# Patient Record
Sex: FEMALE | Race: WHITE | Employment: OTHER | URBAN - METROPOLITAN AREA
[De-identification: names, ages, dates, MRNs, and addresses within clinical notes are randomized per-mention and may not be internally consistent; named-entity substitution may affect disease eponyms.]

---

## 2021-02-22 ENCOUNTER — IMMUNIZATION (OUTPATIENT)
Dept: PRIMARY CARE CLINIC | Age: 66
End: 2021-02-22
Payer: MEDICARE

## 2021-02-22 PROCEDURE — 0001A COVID-19, PFIZER VACCINE 30MCG/0.3ML DOSE: CPT | Performed by: CLINICAL NURSE SPECIALIST

## 2021-02-22 PROCEDURE — 91300 COVID-19, PFIZER VACCINE 30MCG/0.3ML DOSE: CPT | Performed by: CLINICAL NURSE SPECIALIST

## 2021-03-16 ENCOUNTER — IMMUNIZATION (OUTPATIENT)
Dept: PRIMARY CARE CLINIC | Age: 66
End: 2021-03-16
Payer: MEDICARE

## 2021-03-16 PROCEDURE — 0002A COVID-19, PFIZER VACCINE 30MCG/0.3ML DOSE: CPT | Performed by: PHYSICIAN ASSISTANT

## 2021-03-16 PROCEDURE — 91300 COVID-19, PFIZER VACCINE 30MCG/0.3ML DOSE: CPT | Performed by: PHYSICIAN ASSISTANT

## 2022-06-27 ENCOUNTER — HOSPITAL ENCOUNTER (INPATIENT)
Age: 67
LOS: 2 days | Discharge: HOME | DRG: 193 | End: 2022-06-29
Attending: EMERGENCY MEDICINE | Admitting: INTERNAL MEDICINE
Payer: MEDICARE

## 2022-06-27 ENCOUNTER — APPOINTMENT (OUTPATIENT)
Dept: CT IMAGING | Age: 67
DRG: 193 | End: 2022-06-27
Payer: MEDICARE

## 2022-06-27 ENCOUNTER — APPOINTMENT (OUTPATIENT)
Dept: GENERAL RADIOLOGY | Age: 67
DRG: 193 | End: 2022-06-27
Payer: MEDICARE

## 2022-06-27 DIAGNOSIS — J44.1 COPD WITH ACUTE EXACERBATION (HCC): ICD-10-CM

## 2022-06-27 DIAGNOSIS — E87.6 HYPOKALEMIA: ICD-10-CM

## 2022-06-27 DIAGNOSIS — R06.02 SHORTNESS OF BREATH: ICD-10-CM

## 2022-06-27 DIAGNOSIS — J96.01 ACUTE RESPIRATORY FAILURE WITH HYPOXIA (HCC): Primary | ICD-10-CM

## 2022-06-27 DIAGNOSIS — J18.9 PNEUMONIA DUE TO INFECTIOUS ORGANISM, UNSPECIFIED LATERALITY, UNSPECIFIED PART OF LUNG: ICD-10-CM

## 2022-06-27 LAB
ALBUMIN SERPL-MCNC: 3.5 G/DL (ref 3.5–5.2)
ALP BLD-CCNC: 128 U/L (ref 35–104)
ALT SERPL-CCNC: 12 U/L (ref 0–32)
ANION GAP SERPL CALCULATED.3IONS-SCNC: 12 MMOL/L (ref 7–16)
AST SERPL-CCNC: 18 U/L (ref 0–31)
BASOPHILS ABSOLUTE: 0.05 E9/L (ref 0–0.2)
BASOPHILS RELATIVE PERCENT: 0.4 % (ref 0–2)
BILIRUB SERPL-MCNC: 0.3 MG/DL (ref 0–1.2)
BILIRUBIN URINE: NEGATIVE
BLOOD, URINE: NEGATIVE
BUN BLDV-MCNC: 9 MG/DL (ref 6–23)
CALCIUM SERPL-MCNC: 9.5 MG/DL (ref 8.6–10.2)
CHLORIDE BLD-SCNC: 91 MMOL/L (ref 98–107)
CLARITY: CLEAR
CO2: 32 MMOL/L (ref 22–29)
COLOR: YELLOW
CREAT SERPL-MCNC: 0.5 MG/DL (ref 0.5–1)
EKG ATRIAL RATE: 89 BPM
EKG P AXIS: 88 DEGREES
EKG P-R INTERVAL: 136 MS
EKG Q-T INTERVAL: 372 MS
EKG QRS DURATION: 74 MS
EKG QTC CALCULATION (BAZETT): 452 MS
EKG R AXIS: 80 DEGREES
EKG T AXIS: 78 DEGREES
EKG VENTRICULAR RATE: 89 BPM
EOSINOPHILS ABSOLUTE: 0.04 E9/L (ref 0.05–0.5)
EOSINOPHILS RELATIVE PERCENT: 0.3 % (ref 0–6)
GFR AFRICAN AMERICAN: >60
GFR NON-AFRICAN AMERICAN: >60 ML/MIN/1.73
GLUCOSE BLD-MCNC: 133 MG/DL (ref 74–99)
GLUCOSE URINE: NEGATIVE MG/DL
HCT VFR BLD CALC: 43.2 % (ref 34–48)
HEMOGLOBIN: 14.1 G/DL (ref 11.5–15.5)
IMMATURE GRANULOCYTES #: 0.05 E9/L
IMMATURE GRANULOCYTES %: 0.4 % (ref 0–5)
INFLUENZA A BY PCR: NOT DETECTED
INFLUENZA B BY PCR: NOT DETECTED
KETONES, URINE: NEGATIVE MG/DL
LACTIC ACID, SEPSIS: 1 MMOL/L (ref 0.5–1.9)
LACTIC ACID, SEPSIS: 1 MMOL/L (ref 0.5–1.9)
LEUKOCYTE ESTERASE, URINE: NEGATIVE
LIPASE: 16 U/L (ref 13–60)
LYMPHOCYTES ABSOLUTE: 1.59 E9/L (ref 1.5–4)
LYMPHOCYTES RELATIVE PERCENT: 12.5 % (ref 20–42)
MAGNESIUM: 2 MG/DL (ref 1.6–2.6)
MCH RBC QN AUTO: 31.4 PG (ref 26–35)
MCHC RBC AUTO-ENTMCNC: 32.6 % (ref 32–34.5)
MCV RBC AUTO: 96.2 FL (ref 80–99.9)
MONOCYTES ABSOLUTE: 0.61 E9/L (ref 0.1–0.95)
MONOCYTES RELATIVE PERCENT: 4.8 % (ref 2–12)
NEUTROPHILS ABSOLUTE: 10.42 E9/L (ref 1.8–7.3)
NEUTROPHILS RELATIVE PERCENT: 81.6 % (ref 43–80)
NITRITE, URINE: NEGATIVE
PDW BLD-RTO: 13.2 FL (ref 11.5–15)
PH UA: 7 (ref 5–9)
PLATELET # BLD: 408 E9/L (ref 130–450)
PMV BLD AUTO: 9.2 FL (ref 7–12)
POTASSIUM REFLEX MAGNESIUM: 3.3 MMOL/L (ref 3.5–5)
PRO-BNP: 837 PG/ML (ref 0–125)
PROTEIN UA: NEGATIVE MG/DL
RBC # BLD: 4.49 E12/L (ref 3.5–5.5)
SARS-COV-2, NAAT: NOT DETECTED
SODIUM BLD-SCNC: 135 MMOL/L (ref 132–146)
SPECIFIC GRAVITY UA: <=1.005 (ref 1–1.03)
TOTAL PROTEIN: 7.3 G/DL (ref 6.4–8.3)
TROPONIN, HIGH SENSITIVITY: 10 NG/L (ref 0–9)
TROPONIN, HIGH SENSITIVITY: 12 NG/L (ref 0–9)
UROBILINOGEN, URINE: 0.2 E.U./DL
WBC # BLD: 12.8 E9/L (ref 4.5–11.5)

## 2022-06-27 PROCEDURE — 6360000002 HC RX W HCPCS: Performed by: EMERGENCY MEDICINE

## 2022-06-27 PROCEDURE — 6370000000 HC RX 637 (ALT 250 FOR IP): Performed by: STUDENT IN AN ORGANIZED HEALTH CARE EDUCATION/TRAINING PROGRAM

## 2022-06-27 PROCEDURE — 84145 PROCALCITONIN (PCT): CPT

## 2022-06-27 PROCEDURE — 85025 COMPLETE CBC W/AUTO DIFF WBC: CPT

## 2022-06-27 PROCEDURE — 6360000004 HC RX CONTRAST MEDICATION: Performed by: RADIOLOGY

## 2022-06-27 PROCEDURE — 6370000000 HC RX 637 (ALT 250 FOR IP): Performed by: INTERNAL MEDICINE

## 2022-06-27 PROCEDURE — 96375 TX/PRO/DX INJ NEW DRUG ADDON: CPT

## 2022-06-27 PROCEDURE — 83690 ASSAY OF LIPASE: CPT

## 2022-06-27 PROCEDURE — 80053 COMPREHEN METABOLIC PANEL: CPT

## 2022-06-27 PROCEDURE — 1200000000 HC SEMI PRIVATE

## 2022-06-27 PROCEDURE — 83880 ASSAY OF NATRIURETIC PEPTIDE: CPT

## 2022-06-27 PROCEDURE — 99285 EMERGENCY DEPT VISIT HI MDM: CPT

## 2022-06-27 PROCEDURE — 71275 CT ANGIOGRAPHY CHEST: CPT

## 2022-06-27 PROCEDURE — 71045 X-RAY EXAM CHEST 1 VIEW: CPT

## 2022-06-27 PROCEDURE — 99223 1ST HOSP IP/OBS HIGH 75: CPT | Performed by: INTERNAL MEDICINE

## 2022-06-27 PROCEDURE — 84484 ASSAY OF TROPONIN QUANT: CPT

## 2022-06-27 PROCEDURE — 6370000000 HC RX 637 (ALT 250 FOR IP): Performed by: EMERGENCY MEDICINE

## 2022-06-27 PROCEDURE — 93005 ELECTROCARDIOGRAM TRACING: CPT | Performed by: PHYSICIAN ASSISTANT

## 2022-06-27 PROCEDURE — 2580000003 HC RX 258: Performed by: INTERNAL MEDICINE

## 2022-06-27 PROCEDURE — 94640 AIRWAY INHALATION TREATMENT: CPT

## 2022-06-27 PROCEDURE — 36415 COLL VENOUS BLD VENIPUNCTURE: CPT

## 2022-06-27 PROCEDURE — 83605 ASSAY OF LACTIC ACID: CPT

## 2022-06-27 PROCEDURE — 87040 BLOOD CULTURE FOR BACTERIA: CPT

## 2022-06-27 PROCEDURE — 87502 INFLUENZA DNA AMP PROBE: CPT

## 2022-06-27 PROCEDURE — 6360000002 HC RX W HCPCS: Performed by: INTERNAL MEDICINE

## 2022-06-27 PROCEDURE — 94664 DEMO&/EVAL PT USE INHALER: CPT

## 2022-06-27 PROCEDURE — 81003 URINALYSIS AUTO W/O SCOPE: CPT

## 2022-06-27 PROCEDURE — 83735 ASSAY OF MAGNESIUM: CPT

## 2022-06-27 PROCEDURE — 87635 SARS-COV-2 COVID-19 AMP PRB: CPT

## 2022-06-27 PROCEDURE — 96374 THER/PROPH/DIAG INJ IV PUSH: CPT

## 2022-06-27 RX ORDER — ACETAMINOPHEN 325 MG/1
650 TABLET ORAL EVERY 6 HOURS PRN
Status: DISCONTINUED | OUTPATIENT
Start: 2022-06-27 | End: 2022-06-29 | Stop reason: HOSPADM

## 2022-06-27 RX ORDER — HYDRALAZINE HYDROCHLORIDE 20 MG/ML
10 INJECTION INTRAMUSCULAR; INTRAVENOUS ONCE
Status: COMPLETED | OUTPATIENT
Start: 2022-06-27 | End: 2022-06-27

## 2022-06-27 RX ORDER — ONDANSETRON 4 MG/1
4 TABLET, ORALLY DISINTEGRATING ORAL EVERY 8 HOURS PRN
Status: DISCONTINUED | OUTPATIENT
Start: 2022-06-27 | End: 2022-06-29 | Stop reason: HOSPADM

## 2022-06-27 RX ORDER — IPRATROPIUM BROMIDE AND ALBUTEROL SULFATE 2.5; .5 MG/3ML; MG/3ML
1 SOLUTION RESPIRATORY (INHALATION) 4 TIMES DAILY
Status: DISCONTINUED | OUTPATIENT
Start: 2022-06-27 | End: 2022-06-29 | Stop reason: HOSPADM

## 2022-06-27 RX ORDER — LEVOFLOXACIN 5 MG/ML
750 INJECTION, SOLUTION INTRAVENOUS EVERY 24 HOURS
Status: DISCONTINUED | OUTPATIENT
Start: 2022-06-28 | End: 2022-06-29

## 2022-06-27 RX ORDER — ASPIRIN 81 MG/1
81 TABLET ORAL DAILY
COMMUNITY

## 2022-06-27 RX ORDER — LISINOPRIL 10 MG/1
5 TABLET ORAL DAILY
Status: DISCONTINUED | OUTPATIENT
Start: 2022-06-27 | End: 2022-06-29 | Stop reason: HOSPADM

## 2022-06-27 RX ORDER — SODIUM CHLORIDE 0.9 % (FLUSH) 0.9 %
5-40 SYRINGE (ML) INJECTION PRN
Status: DISCONTINUED | OUTPATIENT
Start: 2022-06-27 | End: 2022-06-29 | Stop reason: HOSPADM

## 2022-06-27 RX ORDER — METHYLPREDNISOLONE SODIUM SUCCINATE 125 MG/2ML
125 INJECTION, POWDER, LYOPHILIZED, FOR SOLUTION INTRAMUSCULAR; INTRAVENOUS ONCE
Status: COMPLETED | OUTPATIENT
Start: 2022-06-27 | End: 2022-06-27

## 2022-06-27 RX ORDER — POTASSIUM CHLORIDE 20 MEQ/1
40 TABLET, EXTENDED RELEASE ORAL ONCE
Status: COMPLETED | OUTPATIENT
Start: 2022-06-27 | End: 2022-06-27

## 2022-06-27 RX ORDER — LEVOFLOXACIN 5 MG/ML
750 INJECTION, SOLUTION INTRAVENOUS ONCE
Status: COMPLETED | OUTPATIENT
Start: 2022-06-27 | End: 2022-06-27

## 2022-06-27 RX ORDER — METHYLPREDNISOLONE SODIUM SUCCINATE 40 MG/ML
40 INJECTION, POWDER, LYOPHILIZED, FOR SOLUTION INTRAMUSCULAR; INTRAVENOUS EVERY 12 HOURS
Status: DISCONTINUED | OUTPATIENT
Start: 2022-06-27 | End: 2022-06-29

## 2022-06-27 RX ORDER — FUROSEMIDE 10 MG/ML
40 INJECTION INTRAMUSCULAR; INTRAVENOUS ONCE
Status: COMPLETED | OUTPATIENT
Start: 2022-06-27 | End: 2022-06-27

## 2022-06-27 RX ORDER — SODIUM CHLORIDE 0.9 % (FLUSH) 0.9 %
5-40 SYRINGE (ML) INJECTION EVERY 12 HOURS SCHEDULED
Status: DISCONTINUED | OUTPATIENT
Start: 2022-06-27 | End: 2022-06-29 | Stop reason: HOSPADM

## 2022-06-27 RX ORDER — ACETAMINOPHEN 650 MG/1
650 SUPPOSITORY RECTAL EVERY 6 HOURS PRN
Status: DISCONTINUED | OUTPATIENT
Start: 2022-06-27 | End: 2022-06-29 | Stop reason: HOSPADM

## 2022-06-27 RX ORDER — ONDANSETRON 2 MG/ML
4 INJECTION INTRAMUSCULAR; INTRAVENOUS EVERY 6 HOURS PRN
Status: DISCONTINUED | OUTPATIENT
Start: 2022-06-27 | End: 2022-06-29 | Stop reason: HOSPADM

## 2022-06-27 RX ORDER — LEVOFLOXACIN 5 MG/ML
750 INJECTION, SOLUTION INTRAVENOUS EVERY 24 HOURS
Status: DISCONTINUED | OUTPATIENT
Start: 2022-06-28 | End: 2022-06-27

## 2022-06-27 RX ORDER — POLYETHYLENE GLYCOL 3350 17 G/17G
17 POWDER, FOR SOLUTION ORAL DAILY PRN
Status: DISCONTINUED | OUTPATIENT
Start: 2022-06-27 | End: 2022-06-29 | Stop reason: HOSPADM

## 2022-06-27 RX ORDER — ASCORBIC ACID 500 MG
500 TABLET ORAL DAILY
COMMUNITY

## 2022-06-27 RX ORDER — SODIUM CHLORIDE 9 MG/ML
INJECTION, SOLUTION INTRAVENOUS PRN
Status: DISCONTINUED | OUTPATIENT
Start: 2022-06-27 | End: 2022-06-29 | Stop reason: HOSPADM

## 2022-06-27 RX ORDER — ENOXAPARIN SODIUM 100 MG/ML
30 INJECTION SUBCUTANEOUS DAILY
Status: DISCONTINUED | OUTPATIENT
Start: 2022-06-27 | End: 2022-06-29 | Stop reason: HOSPADM

## 2022-06-27 RX ORDER — ASPIRIN 81 MG/1
81 TABLET ORAL DAILY
Status: DISCONTINUED | OUTPATIENT
Start: 2022-06-28 | End: 2022-06-29 | Stop reason: HOSPADM

## 2022-06-27 RX ORDER — IPRATROPIUM BROMIDE AND ALBUTEROL SULFATE 2.5; .5 MG/3ML; MG/3ML
1 SOLUTION RESPIRATORY (INHALATION) ONCE
Status: COMPLETED | OUTPATIENT
Start: 2022-06-27 | End: 2022-06-27

## 2022-06-27 RX ADMIN — LISINOPRIL 5 MG: 10 TABLET ORAL at 19:39

## 2022-06-27 RX ADMIN — FUROSEMIDE 40 MG: 10 INJECTION, SOLUTION INTRAMUSCULAR; INTRAVENOUS at 19:40

## 2022-06-27 RX ADMIN — METHYLPREDNISOLONE SODIUM SUCCINATE 40 MG: 40 INJECTION, POWDER, LYOPHILIZED, FOR SOLUTION INTRAMUSCULAR; INTRAVENOUS at 19:39

## 2022-06-27 RX ADMIN — IOPAMIDOL 60 ML: 755 INJECTION, SOLUTION INTRAVENOUS at 15:42

## 2022-06-27 RX ADMIN — HYDRALAZINE HYDROCHLORIDE 10 MG: 20 INJECTION, SOLUTION INTRAMUSCULAR; INTRAVENOUS at 15:29

## 2022-06-27 RX ADMIN — POTASSIUM CHLORIDE 40 MEQ: 1500 TABLET, EXTENDED RELEASE ORAL at 16:21

## 2022-06-27 RX ADMIN — IPRATROPIUM BROMIDE AND ALBUTEROL SULFATE 1 AMPULE: .5; 2.5 SOLUTION RESPIRATORY (INHALATION) at 13:51

## 2022-06-27 RX ADMIN — ENOXAPARIN SODIUM 30 MG: 100 INJECTION SUBCUTANEOUS at 19:40

## 2022-06-27 RX ADMIN — LEVOFLOXACIN 750 MG: 5 INJECTION, SOLUTION INTRAVENOUS at 16:41

## 2022-06-27 RX ADMIN — IPRATROPIUM BROMIDE AND ALBUTEROL SULFATE 1 AMPULE: .5; 2.5 SOLUTION RESPIRATORY (INHALATION) at 20:11

## 2022-06-27 RX ADMIN — POLYETHYLENE GLYCOL 3350 17 G: 17 POWDER, FOR SOLUTION ORAL at 19:39

## 2022-06-27 RX ADMIN — METHYLPREDNISOLONE SODIUM SUCCINATE 125 MG: 125 INJECTION, POWDER, FOR SOLUTION INTRAMUSCULAR; INTRAVENOUS at 14:43

## 2022-06-27 RX ADMIN — SODIUM CHLORIDE, PRESERVATIVE FREE 10 ML: 5 INJECTION INTRAVENOUS at 20:43

## 2022-06-27 ASSESSMENT — ENCOUNTER SYMPTOMS
CHEST TIGHTNESS: 0
VOMITING: 0
CONSTIPATION: 1
NAUSEA: 0
EYE REDNESS: 0
SORE THROAT: 0
WHEEZING: 0
COUGH: 0
SHORTNESS OF BREATH: 1
ABDOMINAL PAIN: 0
DIARRHEA: 0
RHINORRHEA: 0

## 2022-06-27 NOTE — H&P
[facility] History and Physical          ASSESSMENT:      Principal Problem:    Acute respiratory failure with hypoxia (HCC)  Resolved Problems:    * No resolved hospital problems. *    PLAN:    1. COPD   No history of COPD. Heavy smoker for majority of her life, cut down to 1 pack every 3-4 days. Has not smoked in 3 weeks. Now requiring 3 L via NC. No PCP for last 10 years. No definitive diagnosis of COPD   Pulmonary consult   Solu-medrol 40 mg IV Q12 hours   Albuterol nebulizers PRN     2. Pneumonia   History of \"bronchitis\" yearly which she treats herself. No history of pneumonia. CT chest suggestive of pneumonia with hypoxia. Continue with Levaquin     3. Hypertension   No history of HTN  Does not take any home medications   Will start on Lisinopril 5 mg po daily     4. Congestive heart failure   BNP > 800   Multifactorial   Will order Echo and give Lasix 40 mg IV x 1   CBC/CMP daily     Code Status: FULL   DVT prophylaxis: Aspirin  Discharge dispo: home     CHIEF COMPLAINT:  Shortness of breath, lightheadedness     History of Present Illness: This is a 69year old female with no past medical history. She has not seen a PCP in 10 years but does get routine mammograms. She takes Aspirin 81 mg po daily but otherwise no medications. Surgical history includes two back surgeries at [facility] last in 2002. She notes that every year she gets bronchitis and treats with over the counter medications. She started feeling unwell three weeks ago with cough and shortness of breath but did not feel better after taking over the counter medications. She presented to the ER today after grocery shopping because she was lightheaded and got anxious. In ER, CXR showed COPD. CT chest showed airspace opacities in left lung base suggesting pneumonia with atelectasis and filling defects in posterior segmental bronchi or right and left lower lobes.  She is requiring 3 L via NC due to hypoxia with SOB. Also of note, has hypertension for which she has never had problems or taken medications. COVID and influenza negative. Was given one dose of hydralazine and Levaquin in ER. REVIEW OF SYSTEMS:  A comprehensive review of systems was negative except for: what is in the HPI    PMH:  History reviewed. No pertinent past medical history. Surgical History:  Past Surgical History:   Procedure Laterality Date    BACK SURGERY         Medications Prior to Admission:    Prior to Admission medications    Medication Sig Start Date End Date Taking? Authorizing Provider   aspirin 81 MG EC tablet Take 81 mg by mouth daily   Yes Historical Provider, MD   vitamin C (ASCORBIC ACID) 500 MG tablet Take 500 mg by mouth daily   Yes Historical Provider, MD       Allergies:    Pcn [penicillins]    Social History:    reports that she has been smoking cigarettes. She has been smoking about 0.25 packs per day. She has never used smokeless tobacco. She reports previous alcohol use. She reports previous drug use. Has not smoked in 3 weeks. Prior to that was smoking 1 pack every 3-4 days. Family History:   family history is not on file.    None to patients knowledge       PHYSICAL EXAM:  Vitals:  BP (!) 169/89   Pulse 93   Temp 97.6 °F (36.4 °C) (Temporal)   Resp 20   Ht 5' 8\" (1.727 m)   Wt 102 lb (46.3 kg)   SpO2 96%   BMI 15.51 kg/m²     General Appearance: chronically ill appearing, frail, thin, mild respiratory distress  Skin: warm and dry  Head: normocephalic and atraumatic  Eyes: pupils equal, round, and reactive to light, extraocular eye movements intact, conjunctivae normal  Neck: neck supple and non tender without mass   Pulmonary/Chest: mild respiratory distress, crackles and wheezing to bilateral posterior fields   Cardiovascular: normal rate, normal S1 and S2 and no carotid bruits  Abdomen: soft, non-tender, non-distended, normal bowel sounds, no masses or organomegaly  Extremities: no cyanosis, no clubbing and no edema  Neurologic: no cranial nerve deficit and speech normal    LABS:  Recent Labs     06/27/22  1308      K 3.3*   CL 91*   CO2 32*   BUN 9   CREATININE 0.5   GLUCOSE 133*   CALCIUM 9.5       Recent Labs     06/27/22  1308   WBC 12.8*   RBC 4.49   HGB 14.1   HCT 43.2   MCV 96.2   MCH 31.4   MCHC 32.6   RDW 13.2      MPV 9.2       No results for input(s): POCGLU in the last 72 hours. Radiology:   CTA PULMONARY W CONTRAST   Final Result   1. Airspace opacities in left lung base notable in posterior left lower lobe   suggesting pneumonia with atelectasis. 2. Filling defects in posterior segmental bronchi of right and left lower   lobes more notable on the left with bronchial stenosis and occlusion. 3. No evidence of pulmonary embolism. 4. Emphysematous changes. XR CHEST PORTABLE   Final Result   Mild biapical and left basilar opacities, suspicious for early infiltrates. COPD. EKG: reviewed 6/27 - normal     NOTE: This report was transcribed using voice recognition software. Every effort was made to ensure accuracy; however, inadvertent computerized transcription errors may be present.   Electronically signed by REGI Bravo NP on 6/27/2022 at 4:51 PM

## 2022-06-27 NOTE — ED NOTES
Department of Emergency Medicine    FIRST PROVIDER TRIAGE NOTE             Independent MLP           6/27/22  12:16 PM EDT    Date of Encounter: 6/27/22   MRN: [id number]    There were no vitals filed for this visit. HPI: Lukas Cheatham is a 69 y.o. female who presents to the ED for Shortness of Breath ( x 3 weeks. 81% RA) and Constipation  Occasional chest pain   Last BM 3 days ago     88% room air during triage  2L oxygen - now 95%     ROS: Negative for fever or urinary complaints. Physical Exam:   Gen Appearance/Constitutional: alert  CV: regular rate  Pulm: abnormal breath sounds auscultated     Initial Plan of Care: All treatment areas with department are currently occupied. Plan to order/Initiate the following while awaiting opening in ED: labs, EKG and imaging studies.     Initial Plan of Care: Initiate Treatment-Testing, Proceed toTreatment Area When Bed Available for ED Attending/MLP to Continue Care    Electronically signed by Sindhu Beltran PA-C   DD: 6/27/22       Sindhu Beltran PA-C  06/27/22 [id number]

## 2022-06-27 NOTE — PROGRESS NOTES
Admission database completed to best of this RN's ability. Care plan and education initiated. Pt from home alone. Denies any DME or [place] [id number] services prior to admission.

## 2022-06-27 NOTE — ED PROVIDER NOTES
78-year-old female to the ER with a past medical history of possible COPD presents with complaints of shortness of breath for the last 3 weeks. She states that she normally gets bronchitis once a year. Her symptoms started early June and she has been treating it with over-the-counter medications. However her symptoms have not improved. Shortness of breath is persistent but has not worsened. Shortness of breath is worse with exertion and relieved by rest.  She also complains of a dry cough currently but states that a week ago she had a productive cough with increased sputum purulence. She denies orthopnea and PND. She is a previous tobacco smoker and quit last month. Previously smoked 1 pack every 3 to 4 days. She does not use any inhalers at this time. She also complains of constipation and states that her last bowel movement was 2 days ago. She denies any bloody stools. Denies any chest pain, abdominal pain, nausea, vomiting, urination changes. The history is provided by the patient and medical records. Review of Systems   Constitutional: Negative for activity change, chills, fatigue and fever. HENT: Negative for rhinorrhea, sneezing and sore throat. Eyes: Negative for redness. Respiratory: Positive for shortness of breath. Negative for cough, chest tightness and wheezing. Cardiovascular: Negative for chest pain, palpitations and leg swelling. Gastrointestinal: Positive for constipation. Negative for abdominal pain, diarrhea, nausea and vomiting. Genitourinary: Negative for decreased urine volume, dysuria, flank pain, frequency, hematuria and urgency. Musculoskeletal: Negative for arthralgias and myalgias. Neurological: Negative for dizziness, syncope, weakness and headaches. Psychiatric/Behavioral: Negative for agitation. The patient is not nervous/anxious. Physical Exam  Vitals and nursing note reviewed. Constitutional:       Appearance: She is well-developed.  She is ill-appearing. She is not diaphoretic. HENT:      Head: Normocephalic and atraumatic. Eyes:      Pupils: Pupils are equal, round, and reactive to light. Cardiovascular:      Rate and Rhythm: Regular rhythm. Tachycardia present. Pulmonary:      Effort: No tachypnea or accessory muscle usage. Breath sounds: Examination of the left-middle field reveals decreased breath sounds. Examination of the left-lower field reveals decreased breath sounds. Decreased breath sounds and rales present. No wheezing. Abdominal:      Palpations: Abdomen is soft. Tenderness: There is no abdominal tenderness. There is no guarding or rebound. Skin:     General: Skin is warm. Capillary Refill: Capillary refill takes less than 2 seconds. Neurological:      Mental Status: She is alert and oriented to person, place, and time. Psychiatric:         Mood and Affect: Mood normal.        Procedures     MDM  Number of Diagnoses or Management Options  Diagnosis management comments: 68-year-old female to the ER with a past medical history of possible COPD presents with complaints of shortness of breath for the last 3 weeks. CTA pulmonary with contrast showed airspace opacities in left lung base in the posterior left lower lobe. Patient was treated with oxygen, duo nebs and Solu-Medrol. Due to penicillin allergy, patient will be treated with Levaquin. Patient reported that she has diaphoresis when she had penicillin in the past.  This was discussed with hospitalist.  Patient was admitted for further treatment of pneumonia.        ED Course as of 06/27/22 1623 Mon Jun 27, 2022   1327 EKG 12 Lead  Vent rate 89  Normal sinus rhythm  Right atrial enlargement  Moderate voltage criteria for LVH, may be normal variant [SD]      ED Course User Index  [SD] Lorena Bunn MD     ED Course as of 06/27/22 1623   Mon Jun 27, 2022   1327 EKG 12 Lead  Vent rate 89  Normal sinus rhythm  Right atrial enlargement  Moderate voltage criteria for LVH, may be normal variant [SD]      ED Course User Index  [SD] Brandie Villa MD       --------------------------------------------- PAST HISTORY ---------------------------------------------  Past Medical History:  has no past medical history on file. Past Surgical History:  has no past surgical history on file. Social History:      Family History: family history is not on file. The patients home medications have been reviewed.     Allergies: Pcn [penicillins]    -------------------------------------------------- RESULTS -------------------------------------------------    LABS:  Results for orders placed or performed during the hospital encounter of 06/27/22   COVID-19, Rapid    Specimen: Nasopharyngeal Swab   Result Value Ref Range    SARS-CoV-2, NAAT Not Detected Not Detected   RAPID INFLUENZA A/B ANTIGENS    Specimen: Nasopharyngeal   Result Value Ref Range    Influenza A by PCR Not Detected Not Detected    Influenza B by PCR Not Detected Not Detected   CBC with Auto Differential   Result Value Ref Range    WBC 12.8 (H) 4.5 - 11.5 E9/L    RBC 4.49 3.50 - 5.50 E12/L    Hemoglobin 14.1 11.5 - 15.5 g/dL    Hematocrit 43.2 34.0 - 48.0 %    MCV 96.2 80.0 - 99.9 fL    MCH 31.4 26.0 - 35.0 pg    MCHC 32.6 32.0 - 34.5 %    RDW 13.2 11.5 - 15.0 fL    Platelets 795 524 - 178 E9/L    MPV 9.2 7.0 - 12.0 fL    Neutrophils % 81.6 (H) 43.0 - 80.0 %    Immature Granulocytes % 0.4 0.0 - 5.0 %    Lymphocytes % 12.5 (L) 20.0 - 42.0 %    Monocytes % 4.8 2.0 - 12.0 %    Eosinophils % 0.3 0.0 - 6.0 %    Basophils % 0.4 0.0 - 2.0 %    Neutrophils Absolute 10.42 (H) 1.80 - 7.30 E9/L    Immature Granulocytes # 0.05 E9/L    Lymphocytes Absolute 1.59 1.50 - 4.00 E9/L    Monocytes Absolute 0.61 0.10 - 0.95 E9/L    Eosinophils Absolute 0.04 (L) 0.05 - 0.50 E9/L    Basophils Absolute 0.05 0.00 - 0.20 E9/L   Comprehensive Metabolic Panel w/ Reflex to MG   Result Value Ref Range    Sodium 135 132 - 146 mmol/L Potassium reflex Magnesium 3.3 (L) 3.5 - 5.0 mmol/L    Chloride 91 (L) 98 - 107 mmol/L    CO2 32 (H) 22 - 29 mmol/L    Anion Gap 12 7 - 16 mmol/L    Glucose 133 (H) 74 - 99 mg/dL    BUN 9 6 - 23 mg/dL    CREATININE 0.5 0.5 - 1.0 mg/dL    GFR Non-African American >60 >=60 mL/min/1.73    GFR African American >60     Calcium 9.5 8.6 - 10.2 mg/dL    Total Protein 7.3 6.4 - 8.3 g/dL    Albumin 3.5 3.5 - 5.2 g/dL    Total Bilirubin 0.3 0.0 - 1.2 mg/dL    Alkaline Phosphatase 128 (H) 35 - 104 U/L    ALT 12 0 - 32 U/L    AST 18 0 - 31 U/L   Troponin   Result Value Ref Range    Troponin, High Sensitivity 10 (H) 0 - 9 ng/L   Brain Natriuretic Peptide   Result Value Ref Range    Pro- (H) 0 - 125 pg/mL   Lactate, Sepsis   Result Value Ref Range    Lactic Acid, Sepsis 1.0 0.5 - 1.9 mmol/L   Lipase   Result Value Ref Range    Lipase 16 13 - 60 U/L   Urinalysis   Result Value Ref Range    Color, UA Yellow Straw/Yellow    Clarity, UA Clear Clear    Glucose, Ur Negative Negative mg/dL    Bilirubin Urine Negative Negative    Ketones, Urine Negative Negative mg/dL    Specific Gravity, UA <=1.005 1.005 - 1.030    Blood, Urine Negative Negative    pH, UA 7.0 5.0 - 9.0    Protein, UA Negative Negative mg/dL    Urobilinogen, Urine 0.2 <2.0 E.U./dL    Nitrite, Urine Negative Negative    Leukocyte Esterase, Urine Negative Negative   EKG 12 Lead   Result Value Ref Range    Ventricular Rate 89 BPM    Atrial Rate 89 BPM    P-R Interval 136 ms    QRS Duration 74 ms    Q-T Interval 372 ms    QTc Calculation (Bazett) 452 ms    P Axis 88 degrees    R Axis 80 degrees    T Axis 78 degrees       RADIOLOGY:  CTA PULMONARY W CONTRAST   Final Result   1. Airspace opacities in left lung base notable in posterior left lower lobe   suggesting pneumonia with atelectasis. 2. Filling defects in posterior segmental bronchi of right and left lower   lobes more notable on the left with bronchial stenosis and occlusion.    3. No evidence of pulmonary embolism. 4. Emphysematous changes. XR CHEST PORTABLE   Final Result   Mild biapical and left basilar opacities, suspicious for early infiltrates. COPD. EKG:  This EKG is signed and interpreted by me. Rate: 89  Rhythm: NSR  Interpretation: Right atrial enlargement, moderate voltage criteria for LVH  Comparison: no previous EKG available      ------------------------- NURSING NOTES AND VITALS REVIEWED ---------------------------  Date / Time Roomed:  6/27/2022  1:00 PM  ED Bed Assignment:  02/02    The nursing notes within the ED encounter and vital signs as below have been reviewed. Patient Vitals for the past 24 hrs:   BP Temp Temp src Pulse Resp SpO2 Height Weight   06/27/22 1531 (!) 169/79 -- -- 82 -- 97 % -- --   06/27/22 1528 (!) 179/79 -- -- 90 16 96 % -- --   06/27/22 1445 (!) 208/99 -- -- 88 16 95 % -- --   06/27/22 1351 -- -- -- -- -- 98 % -- --   06/27/22 1330 (!) 191/91 -- -- 89 16 96 % -- --   06/27/22 1217 (!) 198/116 97.6 °F (36.4 °C) Temporal (!) 102 16 95 % 5' 8\" (1.727 m) 102 lb (46.3 kg)       Oxygen Saturation Interpretation: Normal    ------------------------------------------ PROGRESS NOTES ------------------------------------------  Re-evaluation(s):  Time: 4:00 PM  Patients symptoms are improving  Repeat physical examination is improving    Counseling:  I have spoken with the patient and discussed todays results, in addition to providing specific details for the plan of care and counseling regarding the diagnosis and prognosis. Their questions are answered at this time and they are agreeable with the plan of admission.    --------------------------------- ADDITIONAL PROVIDER NOTES ---------------------------------  Consultations:  Time: 4:20 PM. Spoke with Dr. Aracelis Gunn. Discussed case. They will admit the patient.   This patient's ED course included: a personal history and physicial examination, re-evaluation prior to disposition, multiple bedside re-evaluations and IV medications    This patient has remained hemodynamically stable during their ED course. Diagnosis:  1. Acute respiratory failure with hypoxia ([address]    2. Pneumonia due to infectious organism, unspecified laterality, unspecified part of lung    3. Shortness of breath    4. Hypokalemia    5. COPD with acute exacerbation ([facility] [address]        Disposition:  Patient's disposition: Admit Patient's condition is stable. Harry Conrad MD  Resident  06/27/22 1624    ATTENDING PROVIDER ATTESTATION:     Ilda Bonilla presented to the emergency department for evaluation of Shortness of Breath ( x 3 weeks. 81% RA) and Constipation    I have reviewed and discussed the case, including pertinent history (medical, surgical, family and social) and exam findings with the Resident and the Nurse assigned to Ilda Bonilla. I have personally performed and/or participated in the history, exam, medical decision making, and procedures and agree with all pertinent clinical information. I have reviewed my findings and recommendations with Ilda Bonilla and members of family present at the time of disposition. Oxygen Saturation Interpretation: Abnormal    The cardiac monitor revealed NSR with a heart rate in the 100s as interpreted by me. The cardiac monitor was ordered secondary to the patient's heart rate and to monitor the patient for dysrhythmia. CPT [id number]      Sepsis Re-examination  6/27/22   1600PM EDT          Vital Signs:   Vitals:    06/29/22 0023 06/29/22 0900 06/29/22 1155 06/29/22 1239   BP:  (!) 119/59     Pulse:  90 (!) 106 (!) 106   Resp:  20 20    Temp:  98 °F (36.7 °C)     TempSrc:  Oral     SpO2:  94% 92%    Weight: 97 lb (44 kg)      Height:         Card/Pulm:  Rhythm: rapid rate. Heart Sounds: Normal S1, S2. decreased breath sounds. Capillary Refill: normal.  Radial Pulse:  present 2+. Skin:  Warm.     The patients available past medical records and past encounters were reviewed. MDM:     I, Dr. John Hernandez am the primary provider of record    Please note that the withdrawal or failure to initiate urgent interventions for this patient would likely result in a life threatening deterioration or permanent disability. Accordingly this patient received 32 minutes of critical care time, excluding separately billable procedures. My findings/plan: The primary encounter diagnosis was Acute respiratory failure with hypoxia ([address] Diagnoses of Pneumonia due to infectious organism, unspecified laterality, unspecified part of lung, Shortness of breath, Hypokalemia, and COPD with acute exacerbation ([address] were also pertinent to this visit.     DO Gordy Cuevas DO  06/29/22 2014

## 2022-06-28 LAB
ADENOVIRUS BY PCR: NOT DETECTED
ALBUMIN SERPL-MCNC: 3.2 G/DL (ref 3.5–5.2)
ALP BLD-CCNC: 120 U/L (ref 35–104)
ALT SERPL-CCNC: 12 U/L (ref 0–32)
ANION GAP SERPL CALCULATED.3IONS-SCNC: 12 MMOL/L (ref 7–16)
AST SERPL-CCNC: 16 U/L (ref 0–31)
BASOPHILS ABSOLUTE: 0.01 E9/L (ref 0–0.2)
BASOPHILS RELATIVE PERCENT: 0.1 % (ref 0–2)
BILIRUB SERPL-MCNC: 0.3 MG/DL (ref 0–1.2)
BORDETELLA PARAPERTUSSIS BY PCR: NOT DETECTED
BORDETELLA PERTUSSIS BY PCR: NOT DETECTED
BUN BLDV-MCNC: 17 MG/DL (ref 6–23)
CALCIUM SERPL-MCNC: 9.4 MG/DL (ref 8.6–10.2)
CHLAMYDOPHILIA PNEUMONIAE BY PCR: NOT DETECTED
CHLORIDE BLD-SCNC: 96 MMOL/L (ref 98–107)
CO2: 30 MMOL/L (ref 22–29)
CORONAVIRUS 229E BY PCR: NOT DETECTED
CORONAVIRUS HKU1 BY PCR: NOT DETECTED
CORONAVIRUS NL63 BY PCR: NOT DETECTED
CORONAVIRUS OC43 BY PCR: NOT DETECTED
CREAT SERPL-MCNC: 0.6 MG/DL (ref 0.5–1)
EOSINOPHILS ABSOLUTE: 0 E9/L (ref 0.05–0.5)
EOSINOPHILS RELATIVE PERCENT: 0 % (ref 0–6)
GFR AFRICAN AMERICAN: >60
GFR NON-AFRICAN AMERICAN: >60 ML/MIN/1.73
GLUCOSE BLD-MCNC: 126 MG/DL (ref 74–99)
HCT VFR BLD CALC: 42.8 % (ref 34–48)
HEMOGLOBIN: 14 G/DL (ref 11.5–15.5)
HUMAN METAPNEUMOVIRUS BY PCR: NOT DETECTED
HUMAN RHINOVIRUS/ENTEROVIRUS BY PCR: NOT DETECTED
IMMATURE GRANULOCYTES #: 0.05 E9/L
IMMATURE GRANULOCYTES %: 0.5 % (ref 0–5)
INFLUENZA A BY PCR: NOT DETECTED
INFLUENZA B BY PCR: NOT DETECTED
LYMPHOCYTES ABSOLUTE: 0.88 E9/L (ref 1.5–4)
LYMPHOCYTES RELATIVE PERCENT: 9.6 % (ref 20–42)
MCH RBC QN AUTO: 31 PG (ref 26–35)
MCHC RBC AUTO-ENTMCNC: 32.7 % (ref 32–34.5)
MCV RBC AUTO: 94.9 FL (ref 80–99.9)
MONOCYTES ABSOLUTE: 0.24 E9/L (ref 0.1–0.95)
MONOCYTES RELATIVE PERCENT: 2.6 % (ref 2–12)
MYCOPLASMA PNEUMONIAE BY PCR: NOT DETECTED
NEUTROPHILS ABSOLUTE: 8.03 E9/L (ref 1.8–7.3)
NEUTROPHILS RELATIVE PERCENT: 87.2 % (ref 43–80)
PARAINFLUENZA VIRUS 1 BY PCR: NOT DETECTED
PARAINFLUENZA VIRUS 2 BY PCR: NOT DETECTED
PARAINFLUENZA VIRUS 3 BY PCR: NOT DETECTED
PARAINFLUENZA VIRUS 4 BY PCR: NOT DETECTED
PDW BLD-RTO: 13.2 FL (ref 11.5–15)
PLATELET # BLD: 446 E9/L (ref 130–450)
PMV BLD AUTO: 9.2 FL (ref 7–12)
POTASSIUM SERPL-SCNC: 4.3 MMOL/L (ref 3.5–5)
PROCALCITONIN: 0.16 NG/ML (ref 0–0.08)
RBC # BLD: 4.51 E12/L (ref 3.5–5.5)
RESPIRATORY SYNCYTIAL VIRUS BY PCR: NOT DETECTED
SARS-COV-2, PCR: NOT DETECTED
SODIUM BLD-SCNC: 138 MMOL/L (ref 132–146)
TOTAL PROTEIN: 7.5 G/DL (ref 6.4–8.3)
WBC # BLD: 9.2 E9/L (ref 4.5–11.5)

## 2022-06-28 PROCEDURE — 0202U NFCT DS 22 TRGT SARS-COV-2: CPT

## 2022-06-28 PROCEDURE — 6360000002 HC RX W HCPCS: Performed by: NURSE PRACTITIONER

## 2022-06-28 PROCEDURE — 6370000000 HC RX 637 (ALT 250 FOR IP): Performed by: INTERNAL MEDICINE

## 2022-06-28 PROCEDURE — 85025 COMPLETE CBC W/AUTO DIFF WBC: CPT

## 2022-06-28 PROCEDURE — 99232 SBSQ HOSP IP/OBS MODERATE 35: CPT | Performed by: INTERNAL MEDICINE

## 2022-06-28 PROCEDURE — 36415 COLL VENOUS BLD VENIPUNCTURE: CPT

## 2022-06-28 PROCEDURE — 82785 ASSAY OF IGE: CPT

## 2022-06-28 PROCEDURE — 2700000000 HC OXYGEN THERAPY PER DAY

## 2022-06-28 PROCEDURE — 80053 COMPREHEN METABOLIC PANEL: CPT

## 2022-06-28 PROCEDURE — 87449 NOS EACH ORGANISM AG IA: CPT

## 2022-06-28 PROCEDURE — 82103 ALPHA-1-ANTITRYPSIN TOTAL: CPT

## 2022-06-28 PROCEDURE — 82787 IGG 1 2 3 OR 4 EACH: CPT

## 2022-06-28 PROCEDURE — 94640 AIRWAY INHALATION TREATMENT: CPT

## 2022-06-28 PROCEDURE — 2580000003 HC RX 258: Performed by: INTERNAL MEDICINE

## 2022-06-28 PROCEDURE — 86255 FLUORESCENT ANTIBODY SCREEN: CPT

## 2022-06-28 PROCEDURE — 6360000002 HC RX W HCPCS: Performed by: INTERNAL MEDICINE

## 2022-06-28 PROCEDURE — 82784 ASSAY IGA/IGD/IGG/IGM EACH: CPT

## 2022-06-28 PROCEDURE — 1200000000 HC SEMI PRIVATE

## 2022-06-28 PROCEDURE — 86038 ANTINUCLEAR ANTIBODIES: CPT

## 2022-06-28 RX ORDER — SENNA PLUS 8.6 MG/1
1 TABLET ORAL NIGHTLY
Status: DISCONTINUED | OUTPATIENT
Start: 2022-06-28 | End: 2022-06-29 | Stop reason: HOSPADM

## 2022-06-28 RX ORDER — BUDESONIDE 0.25 MG/2ML
250 INHALANT ORAL 2 TIMES DAILY
Status: DISCONTINUED | OUTPATIENT
Start: 2022-06-28 | End: 2022-06-29 | Stop reason: HOSPADM

## 2022-06-28 RX ORDER — LANOLIN ALCOHOL/MO/W.PET/CERES
6 CREAM (GRAM) TOPICAL NIGHTLY PRN
Status: DISCONTINUED | OUTPATIENT
Start: 2022-06-28 | End: 2022-06-28 | Stop reason: CLARIF

## 2022-06-28 RX ORDER — ARFORMOTEROL TARTRATE 15 UG/2ML
15 SOLUTION RESPIRATORY (INHALATION) 2 TIMES DAILY
Status: DISCONTINUED | OUTPATIENT
Start: 2022-06-28 | End: 2022-06-29 | Stop reason: HOSPADM

## 2022-06-28 RX ORDER — LANOLIN ALCOHOL/MO/W.PET/CERES
6 CREAM (GRAM) TOPICAL NIGHTLY PRN
Status: DISCONTINUED | OUTPATIENT
Start: 2022-06-28 | End: 2022-06-29 | Stop reason: HOSPADM

## 2022-06-28 RX ADMIN — Medication 6 MG: at 20:41

## 2022-06-28 RX ADMIN — ARFORMOTEROL TARTRATE 15 MCG: 15 SOLUTION RESPIRATORY (INHALATION) at 19:24

## 2022-06-28 RX ADMIN — ENOXAPARIN SODIUM 30 MG: 100 INJECTION SUBCUTANEOUS at 09:40

## 2022-06-28 RX ADMIN — SODIUM CHLORIDE, PRESERVATIVE FREE 10 ML: 5 INJECTION INTRAVENOUS at 17:25

## 2022-06-28 RX ADMIN — LEVOFLOXACIN 750 MG: 5 INJECTION, SOLUTION INTRAVENOUS at 17:32

## 2022-06-28 RX ADMIN — IPRATROPIUM BROMIDE AND ALBUTEROL SULFATE 1 AMPULE: .5; 2.5 SOLUTION RESPIRATORY (INHALATION) at 15:58

## 2022-06-28 RX ADMIN — SENNOSIDES 8.6 MG: 8.6 TABLET, FILM COATED ORAL at 20:39

## 2022-06-28 RX ADMIN — BUDESONIDE 250 MCG: 0.25 SUSPENSION RESPIRATORY (INHALATION) at 12:33

## 2022-06-28 RX ADMIN — Medication 6 MG: at 01:04

## 2022-06-28 RX ADMIN — IPRATROPIUM BROMIDE AND ALBUTEROL SULFATE 1 AMPULE: .5; 2.5 SOLUTION RESPIRATORY (INHALATION) at 12:33

## 2022-06-28 RX ADMIN — POLYETHYLENE GLYCOL 3350 17 G: 17 POWDER, FOR SOLUTION ORAL at 12:46

## 2022-06-28 RX ADMIN — IPRATROPIUM BROMIDE AND ALBUTEROL SULFATE 1 AMPULE: .5; 2.5 SOLUTION RESPIRATORY (INHALATION) at 08:44

## 2022-06-28 RX ADMIN — BUDESONIDE 250 MCG: 0.25 SUSPENSION RESPIRATORY (INHALATION) at 19:24

## 2022-06-28 RX ADMIN — METHYLPREDNISOLONE SODIUM SUCCINATE 40 MG: 40 INJECTION, POWDER, LYOPHILIZED, FOR SOLUTION INTRAMUSCULAR; INTRAVENOUS at 17:25

## 2022-06-28 RX ADMIN — SODIUM CHLORIDE, PRESERVATIVE FREE 5 ML: 5 INJECTION INTRAVENOUS at 09:41

## 2022-06-28 RX ADMIN — SODIUM CHLORIDE, PRESERVATIVE FREE 10 ML: 5 INJECTION INTRAVENOUS at 20:39

## 2022-06-28 RX ADMIN — ARFORMOTEROL TARTRATE 15 MCG: 15 SOLUTION RESPIRATORY (INHALATION) at 12:33

## 2022-06-28 RX ADMIN — LISINOPRIL 5 MG: 10 TABLET ORAL at 09:40

## 2022-06-28 RX ADMIN — IPRATROPIUM BROMIDE AND ALBUTEROL SULFATE 1 AMPULE: .5; 2.5 SOLUTION RESPIRATORY (INHALATION) at 19:24

## 2022-06-28 RX ADMIN — METHYLPREDNISOLONE SODIUM SUCCINATE 40 MG: 40 INJECTION, POWDER, LYOPHILIZED, FOR SOLUTION INTRAMUSCULAR; INTRAVENOUS at 04:43

## 2022-06-28 RX ADMIN — ASPIRIN 81 MG: 81 TABLET, COATED ORAL at 09:40

## 2022-06-28 NOTE — PROGRESS NOTES
Pt request medication to help her sleep. Notified Dr. Cara Edmondson. See new order.       Electronically signed by Vinod Balbuena RN on 6/28/2022 at 12:47 AM

## 2022-06-28 NOTE — PROGRESS NOTES
[facility] Progress Note    Admitting Date and Time: 6/27/2022  1:00 PM  Admit Dx: Shortness of breath [R06.02]  Hypokalemia [E87.6]  COPD with acute exacerbation (HCC) [J44.1]  Acute respiratory failure with hypoxia (HCC) [J96.01]  Pneumonia due to infectious organism, unspecified laterality, unspecified part of lung [J18.9]    Assessment:    Principal Problem:    Acute respiratory failure with hypoxia ([address]  Active Problems:    COPD with acute exacerbation (HCC)    Pneumonia due to infectious organism    Uncontrolled hypertension  Resolved Problems:    * No resolved hospital problems. *                                    Plan:  1. COPD   No history of COPD. Heavy smoker for majority of her life, cut down to 1 pack every 3-4 days. Has not smoked in 3 weeks. Educated on tobacco cessation and refusing nicotine patch   Wean oxygen as tolerated - down to 1 L via NC   No PCP for last 10 years. No definitive diagnosis of COPD   Pulmonary consult   Solu-medrol 40 mg IV Q12 hours   Albuterol nebulizers PRN      2. Pneumonia   History of \"bronchitis\" yearly which she treats herself. No history of pneumonia. CT chest suggestive of pneumonia with hypoxia. Continue with Levaquin     3. Hypertension   No history of HTN, family history present   Does not take any home medications   Continue Lisinopril 5 mg po daily      4. Congestive heart failure   BNP > 800   Multifactorial   Lasix x 1 given   Follow Echo   CBC/CMP daily     5.  Constipation   Senokot added   Encourage Po intake      Code Status: FULL   DVT prophylaxis: Aspirin  Discharge dispo: home     Subjective:  Patient is being followed for Shortness of breath [R06.02]  Hypokalemia [E87.6]  COPD with acute exacerbation ([address] [J44.1]  Acute respiratory failure with hypoxia ([facility] [address] [J96.01]  Pneumonia due to infectious organism, unspecified laterality, unspecified part of lung [J18.9]     Seen at bedside this AM. Feels much better with breathing, Labs     06/27/22  1308 06/28/22  0335   WBC 12.8* 9.2   RBC 4.49 4.51   HGB 14.1 14.0   HCT 43.2 42.8   MCV 96.2 94.9   MCH 31.4 31.0   MCHC 32.6 32.7   RDW 13.2 13.2    446   MPV 9.2 9.2       Radiology: reviewed     NOTE: This report was transcribed using voice recognition software. Every effort was made to ensure accuracy; however, inadvertent computerized transcription errors may be present.     Electronically signed by REGI Orozco NP on 6/28/2022 at 11:15 AM

## 2022-06-28 NOTE — CONSULTS
Pulmonary Consultation    Admit Date: 6/27/2022  Requesting Physician: No primary care provider on file. CC:  COPD    HPI:  Katiana Henderson is a 69 y.o. female current smoker of 1 pack per 3-4 days for the past 30+ years, with a remote history of COPD but not put on medications - per chart also hx of HTN not on meds. She does not follow with a primary care physician but gets bronchitis about 2-3 times per year treated with OTC meds. About 3-4 weeks ago she felt she was getting bronchitis again with symptoms of shortness of breath, cough with yellow then clear sputum, chest heaviness, intermittent wheezing, severe constipation with abd pain and lightheadedness. Not sure of fevers, no chills. She progressively got worse and came to the ED. She does not sleep well and is fatigued upon waking and fatigued all day. She does not wear O2 at home. She is resting in bed on 1L nc and feels much better since being admitted. SOB is improved and still has cough and constipation. PMH:  History reviewed. No pertinent past medical history. PSH:    Past Surgical History:   Procedure Laterality Date    BACK SURGERY            Respiratory ROS: sob, cough with yellow clear sputum, abd pain, constipation, poor sleep, chest heaviness, wheezing, lightheaded. Otherwise, a complete review of systems is undertaken and is negative.     Social History:  Social History     Socioeconomic History    Marital status:      Spouse name: Not on file    Number of children: Not on file    Years of education: Not on file    Highest education level: Not on file   Occupational History    Not on file   Tobacco Use    Smoking status: Current Every Day Smoker     Packs/day: 0.25     Types: Cigarettes    Smokeless tobacco: Never Used   Vaping Use    Vaping Use: Never used   Substance and Sexual Activity    Alcohol use: Not Currently    Drug use: Not Currently    Sexual activity: Not Currently   Other Topics Concern    Not on file   Social History Narrative    Not on file     Social Determinants of Health     Financial Resource Strain:     Difficulty of Paying Living Expenses: Not on file   Food Insecurity:     Worried About Running Out of Food in the Last Year: Not on file    Porsche of Food in the Last Year: Not on file   Transportation Needs:     Lack of Transportation (Medical): Not on file    Lack of Transportation (Non-Medical): Not on file   Physical Activity:     Days of Exercise per Week: Not on file    Minutes of Exercise per Session: Not on file   Stress:     Feeling of Stress : Not on file   Social Connections:     Frequency of Communication with Friends and Family: Not on file    Frequency of Social Gatherings with Friends and Family: Not on file    Attends Christian Services: Not on file    Active Member of [address] [place] [address] [employer] or Organizations: Not on file    Attends Club or Organization Meetings: Not on file    Marital Status: Not on file   Intimate Partner Violence:     Fear of Current or Ex-Partner: Not on file    Emotionally Abused: Not on file    Physically Abused: Not on file    Sexually Abused: Not on file   Housing Stability:     Unable to Pay for Housing in the Last Year: Not on file    Number of [place] in the Last Year: Not on file    Unstable Housing in the Last Year: Not on file       Family History:  History reviewed. No pertinent family history.    Mother - D 80 after hip surgery, HTN, heart issues  Father - D 80 alzheimer's, HTN  Sister - A 76 good health  Brother 59 - Crohn's dz  Brother 62 - good health  No biological children    Medications:     sodium chloride        methylPREDNISolone  40 mg IntraVENous Q12H    lisinopril  5 mg Oral Daily    ipratropium-albuterol  1 ampule Inhalation 4x daily    aspirin  81 mg Oral Daily    sodium chloride flush  5-40 mL IntraVENous 2 times per day    enoxaparin  30 mg SubCUTAneous Daily    levofloxacin  750 mg IntraVENous Q24H Vitals:    VITALS:  /61   Pulse (!) 101   Temp 98.1 °F (36.7 °C) (Oral)   Resp 22   Ht 5' 8\" (1.727 m)   Wt 93 lb 1.6 oz (42.2 kg)   SpO2 92%   BMI 14.16 kg/m²   24HR INTAKE/OUTPUT:      Intake/Output Summary (Last 24 hours) at 2022 0930  Last data filed at 2022 0857  Gross per 24 hour   Intake 300 ml   Output --   Net 300 ml     CURRENT PULSE OXIMETRY:  SpO2: 92 %  24HR PULSE OXIMETRY RANGE:  SpO2  Av.5 %  Min: 92 %  Max: 98 %      EXAM:  General: No distress. Thin. Eyes: PERRL. No sclera icterus. No conjunctival injection. ENT: No discharge. Pharynx clear. Neck: Trachea midline. Normal thyroid. Resp: No accessory muscle use. No crackles. No wheezing. No rhonchi. Barreled chest, diminished throughout >bases  CV: Regular rate. Regular rhythm. No mumur or rub. ABD: Non-tender. Non-distended. No masses. No organmegaly. Normal bowel sounds. Skin: Warm and dry. No nodule on exposed extremities. No rash on exposed extremities. Lymph: No cervical LAD. No supraclavicular LAD. Ext: No joint deformity. No clubbing. No cyanosis. No edema  Neuro: Awake. Follows commands      Lab Results:  CBC:   Recent Labs     22  1308 22  0335   WBC 12.8* 9.2   HGB 14.1 14.0   HCT 43.2 42.8   MCV 96.2 94.9    446     BMP:   Recent Labs     22  1308 22  0335    138   K 3.3* 4.3   CL 91* 96*   CO2 32* 30*   BUN 9 17   CREATININE 0.5 0.6     LFT:   Recent Labs     22  1308 22  0335   ALKPHOS 128* 120*   ALT 12 12   AST 18 16   PROT 7.3 7.5   BILITOT 0.3 0.3   LABALBU 3.5 3.2*     PT/INR: No results for input(s): PROTIME, INR in the last 72 hours. Cultures:  No results for input(s): CULTRESP in the last 72 hours. ABG:   No results for input(s): PH, PO2, PCO2, HCO3, BE, O2SAT in the last 72 hours. Films:  XR CHEST  2022: Mild biapical and left lung base opacity. The lungs are hyperaerated. The heart is not enlarged.   There is no pneumothorax. The costophrenic angles are clear. Mild biapical and left basilar opacities, suspicious for early infiltrates. COPD. CTA PULMONARY  6/27/2022: Confluent airspace opacities located in posterior left lower lobe. Patchy irregular opacities are present in peripheral aspect of the lingula. No pleural effusion. Peribronchial thickening in posterior segments of right and left lower lobes with areas of bronchial stenosis notable in posterior left lower lobe. Hyperinflation of both lungs. No pneumothorax. The heart is normal in size. No pericardial effusion. No mediastinal lymphadenopathy. No filling defect in the pulmonary arteries to suggest pulmonary arterial embolism. Grossly normal appearance of the.  1. Airspace opacities in left lung base notable in posterior left lower lobe suggesting pneumonia with atelectasis. 2. Filling defects in posterior segmental bronchi of right and left lower lobes more notable on the left with bronchial stenosis and occlusion. 3. No evidence of pulmonary embolism. 4. Emphysematous changes. Assessment:  · COPD exacerbation - emphysema  · CAP  · HTN  · Tobacco abuse      Plan:  · Wean O2 - on 1L, room air is baseline. Keep pox >90%. Will check ambulatory pulse ox. · Will review CTA with Dr. Miguelina Nunez - ? Filling defects post segment R/L lower lobes >L with bronchial stenosis and occlusion. · Will check SULMA, ANCA, IgE, IgA, IgM, IgA, IgG subclass, A1AT. · Will check respiratory panel, sputum if able, legionella and strep pneumoniae antigens. · Check procal. Ok for levaquin for now. WBC 12.8->9.2 today  · Add brovana and pulmicort with emphysema. Continue duonebs  · Pro-. Lasix IV x1 given  · Continue Solu medrol 40mg IV BID - hope to decrease in am  · IS/ flutter valve  · Lovenox  · Will need outpt PFT      Thank you for allowing us to see this patient in consultation. Please contact us with any questions.       Electronically signed by Cayden Rubin REGI Miller - CNP on 6/28/2022 at 9:30 AM    Attending Attestation Note:    Patient seen and examined with NP. I agree with above. In addition, the following apply:    CTA PULMONARY W CONTRAST   Final Result   1. Airspace opacities in left lung base notable in posterior left lower lobe   suggesting pneumonia with atelectasis. 2. Filling defects in posterior segmental bronchi of right and left lower   lobes more notable on the left with bronchial stenosis and occlusion. 3. No evidence of pulmonary embolism. 4. Emphysematous changes. XR CHEST PORTABLE   Final Result   Mild biapical and left basilar opacities, suspicious for early infiltrates. COPD.              - outpatient PFT  - check Immunoglobulins, A1AT, SULMA, ANCA  - outpatient repeat CT Chest with possible IR biopsy for left chest-wall based mass lesion  - defer on bronchoscopy at this time, LLL posterior segment lesion is most prominent but this could represent rounded pneumonia or mass lesion or  and ith pedro be very difficult to perform bronchscopy with direct visualization that far out in the periphery   - steroids q 12 hours  - sarahi sharp Conni Saltness, MD  6/28/2022  2:54 PM

## 2022-06-28 NOTE — PATIENT CARE CONFERENCE
P Quality Flow/Interdisciplinary Rounds Progress Note        Quality Flow Rounds held on June 28, 2022    Disciplines Attending:  Bedside Nurse, ,  and Nursing Unit Leadership    Haroldo Quinones was admitted on 6/27/2022  1:00 PM    Anticipated Discharge Date:       Disposition:    Guy Score:  Guy Scale Score: 19    Readmission Risk              Risk of Unplanned Readmission:  11           Discussed patient goal for the day, patient clinical progression, and barriers to discharge.   The following Goal(s) of the Day/Commitment(s) have been identified:  Diagnostics - Report Results      Franny Newsome RN  June 28, 2022

## 2022-06-28 NOTE — CARE COORDINATION
Met with pt; lives alone independently; currently on 1 lnc; none at home; no nebs/ cpap/bipap at home.has no PCP; preservice # provided on AVS.not current with a pulmonologist as opt. Will monitor 02 needs; attempt to wean. No DME preference; will have [employer] follow. Plan is home at discharge. Pt drove herself to ther ER ; her car is in the ER parking lot. Await Pulm plan. I V solumedrol . Will follow. plan is home at discharge. Tram Garduno.

## 2022-06-29 VITALS
SYSTOLIC BLOOD PRESSURE: 119 MMHG | RESPIRATION RATE: 20 BRPM | HEART RATE: 106 BPM | DIASTOLIC BLOOD PRESSURE: 59 MMHG | TEMPERATURE: 98 F | BODY MASS INDEX: 14.7 KG/M2 | WEIGHT: 97 LBS | OXYGEN SATURATION: 92 % | HEIGHT: 68 IN

## 2022-06-29 LAB
ALBUMIN SERPL-MCNC: 2.8 G/DL (ref 3.5–5.2)
ALP BLD-CCNC: 94 U/L (ref 35–104)
ALT SERPL-CCNC: 9 U/L (ref 0–32)
ANION GAP SERPL CALCULATED.3IONS-SCNC: 10 MMOL/L (ref 7–16)
ANTI-NUCLEAR ANTIBODY (ANA): NEGATIVE
AST SERPL-CCNC: 14 U/L (ref 0–31)
BASOPHILS ABSOLUTE: 0.02 E9/L (ref 0–0.2)
BASOPHILS RELATIVE PERCENT: 0.1 % (ref 0–2)
BILIRUB SERPL-MCNC: <0.2 MG/DL (ref 0–1.2)
BUN BLDV-MCNC: 20 MG/DL (ref 6–23)
CALCIUM SERPL-MCNC: 8.9 MG/DL (ref 8.6–10.2)
CHLORIDE BLD-SCNC: 99 MMOL/L (ref 98–107)
CO2: 31 MMOL/L (ref 22–29)
CREAT SERPL-MCNC: 0.5 MG/DL (ref 0.5–1)
EOSINOPHILS ABSOLUTE: 0 E9/L (ref 0.05–0.5)
EOSINOPHILS RELATIVE PERCENT: 0 % (ref 0–6)
GFR AFRICAN AMERICAN: >60
GFR NON-AFRICAN AMERICAN: >60 ML/MIN/1.73
GLUCOSE BLD-MCNC: 110 MG/DL (ref 74–99)
HCT VFR BLD CALC: 37.1 % (ref 34–48)
HEMOGLOBIN: 12 G/DL (ref 11.5–15.5)
IGA: 597 MG/DL (ref 70–400)
IGG: 1041 MG/DL (ref 700–1600)
IGM: 98 MG/DL (ref 40–230)
IMMATURE GRANULOCYTES #: 0.08 E9/L
IMMATURE GRANULOCYTES %: 0.5 % (ref 0–5)
L. PNEUMOPHILA SEROGP 1 UR AG: NORMAL
LV EF: 75 %
LVEF MODALITY: NORMAL
LYMPHOCYTES ABSOLUTE: 1.56 E9/L (ref 1.5–4)
LYMPHOCYTES RELATIVE PERCENT: 10.7 % (ref 20–42)
MCH RBC QN AUTO: 31.5 PG (ref 26–35)
MCHC RBC AUTO-ENTMCNC: 32.3 % (ref 32–34.5)
MCV RBC AUTO: 97.4 FL (ref 80–99.9)
MONOCYTES ABSOLUTE: 0.71 E9/L (ref 0.1–0.95)
MONOCYTES RELATIVE PERCENT: 4.9 % (ref 2–12)
NEUTROPHILS ABSOLUTE: 12.26 E9/L (ref 1.8–7.3)
NEUTROPHILS RELATIVE PERCENT: 83.8 % (ref 43–80)
PDW BLD-RTO: 13.8 FL (ref 11.5–15)
PLATELET # BLD: 394 E9/L (ref 130–450)
PMV BLD AUTO: 9.1 FL (ref 7–12)
POTASSIUM SERPL-SCNC: 4.6 MMOL/L (ref 3.5–5)
RBC # BLD: 3.81 E12/L (ref 3.5–5.5)
SODIUM BLD-SCNC: 140 MMOL/L (ref 132–146)
STREP PNEUMONIAE ANTIGEN, URINE: NORMAL
TOTAL PROTEIN: 6.1 G/DL (ref 6.4–8.3)
WBC # BLD: 14.6 E9/L (ref 4.5–11.5)

## 2022-06-29 PROCEDURE — 2700000000 HC OXYGEN THERAPY PER DAY

## 2022-06-29 PROCEDURE — 6370000000 HC RX 637 (ALT 250 FOR IP): Performed by: INTERNAL MEDICINE

## 2022-06-29 PROCEDURE — 94640 AIRWAY INHALATION TREATMENT: CPT

## 2022-06-29 PROCEDURE — 2580000003 HC RX 258: Performed by: INTERNAL MEDICINE

## 2022-06-29 PROCEDURE — APPSS30 APP SPLIT SHARED TIME 16-30 MINUTES: Performed by: INTERNAL MEDICINE

## 2022-06-29 PROCEDURE — 6370000000 HC RX 637 (ALT 250 FOR IP)

## 2022-06-29 PROCEDURE — 93306 TTE W/DOPPLER COMPLETE: CPT

## 2022-06-29 PROCEDURE — 80053 COMPREHEN METABOLIC PANEL: CPT

## 2022-06-29 PROCEDURE — 36415 COLL VENOUS BLD VENIPUNCTURE: CPT

## 2022-06-29 PROCEDURE — 6360000002 HC RX W HCPCS: Performed by: INTERNAL MEDICINE

## 2022-06-29 PROCEDURE — 99239 HOSP IP/OBS DSCHRG MGMT >30: CPT | Performed by: INTERNAL MEDICINE

## 2022-06-29 PROCEDURE — 85025 COMPLETE CBC W/AUTO DIFF WBC: CPT

## 2022-06-29 RX ORDER — PREDNISONE 10 MG/1
30 TABLET ORAL DAILY
Qty: 9 TABLET | Refills: 0 | Status: SHIPPED | OUTPATIENT
Start: 2022-07-03 | End: 2022-07-03

## 2022-06-29 RX ORDER — PREDNISONE 1 MG/1
10 TABLET ORAL DAILY
Status: DISCONTINUED | OUTPATIENT
Start: 2022-07-09 | End: 2022-06-29 | Stop reason: HOSPADM

## 2022-06-29 RX ORDER — ALBUTEROL SULFATE 90 UG/1
2 AEROSOL, METERED RESPIRATORY (INHALATION) 4 TIMES DAILY PRN
Qty: 54 G | Refills: 1 | Status: SHIPPED | OUTPATIENT
Start: 2022-06-29 | End: 2022-07-16

## 2022-06-29 RX ORDER — LEVOFLOXACIN 750 MG/1
750 TABLET ORAL DAILY
Qty: 5 TABLET | Refills: 0 | Status: SHIPPED | OUTPATIENT
Start: 2022-06-29 | End: 2022-07-04

## 2022-06-29 RX ORDER — PREDNISONE 10 MG/1
10 TABLET ORAL DAILY
Qty: 3 TABLET | Refills: 0 | Status: SHIPPED | OUTPATIENT
Start: 2022-07-09 | End: 2022-07-03 | Stop reason: ALTCHOICE

## 2022-06-29 RX ORDER — SENNA PLUS 8.6 MG/1
1 TABLET ORAL NIGHTLY
Qty: 30 TABLET | Refills: 0 | Status: SHIPPED | OUTPATIENT
Start: 2022-06-29 | End: 2022-07-29

## 2022-06-29 RX ORDER — PREDNISONE 20 MG/1
20 TABLET ORAL DAILY
Status: DISCONTINUED | OUTPATIENT
Start: 2022-07-06 | End: 2022-06-29 | Stop reason: HOSPADM

## 2022-06-29 RX ORDER — PREDNISONE 20 MG/1
40 TABLET ORAL DAILY
Qty: 6 TABLET | Refills: 0 | Status: SHIPPED | OUTPATIENT
Start: 2022-06-30 | End: 2022-07-03 | Stop reason: ALTCHOICE

## 2022-06-29 RX ORDER — BUDESONIDE AND FORMOTEROL FUMARATE DIHYDRATE 160; 4.5 UG/1; UG/1
2 AEROSOL RESPIRATORY (INHALATION) 2 TIMES DAILY
Qty: 30.6 G | Refills: 1 | Status: ON HOLD | OUTPATIENT
Start: 2022-06-29 | End: 2022-07-16 | Stop reason: HOSPADM

## 2022-06-29 RX ORDER — LISINOPRIL 5 MG/1
5 TABLET ORAL DAILY
Qty: 30 TABLET | Refills: 3 | Status: SHIPPED | OUTPATIENT
Start: 2022-06-30 | End: 2022-10-25 | Stop reason: SDUPTHER

## 2022-06-29 RX ORDER — PREDNISONE 20 MG/1
20 TABLET ORAL DAILY
Qty: 3 TABLET | Refills: 0 | Status: SHIPPED | OUTPATIENT
Start: 2022-07-06 | End: 2022-07-03 | Stop reason: ALTCHOICE

## 2022-06-29 RX ORDER — NICOTINE 21 MG/24HR
1 PATCH, TRANSDERMAL 24 HOURS TRANSDERMAL DAILY
Status: DISCONTINUED | OUTPATIENT
Start: 2022-06-29 | End: 2022-06-29 | Stop reason: HOSPADM

## 2022-06-29 RX ORDER — PREDNISONE 20 MG/1
40 TABLET ORAL DAILY
Status: DISCONTINUED | OUTPATIENT
Start: 2022-06-30 | End: 2022-06-29 | Stop reason: HOSPADM

## 2022-06-29 RX ADMIN — IPRATROPIUM BROMIDE AND ALBUTEROL SULFATE 1 AMPULE: .5; 2.5 SOLUTION RESPIRATORY (INHALATION) at 15:39

## 2022-06-29 RX ADMIN — SODIUM CHLORIDE, PRESERVATIVE FREE 10 ML: 5 INJECTION INTRAVENOUS at 05:01

## 2022-06-29 RX ADMIN — SODIUM CHLORIDE, PRESERVATIVE FREE 10 ML: 5 INJECTION INTRAVENOUS at 09:20

## 2022-06-29 RX ADMIN — LISINOPRIL 5 MG: 10 TABLET ORAL at 09:19

## 2022-06-29 RX ADMIN — IPRATROPIUM BROMIDE AND ALBUTEROL SULFATE 1 AMPULE: .5; 2.5 SOLUTION RESPIRATORY (INHALATION) at 11:54

## 2022-06-29 RX ADMIN — METHYLPREDNISOLONE SODIUM SUCCINATE 40 MG: 40 INJECTION, POWDER, LYOPHILIZED, FOR SOLUTION INTRAMUSCULAR; INTRAVENOUS at 05:01

## 2022-06-29 RX ADMIN — ENOXAPARIN SODIUM 30 MG: 100 INJECTION SUBCUTANEOUS at 09:19

## 2022-06-29 RX ADMIN — ASPIRIN 81 MG: 81 TABLET, COATED ORAL at 09:19

## 2022-06-29 RX ADMIN — POLYETHYLENE GLYCOL 3350 17 G: 17 POWDER, FOR SOLUTION ORAL at 06:52

## 2022-06-29 NOTE — PROGRESS NOTES
RA at Rest: 89%  RA while ambulatin%   While ambulating O2 on 2L was 86% increased to 3L was 89% and increased again to 4L was 91%  Recovered on 4L NC to 92% at rest.

## 2022-06-29 NOTE — PATIENT CARE CONFERENCE
P Quality Flow/Interdisciplinary Rounds Progress Note        Quality Flow Rounds held on June 29, 2022    Disciplines Attending:  Bedside Nurse, ,  and Nursing Unit Leadership    Haroldo Quinones was admitted on 6/27/2022  1:00 PM    Anticipated Discharge Date:       Disposition:    Guy Score:  Guy Scale Score: 19    Readmission Risk              Risk of Unplanned Readmission:  11           Discussed patient goal for the day, patient clinical progression, and barriers to discharge.   The following Goal(s) of the Day/Commitment(s) have been identified:  Labs - Report Results      Enzo Graves RN  June 29, 2022

## 2022-06-29 NOTE — PROGRESS NOTES
CLINICAL PHARMACY NOTE: MEDS TO BEDS    Total # of Prescriptions Filled: 2   The following medications were delivered to the patient:  · symbicort 160-4.5 mg    Additional Documentation:

## 2022-06-29 NOTE — DISCHARGE SUMMARY
[facility] Physician Discharge Summary       Christos Lynn MD  [address] [facility]  [phone]    Schedule an appointment as soon as possible for a visit      [facility]  phone [phone]    for new home oxygen    Activity level: As tolerated   Dispo: Home  Condition on discharge: Stable    Patient ID:  Megan Ross  [id number]  69 y.o.  1955    Admit date: 6/27/2022    Discharge date and time:  6/29/2022  1:59 PM    Admission Diagnoses: Principal Problem:    Acute respiratory failure with hypoxia ([address]  Active Problems:    COPD with acute exacerbation ([address]    Pneumonia due to infectious organism    Uncontrolled hypertension  Resolved Problems:    * No resolved hospital problems. *      Discharge Diagnoses: Principal Problem:    Acute respiratory failure with hypoxia ([address]  Active Problems:    COPD with acute exacerbation (HCC)    Pneumonia due to infectious organism    Uncontrolled hypertension  Resolved Problems:    * No resolved hospital problems. *      Consults:  IP CONSULT TO PULMONOLOGY  IP CONSULT TO [facility] Course:   Patient Megan Ross is a 69 y.o. presented with Shortness of breath [R06.02]  Hypokalemia [E87.6]  COPD with acute exacerbation (HCC) [J44.1]  Acute respiratory failure with hypoxia ([address] [J96.01]  Pneumonia due to infectious organism, unspecified laterality, unspecified part of lung [J18.9]    This is a 69year old female with no past medical history. She has not seen a PCP in 10 years but does get routine mammograms. She takes Aspirin 81 mg po daily but otherwise no medications. Surgical history includes two back surgeries at [facility] last in 2002. She notes that every year she gets bronchitis and treats with over the counter medications. She started feeling unwell three weeks ago with cough and shortness of breath but did not feel better after taking over the counter medications.  She presented to the ER after grocery shopping because she was lightheaded and got anxious.      In ER, CXR showed COPD. CT chest showed airspace opacities in left lung base suggesting pneumonia with atelectasis and filling defects in posterior segmental bronchi or right and left lower lobes. Was originally on 3 L via NC decreased to 1 L which she will need for discharge as per [facility]. COVID and influenza negative. Was given one dose of hydralazine and Levaquin in ER. Recommend routine follow up with pulmonary - Dr Dave Sal. Will give Levaquin, inhalers, and prednisone taper for discharge. Discharge Exam:    General Appearance: chronically ill appearing, frail, thin, mild respiratory distress  Skin: warm and dry  Head: normocephalic and atraumatic  Eyes: pupils equal, round, and reactive to light, extraocular eye movements intact, conjunctivae normal  Neck: neck supple and non tender without mass   Pulmonary/Chest: mild respiratory distress, expiratory wheezing posteriorly on 1 L via NC   Cardiovascular: normal rate, normal S1 and S2 and no carotid bruits  Abdomen: soft, non-tender, non-distended, normal bowel sounds, no masses or organomegaly  Extremities: no cyanosis, no clubbing and no edema  Neurologic: no cranial nerve deficit and speech normal    I/O last 3 completed shifts: In: 540 [P.O.:540]  Out: 300 [Urine:300]  I/O this shift:  In: 600 [P.O.:600]  Out: -     LABS:  Recent Labs     06/27/22  1308 06/28/22  0335 06/29/22  0504    138 140   K 3.3* 4.3 4.6   CL 91* 96* 99   CO2 32* 30* 31*   BUN 9 17 20   CREATININE 0.5 0.6 0.5   GLUCOSE 133* 126* 110*   CALCIUM 9.5 9.4 8.9       Recent Labs     06/27/22  1308 06/28/22  0335 06/29/22  0504   WBC 12.8* 9.2 14.6*   RBC 4.49 4.51 3.81   HGB 14.1 14.0 12.0   HCT 43.2 42.8 37.1   MCV 96.2 94.9 97.4   MCH 31.4 31.0 31.5   MCHC 32.6 32.7 32.3   RDW 13.2 13.2 13.8    446 394   MPV 9.2 9.2 9.1       No results for input(s): POCGLU in the last 72 hours.     Imaging:  XR CHEST PORTABLE    Result Date: 6/27/2022  EXAMINATION: ONE XRAY VIEW OF THE CHEST 6/27/2022 1:28 pm COMPARISON: None. HISTORY: ORDERING SYSTEM PROVIDED HISTORY: Shortness of breath TECHNOLOGIST PROVIDED HISTORY: Reason for exam:->Shortness of breath FINDINGS: Mild biapical and left lung base opacity. The lungs are hyperaerated. The heart is not enlarged. There is no pneumothorax. The costophrenic angles are clear. Mild biapical and left basilar opacities, suspicious for early infiltrates. COPD. CTA PULMONARY W CONTRAST    Result Date: 6/27/2022  EXAMINATION: CTA OF THE CHEST 6/27/2022 3:41 pm TECHNIQUE: CTA of the chest was performed after the administration of intravenous contrast.  Multiplanar reformatted images are provided for review. MIP images are provided for review. Automated exposure control, iterative reconstruction, and/or weight based adjustment of the mA/kV was utilized to reduce the radiation dose to as low as reasonably achievable. COMPARISON: None. HISTORY: ORDERING SYSTEM PROVIDED HISTORY: hypoxia, sob, concern for PE TECHNOLOGIST PROVIDED HISTORY: Reason for exam:->hypoxia, sob, concern for PE Decision Support Exception - unselect if not a suspected or confirmed emergency medical condition->Emergency Medical Condition (MA) FINDINGS: Confluent airspace opacities located in posterior left lower lobe. Patchy irregular opacities are present in peripheral aspect of the lingula. No pleural effusion. Peribronchial thickening in posterior segments of right and left lower lobes with areas of bronchial stenosis notable in posterior left lower lobe. Hyperinflation of both lungs. No pneumothorax. The heart is normal in size. No pericardial effusion. No mediastinal lymphadenopathy. No filling defect in the pulmonary arteries to suggest pulmonary arterial embolism.   Grossly normal appearance of the.     1. Airspace opacities in left lung base notable in posterior left lower lobe suggesting pneumonia with atelectasis. 2. Filling defects in posterior segmental bronchi of right and left lower lobes more notable on the left with bronchial stenosis and occlusion. 3. No evidence of pulmonary embolism. 4. Emphysematous changes. Patient Instructions:      Medication List      START taking these medications    albuterol sulfate  (90 Base) MCG/ACT inhaler  Commonly known as: Ventolin HFA  Inhale 2 puffs into the lungs 4 times daily as needed for Wheezing     budesonide-formoterol 160-4.5 MCG/ACT Aero  Commonly known as: Symbicort  Inhale 2 puffs into the lungs 2 times daily     levoFLOXacin 750 MG tablet  Commonly known as: Levaquin  Take 1 tablet by mouth daily for 5 days     lisinopril 5 MG tablet  Commonly known as: PRINIVIL;ZESTRIL  Take 1 tablet by mouth daily  Start taking on: June 30, 2022     * predniSONE 20 MG tablet  Commonly known as: DELTASONE  Take 2 tablets by mouth daily for 3 doses  Start taking on: June 30, 2022     * predniSONE 10 MG tablet  Commonly known as: DELTASONE  Take 3 tablets by mouth daily for 3 doses  Start taking on: July 3, 2022     * predniSONE 20 MG tablet  Commonly known as: DELTASONE  Take 1 tablet by mouth daily for 3 doses  Start taking on: July 6, 2022     * predniSONE 10 MG tablet  Commonly known as: DELTASONE  Take 1 tablet by mouth daily for 3 doses  Start taking on: July 9, 2022     senna 8.6 MG tablet  Commonly known as: SENOKOT  Take 1 tablet by mouth nightly         * This list has 4 medication(s) that are the same as other medications prescribed for you. Read the directions carefully, and ask your doctor or other care provider to review them with you.             CONTINUE taking these medications    aspirin 81 MG EC tablet     vitamin C 500 MG tablet  Commonly known as: ASCORBIC ACID           Where to Get Your Medications      These medications were sent to [facility], [address] [place] [address] [phone] - F [phone] [address] [place] [address]    Phone: [phone]   · albuterol sulfate  (90 Base) MCG/ACT inhaler  · budesonide-formoterol 160-4.5 MCG/ACT Aero     These medications were sent to [address] [place] [address] [facility] [phone]  [address], [place] [address]    Phone: [phone]   · levoFLOXacin 750 MG tablet  · lisinopril 5 MG tablet  · predniSONE 10 MG tablet  · predniSONE 10 MG tablet  · predniSONE 20 MG tablet  · predniSONE 20 MG tablet  · senna 8.6 MG tablet       Note that more than 30 minutes was spent in preparing discharge papers, discussing discharge with patient, medication review, etc.    Signed:  Electronically signed by Creighton Goldberg, APRN - NP on 6/29/2022 at 1:59 PM

## 2022-06-29 NOTE — CARE COORDINATION
Social Work discharge planning    Spoke to Madie Olsen at [place] [address] (see CM note yesterday re: choice). Madie Olsen took pt' info. He will need DME order for home 02 with dx of COPD. He will also need added (Rn aHley parikh) to pulse ox check the followin. Room air at rest  2. Room air with ambulation   3. ADD Back on  while still ambulating  4, on  at rest for recovery  Madie Olsen said they will have portable delivered to room asap for dischrge today.   Electronically signed by Arnaldo Yeager on 2022 at 1:19 PM

## 2022-06-29 NOTE — PROGRESS NOTES
[facility] Progress Note    Admitting Date and Time: 6/27/2022  1:00 PM  Admit Dx: Shortness of breath [R06.02]  Hypokalemia [E87.6]  COPD with acute exacerbation (HCC) [J44.1]  Acute respiratory failure with hypoxia (HCC) [J96.01]  Pneumonia due to infectious organism, unspecified laterality, unspecified part of lung [J18.9]    Assessment:    Principal Problem:    Acute respiratory failure with hypoxia ([address]  Active Problems:    COPD with acute exacerbation (HCC)    Pneumonia due to infectious organism    Uncontrolled hypertension  Resolved Problems:    * No resolved hospital problems. *                                    Plan:  1. COPD   No history of COPD. Heavy smoker for majority of her life, cut down to 1 pack every 3-4 days. Has not smoked in 3 weeks. Educated on tobacco cessation and refusing nicotine patch   Wean oxygen as tolerated - unable to wean off 1 L via NC. Will need oxygen for home. Pulmonary consult - will need PFT and CT chest with IR biopsy outpatient. Solu-medrol 40 mg IV Q12 hours - wean per pulmonary for discharge   Albuterol nebulizers PRN     Discussed with pulm - ok to discharge once home oxygen/nebulizers set up.      2. Pneumonia   History of \"bronchitis\" yearly which she treats herself. No history of pneumonia. CT chest suggestive of pneumonia with hypoxia. Continue with Levaquin - will change to PO for another 5 days      3. Hypertension   No history of HTN, family history present   Does not take any home medications   Continue Lisinopril 5 mg po daily      4. Congestive heart failure   BNP > 800   Multifactorial   Echo completed - report pending   CBC/CMP daily     5. Constipation   Senokot added   Encourage Po intake     6.  Leukocytosis   Secondary to solu-medrol     Code Status: FULL   DVT prophylaxis: Aspirin  Discharge dispo: home     Subjective:  Patient is being followed for Shortness of breath [R06.02]  Hypokalemia [E87.6]  COPD with acute exacerbation ([facility] [J44.1]  Acute respiratory failure with hypoxia ([facility] [J96.01]  Pneumonia due to infectious organism, unspecified laterality, unspecified part of lung [J18.9]     Seen at bedside this AM. She is not wearing oxygen while ambulating to bathroom. Appears somewhat winded but recovers well. No chest pain or cough. Tolerating medications - no issues. ROS: denies fever, chills, cp, sob, n/v, HA unless stated above.       Arformoterol Tartrate  15 mcg Nebulization BID    budesonide  250 mcg Nebulization BID    senna  1 tablet Oral Nightly    methylPREDNISolone  40 mg IntraVENous Q12H    lisinopril  5 mg Oral Daily    ipratropium-albuterol  1 ampule Inhalation 4x daily    aspirin  81 mg Oral Daily    sodium chloride flush  5-40 mL IntraVENous 2 times per day    enoxaparin  30 mg SubCUTAneous Daily    levofloxacin  750 mg IntraVENous Q24H     melatonin, 6 mg, Nightly PRN  perflutren lipid microspheres, 1.5 mL, ONCE PRN  perflutren lipid microspheres, 1.5 mL, ONCE PRN  sodium chloride flush, 5-40 mL, PRN  sodium chloride, , PRN  ondansetron, 4 mg, Q8H PRN   Or  ondansetron, 4 mg, Q6H PRN  polyethylene glycol, 17 g, Daily PRN  acetaminophen, 650 mg, Q6H PRN   Or  acetaminophen, 650 mg, Q6H PRN       Objective:    BP (!) 119/59   Pulse 90   Temp 98 °F (36.7 °C) (Oral)   Resp 20   Ht 5' 8\" (1.727 m)   Wt 97 lb (44 kg)   SpO2 94%   BMI 14.75 kg/m²     General Appearance: chronically ill appearing, frail, thin, mild respiratory distress  Skin: warm and dry  Head: normocephalic and atraumatic  Eyes: pupils equal, round, and reactive to light, extraocular eye movements intact, conjunctivae normal  Neck: neck supple and non tender without mass   Pulmonary/Chest: mild respiratory distress, expiratory wheezing posteriorly on 1 L via NC   Cardiovascular: normal rate, normal S1 and S2 and no carotid bruits  Abdomen: soft, non-tender, non-distended, normal bowel sounds, no masses or organomegaly  Extremities: no cyanosis, no clubbing and no edema  Neurologic: no cranial nerve deficit and speech normal    Recent Labs     06/27/22  1308 06/28/22  0335 06/29/22  0504    138 140   K 3.3* 4.3 4.6   CL 91* 96* 99   CO2 32* 30* 31*   BUN 9 17 20   CREATININE 0.5 0.6 0.5   GLUCOSE 133* 126* 110*   CALCIUM 9.5 9.4 8.9       Recent Labs     06/27/22  1308 06/28/22  0335 06/29/22  0504   WBC 12.8* 9.2 14.6*   RBC 4.49 4.51 3.81   HGB 14.1 14.0 12.0   HCT 43.2 42.8 37.1   MCV 96.2 94.9 97.4   MCH 31.4 31.0 31.5   MCHC 32.6 32.7 32.3   RDW 13.2 13.2 13.8    446 394   MPV 9.2 9.2 9.1     Radiology: reviewed     NOTE: This report was transcribed using voice recognition software. Every effort was made to ensure accuracy; however, inadvertent computerized transcription errors may be present.     Electronically signed by REGI Manuel NP on 6/29/2022 at 10:29 AM

## 2022-06-29 NOTE — PROGRESS NOTES
Pulmonary Progress Note    Admit Date: 2022                            PCP: No primary care provider on file. Principal Problem:    Acute respiratory failure with hypoxia (HCC)  Active Problems:    COPD with acute exacerbation (HCC)    Pneumonia due to infectious organism    Uncontrolled hypertension  Resolved Problems:    * No resolved hospital problems. *      Subjective:  Patient was seen resting in bed on room air  She has mild dyspnea on exertion but states feels much better. She has a dry, non-productive cough. Denies wheezing    Medications:   sodium chloride          Arformoterol Tartrate  15 mcg Nebulization BID    budesonide  250 mcg Nebulization BID    senna  1 tablet Oral Nightly    methylPREDNISolone  40 mg IntraVENous Q12H    lisinopril  5 mg Oral Daily    ipratropium-albuterol  1 ampule Inhalation 4x daily    aspirin  81 mg Oral Daily    sodium chloride flush  5-40 mL IntraVENous 2 times per day    enoxaparin  30 mg SubCUTAneous Daily    levofloxacin  750 mg IntraVENous Q24H       Vitals:  VITALS:  BP (!) 119/59   Pulse 90   Temp 98 °F (36.7 °C) (Oral)   Resp 20   Ht 5' 8\" (1.727 m)   Wt 97 lb (44 kg)   SpO2 94%   BMI 14.75 kg/m²   24HR INTAKE/OUTPUT:      Intake/Output Summary (Last 24 hours) at 2022 1044  Last data filed at 2022 0738  Gross per 24 hour   Intake 480 ml   Output 300 ml   Net 180 ml     CURRENT PULSE OXIMETRY:  SpO2: 94 %  24HR PULSE OXIMETRY RANGE:  SpO2  Av.3 %  Min: 93 %  Max: 96 %  CVP:    VENT SETTINGS:      Additional Respiratory Assessments  Heart Rate: 90  Resp: 20  SpO2: 94 %      EXAM:  General: No distress. Alert. Eyes:No sclera icterus. No conjunctival injection. ENT: No discharge. Pharynx clear. Neck: Trachea midline. Normal thyroid. Resp: Barreled chest. No accessory muscle use. No crackles. No wheezing. No rhonchi. Diminished bilaterally   CV: Regular rate. Regular rhythm. No mumur or rub. No edema. ABD: Non-tender. Non-distended. Normal bowel sounds. Skin: Warm and dry. No nodule on exposed extremities. No rash on exposed extremities. M/S: No cyanosis. No joint deformity. No clubbing. Neuro: Awake. Follows commands. A&O x 3    I/O: I/O last 3 completed shifts: In: 540 [P.O.:540]  Out: 300 [Urine:300]  I/O this shift:  In: 240 [P.O.:240]  Out: -      Results:  CBC:   Recent Labs     06/27/22  1308 06/28/22  0335 06/29/22  0504   WBC 12.8* 9.2 14.6*   HGB 14.1 14.0 12.0   HCT 43.2 42.8 37.1   MCV 96.2 94.9 97.4    446 394     BMP:   Recent Labs     06/27/22  1308 06/28/22  0335 06/29/22  0504    138 140   K 3.3* 4.3 4.6   CL 91* 96* 99   CO2 32* 30* 31*   BUN 9 17 20   CREATININE 0.5 0.6 0.5     LFT:   Recent Labs     06/27/22  1308 06/28/22  0335 06/29/22  0504   ALKPHOS 128* 120* 94   ALT 12 12 9   AST 18 16 14   PROT 7.3 7.5 6.1*   BILITOT 0.3 0.3 <0.2   LABALBU 3.5 3.2* 2.8*     PT/INR: No results for input(s): PROTIME, INR in the last 72 hours. Cultures:  No results for input(s): CULTRESP in the last 72 hours. ABG:   No results for input(s): PH, PO2, PCO2, HCO3, BE, O2SAT in the last 72 hours. Films:  XR CHEST PORTABLE    Result Date: 6/27/2022  EXAMINATION: ONE XRAY VIEW OF THE CHEST 6/27/2022 1:28 pm COMPARISON: None. HISTORY: ORDERING SYSTEM PROVIDED HISTORY: Shortness of breath TECHNOLOGIST PROVIDED HISTORY: Reason for exam:->Shortness of breath FINDINGS: Mild biapical and left lung base opacity. The lungs are hyperaerated. The heart is not enlarged. There is no pneumothorax. The costophrenic angles are clear. Mild biapical and left basilar opacities, suspicious for early infiltrates. COPD. CTA PULMONARY W CONTRAST    Result Date: 6/27/2022  EXAMINATION: CTA OF THE CHEST 6/27/2022 3:41 pm TECHNIQUE: CTA of the chest was performed after the administration of intravenous contrast.  Multiplanar reformatted images are provided for review. MIP images are provided for review.  Automated exposure control, iterative reconstruction, and/or weight based adjustment of the mA/kV was utilized to reduce the radiation dose to as low as reasonably achievable. COMPARISON: None. HISTORY: ORDERING SYSTEM PROVIDED HISTORY: hypoxia, sob, concern for PE TECHNOLOGIST PROVIDED HISTORY: Reason for exam:->hypoxia, sob, concern for PE Decision Support Exception - unselect if not a suspected or confirmed emergency medical condition->Emergency Medical Condition (MA) FINDINGS: Confluent airspace opacities located in posterior left lower lobe. Patchy irregular opacities are present in peripheral aspect of the lingula. No pleural effusion. Peribronchial thickening in posterior segments of right and left lower lobes with areas of bronchial stenosis notable in posterior left lower lobe. Hyperinflation of both lungs. No pneumothorax. The heart is normal in size. No pericardial effusion. No mediastinal lymphadenopathy. No filling defect in the pulmonary arteries to suggest pulmonary arterial embolism. Grossly normal appearance of the.     1. Airspace opacities in left lung base notable in posterior left lower lobe suggesting pneumonia with atelectasis. 2. Filling defects in posterior segmental bronchi of right and left lower lobes more notable on the left with bronchial stenosis and occlusion. 3. No evidence of pulmonary embolism. 4. Emphysematous changes. Assessment:  · COPD with acute exacerbation   · Emphysema - noted on CTA chest 6/27/2022  · Community acquired pneumonia   · HTN  · Tobacco use      Plan:  · Was seen on room air which is her baseline but SpO2 88% at rest. I put her back on 1L NC. Will order ambulatory pulse ox. Supplemental oxygen to maintain SpO2 90-92%  · SULMA, ANCA, immunoglobulins and A1AT were ordered and pending  · Respiratory panel negative, strep and legionella antigens negative. Sputum ordered but cough is non-productive  · Patient remains afebrile, WBC 12.8>9.2>14. 6. leukocytosis could be related to systemic steroids. PCT 0.16. Will stop Levaquin as PCT is negative   · Continue Brovana, Pulmicort and duo-nebs with emphysema and smoking history  · Currently on solu-medrol 40 mg BID, will start prednisone taper for tomorrow morning   · Continue IS and flutter valve  · Lovenox for DVT prophylaxis   · Will need PFT as outpatient   · CTA chest showing Fflling defects in posterior segmental bronchi of right and left lower lobes more notable on the left with bronchial stenosis and occlusion - bronchoscopy will be deferred at this time. Will repeat CT chest with possible IR biopsy for left chest wall mass lesion as an outpatient   · Add nicotine patch         Electronically signed by REGI Mukherjee - CNP on 6/29/2022 at 10:44 AM      Attending Attestation Note:    Patient seen and examined with NP. I agree with above.     In addition, the following apply:    - levaquin for 5 days  - PO prednisone taper   - D/C nebs  - Albuterol MDI  - Symbicort MDI  - will follow up lab work outpatient  - PFT outpatient     Shavon Bee MD  6/29/2022  1:52 PM

## 2022-06-30 ENCOUNTER — CARE COORDINATION (OUTPATIENT)
Dept: CASE MANAGEMENT | Age: 67
End: 2022-06-30

## 2022-06-30 DIAGNOSIS — J44.1 COPD WITH ACUTE EXACERBATION (HCC): Primary | ICD-10-CM

## 2022-06-30 LAB
ALPHA-1 ANTITRYPSIN: 241 MG/DL (ref 90–200)
IGG 1: 442 MG/DL (ref 240–1118)
IGG 2: 330 MG/DL (ref 124–549)
IGG 3: 128 MG/DL (ref 21–134)
IGG 4: 40 MG/DL (ref 1–123)

## 2022-06-30 PROCEDURE — 1111F DSCHRG MED/CURRENT MED MERGE: CPT | Performed by: NURSE PRACTITIONER

## 2022-06-30 NOTE — CARE COORDINATION
[facility] Transitions Initial Follow Up Call    Call within 2 business days of discharge: Yes    Patient: Colletta Felts Patient : 1955   MRN: <[id number]>  Reason for Admission: 2022 - 2022 [address] [place] [address] Blvd. Resp failure, COPD, PN, HTN. Discharge Date: 22 RARS: Readmission Risk Score: 10.2 ( )  NR  CT    Last Discharge [facility]       Complaint Diagnosis Description Type Department Provider    22 Shortness of Breath; Constipation Acute respiratory failure with hypoxia (HCC) . .. ED to Hosp-Admission (Discharged) (ADMITTED) JOE Flores MD; Gene Henry. .. Needs PCP. Enc to go to [facility] to establish w/ PCP. START taking:  albuterol sulfate HFA (Ventolin HFA)  budesonide-formoterol (Symbicort)  levoFLOXacin (Levaquin)  lisinopril (PRINIVIL;ZESTRIL)  Start taking on: 2022  predniSONE ([place])  This medication has multiple start dates; refer to the  medication list below for more details. senna (SENOKOT)    Transitions of Care Initial Call    Was this an external facility discharge? No Discharge Facility:     Challenges to be reviewed by the provider   Additional needs identified to be addressed with provider: No  none             Method of communication with provider : none    Advance Care Planning:   Does patient have an Advance Directive:  Edel Chino primary decision maker [phone]     Care Transition Nurse contacted the patient by telephone to perform post hospital discharge assessment. Verified name and  with patient as identifiers. Provided introduction to self, and explanation of the CTN role. Ju Sarmiento reports she is wearing 3L NC at HS/PRN w/ exertions.  Does not have a home pulse oximeter and reviewed where to purchase, how to use, and how to monitor sats, v/u. Reviewed to report any trending sats less than 90%, v/u. Pt rdeclines assist w/ establishing new PCP and states someone is coming over later today and helping her look at local doctors and decide. Enc to call for new pt appt soonest availability and reviewed how a family physician can help w/ health/med/consult/sick day management, v/u. Denies any acute SOB at this time. Has nonproductive coughing spells. No fevers, chills, or flu-like symptoms. Is presently attempting smoking cessation and has nicotine patch on. She shares \"this really scared me and I want to stop smoking\". Reviewed to identify her smoking triggers and make plan to avoid and manage triggers, v/u. Picking up new meds today and reviewed prednisone titration and Levaquin. Enc to hydrate. Reviewed worsening COPD symptoms warranting return to ED, v/u. CTN reviewed discharge instructions, medical action plan and red flags with patient who verbalized understanding. Patient given an opportunity to ask questions and does not have any further questions or concerns at this time. Were discharge instructions available to patient? Yes. Reviewed appropriate site of care based on symptoms and resources available to patient including: When to call 911. The patient agrees to contact the PCP office for questions related to their healthcare. Medication reconciliation was performed with patient, who verbalizes understanding of administration of home medications. Advised obtaining a 90-day supply of all daily and as-needed medications. Was patient discharged with a pulse oximeter? no    CTN provided contact information. Plan for follow-up call in 5-7 days based on severity of symptoms and risk factors. Plan for next call: CT FU, Check resp symptoms, if picked up pulse ox and review sats, if has new PCP.       Care Transitions 24 Hour Call    Schedule Follow Up Appointment with PCP: Declined  Do you have a copy of your discharge instructions?: Yes  Do you have all of your prescriptions and are they filled?: No  Have you scheduled your follow up appointment?: No  Do you feel like you have everything you

## 2022-07-01 ENCOUNTER — CARE COORDINATION (OUTPATIENT)
Dept: CASE MANAGEMENT | Age: 67
End: 2022-07-01

## 2022-07-01 LAB — IGE: 117 KU/L

## 2022-07-02 LAB
BLOOD CULTURE, ROUTINE: NORMAL
CULTURE, BLOOD 2: NORMAL

## 2022-07-03 ENCOUNTER — TELEPHONE (OUTPATIENT)
Dept: OTHER | Facility: CLINIC | Age: 67
End: 2022-07-03

## 2022-07-03 ENCOUNTER — APPOINTMENT (OUTPATIENT)
Dept: CT IMAGING | Age: 67
End: 2022-07-03
Payer: MEDICARE

## 2022-07-03 ENCOUNTER — HOSPITAL ENCOUNTER (EMERGENCY)
Age: 67
Discharge: HOME | End: 2022-07-03
Attending: EMERGENCY MEDICINE
Payer: MEDICARE

## 2022-07-03 VITALS
OXYGEN SATURATION: 99 % | HEIGHT: 68 IN | TEMPERATURE: 98.8 F | HEART RATE: 99 BPM | WEIGHT: 105 LBS | BODY MASS INDEX: 15.91 KG/M2 | RESPIRATION RATE: 20 BRPM | SYSTOLIC BLOOD PRESSURE: 122 MMHG | DIASTOLIC BLOOD PRESSURE: 74 MMHG

## 2022-07-03 DIAGNOSIS — J44.1 COPD EXACERBATION (HCC): Primary | ICD-10-CM

## 2022-07-03 DIAGNOSIS — J18.9 PNEUMONIA OF LEFT LOWER LOBE DUE TO INFECTIOUS ORGANISM: ICD-10-CM

## 2022-07-03 LAB
ALBUMIN SERPL-MCNC: 3.3 G/DL (ref 3.5–5.2)
ALP BLD-CCNC: 97 U/L (ref 35–104)
ALT SERPL-CCNC: 15 U/L (ref 0–32)
ANION GAP SERPL CALCULATED.3IONS-SCNC: 10 MMOL/L (ref 7–16)
AST SERPL-CCNC: 23 U/L (ref 0–31)
BASOPHILS ABSOLUTE: 0.04 E9/L (ref 0–0.2)
BASOPHILS RELATIVE PERCENT: 0.6 % (ref 0–2)
BILIRUB SERPL-MCNC: <0.2 MG/DL (ref 0–1.2)
BUN BLDV-MCNC: 10 MG/DL (ref 6–23)
CALCIUM SERPL-MCNC: 8.8 MG/DL (ref 8.6–10.2)
CHLORIDE BLD-SCNC: 93 MMOL/L (ref 98–107)
CO2: 29 MMOL/L (ref 22–29)
CREAT SERPL-MCNC: 0.6 MG/DL (ref 0.5–1)
EOSINOPHILS ABSOLUTE: 0.01 E9/L (ref 0.05–0.5)
EOSINOPHILS RELATIVE PERCENT: 0.1 % (ref 0–6)
GFR AFRICAN AMERICAN: >60
GFR NON-AFRICAN AMERICAN: >60 ML/MIN/1.73
GLUCOSE BLD-MCNC: 134 MG/DL (ref 74–99)
HCT VFR BLD CALC: 40.4 % (ref 34–48)
HEMOGLOBIN: 13 G/DL (ref 11.5–15.5)
IMMATURE GRANULOCYTES #: 0.06 E9/L
IMMATURE GRANULOCYTES %: 0.9 % (ref 0–5)
LYMPHOCYTES ABSOLUTE: 0.73 E9/L (ref 1.5–4)
LYMPHOCYTES RELATIVE PERCENT: 10.8 % (ref 20–42)
Lab: NORMAL
MCH RBC QN AUTO: 31.3 PG (ref 26–35)
MCHC RBC AUTO-ENTMCNC: 32.2 % (ref 32–34.5)
MCV RBC AUTO: 97.1 FL (ref 80–99.9)
MONOCYTES ABSOLUTE: 0.87 E9/L (ref 0.1–0.95)
MONOCYTES RELATIVE PERCENT: 12.9 % (ref 2–12)
NEUTROPHILS ABSOLUTE: 5.02 E9/L (ref 1.8–7.3)
NEUTROPHILS RELATIVE PERCENT: 74.7 % (ref 43–80)
PDW BLD-RTO: 13.7 FL (ref 11.5–15)
PLATELET # BLD: 386 E9/L (ref 130–450)
PMV BLD AUTO: 8.7 FL (ref 7–12)
POTASSIUM SERPL-SCNC: 4.1 MMOL/L (ref 3.5–5)
PRO-BNP: 358 PG/ML (ref 0–125)
RBC # BLD: 4.16 E12/L (ref 3.5–5.5)
REPORT: NORMAL
SODIUM BLD-SCNC: 132 MMOL/L (ref 132–146)
THIS TEST SENT TO: NORMAL
TOTAL PROTEIN: 6.5 G/DL (ref 6.4–8.3)
TROPONIN, HIGH SENSITIVITY: 10 NG/L (ref 0–9)
TROPONIN, HIGH SENSITIVITY: 9 NG/L (ref 0–9)
WBC # BLD: 6.7 E9/L (ref 4.5–11.5)

## 2022-07-03 PROCEDURE — 93005 ELECTROCARDIOGRAM TRACING: CPT | Performed by: EMERGENCY MEDICINE

## 2022-07-03 PROCEDURE — 94664 DEMO&/EVAL PT USE INHALER: CPT

## 2022-07-03 PROCEDURE — 83880 ASSAY OF NATRIURETIC PEPTIDE: CPT

## 2022-07-03 PROCEDURE — 96374 THER/PROPH/DIAG INJ IV PUSH: CPT

## 2022-07-03 PROCEDURE — 6370000000 HC RX 637 (ALT 250 FOR IP): Performed by: EMERGENCY MEDICINE

## 2022-07-03 PROCEDURE — 6360000004 HC RX CONTRAST MEDICATION: Performed by: RADIOLOGY

## 2022-07-03 PROCEDURE — 94640 AIRWAY INHALATION TREATMENT: CPT

## 2022-07-03 PROCEDURE — 85025 COMPLETE CBC W/AUTO DIFF WBC: CPT

## 2022-07-03 PROCEDURE — 6360000002 HC RX W HCPCS: Performed by: EMERGENCY MEDICINE

## 2022-07-03 PROCEDURE — 80053 COMPREHEN METABOLIC PANEL: CPT

## 2022-07-03 PROCEDURE — 84484 ASSAY OF TROPONIN QUANT: CPT

## 2022-07-03 PROCEDURE — 71275 CT ANGIOGRAPHY CHEST: CPT

## 2022-07-03 PROCEDURE — 99285 EMERGENCY DEPT VISIT HI MDM: CPT

## 2022-07-03 RX ORDER — PREDNISONE 20 MG/1
20 TABLET ORAL 2 TIMES DAILY
Qty: 10 TABLET | Refills: 0 | Status: SHIPPED | OUTPATIENT
Start: 2022-07-03 | End: 2022-07-08

## 2022-07-03 RX ORDER — IPRATROPIUM BROMIDE AND ALBUTEROL SULFATE 2.5; .5 MG/3ML; MG/3ML
3 SOLUTION RESPIRATORY (INHALATION) ONCE
Status: COMPLETED | OUTPATIENT
Start: 2022-07-03 | End: 2022-07-03

## 2022-07-03 RX ORDER — METHYLPREDNISOLONE SODIUM SUCCINATE 125 MG/2ML
80 INJECTION, POWDER, LYOPHILIZED, FOR SOLUTION INTRAMUSCULAR; INTRAVENOUS ONCE
Status: COMPLETED | OUTPATIENT
Start: 2022-07-03 | End: 2022-07-03

## 2022-07-03 RX ADMIN — IPRATROPIUM BROMIDE AND ALBUTEROL SULFATE 3 AMPULE: 2.5; .5 SOLUTION RESPIRATORY (INHALATION) at 13:08

## 2022-07-03 RX ADMIN — METHYLPREDNISOLONE SODIUM SUCCINATE 80 MG: 125 INJECTION, POWDER, FOR SOLUTION INTRAMUSCULAR; INTRAVENOUS at 18:40

## 2022-07-03 RX ADMIN — IOPAMIDOL 75 ML: 755 INJECTION, SOLUTION INTRAVENOUS at 14:02

## 2022-07-03 ASSESSMENT — PAIN DESCRIPTION - LOCATION: LOCATION: CHEST

## 2022-07-03 ASSESSMENT — PAIN DESCRIPTION - DESCRIPTORS: DESCRIPTORS: ACHING

## 2022-07-03 ASSESSMENT — PAIN SCALES - GENERAL
PAINLEVEL_OUTOF10: 2
PAINLEVEL_OUTOF10: 7

## 2022-07-03 ASSESSMENT — PAIN DESCRIPTION - FREQUENCY: FREQUENCY: CONTINUOUS

## 2022-07-03 ASSESSMENT — LIFESTYLE VARIABLES: HOW OFTEN DO YOU HAVE A DRINK CONTAINING ALCOHOL: NEVER

## 2022-07-03 ASSESSMENT — PAIN - FUNCTIONAL ASSESSMENT
PAIN_FUNCTIONAL_ASSESSMENT: 0-10
PAIN_FUNCTIONAL_ASSESSMENT: NONE - DENIES PAIN

## 2022-07-03 ASSESSMENT — PAIN DESCRIPTION - PAIN TYPE: TYPE: ACUTE PAIN

## 2022-07-03 NOTE — TELEPHONE ENCOUNTER
Writer contacted ED provider to inform of 30 day readmission risk. No Decision on disposition at this time.     Call Back: If you need to call back to inform of disposition you can contact me at [phone]

## 2022-07-03 NOTE — ED NOTES
90% spo2 ambulating without home o2, 94% spo2 ambulating with home o2      Oma Bedoya [place]  24/31/75 [id number]

## 2022-07-03 NOTE — ED PROVIDER NOTES
HPI:  7/3/22,   Time: 12:17 PM EDT       Cristina Salgado is a 69 y.o. female presenting to the ED for increasing shortness of breath, beginning 1 day ago. The complaint has been intermittent, moderate in severity, and worsened by deep breathing. Patient has a history of COPD. She states she quit smoking 2 months ago. She was just in the hospital last week and discharged 4 days ago. She was diagnosed with COPD as well as pneumonia. She was discharged on 4 L nasal cannula. She was sent home on oral Levaquin as well as steroids. Patient states has been taking her medication at home she still has a persistent dry cough. However she began to notice it yesterday and into today that she felt somewhat more short of breath. She felt like when she takes a deep breath she patient occasionally has sharp pain to the left side of her chest.  No back pain or abdominal pain or numbness or weakness to extremities. No fevers or chills. No nausea or vomiting. Review of Systems:   Pertinent positives and negatives are stated within HPI, all other systems reviewed and are negative.          --------------------------------------------- PAST HISTORY ---------------------------------------------  Past Medical History:  has a past medical history of COPD (chronic obstructive pulmonary disease) ([address] and Hypertension. Past Surgical History:  has a past surgical history that includes back surgery. Social History:  reports that she has quit smoking. Her smoking use included cigarettes. She smoked 0.25 packs per day. She has never used smokeless tobacco. She reports previous alcohol use. She reports previous drug use. Family History: family history is not on file. The patients home medications have been reviewed.     Allergies: Pcn [penicillins]        ---------------------------------------------------PHYSICAL EXAM--------------------------------------    Constitutional/General: Alert and oriented x3, well appearing, non toxic in NAD; thin  Head: Normocephalic and atraumatic  Eyes: PERRL, EOMI, conjunctive normal, sclera non icteric  Mouth: Oropharynx clear, handling secretions, no trismus, no asymmetry of the posterior oropharynx or uvular edema  Neck: Supple, full ROM, non tender to palpation in the midline, no stridor, no crepitus, no meningeal signs  Respiratory: Lungs fair air movement bilaterally. Mild expiratory wheezes diffusely. No tachypnea or accessory muscle use or retractions. , no rales or rhonchi. Not in respiratory distress. She is able to speak in full sentences. Cardiovascular:  Regular rate. Regular rhythm. No murmurs, gallops, or rubs. 2+ distal pulses  Chest: No chest wall tenderness  GI:  Abdomen Soft, Non tender, Non distended. +BS. No organomegaly, no palpable masses,  No rebound, guarding, or rigidity. Musculoskeletal: Moves all extremities x 4. Warm and well perfused, no clubbing, cyanosis, or edema. Capillary refill <3 seconds  Integument: skin warm and dry. No rashes. Lymphatic: no lymphadenopathy noted  Neurologic: GCS 15, no focal deficits, symmetric strength 5/5 in the upper and lower extremities bilaterally  Psychiatric: Normal Affect    -------------------------------------------------- RESULTS -------------------------------------------------  I have personally reviewed all laboratory and imaging results for this patient. Results are listed below.      LABS:  Results for orders placed or performed during the hospital encounter of 07/03/22   CBC with Auto Differential   Result Value Ref Range    WBC 6.7 4.5 - 11.5 E9/L    RBC 4.16 3.50 - 5.50 E12/L    Hemoglobin 13.0 11.5 - 15.5 g/dL    Hematocrit 40.4 34.0 - 48.0 %    MCV 97.1 80.0 - 99.9 fL    MCH 31.3 26.0 - 35.0 pg    MCHC 32.2 32.0 - 34.5 %    RDW 13.7 11.5 - 15.0 fL    Platelets 972 801 - 435 E9/L    MPV 8.7 7.0 - 12.0 fL    Neutrophils % 74.7 43.0 - 80.0 %    Immature Granulocytes % 0.9 0.0 - 5.0 %    Lymphocytes % 10.8 (L) 20.0 - 42.0 %    Monocytes % 12.9 (H) 2.0 - 12.0 %    Eosinophils % 0.1 0.0 - 6.0 %    Basophils % 0.6 0.0 - 2.0 %    Neutrophils Absolute 5.02 1.80 - 7.30 E9/L    Immature Granulocytes # 0.06 E9/L    Lymphocytes Absolute 0.73 (L) 1.50 - 4.00 E9/L    Monocytes Absolute 0.87 0.10 - 0.95 E9/L    Eosinophils Absolute 0.01 (L) 0.05 - 0.50 E9/L    Basophils Absolute 0.04 0.00 - 0.20 E9/L   Comprehensive Metabolic Panel   Result Value Ref Range    Sodium 132 132 - 146 mmol/L    Potassium 4.1 3.5 - 5.0 mmol/L    Chloride 93 (L) 98 - 107 mmol/L    CO2 29 22 - 29 mmol/L    Anion Gap 10 7 - 16 mmol/L    Glucose 134 (H) 74 - 99 mg/dL    BUN 10 6 - 23 mg/dL    CREATININE 0.6 0.5 - 1.0 mg/dL    GFR Non-African American >60 >=60 mL/min/1.73    GFR African American >60     Calcium 8.8 8.6 - 10.2 mg/dL    Total Protein 6.5 6.4 - 8.3 g/dL    Albumin 3.3 (L) 3.5 - 5.2 g/dL    Total Bilirubin <0.2 0.0 - 1.2 mg/dL    Alkaline Phosphatase 97 35 - 104 U/L    ALT 15 0 - 32 U/L    AST 23 0 - 31 U/L   Troponin   Result Value Ref Range    Troponin, High Sensitivity 10 (H) 0 - 9 ng/L   Brain Natriuretic Peptide   Result Value Ref Range    Pro- (H) 0 - 125 pg/mL   Troponin   Result Value Ref Range    Troponin, High Sensitivity 9 0 - 9 ng/L   EKG 12 Lead   Result Value Ref Range    Ventricular Rate 94 BPM    Atrial Rate 94 BPM    P-R Interval 110 ms    QRS Duration 66 ms    Q-T Interval 372 ms    QTc Calculation (Bazett) 465 ms    P Axis 15 degrees    R Axis -3 degrees    T Axis -10 degrees       RADIOLOGY:  Interpreted by Radiologist.  CTA PULMONARY W CONTRAST   Final Result   1. No acute pulmonary embolus. 2.  Pattern of persistent endobronchial obstruction with retained secretions   or aspiration anterior partially improved bilaterally but not resolved. 3.  Partial improvement of the peripheral consolidation of the left lower   lobe since the study of a June 27.              EKG:  EKG shows normal sinus rhythm at 93 beats minute no signs of any ST changes no ST elevation . Change in previous EKG is new T wave inversions in 3 and aVF as well as V2. These are nonspecific changes. EKG interpreted myself.      ------------------------- NURSING NOTES AND VITALS REVIEWED ---------------------------   The nursing notes within the ED encounter and vital signs as below have been reviewed by myself. /74   Pulse 99   Temp 98.8 °F (37.1 °C) (Oral)   Resp 20   Ht 5' 8\" (1.727 m)   Wt 105 lb (47.6 kg)   SpO2 99%   BMI 15.97 kg/m²   Oxygen Saturation Interpretation: Normal on baseline 4 L. The patients available past medical records and past encounters were reviewed. ------------------------------ ED COURSE/MEDICAL DECISION MAKING----------------------  Medications   ipratropium-albuterol (DUONEB) nebulizer solution 3 ampule (3 ampules Inhalation Given 7/3/22 1308)   iopamidol (ISOVUE-370) 76 % injection 75 mL (75 mLs IntraVENous Given 7/3/22 1402)   methylPREDNISolone sodium (SOLU-MEDROL) injection 80 mg (80 mg IntraVENous Given 7/3/22 1840)         ED COURSE:  ED Course as of 07/03/22 1904   Sun Jul 03, 2022   1812 Maintain saturation 94% on her 4 L she was just discharged. Patient states she did have difficulty filling the prescription for the prednisone. So she has not been on that I will give her a dose here and refill the prescription for 5-day course of prednisone. Patient is comfortable this plan and eager for discharge. I reassessed her lung evaluation she has good bilateral air movement no longer has any wheezing no tachypnea no increased work of breathing stable for discharge. Angelica Esparza   1903 Is currently already taking the Levaquin she was prescribed with. She will be given a new prescription for prednisone which she was not able to fill post discharge and she will follow-up outpatient with pulmonology as well as PCP she was referred to family practice. Patient is very comfortable plan be discharged home.   I did offer her an observation admission but she declined she is feeling markedly better she has no desaturation with ambulation and she is comfortable with outpatient plan. She will return for any new or worsening symptoms. Aicha Natalia      ED Course User Index  Petros Olson MD       Medical Decision Making:    Patient is a pleasant 68-year female presents emergency department with increasing shortness of breath sharp pain with deep breathing was recently discharged in the hospital 4 days ago with COPD and pneumonia on Levaquin and steroids at home she has inhalers for home use. No nebulizer. Differential diagnosis COPD exacerbation PE pneumonia ACS dysrhythmia      This patient has remained hemodynamically stable during their ED course. EKG shows normal sinus rhythm at 93 beats a minute no signs of any ST elevations. New nonspecific T wave inversions in inferior and anterior leads. Patient was given 3 DuoNeb treatments as well as IV steroids. First opponent was 10-second was 9 delta of only 1 CBC was normal Chemistry was normal.  BNP only 358. CT of the chest shows no PE. Improvement of the peripheral consolidation in the left lower lobe. She is currently on Levaquin and after recent hospitalization for pneumonia. Patient states that she was able to fill the prescription for Levaquin that she was discharged with but had difficulty filling the prescription for the prednisone she was prescribed so I wrote her a new 5-day course of prednisone prescription. She will fill that tomorrow at the pharmacy. She is comfortable plan to be discharged home. Much better aeration no hypoxia with ambulation. Stable on her baseline 4 L. She will be discharged home and follow-up with her PCP as well as pulmonary she was given referrals to both. Patient stable told return for any worsening symptoms. Re-Evaluations:             Re-evaluation.   Patients symptoms are improving    Re-examination  7/3/22   12:17 PM EDT          Vital Signs:   Vitals:    07/03/22 1315 07/03/22 1321 07/03/22 1731 07/03/22 1845   BP:    122/74   Pulse:    99   Resp:    20   Temp:       TempSrc:       SpO2: 100% 100% 90% 99%   Weight:       Height:             Counseling: The emergency provider has spoken with the patient and discussed todays results, in addition to providing specific details for the plan of care and counseling regarding the diagnosis and prognosis. Questions are answered at this time and they are agreeable with the plan.       --------------------------------- IMPRESSION AND DISPOSITION ---------------------------------    IMPRESSION  1. COPD exacerbation ([facility] [address] Improving   2. Pneumonia of left lower lobe due to infectious organism Improving       DISPOSITION  Disposition: Discharge to home  Patient condition is stable    NOTE: This report was transcribed using voice recognition software.  Every effort was made to ensure accuracy; however, inadvertent computerized transcription errors may be present        Gabby Soto MD  07/05/22 173

## 2022-07-04 LAB
EKG ATRIAL RATE: 93 BPM
EKG P AXIS: 12 DEGREES
EKG P-R INTERVAL: 136 MS
EKG Q-T INTERVAL: 362 MS
EKG QRS DURATION: 66 MS
EKG QTC CALCULATION (BAZETT): 450 MS
EKG R AXIS: -4 DEGREES
EKG T AXIS: -5 DEGREES
EKG VENTRICULAR RATE: 93 BPM

## 2022-07-07 ENCOUNTER — CARE COORDINATION (OUTPATIENT)
Dept: CASE MANAGEMENT | Age: 67
End: 2022-07-07

## 2022-07-07 DIAGNOSIS — J44.1 COPD WITH ACUTE EXACERBATION (HCC): Primary | ICD-10-CM

## 2022-07-07 PROCEDURE — 1111F DSCHRG MED/CURRENT MED MERGE: CPT | Performed by: INTERNAL MEDICINE

## 2022-07-07 NOTE — CARE COORDINATION
[facility]    2022    Patient: Juana Mclaughlin  Patient : 1955   MRN: <[id number]>  Reason for Admission: 2022 - 2022 [facility]. Resp failure, COPD, PN, HTN. 7/3/2022 [address] [place] [address] Emergency Department. COPD, LLL PN. Discharge Date: 7/3/22 RARS: Readmission Risk Score: 10.2 ( )  NR  CT    Pt reports she is waiting on return call from Dr Aden Samayoa office and will schedule appt for a couple weeks. Pt states she has call out to physician office (a previous doctor) and waiting to hear if accepted. Declines assist to schedule w/ new PCP. Care Transitions Follow Up Call    Needs to be reviewed by the provider   Additional needs identified to be addressed with provider: No  none             Method of communication with provider : none      Care Transition Nurse contacted the patient by telephone to follow up after admission. Verified name and  with patient as identifiers. Addressed changes since last contact: Hernando Josette reports she has increased from 3L NC to 4L NC near con't O2 w/ pulse ox sats of 89-93%. States she is not wearing O2 for outings but is avoiding leaving house. Advised concern for desats off O2 and enc to keep 90% or higher, v/u. States she has occasional chills, coughing, and sneezing. Reports intact smell/taste, no fevers, no flu-like symptoms. Does have a couple home covid tests available. Pt v/u of covid symptoms to report to physician/return to ED. States she is not smoking and wearing nicotine patch. She wants to return to work on Monday and is waiting return call from pulmonary office. Strongly enc to schedule soonest pulmonary appt for additional PN/COPD fu, v/u. Declines assist w/ scheduling w/ new family physician and states she has a call out with a previous physician office to see if accepted. Denies any home or med refill needs. On prednisone.  Does not have an albuterol inhaler in the home and advised she has refill available and enc to , v/u. Reports good appetite and hydration. Discussed follow-up appointments. If no appointment was previously scheduled, appointment scheduling offered: Yes. Is follow up appointment scheduled within 7 days of discharge? No.    Advance Care Planning:   Does patient have an Advance Directive: Wojciech Donovan primary decision maker [phone]   CTN reviewed discharge instructions, medical action plan and red flags with patient and discussed any barriers to care and/or understanding of plan of care after discharge. Discussed appropriate site of care based on symptoms and resources available to patient including: When to call 911. The patient agrees to contact the PCP office for questions related to their healthcare. Patients top risk factors for readmission: COPD/PN  Interventions to address risk factors: Reviewed to report trending sats less than 90% to pulmonologist/return to ED for resp distress. Non-[facility] follow up appointment(s):     CTN provided contact information for future needs. Plan for follow-up call in 7-10 days based on severity of symptoms and risk factors. Plan for next call: COPD/PN FU, Cough/chills/exertional SOB/sneezing, Declines appt assistance. Care Transitions Subsequent and Final Call    Schedule Follow Up Appointment with PCP: Declined  Subsequent and Final Calls  Do you have any ongoing symptoms?: Yes  Patient-reported symptoms: Cough, Shortness of Breath, Other  Do you have any questions related to your medications?: No  Do you currently have any active services?: No  Do you have any needs or concerns that I can assist you with?: No  Identified Barriers: Lack of Education  Care Transitions Interventions  Other Interventions: Follow Up  No future appointments.     Lennox Camps, RN

## 2022-07-08 ENCOUNTER — TELEPHONE (OUTPATIENT)
Dept: PRIMARY CARE CLINIC | Age: 67
End: 2022-07-08

## 2022-07-08 NOTE — TELEPHONE ENCOUNTER
Pt wishes to re-establish w/you for primary care. She is currently in hospital w/pneumonia and hasn't been under a doctor's care for years. Accept?

## 2022-07-09 ENCOUNTER — HOSPITAL ENCOUNTER (EMERGENCY)
Age: 67
Discharge: HOME | End: 2022-07-09
Attending: EMERGENCY MEDICINE
Payer: MEDICARE

## 2022-07-09 ENCOUNTER — TELEPHONE (OUTPATIENT)
Dept: OTHER | Facility: CLINIC | Age: 67
End: 2022-07-09

## 2022-07-09 ENCOUNTER — APPOINTMENT (OUTPATIENT)
Dept: GENERAL RADIOLOGY | Age: 67
End: 2022-07-09
Payer: MEDICARE

## 2022-07-09 ENCOUNTER — APPOINTMENT (OUTPATIENT)
Dept: CT IMAGING | Age: 67
End: 2022-07-09
Payer: MEDICARE

## 2022-07-09 VITALS
SYSTOLIC BLOOD PRESSURE: 168 MMHG | WEIGHT: 105 LBS | TEMPERATURE: 98.6 F | HEART RATE: 90 BPM | DIASTOLIC BLOOD PRESSURE: 78 MMHG | HEIGHT: 68 IN | RESPIRATION RATE: 18 BRPM | BODY MASS INDEX: 15.91 KG/M2 | OXYGEN SATURATION: 97 %

## 2022-07-09 DIAGNOSIS — J44.1 COPD EXACERBATION (HCC): ICD-10-CM

## 2022-07-09 DIAGNOSIS — U07.1 COVID-19: Primary | ICD-10-CM

## 2022-07-09 LAB
ANION GAP SERPL CALCULATED.3IONS-SCNC: 7 MMOL/L (ref 7–16)
BASOPHILS ABSOLUTE: 0.02 E9/L (ref 0–0.2)
BASOPHILS RELATIVE PERCENT: 0.2 % (ref 0–2)
BUN BLDV-MCNC: 12 MG/DL (ref 6–23)
CALCIUM SERPL-MCNC: 9 MG/DL (ref 8.6–10.2)
CHLORIDE BLD-SCNC: 93 MMOL/L (ref 98–107)
CO2: 37 MMOL/L (ref 22–29)
CREAT SERPL-MCNC: 0.5 MG/DL (ref 0.5–1)
EOSINOPHILS ABSOLUTE: 0.04 E9/L (ref 0.05–0.5)
EOSINOPHILS RELATIVE PERCENT: 0.4 % (ref 0–6)
GFR AFRICAN AMERICAN: >60
GFR NON-AFRICAN AMERICAN: >60 ML/MIN/1.73
GLUCOSE BLD-MCNC: 102 MG/DL (ref 74–99)
HCT VFR BLD CALC: 38.7 % (ref 34–48)
HEMOGLOBIN: 12.4 G/DL (ref 11.5–15.5)
IMMATURE GRANULOCYTES #: 0.05 E9/L
IMMATURE GRANULOCYTES %: 0.5 % (ref 0–5)
LYMPHOCYTES ABSOLUTE: 1.58 E9/L (ref 1.5–4)
LYMPHOCYTES RELATIVE PERCENT: 15.6 % (ref 20–42)
MAGNESIUM: 1.9 MG/DL (ref 1.6–2.6)
MCH RBC QN AUTO: 30.8 PG (ref 26–35)
MCHC RBC AUTO-ENTMCNC: 32 % (ref 32–34.5)
MCV RBC AUTO: 96.3 FL (ref 80–99.9)
MONOCYTES ABSOLUTE: 0.66 E9/L (ref 0.1–0.95)
MONOCYTES RELATIVE PERCENT: 6.5 % (ref 2–12)
NEUTROPHILS ABSOLUTE: 7.81 E9/L (ref 1.8–7.3)
NEUTROPHILS RELATIVE PERCENT: 76.8 % (ref 43–80)
PDW BLD-RTO: 14 FL (ref 11.5–15)
PLATELET # BLD: 338 E9/L (ref 130–450)
PMV BLD AUTO: 9 FL (ref 7–12)
POTASSIUM REFLEX MAGNESIUM: 3.5 MMOL/L (ref 3.5–5)
PRO-BNP: 666 PG/ML (ref 0–125)
RBC # BLD: 4.02 E12/L (ref 3.5–5.5)
SARS-COV-2, NAAT: DETECTED
SODIUM BLD-SCNC: 137 MMOL/L (ref 132–146)
TROPONIN, HIGH SENSITIVITY: 10 NG/L (ref 0–9)
TROPONIN, HIGH SENSITIVITY: 12 NG/L (ref 0–9)
WBC # BLD: 10.2 E9/L (ref 4.5–11.5)

## 2022-07-09 PROCEDURE — 6370000000 HC RX 637 (ALT 250 FOR IP): Performed by: EMERGENCY MEDICINE

## 2022-07-09 PROCEDURE — 84484 ASSAY OF TROPONIN QUANT: CPT

## 2022-07-09 PROCEDURE — 6360000002 HC RX W HCPCS: Performed by: STUDENT IN AN ORGANIZED HEALTH CARE EDUCATION/TRAINING PROGRAM

## 2022-07-09 PROCEDURE — 6370000000 HC RX 637 (ALT 250 FOR IP): Performed by: STUDENT IN AN ORGANIZED HEALTH CARE EDUCATION/TRAINING PROGRAM

## 2022-07-09 PROCEDURE — 83880 ASSAY OF NATRIURETIC PEPTIDE: CPT

## 2022-07-09 PROCEDURE — 94640 AIRWAY INHALATION TREATMENT: CPT

## 2022-07-09 PROCEDURE — 94664 DEMO&/EVAL PT USE INHALER: CPT

## 2022-07-09 PROCEDURE — 96375 TX/PRO/DX INJ NEW DRUG ADDON: CPT

## 2022-07-09 PROCEDURE — 71250 CT THORAX DX C-: CPT

## 2022-07-09 PROCEDURE — 80048 BASIC METABOLIC PNL TOTAL CA: CPT

## 2022-07-09 PROCEDURE — 2700000000 HC OXYGEN THERAPY PER DAY

## 2022-07-09 PROCEDURE — 83735 ASSAY OF MAGNESIUM: CPT

## 2022-07-09 PROCEDURE — 85025 COMPLETE CBC W/AUTO DIFF WBC: CPT

## 2022-07-09 PROCEDURE — 87635 SARS-COV-2 COVID-19 AMP PRB: CPT

## 2022-07-09 PROCEDURE — 36415 COLL VENOUS BLD VENIPUNCTURE: CPT

## 2022-07-09 PROCEDURE — 6360000002 HC RX W HCPCS: Performed by: EMERGENCY MEDICINE

## 2022-07-09 PROCEDURE — 99285 EMERGENCY DEPT VISIT HI MDM: CPT

## 2022-07-09 PROCEDURE — 71046 X-RAY EXAM CHEST 2 VIEWS: CPT

## 2022-07-09 PROCEDURE — 96374 THER/PROPH/DIAG INJ IV PUSH: CPT

## 2022-07-09 PROCEDURE — 2580000003 HC RX 258: Performed by: STUDENT IN AN ORGANIZED HEALTH CARE EDUCATION/TRAINING PROGRAM

## 2022-07-09 PROCEDURE — 93005 ELECTROCARDIOGRAM TRACING: CPT | Performed by: NURSE PRACTITIONER

## 2022-07-09 RX ORDER — DEXAMETHASONE SODIUM PHOSPHATE 10 MG/ML
10 INJECTION INTRAMUSCULAR; INTRAVENOUS ONCE
Status: COMPLETED | OUTPATIENT
Start: 2022-07-09 | End: 2022-07-09

## 2022-07-09 RX ORDER — IPRATROPIUM BROMIDE AND ALBUTEROL SULFATE 2.5; .5 MG/3ML; MG/3ML
3 SOLUTION RESPIRATORY (INHALATION) ONCE
Status: COMPLETED | OUTPATIENT
Start: 2022-07-09 | End: 2022-07-09

## 2022-07-09 RX ORDER — 0.9 % SODIUM CHLORIDE 0.9 %
500 INTRAVENOUS SOLUTION INTRAVENOUS ONCE
Status: COMPLETED | OUTPATIENT
Start: 2022-07-09 | End: 2022-07-09

## 2022-07-09 RX ORDER — FAMOTIDINE 20 MG/1
20 TABLET, FILM COATED ORAL ONCE
Status: COMPLETED | OUTPATIENT
Start: 2022-07-09 | End: 2022-07-09

## 2022-07-09 RX ORDER — IPRATROPIUM BROMIDE AND ALBUTEROL SULFATE 2.5; .5 MG/3ML; MG/3ML
1 SOLUTION RESPIRATORY (INHALATION)
Status: COMPLETED | OUTPATIENT
Start: 2022-07-09 | End: 2022-07-09

## 2022-07-09 RX ORDER — METHYLPREDNISOLONE SODIUM SUCCINATE 125 MG/2ML
125 INJECTION, POWDER, LYOPHILIZED, FOR SOLUTION INTRAMUSCULAR; INTRAVENOUS ONCE
Status: COMPLETED | OUTPATIENT
Start: 2022-07-09 | End: 2022-07-09

## 2022-07-09 RX ADMIN — IPRATROPIUM BROMIDE AND ALBUTEROL SULFATE 1 AMPULE: 2.5; .5 SOLUTION RESPIRATORY (INHALATION) at 12:32

## 2022-07-09 RX ADMIN — IPRATROPIUM BROMIDE AND ALBUTEROL SULFATE 3 AMPULE: .5; 2.5 SOLUTION RESPIRATORY (INHALATION) at 10:25

## 2022-07-09 RX ADMIN — IPRATROPIUM BROMIDE AND ALBUTEROL SULFATE 1 AMPULE: 2.5; .5 SOLUTION RESPIRATORY (INHALATION) at 12:33

## 2022-07-09 RX ADMIN — METHYLPREDNISOLONE SODIUM SUCCINATE 125 MG: 125 INJECTION, POWDER, FOR SOLUTION INTRAMUSCULAR; INTRAVENOUS at 12:27

## 2022-07-09 RX ADMIN — IPRATROPIUM BROMIDE AND ALBUTEROL SULFATE 1 AMPULE: 2.5; .5 SOLUTION RESPIRATORY (INHALATION) at 12:31

## 2022-07-09 RX ADMIN — FAMOTIDINE 20 MG: 20 TABLET ORAL at 15:29

## 2022-07-09 RX ADMIN — DEXAMETHASONE SODIUM PHOSPHATE 10 MG: 10 INJECTION INTRAMUSCULAR; INTRAVENOUS at 15:30

## 2022-07-09 RX ADMIN — SODIUM CHLORIDE 500 ML: 9 INJECTION, SOLUTION INTRAVENOUS at 12:29

## 2022-07-09 ASSESSMENT — ENCOUNTER SYMPTOMS
RHINORRHEA: 0
CHEST TIGHTNESS: 0
BACK PAIN: 0
NAUSEA: 0
SORE THROAT: 0
COLOR CHANGE: 0
ABDOMINAL DISTENTION: 0
VOMITING: 0
CONSTIPATION: 0
SHORTNESS OF BREATH: 1
BLOOD IN STOOL: 0
COUGH: 1
DIARRHEA: 0
PHOTOPHOBIA: 0
ABDOMINAL PAIN: 0
EYE PAIN: 0

## 2022-07-09 ASSESSMENT — PAIN - FUNCTIONAL ASSESSMENT: PAIN_FUNCTIONAL_ASSESSMENT: NONE - DENIES PAIN

## 2022-07-09 NOTE — ED PROVIDER NOTES
Name: Yosvany Diaz   MRN: [id number]     --------------------------------------------- History of Present Illness ---------------------------------------------  7/9/22, Time: 10:08 AM EDT   Chief Complaint   Patient presents with    Shortness of Breath     pt just finished antibiotic for pneumonia. Feels she is not any better    Fatigue      HPI    Yosvany Diaz is a 69 y.o. female, with hx of COPD, who presents to the ED today for shortness of breath x 3 days. She states she has dry cough. Problem has been constant, moderate in severity, no exacerbating or relieving factors. Pt states she was diagnosed with pneumonia last week and she finished her levaquin antibiotic course. She was also on prednisone. The pt denies any associated fever, lightheadedness, dizziness, HA, n/v, chest pain, abd pain, leg swelling, GI or  complaints. Not normally on oxygen. Pt relates smoked for 30 years, 0.5 packs per day, 15 pack years, and quit a few months ago. Pt denies any hx of blood clots. Not on blood thinners. Takes aspirin daily. Allg: Pcn [penicillins]   PCP: No primary care provider on file. .    Meds: No current facility-administered medications for this encounter.     Current Outpatient Medications:     lisinopril (PRINIVIL;ZESTRIL) 5 MG tablet, Take 1 tablet by mouth daily, Disp: 30 tablet, Rfl: 3    senna (SENOKOT) 8.6 MG tablet, Take 1 tablet by mouth nightly, Disp: 30 tablet, Rfl: 0    albuterol sulfate HFA (VENTOLIN HFA) 108 (90 Base) MCG/ACT inhaler, Inhale 2 puffs into the lungs 4 times daily as needed for Wheezing (Patient not taking: Reported on 7/7/2022), Disp: 54 g, Rfl: 1    budesonide-formoterol (SYMBICORT) 160-4.5 MCG/ACT AERO, Inhale 2 puffs into the lungs 2 times daily, Disp: 30.6 g, Rfl: 1    aspirin 81 MG EC tablet, Take 81 mg by mouth daily, Disp: , Rfl:     vitamin C (ASCORBIC ACID) 500 MG tablet, Take 500 mg by mouth daily, Disp: , Rfl:      Review of Systems   Constitutional: Positive for fatigue. Negative for chills and fever. HENT: Negative for congestion, rhinorrhea and sore throat. Eyes: Negative for photophobia, pain and visual disturbance. Respiratory: Positive for cough and shortness of breath. Negative for chest tightness. Cardiovascular: Negative for chest pain, palpitations and leg swelling. Gastrointestinal: Negative for abdominal distention, abdominal pain, blood in stool, constipation, diarrhea, nausea and vomiting. Genitourinary: Negative for difficulty urinating, dysuria, hematuria, vaginal bleeding and vaginal discharge. Musculoskeletal: Negative for back pain, neck pain and neck stiffness. Skin: Negative for color change, rash and wound. Neurological: Negative for dizziness, syncope, light-headedness and headaches. Psychiatric/Behavioral: Negative for agitation, behavioral problems and confusion. Physical Exam  Constitutional:       General: She is not in acute distress. Appearance: Normal appearance. She is normal weight. She is ill-appearing. She is not toxic-appearing or diaphoretic. HENT:      Head: Normocephalic and atraumatic. Right Ear: External ear normal.      Left Ear: External ear normal.      Nose: Nose normal. No rhinorrhea. Mouth/Throat:      Pharynx: Oropharynx is clear. Eyes:      General: No scleral icterus. Right eye: No discharge. Left eye: No discharge. Extraocular Movements: Extraocular movements intact. Conjunctiva/sclera: Conjunctivae normal.      Pupils: Pupils are equal, round, and reactive to light. Cardiovascular:      Rate and Rhythm: Normal rate and regular rhythm. Pulses: Normal pulses. Pulmonary:      Effort: Pulmonary effort is normal. No tachypnea, accessory muscle usage or respiratory distress. Breath sounds: No stridor. Examination of the right-upper field reveals wheezing and rhonchi. Examination of the left-upper field reveals wheezing and rhonchi.  Examination of the right-middle field reveals wheezing and rhonchi. Examination of the left-middle field reveals wheezing and rhonchi. Wheezing and rhonchi present. Abdominal:      General: Abdomen is flat. There is no distension. Palpations: Abdomen is soft. Tenderness: There is no abdominal tenderness. There is no guarding. Musculoskeletal:         General: No swelling or tenderness. Normal range of motion. Cervical back: Normal range of motion. Right lower leg: No edema. Left lower leg: No edema. Skin:     General: Skin is warm and dry. Capillary Refill: Capillary refill takes less than 2 seconds. Coloration: Skin is not jaundiced. Findings: No erythema or rash. Neurological:      General: No focal deficit present. Mental Status: She is alert and oriented to person, place, and time. Psychiatric:         Mood and Affect: Mood normal.         Behavior: Behavior normal.          Procedures     MDM  Number of Diagnoses or Management Options  [facility]  COVID-19  Diagnosis management comments: Mrs. Franky Serrato 49-year-old female patient who presented today for shortness of breath, cough, x3 days. Recently diagnosed with pneumonia, finished course of Levaquin. Vitals within normal limits. Patient alert and oriented, no distress. Shortness of breath -history of COPD, recent pneumonia. Rhonchi and end exp wheezing appreciated bilat. Duo nebs, Solu-Medrol, decadron were given. Chest x-ray showed findings consistent with COPD. CT chest showed left lower lobe consolidation, similar to previously seen on CT 7/3/2022 as well as groundglass appearance consistent with COVID infection, no signs of PE. COVID POSITIVE. Trop 12>10, no ischemic concerns on EKG. Pt was ambulated without oxygen and maintained O2 sats in the 90's. Lungs sounds greatly improved on re-exam and pt was feeling much improved. At this time, no further workup was indicated.  Pt s/s likely due to COVID pneumonia and copd exacerbation, no other acute process identified. Spoke with pt about results, recommended f/u with pcp and she was given return precautions. Pt amenable to plan. Pt was discharged home. Amount and/or Complexity of Data Reviewed  Decide to obtain previous medical records or to obtain history from someone other than the patient: yes       EKG Interpretation  Interpreted by emergency department physician. 7/9/22  Time: 0940    Rate:  78  Axis: normal   KY: 112  QRS: 70  Qtc: 421  Rhythm:  regular  Clinical Impression: NSR, voltage crit for LVH  Comparison to old EKG: When compared to 7/3/22, ST elevations no longer present. ED Course as of 07/09/22 2014   Sat Jul 09, 2022   1401 Troponin, High Sensitivity(!): 12 [PW]   1401 SARS-CoV-2, NAAT(!): DETECTED [PW]   9762 Pt states breathing is a bit improved. Lung sounds improved bilaterally, no longer wheezing. [PW]      ED Course User Index  [PW] Sarmad Chen DO        --------------------------------------------- PAST HISTORY ---------------------------------------------  Past Medical History:  has a past medical history of COPD (chronic obstructive pulmonary disease) ([address] and Hypertension. Past Surgical History:  has a past surgical history that includes back surgery. Social History:  reports that she has quit smoking. Her smoking use included cigarettes. She smoked 0.25 packs per day. She has never used smokeless tobacco. She reports previous alcohol use. She reports previous drug use. Family History: family history is not on file. The patients home medications have been reviewed.     Allergies: Pcn [penicillins]    -------------------------------------------------- RESULTS -------------------------------------------------  Labs:  Results for orders placed or performed during the hospital encounter of 07/09/22   COVID-19, Rapid    Specimen: Nasopharyngeal Swab   Result Value Ref Range    SARS-CoV-2, NAAT DETECTED (A) Not Detected   CBC with Auto Differential   Result Value Ref Range    WBC 10.2 4.5 - 11.5 E9/L    RBC 4.02 3.50 - 5.50 E12/L    Hemoglobin 12.4 11.5 - 15.5 g/dL    Hematocrit 38.7 34.0 - 48.0 %    MCV 96.3 80.0 - 99.9 fL    MCH 30.8 26.0 - 35.0 pg    MCHC 32.0 32.0 - 34.5 %    RDW 14.0 11.5 - 15.0 fL    Platelets 611 993 - 160 E9/L    MPV 9.0 7.0 - 12.0 fL    Neutrophils % 76.8 43.0 - 80.0 %    Immature Granulocytes % 0.5 0.0 - 5.0 %    Lymphocytes % 15.6 (L) 20.0 - 42.0 %    Monocytes % 6.5 2.0 - 12.0 %    Eosinophils % 0.4 0.0 - 6.0 %    Basophils % 0.2 0.0 - 2.0 %    Neutrophils Absolute 7.81 (H) 1.80 - 7.30 E9/L    Immature Granulocytes # 0.05 E9/L    Lymphocytes Absolute 1.58 1.50 - 4.00 E9/L    Monocytes Absolute 0.66 0.10 - 0.95 E9/L    Eosinophils Absolute 0.04 (L) 0.05 - 0.50 E9/L    Basophils Absolute 0.02 0.00 - 0.20 I5/M   Basic Metabolic Panel w/ Reflex to MG   Result Value Ref Range    Sodium 137 132 - 146 mmol/L    Potassium reflex Magnesium 3.5 3.5 - 5.0 mmol/L    Chloride 93 (L) 98 - 107 mmol/L    CO2 37 (H) 22 - 29 mmol/L    Anion Gap 7 7 - 16 mmol/L    Glucose 102 (H) 74 - 99 mg/dL    BUN 12 6 - 23 mg/dL    CREATININE 0.5 0.5 - 1.0 mg/dL    GFR Non-African American >60 >=60 mL/min/1.73    GFR African American >60     Calcium 9.0 8.6 - 10.2 mg/dL   Troponin   Result Value Ref Range    Troponin, High Sensitivity 12 (H) 0 - 9 ng/L   Brain Natriuretic Peptide   Result Value Ref Range    Pro- (H) 0 - 125 pg/mL   Magnesium   Result Value Ref Range    Magnesium 1.9 1.6 - 2.6 mg/dL   Troponin   Result Value Ref Range    Troponin, High Sensitivity 10 (H) 0 - 9 ng/L   EKG 12 Lead   Result Value Ref Range    Ventricular Rate 78 BPM    Atrial Rate 78 BPM    P-R Interval 112 ms    QRS Duration 70 ms    Q-T Interval 370 ms    QTc Calculation (Bazett) 421 ms    P Axis 29 degrees    R Axis 71 degrees    T Axis 70 degrees       Radiology:  CT CHEST WO CONTRAST   Final Result   1.   COPD, upper lobe centrilobular emphysema. 2.  Peripheral rounded area of consolidation in the posterior costophrenic   angle of the left lower lobe not significantly changed from 07/03/2022.      3.  Fluid density in the small bronchi of the right left lower lobe   compatible with retained secretions. 4. Stable scattered areas of parenchymal scarring with nodular pleural   thickening in the upper lobes. 5.  The typical interstitial and ground-glass opacities associated with   COVID-19 pneumonia is not present. RECOMMENDATIONS:   Follow-up noncontrast CT chest in 6-12 weeks recommended to confirm   resolution of the alveolar consolidation in the left lower lobe. XR CHEST (2 VW)   Final Result   Findings suggest COPD. No acute pulmonary process             ------------------------- NURSING NOTES AND VITALS REVIEWED ---------------------------  Date / Time Roomed:  7/9/2022  9:29 AM  ED Bed Assignment:  17/17    The nursing notes within the ED encounter and vital signs as below have been reviewed. Patient Vitals for the past 8 hrs:   BP Temp Pulse Resp SpO2   07/09/22 1535 (!) 168/78 98.6 °F (37 °C) 90 18 97 %   07/09/22 1221 135/61 -- 80 -- 97 %       Oxygen Saturation Interpretation: normal    ------------------------------------------ED COURSE & MEDS GIVEN ------------------------------------------  ED Course as of 07/09/22 2014   Sat Jul 09, 2022   1401 Troponin, High Sensitivity(!): 12 [PW]   1401 SARS-CoV-2, NAAT(!): DETECTED [PW]   4845 Pt states breathing is a bit improved. Lung sounds improved bilaterally, no longer wheezing.   [PW]      ED Course User Index  [PW] Gisell Pateenix, DO        Medications   methylPREDNISolone sodium (SOLU-MEDROL) injection 125 mg (125 mg IntraVENous Given 7/9/22 1227)   ipratropium-albuterol (DUONEB) nebulizer solution 3 ampule (3 ampules Inhalation Given 7/9/22 1025)   0.9 % sodium chloride bolus (0 mLs IntraVENous Stopped 7/9/22 1500)

## 2022-07-09 NOTE — TELEPHONE ENCOUNTER
Writer contacted ED provider to inform of 30 day readmission risk. 's attempt to contact Dr. Milo Brock was unsuccessful.       Call Back: If you need to call back to inform of disposition you can contact me at [phone]

## 2022-07-09 NOTE — ED NOTES
Department of Emergency Medicine  FIRST PROVIDER TRIAGE NOTE             Independent ANA           7/9/22  9:14 AM EDT    Date of Encounter: 7/9/22   MRN: [id number]      HPI: Jenifer Abernathy is a 69 y.o. female who presents to the ED for No chief complaint on file. Increase shortness of breath and weakness. She was recently placed on oxygen 4 L via nasal cannula. She arrived at 86% on room air due to not being on to carry her oxygen tank in. She was placed on 4 L of oxygen via nasal cannula and is now 94 %. States she was recently on antibiotics for pneumonia and finished them yesterday. Denies any leg swelling. HX of COPD.    ROS: Negative for abd pain or fever. PE: Gen Appearance/Constitutional: alert  Musculoskeletal: moves all extremities x 4     Initial Plan of Care: All treatment areas with department are currently occupied. Plan to order/Initiate the following while awaiting opening in ED: labs, EKG and imaging studies.   Initiate Treatment-Testing, Proceed toTreatment Area When Bed Available for ED Attending/MLP to Continue Care    Electronically signed by REGI Self CNP   DD: 7/9/22         REGI Self CNP  07/09/22 6543

## 2022-07-09 NOTE — ED NOTES
Arrived on R/A with SaO2 86% Pt states she is supposed to be on O2 4L at home but didn't bring it stating it is too heavy     Cookie Ortiz, RAMO  07/09/22 0239

## 2022-07-09 NOTE — ED NOTES
Ambulated patient on pulse ox at RA. Resting SPO2 95% RA and HR 86. Ambulating POX 91% on RA and HR 97. Assisted patient back to bed and decreased O2 NC to 2 L, patient resting comfortably at 97% with intervention. Dr Holly Rizzo updated of all and aware. Call light within reach.      Adele Kussmaul, RN  07/09/22 5405

## 2022-07-10 LAB
EKG ATRIAL RATE: 78 BPM
EKG P AXIS: 29 DEGREES
EKG P-R INTERVAL: 112 MS
EKG Q-T INTERVAL: 370 MS
EKG QRS DURATION: 70 MS
EKG QTC CALCULATION (BAZETT): 421 MS
EKG R AXIS: 71 DEGREES
EKG T AXIS: 70 DEGREES
EKG VENTRICULAR RATE: 78 BPM

## 2022-07-11 ENCOUNTER — TELEPHONE (OUTPATIENT)
Dept: OTHER | Facility: CLINIC | Age: 67
End: 2022-07-11

## 2022-07-11 NOTE — TELEPHONE ENCOUNTER
RN access attempted to contact pt in regards to need for ed follow up appt    Attempted was not successful. Let message to call back with help scheduling ed follow up.

## 2022-07-12 ENCOUNTER — CARE COORDINATION (OUTPATIENT)
Dept: CASE MANAGEMENT | Age: 67
End: 2022-07-12

## 2022-07-12 NOTE — CARE COORDINATION
[facility] 45 Transitions Follow Up Call    2022    Patient: Chema Hull  Patient : 1955   MRN: <[id number]>  Reason for Admission: 2022 - 2022 [facility]. Resp failure, COPD, PN, HTN. 7/3/2022 [address] [place] [address] Emergency Department. COPD, LLL PN. 2022 [address] [place] [address] Emergency Department. Covid, COPD. Discharge Date: 22 RARS: Readmission Risk Score: 10.2 ( )  NR  CV-19  Freq ED visits. Subsequent CV-19 outreach attempt leaving Hippa VM to primary/secondary numbers w/ my outreach info. Advised to home number to call [phone] for [address] [place] [address] to establish w/ a PCP. Care Transitions Subsequent and Final Call    Subsequent and Final Calls  Care Transitions Interventions  Other Interventions: Follow Up  No future appointments.     RAMO Mason

## 2022-07-14 ENCOUNTER — HOSPITAL ENCOUNTER (INPATIENT)
Age: 67
LOS: 3 days | Discharge: HOME | DRG: 177 | End: 2022-07-17
Attending: EMERGENCY MEDICINE | Admitting: FAMILY MEDICINE
Payer: MEDICARE

## 2022-07-14 ENCOUNTER — APPOINTMENT (OUTPATIENT)
Dept: GENERAL RADIOLOGY | Age: 67
DRG: 177 | End: 2022-07-14
Payer: MEDICARE

## 2022-07-14 ENCOUNTER — TELEPHONE (OUTPATIENT)
Dept: OTHER | Facility: CLINIC | Age: 67
End: 2022-07-14

## 2022-07-14 DIAGNOSIS — J96.21 ACUTE ON CHRONIC RESPIRATORY FAILURE WITH HYPOXIA (HCC): ICD-10-CM

## 2022-07-14 DIAGNOSIS — J44.1 COPD EXACERBATION (HCC): Primary | ICD-10-CM

## 2022-07-14 LAB
ALBUMIN SERPL-MCNC: 3.9 G/DL (ref 3.5–5.2)
ALP BLD-CCNC: 85 U/L (ref 35–104)
ALT SERPL-CCNC: 15 U/L (ref 0–32)
ANION GAP SERPL CALCULATED.3IONS-SCNC: 11 MMOL/L (ref 7–16)
AST SERPL-CCNC: 20 U/L (ref 0–31)
BASOPHILS ABSOLUTE: 0.06 E9/L (ref 0–0.2)
BASOPHILS RELATIVE PERCENT: 0.7 % (ref 0–2)
BILIRUB SERPL-MCNC: 0.4 MG/DL (ref 0–1.2)
BUN BLDV-MCNC: 11 MG/DL (ref 6–23)
C-REACTIVE PROTEIN: 0.3 MG/DL (ref 0–0.4)
CALCIUM SERPL-MCNC: 9.4 MG/DL (ref 8.6–10.2)
CHLORIDE BLD-SCNC: 95 MMOL/L (ref 98–107)
CO2: 30 MMOL/L (ref 22–29)
CREAT SERPL-MCNC: 0.5 MG/DL (ref 0.5–1)
D DIMER: <200 NG/ML DDU
EKG ATRIAL RATE: 83 BPM
EKG P AXIS: 89 DEGREES
EKG P-R INTERVAL: 144 MS
EKG Q-T INTERVAL: 358 MS
EKG QRS DURATION: 68 MS
EKG QTC CALCULATION (BAZETT): 420 MS
EKG R AXIS: 72 DEGREES
EKG T AXIS: 83 DEGREES
EKG VENTRICULAR RATE: 83 BPM
EOSINOPHILS ABSOLUTE: 0.11 E9/L (ref 0.05–0.5)
EOSINOPHILS RELATIVE PERCENT: 1.3 % (ref 0–6)
FERRITIN: 110 NG/ML
GFR AFRICAN AMERICAN: >60
GFR NON-AFRICAN AMERICAN: >60 ML/MIN/1.73
GLUCOSE BLD-MCNC: 93 MG/DL (ref 74–99)
HCT VFR BLD CALC: 42.3 % (ref 34–48)
HEMOGLOBIN: 13.6 G/DL (ref 11.5–15.5)
IMMATURE GRANULOCYTES #: 0.06 E9/L
IMMATURE GRANULOCYTES %: 0.7 % (ref 0–5)
LACTATE DEHYDROGENASE: 175 U/L (ref 135–214)
LIPASE: 28 U/L (ref 13–60)
LYMPHOCYTES ABSOLUTE: 1.86 E9/L (ref 1.5–4)
LYMPHOCYTES RELATIVE PERCENT: 22 % (ref 20–42)
MCH RBC QN AUTO: 31.3 PG (ref 26–35)
MCHC RBC AUTO-ENTMCNC: 32.2 % (ref 32–34.5)
MCV RBC AUTO: 97.5 FL (ref 80–99.9)
MONOCYTES ABSOLUTE: 0.49 E9/L (ref 0.1–0.95)
MONOCYTES RELATIVE PERCENT: 5.8 % (ref 2–12)
NEUTROPHILS ABSOLUTE: 5.87 E9/L (ref 1.8–7.3)
NEUTROPHILS RELATIVE PERCENT: 69.5 % (ref 43–80)
PDW BLD-RTO: 13.8 FL (ref 11.5–15)
PLATELET # BLD: 332 E9/L (ref 130–450)
PMV BLD AUTO: 8.7 FL (ref 7–12)
POTASSIUM REFLEX MAGNESIUM: 4.3 MMOL/L (ref 3.5–5)
PRO-BNP: 257 PG/ML (ref 0–125)
RBC # BLD: 4.34 E12/L (ref 3.5–5.5)
SODIUM BLD-SCNC: 136 MMOL/L (ref 132–146)
TOTAL PROTEIN: 6.9 G/DL (ref 6.4–8.3)
TROPONIN, HIGH SENSITIVITY: 10 NG/L (ref 0–9)
TROPONIN, HIGH SENSITIVITY: 12 NG/L (ref 0–9)
WBC # BLD: 8.5 E9/L (ref 4.5–11.5)

## 2022-07-14 PROCEDURE — 93005 ELECTROCARDIOGRAM TRACING: CPT | Performed by: PHYSICIAN ASSISTANT

## 2022-07-14 PROCEDURE — 80053 COMPREHEN METABOLIC PANEL: CPT

## 2022-07-14 PROCEDURE — 96365 THER/PROPH/DIAG IV INF INIT: CPT

## 2022-07-14 PROCEDURE — 94664 DEMO&/EVAL PT USE INHALER: CPT

## 2022-07-14 PROCEDURE — 96375 TX/PRO/DX INJ NEW DRUG ADDON: CPT

## 2022-07-14 PROCEDURE — 6360000002 HC RX W HCPCS: Performed by: EMERGENCY MEDICINE

## 2022-07-14 PROCEDURE — 86140 C-REACTIVE PROTEIN: CPT

## 2022-07-14 PROCEDURE — 85025 COMPLETE CBC W/AUTO DIFF WBC: CPT

## 2022-07-14 PROCEDURE — 6370000000 HC RX 637 (ALT 250 FOR IP): Performed by: EMERGENCY MEDICINE

## 2022-07-14 PROCEDURE — 1200000000 HC SEMI PRIVATE

## 2022-07-14 PROCEDURE — 2580000003 HC RX 258: Performed by: FAMILY MEDICINE

## 2022-07-14 PROCEDURE — 6370000000 HC RX 637 (ALT 250 FOR IP): Performed by: FAMILY MEDICINE

## 2022-07-14 PROCEDURE — 94640 AIRWAY INHALATION TREATMENT: CPT

## 2022-07-14 PROCEDURE — 84484 ASSAY OF TROPONIN QUANT: CPT

## 2022-07-14 PROCEDURE — 36415 COLL VENOUS BLD VENIPUNCTURE: CPT

## 2022-07-14 PROCEDURE — 2500000003 HC RX 250 WO HCPCS: Performed by: EMERGENCY MEDICINE

## 2022-07-14 PROCEDURE — 83615 LACTATE (LD) (LDH) ENZYME: CPT

## 2022-07-14 PROCEDURE — 99285 EMERGENCY DEPT VISIT HI MDM: CPT

## 2022-07-14 PROCEDURE — 82728 ASSAY OF FERRITIN: CPT

## 2022-07-14 PROCEDURE — 99223 1ST HOSP IP/OBS HIGH 75: CPT | Performed by: FAMILY MEDICINE

## 2022-07-14 PROCEDURE — 2580000003 HC RX 258: Performed by: EMERGENCY MEDICINE

## 2022-07-14 PROCEDURE — 85378 FIBRIN DEGRADE SEMIQUANT: CPT

## 2022-07-14 PROCEDURE — 71046 X-RAY EXAM CHEST 2 VIEWS: CPT

## 2022-07-14 PROCEDURE — 83690 ASSAY OF LIPASE: CPT

## 2022-07-14 PROCEDURE — 83880 ASSAY OF NATRIURETIC PEPTIDE: CPT

## 2022-07-14 RX ORDER — BUDESONIDE AND FORMOTEROL FUMARATE DIHYDRATE 160; 4.5 UG/1; UG/1
2 AEROSOL RESPIRATORY (INHALATION) 2 TIMES DAILY
Status: DISCONTINUED | OUTPATIENT
Start: 2022-07-14 | End: 2022-07-17 | Stop reason: HOSPADM

## 2022-07-14 RX ORDER — ONDANSETRON 2 MG/ML
4 INJECTION INTRAMUSCULAR; INTRAVENOUS EVERY 6 HOURS PRN
Status: DISCONTINUED | OUTPATIENT
Start: 2022-07-14 | End: 2022-07-17 | Stop reason: HOSPADM

## 2022-07-14 RX ORDER — IPRATROPIUM BROMIDE AND ALBUTEROL SULFATE 2.5; .5 MG/3ML; MG/3ML
1 SOLUTION RESPIRATORY (INHALATION)
Status: DISCONTINUED | OUTPATIENT
Start: 2022-07-14 | End: 2022-07-15

## 2022-07-14 RX ORDER — SODIUM CHLORIDE 0.9 % (FLUSH) 0.9 %
5-40 SYRINGE (ML) INJECTION PRN
Status: DISCONTINUED | OUTPATIENT
Start: 2022-07-14 | End: 2022-07-17 | Stop reason: HOSPADM

## 2022-07-14 RX ORDER — ENOXAPARIN SODIUM 100 MG/ML
30 INJECTION SUBCUTANEOUS DAILY
Status: DISCONTINUED | OUTPATIENT
Start: 2022-07-14 | End: 2022-07-17 | Stop reason: HOSPADM

## 2022-07-14 RX ORDER — SENNA PLUS 8.6 MG/1
1 TABLET ORAL NIGHTLY
Status: DISCONTINUED | OUTPATIENT
Start: 2022-07-14 | End: 2022-07-17

## 2022-07-14 RX ORDER — ASPIRIN 81 MG/1
81 TABLET ORAL DAILY
Status: DISCONTINUED | OUTPATIENT
Start: 2022-07-14 | End: 2022-07-17 | Stop reason: HOSPADM

## 2022-07-14 RX ORDER — DEXAMETHASONE SODIUM PHOSPHATE 4 MG/ML
6 INJECTION, SOLUTION INTRA-ARTICULAR; INTRALESIONAL; INTRAMUSCULAR; INTRAVENOUS; SOFT TISSUE EVERY 24 HOURS
Status: DISCONTINUED | OUTPATIENT
Start: 2022-07-15 | End: 2022-07-17 | Stop reason: HOSPADM

## 2022-07-14 RX ORDER — ASCORBIC ACID 500 MG
500 TABLET ORAL DAILY
Status: DISCONTINUED | OUTPATIENT
Start: 2022-07-14 | End: 2022-07-17 | Stop reason: HOSPADM

## 2022-07-14 RX ORDER — ZINC SULFATE 50(220)MG
50 CAPSULE ORAL DAILY
Status: DISCONTINUED | OUTPATIENT
Start: 2022-07-14 | End: 2022-07-17 | Stop reason: HOSPADM

## 2022-07-14 RX ORDER — ACETAMINOPHEN 325 MG/1
650 TABLET ORAL EVERY 6 HOURS PRN
Status: DISCONTINUED | OUTPATIENT
Start: 2022-07-14 | End: 2022-07-17 | Stop reason: HOSPADM

## 2022-07-14 RX ORDER — ONDANSETRON 4 MG/1
4 TABLET, ORALLY DISINTEGRATING ORAL EVERY 8 HOURS PRN
Status: DISCONTINUED | OUTPATIENT
Start: 2022-07-14 | End: 2022-07-17 | Stop reason: HOSPADM

## 2022-07-14 RX ORDER — SODIUM CHLORIDE 0.9 % (FLUSH) 0.9 %
5-40 SYRINGE (ML) INJECTION EVERY 12 HOURS SCHEDULED
Status: DISCONTINUED | OUTPATIENT
Start: 2022-07-14 | End: 2022-07-17 | Stop reason: HOSPADM

## 2022-07-14 RX ORDER — METHYLPREDNISOLONE SODIUM SUCCINATE 40 MG/ML
40 INJECTION, POWDER, LYOPHILIZED, FOR SOLUTION INTRAMUSCULAR; INTRAVENOUS EVERY 6 HOURS
Status: CANCELLED | OUTPATIENT
Start: 2022-07-14 | End: 2022-07-16

## 2022-07-14 RX ORDER — POLYETHYLENE GLYCOL 3350 17 G/17G
17 POWDER, FOR SOLUTION ORAL DAILY PRN
Status: DISCONTINUED | OUTPATIENT
Start: 2022-07-14 | End: 2022-07-17 | Stop reason: HOSPADM

## 2022-07-14 RX ORDER — SODIUM CHLORIDE 0.9 % (FLUSH) 0.9 %
SYRINGE (ML) INJECTION
Status: DISPENSED
Start: 2022-07-14 | End: 2022-07-15

## 2022-07-14 RX ORDER — IPRATROPIUM BROMIDE AND ALBUTEROL SULFATE 2.5; .5 MG/3ML; MG/3ML
1 SOLUTION RESPIRATORY (INHALATION)
Status: COMPLETED | OUTPATIENT
Start: 2022-07-14 | End: 2022-07-14

## 2022-07-14 RX ORDER — METHYLPREDNISOLONE SODIUM SUCCINATE 125 MG/2ML
60 INJECTION, POWDER, LYOPHILIZED, FOR SOLUTION INTRAMUSCULAR; INTRAVENOUS ONCE
Status: COMPLETED | OUTPATIENT
Start: 2022-07-14 | End: 2022-07-14

## 2022-07-14 RX ORDER — LISINOPRIL 10 MG/1
5 TABLET ORAL DAILY
Status: DISCONTINUED | OUTPATIENT
Start: 2022-07-14 | End: 2022-07-17 | Stop reason: HOSPADM

## 2022-07-14 RX ORDER — PREDNISONE 20 MG/1
40 TABLET ORAL DAILY
Status: DISCONTINUED | OUTPATIENT
Start: 2022-07-15 | End: 2022-07-14

## 2022-07-14 RX ORDER — VITAMIN B COMPLEX
2000 TABLET ORAL DAILY
Status: DISCONTINUED | OUTPATIENT
Start: 2022-07-14 | End: 2022-07-17 | Stop reason: HOSPADM

## 2022-07-14 RX ORDER — PREDNISONE 20 MG/1
40 TABLET ORAL DAILY
Status: CANCELLED | OUTPATIENT
Start: 2022-07-17

## 2022-07-14 RX ORDER — SODIUM CHLORIDE 9 MG/ML
INJECTION, SOLUTION INTRAVENOUS PRN
Status: DISCONTINUED | OUTPATIENT
Start: 2022-07-14 | End: 2022-07-17 | Stop reason: HOSPADM

## 2022-07-14 RX ORDER — ACETAMINOPHEN 650 MG/1
650 SUPPOSITORY RECTAL EVERY 6 HOURS PRN
Status: DISCONTINUED | OUTPATIENT
Start: 2022-07-14 | End: 2022-07-17 | Stop reason: HOSPADM

## 2022-07-14 RX ADMIN — IPRATROPIUM BROMIDE AND ALBUTEROL SULFATE 1 AMPULE: 2.5; .5 SOLUTION RESPIRATORY (INHALATION) at 14:46

## 2022-07-14 RX ADMIN — METHYLPREDNISOLONE SODIUM SUCCINATE 60 MG: 125 INJECTION, POWDER, FOR SOLUTION INTRAMUSCULAR; INTRAVENOUS at 14:11

## 2022-07-14 RX ADMIN — Medication 10 ML: at 21:11

## 2022-07-14 RX ADMIN — DOXYCYCLINE 100 MG: 100 INJECTION, POWDER, LYOPHILIZED, FOR SOLUTION INTRAVENOUS at 16:47

## 2022-07-14 RX ADMIN — IPRATROPIUM BROMIDE AND ALBUTEROL SULFATE 1 AMPULE: 2.5; .5 SOLUTION RESPIRATORY (INHALATION) at 14:47

## 2022-07-14 RX ADMIN — ACETAMINOPHEN 650 MG: 325 TABLET ORAL at 21:10

## 2022-07-14 RX ADMIN — SENNOSIDES 8.6 MG: 8.6 TABLET, FILM COATED ORAL at 21:10

## 2022-07-14 RX ADMIN — IPRATROPIUM BROMIDE AND ALBUTEROL SULFATE 1 AMPULE: 2.5; .5 SOLUTION RESPIRATORY (INHALATION) at 14:45

## 2022-07-14 ASSESSMENT — PAIN - FUNCTIONAL ASSESSMENT
PAIN_FUNCTIONAL_ASSESSMENT: NONE - DENIES PAIN
PAIN_FUNCTIONAL_ASSESSMENT: ACTIVITIES ARE NOT PREVENTED
PAIN_FUNCTIONAL_ASSESSMENT: NONE - DENIES PAIN

## 2022-07-14 ASSESSMENT — PAIN DESCRIPTION - PAIN TYPE: TYPE: ACUTE PAIN

## 2022-07-14 ASSESSMENT — PAIN DESCRIPTION - DESCRIPTORS: DESCRIPTORS: ACHING;CRAMPING

## 2022-07-14 ASSESSMENT — PAIN SCALES - GENERAL
PAINLEVEL_OUTOF10: 7
PAINLEVEL_OUTOF10: 7

## 2022-07-14 ASSESSMENT — PAIN DESCRIPTION - ONSET: ONSET: ON-GOING

## 2022-07-14 ASSESSMENT — PAIN DESCRIPTION - ORIENTATION: ORIENTATION: LEFT

## 2022-07-14 ASSESSMENT — PAIN DESCRIPTION - LOCATION: LOCATION: CHEST

## 2022-07-14 ASSESSMENT — PAIN DESCRIPTION - FREQUENCY: FREQUENCY: CONTINUOUS

## 2022-07-14 NOTE — ED PROVIDER NOTES
HPI:  7/14/22, Time: 2:07 PM EDT         Amaris Holder is a 69 y.o. female presenting to the ED for fatigue beginning last Saturday. Patient states that she was recently tested for COVID-19 on 7/9/22. She states her symptoms have been moderate in severity and worsening, exacerbated by exertion, better with oxygen. She reports associated symptoms of burning in her chest and abdomen as well as cough and shortness of breath. I reviewed the patient's chart. She was seen in the ED on 7/9/2022 with similar complaints. She was treated for COPD exacerbation with Solu-Medrol and breathing treatments. She was discharged home. She was not hypoxic during that ED visit. Patient was admitted on 6/27/2022 for COPD exacerbation with hypoxia. She was seen by pulmonology, Dr. Erich Bar, and she was placed on home oxygen. She states that she has been using anywhere from 3 to 4 L. She states she is not currently on steroids. She denies hemoptysis, syncope, leg edema, calf tenderness, emesis, and diarrhea. I reviewed the patient's chart. She is had multiple CTs of her chest in the last several weeks including 2 CTA pulmonary with no evidence of PE, most recently on 7/3/2022. She underwent a noncontrast chest CT on 7/9/2022 showing findings consistent with COVID-19 pneumonia. Review of Systems:   Pertinent positives and negatives are stated within HPI, all other systems reviewed and are negative.          --------------------------------------------- PAST HISTORY ---------------------------------------------  Past Medical History:  has a past medical history of COPD (chronic obstructive pulmonary disease) ([facility] and Hypertension. Past Surgical History:  has a past surgical history that includes back surgery. Social History:  reports that she has quit smoking. Her smoking use included cigarettes. She smoked 0.25 packs per day. She has never used smokeless tobacco. She reports previous alcohol use.  She reports Troponin   Result Value Ref Range    Troponin, High Sensitivity 12 (H) 0 - 9 ng/L   Brain Natriuretic Peptide   Result Value Ref Range    Pro- (H) 0 - 125 pg/mL   D-Dimer, Quantitative   Result Value Ref Range    D-Dimer, Quant <200 ng/mL DDU   Troponin   Result Value Ref Range    Troponin, High Sensitivity 10 (H) 0 - 9 ng/L   EKG 12 Lead   Result Value Ref Range    Ventricular Rate 83 BPM    Atrial Rate 83 BPM    P-R Interval 144 ms    QRS Duration 68 ms    Q-T Interval 358 ms    QTc Calculation (Bazett) 420 ms    P Axis 89 degrees    R Axis 72 degrees    T Axis 83 degrees       RADIOLOGY:  Interpreted by Radiologist.  XR CHEST (2 VW)   Final Result   Severe obstructive airways disease as before.             ------------------------- NURSING NOTES AND VITALS REVIEWED ---------------------------   The nursing notes within the ED encounter and vital signs as below have been reviewed. BP (!) 172/103   Pulse 86   Temp 98.4 °F (36.9 °C) (Oral)   Resp 14   Ht 5' 8\" (1.727 m)   Wt 105 lb (47.6 kg)   SpO2 94%   BMI 15.97 kg/m²   Oxygen Saturation Interpretation: Abnormal and Improved after treatment      ---------------------------------------------------PHYSICAL EXAM--------------------------------------      Constitutional/General: Alert and oriented x3, appears ill, non toxic in NAD  Head: Normocephalic and atraumatic  Eyes: EOMI, normal conjunctiva   Mouth: Oropharynx clear, handling secretions, no trismus  Neck: Supple, full ROM,   Pulmonary: Lungs markedly diminished with scattered wheezes. Moderate respiratory distress  Cardiovascular:  Regular rate and rhythm, no murmurs, gallops, or rubs. 2+ distal pulses  Abdomen: Soft, non tender, non distended,   Extremities: Moves all extremities x 4. Warm and well perfused. No leg edema or calf tenderness. Skin: warm and dry without rash  Neurologic: GCS 15, no focal motor or sensory deficits   Psych: Normal Affect.  Behavior normal.      ------------------------------ ED COURSE/MEDICAL DECISION MAKING----------------------  Medications   sodium chloride flush 0.9 % injection (has no administration in time range)   sodium chloride flush 0.9 % injection 5-40 mL (has no administration in time range)   sodium chloride flush 0.9 % injection 5-40 mL (has no administration in time range)   0.9 % sodium chloride infusion (has no administration in time range)   ondansetron (ZOFRAN-ODT) disintegrating tablet 4 mg (has no administration in time range)     Or   ondansetron (ZOFRAN) injection 4 mg (has no administration in time range)   polyethylene glycol (GLYCOLAX) packet 17 g (has no administration in time range)   enoxaparin Sodium (LOVENOX) injection 30 mg (has no administration in time range)   acetaminophen (TYLENOL) tablet 650 mg (has no administration in time range)     Or   acetaminophen (TYLENOL) suppository 650 mg (has no administration in time range)   ipratropium-albuterol (DUONEB) nebulizer solution 1 ampule (has no administration in time range)   aspirin EC tablet 81 mg (has no administration in time range)   budesonide-formoterol (SYMBICORT) 160-4.5 MCG/ACT inhaler 2 puff (has no administration in time range)   lisinopril (PRINIVIL;ZESTRIL) tablet 5 mg (has no administration in time range)   senna (SENOKOT) tablet 8.6 mg (has no administration in time range)   ascorbic acid (VITAMIN C) tablet 500 mg (has no administration in time range)   predniSONE (DELTASONE) tablet 40 mg (has no administration in time range)   ipratropium-albuterol (DUONEB) nebulizer solution 1 ampule (1 ampule Inhalation Given 7/14/22 1447)   methylPREDNISolone sodium (SOLU-MEDROL) injection 60 mg (60 mg IntraVENous Given 7/14/22 1411)   doxycycline (VIBRAMYCIN) 100 mg in dextrose 5 % 100 mL IVPB (0 mg IntraVENous Stopped 7/14/22 1751)       Medical Decision Making/ED COURSE:   Patient is a 64-year-old female with history of COPD and recent diagnosis of COVID-19 presenting with shortness of breath. He had multiple ED visits in the last 10 days for the same complaints with no improvement. In the ED, patient was hemodynamically stable and afebrile. On exam, appeared ill with markedly diminished breath sounds and scattered wheezes. Patient was placed on the cardiac monitor. I interpreted findings. Rhythm - sinus. Labs and CXR obtained. Patient administered duonebs and solumedrol. I reviewed and interpreted labs. Labs were reassuring without acute findings. High-sensitivity troponin stable x2. No acute EKG changes. Dimer was negative. Additionally, patient has had multiple CTA chest recently with no evidence of PE. I suspect her symptoms are due to COPD with secondary COVID-19 infection. She is still significantly wheezy on reevaluation. She requires admission for further monitoring and treatment. Patient remained hemodynamically stable throughout ED course. ED Course as of 07/14/22 1812   Thu Jul 14, 2022   1405 EKG: This EKG is signed and interpreted by me. Rate: 83  Rhythm: Sinus  Interpretation: Normal sinus rhythm, normal VA interval, normal QRS, normal axis, normal QT interval, no acute ST or T wave changes  Comparison: stable as compared to patient's most recent EKG   [JA]   1710 Hospitalist was consulted. Dr. Lilia Montiel accepted the patient for admission. [JA]      ED Course User Index  [JA] Linsey Wylie MD       New Prescriptions    No medications on file     Linsey Wylie MD      Counseling: The emergency provider has spoken with the patient and discussed todays results, in addition to providing specific details for the plan of care and counseling regarding the diagnosis and prognosis. Questions are answered at this time and they are agreeable with the plan.      --------------------------------- IMPRESSION AND DISPOSITION ---------------------------------    IMPRESSION  1. COPD exacerbation ([address]    2.  Acute on chronic respiratory failure with hypoxia ([facility]        DISPOSITION  Disposition: Admit to telemetry  Patient condition is stable      NOTE: This report was transcribed using voice recognition software.  Every effort was made to ensure accuracy; however, inadvertent computerized transcription errors may be present    INadine MD, am the primary provider of this record       Nadine Garrett MD  07/14/22 0506

## 2022-07-14 NOTE — PROGRESS NOTES
Database initiated. Patient is A&O independent from home alone. States she uses no assistive devices and just started needing oxygen last week when she was discharged.

## 2022-07-14 NOTE — ED TRIAGE NOTES
FIRST PROVIDER CONTACT ASSESSMENT NOTE      Department of Emergency Medicine   Admit Date: No admission date for patient encounter. Chief Complaint: Shortness of Breath (Hx of COPD and recent diagnosis of COVID)      History of Present Illness:    Mike Aiken is a 69 y.o. female who presents to the ED for continued shortness of breath. Patient has COPD. COVID-positive just last week. With admission and COVID-pneumonia. Discharged on oxygen via nasal cannula. Presents with continued shortness of breath. Bad cough. Low oxygen saturation.   Now she states she has a burning pain in her chest and upper abdomen which is new.        -----------------END OF FIRST PROVIDER CONTACT ASSESSMENT NOTE--------------  Electronically signed by LOLIS Sexton   DD: 7/14/22

## 2022-07-14 NOTE — ED NOTES
ESSIE faxed, spoke to laura Durand to be transported momentarily.       Polly Goldsmith RN  07/14/22 0832

## 2022-07-14 NOTE — Clinical Note
Discharge Plan[de-identified] Other/[facility])   Telemetry/Cardiac Monitoring Required?: Yes

## 2022-07-14 NOTE — TELEPHONE ENCOUNTER
Writer contacted ED provider Lashonda YE to inform of 30 day readmission risk. No Decision on disposition at this time. Awaiting lab results at this time.      Call Back: If you need to call back to inform of disposition you can contact me at [phone]

## 2022-07-14 NOTE — ACP (ADVANCE CARE PLANNING)
Silke Neal...Advance Care Planning     Advance Care Planning Activator (Inpatient)  Conversation Note      Date of ACP Conversation: 7/14/2022     Conversation Conducted with: Patient with Decision Making Capacity    ACP Activator: ELIGIO acevedo    Health Care Decision Maker:     Current Designated Health Care Decision Maker:     Primary Decision Maker: sue ward - Brother/Sister - [phone]  Click here to complete Healthcare Decision Makers including section of the Healthcare Decision Maker Relationship (ie \"Primary\")  Today we documented Decision Maker(s) consistent with Legal Next of Kin hierarchy. Care Preferences    Ventilation: \"If you were in your present state of health and suddenly became very ill and were unable to breathe on your own, what would your preference be about the use of a ventilator (breathing machine) if it were available to you? \"      Would the patient desire the use of ventilator (breathing machine)?: yes    \"If your health worsens and it becomes clear that your chance of recovery is unlikely, what would your preference be about the use of a ventilator (breathing machine) if it were available to you? \"     Would the patient desire the use of ventilator (breathing machine)?: Yes      Resuscitation  \"CPR works best to restart the heart when there is a sudden event, like a heart attack, in someone who is otherwise healthy. Unfortunately, CPR does not typically restart the heart for people who have serious health conditions or who are very sick. \"    \"In the event your heart stopped as a result of an underlying serious health condition, would you want attempts to be made to restart your heart (answer \"yes\" for attempt to resuscitate) or would you prefer a natural death (answer \"no\" for do not attempt to resuscitate)? \" yes       [x] Yes   [] No   Educated Patient / Marylou Espinoza regarding differences between Advance Directives and portable DNR orders.     Length of ACP Conversation in minutes: 10    Conversation Outcomes:  [x] ACP discussion completed  [] Existing advance directive reviewed with patient; no changes to patient's previously recorded wishes  [] New Advance Directive completed  [] Portable Do Not Rescitate prepared for Provider review and signature  [] POLST/POST/MOLST/MOST prepared for Provider review and signature      Follow-up plan:    [] Schedule follow-up conversation to continue planning  [x] Referred individual to Provider for additional questions/concerns   [] Advised patient/agent/surrogate to review completed ACP document and update if needed with changes in condition, patient preferences or care setting    [] This note routed to one or more involved healthcare providers

## 2022-07-14 NOTE — H&P
Admission medications    Medication Sig Start Date End Date Taking? Authorizing Provider   lisinopril (PRINIVIL;ZESTRIL) 5 MG tablet Take 1 tablet by mouth daily 6/30/22   REGI Kennedy NP   senna (SENOKOT) 8.6 MG tablet Take 1 tablet by mouth nightly 6/29/22 7/29/22  REGI Kennedy NP   albuterol sulfate HFA (VENTOLIN HFA) 108 (90 Base) MCG/ACT inhaler Inhale 2 puffs into the lungs 4 times daily as needed for Wheezing  Patient not taking: Reported on 7/7/2022 6/29/22   Mayela Villalobos MD   budesonide-formoterol [facility]) 160-4.5 MCG/ACT AERO Inhale 2 puffs into the lungs 2 times daily 6/29/22   Mayela Villalobos MD   aspirin 81 MG EC tablet Take 81 mg by mouth daily    Historical Provider, MD   vitamin C (ASCORBIC ACID) 500 MG tablet Take 500 mg by mouth daily    Historical Provider, MD     Allergies:    Pcn [penicillins]    Social History:    reports that she has quit smoking. Her smoking use included cigarettes. She smoked 0.25 packs per day. She has never used smokeless tobacco. She reports previous alcohol use. She reports previous drug use. Family History:   family history is not on file. Mom and dad had hypertension and heart disease. PHYSICAL EXAM:  Vitals:  BP (!) 172/103   Pulse 86   Temp 98.4 °F (36.9 °C) (Oral)   Resp 14   Ht 5' 8\" (1.727 m)   Wt 105 lb (47.6 kg)   SpO2 94%   BMI 15.97 kg/m²     General Appearance: alert and oriented to person, place and time and in no acute distress. Respiratory contractions noted of the chest.  She has a barrel chest and pursed lip breathing. She is able to talk without shortness of breath.   Skin: warm and dry  Head: normocephalic and atraumatic  Eyes: pupils equal, round, and reactive to light, extraocular eye movements intact, conjunctivae normal  Neck: neck supple and non tender without mass   Pulmonary/Chest: clear to auscultation bilaterally- no wheezes, rales or rhonchi, normal air movement, no respiratory distress  Cardiovascular: normal rate, normal S1 and S2 and no carotid bruits  Abdomen: soft, non-tender, non-distended, normal bowel sounds, no masses or organomegaly  Extremities: no cyanosis, no clubbing and no edema  Neurologic: no cranial nerve deficit and speech normal    LABS:  Recent Labs     07/14/22  1253      K 4.3   CL 95*   CO2 30*   BUN 11   CREATININE 0.5   GLUCOSE 93   CALCIUM 9.4       Recent Labs     07/14/22  1253   WBC 8.5   RBC 4.34   HGB 13.6   HCT 42.3   MCV 97.5   MCH 31.3   MCHC 32.2   RDW 13.8      MPV 8.7     Radiology:   XR CHEST (2 VW)   Final Result   Severe obstructive airways disease as before. EKG:   Normal sinus rhythm  Right atrial enlargement  Borderline ECG     Confirmed by Alissa Hernandez ([id number]) on 7/14/2022 1:56:10 PM    ASSESSMENT:      Principal Problem:    COPD exacerbation ([facility]  Active Problems:    Acute exacerbation of chronic obstructive pulmonary disease (COPD) ([facility]  Resolved Problems:    * No resolved hospital problems. *      PLAN:    1. COVID-19 infection -supportive care. Pharmacy consult for possibility of treatment with baricitinib or remdesivir. IV steroids. Continue DuoNebs. Isolation. Oxygen therapy. 2.  COPD exacerbation  -due to #1. Continue nebs, oxygen, steroids, check procalcitonin. 3.  Hypertension, uncontrolled  -monitor vitals. Continue antihypertensives. Trend accordingly. 2 g sodium diet  4. GERD - PPI 40 mg daily. 5.  Constipation -continue senna. Code Status: Full  DVT prophylaxis: Lovenox    Total care time: 40 minutes    NOTE: This report was transcribed using voice recognition software. Every effort was made to ensure accuracy; however, inadvertent computerized transcription errors may be present.     Electronically signed by Meagan Augustin MD on 7/14/2022 at 5:52 PM

## 2022-07-15 PROBLEM — K21.9 GASTROESOPHAGEAL REFLUX DISEASE WITHOUT ESOPHAGITIS: Status: ACTIVE | Noted: 2022-07-15

## 2022-07-15 LAB
ALBUMIN SERPL-MCNC: 3.4 G/DL (ref 3.5–5.2)
ALP BLD-CCNC: 90 U/L (ref 35–104)
ALT SERPL-CCNC: 15 U/L (ref 0–32)
ANION GAP SERPL CALCULATED.3IONS-SCNC: 9 MMOL/L (ref 7–16)
AST SERPL-CCNC: 17 U/L (ref 0–31)
BASOPHILS ABSOLUTE: 0.01 E9/L (ref 0–0.2)
BASOPHILS RELATIVE PERCENT: 0.2 % (ref 0–2)
BILIRUB SERPL-MCNC: 0.3 MG/DL (ref 0–1.2)
BUN BLDV-MCNC: 12 MG/DL (ref 6–23)
C-REACTIVE PROTEIN: 0.3 MG/DL (ref 0–0.4)
CALCIUM SERPL-MCNC: 8.8 MG/DL (ref 8.6–10.2)
CHLORIDE BLD-SCNC: 95 MMOL/L (ref 98–107)
CO2: 30 MMOL/L (ref 22–29)
CREAT SERPL-MCNC: 0.6 MG/DL (ref 0.5–1)
EOSINOPHILS ABSOLUTE: 0 E9/L (ref 0.05–0.5)
EOSINOPHILS RELATIVE PERCENT: 0 % (ref 0–6)
GFR AFRICAN AMERICAN: >60
GFR NON-AFRICAN AMERICAN: >60 ML/MIN/1.73
GLUCOSE BLD-MCNC: 206 MG/DL (ref 74–99)
HBA1C MFR BLD: 5.9 % (ref 4–5.6)
HCT VFR BLD CALC: 38.5 % (ref 34–48)
HEMOGLOBIN: 12.4 G/DL (ref 11.5–15.5)
IMMATURE GRANULOCYTES #: 0.07 E9/L
IMMATURE GRANULOCYTES %: 1.2 % (ref 0–5)
LYMPHOCYTES ABSOLUTE: 0.74 E9/L (ref 1.5–4)
LYMPHOCYTES RELATIVE PERCENT: 12.3 % (ref 20–42)
MCH RBC QN AUTO: 31.4 PG (ref 26–35)
MCHC RBC AUTO-ENTMCNC: 32.2 % (ref 32–34.5)
MCV RBC AUTO: 97.5 FL (ref 80–99.9)
METER GLUCOSE: 105 MG/DL (ref 74–99)
METER GLUCOSE: 120 MG/DL (ref 74–99)
METER GLUCOSE: 123 MG/DL (ref 74–99)
MONOCYTES ABSOLUTE: 0.19 E9/L (ref 0.1–0.95)
MONOCYTES RELATIVE PERCENT: 3.2 % (ref 2–12)
NEUTROPHILS ABSOLUTE: 4.99 E9/L (ref 1.8–7.3)
NEUTROPHILS RELATIVE PERCENT: 83.1 % (ref 43–80)
PDW BLD-RTO: 13.7 FL (ref 11.5–15)
PLATELET # BLD: 300 E9/L (ref 130–450)
PMV BLD AUTO: 9 FL (ref 7–12)
POTASSIUM REFLEX MAGNESIUM: 4.4 MMOL/L (ref 3.5–5)
PROCALCITONIN: 0.03 NG/ML (ref 0–0.08)
RBC # BLD: 3.95 E12/L (ref 3.5–5.5)
SODIUM BLD-SCNC: 134 MMOL/L (ref 132–146)
TOTAL PROTEIN: 6.3 G/DL (ref 6.4–8.3)
TROPONIN, HIGH SENSITIVITY: 11 NG/L (ref 0–9)
WBC # BLD: 6 E9/L (ref 4.5–11.5)

## 2022-07-15 PROCEDURE — 83036 HEMOGLOBIN GLYCOSYLATED A1C: CPT

## 2022-07-15 PROCEDURE — 84484 ASSAY OF TROPONIN QUANT: CPT

## 2022-07-15 PROCEDURE — 2700000000 HC OXYGEN THERAPY PER DAY

## 2022-07-15 PROCEDURE — 82962 GLUCOSE BLOOD TEST: CPT

## 2022-07-15 PROCEDURE — 1200000000 HC SEMI PRIVATE

## 2022-07-15 PROCEDURE — 6360000002 HC RX W HCPCS: Performed by: FAMILY MEDICINE

## 2022-07-15 PROCEDURE — 86140 C-REACTIVE PROTEIN: CPT

## 2022-07-15 PROCEDURE — A4216 STERILE WATER/SALINE, 10 ML: HCPCS | Performed by: FAMILY MEDICINE

## 2022-07-15 PROCEDURE — 99233 SBSQ HOSP IP/OBS HIGH 50: CPT | Performed by: FAMILY MEDICINE

## 2022-07-15 PROCEDURE — 2580000003 HC RX 258: Performed by: FAMILY MEDICINE

## 2022-07-15 PROCEDURE — 6370000000 HC RX 637 (ALT 250 FOR IP): Performed by: FAMILY MEDICINE

## 2022-07-15 PROCEDURE — 36415 COLL VENOUS BLD VENIPUNCTURE: CPT

## 2022-07-15 PROCEDURE — 85025 COMPLETE CBC W/AUTO DIFF WBC: CPT

## 2022-07-15 PROCEDURE — C9113 INJ PANTOPRAZOLE SODIUM, VIA: HCPCS | Performed by: FAMILY MEDICINE

## 2022-07-15 PROCEDURE — 80053 COMPREHEN METABOLIC PANEL: CPT

## 2022-07-15 PROCEDURE — 84145 PROCALCITONIN (PCT): CPT

## 2022-07-15 PROCEDURE — 93005 ELECTROCARDIOGRAM TRACING: CPT | Performed by: NURSE PRACTITIONER

## 2022-07-15 RX ORDER — HYDROXYZINE HYDROCHLORIDE 10 MG/1
10 TABLET, FILM COATED ORAL 3 TIMES DAILY PRN
Status: DISCONTINUED | OUTPATIENT
Start: 2022-07-15 | End: 2022-07-17 | Stop reason: HOSPADM

## 2022-07-15 RX ORDER — INSULIN LISPRO 100 [IU]/ML
0-3 INJECTION, SOLUTION INTRAVENOUS; SUBCUTANEOUS NIGHTLY
Status: DISCONTINUED | OUTPATIENT
Start: 2022-07-15 | End: 2022-07-17 | Stop reason: HOSPADM

## 2022-07-15 RX ORDER — ALBUTEROL SULFATE 90 UG/1
2 AEROSOL, METERED RESPIRATORY (INHALATION) 4 TIMES DAILY
Status: DISCONTINUED | OUTPATIENT
Start: 2022-07-15 | End: 2022-07-17 | Stop reason: HOSPADM

## 2022-07-15 RX ORDER — DEXTROSE MONOHYDRATE 50 MG/ML
100 INJECTION, SOLUTION INTRAVENOUS PRN
Status: DISCONTINUED | OUTPATIENT
Start: 2022-07-15 | End: 2022-07-15 | Stop reason: SDUPTHER

## 2022-07-15 RX ORDER — DEXTROSE MONOHYDRATE 50 MG/ML
100 INJECTION, SOLUTION INTRAVENOUS PRN
Status: DISCONTINUED | OUTPATIENT
Start: 2022-07-15 | End: 2022-07-17 | Stop reason: HOSPADM

## 2022-07-15 RX ORDER — INSULIN GLARGINE 100 [IU]/ML
0.25 INJECTION, SOLUTION SUBCUTANEOUS NIGHTLY
Status: DISCONTINUED | OUTPATIENT
Start: 2022-07-15 | End: 2022-07-17 | Stop reason: HOSPADM

## 2022-07-15 RX ORDER — INSULIN LISPRO 100 [IU]/ML
0-6 INJECTION, SOLUTION INTRAVENOUS; SUBCUTANEOUS
Status: DISCONTINUED | OUTPATIENT
Start: 2022-07-15 | End: 2022-07-17 | Stop reason: HOSPADM

## 2022-07-15 RX ORDER — PANTOPRAZOLE SODIUM 40 MG/1
40 TABLET, DELAYED RELEASE ORAL
Status: DISCONTINUED | OUTPATIENT
Start: 2022-07-16 | End: 2022-07-17 | Stop reason: HOSPADM

## 2022-07-15 RX ADMIN — BUDESONIDE AND FORMOTEROL FUMARATE DIHYDRATE 2 PUFF: 160; 4.5 AEROSOL RESPIRATORY (INHALATION) at 19:53

## 2022-07-15 RX ADMIN — ALBUTEROL SULFATE 2 PUFF: 90 AEROSOL, METERED RESPIRATORY (INHALATION) at 13:21

## 2022-07-15 RX ADMIN — BUDESONIDE AND FORMOTEROL FUMARATE DIHYDRATE 2 PUFF: 160; 4.5 AEROSOL RESPIRATORY (INHALATION) at 00:57

## 2022-07-15 RX ADMIN — ACETAMINOPHEN 650 MG: 325 TABLET ORAL at 19:58

## 2022-07-15 RX ADMIN — IPRATROPIUM BROMIDE 2 PUFF: 17 AEROSOL, METERED RESPIRATORY (INHALATION) at 19:53

## 2022-07-15 RX ADMIN — ASPIRIN 81 MG: 81 TABLET, COATED ORAL at 08:32

## 2022-07-15 RX ADMIN — LISINOPRIL 5 MG: 10 TABLET ORAL at 08:32

## 2022-07-15 RX ADMIN — SENNOSIDES 8.6 MG: 8.6 TABLET, FILM COATED ORAL at 19:54

## 2022-07-15 RX ADMIN — ENOXAPARIN SODIUM 30 MG: 100 INJECTION SUBCUTANEOUS at 08:33

## 2022-07-15 RX ADMIN — Medication 10 ML: at 19:54

## 2022-07-15 RX ADMIN — Medication 10 ML: at 08:33

## 2022-07-15 RX ADMIN — ALBUTEROL SULFATE 2 PUFF: 90 AEROSOL, METERED RESPIRATORY (INHALATION) at 17:16

## 2022-07-15 RX ADMIN — IPRATROPIUM BROMIDE 2 PUFF: 17 AEROSOL, METERED RESPIRATORY (INHALATION) at 17:16

## 2022-07-15 RX ADMIN — IPRATROPIUM BROMIDE 2 PUFF: 17 AEROSOL, METERED RESPIRATORY (INHALATION) at 13:21

## 2022-07-15 RX ADMIN — SODIUM CHLORIDE 40 MG: 9 INJECTION, SOLUTION INTRAMUSCULAR; INTRAVENOUS; SUBCUTANEOUS at 08:31

## 2022-07-15 RX ADMIN — ZINC SULFATE 220 MG (50 MG) CAPSULE 50 MG: CAPSULE at 08:32

## 2022-07-15 RX ADMIN — DEXAMETHASONE SODIUM PHOSPHATE 6 MG: 4 INJECTION, SOLUTION INTRAMUSCULAR; INTRAVENOUS at 08:32

## 2022-07-15 RX ADMIN — Medication 2000 UNITS: at 08:32

## 2022-07-15 RX ADMIN — ALBUTEROL SULFATE 2 PUFF: 90 AEROSOL, METERED RESPIRATORY (INHALATION) at 19:53

## 2022-07-15 RX ADMIN — OXYCODONE HYDROCHLORIDE AND ACETAMINOPHEN 500 MG: 500 TABLET ORAL at 08:32

## 2022-07-15 RX ADMIN — BUDESONIDE AND FORMOTEROL FUMARATE DIHYDRATE 2 PUFF: 160; 4.5 AEROSOL RESPIRATORY (INHALATION) at 08:33

## 2022-07-15 ASSESSMENT — PAIN SCALES - GENERAL
PAINLEVEL_OUTOF10: 0
PAINLEVEL_OUTOF10: 0
PAINLEVEL_OUTOF10: 6
PAINLEVEL_OUTOF10: 3
PAINLEVEL_OUTOF10: 5

## 2022-07-15 ASSESSMENT — PAIN DESCRIPTION - DESCRIPTORS: DESCRIPTORS: THROBBING;HEAVINESS

## 2022-07-15 ASSESSMENT — PAIN - FUNCTIONAL ASSESSMENT: PAIN_FUNCTIONAL_ASSESSMENT: ACTIVITIES ARE NOT PREVENTED

## 2022-07-15 ASSESSMENT — PAIN DESCRIPTION - LOCATION
LOCATION: CHEST
LOCATION: HEAD
LOCATION: CHEST

## 2022-07-15 ASSESSMENT — PAIN DESCRIPTION - ORIENTATION: ORIENTATION: LEFT

## 2022-07-15 NOTE — CARE COORDINATION
Pt known to me from prior admission; lives alone at home; has 3lnc with portability. (Aurelio Cantu) PCP . plan is home at discharge. Anxious to discharge. Car is in ER lot; will drive self home. Denies any needs at d/c. Cheril Crews.

## 2022-07-15 NOTE — PROGRESS NOTES
Pharmacy Consultation Note    Consult date: 7/14/2022  Provider: Dr Antonio Gonzalez has been consulted to evaluate criteria for Baricitinib therapy based on the [facility] P&T approved Covid-19 treatment algorithm. The patient DOES NOT currently meet [facility] P&T approved Covid-19 treatment criteria for Baricitinib due to labs. Please re-consult if the clinical condition changes and patient meets criteria for initiation based on [facility] P&T approved criteria for use.     Thank you for the consult,  Anel Moreno, [facility]

## 2022-07-15 NOTE — PROGRESS NOTES
[id number] Hospitalist   Progress Note    Admitting Date and Time: 7/14/2022  1:11 PM  Admit Dx: Acute exacerbation of chronic obstructive pulmonary disease (COPD) ([address] [J44.1]  COPD exacerbation (HCC) [J44.1]  Acute on chronic respiratory failure with hypoxia (HCC) [J96.21]    Subjective:    Pt feels slightly improved today. Chronically wears 3L NC at home. Does live alone. She has received COVID-19 vaccine x2. Currently requiring 4LNC. Per RN: Wears 3L NC at home. Currently on 4 LNC. Receiving IV Decadron. Complains of cough    ROS: denies fever, chills, cp, n/v, HA unless stated above.      albuterol sulfate HFA  2 puff Inhalation 4x daily    ipratropium  2 puff Inhalation 4x daily    insulin lispro  0-6 Units SubCUTAneous TID WC    insulin lispro  0-3 Units SubCUTAneous Nightly    insulin glargine  0.25 Units/kg SubCUTAneous Nightly    [START ON 7/16/2022] pantoprazole  40 mg Oral QAM AC    sodium chloride flush  5-40 mL IntraVENous 2 times per day    enoxaparin  30 mg SubCUTAneous Daily    aspirin  81 mg Oral Daily    budesonide-formoterol  2 puff Inhalation BID    lisinopril  5 mg Oral Daily    senna  1 tablet Oral Nightly    vitamin C  500 mg Oral Daily    Vitamin D  2,000 Units Oral Daily    zinc sulfate  50 mg Oral Daily    dexamethasone  6 mg IntraVENous Q24H     glucose, 4 tablet, PRN  glucagon (rDNA), 1 mg, PRN  dextrose bolus, 125 mL, PRN   Or  dextrose bolus, 250 mL, PRN  dextrose, 100 mL/hr, PRN  bisacodyl, 5 mg, Daily PRN  sodium chloride flush, 5-40 mL, PRN  sodium chloride, , PRN  ondansetron, 4 mg, Q8H PRN   Or  ondansetron, 4 mg, Q6H PRN  polyethylene glycol, 17 g, Daily PRN  acetaminophen, 650 mg, Q6H PRN   Or  acetaminophen, 650 mg, Q6H PRN         Objective:    BP (!) 153/65   Pulse 71   Temp 98.2 °F (36.8 °C) (Oral)   Resp 18   Ht 5' 8\" (1.727 m)   Wt 97 lb 9.6 oz (44.3 kg)   SpO2 98%   BMI 14.84 kg/m²   General Appearance: alert and oriented to person, place and time and in no acute distress  Skin: warm and dry  Head: normocephalic and atraumatic  Eyes: pupils equal, round, and reactive to light, extraocular eye movements intact, conjunctivae normal  Neck: neck supple and non tender without mass   Pulmonary/Chest: Diminished to auscultation bilaterally- no wheezes, rales or rhonchi, normal air movement, no respiratory distress. Barrel  Cardiovascular: normal rate, normal S1 and S2 and no RGM  Abdomen: soft, non-tender, non-distended, normal bowel sounds, no masses or organomegaly  Extremities: no cyanosis, no clubbing and no edema  Neurologic: no cranial nerve deficit and speech normal      Recent Labs     07/14/22  1253 07/15/22  0003    134   K 4.3 4.4   CL 95* 95*   CO2 30* 30*   BUN 11 12   CREATININE 0.5 0.6   GLUCOSE 93 206*   CALCIUM 9.4 8.8       Recent Labs     07/14/22  1253 07/15/22  0003   ALKPHOS 85 90   PROT 6.9 6.3*   LABALBU 3.9 3.4*   BILITOT 0.4 0.3   AST 20 17   ALT 15 15       Recent Labs     07/14/22  1253 07/15/22  0003   WBC 8.5 6.0   RBC 4.34 3.95   HGB 13.6 12.4   HCT 42.3 38.5   MCV 97.5 97.5   MCH 31.3 31.4   MCHC 32.2 32.2   RDW 13.8 13.7    300   MPV 8.7 9.0            Radiology:   XR CHEST (2 VW)   Final Result   Severe obstructive airways disease as before. Assessment:  Principal Problem:    COPD exacerbation (HCC)  Active Problems:    Acute exacerbation of chronic obstructive pulmonary disease (COPD) ([facility]  Resolved Problems:    * No resolved hospital problems. *      Plan:  COVID-19 viral pneumonia-does not qualify for baricitinib/remdesivir. Continue dexamethasone. Requiring 4L NC. Continue to wean as tolerated maintain SPO2>92%. Follow inflammatory markers. Vitamin supplementation. Isolation as per protocol. Encourage I-S/pronating. Supportive care  COPD exacerbation-likely 2/2 above. Received Solu-Medrol 60 mg IV x1 in ED course. We will continue  dexamethasone/Symbicort/albuterol/Atrovent.   Has been seen by Dr. Tyrell Young in the past.  Received doxycycline in ED course. Consider consultation with worsening hypoxia assesses acceleration. HTN-continue lisinopril. Follow adjust as needed  GERD-continue PPI  Constipation-continue senna    NOTE: This report was transcribed using voice recognition software. Every effort was made to ensure accuracy; however, inadvertent computerized transcription errors may be present. Electronically signed by Donavan Oconnell CNP on 7/15/2022 at 2:31 PM

## 2022-07-15 NOTE — CONSULTS
[id number] [address]                                  CONSULTATION    PATIENT NAME: Junior Wagner                      :        1955  MED REC NO:   [id number]                            ROOM:       [id number]  ACCOUNT NO:   [de-identified]                           ADMIT DATE: 2022  PROVIDER:     Rowan Price MD    CONSULT DATE:  07/15/2022    PULMONARY CONSULTATION    REQUESTING PROVIDER:  Maria Guadalupe Holland MD.    REASON FOR CONSULTATION:  COPD and COVID. IMPRESSION AND PLAN:  1. Acute exacerbation of COPD for which I agree with the dexamethasone  and continuation of Symbicort with Atrovent and albuterol inhalers, as  she is COVID positive. 2.  COVID-19 pneumonia without radiographic features consistent with  COVID pneumonia. She does continue to have a left lower lobe  infiltrate, which I am getting increasingly concerned about, as it is  just not resolving. For now, she will need another scan in about six  weeks for the left lower lobe infiltrate. 3.  Chronic hypoxic respiratory failure for which she is on her baseline  4 liters. HISTORY OF PRESENT ILLNESS:  This is a 38-year-old white female with  COPD and chronic hypoxic respiratory failure for the last couple of  months on 4 liters, who has been in and out of the emergency department  for the last week or so. She recently was diagnosed with COVID-19  infection. She has been short of breath with activity, having to hold  the walls to walk. She has burning in her chest associated with this as  well as cough productive of clear sputum and chills. She is also  wheezing. The cough with sputum and wheezing are better after starting  dexamethasone and kept on her inhalers. She still is short of breath  with minimal exertion, however. Rest does relieve the shortness of  breath.   She just quit smoking a few months ago and has had a persistent  left lower lobe infiltrate on multiple CT scans. PAST MEDICAL HISTORY:  Otherwise just includes hypertension. She is  status post two back operations and a remote appendectomy. FAMILY HISTORY:  Positive for hypertension and mild heart disease. 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 She smoked heavily in the past.    CURRENT MEDICATIONS:  Albuterol two puffs four times a day, vitamin C  500 daily, Ecotrin 81 mg daily, Symbicort 160/4.5 two puffs b.i.d.,  dexamethasone 6 IV daily, Lovenox 30 subcutaneously daily, Lantus 11  units nightly, sliding scale insulin coverage, Atrovent two puffs four  times a day, lisinopril 5 mg daily, Protonix 40 IV daily, and senna one  tablet nightly. She is also on vitamin D 2000 units daily and zinc  sulfate 50 mg daily. ALLERGIES:  To PENICILLIN. REVIEW OF SYSTEMS:  Some chills. No visual or hearing complaints. Chest burning as above. Shortness of breath as above. Occasional  heartburn and acid reflux. No urinary complaints. Her back is now  fine. Review of systems is otherwise negative. PHYSICAL EXAMINATION:  GENERAL:  She is in no acute distress. VITAL SIGNS:  Temperature 36.8; pulse 71; respiratory rate was 18; blood  pressure 153/65; 4 liter pulse ox was 98%, room air in the ED was 88%. SKIN:  Had no rashes. HEENT:  Eyes had clear sclerae. Mouth was edentulous. Palate and gums  were moist.  NECK:  Without masses or adenopathy. CHEST:  Symmetric. There was no accessory muscle use. HEART:  Regular. LUNGS:  Diminished. A couple of wheezes perhaps at the left base. ABDOMEN:  Soft and nontender without masses or organomegaly. EXTREMITIES:  Showed no clubbing, cyanosis, or lymphedema. NEUROLOGICAL:  She is alert and appropriate. IMAGING DATA:  CT images of the chest were reviewed and compared to  prior studies. There is a persistent left lower lobe rounded density.     LABORATORY DATA:  White count today is 6 with a hemoglobin of 12.4 and  platelets of 926,261. Her chloride is 30. Procalcitonin was 0.03. LDH  was 175. C-reactive protein is 0.3. Troponin was 10. Old records were reviewed on the computer. Thank you for the consult.         Iva Lopez MD    D: 07/15/2022 12:11:04       T: 07/15/2022 12:13:29     [email or web address]  Job#: [id number]     Doc#: [id number]    CC:

## 2022-07-15 NOTE — PROGRESS NOTES
Per respiratory request, called NP to determine in duoneb order can be changed to combivent inhaler.

## 2022-07-16 PROBLEM — K59.00 CONSTIPATION: Status: ACTIVE | Noted: 2022-07-16

## 2022-07-16 PROBLEM — U07.1 COVID-19: Status: ACTIVE | Noted: 2022-07-16

## 2022-07-16 LAB
ALBUMIN SERPL-MCNC: 3.7 G/DL (ref 3.5–5.2)
ALP BLD-CCNC: 92 U/L (ref 35–104)
ALT SERPL-CCNC: 15 U/L (ref 0–32)
ANION GAP SERPL CALCULATED.3IONS-SCNC: 7 MMOL/L (ref 7–16)
AST SERPL-CCNC: 18 U/L (ref 0–31)
BASOPHILS ABSOLUTE: 0.04 E9/L (ref 0–0.2)
BASOPHILS RELATIVE PERCENT: 0.3 % (ref 0–2)
BILIRUB SERPL-MCNC: 0.3 MG/DL (ref 0–1.2)
BUN BLDV-MCNC: 16 MG/DL (ref 6–23)
C-REACTIVE PROTEIN: 0.3 MG/DL (ref 0–0.4)
CALCIUM SERPL-MCNC: 9.2 MG/DL (ref 8.6–10.2)
CHLORIDE BLD-SCNC: 94 MMOL/L (ref 98–107)
CO2: 32 MMOL/L (ref 22–29)
CREAT SERPL-MCNC: 0.6 MG/DL (ref 0.5–1)
EOSINOPHILS ABSOLUTE: 0.05 E9/L (ref 0.05–0.5)
EOSINOPHILS RELATIVE PERCENT: 0.4 % (ref 0–6)
GFR AFRICAN AMERICAN: >60
GFR NON-AFRICAN AMERICAN: >60 ML/MIN/1.73
GLUCOSE BLD-MCNC: 87 MG/DL (ref 74–99)
HCT VFR BLD CALC: 38.4 % (ref 34–48)
HEMOGLOBIN: 12.6 G/DL (ref 11.5–15.5)
IMMATURE GRANULOCYTES #: 0.07 E9/L
IMMATURE GRANULOCYTES %: 0.6 % (ref 0–5)
LYMPHOCYTES ABSOLUTE: 2.51 E9/L (ref 1.5–4)
LYMPHOCYTES RELATIVE PERCENT: 21.5 % (ref 20–42)
MCH RBC QN AUTO: 31.8 PG (ref 26–35)
MCHC RBC AUTO-ENTMCNC: 32.8 % (ref 32–34.5)
MCV RBC AUTO: 97 FL (ref 80–99.9)
METER GLUCOSE: 120 MG/DL (ref 74–99)
METER GLUCOSE: 121 MG/DL (ref 74–99)
METER GLUCOSE: 144 MG/DL (ref 74–99)
METER GLUCOSE: 84 MG/DL (ref 74–99)
MONOCYTES ABSOLUTE: 0.71 E9/L (ref 0.1–0.95)
MONOCYTES RELATIVE PERCENT: 6.1 % (ref 2–12)
NEUTROPHILS ABSOLUTE: 8.32 E9/L (ref 1.8–7.3)
NEUTROPHILS RELATIVE PERCENT: 71.1 % (ref 43–80)
PDW BLD-RTO: 14.1 FL (ref 11.5–15)
PLATELET # BLD: 312 E9/L (ref 130–450)
PMV BLD AUTO: 8.7 FL (ref 7–12)
POTASSIUM REFLEX MAGNESIUM: 4.4 MMOL/L (ref 3.5–5)
RBC # BLD: 3.96 E12/L (ref 3.5–5.5)
SODIUM BLD-SCNC: 133 MMOL/L (ref 132–146)
TOTAL PROTEIN: 6.8 G/DL (ref 6.4–8.3)
TROPONIN, HIGH SENSITIVITY: 12 NG/L (ref 0–9)
WBC # BLD: 11.7 E9/L (ref 4.5–11.5)

## 2022-07-16 PROCEDURE — 84484 ASSAY OF TROPONIN QUANT: CPT

## 2022-07-16 PROCEDURE — 86140 C-REACTIVE PROTEIN: CPT

## 2022-07-16 PROCEDURE — 82962 GLUCOSE BLOOD TEST: CPT

## 2022-07-16 PROCEDURE — 99232 SBSQ HOSP IP/OBS MODERATE 35: CPT | Performed by: NURSE PRACTITIONER

## 2022-07-16 PROCEDURE — 6360000002 HC RX W HCPCS: Performed by: FAMILY MEDICINE

## 2022-07-16 PROCEDURE — 2700000000 HC OXYGEN THERAPY PER DAY

## 2022-07-16 PROCEDURE — 1200000000 HC SEMI PRIVATE

## 2022-07-16 PROCEDURE — 80053 COMPREHEN METABOLIC PANEL: CPT

## 2022-07-16 PROCEDURE — 6370000000 HC RX 637 (ALT 250 FOR IP): Performed by: NURSE PRACTITIONER

## 2022-07-16 PROCEDURE — 6370000000 HC RX 637 (ALT 250 FOR IP): Performed by: FAMILY MEDICINE

## 2022-07-16 PROCEDURE — 36415 COLL VENOUS BLD VENIPUNCTURE: CPT

## 2022-07-16 PROCEDURE — 2580000003 HC RX 258: Performed by: FAMILY MEDICINE

## 2022-07-16 PROCEDURE — 85025 COMPLETE CBC W/AUTO DIFF WBC: CPT

## 2022-07-16 RX ORDER — DEXAMETHASONE 6 MG/1
6 TABLET ORAL
Qty: 3 TABLET | Refills: 0 | Status: SHIPPED | OUTPATIENT
Start: 2022-07-16 | End: 2022-07-19

## 2022-07-16 RX ORDER — ZINC SULFATE 50(220)MG
50 CAPSULE ORAL DAILY
Qty: 30 CAPSULE | Refills: 0 | COMMUNITY
Start: 2022-07-17 | End: 2022-07-27

## 2022-07-16 RX ORDER — BUDESONIDE AND FORMOTEROL FUMARATE DIHYDRATE 160; 4.5 UG/1; UG/1
2 AEROSOL RESPIRATORY (INHALATION) 2 TIMES DAILY
Qty: 10.2 G | Refills: 0 | Status: SHIPPED | OUTPATIENT
Start: 2022-07-16 | End: 2022-10-03 | Stop reason: SDUPTHER

## 2022-07-16 RX ORDER — CHOLECALCIFEROL (VITAMIN D3) 50 MCG
2000 TABLET ORAL DAILY
Qty: 30 TABLET | Refills: 0 | COMMUNITY
Start: 2022-07-17 | End: 2022-07-27

## 2022-07-16 RX ADMIN — ALBUTEROL SULFATE 2 PUFF: 90 AEROSOL, METERED RESPIRATORY (INHALATION) at 08:01

## 2022-07-16 RX ADMIN — ZINC SULFATE 220 MG (50 MG) CAPSULE 50 MG: CAPSULE at 08:00

## 2022-07-16 RX ADMIN — ENOXAPARIN SODIUM 30 MG: 100 INJECTION SUBCUTANEOUS at 07:59

## 2022-07-16 RX ADMIN — IPRATROPIUM BROMIDE 2 PUFF: 17 AEROSOL, METERED RESPIRATORY (INHALATION) at 17:45

## 2022-07-16 RX ADMIN — Medication 10 ML: at 08:00

## 2022-07-16 RX ADMIN — IPRATROPIUM BROMIDE 2 PUFF: 17 AEROSOL, METERED RESPIRATORY (INHALATION) at 12:24

## 2022-07-16 RX ADMIN — ALBUTEROL SULFATE 2 PUFF: 90 AEROSOL, METERED RESPIRATORY (INHALATION) at 17:45

## 2022-07-16 RX ADMIN — Medication 10 ML: at 21:14

## 2022-07-16 RX ADMIN — PANTOPRAZOLE SODIUM 40 MG: 40 TABLET, DELAYED RELEASE ORAL at 06:38

## 2022-07-16 RX ADMIN — ALBUTEROL SULFATE 2 PUFF: 90 AEROSOL, METERED RESPIRATORY (INHALATION) at 12:25

## 2022-07-16 RX ADMIN — BUDESONIDE AND FORMOTEROL FUMARATE DIHYDRATE 2 PUFF: 160; 4.5 AEROSOL RESPIRATORY (INHALATION) at 21:13

## 2022-07-16 RX ADMIN — BISACODYL 5 MG: 5 TABLET, COATED ORAL at 21:13

## 2022-07-16 RX ADMIN — HYDROXYZINE HYDROCHLORIDE 10 MG: 10 TABLET ORAL at 21:13

## 2022-07-16 RX ADMIN — OXYCODONE HYDROCHLORIDE AND ACETAMINOPHEN 500 MG: 500 TABLET ORAL at 08:00

## 2022-07-16 RX ADMIN — IPRATROPIUM BROMIDE 2 PUFF: 17 AEROSOL, METERED RESPIRATORY (INHALATION) at 08:00

## 2022-07-16 RX ADMIN — SENNOSIDES 8.6 MG: 8.6 TABLET, FILM COATED ORAL at 21:13

## 2022-07-16 RX ADMIN — IPRATROPIUM BROMIDE 2 PUFF: 17 AEROSOL, METERED RESPIRATORY (INHALATION) at 21:13

## 2022-07-16 RX ADMIN — ACETAMINOPHEN 650 MG: 325 TABLET ORAL at 08:04

## 2022-07-16 RX ADMIN — Medication 2000 UNITS: at 07:59

## 2022-07-16 RX ADMIN — BISACODYL 5 MG: 5 TABLET, COATED ORAL at 12:23

## 2022-07-16 RX ADMIN — ALBUTEROL SULFATE 2 PUFF: 90 AEROSOL, METERED RESPIRATORY (INHALATION) at 20:16

## 2022-07-16 RX ADMIN — HYDROXYZINE HYDROCHLORIDE 10 MG: 10 TABLET ORAL at 00:08

## 2022-07-16 RX ADMIN — LISINOPRIL 5 MG: 10 TABLET ORAL at 07:59

## 2022-07-16 RX ADMIN — DEXAMETHASONE SODIUM PHOSPHATE 6 MG: 4 INJECTION, SOLUTION INTRAMUSCULAR; INTRAVENOUS at 08:00

## 2022-07-16 RX ADMIN — ASPIRIN 81 MG: 81 TABLET, COATED ORAL at 07:59

## 2022-07-16 RX ADMIN — BUDESONIDE AND FORMOTEROL FUMARATE DIHYDRATE 2 PUFF: 160; 4.5 AEROSOL RESPIRATORY (INHALATION) at 08:01

## 2022-07-16 ASSESSMENT — PAIN SCALES - GENERAL: PAINLEVEL_OUTOF10: 0

## 2022-07-16 ASSESSMENT — PAIN DESCRIPTION - DESCRIPTORS: DESCRIPTORS: DISCOMFORT

## 2022-07-16 NOTE — PLAN OF CARE
Problem: Discharge Planning  Goal: Discharge to home or other facility with appropriate resources  Outcome: Progressing  Flowsheets (Taken 7/16/2022 0000)  Discharge to home or other facility with appropriate resources: Refer to discharge planning if patient needs post-hospital services based on physician order or complex needs related to functional status, cognitive ability or social support system     Problem: Safety - Adult  Goal: Free from fall injury  7/15/2022 1723 by Alba Adler RN  Outcome: Progressing     Problem: Pain  Goal: Verbalizes/displays adequate comfort level or baseline comfort level  Recent Flowsheet Documentation  Taken 7/16/2022 0000 by Devin Isbell  Verbalizes/displays adequate comfort level or baseline comfort level: Encourage patient to monitor pain and request assistance  7/15/2022 1723 by Alba Adler RN  Outcome: Progressing

## 2022-07-16 NOTE — PROGRESS NOTES
Patient complaining of chest pain. Elzie Schirmer NP contacted, see new orders. Staff to update Linda Ortega with results.

## 2022-07-16 NOTE — PROGRESS NOTES
Feels better, less dyspnea and minimal cough. Able to take oxygen off for spells.      Additional Respiratory Assessments  Heart Rate: 63  Resp: 24  SpO2: 98 %      Current Facility-Administered Medications:     bisacodyl (DULCOLAX) EC tablet 5 mg, 5 mg, Oral, 2 times per day, Jeanine Arana MD, 5 mg at 07/16/22 1223    albuterol sulfate HFA (PROVENTIL;VENTOLIN;PROAIR) 108 (90 Base) MCG/ACT inhaler 2 puff, 2 puff, Inhalation, 4x daily, Jeanine Arana MD, 2 puff at 07/16/22 1225    ipratropium (ATROVENT HFA) 17 MCG/ACT inhaler 2 puff, 2 puff, Inhalation, 4x daily, Jeanine Arana MD, 2 puff at 07/16/22 1224    insulin lispro (HUMALOG) injection vial 0-6 Units, 0-6 Units, SubCUTAneous, TID WC, Makenzie Martines MD    insulin lispro (HUMALOG) injection vial 0-3 Units, 0-3 Units, SubCUTAneous, Nightly, Makenzie Martines MD    glucose chewable tablet 16 g, 4 tablet, Oral, PRN, Jeanine Arana MD    glucagon (rDNA) injection 1 mg, 1 mg, IntraMUSCular, PRN, Jeanine Arana MD    dextrose bolus 10% 125 mL, 125 mL, IntraVENous, PRN **OR** dextrose bolus 10% 250 mL, 250 mL, IntraVENous, PRN, Jeanine Arana MD    dextrose 5 % solution, 100 mL/hr, IntraVENous, PRN, Jeanine Arana MD    insulin glargine (LANTUS) injection vial 11 Units, 0.25 Units/kg, SubCUTAneous, Nightly, Makenzie Martines MD    pantoprazole (PROTONIX) tablet 40 mg, 40 mg, Oral, QAM AC, Jeanine Arana MD, 40 mg at 07/16/22 3820    hydrOXYzine HCl (ATARAX) tablet 10 mg, 10 mg, Oral, TID PRN, Beatrice Manzano, APRN - CNP, 10 mg at 07/16/22 0008    sodium chloride flush 0.9 % injection 5-40 mL, 5-40 mL, IntraVENous, 2 times per day, Jeanine Arana MD, 10 mL at 07/16/22 0800    sodium chloride flush 0.9 % injection 5-40 mL, 5-40 mL, IntraVENous, PRN, Makenzie Martines MD    0.9 % sodium chloride infusion, , IntraVENous, PRN, Makenzie Martines MD    ondansetron (ZOFRAN-ODT) disintegrating tablet 4 mg, 4 mg, Oral, Q8H PRN **OR** ondansetron (ZOFRAN) injection 4 mg, 4 mg, IntraVENous, Q6H PRN, Meme Haynes MD    polyethylene glycol (GLYCOLAX) packet 17 g, 17 g, Oral, Daily PRN, Meme Haynes MD    enoxaparin Sodium (LOVENOX) injection 30 mg, 30 mg, SubCUTAneous, Daily, Meme Haynes MD, 30 mg at 07/16/22 0759    acetaminophen (TYLENOL) tablet 650 mg, 650 mg, Oral, Q6H PRN, 650 mg at 07/16/22 0804 **OR** acetaminophen (TYLENOL) suppository 650 mg, 650 mg, Rectal, Q6H PRN, Meme Haynes MD    aspirin EC tablet 81 mg, 81 mg, Oral, Daily, Meme Haynes MD, 81 mg at 07/16/22 0759    budesonide-formoterol (SYMBICORT) 160-4.5 MCG/ACT inhaler 2 puff, 2 puff, Inhalation, BID, Meme Haynes MD, 2 puff at 07/16/22 0801    lisinopril (PRINIVIL;ZESTRIL) tablet 5 mg, 5 mg, Oral, Daily, Meme Haynes MD, 5 mg at 07/16/22 0759    senna (SENOKOT) tablet 8.6 mg, 1 tablet, Oral, Nightly, Meme Haynes MD, 8.6 mg at 07/15/22 1954    ascorbic acid (VITAMIN C) tablet 500 mg, 500 mg, Oral, Daily, Meme Haynes MD, 500 mg at 07/16/22 0800    vitamin D (CHOLECALCIFEROL) tablet 2,000 Units, 2,000 Units, Oral, Daily, Meme Haynes MD, 2,000 Units at 07/16/22 0759    zinc sulfate (ZINCATE) capsule 50 mg, 50 mg, Oral, Daily, Meme Haynes MD, 50 mg at 07/16/22 0800    dexamethasone (DECADRON) injection 6 mg, 6 mg, IntraVENous, Q24H, Meme Haynes MD, 6 mg at 07/16/22 0800  BP (!) 155/65   Pulse 63   Temp 98 °F (36.7 °C) (Oral)   Resp 24   Ht 5' 8\" (1.727 m)   Wt 101 lb 11.2 oz (46.1 kg)   SpO2 98%   BMI 15.46 kg/m²     General: No distress  Chest: Symmetric, no accessory use  Heart: RRR  Lungs: Diminished but clear  Abdomen: Soft, nt, nd  Extremities: No edema    Intake/Output Summary (Last 24 hours) at 7/16/2022 1345  Last data filed at 7/16/2022 0631  Gross per 24 hour   Intake 240 ml   Output 200 ml   Net 40 ml     CBC:   Lab Results   Component Value Date/Time    WBC 11.7 07/16/2022 12:32 AM    RBC 3.96 07/16/2022 12:32 AM    HGB 12.6 07/16/2022 12:32 AM    HCT 38.4 07/16/2022 12:32 AM    MCV 97.0 07/16/2022 12:32 AM    MCH 31.8 07/16/2022 12:32 AM    MCHC 32.8 07/16/2022 12:32 AM    RDW 14.1 07/16/2022 12:32 AM     07/16/2022 12:32 AM    MPV 8.7 07/16/2022 12:32 AM     BMP:    Lab Results   Component Value Date/Time     07/16/2022 12:32 AM    K 4.4 07/16/2022 12:32 AM    CL 94 07/16/2022 12:32 AM    CO2 32 07/16/2022 12:32 AM    BUN 16 07/16/2022 12:32 AM    LABALBU 3.7 07/16/2022 12:32 AM    CREATININE 0.6 07/16/2022 12:32 AM    CALCIUM 9.2 07/16/2022 12:32 AM    GFRAA >60 07/16/2022 12:32 AM    LABGLOM >60 07/16/2022 12:32 AM    GLUCOSE 87 07/16/2022 12:32 AM       IMPRESSION:   Patient Active Problem List   Diagnosis    Acute respiratory failure with hypoxia (HCC)    COPD with acute exacerbation (HCC)    Pneumonia due to infectious organism    Uncontrolled hypertension    COPD exacerbation (HCC)    Acute exacerbation of chronic obstructive pulmonary disease (COPD) ([facility]    Gastroesophageal reflux disease without esophagitis   Acute exac of copd is better, okay to home in  am on dexamethasone and maintenance inhalation therapy. Chronic hypoxic respiratory failure on baseline 4 liters. COVID likely was precipitant.     Cody Sagastume MD  7/16/2022  1:45 PM

## 2022-07-16 NOTE — PROGRESS NOTES
[address] [facility] Hospitalist   Progress Note    Admitting Date and Time: 7/14/2022  1:11 PM  Admit Dx: Acute exacerbation of chronic obstructive pulmonary disease (COPD) ([address] [J44.1]  COPD exacerbation (HCC) [J44.1]  Acute on chronic respiratory failure with hypoxia (HCC) [J96.21]    Subjective:    Pt lying in bed in no acute distress. States he feels much better today asking to be discharged home. Awaiting pulmonology plan. Remains on 4 LNC which is patient baseline. She denies any new concerns at this time. Per RN: No new concerns noted at this time. ROS: denies fever, chills, cp, n/v, HA unless stated above.      albuterol sulfate HFA  2 puff Inhalation 4x daily    ipratropium  2 puff Inhalation 4x daily    insulin lispro  0-6 Units SubCUTAneous TID WC    insulin lispro  0-3 Units SubCUTAneous Nightly    insulin glargine  0.25 Units/kg SubCUTAneous Nightly    pantoprazole  40 mg Oral QAM AC    sodium chloride flush  5-40 mL IntraVENous 2 times per day    enoxaparin  30 mg SubCUTAneous Daily    aspirin  81 mg Oral Daily    budesonide-formoterol  2 puff Inhalation BID    lisinopril  5 mg Oral Daily    senna  1 tablet Oral Nightly    vitamin C  500 mg Oral Daily    Vitamin D  2,000 Units Oral Daily    zinc sulfate  50 mg Oral Daily    dexamethasone  6 mg IntraVENous Q24H     glucose, 4 tablet, PRN  glucagon (rDNA), 1 mg, PRN  dextrose bolus, 125 mL, PRN   Or  dextrose bolus, 250 mL, PRN  dextrose, 100 mL/hr, PRN  bisacodyl, 5 mg, Daily PRN  hydrOXYzine HCl, 10 mg, TID PRN  sodium chloride flush, 5-40 mL, PRN  sodium chloride, , PRN  ondansetron, 4 mg, Q8H PRN   Or  ondansetron, 4 mg, Q6H PRN  polyethylene glycol, 17 g, Daily PRN  acetaminophen, 650 mg, Q6H PRN   Or  acetaminophen, 650 mg, Q6H PRN       Objective:    BP (!) 155/65   Pulse 63   Temp 98 °F (36.7 °C) (Oral)   Resp 24   Ht 5' 8\" (1.727 m)   Wt 101 lb 11.2 oz (46.1 kg)   SpO2 98%   BMI 15.46 kg/m²   General Appearance: alert and oriented to person, place and time and in no acute distress  Skin: warm and dry  Head: normocephalic and atraumatic  Eyes: pupils equal, round, and reactive to light, extraocular eye movements intact, conjunctivae normal  Neck: neck supple and non tender without mass   Pulmonary/Chest: Diminished to auscultation bilaterally- no wheezes, rales or rhonchi, normal air movement, no respiratory distress. Barrel  Cardiovascular: normal rate, normal S1 and S2 and no RGM  Abdomen: soft, non-tender, non-distended, normal bowel sounds, no masses or organomegaly  Extremities: no cyanosis, no clubbing and no edema  Neurologic: no cranial nerve deficit and speech normal      Recent Labs     07/14/22  1253 07/15/22  0003 07/16/22  0032    134 133   K 4.3 4.4 4.4   CL 95* 95* 94*   CO2 30* 30* 32*   BUN 11 12 16   CREATININE 0.5 0.6 0.6   GLUCOSE 93 206* 87   CALCIUM 9.4 8.8 9.2         Recent Labs     07/14/22  1253 07/15/22  0003 07/16/22  0032   ALKPHOS 85 90 92   PROT 6.9 6.3* 6.8   LABALBU 3.9 3.4* 3.7   BILITOT 0.4 0.3 0.3   AST 20 17 18   ALT 15 15 15         Recent Labs     07/14/22  1253 07/15/22  0003 07/16/22  0032   WBC 8.5 6.0 11.7*   RBC 4.34 3.95 3.96   HGB 13.6 12.4 12.6   HCT 42.3 38.5 38.4   MCV 97.5 97.5 97.0   MCH 31.3 31.4 31.8   MCHC 32.2 32.2 32.8   RDW 13.8 13.7 14.1    300 312   MPV 8.7 9.0 8.7              Radiology:   XR CHEST (2 VW)   Final Result   Severe obstructive airways disease as before. Assessment:  Principal Problem:    COPD exacerbation (HCC)  Active Problems:    Acute exacerbation of chronic obstructive pulmonary disease (COPD) ([facility] [address]    Gastroesophageal reflux disease without esophagitis  Resolved Problems:    * No resolved hospital problems. *      Plan:  COVID-19 viral pneumonia-does not qualify for baricitinib/remdesivir. Continue dexamethasone. Requiring 4L NC. Continue to wean as tolerated maintain SPO2>92%. Follow inflammatory markers.   Vitamin

## 2022-07-16 NOTE — PLAN OF CARE
Problem: Discharge Planning  Goal: Discharge to home or other facility with appropriate resources  Outcome: Progressing     Problem: Safety - Adult  Goal: Free from fall injury  Outcome: Progressing  Flowsheets (Taken 7/16/2022 9769)  Free From Fall Injury: Instruct family/caregiver on patient safety     Problem: Pain  Goal: Verbalizes/displays adequate comfort level or baseline comfort level  Outcome: Progressing

## 2022-07-17 VITALS
OXYGEN SATURATION: 94 % | RESPIRATION RATE: 22 BRPM | SYSTOLIC BLOOD PRESSURE: 122 MMHG | BODY MASS INDEX: 15.41 KG/M2 | HEIGHT: 68 IN | DIASTOLIC BLOOD PRESSURE: 59 MMHG | TEMPERATURE: 97.1 F | HEART RATE: 81 BPM | WEIGHT: 101.69 LBS

## 2022-07-17 LAB
ALBUMIN SERPL-MCNC: 3.4 G/DL (ref 3.5–5.2)
ALP BLD-CCNC: 90 U/L (ref 35–104)
ALT SERPL-CCNC: 14 U/L (ref 0–32)
ANION GAP SERPL CALCULATED.3IONS-SCNC: 8 MMOL/L (ref 7–16)
AST SERPL-CCNC: 16 U/L (ref 0–31)
BASOPHILS ABSOLUTE: 0.06 E9/L (ref 0–0.2)
BASOPHILS RELATIVE PERCENT: 0.5 % (ref 0–2)
BILIRUB SERPL-MCNC: 1.1 MG/DL (ref 0–1.2)
BUN BLDV-MCNC: 20 MG/DL (ref 6–23)
CALCIUM SERPL-MCNC: 9.1 MG/DL (ref 8.6–10.2)
CHLORIDE BLD-SCNC: 98 MMOL/L (ref 98–107)
CO2: 31 MMOL/L (ref 22–29)
CREAT SERPL-MCNC: 0.6 MG/DL (ref 0.5–1)
EOSINOPHILS ABSOLUTE: 0.04 E9/L (ref 0.05–0.5)
EOSINOPHILS RELATIVE PERCENT: 0.4 % (ref 0–6)
GFR AFRICAN AMERICAN: >60
GFR NON-AFRICAN AMERICAN: >60 ML/MIN/1.73
GLUCOSE BLD-MCNC: 93 MG/DL (ref 74–99)
HCT VFR BLD CALC: 37.4 % (ref 34–48)
HEMOGLOBIN: 12.1 G/DL (ref 11.5–15.5)
IMMATURE GRANULOCYTES #: 0.08 E9/L
IMMATURE GRANULOCYTES %: 0.7 % (ref 0–5)
LYMPHOCYTES ABSOLUTE: 2.28 E9/L (ref 1.5–4)
LYMPHOCYTES RELATIVE PERCENT: 20.9 % (ref 20–42)
MCH RBC QN AUTO: 31.9 PG (ref 26–35)
MCHC RBC AUTO-ENTMCNC: 32.4 % (ref 32–34.5)
MCV RBC AUTO: 98.7 FL (ref 80–99.9)
METER GLUCOSE: 100 MG/DL (ref 74–99)
METER GLUCOSE: 140 MG/DL (ref 74–99)
MONOCYTES ABSOLUTE: 0.66 E9/L (ref 0.1–0.95)
MONOCYTES RELATIVE PERCENT: 6 % (ref 2–12)
NEUTROPHILS ABSOLUTE: 7.79 E9/L (ref 1.8–7.3)
NEUTROPHILS RELATIVE PERCENT: 71.5 % (ref 43–80)
PDW BLD-RTO: 14.3 FL (ref 11.5–15)
PLATELET # BLD: 273 E9/L (ref 130–450)
PMV BLD AUTO: 9.4 FL (ref 7–12)
POTASSIUM REFLEX MAGNESIUM: 4.7 MMOL/L (ref 3.5–5)
RBC # BLD: 3.79 E12/L (ref 3.5–5.5)
SODIUM BLD-SCNC: 137 MMOL/L (ref 132–146)
TOTAL PROTEIN: 6.3 G/DL (ref 6.4–8.3)
WBC # BLD: 10.9 E9/L (ref 4.5–11.5)

## 2022-07-17 PROCEDURE — 36415 COLL VENOUS BLD VENIPUNCTURE: CPT

## 2022-07-17 PROCEDURE — 6360000002 HC RX W HCPCS: Performed by: FAMILY MEDICINE

## 2022-07-17 PROCEDURE — 80053 COMPREHEN METABOLIC PANEL: CPT

## 2022-07-17 PROCEDURE — 2700000000 HC OXYGEN THERAPY PER DAY

## 2022-07-17 PROCEDURE — 99239 HOSP IP/OBS DSCHRG MGMT >30: CPT | Performed by: NURSE PRACTITIONER

## 2022-07-17 PROCEDURE — 2580000003 HC RX 258: Performed by: FAMILY MEDICINE

## 2022-07-17 PROCEDURE — 85025 COMPLETE CBC W/AUTO DIFF WBC: CPT

## 2022-07-17 PROCEDURE — 6370000000 HC RX 637 (ALT 250 FOR IP): Performed by: FAMILY MEDICINE

## 2022-07-17 PROCEDURE — 82962 GLUCOSE BLOOD TEST: CPT

## 2022-07-17 RX ADMIN — ASPIRIN 81 MG: 81 TABLET, COATED ORAL at 08:22

## 2022-07-17 RX ADMIN — MAGNESIUM CITRATE 296 ML: 1.75 LIQUID ORAL at 10:11

## 2022-07-17 RX ADMIN — Medication 10 ML: at 08:23

## 2022-07-17 RX ADMIN — ALBUTEROL SULFATE 2 PUFF: 90 AEROSOL, METERED RESPIRATORY (INHALATION) at 16:04

## 2022-07-17 RX ADMIN — IPRATROPIUM BROMIDE 2 PUFF: 17 AEROSOL, METERED RESPIRATORY (INHALATION) at 11:45

## 2022-07-17 RX ADMIN — DEXAMETHASONE SODIUM PHOSPHATE 6 MG: 4 INJECTION, SOLUTION INTRAMUSCULAR; INTRAVENOUS at 08:22

## 2022-07-17 RX ADMIN — PANTOPRAZOLE SODIUM 40 MG: 40 TABLET, DELAYED RELEASE ORAL at 10:11

## 2022-07-17 RX ADMIN — BISACODYL 5 MG: 5 TABLET, COATED ORAL at 08:22

## 2022-07-17 RX ADMIN — IPRATROPIUM BROMIDE 2 PUFF: 17 AEROSOL, METERED RESPIRATORY (INHALATION) at 16:03

## 2022-07-17 RX ADMIN — Medication 2000 UNITS: at 08:21

## 2022-07-17 RX ADMIN — ALBUTEROL SULFATE 2 PUFF: 90 AEROSOL, METERED RESPIRATORY (INHALATION) at 08:20

## 2022-07-17 RX ADMIN — ALBUTEROL SULFATE 2 PUFF: 90 AEROSOL, METERED RESPIRATORY (INHALATION) at 11:45

## 2022-07-17 RX ADMIN — ENOXAPARIN SODIUM 30 MG: 100 INJECTION SUBCUTANEOUS at 08:22

## 2022-07-17 RX ADMIN — ZINC SULFATE 220 MG (50 MG) CAPSULE 50 MG: CAPSULE at 08:21

## 2022-07-17 RX ADMIN — OXYCODONE HYDROCHLORIDE AND ACETAMINOPHEN 500 MG: 500 TABLET ORAL at 08:22

## 2022-07-17 RX ADMIN — LISINOPRIL 5 MG: 10 TABLET ORAL at 08:22

## 2022-07-17 RX ADMIN — BUDESONIDE AND FORMOTEROL FUMARATE DIHYDRATE 2 PUFF: 160; 4.5 AEROSOL RESPIRATORY (INHALATION) at 08:21

## 2022-07-17 RX ADMIN — IPRATROPIUM BROMIDE 2 PUFF: 17 AEROSOL, METERED RESPIRATORY (INHALATION) at 08:21

## 2022-07-17 NOTE — PLAN OF CARE
Problem: Discharge Planning  Goal: Discharge to home or other facility with appropriate resources  Outcome: Progressing  Flowsheets (Taken 7/17/2022 0726)  Discharge to home or other facility with appropriate resources: Refer to discharge planning if patient needs post-hospital services based on physician order or complex needs related to functional status, cognitive ability or social support system     Problem: Safety - Adult  Goal: Free from fall injury  7/17/2022 0740 by Henrry Alfred RN  Outcome: Progressing  Flowsheets (Taken 7/17/2022 0730)  Free From Fall Injury: Instruct family/caregiver on patient safety  7/16/2022 2332 by Matt Valencia RN  Outcome: Progressing  Flowsheets (Taken 7/16/2022 0730 by Henrry Alfred RN)  Free From Fall Injury: Instruct family/caregiver on patient safety     Problem: Pain  Goal: Verbalizes/displays adequate comfort level or baseline comfort level  Outcome: Progressing

## 2022-07-17 NOTE — DISCHARGE SUMMARY
[address] [place] [address] Hospitalist       Hospitalist Physician Discharge Summary       Bry [place]az, [address] [facility] [address]  [phone]    Schedule an appointment as soon as possible for a visit in 1 week(s)  Please call for follow up post hospital stay appt. Myrna Underwood MD  [address]  [phone]    Schedule an appointment as soon as possible for a visit in 1 week(s)      Activity level: As Tolerated    Diet: ADULT DIET; Regular; Low Sodium (2 gm)      Condition at discharge: Stable    Dispo:Home    Continue supplemental oxygen via nasal canula @ 4 LPM round-the-clock. Patient ID:  Elise Jaramillo  [id number]  1 y.o.  1955    Admit date: 7/14/2022    Discharge date and time:  7/17/2022  2:23 PM    Admission Diagnoses: Principal Problem:    COPD exacerbation ([address]  Active Problems:    Acute exacerbation of chronic obstructive pulmonary disease (COPD) ([address]    Gastroesophageal reflux disease    COVID-19    Constipation  Resolved Problems:    * No resolved hospital problems. *      Discharge Diagnoses: Principal Problem:    COPD exacerbation ([address]  Active Problems:    Acute exacerbation of chronic obstructive pulmonary disease (COPD) (HCC)    Gastroesophageal reflux disease    COVID-19    Constipation  Resolved Problems:    * No resolved hospital problems. *      Consults:  IP CONSULT TO PULMONOLOGY  IP CONSULT TO PHARMACY    Procedures: none    Hospital Course:   914/2022 27-year-old female presented to [facility] ED with complaints of increased shortness of breath. Several visits to ED over the last week. -9/22 COVID-19 positive. Stated breathing had progressively worsened. D-dimer negative. CTA of chest no evidence of PE. Received DuoNeb/Solu-Medrol in ED course. Patient admitted for further evaluation and treatment. COVID-19 viral pneumonia-does not qualify for baricitinib/remdesivir. Continue dexamethasone.   Requiring 4L NC. Continue to wean as tolerated maintain SPO2>92%. Follow inflammatory markers. Vitamin supplementation. Isolation as per protocol. Encourage I-S/pronating. Supportive care  COPD exacerbation-likely 2/2 above. Received Solu-Medrol 60 mg IV x1 in ED course. We will continue  dexamethasone/Symbicort/albuterol/Atrovent. Has been seen by Dr. Raj Mccrary in the past.  Received doxycycline in ED course. Consider consultation with worsening hypoxia assesses acceleration. HTN-continue lisinopril. Follow adjust as needed  GERD-continue PPI  Constipation-continue senna. Noted results. Patient remained hemodynamic stable and can be discharged at this time. Patient will be instructed to follow-up with PCP and pulmonology upon DC. Patient will continue on dexamethasone inhalation therapy per pulmonology. Discharge Exam:  Vitals:    07/16/22 0745 07/16/22 2015 07/17/22 0040 07/17/22 0818   BP: (!) 155/65 119/71  (!) 122/59   Pulse: 63 74  81   Resp: 24 22     Temp: 98 °F (36.7 °C) 97.1 °F (36.2 °C)     TempSrc: Oral Oral  Oral   SpO2: 98% 95%  94%   Weight:   101 lb 11 oz (46.1 kg)    Height:           General Appearance: alert and oriented to person, place and time and in no acute distress  Skin: warm and dry  Head: normocephalic and atraumatic  Eyes: pupils equal, round, and reactive to light, extraocular eye movements intact, conjunctivae normal  Neck: neck supple and non tender without mass  Pulmonary/Chest: Diminished to auscultation bilaterally- no wheezes, rales or rhonchi, normal air movement, no respiratory distress. Barrel  Cardiovascular: normal rate, normal S1 and S2 and no RGM  Abdomen: soft, non-tender, non-distended, normal bowel sounds, no masses or organomegaly  Extremities: no cyanosis, no clubbing and no edema  Neurologic: no cranial nerve deficit and speech normal     I/O last 3 completed shifts: In: 240 [P.O.:240]  Out: 200 [Urine:200]  No intake/output data recorded.       LABS:  Recent Labs 07/15/22  0003 07/16/22  0032 07/17/22  0239    133 137   K 4.4 4.4 4.7   CL 95* 94* 98   CO2 30* 32* 31*   BUN 12 16 20   CREATININE 0.6 0.6 0.6   GLUCOSE 206* 87 93   CALCIUM 8.8 9.2 9.1       Recent Labs     07/15/22  0003 07/16/22  0032 07/17/22  0239   WBC 6.0 11.7* 10.9   RBC 3.95 3.96 3.79   HGB 12.4 12.6 12.1   HCT 38.5 38.4 37.4   MCV 97.5 97.0 98.7   MCH 31.4 31.8 31.9   MCHC 32.2 32.8 32.4   RDW 13.7 14.1 14.3    312 273   MPV 9.0 8.7 9.4       No results for input(s): POCGLU in the last 72 hours. Imaging:  XR CHEST (2 VW)    Result Date: 7/14/2022  EXAMINATION: TWO XRAY VIEWS OF THE CHEST 7/14/2022 12:35 pm COMPARISON: 9 July 2022 HISTORY: ORDERING SYSTEM PROVIDED HISTORY: covid, pneumonia, copd TECHNOLOGIST PROVIDED HISTORY: Reason for exam:->covid, pneumonia, copd FINDINGS: Unchanged severe obstructive airways disease. Normal heart and pulmonary vascularity. Neither costophrenic angle is blunted. Severe obstructive airways disease as before. Patient Instructions:   Current Discharge Medication List        START taking these medications    Details   dexamethasone (DECADRON) 6 MG tablet Take 1 tablet by mouth daily (with breakfast) for 3 days  Qty: 3 tablet, Refills: 0      zinc sulfate (ZINCATE) 220 (50 Zn) MG capsule Take 1 capsule by mouth in the morning. Qty: 30 capsule, Refills: 0      Vitamin D (CHOLECALCIFEROL) 50 MCG (2000 UT) TABS tablet Take 1 tablet by mouth in the morning. Qty: 30 tablet, Refills: 0    Comments: Labeling may look different. 25 mcg=1000 Units. Please double check dosages.            CONTINUE these medications which have CHANGED    Details   budesonide-formoterol (SYMBICORT) 160-4.5 MCG/ACT AERO Inhale 2 puffs into the lungs 2 times daily  Qty: 10.2 g, Refills: 0           CONTINUE these medications which have NOT CHANGED    Details   lisinopril (PRINIVIL;ZESTRIL) 5 MG tablet Take 1 tablet by mouth daily  Qty: 30 tablet, Refills: 3      senna (SENOKOT) 8.6 MG tablet Take 1 tablet by mouth nightly  Qty: 30 tablet, Refills: 0      aspirin 81 MG EC tablet Take 81 mg by mouth daily      vitamin C (ASCORBIC ACID) 500 MG tablet Take 500 mg by mouth daily           STOP taking these medications       albuterol sulfate HFA (VENTOLIN HFA) 108 (90 Base) MCG/ACT inhaler Comments:   Reason for Stopping:                 Note that more  than 30 minutes was spent in preparing discharge papers, discussing discharge with patient, medication review, etc.    NOTE: This report was transcribed using voice recognition software. Every effort was made to ensure accuracy; however, inadvertent computerized transcription errors may be present. Signed:  Electronically signed by Lizbeth Pickerel Arleta Mcardle - CNP on 7/17/2022 at 2:23 PM

## 2022-07-17 NOTE — PLAN OF CARE
Problem: Discharge Planning  Goal: Discharge to home or other facility with appropriate resources  7/17/2022 1447 by Cherise Calderón RN  Outcome: Completed  7/17/2022 0740 by Cherise Calderón RN  Outcome: Progressing  Flowsheets (Taken 7/17/2022 0726)  Discharge to home or other facility with appropriate resources: Refer to discharge planning if patient needs post-hospital services based on physician order or complex needs related to functional status, cognitive ability or social support system     Problem: Safety - Adult  Goal: Free from fall injury  7/17/2022 1447 by Cherise Calderón RN  Outcome: Completed  7/17/2022 0740 by Cherise Calderón RN  Outcome: Progressing  Flowsheets (Taken 7/17/2022 0730)  Free From Fall Injury: Instruct family/caregiver on patient safety     Problem: Pain  Goal: Verbalizes/displays adequate comfort level or baseline comfort level  7/17/2022 1447 by Cherise Calderón RN  Outcome: Completed  7/17/2022 0740 by Cherise Calderón RN  Outcome: Progressing

## 2022-07-17 NOTE — PLAN OF CARE
Problem: Discharge Planning  Goal: Discharge to home or other facility with appropriate resources  7/16/2022 1505 by Gentry Babb RN  Outcome: Progressing

## 2022-07-18 ENCOUNTER — TELEPHONE (OUTPATIENT)
Dept: PRIMARY CARE CLINIC | Age: 67
End: 2022-07-18

## 2022-07-18 ENCOUNTER — CARE COORDINATION (OUTPATIENT)
Dept: CASE MANAGEMENT | Age: 67
End: 2022-07-18

## 2022-07-18 DIAGNOSIS — J44.1 COPD WITH ACUTE EXACERBATION (HCC): Primary | ICD-10-CM

## 2022-07-18 LAB
EKG ATRIAL RATE: 68 BPM
EKG P AXIS: 11 DEGREES
EKG P-R INTERVAL: 146 MS
EKG Q-T INTERVAL: 400 MS
EKG QRS DURATION: 80 MS
EKG QTC CALCULATION (BAZETT): 425 MS
EKG R AXIS: 66 DEGREES
EKG T AXIS: 77 DEGREES
EKG VENTRICULAR RATE: 68 BPM

## 2022-07-18 PROCEDURE — 1111F DSCHRG MED/CURRENT MED MERGE: CPT | Performed by: FAMILY MEDICINE

## 2022-07-18 NOTE — CARE COORDINATION
COVID-19 Monitoring Transitions of Care Call  Call within 2 business days of discharge: Yes    Patient: Kate Ruiz Patient : 1955   MRN: [id number]  Reason for Admission: COPD exacerbation; +COVID-19  Discharge Date: 22 RARS: Readmission Risk Score: 14.3      Last Discharge [facility]       Date Complaint Diagnosis Description Type Department Provider    22 Shortness of Breath COPD exacerbation ([facility] [address] . .. ED to Hosp-Admission (Discharged) (ADMITTED) Fany Lin MD; Cielo Schneider. .. Challenges to be reviewed by the provider   Additional needs identified to be addressed with provider: No  none                 Encounter was not routed to provider for escalation. Method of communication with provider: none. Discussed COVID-19 related testing which was: available at this time. Test results were: positive. Patient informed of results, if available? Pt previously aware of +COVID-19 results (+22). Current Symptoms: fatigue  cough  shortness of breath  no new symptoms  no worsening symptoms    Called and spoke with the pt for an initial COVID-19 monitoring care transition call. Pt presented to the ED with increased sob, burning in the chest, coughing, and wheezing. Pt is a readmission. Recently admitted from -22 with acute resp failure with hypoxia r/t COPD exacerbation and x 2 ED visits (7/3/22 with COPD exacerbation and pna) and (22 with +COVID-19). Pt states that she is feeling much better today. Pt reports that sob is not as intense. Pt states that she has baseline sob with exertion and feels that she is near baseline. Pt reports that she is on O2 @3L ([employer]) and SaO2 readings have been averaging around 94% on current liter flow. Pt denies any wheezing, chest tightness/congestion, CP, fever, or chills. Pt states that she does have a mild, productive cough. Pt was not discharged home with an IS.  CTN encouraged pt to be mindful in making sure she is doing coughing and deep breathing exercises frequently. She voiced understanding. Pt states that she did  her new rxs, but has not yet gotten the OTC Zinc and Vit D. She intends on getting these. Pt voiced no needs/concerns at this time. Pt was agreeable to continued outreaches from this CTN. Reviewed New or Changed Meds: yes    Do you have what you need at home? Durable Medical Equipment ordered at discharge:  Pt on home O2 @ 3L via NC prior to hospital admission. Pt compliant with wearing. Home Health/Outpatient orders at discharge: none  Was patient discharged with a pulse oximeter? Pt has her own. Discussed when to notify provider or seek emergency care. Pt reports SaO2 94% on O2 @3L. Patient education provided: Reviewed appropriate site of care based on symptoms and resources available to patient including: PCP  Specialist  Urgent care clinics  When to call 911. Advanced Care Planning: Health care decision maker information reviewed. Follow up appointment scheduled within 7 days of discharge: yes. Future Appointments   Date Time Provider Gigi Figueroa   7/19/2022  5:00 PM DO RYAN Arguello [facility]       Interventions: Scheduled appointment with PCP-See above. Pt establishing with new pcp tomorrow. Scheduled appointment with Specialist-Dr. Asencio~pt states that she contacted his office on 7/15/22 and was told they would contact her at the beginning of this week to schedule a HFU appt  Obtained and reviewed discharge summary and/or continuity of care documents  Reviewed and followed up on pending diagnostic tests and treatments-CT chest~Noted per Dr. Haydee Ortega notes that pt will need OP f/u scan to assess left lower lobe infiltrate in 6wks. Assessment and support for treatment adherence and medication management-[id number] entered  Reviewed discharge instructions, medical action plan and red flags with patient who verbalized understanding.     Plan for follow-up call in 7-10 days based on severity of symptoms and risk factors. Plan for next call: symptom management-Any worsening in COPD? Any worsening COVID-19 symptoms? self management-Are SaO2 readings remaining >90%? follow up appointment-Has pt attended her HFU appt with her pcp? Has pt been scheduled for a f/u with Dr. Alberto Bachelor? Provided contact information for future needs.     Neri Callahan RN

## 2022-07-18 NOTE — TELEPHONE ENCOUNTER
----- Message from Gabriela Caruso sent at 7/15/2022  1:02 PM EDT -----  Subject: Message to Provider    QUESTIONS  Information for Provider? Patient was diagnosed with covid 3 days ago and   wants to know if they should keep their appt 7/19  ---------------------------------------------------------------------------  --------------  Jeramy Counter INFO  [phone]; OK to leave message on voicemail  ---------------------------------------------------------------------------  --------------  SCRIPT ANSWERS  Relationship to Patient?  Self

## 2022-07-25 ENCOUNTER — CARE COORDINATION (OUTPATIENT)
Dept: CASE MANAGEMENT | Age: 67
End: 2022-07-25

## 2022-07-25 NOTE — CARE COORDINATION
[facility] Transitions Follow Up Call    2022    Patient: Jeri Dela Cruz  Patient : 1955   MRN: [id number]  Reason for Admission: COPD exacerbation  Discharge Date: 22 RARS: Readmission Risk Score: 14.3        Patient contacted regarding COVID-19 diagnosis. Pt tested +COVID-19 on 22    Care Transition Nurse contacted the patient by telephone to perform follow-up assessment. Patient has following risk factors of: COPD  Htn . Symptoms reviewed with patient who verbalized the following symptoms: fatigue  cough  shortness of breath  no new symptoms  no worsening symptoms. Due to no new or worsening symptoms encounter was not routed to provider for escalation. Called and spoke with pt for a sub COVID-19 care transition call. Pt states that she is feeling \"better\" this week. Pt reports to baseline sob. Denies any increase in sob from baseline. Pt remains on O2 @3L per NC. States that SaO2 ranges remaining >90% on current liter flow. Denies any increase in liter flow. Pt reports to an intermittent cough. Pt feels as if this is subsiding. Pt states he biggest complaint is tiring out more easily then pre COVID. Pt states that she just paces herself. Pt states that she is sleeping and eating better. Pt states that she is scheduled for a new pt appt now with Dr. Sandi Hamilton on 22. Pt states that previous appt was cancelled because the office stated they wanted her to still isolate a little longer before coming in. Pt intends on going to her appt this week. Pt voiced no current needs/concerns. Pt is agreeable to continued outreaches from this CTN. CTN reviewed medical action plan and red flag symptoms with the patient who verbalized understanding. Patient was given an opportunity to verbalize any questions and concerns and agrees to contact CTN or health care provider for questions related to their healthcare. Was patient discharged with a pulse oximeter? Pt has her own.  SaO2

## 2022-07-25 NOTE — CARE COORDINATION
[facility] Transitions Follow Up Call    2022    Patient: Kate Ruiz  Patient : 1955   MRN: [id number]  Reason for Admission: COPD exacerbation  Discharge Date: 22 RARS: Readmission Risk Score: 14.3         Attempted to reach the patient for subsequent covid monitoring call. Message left with CTN's contact information requesting return phone call.        Will attempt outreach again        Follow Up  Future Appointments   Date Time Provider Gigi Figueroa   2022  5:00 PM DO RYAN [facility]       Juanis Cortes RN

## 2022-07-27 ENCOUNTER — OFFICE VISIT (OUTPATIENT)
Dept: PRIMARY CARE CLINIC | Age: 67
End: 2022-07-27
Payer: MEDICARE

## 2022-07-27 DIAGNOSIS — E11.9 DIET-CONTROLLED DIABETES MELLITUS (HCC): ICD-10-CM

## 2022-07-27 DIAGNOSIS — E46 PROTEIN DEFICIENCY (HCC): ICD-10-CM

## 2022-07-27 DIAGNOSIS — M81.0 AGE-RELATED OSTEOPOROSIS WITHOUT CURRENT PATHOLOGICAL FRACTURE: ICD-10-CM

## 2022-07-27 DIAGNOSIS — K59.09 OTHER CONSTIPATION: ICD-10-CM

## 2022-07-27 DIAGNOSIS — R94.31 ABNORMAL EKG: ICD-10-CM

## 2022-07-27 DIAGNOSIS — I10 ESSENTIAL HYPERTENSION: ICD-10-CM

## 2022-07-27 DIAGNOSIS — E03.9 ACQUIRED HYPOTHYROIDISM: ICD-10-CM

## 2022-07-27 DIAGNOSIS — R63.4 UNEXPLAINED WEIGHT LOSS: ICD-10-CM

## 2022-07-27 DIAGNOSIS — K21.9 GASTROESOPHAGEAL REFLUX DISEASE WITHOUT ESOPHAGITIS: ICD-10-CM

## 2022-07-27 DIAGNOSIS — R91.8 MULTIPLE NODULES OF LUNG: ICD-10-CM

## 2022-07-27 DIAGNOSIS — E53.8 VITAMIN B12 DEFICIENCY: ICD-10-CM

## 2022-07-27 DIAGNOSIS — J44.9 COPD WITHOUT EXACERBATION (HCC): Primary | ICD-10-CM

## 2022-07-27 PROCEDURE — 3023F SPIROM DOC REV: CPT | Performed by: FAMILY MEDICINE

## 2022-07-27 PROCEDURE — 1036F TOBACCO NON-USER: CPT | Performed by: FAMILY MEDICINE

## 2022-07-27 PROCEDURE — 2022F DILAT RTA XM EVC RTNOPTHY: CPT | Performed by: FAMILY MEDICINE

## 2022-07-27 PROCEDURE — 1111F DSCHRG MED/CURRENT MED MERGE: CPT | Performed by: FAMILY MEDICINE

## 2022-07-27 PROCEDURE — G8400 PT W/DXA NO RESULTS DOC: HCPCS | Performed by: FAMILY MEDICINE

## 2022-07-27 PROCEDURE — 99204 OFFICE O/P NEW MOD 45 MIN: CPT | Performed by: FAMILY MEDICINE

## 2022-07-27 PROCEDURE — 3017F COLORECTAL CA SCREEN DOC REV: CPT | Performed by: FAMILY MEDICINE

## 2022-07-27 PROCEDURE — G8419 CALC BMI OUT NRM PARAM NOF/U: HCPCS | Performed by: FAMILY MEDICINE

## 2022-07-27 PROCEDURE — 1090F PRES/ABSN URINE INCON ASSESS: CPT | Performed by: FAMILY MEDICINE

## 2022-07-27 PROCEDURE — 3044F HG A1C LEVEL LT 7.0%: CPT | Performed by: FAMILY MEDICINE

## 2022-07-27 PROCEDURE — G8428 CUR MEDS NOT DOCUMENT: HCPCS | Performed by: FAMILY MEDICINE

## 2022-07-27 PROCEDURE — 1123F ACP DISCUSS/DSCN MKR DOCD: CPT | Performed by: FAMILY MEDICINE

## 2022-07-27 NOTE — PROGRESS NOTES
lungs 2 times daily, Disp: 10.2 g, Rfl: 0    lisinopril (PRINIVIL;ZESTRIL) 5 MG tablet, Take 1 tablet by mouth daily, Disp: 30 tablet, Rfl: 3    senna (SENOKOT) 8.6 MG tablet, Take 1 tablet by mouth nightly, Disp: 30 tablet, Rfl: 0    aspirin 81 MG EC tablet, Take 81 mg by mouth daily, Disp: , Rfl:     vitamin C (ASCORBIC ACID) 500 MG tablet, Take 500 mg by mouth daily, Disp: , Rfl:   Allergies   Allergen Reactions    Pcn [Penicillins] Other (See Comments)     Social History     Socioeconomic History    Marital status:      Spouse name: Not on file    Number of children: Not on file    Years of education: Not on file    Highest education level: Not on file   Occupational History    Not on file   Tobacco Use    Smoking status: Former     Packs/day: 0.25     Types: Cigarettes    Smokeless tobacco: Never    Tobacco comments:     may 2022   Vaping Use    Vaping Use: Never used   Substance and Sexual Activity    Alcohol use: Not Currently    Drug use: Not Currently    Sexual activity: Not Currently   Other Topics Concern    Not on file   Social History Narrative    Established 7-22 after not being seen since 2008    COPD sees Dr. 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    Normal EKG 7-22 in the hospital referred to cardiology     Social Determinants of Health     Financial Resource Strain: Not on file   Food Insecurity: Not on file   Transportation Needs: Not on file   Physical Activity: Not on file   Stress: Not on file   Social Connections: Not on file   Intimate Partner Violence: Not on file   Housing Stability: Not on file Past Medical History:   Diagnosis Date    Acute exacerbation of chronic obstructive pulmonary disease (COPD) ([facility] 7/14/2022    Acute respiratory failure with hypoxia ([facility] 6/27/2022    COPD (chronic obstructive pulmonary disease) ([facility])     COPD exacerbation ([facility] 7/14/2022    COPD with acute exacerbation ([facility]     COVID-19 7/16/2022    Hypertension     Pneumonia due to infectious organism     Uncontrolled hypertension      No family history on file. Past Surgical History:   Procedure Laterality Date    BACK SURGERY        Vitals:    07/27/22 1651   BP: 136/82   Pulse: 81   Temp: 98.2 °F (36.8 °C)   TempSrc: Oral   SpO2: 92%   Weight: 90 lb 12.8 oz (41.2 kg)   Height: 5' 8\" (1.727 m)       Objective:    Physical Exam  Vitals reviewed. Constitutional:       Appearance: Normal appearance. She is well-developed. HENT:      Head: Normocephalic. Right Ear: Tympanic membrane normal.      Left Ear: Tympanic membrane normal.      Nose: Nose normal.      Mouth/Throat:      Mouth: Mucous membranes are moist.   Eyes:      Pupils: Pupils are equal, round, and reactive to light. Cardiovascular:      Rate and Rhythm: Normal rate and regular rhythm. Pulmonary:      Effort: Pulmonary effort is normal.      Breath sounds: Normal breath sounds. Abdominal:      General: Bowel sounds are normal.      Palpations: Abdomen is soft. Genitourinary:     Comments: Breast exam neg  Musculoskeletal:         General: Normal range of motion. Cervical back: Normal range of motion. Skin:     General: Skin is warm. Neurological:      Mental Status: She is alert and oriented to person, place, and time. Psychiatric:         Behavior: Behavior normal.       Franklin Lezama was seen today for establish care.     Diagnoses and all orders for this visit:    COPD without exacerbation ([facility]    Essential hypertension    Gastroesophageal reflux disease without esophagitis    Other constipation    Unexplained weight loss    Protein deficiency ([facility]    Diet-controlled diabetes mellitus ([facility]    Multiple nodules of lung    Abnormal EKG      Comments: Have her get laboratory studies tomorrow. See me in 1 week. Try to increase diet and protein. Low sugar diet. Stay off cigarettes. Follow-up with Dr. Jannet Pennington from pulmonary. Oxygen level frequently at home on ambulation call if less than 90. Next Visit we will discuss colonoscopy and mammogram  We also discussed referral to cardiology regarding EKG. A great deal of time was spent reviewing records and establishing treatment protocol and treatment plan. The majority of the time was spent on face-to-face exam and education. I educated the patient about all medications. Make sure they were correct and educated  on the risk associated with missing meds or taking them incorrectly. A list of medications is being sent home with patient today. Check blood pressure at home twice a day. Low-salt low caffeine diet. Call if systolic blood pressure is above 485 and diastolic blood pressures above 85. Only use a upper arm digital cuff. Aggressive low-fat diet. Avoid red meats, greasy fried foods, dairy products. Avoid processed foods. Take cholesterol medications without food. I informed patient about the risk associated with noncompliance of medication and taking medications incorrectly. Appropriate follow-up with myself and all specialist.  Encourage family members to take active role in assisting with medications and medical care. If any confusion should develop to notify my office immediately to avoid risk of worsening medical condition      A great deal of time spent reviewing medications, diet, exercise, social issues. Also reviewing the chart before entering the room with patient and finishing charting after leaving patient's room. More than half of that time was spent face to face with the patient in counseling and coordinating care.       Follow Up: Return in about 1 week (around 8/3/2022) for Lab Before.      Seen by:  Kwesi Hanson DO

## 2022-07-28 VITALS
HEIGHT: 68 IN | WEIGHT: 90.8 LBS | BODY MASS INDEX: 13.76 KG/M2 | HEART RATE: 81 BPM | TEMPERATURE: 98.2 F | SYSTOLIC BLOOD PRESSURE: 136 MMHG | OXYGEN SATURATION: 92 % | DIASTOLIC BLOOD PRESSURE: 82 MMHG

## 2022-07-28 DIAGNOSIS — E53.8 VITAMIN B12 DEFICIENCY: ICD-10-CM

## 2022-07-28 DIAGNOSIS — E03.9 ACQUIRED HYPOTHYROIDISM: ICD-10-CM

## 2022-07-28 DIAGNOSIS — R63.4 UNEXPLAINED WEIGHT LOSS: ICD-10-CM

## 2022-07-28 DIAGNOSIS — I10 ESSENTIAL HYPERTENSION: ICD-10-CM

## 2022-07-28 DIAGNOSIS — E11.9 DIET-CONTROLLED DIABETES MELLITUS (HCC): ICD-10-CM

## 2022-07-28 DIAGNOSIS — M81.0 AGE-RELATED OSTEOPOROSIS WITHOUT CURRENT PATHOLOGICAL FRACTURE: ICD-10-CM

## 2022-07-28 PROBLEM — E46 PROTEIN DEFICIENCY (HCC): Chronic | Status: ACTIVE | Noted: 2022-07-28

## 2022-07-28 PROBLEM — E46 PROTEIN DEFICIENCY (HCC): Status: ACTIVE | Noted: 2022-07-28

## 2022-07-28 PROBLEM — R94.31 ABNORMAL EKG: Status: ACTIVE | Noted: 2022-07-28

## 2022-07-28 PROBLEM — K21.9 GASTROESOPHAGEAL REFLUX DISEASE: Chronic | Status: ACTIVE | Noted: 2022-07-15

## 2022-07-28 PROBLEM — J44.9 COPD WITHOUT EXACERBATION (HCC): Chronic | Status: ACTIVE | Noted: 2022-07-14

## 2022-07-28 PROBLEM — R91.8 MULTIPLE NODULES OF LUNG: Status: ACTIVE | Noted: 2022-07-28

## 2022-07-28 PROBLEM — K59.00 CONSTIPATION: Chronic | Status: ACTIVE | Noted: 2022-07-16

## 2022-07-28 PROBLEM — U07.1 COVID-19: Status: RESOLVED | Noted: 2022-07-16 | Resolved: 2022-07-28

## 2022-07-28 PROBLEM — R91.8 MULTIPLE NODULES OF LUNG: Chronic | Status: ACTIVE | Noted: 2022-07-28

## 2022-07-28 PROBLEM — J44.9 COPD WITHOUT EXACERBATION (HCC): Status: ACTIVE | Noted: 2022-07-14

## 2022-07-28 PROBLEM — J44.1 ACUTE EXACERBATION OF CHRONIC OBSTRUCTIVE PULMONARY DISEASE (COPD) (HCC): Status: RESOLVED | Noted: 2022-07-14 | Resolved: 2022-07-28

## 2022-07-28 PROBLEM — J96.01 ACUTE RESPIRATORY FAILURE WITH HYPOXIA (HCC): Status: RESOLVED | Noted: 2022-06-27 | Resolved: 2022-07-28

## 2022-07-28 PROBLEM — J44.1 COPD EXACERBATION (HCC): Status: RESOLVED | Noted: 2022-07-14 | Resolved: 2022-07-28

## 2022-07-28 PROBLEM — R94.31 ABNORMAL EKG: Chronic | Status: ACTIVE | Noted: 2022-07-28

## 2022-07-28 LAB
ALBUMIN SERPL-MCNC: 3.7 G/DL (ref 3.5–5.2)
ALP BLD-CCNC: 92 U/L (ref 35–104)
ALT SERPL-CCNC: 15 U/L (ref 0–32)
ANION GAP SERPL CALCULATED.3IONS-SCNC: 16 MMOL/L (ref 7–16)
AST SERPL-CCNC: 20 U/L (ref 0–31)
BACTERIA: NORMAL /HPF
BASOPHILS ABSOLUTE: 0.03 E9/L (ref 0–0.2)
BASOPHILS RELATIVE PERCENT: 0.3 % (ref 0–2)
BILIRUB SERPL-MCNC: 0.2 MG/DL (ref 0–1.2)
BILIRUBIN URINE: NEGATIVE
BLOOD, URINE: NEGATIVE
BUN BLDV-MCNC: 14 MG/DL (ref 6–23)
CALCIUM SERPL-MCNC: 9 MG/DL (ref 8.6–10.2)
CHLORIDE BLD-SCNC: 98 MMOL/L (ref 98–107)
CHOLESTEROL, TOTAL: 214 MG/DL (ref 0–199)
CLARITY: CLEAR
CO2: 28 MMOL/L (ref 22–29)
COLOR: YELLOW
CREAT SERPL-MCNC: 0.5 MG/DL (ref 0.5–1)
EOSINOPHILS ABSOLUTE: 0.17 E9/L (ref 0.05–0.5)
EOSINOPHILS RELATIVE PERCENT: 1.7 % (ref 0–6)
GFR AFRICAN AMERICAN: >60
GFR NON-AFRICAN AMERICAN: >60 ML/MIN/1.73
GLUCOSE BLD-MCNC: 106 MG/DL (ref 74–99)
GLUCOSE URINE: NEGATIVE MG/DL
HBA1C MFR BLD: 6.1 % (ref 4–5.6)
HCT VFR BLD CALC: 39.6 % (ref 34–48)
HDLC SERPL-MCNC: 91 MG/DL
HEMOGLOBIN: 12.6 G/DL (ref 11.5–15.5)
IMMATURE GRANULOCYTES #: 0.05 E9/L
IMMATURE GRANULOCYTES %: 0.5 % (ref 0–5)
KETONES, URINE: NEGATIVE MG/DL
LDL CHOLESTEROL CALCULATED: 105 MG/DL (ref 0–99)
LEUKOCYTE ESTERASE, URINE: NEGATIVE
LYMPHOCYTES ABSOLUTE: 1.19 E9/L (ref 1.5–4)
LYMPHOCYTES RELATIVE PERCENT: 11.9 % (ref 20–42)
MCH RBC QN AUTO: 31.4 PG (ref 26–35)
MCHC RBC AUTO-ENTMCNC: 31.8 % (ref 32–34.5)
MCV RBC AUTO: 98.8 FL (ref 80–99.9)
MONOCYTES ABSOLUTE: 0.72 E9/L (ref 0.1–0.95)
MONOCYTES RELATIVE PERCENT: 7.2 % (ref 2–12)
NEUTROPHILS ABSOLUTE: 7.82 E9/L (ref 1.8–7.3)
NEUTROPHILS RELATIVE PERCENT: 78.4 % (ref 43–80)
NITRITE, URINE: NEGATIVE
PDW BLD-RTO: 14.4 FL (ref 11.5–15)
PH UA: 6 (ref 5–9)
PLATELET # BLD: 261 E9/L (ref 130–450)
PMV BLD AUTO: 9.8 FL (ref 7–12)
POTASSIUM SERPL-SCNC: 3.6 MMOL/L (ref 3.5–5)
PROTEIN UA: NORMAL MG/DL
RBC # BLD: 4.01 E12/L (ref 3.5–5.5)
RBC UA: NORMAL /HPF (ref 0–2)
SODIUM BLD-SCNC: 142 MMOL/L (ref 132–146)
SPECIFIC GRAVITY UA: >=1.03 (ref 1–1.03)
T4 TOTAL: 7.9 MCG/DL (ref 4.5–11.7)
TOTAL PROTEIN: 7 G/DL (ref 6.4–8.3)
TRIGL SERPL-MCNC: 90 MG/DL (ref 0–149)
TSH SERPL DL<=0.05 MIU/L-ACNC: 0.02 UIU/ML (ref 0.27–4.2)
UROBILINOGEN, URINE: 0.2 E.U./DL
VITAMIN B-12: 205 PG/ML (ref 211–946)
VITAMIN D 25-HYDROXY: 23 NG/ML (ref 30–100)
VLDLC SERPL CALC-MCNC: 18 MG/DL
WBC # BLD: 10 E9/L (ref 4.5–11.5)
WBC UA: NORMAL /HPF (ref 0–5)

## 2022-07-28 ASSESSMENT — ENCOUNTER SYMPTOMS
RESPIRATORY NEGATIVE: 1
GASTROINTESTINAL NEGATIVE: 1
EYES NEGATIVE: 1
ALLERGIC/IMMUNOLOGIC NEGATIVE: 1

## 2022-07-28 ASSESSMENT — PATIENT HEALTH QUESTIONNAIRE - PHQ9
SUM OF ALL RESPONSES TO PHQ QUESTIONS 1-9: 0
1. LITTLE INTEREST OR PLEASURE IN DOING THINGS: 0
SUM OF ALL RESPONSES TO PHQ QUESTIONS 1-9: 0
SUM OF ALL RESPONSES TO PHQ9 QUESTIONS 1 & 2: 0
2. FEELING DOWN, DEPRESSED OR HOPELESS: 0

## 2022-08-01 ENCOUNTER — CARE COORDINATION (OUTPATIENT)
Dept: CASE MANAGEMENT | Age: 67
End: 2022-08-01

## 2022-08-01 NOTE — CARE COORDINATION
[facility] COVID-19 Monitoring Care Transitions Follow Up Call    2022    Patient: Liberty Mercer  Patient : 1955   MRN: [id number]  Reason for Admission: COPD exacerbation  Discharge Date: 22 RARS: Readmission Risk Score: 14.3             Attempted to reach the patient for a sub COVID Monitoring Care Transition call post hospital discharge. Message left with CTN's contact information requesting return phone call. Will attempt outreach again.          Follow Up  Future Appointments   Date Time Provider Gigi Figueroa   8/3/2022 10:30 AM DO RYAN Curry [facility]       Margarita Gar RN

## 2022-08-03 ENCOUNTER — OFFICE VISIT (OUTPATIENT)
Dept: PRIMARY CARE CLINIC | Age: 67
End: 2022-08-03
Payer: MEDICARE

## 2022-08-03 VITALS
HEIGHT: 68 IN | BODY MASS INDEX: 13.34 KG/M2 | SYSTOLIC BLOOD PRESSURE: 122 MMHG | TEMPERATURE: 98.5 F | DIASTOLIC BLOOD PRESSURE: 62 MMHG | WEIGHT: 88 LBS

## 2022-08-03 DIAGNOSIS — R63.4 UNEXPLAINED WEIGHT LOSS: Chronic | ICD-10-CM

## 2022-08-03 DIAGNOSIS — J44.9 COPD WITHOUT EXACERBATION (HCC): Chronic | ICD-10-CM

## 2022-08-03 DIAGNOSIS — M81.0 AGE-RELATED OSTEOPOROSIS WITHOUT CURRENT PATHOLOGICAL FRACTURE: ICD-10-CM

## 2022-08-03 DIAGNOSIS — E11.9 DIET-CONTROLLED DIABETES MELLITUS (HCC): Chronic | ICD-10-CM

## 2022-08-03 DIAGNOSIS — D51.8 VITAMIN B12 DEFICIENCY (DIETARY) ANEMIA: ICD-10-CM

## 2022-08-03 DIAGNOSIS — E78.2 MIXED HYPERLIPIDEMIA: ICD-10-CM

## 2022-08-03 DIAGNOSIS — Z12.11 SCREEN FOR COLON CANCER: ICD-10-CM

## 2022-08-03 DIAGNOSIS — I10 ESSENTIAL HYPERTENSION: Chronic | ICD-10-CM

## 2022-08-03 DIAGNOSIS — E46 PROTEIN DEFICIENCY (HCC): ICD-10-CM

## 2022-08-03 DIAGNOSIS — Z00.00 INITIAL MEDICARE ANNUAL WELLNESS VISIT: Primary | ICD-10-CM

## 2022-08-03 DIAGNOSIS — R94.31 ABNORMAL EKG: Chronic | ICD-10-CM

## 2022-08-03 DIAGNOSIS — E05.90 HYPERTHYROIDISM: ICD-10-CM

## 2022-08-03 DIAGNOSIS — K59.09 OTHER CONSTIPATION: Chronic | ICD-10-CM

## 2022-08-03 PROCEDURE — 1123F ACP DISCUSS/DSCN MKR DOCD: CPT | Performed by: FAMILY MEDICINE

## 2022-08-03 PROCEDURE — 3044F HG A1C LEVEL LT 7.0%: CPT | Performed by: FAMILY MEDICINE

## 2022-08-03 PROCEDURE — G0438 PPPS, INITIAL VISIT: HCPCS | Performed by: FAMILY MEDICINE

## 2022-08-03 PROCEDURE — 3017F COLORECTAL CA SCREEN DOC REV: CPT | Performed by: FAMILY MEDICINE

## 2022-08-03 PROCEDURE — 96372 THER/PROPH/DIAG INJ SC/IM: CPT | Performed by: FAMILY MEDICINE

## 2022-08-03 RX ORDER — CYANOCOBALAMIN 1000 UG/ML
1000 INJECTION INTRAMUSCULAR; SUBCUTANEOUS ONCE
Status: COMPLETED | OUTPATIENT
Start: 2022-08-03 | End: 2022-08-03

## 2022-08-03 RX ADMIN — CYANOCOBALAMIN 1000 MCG: 1000 INJECTION INTRAMUSCULAR; SUBCUTANEOUS at 10:57

## 2022-08-03 ASSESSMENT — LIFESTYLE VARIABLES
HOW OFTEN DO YOU HAVE A DRINK CONTAINING ALCOHOL: NEVER
HOW MANY STANDARD DRINKS CONTAINING ALCOHOL DO YOU HAVE ON A TYPICAL DAY: PATIENT DOES NOT DRINK

## 2022-08-03 NOTE — PATIENT INSTRUCTIONS
Personalized Preventive Plan for Collins James - 8/3/2022  Medicare offers a range of preventive health benefits. Some of the tests and screenings are paid in full while other may be subject to a deductible, co-insurance, and/or copay. Some of these benefits include a comprehensive review of your medical history including lifestyle, illnesses that may run in your family, and various assessments and screenings as appropriate. After reviewing your medical record and screening and assessments performed today your provider may have ordered immunizations, labs, imaging, and/or referrals for you. A list of these orders (if applicable) as well as your Preventive Care list are included within your After Visit Summary for your review. Other Preventive Recommendations:    A preventive eye exam performed by an eye specialist is recommended every 1-2 years to screen for glaucoma; cataracts, macular degeneration, and other eye disorders. A preventive dental visit is recommended every 6 months. Try to get at least 150 minutes of exercise per week or 10,000 steps per day on a pedometer . Order or download the FREE \"Exercise & Physical Activity: Your Everyday Guide\" from [employer]. Call 7-750.453.2914 or search [employer] online. You need 4336-2827 mg of calcium and 5268-7076 IU of vitamin D per day. It is possible to meet your calcium requirement with diet alone, but a vitamin D supplement is usually necessary to meet this goal.  When exposed to the sun, use a sunscreen that protects against both UVA and UVB radiation with an SPF of 30 or greater. Reapply every 2 to 3 hours or after sweating, drying off with a towel, or swimming. Always wear a seat belt when traveling in a car. Always wear a helmet when riding a bicycle or motorcycle.

## 2022-08-03 NOTE — PROGRESS NOTES
Medicare Annual Wellness Visit    Serge Boeck is here for Medicare AWV    Assessment & Plan   Initial Medicare annual wellness visit  Essential hypertension  Protein deficiency ([facility])  COPD without exacerbation ([facility])  Diet-controlled diabetes mellitus ([facility])  Mixed hyperlipidemia  Hyperthyroidism  Unexplained weight loss  -     CT ABDOMEN PELVIS W WO CONTRAST Additional Contrast? Oral; Future  Abnormal EKG  -     [address] [facility]  Other constipation  Vitamin B12 deficiency (dietary) anemia  -     cyanocobalamin injection 1,000 mcg; 1,000 mcg, IntraMUSCular, ONCE, 1 dose, On Wed 8/3/22 at 1115  Age-related osteoporosis without current pathological fracture  Screen for colon cancer  -     Fecal DNA Colorectal cancer screening (Cologuard)    Recommendations for Preventive Services Due: see orders and patient instructions/AVS.  Recommended screening schedule for the next 5-10 years is provided to the patient in written form: see Patient Instructions/AVS.     Return in 3 weeks (on 8/24/2022) for See Referral.     Subjective   The following acute and/or chronic problems were also addressed today:   One wek  ck  re  coopd  wt loss  thryoid  ekg    bp     suagar        Feel  better  bm  better   eating ensure but weigh tdown again     Lab with  tsh  down       d and b12 low       ghb up         Patient's complete Health Risk Assessment and screening values have been reviewed and are found in Flowsheets. The following problems were reviewed today and where indicated follow up appointments were made and/or referrals ordered.     Positive Risk Factor Screenings with Interventions:             General Health and ACP:  General  In general, how would you say your health is?: Fair  In the past 7 days, have you experienced any of the following: New or Increased Pain, New or Increased Fatigue, Loneliness, Social Isolation, Stress or Anger?: No  Do you get the social and emotional support that you need?: Yes  Do you have a Living Will?: Yes    Advance Directives       Power of [address] Will ACP-Advance Directive ACP-Power of     Not on File Not on File Not on File Not on File        General Health Risk Interventions:  No Living Will: Advance Care Planning addressed with patient today    Health Habits/Nutrition:  Physical Activity: Inactive    Days of Exercise per Week: 0 days    Minutes of Exercise per Session: 0 min     Have you lost any weight without trying in the past 3 months?: No  Body mass index: (!) 13.38  Have you seen the dentist within the past year?: Yes  Health Habits/Nutrition Interventions:  Inadequate physical activity:  patient agrees to exercise for at least 150 minutes/week             Objective   Vitals:    08/03/22 1020   BP: 122/62   Temp: 98.5 °F (36.9 °C)   TempSrc: Oral   Weight: 88 lb (39.9 kg)   Height: 5' 8\" (1.727 m)      Body mass index is 13.38 kg/m².       General Appearance: alert and oriented to person, place and time, well developed and well- nourished, in no acute distress  Skin: warm and dry, no rash or erythema  Head: normocephalic and atraumatic  Eyes: pupils equal, round, and reactive to light, extraocular eye movements intact, conjunctivae normal  ENT: tympanic membrane, external ear and ear canal normal bilaterally, nose without deformity, nasal mucosa and turbinates normal without polyps  Neck: supple and non-tender without mass, no thyromegaly or thyroid nodules, no cervical lymphadenopathy  Pulmonary/Chest: ex roderick wheezing  Cardiovascular: normal rate, regular rhythm, normal S1 and S2, no murmurs, rubs, clicks, or gallops, distal pulses intact, no carotid bruits  Abdomen: soft, non-tender, non-distended, normal bowel sounds, no masses or organomegaly  Extremities: no cyanosis, clubbing or edema  Musculoskeletal: normal range of motion, no joint swelling, deformity or tenderness  Neurologic: reflexes normal and symmetric, no cranial nerve deficit, gait, coordination and speech normal       Allergies   Allergen Reactions    Pcn [Penicillins] Other (See Comments)     Prior to Visit Medications    Medication Sig Taking? Authorizing Provider   budesonide-formoterol (SYMBICORT) 160-4.5 MCG/ACT AERO Inhale 2 puffs into the lungs 2 times daily  Phu Benjamin MD   lisinopril (PRINIVIL;ZESTRIL) 5 MG tablet Take 1 tablet by mouth daily  REGI Norris - NP   albuterol sulfate HFA (VENTOLIN HFA) 108 (90 Base) MCG/ACT inhaler Inhale 2 puffs into the lungs 4 times daily as needed for Wheezing  Patient not taking: Reported on 7/7/2022  Megan Epperson MD   aspirin 81 MG EC tablet Take 81 mg by mouth daily  Historical Provider, MD   vitamin C (ASCORBIC ACID) 500 MG tablet Take 500 mg by mouth daily  Historical Provider, MD Gauthier (Including outside providers/suppliers regularly involved in providing care):   Patient Care Team:  Alli Chavez DO as PCP - General (Family Medicine)  [facility] DO Kathy as PCP - [facility] Empaneled Provider  Juanis Cortes RN as Care Transitions Nurse     Reviewed and updated this visit:  Tobacco  Allergies  Med Hx  Surg Hx  Soc Hx  Fam Hx        Appt  cardio re  ekg  Sees pulm next week  Add vit b 21 1000 and  d3  [id number]     b 12 shto now  Lwo fsugar diet but not  sure improtnat now   inc diet  Ct abd pelvis  Not work yet     if  wt dwon  more then  2 lbs s fina sooner  s ee me three weeks  See me  thre week   will do another b 12  shot        I educated the patient about all medications. Make sure they were correct and educated  on the risk associated with missing meds or taking them incorrectly. A list of medications is being sent home with patient today. Check blood pressure at home twice a day. Low-salt low caffeine diet. Call if systolic blood pressure is above 233 and diastolic blood pressures above 85. Only use a upper arm digital cuff. Aggressive low-fat diet. Avoid red meats, greasy fried foods, dairy products.   Avoid processed foods. Take cholesterol medications without food. I informed patient about the risk associated with noncompliance of medication and taking medications incorrectly. Appropriate follow-up with myself and all specialist.  Encourage family members to take active role in assisting with medications and medical care. If any confusion should develop to notify my office immediately to avoid risk of worsening medical condition    A great deal of time spent reviewing medications, diet, exercise, social issues. Also reviewing the chart before entering the room with patient and finishing charting after leaving patient's room. More than half of that time was spent face to face with the patient in counseling and coordinating care.

## 2022-08-09 ENCOUNTER — CARE COORDINATION (OUTPATIENT)
Dept: CASE MANAGEMENT | Age: 67
End: 2022-08-09

## 2022-08-09 NOTE — CARE COORDINATION
[facility]) COVID-19 Monitoring Care Transitions Follow Up Call    2022    Patient: Terrie Freire  Patient : 1955   MRN: [id number]  Reason for Admission: COPD exacerbation  Discharge Date: 22 RARS: Readmission Risk Score: 14.3             2nd attempt to reach the patient for a sub COVID Monitoring Care Transition call post hospital discharge. Message left with CTN's contact information requesting return phone call. Will attempt a final outreach next week.       Follow Up  Future Appointments   Date Time Provider Gigi Figueroa   2022  4:30 PM SEB CT 1-BD SEBZ CT [facility] Radiolog   2022 10:00 AM DO RYAN Arguello [facility]       Mariely Cid RN

## 2022-08-11 ENCOUNTER — CARE COORDINATION (OUTPATIENT)
Dept: CASE MANAGEMENT | Age: 67
End: 2022-08-11

## 2022-08-11 RX ORDER — ACETAMINOPHEN 160 MG
1 TABLET,DISINTEGRATING ORAL DAILY
COMMUNITY

## 2022-08-11 NOTE — CARE COORDINATION
[facility]) COVID-19 Monitoring Care Transitions Follow Up Call    2022    Patient: Mike Aiken  Patient : 1955   MRN: [id number]  Reason for Admission: COPD exacerbation  Discharge Date: 22 RARS: Readmission Risk Score: 14.3    Patient contacted regarding COVID-19 exposure and diagnosis. Pt tested +COVID-19 again on 22 d/t being exposed to a +COVID-19 friend on 22. Pt was tested in [place] on  at [employer]. Pt states that she has notified her pcp of being +COVID again. Care Transition Nurse contacted the patient by telephone to perform follow-up assessment. Patient has following risk factors of: COPD  Htn, wt loss, pna, O2 dependent . Symptoms reviewed with patient who verbalized the following symptoms: fever  fatigue  cough  loss of taste or smell. Due to no new or worsening symptoms encounter was not routed to provider for escalation. Pt called CTN earlier today and left a VM. CTN returned pt's call this afternoon. Pt states that she tested +COVID-19 again on 22 at [employer] in [place]. Pt states that she was exposed to a friend that was visiting her last week on 22. Pt states that she started to feel \"funny\" with losing her sense of taste, had a low grade temp of 99, and tired so she proceeded to get retested. Pt states that she is feeling \"fine\" today and voiced no resp difficulties. Pt feels at her baseline from a pulmonary standpoint. Pt wearing O2 @3L and SaO2 readings are staying above 90%. Pt has an Albuterol inhaler at home that she uses prn. Pt states that she has not had to use it recently. Pt on daily Symbicort and has been compliant with this. Pt denies any fevers today. Pt states that her appetite is good, despite the wt that she has been losing. Pt states that she eats about 5 small meals a day because she can't tolerate eating 3 larger meals a day. Pt also states that she has been drinking Ensure supplements, 3 bottles daily.  CTN offered a RD referral to discuss wt loss and provide some advice/recommendations to help with wt gain. Pt receptive to this. CTN will place referral today. Noted pt was given a VitB12 injection at her last OV with her pcp. Pt was advised per pcp notes to start on Vit D3 2000 units and Vit B12 1000 daily. Pt states that she has started the Vit D3, but not the Vit B12 yet. Noted pt had 2 recent f/u appts with her pcp. Noted CT abd scheduled d/t wt loss. Pt has scheduled this for 8/18/22. Pt states that she was supposed to f/u with Dr. Nury Guerra on 8/9/22, but had to cancel d/t testing +COVID-19 again. Pt states that she was able to reschedule her appt for the first week of Sept. Pt states that she did speak to the cardiology office and was told to call next Tuesday to schedule. Pt got referred to cardiology for an abnormal EKG. Pt not being scheduled at this time d/t +COVID-19. Pt states that she will call next week as advised. Pt voiced no other needs/concerns at this time. Pt agreeable to a f/u call next week from this CTN. CTN will send referral to RD and request that Ensure coupons be mailed to pt. CTN reviewed medical action plan and red flag symptoms with the patient who verbalized understanding. Patient was given an opportunity to verbalize any questions and concerns and agrees to contact CTN or health care provider for questions related to their healthcare. Was patient discharged with a pulse oximeter? Pt has her own and has been testing daily. SaO2 ranging 94-95% on O2 @ 3L. Discussed when to call her pcp with low readings and when to proceed to ED if unable to keep levels >90%. CTN provided contact information. Plan for follow-up call in 5-7 days based on severity of symptoms and risk factors.

## 2022-08-12 ENCOUNTER — CARE COORDINATION (OUTPATIENT)
Dept: CARE COORDINATION | Age: 67
End: 2022-08-12

## 2022-08-12 NOTE — CARE COORDINATION
Contacted David Morris and left voicemail regarding Dietitian referral. Left call back number and will follow up as appropriate.          [address] [facility], [address] [place] [address]

## 2022-08-16 ENCOUNTER — CARE COORDINATION (OUTPATIENT)
Dept: CASE MANAGEMENT | Age: 67
End: 2022-08-16

## 2022-08-16 ENCOUNTER — CARE COORDINATION (OUTPATIENT)
Dept: CARE COORDINATION | Age: 67
End: 2022-08-16

## 2022-08-16 NOTE — CARE COORDINATION
[facility]) COVID-19 Monitoring Care Transitions Follow Up Call    2022    Patient: Ilda Bonilla  Patient : 1955   MRN: [id number]  Reason for Admission: COPD exacerbation  Discharge Date: 22 RARS: Readmission Risk Score: 14.3             Attempted to reach the patient for final COVID Monitoring Care Transition call post hospital discharge. Message left with CTN's contact information requesting return phone call. No further outreaches will be attempted. If no return call by the end of today, CTN will  sign off and resolve COVID-19 Monitoring episode, as episode is resolving.             Follow Up  Future Appointments   Date Time Provider Gigi Figueroa   2022  4:30 PM SEB CT 1-BD SEBZ CT SEB Radiolog   2022 10:00 AM DO RYAN Arguello [facility]       Nathaniel Mckoy RN

## 2022-08-16 NOTE — CARE COORDINATION
Contacted Anne De Jesus and left voicemail regarding Dietitian referral. Left call back number and will follow up as appropriate. MARLEN Salmon noted patient would like Ensure coupons- RD will mail Ensure coupons.        [address] [facility], [address] [place] [address]

## 2022-08-16 NOTE — CARE COORDINATION
Mattie Farah  August 16, 2022    Initial Referral Reason: Unintentional Weight Loss    Patient Care Team:  Jenny Figueroa DO as PCP - General (Family Medicine)  Jenny Figueroa DO as PCP - [facility]  Cely Tyler RN as Care Transitions Nurse  Justin Whitley RD, LD as Dietitian (Dietitian Registered)    Past Medical History:    Current Outpatient Medications   Medication Sig Dispense Refill    Cholecalciferol (VITAMIN D3) 50 MCG (2000 UT) CAPS Take 1 capsule by mouth in the morning. budesonide-formoterol (SYMBICORT) 160-4.5 MCG/ACT AERO Inhale 2 puffs into the lungs 2 times daily 10.2 g 0    lisinopril (PRINIVIL;ZESTRIL) 5 MG tablet Take 1 tablet by mouth daily 30 tablet 3    aspirin 81 MG EC tablet Take 81 mg by mouth daily      vitamin C (ASCORBIC ACID) 500 MG tablet Take 500 mg by mouth daily       No current facility-administered medications for this visit.        Biochemical Data, Medical Tests and Procedures:    Lab Results   Component Value Date    LABA1C 6.1 (H) 07/28/2022     No results found for: EAG    Lab Results   Component Value Date    CHOL 214 (H) 07/28/2022     Lab Results   Component Value Date    TRIG 90 07/28/2022     Lab Results   Component Value Date    HDL 91 07/28/2022     Lab Results   Component Value Date    LDLCALC 105 (H) 07/28/2022     Lab Results   Component Value Date    LABVLDL 18 07/28/2022     No results found for: [facility]    Lab Results   Component Value Date    WBC 10.0 07/28/2022    HGB 12.6 07/28/2022    HCT 39.6 07/28/2022    MCV 98.8 07/28/2022     07/28/2022       Lab Results   Component Value Date    CREATININE 0.5 07/28/2022    BUN 14 07/28/2022     07/28/2022    K 3.6 07/28/2022    CL 98 07/28/2022    CO2 28 07/28/2022         Anthropometric Measurements:    Height: 68 inches (172.7 cm)  Weight: 88 lb (39.9 kg)  BMI: 13.38 (underweight)  IBW: 140 lb (63.6 kg) +-10%  %IBW: 62.9%     Physical Exam Findings:  Deferred    Nutrition Interview: RD called pt, explained reason for call and role in care. Pt states her taste is off due to Covid and she forces herself to eat 4-5 small meals/day. See food recall below. Patient states her UBW is 120 lb and her CBW is 91 lb as of Saturday 8/13/22. Per chart review, patient's weight documented on 7/14/22 was 105 lb and patient's weight documented on 8/3/22 was 88 lb. RD noted patient's BMI is 13.38 (underweight) and patient's %IBW is 62.9%. RD explained the importance of monitoring her weight closely- pt states her neighbor has a scale and she weighs herself at least once a week. RD explained the importance of meeting estimated nutrition needs daily. Explained the importance of following a high calorie high protein diet to help promote weight gain. Reviewed protein sources and tips for adding protein to meals/snacks. RD discussed the benefits of utilizing oral nutrition supplements such as Ensure or Boost.  Explained that oral nutrition supplements offer protein, carbohydrates, and vitamins/minerals that the patient may be missing out on if not eating adequately. Patient states she is drinking Ensure Original once a day. RD reviewed Ensure products with patient and recommended switching to Ensure Plus (16 grams protein and 350 calories). Recommended drinking Ensure Plus 2-3x/day to help patient better meet estimated nutrition needs daily and promote weight gain. RD will mail Ensure coupons. Pt has no nutrition related questions or concerns at this time. RD offered to mail educational handouts to pt to reinforce concepts discussed during phone conversation, pt accepted and very appreciative. RD verified address.        24-Hour Diet Recall  Breakfast  Consumed: cereal, toast and milk    Lunch  Consumed: chicken, asparagus, baked potato     Dinner  Consumed: hamburger with bun and vegetables; steak with veggies; spaghetti; ham and sweet potato     Beverages: water, milk, Ensure once a day     Nutrition Diagnosis:  #1 Problem Underweight       Etiology related to inadequate energy intake        Signs/Symptoms as evidenced by pt's BMI 13.38 (underweight), %IBW 62.9%    Nutrition Intervention:     Estimated Needs  regular diet providing 5198-7746 kcals to promote wt gain ([place] based on IBW). Estimated daily Protein Needs: 64-76 g (based on 1.0-1.2 g/kg based on IBW)  Estimated daily Fluid Needs: 64 oz. #1 Nutrition Information: Provided patient with Tips for Adding Protein and High Calorie High Protein Nutrition Therapy  handouts. For reinforcement of concepts discussed during nutrition interview. #2 Nutrition Counseling: Used open-ended questions to assess patients perceived susceptibility and severity of disease state. Discussed potential impact of health threat on patient's lifestyle. Used open-ended questions to assess patient's perception regarding benefits of and barriers to implementation of nutrition therapy. #3 Nutrition Education: Clearly defined the benefits of nutrition therapy. Summarized and affirmed positive aspects of current nutrition patterns. Provided education regarding value of adherence to regular diet. Discussed ways to establish applying concepts of alternatives and choices regarding implementation of diet. Explored ideas for small, incremental goals to initiate behavior change. Monitoring and Evaluation:    Indicator/Goal Criteria   #1 Eat small balanced meals consistently throughout the day. #1 Make meals balanced using MyPlate and incorporate a protein source to each meal and snack. #2 Supplement with Ensure Plus 2-3x/day to help better meet estimated nutrition needs. #2 Start drinking Ensure Plus instead of Ensure Original. Drink Ensure Plus at least BID. #3 Monitor weights and keep a weight log. #3 Weigh self at least once a week. Follow Up: RD will call pt in 2 weeks to follow up and make sure pt received handouts in mail.  RD will answer any nutrition related questions at this time.      [address] [facility], [address]

## 2022-08-17 ENCOUNTER — TELEPHONE (OUTPATIENT)
Dept: ADMINISTRATIVE | Age: 67
End: 2022-08-17

## 2022-08-17 NOTE — TELEPHONE ENCOUNTER
Pt returning call to schedule NP apt with Cardiology. Patient Appointment Form:      PCP: Dr. Adele Weiner  Referring: Dr. Adele Weiner    Has the Patient:    Seen a Cardiologist? no    Had a heart catheterization? no    Had heart surgery? no    Had a stress test or nuclear stress test? no    Had an echocardiogram? Yes 6/27/22 Epic     Had a vascular ultrasound? no    Had a 24/48 heart monitor or extended cardiac event monitor? no    Had recent blood work in the last 6 months?  Yes 7/28/22 - PCP Epic    Had a pacemaker/ICD/ILR implant? no    Seen an Electrophysiologist? no        Will send records via: No prior cardiology hx or recs - EKGs /echo only - PCP recs in Epic       Date & time of appointment:  8/26/22 @ 11:00 with Dr. Brook Harrington

## 2022-08-18 ENCOUNTER — HOSPITAL ENCOUNTER (OUTPATIENT)
Dept: CT IMAGING | Age: 67
Discharge: HOME | End: 2022-08-20
Payer: MEDICARE

## 2022-08-18 DIAGNOSIS — R63.4 UNEXPLAINED WEIGHT LOSS: Chronic | ICD-10-CM

## 2022-08-18 PROCEDURE — 6360000004 HC RX CONTRAST MEDICATION: Performed by: RADIOLOGY

## 2022-08-18 PROCEDURE — 74178 CT ABD&PLV WO CNTR FLWD CNTR: CPT

## 2022-08-18 RX ADMIN — IOHEXOL 50 ML: 240 INJECTION, SOLUTION INTRATHECAL; INTRAVASCULAR; INTRAVENOUS; ORAL at 17:28

## 2022-08-18 RX ADMIN — IOPAMIDOL 75 ML: 755 INJECTION, SOLUTION INTRAVENOUS at 17:28

## 2022-08-25 LAB — NONINV COLON CA DNA+OCC BLD SCRN STL QL: POSITIVE

## 2022-08-26 ENCOUNTER — TELEPHONE (OUTPATIENT)
Dept: PRIMARY CARE CLINIC | Age: 67
End: 2022-08-26

## 2022-08-26 DIAGNOSIS — R19.5 POSITIVE COLORECTAL CANCER SCREENING USING COLOGUARD TEST: Primary | ICD-10-CM

## 2022-08-26 NOTE — TELEPHONE ENCOUNTER
Please notify the patient his Cologuard testing was positive. He needs to see specialist to get a colonoscopy. I put the referral in.   I am using Dr. Helen Dorantes  who is excellent but if you wants someone else, that is okay but may

## 2022-08-29 ENCOUNTER — CARE COORDINATION (OUTPATIENT)
Dept: CARE COORDINATION | Age: 67
End: 2022-08-29

## 2022-08-29 NOTE — CARE COORDINATION
Contacted Hector Johnson and left voicemail regarding Dietitian follow up. Left call back number and will follow up as appropriate.          [address] [facility], [address] [place] [address]

## 2022-08-31 ENCOUNTER — OFFICE VISIT (OUTPATIENT)
Dept: PRIMARY CARE CLINIC | Age: 67
End: 2022-08-31
Payer: MEDICARE

## 2022-08-31 VITALS
DIASTOLIC BLOOD PRESSURE: 68 MMHG | HEIGHT: 68 IN | BODY MASS INDEX: 14.09 KG/M2 | WEIGHT: 93 LBS | SYSTOLIC BLOOD PRESSURE: 124 MMHG | TEMPERATURE: 98 F

## 2022-08-31 DIAGNOSIS — J44.9 COPD WITHOUT EXACERBATION (HCC): Chronic | ICD-10-CM

## 2022-08-31 DIAGNOSIS — R91.8 MULTIPLE NODULES OF LUNG: Chronic | ICD-10-CM

## 2022-08-31 DIAGNOSIS — R63.4 UNEXPLAINED WEIGHT LOSS: Chronic | ICD-10-CM

## 2022-08-31 DIAGNOSIS — R91.8 MASS OF LOWER LOBE OF LEFT LUNG: Primary | ICD-10-CM

## 2022-08-31 PROCEDURE — 3017F COLORECTAL CA SCREEN DOC REV: CPT | Performed by: FAMILY MEDICINE

## 2022-08-31 PROCEDURE — G8400 PT W/DXA NO RESULTS DOC: HCPCS | Performed by: FAMILY MEDICINE

## 2022-08-31 PROCEDURE — G8419 CALC BMI OUT NRM PARAM NOF/U: HCPCS | Performed by: FAMILY MEDICINE

## 2022-08-31 PROCEDURE — 1036F TOBACCO NON-USER: CPT | Performed by: FAMILY MEDICINE

## 2022-08-31 PROCEDURE — G8428 CUR MEDS NOT DOCUMENT: HCPCS | Performed by: FAMILY MEDICINE

## 2022-08-31 PROCEDURE — 1090F PRES/ABSN URINE INCON ASSESS: CPT | Performed by: FAMILY MEDICINE

## 2022-08-31 PROCEDURE — 99214 OFFICE O/P EST MOD 30 MIN: CPT | Performed by: FAMILY MEDICINE

## 2022-08-31 PROCEDURE — 1123F ACP DISCUSS/DSCN MKR DOCD: CPT | Performed by: FAMILY MEDICINE

## 2022-08-31 PROCEDURE — 3023F SPIROM DOC REV: CPT | Performed by: FAMILY MEDICINE

## 2022-08-31 ASSESSMENT — PATIENT HEALTH QUESTIONNAIRE - PHQ9
SUM OF ALL RESPONSES TO PHQ9 QUESTIONS 1 & 2: 0
SUM OF ALL RESPONSES TO PHQ QUESTIONS 1-9: 0
2. FEELING DOWN, DEPRESSED OR HOPELESS: 0
1. LITTLE INTEREST OR PLEASURE IN DOING THINGS: 0
SUM OF ALL RESPONSES TO PHQ QUESTIONS 1-9: 0

## 2022-08-31 ASSESSMENT — ENCOUNTER SYMPTOMS
EYES NEGATIVE: 1
ALLERGIC/IMMUNOLOGIC NEGATIVE: 1
GASTROINTESTINAL NEGATIVE: 1
RESPIRATORY NEGATIVE: 1

## 2022-08-31 NOTE — PROGRESS NOTES
22  Name: Lukas Cheatham    : 1955    Sex: female    Age: 69 y.o. Subjective:  Chief Complaint: Patient is here for       few wek s ck  re   w eight          bp    sugar     ct  reprot       He sees pulmonary Dr. Yahaira Sanchez soon regarding pulmonary issues. She was COVID 3 weeks ago positive. Her Cologuard is positive she sees Dr. Nadia Lee soon for colonoscopy. Weight is up 5 pounds. She had another CAT scan done Because of the weight loss is shows a persistent left lower lobe mass. She sees a pulmonary soon for same issue. Review of Systems   Constitutional: Negative. HENT: Negative. Eyes: Negative. Respiratory: Negative. Cardiovascular: Negative. Gastrointestinal: Negative. Endocrine: Negative. Genitourinary: Negative. Musculoskeletal: Negative. Skin: Negative. Allergic/Immunologic: Negative. Neurological: Negative. Hematological: Negative. Psychiatric/Behavioral: Negative.          Current Outpatient Medications:     Cholecalciferol (VITAMIN D3) 50 MCG ( UT) CAPS, Take 1 capsule by mouth in the morning., Disp: , Rfl:     budesonide-formoterol (SYMBICORT) 160-4.5 MCG/ACT AERO, Inhale 2 puffs into the lungs 2 times daily, Disp: 10.2 g, Rfl: 0    lisinopril (PRINIVIL;ZESTRIL) 5 MG tablet, Take 1 tablet by mouth daily, Disp: 30 tablet, Rfl: 3    aspirin 81 MG EC tablet, Take 81 mg by mouth daily, Disp: , Rfl:     vitamin C (ASCORBIC ACID) 500 MG tablet, Take 500 mg by mouth daily, Disp: , Rfl:   Allergies   Allergen Reactions    Pcn [Penicillins] Other (See Comments)     Social History     Socioeconomic History    Marital status:      Spouse name: Not on file    Number of children: Not on file    Years of education: Not on file    Highest education level: Not on file   Occupational History    Not on file   Tobacco Use    Smoking status: Former     Packs/day: 0.25     Types: Cigarettes    Smokeless tobacco: Never    Tobacco comments:     may 2022 Vaping Use    Vaping Use: Never used   Substance and Sexual Activity    Alcohol use: Not Currently    Drug use: Not Currently    Sexual activity: Not Currently   Other Topics Concern    Not on file   Social History Narrative    Established 7-22 after not being seen since 2008    Smoker  quit   5-2022    COPD sees Dr. 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 (Keturah Mandujano) solvesky---and  other cousin Wanna Pulse 7-22 with copd exac -----------------------------------------------------------------  dr Karie Garvin pulm    abnormal EKG 7-22 in the hospital referred to cardiology--------dr Garcia    Early hyperthyroid    8-22  refer to endo    Vit d  defic   8-22    Vit b 12 defic   start shtos  8-22    Ct    abd pelvis     8-22 mass left lung base and advsied  pet scan     Social Determinants of Health     Financial Resource Strain: Not on file   Food Insecurity: Not on file   Transportation Needs: Not on file   Physical Activity: Inactive    Days of Exercise per Week: 0 days    Minutes of Exercise per Session: 0 min   Stress: Not on file   Social Connections: Not on file   Intimate Partner Violence: Not on file   Housing Stability: Not on file      Past Medical History:   Diagnosis Date    Acute exacerbation of chronic obstructive pulmonary disease (COPD) ([address] 7/14/2022    Acute respiratory failure with hypoxia [facility] [facility] 6/27/2022    COPD (chronic obstructive pulmonary disease) ([facility] [facility]     COPD exacerbation ([address] 7/14/2022    COPD with acute exacerbation ([facility] [facility]     COVID-19 7/16/2022    Hypertension     Pneumonia due to infectious organism     Uncontrolled hypertension      No family history on file. Past Surgical History:   Procedure Laterality Date    BACK SURGERY        Vitals:    08/31/22 1040   BP: 124/68   Temp: 98 °F (36.7 °C)   TempSrc: Oral   Weight: 93 lb (42.2 kg)   Height: 5' 8\" (1.727 m)       Objective:    Physical Exam  Vitals reviewed. Constitutional:       Appearance: Normal appearance. She is well-developed. HENT:      Head: Normocephalic. Right Ear: Tympanic membrane normal.      Left Ear: Tympanic membrane normal.      Nose: Nose normal.      Mouth/Throat:      Mouth: Mucous membranes are moist.   Eyes:      Pupils: Pupils are equal, round, and reactive to light. Cardiovascular:      Rate and Rhythm: Normal rate and regular rhythm. Pulmonary:      Effort: Pulmonary effort is normal.      Breath sounds: Normal breath sounds. Abdominal:      General: Bowel sounds are normal.      Palpations: Abdomen is soft. Musculoskeletal:         General: Normal range of motion. Cervical back: Normal range of motion. Skin:     General: Skin is warm. Neurological:      Mental Status: She is alert and oriented to person, place, and time. Psychiatric:         Behavior: Behavior normal.       Patience Key was seen today for results. Diagnoses and all orders for this visit:    Mass of lower lobe of left lung  -     PET CT SKULL BASE TO MID THIGH; Future    COPD without exacerbation (HCC)    Unexplained weight loss  -     PET CT SKULL BASE TO MID THIGH; Future    Multiple nodules of lung  -     PET CT SKULL BASE TO MID THIGH; Future      Comments: We will schedule PET scan regarding the mass. Sees pulmonary soon. Increase diet. She is gained 5 pounds. See me in 2 months        I educated the patient about all medications. Make sure they were correct and educated  on the risk associated with missing meds or taking them incorrectly. A list of medications is being sent home with patient today.       Check blood pressure at home twice a day. Low-salt low caffeine diet. Call if systolic blood pressure is above 842 and diastolic blood pressures above 85. Only use a upper arm digital cuff. I informed patient about the risk associated with noncompliance of medication and taking medications incorrectly. Appropriate follow-up with myself and all specialist.  Encourage family members to take active role in assisting with medications and medical care. If any confusion should develop to notify my office immediately to avoid risk of worsening medical condition      A great deal of time spent reviewing medications, diet, exercise, social issues. Also reviewing the chart before entering the room with patient and finishing charting after leaving patient's room. More than half of that time was spent face to face with the patient in counseling and coordinating care. Follow Up: Return in about 2 months (around 10/31/2022).      Seen by:  Viviane Bruce, DO

## 2022-09-02 ENCOUNTER — CARE COORDINATION (OUTPATIENT)
Dept: CARE COORDINATION | Age: 67
End: 2022-09-02

## 2022-09-02 NOTE — CARE COORDINATION
Contacted Liberty Mercer and left voicemail regarding Dietitian follow up. Left call back number. RD spoke with patient for initial nutrition assessment on 8/16/22. RD outreached 8/29/22 and today 9/2/22- left VM both outreaches. RD will continue to follow/assist with patient return call.        [address]

## 2022-09-08 ENCOUNTER — CLINICAL DOCUMENTATION (OUTPATIENT)
Dept: SURGERY | Age: 67
End: 2022-09-08

## 2022-09-08 NOTE — PROGRESS NOTES
Patient called and is unable to make an appointment regarding referral due to financial reasons. Referral sent back to Dr. Yelitza Alvarado office.      Electronically signed by Moisés Lynn on 9/8/2022 at 12:56 PM

## 2022-10-03 RX ORDER — BUDESONIDE AND FORMOTEROL FUMARATE DIHYDRATE 160; 4.5 UG/1; UG/1
2 AEROSOL RESPIRATORY (INHALATION) 2 TIMES DAILY
Qty: 10.2 G | Refills: 2 | Status: SHIPPED | OUTPATIENT
Start: 2022-10-03 | End: 2022-11-02

## 2022-10-21 ENCOUNTER — TELEPHONE (OUTPATIENT)
Dept: PRIMARY CARE CLINIC | Age: 67
End: 2022-10-21

## 2022-10-22 NOTE — TELEPHONE ENCOUNTER
Chart audit shows patient had a positive (abnormal) Cologuard test completed 8/16/22. Audit shows results were entered correctly, ordering physician/APC reviewed and follow up plan in place.

## 2022-10-25 RX ORDER — LISINOPRIL 5 MG/1
5 TABLET ORAL DAILY
Qty: 90 TABLET | Refills: 3 | Status: SHIPPED | OUTPATIENT
Start: 2022-10-25

## 2022-11-02 ENCOUNTER — CARE COORDINATION (OUTPATIENT)
Dept: CARE COORDINATION | Age: 67
End: 2022-11-02

## 2022-11-02 ENCOUNTER — OFFICE VISIT (OUTPATIENT)
Dept: PRIMARY CARE CLINIC | Age: 67
End: 2022-11-02
Payer: MEDICARE

## 2022-11-02 VITALS — BODY MASS INDEX: 13.94 KG/M2 | HEIGHT: 68 IN | TEMPERATURE: 97.8 F | WEIGHT: 92 LBS

## 2022-11-02 DIAGNOSIS — I10 ESSENTIAL HYPERTENSION: Primary | Chronic | ICD-10-CM

## 2022-11-02 DIAGNOSIS — J44.9 COPD WITHOUT EXACERBATION (HCC): Chronic | ICD-10-CM

## 2022-11-02 DIAGNOSIS — R63.4 UNEXPLAINED WEIGHT LOSS: Chronic | ICD-10-CM

## 2022-11-02 DIAGNOSIS — R91.8 MULTIPLE NODULES OF LUNG: Chronic | ICD-10-CM

## 2022-11-02 PROCEDURE — 1090F PRES/ABSN URINE INCON ASSESS: CPT | Performed by: FAMILY MEDICINE

## 2022-11-02 PROCEDURE — G8484 FLU IMMUNIZE NO ADMIN: HCPCS | Performed by: FAMILY MEDICINE

## 2022-11-02 PROCEDURE — 1036F TOBACCO NON-USER: CPT | Performed by: FAMILY MEDICINE

## 2022-11-02 PROCEDURE — 3017F COLORECTAL CA SCREEN DOC REV: CPT | Performed by: FAMILY MEDICINE

## 2022-11-02 PROCEDURE — G8419 CALC BMI OUT NRM PARAM NOF/U: HCPCS | Performed by: FAMILY MEDICINE

## 2022-11-02 PROCEDURE — G8400 PT W/DXA NO RESULTS DOC: HCPCS | Performed by: FAMILY MEDICINE

## 2022-11-02 PROCEDURE — 3023F SPIROM DOC REV: CPT | Performed by: FAMILY MEDICINE

## 2022-11-02 PROCEDURE — 99214 OFFICE O/P EST MOD 30 MIN: CPT | Performed by: FAMILY MEDICINE

## 2022-11-02 PROCEDURE — 1123F ACP DISCUSS/DSCN MKR DOCD: CPT | Performed by: FAMILY MEDICINE

## 2022-11-02 PROCEDURE — G8428 CUR MEDS NOT DOCUMENT: HCPCS | Performed by: FAMILY MEDICINE

## 2022-11-02 ASSESSMENT — ENCOUNTER SYMPTOMS
SHORTNESS OF BREATH: 1
GASTROINTESTINAL NEGATIVE: 1
ALLERGIC/IMMUNOLOGIC NEGATIVE: 1
EYES NEGATIVE: 1

## 2022-11-02 NOTE — CARE COORDINATION
-ACM attempted to reach patient to offer enrollment into Care Coordination program & RPM services, however no answer. -HIPAA compliant VM left introducing self, reason for call, and brief explanation of program.  -Left ACM's contact information, requesting call back. If no return call, will attempt outreach again.

## 2022-11-02 NOTE — PROGRESS NOTES
Vaping Use    Vaping Use: Never used   Substance and Sexual Activity    Alcohol use: Not Currently    Drug use: Not Currently    Sexual activity: Not Currently   Other Topics Concern    Not on file   Social History Narrative    Established 7-22 after not being seen since 2008    Smoker  quit   5-2022    COPD sees Dr. 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  other cousin omer---quit    Admit 7-22 with copd exac -----------------------------------------------------------------  dr Wally Wilkes pulm    abnormal EKG 7-22 in the hospital referred to cardiology--------dr Garcia    Early hyperthyroid    8-22  refer to endo    Vit d  defic   8-22    Vit b 12 defic   start shtos  8-22    Ct    abd pelvis     8-22 mass left lung base and advsied  pet scan--- and pulm consult    Pos cologuard  8-22  referred to dr Boom Rios but pt refused to go    Pt evicted from apartment-----failed to see specialist     Social Determinants of Health     Financial Resource Strain: Not on file   Food Insecurity: Not on file   Transportation Needs: Not on file   Physical Activity: Inactive    Days of Exercise per Week: 0 days    Minutes of Exercise per Session: 0 min   Stress: Not on file   Social Connections: Not on file   Intimate Partner Violence: Not on file   Housing Stability: Not on file      Past Medical History:   Diagnosis Date    Acute exacerbation of chronic obstructive pulmonary disease (COPD) ([facility] 7/14/2022    Acute respiratory failure with hypoxia ([facility] 6/27/2022    COPD (chronic obstructive pulmonary disease) ([facility] [address] [facility] COPD exacerbation ([facility] 7/14/2022    COPD with acute exacerbation ([facility]     COVID-19 7/16/2022    Hypertension     Pneumonia due to infectious organism     Uncontrolled hypertension      No family history on file. Past Surgical History:   Procedure Laterality Date    BACK SURGERY        Vitals:    11/02/22 0951   Temp: 97.8 °F (36.6 °C)   TempSrc: Oral   Weight: 92 lb (41.7 kg)   Height: 5' 8\" (1.727 m)       Objective:    Physical Exam  Vitals reviewed. Constitutional:       Appearance: Normal appearance. She is well-developed. HENT:      Head: Normocephalic. Right Ear: Tympanic membrane normal.      Left Ear: Tympanic membrane normal.      Nose: Nose normal.      Mouth/Throat:      Mouth: Mucous membranes are moist.   Eyes:      Pupils: Pupils are equal, round, and reactive to light. Cardiovascular:      Rate and Rhythm: Normal rate and regular rhythm. Pulmonary:      Effort: Pulmonary effort is normal.      Breath sounds: Wheezing present. Abdominal:      General: Bowel sounds are normal.      Palpations: Abdomen is soft. Musculoskeletal:         General: Normal range of motion. Cervical back: Normal range of motion. Skin:     General: Skin is warm. Neurological:      Mental Status: She is alert and oriented to person, place, and time. Psychiatric:         Behavior: Behavior normal.       Angelito Chino was seen today for follow-up. Diagnoses and all orders for this visit:    Essential hypertension    COPD without exacerbation ([facility]    Multiple nodules of lung    Unexplained weight loss      Comments: advised  ct   pet    pulm   surg      but  pt  wntws  to wait      she awreof risk  no smoking    I educated the patient about all medications. Make sure they were correct and educated  on the risk associated with missing meds or taking them incorrectly. A list of medications is being sent home with patient today. Check blood pressure at home twice a day. Low-salt low caffeine diet. Call if systolic blood pressure is above 258 and diastolic blood pressures above 85. Only use a upper arm digital cuff. I informed patient about the risk associated with noncompliance of medication and taking medications incorrectly. Appropriate follow-up with myself and all specialist.  Encourage family members to take active role in assisting with medications and medical care. If any confusion should develop to notify my office immediately to avoid risk of worsening medical condition      A great deal of time spent reviewing medications, diet, exercise, social issues. Also reviewing the chart before entering the room with patient and finishing charting after leaving patient's room. More than half of that time was spent face to face with the patient in counseling and coordinating care. Follow Up: Return in about 3 months (around 2/2/2023).      Seen by:  Nisha Menendez DO

## 2022-11-03 ENCOUNTER — CARE COORDINATION (OUTPATIENT)
Dept: CARE COORDINATION | Age: 67
End: 2022-11-03

## 2022-11-03 NOTE — CARE COORDINATION
-[facility]'s second attempt to reach patient  to offer enrollment into Care Coordination program & RPM services, however no answer. -HIPAA compliant  left introducing self, reason for call, and brief explanation of program.  -Left [facility]'s contact information, requesting call back.  If no return call, will attempt outreach again.   -Will mail [facility] Introduction letter

## 2022-11-03 NOTE — LETTER
11/3/2022    [facility] [facility] [facility]      Dear Toyin Herman    My name is Beau Celaya and I am an Ambulatory Care Manager who partners with Dr. Ana Gifford to improve patients' health. I've been trying to reach you via phone to let you know that, as a member of your care team, I will work with other providers involved in your care, offer education for your specific health conditions, and connect you with more resources as needed. This program is designed to provide you with the opportunity to have a ([facility]/[address]) care manager partner with you for the following situations:     1) if you come home from the hospital or emergency room   2) to help manage your disease   3) when you would like assistance coordinating services or appointments    This added support is provided at no additional cost to you. My primary focus is to help you achieve specific goals and improve your health. Please call me at [phone] to further discuss how I can support your health care needs.     Sincerely,      Brittney Huffman RN cooperative/comfortable appearance

## 2022-11-23 ENCOUNTER — CARE COORDINATION (OUTPATIENT)
Dept: CARE COORDINATION | Age: 67
End: 2022-11-23

## 2022-11-27 ENCOUNTER — APPOINTMENT (OUTPATIENT)
Dept: GENERAL RADIOLOGY | Age: 67
DRG: 189 | End: 2022-11-27
Payer: MEDICARE

## 2022-11-27 ENCOUNTER — HOSPITAL ENCOUNTER (INPATIENT)
Age: 67
LOS: 3 days | Discharge: HOME | DRG: 189 | End: 2022-11-30
Attending: EMERGENCY MEDICINE | Admitting: STUDENT IN AN ORGANIZED HEALTH CARE EDUCATION/TRAINING PROGRAM
Payer: MEDICARE

## 2022-11-27 ENCOUNTER — APPOINTMENT (OUTPATIENT)
Dept: CT IMAGING | Age: 67
DRG: 189 | End: 2022-11-27
Payer: MEDICARE

## 2022-11-27 DIAGNOSIS — J96.01 ACUTE RESPIRATORY FAILURE WITH HYPOXIA (HCC): Primary | ICD-10-CM

## 2022-11-27 DIAGNOSIS — J44.1 COPD EXACERBATION (HCC): ICD-10-CM

## 2022-11-27 PROBLEM — R07.9 CHEST PAIN: Status: ACTIVE | Noted: 2022-11-27

## 2022-11-27 PROBLEM — R79.89 ELEVATED TROPONIN: Status: ACTIVE | Noted: 2022-11-27

## 2022-11-27 PROBLEM — R77.8 ELEVATED TROPONIN: Status: ACTIVE | Noted: 2022-11-27

## 2022-11-27 PROBLEM — J96.00 ACUTE RESPIRATORY FAILURE (HCC): Status: ACTIVE | Noted: 2022-11-27

## 2022-11-27 LAB
ALBUMIN SERPL-MCNC: 4.1 G/DL (ref 3.5–5.2)
ALP BLD-CCNC: 93 U/L (ref 35–104)
ALT SERPL-CCNC: 39 U/L (ref 0–32)
ANION GAP SERPL CALCULATED.3IONS-SCNC: 8 MMOL/L (ref 7–16)
AST SERPL-CCNC: 46 U/L (ref 0–31)
BASOPHILS ABSOLUTE: 0.11 E9/L (ref 0–0.2)
BASOPHILS RELATIVE PERCENT: 1 % (ref 0–2)
BILIRUB SERPL-MCNC: 0.2 MG/DL (ref 0–1.2)
BUN BLDV-MCNC: 13 MG/DL (ref 6–23)
CALCIUM SERPL-MCNC: 9.4 MG/DL (ref 8.6–10.2)
CHLORIDE BLD-SCNC: 97 MMOL/L (ref 98–107)
CO2: 33 MMOL/L (ref 22–29)
CREAT SERPL-MCNC: 0.5 MG/DL (ref 0.5–1)
EKG ATRIAL RATE: 74 BPM
EKG P AXIS: 83 DEGREES
EKG P-R INTERVAL: 148 MS
EKG Q-T INTERVAL: 400 MS
EKG QRS DURATION: 74 MS
EKG QTC CALCULATION (BAZETT): 444 MS
EKG R AXIS: 61 DEGREES
EKG T AXIS: 69 DEGREES
EKG VENTRICULAR RATE: 74 BPM
EOSINOPHILS ABSOLUTE: 0.13 E9/L (ref 0.05–0.5)
EOSINOPHILS RELATIVE PERCENT: 1.2 % (ref 0–6)
GFR SERPL CREATININE-BSD FRML MDRD: >60 ML/MIN/1.73
GLUCOSE BLD-MCNC: 112 MG/DL (ref 74–99)
HCT VFR BLD CALC: 46.6 % (ref 34–48)
HEMOGLOBIN: 14.6 G/DL (ref 11.5–15.5)
IMMATURE GRANULOCYTES #: 0.04 E9/L
IMMATURE GRANULOCYTES %: 0.4 % (ref 0–5)
INFLUENZA A BY PCR: NOT DETECTED
INFLUENZA B BY PCR: NOT DETECTED
LYMPHOCYTES ABSOLUTE: 2.16 E9/L (ref 1.5–4)
LYMPHOCYTES RELATIVE PERCENT: 19.6 % (ref 20–42)
MCH RBC QN AUTO: 30 PG (ref 26–35)
MCHC RBC AUTO-ENTMCNC: 31.3 % (ref 32–34.5)
MCV RBC AUTO: 95.9 FL (ref 80–99.9)
MONOCYTES ABSOLUTE: 0.74 E9/L (ref 0.1–0.95)
MONOCYTES RELATIVE PERCENT: 6.7 % (ref 2–12)
NEUTROPHILS ABSOLUTE: 7.82 E9/L (ref 1.8–7.3)
NEUTROPHILS RELATIVE PERCENT: 71.1 % (ref 43–80)
PDW BLD-RTO: 13.4 FL (ref 11.5–15)
PLATELET # BLD: 303 E9/L (ref 130–450)
PMV BLD AUTO: 9.4 FL (ref 7–12)
POTASSIUM SERPL-SCNC: 3.5 MMOL/L (ref 3.5–5)
PRO-BNP: 446 PG/ML (ref 0–125)
RBC # BLD: 4.86 E12/L (ref 3.5–5.5)
RSV BY PCR: NEGATIVE
SARS-COV-2, NAAT: NOT DETECTED
SODIUM BLD-SCNC: 138 MMOL/L (ref 132–146)
TOTAL PROTEIN: 7.1 G/DL (ref 6.4–8.3)
TROPONIN, HIGH SENSITIVITY: 22 NG/L (ref 0–9)
WBC # BLD: 11 E9/L (ref 4.5–11.5)

## 2022-11-27 PROCEDURE — 2700000000 HC OXYGEN THERAPY PER DAY

## 2022-11-27 PROCEDURE — 96375 TX/PRO/DX INJ NEW DRUG ADDON: CPT

## 2022-11-27 PROCEDURE — 99222 1ST HOSP IP/OBS MODERATE 55: CPT | Performed by: STUDENT IN AN ORGANIZED HEALTH CARE EDUCATION/TRAINING PROGRAM

## 2022-11-27 PROCEDURE — 71045 X-RAY EXAM CHEST 1 VIEW: CPT

## 2022-11-27 PROCEDURE — 80053 COMPREHEN METABOLIC PANEL: CPT

## 2022-11-27 PROCEDURE — 6360000002 HC RX W HCPCS: Performed by: EMERGENCY MEDICINE

## 2022-11-27 PROCEDURE — 6370000000 HC RX 637 (ALT 250 FOR IP): Performed by: STUDENT IN AN ORGANIZED HEALTH CARE EDUCATION/TRAINING PROGRAM

## 2022-11-27 PROCEDURE — 1200000000 HC SEMI PRIVATE

## 2022-11-27 PROCEDURE — 85025 COMPLETE CBC W/AUTO DIFF WBC: CPT

## 2022-11-27 PROCEDURE — 6360000002 HC RX W HCPCS: Performed by: STUDENT IN AN ORGANIZED HEALTH CARE EDUCATION/TRAINING PROGRAM

## 2022-11-27 PROCEDURE — 87807 RSV ASSAY W/OPTIC: CPT

## 2022-11-27 PROCEDURE — 6370000000 HC RX 637 (ALT 250 FOR IP): Performed by: FAMILY MEDICINE

## 2022-11-27 PROCEDURE — 96365 THER/PROPH/DIAG IV INF INIT: CPT

## 2022-11-27 PROCEDURE — 2580000003 HC RX 258: Performed by: STUDENT IN AN ORGANIZED HEALTH CARE EDUCATION/TRAINING PROGRAM

## 2022-11-27 PROCEDURE — 94640 AIRWAY INHALATION TREATMENT: CPT

## 2022-11-27 PROCEDURE — 93005 ELECTROCARDIOGRAM TRACING: CPT | Performed by: PHYSICIAN ASSISTANT

## 2022-11-27 PROCEDURE — 83880 ASSAY OF NATRIURETIC PEPTIDE: CPT

## 2022-11-27 PROCEDURE — 99285 EMERGENCY DEPT VISIT HI MDM: CPT

## 2022-11-27 PROCEDURE — 6360000004 HC RX CONTRAST MEDICATION: Performed by: RADIOLOGY

## 2022-11-27 PROCEDURE — 93010 ELECTROCARDIOGRAM REPORT: CPT | Performed by: INTERNAL MEDICINE

## 2022-11-27 PROCEDURE — 87502 INFLUENZA DNA AMP PROBE: CPT

## 2022-11-27 PROCEDURE — 6370000000 HC RX 637 (ALT 250 FOR IP): Performed by: EMERGENCY MEDICINE

## 2022-11-27 PROCEDURE — 84484 ASSAY OF TROPONIN QUANT: CPT

## 2022-11-27 PROCEDURE — 71275 CT ANGIOGRAPHY CHEST: CPT

## 2022-11-27 PROCEDURE — 87635 SARS-COV-2 COVID-19 AMP PRB: CPT

## 2022-11-27 RX ORDER — SODIUM CHLORIDE 0.9 % (FLUSH) 0.9 %
5-40 SYRINGE (ML) INJECTION EVERY 12 HOURS SCHEDULED
Status: DISCONTINUED | OUTPATIENT
Start: 2022-11-27 | End: 2022-11-30 | Stop reason: HOSPADM

## 2022-11-27 RX ORDER — METHYLPREDNISOLONE SODIUM SUCCINATE 40 MG/ML
40 INJECTION, POWDER, LYOPHILIZED, FOR SOLUTION INTRAMUSCULAR; INTRAVENOUS EVERY 8 HOURS
Status: DISCONTINUED | OUTPATIENT
Start: 2022-11-27 | End: 2022-11-27

## 2022-11-27 RX ORDER — IPRATROPIUM BROMIDE AND ALBUTEROL SULFATE 2.5; .5 MG/3ML; MG/3ML
1 SOLUTION RESPIRATORY (INHALATION)
Status: DISCONTINUED | OUTPATIENT
Start: 2022-11-27 | End: 2022-11-30 | Stop reason: HOSPADM

## 2022-11-27 RX ORDER — PREDNISONE 20 MG/1
40 TABLET ORAL DAILY
Status: DISCONTINUED | OUTPATIENT
Start: 2022-11-27 | End: 2022-11-30 | Stop reason: HOSPADM

## 2022-11-27 RX ORDER — POLYETHYLENE GLYCOL 3350 17 G/17G
17 POWDER, FOR SOLUTION ORAL DAILY PRN
Status: DISCONTINUED | OUTPATIENT
Start: 2022-11-27 | End: 2022-11-30 | Stop reason: HOSPADM

## 2022-11-27 RX ORDER — LISINOPRIL 5 MG/1
5 TABLET ORAL DAILY
Status: DISCONTINUED | OUTPATIENT
Start: 2022-11-27 | End: 2022-11-30 | Stop reason: HOSPADM

## 2022-11-27 RX ORDER — BUDESONIDE AND FORMOTEROL FUMARATE DIHYDRATE 160; 4.5 UG/1; UG/1
2 AEROSOL RESPIRATORY (INHALATION) 2 TIMES DAILY
Status: DISCONTINUED | OUTPATIENT
Start: 2022-11-27 | End: 2022-11-27 | Stop reason: CLARIF

## 2022-11-27 RX ORDER — ONDANSETRON 4 MG/1
4 TABLET, ORALLY DISINTEGRATING ORAL EVERY 8 HOURS PRN
Status: DISCONTINUED | OUTPATIENT
Start: 2022-11-27 | End: 2022-11-30 | Stop reason: HOSPADM

## 2022-11-27 RX ORDER — ENOXAPARIN SODIUM 100 MG/ML
30 INJECTION SUBCUTANEOUS DAILY
Status: DISCONTINUED | OUTPATIENT
Start: 2022-11-27 | End: 2022-11-30 | Stop reason: HOSPADM

## 2022-11-27 RX ORDER — ONDANSETRON 2 MG/ML
4 INJECTION INTRAMUSCULAR; INTRAVENOUS EVERY 6 HOURS PRN
Status: DISCONTINUED | OUTPATIENT
Start: 2022-11-27 | End: 2022-11-30 | Stop reason: HOSPADM

## 2022-11-27 RX ORDER — SODIUM CHLORIDE 9 MG/ML
INJECTION, SOLUTION INTRAVENOUS PRN
Status: DISCONTINUED | OUTPATIENT
Start: 2022-11-27 | End: 2022-11-30 | Stop reason: HOSPADM

## 2022-11-27 RX ORDER — IPRATROPIUM BROMIDE AND ALBUTEROL SULFATE 2.5; .5 MG/3ML; MG/3ML
1 SOLUTION RESPIRATORY (INHALATION)
Status: DISCONTINUED | OUTPATIENT
Start: 2022-11-27 | End: 2022-11-27

## 2022-11-27 RX ORDER — ARFORMOTEROL TARTRATE 15 UG/2ML
15 SOLUTION RESPIRATORY (INHALATION) 2 TIMES DAILY
Status: DISCONTINUED | OUTPATIENT
Start: 2022-11-27 | End: 2022-11-27

## 2022-11-27 RX ORDER — BUDESONIDE 0.5 MG/2ML
0.5 INHALANT ORAL 2 TIMES DAILY
Status: DISCONTINUED | OUTPATIENT
Start: 2022-11-27 | End: 2022-11-27

## 2022-11-27 RX ORDER — SODIUM CHLORIDE 0.9 % (FLUSH) 0.9 %
5-40 SYRINGE (ML) INJECTION PRN
Status: DISCONTINUED | OUTPATIENT
Start: 2022-11-27 | End: 2022-11-30 | Stop reason: HOSPADM

## 2022-11-27 RX ORDER — ACETAMINOPHEN 650 MG/1
650 SUPPOSITORY RECTAL EVERY 6 HOURS PRN
Status: DISCONTINUED | OUTPATIENT
Start: 2022-11-27 | End: 2022-11-30 | Stop reason: HOSPADM

## 2022-11-27 RX ORDER — ASPIRIN 81 MG/1
81 TABLET ORAL DAILY
Status: DISCONTINUED | OUTPATIENT
Start: 2022-11-27 | End: 2022-11-30 | Stop reason: HOSPADM

## 2022-11-27 RX ORDER — ACETAMINOPHEN 325 MG/1
650 TABLET ORAL EVERY 6 HOURS PRN
Status: DISCONTINUED | OUTPATIENT
Start: 2022-11-27 | End: 2022-11-30 | Stop reason: HOSPADM

## 2022-11-27 RX ORDER — DEXAMETHASONE SODIUM PHOSPHATE 10 MG/ML
10 INJECTION INTRAMUSCULAR; INTRAVENOUS ONCE
Status: COMPLETED | OUTPATIENT
Start: 2022-11-27 | End: 2022-11-27

## 2022-11-27 RX ORDER — MAGNESIUM SULFATE IN WATER 40 MG/ML
2000 INJECTION, SOLUTION INTRAVENOUS ONCE
Status: COMPLETED | OUTPATIENT
Start: 2022-11-27 | End: 2022-11-27

## 2022-11-27 RX ADMIN — POLYETHYLENE GLYCOL 3350 17 G: 17 POWDER, FOR SOLUTION ORAL at 14:45

## 2022-11-27 RX ADMIN — IPRATROPIUM BROMIDE AND ALBUTEROL SULFATE 1 AMPULE: .5; 2.5 SOLUTION RESPIRATORY (INHALATION) at 15:51

## 2022-11-27 RX ADMIN — ONDANSETRON 4 MG: 2 INJECTION INTRAMUSCULAR; INTRAVENOUS at 20:33

## 2022-11-27 RX ADMIN — IPRATROPIUM BROMIDE AND ALBUTEROL SULFATE 1 AMPULE: .5; 2.5 SOLUTION RESPIRATORY (INHALATION) at 07:45

## 2022-11-27 RX ADMIN — MAGNESIUM SULFATE HEPTAHYDRATE 2000 MG: 40 INJECTION, SOLUTION INTRAVENOUS at 01:26

## 2022-11-27 RX ADMIN — IPRATROPIUM BROMIDE AND ALBUTEROL SULFATE 1 AMPULE: .5; 2.5 SOLUTION RESPIRATORY (INHALATION) at 12:25

## 2022-11-27 RX ADMIN — IPRATROPIUM BROMIDE AND ALBUTEROL SULFATE 1 AMPULE: .5; 2.5 SOLUTION RESPIRATORY (INHALATION) at 01:18

## 2022-11-27 RX ADMIN — METHYLPREDNISOLONE SODIUM SUCCINATE 40 MG: 40 INJECTION, POWDER, FOR SOLUTION INTRAMUSCULAR; INTRAVENOUS at 09:21

## 2022-11-27 RX ADMIN — IPRATROPIUM BROMIDE AND ALBUTEROL SULFATE 1 AMPULE: .5; 2.5 SOLUTION RESPIRATORY (INHALATION) at 19:15

## 2022-11-27 RX ADMIN — ASPIRIN 81 MG: 81 TABLET, COATED ORAL at 09:21

## 2022-11-27 RX ADMIN — SODIUM CHLORIDE, PRESERVATIVE FREE 10 ML: 5 INJECTION INTRAVENOUS at 21:55

## 2022-11-27 RX ADMIN — DEXAMETHASONE SODIUM PHOSPHATE 10 MG: 10 INJECTION INTRAMUSCULAR; INTRAVENOUS at 01:22

## 2022-11-27 RX ADMIN — LISINOPRIL 5 MG: 5 TABLET ORAL at 09:21

## 2022-11-27 RX ADMIN — ENOXAPARIN SODIUM 30 MG: 100 INJECTION SUBCUTANEOUS at 09:21

## 2022-11-27 RX ADMIN — IOPAMIDOL 75 ML: 755 INJECTION, SOLUTION INTRAVENOUS at 02:01

## 2022-11-27 RX ADMIN — ARFORMOTEROL TARTRATE 15 MCG: 15 SOLUTION RESPIRATORY (INHALATION) at 07:45

## 2022-11-27 RX ADMIN — PREDNISONE 40 MG: 20 TABLET ORAL at 16:17

## 2022-11-27 RX ADMIN — BUDESONIDE 500 MCG: 0.5 SUSPENSION RESPIRATORY (INHALATION) at 07:45

## 2022-11-27 RX ADMIN — ONDANSETRON 4 MG: 2 INJECTION INTRAMUSCULAR; INTRAVENOUS at 14:07

## 2022-11-27 RX ADMIN — SODIUM CHLORIDE, PRESERVATIVE FREE 10 ML: 5 INJECTION INTRAVENOUS at 09:22

## 2022-11-27 ASSESSMENT — ENCOUNTER SYMPTOMS
COUGH: 1
COLOR CHANGE: 0
PHOTOPHOBIA: 0
SINUS PRESSURE: 0
DIARRHEA: 0
NAUSEA: 0
SHORTNESS OF BREATH: 1
BACK PAIN: 0
ABDOMINAL PAIN: 0

## 2022-11-27 ASSESSMENT — PAIN - FUNCTIONAL ASSESSMENT: PAIN_FUNCTIONAL_ASSESSMENT: NONE - DENIES PAIN

## 2022-11-27 NOTE — ED NOTES
Department of Emergency Medicine  FIRST PROVIDER TRIAGE NOTE             Independent MLP           11/27/22  12:23 AM EST    Date of Encounter: 11/27/22   MRN: [id number]      HPI: Avelino Goode is a 69 y.o. female who presents to the ED for Shortness of Breath (For the past week. Hx of COPD. )     Pt presenting with sob and cp, productive cough    ROS: Negative for vomiting or diarrhea. PE: Gen Appearance/Constitutional: alert  HEENT: NC/NT. PERRLA,  Airway patent. Initial Plan of Care: All treatment areas with department are currently occupied. Plan to order/Initiate the following while awaiting opening in ED: labs, EKG, and imaging studies.   Initiate Treatment-Testing, Proceed toTreatment Area When Bed Available for ED Attending/MLP to Continue Care    Electronically signed by Abigail Wasserman PA-C   DD: 11/27/22      Abigail Wasserman PA-C  11/27/22 0023

## 2022-11-27 NOTE — Clinical Note
Discharge Plan[de-identified] Other/[facility])   Telemetry/Cardiac Monitoring Required?: No

## 2022-11-27 NOTE — H&P
[facility] History and Physical      CHIEF COMPLAINT: Shortness of breath, chest pain    History of Present Illness:    Ms. Thad Boyer is a 78-year-old female with past medical history significant for COPD, hypertension who presents with 5-day history of worsening shortness of breath. In talking with . Arianna Meyer, she states about 5 days ago she noticed development of shortness of breath. He states that has been progressive over the last 5 days. Nothing seems to make it better or worse. She denies fevers or chills. She reports a dry cough. Still reports development of some substernal chest pain during this time. She reports that it is substernal located in the center of her chest, without radiation, approximately 5 out of 10 in severity. Due to these reason she decided to present to [facility] emergency department for further evaluation. In the ED, vitals on presentation were temp 98, RR 18, HR 98, /99, O2 sat 88% on room air. While ambulating to the restroom her oxygen saturation was recorded as 50%. Lab work was obtained which revealed serum bicarbonate 33, creatinine 0.5, blood glucose 112, proBNP 446, high-sensitivity troponin 22. A CBC was obtained which revealed WBC 11.0, hemoglobin 14.6, platelets 632O. An imaging work-up was obtained in chest x-ray which revealed no acute disease. There was hyperinflation of the lungs with flattening of the diaphragms suggesting COPD. He was followed up with CTA pulmonary with contrast which revealed no evidence of PE or acute pulmonary abnormality. COPD and moderate retrocardiac hiatal hernia noted. The patient was given IV Decadron 10 mg once as well as magnesium sulfate 2000 mg once. Duo nebs were ordered. She was started on supplemental oxygen at 4 L. Decision was made to hospitalize the patient and internal medicine was contacted for hospitalization.     Upon ROS, she reports shortness of breath, chest pain, wheezing, cough, nausea and fatigue. She denies fevers, chills, lightheadedness, dizziness, vision change, loss consciousness, seizure, vomiting, abdominal pain, constipation, diarrhea, dysuria, increased urinary frequency, myalgias, joint pain, bleeding, new skin rash. Informant(s) for H&P: Patient    REVIEW OF SYSTEMS:  A comprehensive review of systems was negative except for: what is in the HPI      PMH:  Past Medical History:   Diagnosis Date    Acute exacerbation of chronic obstructive pulmonary disease (COPD) ([facility] 7/14/2022    [facility] 6/27/2022    COPD (chronic obstructive pulmonary disease) (HCC)     COPD exacerbation ([facility] 7/14/2022    COPD with acute exacerbation ([facility]     COVID-19 7/16/2022    Hypertension     Pneumonia due to infectious organism     Uncontrolled hypertension        Surgical History:  Past Surgical History:   Procedure Laterality Date    BACK SURGERY         Medications Prior to Admission:    Prior to Admission medications    Medication Sig Start Date End Date Taking? Authorizing Provider   lisinopril (PRINIVIL;ZESTRIL) 5 MG tablet Take 1 tablet by mouth daily 10/25/22   Clyde Gonzalez DO   budesonide-formoterol (SYMBICORT) 160-4.5 MCG/ACT AERO Inhale 2 puffs into the lungs 2 times daily 10/3/22 11/2/22  Clyde Gonzalez DO   Cholecalciferol (VITAMIN D3) 50 MCG (2000 UT) CAPS Take 1 capsule by mouth in the morning. Historical Provider, MD   albuterol sulfate HFA (VENTOLIN HFA) 108 (90 Base) MCG/ACT inhaler Inhale 2 puffs into the lungs 4 times daily as needed for Wheezing  Patient not taking: Reported on 7/7/2022 6/29/22 7/16/22  Kimi Cee MD   aspirin 81 MG EC tablet Take 81 mg by mouth daily    Historical Provider, MD   vitamin C (ASCORBIC ACID) 500 MG tablet Take 500 mg by mouth daily    Historical Provider, MD       Allergies:    Pcn [penicillins]    Social History:    reports that she has quit smoking.  Her smoking use included cigarettes. She smoked an average of .25 packs per day. She has never used smokeless tobacco. She reports that she does not currently use alcohol. She reports that she does not currently use drugs. Family History:   Patient denies family history of heart disease or cancer. PHYSICAL EXAM:  Vitals:  BP (!) 145/99   Pulse 98   Temp 98 °F (36.7 °C) (Temporal)   Resp 22   Ht 5' 8\" (1.727 m)   Wt 95 lb (43.1 kg)   SpO2 95%   BMI 14.44 kg/m²     General Appearance: alert and oriented to person, place and time and in no acute distress, thin appearing  Skin: warm and dry  Head: normocephalic and atraumatic  Eyes: pupils equal, round, and reactive to light, extraocular eye movements intact, conjunctivae normal  Neck: neck supple and non tender without mass   Pulmonary/Chest: clear to auscultation bilaterally- no wheezes, rales or rhonchi, normal air movement, no respiratory distress  Cardiovascular: normal rate, normal S1 and S2 and no carotid bruits  Abdomen: soft, non-tender, non-distended, normal bowel sounds, no masses or organomegaly  Extremities: no cyanosis, no clubbing and no edema  Neurologic: no cranial nerve deficit and speech normal        LABS:  Recent Labs     11/27/22  0043      K 3.5   CL 97*   CO2 33*   BUN 13   CREATININE 0.5   GLUCOSE 112*   CALCIUM 9.4       Recent Labs     11/27/22  0043   WBC 11.0   RBC 4.86   HGB 14.6   HCT 46.6   MCV 95.9   MCH 30.0   MCHC 31.3*   RDW 13.4      MPV 9.4       No results for input(s): POCGLU in the last 72 hours. Radiology:   CTA PULMONARY W CONTRAST   Final Result   1. No evidence of pulmonary embolism or acute pulmonary abnormality. 2. COPD and moderate retrocardiac hiatal hernia noted. RECOMMENDATIONS:   Careful clinical correlation and follow up recommended. XR CHEST PORTABLE   Final Result   No acute disease. RECOMMENDATION:   Careful clinical correlation and follow up recommended.              EKG: Normal sinus rhythm, HR 74, QTc 444 MS    ASSESSMENT:      Principal Problem:    Acute respiratory failure with hypoxia (HCC)  Active Problems:    Acute respiratory failure (HCC)  Resolved Problems:    * No resolved hospital problems. *      PLAN:    1. Acute hypoxic respiratory failure requiring supplemental oxygen via nasal cannula-continue supplemental oxygen as needed, currently requiring 4 L, continue pulse oximetry, wean oxygen as able  2. Acute COPD exacerbation-start IV Solu-Medrol 40 mg every 8 hours, continue duo nebs  3. Hypertension-continue home lisinopril  4. Nonzero troponin-repeat troponin  5. Noncardiac chest pain-follow repeat troponin, EKG nonacute  6. History of COVID-19    Code Status: Full code  DVT prophylaxis: Lovenox subcutaneously      NOTE: This report was transcribed using voice recognition software. Every effort was made to ensure accuracy; however, inadvertent computerized transcription errors may be present.   Electronically signed by Edmond Maddox MD on 11/27/2022 at 3:29 AM

## 2022-11-27 NOTE — ED PROVIDER NOTES
Bella Mueller is a 69 y.o. female presenting to the ED for shortness of breath cough, chest burning, beginning 2 weeks ago. The complaint has been persistent, moderate in severity, and worsened by nothing. 70 yo f w history of copd has been on o2 in the past but doesn't have at home anymore. Pt notes worsening sob and cough for 2 weeks. Pt also notes chest burning denies any pressure or tightness,. Denies any radiation of pain. Pt notes has been \"cheating\" smoking again. Pt notes no vomiting or abdominal pain, she does not intermittent diarrhea and constipation. Pt denies any fever. Pt denies any weight gain or le edema. Pt has had weight loss over pasty year. Pt does get regular mammograms. Pts o2 sat was 50% on RA ambulating to bathrrom. Pt denies any recent travel or cancer history. PCP dr Preethi Zhou. Pulmonary is dr Melissa Rowe. Review of Systems:   Review of Systems   Constitutional:  Positive for fatigue. Negative for diaphoresis. HENT:  Positive for congestion. Negative for sinus pressure. Eyes:  Negative for photophobia and visual disturbance. Respiratory:  Positive for cough and shortness of breath. Cardiovascular:  Positive for chest pain. Negative for palpitations and leg swelling. Gastrointestinal:  Negative for abdominal pain, diarrhea and nausea. Endocrine: Negative for polyphagia and polyuria. Genitourinary:  Negative for difficulty urinating, flank pain and frequency. Musculoskeletal:  Negative for back pain and neck pain. Skin:  Negative for color change and rash. Allergic/Immunologic: Negative for food allergies and immunocompromised state. Neurological:  Negative for dizziness, light-headedness and headaches. Hematological:  Negative for adenopathy. Does not bruise/bleed easily. Psychiatric/Behavioral:  Negative for agitation and dysphoric mood.               --------------------------------------------- PAST HISTORY ---------------------------------------------  Past Medical History:  has a past medical history of Acute exacerbation of chronic obstructive pulmonary disease (COPD) ([facility] Acute respiratory failure with hypoxia (HCC), COPD (chronic obstructive pulmonary disease) ([facility] COPD exacerbation ([place] [id number] COPD with acute exacerbation ([place] [address] COVID-19, Hypertension, Pneumonia due to infectious organism, and Uncontrolled hypertension. Past Surgical History:  has a past surgical history that includes back surgery. Social History:  reports that she has quit smoking. Her smoking use included cigarettes. She smoked an average of .25 packs per day. She has never used smokeless tobacco. She reports that she does not currently use alcohol. She reports that she does not currently use drugs. Family History: family history is not on file. The patients home medications have been reviewed.     Allergies: Pcn [penicillins]    -------------------------------------------------- RESULTS -------------------------------------------------  All laboratory and radiology results have been personally reviewed by myself   LABS:  Results for orders placed or performed during the hospital encounter of 11/27/22   COVID-19, Rapid    Specimen: Nasopharyngeal Swab   Result Value Ref Range    SARS-CoV-2, NAAT Not Detected Not Detected   RAPID INFLUENZA A/B ANTIGENS    Specimen: Nasopharyngeal   Result Value Ref Range    Influenza A by PCR Not Detected Not Detected    Influenza B by PCR Not Detected Not Detected   Rapid RSV Antigen    Specimen: Nasopharyngeal Swab   Result Value Ref Range    RSV by PCR Negative Negative   CBC with Auto Differential   Result Value Ref Range    WBC 11.0 4.5 - 11.5 E9/L    RBC 4.86 3.50 - 5.50 E12/L    Hemoglobin 14.6 11.5 - 15.5 g/dL    Hematocrit 46.6 34.0 - 48.0 %    MCV 95.9 80.0 - 99.9 fL    MCH 30.0 26.0 - 35.0 pg    MCHC 31.3 (L) 32.0 - 34.5 %    RDW 13.4 11.5 - 15.0 fL    Platelets 401 306 - 919 E9/L    MPV 9.4 7.0 - 12.0 fL    Neutrophils % 71.1 43.0 - 80.0 %    Immature Granulocytes % 0.4 0.0 - 5.0 %    Lymphocytes % 19.6 (L) 20.0 - 42.0 %    Monocytes % 6.7 2.0 - 12.0 %    Eosinophils % 1.2 0.0 - 6.0 %    Basophils % 1.0 0.0 - 2.0 %    Neutrophils Absolute 7.82 (H) 1.80 - 7.30 E9/L    Immature Granulocytes # 0.04 E9/L    Lymphocytes Absolute 2.16 1.50 - 4.00 E9/L    Monocytes Absolute 0.74 0.10 - 0.95 E9/L    Eosinophils Absolute 0.13 0.05 - 0.50 E9/L    Basophils Absolute 0.11 0.00 - 0.20 E9/L   Comprehensive Metabolic Panel   Result Value Ref Range    Sodium 138 132 - 146 mmol/L    Potassium 3.5 3.5 - 5.0 mmol/L    Chloride 97 (L) 98 - 107 mmol/L    CO2 33 (H) 22 - 29 mmol/L    Anion Gap 8 7 - 16 mmol/L    Glucose 112 (H) 74 - 99 mg/dL    BUN 13 6 - 23 mg/dL    Creatinine 0.5 0.5 - 1.0 mg/dL    Est, Glom Filt Rate >60 >=60 mL/min/1.73    Calcium 9.4 8.6 - 10.2 mg/dL    Total Protein 7.1 6.4 - 8.3 g/dL    Albumin 4.1 3.5 - 5.2 g/dL    Total Bilirubin 0.2 0.0 - 1.2 mg/dL    Alkaline Phosphatase 93 35 - 104 U/L    ALT 39 (H) 0 - 32 U/L    AST 46 (H) 0 - 31 U/L   Troponin   Result Value Ref Range    Troponin, High Sensitivity 22 (H) 0 - 9 ng/L   Brain Natriuretic Peptide   Result Value Ref Range    Pro- (H) 0 - 125 pg/mL   EKG 12 Lead   Result Value Ref Range    Ventricular Rate 74 BPM    Atrial Rate 74 BPM    P-R Interval 148 ms    QRS Duration 74 ms    Q-T Interval 400 ms    QTc Calculation (Bazett) 444 ms    P Axis 83 degrees    R Axis 61 degrees    T Axis 69 degrees       RADIOLOGY:  Interpreted by Radiologist.  CTA PULMONARY W CONTRAST   Final Result   1. No evidence of pulmonary embolism or acute pulmonary abnormality. 2. COPD and moderate retrocardiac hiatal hernia noted. RECOMMENDATIONS:   Careful clinical correlation and follow up recommended. XR CHEST PORTABLE   Final Result   No acute disease. RECOMMENDATION:   Careful clinical correlation and follow up recommended. ------------------------- NURSING NOTES AND VITALS REVIEWED ---------------------------   The nursing notes within the ED encounter and vital signs as below have been reviewed. BP (!) 145/99   Pulse 98   Temp 98 °F (36.7 °C) (Temporal)   Resp 22   Ht 5' 8\" (1.727 m)   Wt 95 lb (43.1 kg)   SpO2 95%   BMI 14.44 kg/m²   Oxygen Saturation Interpretation: Abnormal      ---------------------------------------------------PHYSICAL EXAM--------------------------------------    Physical Exam  Vitals reviewed. Constitutional:       General: She is not in acute distress. Appearance: Normal appearance. She is not toxic-appearing. HENT:      Head: Normocephalic and atraumatic. Right Ear: External ear normal.      Left Ear: External ear normal.      Nose: Nose normal. No congestion. Mouth/Throat:      Mouth: Mucous membranes are moist.      Pharynx: Oropharynx is clear. No posterior oropharyngeal erythema. Eyes:      Extraocular Movements: Extraocular movements intact. Pupils: Pupils are equal, round, and reactive to light. Cardiovascular:      Rate and Rhythm: Normal rate and regular rhythm. Pulses: Normal pulses. Heart sounds: No murmur heard. Pulmonary:      Effort: Pulmonary effort is normal.      Breath sounds: Examination of the right-lower field reveals decreased breath sounds. Examination of the left-lower field reveals decreased breath sounds. Decreased breath sounds present. No wheezing, rhonchi or rales. Chest:      Chest wall: No tenderness or crepitus. There is no dullness to percussion. Comments: Pectus carinatum  Abdominal:      General: Bowel sounds are normal.      Tenderness: There is no abdominal tenderness. There is no right CVA tenderness, left CVA tenderness or guarding. Musculoskeletal:         General: No swelling or deformity. Cervical back: Normal range of motion and neck supple. No muscular tenderness.    Skin:     General: Skin is warm and dry. Capillary Refill: Capillary refill takes less than 2 seconds. Neurological:      General: No focal deficit present. Mental Status: She is alert and oriented to person, place, and time. Psychiatric:         Mood and Affect: Mood normal.             ------------------------------ ED COURSE/MEDICAL DECISION MAKING----------------------  Medications   methylPREDNISolone sodium (SOLU-MEDROL) injection 40 mg (has no administration in time range)   ipratropium-albuterol (DUONEB) nebulizer solution 1 ampule (has no administration in time range)   dexamethasone (DECADRON) injection 10 mg (10 mg IntraVENous Given 11/27/22 0122)   magnesium sulfate 2000 mg in 50 mL IVPB premix (0 mg IntraVENous Stopped 11/27/22 0227)   iopamidol (ISOVUE-370) 76 % injection 75 mL (75 mLs IntraVENous Given 11/27/22 0201)     EKG: This EKG is signed and interpreted by me. [id number]  Rate: 74  Rhythm: Sinus  Interpretation: no acute changes normal axis and intervals  Comparison: no previous EKG available      ED COURSE:  ED Course as of 11/27/22 0443   Sun Nov 27, 2022   0043 02 sat 50% when ambulating to bathroom very sob [MARYANN]      ED Course User Index  [MARYANN] Fatou Farias DO       Medical Decision Making:    Patient presented with worsening shortness of breath and cough. She was found to be extremely hypoxic ambulating. Patient stable on supplemental nasal cannula. She likely is all COPD exacerbation. We found no evidence of any pneumonia pneumothorax pulmonary embolism or any other acute pulmonary process. There is no evidence of any heart failure. Patient is agreeable to admission. Discussed with the hospitalist they will admit the patient to    Counseling: The emergency provider has spoken with the patient and discussed todays results, in addition to providing specific details for the plan of care and counseling regarding the diagnosis and prognosis.   Questions are answered at this time and they are agreeable with the plan.      --------------------------------- IMPRESSION AND DISPOSITION ---------------------------------    IMPRESSION  1. Acute respiratory failure with hypoxia ([facility]    2. [facility]        DISPOSITION  Disposition: Admit to telemetry  Patient condition is fair      NOTE: This report was transcribed using voice recognition software.  Every effort was made to ensure accuracy; however, inadvertent computerized transcription errors may be present       Danielle Chandra DO  11/27/22 0235

## 2022-11-27 NOTE — PROGRESS NOTES
Pt seen/examined by me. Chart reviewed. I read the night doc's H & P and I agree    Pt feels a bit better. No fevers. O2 requirement down from 4 liters to 2 liters. No chest pain    On exam:  AFVSS  Eyes, mouth clear, neck without JVD, barrel chest, cachexia -- \"pink puffer\" appearance  Lungs -- left LL wheezing, overall diminished sounds  Abd nontender/benign  No LE edema  Pos pedal pulses    Plan:  Steroids/nebs/oxygen support -- wean as able. Discharge possibly tomorrow if she comes off O2.  ? Need for home O2 -- she has had it before but got to the point the past few months where she doesn't need it. Ambulatory pulse ox prior to discharge. Does not follow with a pulmonologist long-term. Consider referral for discharge.

## 2022-11-28 LAB
BASOPHILS ABSOLUTE: 0.02 E9/L (ref 0–0.2)
BASOPHILS RELATIVE PERCENT: 0.2 % (ref 0–2)
BILIRUBIN URINE: NEGATIVE
BLOOD, URINE: NEGATIVE
CLARITY: CLEAR
COLOR: YELLOW
EOSINOPHILS ABSOLUTE: 0 E9/L (ref 0.05–0.5)
EOSINOPHILS RELATIVE PERCENT: 0 % (ref 0–6)
GLUCOSE URINE: NEGATIVE MG/DL
HCT VFR BLD CALC: 35.5 % (ref 34–48)
HEMOGLOBIN: 11.2 G/DL (ref 11.5–15.5)
IMMATURE GRANULOCYTES #: 0.04 E9/L
IMMATURE GRANULOCYTES %: 0.3 % (ref 0–5)
KETONES, URINE: ABNORMAL MG/DL
LEUKOCYTE ESTERASE, URINE: NEGATIVE
LYMPHOCYTES ABSOLUTE: 1.15 E9/L (ref 1.5–4)
LYMPHOCYTES RELATIVE PERCENT: 9.5 % (ref 20–42)
MCH RBC QN AUTO: 30.5 PG (ref 26–35)
MCHC RBC AUTO-ENTMCNC: 31.5 % (ref 32–34.5)
MCV RBC AUTO: 96.7 FL (ref 80–99.9)
MONOCYTES ABSOLUTE: 1.04 E9/L (ref 0.1–0.95)
MONOCYTES RELATIVE PERCENT: 8.6 % (ref 2–12)
NEUTROPHILS ABSOLUTE: 9.8 E9/L (ref 1.8–7.3)
NEUTROPHILS RELATIVE PERCENT: 81.4 % (ref 43–80)
NITRITE, URINE: NEGATIVE
PDW BLD-RTO: 13.7 FL (ref 11.5–15)
PH UA: 6 (ref 5–9)
PLATELET # BLD: 273 E9/L (ref 130–450)
PMV BLD AUTO: 10.1 FL (ref 7–12)
PROTEIN UA: NEGATIVE MG/DL
RBC # BLD: 3.67 E12/L (ref 3.5–5.5)
SPECIFIC GRAVITY UA: >=1.03 (ref 1–1.03)
TROPONIN, HIGH SENSITIVITY: 20 NG/L (ref 0–9)
UROBILINOGEN, URINE: 0.2 E.U./DL
WBC # BLD: 12.1 E9/L (ref 4.5–11.5)

## 2022-11-28 PROCEDURE — 6370000000 HC RX 637 (ALT 250 FOR IP): Performed by: FAMILY MEDICINE

## 2022-11-28 PROCEDURE — 81003 URINALYSIS AUTO W/O SCOPE: CPT

## 2022-11-28 PROCEDURE — 94640 AIRWAY INHALATION TREATMENT: CPT

## 2022-11-28 PROCEDURE — 2700000000 HC OXYGEN THERAPY PER DAY

## 2022-11-28 PROCEDURE — 93005 ELECTROCARDIOGRAM TRACING: CPT | Performed by: NURSE PRACTITIONER

## 2022-11-28 PROCEDURE — 2580000003 HC RX 258: Performed by: STUDENT IN AN ORGANIZED HEALTH CARE EDUCATION/TRAINING PROGRAM

## 2022-11-28 PROCEDURE — 84484 ASSAY OF TROPONIN QUANT: CPT

## 2022-11-28 PROCEDURE — 36415 COLL VENOUS BLD VENIPUNCTURE: CPT

## 2022-11-28 PROCEDURE — 6370000000 HC RX 637 (ALT 250 FOR IP): Performed by: STUDENT IN AN ORGANIZED HEALTH CARE EDUCATION/TRAINING PROGRAM

## 2022-11-28 PROCEDURE — 6360000002 HC RX W HCPCS: Performed by: STUDENT IN AN ORGANIZED HEALTH CARE EDUCATION/TRAINING PROGRAM

## 2022-11-28 PROCEDURE — 85025 COMPLETE CBC W/AUTO DIFF WBC: CPT

## 2022-11-28 PROCEDURE — 6370000000 HC RX 637 (ALT 250 FOR IP): Performed by: NURSE PRACTITIONER

## 2022-11-28 PROCEDURE — 99232 SBSQ HOSP IP/OBS MODERATE 35: CPT | Performed by: INTERNAL MEDICINE

## 2022-11-28 PROCEDURE — 1200000000 HC SEMI PRIVATE

## 2022-11-28 RX ORDER — GUAIFENESIN/DEXTROMETHORPHAN 100-10MG/5
5 SYRUP ORAL EVERY 4 HOURS PRN
Status: DISCONTINUED | OUTPATIENT
Start: 2022-11-28 | End: 2022-11-30 | Stop reason: HOSPADM

## 2022-11-28 RX ADMIN — GUAIFENESIN AND DEXTROMETHORPHAN 5 ML: 100; 10 SYRUP ORAL at 17:49

## 2022-11-28 RX ADMIN — ASPIRIN 81 MG: 81 TABLET, COATED ORAL at 09:53

## 2022-11-28 RX ADMIN — ENOXAPARIN SODIUM 30 MG: 100 INJECTION SUBCUTANEOUS at 09:53

## 2022-11-28 RX ADMIN — SODIUM CHLORIDE, PRESERVATIVE FREE 10 ML: 5 INJECTION INTRAVENOUS at 21:07

## 2022-11-28 RX ADMIN — GUAIFENESIN AND DEXTROMETHORPHAN 5 ML: 100; 10 SYRUP ORAL at 22:09

## 2022-11-28 RX ADMIN — SODIUM CHLORIDE, PRESERVATIVE FREE 10 ML: 5 INJECTION INTRAVENOUS at 09:54

## 2022-11-28 RX ADMIN — IPRATROPIUM BROMIDE AND ALBUTEROL SULFATE 1 AMPULE: .5; 2.5 SOLUTION RESPIRATORY (INHALATION) at 13:21

## 2022-11-28 RX ADMIN — BISACODYL 10 MG: 5 TABLET, COATED ORAL at 17:50

## 2022-11-28 RX ADMIN — IPRATROPIUM BROMIDE AND ALBUTEROL SULFATE 1 AMPULE: .5; 2.5 SOLUTION RESPIRATORY (INHALATION) at 15:36

## 2022-11-28 RX ADMIN — IPRATROPIUM BROMIDE AND ALBUTEROL SULFATE 1 AMPULE: .5; 2.5 SOLUTION RESPIRATORY (INHALATION) at 10:04

## 2022-11-28 RX ADMIN — PREDNISONE 40 MG: 20 TABLET ORAL at 09:53

## 2022-11-28 RX ADMIN — ONDANSETRON 4 MG: 2 INJECTION INTRAMUSCULAR; INTRAVENOUS at 04:28

## 2022-11-28 RX ADMIN — IPRATROPIUM BROMIDE AND ALBUTEROL SULFATE 1 AMPULE: .5; 2.5 SOLUTION RESPIRATORY (INHALATION) at 20:34

## 2022-11-28 RX ADMIN — IPRATROPIUM BROMIDE AND ALBUTEROL SULFATE 1 AMPULE: .5; 2.5 SOLUTION RESPIRATORY (INHALATION) at 04:53

## 2022-11-28 RX ADMIN — MAGNESIUM CITRATE 296 ML: 1.75 LIQUID ORAL at 07:01

## 2022-11-28 RX ADMIN — LISINOPRIL 5 MG: 5 TABLET ORAL at 09:53

## 2022-11-28 RX ADMIN — ONDANSETRON 4 MG: 4 TABLET, ORALLY DISINTEGRATING ORAL at 22:10

## 2022-11-28 ASSESSMENT — PAIN DESCRIPTION - FREQUENCY: FREQUENCY: CONTINUOUS

## 2022-11-28 ASSESSMENT — PAIN SCALES - GENERAL
PAINLEVEL_OUTOF10: 0
PAINLEVEL_OUTOF10: 7

## 2022-11-28 ASSESSMENT — PAIN - FUNCTIONAL ASSESSMENT: PAIN_FUNCTIONAL_ASSESSMENT: ACTIVITIES ARE NOT PREVENTED

## 2022-11-28 ASSESSMENT — PAIN DESCRIPTION - PAIN TYPE: TYPE: ACUTE PAIN

## 2022-11-28 ASSESSMENT — PAIN DESCRIPTION - DESCRIPTORS: DESCRIPTORS: BURNING;PRESSURE

## 2022-11-28 ASSESSMENT — PAIN DESCRIPTION - ORIENTATION: ORIENTATION: RIGHT;LEFT;LOWER;UPPER

## 2022-11-28 ASSESSMENT — PAIN DESCRIPTION - LOCATION: LOCATION: ABDOMEN;CHEST

## 2022-11-28 ASSESSMENT — PAIN DESCRIPTION - ONSET: ONSET: GRADUAL

## 2022-11-28 NOTE — PROGRESS NOTES
Patient complaining of pain to her abdomen and chest that she describes as burning and pressure like. She also reports feeling constipated and slightly nauseated and has also been experiencing burning upon urination. She reports that her last BM was \"a few days ago\" Rosalino Germain CNP notified. Orders received for EKG, UA, magnesium citrate, and troponin level. RT also gave patient a breathing treatment which provided some relief.

## 2022-11-28 NOTE — CARE COORDINATION
CM made in room visit to pt w/initial assessment completed. Pt resides w/her brother and is independent w/o the use of any DMEs. Pt has no home O2, currently on 2L. Will need ambulating pulse ox prior to discharge, if pt needs O2 she prefers [facility] w/referral made w/acceptance. Pt has no nebulizer or cpap. She is established w/Dr. Clarisa Smallwood and uses [facility] on [address]. No hx of HHC or SNF stay. Pt drove here and states she will drive herself home. Will follow along with  and assist with discharge planning as necessary.    Jamie Vickers, [place], RN  [facility] Case Management  [phone]

## 2022-11-28 NOTE — PROGRESS NOTES
[facility] Progress Note    Admitting Date and Time: 11/27/2022 12:30 AM  Admit Dx: Acute respiratory failure (HCC) [J96.00]  COPD exacerbation (HCC) [J44.1]  Acute respiratory failure with hypoxia (HCC) [J96.01]    Subjective:  Patient is being followed for Acute respiratory failure ([address] [J96.00]  COPD exacerbation ([facility] [J44.1]  Acute respiratory failure with hypoxia ([facility] [J96.01]     Seen and examined at bedside. She feels better than yesterday. Breathing better, less anxiety. Continues on 2 L via NC. Discussed weaning oxygen. No urinary symptoms. Some abdominal pain but better. Tolerating medications, no side effects. ROS: denies fever, chills, cp, sob, n/v, HA unless stated above.       aspirin  81 mg Oral Daily    lisinopril  5 mg Oral Daily    sodium chloride flush  5-40 mL IntraVENous 2 times per day    enoxaparin  30 mg SubCUTAneous Daily    ipratropium-albuterol  1 ampule Inhalation Q4H WA    predniSONE  40 mg Oral Daily     sodium chloride flush, 5-40 mL, PRN  sodium chloride, , PRN  ondansetron, 4 mg, Q8H PRN   Or  ondansetron, 4 mg, Q6H PRN  polyethylene glycol, 17 g, Daily PRN  acetaminophen, 650 mg, Q6H PRN   Or  acetaminophen, 650 mg, Q6H PRN         Objective:    /66   Pulse 82   Temp 98.1 °F (36.7 °C) (Oral)   Resp 18   Ht 5' 8\" (1.727 m)   Wt 97 lb 11.2 oz (44.3 kg)   SpO2 93%   BMI 14.86 kg/m²     General Appearance: alert and oriented to person, place and time and in no acute distress - thin build  barrel chest   Skin: warm and dry  Head: normocephalic and atraumatic  Eyes: pupils equal, round, and reactive to light, extraocular eye movements intact, conjunctivae normal  Neck: neck supple and non tender without mass   Pulmonary/Chest: diminished to auscultation bilaterally- ++ wheezes, rales or rhonchi, normal air movement, no respiratory distress + 2 L via NC   Cardiovascular: normal rate, normal S1 and S2 and no carotid bruits  Abdomen: soft, non-tender, non-distended, normal bowel sounds, no masses or organomegaly  Extremities: no cyanosis, no clubbing and no edema  Neurologic: no cranial nerve deficit and speech normal        Recent Labs     11/27/22  0043      K 3.5   CL 97*   CO2 33*   BUN 13   CREATININE 0.5   GLUCOSE 112*   CALCIUM 9.4       Recent Labs     11/27/22  0043 11/28/22  0440   WBC 11.0 12.1*   RBC 4.86 3.67   HGB 14.6 11.2*   HCT 46.6 35.5   MCV 95.9 96.7   MCH 30.0 30.5   MCHC 31.3* 31.5*   RDW 13.4 13.7    273   MPV 9.4 10.1       Radiology: reviewed       Assessment:    Principal Problem:    Acute respiratory failure with hypoxia (HCC)  Active Problems:    COPD with acute exacerbation (HCC)    Acute respiratory failure (HCC)    Elevated troponin    Chest pain  Resolved Problems:    * No resolved hospital problems. *    PLAN  1. Acute hypoxic respiratory failure requiring supplemental oxygen via nasal cannula-continue supplemental oxygen as needed, currently requiring 2 L, wean as tolerated. Continue pulse oximetry - found without nasal cannula at 79% on room air. Will need walking pulse ox for discharge. Hx oxygen at home. 2.  Acute COPD exacerbation- Last exacerbation June 2022 for which she was given nasal cannula, Levaquin, inhalers, and prednisone taper. CTA chest showed COPD. Given IV Solu-Medrol 40 mg every 8 hours now weaned to 40 mg po daily. continue duo nebs  3. Hypertension-continue home lisinopril  4. Nonzero troponin - EKG nonacute. Does have noncardiac chest pain. Troponin 22 < 20   5. History of COVID-19 July 2022. Code Status: Full code  DVT prophylaxis: Lovenox  Discharge dispo: from home, lives with brother. Will need walking pulse ox prior to discharge. Was supposed to follow up outpatient with Dr Luis Fernando Benoit after July admission but did not.      30 minutes time spent reviewing patient chart, assessing patient, discussing plan of care with patient and family, discussing plan of care with collaborating physician, and charting. NOTE: This report was transcribed using voice recognition software. Every effort was made to ensure accuracy; however, inadvertent computerized transcription errors may be present.   Electronically signed by REGI Saha NP on 11/28/2022 at 9:05 AM

## 2022-11-28 NOTE — PLAN OF CARE
Problem: Discharge Planning  Goal: Discharge to home or other facility with appropriate resources  11/28/2022 1752 by Gi Handley RN  Outcome: Progressing  11/28/2022 1522 by Gil Eng RN  Outcome: Progressing     Problem: Chronic Conditions and Co-morbidities  Goal: Patient's chronic conditions and co-morbidity symptoms are monitored and maintained or improved  11/28/2022 1752 by Gi Handley RN  Outcome: Progressing  11/28/2022 1522 by Gil Eng RN  Outcome: Progressing     Problem: Pain  Goal: Verbalizes/displays adequate comfort level or baseline comfort level  11/28/2022 1752 by Gi Handley RN  Outcome: Progressing  11/28/2022 1522 by Gil Eng RN  Outcome: Progressing     Problem: Safety - Adult  Goal: Free from fall injury  11/28/2022 1752 by Gi Handley RN  Outcome: Progressing  11/28/2022 1522 by Gil Eng RN  Outcome: Progressing

## 2022-11-28 NOTE — PROGRESS NOTES
Pt. Found with o2 off, 79% room air, no complaints, replaced 2lpm NC and patient went to 94% within 5 minutes.

## 2022-11-28 NOTE — PLAN OF CARE
Problem: Discharge Planning  Goal: Discharge to home or other facility with appropriate resources  11/28/2022 0255 by Alexa Rivera RN  Outcome: Progressing  11/27/2022 1711 by Jarrell Celis RN  Outcome: Progressing     Problem: Chronic Conditions and Co-morbidities  Goal: Patient's chronic conditions and co-morbidity symptoms are monitored and maintained or improved  11/28/2022 0255 by Alexa Rivera RN  Outcome: Progressing  11/27/2022 1711 by Jarrell Celis, RN  Outcome: Progressing

## 2022-11-28 NOTE — PLAN OF CARE
Problem: Discharge Planning  Goal: Discharge to home or other facility with appropriate resources  11/28/2022 1522 by Mandie Haile RN  Outcome: Progressing  11/28/2022 0255 by Prudence Manning RN  Outcome: Progressing     Problem: Chronic Conditions and Co-morbidities  Goal: Patient's chronic conditions and co-morbidity symptoms are monitored and maintained or improved  11/28/2022 1522 by Mandie Haile RN  Outcome: Progressing  11/28/2022 0255 by Prudence Manning RN  Outcome: Progressing

## 2022-11-29 ENCOUNTER — APPOINTMENT (OUTPATIENT)
Dept: GENERAL RADIOLOGY | Age: 67
DRG: 189 | End: 2022-11-29
Payer: MEDICARE

## 2022-11-29 LAB
EKG ATRIAL RATE: 88 BPM
EKG P AXIS: 88 DEGREES
EKG P-R INTERVAL: 146 MS
EKG Q-T INTERVAL: 368 MS
EKG QRS DURATION: 74 MS
EKG QTC CALCULATION (BAZETT): 445 MS
EKG R AXIS: 68 DEGREES
EKG T AXIS: 79 DEGREES
EKG VENTRICULAR RATE: 88 BPM

## 2022-11-29 PROCEDURE — 94640 AIRWAY INHALATION TREATMENT: CPT

## 2022-11-29 PROCEDURE — 6370000000 HC RX 637 (ALT 250 FOR IP): Performed by: STUDENT IN AN ORGANIZED HEALTH CARE EDUCATION/TRAINING PROGRAM

## 2022-11-29 PROCEDURE — 93010 ELECTROCARDIOGRAM REPORT: CPT | Performed by: INTERNAL MEDICINE

## 2022-11-29 PROCEDURE — 6360000002 HC RX W HCPCS

## 2022-11-29 PROCEDURE — 6370000000 HC RX 637 (ALT 250 FOR IP): Performed by: FAMILY MEDICINE

## 2022-11-29 PROCEDURE — 2580000003 HC RX 258: Performed by: STUDENT IN AN ORGANIZED HEALTH CARE EDUCATION/TRAINING PROGRAM

## 2022-11-29 PROCEDURE — 6360000002 HC RX W HCPCS: Performed by: STUDENT IN AN ORGANIZED HEALTH CARE EDUCATION/TRAINING PROGRAM

## 2022-11-29 PROCEDURE — 2700000000 HC OXYGEN THERAPY PER DAY

## 2022-11-29 PROCEDURE — 1200000000 HC SEMI PRIVATE

## 2022-11-29 PROCEDURE — 6370000000 HC RX 637 (ALT 250 FOR IP)

## 2022-11-29 PROCEDURE — 99232 SBSQ HOSP IP/OBS MODERATE 35: CPT | Performed by: INTERNAL MEDICINE

## 2022-11-29 PROCEDURE — 74018 RADEX ABDOMEN 1 VIEW: CPT

## 2022-11-29 RX ORDER — KETOROLAC TROMETHAMINE 30 MG/ML
15 INJECTION, SOLUTION INTRAMUSCULAR; INTRAVENOUS EVERY 6 HOURS PRN
Status: DISCONTINUED | OUTPATIENT
Start: 2022-11-29 | End: 2022-11-30 | Stop reason: HOSPADM

## 2022-11-29 RX ORDER — PANTOPRAZOLE SODIUM 40 MG/1
40 TABLET, DELAYED RELEASE ORAL
Status: DISCONTINUED | OUTPATIENT
Start: 2022-11-29 | End: 2022-11-29

## 2022-11-29 RX ORDER — ZOLPIDEM TARTRATE 5 MG/1
5 TABLET ORAL NIGHTLY PRN
Status: DISCONTINUED | OUTPATIENT
Start: 2022-11-29 | End: 2022-11-30 | Stop reason: HOSPADM

## 2022-11-29 RX ORDER — PANTOPRAZOLE SODIUM 40 MG/1
40 TABLET, DELAYED RELEASE ORAL
Status: DISCONTINUED | OUTPATIENT
Start: 2022-11-29 | End: 2022-11-30 | Stop reason: HOSPADM

## 2022-11-29 RX ADMIN — PANTOPRAZOLE SODIUM 40 MG: 40 TABLET, DELAYED RELEASE ORAL at 01:29

## 2022-11-29 RX ADMIN — IPRATROPIUM BROMIDE AND ALBUTEROL SULFATE 1 AMPULE: .5; 2.5 SOLUTION RESPIRATORY (INHALATION) at 13:10

## 2022-11-29 RX ADMIN — GUAIFENESIN AND DEXTROMETHORPHAN 5 ML: 100; 10 SYRUP ORAL at 15:45

## 2022-11-29 RX ADMIN — LISINOPRIL 5 MG: 5 TABLET ORAL at 08:37

## 2022-11-29 RX ADMIN — SODIUM CHLORIDE, PRESERVATIVE FREE 10 ML: 5 INJECTION INTRAVENOUS at 08:42

## 2022-11-29 RX ADMIN — PANTOPRAZOLE SODIUM 40 MG: 40 TABLET, DELAYED RELEASE ORAL at 15:45

## 2022-11-29 RX ADMIN — KETOROLAC TROMETHAMINE 15 MG: 30 INJECTION, SOLUTION INTRAMUSCULAR at 04:42

## 2022-11-29 RX ADMIN — ZOLPIDEM TARTRATE 5 MG: 5 TABLET ORAL at 23:57

## 2022-11-29 RX ADMIN — PANTOPRAZOLE SODIUM 40 MG: 40 TABLET, DELAYED RELEASE ORAL at 06:47

## 2022-11-29 RX ADMIN — ENOXAPARIN SODIUM 30 MG: 100 INJECTION SUBCUTANEOUS at 08:37

## 2022-11-29 RX ADMIN — IPRATROPIUM BROMIDE AND ALBUTEROL SULFATE 1 AMPULE: .5; 2.5 SOLUTION RESPIRATORY (INHALATION) at 21:25

## 2022-11-29 RX ADMIN — PREDNISONE 40 MG: 20 TABLET ORAL at 08:37

## 2022-11-29 RX ADMIN — ACETAMINOPHEN 650 MG: 325 TABLET ORAL at 22:39

## 2022-11-29 RX ADMIN — ASPIRIN 81 MG: 81 TABLET, COATED ORAL at 08:37

## 2022-11-29 RX ADMIN — ACETAMINOPHEN 650 MG: 325 TABLET ORAL at 02:37

## 2022-11-29 RX ADMIN — GUAIFENESIN AND DEXTROMETHORPHAN 5 ML: 100; 10 SYRUP ORAL at 22:39

## 2022-11-29 RX ADMIN — IPRATROPIUM BROMIDE AND ALBUTEROL SULFATE 1 AMPULE: .5; 2.5 SOLUTION RESPIRATORY (INHALATION) at 15:58

## 2022-11-29 RX ADMIN — IPRATROPIUM BROMIDE AND ALBUTEROL SULFATE 1 AMPULE: .5; 2.5 SOLUTION RESPIRATORY (INHALATION) at 09:11

## 2022-11-29 ASSESSMENT — PAIN SCALES - GENERAL
PAINLEVEL_OUTOF10: 7
PAINLEVEL_OUTOF10: 0
PAINLEVEL_OUTOF10: 3
PAINLEVEL_OUTOF10: 7
PAINLEVEL_OUTOF10: 0

## 2022-11-29 NOTE — PROGRESS NOTES
Pulse ox was __89____% on room air at rest.   Ambulated patient on room air. Oxygen saturation was _89_____% on room air while ambulating. Recovery pulse ox was _89_____% on room air.

## 2022-11-29 NOTE — PLAN OF CARE
Problem: Discharge Planning  Goal: Discharge to home or other facility with appropriate resources  Outcome: Progressing  Flowsheets (Taken 11/28/2022 2100 by Prateek Baca RN)  Discharge to home or other facility with appropriate resources: Identify barriers to discharge with patient and caregiver     Problem: Chronic Conditions and Co-morbidities  Goal: Patient's chronic conditions and co-morbidity symptoms are monitored and maintained or improved  Outcome: Progressing  Flowsheets (Taken 11/28/2022 2100 by Prateek Baca RN)  Care Plan - Patient's Chronic Conditions and Co-Morbidity Symptoms are Monitored and Maintained or Improved:   Monitor and assess patient's chronic conditions and comorbid symptoms for stability, deterioration, or improvement   Collaborate with multidisciplinary team to address chronic and comorbid conditions and prevent exacerbation or deterioration   Update acute care plan with appropriate goals if chronic or comorbid symptoms are exacerbated and prevent overall improvement and discharge     Problem: Pain  Goal: Verbalizes/displays adequate comfort level or baseline comfort level  Outcome: Progressing     Problem: Safety - Adult  Goal: Free from fall injury  Outcome: Progressing  Flowsheets (Taken 11/29/2022 0830)  Free From Fall Injury: Instruct family/caregiver on patient safety

## 2022-11-29 NOTE — PROGRESS NOTES
In to evaluate pt, pt was stating that she just got back from  an xray and walked to the bathroom. She states that she started to get a burning feeling in her chest. Vitals signs showed O2 levels being at 88% on room air. Pt was placed back onto 2 liters of O2 and came up to 99&. Pt states that this pain has been going on for the 4 months since not having any o2 at home. She states that it is burning in nature and radiates across her whole chest. She states that rest and placing on the oxygen seems to make it less intense. She states that walking around makes it much worse. Lungs are clear. Advised that I would tell her nurse.

## 2022-11-29 NOTE — PROGRESS NOTES
Pt continuing to complain of burning pain in chest and abd, call placed to night NP, she will put orders in.

## 2022-11-29 NOTE — PROGRESS NOTES
[facility] Progress Note    Admitting Date and Time: 11/27/2022 12:30 AM  Admit Dx: Acute respiratory failure (HCC) [J96.00]  COPD exacerbation (HCC) [J44.1]  Acute respiratory failure with hypoxia (HCC) [J96.01]    Subjective:  Patient is being followed for Acute respiratory failure ([address] [J96.00]  COPD exacerbation ([facility] [J44.1]  Acute respiratory failure with hypoxia ([facility] [J96.01]     Seen and examined at bedside. Complaining of anxiety and burning in chest. On room air with hypoxia, discussed this at length as she keeps taking oxygen off. No urinary symptoms. Some abdominal pain but better. Tolerating medications, no side effects. ROS: denies fever, chills, cp, sob, n/v, HA unless stated above.       pantoprazole  40 mg Oral BID AC    aspirin  81 mg Oral Daily    lisinopril  5 mg Oral Daily    sodium chloride flush  5-40 mL IntraVENous 2 times per day    enoxaparin  30 mg SubCUTAneous Daily    ipratropium-albuterol  1 ampule Inhalation Q4H WA    predniSONE  40 mg Oral Daily     ketorolac, 15 mg, Q6H PRN  zolpidem, 5 mg, Nightly PRN  bisacodyl, 10 mg, Daily PRN  guaiFENesin-dextromethorphan, 5 mL, Q4H PRN  sodium chloride flush, 5-40 mL, PRN  sodium chloride, , PRN  ondansetron, 4 mg, Q8H PRN   Or  ondansetron, 4 mg, Q6H PRN  polyethylene glycol, 17 g, Daily PRN  acetaminophen, 650 mg, Q6H PRN   Or  acetaminophen, 650 mg, Q6H PRN       Objective:    BP (!) 140/80   Pulse 92   Temp 98.1 °F (36.7 °C) (Oral)   Resp 22   Ht 5' 8\" (1.727 m)   Wt 104 lb (47.2 kg)   SpO2 99%   BMI 15.81 kg/m²     General Appearance: alert and oriented to person, place and time and in no acute distress - thin build  barrel chest   Skin: warm and dry  Head: normocephalic and atraumatic  Eyes: pupils equal, round, and reactive to light, extraocular eye movements intact, conjunctivae normal  Neck: neck supple and non tender without mass   Pulmonary/Chest: diminished to auscultation bilaterally- ++ wheezes, rales or rhonchi, normal air movement, no respiratory distress + 2 L via NC   Cardiovascular: normal rate, normal S1 and S2 and no carotid bruits  Abdomen: soft, non-tender, non-distended, normal bowel sounds, no masses or organomegaly  Extremities: no cyanosis, no clubbing and no edema  Neurologic: no cranial nerve deficit and speech normal        Recent Labs     11/27/22  0043      K 3.5   CL 97*   CO2 33*   BUN 13   CREATININE 0.5   GLUCOSE 112*   CALCIUM 9.4       Recent Labs     11/27/22  0043 11/28/22  0440   WBC 11.0 12.1*   RBC 4.86 3.67   HGB 14.6 11.2*   HCT 46.6 35.5   MCV 95.9 96.7   MCH 30.0 30.5   MCHC 31.3* 31.5*   RDW 13.4 13.7    273   MPV 9.4 10.1       Radiology: reviewed       Assessment:    Principal Problem:    Acute respiratory failure with hypoxia (HCC)  Active Problems:    COPD with acute exacerbation (HCC)    Acute respiratory failure (HCC)    Elevated troponin    Chest pain  Resolved Problems:    * No resolved hospital problems. *    PLAN  1. Acute hypoxic respiratory failure requiring supplemental oxygen via nasal cannula-continue supplemental oxygen as needed, currently requiring 2 L, wean as tolerated. Continue pulse oximetry - found without nasal cannula at 79% on room air. Will need oxygen at home as per walking pulse ox. Discussed with patient because she keeps taking oxygen off.   2.  Acute COPD exacerbation- Last exacerbation June 2022 for which she was given nasal cannula, Levaquin, inhalers, and prednisone taper. CTA chest showed COPD. Given IV Solu-Medrol 40 mg every 8 hours now weaned to 40 mg po daily. continue duo nebs  3. Hypertension-continue home lisinopril  4. Nonzero troponin - EKG nonacute. Does have noncardiac chest pain. Troponin 22 < 20   5. History of COVID-19 July 2022. Code Status: Full code  DVT prophylaxis: Lovenox  Discharge dispo: from home, lives with brother. Will need oxygen for home which was accepted from [address].  Was supposed to follow up outpatient with Dr Manny Almanza after July admission but did not. 28 minutes time spent reviewing patient chart, assessing patient, discussing plan of care with patient and family, discussing plan of care with collaborating physician, and charting. NOTE: This report was transcribed using voice recognition software. Every effort was made to ensure accuracy; however, inadvertent computerized transcription errors may be present.   Electronically signed by REGI Lyon NP on 11/29/2022 at 1:33 PM

## 2022-11-29 NOTE — PROGRESS NOTES
Pt having burning pain in chest and abd, she states this is the same pain she came in with. Cannot get to sleep, requests something to alleviate this pain. Call placed to night NP, new orders received.

## 2022-11-30 VITALS
WEIGHT: 100.8 LBS | OXYGEN SATURATION: 99 % | DIASTOLIC BLOOD PRESSURE: 80 MMHG | BODY MASS INDEX: 15.28 KG/M2 | TEMPERATURE: 97.7 F | HEART RATE: 80 BPM | HEIGHT: 68 IN | RESPIRATION RATE: 20 BRPM | SYSTOLIC BLOOD PRESSURE: 170 MMHG

## 2022-11-30 PROCEDURE — 2580000003 HC RX 258: Performed by: STUDENT IN AN ORGANIZED HEALTH CARE EDUCATION/TRAINING PROGRAM

## 2022-11-30 PROCEDURE — 6370000000 HC RX 637 (ALT 250 FOR IP): Performed by: STUDENT IN AN ORGANIZED HEALTH CARE EDUCATION/TRAINING PROGRAM

## 2022-11-30 PROCEDURE — 99239 HOSP IP/OBS DSCHRG MGMT >30: CPT | Performed by: INTERNAL MEDICINE

## 2022-11-30 PROCEDURE — 6360000002 HC RX W HCPCS: Performed by: STUDENT IN AN ORGANIZED HEALTH CARE EDUCATION/TRAINING PROGRAM

## 2022-11-30 PROCEDURE — 94640 AIRWAY INHALATION TREATMENT: CPT

## 2022-11-30 PROCEDURE — 6370000000 HC RX 637 (ALT 250 FOR IP): Performed by: FAMILY MEDICINE

## 2022-11-30 RX ORDER — IPRATROPIUM BROMIDE AND ALBUTEROL SULFATE 2.5; .5 MG/3ML; MG/3ML
3 SOLUTION RESPIRATORY (INHALATION)
Qty: 360 ML | Refills: 0 | Status: SHIPPED | OUTPATIENT
Start: 2022-11-30

## 2022-11-30 RX ORDER — PREDNISONE 10 MG/1
TABLET ORAL
Qty: 32 TABLET | Refills: 0 | Status: SHIPPED | OUTPATIENT
Start: 2022-11-30 | End: 2022-12-15

## 2022-11-30 RX ORDER — GUAIFENESIN/DEXTROMETHORPHAN 100-10MG/5
5 SYRUP ORAL EVERY 4 HOURS PRN
Qty: 120 ML | COMMUNITY
Start: 2022-11-30 | End: 2022-12-10

## 2022-11-30 RX ADMIN — IPRATROPIUM BROMIDE AND ALBUTEROL SULFATE 1 AMPULE: .5; 2.5 SOLUTION RESPIRATORY (INHALATION) at 12:59

## 2022-11-30 RX ADMIN — PREDNISONE 40 MG: 20 TABLET ORAL at 07:28

## 2022-11-30 RX ADMIN — LISINOPRIL 5 MG: 5 TABLET ORAL at 07:28

## 2022-11-30 RX ADMIN — GUAIFENESIN AND DEXTROMETHORPHAN 5 ML: 100; 10 SYRUP ORAL at 07:32

## 2022-11-30 RX ADMIN — PANTOPRAZOLE SODIUM 40 MG: 40 TABLET, DELAYED RELEASE ORAL at 06:23

## 2022-11-30 RX ADMIN — ENOXAPARIN SODIUM 30 MG: 100 INJECTION SUBCUTANEOUS at 07:29

## 2022-11-30 RX ADMIN — IPRATROPIUM BROMIDE AND ALBUTEROL SULFATE 1 AMPULE: .5; 2.5 SOLUTION RESPIRATORY (INHALATION) at 09:33

## 2022-11-30 RX ADMIN — ASPIRIN 81 MG: 81 TABLET, COATED ORAL at 07:28

## 2022-11-30 RX ADMIN — BISACODYL 10 MG: 5 TABLET, COATED ORAL at 07:28

## 2022-11-30 RX ADMIN — POLYETHYLENE GLYCOL 3350 17 G: 17 POWDER, FOR SOLUTION ORAL at 06:23

## 2022-11-30 RX ADMIN — SODIUM CHLORIDE, PRESERVATIVE FREE 10 ML: 5 INJECTION INTRAVENOUS at 07:29

## 2022-11-30 ASSESSMENT — PAIN SCALES - GENERAL: PAINLEVEL_OUTOF10: 0

## 2022-11-30 NOTE — DISCHARGE SUMMARY
[facility] Physician Discharge Summary       Timoteo Tran MD  [address] [facility] [address]  [address]  [phone]    Schedule an appointment as soon as possible for a visit in 4 week(s)      DO Aniya Escudero [address] [place] [id number]  [phone]    Follow up in 3 day(s)        Activity level: As tolerated     Dispo: Home      Condition on discharge: Stable     Patient ID:  Claudia Wyatt  [id number]  69 y.o.  1955    Admit date: 11/27/2022    Discharge date and time:  11/30/2022  1:28 PM    Admission Diagnoses: Principal Problem:    Acute respiratory failure with hypoxia ([address]  Active Problems:    COPD with acute exacerbation ([address]    Essential (primary) hypertension    COPD exacerbation ([address]    Acute respiratory failure (HCC)    Elevated troponin    Chest pain  Resolved Problems:    * No resolved hospital problems. *      Discharge Diagnoses: Principal Problem:    Acute respiratory failure with hypoxia (HCC)  Active Problems:    COPD with acute exacerbation (HCC)    Essential (primary) hypertension    COPD exacerbation (HCC)    Acute respiratory failure (HCC)    Elevated troponin    Chest pain  Resolved Problems:    * No resolved hospital problems. *      Consults:  None    Hospital Course:   Patient Claudia Wyatt is a 69 y.o. presented with Acute respiratory failure ([address] [J96.00]  COPD exacerbation ([address] [J44.1]  Acute respiratory failure with hypoxia ([address] [J96.01]    Ms. Nivia Cisse is a 70-year-old female with past medical history significant for COPD, hypertension who presents with 5-day history of worsening shortness of breath. She states about 5 days ago she noticed development of shortness of breath with dry cough. In the ED, O2 sat 88% on room air. While ambulating to the restroom her oxygen saturation was recorded as 50%.  Lab work was obtained which revealed serum bicarbonate 33, creatinine 0.5, blood glucose 112, proBNP 446, high-sensitivity troponin 22. A CBC was obtained which revealed WBC 11.0, hemoglobin 14.6, platelets 480R. An imaging work-up was obtained in chest x-ray which revealed no acute disease. There was hyperinflation of the lungs with flattening of the diaphragms suggesting COPD. He was followed up with CTA pulmonary with contrast which revealed no evidence of PE or acute pulmonary abnormality. COPD and moderate retrocardiac hiatal hernia noted. The patient was given IV Decadron 10 mg once as well as magnesium sulfate 2000 mg once. Duo nebs were ordered. She was started on supplemental oxygen at 4 L. Patient weaned down to 2 L via NC which she will require at home. Long discussion had with patient as she desaturates quickly with slow resolve. CTA chest showed COPD and she was given IV Solu-medrol weaned to PO. Other hospital issues include constipation for a KUB was ordered and showed no fecal material. She was given Miralax with resolve. Patient is hemodynamically and medically stable for discharge. She will have oxygen via [employer]. Should follow up with pulmonary and PCP outpatient. Discharge Exam:    General Appearance: alert and oriented to person, place and time and in no acute distress - thin , ill- appearing, anxious, barrel chest   Skin: warm and dry  Head: normocephalic and atraumatic  Eyes: pupils equal, round, and reactive to light, extraocular eye movements intact, conjunctivae normal  Neck: neck supple and non tender without mass   Pulmonary/Chest: clear to auscultation bilaterally- no wheezes, rales or rhonchi, normal air movement, no respiratory distress +2 L via NC   Cardiovascular: normal rate, normal S1 and S2 and no carotid bruits  Abdomen: soft, non-tender, non-distended, normal bowel sounds, no masses or organomegaly  Extremities: no cyanosis, no clubbing and no edema  Neurologic: no cranial nerve deficit and speech normal    No intake/output data recorded.   I/O this shift:  In: 10 [I.V.:10]  Out: -       LABS:  No results for input(s): NA, K, CL, CO2, BUN, CREATININE, GLUCOSE, CALCIUM in the last 72 hours. Recent Labs     11/28/22  0440   WBC 12.1*   RBC 3.67   HGB 11.2*   HCT 35.5   MCV 96.7   MCH 30.5   MCHC 31.5*   RDW 13.7      MPV 10.1       No results for input(s): POCGLU in the last 72 hours. Imaging:  XR CHEST PORTABLE    Result Date: 11/27/2022  EXAMINATION: ONE XRAY VIEW OF THE CHEST 11/27/2022 2:08 am COMPARISON: July 14, 2022 HISTORY: ORDERING SYSTEM PROVIDED HISTORY: chest pain TECHNOLOGIST PROVIDED HISTORY: Reason for exam:->chest pain FINDINGS: Normal cardiomediastinal silhouette. Lungs clear. There is hyperinflation of the lungs with flattening of the diaphragms suggesting COPD. No pneumothorax or effusion. Osseous thorax intact. No acute disease. RECOMMENDATION: Careful clinical correlation and follow up recommended. CTA PULMONARY W CONTRAST    Result Date: 11/27/2022  EXAMINATION: CTA OF THE CHEST 11/27/2022 2:01 am TECHNIQUE: CTA of the chest was performed after the administration of intravenous contrast.  Multiplanar reformatted images are provided for review. MIP images are provided for review. Automated exposure control, iterative reconstruction, and/or weight based adjustment of the mA/kV was utilized to reduce the radiation dose to as low as reasonably achievable. COMPARISON: None. HISTORY: ORDERING SYSTEM PROVIDED HISTORY: hypoxia, copd, short of breath cough TECHNOLOGIST PROVIDED HISTORY: Reason for exam:->hypoxia, copd, short of breath cough Decision Support Exception - unselect if not a suspected or confirmed emergency medical condition->Emergency Medical Condition ([place]) FINDINGS: Pulmonary Arteries: Pulmonary arteries are adequately opacified for evaluation. No evidence of intraluminal filling defect to suggest pulmonary embolism. Main pulmonary artery is normal in caliber. Mediastinum: No evidence of mediastinal lymphadenopathy.   The heart and pericardium demonstrate no acute abnormality. There is no acute abnormality of the thoracic aorta. Moderate retrocardiac hiatal hernia. Lungs/pleura: The lungs are without acute process. No focal consolidation or pulmonary edema. No evidence of pleural effusion or pneumothorax. There is hyperinflation of the lungs with flattening of the diaphragms suggesting COPD. Upper Abdomen: Limited images of the upper abdomen are unremarkable. Soft Tissues/Bones: No acute bone or soft tissue abnormality. 1. No evidence of pulmonary embolism or acute pulmonary abnormality. 2. COPD and moderate retrocardiac hiatal hernia noted. RECOMMENDATIONS: Careful clinical correlation and follow up recommended. Patient Instructions:      Medication List        START taking these medications      guaiFENesin-dextromethorphan 100-10 MG/5ML syrup  Commonly known as: ROBITUSSIN DM  Take 5 mLs by mouth every 4 hours as needed for Cough     ipratropium-albuterol 0.5-2.5 (3) MG/3ML Soln nebulizer solution  Commonly known as: DUONEB  Inhale 3 mLs into the lungs every 4 hours (while awake)     predniSONE 10 MG tablet  Commonly known as: DELTASONE  Take 4 tablets by mouth daily for 3 days, THEN 3 tablets daily for 3 days, THEN 2 tablets daily for 3 days, THEN 1 tablet daily for 3 days, THEN 0.5 tablets daily for 3 days.   Start taking on: November 30, 2022            CONTINUE taking these medications      aspirin 81 MG EC tablet     budesonide-formoterol 160-4.5 MCG/ACT Aero  Commonly known as: Symbicort  Inhale 2 puffs into the lungs 2 times daily     lisinopril 5 MG tablet  Commonly known as: PRINIVIL;ZESTRIL  Take 1 tablet by mouth daily     vitamin C 500 MG tablet  Commonly known as: ASCORBIC ACID     Vitamin D3 50 MCG (2000 UT) Caps            STOP taking these medications      albuterol sulfate  (90 Base) MCG/ACT inhaler  Commonly known as: Ventolin HFA               Where to Get Your Medications        These [facility] were [facility] to [facility], [facility]  [address], [address] [place] [id number]      Phone: [phone]   ipratropium-albuterol 0.5-2.5 (3) MG/3ML Soln nebulizer solution  predniSONE 10 MG tablet       You can get these medications from any pharmacy    You don't need a prescription for these medications  guaiFENesin-dextromethorphan 100-10 MG/5ML syrup     Note that more than 30 minutes was spent in preparing discharge papers, discussing discharge with patient, medication review, etc.    Signed:  Electronically signed by REGI Wong NP on 11/30/2022 at 1:28 PM

## 2022-11-30 NOTE — PROGRESS NOTES
Pulse ox was 89% on room air at rest.  Ambulated patient on room air. Oxygen saturation was 86% on room air while ambulating. Oxygen applied. Recovery pulse ox was 89% on 2 liters of oxygen while ambulating.

## 2022-11-30 NOTE — CARE COORDINATION
CM received a call from [facility] stating patient wants to hold off on the home delivery of the oxygen concentrator because she may be locked out of her home and staying with a friend tonight. CM revisited pt who was not wearing her O2, new tubing given to pt by CM and O2 placed back on pt w/education given. CM instructed pt to let [employer] know what address she would be at [facility] for the delivery of the concentrator, pt is agreeable. Pt also made aware that [employer] will assist w/transportation of concentrator back to her home the following day if she is not able to transport it herself. [facility] updated and awaiting a call from patient.   Clarisa Almeida, [place], RN  [facility] Case Management  [phone]

## 2022-11-30 NOTE — CARE COORDINATION
Discharge order in place for home today. O2 order in place w/[employer] to bring a tank today to patient's room. [employer] made aware of nebulizer order and will be delivered to her home. Pt drove here and states she will drive herself home. Will follow.   Jose Grey, [place], RN  [facility] Case Management  [phone]

## 2022-11-30 NOTE — PROGRESS NOTES
AVS discharge instructions and education provided to patient. Patient verbalized understanding. Patient stated she is locked out of her house and is awaiting call from brother to let her into her home.

## 2022-12-01 ENCOUNTER — APPOINTMENT (OUTPATIENT)
Dept: GENERAL RADIOLOGY | Age: 67
DRG: 309 | End: 2022-12-01
Payer: MEDICARE

## 2022-12-01 ENCOUNTER — CARE COORDINATION (OUTPATIENT)
Dept: CASE MANAGEMENT | Age: 67
End: 2022-12-01

## 2022-12-01 ENCOUNTER — HOSPITAL ENCOUNTER (INPATIENT)
Age: 67
LOS: 1 days | Discharge: HOME | DRG: 309 | End: 2022-12-03
Attending: EMERGENCY MEDICINE | Admitting: INTERNAL MEDICINE
Payer: MEDICARE

## 2022-12-01 DIAGNOSIS — J44.1 COPD EXACERBATION (HCC): ICD-10-CM

## 2022-12-01 DIAGNOSIS — I48.91 ATRIAL FIBRILLATION WITH RVR (HCC): Primary | ICD-10-CM

## 2022-12-01 PROCEDURE — 99285 EMERGENCY DEPT VISIT HI MDM: CPT

## 2022-12-01 PROCEDURE — 94640 AIRWAY INHALATION TREATMENT: CPT

## 2022-12-01 PROCEDURE — 6370000000 HC RX 637 (ALT 250 FOR IP)

## 2022-12-01 PROCEDURE — 93005 ELECTROCARDIOGRAM TRACING: CPT

## 2022-12-01 PROCEDURE — 85025 COMPLETE CBC W/AUTO DIFF WBC: CPT

## 2022-12-01 PROCEDURE — 80048 BASIC METABOLIC PNL TOTAL CA: CPT

## 2022-12-01 PROCEDURE — 71045 X-RAY EXAM CHEST 1 VIEW: CPT

## 2022-12-01 PROCEDURE — 84484 ASSAY OF TROPONIN QUANT: CPT

## 2022-12-01 PROCEDURE — 94664 DEMO&/EVAL PT USE INHALER: CPT

## 2022-12-01 RX ORDER — IPRATROPIUM BROMIDE AND ALBUTEROL SULFATE 2.5; .5 MG/3ML; MG/3ML
3 SOLUTION RESPIRATORY (INHALATION) ONCE
Status: COMPLETED | OUTPATIENT
Start: 2022-12-01 | End: 2022-12-01

## 2022-12-01 RX ADMIN — IPRATROPIUM BROMIDE AND ALBUTEROL SULFATE 3 AMPULE: 2.5; .5 SOLUTION RESPIRATORY (INHALATION) at 23:47

## 2022-12-01 ASSESSMENT — PAIN - FUNCTIONAL ASSESSMENT: PAIN_FUNCTIONAL_ASSESSMENT: 0-10

## 2022-12-01 ASSESSMENT — PAIN DESCRIPTION - LOCATION: LOCATION: CHEST

## 2022-12-01 ASSESSMENT — PAIN DESCRIPTION - DESCRIPTORS: DESCRIPTORS: PRESSURE

## 2022-12-01 ASSESSMENT — PAIN SCALES - GENERAL: PAINLEVEL_OUTOF10: 8

## 2022-12-01 NOTE — CARE COORDINATION
[facility] Care Transitions Initial Follow Up Call    Call within 2 business days of discharge: Yes    Patient: Eyal Pineda Patient : 1955   MRN: [id number]  Reason for Admission: Acute respiratory failure with hypoxia  Discharge Date: 22 RARS: Readmission Risk Score: 10.7      Last Discharge  [facility]       Date Complaint Diagnosis Description Type Department Provider    22 Shortness of Breath; Chest Pain Acute respiratory failure with hypoxia (HCC) . .. ED to Hosp-Admission (Discharged) (ADMITTED) JOE Guerrier MD; Silvia Rod. .. Attempted to contact patient today 22 for initial TCM/hospital discharge (low risk) follow up. Left message requesting a return call back to [facility] and provided contact information.     Andres Bailey APRN

## 2022-12-02 ENCOUNTER — APPOINTMENT (OUTPATIENT)
Dept: NUCLEAR MEDICINE | Age: 67
DRG: 309 | End: 2022-12-02
Payer: MEDICARE

## 2022-12-02 ENCOUNTER — APPOINTMENT (OUTPATIENT)
Dept: NON INVASIVE DIAGNOSTICS | Age: 67
DRG: 309 | End: 2022-12-02
Payer: MEDICARE

## 2022-12-02 PROBLEM — I48.91 ATRIAL FIBRILLATION WITH RVR (HCC): Status: RESOLVED | Noted: 2022-12-02 | Resolved: 2022-12-02

## 2022-12-02 PROBLEM — R00.0 TACHYCARDIA, UNSPECIFIED: Status: ACTIVE | Noted: 2022-12-02

## 2022-12-02 PROBLEM — I48.91 ATRIAL FIBRILLATION WITH RAPID VENTRICULAR RESPONSE (HCC): Status: ACTIVE | Noted: 2022-12-02

## 2022-12-02 LAB
ANION GAP SERPL CALCULATED.3IONS-SCNC: 10 MMOL/L (ref 7–16)
ANION GAP SERPL CALCULATED.3IONS-SCNC: 9 MMOL/L (ref 7–16)
BASOPHILS ABSOLUTE: 0.03 E9/L (ref 0–0.2)
BASOPHILS ABSOLUTE: 0.03 E9/L (ref 0–0.2)
BASOPHILS RELATIVE PERCENT: 0.3 % (ref 0–2)
BASOPHILS RELATIVE PERCENT: 0.4 % (ref 0–2)
BUN BLDV-MCNC: 18 MG/DL (ref 6–23)
BUN BLDV-MCNC: 23 MG/DL (ref 6–23)
CALCIUM SERPL-MCNC: 9.1 MG/DL (ref 8.6–10.2)
CALCIUM SERPL-MCNC: 9.5 MG/DL (ref 8.6–10.2)
CHLORIDE BLD-SCNC: 91 MMOL/L (ref 98–107)
CHLORIDE BLD-SCNC: 92 MMOL/L (ref 98–107)
CO2: 30 MMOL/L (ref 22–29)
CO2: 30 MMOL/L (ref 22–29)
CREAT SERPL-MCNC: 0.6 MG/DL (ref 0.5–1)
CREAT SERPL-MCNC: 0.8 MG/DL (ref 0.5–1)
EKG ATRIAL RATE: 100 BPM
EKG ATRIAL RATE: 86 BPM
EKG P AXIS: 88 DEGREES
EKG P AXIS: 89 DEGREES
EKG P-R INTERVAL: 132 MS
EKG P-R INTERVAL: 134 MS
EKG Q-T INTERVAL: 342 MS
EKG Q-T INTERVAL: 370 MS
EKG QRS DURATION: 68 MS
EKG QRS DURATION: 70 MS
EKG QTC CALCULATION (BAZETT): 441 MS
EKG QTC CALCULATION (BAZETT): 442 MS
EKG R AXIS: 72 DEGREES
EKG R AXIS: 74 DEGREES
EKG T AXIS: 83 DEGREES
EKG T AXIS: 88 DEGREES
EKG VENTRICULAR RATE: 100 BPM
EKG VENTRICULAR RATE: 86 BPM
EOSINOPHILS ABSOLUTE: 0 E9/L (ref 0.05–0.5)
EOSINOPHILS ABSOLUTE: 0.01 E9/L (ref 0.05–0.5)
EOSINOPHILS RELATIVE PERCENT: 0 % (ref 0–6)
EOSINOPHILS RELATIVE PERCENT: 0.1 % (ref 0–6)
GFR SERPL CREATININE-BSD FRML MDRD: >60 ML/MIN/1.73
GFR SERPL CREATININE-BSD FRML MDRD: >60 ML/MIN/1.73
GLUCOSE BLD-MCNC: 114 MG/DL (ref 74–99)
GLUCOSE BLD-MCNC: 142 MG/DL (ref 74–99)
HCT VFR BLD CALC: 44.9 % (ref 34–48)
HCT VFR BLD CALC: 47.8 % (ref 34–48)
HEMOGLOBIN: 14.7 G/DL (ref 11.5–15.5)
HEMOGLOBIN: 15.4 G/DL (ref 11.5–15.5)
IMMATURE GRANULOCYTES #: 0.04 E9/L
IMMATURE GRANULOCYTES #: 0.04 E9/L
IMMATURE GRANULOCYTES %: 0.4 % (ref 0–5)
IMMATURE GRANULOCYTES %: 0.5 % (ref 0–5)
INR BLD: 1
LYMPHOCYTES ABSOLUTE: 0.48 E9/L (ref 1.5–4)
LYMPHOCYTES ABSOLUTE: 1.31 E9/L (ref 1.5–4)
LYMPHOCYTES RELATIVE PERCENT: 13 % (ref 20–42)
LYMPHOCYTES RELATIVE PERCENT: 6 % (ref 20–42)
MAGNESIUM: 1.8 MG/DL (ref 1.6–2.6)
MCH RBC QN AUTO: 29.6 PG (ref 26–35)
MCH RBC QN AUTO: 30.1 PG (ref 26–35)
MCHC RBC AUTO-ENTMCNC: 32.2 % (ref 32–34.5)
MCHC RBC AUTO-ENTMCNC: 32.7 % (ref 32–34.5)
MCV RBC AUTO: 91.8 FL (ref 80–99.9)
MCV RBC AUTO: 91.9 FL (ref 80–99.9)
MONOCYTES ABSOLUTE: 0.7 E9/L (ref 0.1–0.95)
MONOCYTES ABSOLUTE: 1.07 E9/L (ref 0.1–0.95)
MONOCYTES RELATIVE PERCENT: 10.7 % (ref 2–12)
MONOCYTES RELATIVE PERCENT: 8.8 % (ref 2–12)
NEUTROPHILS ABSOLUTE: 6.69 E9/L (ref 1.8–7.3)
NEUTROPHILS ABSOLUTE: 7.58 E9/L (ref 1.8–7.3)
NEUTROPHILS RELATIVE PERCENT: 75.5 % (ref 43–80)
NEUTROPHILS RELATIVE PERCENT: 84.3 % (ref 43–80)
PDW BLD-RTO: 13.3 FL (ref 11.5–15)
PDW BLD-RTO: 13.5 FL (ref 11.5–15)
PLATELET # BLD: 315 E9/L (ref 130–450)
PLATELET # BLD: 340 E9/L (ref 130–450)
PMV BLD AUTO: 9.4 FL (ref 7–12)
PMV BLD AUTO: 9.7 FL (ref 7–12)
POTASSIUM REFLEX MAGNESIUM: 4.5 MMOL/L (ref 3.5–5)
POTASSIUM SERPL-SCNC: 4.4 MMOL/L (ref 3.5–5)
PRO-BNP: 1516 PG/ML (ref 0–125)
PROTHROMBIN TIME: 11.2 SEC (ref 9.3–12.4)
RBC # BLD: 4.89 E12/L (ref 3.5–5.5)
RBC # BLD: 5.2 E12/L (ref 3.5–5.5)
RBC # BLD: NORMAL 10*6/UL
SODIUM BLD-SCNC: 130 MMOL/L (ref 132–146)
SODIUM BLD-SCNC: 132 MMOL/L (ref 132–146)
T4 FREE: 1.19 NG/DL (ref 0.93–1.7)
TROPONIN, HIGH SENSITIVITY: 18 NG/L (ref 0–9)
TROPONIN, HIGH SENSITIVITY: 35 NG/L (ref 0–9)
TSH SERPL DL<=0.05 MIU/L-ACNC: 0.04 UIU/ML (ref 0.27–4.2)
WBC # BLD: 10 E9/L (ref 4.5–11.5)
WBC # BLD: 7.9 E9/L (ref 4.5–11.5)

## 2022-12-02 PROCEDURE — 99222 1ST HOSP IP/OBS MODERATE 55: CPT | Performed by: INTERNAL MEDICINE

## 2022-12-02 PROCEDURE — 2060000000 HC ICU INTERMEDIATE R&B

## 2022-12-02 PROCEDURE — 6370000000 HC RX 637 (ALT 250 FOR IP)

## 2022-12-02 PROCEDURE — 84484 ASSAY OF TROPONIN QUANT: CPT

## 2022-12-02 PROCEDURE — 3430000000 HC RX DIAGNOSTIC RADIOPHARMACEUTICAL: Performed by: RADIOLOGY

## 2022-12-02 PROCEDURE — 84439 ASSAY OF FREE THYROXINE: CPT

## 2022-12-02 PROCEDURE — 6360000002 HC RX W HCPCS: Performed by: NURSE PRACTITIONER

## 2022-12-02 PROCEDURE — 93016 CV STRESS TEST SUPVJ ONLY: CPT | Performed by: INTERNAL MEDICINE

## 2022-12-02 PROCEDURE — 80048 BASIC METABOLIC PNL TOTAL CA: CPT

## 2022-12-02 PROCEDURE — 6370000000 HC RX 637 (ALT 250 FOR IP): Performed by: INTERNAL MEDICINE

## 2022-12-02 PROCEDURE — 85025 COMPLETE CBC W/AUTO DIFF WBC: CPT

## 2022-12-02 PROCEDURE — 93010 ELECTROCARDIOGRAM REPORT: CPT | Performed by: INTERNAL MEDICINE

## 2022-12-02 PROCEDURE — 78452 HT MUSCLE IMAGE SPECT MULT: CPT

## 2022-12-02 PROCEDURE — 94640 AIRWAY INHALATION TREATMENT: CPT

## 2022-12-02 PROCEDURE — 84443 ASSAY THYROID STIM HORMONE: CPT

## 2022-12-02 PROCEDURE — 83735 ASSAY OF MAGNESIUM: CPT

## 2022-12-02 PROCEDURE — 6360000002 HC RX W HCPCS

## 2022-12-02 PROCEDURE — 6360000002 HC RX W HCPCS: Performed by: INTERNAL MEDICINE

## 2022-12-02 PROCEDURE — 2500000003 HC RX 250 WO HCPCS

## 2022-12-02 PROCEDURE — 93005 ELECTROCARDIOGRAM TRACING: CPT | Performed by: NURSE PRACTITIONER

## 2022-12-02 PROCEDURE — 6370000000 HC RX 637 (ALT 250 FOR IP): Performed by: NURSE PRACTITIONER

## 2022-12-02 PROCEDURE — 78452 HT MUSCLE IMAGE SPECT MULT: CPT | Performed by: INTERNAL MEDICINE

## 2022-12-02 PROCEDURE — APPSS45 APP SPLIT SHARED TIME 31-45 MINUTES

## 2022-12-02 PROCEDURE — A9500 TC99M SESTAMIBI: HCPCS | Performed by: RADIOLOGY

## 2022-12-02 PROCEDURE — 36415 COLL VENOUS BLD VENIPUNCTURE: CPT

## 2022-12-02 PROCEDURE — APPSS60 APP SPLIT SHARED TIME 46-60 MINUTES: Performed by: NURSE PRACTITIONER

## 2022-12-02 PROCEDURE — 2580000003 HC RX 258

## 2022-12-02 PROCEDURE — 93018 CV STRESS TEST I&R ONLY: CPT | Performed by: INTERNAL MEDICINE

## 2022-12-02 PROCEDURE — 85610 PROTHROMBIN TIME: CPT

## 2022-12-02 PROCEDURE — 93017 CV STRESS TEST TRACING ONLY: CPT

## 2022-12-02 PROCEDURE — 83880 ASSAY OF NATRIURETIC PEPTIDE: CPT

## 2022-12-02 RX ORDER — SODIUM CHLORIDE 0.9 % (FLUSH) 0.9 %
5-40 SYRINGE (ML) INJECTION EVERY 12 HOURS SCHEDULED
Status: DISCONTINUED | OUTPATIENT
Start: 2022-12-02 | End: 2022-12-03 | Stop reason: HOSPADM

## 2022-12-02 RX ORDER — TECHNETIUM TC-99M SESTAMIBI 1 MG/10ML
30 INJECTION INTRAVENOUS
Status: COMPLETED | OUTPATIENT
Start: 2022-12-02 | End: 2022-12-02

## 2022-12-02 RX ORDER — MAGNESIUM SULFATE IN WATER 40 MG/ML
2000 INJECTION, SOLUTION INTRAVENOUS ONCE
Status: COMPLETED | OUTPATIENT
Start: 2022-12-02 | End: 2022-12-02

## 2022-12-02 RX ORDER — SODIUM CHLORIDE 0.9 % (FLUSH) 0.9 %
5-40 SYRINGE (ML) INJECTION PRN
Status: DISCONTINUED | OUTPATIENT
Start: 2022-12-02 | End: 2022-12-03 | Stop reason: HOSPADM

## 2022-12-02 RX ORDER — IPRATROPIUM BROMIDE AND ALBUTEROL SULFATE 2.5; .5 MG/3ML; MG/3ML
1 SOLUTION RESPIRATORY (INHALATION)
Status: DISCONTINUED | OUTPATIENT
Start: 2022-12-02 | End: 2022-12-03 | Stop reason: HOSPADM

## 2022-12-02 RX ORDER — ACETAMINOPHEN 650 MG/1
650 SUPPOSITORY RECTAL EVERY 6 HOURS PRN
Status: DISCONTINUED | OUTPATIENT
Start: 2022-12-02 | End: 2022-12-03 | Stop reason: HOSPADM

## 2022-12-02 RX ORDER — GUAIFENESIN/DEXTROMETHORPHAN 100-10MG/5
5 SYRUP ORAL EVERY 4 HOURS PRN
Status: DISCONTINUED | OUTPATIENT
Start: 2022-12-02 | End: 2022-12-03 | Stop reason: HOSPADM

## 2022-12-02 RX ORDER — DILTIAZEM HYDROCHLORIDE 5 MG/ML
5 INJECTION INTRAVENOUS ONCE
Status: COMPLETED | OUTPATIENT
Start: 2022-12-02 | End: 2022-12-02

## 2022-12-02 RX ORDER — DILTIAZEM HYDROCHLORIDE 120 MG/1
120 CAPSULE, COATED, EXTENDED RELEASE ORAL DAILY
Status: DISCONTINUED | OUTPATIENT
Start: 2022-12-02 | End: 2022-12-03 | Stop reason: HOSPADM

## 2022-12-02 RX ORDER — CYANOCOBALAMIN 1000 UG/ML
1000 INJECTION, SOLUTION INTRAMUSCULAR; SUBCUTANEOUS DAILY
Status: COMPLETED | OUTPATIENT
Start: 2022-12-02 | End: 2022-12-03

## 2022-12-02 RX ORDER — ASCORBIC ACID 500 MG
500 TABLET ORAL DAILY
Status: DISCONTINUED | OUTPATIENT
Start: 2022-12-02 | End: 2022-12-03 | Stop reason: HOSPADM

## 2022-12-02 RX ORDER — PANTOPRAZOLE SODIUM 40 MG/1
40 TABLET, DELAYED RELEASE ORAL
Status: DISCONTINUED | OUTPATIENT
Start: 2022-12-02 | End: 2022-12-03 | Stop reason: HOSPADM

## 2022-12-02 RX ORDER — LISINOPRIL 10 MG/1
5 TABLET ORAL DAILY
Status: DISCONTINUED | OUTPATIENT
Start: 2022-12-02 | End: 2022-12-02

## 2022-12-02 RX ORDER — ENOXAPARIN SODIUM 100 MG/ML
1 INJECTION SUBCUTANEOUS ONCE
Status: COMPLETED | OUTPATIENT
Start: 2022-12-02 | End: 2022-12-02

## 2022-12-02 RX ORDER — POLYETHYLENE GLYCOL 3350 17 G/17G
17 POWDER, FOR SOLUTION ORAL DAILY PRN
Status: DISCONTINUED | OUTPATIENT
Start: 2022-12-02 | End: 2022-12-03 | Stop reason: HOSPADM

## 2022-12-02 RX ORDER — BUDESONIDE AND FORMOTEROL FUMARATE DIHYDRATE 160; 4.5 UG/1; UG/1
2 AEROSOL RESPIRATORY (INHALATION) 2 TIMES DAILY
Status: DISCONTINUED | OUTPATIENT
Start: 2022-12-02 | End: 2022-12-02 | Stop reason: CLARIF

## 2022-12-02 RX ORDER — VITAMIN B COMPLEX
2000 TABLET ORAL DAILY
Status: DISCONTINUED | OUTPATIENT
Start: 2022-12-02 | End: 2022-12-03 | Stop reason: HOSPADM

## 2022-12-02 RX ORDER — ONDANSETRON 4 MG/1
4 TABLET, ORALLY DISINTEGRATING ORAL EVERY 8 HOURS PRN
Status: DISCONTINUED | OUTPATIENT
Start: 2022-12-02 | End: 2022-12-03 | Stop reason: HOSPADM

## 2022-12-02 RX ORDER — TECHNETIUM TC-99M SESTAMIBI 1 MG/10ML
10 INJECTION INTRAVENOUS
Status: COMPLETED | OUTPATIENT
Start: 2022-12-02 | End: 2022-12-02

## 2022-12-02 RX ORDER — ACETAMINOPHEN 325 MG/1
650 TABLET ORAL EVERY 6 HOURS PRN
Status: DISCONTINUED | OUTPATIENT
Start: 2022-12-02 | End: 2022-12-03 | Stop reason: HOSPADM

## 2022-12-02 RX ORDER — ONDANSETRON 2 MG/ML
4 INJECTION INTRAMUSCULAR; INTRAVENOUS EVERY 6 HOURS PRN
Status: DISCONTINUED | OUTPATIENT
Start: 2022-12-02 | End: 2022-12-03 | Stop reason: HOSPADM

## 2022-12-02 RX ORDER — PREDNISONE 20 MG/1
40 TABLET ORAL DAILY
Status: DISCONTINUED | OUTPATIENT
Start: 2022-12-02 | End: 2022-12-03 | Stop reason: HOSPADM

## 2022-12-02 RX ORDER — SODIUM CHLORIDE 9 MG/ML
INJECTION, SOLUTION INTRAVENOUS PRN
Status: DISCONTINUED | OUTPATIENT
Start: 2022-12-02 | End: 2022-12-03 | Stop reason: HOSPADM

## 2022-12-02 RX ORDER — KETOROLAC TROMETHAMINE 30 MG/ML
15 INJECTION, SOLUTION INTRAMUSCULAR; INTRAVENOUS EVERY 6 HOURS PRN
Status: DISCONTINUED | OUTPATIENT
Start: 2022-12-02 | End: 2022-12-03 | Stop reason: HOSPADM

## 2022-12-02 RX ORDER — ASPIRIN 81 MG/1
81 TABLET ORAL DAILY
Status: DISCONTINUED | OUTPATIENT
Start: 2022-12-02 | End: 2022-12-02

## 2022-12-02 RX ORDER — LEVALBUTEROL INHALATION SOLUTION 0.63 MG/3ML
0.63 SOLUTION RESPIRATORY (INHALATION) EVERY 8 HOURS PRN
Status: DISCONTINUED | OUTPATIENT
Start: 2022-12-02 | End: 2022-12-03 | Stop reason: HOSPADM

## 2022-12-02 RX ORDER — ARFORMOTEROL TARTRATE 15 UG/2ML
15 SOLUTION RESPIRATORY (INHALATION) 2 TIMES DAILY
Status: DISCONTINUED | OUTPATIENT
Start: 2022-12-02 | End: 2022-12-03 | Stop reason: HOSPADM

## 2022-12-02 RX ORDER — BUDESONIDE 0.5 MG/2ML
0.5 INHALANT ORAL 2 TIMES DAILY
Status: DISCONTINUED | OUTPATIENT
Start: 2022-12-02 | End: 2022-12-03 | Stop reason: HOSPADM

## 2022-12-02 RX ADMIN — IPRATROPIUM BROMIDE AND ALBUTEROL SULFATE 1 AMPULE: 2.5; .5 SOLUTION RESPIRATORY (INHALATION) at 19:23

## 2022-12-02 RX ADMIN — DILTIAZEM HYDROCHLORIDE 120 MG: 120 CAPSULE, COATED, EXTENDED RELEASE ORAL at 08:00

## 2022-12-02 RX ADMIN — BUDESONIDE 500 MCG: 0.5 SUSPENSION RESPIRATORY (INHALATION) at 19:23

## 2022-12-02 RX ADMIN — DEXTROSE MONOHYDRATE 5 MG/HR: 50 INJECTION, SOLUTION INTRAVENOUS at 00:37

## 2022-12-02 RX ADMIN — GUAIFENESIN AND DEXTROMETHORPHAN 5 ML: 100; 10 SYRUP ORAL at 17:11

## 2022-12-02 RX ADMIN — DILTIAZEM HYDROCHLORIDE 5 MG: 5 INJECTION, SOLUTION INTRAVENOUS at 00:35

## 2022-12-02 RX ADMIN — OXYCODONE HYDROCHLORIDE AND ACETAMINOPHEN 500 MG: 500 TABLET ORAL at 08:00

## 2022-12-02 RX ADMIN — Medication 30 MILLICURIE: at 11:18

## 2022-12-02 RX ADMIN — IPRATROPIUM BROMIDE AND ALBUTEROL SULFATE 1 AMPULE: 2.5; .5 SOLUTION RESPIRATORY (INHALATION) at 08:19

## 2022-12-02 RX ADMIN — ENOXAPARIN SODIUM 50 MG: 100 INJECTION SUBCUTANEOUS at 00:37

## 2022-12-02 RX ADMIN — ASPIRIN 81 MG: 81 TABLET, COATED ORAL at 08:07

## 2022-12-02 RX ADMIN — GUAIFENESIN AND DEXTROMETHORPHAN 5 ML: 100; 10 SYRUP ORAL at 21:17

## 2022-12-02 RX ADMIN — Medication 2000 UNITS: at 08:00

## 2022-12-02 RX ADMIN — PREDNISONE 40 MG: 20 TABLET ORAL at 07:59

## 2022-12-02 RX ADMIN — APIXABAN 5 MG: 5 TABLET, FILM COATED ORAL at 18:21

## 2022-12-02 RX ADMIN — PANTOPRAZOLE SODIUM 40 MG: 40 TABLET, DELAYED RELEASE ORAL at 05:23

## 2022-12-02 RX ADMIN — BUDESONIDE 500 MCG: 0.5 SUSPENSION RESPIRATORY (INHALATION) at 08:19

## 2022-12-02 RX ADMIN — CYANOCOBALAMIN 1000 MCG: 1000 INJECTION, SOLUTION INTRAMUSCULAR at 10:58

## 2022-12-02 RX ADMIN — ARFORMOTEROL TARTRATE 15 MCG: 15 SOLUTION RESPIRATORY (INHALATION) at 08:19

## 2022-12-02 RX ADMIN — REGADENOSON 0.4 MG: 0.08 INJECTION, SOLUTION INTRAVENOUS at 12:40

## 2022-12-02 RX ADMIN — IPRATROPIUM BROMIDE AND ALBUTEROL SULFATE 1 AMPULE: 2.5; .5 SOLUTION RESPIRATORY (INHALATION) at 15:41

## 2022-12-02 RX ADMIN — PANTOPRAZOLE SODIUM 40 MG: 40 TABLET, DELAYED RELEASE ORAL at 17:07

## 2022-12-02 RX ADMIN — MAGNESIUM SULFATE HEPTAHYDRATE 2000 MG: 40 INJECTION, SOLUTION INTRAVENOUS at 09:40

## 2022-12-02 RX ADMIN — Medication 10 MILLICURIE: at 11:18

## 2022-12-02 RX ADMIN — ARFORMOTEROL TARTRATE 15 MCG: 15 SOLUTION RESPIRATORY (INHALATION) at 19:23

## 2022-12-02 RX ADMIN — Medication 10 ML: at 20:00

## 2022-12-02 ASSESSMENT — PAIN SCALES - GENERAL
PAINLEVEL_OUTOF10: 0
PAINLEVEL_OUTOF10: 0

## 2022-12-02 ASSESSMENT — ENCOUNTER SYMPTOMS
ABDOMINAL PAIN: 0
VOMITING: 0
DIARRHEA: 0
COUGH: 1
NAUSEA: 0
CONSTIPATION: 0
BACK PAIN: 0
SHORTNESS OF BREATH: 1
EYE PAIN: 0
WHEEZING: 1
SINUS PAIN: 0

## 2022-12-02 NOTE — PROGRESS NOTES
Stress test showed no ischemia, EF Normal    Cardiology sign off      Jayla Haney MD  12/2/2022  4:04 PM

## 2022-12-02 NOTE — PROGRESS NOTES
Pt noted to have converted from afib to NSR. Heart rate remains in low 100s. Dr. Ray Guerin updated via [employer].

## 2022-12-02 NOTE — PROGRESS NOTES
[facility] Progress Note    Admitting Date and Time: 12/1/2022 11:08 PM  Admit Dx: COPD exacerbation ([address] [J44.1]  Atrial fibrillation with rapid ventricular response ([address] [I48.91]  Atrial fibrillation with RVR ([address] [I48.91]    Subjective:  Patient is being followed for COPD exacerbation ([address] [J44.1]  Atrial fibrillation with rapid ventricular response ([address] [I48.91]  Atrial fibrillation with RVR ([address] [I48.91]   Pt feels better. Advised needs to wear her oxygen continuously. Converted to SR. Had a Nuclear TM stress today which was without ischemia. Cardiology has signed off. Per RN:   No new concerns.     ROS: denies fever, chills, cp, sob, n/v, HA unless stated above.      sodium chloride flush  5-40 mL IntraVENous 2 times per day    ipratropium-albuterol  1 ampule Inhalation Q4H WA    predniSONE  40 mg Oral Daily    pantoprazole  40 mg Oral BID AC    Vitamin D  2,000 Units Oral Daily    vitamin C  500 mg Oral Daily    Arformoterol Tartrate  15 mcg Nebulization BID    And    budesonide  0.5 mg Nebulization BID    dilTIAZem  120 mg Oral Daily    cyanocobalamin  1,000 mcg IntraMUSCular Daily    apixaban  5 mg Oral BID     sodium chloride flush, 5-40 mL, PRN  sodium chloride, , PRN  ondansetron, 4 mg, Q8H PRN   Or  ondansetron, 4 mg, Q6H PRN  polyethylene glycol, 17 g, Daily PRN  acetaminophen, 650 mg, Q6H PRN   Or  acetaminophen, 650 mg, Q6H PRN  levalbuterol, 0.63 mg, Q8H PRN  ketorolac, 15 mg, Q6H PRN  guaiFENesin-dextromethorphan, 5 mL, Q4H PRN    Objective:    BP 94/62   Pulse (!) 102   Temp 98.9 °F (37.2 °C) (Oral)   Resp 22   Ht 5' 8\" (1.727 m)   Wt 88 lb 10 oz (40.2 kg)   SpO2 97%   BMI 13.48 kg/m²     General Appearance: alert and oriented to person, place and time and in no acute distress  Skin: warm and dry  Head: normocephalic and atraumatic  Eyes: pupils equal, round, and reactive to light, extraocular eye movements intact, conjunctivae normal  Neck: neck supple and non tender without mass   Pulmonary/Chest: clear to auscultation bilaterally- no wheezes, rales or rhonchi, normal air movement, no respiratory distress  Cardiovascular: normal rate, normal S1 and S2 and no carotid bruits  Abdomen: soft, non-tender, non-distended, normal bowel sounds, no masses or organomegaly  Extremities: no cyanosis, no clubbing and no edema  Neurologic: no cranial nerve deficit and speech normal    Recent Labs     12/01/22  2334 12/02/22  0430    130*   K 4.5 4.4   CL 92* 91*   CO2 30* 30*   BUN 18 23   CREATININE 0.6 0.8   GLUCOSE 142* 114*   CALCIUM 9.5 9.1       Recent Labs     12/01/22  2334 12/02/22  0430   WBC 7.9 10.0   RBC 4.89 5.20   HGB 14.7 15.4   HCT 44.9 47.8   MCV 91.8 91.9   MCH 30.1 29.6   MCHC 32.7 32.2   RDW 13.3 13.5    340   MPV 9.4 9.7     Radiology:   XR ABDOMEN (KUB) (SINGLE AP VIEW)    Result Date: 11/29/2022  EXAMINATION: ONE SUPINE XRAY VIEW(S) OF THE ABDOMEN 11/29/2022 11:59 am COMPARISON: Abdominopelvic CT 18 August 2022 HISTORY: ORDERING SYSTEM PROVIDED HISTORY: constipation TECHNOLOGIST PROVIDED HISTORY: Reason for exam:->constipation FINDINGS: A pair of tiny calcifications in the right abdomen likely are vascular. No free air, bowel wall pneumatosis or dilated bowel. No abnormal accumulation of fecal material within the lower GI tract. No active process. See above. XR CHEST PORTABLE    Result Date: 12/2/2022  EXAMINATION: ONE XRAY VIEW OF THE CHEST 12/1/2022 11:55 pm COMPARISON: 27 November 2022 HISTORY: ORDERING SYSTEM PROVIDED HISTORY: SOB TECHNOLOGIST PROVIDED HISTORY: Reason for exam:->SOB FINDINGS: Single AP upright portable chest demonstrates marked hyperexpansion lungs with increased markings and bullous emphysematous changes. The cardiac silhouette is small in size. There is no evidence of a pneumothorax. There are EKG leads overlying the chest.     Severe COPD with no acute cardiopulmonary process.      XR CHEST PORTABLE    Result Date: 11/27/2022  EXAMINATION: ONE XRAY VIEW OF THE CHEST 11/27/2022 2:08 am COMPARISON: July 14, 2022 HISTORY: ORDERING SYSTEM PROVIDED HISTORY: chest pain TECHNOLOGIST PROVIDED HISTORY: Reason for exam:->chest pain FINDINGS: Normal cardiomediastinal silhouette. Lungs clear. There is hyperinflation of the lungs with flattening of the diaphragms suggesting COPD. No pneumothorax or effusion. Osseous thorax intact. No acute disease. RECOMMENDATION: Careful clinical correlation and follow up recommended. CTA PULMONARY W CONTRAST    Result Date: 11/27/2022  EXAMINATION: CTA OF THE CHEST 11/27/2022 2:01 am TECHNIQUE: CTA of the chest was performed after the administration of intravenous contrast.  Multiplanar reformatted images are provided for review. MIP images are provided for review. Automated exposure control, iterative reconstruction, and/or weight based adjustment of the mA/kV was utilized to reduce the radiation dose to as low as reasonably achievable. COMPARISON: None. HISTORY: ORDERING SYSTEM PROVIDED HISTORY: hypoxia, copd, short of breath cough TECHNOLOGIST PROVIDED HISTORY: Reason for exam:->hypoxia, copd, short of breath cough Decision Support Exception - unselect if not a suspected or confirmed emergency medical condition->Emergency Medical Condition (MA) FINDINGS: Pulmonary Arteries: Pulmonary arteries are adequately opacified for evaluation. No evidence of intraluminal filling defect to suggest pulmonary embolism. Main pulmonary artery is normal in caliber. Mediastinum: No evidence of mediastinal lymphadenopathy. The heart and pericardium demonstrate no acute abnormality. There is no acute abnormality of the thoracic aorta. Moderate retrocardiac hiatal hernia. Lungs/pleura: The lungs are without acute process. No focal consolidation or pulmonary edema. No evidence of pleural effusion or pneumothorax.  There is hyperinflation of the lungs with flattening of the diaphragms suggesting COPD. Upper Abdomen: Limited images of the upper abdomen are unremarkable. Soft Tissues/Bones: No acute bone or soft tissue abnormality. 1. No evidence of pulmonary embolism or acute pulmonary abnormality. 2. COPD and moderate retrocardiac hiatal hernia noted. RECOMMENDATIONS: Careful clinical correlation and follow up recommended. NM Cardiac Stress Test Nuclear Imaging    Result Date: 12/2/2022  Indication:  Chest Pain. Clinical History:   Patient has no known previous historyof coronary artery disease. IMAGING: Myocardial perfusion imaging was performed at rest 30-35 minutes following the intravenous injection of 10.5 mCi of (Tc-Sestamibi) followed by 10 ml of Normal Saline. At peak exercise, the patient was injected intravenously with 30. 1mCi of (Tc-Sestamibi) followed by 10 ml of Normal Saline. Gated post-stress tomographic imaging was performed 20-25 minutes after stress. FINDINGS: The overall quality of the study was good. Left ventricular cavity size was noted to be normal. Rotational analog analysis demonstrated increased extracardiac radiotracer uptake in the abdominal organs The gated SPECT stress imaging in the short, vertical long, and horizontal long axis demonstrated normal homogeneous tracer distribution throughout the myocardium. The resting images showed no change Gated SPECT left ventricular ejection fraction was calculated to be 68%, with normal myocardial thickening and wall motion.      The myocardial perfusion imaging was normal with no convincing evidence of scars or stress-induced ischemia Overall left ventricular systolic function was normal with no regional wall motion abnormality abnormality Low risk pharmacologic nuclear stress test.        Assessment:    Principal Problem:    Tachycardia, unspecified  Active Problems:    Essential (primary) hypertension    COPD exacerbation (HCC)    Vitamin B12 deficiency (dietary) anemia    Chest pain  Resolved Problems:    Atrial fibrillation with RVR ([facility]      Plan:  1. Atrial Fibrillation with RVR, resolved - now in SR. CHADsVASC score is 3. Cardiology recommending Eliquis. Nuclear TM test without ischemia. O2 requirements unchanged. Observe overnight and anticipate discharge tomorrow. 2.  COPD - continue meds, nebs and respiratory support. 3.  HTN - Continue meds. Monitor vitals and adjust accordingly  4. Vitamin B12 deficiency - continue replacement Vitamin B12 therapy. Total care time:  18 minutes    NOTE: This report was transcribed using voice recognition software. Every effort was made to ensure accuracy; however, inadvertent computerized transcription errors may be present.     Electronically signed by Christina Torres MD on 12/2/2022 at 2:05 PM

## 2022-12-02 NOTE — PLAN OF CARE
Problem: Safety - Adult  Goal: Free from fall injury  12/2/2022 1341 by Karla Maldonado RN  Outcome: Progressing     Problem: Respiratory - Adult  Goal: Achieves optimal ventilation and oxygenation  12/2/2022 1341 by Karla Maldonado RN  Outcome: Progressing     Problem: Cardiovascular - Adult  Goal: Absence of cardiac dysrhythmias or at baseline  12/2/2022 1341 by Karla Maldonado RN  Outcome: Progressing

## 2022-12-02 NOTE — H&P
[facility] History and Physical      CHIEF COMPLAINT:  shortness of breath    History of Present Illness: This is a 69year old female with a past medical history of COPD and hypertension who presents to the ED with shortness of breath. Sates she was attempting to do housework today and became increasingly dyspneic, with wheezing. Complains of 7/10 burning chest pain  for two weeks, encompassing her whole chest. States it is worse when she coughs. Nothing makes it better or worse. She states she has intermittent fatigue and complains of generalized weakness today. Was discharged on 11/30 on 2 liters oxygen, which she states she typically wears only at night. Denies fever, chills, congestion, nausea, vomiting, or diarrhea. She did flip into A fib RVR while in the ED. States she does not have a history. Denies increase in chest pain. Started on Cardizem drip and Lovenox at 1 mg/ml. Informant(s) for H&P: patient and chart review    REVIEW OF SYSTEMS:  A comprehensive review of systems was negative except for: what is in the HPI      PMH:  Past Medical History:   Diagnosis Date    Acute exacerbation of chronic obstructive pulmonary disease (COPD) ([facility] 7/14/2022    Acute respiratory failure with hypoxia ([facility] 6/27/2022    COPD (chronic obstructive pulmonary disease) (HCC)     COPD exacerbation ([facility] 7/14/2022    [facility]     COVID-19 7/16/2022    Hypertension     Pneumonia due to infectious organism     Uncontrolled hypertension        Surgical History:  Past Surgical History:   Procedure Laterality Date    BACK SURGERY         Medications Prior to Admission:    Prior to Admission medications    Medication Sig Start Date End Date Taking?  Authorizing Provider   ipratropium-albuterol (DUONEB) 0.5-2.5 (3) MG/3ML SOLN nebulizer solution Inhale 3 mLs into the lungs every 4 hours (while awake) 11/30/22   Italia Hernandez MD   predniSONE (DELTASONE) 10 MG tablet Take 4 tablets by mouth daily for 3 days, THEN 3 tablets daily for 3 days, THEN 2 tablets daily for 3 days, THEN 1 tablet daily for 3 days, THEN 0.5 tablets daily for 3 days. 11/30/22 12/15/22  Christina Torres MD   guaiFENesin-dextromethorphan (ROBITUSSIN DM) 100-10 MG/5ML syrup Take 5 mLs by mouth every 4 hours as needed for Cough 11/30/22 12/10/22  Christina Torres MD   lisinopril (PRINIVIL;ZESTRIL) 5 MG tablet Take 1 tablet by mouth daily 10/25/22   Clyde Gonzalez DO   budesonide-formoterol (SYMBICORT) 160-4.5 MCG/ACT AERO Inhale 2 puffs into the lungs 2 times daily 10/3/22 11/2/22  Clyde Gonzalez DO   Cholecalciferol (VITAMIN D3) 50 MCG (2000 UT) CAPS Take 1 capsule by mouth in the morning. Historical Provider, MD   albuterol sulfate HFA (VENTOLIN HFA) 108 (90 Base) MCG/ACT inhaler Inhale 2 puffs into the lungs 4 times daily as needed for Wheezing  Patient not taking: Reported on 7/7/2022 6/29/22 7/16/22  Jeanne Leach MD   aspirin 81 MG EC tablet Take 81 mg by mouth daily    Historical Provider, MD   vitamin C (ASCORBIC ACID) 500 MG tablet Take 500 mg by mouth daily    Historical Provider, MD       Allergies:    Pcn [penicillins]    Social History:    reports that she has quit smoking. Her smoking use included cigarettes. She smoked an average of .25 packs per day. She has never used smokeless tobacco. She reports that she does not currently use alcohol. She reports that she does not currently use drugs. Family History:   family history is not on file.        PHYSICAL EXAM:  Vitals:  BP (!) 143/106   Pulse (!) 103   Temp 98.8 °F (37.1 °C)   Resp 20   Ht 5' 8\" (1.727 m)   Wt 104 lb (47.2 kg)   SpO2 93%   BMI 15.81 kg/m²     General Appearance: alert and oriented to person, place and time and in no acute distress  Skin: warm and dry  Head: normocephalic and atraumatic  Eyes: pupils equal, round, and reactive to light, conjunctivae normal  Pulmonary/Chest: clear to auscultation bilaterally- no wheezes, rales or rhonchi, diminished air movement, no respiratory distress  Cardiovascular: irregular, tachycardic rate, normal S1 and S2  Abdomen: soft, non-tender, non-distended, normal bowel sounds, no masses or organomegaly  Extremities: no cyanosis, no clubbing and no edema  Neurologic: speech normal        LABS:  Recent Labs     12/01/22  2334      K 4.5   CL 92*   CO2 30*   BUN 18   CREATININE 0.6   GLUCOSE 142*   CALCIUM 9.5       Recent Labs     12/01/22  2334   WBC 7.9   RBC 4.89   HGB 14.7   HCT 44.9   MCV 91.8   MCH 30.1   MCHC 32.7   RDW 13.3      MPV 9.4       No results for input(s): POCGLU in the last 72 hours. Radiology:   XR CHEST PORTABLE   Final Result   Severe COPD with no acute cardiopulmonary process. EKG:       ASSESSMENT:      Principal Problem:    Atrial fibrillation with rapid ventricular response (HCC)  Resolved Problems:    * No resolved hospital problems. *      PLAN:    1.  New onset atrial fibrillation: Continue and titrate Cardizem drip for goal of heart rate less that 120. Monitor telemetry. Lovenox at 1 mg/kg. Consult cardiology. 2. COPD exacerbation: Was discharged on 11/30 on prednisone taper, which she did start yesterday. Oxygen therapy PRN. Was discharged on 2 liters. Continue Duonebs and Symbicort. Xopenex PRN. Prednisone 40 mg PO daily. 3. Chest pain: Noncardiac. Described as burning. Nonzero troponins trending down from previous admission. Stated she was given a PO medication during her last admission that did help. Try Protonix 40 mg BID. 4. Hypertension: Continue lisinopril. 5. Hyperthyroidism: TSH in July was 0.016 with total T4 of 7.9. Check TSH and free T4. Code Status: full  DVT prophylaxis: Lovenox    35 minutes or more spent reviewing patient chart, assessing patient, discussing plan of care with patient and family, discussing plan of care with collaborating physician, and documentation.     NOTE: This report was transcribed using voice recognition software. Every effort was made to ensure accuracy; however, inadvertent computerized transcription errors may be present. Electronically signed by REGI Abarca CNP on 12/2/2022 at 12:29 AM     Attending Physician Statement:  Gaye Tyler M.D., F.A.C.P. I have seen/discussed the case, including pertinent history and exam findings with NP. Meds reviewed. I agree with the NP history/PE, assessment, plan and orders as documented by the [address] reviewed, including other providers notes, relevant labs and imaging. Billing based on   Medically complex case  My assessment of various problems as below  Admitted with copd exac and just DCd home- still feeling weak- neg viral panel    Recent copd exac- still wheezing, dec airflow-- continue home o2, prednisone 40mg daily Rx on DC day ago  Chest tightness noted, but first time noted racing heart  In ER \"Afib w RVR noted\" -- cardizem drip +lovenox  Bc of high chadsvas- will switch lovenox to eliquis    Recent echo on 6/29: noted normal  \"Normal left ventricular size. LV systolic function is hyperdynamic. Ejection fraction is visually estimated at >75%. There is near cavity obliteration in systole. Normal diastolic function. No regional wall motion abnormalities seen. Normal left ventricular wall thickness. No LVOT obstruction. Normal right ventricular size and function. No significant valvular abnormalities. \"    Check tsh etc.. tsh low  -- this was present prior , but free t4 N- rule out subclinical hyperthyroidism,-but I doubt this  doubt ACS- +change delta trop-- but near baseline 20-22   I will stop her home lisinopril and start transitioning her to PO cardizem  (?uncertain if betablocker will exac asthma?)  As of this am, she cardioverted on her own, so cardioversion not necessary    Cardiology consult- bc some strips look like MAT not afib  +various P wave morphologies noted??? Also vit b12 level low 205 -(but no macrocytic anemia noted-- will give b12 shots while in house  Moderately medically complex  >50% of time spent coordinating care -including ER then pimary service who will take over +other providers and/or counseling patient/family  Remainder of medical problems as per NP note.

## 2022-12-02 NOTE — CARE COORDINATION
Transition of Care-Met with patient at the bedside, she resides with her brother in a two story home, bed/bath on second floor. Patient reports being independent with ADL's, does not use any equipment other than oxygen provided by [employer]-she wears 2L at baseline. PCP is Dr. Padma Lake, preferred pharmacy is [facility]. Patient drove herself here, plans to drive home at discharge. Patient does not anticipate any discharge needs.     Uzair Medina BSN, RN  [facility]

## 2022-12-02 NOTE — FLOWSHEET NOTE
12/02/22 1358   Vital Signs   Blood Pressure Lying 109/65   Pulse Lying 87 PER MINUTE   Blood Pressure Sitting 97/54   Pulse Sitting 98 PER MINUTE   Blood Pressure Standing 86/57   Pulse Standing 104 PER MINUTE

## 2022-12-02 NOTE — PLAN OF CARE
Problem: Discharge Planning  Goal: Discharge to home or other facility with appropriate resources  Outcome: Progressing  Flowsheets (Taken 12/2/2022 0307)  Discharge to home or other facility with appropriate resources: Identify barriers to discharge with patient and caregiver     Problem: Safety - Adult  Goal: Free from fall injury  Outcome: Progressing  Flowsheets (Taken 12/2/2022 0307)  Free From Fall Injury: Instruct family/caregiver on patient safety     Problem: Respiratory - Adult  Goal: Achieves optimal ventilation and oxygenation  Outcome: Progressing     Problem: Cardiovascular - Adult  Goal: Maintains optimal cardiac output and hemodynamic stability  Outcome: Progressing  Goal: Absence of cardiac dysrhythmias or at baseline  Outcome: Progressing     Problem: Cardiovascular - Adult  Goal: Absence of cardiac dysrhythmias or at baseline  Outcome: Progressing

## 2022-12-02 NOTE — CONSULTS
Inpatient Cardiology Consultation      Reason for Consult:  New Onset Atrial Fibrillation with RVR    Consulting Physician: Dr. Olena Kennedy    Requesting Physician:  Keisha Anthony, REGI     Date of Consultation: 12/2/2022    HISTORY OF PRESENT ILLNESS:   Ms. Gary Mondragon is a 69year old  female who is previously not known to [facility]) Cardiology Physicians in [address] [place] [address]. Her medical history includes COPD, recent tobacco cessation, and HTN. Mr. Gary Mondragon presented to [facility] ED on 12/1/2022 with complaints of dyspnea. She states prior to presentation she was, \"cleaning up and I think I did too much because it made me short of breath\". She states over the past week she has also been having midsternal chest \"burning\" states has been constant, worse with ambulation, food ingestion, deep inspiration. She denies accompanying orthopnea, PND, palpitations or feelings of heart racing. Upon arrival to the ED her VS oral temperature 97.1-869-/80-95% RA. EKG atrial fibrillation with RVR. Rapid COVID and influenza screening negative. She received DuoNeb, Lovenox 50 mg, Cardizem 5 mg IV and was placed on a continuous IV infusion. She was admitted to a telemetry monitored unit. Telemetry monitoring revealed conversion spontaneously to NSR in 12/02/2022 while on IV Cardizem. Cardiology was consulted for management of new onset of Atrial Fibrillation with RVR. Please note: past medical records were reviewed per electronic medical record (EMR) - see detailed reports under Past Medical/ Surgical History. Past Medical and Surgical History:    HTN  Recent tobacco cessation  COPD  S/p Lumbar back surgery, appendectomy, left oophorectomy at age 13 secondary to cyst  TTE 06/29/2022 (Dr. Rankin Scheuermann): Normal LV size. LV systolic function is hyperdynamic. EF is visually estimated at greater than 75%. There is near cavity obliteration in systole. Normal diastolic function.   No regional wall motion abnormality seen. Normal LV wall thickness. No LVOT obstruction. Normal RV size and function. No significant valvular abnormalities. Medications Prior to admit:  Prior to Admission medications    Medication Sig Start Date End Date Taking? Authorizing Provider   ipratropium-albuterol (DUONEB) 0.5-2.5 (3) MG/3ML SOLN nebulizer solution Inhale 3 mLs into the lungs every 4 hours (while awake) 11/30/22   Angie Quinn MD   predniSONE (DELTASONE) 10 MG tablet Take 4 tablets by mouth daily for 3 days, THEN 3 tablets daily for 3 days, THEN 2 tablets daily for 3 days, THEN 1 tablet daily for 3 days, THEN 0.5 tablets daily for 3 days. 11/30/22 12/15/22  Angie Quinn MD   guaiFENesin-dextromethorphan (ROBITUSSIN DM) 100-10 MG/5ML syrup Take 5 mLs by mouth every 4 hours as needed for Cough 11/30/22 12/10/22  Angie Quinn MD   budesonide-formoterol [facility]) 160-4.5 MCG/ACT AERO Inhale 2 puffs into the lungs 2 times daily 10/3/22 11/2/22  Clyde Gonzalez DO   Cholecalciferol (VITAMIN D3) 50 MCG (2000 UT) CAPS Take 1 capsule by mouth in the morning.     Historical Provider, MD   albuterol sulfate HFA (VENTOLIN HFA) 108 (90 Base) MCG/ACT inhaler Inhale 2 puffs into the lungs 4 times daily as needed for Wheezing  Patient not taking: Reported on 7/7/2022 6/29/22 7/16/22  Armen Chavis MD   aspirin 81 MG EC tablet Take 81 mg by mouth daily    Historical Provider, MD   vitamin C (ASCORBIC ACID) 500 MG tablet Take 500 mg by mouth daily    Historical Provider, MD       Current Medications:    Current Facility-Administered Medications: sodium chloride flush 0.9 % injection 5-40 mL, 5-40 mL, IntraVENous, 2 times per day  sodium chloride flush 0.9 % injection 5-40 mL, 5-40 mL, IntraVENous, PRN  0.9 % sodium chloride infusion, , IntraVENous, PRN  ondansetron (ZOFRAN-ODT) disintegrating tablet 4 mg, 4 mg, Oral, Q8H PRN **OR** ondansetron (ZOFRAN) injection 4 mg, 4 mg, IntraVENous, Q6H PRN  polyethylene glycol (GLYCOLAX) packet 17 g, 17 g, Oral, Daily PRN  acetaminophen (TYLENOL) tablet 650 mg, 650 mg, Oral, Q6H PRN **OR** acetaminophen (TYLENOL) suppository 650 mg, 650 mg, Rectal, Q6H PRN  levalbuterol (XOPENEX) nebulization 0.63 mg, 0.63 mg, Nebulization, Q8H PRN  ipratropium-albuterol (DUONEB) nebulizer solution 1 ampule, 1 ampule, Inhalation, Q4H WA  predniSONE (DELTASONE) tablet 40 mg, 40 mg, Oral, Daily  pantoprazole (PROTONIX) tablet 40 mg, 40 mg, Oral, BID AC  ketorolac (TORADOL) injection 15 mg, 15 mg, IntraVENous, Q6H PRN  aspirin EC tablet 81 mg, 81 mg, Oral, Daily  vitamin D (CHOLECALCIFEROL) tablet 2,000 Units, 2,000 Units, Oral, Daily  guaiFENesin-dextromethorphan (ROBITUSSIN DM) 100-10 MG/5ML syrup 5 mL, 5 mL, Oral, Q4H PRN  ascorbic acid (VITAMIN C) tablet 500 mg, 500 mg, Oral, Daily  Arformoterol Tartrate (BROVANA) nebulizer solution 15 mcg, 15 mcg, Nebulization, BID **AND** budesonide (PULMICORT) nebulizer suspension 500 mcg, 0.5 mg, Nebulization, BID  dilTIAZem (CARDIZEM CD) extended release capsule 120 mg, 120 mg, Oral, Daily  cyanocobalamin injection 1,000 mcg, 1,000 mcg, IntraMUSCular, Daily  regadenoson (LEXISCAN) injection 0.4 mg, 0.4 mg, IntraVENous, ONCE PRN    Allergies:  Pcn [penicillins]    Social History:    Quit smoking in 10/2022, prior to this smoked 1 pack a week for 30 years. Denies alcohol or illicit drug use. Caffeine intake includes 2 cups of coffee a day. Family History:   Please note this information was not obtained at this time, as it is limited in nature secondary to the patient's advanced age. REVIEW OF SYSTEMS:     Constitutional: Denies fevers, chills, night sweats, and fatigue  HEENT: Denies nose bleeds, and blurred vision,oral pain, abscess or lesion. Planes of continuous headache for 2 days. Musculoskeletal: Denies falls, pain to BLE with ambulation and edema to BLE.   Neurological: Denies dizziness and lightheadedness, numbness and tingling  Cardiovascular: Denies palpitations, and feelings of heart racing. Complains of chest pain-see HPI  Respiratory: Denies orthopnea and PND. Planes of PACHECO-see HPI  Gastrointestinal: Denies heartburn, nausea/vomiting, diarrhea and constipation, black/bloody, and tarry stools. Planes of dark stools on occasion. Genitourinary: Denies dysuria and hematuria  Hematologic: Denies excessive bruising or bleeding  Lymphatic: Denies lumps and bumps to neck, axilla, breast, and groin  Endocrine: Denies excessive thirst. Denies intolerance to hot and cold  GYN: Postmenopausal state; Denies vaginal bleeding. Psychiatric: Denies anxiety and depression. PHYSICAL EXAM:   BP 94/62   Pulse (!) 102   Temp 98.9 °F (37.2 °C) (Oral)   Resp 22   Ht 5' 8\" (1.727 m)   Wt 88 lb 10 oz (40.2 kg)   SpO2 97%   BMI 13.48 kg/m²   CONST:  Well developed, emaciated, frail appearing elderly  female who appears much older than her stated age. Awake, alert, cooperative, no apparent distress  HEENT:   Head- Normocephalic, atraumatic   Eyes- Conjunctivae pink, anicteric  Throat- Oral mucosa pink and moist  Neck-  No stridor, trachea midline, no jugular venous distention. No adenopathy   CHEST: Chest symmetrical and non-tender to palpation. No accessory muscle use or intercostal retractions  RESPIRATORY: Lung sounds -diminished throughout fields   CARDIOVASCULAR:     No carotid bruit  Heart Inspection- shows no noted pulsations  Heart Palpation- no heaves or thrills; PMI is non-displaced   Heart Ausculation- Regular rate and rhythm, no murmur. No s3, s4 or rub   PV: No lower extremity edema. No varicosities. Pedal pulses palpable, no clubbing or cyanosis   ABDOMEN: Soft, non-tender to light palpation. Bowel sounds present. No palpable masses no organomegaly; no abdominal bruit  MS: Good muscle strength and tone. No atrophy or abnormal movements.    : Deferred  SKIN: Warm and dry no statis dermatitis or ulcers   NEURO / PSYCH: Oriented to person, place and time. Speech clear and appropriate. Follows all commands. Pleasant affect     DATA:    ECG: As above  Tele strips: As above, currently ST with  on IV Cardizem  Diagnostic:    Labs:   CBC:   Recent Labs     12/01/22 2334 12/02/22 0430   WBC 7.9 10.0   HGB 14.7 15.4   HCT 44.9 47.8    340     BMP:   Recent Labs     12/01/22  2334 12/02/22  0430    130*   K 4.5 4.4   CO2 30* 30*   BUN 18 23   CREATININE 0.6 0.8   LABGLOM >60 >60   CALCIUM 9.5 9.1     Mag:   Recent Labs     12/02/22 0430   MG 1.8   TSH:   Recent Labs     12/02/22  0430   TSH 0.039*     HgA1c:   Lab Results   Component Value Date    LABA1C 6.1 (H) 07/28/2022     Recent Labs     12/02/22 0430   PROTIME 11.2   INR 1.0   FASTING LIPID PANEL:  Lab Results   Component Value Date/Time    CHOL 214 07/28/2022 09:31 AM    HDL 91 07/28/2022 09:31 AM    LDLCALC 105 07/28/2022 09:31 AM    TRIG 90 07/28/2022 09:31 AM      Latest Reference Range & Units 11/27/22 00:43 11/28/22 04:40 12/1/22 23:34 12/2/22 04:30   Pro-BNP 0 - 125 pg/mL 446 (H)      Troponin, High Sensitivity 0 - 9 ng/L 22 (H) 20 (H) 18 (H) 35 (H)   (H): Data is abnormally high    12/01/2022 CXR:  Severe COPD with no acute cardiopulmonary process. IMPRESSION and PLAN to follow per Dr. Solitario Murphy     Electronically signed by REGI Mckinney CNP on 12/2/2022 at 8:02 AM        Inpatient CARDIOLOGY Consultation         Our SOL exam, assessment reviewed and reflect my work. I am the primary author for the consult notes. I spent > 51% of the total time involved with completing the encounter.    The total time included the following:  Independently interviewing the patient (HPI, ROS, PMH, PSH, FMH, SH, allergies, and medications)  Reviewing available records, results of previously ordered testing/procedures, and current problem list  Independently performing a medically appropriate examination  Reviewing the above documentation completed by the SOL  Ordering medications, tests, and/or procedures as required  Formulating the assessment/plan and reviewing the rationale for the above recommendations  Counseling/educating the patient  Coordinating care with other healthcare professionals  Communicating results to the patient's family/caregiver as available  Documenting clinical information in the patient's electronic health record     Reason for Consult:   A Fib     Date of Consultation: 12/2/2022     Patient previously not known to [facility] Cardiology. HISTORY OF PRESENT ILLNESS: 69year old with history of HTN, COPD-Home O2 presented to [facility] ED on 12/1/2022 with complaints of dyspnea. She states prior to presentation she was, \"cleaning up and I think I did too much because it made me short of breath\". She states over the past week she has also been having midsternal chest \"burning\" states has been constant, worse with ambulation, food ingestion, deep inspiration. She denies accompanying orthopnea, PND, palpitations or feelings of heart racing. Upon arrival to the ED her VS oral temperature 97.7-848-/80-95% RA. EKG atrial fibrillation with RVR. Rapid COVID and influenza screening negative. She received DuoNeb, Lovenox 50 mg, Cardizem 5 mg IV and was placed on a continuous IV infusion. She was admitted to a telemetry monitored unit. Telemetry monitoring revealed conversion spontaneously to NSR in 12/02/2022 while on IV Cardizem. Cardiology was consulted for management of new onset of Atrial Fibrillation with RVR. Currently feeling better, No chest pain; or heart racing. No orthopnea. REVIEW OF SYSTEMS:   Review of rest of 12 systems negative except as mentioned in HPI. Please note: past medical records were reviewed per electronic medical record (EMR) - see detailed reports under Past Medical/ Surgical History.          Past Medical History:    Past Medical History        Past Medical History:   Diagnosis Date    Acute exacerbation of chronic obstructive pulmonary disease (COPD) ([facility] [place] [facility] 7/14/2022    Acute respiratory failure with hypoxia ([facility] [place] [facility] 6/27/2022    COPD (chronic obstructive pulmonary disease) (HCC)      COPD exacerbation ([facility] [place] [facility] 7/14/2022    COPD with acute exacerbation ([facility] [place] [facility]      COVID-19 7/16/2022    Hypertension      Pneumonia due to infectious organism      Uncontrolled hypertension              Past Surgical History:    Past Surgical History         Past Surgical History:   Procedure Laterality Date    BACK SURGERY                   Allergies:    Pcn [penicillins]     Social History:    Social History               Socioeconomic History    Marital status:        Spouse name: Not on file    Number of children: Not on file    Years of education: Not on file    Highest education level: Not on file   Occupational History    Not on file   Tobacco Use    Smoking status: Former       Packs/day: 0.25       Types: Cigarettes    Smokeless tobacco: Never    Tobacco comments:       may 2022   Vaping Use    Vaping Use: Never used   Substance and Sexual Activity    Alcohol use: Not Currently    Drug use: Not Currently    Sexual activity: Not Currently   Other Topics Concern    Not on file   Social History Narrative     Established 7-22 after not being seen since 2008     Smoker  quit   5-2022     COPD sees Dr. 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 (Victorio Brill)---and  other cousin omer---quit     Admit 7-22 with copd exac -----------------------------------------------------------------  dr Oxana sinclair     abnormal EKG 7-22 in the hospital Provider, MD   albuterol sulfate HFA (VENTOLIN HFA) 108 (90 Base) MCG/ACT inhaler Inhale 2 puffs into the lungs 4 times daily as needed for Wheezing  Patient not taking: Reported on 7/7/2022 6/29/22 7/16/22   Brien Carlson MD   aspirin 81 MG EC tablet Take 81 mg by mouth daily       Historical Provider, MD   vitamin C (ASCORBIC ACID) 500 MG tablet Take 500 mg by mouth daily       Historical Provider, MD               DATA:       ECG - Reviewed      Tele strips: Reviewed     Diagnostic:     No intake or output data in the 24 hours ending 12/02/22 0843     IMAGING STUDIES: Reviewed     TTE 06/29/2022 (Dr. Maryse Dykes):   Normal LV size. LV systolic function is hyperdynamic. EF is visually estimated at greater than 75%. There is near cavity obliteration in systole. Normal diastolic function. No regional wall motion abnormality seen. Normal LV wall thickness. No LVOT obstruction. Normal RV size and function. No significant valvular abnormalities. LABS:       Cardiac enzymes:       Recent Labs     12/01/22 2334 12/02/22 0430   TROPHS 18* 35*      CBC:        Recent Labs     12/01/22 2334 12/02/22 0430   WBC 7.9 10.0   HGB 14.7 15.4   HCT 44.9 47.8    340      BMP:        Recent Labs     12/01/22 2334 12/02/22 0430    130*   K 4.5 4.4   CO2 30* 30*   BUN 18 23   CREATININE 0.6 0.8   LABGLOM >60 >60   CALCIUM 9.5 9.1      Mag:       Recent Labs     12/02/22 0430   MG 1.8      Phos: No results for input(s): PHOS in the last 72 hours. TSH:       Recent Labs     12/02/22 0430   TSH 0.039*      HgA1c:         Lab Results   Component Value Date     LABA1C 6.1 (H) 07/28/2022      No results found for: EAG  proBNP: No results for input(s): PROBNP in the last 72 hours. PT/INR:       Recent Labs     12/02/22 0430   PROTIME 11.2   INR 1.0      APTT:No results for input(s): APTT in the last 72 hours.   FASTING LIPID PANEL:        Lab Results   Component Value Date/Time     CHOL 214 07/28/2022 09:31 AM     HDL 91 07/28/2022 09:31 AM     LDLCALC 105 07/28/2022 09:31 AM     TRIG 90 07/28/2022 09:31 AM      LIVER PROFILE:No results for input(s): AST, ALT, LABALBU in the last 72 hours. Current Inpatient Medications:  Scheduled Medications    sodium chloride flush  5-40 mL IntraVENous 2 times per day    ipratropium-albuterol  1 ampule Inhalation Q4H WA    predniSONE  40 mg Oral Daily    pantoprazole  40 mg Oral BID AC    aspirin  81 mg Oral Daily    Vitamin D  2,000 Units Oral Daily    vitamin C  500 mg Oral Daily    Arformoterol Tartrate  15 mcg Nebulization BID     And    budesonide  0.5 mg Nebulization BID    dilTIAZem  120 mg Oral Daily    cyanocobalamin  1,000 mcg IntraMUSCular Daily    magnesium sulfate  2,000 mg IntraVENous Once            IV Infusions (if any): Infusions Meds    sodium chloride              PHYSICAL EXAM:      BP 94/62   Pulse (!) 102   Temp 98.9 °F (37.2 °C) (Oral)   Resp 22   Ht 5' 8\" (1.727 m)   Wt 88 lb 10 oz (40.2 kg)   SpO2 97%   BMI 13.48 kg/m²      CONST:  Well developed, appears stated age. Awake, alert and mild resp. distress. HEENT:   Head- Normocephalic  Eyes- Conjunctivae pink, no icterus  Throat- Oral mucosa moist  Neck-  No stridor, no jugular venous distention. No carotid bruit. CHEST: Chest symmetrical and non-tender to palpation. No accessory muscle use  RESPIRATORY: Lung sounds - Few rhonchi  CARDIOVASCULAR:     Heart Inspection-OK  Heart Palpation- No thrills. Heart Ausculation- Regular rate and rhythm, 1/6 systolic murmur. No s3 or rub   EXT: No lower extremity edema. Distal pulses palpable, no cyanosis   ABDOMEN: Soft, non-tender to light palpation. Bowel sounds present. No abdominal bruit  MS: Good muscle strength    : Deferred  RECTAL: Deferred  SKIN: Warm and dry   NEURO / PSYCH: Oriented to person, place; Good mood and affect.           IMPRESSION / RECOMMENDATIONS:     Paroxysmal Atrial fibrillation with RVR (HCC) - NEW, High [id number] score 3 - Converted to Sinus - Wean and discontinue iv Cardizem  - On Lovenox, Risk nbenefits of [address] dsicussed, agreeable fro Eliquis; Echo June 2022 noted     Chest pain - EKG reviewed; Lexiscan stress today or tomorrow based on her Resp. status     Elevated Troponin- Likely demand ischemia from Hypoxia, tachycardia - Lexiscan stress today or tomorrow based on her resp. status     Essential (primary) hypertension - Monitor BP     COPD exacerbation ([address] - Per Dr Torri Katz               If stress test normal, cardiology will sign off    F/y by  [facility] Cardiology 3-4 weeks after discharge     Above recommendations discussed with her.          Electronically signed by Melodie Ortiz MD on 12/2/2022 at 8:43 AM  [facility]) Cardiology

## 2022-12-02 NOTE — ED PROVIDER NOTES
[facility]  Department of Emergency Medicine     Written by: Margarita Jones DO  Patient Name: Bella Mueller  Attending Provider: Dahlia Guzman MD  Admit Date: 2022 11:08 PM  MRN: [id number]                   : 1955         - Chief complaint - SOB    HPI   Patient is a 64-year-old female with a history of COPD and current smoker arriving with a complaint of SOB, chest pain, dyspnea on exertion. She states that the chest pain has been intermittent over the past 2 weeks and describes as a bilateral burning sensation that is worse when she coughs. She has a chronic cough. patient was discharged yesterday after being admitted for a COPD exacerbation. She was sent home with the new requirement of 2L nasal cannula at home. She was too weak to bring the oxygen tank with her to the emergency room today. Review of Systems   Constitutional:  Negative for chills and fever. HENT:  Negative for ear pain and sinus pain. Eyes:  Negative for pain. Respiratory:  Positive for cough, shortness of breath and wheezing. Cardiovascular:  Positive for chest pain. Gastrointestinal:  Negative for abdominal pain, constipation, diarrhea, nausea and vomiting. Genitourinary:  Negative for difficulty urinating, dysuria and flank pain. Musculoskeletal:  Negative for back pain. Skin:  Negative for rash. Neurological:  Positive for weakness. Negative for dizziness, light-headedness and headaches. Psychiatric/Behavioral:  Negative for confusion. Physical Exam  Constitutional:       General: She is not in acute distress. Appearance: Normal appearance. She is not ill-appearing. HENT:      Head: Normocephalic. Right Ear: External ear normal.      Left Ear: External ear normal.      Nose: Nose normal. No congestion or rhinorrhea.       Mouth/Throat:      Mouth: Mucous membranes are moist.   Eyes:      Conjunctiva/sclera: Conjunctivae normal.      Pupils: Pupils are equal, round, and reactive to light. Cardiovascular:      Rate and Rhythm: Regular rhythm. Tachycardia present. Pulses: Normal pulses. Pulmonary:      Effort: Pulmonary effort is normal. No respiratory distress. Breath sounds: No stridor. Wheezing present. No rales. Comments: Pt appears to have labored breathing  Decreased breath sounds b/l with wheezing   Abdominal:      General: Abdomen is flat. Bowel sounds are normal. There is no distension. Palpations: Abdomen is soft. Tenderness: There is no abdominal tenderness. There is no guarding. Musculoskeletal:         General: No tenderness. Normal range of motion. Cervical back: Normal range of motion and neck supple. Skin:     General: Skin is warm. Findings: No erythema, lesion or rash. Neurological:      General: No focal deficit present. Mental Status: She is alert and oriented to person, place, and time. Sensory: No sensory deficit. Motor: No weakness. Psychiatric:         Behavior: Behavior normal.        Procedures       MDM     Amount and/or Complexity of Data Reviewed  Decide to obtain previous medical records or to obtain history from someone other than the patient: yes           EK:43   This EKG is signed and interpreted by me. Rate: 86  Rhythm: Normal Sinus rhythm   Axis: normal  Interpretation: no acute changes  Comparison: stable as compared to patient's most recent EKG    EK:06:45  This EKG is signed by emergency department physician. Rate: 150  Rhythm: Atrial fibrillation with RVR, no ST or T wave abnormalities. Interpretation: atrial fibrillation (new onset)  Comparison: changes compared to previous EKG A fib now present      ED Course as of 22 0058   Fri Dec 02, 2022   0010 Pt developed A fib RVR while in the ED [CB]   0022 Patient presented to the hospital for evaluation of shortness of breath and wheezing.   She was discharged yesterday after a work-up for evaluation and treatment of COPD. She states she was at home this evening wearing her oxygen and tried to do a little bit of work around the house and became acutely short of breath. On examination she is wheezing and slightly tachycardic. While in the emergency department she received an aerosol treatment and developed atrial fibrillation with rapid ventricular response with rate up to this 170 range. She quickly came down to the 135 range. Required placement on a calcium channel blocker infusion. She is not anticoagulated has no history of atrial fibrillation. She was given anticoagulation as well. She continues to have wheezing and remains hemodynamically stable otherwise. She will require hospital admission. I agree with the EKG findings as dictated by the resident. ATTENDING PROVIDER ATTESTATION:     I have personally performed and/or participated in the history, exam, medical decision making, and procedures and agree with all pertinent clinical information. I have also reviewed and agree with the past medical, family and social history unless otherwise noted. I have discussed this patient in detail with the resident, and provided the instruction and education regarding atrial fibrillation and copd exacerbation. [RM]   0024 Please note that the withdrawal or failure to initiate urgent interventions for this patient would likely result in a life threatening deterioration or permanent disability. Accordingly this patient received 30 minutes of critical care time, excluding separately billable procedures. [RM]      ED Course User Index  [CB] Sylvia Rodriguez DO  [RM] Addie Luke DO        Patient is a 58-year-old female with a history of COPD and current smoker arriving with a complaint of SOB, chest pain, dyspnea on exertion. She was discharged yesterday from [facility] after a admission for COPD exacerbation.   On arrival she is complaining of acute on chronic worsening of her symptoms and she did not bring her oxygen tank due to weakness. She is supposed to be on 2L at home. She described her chronic chest pain as bilateral burning sensation worse with coughing. She denies fevers, chills, nausea, vomiting, syncopal episodes, back pain, viral symptoms. Exam revealed bilateral diffuse wheezing and decreased breath sounds. Her saturation was 94% on room air at rest.  Labs and CXR were ordered. [place] through her stay she developed new onset A. fib with RVR and was given Lovenox. She does not currently take blood thinners. Labs indicated no acute abnormalities and CXR was positive for chronic severe COPD-like changes. I consulted Dr. Sammy Chan who agreed the patient should be admitted for further workup. Pt agreed with the plan. Patient was seen and evaluated by myself and my attending Dr. Salvador Montemayor. Assessment and Plan discussed with attending provider, please see attestation for final plan of care.      DO Nandini Block DO  Resident  12/02/22 [id number]

## 2022-12-03 VITALS
SYSTOLIC BLOOD PRESSURE: 106 MMHG | DIASTOLIC BLOOD PRESSURE: 63 MMHG | WEIGHT: 92.6 LBS | HEIGHT: 68 IN | RESPIRATION RATE: 18 BRPM | HEART RATE: 99 BPM | TEMPERATURE: 98.5 F | BODY MASS INDEX: 14.03 KG/M2 | OXYGEN SATURATION: 96 %

## 2022-12-03 LAB
ANION GAP SERPL CALCULATED.3IONS-SCNC: 8 MMOL/L (ref 7–16)
BUN BLDV-MCNC: 21 MG/DL (ref 6–23)
CALCIUM SERPL-MCNC: 8.9 MG/DL (ref 8.6–10.2)
CHLORIDE BLD-SCNC: 96 MMOL/L (ref 98–107)
CO2: 31 MMOL/L (ref 22–29)
CREAT SERPL-MCNC: 0.8 MG/DL (ref 0.5–1)
EKG ATRIAL RATE: 83 BPM
EKG P AXIS: -89 DEGREES
EKG P-R INTERVAL: 146 MS
EKG Q-T INTERVAL: 368 MS
EKG QRS DURATION: 70 MS
EKG QTC CALCULATION (BAZETT): 432 MS
EKG R AXIS: 71 DEGREES
EKG T AXIS: 82 DEGREES
EKG VENTRICULAR RATE: 83 BPM
GFR SERPL CREATININE-BSD FRML MDRD: >60 ML/MIN/1.73
GLUCOSE BLD-MCNC: 99 MG/DL (ref 74–99)
MAGNESIUM: 2 MG/DL (ref 1.6–2.6)
POTASSIUM SERPL-SCNC: 4.3 MMOL/L (ref 3.5–5)
SODIUM BLD-SCNC: 135 MMOL/L (ref 132–146)

## 2022-12-03 PROCEDURE — 80048 BASIC METABOLIC PNL TOTAL CA: CPT

## 2022-12-03 PROCEDURE — 6370000000 HC RX 637 (ALT 250 FOR IP): Performed by: INTERNAL MEDICINE

## 2022-12-03 PROCEDURE — 2580000003 HC RX 258

## 2022-12-03 PROCEDURE — 83735 ASSAY OF MAGNESIUM: CPT

## 2022-12-03 PROCEDURE — 99233 SBSQ HOSP IP/OBS HIGH 50: CPT | Performed by: FAMILY MEDICINE

## 2022-12-03 PROCEDURE — 6360000002 HC RX W HCPCS: Performed by: INTERNAL MEDICINE

## 2022-12-03 PROCEDURE — 6370000000 HC RX 637 (ALT 250 FOR IP)

## 2022-12-03 PROCEDURE — 6370000000 HC RX 637 (ALT 250 FOR IP): Performed by: FAMILY MEDICINE

## 2022-12-03 PROCEDURE — 36415 COLL VENOUS BLD VENIPUNCTURE: CPT

## 2022-12-03 PROCEDURE — 2700000000 HC OXYGEN THERAPY PER DAY

## 2022-12-03 PROCEDURE — 6370000000 HC RX 637 (ALT 250 FOR IP): Performed by: NURSE PRACTITIONER

## 2022-12-03 PROCEDURE — 6360000002 HC RX W HCPCS

## 2022-12-03 PROCEDURE — 93005 ELECTROCARDIOGRAM TRACING: CPT

## 2022-12-03 PROCEDURE — 94640 AIRWAY INHALATION TREATMENT: CPT

## 2022-12-03 RX ORDER — DILTIAZEM HYDROCHLORIDE 120 MG/1
120 CAPSULE, COATED, EXTENDED RELEASE ORAL DAILY
Qty: 30 CAPSULE | Refills: 0 | Status: SHIPPED | OUTPATIENT
Start: 2022-12-04

## 2022-12-03 RX ORDER — SENNA AND DOCUSATE SODIUM 50; 8.6 MG/1; MG/1
1 TABLET, FILM COATED ORAL 2 TIMES DAILY
Status: DISCONTINUED | OUTPATIENT
Start: 2022-12-03 | End: 2022-12-03 | Stop reason: HOSPADM

## 2022-12-03 RX ORDER — DILTIAZEM HYDROCHLORIDE 120 MG/1
120 CAPSULE, COATED, EXTENDED RELEASE ORAL DAILY
Qty: 30 CAPSULE | Refills: 0 | Status: SHIPPED | OUTPATIENT
Start: 2022-12-04 | End: 2022-12-03 | Stop reason: SDUPTHER

## 2022-12-03 RX ADMIN — PREDNISONE 40 MG: 20 TABLET ORAL at 08:30

## 2022-12-03 RX ADMIN — IPRATROPIUM BROMIDE AND ALBUTEROL SULFATE 1 AMPULE: 2.5; .5 SOLUTION RESPIRATORY (INHALATION) at 15:43

## 2022-12-03 RX ADMIN — ARFORMOTEROL TARTRATE 15 MCG: 15 SOLUTION RESPIRATORY (INHALATION) at 07:40

## 2022-12-03 RX ADMIN — CYANOCOBALAMIN 1000 MCG: 1000 INJECTION, SOLUTION INTRAMUSCULAR at 08:30

## 2022-12-03 RX ADMIN — OXYCODONE HYDROCHLORIDE AND ACETAMINOPHEN 500 MG: 500 TABLET ORAL at 08:30

## 2022-12-03 RX ADMIN — ACETAMINOPHEN 650 MG: 325 TABLET ORAL at 08:46

## 2022-12-03 RX ADMIN — GUAIFENESIN AND DEXTROMETHORPHAN 5 ML: 100; 10 SYRUP ORAL at 03:26

## 2022-12-03 RX ADMIN — Medication 10 ML: at 08:30

## 2022-12-03 RX ADMIN — IPRATROPIUM BROMIDE AND ALBUTEROL SULFATE 1 AMPULE: 2.5; .5 SOLUTION RESPIRATORY (INHALATION) at 07:40

## 2022-12-03 RX ADMIN — Medication 2000 UNITS: at 08:30

## 2022-12-03 RX ADMIN — IPRATROPIUM BROMIDE AND ALBUTEROL SULFATE 1 AMPULE: 2.5; .5 SOLUTION RESPIRATORY (INHALATION) at 11:36

## 2022-12-03 RX ADMIN — PANTOPRAZOLE SODIUM 40 MG: 40 TABLET, DELAYED RELEASE ORAL at 04:29

## 2022-12-03 RX ADMIN — PANTOPRAZOLE SODIUM 40 MG: 40 TABLET, DELAYED RELEASE ORAL at 16:38

## 2022-12-03 RX ADMIN — DILTIAZEM HYDROCHLORIDE 120 MG: 120 CAPSULE, COATED, EXTENDED RELEASE ORAL at 08:30

## 2022-12-03 RX ADMIN — DOCUSATE SODIUM 50 MG AND SENNOSIDES 8.6 MG 1 TABLET: 8.6; 5 TABLET, FILM COATED ORAL at 09:19

## 2022-12-03 RX ADMIN — APIXABAN 5 MG: 5 TABLET, FILM COATED ORAL at 08:30

## 2022-12-03 RX ADMIN — BUDESONIDE 500 MCG: 0.5 SUSPENSION RESPIRATORY (INHALATION) at 07:40

## 2022-12-03 ASSESSMENT — PAIN SCALES - GENERAL
PAINLEVEL_OUTOF10: 0
PAINLEVEL_OUTOF10: 3
PAINLEVEL_OUTOF10: 0

## 2022-12-03 ASSESSMENT — PAIN SCALES - WONG BAKER: WONGBAKER_NUMERICALRESPONSE: 0

## 2022-12-03 NOTE — PLAN OF CARE
Problem: Discharge Planning  Goal: Discharge to home or other facility with appropriate resources  Outcome: Progressing     Problem: Safety - Adult  Goal: Free from fall injury  Outcome: Progressing     Problem: Respiratory - Adult  Goal: Achieves optimal ventilation and oxygenation  Outcome: Progressing     Problem: Cardiovascular - Adult  Goal: Maintains optimal cardiac output and hemodynamic stability  Outcome: Progressing  Goal: Absence of cardiac dysrhythmias or at baseline  Outcome: Progressing     Problem: Chronic Conditions and Co-morbidities  Goal: Patient's chronic conditions and co-morbidity symptoms are monitored and maintained or improved  Outcome: Progressing

## 2022-12-03 NOTE — PLAN OF CARE
Problem: Discharge Planning  Goal: Discharge to home or other facility with appropriate resources  12/3/2022 1558 by Willy Velazquez RN  Outcome: Completed  12/3/2022 1208 by Willy Velazquez RN  Outcome: Progressing     Problem: Safety - Adult  Goal: Free from fall injury  12/3/2022 1558 by Willy Velazquez RN  Outcome: Completed  12/3/2022 1208 by Willy Velazquez RN  Outcome: Progressing     Problem: Respiratory - Adult  Goal: Achieves optimal ventilation and oxygenation  12/3/2022 1558 by Willy Velazquez RN  Outcome: Completed  12/3/2022 1208 by Willy Velazquez RN  Outcome: Progressing     Problem: Cardiovascular - Adult  Goal: Maintains optimal cardiac output and hemodynamic stability  12/3/2022 1558 by Willy Velazquez RN  Outcome: Completed  12/3/2022 1208 by Willy Velazquez RN  Outcome: Progressing  Goal: Absence of cardiac dysrhythmias or at baseline  12/3/2022 1558 by Willy Velazquez RN  Outcome: Completed  12/3/2022 1208 by Willy Velazquez RN  Outcome: Progressing     Problem: Chronic Conditions and Co-morbidities  Goal: Patient's chronic conditions and co-morbidity symptoms are monitored and maintained or improved  12/3/2022 1558 by Willy Velazquez RN  Outcome: Completed  12/3/2022 1208 by Wlily Velazquez RN  Outcome: Progressing

## 2022-12-03 NOTE — SIGNIFICANT EVENT
Patient complaining of spasms in hands and feet. Has history of Neuropathy. Checking Magnesium level. Labs otherwise within normal limits. HR normal.  Cardiology has signed off. Await results and determine if discharge today home is appropriate.     Jhonny Spencer MD  #[phone]

## 2022-12-03 NOTE — DISCHARGE INSTR - DIET

## 2022-12-03 NOTE — DISCHARGE SUMMARY
[facility] Physician Discharge Summary       No follow-up provider specified. Activity level: As tolerated     Dispo:  Home      Condition on discharge: Stable     Patient ID:  Anne Smith  [id number]  40 y.o.  1955    Admit date: 12/1/2022    Discharge date and time:  12/3/2022  1:15 PM    Admission Diagnoses: Principal Problem:    Tachycardia, unspecified  Active Problems:    Primary hypertension    COPD exacerbation (HCC)    Vitamin B12 deficiency (dietary) anemia    Chest pain  Resolved Problems:    Atrial fibrillation with RVR ([address]      Discharge Diagnoses: Principal Problem:    Tachycardia, unspecified  Active Problems:    Primary hypertension    COPD exacerbation (HCC)    Vitamin B12 deficiency (dietary) anemia    Chest pain  Resolved Problems:    Atrial fibrillation with RVR ([address]      Consults:  IP CONSULT TO CARDIOLOGY    Procedures:   N/A    Hospital Course:   Patient Anne Smith is a 69 y.o. presented with COPD exacerbation ([address] [J44.1]  Atrial fibrillation with rapid ventricular response ([address] [I48.91]  Atrial fibrillation with RVR ([address] [I48.91]. Patient was admitted for treatment of new onset Atrial Fibrillation which occurred at home doing chores without her oxygen on. She was given IV Cardizem. Cardiology consult was obtained. She converted to SR. Nuclear TM stress test was done and without ischemic changes. She was cleared for discharge from Cardiology. She will F/U with PCP in 1 wk and Cardiology in 3 - 4 wks.     Discharge Exam:    General Appearance: alert and oriented to person, place and time and in no acute distress  Skin: warm and dry  Head: normocephalic and atraumatic  Eyes: pupils equal, round, and reactive to light, extraocular eye movements intact, conjunctivae normal  Neck: neck supple and non tender without mass   Pulmonary/Chest: clear to auscultation bilaterally- no wheezes, rales or rhonchi, normal air movement, no respiratory distress  Cardiovascular: normal rate, normal S1 and S2 and no carotid bruits  Abdomen: soft, non-tender, non-distended, normal bowel sounds, no masses or organomegaly  Extremities: no cyanosis, no clubbing and no edema  Neurologic: no cranial nerve deficit and speech normal    LABS:  Recent Labs     12/01/22 2334 12/02/22  0430 12/03/22  0320    130* 135   K 4.5 4.4 4.3   CL 92* 91* 96*   CO2 30* 30* 31*   BUN 18 23 21   CREATININE 0.6 0.8 0.8   GLUCOSE 142* 114* 99   CALCIUM 9.5 9.1 8.9       Recent Labs     12/01/22 2334 12/02/22  0430   WBC 7.9 10.0   RBC 4.89 5.20   HGB 14.7 15.4   HCT 44.9 47.8   MCV 91.8 91.9   MCH 30.1 29.6   MCHC 32.7 32.2   RDW 13.3 13.5    340   MPV 9.4 9.7     Imaging:  XR CHEST PORTABLE    Result Date: 12/2/2022  EXAMINATION: ONE XRAY VIEW OF THE CHEST 12/1/2022 11:55 pm COMPARISON: 27 November 2022 HISTORY: ORDERING SYSTEM PROVIDED HISTORY: SOB TECHNOLOGIST PROVIDED HISTORY: Reason for exam:->SOB FINDINGS: Single AP upright portable chest demonstrates marked hyperexpansion lungs with increased markings and bullous emphysematous changes. The cardiac silhouette is small in size. There is no evidence of a pneumothorax. There are EKG leads overlying the chest.     Severe COPD with no acute cardiopulmonary process. NM Cardiac Stress Test Nuclear Imaging    Result Date: 12/2/2022  Indication:  Chest Pain. Clinical History:   Patient has no known previous historyof coronary artery disease. IMAGING: Myocardial perfusion imaging was performed at rest 30-35 minutes following the intravenous injection of 10.5 mCi of (Tc-Sestamibi) followed by 10 ml of Normal Saline. At peak exercise, the patient was injected intravenously with 30. 1mCi of (Tc-Sestamibi) followed by 10 ml of Normal Saline. Gated post-stress tomographic imaging was performed 20-25 minutes after stress. FINDINGS: The overall quality of the study was good.  Left ventricular cavity size was noted to be normal. Rotational analog analysis demonstrated increased extracardiac radiotracer uptake in the abdominal organs The gated SPECT stress imaging in the short, vertical long, and horizontal long axis demonstrated normal homogeneous tracer distribution throughout the myocardium. The resting images showed no change Gated SPECT left ventricular ejection fraction was calculated to be 68%, with normal myocardial thickening and wall motion. The myocardial perfusion imaging was normal with no convincing evidence of scars or stress-induced ischemia Overall left ventricular systolic function was normal with no regional wall motion abnormality abnormality Low risk pharmacologic nuclear stress test.       Patient Instructions:      Medication List        START taking these medications      apixaban 5 MG Tabs tablet  Commonly known as: ELIQUIS  Take 1 tablet by mouth 2 times daily     dilTIAZem 120 MG extended release capsule  Commonly known as: CARDIZEM CD  Take 1 capsule by mouth daily  Start taking on: December 4, 2022            CONTINUE taking these medications      budesonide-formoterol 160-4.5 MCG/ACT Aero  Commonly known as: Symbicort  Inhale 2 puffs into the lungs 2 times daily     guaiFENesin-dextromethorphan 100-10 MG/5ML syrup  Commonly known as: ROBITUSSIN DM  Take 5 mLs by mouth every 4 hours as needed for Cough     ipratropium-albuterol 0.5-2.5 (3) MG/3ML Soln nebulizer solution  Commonly known as: DUONEB  Inhale 3 mLs into the lungs every 4 hours (while awake)     predniSONE 10 MG tablet  Commonly known as: DELTASONE  Take 4 tablets by mouth daily for 3 days, THEN 3 tablets daily for 3 days, THEN 2 tablets daily for 3 days, THEN 1 tablet daily for 3 days, THEN 0.5 tablets daily for 3 days.   Start taking on: November 30, 2022     vitamin C 500 MG tablet  Commonly known as: ASCORBIC ACID     Vitamin D3 50 MCG (2000 UT) Caps            STOP taking these medications      albuterol sulfate  (90 Base) MCG/ACT inhaler  Commonly known as: Ventolin HFA     aspirin 81 MG EC tablet     lisinopril 5 MG tablet  Commonly known as: PRINIVIL;ZESTRIL               Where to Get Your Medications        These medications were sent to [address] [place] [address], [address] Josiah Gilford [phone]  [address] [place] [address] [place], [place] [address]      Phone: [phone]   apixaban 5 MG Tabs tablet  dilTIAZem 120 MG extended release capsule       Total discharge time:  35 minutes    Note that more than 30 minutes was spent in preparing discharge papers, discussing discharge with patient, medication review, etc.    Signed:  Electronically signed by Bhavya Garcia MD on 12/3/2022 at 1:15 PM

## 2022-12-05 ENCOUNTER — HOSPITAL ENCOUNTER (INPATIENT)
Age: 67
LOS: 5 days | Discharge: HOME | End: 2022-12-10
Attending: EMERGENCY MEDICINE | Admitting: INTERNAL MEDICINE
Payer: MEDICARE

## 2022-12-05 ENCOUNTER — APPOINTMENT (OUTPATIENT)
Dept: GENERAL RADIOLOGY | Age: 67
End: 2022-12-05
Payer: MEDICARE

## 2022-12-05 ENCOUNTER — CARE COORDINATION (OUTPATIENT)
Dept: CASE MANAGEMENT | Age: 67
End: 2022-12-05

## 2022-12-05 ENCOUNTER — TELEPHONE (OUTPATIENT)
Dept: OTHER | Facility: CLINIC | Age: 67
End: 2022-12-05

## 2022-12-05 DIAGNOSIS — J96.21 ACUTE ON CHRONIC RESPIRATORY FAILURE WITH HYPOXIA (HCC): ICD-10-CM

## 2022-12-05 DIAGNOSIS — J44.1 COPD EXACERBATION (HCC): Primary | ICD-10-CM

## 2022-12-05 DIAGNOSIS — J10.1 INFLUENZA A: ICD-10-CM

## 2022-12-05 LAB
ALBUMIN SERPL-MCNC: 3.2 G/DL (ref 3.5–5.2)
ALP BLD-CCNC: 74 U/L (ref 35–104)
ALT SERPL-CCNC: 30 U/L (ref 0–32)
ANION GAP SERPL CALCULATED.3IONS-SCNC: 4 MMOL/L (ref 7–16)
AST SERPL-CCNC: 26 U/L (ref 0–31)
BASOPHILS ABSOLUTE: 0.01 E9/L (ref 0–0.2)
BASOPHILS RELATIVE PERCENT: 0.1 % (ref 0–2)
BILIRUB SERPL-MCNC: <0.2 MG/DL (ref 0–1.2)
BUN BLDV-MCNC: 20 MG/DL (ref 6–23)
CALCIUM SERPL-MCNC: 8.7 MG/DL (ref 8.6–10.2)
CHLORIDE BLD-SCNC: 97 MMOL/L (ref 98–107)
CO2: 34 MMOL/L (ref 22–29)
CREAT SERPL-MCNC: 0.6 MG/DL (ref 0.5–1)
D DIMER: <200 NG/ML DDU
EOSINOPHILS ABSOLUTE: 0.02 E9/L (ref 0.05–0.5)
EOSINOPHILS RELATIVE PERCENT: 0.2 % (ref 0–6)
GFR SERPL CREATININE-BSD FRML MDRD: >60 ML/MIN/1.73
GLUCOSE BLD-MCNC: 113 MG/DL (ref 74–99)
HCT VFR BLD CALC: 38.9 % (ref 34–48)
HEMOGLOBIN: 12 G/DL (ref 11.5–15.5)
IMMATURE GRANULOCYTES #: 0.05 E9/L
IMMATURE GRANULOCYTES %: 0.4 % (ref 0–5)
INFLUENZA A BY PCR: DETECTED
INFLUENZA B BY PCR: NOT DETECTED
LYMPHOCYTES ABSOLUTE: 1.64 E9/L (ref 1.5–4)
LYMPHOCYTES RELATIVE PERCENT: 14.4 % (ref 20–42)
MCH RBC QN AUTO: 29.3 PG (ref 26–35)
MCHC RBC AUTO-ENTMCNC: 30.8 % (ref 32–34.5)
MCV RBC AUTO: 95.1 FL (ref 80–99.9)
MONOCYTES ABSOLUTE: 0.8 E9/L (ref 0.1–0.95)
MONOCYTES RELATIVE PERCENT: 7 % (ref 2–12)
NEUTROPHILS ABSOLUTE: 8.88 E9/L (ref 1.8–7.3)
NEUTROPHILS RELATIVE PERCENT: 77.9 % (ref 43–80)
PDW BLD-RTO: 13.6 FL (ref 11.5–15)
PLATELET # BLD: 274 E9/L (ref 130–450)
PMV BLD AUTO: 9.4 FL (ref 7–12)
POTASSIUM SERPL-SCNC: 3.6 MMOL/L (ref 3.5–5)
PRO-BNP: 569 PG/ML (ref 0–125)
RBC # BLD: 4.09 E12/L (ref 3.5–5.5)
RSV BY PCR: NEGATIVE
SARS-COV-2, NAAT: NOT DETECTED
SODIUM BLD-SCNC: 135 MMOL/L (ref 132–146)
TOTAL PROTEIN: 5.7 G/DL (ref 6.4–8.3)
TROPONIN, HIGH SENSITIVITY: 24 NG/L (ref 0–9)
TROPONIN, HIGH SENSITIVITY: 25 NG/L (ref 0–9)
WBC # BLD: 11.4 E9/L (ref 4.5–11.5)

## 2022-12-05 PROCEDURE — 6360000002 HC RX W HCPCS: Performed by: EMERGENCY MEDICINE

## 2022-12-05 PROCEDURE — 87807 RSV ASSAY W/OPTIC: CPT

## 2022-12-05 PROCEDURE — 6370000000 HC RX 637 (ALT 250 FOR IP): Performed by: EMERGENCY MEDICINE

## 2022-12-05 PROCEDURE — 36415 COLL VENOUS BLD VENIPUNCTURE: CPT

## 2022-12-05 PROCEDURE — APPSS45 APP SPLIT SHARED TIME 31-45 MINUTES

## 2022-12-05 PROCEDURE — 80053 COMPREHEN METABOLIC PANEL: CPT

## 2022-12-05 PROCEDURE — 93005 ELECTROCARDIOGRAM TRACING: CPT | Performed by: EMERGENCY MEDICINE

## 2022-12-05 PROCEDURE — 94664 DEMO&/EVAL PT USE INHALER: CPT

## 2022-12-05 PROCEDURE — 96374 THER/PROPH/DIAG INJ IV PUSH: CPT

## 2022-12-05 PROCEDURE — 71045 X-RAY EXAM CHEST 1 VIEW: CPT

## 2022-12-05 PROCEDURE — 84484 ASSAY OF TROPONIN QUANT: CPT

## 2022-12-05 PROCEDURE — 87635 SARS-COV-2 COVID-19 AMP PRB: CPT

## 2022-12-05 PROCEDURE — 83880 ASSAY OF NATRIURETIC PEPTIDE: CPT

## 2022-12-05 PROCEDURE — 2060000000 HC ICU INTERMEDIATE R&B

## 2022-12-05 PROCEDURE — 94640 AIRWAY INHALATION TREATMENT: CPT

## 2022-12-05 PROCEDURE — 85378 FIBRIN DEGRADE SEMIQUANT: CPT

## 2022-12-05 PROCEDURE — 85025 COMPLETE CBC W/AUTO DIFF WBC: CPT

## 2022-12-05 PROCEDURE — 87502 INFLUENZA DNA AMP PROBE: CPT

## 2022-12-05 PROCEDURE — 99219 PR INITIAL OBSERVATION CARE/DAY 50 MINUTES: CPT | Performed by: INTERNAL MEDICINE

## 2022-12-05 PROCEDURE — 99285 EMERGENCY DEPT VISIT HI MDM: CPT

## 2022-12-05 RX ORDER — METHYLPREDNISOLONE SODIUM SUCCINATE 125 MG/2ML
80 INJECTION, POWDER, LYOPHILIZED, FOR SOLUTION INTRAMUSCULAR; INTRAVENOUS ONCE
Status: COMPLETED | OUTPATIENT
Start: 2022-12-05 | End: 2022-12-05

## 2022-12-05 RX ORDER — OSELTAMIVIR PHOSPHATE 75 MG/1
75 CAPSULE ORAL ONCE
Status: COMPLETED | OUTPATIENT
Start: 2022-12-05 | End: 2022-12-05

## 2022-12-05 RX ORDER — IPRATROPIUM BROMIDE AND ALBUTEROL SULFATE 2.5; .5 MG/3ML; MG/3ML
3 SOLUTION RESPIRATORY (INHALATION) ONCE
Status: COMPLETED | OUTPATIENT
Start: 2022-12-05 | End: 2022-12-05

## 2022-12-05 RX ADMIN — METHYLPREDNISOLONE SODIUM SUCCINATE 80 MG: 125 INJECTION, POWDER, FOR SOLUTION INTRAMUSCULAR; INTRAVENOUS at 19:17

## 2022-12-05 RX ADMIN — IPRATROPIUM BROMIDE AND ALBUTEROL SULFATE 3 AMPULE: .5; 2.5 SOLUTION RESPIRATORY (INHALATION) at 18:04

## 2022-12-05 RX ADMIN — OSELTAMIVIR PHOSPHATE 75 MG: 75 CAPSULE ORAL at 23:10

## 2022-12-05 ASSESSMENT — PAIN - FUNCTIONAL ASSESSMENT: PAIN_FUNCTIONAL_ASSESSMENT: NONE - DENIES PAIN

## 2022-12-05 NOTE — CARE COORDINATION
[facility] Care Transitions Initial Follow Up Call    Call within 2 business days of discharge: Yes      Patient: Peter Cagle Patient : 1955   MRN: [id number]  Reason for Admission: Atrial fibrillation with RVR   Discharge Date: 12/3/22 RARS: Readmission Risk Score: 17.6      Last Discharge  Street       Date Complaint Diagnosis Description Type Department Provider    22 Cough, Shortness of breath, states that she was discharged yesterday,  Atrial fibrillation with RVR ([facility] [address] . .. ED to Hosp-Admission (Discharged) (ADMITTED) Fabiola Stevens MD; America Ramírez... Attempted to reach the patient for initial Care Transition call post hospital discharge. Message left with CTN's contact information requesting return phone call. Will attempt outreach again.         Follow Up  Future Appointments   Date Time Provider Gigi Figueroa   2023 11:00 AM April DO RYAN Chavez [facility]         Valencia Aguero RN

## 2022-12-05 NOTE — ED PROVIDER NOTES
HPI:  12/5/22,   Time: 5:42 PM DIONY Love is a 69 y.o. female presenting to the ED for dry cough and shortness of breath, beginning 2 days ago. The complaint has been persistent, moderate in severity, and worsened by light exertion. Patient 49-year-old female history of COPD wears 2 L of oxygen at home. She states that she has had increasing dry cough but no fevers or chills the last 2 days she has been increasingly short of breath and using her nebulizers more at home she uses it now every 4 hours but still feels short of breath. She has chest pain with coughing sometimes with twisting and moving to the left chest area. No pleuritic pain. She is on Eliquis chronically for A. fib. She denies any leg pain or leg swelling no nausea vomiting or diarrhea. Denies any sick contacts. Patient states she quit smoking 5 weeks ago but smells heavily of smoke. Patient states she just \"around friends that smoke\". Patient denies any chest pain at this time. No back pain. No numbness or weakness to extremities. She states she does feel occasional lightheadedness with standing when she was in the shower yesterday but no lightheadedness or dizziness at this time. No numbness or weakness to extremities no aphasia no dysarthria no visual changes. No headache. Review of Systems:   Pertinent positives and negatives are stated within HPI, all other systems reviewed and are negative.    --------------------------------------------- PAST HISTORY ---------------------------------------------  Past Medical History:  has a past medical history of Acute exacerbation of chronic obstructive pulmonary disease (COPD) ([facility] Acute respiratory failure with hypoxia ([facility] [address] COPD (chronic obstructive pulmonary disease) ([facility] [address] COPD exacerbation ([facility] [address] COPD with acute exacerbation ([facility] [address] COVID-19, Hypertension, Pneumonia due to infectious organism, and Uncontrolled hypertension.     Past Surgical History:  has a past surgical history that includes back surgery. Social History:  reports that she has quit smoking. Her smoking use included cigarettes. She smoked an average of .25 packs per day. She has never used smokeless tobacco. She reports that she does not currently use alcohol. She reports that she does not currently use drugs. Family History: family history includes Dementia in her father; Diabetes in her maternal grandmother; Heart Disease in her mother. The patients home medications have been reviewed. Allergies: Pcn [penicillins]    ---------------------------------------------------PHYSICAL EXAM--------------------------------------    Constitutional/General: Alert and oriented x3, well appearing, non toxic in NAD; thin  Head: Normocephalic and atraumatic  Eyes: PERRL, EOMI, conjunctive normal, sclera non icteric  Mouth: Oropharynx clear, handling secretions, no trismus, no asymmetry of the posterior oropharynx or uvular edema  Neck: Supple, full ROM, non tender to palpation in the midline, no stridor, no crepitus, no meningeal signs  Respiratory: Lungs fair air movement bilaterally. Mild tachypnea. Mild accessory muscle use. No retractions. Able speak in moderate length sentences. Diffuse expiratory wheezes no rales or rhonchi. Not in respiratory distress  Cardiovascular:  Regular rate. Regular rhythm. No murmurs, gallops, or rubs. 2+ distal pulses  Chest: No chest wall tenderness  GI:  Abdomen Soft, Non tender, Non distended. +BS. No organomegaly, no palpable masses,  No rebound, guarding, or rigidity. Musculoskeletal: Moves all extremities x 4. Warm and well perfused, no clubbing, cyanosis, or edema. Capillary refill <3 seconds  Integument: skin warm and dry. No rashes.    Lymphatic: no lymphadenopathy noted  Neurologic: GCS 15, no focal deficits, symmetric strength 5/5 in the upper and lower extremities bilaterally  Psychiatric: Normal Affect    -------------------------------------------------- RESULTS -------------------------------------------------  I have personally reviewed all laboratory and imaging results for this patient. Results are listed below.      LABS:  Results for orders placed or performed during the hospital encounter of 12/05/22   Rapid influenza A/B antigens    Specimen: Nasopharyngeal   Result Value Ref Range    Influenza A by PCR DETECTED (A) Not Detected    Influenza B by PCR Not Detected Not Detected   Rapid RSV Antigen    Specimen: Blood   Result Value Ref Range    RSV by PCR Negative Negative   COVID-19, Rapid    Specimen: Nasopharyngeal Swab   Result Value Ref Range    SARS-CoV-2, NAAT Not Detected Not Detected   CBC with Auto Differential   Result Value Ref Range    WBC 11.4 4.5 - 11.5 E9/L    RBC 4.09 3.50 - 5.50 E12/L    Hemoglobin 12.0 11.5 - 15.5 g/dL    Hematocrit 38.9 34.0 - 48.0 %    MCV 95.1 80.0 - 99.9 fL    MCH 29.3 26.0 - 35.0 pg    MCHC 30.8 (L) 32.0 - 34.5 %    RDW 13.6 11.5 - 15.0 fL    Platelets 759 266 - 424 E9/L    MPV 9.4 7.0 - 12.0 fL    Neutrophils % 77.9 43.0 - 80.0 %    Immature Granulocytes % 0.4 0.0 - 5.0 %    Lymphocytes % 14.4 (L) 20.0 - 42.0 %    Monocytes % 7.0 2.0 - 12.0 %    Eosinophils % 0.2 0.0 - 6.0 %    Basophils % 0.1 0.0 - 2.0 %    Neutrophils Absolute 8.88 (H) 1.80 - 7.30 E9/L    Immature Granulocytes # 0.05 E9/L    Lymphocytes Absolute 1.64 1.50 - 4.00 E9/L    Monocytes Absolute 0.80 0.10 - 0.95 E9/L    Eosinophils Absolute 0.02 (L) 0.05 - 0.50 E9/L    Basophils Absolute 0.01 0.00 - 0.20 E9/L   Comprehensive Metabolic Panel   Result Value Ref Range    Sodium 135 132 - 146 mmol/L    Potassium 3.6 3.5 - 5.0 mmol/L    Chloride 97 (L) 98 - 107 mmol/L    CO2 34 (H) 22 - 29 mmol/L    Anion Gap 4 (L) 7 - 16 mmol/L    Glucose 113 (H) 74 - 99 mg/dL    BUN 20 6 - 23 mg/dL    Creatinine 0.6 0.5 - 1.0 mg/dL    Est, Glom Filt Rate >60 >=60 mL/min/1.73    Calcium 8.7 8.6 - 10.2 mg/dL    Total Protein 5.7 (L) 6.4 - 8.3 g/dL    Albumin 3.2 (L) 3.5 - 5.2 g/dL Total Bilirubin <0.2 0.0 - 1.2 mg/dL    Alkaline Phosphatase 74 35 - 104 U/L    ALT 30 0 - 32 U/L    AST 26 0 - 31 U/L   Troponin   Result Value Ref Range    Troponin, High Sensitivity 24 (H) 0 - 9 ng/L   Brain Natriuretic Peptide   Result Value Ref Range    Pro- (H) 0 - 125 pg/mL   D-Dimer, Quantitative   Result Value Ref Range    D-Dimer, Quant <200 ng/mL DDU   Troponin   Result Value Ref Range    Troponin, High Sensitivity 25 (H) 0 - 9 ng/L   EKG 12 Lead   Result Value Ref Range    Ventricular Rate 83 BPM    Atrial Rate 83 BPM    P-R Interval 148 ms    QRS Duration 70 ms    Q-T Interval 374 ms    QTc Calculation (Bazett) 439 ms    R Axis 80 degrees    T Axis 82 degrees       RADIOLOGY:  Interpreted by Radiologist.  XR CHEST PORTABLE   Final Result   No acute cardiopulmonary process. COPD.                   ------------------------- NURSING NOTES AND VITALS REVIEWED ---------------------------   The nursing notes within the ED encounter and vital signs as below have been reviewed by myself. BP (!) 137/116   Pulse 93   Temp 97.5 °F (36.4 °C)   Resp 18   Ht 5' 8\" (1.727 m)   Wt 101 lb (45.8 kg)   SpO2 94%   BMI 15.36 kg/m²   Oxygen Saturation Interpretation: Abnormal - but at baseline; 98% on her baseline 2 L nasal cannula. The patients available past medical records and past encounters were reviewed. ------------------------------ ED COURSE/MEDICAL DECISION MAKING----------------------  Medications   oseltamivir (TAMIFLU) capsule 75 mg (has no administration in time range)   ipratropium-albuterol (DUONEB) nebulizer solution 3 ampule (3 ampules Inhalation Given 12/5/22 1804)   methylPREDNISolone sodium (SOLU-MEDROL) injection 80 mg (80 mg IntraVENous Given 12/5/22 1917)         ED COURSE:  ED Course as of 12/05/22 2308   Mon Dec 05, 2022   1904 EKG shows normal sinus rhythm 83 beats a minute no signs of any ST changes no ST elevation . No change in prior EKG.   EKG inter by myself. [KK]   2127 Was hypoxic on her baseline 2 L she was increased to 4 L and tolerated this well. Influenza A is positive. She will require admission secondary to hypoxia. [CA]   3844 Spoke to the hospitalist, Dr. Andrzej Chou. He accepted the patient to their service. He requested the patient be started on Tamiflu as her symptoms only began 2 days ago. Patient will be admitted. [YN]   3714 Only just recently in the hospital and discharged 2 days ago after being admitted for A. fib RVR and COPD exacerbation she converted to sinus rhythm prior to discharge and was discharged home to follow-up with cardiology as an outpatient. [KK]   2308 Resting comfortably on 4 L nasal cannula. No longer any increased work of breathing or tachypnea much better aeration bilaterally. She is agreeable to plan for admission. [KK]      ED Course User Index  [KK] Hema Mckoy MD       Medical Decision Making:    Patient is a 67-year-old female history of COPD on 2 L of oxygen at home. Not hypoxic she is at 98% on her baseline 2 there is here she had increasing cough and congestion and dyspnea on exertion in the last 2 days. Slight lightheadedness with standing in the shower yesterday occasional chest pain with coughing. No chest pain at this time. She is chronically on Eliquis for A. fib. She uses DuoNeb treatments at home but still has shortness of breath. We will check for COVID flu pneumonia we will give duo nebs and steroids    Differential diagnosis COPD exacerbation pneumonia hypoxia COVID influenza CHF ACS dysrhythmia MI      This patient has remained hemodynamically stable and been closely monitored during their ED course. EKG shows normal sinus rhythm 83 beats a minute no signs of any ST changes. Did not have tachypnea and wheezing on exam and was given 3 DuoNeb treatments IV Solu-Medrol. She was hypoxic on her baseline 2 L increased to 4 L and improved. Maintaining saturations in the mid 90s.   Decreased work of breathing after DuoNeb treatments were given. Patient was found to be influenza A positive she was started on Tamiflu. CBC was normal normal chemistry is normal creatinine normal BNP slightly elevated at 569 D-dimer was negative. COVID and RSV were negative chest x-ray showed no focal infiltrate. First troponin was 24. Second was 25 delta of only 1. Spoke to the hospitalist agreed to admit. Patient will be admitted in stable condition to monitored bed  Re-Evaluations:             Re-evaluation. Patients symptoms are improving        Counseling: The emergency provider has spoken with the patient and discussed todays results, in addition to providing specific details for the plan of care and counseling regarding the diagnosis and prognosis. Questions are answered at this time and they are agreeable with the plan. CRITICAL CARE:   35 MINUTES. Please note that the withdrawal or failure to initiate urgent interventions for this patient would likely result in a life threatening deterioration or permanent disability. Accordingly this patient received the above mentioned time, excluding separately billable procedures. --------------------------------- IMPRESSION AND DISPOSITION ---------------------------------    IMPRESSION  1. COPD exacerbation ([address]    2. Influenza A    3. Acute on chronic respiratory failure with hypoxia (HCC)        DISPOSITION  Disposition: Admit to telemetry  Patient condition is fair    NOTE: This report was transcribed using voice recognition software.  Every effort was made to ensure accuracy; however, inadvertent computerized transcription errors may be present       Davide Vazquez MD  12/06/22 4021

## 2022-12-06 LAB
EKG ATRIAL RATE: 83 BPM
EKG ATRIAL RATE: 83 BPM
EKG P AXIS: -89 DEGREES
EKG P-R INTERVAL: 146 MS
EKG P-R INTERVAL: 148 MS
EKG Q-T INTERVAL: 368 MS
EKG Q-T INTERVAL: 374 MS
EKG QRS DURATION: 70 MS
EKG QRS DURATION: 70 MS
EKG QTC CALCULATION (BAZETT): 432 MS
EKG QTC CALCULATION (BAZETT): 439 MS
EKG R AXIS: 71 DEGREES
EKG R AXIS: 80 DEGREES
EKG T AXIS: 82 DEGREES
EKG T AXIS: 82 DEGREES
EKG VENTRICULAR RATE: 83 BPM
EKG VENTRICULAR RATE: 83 BPM

## 2022-12-06 PROCEDURE — 6370000000 HC RX 637 (ALT 250 FOR IP): Performed by: EMERGENCY MEDICINE

## 2022-12-06 PROCEDURE — 6370000000 HC RX 637 (ALT 250 FOR IP): Performed by: STUDENT IN AN ORGANIZED HEALTH CARE EDUCATION/TRAINING PROGRAM

## 2022-12-06 PROCEDURE — 2060000000 HC ICU INTERMEDIATE R&B

## 2022-12-06 PROCEDURE — 2700000000 HC OXYGEN THERAPY PER DAY

## 2022-12-06 PROCEDURE — 2580000003 HC RX 258

## 2022-12-06 PROCEDURE — 6370000000 HC RX 637 (ALT 250 FOR IP)

## 2022-12-06 PROCEDURE — 94640 AIRWAY INHALATION TREATMENT: CPT

## 2022-12-06 PROCEDURE — 93010 ELECTROCARDIOGRAM REPORT: CPT | Performed by: INTERNAL MEDICINE

## 2022-12-06 PROCEDURE — 6360000002 HC RX W HCPCS

## 2022-12-06 PROCEDURE — 99232 SBSQ HOSP IP/OBS MODERATE 35: CPT | Performed by: STUDENT IN AN ORGANIZED HEALTH CARE EDUCATION/TRAINING PROGRAM

## 2022-12-06 RX ORDER — BENZONATATE 100 MG/1
100 CAPSULE ORAL 3 TIMES DAILY PRN
Status: DISCONTINUED | OUTPATIENT
Start: 2022-12-06 | End: 2022-12-10 | Stop reason: HOSPADM

## 2022-12-06 RX ORDER — GUAIFENESIN/DEXTROMETHORPHAN 100-10MG/5
5 SYRUP ORAL EVERY 4 HOURS PRN
Status: DISCONTINUED | OUTPATIENT
Start: 2022-12-06 | End: 2022-12-10 | Stop reason: HOSPADM

## 2022-12-06 RX ORDER — SODIUM CHLORIDE 0.9 % (FLUSH) 0.9 %
5-40 SYRINGE (ML) INJECTION EVERY 12 HOURS SCHEDULED
Status: DISCONTINUED | OUTPATIENT
Start: 2022-12-06 | End: 2022-12-10 | Stop reason: HOSPADM

## 2022-12-06 RX ORDER — OSELTAMIVIR PHOSPHATE 75 MG/1
75 CAPSULE ORAL 2 TIMES DAILY
Status: DISCONTINUED | OUTPATIENT
Start: 2022-12-06 | End: 2022-12-10 | Stop reason: HOSPADM

## 2022-12-06 RX ORDER — IPRATROPIUM BROMIDE AND ALBUTEROL SULFATE 2.5; .5 MG/3ML; MG/3ML
1 SOLUTION RESPIRATORY (INHALATION) EVERY 4 HOURS
Status: DISCONTINUED | OUTPATIENT
Start: 2022-12-06 | End: 2022-12-10 | Stop reason: HOSPADM

## 2022-12-06 RX ORDER — SODIUM CHLORIDE 0.9 % (FLUSH) 0.9 %
5-40 SYRINGE (ML) INJECTION PRN
Status: DISCONTINUED | OUTPATIENT
Start: 2022-12-06 | End: 2022-12-10 | Stop reason: HOSPADM

## 2022-12-06 RX ORDER — VITAMIN B COMPLEX
2000 TABLET ORAL DAILY
Status: DISCONTINUED | OUTPATIENT
Start: 2022-12-06 | End: 2022-12-10 | Stop reason: HOSPADM

## 2022-12-06 RX ORDER — BUDESONIDE 0.5 MG/2ML
0.5 INHALANT ORAL 2 TIMES DAILY
Status: DISCONTINUED | OUTPATIENT
Start: 2022-12-06 | End: 2022-12-10 | Stop reason: HOSPADM

## 2022-12-06 RX ORDER — DILTIAZEM HYDROCHLORIDE 120 MG/1
120 CAPSULE, COATED, EXTENDED RELEASE ORAL DAILY
Status: DISCONTINUED | OUTPATIENT
Start: 2022-12-06 | End: 2022-12-10 | Stop reason: HOSPADM

## 2022-12-06 RX ORDER — DOCUSATE SODIUM 100 MG/1
100 CAPSULE, LIQUID FILLED ORAL 2 TIMES DAILY
Status: DISCONTINUED | OUTPATIENT
Start: 2022-12-06 | End: 2022-12-10 | Stop reason: HOSPADM

## 2022-12-06 RX ORDER — BUDESONIDE AND FORMOTEROL FUMARATE DIHYDRATE 160; 4.5 UG/1; UG/1
2 AEROSOL RESPIRATORY (INHALATION) 2 TIMES DAILY
Status: DISCONTINUED | OUTPATIENT
Start: 2022-12-06 | End: 2022-12-06 | Stop reason: CLARIF

## 2022-12-06 RX ORDER — POLYETHYLENE GLYCOL 3350 17 G/17G
17 POWDER, FOR SOLUTION ORAL DAILY PRN
Status: DISCONTINUED | OUTPATIENT
Start: 2022-12-06 | End: 2022-12-10 | Stop reason: HOSPADM

## 2022-12-06 RX ORDER — ARFORMOTEROL TARTRATE 15 UG/2ML
15 SOLUTION RESPIRATORY (INHALATION) 2 TIMES DAILY
Status: DISCONTINUED | OUTPATIENT
Start: 2022-12-06 | End: 2022-12-10 | Stop reason: HOSPADM

## 2022-12-06 RX ORDER — SODIUM CHLORIDE 9 MG/ML
25 INJECTION, SOLUTION INTRAVENOUS PRN
Status: DISCONTINUED | OUTPATIENT
Start: 2022-12-06 | End: 2022-12-10 | Stop reason: HOSPADM

## 2022-12-06 RX ORDER — ONDANSETRON 4 MG/1
4 TABLET, ORALLY DISINTEGRATING ORAL EVERY 8 HOURS PRN
Status: DISCONTINUED | OUTPATIENT
Start: 2022-12-06 | End: 2022-12-10 | Stop reason: HOSPADM

## 2022-12-06 RX ORDER — ENOXAPARIN SODIUM 100 MG/ML
30 INJECTION SUBCUTANEOUS DAILY
Status: DISCONTINUED | OUTPATIENT
Start: 2022-12-06 | End: 2022-12-06

## 2022-12-06 RX ORDER — ACETAMINOPHEN 325 MG/1
650 TABLET ORAL ONCE
Status: COMPLETED | OUTPATIENT
Start: 2022-12-06 | End: 2022-12-06

## 2022-12-06 RX ORDER — ASCORBIC ACID 500 MG
500 TABLET ORAL DAILY
Status: DISCONTINUED | OUTPATIENT
Start: 2022-12-06 | End: 2022-12-10 | Stop reason: HOSPADM

## 2022-12-06 RX ORDER — ONDANSETRON 2 MG/ML
4 INJECTION INTRAMUSCULAR; INTRAVENOUS EVERY 6 HOURS PRN
Status: DISCONTINUED | OUTPATIENT
Start: 2022-12-06 | End: 2022-12-10 | Stop reason: HOSPADM

## 2022-12-06 RX ORDER — METHYLPREDNISOLONE SODIUM SUCCINATE 40 MG/ML
40 INJECTION, POWDER, LYOPHILIZED, FOR SOLUTION INTRAMUSCULAR; INTRAVENOUS EVERY 12 HOURS
Status: DISCONTINUED | OUTPATIENT
Start: 2022-12-06 | End: 2022-12-07

## 2022-12-06 RX ORDER — ACETAMINOPHEN 650 MG/1
650 SUPPOSITORY RECTAL EVERY 6 HOURS PRN
Status: DISCONTINUED | OUTPATIENT
Start: 2022-12-06 | End: 2022-12-10 | Stop reason: HOSPADM

## 2022-12-06 RX ORDER — ACETAMINOPHEN 325 MG/1
650 TABLET ORAL EVERY 6 HOURS PRN
Status: DISCONTINUED | OUTPATIENT
Start: 2022-12-06 | End: 2022-12-10 | Stop reason: HOSPADM

## 2022-12-06 RX ADMIN — IPRATROPIUM BROMIDE AND ALBUTEROL SULFATE 1 AMPULE: 2.5; .5 SOLUTION RESPIRATORY (INHALATION) at 04:34

## 2022-12-06 RX ADMIN — POLYETHYLENE GLYCOL 3350 17 G: 17 POWDER, FOR SOLUTION ORAL at 10:39

## 2022-12-06 RX ADMIN — OSELTAMIVIR PHOSPHATE 75 MG: 75 CAPSULE ORAL at 08:39

## 2022-12-06 RX ADMIN — BUDESONIDE 500 MCG: 0.5 SUSPENSION RESPIRATORY (INHALATION) at 09:16

## 2022-12-06 RX ADMIN — DOCUSATE SODIUM 100 MG: 100 CAPSULE, LIQUID FILLED ORAL at 20:39

## 2022-12-06 RX ADMIN — BENZONATATE 100 MG: 100 CAPSULE ORAL at 20:39

## 2022-12-06 RX ADMIN — SODIUM CHLORIDE, PRESERVATIVE FREE 10 ML: 5 INJECTION INTRAVENOUS at 20:40

## 2022-12-06 RX ADMIN — METHYLPREDNISOLONE SODIUM SUCCINATE 40 MG: 40 INJECTION, POWDER, LYOPHILIZED, FOR SOLUTION INTRAMUSCULAR; INTRAVENOUS at 18:09

## 2022-12-06 RX ADMIN — APIXABAN 5 MG: 5 TABLET, FILM COATED ORAL at 08:39

## 2022-12-06 RX ADMIN — Medication 2000 UNITS: at 08:39

## 2022-12-06 RX ADMIN — OXYCODONE HYDROCHLORIDE AND ACETAMINOPHEN 500 MG: 500 TABLET ORAL at 08:39

## 2022-12-06 RX ADMIN — GUAIFENESIN AND DEXTROMETHORPHAN 5 ML: 100; 10 SYRUP ORAL at 08:39

## 2022-12-06 RX ADMIN — SODIUM CHLORIDE, PRESERVATIVE FREE 10 ML: 5 INJECTION INTRAVENOUS at 08:40

## 2022-12-06 RX ADMIN — METHYLPREDNISOLONE SODIUM SUCCINATE 40 MG: 40 INJECTION, POWDER, LYOPHILIZED, FOR SOLUTION INTRAMUSCULAR; INTRAVENOUS at 05:58

## 2022-12-06 RX ADMIN — ACETAMINOPHEN 650 MG: 325 TABLET ORAL at 02:23

## 2022-12-06 RX ADMIN — IPRATROPIUM BROMIDE AND ALBUTEROL SULFATE 1 AMPULE: 2.5; .5 SOLUTION RESPIRATORY (INHALATION) at 09:16

## 2022-12-06 RX ADMIN — GUAIFENESIN AND DEXTROMETHORPHAN 5 ML: 100; 10 SYRUP ORAL at 13:31

## 2022-12-06 RX ADMIN — ARFORMOTEROL TARTRATE 15 MCG: 15 SOLUTION RESPIRATORY (INHALATION) at 20:32

## 2022-12-06 RX ADMIN — IPRATROPIUM BROMIDE AND ALBUTEROL SULFATE 1 AMPULE: 2.5; .5 SOLUTION RESPIRATORY (INHALATION) at 20:32

## 2022-12-06 RX ADMIN — ARFORMOTEROL TARTRATE 15 MCG: 15 SOLUTION RESPIRATORY (INHALATION) at 09:16

## 2022-12-06 RX ADMIN — OSELTAMIVIR PHOSPHATE 75 MG: 75 CAPSULE ORAL at 20:39

## 2022-12-06 RX ADMIN — BUDESONIDE 500 MCG: 0.5 SUSPENSION RESPIRATORY (INHALATION) at 20:32

## 2022-12-06 RX ADMIN — BENZONATATE 100 MG: 100 CAPSULE ORAL at 06:48

## 2022-12-06 RX ADMIN — IPRATROPIUM BROMIDE AND ALBUTEROL SULFATE 1 AMPULE: 2.5; .5 SOLUTION RESPIRATORY (INHALATION) at 13:37

## 2022-12-06 RX ADMIN — APIXABAN 5 MG: 5 TABLET, FILM COATED ORAL at 20:39

## 2022-12-06 RX ADMIN — GUAIFENESIN AND DEXTROMETHORPHAN 5 ML: 100; 10 SYRUP ORAL at 20:40

## 2022-12-06 RX ADMIN — IPRATROPIUM BROMIDE AND ALBUTEROL SULFATE 1 AMPULE: 2.5; .5 SOLUTION RESPIRATORY (INHALATION) at 23:54

## 2022-12-06 RX ADMIN — DILTIAZEM HYDROCHLORIDE 120 MG: 120 CAPSULE, COATED, EXTENDED RELEASE ORAL at 08:39

## 2022-12-06 ASSESSMENT — PAIN DESCRIPTION - DESCRIPTORS
DESCRIPTORS: SORE
DESCRIPTORS: ACHING

## 2022-12-06 ASSESSMENT — PAIN DESCRIPTION - LOCATION
LOCATION: HEAD
LOCATION: RIB CAGE
LOCATION: HEAD

## 2022-12-06 ASSESSMENT — PAIN SCALES - GENERAL
PAINLEVEL_OUTOF10: 0
PAINLEVEL_OUTOF10: 5

## 2022-12-06 NOTE — H&P
[facility] History and Physical      CHIEF COMPLAINT:  shortness of breath    History of Present Illness: This is a 69year old patient with a past medical history of atrial fibrillation, COPD,  an hypertension   who presents to the ED with shortness of breath. Was recently discharged two days ago after being treated for new onset atrial fibrillation and COPD exacerbation. States she has been increasingly short of breath for two days. States her shortness of breath is worse with exertion. Has had a dry cough. Complains of chest pain with cough at times. Denies congestion, fever, or chills. Denies nausea, vomiting, or diarrhea. Wears two liters at home at baseline. Informant(s) for H&P: patient and chart review    REVIEW OF SYSTEMS:  A comprehensive review of systems was negative except for: what is in the HPI      PMH:  Past Medical History:   Diagnosis Date    Acute exacerbation of chronic obstructive pulmonary disease (COPD) ([facility] [address] 7/14/2022    Acute respiratory failure with hypoxia ([facility] [address] 6/27/2022    COPD (chronic obstructive pulmonary disease) (HCC)     COPD exacerbation ([facility] [address] 7/14/2022    COPD with acute exacerbation ([facility] [address]     COVID-19 7/16/2022    Hypertension     Pneumonia due to infectious organism     Uncontrolled hypertension        Surgical History:  Past Surgical History:   Procedure Laterality Date    BACK SURGERY         Medications Prior to Admission:    Prior to Admission medications    Medication Sig Start Date End Date Taking?  Authorizing Provider   apixaban (ELIQUIS) 5 MG TABS tablet Take 1 tablet by mouth 2 times daily 12/3/22 1/2/23  James Millard MD   dilTIAZem (CARDIZEM CD) 120 MG extended release capsule Take 1 capsule by mouth daily 12/4/22   James Millard MD   ipratropium-albuterol (DUONEB) 0.5-2.5 (3) MG/3ML SOLN nebulizer solution Inhale 3 mLs into the lungs every 4 hours (while awake) 11/30/22   James Millard MD   predniSONE (DELTASONE) 10 MG tablet Take 4 tablets by mouth daily for 3 days, THEN 3 tablets daily for 3 days, THEN 2 tablets daily for 3 days, THEN 1 tablet daily for 3 days, THEN 0.5 tablets daily for 3 days. 11/30/22 12/15/22  Lianna Elizabeth MD   guaiFENesin-dextromethorphan (ROBITUSSIN DM) 100-10 MG/5ML syrup Take 5 mLs by mouth every 4 hours as needed for Cough 11/30/22 12/10/22  Lianna Elizabeth MD   budesonide-formoterol [facility]) 160-4.5 MCG/ACT AERO Inhale 2 puffs into the lungs 2 times daily 10/3/22 11/2/22  Clyde Gonzalez DO   Cholecalciferol (VITAMIN D3) 50 MCG (2000 UT) CAPS Take 1 capsule by mouth in the morning. Historical Provider, MD   albuterol sulfate HFA (VENTOLIN HFA) 108 (90 Base) MCG/ACT inhaler Inhale 2 puffs into the lungs 4 times daily as needed for Wheezing  Patient not taking: Reported on 7/7/2022 6/29/22 7/16/22  Gilmer Beltre MD   vitamin C (ASCORBIC ACID) 500 MG tablet Take 500 mg by mouth daily    Historical Provider, MD       Allergies:    Pcn [penicillins]    Social History:    reports that she has quit smoking. Her smoking use included cigarettes. She smoked an average of .25 packs per day. She has never used smokeless tobacco. She reports that she does not currently use alcohol. She reports that she does not currently use drugs. Family History:   family history includes Dementia in her father; Diabetes in her maternal grandmother; Heart Disease in her mother.        PHYSICAL EXAM:  Vitals:  BP (!) 137/116   Pulse 93   Temp 97.5 °F (36.4 °C)   Resp 18   Ht 5' 8\" (1.727 m)   Wt 101 lb (45.8 kg)   SpO2 94%   BMI 15.36 kg/m²     General Appearance: alert and oriented to person, place and time and in no acute distress  Skin: warm and dry  Head: normocephalic and atraumatic  Eyes: pupils equal, round, and reactive to light, conjunctivae normal  Pulmonary/Chest: diminished bilaterally- no wheezes, rales or rhonchi, no respiratory distress  Cardiovascular: normal rate, normal S1 and S2  Abdomen: soft, non-tender, non-distended, normal bowel sounds, no masses or organomegaly  Extremities: no cyanosis, no clubbing and no edema  Neurologic: speech normal        LABS:  Recent Labs     12/03/22  0320 12/05/22  1809    135   K 4.3 3.6   CL 96* 97*   CO2 31* 34*   BUN 21 20   CREATININE 0.8 0.6   GLUCOSE 99 113*   CALCIUM 8.9 8.7       Recent Labs     12/05/22  1809   WBC 11.4   RBC 4.09   HGB 12.0   HCT 38.9   MCV 95.1   MCH 29.3   MCHC 30.8*   RDW 13.6      MPV 9.4       No results for input(s): POCGLU in the last 72 hours. Radiology:   XR CHEST PORTABLE   Final Result   No acute cardiopulmonary process. COPD. EKG: NSR    ASSESSMENT:      Principal Problem:    Acute respiratory failure with hypoxia (HCC)  Resolved Problems:    * No resolved hospital problems. *      PLAN:    1. Influenza A: Continue Tamiflu 75 mg BID. Duonebs every 4 hours. Tessalon PRN for cough. Continue oxygen therapy PRN. 2 liters at baseline. 2. COPD exacerbation: Solumedrol 40 mg BID. Continue Duonebs every 4 hours and PRN. Continue Brovana and Pulmicort. 3. Atrial fibrillation: Continue Cardizem and Eliquis. Monitor telemetry. Currently in NSR.     4. Hypertension: Continue Cardizem. 5. History of hyperthyroidism: TSH 0.039 and free T4 was 1. 19. Monitor. Code Status: full   DVT prophylaxis: Eliquis  35 minutes or more spent reviewing patient chart, assessing patient, discussing plan of care with patient and family, discussing plan of care with collaborating physician, and documentation. NOTE: This report was transcribed using voice recognition software. Every effort was made to ensure accuracy; however, inadvertent computerized transcription errors may be present. Electronically signed by REGI Shaffer CNP on 12/5/2022 at 11:22 PM    Attending Physician Statement:  Warren Valle M.D., F.A.C.P.     I have seen/discussed the case, including pertinent history and exam findings with NP.  Meds reviewed. I agree with the NP history/PE, assessment, plan and orders as documented by the [address] reviewed, including other providers notes, relevant labs and imaging. Billing based on   Medically complex case  My assessment of various problems as below  +copd - 2lNC last admission  on chronic home o2  New onset +flu A- add tamiflu  +bronchospasm- add steroids, duonebs etc..  +dry cough, worse sob  Hx of afib, currently in sinus    Moderate complexity- decision making  Observation charge? Bc recently admitted for same? 27584  +FS split  >50% of time spent coordinating care -including ER then pimary service who will take over +other providers and/or counseling patient/family  Remainder of medical problems as per NP note.

## 2022-12-06 NOTE — PROGRESS NOTES
[facility] Progress Note    Admitting Date and Time: 12/5/2022  5:20 PM  Admit Dx: Influenza A [J10.1]  COPD exacerbation ([address] [J44.1]  Acute respiratory failure with hypoxia (HCC) [J96.01]  Acute on chronic respiratory failure with hypoxia (HCC) [J96.21]    Subjective:  Patient is being followed for Influenza A [J10.1]  COPD exacerbation ([address] [J44.1]  Acute respiratory failure with hypoxia ([address] [J96.01]  Acute on chronic respiratory failure with hypoxia ([address] [J96.21]   Pt feels congested but unable to cough up phlegm. Per RN: No major concerns.     ROS: denies fever, chills, cp, sob, n/v, HA unless stated above.      sodium chloride flush  5-40 mL IntraVENous 2 times per day    ipratropium-albuterol  1 ampule Inhalation Q4H    methylPREDNISolone  40 mg IntraVENous Q12H    apixaban  5 mg Oral BID    Vitamin D  2,000 Units Oral Daily    dilTIAZem  120 mg Oral Daily    vitamin C  500 mg Oral Daily    Arformoterol Tartrate  15 mcg Nebulization BID    And    budesonide  0.5 mg Nebulization BID    oseltamivir  75 mg Oral BID     sodium chloride flush, 5-40 mL, PRN  sodium chloride, 25 mL, PRN  ondansetron, 4 mg, Q8H PRN   Or  ondansetron, 4 mg, Q6H PRN  polyethylene glycol, 17 g, Daily PRN  acetaminophen, 650 mg, Q6H PRN   Or  acetaminophen, 650 mg, Q6H PRN  guaiFENesin-dextromethorphan, 5 mL, Q4H PRN  benzonatate, 100 mg, TID PRN         Objective:    BP (!) 144/78   Pulse 67   Temp 98.3 °F (36.8 °C) (Oral)   Resp 20   Ht 5' 8\" (1.727 m)   Wt 101 lb (45.8 kg)   SpO2 93%   BMI 15.36 kg/m²     General Appearance: alert and oriented to person, place and time and in no acute distress  Skin: warm and dry  Head: normocephalic and atraumatic  Eyes: pupils equal, round, and reactive to light, extraocular eye movements intact, conjunctivae normal  Neck: neck supple and non tender without mass   Pulmonary/Chest: clear to auscultation bilaterally- no wheezes, rales or rhonchi, normal air movement, no respiratory distress  Cardiovascular: normal rate, normal S1 and S2 and no carotid bruits  Abdomen: soft, non-tender, non-distended, normal bowel sounds, no masses or organomegaly  Extremities: no cyanosis, no clubbing and no edema  Neurologic: no cranial nerve deficit and speech normal        Recent Labs     12/05/22  1809      K 3.6   CL 97*   CO2 34*   BUN 20   CREATININE 0.6   GLUCOSE 113*   CALCIUM 8.7       Recent Labs     12/05/22  1809   WBC 11.4   RBC 4.09   HGB 12.0   HCT 38.9   MCV 95.1   MCH 29.3   MCHC 30.8*   RDW 13.6      MPV 9.4       Micro:  No components found for: [facility])    Radiology:   XR CHEST PORTABLE    Result Date: 12/5/2022  EXAMINATION: ONE XRAY VIEW OF THE CHEST 12/5/2022 6:15 pm HISTORY: ORDERING SYSTEM PROVIDED HISTORY: Shortness of breath TECHNOLOGIST PROVIDED HISTORY: Reason for exam:->Shortness of breath FINDINGS: The lungs are hyperaerated. There are chronic changes at the lung apices and bases. The heart is not enlarged. No pneumothorax or pleural effusion. No acute cardiopulmonary process. COPD. Assessment:    Principal Problem:    Acute respiratory failure with hypoxia (HCC)  Resolved Problems:    * No resolved hospital problems. *      Plan:  1. Ac hypoxia - improved - underlying Influenza A & COPD exacerbation: Continue Tamiflu 75 mg BID. Duonebs every 4 hours. Tessalon PRN for cough. Continue oxygen therapy PRN. 2 liters at baseline. Solumedrol 40 mg BID. Continue Duonebs every 4 hours and PRN. Continue Brovana and Pulmicort. 3. Atrial fibrillation: Continue Cardizem and Eliquis. Monitor telemetry. Currently in NSR.      4. Hypertension: Continue Cardizem. 5. History of hyperthyroidism: TSH 0.039 and free T4 was 1. 19. Monitor. Code Status: full   DVT prophylaxis: Eliquis  Possible dc tomorrow, if clinically improved. NOTE: This report was transcribed using voice recognition software.  Every effort was made to ensure accuracy;

## 2022-12-06 NOTE — TELEPHONE ENCOUNTER
Writer contacted Dr. France Parrish  to inform of 30 day readmission risk. Rosar's attempt to contact Dr. France Parrish was unsuccessful.     Call Back: If you need to call back to inform of disposition you can contact me at [phone]

## 2022-12-06 NOTE — CARE COORDINATION
Social Work / Discharge Planning : Patient readmit. THis SW discharged patient home on saturday and this SW did provide portable tanks at discharge due to patient forgot her at home. SW did meet with patient and patient familiar with SW. Patient stated she could NOT breath and now positive for Flu. Patient drove her self to the hospital and DID bring her portable tanks with her. Patient plans on returning home with brother when able. Patient does have her  oxygen provided by [employer] and she wears 2L at baseline. PCP is Dr. Bran Gomez and her pharmacy is [facility] on [address]. Patient states she is independent and currently denies any HOME needs. [facility] plans. Patient does NOT want [facility]. LINSEY to follow.  Electronically signed by ELIGIO Gee on 12/6/22 at 8:03 AM EST

## 2022-12-07 LAB
BASOPHILS ABSOLUTE: 0.01 E9/L (ref 0–0.2)
BASOPHILS RELATIVE PERCENT: 0.1 % (ref 0–2)
EOSINOPHILS ABSOLUTE: 0 E9/L (ref 0.05–0.5)
EOSINOPHILS RELATIVE PERCENT: 0 % (ref 0–6)
HCT VFR BLD CALC: 34.7 % (ref 34–48)
HEMOGLOBIN: 11.1 G/DL (ref 11.5–15.5)
IMMATURE GRANULOCYTES #: 0.06 E9/L
IMMATURE GRANULOCYTES %: 0.6 % (ref 0–5)
LYMPHOCYTES ABSOLUTE: 1.48 E9/L (ref 1.5–4)
LYMPHOCYTES RELATIVE PERCENT: 15.3 % (ref 20–42)
MCH RBC QN AUTO: 30 PG (ref 26–35)
MCHC RBC AUTO-ENTMCNC: 32 % (ref 32–34.5)
MCV RBC AUTO: 93.8 FL (ref 80–99.9)
MONOCYTES ABSOLUTE: 0.49 E9/L (ref 0.1–0.95)
MONOCYTES RELATIVE PERCENT: 5.1 % (ref 2–12)
NEUTROPHILS ABSOLUTE: 7.62 E9/L (ref 1.8–7.3)
NEUTROPHILS RELATIVE PERCENT: 78.9 % (ref 43–80)
PDW BLD-RTO: 13.6 FL (ref 11.5–15)
PLATELET # BLD: 295 E9/L (ref 130–450)
PMV BLD AUTO: 10.1 FL (ref 7–12)
RBC # BLD: 3.7 E12/L (ref 3.5–5.5)
WBC # BLD: 9.7 E9/L (ref 4.5–11.5)

## 2022-12-07 PROCEDURE — 2580000003 HC RX 258

## 2022-12-07 PROCEDURE — 6360000002 HC RX W HCPCS

## 2022-12-07 PROCEDURE — 85025 COMPLETE CBC W/AUTO DIFF WBC: CPT

## 2022-12-07 PROCEDURE — 6370000000 HC RX 637 (ALT 250 FOR IP)

## 2022-12-07 PROCEDURE — 6370000000 HC RX 637 (ALT 250 FOR IP): Performed by: STUDENT IN AN ORGANIZED HEALTH CARE EDUCATION/TRAINING PROGRAM

## 2022-12-07 PROCEDURE — 6370000000 HC RX 637 (ALT 250 FOR IP): Performed by: NURSE PRACTITIONER

## 2022-12-07 PROCEDURE — 36415 COLL VENOUS BLD VENIPUNCTURE: CPT

## 2022-12-07 PROCEDURE — 2060000000 HC ICU INTERMEDIATE R&B

## 2022-12-07 PROCEDURE — 94640 AIRWAY INHALATION TREATMENT: CPT

## 2022-12-07 PROCEDURE — 99232 SBSQ HOSP IP/OBS MODERATE 35: CPT | Performed by: STUDENT IN AN ORGANIZED HEALTH CARE EDUCATION/TRAINING PROGRAM

## 2022-12-07 PROCEDURE — 2700000000 HC OXYGEN THERAPY PER DAY

## 2022-12-07 RX ORDER — METHYLPREDNISOLONE SODIUM SUCCINATE 40 MG/ML
40 INJECTION, POWDER, LYOPHILIZED, FOR SOLUTION INTRAMUSCULAR; INTRAVENOUS DAILY
Status: DISCONTINUED | OUTPATIENT
Start: 2022-12-08 | End: 2022-12-10 | Stop reason: HOSPADM

## 2022-12-07 RX ORDER — OSELTAMIVIR PHOSPHATE 75 MG/1
75 CAPSULE ORAL 2 TIMES DAILY
Qty: 7 CAPSULE | Refills: 0 | Status: SHIPPED | OUTPATIENT
Start: 2022-12-07 | End: 2022-12-10 | Stop reason: SDUPTHER

## 2022-12-07 RX ORDER — ZOLPIDEM TARTRATE 5 MG/1
5 TABLET ORAL NIGHTLY PRN
Status: DISCONTINUED | OUTPATIENT
Start: 2022-12-07 | End: 2022-12-10 | Stop reason: HOSPADM

## 2022-12-07 RX ORDER — BENZONATATE 100 MG/1
100 CAPSULE ORAL 3 TIMES DAILY PRN
Qty: 20 CAPSULE | Refills: 0 | Status: SHIPPED | OUTPATIENT
Start: 2022-12-07 | End: 2022-12-10 | Stop reason: SDUPTHER

## 2022-12-07 RX ORDER — PREDNISONE 10 MG/1
TABLET ORAL
Qty: 20 TABLET | Refills: 0 | Status: SHIPPED | OUTPATIENT
Start: 2022-12-07 | End: 2022-12-10 | Stop reason: SDUPTHER

## 2022-12-07 RX ADMIN — OSELTAMIVIR PHOSPHATE 75 MG: 75 CAPSULE ORAL at 20:02

## 2022-12-07 RX ADMIN — OSELTAMIVIR PHOSPHATE 75 MG: 75 CAPSULE ORAL at 09:55

## 2022-12-07 RX ADMIN — MAGNESIUM HYDROXIDE 30 ML: 400 SUSPENSION ORAL at 13:35

## 2022-12-07 RX ADMIN — ONDANSETRON 4 MG: 2 INJECTION INTRAMUSCULAR; INTRAVENOUS at 06:07

## 2022-12-07 RX ADMIN — APIXABAN 5 MG: 5 TABLET, FILM COATED ORAL at 20:02

## 2022-12-07 RX ADMIN — IPRATROPIUM BROMIDE AND ALBUTEROL SULFATE 1 AMPULE: 2.5; .5 SOLUTION RESPIRATORY (INHALATION) at 23:44

## 2022-12-07 RX ADMIN — SODIUM CHLORIDE, PRESERVATIVE FREE 10 ML: 5 INJECTION INTRAVENOUS at 20:03

## 2022-12-07 RX ADMIN — BUDESONIDE 500 MCG: 0.5 SUSPENSION RESPIRATORY (INHALATION) at 09:00

## 2022-12-07 RX ADMIN — IPRATROPIUM BROMIDE AND ALBUTEROL SULFATE 1 AMPULE: 2.5; .5 SOLUTION RESPIRATORY (INHALATION) at 03:28

## 2022-12-07 RX ADMIN — BUDESONIDE 500 MCG: 0.5 SUSPENSION RESPIRATORY (INHALATION) at 20:24

## 2022-12-07 RX ADMIN — APIXABAN 5 MG: 5 TABLET, FILM COATED ORAL at 09:55

## 2022-12-07 RX ADMIN — ZOLPIDEM TARTRATE 5 MG: 5 TABLET ORAL at 21:45

## 2022-12-07 RX ADMIN — GUAIFENESIN AND DEXTROMETHORPHAN 5 ML: 100; 10 SYRUP ORAL at 06:04

## 2022-12-07 RX ADMIN — OXYCODONE HYDROCHLORIDE AND ACETAMINOPHEN 500 MG: 500 TABLET ORAL at 09:55

## 2022-12-07 RX ADMIN — IPRATROPIUM BROMIDE AND ALBUTEROL SULFATE 1 AMPULE: 2.5; .5 SOLUTION RESPIRATORY (INHALATION) at 11:50

## 2022-12-07 RX ADMIN — DOCUSATE SODIUM 100 MG: 100 CAPSULE, LIQUID FILLED ORAL at 09:54

## 2022-12-07 RX ADMIN — IPRATROPIUM BROMIDE AND ALBUTEROL SULFATE 1 AMPULE: 2.5; .5 SOLUTION RESPIRATORY (INHALATION) at 20:24

## 2022-12-07 RX ADMIN — POLYETHYLENE GLYCOL 3350 17 G: 17 POWDER, FOR SOLUTION ORAL at 05:50

## 2022-12-07 RX ADMIN — Medication 2000 UNITS: at 09:55

## 2022-12-07 RX ADMIN — ARFORMOTEROL TARTRATE 15 MCG: 15 SOLUTION RESPIRATORY (INHALATION) at 09:00

## 2022-12-07 RX ADMIN — DOCUSATE SODIUM 100 MG: 100 CAPSULE, LIQUID FILLED ORAL at 20:02

## 2022-12-07 RX ADMIN — METHYLPREDNISOLONE SODIUM SUCCINATE 40 MG: 40 INJECTION, POWDER, LYOPHILIZED, FOR SOLUTION INTRAMUSCULAR; INTRAVENOUS at 05:51

## 2022-12-07 RX ADMIN — DILTIAZEM HYDROCHLORIDE 120 MG: 120 CAPSULE, COATED, EXTENDED RELEASE ORAL at 09:55

## 2022-12-07 RX ADMIN — BENZONATATE 100 MG: 100 CAPSULE ORAL at 06:05

## 2022-12-07 RX ADMIN — ONDANSETRON 4 MG: 4 TABLET, ORALLY DISINTEGRATING ORAL at 17:49

## 2022-12-07 RX ADMIN — GUAIFENESIN AND DEXTROMETHORPHAN 5 ML: 100; 10 SYRUP ORAL at 10:02

## 2022-12-07 RX ADMIN — IPRATROPIUM BROMIDE AND ALBUTEROL SULFATE 1 AMPULE: 2.5; .5 SOLUTION RESPIRATORY (INHALATION) at 16:24

## 2022-12-07 RX ADMIN — IPRATROPIUM BROMIDE AND ALBUTEROL SULFATE 1 AMPULE: 2.5; .5 SOLUTION RESPIRATORY (INHALATION) at 09:00

## 2022-12-07 RX ADMIN — SODIUM CHLORIDE, PRESERVATIVE FREE 10 ML: 5 INJECTION INTRAVENOUS at 09:55

## 2022-12-07 RX ADMIN — ARFORMOTEROL TARTRATE 15 MCG: 15 SOLUTION RESPIRATORY (INHALATION) at 20:25

## 2022-12-07 ASSESSMENT — PAIN SCALES - WONG BAKER: WONGBAKER_NUMERICALRESPONSE: 0

## 2022-12-07 ASSESSMENT — PAIN SCALES - GENERAL: PAINLEVEL_OUTOF10: 0

## 2022-12-07 NOTE — PROGRESS NOTES
[facility] Progress Note    Admitting Date and Time: 12/5/2022  5:20 PM  Admit Dx: Influenza A [J10.1]  COPD exacerbation ([address] [J44.1]  Acute respiratory failure with hypoxia (HCC) [J96.01]  Acute on chronic respiratory failure with hypoxia (HCC) [J96.21]    Subjective:  Patient is being followed for Influenza A [J10.1]  COPD exacerbation ([address] [J44.1]  Acute respiratory failure with hypoxia ([address] [J96.01]  Acute on chronic respiratory failure with hypoxia ([address] [J96.21]   Pt feels congested but unable to cough up phlegm. Per RN: No major concerns. ROS: denies fever, chills, cp, sob, n/v, HA unless stated above.       [START ON 12/8/2022] methylPREDNISolone  40 mg IntraVENous Daily    sodium chloride flush  5-40 mL IntraVENous 2 times per day    ipratropium-albuterol  1 ampule Inhalation Q4H    apixaban  5 mg Oral BID    Vitamin D  2,000 Units Oral Daily    dilTIAZem  120 mg Oral Daily    vitamin C  500 mg Oral Daily    Arformoterol Tartrate  15 mcg Nebulization BID    And    budesonide  0.5 mg Nebulization BID    oseltamivir  75 mg Oral BID    docusate sodium  100 mg Oral BID     magnesium hydroxide, 30 mL, Daily PRN  sodium chloride flush, 5-40 mL, PRN  sodium chloride, 25 mL, PRN  ondansetron, 4 mg, Q8H PRN   Or  ondansetron, 4 mg, Q6H PRN  polyethylene glycol, 17 g, Daily PRN  acetaminophen, 650 mg, Q6H PRN   Or  acetaminophen, 650 mg, Q6H PRN  guaiFENesin-dextromethorphan, 5 mL, Q4H PRN  benzonatate, 100 mg, TID PRN       Objective:    /77   Pulse 80   Temp 98.6 °F (37 °C) (Oral)   Resp 28   Ht 5' 8\" (1.727 m)   Wt 101 lb (45.8 kg)   SpO2 93%   BMI 15.36 kg/m²     General Appearance: alert and oriented to person, place and time and in no acute distress  Skin: warm and dry  Head: normocephalic and atraumatic  Eyes: pupils equal, round, and reactive to light, extraocular eye movements intact, conjunctivae normal  Neck: neck supple and non tender without mass Pulmonary/Chest: clear to auscultation bilaterally- no wheezes, rales or rhonchi, normal air movement, no respiratory distress  Cardiovascular: normal rate, normal S1 and S2 and no carotid bruits  Abdomen: soft, non-tender, non-distended, normal bowel sounds, no masses or organomegaly  Extremities: no cyanosis, no clubbing and no edema  Neurologic: no cranial nerve deficit and speech normal        Recent Labs     12/05/22  1809      K 3.6   CL 97*   CO2 34*   BUN 20   CREATININE 0.6   GLUCOSE 113*   CALCIUM 8.7       Recent Labs     12/05/22  1809 12/07/22  0613   WBC 11.4 9.7   RBC 4.09 3.70   HGB 12.0 11.1*   HCT 38.9 34.7   MCV 95.1 93.8   MCH 29.3 30.0   MCHC 30.8* 32.0   RDW 13.6 13.6    295   MPV 9.4 10.1       Micro:  No components found for: [facility])    Radiology:   XR CHEST PORTABLE    Result Date: 12/5/2022  EXAMINATION: ONE XRAY VIEW OF THE CHEST 12/5/2022 6:15 pm HISTORY: ORDERING SYSTEM PROVIDED HISTORY: Shortness of breath TECHNOLOGIST PROVIDED HISTORY: Reason for exam:->Shortness of breath FINDINGS: The lungs are hyperaerated. There are chronic changes at the lung apices and bases. The heart is not enlarged. No pneumothorax or pleural effusion. No acute cardiopulmonary process. COPD. Assessment:    Principal Problem:    Acute respiratory failure with hypoxia (HCC)  Resolved Problems:    * No resolved hospital problems. *      Plan:  1. Ac hypoxia - improved - underlying Influenza A & COPD exacerbation: Continue Tamiflu 75 mg BID. Duonebs every 4 hours. Tessalon PRN for cough. Continue oxygen therapy PRN. 2 liters at baseline. Solumedrol IV. Continue Duonebs every 4 hours and PRN. Continue Brovana and Pulmicort. 3. Atrial fibrillation: Continue Cardizem and Eliquis. Monitor telemetry. Currently in NSR.      4. Hypertension: Continue Cardizem. 5. History of hyperthyroidism: TSH 0.039 and free T4 was 1. 19. Monitor.      Code Status: full   DVT prophylaxis: Eliquis  Possible dc tomorrow, if clinically improved. NOTE: This report was transcribed using voice recognition software. Every effort was made to ensure accuracy; however, inadvertent computerized transcription errors may be present.   Electronically signed by Erendira Hi MD on 12/7/2022 at 5:31 PM

## 2022-12-07 NOTE — PROGRESS NOTES
Spoke with Dr John Barry on unit ok to hold d/c tonight as pt has not had a BM continue to monitor and admin the bowel regimen as ordered.

## 2022-12-07 NOTE — PROGRESS NOTES
CLINICAL PHARMACY NOTE: MEDS TO BEDS    Total # of Prescriptions Filled: 1   The following medications were delivered to the patient:  Prednisone 10    Additional Documentation:

## 2022-12-08 ENCOUNTER — APPOINTMENT (OUTPATIENT)
Dept: GENERAL RADIOLOGY | Age: 67
End: 2022-12-08
Payer: MEDICARE

## 2022-12-08 PROCEDURE — 2580000003 HC RX 258

## 2022-12-08 PROCEDURE — 6360000002 HC RX W HCPCS

## 2022-12-08 PROCEDURE — 6370000000 HC RX 637 (ALT 250 FOR IP): Performed by: STUDENT IN AN ORGANIZED HEALTH CARE EDUCATION/TRAINING PROGRAM

## 2022-12-08 PROCEDURE — 6360000002 HC RX W HCPCS: Performed by: STUDENT IN AN ORGANIZED HEALTH CARE EDUCATION/TRAINING PROGRAM

## 2022-12-08 PROCEDURE — 2060000000 HC ICU INTERMEDIATE R&B

## 2022-12-08 PROCEDURE — 2700000000 HC OXYGEN THERAPY PER DAY

## 2022-12-08 PROCEDURE — 2580000003 HC RX 258: Performed by: STUDENT IN AN ORGANIZED HEALTH CARE EDUCATION/TRAINING PROGRAM

## 2022-12-08 PROCEDURE — 74018 RADEX ABDOMEN 1 VIEW: CPT

## 2022-12-08 PROCEDURE — 94640 AIRWAY INHALATION TREATMENT: CPT

## 2022-12-08 PROCEDURE — 6370000000 HC RX 637 (ALT 250 FOR IP): Performed by: NURSE PRACTITIONER

## 2022-12-08 PROCEDURE — 99232 SBSQ HOSP IP/OBS MODERATE 35: CPT | Performed by: STUDENT IN AN ORGANIZED HEALTH CARE EDUCATION/TRAINING PROGRAM

## 2022-12-08 PROCEDURE — 6370000000 HC RX 637 (ALT 250 FOR IP)

## 2022-12-08 RX ORDER — SODIUM PHOSPHATE, DIBASIC AND SODIUM PHOSPHATE, MONOBASIC 7; 19 G/133ML; G/133ML
1 ENEMA RECTAL
Status: ACTIVE | OUTPATIENT
Start: 2022-12-08 | End: 2022-12-09

## 2022-12-08 RX ADMIN — ZOLPIDEM TARTRATE 5 MG: 5 TABLET ORAL at 21:27

## 2022-12-08 RX ADMIN — DOCUSATE SODIUM 100 MG: 100 CAPSULE, LIQUID FILLED ORAL at 09:07

## 2022-12-08 RX ADMIN — BENZONATATE 100 MG: 100 CAPSULE ORAL at 21:32

## 2022-12-08 RX ADMIN — GUAIFENESIN AND DEXTROMETHORPHAN 5 ML: 100; 10 SYRUP ORAL at 09:07

## 2022-12-08 RX ADMIN — IPRATROPIUM BROMIDE AND ALBUTEROL SULFATE 1 AMPULE: 2.5; .5 SOLUTION RESPIRATORY (INHALATION) at 12:01

## 2022-12-08 RX ADMIN — POLYETHYLENE GLYCOL 3350 17 G: 17 POWDER, FOR SOLUTION ORAL at 04:57

## 2022-12-08 RX ADMIN — OSELTAMIVIR PHOSPHATE 75 MG: 75 CAPSULE ORAL at 21:27

## 2022-12-08 RX ADMIN — DILTIAZEM HYDROCHLORIDE 120 MG: 120 CAPSULE, COATED, EXTENDED RELEASE ORAL at 09:07

## 2022-12-08 RX ADMIN — APIXABAN 5 MG: 5 TABLET, FILM COATED ORAL at 09:07

## 2022-12-08 RX ADMIN — MAGNESIUM SULFATE: 1 CRYSTAL ORAL; TOPICAL at 14:00

## 2022-12-08 RX ADMIN — METHYLPREDNISOLONE SODIUM SUCCINATE 40 MG: 40 INJECTION, POWDER, LYOPHILIZED, FOR SOLUTION INTRAMUSCULAR; INTRAVENOUS at 09:10

## 2022-12-08 RX ADMIN — BUDESONIDE 500 MCG: 0.5 SUSPENSION RESPIRATORY (INHALATION) at 09:35

## 2022-12-08 RX ADMIN — ARFORMOTEROL TARTRATE 15 MCG: 15 SOLUTION RESPIRATORY (INHALATION) at 21:27

## 2022-12-08 RX ADMIN — OSELTAMIVIR PHOSPHATE 75 MG: 75 CAPSULE ORAL at 09:08

## 2022-12-08 RX ADMIN — IPRATROPIUM BROMIDE AND ALBUTEROL SULFATE 1 AMPULE: 2.5; .5 SOLUTION RESPIRATORY (INHALATION) at 17:00

## 2022-12-08 RX ADMIN — MAGNESIUM HYDROXIDE 30 ML: 400 SUSPENSION ORAL at 09:08

## 2022-12-08 RX ADMIN — SODIUM CHLORIDE, PRESERVATIVE FREE 10 ML: 5 INJECTION INTRAVENOUS at 21:28

## 2022-12-08 RX ADMIN — ARFORMOTEROL TARTRATE 15 MCG: 15 SOLUTION RESPIRATORY (INHALATION) at 09:36

## 2022-12-08 RX ADMIN — IPRATROPIUM BROMIDE AND ALBUTEROL SULFATE 1 AMPULE: 2.5; .5 SOLUTION RESPIRATORY (INHALATION) at 03:28

## 2022-12-08 RX ADMIN — BUDESONIDE 500 MCG: 0.5 SUSPENSION RESPIRATORY (INHALATION) at 21:27

## 2022-12-08 RX ADMIN — IPRATROPIUM BROMIDE AND ALBUTEROL SULFATE 1 AMPULE: 2.5; .5 SOLUTION RESPIRATORY (INHALATION) at 09:35

## 2022-12-08 RX ADMIN — ACETAMINOPHEN 650 MG: 325 TABLET ORAL at 10:06

## 2022-12-08 RX ADMIN — DOCUSATE SODIUM 100 MG: 100 CAPSULE, LIQUID FILLED ORAL at 21:27

## 2022-12-08 RX ADMIN — SODIUM CHLORIDE, PRESERVATIVE FREE 10 ML: 5 INJECTION INTRAVENOUS at 09:08

## 2022-12-08 RX ADMIN — OXYCODONE HYDROCHLORIDE AND ACETAMINOPHEN 500 MG: 500 TABLET ORAL at 09:07

## 2022-12-08 RX ADMIN — IPRATROPIUM BROMIDE AND ALBUTEROL SULFATE 1 AMPULE: 2.5; .5 SOLUTION RESPIRATORY (INHALATION) at 21:27

## 2022-12-08 RX ADMIN — APIXABAN 5 MG: 5 TABLET, FILM COATED ORAL at 21:27

## 2022-12-08 RX ADMIN — Medication 2000 UNITS: at 09:07

## 2022-12-08 RX ADMIN — GUAIFENESIN AND DEXTROMETHORPHAN 5 ML: 100; 10 SYRUP ORAL at 15:03

## 2022-12-08 ASSESSMENT — PAIN DESCRIPTION - DESCRIPTORS
DESCRIPTORS: THROBBING
DESCRIPTORS: CRAMPING;DISCOMFORT

## 2022-12-08 ASSESSMENT — PAIN DESCRIPTION - FREQUENCY: FREQUENCY: CONTINUOUS

## 2022-12-08 ASSESSMENT — PAIN DESCRIPTION - LOCATION
LOCATION: CHEST
LOCATION: ABDOMEN

## 2022-12-08 ASSESSMENT — PAIN - FUNCTIONAL ASSESSMENT: PAIN_FUNCTIONAL_ASSESSMENT: ACTIVITIES ARE NOT PREVENTED

## 2022-12-08 ASSESSMENT — PAIN DESCRIPTION - ORIENTATION
ORIENTATION: LOWER
ORIENTATION: LEFT

## 2022-12-08 ASSESSMENT — PAIN DESCRIPTION - PAIN TYPE
TYPE: ACUTE PAIN
TYPE: ACUTE PAIN

## 2022-12-08 ASSESSMENT — PAIN DESCRIPTION - ONSET: ONSET: ON-GOING

## 2022-12-08 ASSESSMENT — PAIN SCALES - GENERAL
PAINLEVEL_OUTOF10: 5
PAINLEVEL_OUTOF10: 6
PAINLEVEL_OUTOF10: 0

## 2022-12-08 NOTE — PROGRESS NOTES
[facility] Progress Note    Admitting Date and Time: 12/5/2022  5:20 PM  Admit Dx: Influenza A [J10.1]  COPD exacerbation ([address] [J44.1]  Acute respiratory failure with hypoxia (HCC) [J96.01]  Acute on chronic respiratory failure with hypoxia (HCC) [J96.21]    Subjective:  Patient is being followed for Influenza A [J10.1]  COPD exacerbation ([address] [J44.1]  Acute respiratory failure with hypoxia ([address] [J96.01]  Acute on chronic respiratory failure with hypoxia ([facility] [J96.21]   Pt feels congested but unable to cough up phlegm. Reports abdominal cramps from not able to pass stool. Per RN: No major concerns except constipation. ROS: denies fever, chills, cp, sob, n/v, HA unless stated above.       methylPREDNISolone  40 mg IntraVENous Daily    sodium chloride flush  5-40 mL IntraVENous 2 times per day    ipratropium-albuterol  1 ampule Inhalation Q4H    apixaban  5 mg Oral BID    Vitamin D  2,000 Units Oral Daily    dilTIAZem  120 mg Oral Daily    vitamin C  500 mg Oral Daily    Arformoterol Tartrate  15 mcg Nebulization BID    And    budesonide  0.5 mg Nebulization BID    oseltamivir  75 mg Oral BID    docusate sodium  100 mg Oral BID     sodium phosphate, 1 enema, Once PRN  magnesium hydroxide, 30 mL, Daily PRN  zolpidem, 5 mg, Nightly PRN  sodium chloride flush, 5-40 mL, PRN  sodium chloride, 25 mL, PRN  ondansetron, 4 mg, Q8H PRN   Or  ondansetron, 4 mg, Q6H PRN  polyethylene glycol, 17 g, Daily PRN  acetaminophen, 650 mg, Q6H PRN   Or  acetaminophen, 650 mg, Q6H PRN  guaiFENesin-dextromethorphan, 5 mL, Q4H PRN  benzonatate, 100 mg, TID PRN       Objective:    /65   Pulse 80   Temp 97.8 °F (36.6 °C) (Oral)   Resp 18   Ht 5' 8\" (1.727 m)   Wt 100 lb (45.4 kg)   SpO2 94%   BMI 15.20 kg/m²     General Appearance: alert and oriented to person, place and time and in no acute distress  Skin: warm and dry  Head: normocephalic and atraumatic  Eyes: pupils equal, round, and reactive to light, extraocular eye movements intact, conjunctivae normal  Neck: neck supple and non tender without mass   Pulmonary/Chest: clear to auscultation bilaterally- no wheezes, rales or rhonchi, normal air movement, no respiratory distress  Cardiovascular: normal rate, normal S1 and S2 and no carotid bruits  Abdomen: soft, non-tender, + distended, normal bowel sounds, no masses or organomegaly  Extremities: no cyanosis, no clubbing and no edema  Neurologic: no cranial nerve deficit and speech normal        Recent Labs     12/05/22  1809      K 3.6   CL 97*   CO2 34*   BUN 20   CREATININE 0.6   GLUCOSE 113*   CALCIUM 8.7       Recent Labs     12/05/22  1809 12/07/22  0613   WBC 11.4 9.7   RBC 4.09 3.70   HGB 12.0 11.1*   HCT 38.9 34.7   MCV 95.1 93.8   MCH 29.3 30.0   MCHC 30.8* 32.0   RDW 13.6 13.6    295   MPV 9.4 10.1       Micro:  No components found for: [facility])    Radiology:   XR CHEST PORTABLE    Result Date: 12/5/2022  EXAMINATION: ONE XRAY VIEW OF THE CHEST 12/5/2022 6:15 pm HISTORY: ORDERING SYSTEM PROVIDED HISTORY: Shortness of breath TECHNOLOGIST PROVIDED HISTORY: Reason for exam:->Shortness of breath FINDINGS: The lungs are hyperaerated. There are chronic changes at the lung apices and bases. The heart is not enlarged. No pneumothorax or pleural effusion. No acute cardiopulmonary process. COPD. Assessment:    Principal Problem:    Acute respiratory failure with hypoxia (HCC)  Resolved Problems:    * No resolved hospital problems. *      Plan:  1. Ac hypoxia - improved - underlying Influenza A & COPD exacerbation: Continue Tamiflu 75 mg BID. Duonebs every 4 hours. Tessalon PRN for cough. Continue oxygen therapy PRN. 2 liters at baseline. Solumedrol IV. Continue Duonebs every 4 hours and PRN. Continue Brovana and Pulmicort. 3. Atrial fibrillation: Continue Cardizem and Eliquis. Monitor telemetry. Currently in NSR.      4. Hypertension: Continue Cardizem.       5. History of hyperthyroidism: TSH 0.039 and free T4 was 1. 19. Monitor. 6. Constipation, - prn laxatives, enema, f/u kub     Code Status: full   DVT prophylaxis: Eliquis  Possible dc tomorrow, if clinically improved. NOTE: This report was transcribed using voice recognition software. Every effort was made to ensure accuracy; however, inadvertent computerized transcription errors may be present.   Electronically signed by Vianey Ames MD on 12/8/2022 at 3:35 PM

## 2022-12-08 NOTE — PROGRESS NOTES
P Quality Flow/Interdisciplinary Rounds Progress Note        Quality Flow Rounds held on December 8, 2022    Disciplines Attending:  Bedside Nurse, , , and Nursing Unit Leadership    Howard Hanson was admitted on 12/5/2022  5:20 PM    Anticipated Discharge Date:  today? Disposition: home    Guy Score:  Guy Scale Score: 21    Readmission Risk              Risk of Unplanned Readmission:  28           Discussed patient goal for the day, patient clinical progression, and barriers to discharge.   The following Goal(s) of the Day/Commitment(s) have been identified:   [address], RN  December 8, 2022

## 2022-12-09 LAB
ANION GAP SERPL CALCULATED.3IONS-SCNC: 6 MMOL/L (ref 7–16)
BUN BLDV-MCNC: 19 MG/DL (ref 6–23)
CALCIUM SERPL-MCNC: 9 MG/DL (ref 8.6–10.2)
CHLORIDE BLD-SCNC: 96 MMOL/L (ref 98–107)
CO2: 34 MMOL/L (ref 22–29)
CREAT SERPL-MCNC: 0.6 MG/DL (ref 0.5–1)
GFR SERPL CREATININE-BSD FRML MDRD: >60 ML/MIN/1.73
GLUCOSE BLD-MCNC: 96 MG/DL (ref 74–99)
HCT VFR BLD CALC: 36.8 % (ref 34–48)
HEMOGLOBIN: 11.8 G/DL (ref 11.5–15.5)
MCH RBC QN AUTO: 30.3 PG (ref 26–35)
MCHC RBC AUTO-ENTMCNC: 32.1 % (ref 32–34.5)
MCV RBC AUTO: 94.6 FL (ref 80–99.9)
PDW BLD-RTO: 13.7 FL (ref 11.5–15)
PLATELET # BLD: 363 E9/L (ref 130–450)
PMV BLD AUTO: 10 FL (ref 7–12)
POTASSIUM SERPL-SCNC: 4.6 MMOL/L (ref 3.5–5)
POTASSIUM SERPL-SCNC: 5.5 MMOL/L (ref 3.5–5)
RBC # BLD: 3.89 E12/L (ref 3.5–5.5)
SODIUM BLD-SCNC: 136 MMOL/L (ref 132–146)
WBC # BLD: 15.5 E9/L (ref 4.5–11.5)

## 2022-12-09 PROCEDURE — 6370000000 HC RX 637 (ALT 250 FOR IP): Performed by: STUDENT IN AN ORGANIZED HEALTH CARE EDUCATION/TRAINING PROGRAM

## 2022-12-09 PROCEDURE — 94640 AIRWAY INHALATION TREATMENT: CPT

## 2022-12-09 PROCEDURE — 6370000000 HC RX 637 (ALT 250 FOR IP)

## 2022-12-09 PROCEDURE — 99232 SBSQ HOSP IP/OBS MODERATE 35: CPT | Performed by: STUDENT IN AN ORGANIZED HEALTH CARE EDUCATION/TRAINING PROGRAM

## 2022-12-09 PROCEDURE — 36415 COLL VENOUS BLD VENIPUNCTURE: CPT

## 2022-12-09 PROCEDURE — 85027 COMPLETE CBC AUTOMATED: CPT

## 2022-12-09 PROCEDURE — 6360000002 HC RX W HCPCS

## 2022-12-09 PROCEDURE — 6360000002 HC RX W HCPCS: Performed by: STUDENT IN AN ORGANIZED HEALTH CARE EDUCATION/TRAINING PROGRAM

## 2022-12-09 PROCEDURE — 2580000003 HC RX 258

## 2022-12-09 PROCEDURE — 2700000000 HC OXYGEN THERAPY PER DAY

## 2022-12-09 PROCEDURE — 80048 BASIC METABOLIC PNL TOTAL CA: CPT

## 2022-12-09 PROCEDURE — 2060000000 HC ICU INTERMEDIATE R&B

## 2022-12-09 PROCEDURE — 84132 ASSAY OF SERUM POTASSIUM: CPT

## 2022-12-09 PROCEDURE — 6370000000 HC RX 637 (ALT 250 FOR IP): Performed by: NURSE PRACTITIONER

## 2022-12-09 RX ORDER — BISACODYL 10 MG
10 SUPPOSITORY, RECTAL RECTAL DAILY
Status: DISCONTINUED | OUTPATIENT
Start: 2022-12-09 | End: 2022-12-10 | Stop reason: HOSPADM

## 2022-12-09 RX ORDER — SODIUM POLYSTYRENE SULFONATE 15 G/60ML
15 SUSPENSION ORAL; RECTAL ONCE
Status: COMPLETED | OUTPATIENT
Start: 2022-12-09 | End: 2022-12-09

## 2022-12-09 RX ADMIN — SODIUM CHLORIDE, PRESERVATIVE FREE 10 ML: 5 INJECTION INTRAVENOUS at 21:22

## 2022-12-09 RX ADMIN — DOCUSATE SODIUM 100 MG: 100 CAPSULE, LIQUID FILLED ORAL at 09:03

## 2022-12-09 RX ADMIN — SODIUM CHLORIDE, PRESERVATIVE FREE 10 ML: 5 INJECTION INTRAVENOUS at 09:03

## 2022-12-09 RX ADMIN — ZOLPIDEM TARTRATE 5 MG: 5 TABLET ORAL at 21:21

## 2022-12-09 RX ADMIN — IPRATROPIUM BROMIDE AND ALBUTEROL SULFATE 1 AMPULE: 2.5; .5 SOLUTION RESPIRATORY (INHALATION) at 13:07

## 2022-12-09 RX ADMIN — IPRATROPIUM BROMIDE AND ALBUTEROL SULFATE 1 AMPULE: 2.5; .5 SOLUTION RESPIRATORY (INHALATION) at 08:58

## 2022-12-09 RX ADMIN — Medication 2000 UNITS: at 09:02

## 2022-12-09 RX ADMIN — ARFORMOTEROL TARTRATE 15 MCG: 15 SOLUTION RESPIRATORY (INHALATION) at 08:58

## 2022-12-09 RX ADMIN — GUAIFENESIN AND DEXTROMETHORPHAN 5 ML: 100; 10 SYRUP ORAL at 17:52

## 2022-12-09 RX ADMIN — BUDESONIDE 500 MCG: 0.5 SUSPENSION RESPIRATORY (INHALATION) at 08:58

## 2022-12-09 RX ADMIN — IPRATROPIUM BROMIDE AND ALBUTEROL SULFATE 1 AMPULE: 2.5; .5 SOLUTION RESPIRATORY (INHALATION) at 03:55

## 2022-12-09 RX ADMIN — ARFORMOTEROL TARTRATE 15 MCG: 15 SOLUTION RESPIRATORY (INHALATION) at 20:55

## 2022-12-09 RX ADMIN — APIXABAN 5 MG: 5 TABLET, FILM COATED ORAL at 21:21

## 2022-12-09 RX ADMIN — SODIUM POLYSTYRENE SULFONATE 15 G: 15 SUSPENSION ORAL; RECTAL at 09:02

## 2022-12-09 RX ADMIN — DILTIAZEM HYDROCHLORIDE 120 MG: 120 CAPSULE, COATED, EXTENDED RELEASE ORAL at 09:02

## 2022-12-09 RX ADMIN — DOCUSATE SODIUM 100 MG: 100 CAPSULE, LIQUID FILLED ORAL at 21:21

## 2022-12-09 RX ADMIN — BISACODYL 10 MG: 10 SUPPOSITORY RECTAL at 17:52

## 2022-12-09 RX ADMIN — MAGNESIUM HYDROXIDE 30 ML: 400 SUSPENSION ORAL at 09:02

## 2022-12-09 RX ADMIN — IPRATROPIUM BROMIDE AND ALBUTEROL SULFATE 1 AMPULE: 2.5; .5 SOLUTION RESPIRATORY (INHALATION) at 20:55

## 2022-12-09 RX ADMIN — BISACODYL 10 MG: 5 TABLET, COATED ORAL at 13:56

## 2022-12-09 RX ADMIN — BUDESONIDE 500 MCG: 0.5 SUSPENSION RESPIRATORY (INHALATION) at 20:55

## 2022-12-09 RX ADMIN — IPRATROPIUM BROMIDE AND ALBUTEROL SULFATE 1 AMPULE: 2.5; .5 SOLUTION RESPIRATORY (INHALATION) at 17:30

## 2022-12-09 RX ADMIN — GUAIFENESIN AND DEXTROMETHORPHAN 5 ML: 100; 10 SYRUP ORAL at 21:26

## 2022-12-09 RX ADMIN — OXYCODONE HYDROCHLORIDE AND ACETAMINOPHEN 500 MG: 500 TABLET ORAL at 09:03

## 2022-12-09 RX ADMIN — METHYLPREDNISOLONE SODIUM SUCCINATE 40 MG: 40 INJECTION, POWDER, LYOPHILIZED, FOR SOLUTION INTRAMUSCULAR; INTRAVENOUS at 09:09

## 2022-12-09 RX ADMIN — IPRATROPIUM BROMIDE AND ALBUTEROL SULFATE 1 AMPULE: 2.5; .5 SOLUTION RESPIRATORY (INHALATION) at 00:13

## 2022-12-09 RX ADMIN — OSELTAMIVIR PHOSPHATE 75 MG: 75 CAPSULE ORAL at 09:05

## 2022-12-09 RX ADMIN — APIXABAN 5 MG: 5 TABLET, FILM COATED ORAL at 09:02

## 2022-12-09 RX ADMIN — OSELTAMIVIR PHOSPHATE 75 MG: 75 CAPSULE ORAL at 21:21

## 2022-12-09 RX ADMIN — GUAIFENESIN AND DEXTROMETHORPHAN 5 ML: 100; 10 SYRUP ORAL at 09:01

## 2022-12-09 ASSESSMENT — PAIN SCALES - GENERAL: PAINLEVEL_OUTOF10: 0

## 2022-12-09 NOTE — PLAN OF CARE
Problem: Pain  Goal: Verbalizes/displays adequate comfort level or baseline comfort level  12/8/2022 2337 by Ammy Neves RN  Outcome: Progressing  12/8/2022 1100 by Aldo Olivarez RN  Outcome: Progressing     Problem: Chronic Conditions and Co-morbidities  Goal: Patient's chronic conditions and co-morbidity symptoms are monitored and maintained or improved  12/8/2022 2337 by Ammy Neves RN  Outcome: Progressing  12/8/2022 1100 by Aldo Olivarez RN  Outcome: Progressing

## 2022-12-09 NOTE — PROGRESS NOTES
[facility] Quality Flow/Interdisciplinary Rounds Progress Note        Quality Flow Rounds held on December 9, 2022    Disciplines Attending:  Bedside Nurse, , , and Nursing Unit Leadership    Maria Teresa Williamson was admitted on 12/5/2022  5:20 PM    Anticipated Discharge Date:  Expected Discharge Date: 12/08/22    Disposition: home    Guy Score:  Guy Scale Score: 22    Readmission Risk              Risk of Unplanned Readmission:  26           Discussed patient goal for the day, patient clinical progression, and barriers to discharge.   The following Goal(s) of the Day/Commitment(s) have been identified:   cotninue iv steroids, BM, discharge      Korey Hernandez RN  December 9, 2022

## 2022-12-09 NOTE — PLAN OF CARE
Problem: Pain  Goal: Verbalizes/displays adequate comfort level or baseline comfort level  12/9/2022 1255 by Mandeep Rivas RN  Outcome: Progressing  12/8/2022 2337 by Justus Macias, RN  Outcome: Progressing     Problem: Chronic Conditions and Co-morbidities  Goal: Patient's chronic conditions and co-morbidity symptoms are monitored and maintained or improved  12/9/2022 1255 by Mandeep Rivas RN  Outcome: Progressing  12/8/2022 2337 by Justus Macias, RN  Outcome: Progressing

## 2022-12-10 VITALS
BODY MASS INDEX: 13.76 KG/M2 | HEIGHT: 68 IN | DIASTOLIC BLOOD PRESSURE: 59 MMHG | HEART RATE: 82 BPM | OXYGEN SATURATION: 95 % | SYSTOLIC BLOOD PRESSURE: 131 MMHG | TEMPERATURE: 98.3 F | WEIGHT: 90.8 LBS | RESPIRATION RATE: 18 BRPM

## 2022-12-10 LAB
ANION GAP SERPL CALCULATED.3IONS-SCNC: 8 MMOL/L (ref 7–16)
BUN BLDV-MCNC: 17 MG/DL (ref 6–23)
CALCIUM SERPL-MCNC: 8.8 MG/DL (ref 8.6–10.2)
CHLORIDE BLD-SCNC: 96 MMOL/L (ref 98–107)
CO2: 31 MMOL/L (ref 22–29)
CREAT SERPL-MCNC: 0.5 MG/DL (ref 0.5–1)
GFR SERPL CREATININE-BSD FRML MDRD: >60 ML/MIN/1.73
GLUCOSE BLD-MCNC: 88 MG/DL (ref 74–99)
HCT VFR BLD CALC: 36.1 % (ref 34–48)
HEMOGLOBIN: 11.5 G/DL (ref 11.5–15.5)
MCH RBC QN AUTO: 29.6 PG (ref 26–35)
MCHC RBC AUTO-ENTMCNC: 31.9 % (ref 32–34.5)
MCV RBC AUTO: 92.8 FL (ref 80–99.9)
PDW BLD-RTO: 13.8 FL (ref 11.5–15)
PLATELET # BLD: 378 E9/L (ref 130–450)
PMV BLD AUTO: 9.5 FL (ref 7–12)
POTASSIUM SERPL-SCNC: 4.4 MMOL/L (ref 3.5–5)
RBC # BLD: 3.89 E12/L (ref 3.5–5.5)
SODIUM BLD-SCNC: 135 MMOL/L (ref 132–146)
WBC # BLD: 18.9 E9/L (ref 4.5–11.5)

## 2022-12-10 PROCEDURE — 94640 AIRWAY INHALATION TREATMENT: CPT

## 2022-12-10 PROCEDURE — 6370000000 HC RX 637 (ALT 250 FOR IP): Performed by: STUDENT IN AN ORGANIZED HEALTH CARE EDUCATION/TRAINING PROGRAM

## 2022-12-10 PROCEDURE — 6360000002 HC RX W HCPCS

## 2022-12-10 PROCEDURE — 36415 COLL VENOUS BLD VENIPUNCTURE: CPT

## 2022-12-10 PROCEDURE — 2700000000 HC OXYGEN THERAPY PER DAY

## 2022-12-10 PROCEDURE — 80048 BASIC METABOLIC PNL TOTAL CA: CPT

## 2022-12-10 PROCEDURE — 6370000000 HC RX 637 (ALT 250 FOR IP)

## 2022-12-10 PROCEDURE — 6360000002 HC RX W HCPCS: Performed by: STUDENT IN AN ORGANIZED HEALTH CARE EDUCATION/TRAINING PROGRAM

## 2022-12-10 PROCEDURE — 99239 HOSP IP/OBS DSCHRG MGMT >30: CPT | Performed by: STUDENT IN AN ORGANIZED HEALTH CARE EDUCATION/TRAINING PROGRAM

## 2022-12-10 PROCEDURE — 2580000003 HC RX 258

## 2022-12-10 PROCEDURE — 85027 COMPLETE CBC AUTOMATED: CPT

## 2022-12-10 RX ORDER — BENZONATATE 100 MG/1
100 CAPSULE ORAL 3 TIMES DAILY PRN
Qty: 20 CAPSULE | Refills: 0 | Status: ON HOLD | OUTPATIENT
Start: 2022-12-10 | End: 2022-12-18

## 2022-12-10 RX ORDER — BUSPIRONE HYDROCHLORIDE 10 MG/1
10 TABLET ORAL 3 TIMES DAILY
Qty: 90 TABLET | Refills: 0 | Status: SHIPPED | OUTPATIENT
Start: 2022-12-10 | End: 2022-12-10 | Stop reason: SDUPTHER

## 2022-12-10 RX ORDER — PREDNISONE 10 MG/1
TABLET ORAL
Qty: 20 TABLET | Refills: 0 | Status: SHIPPED | OUTPATIENT
Start: 2022-12-10 | End: 2022-12-10 | Stop reason: SDUPTHER

## 2022-12-10 RX ORDER — OSELTAMIVIR PHOSPHATE 75 MG/1
75 CAPSULE ORAL 2 TIMES DAILY
Qty: 7 CAPSULE | Refills: 0 | Status: SHIPPED | OUTPATIENT
Start: 2022-12-10 | End: 2022-12-10 | Stop reason: SDUPTHER

## 2022-12-10 RX ORDER — OSELTAMIVIR PHOSPHATE 75 MG/1
75 CAPSULE ORAL 2 TIMES DAILY
Qty: 7 CAPSULE | Refills: 0 | Status: SHIPPED | OUTPATIENT
Start: 2022-12-10 | End: 2022-12-18 | Stop reason: HOSPADM

## 2022-12-10 RX ORDER — BUSPIRONE HYDROCHLORIDE 10 MG/1
10 TABLET ORAL 3 TIMES DAILY
Status: DISCONTINUED | OUTPATIENT
Start: 2022-12-10 | End: 2022-12-10 | Stop reason: HOSPADM

## 2022-12-10 RX ORDER — PREDNISONE 10 MG/1
TABLET ORAL
Qty: 20 TABLET | Refills: 0 | Status: ON HOLD | OUTPATIENT
Start: 2022-12-10 | End: 2022-12-18 | Stop reason: HOSPADM

## 2022-12-10 RX ORDER — BUSPIRONE HYDROCHLORIDE 10 MG/1
10 TABLET ORAL 3 TIMES DAILY
Qty: 90 TABLET | Refills: 0 | Status: SHIPPED | OUTPATIENT
Start: 2022-12-10 | End: 2023-01-09

## 2022-12-10 RX ORDER — LORAZEPAM 0.5 MG/1
0.5 TABLET ORAL ONCE
Status: COMPLETED | OUTPATIENT
Start: 2022-12-10 | End: 2022-12-10

## 2022-12-10 RX ORDER — BENZONATATE 100 MG/1
100 CAPSULE ORAL 3 TIMES DAILY PRN
Qty: 20 CAPSULE | Refills: 0 | Status: SHIPPED | OUTPATIENT
Start: 2022-12-10 | End: 2022-12-10 | Stop reason: SDUPTHER

## 2022-12-10 RX ADMIN — BUSPIRONE HYDROCHLORIDE 10 MG: 10 TABLET ORAL at 10:30

## 2022-12-10 RX ADMIN — METHYLPREDNISOLONE SODIUM SUCCINATE 40 MG: 40 INJECTION, POWDER, LYOPHILIZED, FOR SOLUTION INTRAMUSCULAR; INTRAVENOUS at 08:27

## 2022-12-10 RX ADMIN — SODIUM CHLORIDE, PRESERVATIVE FREE 10 ML: 5 INJECTION INTRAVENOUS at 08:29

## 2022-12-10 RX ADMIN — IPRATROPIUM BROMIDE AND ALBUTEROL SULFATE 1 AMPULE: 2.5; .5 SOLUTION RESPIRATORY (INHALATION) at 03:22

## 2022-12-10 RX ADMIN — Medication 2000 UNITS: at 08:27

## 2022-12-10 RX ADMIN — IPRATROPIUM BROMIDE AND ALBUTEROL SULFATE 1 AMPULE: 2.5; .5 SOLUTION RESPIRATORY (INHALATION) at 09:57

## 2022-12-10 RX ADMIN — DOCUSATE SODIUM 100 MG: 100 CAPSULE, LIQUID FILLED ORAL at 08:27

## 2022-12-10 RX ADMIN — APIXABAN 5 MG: 5 TABLET, FILM COATED ORAL at 08:27

## 2022-12-10 RX ADMIN — OXYCODONE HYDROCHLORIDE AND ACETAMINOPHEN 500 MG: 500 TABLET ORAL at 08:27

## 2022-12-10 RX ADMIN — LORAZEPAM 0.5 MG: 0.5 TABLET ORAL at 09:40

## 2022-12-10 RX ADMIN — ONDANSETRON 4 MG: 4 TABLET, ORALLY DISINTEGRATING ORAL at 06:53

## 2022-12-10 RX ADMIN — BUDESONIDE 500 MCG: 0.5 SUSPENSION RESPIRATORY (INHALATION) at 09:57

## 2022-12-10 RX ADMIN — ARFORMOTEROL TARTRATE 15 MCG: 15 SOLUTION RESPIRATORY (INHALATION) at 09:57

## 2022-12-10 RX ADMIN — OSELTAMIVIR PHOSPHATE 75 MG: 75 CAPSULE ORAL at 08:27

## 2022-12-10 RX ADMIN — DILTIAZEM HYDROCHLORIDE 120 MG: 120 CAPSULE, COATED, EXTENDED RELEASE ORAL at 08:27

## 2022-12-10 ASSESSMENT — PAIN DESCRIPTION - DESCRIPTORS: DESCRIPTORS: THROBBING

## 2022-12-10 ASSESSMENT — PAIN - FUNCTIONAL ASSESSMENT: PAIN_FUNCTIONAL_ASSESSMENT: ACTIVITIES ARE NOT PREVENTED

## 2022-12-10 ASSESSMENT — PAIN SCALES - GENERAL
PAINLEVEL_OUTOF10: 0
PAINLEVEL_OUTOF10: 5

## 2022-12-10 ASSESSMENT — PAIN DESCRIPTION - FREQUENCY: FREQUENCY: CONTINUOUS

## 2022-12-10 ASSESSMENT — PAIN DESCRIPTION - ONSET: ONSET: ON-GOING

## 2022-12-10 ASSESSMENT — PAIN DESCRIPTION - PAIN TYPE: TYPE: ACUTE PAIN

## 2022-12-10 ASSESSMENT — PAIN DESCRIPTION - ORIENTATION: ORIENTATION: LEFT

## 2022-12-10 ASSESSMENT — PAIN DESCRIPTION - LOCATION: LOCATION: CHEST

## 2022-12-10 NOTE — DISCHARGE SUMMARY
[facility] Physician Discharge Summary       No follow-up provider specified. Activity level: As tolerated     Dispo: Home      Condition on discharge: Stable     Patient ID:  Vikki Duque  [id number]  15 y.o.  1955    Admit date: 12/5/2022    Discharge date and time:  12/10/2022  8:34 AM    Admission Diagnoses: Principal Problem:    Acute respiratory failure with hypoxia ([address]  Resolved Problems:    * No resolved hospital problems. *      Discharge Diagnoses: Principal Problem:    Acute respiratory failure with hypoxia ([address]  Resolved Problems:    * No resolved hospital problems. *      Consults:  None    Hospital Course:   Patient Vikki Duque is a 69 y.o. presented with Influenza A [J10.1]  COPD exacerbation ([address] [J44.1]  Acute respiratory failure with hypoxia (HCC) [J96.01]  Acute on chronic respiratory failure with hypoxia ([address] [J96.21]  Patient was admitted and managed for  Ac hypoxia - improved - underlying Influenza A & COPD exacerbation: Continue Tamiflu 75 mg BID. Duonebs every 4 hours. Tessalon PRN for cough. Continue oxygen therapy PRN. 2 liters at baseline. Solumedrol IV changed to steroid taper. Continue Duonebs every 4 hours and PRN. Continue Brovana and Pulmicort.     Discharge Exam:  General Appearance: alert and oriented to person, place and time and in no acute distress  Skin: warm and dry  Head: normocephalic and atraumatic  Eyes: pupils equal, round, and reactive to light, extraocular eye movements intact, conjunctivae normal  Neck: neck supple and non tender without mass   Pulmonary/Chest: clear to auscultation bilaterally- no wheezes, rales or rhonchi, normal air movement, no respiratory distress  Cardiovascular: normal rate, normal S1 and S2 and no carotid bruits  Abdomen: soft, non-tender, non-distended, normal bowel sounds, no masses or organomegaly  Extremities: no cyanosis, no clubbing and no edema  Neurologic: no cranial nerve deficit and speech normal    No intake/output data recorded. No intake/output data recorded. LABS:  Recent Labs     12/09/22  0145 12/09/22  1120 12/10/22  0435     --  135   K 5.5* 4.6 4.4   CL 96*  --  96*   CO2 34*  --  31*   BUN 19  --  17   CREATININE 0.6  --  0.5   GLUCOSE 96  --  88   CALCIUM 9.0  --  8.8       Recent Labs     12/09/22  0145 12/10/22  0435   WBC 15.5* 18.9*   RBC 3.89 3.89   HGB 11.8 11.5   HCT 36.8 36.1   MCV 94.6 92.8   MCH 30.3 29.6   MCHC 32.1 31.9*   RDW 13.7 13.8    378   MPV 10.0 9.5       No results for input(s): POCGLU in the last 72 hours. Imaging:  XR CHEST PORTABLE    Result Date: 12/5/2022  EXAMINATION: ONE XRAY VIEW OF THE CHEST 12/5/2022 6:15 pm HISTORY: ORDERING SYSTEM PROVIDED HISTORY: Shortness of breath TECHNOLOGIST PROVIDED HISTORY: Reason for exam:->Shortness of breath FINDINGS: The lungs are hyperaerated. There are chronic changes at the lung apices and bases. The heart is not enlarged. No pneumothorax or pleural effusion. No acute cardiopulmonary process. COPD. Patient Instructions:      Medication List        START taking these medications      benzonatate 100 MG capsule  Commonly known as: TESSALON  Take 1 capsule by mouth 3 times daily as needed for Cough     busPIRone 10 MG tablet  Commonly known as: BUSPAR  Take 1 tablet by mouth 3 times daily     oseltamivir 75 MG capsule  Commonly known as: TAMIFLU  Take 1 capsule by mouth 2 times daily for 7 doses            CHANGE how you take these medications      * predniSONE 10 MG tablet  Commonly known as: DELTASONE  Take 4 tablets by mouth daily for 3 days, THEN 3 tablets daily for 3 days, THEN 2 tablets daily for 3 days, THEN 1 tablet daily for 3 days, THEN 0.5 tablets daily for 3 days. Start taking on: November 30, 2022  What changed: Another medication with the same name was added. Make sure you understand how and when to take each.      * predniSONE 10 MG tablet  Commonly known as: DELTASONE  Take 4 tablets by mouth daily for 2 days, THEN 2 tablets daily for 2 days, THEN 1 tablet daily for 2 days. Start taking on: December 7, 2022  What changed: You were already taking a medication with the same name, and this prescription was added. Make sure you understand how and when to take each. * This list has 2 medication(s) that are the same as other medications prescribed for you. Read the directions carefully, and ask your doctor or other care provider to review them with you.                 CONTINUE taking these medications      apixaban 5 MG Tabs tablet  Commonly known as: ELIQUIS  Take 1 tablet by mouth 2 times daily     budesonide-formoterol 160-4.5 MCG/ACT Aero  Commonly known as: Symbicort  Inhale 2 puffs into the lungs 2 times daily     dilTIAZem 120 MG extended release capsule  Commonly known as: CARDIZEM CD  Take 1 capsule by mouth daily     guaiFENesin-dextromethorphan 100-10 MG/5ML syrup  Commonly known as: ROBITUSSIN DM  Take 5 mLs by mouth every 4 hours as needed for Cough     ipratropium-albuterol 0.5-2.5 (3) MG/3ML Soln nebulizer solution  Commonly known as: DUONEB  Inhale 3 mLs into the lungs every 4 hours (while awake)     vitamin C 500 MG tablet  Commonly known as: ASCORBIC ACID     Vitamin D3 50 MCG (2000 UT) Caps            STOP taking these medications      albuterol sulfate  (90 Base) MCG/ACT inhaler  Commonly known as: Ventolin HFA               Where to Get Your Medications        These medications were sent to [address], [address] [place] [address]      Phone: [phone]   benzonatate 100 MG capsule  oseltamivir 75 MG capsule  predniSONE 10 MG tablet       These medications were sent to [address], [facility] [address]      Phone: [phone]   busPIRone 10 MG tablet Note that more than 30 minutes was spent in preparing discharge papers, discussing discharge with patient, medication review, etc.    Signed:  Electronically signed by Martine Álvarez MD on 12/10/2022 at 8:34 AM

## 2022-12-11 ENCOUNTER — APPOINTMENT (OUTPATIENT)
Dept: GENERAL RADIOLOGY | Age: 67
DRG: 194 | End: 2022-12-11
Payer: MEDICARE

## 2022-12-11 LAB
ANION GAP SERPL CALCULATED.3IONS-SCNC: 9 MMOL/L (ref 7–16)
BASOPHILS ABSOLUTE: 0.02 E9/L (ref 0–0.2)
BASOPHILS RELATIVE PERCENT: 0.1 % (ref 0–2)
BUN BLDV-MCNC: 22 MG/DL (ref 6–23)
CALCIUM SERPL-MCNC: 9.7 MG/DL (ref 8.6–10.2)
CHLORIDE BLD-SCNC: 92 MMOL/L (ref 98–107)
CO2: 34 MMOL/L (ref 22–29)
CREAT SERPL-MCNC: 0.6 MG/DL (ref 0.5–1)
EOSINOPHILS ABSOLUTE: 0 E9/L (ref 0.05–0.5)
EOSINOPHILS RELATIVE PERCENT: 0 % (ref 0–6)
GFR SERPL CREATININE-BSD FRML MDRD: >60 ML/MIN/1.73
GLUCOSE BLD-MCNC: 110 MG/DL (ref 74–99)
HCT VFR BLD CALC: 39.9 % (ref 34–48)
HEMOGLOBIN: 12.6 G/DL (ref 11.5–15.5)
IMMATURE GRANULOCYTES #: 0.21 E9/L
IMMATURE GRANULOCYTES %: 0.9 % (ref 0–5)
LYMPHOCYTES ABSOLUTE: 1.28 E9/L (ref 1.5–4)
LYMPHOCYTES RELATIVE PERCENT: 5.7 % (ref 20–42)
MCH RBC QN AUTO: 29.5 PG (ref 26–35)
MCHC RBC AUTO-ENTMCNC: 31.6 % (ref 32–34.5)
MCV RBC AUTO: 93.4 FL (ref 80–99.9)
MONOCYTES ABSOLUTE: 1.04 E9/L (ref 0.1–0.95)
MONOCYTES RELATIVE PERCENT: 4.6 % (ref 2–12)
NEUTROPHILS ABSOLUTE: 19.84 E9/L (ref 1.8–7.3)
NEUTROPHILS RELATIVE PERCENT: 88.7 % (ref 43–80)
PDW BLD-RTO: 13.8 FL (ref 11.5–15)
PLATELET # BLD: 457 E9/L (ref 130–450)
PMV BLD AUTO: 9.1 FL (ref 7–12)
POTASSIUM SERPL-SCNC: 4.7 MMOL/L (ref 3.5–5)
PRO-BNP: 508 PG/ML (ref 0–125)
RBC # BLD: 4.27 E12/L (ref 3.5–5.5)
SODIUM BLD-SCNC: 135 MMOL/L (ref 132–146)
TROPONIN, HIGH SENSITIVITY: 16 NG/L (ref 0–9)
WBC # BLD: 22.4 E9/L (ref 4.5–11.5)

## 2022-12-11 PROCEDURE — 71046 X-RAY EXAM CHEST 2 VIEWS: CPT | Performed by: RADIOLOGY

## 2022-12-11 PROCEDURE — 93005 ELECTROCARDIOGRAM TRACING: CPT | Performed by: PHYSICIAN ASSISTANT

## 2022-12-11 PROCEDURE — 36415 COLL VENOUS BLD VENIPUNCTURE: CPT

## 2022-12-11 PROCEDURE — 85025 COMPLETE CBC W/AUTO DIFF WBC: CPT

## 2022-12-11 PROCEDURE — 80048 BASIC METABOLIC PNL TOTAL CA: CPT

## 2022-12-11 PROCEDURE — 71046 X-RAY EXAM CHEST 2 VIEWS: CPT

## 2022-12-11 PROCEDURE — 84484 ASSAY OF TROPONIN QUANT: CPT

## 2022-12-11 PROCEDURE — 99285 EMERGENCY DEPT VISIT HI MDM: CPT

## 2022-12-11 PROCEDURE — 83880 ASSAY OF NATRIURETIC PEPTIDE: CPT

## 2022-12-11 ASSESSMENT — PAIN - FUNCTIONAL ASSESSMENT: PAIN_FUNCTIONAL_ASSESSMENT: 0-10

## 2022-12-11 ASSESSMENT — PAIN DESCRIPTION - FREQUENCY: FREQUENCY: INTERMITTENT

## 2022-12-11 ASSESSMENT — PAIN DESCRIPTION - PAIN TYPE: TYPE: ACUTE PAIN

## 2022-12-11 ASSESSMENT — PAIN SCALES - GENERAL: PAINLEVEL_OUTOF10: 3

## 2022-12-11 ASSESSMENT — PAIN DESCRIPTION - LOCATION: LOCATION: CHEST

## 2022-12-11 NOTE — ED NOTES
Department of Emergency Medicine  FIRST PROVIDER TRIAGE NOTE             Independent MLP           12/11/22  6:57 PM EST    Date of Encounter: 12/11/22   MRN: [id number]      HPI: Kendell Saucedo is a 69 y.o. female who presents to the ED for Shortness of Breath (sob on cont. O2 3L, incont. of both, d/c IP  yesterday. Hx COPD)   PT prsents to ED with SOB, chronic with severe COPD on O2, 3L, chest pain. She was just discharged yesterday after admission for same. She is flu positive. 12/5//22. She has been incontinent of urine and stool with all the coughing. ROS: Negative for back pain, fever, vomiting, or rash. PE: Gen Appearance/Constitutional: skeletal, appears older than stated age  CV: tachycardia  Pulm: overall diminished breath sounds, course rattles left lower lobe. Vitals:    12/11/22 1853   BP: (!) 124/48   Pulse: (!) 116   Resp: 21   Temp: 97.5 °F (36.4 °C)   SpO2: 96%        Initial Plan of Care: All treatment areas with department are currently occupied. Plan to order/Initiate the following while awaiting opening in ED: labs, EKG, and imaging studies.   Initiate Treatment-Testing, Proceed toTreatment Area When Bed Available for ED Attending/MLP to Continue Care    Electronically signed by Yanet Batista PA-C   DD: 12/11/22       Yanet Batista PA-C  12/11/22 1900

## 2022-12-12 ENCOUNTER — TELEPHONE (OUTPATIENT)
Dept: OTHER | Facility: CLINIC | Age: 67
End: 2022-12-12

## 2022-12-12 ENCOUNTER — HOSPITAL ENCOUNTER (EMERGENCY)
Age: 67
Discharge: HOME | DRG: 194 | End: 2022-12-12
Attending: EMERGENCY MEDICINE
Payer: MEDICARE

## 2022-12-12 ENCOUNTER — CARE COORDINATION (OUTPATIENT)
Dept: CASE MANAGEMENT | Age: 67
End: 2022-12-12

## 2022-12-12 VITALS
TEMPERATURE: 98.1 F | HEART RATE: 85 BPM | RESPIRATION RATE: 15 BRPM | OXYGEN SATURATION: 95 % | HEIGHT: 68 IN | BODY MASS INDEX: 15.31 KG/M2 | SYSTOLIC BLOOD PRESSURE: 150 MMHG | DIASTOLIC BLOOD PRESSURE: 84 MMHG | WEIGHT: 101 LBS

## 2022-12-12 DIAGNOSIS — R53.83 OTHER FATIGUE: ICD-10-CM

## 2022-12-12 DIAGNOSIS — J11.1 INFLUENZA WITH RESPIRATORY MANIFESTATION OTHER THAN PNEUMONIA: Primary | ICD-10-CM

## 2022-12-12 DIAGNOSIS — F41.1 ANXIETY STATE: ICD-10-CM

## 2022-12-12 LAB
EKG ATRIAL RATE: 113 BPM
EKG P AXIS: 88 DEGREES
EKG P-R INTERVAL: 132 MS
EKG Q-T INTERVAL: 318 MS
EKG QRS DURATION: 70 MS
EKG QTC CALCULATION (BAZETT): 436 MS
EKG R AXIS: 79 DEGREES
EKG T AXIS: 81 DEGREES
EKG VENTRICULAR RATE: 113 BPM

## 2022-12-12 PROCEDURE — 6370000000 HC RX 637 (ALT 250 FOR IP): Performed by: EMERGENCY MEDICINE

## 2022-12-12 PROCEDURE — 93010 ELECTROCARDIOGRAM REPORT: CPT | Performed by: INTERNAL MEDICINE

## 2022-12-12 PROCEDURE — 6370000000 HC RX 637 (ALT 250 FOR IP): Performed by: PHYSICIAN ASSISTANT

## 2022-12-12 RX ORDER — LORAZEPAM 1 MG/1
1 TABLET ORAL ONCE
Status: COMPLETED | OUTPATIENT
Start: 2022-12-12 | End: 2022-12-12

## 2022-12-12 RX ORDER — ACETAMINOPHEN 325 MG/1
650 TABLET ORAL ONCE
Status: COMPLETED | OUTPATIENT
Start: 2022-12-12 | End: 2022-12-12

## 2022-12-12 RX ORDER — CEPHALEXIN 500 MG/1
500 CAPSULE ORAL 4 TIMES DAILY
Qty: 20 CAPSULE | Refills: 0 | Status: SHIPPED | OUTPATIENT
Start: 2022-12-12 | End: 2022-12-12 | Stop reason: SDUPTHER

## 2022-12-12 RX ORDER — CEPHALEXIN 500 MG/1
500 CAPSULE ORAL 4 TIMES DAILY
Qty: 20 CAPSULE | Refills: 0 | Status: SHIPPED | OUTPATIENT
Start: 2022-12-12 | End: 2022-12-13

## 2022-12-12 RX ADMIN — ACETAMINOPHEN 650 MG: 325 TABLET ORAL at 03:22

## 2022-12-12 RX ADMIN — LORAZEPAM 1 MG: 1 TABLET ORAL at 07:14

## 2022-12-12 ASSESSMENT — PAIN DESCRIPTION - LOCATION: LOCATION: CHEST

## 2022-12-12 NOTE — ED PROVIDER NOTES
HPI:  12/12/22,   Time: 7:02 AM DIONY Allen is a 69 y.o. female presenting to the ED for valuation of generalized fatigue. Patient states that she has influenza and COPD. She uses oxygen at home. Last evening she became very fatigued and scared. She had some loose bowel movements and felt like she had mild GI upset. She started to become anxious and scared and her anxiety was the primary  for her coming to the emergency department. She has been here for several hours. She is been in no distress. Laboratory reviewed. ROS:   Pertinent positives and negatives are stated within HPI, all other systems reviewed and are negative.  --------------------------------------------- PAST HISTORY ---------------------------------------------  Past Medical History:  has a past medical history of Acute exacerbation of chronic obstructive pulmonary disease (COPD) ([facility] Acute respiratory failure with hypoxia ([facility] COPD (chronic obstructive pulmonary disease) ([facility] [facility] COPD exacerbation ([facility] COVID-19, Hypertension, Pneumonia due to infectious organism, and Uncontrolled hypertension. Past Surgical History:  has a past surgical history that includes back surgery. Social History:  reports that she has quit smoking. Her smoking use included cigarettes. She smoked an average of .25 packs per day. She has never used smokeless tobacco. She reports that she does not currently use alcohol. She reports that she does not currently use drugs. Family History: family history includes Dementia in her father; Diabetes in her maternal grandmother; Heart Disease in her mother. The patients home medications have been reviewed.     Allergies: Pcn [penicillins]    -------------------------------------------------- RESULTS -------------------------------------------------  All laboratory and radiology results have been personally reviewed by myself   LABS:  Results for orders VITALS REVIEWED ---------------------------   The nursing notes within the ED encounter and vital signs as below have been reviewed. /89   Pulse 99   Temp 97.8 °F (36.6 °C) (Tympanic)   Resp 16   Ht 5' 8\" (1.727 m)   Wt 101 lb (45.8 kg)   SpO2 94%   BMI 15.36 kg/m²   Oxygen Saturation Interpretation: Normal      ---------------------------------------------------PHYSICAL EXAM--------------------------------------      Constitutional/General: Alert and oriented x3, well appearing, non toxic in NAD  Head: NC/AT  Eyes: PERRL, EOMI  Nose:  Nares patent. No congestion or discharge noted. Ears:  TM's intact without erythema or perforation. External canal without swelling  Mouth: Oropharynx clear, handling secretions, no trismus  Neck: Supple, full ROM, no meningeal signs  Pulmonary: Diminished breath sounds bilaterally but no tachypnea or retractions. Otherwise clear. .  No rales or rhonchi or wheezes noted. No retractions. Cardiovascular:  Regular rate and rhythm, no murmurs, gallops, or rubs. 2+ symmetric distal pulses   Chest:  No tenderness, deformity or crepitus  Abdomen: Soft, non tender, non distended, normal bowel sounds  Back:  No tenderness to palpation on the cervical or thoracic or lumbar spine  Extremities: Moves all extremities x 4. Warm and well perfused  Skin: warm and dry without rash  Neurologic: GCS 15, no focal acute neurological deficit  Psych: Normal Affect      ------------------------------ ED COURSE/MEDICAL DECISION MAKING----------------------  Medications   LORazepam (ATIVAN) tablet 1 mg (has no administration in time range)   acetaminophen (TYLENOL) tablet 650 mg (650 mg Oral Given 12/12/22 0322)            Medical Decision Making:    Patient comfortable discharged home. She will be given a one-time dose of lorazepam here. Advised to contact her primary care physician and return should symptoms worsen. I believe the patient's fatigue is due to the fact she has influenza. I educated her on the need to adequately hydrate and nourish herself Patient advised to return to the emergency department should symptoms worsen. Advised to contact primary care physician to secure follow-up appointment within the next 1-2 days. --------------------------------- IMPRESSION AND DISPOSITION ---------------------------------    IMPRESSION  1. Influenza with respiratory manifestation other than pneumonia    2. Other fatigue    3.  Anxiety state        DISPOSITION  Disposition: Discharge to home  Patient condition is good        Sony Almazan DO  12/12/22 9115

## 2022-12-12 NOTE — ED NOTES
Patient was instructed on script at pharmacy and she asks about urgency of urine. She was instructed to start her antibiotic and follow up with he PCP Patient is independently ambulatory to bathroom prior to discharge.       Keila Germain RN  12/12/22 4405

## 2022-12-12 NOTE — CARE COORDINATION
[facility] Initial Follow Up Call    Call within 2 business days of discharge: Yes    Care Transition Nurse attempted initial CT outreach leaving Hippa VM w/ my outreach info. Advised of PCP appt. Patient: Harper Gallegos Patient : 1955   MRN: <[id number]>  Reason for Admission: 2022 - 12/10/2022 [address] [place] [address] Blvd. Resp failure, Underlying Influenza A & COPD exacerbation. 2022 [address] [place] [address] ED. SOB. Discharge Date: 22 RARS: Readmission Risk Score: 21.4  Readmit  CT    PCP  2:00    START taking:  benzonatate (TESSALON)  busPIRone (BUSPAR)  oseltamivir (TAMIFLU)  CHANGE how you take:  predniSONE (DELTASONE)  STOP taking:  albuterol sulfate  (90 Base) MCG/ACT inhaler (Ventolin HFA)    [place] Dis[place] [facility] [facility]       Date Complaint Diagnosis Description Type Department Provider    22 Shortness of Breath Influenza with respiratory manifestation other than pneumonia . ..  ED (DISCHARGE) DO Dora Burgos ED, RN

## 2022-12-13 ENCOUNTER — HOSPITAL ENCOUNTER (EMERGENCY)
Age: 67
Discharge: HOME | DRG: 194 | End: 2022-12-14
Attending: EMERGENCY MEDICINE
Payer: MEDICARE

## 2022-12-13 ENCOUNTER — TELEPHONE (OUTPATIENT)
Dept: OTHER | Facility: CLINIC | Age: 67
End: 2022-12-13

## 2022-12-13 ENCOUNTER — APPOINTMENT (OUTPATIENT)
Dept: CT IMAGING | Age: 67
DRG: 194 | End: 2022-12-13
Payer: MEDICARE

## 2022-12-13 ENCOUNTER — CARE COORDINATION (OUTPATIENT)
Dept: CASE MANAGEMENT | Age: 67
End: 2022-12-13

## 2022-12-13 ENCOUNTER — APPOINTMENT (OUTPATIENT)
Dept: GENERAL RADIOLOGY | Age: 67
DRG: 194 | End: 2022-12-13
Payer: MEDICARE

## 2022-12-13 DIAGNOSIS — J10.1 INFLUENZA A: ICD-10-CM

## 2022-12-13 DIAGNOSIS — J18.9 PNEUMONIA DUE TO INFECTIOUS ORGANISM, UNSPECIFIED LATERALITY, UNSPECIFIED PART OF LUNG: Primary | ICD-10-CM

## 2022-12-13 LAB
ALBUMIN SERPL-MCNC: 3.3 G/DL (ref 3.5–5.2)
ALP BLD-CCNC: 82 U/L (ref 35–104)
ALT SERPL-CCNC: 20 U/L (ref 0–32)
ANION GAP SERPL CALCULATED.3IONS-SCNC: 8 MMOL/L (ref 7–16)
AST SERPL-CCNC: 20 U/L (ref 0–31)
BACTERIA: ABNORMAL /HPF
BASOPHILS ABSOLUTE: 0.01 E9/L (ref 0–0.2)
BASOPHILS RELATIVE PERCENT: 0.1 % (ref 0–2)
BILIRUB SERPL-MCNC: <0.2 MG/DL (ref 0–1.2)
BILIRUBIN URINE: NEGATIVE
BLOOD, URINE: ABNORMAL
BUN BLDV-MCNC: 20 MG/DL (ref 6–23)
CALCIUM SERPL-MCNC: 9.1 MG/DL (ref 8.6–10.2)
CHLORIDE BLD-SCNC: 95 MMOL/L (ref 98–107)
CLARITY: CLEAR
CO2: 32 MMOL/L (ref 22–29)
COLOR: YELLOW
CREAT SERPL-MCNC: 0.5 MG/DL (ref 0.5–1)
EOSINOPHILS ABSOLUTE: 0 E9/L (ref 0.05–0.5)
EOSINOPHILS RELATIVE PERCENT: 0 % (ref 0–6)
GFR SERPL CREATININE-BSD FRML MDRD: >60 ML/MIN/1.73
GLUCOSE BLD-MCNC: 206 MG/DL (ref 74–99)
GLUCOSE URINE: NEGATIVE MG/DL
HCT VFR BLD CALC: 38.5 % (ref 34–48)
HEMOGLOBIN: 11.9 G/DL (ref 11.5–15.5)
IMMATURE GRANULOCYTES #: 0.09 E9/L
IMMATURE GRANULOCYTES %: 0.7 % (ref 0–5)
KETONES, URINE: NEGATIVE MG/DL
LACTIC ACID: 2.1 MMOL/L (ref 0.5–2.2)
LEUKOCYTE ESTERASE, URINE: NEGATIVE
LIPASE: 31 U/L (ref 13–60)
LYMPHOCYTES ABSOLUTE: 1.02 E9/L (ref 1.5–4)
LYMPHOCYTES RELATIVE PERCENT: 7.6 % (ref 20–42)
MCH RBC QN AUTO: 29.4 PG (ref 26–35)
MCHC RBC AUTO-ENTMCNC: 30.9 % (ref 32–34.5)
MCV RBC AUTO: 95.1 FL (ref 80–99.9)
MONOCYTES ABSOLUTE: 0.4 E9/L (ref 0.1–0.95)
MONOCYTES RELATIVE PERCENT: 3 % (ref 2–12)
NEUTROPHILS ABSOLUTE: 11.83 E9/L (ref 1.8–7.3)
NEUTROPHILS RELATIVE PERCENT: 88.6 % (ref 43–80)
NITRITE, URINE: NEGATIVE
PDW BLD-RTO: 13.9 FL (ref 11.5–15)
PH UA: 6 (ref 5–9)
PLATELET # BLD: 418 E9/L (ref 130–450)
PMV BLD AUTO: 8.9 FL (ref 7–12)
POTASSIUM SERPL-SCNC: 4.6 MMOL/L (ref 3.5–5)
PROTEIN UA: NEGATIVE MG/DL
RBC # BLD: 4.05 E12/L (ref 3.5–5.5)
RBC UA: ABNORMAL /HPF (ref 0–2)
SODIUM BLD-SCNC: 135 MMOL/L (ref 132–146)
SPECIFIC GRAVITY UA: 1.02 (ref 1–1.03)
TOTAL PROTEIN: 6.9 G/DL (ref 6.4–8.3)
TROPONIN, HIGH SENSITIVITY: 15 NG/L (ref 0–9)
TROPONIN, HIGH SENSITIVITY: 19 NG/L (ref 0–9)
UROBILINOGEN, URINE: 0.2 E.U./DL
WBC # BLD: 13.4 E9/L (ref 4.5–11.5)
WBC UA: ABNORMAL /HPF (ref 0–5)

## 2022-12-13 PROCEDURE — 6370000000 HC RX 637 (ALT 250 FOR IP): Performed by: STUDENT IN AN ORGANIZED HEALTH CARE EDUCATION/TRAINING PROGRAM

## 2022-12-13 PROCEDURE — 96374 THER/PROPH/DIAG INJ IV PUSH: CPT

## 2022-12-13 PROCEDURE — 83605 ASSAY OF LACTIC ACID: CPT

## 2022-12-13 PROCEDURE — 94640 AIRWAY INHALATION TREATMENT: CPT

## 2022-12-13 PROCEDURE — 6360000002 HC RX W HCPCS: Performed by: STUDENT IN AN ORGANIZED HEALTH CARE EDUCATION/TRAINING PROGRAM

## 2022-12-13 PROCEDURE — 83690 ASSAY OF LIPASE: CPT

## 2022-12-13 PROCEDURE — 6360000004 HC RX CONTRAST MEDICATION: Performed by: RADIOLOGY

## 2022-12-13 PROCEDURE — 81001 URINALYSIS AUTO W/SCOPE: CPT

## 2022-12-13 PROCEDURE — 74177 CT ABD & PELVIS W/CONTRAST: CPT

## 2022-12-13 PROCEDURE — 71046 X-RAY EXAM CHEST 2 VIEWS: CPT

## 2022-12-13 PROCEDURE — 85025 COMPLETE CBC W/AUTO DIFF WBC: CPT

## 2022-12-13 PROCEDURE — 94664 DEMO&/EVAL PT USE INHALER: CPT

## 2022-12-13 PROCEDURE — 80053 COMPREHEN METABOLIC PANEL: CPT

## 2022-12-13 PROCEDURE — 99285 EMERGENCY DEPT VISIT HI MDM: CPT

## 2022-12-13 PROCEDURE — 93005 ELECTROCARDIOGRAM TRACING: CPT | Performed by: STUDENT IN AN ORGANIZED HEALTH CARE EDUCATION/TRAINING PROGRAM

## 2022-12-13 PROCEDURE — 84484 ASSAY OF TROPONIN QUANT: CPT

## 2022-12-13 RX ORDER — LORAZEPAM 2 MG/ML
0.5 INJECTION INTRAMUSCULAR ONCE
Status: COMPLETED | OUTPATIENT
Start: 2022-12-13 | End: 2022-12-13

## 2022-12-13 RX ORDER — IPRATROPIUM BROMIDE AND ALBUTEROL SULFATE 2.5; .5 MG/3ML; MG/3ML
2 SOLUTION RESPIRATORY (INHALATION) ONCE
Status: COMPLETED | OUTPATIENT
Start: 2022-12-13 | End: 2022-12-13

## 2022-12-13 RX ORDER — DOXYCYCLINE HYCLATE 100 MG/1
100 CAPSULE ORAL ONCE
Status: COMPLETED | OUTPATIENT
Start: 2022-12-13 | End: 2022-12-13

## 2022-12-13 RX ORDER — CEFDINIR 300 MG/1
300 CAPSULE ORAL ONCE
Status: COMPLETED | OUTPATIENT
Start: 2022-12-13 | End: 2022-12-13

## 2022-12-13 RX ORDER — POLYETHYLENE GLYCOL 3350 17 G/17G
17 POWDER, FOR SOLUTION ORAL DAILY
Qty: 1530 G | Refills: 0 | Status: SHIPPED | OUTPATIENT
Start: 2022-12-13 | End: 2022-12-14 | Stop reason: SDUPTHER

## 2022-12-13 RX ORDER — CEFDINIR 300 MG/1
300 CAPSULE ORAL 2 TIMES DAILY
Qty: 14 CAPSULE | Refills: 0 | Status: SHIPPED | OUTPATIENT
Start: 2022-12-13 | End: 2022-12-14 | Stop reason: SDUPTHER

## 2022-12-13 RX ORDER — DOXYCYCLINE HYCLATE 100 MG
100 TABLET ORAL 2 TIMES DAILY
Qty: 14 TABLET | Refills: 0 | Status: SHIPPED | OUTPATIENT
Start: 2022-12-13 | End: 2022-12-14 | Stop reason: SDUPTHER

## 2022-12-13 RX ADMIN — DOXYCYCLINE HYCLATE 100 MG: 100 CAPSULE ORAL at 23:42

## 2022-12-13 RX ADMIN — LORAZEPAM 0.5 MG: 2 INJECTION INTRAMUSCULAR; INTRAVENOUS at 21:12

## 2022-12-13 RX ADMIN — CEFDINIR 300 MG: 300 CAPSULE ORAL at 23:42

## 2022-12-13 RX ADMIN — IPRATROPIUM BROMIDE AND ALBUTEROL SULFATE 2 AMPULE: 2.5; .5 SOLUTION RESPIRATORY (INHALATION) at 21:23

## 2022-12-13 RX ADMIN — IOPAMIDOL 60 ML: 755 INJECTION, SOLUTION INTRAVENOUS at 22:09

## 2022-12-13 ASSESSMENT — ENCOUNTER SYMPTOMS
BACK PAIN: 0
CONSTIPATION: 1
NAUSEA: 0
COUGH: 0
ABDOMINAL PAIN: 1
SHORTNESS OF BREATH: 0
COLOR CHANGE: 0
DIARRHEA: 0
VOMITING: 0

## 2022-12-13 ASSESSMENT — PAIN DESCRIPTION - ORIENTATION: ORIENTATION: RIGHT

## 2022-12-13 ASSESSMENT — PAIN SCALES - GENERAL: PAINLEVEL_OUTOF10: 6

## 2022-12-13 ASSESSMENT — PAIN DESCRIPTION - PAIN TYPE: TYPE: ACUTE PAIN

## 2022-12-13 ASSESSMENT — PAIN DESCRIPTION - DESCRIPTORS: DESCRIPTORS: PRESSURE

## 2022-12-13 ASSESSMENT — PAIN DESCRIPTION - LOCATION: LOCATION: ABDOMEN

## 2022-12-13 NOTE — CARE COORDINATION
[facility] Care Transitions Initial Follow Up Call    Call within 2 business days of discharge: Yes    Care Transition Nurse attempted second initial CT outreach leaving Hippa VM w/ my outreach info. Advised of PCP appt. CTN s/o. Patient: Kendell Saucedo Patient : 1955   MRN: <[id number]>  Reason for Admission: 2022 - 12/10/2022 [facility]. Resp failure, Underlying Influenza A & COPD exacerbation. 2022 [address] [place] [address] ED. SOB. Discharge Date: 22 RARS: Readmission Risk Score: 21.4  Readmit  CT     PCP  2:00    START taking:  benzonatate (TESSALON)  busPIRone (BUSPAR)  oseltamivir (TAMIFLU)  CHANGE how you take:  predniSONE (DELTASONE)  STOP taking:  albuterol sulfate  (90 Base) MCG/ACT inhaler (Ventolin HFA)    Last Discharge [facility] Facility       Date Complaint Diagnosis Description Type Department Provider    22 Shortness of Breath Influenza with respiratory manifestation other than pneumonia . ..  ED (DISCHARGE) DO Danna Watters ED, RN

## 2022-12-13 NOTE — ED NOTES
Department of Emergency Medicine  FIRST PROVIDER TRIAGE NOTE             Independent MLP           12/13/22  3:24 PM EST    Date of Encounter: 12/13/22   MRN: [id number]      HPI: Avelino Goode is a 69 y.o. female who presents to the ED for Constipation (X 4 days ) and Anxiety (Seen yesterday for palpitations )     Patient is a 27-year-old that is presenting with anxiety, chest pain and constipation. Patient states has not gone to the bathroom in 3 days    ROS: Negative for back pain, fever, cough, or vomiting. PE: Gen Appearance/Constitutional: alert  HEENT: NC/NT. PERRLA,  Airway patent. Neck: supple     Initial Plan of Care: All treatment areas with department are currently occupied. Plan to order/Initiate the following while awaiting opening in ED: labs and imaging studies.   Initiate Treatment-Testing, Proceed toTreatment Area When Bed Available for ED Attending/MLP to Continue Care    Electronically signed by Lillie Patel PA-C   DD: 12/13/22       Lillie Patel PA-C  12/13/22 1524

## 2022-12-14 ENCOUNTER — APPOINTMENT (OUTPATIENT)
Dept: GENERAL RADIOLOGY | Age: 67
DRG: 194 | End: 2022-12-14
Payer: MEDICARE

## 2022-12-14 ENCOUNTER — OFFICE VISIT (OUTPATIENT)
Dept: PRIMARY CARE CLINIC | Age: 67
End: 2022-12-14

## 2022-12-14 VITALS
DIASTOLIC BLOOD PRESSURE: 78 MMHG | WEIGHT: 91.2 LBS | TEMPERATURE: 97.6 F | BODY MASS INDEX: 13.82 KG/M2 | OXYGEN SATURATION: 87 % | HEIGHT: 68 IN | HEART RATE: 68 BPM | SYSTOLIC BLOOD PRESSURE: 126 MMHG

## 2022-12-14 VITALS
OXYGEN SATURATION: 98 % | HEART RATE: 83 BPM | RESPIRATION RATE: 15 BRPM | TEMPERATURE: 98.7 F | DIASTOLIC BLOOD PRESSURE: 79 MMHG | SYSTOLIC BLOOD PRESSURE: 132 MMHG

## 2022-12-14 DIAGNOSIS — I10 PRIMARY HYPERTENSION: ICD-10-CM

## 2022-12-14 DIAGNOSIS — K59.09 OTHER CONSTIPATION: Chronic | ICD-10-CM

## 2022-12-14 DIAGNOSIS — Z09 HOSPITAL DISCHARGE FOLLOW-UP: Primary | ICD-10-CM

## 2022-12-14 DIAGNOSIS — J44.9 COPD WITHOUT EXACERBATION (HCC): Chronic | ICD-10-CM

## 2022-12-14 LAB
EKG ATRIAL RATE: 68 BPM
EKG P AXIS: 88 DEGREES
EKG P-R INTERVAL: 146 MS
EKG Q-T INTERVAL: 422 MS
EKG QRS DURATION: 74 MS
EKG QTC CALCULATION (BAZETT): 448 MS
EKG R AXIS: 72 DEGREES
EKG T AXIS: 83 DEGREES
EKG VENTRICULAR RATE: 68 BPM

## 2022-12-14 PROCEDURE — 99285 EMERGENCY DEPT VISIT HI MDM: CPT

## 2022-12-14 PROCEDURE — 96374 THER/PROPH/DIAG INJ IV PUSH: CPT

## 2022-12-14 PROCEDURE — 71046 X-RAY EXAM CHEST 2 VIEWS: CPT

## 2022-12-14 PROCEDURE — 93010 ELECTROCARDIOGRAM REPORT: CPT | Performed by: INTERNAL MEDICINE

## 2022-12-14 RX ORDER — POLYETHYLENE GLYCOL 3350 17 G/17G
17 POWDER, FOR SOLUTION ORAL DAILY
Qty: 1530 G | Refills: 0 | Status: SHIPPED | OUTPATIENT
Start: 2022-12-14 | End: 2022-12-23

## 2022-12-14 RX ORDER — DOXYCYCLINE HYCLATE 100 MG
100 TABLET ORAL 2 TIMES DAILY
Qty: 14 TABLET | Refills: 0 | Status: SHIPPED | OUTPATIENT
Start: 2022-12-14 | End: 2022-12-21

## 2022-12-14 RX ORDER — CEFDINIR 300 MG/1
300 CAPSULE ORAL 2 TIMES DAILY
Qty: 14 CAPSULE | Refills: 0 | Status: SHIPPED | OUTPATIENT
Start: 2022-12-14 | End: 2022-12-21

## 2022-12-14 ASSESSMENT — PATIENT HEALTH QUESTIONNAIRE - PHQ9
SUM OF ALL RESPONSES TO PHQ QUESTIONS 1-9: 0
SUM OF ALL RESPONSES TO PHQ9 QUESTIONS 1 & 2: 0
SUM OF ALL RESPONSES TO PHQ QUESTIONS 1-9: 0
1. LITTLE INTEREST OR PLEASURE IN DOING THINGS: 0
2. FEELING DOWN, DEPRESSED OR HOPELESS: 0

## 2022-12-14 ASSESSMENT — PAIN - FUNCTIONAL ASSESSMENT: PAIN_FUNCTIONAL_ASSESSMENT: NONE - DENIES PAIN

## 2022-12-14 NOTE — TELEPHONE ENCOUNTER
Rosar contacted Dr. Andrew Peng to inform of 30 day readmission risk. 's attempt to contact Dr. Andrew Peng was unsuccessful.      Call Back: If you need to call back to inform of disposition you can contact me at [phone]

## 2022-12-14 NOTE — ED PROVIDER NOTES
HPI   This is 64-year-old female patient present to emergency department for evaluation of lower abdominal pain and anxiety. Patient on nasal cannula oxygen at baseline 2 L. She is not having to increase her oxygen. Patient reporting that she was seen here yesterday. She is influenza A positive since 12/5. She is reporting generalized fatigue and body aches. She states that she has had having lower abdominal pain that has been ongoing for last 4 days and associated history of constipation. She states that she feels like she is constipated. She states that is unrelieved by Colace and enema at home. She denies any chest pain or shortness of breath at present however triage complaint does mention that she was having chest pain earlier. There is no associated nausea, vomiting or diarrhea. She denies any numbness or weakness no saddle paresthesias or anesthesias. No bowel or bladder incontinence. She states that she does currently feel anxious and she states that when she gets anxious like this she starts to breathe heavier. Patient symptoms are mild to moderate in severity and have been persistent. She is on Eliquis she has been compliant with her medication. Review of Systems   Constitutional:  Negative for chills and fever. HENT:  Negative for congestion. Respiratory:  Negative for cough and shortness of breath. Cardiovascular:  Negative for chest pain. Gastrointestinal:  Positive for abdominal pain and constipation. Negative for diarrhea, nausea and vomiting. Genitourinary:  Negative for difficulty urinating, dysuria and hematuria. Musculoskeletal:  Negative for back pain. Skin:  Negative for color change. Psychiatric/Behavioral:          Anxiety   All other systems reviewed and are negative. Physical Exam  Vitals and nursing note reviewed. Constitutional:       Appearance: Normal appearance. HENT:      Head: Normocephalic and atraumatic.       Nose: Nose normal. No congestion. Mouth/Throat:      Mouth: Mucous membranes are moist.      Pharynx: Oropharynx is clear. Eyes:      Conjunctiva/sclera: Conjunctivae normal.      Pupils: Pupils are equal, round, and reactive to light. Cardiovascular:      Rate and Rhythm: Regular rhythm. Tachycardia present. Pulses: Normal pulses. Heart sounds: Normal heart sounds. Pulmonary:      Effort: Pulmonary effort is normal.      Breath sounds: Normal breath sounds. Comments: Patient has diminished breath sounds throughout, mildly tachypneic. On oxygen. Abdominal:      General: Bowel sounds are normal. There is no distension. Tenderness: There is no abdominal tenderness. Comments: No guarding or rebound. Musculoskeletal:         General: Normal range of motion. Cervical back: Normal range of motion and neck supple. Skin:     General: Skin is warm and dry. Capillary Refill: Capillary refill takes less than 2 seconds. Neurological:      General: No focal deficit present. Mental Status: She is alert. Procedures     MDM  77-year-old female patient present to emergency department for evaluation of lower abdominal pain, anxiety. Labs and imaging obtained. No acute electrolyte abnormalities, normal renal function. Patient's troponin is 15 and repeat is 19, no significant change. EKG showing no acute findings. White count stable at 13.4. CT scan of the abdomen pelvis showing no acute obstruction, gallbladder wall enhancement noted however patient having no right upper quadrant tenderness to palpation and LFTs all within normal limits. She was noted to have right lower lobe pneumonia. We will treat with Omnicef and Doxy, first dose is given here. For anxiety she was given 0.5 mg of Ativan feeling much improved. She is no longer anxious was resting comfortably on reassessment. At this time patient stable for discharge home.         ED Course as of 12/13/22 2167   Tue Dec 13, 2022 [phone] Patient reassessed she is breathing without any difficulty. She is not hypoxic on her baseline oxygen. No complaints at present. [FG]   0269 Pneumonia severity index score class II 0.6 to 0.9% mortality. Outpatient care reasonable. [FG]      ED Course User Index  [FG] Letha Erazo DO       EKG: This EKG is signed and interpreted by me. Rate: 68  Rhythm: Sinus  Interpretation: ST elevation present in anterior leads and V5, no reciprocal changes. Comparison: Previous EKG showing similar from 12/5/2022    --------------------------------------------- PAST HISTORY ---------------------------------------------  Past Medical History:  has a past medical history of Acute exacerbation of chronic obstructive pulmonary disease (COPD) ([facility] Acute respiratory failure with hypoxia (HCC), COPD (chronic obstructive pulmonary disease) ([facility] COPD exacerbation ([facility] [facility] [facility]ation [facility] COVID-19, Hypertension, Pneumonia due to infectious organism, and Uncontrolled hypertension. Past Surgical History:  has a past surgical history that includes back surgery. Social History:  reports that she has quit smoking. Her smoking use included cigarettes. She smoked an average of .25 packs per day. She has never used smokeless tobacco. She reports that she does not currently use alcohol. She reports that she does not currently use drugs. Family History: family history includes Dementia in her father; Diabetes in her maternal grandmother; Heart Disease in her mother. The patients home medications have been reviewed.     Allergies: Pcn [penicillins]    -------------------------------------------------- RESULTS -------------------------------------------------  Labs:  Results for orders placed or performed during the hospital encounter of 12/13/22   CBC with Auto Differential   Result Value Ref Range    WBC 13.4 (H) 4.5 - 11.5 E9/L    RBC 4.05 3.50 - 5.50 E12/L    Hemoglobin 11.9 11.5 - 15.5 g/dL Hematocrit 38.5 34.0 - 48.0 %    MCV 95.1 80.0 - 99.9 fL    MCH 29.4 26.0 - 35.0 pg    MCHC 30.9 (L) 32.0 - 34.5 %    RDW 13.9 11.5 - 15.0 fL    Platelets 778 719 - 391 E9/L    MPV 8.9 7.0 - 12.0 fL    Neutrophils % 88.6 (H) 43.0 - 80.0 %    Immature Granulocytes % 0.7 0.0 - 5.0 %    Lymphocytes % 7.6 (L) 20.0 - 42.0 %    Monocytes % 3.0 2.0 - 12.0 %    Eosinophils % 0.0 0.0 - 6.0 %    Basophils % 0.1 0.0 - 2.0 %    Neutrophils Absolute 11.83 (H) 1.80 - 7.30 E9/L    Immature Granulocytes # 0.09 E9/L    Lymphocytes Absolute 1.02 (L) 1.50 - 4.00 E9/L    Monocytes Absolute 0.40 0.10 - 0.95 E9/L    Eosinophils Absolute 0.00 (L) 0.05 - 0.50 E9/L    Basophils Absolute 0.01 0.00 - 0.20 E9/L   Comprehensive Metabolic Panel   Result Value Ref Range    Sodium 135 132 - 146 mmol/L    Potassium 4.6 3.5 - 5.0 mmol/L    Chloride 95 (L) 98 - 107 mmol/L    CO2 32 (H) 22 - 29 mmol/L    Anion Gap 8 7 - 16 mmol/L    Glucose 206 (H) 74 - 99 mg/dL    BUN 20 6 - 23 mg/dL    Creatinine 0.5 0.5 - 1.0 mg/dL    Est, Glom Filt Rate >60 >=60 mL/min/1.73    Calcium 9.1 8.6 - 10.2 mg/dL    Total Protein 6.9 6.4 - 8.3 g/dL    Albumin 3.3 (L) 3.5 - 5.2 g/dL    Total Bilirubin <0.2 0.0 - 1.2 mg/dL    Alkaline Phosphatase 82 35 - 104 U/L    ALT 20 0 - 32 U/L    AST 20 0 - 31 U/L   Lipase   Result Value Ref Range    Lipase 31 13 - 60 U/L   Lactic Acid   Result Value Ref Range    Lactic Acid 2.1 0.5 - 2.2 mmol/L   Urinalysis with Microscopic   Result Value Ref Range    Color, UA Yellow Straw/Yellow    Clarity, UA Clear Clear    Glucose, Ur Negative Negative mg/dL    Bilirubin Urine Negative Negative    Ketones, Urine Negative Negative mg/dL    Specific Gravity, UA 1.025 1.005 - 1.030    Blood, Urine TRACE-INTACT Negative    pH, UA 6.0 5.0 - 9.0    Protein, UA Negative Negative mg/dL    Urobilinogen, Urine 0.2 <2.0 E.U./dL    Nitrite, Urine Negative Negative    Leukocyte Esterase, Urine Negative Negative    WBC, UA 1-3 0 - 5 /HPF    RBC, UA 2-5 0 - 2 /HPF    Bacteria, UA FEW (A) None Seen /HPF   Troponin   Result Value Ref Range    Troponin, High Sensitivity 15 (H) 0 - 9 ng/L   Troponin   Result Value Ref Range    Troponin, High Sensitivity 19 (H) 0 - 9 ng/L   EKG 12 Lead   Result Value Ref Range    Ventricular Rate 68 BPM    Atrial Rate 68 BPM    P-R Interval 146 ms    QRS Duration 74 ms    Q-T Interval 422 ms    QTc Calculation (Bazett) 448 ms    P Axis 88 degrees    R Axis 72 degrees    T Axis 83 degrees       Radiology:  CT ABDOMEN PELVIS W IV CONTRAST Additional Contrast? None   Final Result   Consolidation in the right lower lobe concerning for pneumonia. Enhancement of gallbladder wall, without gallbladder distension, correlate   with LFTs. Small      Calcification in the pancreatic head unchanged from prior study         XR CHEST (2 VW)   Final Result   COPD changes, no evidence of acute cardiopulmonary disease.             ------------------------- NURSING NOTES AND VITALS REVIEWED ---------------------------  Date / Time Roomed:  12/13/2022  8:34 PM  ED Bed Assignment:  20/20    The nursing notes within the ED encounter and vital signs as below have been reviewed. BP (!) 160/70   Pulse 69   Temp 98.2 °F (36.8 °C)   Resp 19   SpO2 99%   Oxygen Saturation Interpretation: Normal      --------------------------------- ADDITIONAL PROVIDER NOTES ---------------------------------  At this time the patient is without objective evidence of an acute process requiring hospitalization or inpatient management. They have remained hemodynamically stable throughout their entire ED visit and are stable for discharge with outpatient follow-up. The plan has been discussed in detail and they are aware of the specific conditions for emergent return, as well as the importance of follow-up.       New Prescriptions    CEFDINIR (OMNICEF) 300 MG CAPSULE    Take 1 capsule by mouth 2 times daily for 7 days    DOXYCYCLINE HYCLATE (VIBRA-TABS) 100 MG TABLET    Take 1 tablet by mouth 2 times daily for 7 days    POLYETHYLENE GLYCOL (GLYCOLAX) 17 GM/SCOOP POWDER    Take 17 g by mouth daily       Diagnosis:  1. Pneumonia due to infectious organism, unspecified laterality, unspecified part of lung    2. Influenza A        Disposition:  Patient's disposition: Discharge to home  Patient's condition is stable.        Be[facility]  Resident  12/13/22 9543

## 2022-12-14 NOTE — PROGRESS NOTES
Post-Discharge Transitional Care  Follow Up      Howard Hanson   YOB: 1955    Date of Office Visit:  12/14/2022  Date of Hospital Admission: 12/13/22  Date of Hospital Discharge: 12/14/22  Risk of hospital readmission (high >=14%. Medium >=10%) :Readmission Risk Score: 21.4      Care management risk score Rising risk (score 2-5) and Complex Care (Scores >=6): No Risk Score On File     Non face to face  following discharge, date last encounter closed (first attempt may have been earlier): 12/13/2022    Call initiated 2 business days of discharge: Yes    ASSESSMENT/PLAN:   Hospital discharge follow-up  -     MA DISCHARGE MEDS RECONCILED W/ CURRENT OUTPATIENT MED LIST  COPD without exacerbation ([address]  Primary hypertension  Other constipation    Medical Decision Making: high complexity  Return for Reg Appt. On this date 12/14/2022 I have spent 45 minutes reviewing previous notes, test results and face to face with the patient discussing the diagnosis and importance of compliance with the treatment plan as well as documenting on the day of the visit. Subjective:   HPI:  Follow up of Hospital problems/diagnosis(es): Patient to the hospital December 5 and was discharged December 10 with respiratory failure COPD. After discharge on December 12 she went back to the ER found to have flu a. She went back to the ER again yesterday with constipation and abdominal pain she did have a bowel movement after taking MiraLAX. Inpatient course: Discharge summary reviewed- see chart. Interval history/Current status: He is feeling better she is eating 3 meals a day she is staying with her brother. She sees pulmonary on January 5 to me a few weeks later.   Lab work noted from the hospital.  No chest pain    Patient Active Problem List   Diagnosis    COPD with acute exacerbation ([address]    Primary hypertension    COPD exacerbation ([address]    COPD without exacerbation ([address]    Gastroesophageal reflux disease Constipation    Unexplained weight loss    Protein deficiency (HCC)    Diet-controlled diabetes mellitus ([facility]    Multiple nodules of lung    Abnormal EKG    Mixed hyperlipidemia    Hyperthyroidism    Vitamin B12 deficiency (dietary) anemia    Age-related osteoporosis without current pathological fracture    Acute respiratory failure with hypoxia (HCC)    Acute respiratory failure (HCC)    Elevated troponin    Chest pain    Tachycardia, unspecified       Medications listed as ordered at the time of discharge from hospital     Medication List            Accurate as of December 14, 2022  2:05 PM. If you have any questions, ask your nurse or doctor.                 CONTINUE taking these medications      apixaban 5 MG Tabs tablet  Commonly known as: ELIQUIS  Take 1 tablet by mouth 2 times daily     benzonatate 100 MG capsule  Commonly known as: TESSALON  Take 1 capsule by mouth 3 times daily as needed for Cough     budesonide-formoterol 160-4.5 MCG/ACT Aero  Commonly known as: Symbicort  Inhale 2 puffs into the lungs 2 times daily     busPIRone 10 MG tablet  Commonly known as: BUSPAR  Take 1 tablet by mouth 3 times daily     cefdinir 300 MG capsule  Commonly known as: OMNICEF  Take 1 capsule by mouth 2 times daily for 7 days     dilTIAZem 120 MG extended release capsule  Commonly known as: CARDIZEM CD  Take 1 capsule by mouth daily     doxycycline hyclate 100 MG tablet  Commonly known as: VIBRA-TABS  Take 1 tablet by mouth 2 times daily for 7 days     ipratropium-albuterol 0.5-2.5 (3) MG/3ML Soln nebulizer solution  Commonly known as: DUONEB  Inhale 3 mLs into the lungs every 4 hours (while awake)     oseltamivir 75 MG capsule  Commonly known as: TAMIFLU  Take 1 capsule by mouth 2 times daily for 7 doses     polyethylene glycol 17 GM/SCOOP powder  Commonly known as: GLYCOLAX  Take 17 g by mouth daily     predniSONE 10 MG tablet  Commonly known as: DELTASONE  Take 4 tablets by mouth daily for 2 days, THEN 2 tablets daily for 2 days, THEN 1 tablet daily for 2 days. Start taking on: December 10, 2022     vitamin C 500 MG tablet  Commonly known as: ASCORBIC ACID     Vitamin D3 50 MCG (2000 UT) Caps                Medications marked \"taking\" at this time  No outpatient medications have been marked as taking for the 12/14/22 encounter (Office Visit) with Haylee Jordan DO.        Medications patient taking as of now reconciled against medications ordered at time of hospital discharge: Yes    A comprehensive review of systems was negative except for what was noted in the HPI. Objective:    /78   Pulse 68   Temp 97.6 °F (36.4 °C)   Ht 5' 8\" (1.727 m)   Wt 91 lb 3.2 oz (41.4 kg)   SpO2 (!) 87%   BMI 13.87 kg/m²   General Appearance: alert and oriented to person, place and time, well developed and well- nourished, in no acute distress  Skin: warm and dry, no rash or erythema  Head: normocephalic and atraumatic  Eyes: pupils equal, round, and reactive to light, extraocular eye movements intact, conjunctivae normal  ENT: tympanic membrane, external ear and ear canal normal bilaterally, nose without deformity, nasal mucosa and turbinates normal without polyps  Neck: supple and non-tender without mass, no thyromegaly or thyroid nodules, no cervical lymphadenopathy  Pulmonary/Chest: clear to auscultation bilaterally- no wheezes, rales or rhonchi, normal air movement, no respiratory distress  Cardiovascular: normal rate, regular rhythm, normal S1 and S2, no murmurs, rubs, clicks, or gallops, distal pulses intact, no carotid bruits  Abdomen: soft, non-tender, non-distended, normal bowel sounds, no masses or organomegaly  Extremities: no cyanosis, clubbing or edema  Musculoskeletal: normal range of motion, no joint swelling, deformity or tenderness  Neurologic: reflexes normal and symmetric, no cranial nerve deficit, gait, coordination and speech normal    Is pulmonary soon. She uses oxygen but she does not have it with her now. She wants to try to get a small unit. Stool softener daily. Increase protein. Recheck with me in 3 weeks        I educated the patient about all medications. Make sure they were correct and educated  on the risk associated with missing meds or taking them incorrectly. A list of medications is being sent home with patient today. Check blood pressure at home twice a day. Low-salt low caffeine diet. Call if systolic blood pressure is above 686 and diastolic blood pressures above 85. Only use a upper arm digital cuff. I informed patient about the risk associated with noncompliance of medication and taking medications incorrectly. Appropriate follow-up with myself and all specialist.  Encourage family members to take active role in assisting with medications and medical care. If any confusion should develop to notify my office immediately to avoid risk of worsening medical condition      A great deal of time spent reviewing medications, diet, exercise, social issues. Also reviewing the chart before entering the room with patient and finishing charting after leaving patient's room. More than half of that time was spent face to face with the patient in counseling and coordinating care. An electronic signature was used to authenticate this note.   --Rebecca Andersen, DO

## 2022-12-14 NOTE — EC ADMISSION CRITERIA
AdmissionCare    Guideline: Pneumonia, WITH COPD, Inpatient    Based on the indications selected for the patient, the bed status of Admit to Inpatient was determined to be NOT MET    AdmissionCare documentation entered by: Tamara, 26th edition, Copyright ? [address] All Rights Reserved.  [id number]:[id number]:00

## 2022-12-15 ENCOUNTER — APPOINTMENT (OUTPATIENT)
Dept: CT IMAGING | Age: 67
DRG: 194 | End: 2022-12-15
Payer: MEDICARE

## 2022-12-15 ENCOUNTER — HOSPITAL ENCOUNTER (INPATIENT)
Age: 67
LOS: 3 days | Discharge: HOME | DRG: 194 | End: 2022-12-18
Attending: EMERGENCY MEDICINE | Admitting: INTERNAL MEDICINE
Payer: MEDICARE

## 2022-12-15 ENCOUNTER — TELEPHONE (OUTPATIENT)
Dept: OTHER | Facility: CLINIC | Age: 67
End: 2022-12-15

## 2022-12-15 DIAGNOSIS — J18.9 PNEUMONIA OF RIGHT LOWER LOBE DUE TO INFECTIOUS ORGANISM: ICD-10-CM

## 2022-12-15 DIAGNOSIS — J44.1 COPD EXACERBATION (HCC): Primary | ICD-10-CM

## 2022-12-15 PROBLEM — J44.9 CHRONIC OBSTRUCTIVE PULMONARY DISEASE (HCC): Status: ACTIVE | Noted: 2022-12-15

## 2022-12-15 PROBLEM — I48.91 ATRIAL FIBRILLATION (HCC): Status: ACTIVE | Noted: 2022-12-15

## 2022-12-15 PROBLEM — J15.9 BACTERIAL PNEUMONIA: Status: ACTIVE | Noted: 2022-12-15

## 2022-12-15 LAB
ANION GAP SERPL CALCULATED.3IONS-SCNC: 5 MMOL/L (ref 7–16)
BACTERIA: ABNORMAL /HPF
BASOPHILS ABSOLUTE: 0.03 E9/L (ref 0–0.2)
BASOPHILS RELATIVE PERCENT: 0.2 % (ref 0–2)
BILIRUBIN URINE: NEGATIVE
BLOOD, URINE: ABNORMAL
BUN BLDV-MCNC: 19 MG/DL (ref 6–23)
CALCIUM SERPL-MCNC: 9.4 MG/DL (ref 8.6–10.2)
CHLORIDE BLD-SCNC: 102 MMOL/L (ref 98–107)
CLARITY: CLEAR
CO2: 33 MMOL/L (ref 22–29)
COLOR: YELLOW
CREAT SERPL-MCNC: 0.6 MG/DL (ref 0.5–1)
CRYSTALS, UA: ABNORMAL /HPF
EKG ATRIAL RATE: 70 BPM
EKG P AXIS: 52 DEGREES
EKG P-R INTERVAL: 144 MS
EKG Q-T INTERVAL: 402 MS
EKG QRS DURATION: 72 MS
EKG QTC CALCULATION (BAZETT): 434 MS
EKG R AXIS: 78 DEGREES
EKG T AXIS: 84 DEGREES
EKG VENTRICULAR RATE: 70 BPM
EOSINOPHILS ABSOLUTE: 0.04 E9/L (ref 0.05–0.5)
EOSINOPHILS RELATIVE PERCENT: 0.3 % (ref 0–6)
EPITHELIAL CELLS, UA: ABNORMAL /HPF
GFR SERPL CREATININE-BSD FRML MDRD: >60 ML/MIN/1.73
GLUCOSE BLD-MCNC: 120 MG/DL (ref 74–99)
GLUCOSE URINE: NEGATIVE MG/DL
HCT VFR BLD CALC: 36.1 % (ref 34–48)
HEMOGLOBIN: 11.3 G/DL (ref 11.5–15.5)
IMMATURE GRANULOCYTES #: 0.08 E9/L
IMMATURE GRANULOCYTES %: 0.6 % (ref 0–5)
KETONES, URINE: ABNORMAL MG/DL
LEUKOCYTE ESTERASE, URINE: NEGATIVE
LYMPHOCYTES ABSOLUTE: 2.07 E9/L (ref 1.5–4)
LYMPHOCYTES RELATIVE PERCENT: 14.7 % (ref 20–42)
MCH RBC QN AUTO: 30.2 PG (ref 26–35)
MCHC RBC AUTO-ENTMCNC: 31.3 % (ref 32–34.5)
MCV RBC AUTO: 96.5 FL (ref 80–99.9)
MONOCYTES ABSOLUTE: 0.71 E9/L (ref 0.1–0.95)
MONOCYTES RELATIVE PERCENT: 5 % (ref 2–12)
NEUTROPHILS ABSOLUTE: 11.19 E9/L (ref 1.8–7.3)
NEUTROPHILS RELATIVE PERCENT: 79.2 % (ref 43–80)
NITRITE, URINE: NEGATIVE
PDW BLD-RTO: 14.1 FL (ref 11.5–15)
PH UA: 5 (ref 5–9)
PLATELET # BLD: 392 E9/L (ref 130–450)
PMV BLD AUTO: 8.8 FL (ref 7–12)
POTASSIUM SERPL-SCNC: 4 MMOL/L (ref 3.5–5)
PROCALCITONIN: 0.03 NG/ML (ref 0–0.08)
PROTEIN UA: ABNORMAL MG/DL
RBC # BLD: 3.74 E12/L (ref 3.5–5.5)
RBC UA: ABNORMAL /HPF (ref 0–2)
SODIUM BLD-SCNC: 140 MMOL/L (ref 132–146)
SPECIFIC GRAVITY UA: >=1.03 (ref 1–1.03)
TOTAL CK: 81 U/L (ref 20–180)
TROPONIN, HIGH SENSITIVITY: 22 NG/L (ref 0–9)
TROPONIN, HIGH SENSITIVITY: 24 NG/L (ref 0–9)
UROBILINOGEN, URINE: 1 E.U./DL
WBC # BLD: 14.1 E9/L (ref 4.5–11.5)
WBC UA: ABNORMAL /HPF (ref 0–5)

## 2022-12-15 PROCEDURE — 6370000000 HC RX 637 (ALT 250 FOR IP)

## 2022-12-15 PROCEDURE — 2500000003 HC RX 250 WO HCPCS: Performed by: EMERGENCY MEDICINE

## 2022-12-15 PROCEDURE — 6370000000 HC RX 637 (ALT 250 FOR IP): Performed by: NURSE PRACTITIONER

## 2022-12-15 PROCEDURE — 93005 ELECTROCARDIOGRAM TRACING: CPT | Performed by: NURSE PRACTITIONER

## 2022-12-15 PROCEDURE — 6360000002 HC RX W HCPCS: Performed by: EMERGENCY MEDICINE

## 2022-12-15 PROCEDURE — 85025 COMPLETE CBC W/AUTO DIFF WBC: CPT

## 2022-12-15 PROCEDURE — 71275 CT ANGIOGRAPHY CHEST: CPT

## 2022-12-15 PROCEDURE — 94664 DEMO&/EVAL PT USE INHALER: CPT

## 2022-12-15 PROCEDURE — 2580000003 HC RX 258: Performed by: NURSE PRACTITIONER

## 2022-12-15 PROCEDURE — 2060000000 HC ICU INTERMEDIATE R&B

## 2022-12-15 PROCEDURE — 84484 ASSAY OF TROPONIN QUANT: CPT

## 2022-12-15 PROCEDURE — 6370000000 HC RX 637 (ALT 250 FOR IP): Performed by: EMERGENCY MEDICINE

## 2022-12-15 PROCEDURE — 99223 1ST HOSP IP/OBS HIGH 75: CPT | Performed by: NURSE PRACTITIONER

## 2022-12-15 PROCEDURE — 36415 COLL VENOUS BLD VENIPUNCTURE: CPT

## 2022-12-15 PROCEDURE — 92610 EVALUATE SWALLOWING FUNCTION: CPT | Performed by: SPEECH-LANGUAGE PATHOLOGIST

## 2022-12-15 PROCEDURE — 2580000003 HC RX 258: Performed by: EMERGENCY MEDICINE

## 2022-12-15 PROCEDURE — 99223 1ST HOSP IP/OBS HIGH 75: CPT | Performed by: INTERNAL MEDICINE

## 2022-12-15 PROCEDURE — 6360000002 HC RX W HCPCS: Performed by: NURSE PRACTITIONER

## 2022-12-15 PROCEDURE — 81001 URINALYSIS AUTO W/SCOPE: CPT

## 2022-12-15 PROCEDURE — 84145 PROCALCITONIN (PCT): CPT

## 2022-12-15 PROCEDURE — 6360000004 HC RX CONTRAST MEDICATION: Performed by: RADIOLOGY

## 2022-12-15 PROCEDURE — 94640 AIRWAY INHALATION TREATMENT: CPT

## 2022-12-15 PROCEDURE — 2500000003 HC RX 250 WO HCPCS: Performed by: NURSE PRACTITIONER

## 2022-12-15 PROCEDURE — 82550 ASSAY OF CK (CPK): CPT

## 2022-12-15 PROCEDURE — 93010 ELECTROCARDIOGRAM REPORT: CPT | Performed by: INTERNAL MEDICINE

## 2022-12-15 PROCEDURE — 80048 BASIC METABOLIC PNL TOTAL CA: CPT

## 2022-12-15 PROCEDURE — 92526 ORAL FUNCTION THERAPY: CPT | Performed by: SPEECH-LANGUAGE PATHOLOGIST

## 2022-12-15 RX ORDER — SENNA PLUS 8.6 MG/1
1 TABLET ORAL NIGHTLY PRN
Status: DISCONTINUED | OUTPATIENT
Start: 2022-12-16 | End: 2022-12-15

## 2022-12-15 RX ORDER — ASCORBIC ACID 500 MG
500 TABLET ORAL DAILY
Status: DISCONTINUED | OUTPATIENT
Start: 2022-12-15 | End: 2022-12-18 | Stop reason: HOSPADM

## 2022-12-15 RX ORDER — SODIUM CHLORIDE 0.9 % (FLUSH) 0.9 %
5-40 SYRINGE (ML) INJECTION PRN
Status: DISCONTINUED | OUTPATIENT
Start: 2022-12-15 | End: 2022-12-18 | Stop reason: HOSPADM

## 2022-12-15 RX ORDER — ACETAMINOPHEN 500 MG
1000 TABLET ORAL ONCE
Status: COMPLETED | OUTPATIENT
Start: 2022-12-15 | End: 2022-12-15

## 2022-12-15 RX ORDER — VITAMIN B COMPLEX
2 TABLET ORAL DAILY
Status: DISCONTINUED | OUTPATIENT
Start: 2022-12-15 | End: 2022-12-18 | Stop reason: HOSPADM

## 2022-12-15 RX ORDER — DILTIAZEM HYDROCHLORIDE 120 MG/1
120 CAPSULE, COATED, EXTENDED RELEASE ORAL DAILY
Status: DISCONTINUED | OUTPATIENT
Start: 2022-12-15 | End: 2022-12-18 | Stop reason: HOSPADM

## 2022-12-15 RX ORDER — SODIUM CHLORIDE 9 MG/ML
INJECTION, SOLUTION INTRAVENOUS PRN
Status: DISCONTINUED | OUTPATIENT
Start: 2022-12-15 | End: 2022-12-18 | Stop reason: HOSPADM

## 2022-12-15 RX ORDER — PREDNISONE 1 MG/1
10 TABLET ORAL DAILY
Status: COMPLETED | OUTPATIENT
Start: 2022-12-17 | End: 2022-12-18

## 2022-12-15 RX ORDER — SENNA PLUS 8.6 MG/1
1 TABLET ORAL NIGHTLY PRN
Status: DISCONTINUED | OUTPATIENT
Start: 2022-12-15 | End: 2022-12-18 | Stop reason: HOSPADM

## 2022-12-15 RX ORDER — BUSPIRONE HYDROCHLORIDE 10 MG/1
10 TABLET ORAL 3 TIMES DAILY
Status: DISCONTINUED | OUTPATIENT
Start: 2022-12-15 | End: 2022-12-18 | Stop reason: HOSPADM

## 2022-12-15 RX ORDER — IPRATROPIUM BROMIDE AND ALBUTEROL SULFATE 2.5; .5 MG/3ML; MG/3ML
3 SOLUTION RESPIRATORY (INHALATION)
Status: DISCONTINUED | OUTPATIENT
Start: 2022-12-15 | End: 2022-12-18 | Stop reason: HOSPADM

## 2022-12-15 RX ORDER — BUDESONIDE 0.5 MG/2ML
0.5 INHALANT ORAL 2 TIMES DAILY
Status: DISCONTINUED | OUTPATIENT
Start: 2022-12-15 | End: 2022-12-18 | Stop reason: HOSPADM

## 2022-12-15 RX ORDER — BUDESONIDE AND FORMOTEROL FUMARATE DIHYDRATE 160; 4.5 UG/1; UG/1
2 AEROSOL RESPIRATORY (INHALATION) 2 TIMES DAILY
Status: DISCONTINUED | OUTPATIENT
Start: 2022-12-15 | End: 2022-12-15 | Stop reason: CLARIF

## 2022-12-15 RX ORDER — ACETAMINOPHEN 325 MG/1
650 TABLET ORAL EVERY 6 HOURS PRN
Status: DISCONTINUED | OUTPATIENT
Start: 2022-12-15 | End: 2022-12-18 | Stop reason: HOSPADM

## 2022-12-15 RX ORDER — SODIUM CHLORIDE 9 MG/ML
INJECTION, SOLUTION INTRAVENOUS CONTINUOUS
Status: DISCONTINUED | OUTPATIENT
Start: 2022-12-15 | End: 2022-12-17

## 2022-12-15 RX ORDER — PREDNISONE 20 MG/1
20 TABLET ORAL DAILY
Status: COMPLETED | OUTPATIENT
Start: 2022-12-15 | End: 2022-12-16

## 2022-12-15 RX ORDER — SODIUM CHLORIDE 0.9 % (FLUSH) 0.9 %
5-40 SYRINGE (ML) INJECTION EVERY 12 HOURS SCHEDULED
Status: DISCONTINUED | OUTPATIENT
Start: 2022-12-15 | End: 2022-12-18 | Stop reason: HOSPADM

## 2022-12-15 RX ORDER — ONDANSETRON 2 MG/ML
4 INJECTION INTRAMUSCULAR; INTRAVENOUS EVERY 6 HOURS PRN
Status: DISCONTINUED | OUTPATIENT
Start: 2022-12-15 | End: 2022-12-18 | Stop reason: HOSPADM

## 2022-12-15 RX ORDER — POLYETHYLENE GLYCOL 3350 17 G/17G
17 POWDER, FOR SOLUTION ORAL DAILY PRN
Status: DISCONTINUED | OUTPATIENT
Start: 2022-12-15 | End: 2022-12-18 | Stop reason: HOSPADM

## 2022-12-15 RX ORDER — ONDANSETRON 4 MG/1
4 TABLET, ORALLY DISINTEGRATING ORAL EVERY 8 HOURS PRN
Status: DISCONTINUED | OUTPATIENT
Start: 2022-12-15 | End: 2022-12-18 | Stop reason: HOSPADM

## 2022-12-15 RX ORDER — LANOLIN ALCOHOL/MO/W.PET/CERES
6 CREAM (GRAM) TOPICAL NIGHTLY PRN
Status: DISCONTINUED | OUTPATIENT
Start: 2022-12-15 | End: 2022-12-18 | Stop reason: HOSPADM

## 2022-12-15 RX ORDER — ACETAMINOPHEN 650 MG/1
650 SUPPOSITORY RECTAL EVERY 6 HOURS PRN
Status: DISCONTINUED | OUTPATIENT
Start: 2022-12-15 | End: 2022-12-18 | Stop reason: HOSPADM

## 2022-12-15 RX ORDER — ARFORMOTEROL TARTRATE 15 UG/2ML
15 SOLUTION RESPIRATORY (INHALATION) 2 TIMES DAILY
Status: DISCONTINUED | OUTPATIENT
Start: 2022-12-15 | End: 2022-12-18 | Stop reason: HOSPADM

## 2022-12-15 RX ORDER — BENZONATATE 100 MG/1
100 CAPSULE ORAL 3 TIMES DAILY PRN
Status: DISCONTINUED | OUTPATIENT
Start: 2022-12-15 | End: 2022-12-18 | Stop reason: HOSPADM

## 2022-12-15 RX ORDER — IPRATROPIUM BROMIDE AND ALBUTEROL SULFATE 2.5; .5 MG/3ML; MG/3ML
3 SOLUTION RESPIRATORY (INHALATION) ONCE
Status: COMPLETED | OUTPATIENT
Start: 2022-12-15 | End: 2022-12-15

## 2022-12-15 RX ADMIN — IPRATROPIUM BROMIDE AND ALBUTEROL SULFATE 3 ML: 2.5; .5 SOLUTION RESPIRATORY (INHALATION) at 19:53

## 2022-12-15 RX ADMIN — IOPAMIDOL 75 ML: 755 INJECTION, SOLUTION INTRAVENOUS at 05:15

## 2022-12-15 RX ADMIN — APIXABAN 5 MG: 5 TABLET, FILM COATED ORAL at 11:13

## 2022-12-15 RX ADMIN — OXYCODONE HYDROCHLORIDE AND ACETAMINOPHEN 500 MG: 500 TABLET ORAL at 11:13

## 2022-12-15 RX ADMIN — ACETAMINOPHEN 1000 MG: 500 TABLET ORAL at 01:36

## 2022-12-15 RX ADMIN — DOXYCYCLINE 100 MG: 100 INJECTION, POWDER, LYOPHILIZED, FOR SOLUTION INTRAVENOUS at 06:17

## 2022-12-15 RX ADMIN — WATER 2000 MG: 1 INJECTION INTRAMUSCULAR; INTRAVENOUS; SUBCUTANEOUS at 06:12

## 2022-12-15 RX ADMIN — BENZONATATE 100 MG: 100 CAPSULE ORAL at 17:52

## 2022-12-15 RX ADMIN — Medication 10 ML: at 20:17

## 2022-12-15 RX ADMIN — IPRATROPIUM BROMIDE AND ALBUTEROL SULFATE 3 AMPULE: .5; 2.5 SOLUTION RESPIRATORY (INHALATION) at 04:25

## 2022-12-15 RX ADMIN — IPRATROPIUM BROMIDE AND ALBUTEROL SULFATE 3 ML: 2.5; .5 SOLUTION RESPIRATORY (INHALATION) at 12:29

## 2022-12-15 RX ADMIN — ARFORMOTEROL TARTRATE 15 MCG: 15 SOLUTION RESPIRATORY (INHALATION) at 10:27

## 2022-12-15 RX ADMIN — APIXABAN 5 MG: 5 TABLET, FILM COATED ORAL at 20:16

## 2022-12-15 RX ADMIN — DOXYCYCLINE 100 MG: 100 INJECTION, POWDER, LYOPHILIZED, FOR SOLUTION INTRAVENOUS at 18:14

## 2022-12-15 RX ADMIN — SODIUM CHLORIDE: 9 INJECTION, SOLUTION INTRAVENOUS at 11:17

## 2022-12-15 RX ADMIN — PREDNISONE 20 MG: 20 TABLET ORAL at 11:14

## 2022-12-15 RX ADMIN — BUSPIRONE HYDROCHLORIDE 10 MG: 10 TABLET ORAL at 20:16

## 2022-12-15 RX ADMIN — SODIUM CHLORIDE: 9 INJECTION, SOLUTION INTRAVENOUS at 17:42

## 2022-12-15 RX ADMIN — Medication 2000 UNITS: at 11:13

## 2022-12-15 RX ADMIN — BENZONATATE 100 MG: 100 CAPSULE ORAL at 21:48

## 2022-12-15 RX ADMIN — IPRATROPIUM BROMIDE AND ALBUTEROL SULFATE 3 ML: 2.5; .5 SOLUTION RESPIRATORY (INHALATION) at 15:55

## 2022-12-15 RX ADMIN — DILTIAZEM HYDROCHLORIDE 120 MG: 120 CAPSULE, COATED, EXTENDED RELEASE ORAL at 11:14

## 2022-12-15 RX ADMIN — POLYETHYLENE GLYCOL 3350 17 G: 17 POWDER, FOR SOLUTION ORAL at 17:52

## 2022-12-15 RX ADMIN — BUDESONIDE 500 MCG: 0.5 SUSPENSION RESPIRATORY (INHALATION) at 10:27

## 2022-12-15 RX ADMIN — Medication 6 MG: at 21:49

## 2022-12-15 RX ADMIN — BUSPIRONE HYDROCHLORIDE 10 MG: 10 TABLET ORAL at 17:43

## 2022-12-15 RX ADMIN — ARFORMOTEROL TARTRATE 15 MCG: 15 SOLUTION RESPIRATORY (INHALATION) at 19:53

## 2022-12-15 RX ADMIN — IPRATROPIUM BROMIDE AND ALBUTEROL SULFATE 3 ML: 2.5; .5 SOLUTION RESPIRATORY (INHALATION) at 10:27

## 2022-12-15 RX ADMIN — SENNOSIDES 8.6 MG: 8.6 TABLET, FILM COATED ORAL at 21:49

## 2022-12-15 RX ADMIN — BUDESONIDE 500 MCG: 0.5 SUSPENSION RESPIRATORY (INHALATION) at 19:53

## 2022-12-15 RX ADMIN — BUSPIRONE HYDROCHLORIDE 10 MG: 10 TABLET ORAL at 11:14

## 2022-12-15 ASSESSMENT — PAIN SCALES - GENERAL
PAINLEVEL_OUTOF10: 5
PAINLEVEL_OUTOF10: 4

## 2022-12-15 ASSESSMENT — PAIN DESCRIPTION - FREQUENCY: FREQUENCY: CONTINUOUS

## 2022-12-15 ASSESSMENT — PAIN DESCRIPTION - DESCRIPTORS
DESCRIPTORS: CRAMPING;BURNING;DISCOMFORT
DESCRIPTORS: BURNING
DESCRIPTORS: CRAMPING

## 2022-12-15 ASSESSMENT — PAIN DESCRIPTION - LOCATION
LOCATION: ABDOMEN;CHEST
LOCATION: ABDOMEN;CHEST

## 2022-12-15 ASSESSMENT — PAIN - FUNCTIONAL ASSESSMENT: PAIN_FUNCTIONAL_ASSESSMENT: PREVENTS OR INTERFERES SOME ACTIVE ACTIVITIES AND ADLS

## 2022-12-15 ASSESSMENT — PAIN DESCRIPTION - PAIN TYPE: TYPE: ACUTE PAIN

## 2022-12-15 NOTE — ED PROVIDER NOTES
FIRST PROVIDER CONTACT ASSESSMENT NOTE       Department of Emergency Medicine                 First Provider Note            22  9:23 PM EST    Date of Encounter: No admission date for patient encounter. Patient Name: Shaila Wilks  : 1955  MRN: [id number]    Chief Complaint: No chief complaint on file. History of Present Illness:   Shaila Wilks is a 69 y.o. female who presents to the ED for plaints of chest burning and states she recently saw her doctor today for follow-up evaluation she was here in the ED yesterday and was complaining of constipation she reports she is embarrassed for coming back but she still having worsening of symptoms and was recently released from the hospital and was diagnosed with influenza on 2022 and discharged from the hospital on 2022 on doxycycline and Tamiflu and a prednisone taper. Patient states she is not feeling well she wears nasal oxygen 2 L at home and lives with her brother she arrives with no nasal cannula and was placed on oxygen. Focused Physical Exam:  VS:    ED Triage Vitals [22]   BP Temp Temp Source Heart Rate Resp SpO2 Height Weight   125/99 97.9 °F (36.6 °C) Oral 84 16 98 % 5' 8\" (1.727 m) 92 lb (41.7 kg)        Physical Ex: Constitutional: Alert and non-toxic. Medical History:  has a past medical history of Acute exacerbation of chronic obstructive pulmonary disease (COPD) ([address] Acute respiratory failure with hypoxia (HCC), COPD (chronic obstructive pulmonary disease) ([address] COPD exacerbation ([address] COPD with acute exacerbation ([address] COVID-19, Hypertension, Pneumonia due to infectious organism, and Uncontrolled hypertension. Surgical History:  has a past surgical history that includes back surgery. Social History:  reports that she has quit smoking. Her smoking use included cigarettes. She smoked an average of .25 packs per day.  She has never used smokeless tobacco. She reports that she does not currently use alcohol. She reports that she does not currently use drugs. Family History: family history includes Dementia in her father; Diabetes in her maternal grandmother; Heart Disease in her mother.     Allergies: Pcn [penicillins]     Initial Plan of Care: Initiate Treatment-Testing, Proceed toTreatment Area When Bed Available for ED Attending/MLP to Continue Care      ---END OF FIRST PROVIDER CONTACT ASSESSMENT NOTE---  Electronically signed by REGI Roberts CNP   DD: 12/14/22      REGI Roberts CNP  12/14/22 0913

## 2022-12-15 NOTE — H&P
[address] [facility]   History and Physical      CHIEF COMPLAINT:  SOB    History of Present Illness:  69 y.o. female with a history of COPD, COVID-19, HTN, Chronic Respiratory Failure with Hypoxia wears 2L NC presents with increased SOB and CP beginning several hours prior to presentation. Patient complaint is mild in severity, intermittent, worsened by nothing. Patient admitted to [facility] with influenza A. She received nebs/steroids/Tamiflu. DC home 12/10. She presented to ED 12/13 Presented to ED with complaints lower abdominal pain, anxiety. WBC 13.4 CT scan of the abdomen pelvis showing no acute obstruction, gallbladder wall enhancement noted however patient having no right upper quadrant tenderness. LFT normal. RLL pneumonia. DC home on Omnicef 300mg BID x 7 days and Doxycycline 100mg BID x 7 Days. States she has been taking prescribed steroids/antibiotics however no relief. Sent back to the ED for further evaluation. BUN/Crt 19/0.6 glucose HS troponin 22> 24. 120 WBC 14.1 H/H11.3/36.1 CTA chest no PE.  RLL pneumonia. Emphysema. CXR no pneumonia/pleural effusion. Emphysematous changes. Patient received acetaminophen 1000 mg x 1/ceftriaxone 2G IV x1/doxycycline 100 mg IV x1/DuoNeb x1 in ED course. Decision to admit patient for further evaluation and treatment. 12/13 Presented to ED with complaints lower abdominal pain, anxiety. WBC 13.4 CT scan of the abdomen pelvis showing no acute obstruction, gallbladder wall enhancement noted however patient having no right upper quadrant tenderness. LFT normal. RLL pneumonia. DC home on Omnicef 300mg BID x 7 days and Doxycycline 100mg BID x 7 Days     12/5-12/10 Pt admitted to [facility] with Influenza A and COPD Exacerbation. She was treated with nebs/Tamiflu/Steroids. She was DC home 12/10.     12/1-12/3  Admitted with COPD Exacerbation and New onset Atrial Fibrillation RVR. Initially received Diltiazem infusion.  Converted to SR. Nuclear Stress performed with no ischemic changes noted. DC 12/10 with follow up 3-4 weeks with Cardiology. 11/27-11/30 5 day worsening shortness of breath. The patient was given IV Decadron 10 mg once as well as magnesium sulfate 2000 mg once. Duo nebs were ordered. She was started on supplemental oxygen at 4 L. Patient weaned down to 2 L via NC which she will require at home. IV Solu-medrol weaned to PO. Other hospital issues include constipation for a KUB was ordered and showed no fecal material. She was given Miralax with resolve. 7/14-7/17 Presented increased SOB. COVID-19+. D-dimer negative. CTA of chest no evidence of PE. Received DuoNeb/Solu-Medrol. Did not qualify for baricitinib/remdesivir. She received steroids/nebs and weaned back to baseline 2L NC. DC 7/17. Informant(s) for H&P:PT and EMR    REVIEW OF SYSTEMS:  Admits to CP and increased shortness of breath. Denies  fevers, chills,  n/v, ha, vision/hearing changes, wt changes, hot/cold flashes, other open skin lesions, diarrhea, constipation, dysuria/hematuria unless noted in HPI. Complete ROS performed with the patient and is otherwise negative. PMH:  Past Medical History:   Diagnosis Date    Acute exacerbation of chronic obstructive pulmonary disease (COPD) ([address] 7/14/2022    Acute respiratory failure with hypoxia ([address] 6/27/2022    COPD (chronic obstructive pulmonary disease) (HCC)     COPD exacerbation ([address] 7/14/2022    COPD with acute exacerbation [address]     COVID-19 7/16/2022    Hypertension     Pneumonia due to infectious organism     Uncontrolled hypertension        Surgical History:  Past Surgical History:   Procedure Laterality Date    BACK SURGERY         Medications Prior to Admission:    Prior to Admission medications    Medication Sig Start Date End Date Taking?  Authorizing Provider   cefdinir (OMNICEF) 300 MG capsule Take 1 capsule by mouth 2 times daily for 7 days 12/14/22 12/21/22  Jessica Mercer, DO   doxycycline hyclate (VIBRA-TABS) 100 MG tablet Take 1 tablet by mouth 2 times daily for 7 days 12/14/22 12/21/22  Lilian Barajas DO   polyethylene glycol [facility]) 17 GM/SCOOP powder Take 17 g by mouth daily 12/14/22 1/13/23  Lilian Barajas DO   busPIRone (BUSPAR) 10 MG tablet Take 1 tablet by mouth 3 times daily 12/10/22 1/9/23  Suhail Batres MD   benzonatate (TESSALON) 100 MG capsule Take 1 capsule by mouth 3 times daily as needed for Cough 12/10/22 12/17/22  Suhail Batres MD   predniSONE (DELTASONE) 10 MG tablet Take 4 tablets by mouth daily for 2 days, THEN 2 tablets daily for 2 days, THEN 1 tablet daily for 2 days. 12/10/22 12/16/22  Suhail Batres MD   apixaban (ELIQUIS) 5 MG TABS tablet Take 1 tablet by mouth 2 times daily 12/3/22 1/2/23  Chris Bates MD   dilTIAZem (CARDIZEM CD) 120 MG extended release capsule Take 1 capsule by mouth daily 12/4/22   Chris Bates MD   ipratropium-albuterol (DUONEB) 0.5-2.5 (3) MG/3ML SOLN nebulizer solution Inhale 3 mLs into the lungs every 4 hours (while awake) 11/30/22   Chris Bates MD   budesonide-formoterol [facility]) 160-4.5 MCG/ACT AERO Inhale 2 puffs into the lungs 2 times daily 10/3/22 11/2/22  Clyde Gonzalez DO   Cholecalciferol (VITAMIN D3) 50 MCG (2000 UT) CAPS Take 1 capsule by mouth in the morning. Historical Provider, MD   albuterol sulfate HFA (VENTOLIN HFA) 108 (90 Base) MCG/ACT inhaler Inhale 2 puffs into the lungs 4 times daily as needed for Wheezing  Patient not taking: Reported on 7/7/2022 6/29/22 7/16/22  Lina Calvert MD   vitamin C (ASCORBIC ACID) 500 MG tablet Take 500 mg by mouth daily    Historical Provider, MD       Allergies:    Pcn [penicillins]    Social History:    reports that she has quit smoking. Her smoking use included cigarettes. She smoked an average of .25 packs per day. She has never used smokeless tobacco. She reports that she does not currently use alcohol. She reports that she does not currently use drugs.       Family History:   family history includes Dementia in her father; Diabetes in her maternal grandmother; Heart Disease in her mother. Reviewed no updates needed at this time. PHYSICAL EXAM:  Vitals:  BP (!) 151/79   Pulse 92   Temp 97.9 °F (36.6 °C) (Oral)   Resp 17   Ht 5' 8\" (1.727 m)   Wt 92 lb (41.7 kg)   SpO2 100%   BMI 13.99 kg/m²     General Appearance: alert and oriented to person, place and time and in no acute distress  Skin: warm and dry  Head: normocephalic and atraumatic  Eyes: pupils equal, round, and reactive to light, extraocular eye movements intact, conjunctivae normal  Neck: neck supple and non tender without mass   Pulmonary/Chest: clear to auscultation bilaterally- no wheezes, rales or rhonchi, normal air movement, no respiratory distress  Cardiovascular: normal rate, normal S1 and S2 and no RGM  Abdomen: soft, non-tender, non-distended, normal bowel sounds  Extremities: no cyanosis, no clubbing and no edema  Neurologic: no cranial nerve deficit and speech normal    LABS:  Recent Labs     12/13/22  1711 12/15/22  0132    140   K 4.6 4.0   CL 95* 102   CO2 32* 33*   BUN 20 19   CREATININE 0.5 0.6   GLUCOSE 206* 120*   CALCIUM 9.1 9.4       Recent Labs     12/13/22  1711 12/15/22  0132   WBC 13.4* 14.1*   RBC 4.05 3.74   HGB 11.9 11.3*   HCT 38.5 36.1   MCV 95.1 96.5   MCH 29.4 30.2   MCHC 30.9* 31.3*   RDW 13.9 14.1    392   MPV 8.9 8.8       No results for input(s): POCGLU in the last 72 hours. Radiology: CTA PULMONARY W CONTRAST    Result Date: 12/15/2022  EXAMINATION: CTA OF THE CHEST 12/15/2022 5:15 am TECHNIQUE: CTA of the chest was performed after the administration of intravenous contrast.  Multiplanar reformatted images are provided for review. MIP images are provided for review. Automated exposure control, iterative reconstruction, and/or weight based adjustment of the mA/kV was utilized to reduce the radiation dose to as low as reasonably achievable.  COMPARISON: None. HISTORY: ORDERING SYSTEM PROVIDED HISTORY: sob TECHNOLOGIST PROVIDED HISTORY: Reason for exam:->sob Decision Support Exception - unselect if not a suspected or confirmed emergency medical condition->Emergency Medical Condition (MA) FINDINGS: Pulmonary Arteries: Pulmonary arteries are adequately opacified for evaluation. No evidence of intraluminal filling defect to suggest pulmonary embolism. Main pulmonary artery is normal in caliber. Mediastinum: No evidence of mediastinal lymphadenopathy. The heart and pericardium demonstrate no acute abnormality. There is no acute abnormality of the thoracic aorta. Lungs/pleura: The lungs demonstrate mild consolidation in the right lower lobe. There is pulmonary emphysema. No pulmonary edema. No evidence of pleural effusion or pneumothorax. Upper Abdomen: Limited images of the upper abdomen are unremarkable. Soft Tissues/Bones: No acute bone or soft tissue abnormality. No evidence of pulmonary embolism. Right lower lobe pneumonia. Emphysema. EKG: Interpretation per ED physician:     ASSESSMENT:      Principal Problem:    Pneumonia due to organism  Resolved Problems:    * No resolved hospital problems. *      PLAN:    RLL pneumonia-multiple admissions over the last couple of months. 12/13 presented with increased shortness of breath. Found with RLL pneumonia. She was DC'd home with cefdinir/doxycycline x7 days, however has not improved and is feeling worse. CTA chest no PE RLL pneumonia. Received ceftriaxone 1000 mg IV x1 and doxycycline 100 mg IV x1 in ED course. Check procalcitonin. Low Le Speech therapy for swallow examination sputum culture. Continue ceftriaxone/doxycycline. Atrial Fibrillation-new onset atrial fibrillation 12/1. Placed on diltiazem infusion. Converted to SR. Plan to follow-up with cardiology 3 to 4 weeks postdischarge. Telemetry monitoring. Continue Eliquis/diltiazem. Has been seen by [facility] cardiology.   Leukocytosis-has been on steroid taper/volume contracted? RLL pneumonia noted. Check sputum culture. Received doxycycline/ceftriaxone in ED course we will continue. Check procalcitonin. HTN-continue diltiazem. Follow adjust as needed. Chronic Respiratory Failure with Hypoxia-chronically wears 2L NC at home. Currently at baseline. Continue to follow closely  Recent Influenza A- 12/5-12/10 Pt admitted to [facility] with Influenza A and COPD Exacerbation. She was treated with nebs/Tamiflu/Steroids. Steroid taper/Tamiflu continue. COPD-currently without exacerbation. Continue home regimen including current steroid taper. Code Status: Full  DVT prophylaxis: Eliquis    NOTE: This report was transcribed using voice recognition software. Every effort was made to ensure accuracy; however, inadvertent computerized transcription errors may be present. In Review of EMR,  evaluation, management and diagnosis. Care plan has been discussed with attending. Time spent 41 minutes. Electronically signed by Emilia Oconnell CNP on 12/15/2022 at 8:39 AM  HOSPITALIST ATTENDING PHYSICIAN NOTE 12/15/2022 1936PM:    Details of the evaluation - subjective assessment (including medication profile, past medical, family and social history when applicable), examination, review of lab and test data, diagnostic impressions and medical decision making - performed by Sabino Tovar. Theresa Oconnell CNP, were discussed with me on the date of service and I agree with clinical information herein unless otherwise noted. The patient has been evaluated by me personally earlier today. Pt reports no fevers, chills,n/v. PHYSICAL EXAM:    Vitals:  /69   Pulse 99   Temp 99 °F (37.2 °C) (Oral)   Resp 22   Ht 5' 8\" (1.727 m)   Wt 92 lb (41.7 kg)   SpO2 99%   BMI 13.99 kg/m²     General:  Appears comfortable.  Answers questions appropriately and cooperative with exam  HEENT:  Mucous membranes moist. No erythema, rhinorrhea, or post-nasal drip noted. Neck:  No carotid bruits. Heart:  Rhythm regular at rate of 96  Lungs:  CTA. No wheeze, rales, or rhonchi. Mild dyspnea. Rr=22  Abdomen:  Positive bowel sounds positive. Soft. Non-tender. No guarding, rebound or rigidity. Breast/Rectal/Genitourinary: not pertinent. Extremities:  Negative for lower extremity edema  Skin:  Warm and dry  Vascular: 2/4 Dorsalis Pedis pulses bilaterally. Neuro:  Cranial nerves 2-12 grossly intact, no focal weakness or change in sensation noted. Extraocular muscles intact. Pupils equal, round, reactive to light. I agree with the assessment and plan of Garcia Osman. Hansa Finch, APRN - CNP    Bacterial pneumonia  Atrial fib  Leukocytosis  Htn  chronic respiratory failure with hypoxia  Copd  Recent influenza A infection        Electronically signed by Kesha Latham D.O.   Hospitalist  [facility] at [facility]

## 2022-12-15 NOTE — TELEPHONE ENCOUNTER
Writer contacted ED provider to inform of 30 day readmission risk. Writer's attempt to contact ED provider was unsuccessful.       Call Back: If you need to call back to inform of disposition you can contact me at [phone]

## 2022-12-15 NOTE — PROGRESS NOTES
SPEECH/LANGUAGE PATHOLOGY  CLINICAL ASSESSMENT OF SWALLOWING FUNCTION   and PLAN OF CARE    PATIENT NAME:  Ethan Candelaria  (female)     MRN:  [id number]    :  1955  (69 y.o.)  STATUS:  Inpatient: Room     TODAY'S DATE:  12/15/2022  REFERRING PROVIDER:   12/15/22 1000    SLP swallowing-dysphagia evaluation and treatment  Start:  12/15/22 1000,   End:  12/15/22 1000,   ONE TIME,   Standing Count:  1 Occurrences,   R         Alice Lopez, APRN - CNP   REASON FOR REFERRAL: assess for dysphagia   EVALUATING THERAPIST: Cash Bhat, SLP                 RESULTS:    DYSPHAGIA DIAGNOSIS:   Clinical indicators of pharyngeal phase dysphagia including clinical indicators of possible aspiration with thin liquid per straw    Pt also c/o of food getting stuck mid sternum and an acid taste in her mouth when food/drinks are redirected     Pt educated on comp strats for safe swallow for pt's with respiratory disease       DIET RECOMMENDATIONS:  Regular consistency solids (IDDSI level 7) with small sips of thin liquids (IDDSI level 0)-NO STRAWS MEDICATION ADMINISTRATION, and Per patient choice/nursing judgement     FEEDING RECOMMENDATIONS:     Assistance level:  No assistance needed      Compensatory strategies recommended: Small bites/sips and No straw      Discussed recommendations with nursing and/or faxed report to referring provider: Yes    SPEECH THERAPY  PLAN OF CARE   The dysphagia POC is established based on physician order, dysphagia diagnosis and results of clinical assessment     Meal time assessment for 1-2 sessions to provide diet modification and compensatory strategy implementation due to inconsistent episodes of clinical indicators of dysphagia during intake and due to need to ensure proper implementation of compensatory strategies during PO intake     Conditions Requiring Skilled Therapeutic Intervention for dysphagia:    Patient is performing below functional baseline d/t  current acute condition, Multiple diagnoses, multiple medications, and increased dependency upon caregivers. Throat clearing during PO intake     Specific dysphagia interventions to include:     Meal time assessment for 1-2 sessions to provide diet modification and compensatory strategy implementation due to inconsistent episodes of clinical indicators of dysphagia during intake and due to need to ensure proper implementation of compensatory strategies during PO intake     Specific instructions for next treatment:  initiate instruction of compensatory strategies  Patient Treatment Goals:    Short Term Goals:  Pt will participate in meal time assessment for 1-2 sessions to provide diet modification and compensatory strategy implementation due to inconsistent episodes of clinical indicators of dysphagia during intake and due to need to ensure proper implementation of compensatory strategies during PO intake     Long Term Goals:   Pt will maintain adequate nutrition/hydration via PO intake of the least restrictive oral diet with implementation of safe swallow/ compensatory strategies and decrease signs/symptoms of aspiration to less than 1 x/day. Patient/family Goal:    Did not state. Will further assess during treatment. Plan of care discussed with Patient   The Patient understand(s) the diagnosis, prognosis and plan of care     Rehabilitation Potential/Prognosis: fair                    ADMITTING DIAGNOSIS: Pneumonia due to organism [J18.9]    VISIT DIAGNOSIS:      PATIENT REPORT/COMPLAINT: see above  RN cleared patient for participation in assessment     no , meal tray present and pt consuming    PRIOR LEVEL OF SWALLOW FUNCTION:    PAST HISTORY OF DYSPHAGIA?: none reported    Home diet: Regular consistency solids (IDDSI level 7) with  thin liquids (IDDSI level 0)  Current Diet Order:  ADULT DIET; Regular;  Low Sodium (2 gm)    PROCEDURE:  Consistencies Administered During the Evaluation   Liquids: thin liquid   Solids:  pureed foods and solid foods      Method of Intake:   cup, straw, spoon  Self fed      Position:   Seated, upright    CLINICAL ASSESSMENT:  Oral Stage:       Decreased mastication due to:  poor/missing dentition   -pt to choose soft foods she can masticate     Pharyngeal Stage:    Throat clearing present after presentation of thin liquid per straw X1    No other signs of aspiration were noted during this evaluation however, silent aspiration cannot be ruled out at bedside. If silent aspiration is suspected, a Videofluoroscopic Study of Swallowing (MBS) is recommended and requires a physician order. Cognition:   Within functional limits for this exam    Oral Peripheral Examination   Adequate lingual/labial strength     Current Respiratory Status    nasal cannula     Parameters of Speech Production  Respiration:  Inadequate for speech production  Quality:   Within functional limits  Intensity: Within functional limits    Volitional Swallow: present     Volitional Cough:   present     Pain: No pain reported. EDUCATION:   The Speech Language Pathologist (SLP) completed education regarding results of evaluation and that intervention is warranted at this time. Learner: Patient  Education: Reviewed results and recommendations of this evaluation  Evaluation of Education:  [place] understanding    This plan may be re-evaluated and revised as warranted. Evaluation Time includes thorough review of current medical information, gathering information on past medical history/social history and prior level of function, completion of standardized testing/informal observation of tasks, assessment of data and education on plan of care and goals. INTERVENTION    Pt/family educated on above results and plan of care. Pt/family trained on compensatory strategies for safe swallow and signs and symptoms of aspiration (throat clearing, coughing/choking, wet vocal quality. , etc.) and encouraged to notify staff if observed. Handouts provided as warranted. Pt/family encouraged to engage in question/answer session. All questions answered and pt/family verbalized understanding of above. [x]The admitting diagnosis and active problem list, have been reviewed prior to initiation of this evaluation. ACTIVE PROBLEM LIST:   Patient Active Problem List   Diagnosis    COPD with acute exacerbation [address] [address]    Primary hypertension    COPD exacerbation ([facility]    COPD without exacerbation ([facility] [address] [facility]    Gastroesophageal reflux disease    Constipation    Unexplained weight loss    Protein deficiency ([facility] [address]    Diet-controlled diabetes mellitus ([facility] [address] [facility]    Multiple nodules of lung    Abnormal EKG    Mixed hyperlipidemia    Hyperthyroidism    Vitamin B12 deficiency (dietary) anemia    Age-related osteoporosis without current pathological fracture    Acute respiratory failure with hypoxia (HCC)    Acute respiratory failure (HCC)    Elevated troponin    Chest pain    Tachycardia, unspecified    Pneumonia due to organism         CPT code:  25472  bedside swallow eval, 95725 dysphagia therapy    Paige GARCIA CCC/SLP [id number]  Speech-Language Pathologist

## 2022-12-15 NOTE — ED PROVIDER NOTES
HPI:  12/15/22,   Time: 4:01 AM DIONY Lion is a 69 y.o. female presenting to the ED for shortness of breath and chest pain, beginning several days ago. The complaint has been intermittent, mild in severity, and worsened by nothing. The patient states she has been seen several times over the last couple weeks. She states that she was admitted to the hospital for influenza A. She states at that time she was placed on oxygen and has been on oxygen ever since. She states that since then she has been in the ER several times. She states she was told yesterday she has pneumonia and started antibiotics. She states she has been taking her steroids antibiotics as well as other medications with no relief. She states she continuously has burning pain in her chest as well as shortness of breath and feels as if she cannot catch her breath. Therefore she came back to the ED to be evaluated. No fevers or chills. No nausea vomiting diarrhea. No abdominal pain. No numbness tingling. No focal deficits. Review of Systems:   Pertinent positives and negatives are stated within HPI, all other systems reviewed and are negative.          --------------------------------------------- PAST HISTORY ---------------------------------------------  Past Medical History:  has a past medical history of Acute exacerbation of chronic obstructive pulmonary disease (COPD) ([facility] [address] Acute respiratory failure with hypoxia ([facility] [address] COPD (chronic obstructive pulmonary disease) ([facility] [address] COPD exacerbation ([facility] [address] [facility] COVID-19, Hypertension, Pneumonia due to infectious organism, and Uncontrolled hypertension. Past Surgical History:  has a past surgical history that includes back surgery. Social History:  reports that she has quit smoking. Her smoking use included cigarettes. She smoked an average of .25 packs per day.  She has never used smokeless tobacco. She reports that she does not currently use alcohol. She reports that she does not currently use drugs. Family History: family history includes Dementia in her father; Diabetes in her maternal grandmother; Heart Disease in her mother. The patients home medications have been reviewed. Allergies: Pcn [penicillins]        ---------------------------------------------------PHYSICAL EXAM--------------------------------------    Constitutional/General: Alert and oriented x3, thin, cachectic on nasal cannula head: Normocephalic and atraumatic  Eyes: PERRL, EOMI, conjunctive normal, sclera non icteric  Mouth: Oropharynx clear, handling secretions, no trismus, no asymmetry of the posterior oropharynx or uvular edema  Neck: Supple, full ROM, non tender to palpation in the midline, no stridor, no crepitus, no meningeal signs  Respiratory: Decreased breath sounds bilaterally, no Rales or rhonchi. Tachypnea present. Not in respiratory distress  Cardiovascular:  Regular rate. Regular rhythm. No murmurs, gallops, or rubs. 2+ distal pulses  GI:  Abdomen Soft, Non tender, Non distended. +BS. No organomegaly, no palpable masses,  No rebound, guarding, or rigidity. Musculoskeletal: Moves all extremities x 4. Warm and well perfused, no clubbing, cyanosis, or edema. Capillary refill <3 seconds  Integument: skin warm and dry. No rashes. Neurologic: GCS 15, no focal deficits, symmetric strength 5/5 in the upper and lower extremities bilaterally  Psychiatric: Normal Affect    -------------------------------------------------- RESULTS -------------------------------------------------  I have personally reviewed all laboratory and imaging results for this patient. Results are listed below.      LABS:  Results for orders placed or performed during the hospital encounter of 12/15/22   CBC with Auto Differential   Result Value Ref Range    WBC 14.1 (H) 4.5 - 11.5 E9/L    RBC 3.74 3.50 - 5.50 E12/L    Hemoglobin 11.3 (L) 11.5 - 15.5 g/dL    Hematocrit 36.1 34.0 - 48.0 % MCV 96.5 80.0 - 99.9 fL    MCH 30.2 26.0 - 35.0 pg    MCHC 31.3 (L) 32.0 - 34.5 %    RDW 14.1 11.5 - 15.0 fL    Platelets 357 962 - 834 E9/L    MPV 8.8 7.0 - 12.0 fL    Neutrophils % 79.2 43.0 - 80.0 %    Immature Granulocytes % 0.6 0.0 - 5.0 %    Lymphocytes % 14.7 (L) 20.0 - 42.0 %    Monocytes % 5.0 2.0 - 12.0 %    Eosinophils % 0.3 0.0 - 6.0 %    Basophils % 0.2 0.0 - 2.0 %    Neutrophils Absolute 11.19 (H) 1.80 - 7.30 E9/L    Immature Granulocytes # 0.08 E9/L    Lymphocytes Absolute 2.07 1.50 - 4.00 E9/L    Monocytes Absolute 0.71 0.10 - 0.95 E9/L    Eosinophils Absolute 0.04 (L) 0.05 - 0.50 E9/L    Basophils Absolute 0.03 0.00 - 0.20 E9/L   Troponin   Result Value Ref Range    Troponin, High Sensitivity 22 (H) 0 - 9 ng/L   Basic Metabolic Panel   Result Value Ref Range    Sodium 140 132 - 146 mmol/L    Potassium 4.0 3.5 - 5.0 mmol/L    Chloride 102 98 - 107 mmol/L    CO2 33 (H) 22 - 29 mmol/L    Anion Gap 5 (L) 7 - 16 mmol/L    Glucose 120 (H) 74 - 99 mg/dL    BUN 19 6 - 23 mg/dL    Creatinine 0.6 0.5 - 1.0 mg/dL    Est, Glom Filt Rate >60 >=60 mL/min/1.73    Calcium 9.4 8.6 - 10.2 mg/dL   Urinalysis   Result Value Ref Range    Color, UA Yellow Straw/Yellow    Clarity, UA Clear Clear    Glucose, Ur Negative Negative mg/dL    Bilirubin Urine Negative Negative    Ketones, Urine TRACE (A) Negative mg/dL    Specific Gravity, UA >=1.030 1.005 - 1.030    Blood, Urine MODERATE (A) Negative    pH, UA 5.0 5.0 - 9.0    Protein, UA TRACE Negative mg/dL    Urobilinogen, Urine 1.0 <2.0 E.U./dL    Nitrite, Urine Negative Negative    Leukocyte Esterase, Urine Negative Negative   Troponin   Result Value Ref Range    Troponin, High Sensitivity 24 (H) 0 - 9 ng/L   Microscopic Urinalysis   Result Value Ref Range    WBC, UA 1-3 0 - 5 /HPF    RBC, UA 5-10 (A) 0 - 2 /HPF    Epithelial Cells, UA FEW /HPF    Bacteria, UA RARE (A) None Seen /HPF    Crystals, UA Many (A) None Seen /HPF   EKG 12 Lead   Result Value Ref Range Ventricular Rate 70 BPM    Atrial Rate 70 BPM    P-R Interval 144 ms    QRS Duration 72 ms    Q-T Interval 402 ms    QTc Calculation (Bazett) 434 ms    P Axis 52 degrees    R Axis 78 degrees    T Axis 84 degrees       RADIOLOGY:  Interpreted by Radiologist.  CTA PULMONARY W CONTRAST   Final Result   No evidence of pulmonary embolism. Right lower lobe pneumonia. Emphysema. XR CHEST (2 VW)   Final Result   1. No pneumonia or pleural effusion. 2. Emphysematous changes. EKG:  This EKG is signed and interpreted by the EP. EKG shows normal sinus rhythm 70 bpm.  Normal axis. Normal QRS. T wave inversion in V2. No STEMI.    ------------------------- NURSING NOTES AND VITALS REVIEWED ---------------------------   The nursing notes within the ED encounter and vital signs as below have been reviewed by myself. BP (!) 142/83   Pulse 87   Temp 97.9 °F (36.6 °C) (Oral)   Resp 24   Ht 5' 8\" (1.727 m)   Wt 92 lb (41.7 kg)   SpO2 98%   BMI 13.99 kg/m²   Oxygen Saturation Interpretation: Abnormal - but at baseline    The patients available past medical records and past encounters were reviewed. ------------------------------ ED COURSE/MEDICAL DECISION MAKING----------------------  Medications   doxycycline (VIBRAMYCIN) 100 mg in dextrose 5 % 100 mL IVPB (has no administration in time range)   acetaminophen (TYLENOL) tablet 1,000 mg (1,000 mg Oral Given 12/15/22 0136)   ipratropium-albuterol (DUONEB) nebulizer solution 3 ampule (3 ampules Inhalation Given 12/15/22 1945)   iopamidol (ISOVUE-370) 76 % injection 75 mL (75 mLs IntraVENous Given 12/15/22 3015)   cefTRIAXone (ROCEPHIN) 2,000 mg in sterile water 20 mL IV syringe (2,000 mg IntraVENous Given 12/15/22 0612)         ED COURSE:       Medical Decision Making: This is a 75-year-old female presented to the ED for chest pain shortness of breath. On arrival to the ED the patient was tachypneic but no hypoxia on nasal cannula. Patient given breathing treatments with improvement. Patient's labs and imaging performed which showed a white count of 14.1 hemoglobin 11.3 troponin were 22 and 24 negative delta. Chemistry unremarkable except for a bicarb of 33 and a glucose of 120. Patient CTA shows no PE. Right lower lobe pneumonia. Due to the patient failing outpatient oral antibiotics with multiple visits to the emergency department patient be admitted for further care. Patient started on IV antibiotics. Case discussed with hospitalist who is agreed admit the patient for further care. I, Dr. Stacey Salguero, am the primary provider for this encounter    This patient's ED course included: a personal history and physicial examination, re-evaluation prior to disposition, multiple bedside re-evaluations, IV medications, cardiac monitoring, and continuous pulse oximetry    This patient has remained hemodynamically stable during their ED course. Re-Evaluations:             Re-evaluation. Patients symptoms show no change    Counseling: The emergency provider has spoken with the patient and discussed todays results, in addition to providing specific details for the plan of care and counseling regarding the diagnosis and prognosis. Questions are answered at this time and they are agreeable with the plan.       --------------------------------- IMPRESSION AND DISPOSITION ---------------------------------    IMPRESSION  1. COPD exacerbation ([address]    2. Pneumonia of right lower lobe due to infectious organism        DISPOSITION  Disposition: Admit to telemetry  Patient condition is stable    NOTE: This report was transcribed using voice recognition software.  Every effort was made to ensure accuracy; however, inadvertent computerized transcription errors may be present        Stella Celis DO  12/15/22 [id number]

## 2022-12-16 LAB
ANION GAP SERPL CALCULATED.3IONS-SCNC: 5 MMOL/L (ref 7–16)
BASOPHILS ABSOLUTE: 0.02 E9/L (ref 0–0.2)
BASOPHILS RELATIVE PERCENT: 0.2 % (ref 0–2)
BUN BLDV-MCNC: 15 MG/DL (ref 6–23)
CALCIUM SERPL-MCNC: 8.5 MG/DL (ref 8.6–10.2)
CHLORIDE BLD-SCNC: 104 MMOL/L (ref 98–107)
CO2: 31 MMOL/L (ref 22–29)
CREAT SERPL-MCNC: 0.5 MG/DL (ref 0.5–1)
EOSINOPHILS ABSOLUTE: 0.04 E9/L (ref 0.05–0.5)
EOSINOPHILS RELATIVE PERCENT: 0.4 % (ref 0–6)
GFR SERPL CREATININE-BSD FRML MDRD: >60 ML/MIN/1.73
GLUCOSE BLD-MCNC: 105 MG/DL (ref 74–99)
HCT VFR BLD CALC: 30.6 % (ref 34–48)
HEMOGLOBIN: 9.5 G/DL (ref 11.5–15.5)
IMMATURE GRANULOCYTES #: 0.05 E9/L
IMMATURE GRANULOCYTES %: 0.4 % (ref 0–5)
LYMPHOCYTES ABSOLUTE: 1.87 E9/L (ref 1.5–4)
LYMPHOCYTES RELATIVE PERCENT: 16.4 % (ref 20–42)
MCH RBC QN AUTO: 30.4 PG (ref 26–35)
MCHC RBC AUTO-ENTMCNC: 31 % (ref 32–34.5)
MCV RBC AUTO: 97.8 FL (ref 80–99.9)
MONOCYTES ABSOLUTE: 0.55 E9/L (ref 0.1–0.95)
MONOCYTES RELATIVE PERCENT: 4.8 % (ref 2–12)
NEUTROPHILS ABSOLUTE: 8.85 E9/L (ref 1.8–7.3)
NEUTROPHILS RELATIVE PERCENT: 77.8 % (ref 43–80)
PDW BLD-RTO: 14.4 FL (ref 11.5–15)
PLATELET # BLD: 309 E9/L (ref 130–450)
PMV BLD AUTO: 8.9 FL (ref 7–12)
POTASSIUM REFLEX MAGNESIUM: 4.1 MMOL/L (ref 3.5–5)
RBC # BLD: 3.13 E12/L (ref 3.5–5.5)
SODIUM BLD-SCNC: 140 MMOL/L (ref 132–146)
WBC # BLD: 11.4 E9/L (ref 4.5–11.5)

## 2022-12-16 PROCEDURE — 2580000003 HC RX 258: Performed by: NURSE PRACTITIONER

## 2022-12-16 PROCEDURE — 2060000000 HC ICU INTERMEDIATE R&B

## 2022-12-16 PROCEDURE — 2500000003 HC RX 250 WO HCPCS: Performed by: NURSE PRACTITIONER

## 2022-12-16 PROCEDURE — 2700000000 HC OXYGEN THERAPY PER DAY

## 2022-12-16 PROCEDURE — 80048 BASIC METABOLIC PNL TOTAL CA: CPT

## 2022-12-16 PROCEDURE — 6370000000 HC RX 637 (ALT 250 FOR IP): Performed by: NURSE PRACTITIONER

## 2022-12-16 PROCEDURE — 94640 AIRWAY INHALATION TREATMENT: CPT

## 2022-12-16 PROCEDURE — 85025 COMPLETE CBC W/AUTO DIFF WBC: CPT

## 2022-12-16 PROCEDURE — 36415 COLL VENOUS BLD VENIPUNCTURE: CPT

## 2022-12-16 PROCEDURE — 6370000000 HC RX 637 (ALT 250 FOR IP): Performed by: INTERNAL MEDICINE

## 2022-12-16 PROCEDURE — 99232 SBSQ HOSP IP/OBS MODERATE 35: CPT | Performed by: INTERNAL MEDICINE

## 2022-12-16 PROCEDURE — 6370000000 HC RX 637 (ALT 250 FOR IP)

## 2022-12-16 PROCEDURE — 97165 OT EVAL LOW COMPLEX 30 MIN: CPT

## 2022-12-16 PROCEDURE — 6360000002 HC RX W HCPCS: Performed by: NURSE PRACTITIONER

## 2022-12-16 RX ORDER — CALCIUM CARBONATE 200(500)MG
500 TABLET,CHEWABLE ORAL 3 TIMES DAILY PRN
Status: DISCONTINUED | OUTPATIENT
Start: 2022-12-16 | End: 2022-12-18 | Stop reason: HOSPADM

## 2022-12-16 RX ORDER — MAGNESIUM HYDROXIDE/ALUMINUM HYDROXICE/SIMETHICONE 120; 1200; 1200 MG/30ML; MG/30ML; MG/30ML
30 SUSPENSION ORAL EVERY 6 HOURS PRN
Status: DISCONTINUED | OUTPATIENT
Start: 2022-12-16 | End: 2022-12-18 | Stop reason: HOSPADM

## 2022-12-16 RX ADMIN — PREDNISONE 20 MG: 20 TABLET ORAL at 09:14

## 2022-12-16 RX ADMIN — APIXABAN 5 MG: 5 TABLET, FILM COATED ORAL at 21:06

## 2022-12-16 RX ADMIN — SENNOSIDES 8.6 MG: 8.6 TABLET, FILM COATED ORAL at 17:53

## 2022-12-16 RX ADMIN — SODIUM CHLORIDE: 9 INJECTION, SOLUTION INTRAVENOUS at 02:33

## 2022-12-16 RX ADMIN — IPRATROPIUM BROMIDE AND ALBUTEROL SULFATE 3 ML: 2.5; .5 SOLUTION RESPIRATORY (INHALATION) at 17:04

## 2022-12-16 RX ADMIN — Medication 10 ML: at 10:23

## 2022-12-16 RX ADMIN — IPRATROPIUM BROMIDE AND ALBUTEROL SULFATE 3 ML: 2.5; .5 SOLUTION RESPIRATORY (INHALATION) at 12:25

## 2022-12-16 RX ADMIN — BUDESONIDE 500 MCG: 0.5 SUSPENSION RESPIRATORY (INHALATION) at 20:14

## 2022-12-16 RX ADMIN — BENZONATATE 100 MG: 100 CAPSULE ORAL at 21:06

## 2022-12-16 RX ADMIN — DILTIAZEM HYDROCHLORIDE 120 MG: 120 CAPSULE, COATED, EXTENDED RELEASE ORAL at 09:13

## 2022-12-16 RX ADMIN — ALUMINUM HYDROXIDE, MAGNESIUM HYDROXIDE, AND SIMETHICONE 30 ML: 200; 200; 20 SUSPENSION ORAL at 18:54

## 2022-12-16 RX ADMIN — ARFORMOTEROL TARTRATE 15 MCG: 15 SOLUTION RESPIRATORY (INHALATION) at 20:14

## 2022-12-16 RX ADMIN — Medication 2000 UNITS: at 09:13

## 2022-12-16 RX ADMIN — BUSPIRONE HYDROCHLORIDE 10 MG: 10 TABLET ORAL at 21:06

## 2022-12-16 RX ADMIN — DOXYCYCLINE 100 MG: 100 INJECTION, POWDER, LYOPHILIZED, FOR SOLUTION INTRAVENOUS at 17:54

## 2022-12-16 RX ADMIN — BUSPIRONE HYDROCHLORIDE 10 MG: 10 TABLET ORAL at 09:13

## 2022-12-16 RX ADMIN — CALCIUM CARBONATE 500 MG: 500 TABLET, CHEWABLE ORAL at 03:28

## 2022-12-16 RX ADMIN — BENZONATATE 100 MG: 100 CAPSULE ORAL at 09:13

## 2022-12-16 RX ADMIN — SODIUM CHLORIDE: 9 INJECTION, SOLUTION INTRAVENOUS at 17:54

## 2022-12-16 RX ADMIN — APIXABAN 5 MG: 5 TABLET, FILM COATED ORAL at 09:13

## 2022-12-16 RX ADMIN — ALUMINUM HYDROXIDE, MAGNESIUM HYDROXIDE, AND SIMETHICONE 30 ML: 200; 200; 20 SUSPENSION ORAL at 12:22

## 2022-12-16 RX ADMIN — POLYETHYLENE GLYCOL 3350 17 G: 17 POWDER, FOR SOLUTION ORAL at 09:18

## 2022-12-16 RX ADMIN — OXYCODONE HYDROCHLORIDE AND ACETAMINOPHEN 500 MG: 500 TABLET ORAL at 09:13

## 2022-12-16 RX ADMIN — BUDESONIDE 500 MCG: 0.5 SUSPENSION RESPIRATORY (INHALATION) at 09:05

## 2022-12-16 RX ADMIN — ARFORMOTEROL TARTRATE 15 MCG: 15 SOLUTION RESPIRATORY (INHALATION) at 09:05

## 2022-12-16 RX ADMIN — Medication 6 MG: at 21:06

## 2022-12-16 RX ADMIN — WATER 1000 MG: 1 INJECTION INTRAMUSCULAR; INTRAVENOUS; SUBCUTANEOUS at 06:35

## 2022-12-16 RX ADMIN — IPRATROPIUM BROMIDE AND ALBUTEROL SULFATE 3 ML: 2.5; .5 SOLUTION RESPIRATORY (INHALATION) at 00:11

## 2022-12-16 RX ADMIN — BUSPIRONE HYDROCHLORIDE 10 MG: 10 TABLET ORAL at 15:06

## 2022-12-16 RX ADMIN — DOXYCYCLINE 100 MG: 100 INJECTION, POWDER, LYOPHILIZED, FOR SOLUTION INTRAVENOUS at 10:36

## 2022-12-16 RX ADMIN — IPRATROPIUM BROMIDE AND ALBUTEROL SULFATE 3 ML: 2.5; .5 SOLUTION RESPIRATORY (INHALATION) at 09:05

## 2022-12-16 RX ADMIN — IPRATROPIUM BROMIDE AND ALBUTEROL SULFATE 3 ML: 2.5; .5 SOLUTION RESPIRATORY (INHALATION) at 20:14

## 2022-12-16 NOTE — PROGRESS NOTES
[facility] Hospitalist   Progress Note    Admitting Date and Time: 12/15/2022  3:37 AM  Admit Dx: Pneumonia due to organism [J18.9]  COPD exacerbation ([facility] [address] [J44.1]  Pneumonia of right lower lobe due to infectious organism [J18.9]    Subjective:    Patient was admitted with Pneumonia due to organism [J18.9]  COPD exacerbation ([facility] [address] [J44.1]  Pneumonia of right lower lobe due to infectious organism [J18.9]. Patient denies fever, chills, cp, sob, n/v. Pt c/o reflux.  Does not like tums because it tastes chalky, per nursing     apixaban  5 mg Oral BID    busPIRone  10 mg Oral TID    Vitamin D  2 tablet Oral Daily    dilTIAZem  120 mg Oral Daily    ipratropium-albuterol  3 mL Inhalation Q4H WA    [START ON 12/17/2022] predniSONE  10 mg Oral Daily    vitamin C  500 mg Oral Daily    sodium chloride flush  5-40 mL IntraVENous 2 times per day    cefTRIAXone (ROCEPHIN) IV  1,000 mg IntraVENous Q24H    doxycycline (VIBRAMYCIN) IV  100 mg IntraVENous Q12H    budesonide  0.5 mg Nebulization BID    Arformoterol Tartrate  15 mcg Nebulization BID     calcium carbonate, 500 mg, TID PRN  aluminum & magnesium hydroxide-simethicone, 30 mL, Q6H PRN  benzonatate, 100 mg, TID PRN  sodium chloride flush, 5-40 mL, PRN  sodium chloride, , PRN  ondansetron, 4 mg, Q8H PRN   Or  ondansetron, 4 mg, Q6H PRN  polyethylene glycol, 17 g, Daily PRN  acetaminophen, 650 mg, Q6H PRN   Or  acetaminophen, 650 mg, Q6H PRN  melatonin, 6 mg, Nightly PRN  senna, 1 tablet, Nightly PRN         Objective:    BP (!) 128/51   Pulse 81   Temp 98.4 °F (36.9 °C) (Oral)   Resp 16   Ht 5' 8\" (1.727 m)   Wt 96 lb 8 oz (43.8 kg)   SpO2 99%   BMI 14.67 kg/m²   Skin: warm and dry, no rash or erythema  Pulmonary/Chest: clear to auscultation bilaterally- no wheezes, rales or rhonchi, normal air movement, no respiratory distress  Cardiovascular: rhythm reg at rate of 80  Abdomen: soft, non-tender, non-distended, normal bowel sounds, no masses or organomegaly  Extremities: no cyanosis, no clubbing, and no edema      Recent Labs     12/15/22  0132 12/16/22 0225    140   K 4.0 4.1    104   CO2 33* 31*   BUN 19 15   CREATININE 0.6 0.5   GLUCOSE 120* 105*   CALCIUM 9.4 8.5*       Recent Labs     12/15/22  0132 12/16/22 0225   WBC 14.1* 11.4   RBC 3.74 3.13*   HGB 11.3* 9.5*   HCT 36.1 30.6*   MCV 96.5 97.8   MCH 30.2 30.4   MCHC 31.3* 31.0*   RDW 14.1 14.4    309   MPV 8.8 8.9       CBC with Differential:    Lab Results   Component Value Date/Time    WBC 11.4 12/16/2022 02:25 AM    RBC 3.13 12/16/2022 02:25 AM    HGB 9.5 12/16/2022 02:25 AM    HCT 30.6 12/16/2022 02:25 AM     12/16/2022 02:25 AM    MCV 97.8 12/16/2022 02:25 AM    MCH 30.4 12/16/2022 02:25 AM    MCHC 31.0 12/16/2022 02:25 AM    RDW 14.4 12/16/2022 02:25 AM    LYMPHOPCT 16.4 12/16/2022 02:25 AM    MONOPCT 4.8 12/16/2022 02:25 AM    BASOPCT 0.2 12/16/2022 02:25 AM    MONOSABS 0.55 12/16/2022 02:25 AM    LYMPHSABS 1.87 12/16/2022 02:25 AM    EOSABS 0.04 12/16/2022 02:25 AM    BASOSABS 0.02 12/16/2022 02:25 AM     BMP:    Lab Results   Component Value Date/Time     12/16/2022 02:25 AM    K 4.1 12/16/2022 02:25 AM     12/16/2022 02:25 AM    CO2 31 12/16/2022 02:25 AM    BUN 15 12/16/2022 02:25 AM    LABALBU 3.3 12/13/2022 05:11 PM    CREATININE 0.5 12/16/2022 02:25 AM    CALCIUM 8.5 12/16/2022 02:25 AM    GFRAA >60 07/28/2022 09:31 AM    LABGLOM >60 12/16/2022 02:25 AM    GLUCOSE 105 12/16/2022 02:25 AM        Radiology:   CTA PULMONARY W CONTRAST   Final Result   No evidence of pulmonary embolism. Right lower lobe pneumonia. Emphysema. XR CHEST (2 VW)   Final Result   1. No pneumonia or pleural effusion. 2. Emphysematous changes.              Assessment:    Principal Problem:    Pneumonia due to organism  Active Problems:    Chronic obstructive pulmonary disease (HCC)    Atrial fibrillation (HCC)    Bacterial pneumonia  Resolved Problems: * No resolved hospital problems. *      Plan:  Bacterial pneumonia continue abx  Atrial fib continue med/anticoagulation  Leukocytosis monitor  pt also on steroids  Htn continue med  chronic respiratory failure with hypoxia continue o2  Copd pt being treated for recent exacerbation  Recent influenza A infection monitor  Mirtha De Paz will give trial of maalox    Will get pt/ot to evaluate pt.        Electronically signed by Christiano Green DO on 12/16/2022 at 5:35 PM

## 2022-12-16 NOTE — CARE COORDINATION
Transition of Care-Met with patient at bedside, readmission from 12/10 (risk assessment completed), she stated she continues to have issues catching her breath. Patient resides with her brother in a two story home, bed/bath on second floor. Patient reports being independent with ADL's, does not use any equipment other than oxygen provided by [employer]-she wears 2L at baseline. PCP is Dr. Donya Jimenes, preferred pharmacy is [facility] on [address]. Patient drove herself here, plans to drive home at discharge. Refused HHC, PT/OT ordered. She currently is requiring 4L of oxygen, remains on IV Doxy & Rocephin for PNA.      Kiran Berry BSN, RN  [facility]

## 2022-12-16 NOTE — PROGRESS NOTES
OCCUPATIONAL THERAPY INITIAL EVALUATION    [facility], [place]       [id number]                                                  Patient Name: Bre Ortiz  MRN: [id number]  : 1955  Room: -A    Evaluating OT: ILAN Claire, OTR/L [id number]  Referring REGI Blanco CNP  Specific Provider Orders: OT eval and treat 12/15  Recommended Adaptive Equipment:  TBD     Diagnosis: PNA   Surgery: none   Pertinent Medical History: COPD, HTN, covid   Precautions:  Fall Risk, O2    Assessment of current deficits   [x] Functional mobility  [x]ADLs  [x] Strength               [x]Cognition   [x] Functional transfers   [x] IADLs         [x] Safety Awareness   [x]Endurance   [] Fine Coordination              [x] Balance      [] Vision/perception   [x]Sensation    []Gross Motor Coordination  [] ROM  [] Delirium                   [] Motor Control     OT PLAN OF CARE   OT POC based on physician orders, patient diagnosis and results of clinical assessment    Frequency/Duration: 2-3 days/wk for 2 weeks PRN   Specific OT Treatment to include:   * Instruction/training on adapted ADL techniques and AE recommendations to increase functional independence within precautions       * Training on energy conservation strategies, correct breathing pattern and techniques to improve independence/tolerance for self-care routine  * Functional transfer/mobility training/DME recommendations for increased independence, safety, and fall prevention  * Patient/Family education to increase follow through with safety techniques and functional independence  * Recommendation of environmental modifications for increased safety with functional transfers/mobility and ADLs  * Therapeutic exercise to improve motor endurance, ROM, and functional strength for ADLs/functional transfers  * Therapeutic activities to facilitate/challenge dynamic balance, stand tolerance for increased safety and independence with ADLs  * Neuro-muscular re-education: facilitation of righting/equilibrium reactions, midline orientation, scapular stability/mobility, normalization of muscle tone, and facilitation of volitional active controled movement    Home Living: Pt lives with brother in a 2 story home; bed/bath on 2nd level   Bathroom setup: 1 tub shower unit and 1 walk in shower   Equipment owned: shower chair  Prior Level of Function: IND with ADLs/IADLs; using ww for functional mobility     Pain Level: 0/10  Cognition: A&O: 3/4; Follows multi step directions    Memory:  good    Sequencing:  good    Problem solving:  fair    Judgement/safety:  fair     Functional Assessment:  AM-PAC Daily Activity Raw Score: 19/24   Initial Eval Status  Date: 12/16/22 Treatment Status  Date: STGs=LTGs  Time Frame: 10-14 days   Feeding IND (pt opened packages and self fed)      Grooming SBA (standing at sink)   IN   UB Dressing SBA   IND   LB Dressing SBA (pt crossed BLEs to don B socks)  IND    Bathing SBA (simulated)  SUP    Toileting SBA (pt completed hygiene and pants management)  IND   Bed Mobility  Log roll: SBA  Supine to sit: SBA   Sit to supine: SBA   Log roll IND  Supine to sit: IND   Sit to supine: IND   Functional Transfers Sit to stand:SBA   Stand to sit:SBA  Commode: SBA  IND   Functional Mobility SBA (using ww, to/from bathroom)  IND   Balance Sitting: SBA  Standing: SBA     Activity Tolerance fair     Visual/  Perceptual Glasses: none             UE ROM: RUE:  WFL  LUE:  WFL  Strength: RUE: grossly 3/5 LUE: grossly 3/5   Strength: B WFL  Fine Motor Coordination:  WFL     Hearing: WFL  Sensation:  No c/o numbness/tingling   Tone:  WFL  Edema: none noted                            Comments:Cleared by RN to see pt. Upon arrival, patient supine in bed and agreeable to OT session. At end of session, patient supine in bed with call light and phone within reach, all lines and tubes intact.

## 2022-12-17 PROCEDURE — 6370000000 HC RX 637 (ALT 250 FOR IP)

## 2022-12-17 PROCEDURE — 2700000000 HC OXYGEN THERAPY PER DAY

## 2022-12-17 PROCEDURE — 99232 SBSQ HOSP IP/OBS MODERATE 35: CPT | Performed by: INTERNAL MEDICINE

## 2022-12-17 PROCEDURE — 2500000003 HC RX 250 WO HCPCS: Performed by: NURSE PRACTITIONER

## 2022-12-17 PROCEDURE — 6370000000 HC RX 637 (ALT 250 FOR IP): Performed by: NURSE PRACTITIONER

## 2022-12-17 PROCEDURE — 2060000000 HC ICU INTERMEDIATE R&B

## 2022-12-17 PROCEDURE — 94640 AIRWAY INHALATION TREATMENT: CPT

## 2022-12-17 PROCEDURE — 6360000002 HC RX W HCPCS: Performed by: NURSE PRACTITIONER

## 2022-12-17 PROCEDURE — 6370000000 HC RX 637 (ALT 250 FOR IP): Performed by: INTERNAL MEDICINE

## 2022-12-17 PROCEDURE — 2580000003 HC RX 258: Performed by: NURSE PRACTITIONER

## 2022-12-17 RX ADMIN — BENZONATATE 100 MG: 100 CAPSULE ORAL at 21:36

## 2022-12-17 RX ADMIN — WATER 1000 MG: 1 INJECTION INTRAMUSCULAR; INTRAVENOUS; SUBCUTANEOUS at 05:25

## 2022-12-17 RX ADMIN — OXYCODONE HYDROCHLORIDE AND ACETAMINOPHEN 500 MG: 500 TABLET ORAL at 08:05

## 2022-12-17 RX ADMIN — BUSPIRONE HYDROCHLORIDE 10 MG: 10 TABLET ORAL at 15:21

## 2022-12-17 RX ADMIN — DOXYCYCLINE 100 MG: 100 INJECTION, POWDER, LYOPHILIZED, FOR SOLUTION INTRAVENOUS at 05:27

## 2022-12-17 RX ADMIN — ARFORMOTEROL TARTRATE 15 MCG: 15 SOLUTION RESPIRATORY (INHALATION) at 09:05

## 2022-12-17 RX ADMIN — BUSPIRONE HYDROCHLORIDE 10 MG: 10 TABLET ORAL at 08:05

## 2022-12-17 RX ADMIN — BUSPIRONE HYDROCHLORIDE 10 MG: 10 TABLET ORAL at 21:33

## 2022-12-17 RX ADMIN — BUDESONIDE 500 MCG: 0.5 SUSPENSION RESPIRATORY (INHALATION) at 09:05

## 2022-12-17 RX ADMIN — ALUMINUM HYDROXIDE, MAGNESIUM HYDROXIDE, AND SIMETHICONE 30 ML: 200; 200; 20 SUSPENSION ORAL at 06:11

## 2022-12-17 RX ADMIN — DILTIAZEM HYDROCHLORIDE 120 MG: 120 CAPSULE, COATED, EXTENDED RELEASE ORAL at 08:04

## 2022-12-17 RX ADMIN — IPRATROPIUM BROMIDE AND ALBUTEROL SULFATE 3 ML: 2.5; .5 SOLUTION RESPIRATORY (INHALATION) at 12:53

## 2022-12-17 RX ADMIN — IPRATROPIUM BROMIDE AND ALBUTEROL SULFATE 3 ML: 2.5; .5 SOLUTION RESPIRATORY (INHALATION) at 16:32

## 2022-12-17 RX ADMIN — ACETAMINOPHEN 650 MG: 325 TABLET ORAL at 22:48

## 2022-12-17 RX ADMIN — Medication 6 MG: at 21:36

## 2022-12-17 RX ADMIN — BENZONATATE 100 MG: 100 CAPSULE ORAL at 15:21

## 2022-12-17 RX ADMIN — DOXYCYCLINE 100 MG: 100 INJECTION, POWDER, LYOPHILIZED, FOR SOLUTION INTRAVENOUS at 18:33

## 2022-12-17 RX ADMIN — APIXABAN 5 MG: 5 TABLET, FILM COATED ORAL at 21:33

## 2022-12-17 RX ADMIN — BUDESONIDE 500 MCG: 0.5 SUSPENSION RESPIRATORY (INHALATION) at 20:05

## 2022-12-17 RX ADMIN — IPRATROPIUM BROMIDE AND ALBUTEROL SULFATE 3 ML: 2.5; .5 SOLUTION RESPIRATORY (INHALATION) at 09:05

## 2022-12-17 RX ADMIN — Medication 10 ML: at 21:33

## 2022-12-17 RX ADMIN — Medication 2000 UNITS: at 08:04

## 2022-12-17 RX ADMIN — PREDNISONE 10 MG: 5 TABLET ORAL at 08:05

## 2022-12-17 RX ADMIN — ACETAMINOPHEN 650 MG: 325 TABLET ORAL at 01:27

## 2022-12-17 RX ADMIN — APIXABAN 5 MG: 5 TABLET, FILM COATED ORAL at 08:05

## 2022-12-17 RX ADMIN — SENNOSIDES 8.6 MG: 8.6 TABLET, FILM COATED ORAL at 22:48

## 2022-12-17 RX ADMIN — IPRATROPIUM BROMIDE AND ALBUTEROL SULFATE 3 ML: 2.5; .5 SOLUTION RESPIRATORY (INHALATION) at 20:05

## 2022-12-17 RX ADMIN — ARFORMOTEROL TARTRATE 15 MCG: 15 SOLUTION RESPIRATORY (INHALATION) at 20:05

## 2022-12-17 RX ADMIN — POLYETHYLENE GLYCOL 3350 17 G: 17 POWDER, FOR SOLUTION ORAL at 08:05

## 2022-12-17 ASSESSMENT — PAIN SCALES - GENERAL
PAINLEVEL_OUTOF10: 4
PAINLEVEL_OUTOF10: 2

## 2022-12-17 ASSESSMENT — PAIN DESCRIPTION - LOCATION: LOCATION: ABDOMEN

## 2022-12-18 VITALS
OXYGEN SATURATION: 92 % | TEMPERATURE: 98.2 F | WEIGHT: 98.77 LBS | HEART RATE: 91 BPM | SYSTOLIC BLOOD PRESSURE: 125 MMHG | HEIGHT: 68 IN | RESPIRATION RATE: 16 BRPM | DIASTOLIC BLOOD PRESSURE: 65 MMHG | BODY MASS INDEX: 14.97 KG/M2

## 2022-12-18 LAB
ANION GAP SERPL CALCULATED.3IONS-SCNC: 6 MMOL/L (ref 7–16)
BASOPHILS ABSOLUTE: 0.05 E9/L (ref 0–0.2)
BASOPHILS RELATIVE PERCENT: 0.5 % (ref 0–2)
BUN BLDV-MCNC: 10 MG/DL (ref 6–23)
CALCIUM SERPL-MCNC: 8.8 MG/DL (ref 8.6–10.2)
CHLORIDE BLD-SCNC: 100 MMOL/L (ref 98–107)
CO2: 33 MMOL/L (ref 22–29)
CREAT SERPL-MCNC: 0.6 MG/DL (ref 0.5–1)
EOSINOPHILS ABSOLUTE: 0.1 E9/L (ref 0.05–0.5)
EOSINOPHILS RELATIVE PERCENT: 1 % (ref 0–6)
GFR SERPL CREATININE-BSD FRML MDRD: >60 ML/MIN/1.73
GLUCOSE BLD-MCNC: 91 MG/DL (ref 74–99)
HCT VFR BLD CALC: 34.7 % (ref 34–48)
HEMOGLOBIN: 10.4 G/DL (ref 11.5–15.5)
IMMATURE GRANULOCYTES #: 0.03 E9/L
IMMATURE GRANULOCYTES %: 0.3 % (ref 0–5)
LYMPHOCYTES ABSOLUTE: 2.04 E9/L (ref 1.5–4)
LYMPHOCYTES RELATIVE PERCENT: 20.9 % (ref 20–42)
MCH RBC QN AUTO: 28.9 PG (ref 26–35)
MCHC RBC AUTO-ENTMCNC: 30 % (ref 32–34.5)
MCV RBC AUTO: 96.4 FL (ref 80–99.9)
MONOCYTES ABSOLUTE: 0.59 E9/L (ref 0.1–0.95)
MONOCYTES RELATIVE PERCENT: 6.1 % (ref 2–12)
NEUTROPHILS ABSOLUTE: 6.93 E9/L (ref 1.8–7.3)
NEUTROPHILS RELATIVE PERCENT: 71.2 % (ref 43–80)
PDW BLD-RTO: 14.4 FL (ref 11.5–15)
PLATELET # BLD: 312 E9/L (ref 130–450)
PMV BLD AUTO: 9 FL (ref 7–12)
POTASSIUM SERPL-SCNC: 3.8 MMOL/L (ref 3.5–5)
RBC # BLD: 3.6 E12/L (ref 3.5–5.5)
SODIUM BLD-SCNC: 139 MMOL/L (ref 132–146)
WBC # BLD: 9.7 E9/L (ref 4.5–11.5)

## 2022-12-18 PROCEDURE — 6370000000 HC RX 637 (ALT 250 FOR IP): Performed by: INTERNAL MEDICINE

## 2022-12-18 PROCEDURE — 94640 AIRWAY INHALATION TREATMENT: CPT

## 2022-12-18 PROCEDURE — 6370000000 HC RX 637 (ALT 250 FOR IP): Performed by: NURSE PRACTITIONER

## 2022-12-18 PROCEDURE — 2700000000 HC OXYGEN THERAPY PER DAY

## 2022-12-18 PROCEDURE — 80048 BASIC METABOLIC PNL TOTAL CA: CPT

## 2022-12-18 PROCEDURE — 2500000003 HC RX 250 WO HCPCS: Performed by: NURSE PRACTITIONER

## 2022-12-18 PROCEDURE — 99239 HOSP IP/OBS DSCHRG MGMT >30: CPT | Performed by: INTERNAL MEDICINE

## 2022-12-18 PROCEDURE — 97161 PT EVAL LOW COMPLEX 20 MIN: CPT

## 2022-12-18 PROCEDURE — 2580000003 HC RX 258: Performed by: NURSE PRACTITIONER

## 2022-12-18 PROCEDURE — 6360000002 HC RX W HCPCS: Performed by: NURSE PRACTITIONER

## 2022-12-18 PROCEDURE — 85025 COMPLETE CBC W/AUTO DIFF WBC: CPT

## 2022-12-18 PROCEDURE — 36415 COLL VENOUS BLD VENIPUNCTURE: CPT

## 2022-12-18 RX ORDER — BENZONATATE 100 MG/1
100 CAPSULE ORAL 3 TIMES DAILY PRN
Qty: 20 CAPSULE | Refills: 0 | Status: SHIPPED | OUTPATIENT
Start: 2022-12-18 | End: 2022-12-25

## 2022-12-18 RX ADMIN — OXYCODONE HYDROCHLORIDE AND ACETAMINOPHEN 500 MG: 500 TABLET ORAL at 08:43

## 2022-12-18 RX ADMIN — DOXYCYCLINE 100 MG: 100 INJECTION, POWDER, LYOPHILIZED, FOR SOLUTION INTRAVENOUS at 05:14

## 2022-12-18 RX ADMIN — BUSPIRONE HYDROCHLORIDE 10 MG: 10 TABLET ORAL at 13:55

## 2022-12-18 RX ADMIN — ARFORMOTEROL TARTRATE 15 MCG: 15 SOLUTION RESPIRATORY (INHALATION) at 08:23

## 2022-12-18 RX ADMIN — PREDNISONE 10 MG: 5 TABLET ORAL at 08:43

## 2022-12-18 RX ADMIN — BUSPIRONE HYDROCHLORIDE 10 MG: 10 TABLET ORAL at 08:44

## 2022-12-18 RX ADMIN — ALUMINUM HYDROXIDE, MAGNESIUM HYDROXIDE, AND SIMETHICONE 30 ML: 200; 200; 20 SUSPENSION ORAL at 00:45

## 2022-12-18 RX ADMIN — ONDANSETRON 4 MG: 2 INJECTION INTRAMUSCULAR; INTRAVENOUS at 05:09

## 2022-12-18 RX ADMIN — BUDESONIDE 500 MCG: 0.5 SUSPENSION RESPIRATORY (INHALATION) at 08:23

## 2022-12-18 RX ADMIN — IPRATROPIUM BROMIDE AND ALBUTEROL SULFATE 3 ML: 2.5; .5 SOLUTION RESPIRATORY (INHALATION) at 12:09

## 2022-12-18 RX ADMIN — ACETAMINOPHEN 650 MG: 325 TABLET ORAL at 08:43

## 2022-12-18 RX ADMIN — ACETAMINOPHEN 650 MG: 325 TABLET ORAL at 13:55

## 2022-12-18 RX ADMIN — WATER 1000 MG: 1 INJECTION INTRAMUSCULAR; INTRAVENOUS; SUBCUTANEOUS at 05:13

## 2022-12-18 RX ADMIN — DILTIAZEM HYDROCHLORIDE 120 MG: 120 CAPSULE, COATED, EXTENDED RELEASE ORAL at 08:44

## 2022-12-18 RX ADMIN — Medication 10 ML: at 08:52

## 2022-12-18 RX ADMIN — IPRATROPIUM BROMIDE AND ALBUTEROL SULFATE 3 ML: 2.5; .5 SOLUTION RESPIRATORY (INHALATION) at 08:23

## 2022-12-18 RX ADMIN — Medication 2000 UNITS: at 08:43

## 2022-12-18 RX ADMIN — APIXABAN 5 MG: 5 TABLET, FILM COATED ORAL at 08:43

## 2022-12-18 ASSESSMENT — PAIN DESCRIPTION - LOCATION: LOCATION: HEAD

## 2022-12-18 ASSESSMENT — PAIN - FUNCTIONAL ASSESSMENT: PAIN_FUNCTIONAL_ASSESSMENT: ACTIVITIES ARE NOT PREVENTED

## 2022-12-18 ASSESSMENT — PAIN SCALES - GENERAL: PAINLEVEL_OUTOF10: 4

## 2022-12-18 ASSESSMENT — PAIN DESCRIPTION - DESCRIPTORS: DESCRIPTORS: DISCOMFORT;DULL;THROBBING

## 2022-12-18 NOTE — DISCHARGE SUMMARY
[facility] EMERGENCY SERVICE Physician Discharge Summary       Everett Miguel DO  [address] [place] [address]  [address], [place]  [address] [facility]  [address]  [phone]    Follow up  call to arrange for outpatient pulmonary rehab. Activity level: as todd    Diet: ADULT DIET; Regular; Low Sodium (2 gm)    Dispo:home     Condition at discharge: fair        Patient ID:  Emeka Mitchell  [id number]  69 y.o.  1955    Admit date: 12/15/2022    Discharge date and time:  12/18/2022  4:31 PM    Admission Diagnoses: Principal Problem:    Pneumonia due to organism  Active Problems:    Chronic obstructive pulmonary disease (HCC)    Atrial fibrillation (HCC)    Bacterial pneumonia  Resolved Problems:    * No resolved hospital problems. *      Discharge Diagnoses: Principal Problem:    Pneumonia due to organism  Active Problems:    Chronic obstructive pulmonary disease (HCC)    Atrial fibrillation ([address]    Bacterial pneumonia  Resolved Problems:    * No resolved hospital problems. *    Bacterial pneumonia  Atrial fib  Leukocytosis  Htn  chronic respiratory failure with hypoxia  Copd  gerd  Recent influenza A infection        Consults:  IP CONSULT TO SOCIAL WORK    Procedures: none    Hospital Course: Patient was admitted with Pneumonia due to organism [J18.9]  COPD exacerbation ([address] [J44.1]  Pneumonia of right lower lobe due to infectious organism [J18.9]. Patient is a 69 y.o. female with a history of COPD, COVID-19, HTN, Chronic Respiratory Failure with Hypoxia wears 2L NC presents with increased SOB and CP beginning several hours prior to presentation. Patient complaint is mild in severity, intermittent, worsened by nothing. Patient admitted to [facility] with influenza A. She received nebs/steroids/Tamiflu. OH home 12/10. She presented to ED 12/13 Presented to ED with complaints lower abdominal pain, anxiety.  WBC 13.4 CT scan of the abdomen pelvis showing no acute obstruction, gallbladder wall enhancement noted however patient having no right upper quadrant tenderness. LFT normal. RLL pneumonia. DC home on Omnicef 300mg BID x 7 days and Doxycycline 100mg BID x 7 Days. States she has been taking prescribed steroids/antibiotics however no relief. Sent back to the ED for further evaluation. BUN/Crt 19/0.6 glucose HS troponin 22> 24. 120 WBC 14.1 H/H11.3/36.1 CTA chest no PE.  RLL pneumonia. Emphysema. CXR no pneumonia/pleural effusion. Emphysematous changes. Patient received acetaminophen 1000 mg x 1/ceftriaxone 2G IV x1/doxycycline 100 mg IV x1/DuoNeb x1 in ED course. Decision to admit patient for further evaluation and treatment. Pt seen and examined. Pt had been given iv abx and continued her steroid taper while in hospital. Pt improved. During hospitalization, pt states she recognizing she is having hard time adjusting with her copd. Discussion had about ways to help with her copd. Recommended following up with pcp about referring her to psychotherapy for her anxiety. Also, recommended pulmonary rehab for her and she will talk to her pulmonologist about it. On day of discharge, pt denied fevers, chills,n/v. Discharge planning d/w pt. Time given for questions and all questions answered.      Discharge Exam:  Vitals:    12/17/22 2131 12/18/22 0237 12/18/22 0736 12/18/22 1527   BP: (!) 158/85  (!) 173/79 125/65   Pulse: 85  67 91   Resp: 17  16 16   Temp: 98 °F (36.7 °C)  98.6 °F (37 °C) 98.2 °F (36.8 °C)   TempSrc: Oral  Oral Oral   SpO2: 96%   92%   Weight:  98 lb 12.3 oz (44.8 kg)     Height:           Skin: warm and dry, no rash or erythema  Pulmonary/Chest: clear to auscultation bilaterally- no wheezes, rales or rhonchi, normal air movement, no respiratory distress  Cardiovascular: rhythm reg at rate of 84  Abdomen: soft, non-tender, non-distended, normal bowel sounds, no masses or organomegaly  Extremities: no cyanosis, no clubbing, and no edema  I/O last 3 completed shifts: In: 285 [I.V.:285]  Out: -   No intake/output data recorded. LABS:  Recent Labs     12/16/22 0225 12/18/22  0500    139   K 4.1 3.8    100   CO2 31* 33*   BUN 15 10   CREATININE 0.5 0.6   GLUCOSE 105* 91   CALCIUM 8.5* 8.8       Recent Labs     12/16/22 0225 12/18/22  0500   WBC 11.4 9.7   RBC 3.13* 3.60   HGB 9.5* 10.4*   HCT 30.6* 34.7   MCV 97.8 96.4   MCH 30.4 28.9   MCHC 31.0* 30.0*   RDW 14.4 14.4    312   MPV 8.9 9.0       No results for input(s): POCGLU in the last 72 hours. CBC with Differential:    Lab Results   Component Value Date/Time    WBC 9.7 12/18/2022 05:00 AM    RBC 3.60 12/18/2022 05:00 AM    HGB 10.4 12/18/2022 05:00 AM    HCT 34.7 12/18/2022 05:00 AM     12/18/2022 05:00 AM    MCV 96.4 12/18/2022 05:00 AM    MCH 28.9 12/18/2022 05:00 AM    MCHC 30.0 12/18/2022 05:00 AM    RDW 14.4 12/18/2022 05:00 AM    LYMPHOPCT 20.9 12/18/2022 05:00 AM    MONOPCT 6.1 12/18/2022 05:00 AM    BASOPCT 0.5 12/18/2022 05:00 AM    MONOSABS 0.59 12/18/2022 05:00 AM    LYMPHSABS 2.04 12/18/2022 05:00 AM    EOSABS 0.10 12/18/2022 05:00 AM    BASOSABS 0.05 12/18/2022 05:00 AM     BMP:    Lab Results   Component Value Date/Time     12/18/2022 05:00 AM    K 3.8 12/18/2022 05:00 AM    K 4.1 12/16/2022 02:25 AM     12/18/2022 05:00 AM    CO2 33 12/18/2022 05:00 AM    BUN 10 12/18/2022 05:00 AM    LABALBU 3.3 12/13/2022 05:11 PM    CREATININE 0.6 12/18/2022 05:00 AM    CALCIUM 8.8 12/18/2022 05:00 AM    GFRAA >60 07/28/2022 09:31 AM    LABGLOM >60 12/18/2022 05:00 AM    GLUCOSE 91 12/18/2022 05:00 AM       Imaging:   CTA PULMONARY W CONTRAST   Final Result   No evidence of pulmonary embolism. Right lower lobe pneumonia. Emphysema. XR CHEST (2 VW)   Final Result   1. No pneumonia or pleural effusion. 2. Emphysematous changes.              Patient Instructions:      Medication List        CONTINUE taking these medications      apixaban 5 MG Tabs tablet  Commonly known as: ELIQUIS  Take 1 tablet by mouth 2 times daily     benzonatate 100 MG capsule  Commonly known as: TESSALON  Take 1 capsule by mouth 3 times daily as needed for Cough     budesonide-formoterol 160-4.5 MCG/ACT Aero  Commonly known as: Symbicort  Inhale 2 puffs into the lungs 2 times daily     busPIRone 10 MG tablet  Commonly known as: BUSPAR  Take 1 tablet by mouth 3 times daily     cefdinir 300 MG capsule  Commonly known as: OMNICEF  Take 1 capsule by mouth 2 times daily for 7 days     dilTIAZem 120 MG extended release capsule  Commonly known as: CARDIZEM CD  Take 1 capsule by mouth daily     doxycycline hyclate 100 MG tablet  Commonly known as: VIBRA-TABS  Take 1 tablet by mouth 2 times daily for 7 days     ipratropium-albuterol 0.5-2.5 (3) MG/3ML Soln nebulizer solution  Commonly known as: DUONEB  Inhale 3 mLs into the lungs every 4 hours (while awake)     polyethylene glycol 17 GM/SCOOP powder  Commonly known as: GLYCOLAX  Take 17 g by mouth daily     vitamin C 500 MG tablet  Commonly known as: ASCORBIC ACID     Vitamin D3 50 MCG (2000 UT) Caps            STOP taking these medications      albuterol sulfate  (90 Base) MCG/ACT inhaler  Commonly known as: Ventolin HFA     oseltamivir 75 MG capsule  Commonly known as: TAMIFLU     predniSONE 10 MG tablet  Commonly known as: DELTASONE               Where to Get Your Medications        These medications were sent to [facility], [address] [place], [place] [address]is, [address] [place] [id number]      Phone: [phone]   benzonatate 100 MG capsule         Total time for discharge is 37 min    Signed:  Electronically signed by Chepe Dos Santos DO on 12/18/2022 at 4:31 PM

## 2022-12-18 NOTE — PROGRESS NOTES
Estel[facility] Hospitalist   Progress Note    Admitting Date and Time: 12/15/2022  3:37 AM  Admit Dx: Pneumonia due to organism [J18.9]  COPD exacerbation ([facility] [J44.1]  Pneumonia of right lower lobe due to infectious organism [J18.9]    Subjective:    Patient was admitted with Pneumonia due to organism [J18.9]  COPD exacerbation ([facility] [J44.1]  Pneumonia of right lower lobe due to infectious organism [J18.9].  Patient denies fever, chills, cp, sob, n/v.     apixaban  5 mg Oral BID    busPIRone  10 mg Oral TID    Vitamin D  2 tablet Oral Daily    dilTIAZem  120 mg Oral Daily    ipratropium-albuterol  3 mL Inhalation Q4H WA    predniSONE  10 mg Oral Daily    vitamin C  500 mg Oral Daily    sodium chloride flush  5-40 mL IntraVENous 2 times per day    cefTRIAXone (ROCEPHIN) IV  1,000 mg IntraVENous Q24H    doxycycline (VIBRAMYCIN) IV  100 mg IntraVENous Q12H    budesonide  0.5 mg Nebulization BID    Arformoterol Tartrate  15 mcg Nebulization BID     calcium carbonate, 500 mg, TID PRN  aluminum & magnesium hydroxide-simethicone, 30 mL, Q6H PRN  benzonatate, 100 mg, TID PRN  sodium chloride flush, 5-40 mL, PRN  sodium chloride, , PRN  ondansetron, 4 mg, Q8H PRN   Or  ondansetron, 4 mg, Q6H PRN  polyethylene glycol, 17 g, Daily PRN  acetaminophen, 650 mg, Q6H PRN   Or  acetaminophen, 650 mg, Q6H PRN  melatonin, 6 mg, Nightly PRN  senna, 1 tablet, Nightly PRN         Objective:    BP (!) 172/76   Pulse 85   Temp 98.4 °F (36.9 °C) (Oral)   Resp 18   Ht 5' 8\" (1.727 m)   Wt 99 lb 9.6 oz (45.2 kg)   SpO2 99%   BMI 15.14 kg/m²   Skin: warm and dry, no rash or erythema  Pulmonary/Chest: clear to auscultation bilaterally- no wheezes, rales or rhonchi, normal air movement, no respiratory distress  Cardiovascular: rhythm reg at rate of 84  Abdomen: soft, non-tender, non-distended, normal bowel sounds, no masses or organomegaly  Extremities: no cyanosis, no clubbing, and no edema      Recent Labs 12/15/22  0132 12/16/22  0225    140   K 4.0 4.1    104   CO2 33* 31*   BUN 19 15   CREATININE 0.6 0.5   GLUCOSE 120* 105*   CALCIUM 9.4 8.5*       Recent Labs     12/15/22  0132 12/16/22  0225   WBC 14.1* 11.4   RBC 3.74 3.13*   HGB 11.3* 9.5*   HCT 36.1 30.6*   MCV 96.5 97.8   MCH 30.2 30.4   MCHC 31.3* 31.0*   RDW 14.1 14.4    309   MPV 8.8 8.9            Radiology:   CTA PULMONARY W CONTRAST   Final Result   No evidence of pulmonary embolism. Right lower lobe pneumonia. Emphysema. XR CHEST (2 VW)   Final Result   1. No pneumonia or pleural effusion. 2. Emphysematous changes. Assessment:    Principal Problem:    Pneumonia due to organism  Active Problems:    Chronic obstructive pulmonary disease (HCC)    Atrial fibrillation (HCC)    Bacterial pneumonia  Resolved Problems:    * No resolved hospital problems. *      Plan:  Bacterial pneumonia continue abx  Atrial fib continue med/anticoagulation  Leukocytosis monitor  pt also on steroids  Htn continue med  chronic respiratory failure with hypoxia continue o2  Copd pt being treated for recent exacerbation. Continue wean  Recent influenza A infection monitor  Elida Coon will give trial of maalox. Pt states symptoms improved    Will monitor pt off fluids. Hopeful discharge soon.      Electronically signed by Kreg Seip, DO on 12/17/2022 at 8:05 PM

## 2022-12-18 NOTE — CARE COORDINATION
CASE MANAGEMENT. .. Received call from unit to arrange for outpatient pulm rehab. Unable to do so today d/t weekend. Spoke with Ms Jake Schafer. States she has her 1st appt to see Dr Edy Campoverde on Jan 5 2023. Instructed her to call his office tomorrow so that they can assist with scheduling. She is agreeable. Dr Edy Campoverde info placed in dc instructions.

## 2022-12-18 NOTE — PROGRESS NOTES
Physical Therapy  Facility/Department: [facility]  Physical Therapy Initial Assessment    Name: Howard Hanson  : 1955  MRN: [id number]  Date of Service: 2022    Attending Provider:  Rg Mcknight DO    Evaluating PT:  José Miguel Ramos, P.T. Room #:  1543/1509-N  Diagnosis:  Pneumonia due to organism [J18.9]  COPD exacerbation ([address] [J44.1]  Pneumonia of right lower lobe due to infectious organism [J18.9]  Precautions:  O2 sat drops with activity    SUBJECTIVE:    Pt lives with her brother in a 2 story home with 3 stairs and 2 rails to enter. There are 8 steps and 2 rails to 2nd floor bed and bath. Pt ambulated with no AD, but has a cane and ww if needed. She states she wears 2L home O2 PRN. OBJECTIVE:   Initial Evaluation  Date: 22 Treatment Short Term/ Long Term   Goals   Was pt agreeable to Eval/treatment? yes     Does pt have pain? C/o bad HA this am     Bed Mobility  Rolling: Independent  Supine to sit: Independent  Sit to supine: Independent  Scooting: Independent  Independent   Transfers Sit to stand: Independent  Stand to sit: Independent  Stand pivot: Independent  Independent   Ambulation   250 feet with no AD supervision  350 feet with no AD Independent    Stair negotiation: ascended and descended 6 steps with no rail ascending and 1 rail descending supervision  12 steps with 1 rail Independent    AM-PAC 6 Clicks 52/13       BLE ROM is WFL. BLE strength is grossly 4/5 to 4+/5. Sensation:  Pt denies numbness and tingling to extremities  Balance: sitting is Independent and standing with no AD is Independent   Endurance: fair+    Vitals:  Pt was on 2L O2 and O2 sat at rest in bed was 92%. She amb and performed stairs on RA and had no SOB, but after her O2 sat was 79-81%. She was placed back on 2L O2 and after 2 min O2 sat came up to 90%. Patient education  Pt educated on monitoring her O2 sat level at home with her pulse ox and use home O2 as prescribed. Patient response to education:   Pt verbalized understanding Pt demonstrated skill Pt requires further education in this area   yes TBA yes     ASSESSMENT:    Conditions Requiring Skilled Therapeutic Intervention:    [x]Decreased strength     []Decreased ROM  [x]Decreased functional mobility  []Decreased balance   [x]Decreased endurance   []Decreased posture  []Decreased sensation  []Decreased coordination   []Decreased vision  []Decreased safety awareness   []Increased pain       Comments:  Pt walked and performed stairs safely with no LOB. O2 sat dropped with activity. Pt was left supine in bed per her request with call light left by patient. Pt's/ family goals   1. To go home. Patient and or family understand(s) diagnosis, prognosis, and plan of care. PHYSICAL THERAPY PLAN OF CARE:    PT POC is established based on physician order and patient diagnosis     Referring provider/PT Order:  PT eval and treat  Diagnosis:  Pneumonia due to organism [J18.9]  COPD exacerbation ([facility] [address] [J44.1]  Pneumonia of right lower lobe due to infectious organism [J18.9]  Specific instructions for next treatment:  increase amb distance and number of stairs using supplemental O2 and monitoring O2 sat.      Current Treatment Recommendations:     [x] Strengthening to improve independence with functional mobility   [] ROM to improve ROM and decrease spasm and pain which will help promote independence with functional mobility   [] Balance Training to improve static/dynamic balance and to reduce fall risk  [x] Endurance Training to improve activity tolerance during functional mobility   [x] Transfer Training to improve safety and independence with all functional transfers   [x] Gait Training to improve gait mechanics, endurance and assess need for appropriate assistive device  [x] Stair Training in preparation for safe discharge home and/or into the community   [] Positioning to prevent skin breakdown and contractures  [] Safety and Education Training   [x] Patient/Caregiver Education   [] HEP  [] Other     PT long term treatment goals are located in above grid    Frequency of treatments: 2-5x/week x 1-2 weeks. Time in  07:55  Time out  08:10    Evaluation Time includes thorough review of current medical information, gathering information on past medical history/social history and prior level of function, completion of standardized testing/informal observation of tasks, assessment of data and education on plan of care and goals. CPT codes:  [x] Low Complexity PT evaluation 83594  [] Moderate Complexity PT evaluation 91118  [] High Complexity PT evaluation 28403  [] PT Re-evaluation 06401  [] Gait training 86876 ** minutes  [] Manual therapy 45834 ** minutes  [] Therapeutic activities 94227 ** minutes  [] Therapeutic exercises 91808 ** minutes  [] Neuromuscular reeducation 97586 ** minutes     Chele Lou., P.T.   License Number: [id number]

## 2022-12-19 ENCOUNTER — CARE COORDINATION (OUTPATIENT)
Dept: CASE MANAGEMENT | Age: 67
End: 2022-12-19

## 2022-12-19 NOTE — CARE COORDINATION
[facility] Care Transitions Initial Follow Up Call    Call within 2 business days of discharge: Yes        Patient: Sterling Love Patient : 1955   MRN: [id number]  Reason for Admission: Pneumonia d/t Organism  Discharge Date: 22 RARS: Readmission Risk Score: 28.3      Last Discharge  Street       Date Complaint Diagnosis Description Type Department Provider    12/15/22 Shortness of Breath COPD exacerbation ([facility] [address] . .. ED to Hosp-Admission (Discharged) (ADMITTED) [facility] McclureW Chele Meneses DO; Nelida Perera. .. Attempted to reach patient by phone for initial post hospital discharge follow up. HIPAA compliant message left on patient's voicemail; CTN contact information provided.           Tre Rainey RN

## 2022-12-20 ENCOUNTER — CARE COORDINATION (OUTPATIENT)
Dept: CASE MANAGEMENT | Age: 67
End: 2022-12-20

## 2022-12-20 DIAGNOSIS — J44.1 COPD EXACERBATION (HCC): Primary | ICD-10-CM

## 2022-12-20 PROCEDURE — 1111F DSCHRG MED/CURRENT MED MERGE: CPT | Performed by: FAMILY MEDICINE

## 2022-12-20 NOTE — CARE COORDINATION
[facility] Care Transitions Initial Follow Up Call    Call within 2 business days of discharge: Yes    Care Transition Nurse contacted the patient by telephone to perform post hospital discharge assessment. Verified name and  with patient as identifiers. Provided introduction to self, and explanation of the Care Transition Nurse role. Patient: Carissa Nunez Patient : 1955   MRN: [id number]  Reason for Admission: Pneumonia d/t Organism  Discharge Date: 22 RARS: Readmission Risk Score: 28.3      Last Discharge  [address]       Date Complaint Diagnosis Description Type Department Provider    12/15/22 Shortness of Breath COPD exacerbation ([facility] [address] . .. ED to Hosp-Admission (Discharged) (ADMITTED) [facility] Chele Meneses DO; Katie Reaves. .. Was this an external facility discharge? No Discharge Facility: [facility]    Challenges to be reviewed by the provider   Additional needs identified to be addressed with provider: No           Method of communication with provider: none. Patient is pleasant in conversation and provides brief update.   -reports utilizing oxygen at 2 LNC prn; reports SpO2 95% RA   -reports shortness of breath has improved   -reports dry cough is resolving   -reports slight upset stomach   -utilizing Incentive Spirometer each hour as instructed   -denies fever/chills  -denies chest pain, palpitations, lightheaded, or dizziness  -denies any swelling   -independent with ambulation   -able to afford cost of medications   -monitors BP; no results at this time  -no transportation issues     Emotional support provided; discussed will continue to follow. Care Transition Nurse reviewed discharge instructions, medical action plan, and red flags with patient who verbalized understanding. The patient was given an opportunity to ask questions and does not have any further questions or concerns at this time. Were discharge instructions available to patient? Yes.  Reviewed appropriate site of care based on symptoms and resources available to patient including: PCP  Specialist  When to call 911. The patient agrees to contact the PCP office for questions related to their healthcare. Advance Care Planning:   Does patient have an Advance Directive: decision maker updated. Medication reconciliation was performed with patient, who verbalizes understanding of administration of home medications. Medications reviewed, 1111F entered: yes    Was patient discharged with a pulse oximeter? no    Offered patient enrollment in the Remote Patient Monitoring (RPM) program for in-home monitoring: Patient declined. Care Transitions 24 Hour Call    Do you have a copy of your discharge instructions?: Yes  Do you have all of your prescriptions and are they filled?: Yes  Have you scheduled your follow up appointment?: Yes  How are you going to get to your appointment?: Car - drive self  Do you feel like you have everything you need to keep you well at home?: Yes  Care Transitions Interventions  No Identified Needs      Follow Up  Future Appointments   Date Time Provider Gigi Figueroa   12/23/2022 11:30 AM DO RYAN Cobb [facility]   1/5/2023 10:15 AM Adalgisa Rios MD [facility]   2/1/2023 11:00 AM DO RYAN Cobb [facility]       Care Transition Nurse provided contact information. Plan for follow-up call in 7-10 days  based on severity of symptoms and risk factors.   Plan for next call: symptom management-symptom assessment COPD/Pneumonia  -confirm patient completed HFU appointment with PCP    Rocky Myers RN

## 2022-12-22 ENCOUNTER — OFFICE VISIT (OUTPATIENT)
Dept: PRIMARY CARE CLINIC | Age: 67
End: 2022-12-22

## 2022-12-22 DIAGNOSIS — I48.0 PAROXYSMAL ATRIAL FIBRILLATION (HCC): ICD-10-CM

## 2022-12-22 DIAGNOSIS — Z09 HOSPITAL DISCHARGE FOLLOW-UP: Primary | ICD-10-CM

## 2022-12-22 DIAGNOSIS — J18.9 PNEUMONIA OF RIGHT LOWER LOBE DUE TO INFECTIOUS ORGANISM: ICD-10-CM

## 2022-12-22 DIAGNOSIS — I10 PRIMARY HYPERTENSION: ICD-10-CM

## 2022-12-22 DIAGNOSIS — J44.9 COPD WITHOUT EXACERBATION (HCC): Chronic | ICD-10-CM

## 2022-12-22 RX ORDER — DILTIAZEM HYDROCHLORIDE 120 MG/1
120 CAPSULE, COATED, EXTENDED RELEASE ORAL DAILY
Qty: 30 CAPSULE | Refills: 5 | Status: SHIPPED | OUTPATIENT
Start: 2022-12-22

## 2022-12-22 NOTE — PROGRESS NOTES
Post-Discharge Transitional Care  Follow Up      Angie Lion   YOB: 1955    Date of Office Visit:  12/22/2022  Date of Hospital Admission: 12/15/22  Date of Hospital Discharge: 12/18/22  Risk of hospital readmission (high >=14%. Medium >=10%) :Readmission Risk Score: 28.3      Care management risk score Rising risk (score 2-5) and Complex Care (Scores >=6): No Risk Score On File     Non face to face  following discharge, date last encounter closed (first attempt may have been earlier): 12/20/2022    Call initiated 2 business days of discharge: Yes    ASSESSMENT/PLAN:   Hospital discharge follow-up  -     [facility] DISCHARGE MEDS RECONCILED W/ CURRENT OUTPATIENT MED LIST  Paroxysmal atrial fibrillation (HCC)  COPD without exacerbation ([address]  Primary hypertension  Pneumonia of right lower lobe due to infectious organism    Medical Decision Making: high complexity  Return in about 1 week (around 12/29/2022). On this date 12/22/2022 I have spent 42 minutes reviewing previous notes, test results and face to face with the patient discussing the diagnosis and importance of compliance with the treatment plan as well as documenting on the day of the visit. Subjective:   HPI:  Follow up of Hospital problems/diagnosis(es): Pneumonia right lower lobe COPD atrial fibrillation GERD    Inpatient course: Discharge summary reviewed- see chart. Interval history/Current status: Patient presents for hospital discharge discharged on 1218 with above symptoms. She sees pulmonary Dr. Salena Santo January 5.   She is instructed to follow-up with cardiology    she had a stress test December 2 she did upset atrial fibrillation seen by cardiology was told to see them back in 3 weeks is on Eliquis    Patient Active Problem List   Diagnosis    COPD with acute exacerbation ([address]    Primary hypertension    COPD exacerbation ([address]    COPD without exacerbation ([address]    Gastroesophageal reflux disease    Constipation Unexplained weight loss    Protein deficiency (HCC)    Diet-controlled diabetes mellitus ([facility]    Multiple nodules of lung    Abnormal EKG    Mixed hyperlipidemia    Hyperthyroidism    Vitamin B12 deficiency (dietary) anemia    Age-related osteoporosis without current pathological fracture    Acute respiratory failure with hypoxia (HCC)    Acute respiratory failure (HCC)    Elevated troponin    Chest pain    Tachycardia, unspecified    Pneumonia due to organism    Chronic obstructive pulmonary disease (HCC)    Atrial fibrillation ([facility]    Bacterial pneumonia       Medications listed as ordered at the time of discharge from hospital     Medication List            Accurate as of December 22, 2022 11:59 PM. If you have any questions, ask your nurse or doctor.                 CONTINUE taking these medications      apixaban 5 MG Tabs tablet  Commonly known as: ELIQUIS  Take 1 tablet by mouth 2 times daily     benzonatate 100 MG capsule  Commonly known as: TESSALON  Take 1 capsule by mouth 3 times daily as needed for Cough     budesonide-formoterol 160-4.5 MCG/ACT Aero  Commonly known as: Symbicort  Inhale 2 puffs into the lungs 2 times daily     busPIRone 10 MG tablet  Commonly known as: BUSPAR  Take 1 tablet by mouth 3 times daily     dilTIAZem 120 MG extended release capsule  Commonly known as: CARDIZEM CD  Take 1 capsule by mouth daily     ipratropium-albuterol 0.5-2.5 (3) MG/3ML Soln nebulizer solution  Commonly known as: DUONEB  Inhale 3 mLs into the lungs every 4 hours (while awake)     vitamin C 500 MG tablet  Commonly known as: ASCORBIC ACID     Vitamin D3 50 MCG (2000 UT) Caps            STOP taking these medications      polyethylene glycol 17 GM/SCOOP powder  Commonly known as: GLYCOLAX  Stopped by: Benjamin Roth, DO               Where to Get Your Medications        These medications were sent to [address]  [address], [address] [facility] [address]      Phone: [phone]   apixaban 5 MG Tabs tablet  dilTIAZem 120 MG extended release capsule           Medications marked \"taking\" at this time  Outpatient Medications Marked as Taking for the 12/22/22 encounter (Office Visit) with Claudia Woodward DO   Medication Sig Dispense Refill    apixaban (ELIQUIS) 5 MG TABS tablet Take 1 tablet by mouth 2 times daily 30 tablet 5    dilTIAZem (CARDIZEM CD) 120 MG extended release capsule Take 1 capsule by mouth daily 30 capsule 5        Medications patient taking as of now reconciled against medications ordered at time of hospital discharge: Yes    A comprehensive review of systems was negative except for what was noted in the HPI.     Objective:    /67   Temp 97.5 °F (36.4 °C)   Ht 5' 8\" (1.727 m)   Wt 94 lb 12.8 oz (43 kg)   BMI 14.41 kg/m²   General Appearance: alert and oriented to person, place and time, well developed and well- nourished, in no acute distress  Skin: warm and dry, no rash or erythema  Head: normocephalic and atraumatic  Eyes: pupils equal, round, and reactive to light, extraocular eye movements intact, conjunctivae normal  ENT: tympanic membrane, external ear and ear canal normal bilaterally, nose without deformity, nasal mucosa and turbinates normal without polyps  Neck: supple and non-tender without mass, no thyromegaly or thyroid nodules, no cervical lymphadenopathy  Pulmonary/Chest: clear to auscultation bilaterally- no wheezes, rales or rhonchi, normal air movement, no respiratory distress  Cardiovascular: normal rate, regular rhythm, normal S1 and S2, no murmurs, rubs, clicks, or gallops, distal pulses intact, no carotid bruits  Abdomen: soft, non-tender, non-distended, normal bowel sounds, no masses or organomegaly  Extremities: no cyanosis, clubbing or edema  Musculoskeletal: normal range of motion, no joint swelling, deformity or tenderness  Neurologic: reflexes normal and symmetric, no cranial nerve deficit, gait, coordination and speech normal    Pulmonary soon. Advised patient call for follow-up with cardiology. Increase diet. Continue oxygen. She has at home but she does not bring it to the office or go out of the house with it. Continue Eliquis. Warned adverse effects of Eliquis if any fall or bleeding go to ER. I educated the patient about all medications. Make sure they were correct and educated  on the risk associated with missing meds or taking them incorrectly. A list of medications is being sent home with patient today. Check blood pressure at home twice a day. Low-salt low caffeine diet. Call if systolic blood pressure is above 377 and diastolic blood pressures above 85. Only use a upper arm digital cuff. Aggressive low-fat diet. Avoid red meats, greasy fried foods, dairy products. Avoid processed foods. Take cholesterol medications without food. A great deal of time spent reviewing medications, diet, exercise, social issues. Also reviewing the chart before entering the room with patient and finishing charting after leaving patient's room. More than half of that time was spent face to face with the patient in counseling and coordinating care. I informed patient about the risk associated with noncompliance of medication and taking medications incorrectly. Appropriate follow-up with myself and all specialist.  Encourage family members to take active role in assisting with medications and medical care. If any confusion should develop to notify my office immediately to avoid risk of worsening medical condition      An electronic signature was used to authenticate this note.   --Delmi Ruelas,

## 2022-12-23 VITALS
DIASTOLIC BLOOD PRESSURE: 67 MMHG | SYSTOLIC BLOOD PRESSURE: 132 MMHG | BODY MASS INDEX: 14.37 KG/M2 | HEIGHT: 68 IN | TEMPERATURE: 97.5 F | WEIGHT: 94.8 LBS

## 2022-12-27 ENCOUNTER — CARE COORDINATION (OUTPATIENT)
Dept: CASE MANAGEMENT | Age: 67
End: 2022-12-27

## 2022-12-27 ENCOUNTER — TELEPHONE (OUTPATIENT)
Dept: ADMINISTRATIVE | Age: 67
End: 2022-12-27

## 2022-12-27 PROBLEM — R79.89 ELEVATED TROPONIN: Status: RESOLVED | Noted: 2022-11-27 | Resolved: 2022-12-27

## 2022-12-27 PROBLEM — R77.8 ELEVATED TROPONIN: Status: RESOLVED | Noted: 2022-11-27 | Resolved: 2022-12-27

## 2022-12-27 NOTE — CARE COORDINATION
[facility] Care Transitions Follow Up Call        Patient: Oswaldo Ramirez  Patient : 1955   MRN: [id number]  Reason for Admission: Pneumonia d/t Organism  Discharge Date: 22 RARS: Readmission Risk Score: 28.3      Attempted to reach patient by phone regarding follow up; Care Transitions. Unable to reach the patient; call picked up, then disconnected. Noted in chart, patient completed HFU appointment with PCP on 2022.     Melani Burgess RN

## 2022-12-27 NOTE — TELEPHONE ENCOUNTER
Pt calling at the request of Dr. Bakari Victoria to schedule a HFU apt/timing with Dr. Jarrod Pitts was seen on: 12/21/22 dx: New onset Afib with RVR. Please call her with an apt.

## 2022-12-29 ENCOUNTER — APPOINTMENT (OUTPATIENT)
Dept: GENERAL RADIOLOGY | Age: 67
End: 2022-12-29
Payer: MEDICARE

## 2022-12-29 PROCEDURE — 71046 X-RAY EXAM CHEST 2 VIEWS: CPT

## 2022-12-29 PROCEDURE — 96374 THER/PROPH/DIAG INJ IV PUSH: CPT

## 2022-12-29 PROCEDURE — 99285 EMERGENCY DEPT VISIT HI MDM: CPT

## 2022-12-29 ASSESSMENT — PAIN DESCRIPTION - ORIENTATION: ORIENTATION: RIGHT;LEFT;LOWER

## 2022-12-29 ASSESSMENT — PAIN DESCRIPTION - DESCRIPTORS: DESCRIPTORS: ACHING

## 2022-12-29 ASSESSMENT — PAIN - FUNCTIONAL ASSESSMENT: PAIN_FUNCTIONAL_ASSESSMENT: 0-10

## 2022-12-29 ASSESSMENT — PAIN SCALES - GENERAL: PAINLEVEL_OUTOF10: 8

## 2022-12-29 ASSESSMENT — PAIN DESCRIPTION - LOCATION: LOCATION: ABDOMEN

## 2022-12-29 NOTE — ED NOTES
Department of Emergency Medicine    FIRST PROVIDER TRIAGE NOTE             Independent MLP           12/29/22  6:22 PM EST    Date of Encounter: 12/29/22   MRN: [id number]    There were no vitals filed for this visit. HPI: Serge Boeck is a 69 y.o. female who presents to the ED for Abdominal Pain   Reports recent constipation - no BM x4 days     Pt wears home oxygen 2L daily for COPD     ROS: Negative for cp, rectal bleeding, diarrhea     Physical Exam:   Gen Appearance/Constitutional: alert  CV: regular rate     Initial Plan of Care: All treatment areas with department are currently occupied. Plan to order/Initiate the following while awaiting opening in ED: labs and imaging studies.     Initial Plan of Care: Initiate Treatment-Testing, Proceed toTreatment Area When Bed Available for ED Attending/MLP to Continue Care    Electronically signed by Maria Teresa Davsi PA-C   DD: 12/29/22       Maria Teresa Davis PA-C  12/29/22 3524

## 2022-12-30 ENCOUNTER — APPOINTMENT (OUTPATIENT)
Dept: GENERAL RADIOLOGY | Age: 67
End: 2022-12-30
Payer: MEDICARE

## 2022-12-30 ENCOUNTER — TELEPHONE (OUTPATIENT)
Dept: PRIMARY CARE CLINIC | Age: 67
End: 2022-12-30

## 2022-12-30 ENCOUNTER — APPOINTMENT (OUTPATIENT)
Dept: CT IMAGING | Age: 67
End: 2022-12-30
Payer: MEDICARE

## 2022-12-30 ENCOUNTER — HOSPITAL ENCOUNTER (EMERGENCY)
Age: 67
Discharge: HOME | End: 2022-12-30
Attending: EMERGENCY MEDICINE
Payer: MEDICARE

## 2022-12-30 VITALS
TEMPERATURE: 97.3 F | HEIGHT: 68 IN | WEIGHT: 100 LBS | SYSTOLIC BLOOD PRESSURE: 140 MMHG | OXYGEN SATURATION: 100 % | RESPIRATION RATE: 18 BRPM | DIASTOLIC BLOOD PRESSURE: 67 MMHG | HEART RATE: 87 BPM | BODY MASS INDEX: 15.16 KG/M2

## 2022-12-30 DIAGNOSIS — K59.00 CONSTIPATION, UNSPECIFIED CONSTIPATION TYPE: ICD-10-CM

## 2022-12-30 DIAGNOSIS — R10.84 GENERALIZED ABDOMINAL PAIN: Primary | ICD-10-CM

## 2022-12-30 LAB
ALBUMIN SERPL-MCNC: 3.7 G/DL (ref 3.5–5.2)
ALP BLD-CCNC: 75 U/L (ref 35–104)
ALT SERPL-CCNC: 26 U/L (ref 0–32)
ANION GAP SERPL CALCULATED.3IONS-SCNC: 7 MMOL/L (ref 7–16)
AST SERPL-CCNC: 39 U/L (ref 0–31)
BACTERIA: ABNORMAL /HPF
BASOPHILS ABSOLUTE: 0.07 E9/L (ref 0–0.2)
BASOPHILS RELATIVE PERCENT: 0.6 % (ref 0–2)
BILIRUB SERPL-MCNC: <0.2 MG/DL (ref 0–1.2)
BILIRUBIN URINE: NEGATIVE
BLOOD, URINE: NORMAL
BUN BLDV-MCNC: 16 MG/DL (ref 6–23)
CALCIUM SERPL-MCNC: 9.2 MG/DL (ref 8.6–10.2)
CHLORIDE BLD-SCNC: 102 MMOL/L (ref 98–107)
CLARITY: CLEAR
CO2: 30 MMOL/L (ref 22–29)
COLOR: YELLOW
CREAT SERPL-MCNC: 0.6 MG/DL (ref 0.5–1)
CRYSTALS, UA: ABNORMAL /HPF
EOSINOPHILS ABSOLUTE: 0.17 E9/L (ref 0.05–0.5)
EOSINOPHILS RELATIVE PERCENT: 1.3 % (ref 0–6)
EPITHELIAL CELLS, UA: ABNORMAL /HPF
GFR SERPL CREATININE-BSD FRML MDRD: >60 ML/MIN/1.73
GLUCOSE BLD-MCNC: 104 MG/DL (ref 74–99)
GLUCOSE URINE: NEGATIVE MG/DL
HCT VFR BLD CALC: 34.9 % (ref 34–48)
HEMOGLOBIN: 10.8 G/DL (ref 11.5–15.5)
IMMATURE GRANULOCYTES #: 0.05 E9/L
IMMATURE GRANULOCYTES %: 0.4 % (ref 0–5)
KETONES, URINE: NEGATIVE MG/DL
LACTIC ACID: 0.9 MMOL/L (ref 0.5–2.2)
LEUKOCYTE ESTERASE, URINE: NEGATIVE
LIPASE: 43 U/L (ref 13–60)
LYMPHOCYTES ABSOLUTE: 1.9 E9/L (ref 1.5–4)
LYMPHOCYTES RELATIVE PERCENT: 15.1 % (ref 20–42)
MAGNESIUM: 1.7 MG/DL (ref 1.6–2.6)
MCH RBC QN AUTO: 29.8 PG (ref 26–35)
MCHC RBC AUTO-ENTMCNC: 30.9 % (ref 32–34.5)
MCV RBC AUTO: 96.1 FL (ref 80–99.9)
MONOCYTES ABSOLUTE: 1.04 E9/L (ref 0.1–0.95)
MONOCYTES RELATIVE PERCENT: 8.3 % (ref 2–12)
NEUTROPHILS ABSOLUTE: 9.37 E9/L (ref 1.8–7.3)
NEUTROPHILS RELATIVE PERCENT: 74.3 % (ref 43–80)
NITRITE, URINE: NEGATIVE
PDW BLD-RTO: 14.3 FL (ref 11.5–15)
PH UA: 5.5 (ref 5–9)
PLATELET # BLD: 366 E9/L (ref 130–450)
PMV BLD AUTO: 8.7 FL (ref 7–12)
POTASSIUM REFLEX MAGNESIUM: 3.3 MMOL/L (ref 3.5–5)
PROTEIN UA: NEGATIVE MG/DL
RBC # BLD: 3.63 E12/L (ref 3.5–5.5)
RBC UA: ABNORMAL /HPF (ref 0–2)
SODIUM BLD-SCNC: 139 MMOL/L (ref 132–146)
SPECIFIC GRAVITY UA: <=1.005 (ref 1–1.03)
TOTAL PROTEIN: 6.4 G/DL (ref 6.4–8.3)
TROPONIN, HIGH SENSITIVITY: 21 NG/L (ref 0–9)
UROBILINOGEN, URINE: 0.2 E.U./DL
WBC # BLD: 12.6 E9/L (ref 4.5–11.5)
WBC UA: ABNORMAL /HPF (ref 0–5)

## 2022-12-30 PROCEDURE — 84484 ASSAY OF TROPONIN QUANT: CPT

## 2022-12-30 PROCEDURE — 83690 ASSAY OF LIPASE: CPT

## 2022-12-30 PROCEDURE — 74177 CT ABD & PELVIS W/CONTRAST: CPT

## 2022-12-30 PROCEDURE — 80053 COMPREHEN METABOLIC PANEL: CPT

## 2022-12-30 PROCEDURE — 85025 COMPLETE CBC W/AUTO DIFF WBC: CPT

## 2022-12-30 PROCEDURE — 6360000002 HC RX W HCPCS: Performed by: EMERGENCY MEDICINE

## 2022-12-30 PROCEDURE — 73110 X-RAY EXAM OF WRIST: CPT

## 2022-12-30 PROCEDURE — 83735 ASSAY OF MAGNESIUM: CPT

## 2022-12-30 PROCEDURE — 83605 ASSAY OF LACTIC ACID: CPT

## 2022-12-30 PROCEDURE — 81001 URINALYSIS AUTO W/SCOPE: CPT

## 2022-12-30 PROCEDURE — 6360000004 HC RX CONTRAST MEDICATION: Performed by: RADIOLOGY

## 2022-12-30 RX ORDER — MORPHINE SULFATE 4 MG/ML
4 INJECTION, SOLUTION INTRAMUSCULAR; INTRAVENOUS ONCE
Status: COMPLETED | OUTPATIENT
Start: 2022-12-30 | End: 2022-12-30

## 2022-12-30 RX ORDER — POLYETHYLENE GLYCOL 3350 17 G/17G
17 POWDER, FOR SOLUTION ORAL 2 TIMES DAILY
Qty: 225 G | Refills: 0 | Status: SHIPPED | OUTPATIENT
Start: 2022-12-30 | End: 2023-01-04

## 2022-12-30 RX ADMIN — IOPAMIDOL 75 ML: 755 INJECTION, SOLUTION INTRAVENOUS at 03:52

## 2022-12-30 RX ADMIN — MORPHINE SULFATE 4 MG: 4 INJECTION, SOLUTION INTRAMUSCULAR; INTRAVENOUS at 03:55

## 2022-12-30 ASSESSMENT — PAIN DESCRIPTION - LOCATION: LOCATION: ABDOMEN

## 2022-12-30 ASSESSMENT — PAIN DESCRIPTION - ORIENTATION: ORIENTATION: MID

## 2022-12-30 ASSESSMENT — PAIN DESCRIPTION - DESCRIPTORS: DESCRIPTORS: CRAMPING

## 2022-12-30 ASSESSMENT — PAIN SCALES - GENERAL: PAINLEVEL_OUTOF10: 8

## 2022-12-30 NOTE — ED PROVIDER NOTES
Department of Emergency Medicine   ED  Provider Note  Admit Date/RoomTime: 12/30/2022  2:38 AM  ED Room: 25/25          History of Present Illness:  12/30/22, Time: 2:56 AM EST  Chief Complaint   Patient presents with    Abdominal Pain                Martha Leon is a 69 y.o. female presenting to the ED for abdominal pain and decreased bowel movements, beginning 3 days ago. The complaint has been persistent, mild in severity, and worsened by nothing. Patient states that over the past few days she has been having a burning sensation diffusely through the abdomen. She states that she has not been moving her bowels regularly. She denies any nausea or vomiting, no fevers or dysuria. Patient also states that her right wrist began to swell and is somewhat painful, no recent trauma. Review of Systems:   Pertinent positives and negatives are stated within HPI, all other systems reviewed and are negative.        --------------------------------------------- PAST HISTORY ---------------------------------------------  Past Medical History:  has a past medical history of Acute exacerbation of chronic obstructive pulmonary disease (COPD) ([facility] Acute respiratory failure with hypoxia ([facility] COPD (chronic obstructive pulmonary disease) ([facility] COPD exacerbation ([facility] COPD with acute exacerbation [facility] COVID-19, Hypertension, Pneumonia due to infectious organism, and Uncontrolled hypertension. Past Surgical History:  has a past surgical history that includes back surgery. Social History:  reports that she has quit smoking. Her smoking use included cigarettes. She smoked an average of .25 packs per day. She has never used smokeless tobacco. She reports that she does not currently use alcohol. She reports that she does not currently use drugs. Family History: family history includes Dementia in her father; Diabetes in her maternal grandmother; Heart Disease in her mother. . Unless otherwise noted, family history is non contributory    The patients home medications have been reviewed. Allergies: Pcn [penicillins]        ---------------------------------------------------PHYSICAL EXAM--------------------------------------    Constitutional/General: Alert and oriented x3  Head: Normocephalic and atraumatic  Eyes: PERRL, EOMI, sclera non icteric  Mouth: Oropharynx clear, handling secretions, no trismus, no asymmetry of the posterior oropharynx or uvular edema  Neck: Supple, full ROM, no stridor, no meningeal signs  Respiratory: Lungs clear to auscultation bilaterally, no wheezes, rales, or rhonchi. Not in respiratory distress  Cardiovascular:  Regular rate. Regular rhythm. No murmurs, no aortic murmurs, no gallops, or rubs. 2+ distal pulses. Equal extremity pulses. Chest: No chest wall tenderness  GI:  Abdomen Soft, Non tender, Non distended. No rebound, guarding, or rigidity. No pulsatile masses. Musculoskeletal: Moves all extremities x 4. Warm and well perfused, no clubbing, cyanosis, or edema. Capillary refill <3 seconds. Mild swelling and erythema to the right wrist, range of motion intact, no bony tenderness  Integument: skin warm and dry. No rashes. Neurologic: GCS 15, no focal deficits, symmetric strength 5/5 in the upper and lower extremities bilaterally  Psychiatric: Normal Affect          -------------------------------------------------- RESULTS -------------------------------------------------  I have personally reviewed all laboratory and imaging results for this patient. Results are listed below.      LABS: (Lab results interpreted by me)  Results for orders placed or performed during the hospital encounter of 12/30/22   CBC with Auto Differential   Result Value Ref Range    WBC 12.6 (H) 4.5 - 11.5 E9/L    RBC 3.63 3.50 - 5.50 E12/L    Hemoglobin 10.8 (L) 11.5 - 15.5 g/dL    Hematocrit 34.9 34.0 - 48.0 %    MCV 96.1 80.0 - 99.9 fL    MCH 29.8 26.0 - 35.0 pg    MCHC 30.9 (L) 32.0 - 34.5 %    RDW 14.3 11.5 - 15.0 fL    Platelets 611 672 - 289 E9/L    MPV 8.7 7.0 - 12.0 fL    Neutrophils % 74.3 43.0 - 80.0 %    Immature Granulocytes % 0.4 0.0 - 5.0 %    Lymphocytes % 15.1 (L) 20.0 - 42.0 %    Monocytes % 8.3 2.0 - 12.0 %    Eosinophils % 1.3 0.0 - 6.0 %    Basophils % 0.6 0.0 - 2.0 %    Neutrophils Absolute 9.37 (H) 1.80 - 7.30 E9/L    Immature Granulocytes # 0.05 E9/L    Lymphocytes Absolute 1.90 1.50 - 4.00 E9/L    Monocytes Absolute 1.04 (H) 0.10 - 0.95 E9/L    Eosinophils Absolute 0.17 0.05 - 0.50 E9/L    Basophils Absolute 0.07 0.00 - 0.20 E9/L   Comprehensive Metabolic Panel w/ Reflex to MG   Result Value Ref Range    Sodium 139 132 - 146 mmol/L    Potassium reflex Magnesium 3.3 (L) 3.5 - 5.0 mmol/L    Chloride 102 98 - 107 mmol/L    CO2 30 (H) 22 - 29 mmol/L    Anion Gap 7 7 - 16 mmol/L    Glucose 104 (H) 74 - 99 mg/dL    BUN 16 6 - 23 mg/dL    Creatinine 0.6 0.5 - 1.0 mg/dL    Est, Glom Filt Rate >60 >=60 mL/min/1.73    Calcium 9.2 8.6 - 10.2 mg/dL    Total Protein 6.4 6.4 - 8.3 g/dL    Albumin 3.7 3.5 - 5.2 g/dL    Total Bilirubin <0.2 0.0 - 1.2 mg/dL    Alkaline Phosphatase 75 35 - 104 U/L    ALT 26 0 - 32 U/L    AST 39 (H) 0 - 31 U/L   Lipase   Result Value Ref Range    Lipase 43 13 - 60 U/L   Lactic Acid   Result Value Ref Range    Lactic Acid 0.9 0.5 - 2.2 mmol/L   Urinalysis   Result Value Ref Range    Color, UA Yellow Straw/Yellow    Clarity, UA Clear Clear    Glucose, Ur Negative Negative mg/dL    Bilirubin Urine Negative Negative    Ketones, Urine Negative Negative mg/dL    Specific Gravity, UA <=1.005 1.005 - 1.030    Blood, Urine TRACE-LYSED Negative    pH, UA 5.5 5.0 - 9.0    Protein, UA Negative Negative mg/dL    Urobilinogen, Urine 0.2 <2.0 E.U./dL    Nitrite, Urine Negative Negative    Leukocyte Esterase, Urine Negative Negative   Troponin   Result Value Ref Range    Troponin, High Sensitivity 21 (H) 0 - 9 ng/L   Magnesium   Result Value Ref Range    Magnesium 1.7 1.6 - 2.6 mg/dL   Microscopic Urinalysis   Result Value Ref Range    WBC, UA NONE 0 - 5 /HPF    RBC, UA 1-3 0 - 2 /HPF    Epithelial Cells, UA RARE /HPF    Bacteria, UA NONE SEEN None Seen /HPF    Crystals, UA Rare (A) None Seen /HPF   ,       RADIOLOGY:  Interpreted by Radiologist unless otherwise specified  CT ABDOMEN PELVIS W IV CONTRAST Additional Contrast? None   Final Result   No acute abdominopelvic abnormality. XR CHEST (2 VW)   Final Result   No acute process. Hyperinflation. XR WRIST RIGHT (MIN 3 VIEWS)    (Results Pending)               ------------------------- NURSING NOTES AND VITALS REVIEWED ---------------------------   The nursing notes within the ED encounter and vital signs as below have been reviewed by myself  BP (!) 140/67   Pulse 87   Temp 97.3 °F (36.3 °C) (Oral)   Resp 18   Ht 5' 8\" (1.727 m)   Wt 100 lb (45.4 kg)   SpO2 100%   BMI 15.20 kg/m²     Oxygen Saturation Interpretation: Normal    The patients available past medical records and past encounters were reviewed. ------------------------------ ED COURSE/MEDICAL DECISION MAKING----------------------  Medications   morphine sulfate (PF) injection 4 mg (4 mg IntraVENous Given 12/30/22 0355)   iopamidol (ISOVUE-370) 76 % injection 75 mL (75 mLs IntraVENous Given 12/30/22 0352)               Medical Decision Making:     I, Dr. Luis Miguel Alves, am the primary provider of record    78-year-old female presenting with a diffuse abdominal burning and constipation. She is also having right wrist pain. The right wrist is slightly swollen with a mild warmth and erythema. Have low suspicion for cellulitis, more concern for osteoarthritis. She has no abdominal tenderness on exam, low suspicion for obstruction or infection. She is hemodynamically stable and well-appearing, afebrile. Chest x-ray unremarkable. CT abdomen pelvis unremarkable. X-ray of right wrist shows no bony abnormality. Urinalysis shows no signs of infection. Leukocytosis 12.6 with no leftward shift, lactic acid 0.9. Metabolic panel is within acceptable limits. Lipase normal.  Patient was more comfortable after morphine. She is agreeable to discharge with prescription for MiraLAX to improve bowel movements. PCP follow-up, also given follow-up to gastroenterology. Instruction to return for any changes in condition or new symptoms. Re-Evaluations: This patient's ED course included: a personal history and physicial examination, re-evaluation prior to disposition, and IV medications    This patient has remained hemodynamically stable, improved, and been closely monitored during their ED course. Counseling: The emergency provider has spoken with the patient and discussed todays results, in addition to providing specific details for the plan of care and counseling regarding the diagnosis and prognosis. Questions are answered at this time and they are agreeable with the plan.       --------------------------------- IMPRESSION AND DISPOSITION ---------------------------------    IMPRESSION  1. Generalized abdominal pain    2. Constipation, unspecified constipation type        DISPOSITION  Disposition: Discharge to home  Patient condition is stable        NOTE: This report was transcribed using voice recognition software.  Every effort was made to ensure accuracy; however, inadvertent computerized transcription errors may be present       Francy Bo DO  12/30/22 [id number]

## 2022-12-30 NOTE — TELEPHONE ENCOUNTER
Went to ER for Abdominal Pain and swelling in her hand, it was xrayed but it was fine. Wasn't given anything for the swelling. She has an appointment on Tuesday but thought she should have something for the swelling.

## 2023-01-03 ENCOUNTER — OFFICE VISIT (OUTPATIENT)
Dept: PRIMARY CARE CLINIC | Age: 68
End: 2023-01-03
Payer: MEDICARE

## 2023-01-03 ENCOUNTER — CARE COORDINATION (OUTPATIENT)
Dept: CASE MANAGEMENT | Age: 68
End: 2023-01-03

## 2023-01-03 ENCOUNTER — COMMUNITY OUTREACH (OUTPATIENT)
Dept: PRIMARY CARE CLINIC | Age: 68
End: 2023-01-03

## 2023-01-03 VITALS
DIASTOLIC BLOOD PRESSURE: 78 MMHG | BODY MASS INDEX: 13.64 KG/M2 | OXYGEN SATURATION: 92 % | HEIGHT: 68 IN | TEMPERATURE: 99 F | HEART RATE: 90 BPM | WEIGHT: 90 LBS | SYSTOLIC BLOOD PRESSURE: 132 MMHG

## 2023-01-03 DIAGNOSIS — E11.9 DIET-CONTROLLED DIABETES MELLITUS (HCC): ICD-10-CM

## 2023-01-03 DIAGNOSIS — J44.9 COPD WITHOUT EXACERBATION (HCC): ICD-10-CM

## 2023-01-03 DIAGNOSIS — I48.0 PAROXYSMAL ATRIAL FIBRILLATION (HCC): ICD-10-CM

## 2023-01-03 DIAGNOSIS — E46 PROTEIN DEFICIENCY (HCC): ICD-10-CM

## 2023-01-03 DIAGNOSIS — N30.00 ACUTE CYSTITIS WITHOUT HEMATURIA: Primary | ICD-10-CM

## 2023-01-03 PROCEDURE — 3017F COLORECTAL CA SCREEN DOC REV: CPT | Performed by: FAMILY MEDICINE

## 2023-01-03 PROCEDURE — 1090F PRES/ABSN URINE INCON ASSESS: CPT | Performed by: FAMILY MEDICINE

## 2023-01-03 PROCEDURE — G8428 CUR MEDS NOT DOCUMENT: HCPCS | Performed by: FAMILY MEDICINE

## 2023-01-03 PROCEDURE — 1111F DSCHRG MED/CURRENT MED MERGE: CPT | Performed by: FAMILY MEDICINE

## 2023-01-03 PROCEDURE — 2022F DILAT RTA XM EVC RTNOPTHY: CPT | Performed by: FAMILY MEDICINE

## 2023-01-03 PROCEDURE — 3023F SPIROM DOC REV: CPT | Performed by: FAMILY MEDICINE

## 2023-01-03 PROCEDURE — G8419 CALC BMI OUT NRM PARAM NOF/U: HCPCS | Performed by: FAMILY MEDICINE

## 2023-01-03 PROCEDURE — G8484 FLU IMMUNIZE NO ADMIN: HCPCS | Performed by: FAMILY MEDICINE

## 2023-01-03 PROCEDURE — 3075F SYST BP GE 130 - 139MM HG: CPT | Performed by: FAMILY MEDICINE

## 2023-01-03 PROCEDURE — G8400 PT W/DXA NO RESULTS DOC: HCPCS | Performed by: FAMILY MEDICINE

## 2023-01-03 PROCEDURE — 99214 OFFICE O/P EST MOD 30 MIN: CPT | Performed by: FAMILY MEDICINE

## 2023-01-03 PROCEDURE — 1123F ACP DISCUSS/DSCN MKR DOCD: CPT | Performed by: FAMILY MEDICINE

## 2023-01-03 PROCEDURE — 3078F DIAST BP <80 MM HG: CPT | Performed by: FAMILY MEDICINE

## 2023-01-03 PROCEDURE — 1036F TOBACCO NON-USER: CPT | Performed by: FAMILY MEDICINE

## 2023-01-03 PROCEDURE — 3046F HEMOGLOBIN A1C LEVEL >9.0%: CPT | Performed by: FAMILY MEDICINE

## 2023-01-03 RX ORDER — NITROFURANTOIN 25; 75 MG/1; MG/1
100 CAPSULE ORAL 2 TIMES DAILY
Qty: 14 CAPSULE | Refills: 0 | Status: SHIPPED | OUTPATIENT
Start: 2023-01-03

## 2023-01-03 ASSESSMENT — ENCOUNTER SYMPTOMS
ALLERGIC/IMMUNOLOGIC NEGATIVE: 1
EYES NEGATIVE: 1
ABDOMINAL PAIN: 1
SHORTNESS OF BREATH: 1

## 2023-01-03 NOTE — CARE COORDINATION
[facility] Care Transitions Initial Follow Up Call    Call within 2 business days of discharge: Yes    . Patient: Megan Ross Patient : 1955   MRN: [id number]  Reason for Admission: Generalized abdominal pain  Discharge Date: 22 RARS: Readmission Risk Score: 28.3      Last Discharge [address]       Date Complaint Diagnosis Description Type Department Provider    22 Abdominal Pain Generalized abdominal pain . .. ED (DISCHARGE) JOE Peñaloza DO     Attempted to reach patient by phone for initial post ED discharge follow up call. HIPAA compliant message left on patient's voicemail; CTN contact information provided. Noted in chart, patient has ED f/u appointment with PCP today, 1/3/2023.     Sabina aGge RN

## 2023-01-03 NOTE — PROGRESS NOTES
1/3/23  Name: Kate Ruiz    : 1955    Sex: female    Age: 69 y.o. Subjective:  Chief Complaint: Patient is here for   er follow up  abd    breating   hert    copd   af  gerd  diet    Went to er   again with abd pain    cxr     ok    righ trist pain    xray neg     she   todl may  be goug  n dmuc beter    Pain lower  abd    ct ok    Sees [facility]  and christian  soon    she  called   gi  doc  we referred to  today  and to  see them soon  Dysurai  sl  freq    no   chills no  back pain  Poor diet  not trying to push      Review of Systems   Constitutional: Negative. HENT: Negative. Eyes: Negative. Respiratory:  Positive for shortness of breath (with exertion   stable). Cardiovascular: Negative. Gastrointestinal:  Positive for abdominal pain (epigastric area). Endocrine: Negative. Genitourinary: Negative. Musculoskeletal:  Positive for arthralgias. Skin: Negative. Allergic/Immunologic: Negative. Neurological: Negative. Hematological: Negative. Psychiatric/Behavioral: Negative.          Current Outpatient Medications:     nitrofurantoin, macrocrystal-monohydrate, (MACROBID) 100 MG capsule, Take 1 capsule by mouth 2 times daily, Disp: 14 capsule, Rfl: 0    polyethylene glycol (GLYCOLAX) 17 GM/SCOOP powder, Take 17 g by mouth 2 times daily for 5 days, Disp: 225 g, Rfl: 0    apixaban (ELIQUIS) 5 MG TABS tablet, Take 1 tablet by mouth 2 times daily, Disp: 30 tablet, Rfl: 5    dilTIAZem (CARDIZEM CD) 120 MG extended release capsule, Take 1 capsule by mouth daily, Disp: 30 capsule, Rfl: 5    busPIRone (BUSPAR) 10 MG tablet, Take 1 tablet by mouth 3 times daily, Disp: 90 tablet, Rfl: 0    ipratropium-albuterol (DUONEB) 0.5-2.5 (3) MG/3ML SOLN nebulizer solution, Inhale 3 mLs into the lungs every 4 hours (while awake), Disp: 360 mL, Rfl: 0    budesonide-formoterol (SYMBICORT) 160-4.5 MCG/ACT AERO, Inhale 2 puffs into the lungs 2 times daily, Disp: 10.2 g, Rfl: 2 Cholecalciferol (VITAMIN D3) 50 MCG (2000 UT) CAPS, Take 1 capsule by mouth in the morning., Disp: , Rfl:     vitamin C (ASCORBIC ACID) 500 MG tablet, Take 500 mg by mouth daily, Disp: , Rfl:   Allergies   Allergen Reactions    Pcn [Penicillins] Other (See Comments)     Social History     Socioeconomic History    Marital status:      Spouse name: Not on file    Number of children: Not on file    Years of education: Not on file    Highest education level: Not on file   Occupational History    Not on file   Tobacco Use    Smoking status: Former     Packs/day: 0.25     Types: Cigarettes    Smokeless tobacco: Never    Tobacco comments:     may 2022   Vaping Use    Vaping Use: Never used   Substance and Sexual Activity    Alcohol use: Not Currently    Drug use: Not Currently    Sexual activity: Not Currently   Other Topics Concern    Not on file   Social History Narrative    Established 7-22 after not being seen since 2008    Smoker  quit   5-2022    COPD sees Dr. 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---edwige (Eduarda Scale) tim---and  other cousin omer---quit    Admit 7-22 with copd exac -----------------------------------------------------------------  dr Sanford Gist pulm    abnormal EKG 7-22 in the hospital referred to cardiology--------dr Garcia    Early hyperthyroid    8-22  refer to endo    Vit d  defic   8-22    Vit b 12 defic   start shtos  8-22    Ct    abd pelvis     8-22 mass left lung base and advsied  pet scan--- and pulm consult    Pos cologuard  8-22  referred to dr Mer Shultz but pt refused to go    Pt evicted from apartment-----failed to see specialist    Admit December 5, 2022 with exacerbation of COPD,   stress test was positive atrial fibrillation started Eliquis    Dr Guerline Chang     then on December 12 with influenza A in December 16 for constipation    Will discharge again 1218 with right lower lobe pneumonia COPD    Pulmonary evaluation Dr. Taisha Josue 1-5-2023     Social Determinants of Health     Financial Resource Strain: Not on file   Food Insecurity: Not on file   Transportation Needs: Not on file   Physical Activity: Inactive    Days of Exercise per Week: 0 days    Minutes of Exercise per Session: 0 min   Stress: Not on file   Social Connections: Not on file   Intimate Partner Violence: Not on file   Housing Stability: Not on file      Past Medical History:   Diagnosis Date    Acute exacerbation of chronic obstructive pulmonary disease (COPD) ([address] 7/14/2022    Acute respiratory failure with hypoxia ([address] 6/27/2022    COPD (chronic obstructive pulmonary disease) ([address]     COPD exacerbation ([address] 7/14/2022    COPD with acute exacerbation ([address]     COVID-19 7/16/2022    Hypertension     Pneumonia due to infectious organism     Uncontrolled hypertension      Family History   Problem Relation Age of Onset    Heart Disease Mother     Dementia Father     Diabetes Maternal Grandmother       Past Surgical History:   Procedure Laterality Date    BACK SURGERY        Vitals:    01/03/23 1510   BP: 132/78   Pulse: 90   Temp: 99 °F (37.2 °C)   TempSrc: Oral   SpO2: 92%   Weight: 90 lb (40.8 kg)   Height: 5' 8\" (1.727 m)       Objective:    Physical Exam  Vitals reviewed. Constitutional:       Appearance: Normal appearance. She is well-developed. HENT:      Head: Normocephalic. Right Ear: Tympanic membrane normal.      Left Ear: Tympanic membrane normal.      Nose: Nose normal.      Mouth/Throat:      Mouth: Mucous membranes are moist.   Eyes:      Pupils: Pupils are equal, round, and reactive to light.    Cardiovascular: Rate and Rhythm: Normal rate and regular rhythm. Pulmonary:      Effort: Pulmonary effort is normal.      Breath sounds: Normal breath sounds. Abdominal:      General: Bowel sounds are normal.      Palpations: Abdomen is soft. Musculoskeletal:         General: Normal range of motion. Cervical back: Normal range of motion. Skin:     General: Skin is warm. Neurological:      Mental Status: She is alert and oriented to person, place, and time. Psychiatric:         Behavior: Behavior normal.       Edmond Epstein was seen today for follow-up. Diagnoses and all orders for this visit:    Acute cystitis without hematuria  -     nitrofurantoin, macrocrystal-monohydrate, (MACROBID) 100 MG capsule; Take 1 capsule by mouth 2 times daily    Protein deficiency ([address]    COPD without exacerbation (HCC)    Paroxysmal atrial fibrillation (HCC)    Diet-controlled diabetes mellitus ([address]      Comments: inc diet   fuwit  gi cardio pulm   ab     see me  reg one m  Er if pain recurs   gone now    I educated the patient about all medications. Make sure they were correct and educated  on the risk associated with missing meds or taking them incorrectly. A list of medications is being sent home with patient today. Check blood pressure at home twice a day. Low-salt low caffeine diet. Call if systolic blood pressure is above 634 and diastolic blood pressures above 85. Only use a upper arm digital cuff. Aggressive low-fat diet. Avoid red meats, greasy fried foods, dairy products. Avoid processed foods. Take cholesterol medications without food. I informed patient about the risk associated with noncompliance of medication and taking medications incorrectly. Appropriate follow-up with myself and all specialist.  Encourage family members to take active role in assisting with medications and medical care.   If any confusion should develop to notify my office immediately to avoid risk of worsening medical condition    A great deal of time spent reviewing medications, diet, exercise, social issues. Also reviewing the chart before entering the room with patient and finishing charting after leaving patient's room. More than half of that time was spent face to face with the patient in counseling and coordinating care. Follow Up: No follow-ups on file.      Seen by:  Sriram Aaron DO

## 2023-01-04 ENCOUNTER — CARE COORDINATION (OUTPATIENT)
Dept: CASE MANAGEMENT | Age: 68
End: 2023-01-04

## 2023-01-05 PROBLEM — R09.02 HYPOXIA: Status: ACTIVE | Noted: 2023-01-05

## 2023-01-18 NOTE — PROGRESS NOTES
Outpatient Cardiology Office Visit    Primary Cardiologist: Dr Caroline Valentin    Reason for Visit: Post-hospital cardiology outpatient follow up for recently diagnosed PAF 12/2022      HISTORY OF PRESENT ILLNESS: 70 yo  female who presents today after recently being diagnosed with PAF. Ongoing issues with fatigue with increase ambulation. Denies any CP/burning. Denies any recent palpitations. Has been smoking around 3 cigarettes a week. Still wearing O2 prn @ hs and has not needed O2 during the day with her ADL's. Reports compliance with OAC BID.        PMH: Hyperdynamic EF, PAF diagnosed 12/1/2022, [address] with ELiquis, HTN, tobacco use (down to 3 cigarettes weekly), COPD with chronic SOB, O2 @ hs (using prn) and also prn with increase activity),  COVID infection 7/2022, and influenza A 12/2022      Problem List  Patient Active Problem List    Diagnosis Date Noted    Hypoxia 01/05/2023     Priority: Medium    Pneumonia due to organism 12/15/2022     Priority: Medium    Chronic obstructive pulmonary disease ([address] 12/15/2022     Priority: Medium    Atrial fibrillation ([address] 12/15/2022     Priority: Medium    Bacterial pneumonia 12/15/2022     Priority: Medium    Acute respiratory failure with hypoxia ([address] 11/27/2022     Priority: Medium    Acute respiratory failure ([address] 11/27/2022     Priority: Medium    Chest pain 11/27/2022     Priority: Medium    Mixed hyperlipidemia 08/03/2022     Priority: Medium    Hyperthyroidism 08/03/2022     Priority: Medium    Vitamin B12 deficiency (dietary) anemia 08/03/2022     Priority: Medium    Age-related osteoporosis without current pathological fracture 08/03/2022     Priority: Medium    Unexplained weight loss 07/28/2022     Priority: Medium    Protein deficiency ([address] 07/28/2022     Priority: Medium    Diet-controlled diabetes mellitus ([address] 07/28/2022     Priority: Medium    Multiple nodules of lung 07/28/2022     Priority: Medium    Abnormal EKG 07/28/2022 Priority: Medium    Constipation 07/16/2022     Priority: Medium    Gastroesophageal reflux disease 07/15/2022     Priority: Medium    COPD exacerbation ([facility] 07/14/2022     Priority: Medium    COPD without exacerbation ([facility] 07/14/2022     Priority: Medium    COPD with acute exacerbation [facility])      Priority: Medium    Primary hypertension      Priority: Medium    Tachycardia, unspecified 12/02/2022     afib vs MAT         Please note: past medical records were reviewed per electronic medical record (EMR) - see detailed reports under Past Medical/ Surgical History. Past Medical History:    6/29/2022 TTE Dr Aicha Escobedo: Normal LV size. LV systolic function is hyperdynamic. EF is visually estimated at greater than 75%. There is near cavity obliteration in systole. Normal diastolic function. No regional wall motion abnormality seen. Normal LV wall thickness. No LVOT obstruction. Normal RV size and function. No significant valvular abnormalities. PAF  12/1/2022 Diagnosed with new onset AF RVR converted to SR on Cardize  High [id number] score 3. [address] on Eliquis 5 mg BID  12/1/2022 Lexiscan MPS; EF 68%. NWM. Non ischemic  HTN  30 pack year smoker quit in 10/2022  Chronic SOB  COPD  Per Dr Kenia Ramon office note 1/5/2023 to use 2 liters NC @ hs and with activity ( 6MWT revealed Desaturation with activity). GERD  Pfizer Vaccine x 2 Pfizer  7/9/2022 + COVID  12/5/2022 + Influenza A      Past Surgical History:  Lumbar surgery, L oophorectomy age 13 2/2 cyst      Home Medications   Outpatient Medications Marked as Taking for the 1/27/23 encounter (Office Visit) with Krystin Matos.  ANJU Partida   Medication Sig Dispense Refill    busPIRone (BUSPAR) 10 MG tablet Take 10 mg by mouth 3 times daily as needed      OXYGEN Inhale 2 L into the lungs as needed      apixaban (ELIQUIS) 5 MG TABS tablet Take 1 tablet by mouth 2 times daily 180 tablet 1    budesonide-formoterol (SYMBICORT) 160-4.5 MCG/ACT AERO Inhale 2 puffs into the lungs 2 times daily 1 each 3    dilTIAZem (CARDIZEM CD) 120 MG extended release capsule Take 1 capsule by mouth daily 30 capsule 5    ipratropium-albuterol (DUONEB) 0.5-2.5 (3) MG/3ML SOLN nebulizer solution Inhale 3 mLs into the lungs every 4 hours (while awake) 360 mL 0    Cholecalciferol (VITAMIN D3) 50 MCG (2000 UT) CAPS Take 1 capsule by mouth daily      vitamin C (ASCORBIC ACID) 500 MG tablet Take 500 mg by mouth daily           Allergies:  Pcn [penicillins]    Social History:    Quit smoking in 10/2022, prior to this smoked 1 pack a week for 30 years. Denies alcohol or illicit drug use. Caffeine intake includes 2 cups of coffee a day. Activity: Lives with brother in 2 story with basement. Laundry in 1st floor. +drives. No assistive devices. Code Status: full Code          REVIEW OF SYSTEMS:   See HPI    PHYSICAL EXAM:   Wt Readings from Last 3 Encounters:   01/27/23 90 lb 9.6 oz (41.1 kg)   01/05/23 92 lb 12.8 oz (42.1 kg)   01/03/23 90 lb (40.8 kg)     /60 (Site: Left Upper Arm, Position: Sitting, Cuff Size: Medium Adult)   Pulse 94   Resp 18   Ht 5' 8\" (1.727 m)   Wt 90 lb 9.6 oz (41.1 kg)   SpO2 94%   BMI 13.78 kg/m²   CONST:  Well developed, thin  female who appears older than stated age. Awake, alert and cooperative. No apparent distress. HEENT:   Head- Normocephalic, atraumatic   Eyes- Conjunctivae pink, anicteric  Throat- Oral mucosa pink and moist  Neck-  No stridor, trachea midline, no jugular venous distention. No carotid bruit. CHEST: Chest symmetrical and non-tender to palpation. No accessory muscle use or intercostal retractions  RESPIRATORY: Lung sounds - diminished throughout, on RA  CARDIOVASCULAR:     Heart Ausculation- Regular rate and rhythm, no murmur. No s3, s4 or rub   PV: No lower extremity edema. No varicosities. Pedal pulses palpable, no clubbing or cyanosis   ABDOMEN: Soft, non-tender to light palpation. Bowel sounds present.  No palpable masses; no abdominal bruit  MS: Good muscle strength and tone. No atrophy or abnormal movements. : Deferred  SKIN: Pale, warm and dry no statis dermatitis or ulcers   NEURO / PSYCH: Oriented to person, place and time. Speech clear and appropriate. Follows all commands. Pleasant affect     DATA:      EKG: SR rate 94. OLGA. Diagnostic:    12/15/2022 CTA Pulmonary W: No evidence of pulmonary embolism. Right lower lobe pneumonia. Emphysema. 12/30/2022 CT Abdomen/Pelvis W IV: No acute abdominopelvic abnormality. Labs:   BMP:   Lab Results   Component Value Date/Time     12/30/2022 02:49 AM     12/18/2022 05:00 AM    K 3.3 12/30/2022 02:49 AM    K 3.8 12/18/2022 05:00 AM    K 4.1 12/16/2022 02:25 AM    CO2 30 12/30/2022 02:49 AM    CO2 33 12/18/2022 05:00 AM    BUN 16 12/30/2022 02:49 AM    BUN 10 12/18/2022 05:00 AM    BUN 15 12/16/2022 02:25 AM    CREATININE 0.6 12/30/2022 02:49 AM    CREATININE 0.6 12/18/2022 05:00 AM    CREATININE 0.5 12/16/2022 02:25 AM    LABGLOM >60 12/30/2022 02:49 AM    LABGLOM >60 12/18/2022 05:00 AM    CALCIUM 9.2 12/30/2022 02:49 AM    CALCIUM 8.8 12/18/2022 05:00 AM     TFT:   Lab Results   Component Value Date    TSH 0.039 (L) 12/02/2022    B5INIQU 7.9 07/28/2022    T4FREE 1.19 12/02/2022      HgA1c:   Lab Results   Component Value Date    LABA1C 6.1 (H) 07/28/2022     proBNP:   Lab Results   Component Value Date/Time    PROBNP 508 12/11/2022 07:12 PM    PROBNP 569 12/05/2022 06:09 PM    PROBNP 1,516 12/02/2022 04:30 AM    PROBNP 446 11/27/2022 12:43 AM    PROBNP 257 07/14/2022 12:53 PM     FASTING LIPID PANEL:  Lab Results   Component Value Date/Time    CHOL 214 07/28/2022 09:31 AM    HDL 91 07/28/2022 09:31 AM    LDLCALC 105 07/28/2022 09:31 AM    TRIG 90 07/28/2022 09:31 AM     ASSESSMENT  PAF diagnosed 12/2023, on Cardizem CD. Maintaining SR.    [address] with Eliquis 5 mg BID  Non ischemic stress in 12/2022  Hyperdynamic EF on TTE 75%  HTN  COPD O2 @ hs and during  increased activity  Hx hyperthyroidism 12/2022    PLAN:  Continue home cardiac mediations  Encouraged complete elimination of tobacco  Encouraged well balanced, low fat, low salt diet   Emotional support given  Follow-up with Dr Yaniv Hancock 6 months/sooner if any issues arise    Electronically signed by ANJU Valdivia on 1/27/2023 at 11:20 AM

## 2023-01-27 ENCOUNTER — OFFICE VISIT (OUTPATIENT)
Dept: CARDIOLOGY CLINIC | Age: 68
End: 2023-01-27
Payer: MEDICARE

## 2023-01-27 VITALS
BODY MASS INDEX: 13.73 KG/M2 | RESPIRATION RATE: 18 BRPM | OXYGEN SATURATION: 94 % | SYSTOLIC BLOOD PRESSURE: 130 MMHG | WEIGHT: 90.6 LBS | HEART RATE: 94 BPM | DIASTOLIC BLOOD PRESSURE: 60 MMHG | HEIGHT: 68 IN

## 2023-01-27 DIAGNOSIS — I48.91 ATRIAL FIBRILLATION WITH RVR (HCC): Primary | ICD-10-CM

## 2023-01-27 PROCEDURE — 99212 OFFICE O/P EST SF 10 MIN: CPT | Performed by: NURSE PRACTITIONER

## 2023-01-27 PROCEDURE — 3017F COLORECTAL CA SCREEN DOC REV: CPT | Performed by: NURSE PRACTITIONER

## 2023-01-27 PROCEDURE — 93000 ELECTROCARDIOGRAM COMPLETE: CPT | Performed by: INTERNAL MEDICINE

## 2023-01-27 PROCEDURE — G8419 CALC BMI OUT NRM PARAM NOF/U: HCPCS | Performed by: NURSE PRACTITIONER

## 2023-01-27 PROCEDURE — G8400 PT W/DXA NO RESULTS DOC: HCPCS | Performed by: NURSE PRACTITIONER

## 2023-01-27 PROCEDURE — 1123F ACP DISCUSS/DSCN MKR DOCD: CPT | Performed by: NURSE PRACTITIONER

## 2023-01-27 PROCEDURE — 3078F DIAST BP <80 MM HG: CPT | Performed by: NURSE PRACTITIONER

## 2023-01-27 PROCEDURE — 1090F PRES/ABSN URINE INCON ASSESS: CPT | Performed by: NURSE PRACTITIONER

## 2023-01-27 PROCEDURE — G8484 FLU IMMUNIZE NO ADMIN: HCPCS | Performed by: NURSE PRACTITIONER

## 2023-01-27 PROCEDURE — 4004F PT TOBACCO SCREEN RCVD TLK: CPT | Performed by: NURSE PRACTITIONER

## 2023-01-27 PROCEDURE — 3075F SYST BP GE 130 - 139MM HG: CPT | Performed by: NURSE PRACTITIONER

## 2023-01-27 PROCEDURE — G8427 DOCREV CUR MEDS BY ELIG CLIN: HCPCS | Performed by: NURSE PRACTITIONER

## 2023-01-27 RX ORDER — BUSPIRONE HYDROCHLORIDE 10 MG/1
10 TABLET ORAL 3 TIMES DAILY PRN
COMMUNITY

## 2023-01-27 NOTE — PATIENT INSTRUCTIONS
Continue home cardiac mediations  Encourage complete elimination of tobacco  Encourage well balanced, low fat, low salt diet   Follow-up with Dr Mainor Bailey 6 months/sooner if any issues arise

## 2023-02-01 ENCOUNTER — OFFICE VISIT (OUTPATIENT)
Dept: PRIMARY CARE CLINIC | Age: 68
End: 2023-02-01

## 2023-02-01 VITALS
HEART RATE: 58 BPM | WEIGHT: 91 LBS | OXYGEN SATURATION: 97 % | SYSTOLIC BLOOD PRESSURE: 132 MMHG | DIASTOLIC BLOOD PRESSURE: 68 MMHG | TEMPERATURE: 98.8 F | BODY MASS INDEX: 13.84 KG/M2

## 2023-02-01 DIAGNOSIS — E11.9 DIET-CONTROLLED DIABETES MELLITUS (HCC): Chronic | ICD-10-CM

## 2023-02-01 DIAGNOSIS — F17.210 CIGARETTE NICOTINE DEPENDENCE WITHOUT COMPLICATION: ICD-10-CM

## 2023-02-01 DIAGNOSIS — I48.0 PAROXYSMAL ATRIAL FIBRILLATION (HCC): ICD-10-CM

## 2023-02-01 DIAGNOSIS — D51.8 VITAMIN B12 DEFICIENCY (DIETARY) ANEMIA: ICD-10-CM

## 2023-02-01 DIAGNOSIS — J44.9 COPD WITHOUT EXACERBATION (HCC): Primary | Chronic | ICD-10-CM

## 2023-02-01 DIAGNOSIS — E46 PROTEIN DEFICIENCY (HCC): Chronic | ICD-10-CM

## 2023-02-01 PROBLEM — J96.00 ACUTE RESPIRATORY FAILURE (HCC): Status: RESOLVED | Noted: 2022-11-27 | Resolved: 2023-02-01

## 2023-02-01 PROBLEM — J96.01 ACUTE RESPIRATORY FAILURE WITH HYPOXIA (HCC): Status: RESOLVED | Noted: 2022-11-27 | Resolved: 2023-02-01

## 2023-02-01 PROBLEM — J44.1 COPD EXACERBATION (HCC): Status: RESOLVED | Noted: 2022-07-14 | Resolved: 2023-02-01

## 2023-02-01 RX ORDER — NICOTINE 21 MG/24HR
1 PATCH, TRANSDERMAL 24 HOURS TRANSDERMAL EVERY 24 HOURS
Qty: 30 PATCH | Refills: 3 | Status: SHIPPED | OUTPATIENT
Start: 2023-02-01 | End: 2024-02-01

## 2023-02-01 RX ORDER — CYANOCOBALAMIN 1000 UG/ML
1000 INJECTION, SOLUTION INTRAMUSCULAR; SUBCUTANEOUS ONCE
Status: COMPLETED | OUTPATIENT
Start: 2023-02-01 | End: 2023-02-01

## 2023-02-01 RX ADMIN — CYANOCOBALAMIN 1000 MCG: 1000 INJECTION, SOLUTION INTRAMUSCULAR; SUBCUTANEOUS at 11:35

## 2023-02-01 ASSESSMENT — ENCOUNTER SYMPTOMS
ALLERGIC/IMMUNOLOGIC NEGATIVE: 1
GASTROINTESTINAL NEGATIVE: 1
EYES NEGATIVE: 1
SHORTNESS OF BREATH: 1

## 2023-02-01 ASSESSMENT — PATIENT HEALTH QUESTIONNAIRE - PHQ9
SUM OF ALL RESPONSES TO PHQ QUESTIONS 1-9: 0
SUM OF ALL RESPONSES TO PHQ9 QUESTIONS 1 & 2: 0
SUM OF ALL RESPONSES TO PHQ QUESTIONS 1-9: 0
SUM OF ALL RESPONSES TO PHQ QUESTIONS 1-9: 0
1. LITTLE INTEREST OR PLEASURE IN DOING THINGS: 0
SUM OF ALL RESPONSES TO PHQ QUESTIONS 1-9: 0
2. FEELING DOWN, DEPRESSED OR HOPELESS: 0

## 2023-02-01 NOTE — PROGRESS NOTES
23  Name: Bre Ortiz    : 1955    Sex: female    Age: 77 y.o. Subjective:  Chief Complaint: Patient is here for   3 mo ck  re    copd     af  gerd   bp   diet  weigth  a rht     heart  Constip  b 12  vit  d       Saw  cardio and due     6 months  Seeng dr Lucio Skinner   reg  Brahting better  Not  sleep well  told to try   dramaine  fist    and if not help then clal for  poss    trazodne   Mirlasx help a lot  no   ab d painro sx        Saw andrew rivas and  order  PFt  and  ct  4  mo  Wareing  o2  at home only  Not wleep great     stil rare smokign  askign fro  nicoderm patch  Wt up one lb      Review of Systems   Constitutional: Negative. HENT: Negative. Eyes: Negative. Respiratory:  Positive for shortness of breath (no worse). Cardiovascular: Negative. Negative for chest pain, palpitations and leg swelling. Gastrointestinal: Negative. Endocrine: Negative. Genitourinary: Negative. Musculoskeletal: Negative. Skin: Negative. Allergic/Immunologic: Negative. Neurological: Negative. Hematological: Negative. Psychiatric/Behavioral: Negative.          Current Outpatient Medications:     Handicap Placard MISC, by Does not apply route Dx  copd    5 yrs, Disp: 1 each, Rfl: 0    nicotine (NICODERM CQ) 21 MG/24HR, Place 1 patch onto the skin every 24 hours, Disp: 30 patch, Rfl: 3    busPIRone (BUSPAR) 10 MG tablet, Take 10 mg by mouth 3 times daily as needed, Disp: , Rfl:     OXYGEN, Inhale 2 L into the lungs as needed, Disp: , Rfl:     apixaban (ELIQUIS) 5 MG TABS tablet, Take 1 tablet by mouth 2 times daily, Disp: 180 tablet, Rfl: 1    budesonide-formoterol (SYMBICORT) 160-4.5 MCG/ACT AERO, Inhale 2 puffs into the lungs 2 times daily, Disp: 1 each, Rfl: 3    dilTIAZem (CARDIZEM CD) 120 MG extended release capsule, Take 1 capsule by mouth daily, Disp: 30 capsule, Rfl: 5    ipratropium-albuterol (DUONEB) 0.5-2.5 (3) MG/3ML SOLN nebulizer solution, Inhale 3 mLs into the lungs every 4 hours (while awake), Disp: 360 mL, Rfl: 0    Cholecalciferol (VITAMIN D3) 50 MCG (2000) CAPS, Take 1 capsule by mouth daily, Disp: , Rfl:     vitamin C (ASCORBIC ACID) 500 MG tablet, Take 500 mg by mouth daily, Disp: , Rfl:   Allergies   Allergen Reactions    Pcn [Penicillins] Other (See Comments)     Social History     Socioeconomic History    Marital status:      Spouse name: Not on file    Number of children: Not on file    Years of education: Not on file    Highest education level: Not on file   Occupational History    Not on file   Tobacco Use    Smoking status: Some Days     Packs/day: 0.20     Years: 39.00     Pack years: 7.80     Types: Cigarettes     Start date:      Last attempt to quit: 2022     Years since quittin.1    Smokeless tobacco: Never    Tobacco comments:     1 cig a few times weekly.  trying to quit (2023)   Vaping Use    Vaping Use: Never used   Substance and Sexual Activity    Alcohol use: Not Currently    Drug use: Not Currently    Sexual activity: Not Currently   Other Topics Concern    Not on file   Social History Narrative    1 cup coffee daily             Established  after not being seen since     Smoker  quit       COPD sees Dr. 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 omer---quit    Admit  with copd exac -----------------------------------------------------------------  dr Esther sinclair    abnormal EKG  in the hospital referred to cardiology--------dr Garcia    Early hyperthyroid      refer to endo Vit d  defic   8-22    Vit b 12 defic   start shtos  8-22    Ct    abd pelvis     8-22 mass left lung base and advsied  pet scan--- and pulm consult    Pos cologuard  8-22  referred to dr Deven Wilkes but pt refused to go    Pt evicted from apartment-----failed to see specialist    Admit December 5, 2022 with exacerbation of COPD,   stress test was positive atrial fibrillation started Eliquis    Dr Shannen Negron     then on December 12 with influenza A in December 16 for constipation    Will discharge again 1218 with right lower lobe pneumonia COPD    Pulmonary evaluation Dr. Singer Parents 1-5-2023    Vit b  12  defic  1-23     Social Determinants of Health     Financial Resource Strain: Not on file   Food Insecurity: Not on file   Transportation Needs: Not on file   Physical Activity: Inactive    Days of Exercise per Week: 0 days    Minutes of Exercise per Session: 0 min   Stress: Not on file   Social Connections: Not on file   Intimate Partner Violence: Not on file   Housing Stability: Not on file      Past Medical History:   Diagnosis Date    Acute exacerbation of chronic obstructive pulmonary disease (COPD) ([address] 7/14/2022    Acute respiratory failure ([address] 11/27/2022    Acute respiratory failure with hypoxia ([address] 6/27/2022    Acute respiratory failure with hypoxia ([address] 11/27/2022    Chronic obstructive pulmonary disease ([address] 12/15/2022    COPD (chronic obstructive pulmonary disease) ([address]     COPD exacerbation ([address] 7/14/2022    COPD exacerbation ([address] 7/14/2022    COPD with acute exacerbation ([address]     COPD with acute exacerbation ([address]     COVID-19 7/16/2022    Hypertension     Pneumonia due to infectious organism     Uncontrolled hypertension      Family History   Problem Relation Age of Onset    Heart Disease Mother     Dementia Father     Diabetes Maternal Grandmother       Past Surgical History:   Procedure Laterality Date    BACK SURGERY      LEFT OOPHORECTOMY        Vitals:    02/01/23 1107   BP: 132/68   Pulse: 58 Temp: 98.8 °F (37.1 °C)   TempSrc: Oral   SpO2: 97%   Weight: 91 lb (41.3 kg)       Objective:    Physical Exam  Vitals reviewed. Constitutional:       Appearance: Normal appearance. She is well-developed. HENT:      Head: Normocephalic. Right Ear: Tympanic membrane normal.      Left Ear: Tympanic membrane normal.      Nose: Nose normal.      Mouth/Throat:      Mouth: Mucous membranes are moist.   Eyes:      Pupils: Pupils are equal, round, and reactive to light. Cardiovascular:      Rate and Rhythm: Normal rate and regular rhythm. Pulmonary:      Effort: Pulmonary effort is normal. No respiratory distress. Breath sounds: No stridor. Wheezing present. No rhonchi or rales. Chest:      Chest wall: No tenderness. Abdominal:      General: Bowel sounds are normal.      Palpations: Abdomen is soft. Musculoskeletal:         General: Normal range of motion. Cervical back: Normal range of motion. Skin:     General: Skin is warm. Neurological:      Mental Status: She is alert and oriented to person, place, and time. Psychiatric:         Behavior: Behavior normal.       Diego was seen today for follow-up. Diagnoses and all orders for this visit:    COPD without exacerbation ([address]    Cigarette nicotine dependence without complication  -     nicotine (NICODERM CQ) 21 MG/24HR; Place 1 patch onto the skin every 24 hours    Paroxysmal atrial fibrillation (HCC)    Diet-controlled diabetes mellitus (HCC)    Protein deficiency (HCC)    Vitamin B12 deficiency (dietary) anemia    Other orders  -     Handicap Placard MISC; by Does not apply route Dx  copd    5 yrs      Comments: diet [employer] protein    diet exer  Add  b  12  and  mvi    b 12 shot    Diet exer    Exer    Encoaurge use  lab neb qid   dc smokign   wear o2  more often     pusle ox  call less then   90    Plna lab    next ov        I educated the patient about all medications.   Make sure they were correct and educated  on the risk associated with missing meds or taking them incorrectly. A list of medications is being sent home with patient today. A great deal of time spent reviewing medications, diet, exercise, social issues. Also reviewing the chart before entering the room with patient and finishing charting after leaving patient's room. More than half of that time was spent face to face with the patient in counseling and coordinating care. I informed patient about the risk associated with noncompliance of medication and taking medications incorrectly. Appropriate follow-up with myself and all specialist.  Encourage family members to take active role in assisting with medications and medical care. If any confusion should develop to notify my office immediately to avoid risk of worsening medical condition    Aggressive low-fat diet. Avoid red meats, greasy fried foods, dairy products. Avoid processed foods. Take cholesterol medications without food. Follow Up: Return in about 3 months (around 5/1/2023).      Seen by:  Aifa Duncan,

## 2023-02-06 RX ORDER — BUSPIRONE HYDROCHLORIDE 10 MG/1
10 TABLET ORAL 3 TIMES DAILY
Qty: 270 TABLET | Refills: 1 | Status: SHIPPED | OUTPATIENT
Start: 2023-02-06

## 2023-02-21 RX ORDER — BENZONATATE 100 MG/1
100 CAPSULE ORAL 3 TIMES DAILY PRN
Qty: 60 CAPSULE | Refills: 2 | Status: SHIPPED | OUTPATIENT
Start: 2023-02-21

## 2023-03-02 ENCOUNTER — TELEPHONE (OUTPATIENT)
Dept: PRIMARY CARE CLINIC | Age: 68
End: 2023-03-02

## 2023-03-02 NOTE — TELEPHONE ENCOUNTER
Patient is scheduled for upper GI Monday. She has been advised to hold eliquis 2 days prior. Wanting to know if this is okay to do?

## 2023-03-03 ENCOUNTER — OFFICE VISIT (OUTPATIENT)
Dept: PRIMARY CARE CLINIC | Age: 68
End: 2023-03-03

## 2023-03-03 VITALS
WEIGHT: 93.4 LBS | HEART RATE: 84 BPM | TEMPERATURE: 98.6 F | DIASTOLIC BLOOD PRESSURE: 62 MMHG | HEIGHT: 68 IN | BODY MASS INDEX: 14.16 KG/M2 | SYSTOLIC BLOOD PRESSURE: 128 MMHG | OXYGEN SATURATION: 98 %

## 2023-03-03 DIAGNOSIS — J44.9 COPD WITHOUT EXACERBATION (HCC): ICD-10-CM

## 2023-03-03 DIAGNOSIS — J20.8 ACUTE BRONCHITIS DUE TO OTHER SPECIFIED ORGANISMS: Primary | ICD-10-CM

## 2023-03-03 RX ORDER — METHYLPREDNISOLONE ACETATE 40 MG/ML
40 INJECTION, SUSPENSION INTRA-ARTICULAR; INTRALESIONAL; INTRAMUSCULAR; SOFT TISSUE ONCE
Status: COMPLETED | OUTPATIENT
Start: 2023-03-03 | End: 2023-03-03

## 2023-03-03 RX ORDER — DEXTROMETHORPHAN HYDROBROMIDE AND PROMETHAZINE HYDROCHLORIDE 15; 6.25 MG/5ML; MG/5ML
5 SYRUP ORAL 4 TIMES DAILY PRN
Qty: 120 ML | Refills: 0 | Status: SHIPPED | OUTPATIENT
Start: 2023-03-03 | End: 2023-03-10

## 2023-03-03 RX ORDER — DOXYCYCLINE HYCLATE 100 MG
100 TABLET ORAL 2 TIMES DAILY
Qty: 20 TABLET | Refills: 0 | Status: SHIPPED | OUTPATIENT
Start: 2023-03-03 | End: 2023-03-13

## 2023-03-03 RX ADMIN — METHYLPREDNISOLONE ACETATE 40 MG: 40 INJECTION, SUSPENSION INTRA-ARTICULAR; INTRALESIONAL; INTRAMUSCULAR; SOFT TISSUE at 08:43

## 2023-03-03 ASSESSMENT — ENCOUNTER SYMPTOMS
ALLERGIC/IMMUNOLOGIC NEGATIVE: 1
COUGH: 1
EYES NEGATIVE: 1
GASTROINTESTINAL NEGATIVE: 1

## 2023-03-03 NOTE — PROGRESS NOTES
3/3/23  Name: Roni Lockhart    : 1955    Sex: female    Age: 77 y.o. Subjective:  Chief Complaint: Patient is here for cough adamaris sinus    abd  cramp     She lifted yest and pulled musce in mid  abd   and much better today   --no   abd   sx  no  cp  coug h   two d   no t ch  no chg taste or smell  Stil smoking   saw pulm  and for  pft    soon and  egd monday      Review of Systems   Constitutional: Negative. HENT:  Positive for postnasal drip. Eyes: Negative. Respiratory:  Positive for cough. Cardiovascular: Negative. Gastrointestinal: Negative. Endocrine: Negative. Genitourinary: Negative. Musculoskeletal: Negative. Skin: Negative. Allergic/Immunologic: Negative. Neurological: Negative. Hematological: Negative. Psychiatric/Behavioral: Negative.          Current Outpatient Medications:     doxycycline hyclate (VIBRA-TABS) 100 MG tablet, Take 1 tablet by mouth 2 times daily for 10 days, Disp: 20 tablet, Rfl: 0    promethazine-dextromethorphan (PROMETHAZINE-DM) 6.25-15 MG/5ML syrup, Take 5 mLs by mouth 4 times daily as needed for Cough, Disp: 120 mL, Rfl: 0    benzonatate (TESSALON) 100 MG capsule, Take 1 capsule by mouth 3 times daily as needed for Cough, Disp: 60 capsule, Rfl: 2    busPIRone (BUSPAR) 10 MG tablet, Take 1 tablet by mouth 3 times daily, Disp: 270 tablet, Rfl: 1    nicotine (NICODERM CQ) 21 MG/24HR, Place 1 patch onto the skin every 24 hours, Disp: 30 patch, Rfl: 3    OXYGEN, Inhale 2 L into the lungs as needed, Disp: , Rfl:     apixaban (ELIQUIS) 5 MG TABS tablet, Take 1 tablet by mouth 2 times daily, Disp: 180 tablet, Rfl: 1    budesonide-formoterol (SYMBICORT) 160-4.5 MCG/ACT AERO, Inhale 2 puffs into the lungs 2 times daily, Disp: 1 each, Rfl: 3    dilTIAZem (CARDIZEM CD) 120 MG extended release capsule, Take 1 capsule by mouth daily, Disp: 30 capsule, Rfl: 5    ipratropium-albuterol (DUONEB) 0.5-2.5 (3) MG/3ML SOLN nebulizer solution, Inhale 3 mLs into the lungs every 4 hours (while awake), Disp: 360 mL, Rfl: 0    Cholecalciferol (VITAMIN D3) 50 MCG ( UT) CAPS, Take 1 capsule by mouth daily, Disp: , Rfl:     vitamin C (ASCORBIC ACID) 500 MG tablet, Take 500 mg by mouth daily, Disp: , Rfl:   Allergies   Allergen Reactions    Pcn [Penicillins] Other (See Comments)     Social History     Socioeconomic History    Marital status:      Spouse name: Not on file    Number of children: Not on file    Years of education: Not on file    Highest education level: Not on file   Occupational History    Not on file   Tobacco Use    Smoking status: Some Days     Packs/day: 0.20     Years: 39.00     Pack years: 7.80     Types: Cigarettes     Start date:      Last attempt to quit: 2022     Years since quittin.2    Smokeless tobacco: Never    Tobacco comments:     1 cig a few times weekly.  trying to quit (2023)   Vaping Use    Vaping Use: Never used   Substance and Sexual Activity    Alcohol use: Not Currently    Drug use: Not Currently    Sexual activity: Not Currently   Other Topics Concern    Not on file   Social History Narrative    1 cup coffee daily             Established  after not being seen since     Smoker  quit       COPD sees Dr. 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---quit    Admit  with copd exac -----------------------------------------------------------------  dr Yoseph Boateng puldavin    abnormal EKG  in the hospital referred to cardiology--------dr Garcia    Early hyperthyroid 8-22  refer to endo    Vit d  defic   8-22    Vit b 12 defic   start shtos  8-22    Ct    abd pelvis     8-22 mass left lung base and advsied  pet scan--- and pulm consult    Pos cologuard  8-22  referred to dr Rodrick Arenas but pt refused to go    Pt evicted from apartment-----failed to see specialist    Admit December 5, 2022 with exacerbation of COPD,   stress test was positive atrial fibrillation started Eliquskyler Garcia     then on December 12 with influenza A in December 16 for constipation    Will discharge again 1218 with right lower lobe pneumonia COPD    Pulmonary evaluation Dr. Yoseph Retana 1-5-2023    Vit b  12  defic  1-23     Social Determinants of Health     Financial Resource Strain: Not on file   Food Insecurity: Not on file   Transportation Needs: Not on file   Physical Activity: Inactive    Days of Exercise per Week: 0 days    Minutes of Exercise per Session: 0 min   Stress: Not on file   Social Connections: Not on file   Intimate Partner Violence: Not on file   Housing Stability: Not on file      Past Medical History:   Diagnosis Date    Acute exacerbation of chronic obstructive pulmonary disease (COPD) ([address] 7/14/2022    Acute respiratory failure ([address] 11/27/2022    Acute respiratory failure with hypoxia ([address] 6/27/2022    Acute respiratory failure with hypoxia ([address] 11/27/2022    Chronic obstructive pulmonary disease ([address] 12/15/2022    COPD (chronic obstructive pulmonary disease) ([address]     COPD exacerbation ([address] 7/14/2022    COPD exacerbation ([address] 7/14/2022    COPD with acute exacerbation ([address]     COPD with acute exacerbation ([address]     COVID-19 7/16/2022    Hypertension     Pneumonia due to infectious organism     Uncontrolled hypertension      Family History   Problem Relation Age of Onset    Heart Disease Mother     Dementia Father     Diabetes Maternal Grandmother       Past Surgical History:   Procedure Laterality Date    BACK SURGERY      LEFT OOPHORECTOMY        Vitals:    03/03/23 0824   BP: 128/62   Pulse: 84   Temp: 98.6 °F (37 °C)   SpO2: 98%  Comment: 3L O2   Weight: 93 lb 6.4 oz (42.4 kg)   Height: 5' 8\" (1.727 m)       Objective:    Physical Exam  Vitals reviewed. Constitutional:       Appearance: Normal appearance. She is well-developed. HENT:      Head: Normocephalic. Right Ear: Tympanic membrane normal.      Left Ear: Tympanic membrane normal.      Nose: Nose normal.      Mouth/Throat:      Mouth: Mucous membranes are moist.   Eyes:      Pupils: Pupils are equal, round, and reactive to light. Cardiovascular:      Rate and Rhythm: Normal rate and regular rhythm. Pulmonary:      Effort: Pulmonary effort is normal. No respiratory distress. Breath sounds: No stridor. Wheezing present. No rhonchi or rales. Chest:      Chest wall: No tenderness. Abdominal:      General: Bowel sounds are normal.      Palpations: Abdomen is soft. Musculoskeletal:         General: Normal range of motion. Cervical back: Normal range of motion. Skin:     General: Skin is warm. Neurological:      Mental Status: She is alert and oriented to person, place, and time. Psychiatric:         Behavior: Behavior normal.       Cleveland Mahajan was seen today for other. Diagnoses and all orders for this visit:    Acute bronchitis due to other specified organisms  -     doxycycline hyclate (VIBRA-TABS) 100 MG tablet; Take 1 tablet by mouth 2 times daily for 10 days  -     promethazine-dextromethorphan (PROMETHAZINE-DM) 6.25-15 MG/5ML syrup; Take 5 mLs by mouth 4 times daily as needed for Cough  -     methylPREDNISolone acetate (DEPO-MEDROL) injection 40 mg    COPD without exacerbation (HCC)  -     doxycycline hyclate (VIBRA-TABS) 100 MG tablet; Take 1 tablet by mouth 2 times daily for 10 days  -     promethazine-dextromethorphan (PROMETHAZINE-DM) 6.25-15 MG/5ML syrup;  Take 5 mLs by mouth 4 times daily as needed for Cough  -     methylPREDNISolone acetate (DEPO-MEDROL) injection 40 mg      Comments: meds not great see  I educated the patient about all medications. Make sure they were correct and educated  on the risk associated with missing meds or taking them incorrectly. A list of medications is being sent home with patient today. A great deal of time spent reviewing medications, diet, exercise, social issues. Also reviewing the chart before entering the room with patient and finishing charting after leaving patient's room. More than half of that time was spent face to face with the patient in counseling and coordinating care. I informed patient about the risk associated with noncompliance of medication and taking medications incorrectly. Appropriate follow-up with myself and all specialist.  Encourage family members to take active role in assisting with medications and medical care. If any confusion should develop to notify my office immediately to avoid risk of worsening medical condition          Follow Up: No follow-ups on file.      Seen by:  Jesus Goldman DO

## 2023-03-08 ENCOUNTER — TELEPHONE (OUTPATIENT)
Dept: PRIMARY CARE CLINIC | Age: 68
End: 2023-03-08

## 2023-03-08 NOTE — TELEPHONE ENCOUNTER
----- Message from Jefrychristopher Claudio sent at 3/8/2023 10:54 AM EST -----  Subject: Message to Provider    QUESTIONS  Information for Provider? Subjective? do i need to continue with the PFT   on Friday 3/10/23 I am still not feeling that great.  ---------------------------------------------------------------------------  --------------  Otto Valdovinos INFO  [phone]; OK to leave message on voicemail  ---------------------------------------------------------------------------  --------------  SCRIPT ANSWERS  Relationship to Patient?  Self

## 2023-03-10 ENCOUNTER — HOSPITAL ENCOUNTER (OUTPATIENT)
Dept: PULMONOLOGY | Age: 68
Discharge: HOME | End: 2023-03-10
Payer: MEDICARE

## 2023-03-10 DIAGNOSIS — J44.9 CHRONIC OBSTRUCTIVE PULMONARY DISEASE, UNSPECIFIED COPD TYPE (HCC): ICD-10-CM

## 2023-03-10 PROCEDURE — 94060 EVALUATION OF WHEEZING: CPT

## 2023-03-10 PROCEDURE — 94726 PLETHYSMOGRAPHY LUNG VOLUMES: CPT

## 2023-03-10 PROCEDURE — 94729 DIFFUSING CAPACITY: CPT

## 2023-03-21 ENCOUNTER — TELEPHONE (OUTPATIENT)
Dept: PRIMARY CARE CLINIC | Age: 68
End: 2023-03-21

## 2023-03-21 NOTE — TELEPHONE ENCOUNTER
Patient having upper GI on Monday. Was advised to stop Eliquis 2 days prior. Patient will be missing 4 doses and she takes it twice a day.  Concerned about stopping it and asking it is okay to do so

## 2023-04-21 ENCOUNTER — TELEPHONE (OUTPATIENT)
Dept: PRIMARY CARE CLINIC | Age: 68
End: 2023-04-21

## 2023-04-21 DIAGNOSIS — F41.9 ANXIETY: Primary | ICD-10-CM

## 2023-04-21 NOTE — TELEPHONE ENCOUNTER
Patient calling. Brother just passed away a couple of says ago. On Buspar, but doesn't seem to be helping, says he passed away unexpectedly. Asking if you can prescribe something for her anxiety that is not strong to help her get through this week.

## 2023-04-21 NOTE — TELEPHONE ENCOUNTER
The pt is calling again to see if something can be sent over to the pharmacy for her anxiety from losing her brother

## 2023-04-22 RX ORDER — CLONAZEPAM 0.5 MG/1
0.25 TABLET ORAL 2 TIMES DAILY PRN
Qty: 10 TABLET | Refills: 0 | Status: SHIPPED | OUTPATIENT
Start: 2023-04-22 | End: 2023-05-11

## 2023-05-10 ENCOUNTER — OFFICE VISIT (OUTPATIENT)
Dept: PRIMARY CARE CLINIC | Age: 68
End: 2023-05-10

## 2023-05-10 VITALS
HEART RATE: 82 BPM | TEMPERATURE: 98.4 F | OXYGEN SATURATION: 92 % | WEIGHT: 90.2 LBS | HEIGHT: 68 IN | BODY MASS INDEX: 13.67 KG/M2 | SYSTOLIC BLOOD PRESSURE: 135 MMHG | DIASTOLIC BLOOD PRESSURE: 78 MMHG

## 2023-05-10 DIAGNOSIS — R91.8 MULTIPLE NODULES OF LUNG: Chronic | ICD-10-CM

## 2023-05-10 DIAGNOSIS — E11.9 DIET-CONTROLLED DIABETES MELLITUS (HCC): Chronic | ICD-10-CM

## 2023-05-10 DIAGNOSIS — M81.0 AGE-RELATED OSTEOPOROSIS WITHOUT CURRENT PATHOLOGICAL FRACTURE: ICD-10-CM

## 2023-05-10 DIAGNOSIS — I10 PRIMARY HYPERTENSION: ICD-10-CM

## 2023-05-10 DIAGNOSIS — F51.01 PRIMARY INSOMNIA: ICD-10-CM

## 2023-05-10 DIAGNOSIS — E05.90 HYPERTHYROIDISM: ICD-10-CM

## 2023-05-10 DIAGNOSIS — J43.8 OTHER EMPHYSEMA (HCC): Primary | ICD-10-CM

## 2023-05-10 LAB
ALBUMIN SERPL-MCNC: 4 G/DL (ref 3.5–5.2)
ALP SERPL-CCNC: 77 U/L (ref 35–104)
ALT SERPL-CCNC: 22 U/L (ref 0–32)
ANION GAP SERPL CALCULATED.3IONS-SCNC: 9 MMOL/L (ref 7–16)
ANISOCYTOSIS: ABNORMAL
AST SERPL-CCNC: 32 U/L (ref 0–31)
BACTERIA URNS QL MICRO: ABNORMAL /HPF
BASOPHILS # BLD: 0.07 E9/L (ref 0–0.2)
BASOPHILS NFR BLD: 0.9 % (ref 0–2)
BILIRUB SERPL-MCNC: 0.2 MG/DL (ref 0–1.2)
BILIRUB UR QL STRIP: NEGATIVE
BUN SERPL-MCNC: 11 MG/DL (ref 6–23)
CALCIUM SERPL-MCNC: 9.8 MG/DL (ref 8.6–10.2)
CHLORIDE SERPL-SCNC: 93 MMOL/L (ref 98–107)
CHOLESTEROL, TOTAL: 226 MG/DL (ref 0–199)
CLARITY UR: ABNORMAL
CO2 SERPL-SCNC: 38 MMOL/L (ref 22–29)
COLOR UR: YELLOW
CREAT SERPL-MCNC: 0.5 MG/DL (ref 0.5–1)
EOSINOPHIL # BLD: 0.07 E9/L (ref 0.05–0.5)
EOSINOPHIL NFR BLD: 0.9 % (ref 0–6)
EPI CELLS #/AREA URNS HPF: ABNORMAL /HPF
ERYTHROCYTE [DISTWIDTH] IN BLOOD BY AUTOMATED COUNT: 17.4 FL (ref 11.5–15)
GLUCOSE SERPL-MCNC: 128 MG/DL (ref 74–99)
GLUCOSE UR STRIP-MCNC: NEGATIVE MG/DL
HBA1C MFR BLD: 5.5 % (ref 4–5.6)
HCT VFR BLD AUTO: 34 % (ref 34–48)
HDLC SERPL-MCNC: 104 MG/DL
HGB BLD-MCNC: 9.7 G/DL (ref 11.5–15.5)
HGB UR QL STRIP: ABNORMAL
HYPOCHROMIA: ABNORMAL
KETONES UR STRIP-MCNC: NEGATIVE MG/DL
LDLC SERPL CALC-MCNC: 108 MG/DL (ref 0–99)
LEUKOCYTE ESTERASE UR QL STRIP: NEGATIVE
LYMPHOCYTES # BLD: 1.39 E9/L (ref 1.5–4)
LYMPHOCYTES NFR BLD: 19.1 % (ref 20–42)
MCH RBC QN AUTO: 25.9 PG (ref 26–35)
MCHC RBC AUTO-ENTMCNC: 28.5 % (ref 32–34.5)
MCV RBC AUTO: 90.9 FL (ref 80–99.9)
MONOCYTES # BLD: 0.29 E9/L (ref 0.1–0.95)
MONOCYTES NFR BLD: 3.5 % (ref 2–12)
NEUTROPHILS # BLD: 5.55 E9/L (ref 1.8–7.3)
NEUTS SEG NFR BLD: 75.6 % (ref 43–80)
NITRITE UR QL STRIP: NEGATIVE
OVALOCYTES: ABNORMAL
PH UR STRIP: 7 [PH] (ref 5–9)
PLATELET # BLD AUTO: 261 E9/L (ref 130–450)
PMV BLD AUTO: 10.1 FL (ref 7–12)
POLYCHROMASIA: ABNORMAL
POTASSIUM SERPL-SCNC: 4.1 MMOL/L (ref 3.5–5)
PROT SERPL-MCNC: 7.2 G/DL (ref 6.4–8.3)
PROT UR STRIP-MCNC: 30 MG/DL
RBC # BLD AUTO: 3.74 E12/L (ref 3.5–5.5)
RBC #/AREA URNS HPF: >20 /HPF (ref 0–2)
SODIUM SERPL-SCNC: 140 MMOL/L (ref 132–146)
SP GR UR STRIP: 1.01 (ref 1–1.03)
STOMATOCYTES: ABNORMAL
T4 SERPL-MCNC: 7.4 MCG/DL (ref 4.5–11.7)
TARGET CELLS: ABNORMAL
TRIGL SERPL-MCNC: 69 MG/DL (ref 0–149)
TSH SERPL-MCNC: <0.01 UIU/ML (ref 0.27–4.2)
UROBILINOGEN UR STRIP-ACNC: 0.2 E.U./DL
VITAMIN D 25-HYDROXY: 27 NG/ML (ref 30–100)
VLDLC SERPL CALC-MCNC: 14 MG/DL
WBC # BLD: 7.3 E9/L (ref 4.5–11.5)
WBC #/AREA URNS HPF: ABNORMAL /HPF (ref 0–5)

## 2023-05-10 RX ORDER — TRAZODONE HYDROCHLORIDE 50 MG/1
50 TABLET ORAL NIGHTLY PRN
Qty: 30 TABLET | Refills: 1 | Status: SHIPPED | OUTPATIENT
Start: 2023-05-10

## 2023-05-10 ASSESSMENT — PATIENT HEALTH QUESTIONNAIRE - PHQ9
SUM OF ALL RESPONSES TO PHQ9 QUESTIONS 1 & 2: 0
SUM OF ALL RESPONSES TO PHQ QUESTIONS 1-9: 0
SUM OF ALL RESPONSES TO PHQ QUESTIONS 1-9: 0
2. FEELING DOWN, DEPRESSED OR HOPELESS: 0
SUM OF ALL RESPONSES TO PHQ QUESTIONS 1-9: 0
1. LITTLE INTEREST OR PLEASURE IN DOING THINGS: 0
SUM OF ALL RESPONSES TO PHQ QUESTIONS 1-9: 0

## 2023-05-10 ASSESSMENT — ENCOUNTER SYMPTOMS
GASTROINTESTINAL NEGATIVE: 1
ALLERGIC/IMMUNOLOGIC NEGATIVE: 1
SHORTNESS OF BREATH: 1
EYES NEGATIVE: 1

## 2023-05-10 NOTE — PROGRESS NOTES
current pathological fracture  -     Vitamin D 25 Hydroxy; Future    Primary insomnia  -     traZODone (DESYREL) 50 MG tablet; Take 1 tablet by mouth nightly as needed for Sleep        Comments: alec hooks isuses    dc smoking  fuwith pulm lab today    icn florence Mahoney for sleep        I educated the patient about all medications. Make sure they were correct and educated  on the risk associated with missing meds or taking them incorrectly. A list of medications is being sent home with patient today. Check blood pressure at home twice a day. Low-salt low caffeine diet. Call if systolic blood pressure is above 396 and diastolic blood pressures above 85. Only use a upper arm digital cuff. A great deal of time spent reviewing medications, diet, exercise, social issues. Also reviewing the chart before entering the room with patient and finishing charting after leaving patient's room. More than half of that time was spent face to face with the patient in counseling and coordinating care. Aggressive low-fat diet. Avoid red meats, greasy fried foods, dairy products. Avoid processed foods. Take cholesterol medications without food. I informed patient about the risk associated with noncompliance of medication and taking medications incorrectly. Appropriate follow-up with myself and all specialist.  Encourage family members to take active role in assisting with medications and medical care. If any confusion should develop to notify my office immediately to avoid risk of worsening medical condition      Follow Up: Return in about 4 months (around 9/10/2023).      Seen by:  George Whittaker DO

## 2023-05-11 ENCOUNTER — TELEPHONE (OUTPATIENT)
Dept: PRIMARY CARE CLINIC | Age: 68
End: 2023-05-11

## 2023-05-11 NOTE — TELEPHONE ENCOUNTER
Notify patient labs show few things I need to review with her in person. Her blood counts down a little bit. Have her see me in the next week.

## 2023-05-17 ENCOUNTER — OFFICE VISIT (OUTPATIENT)
Dept: PRIMARY CARE CLINIC | Age: 68
End: 2023-05-17

## 2023-05-17 VITALS
WEIGHT: 93 LBS | HEIGHT: 68 IN | SYSTOLIC BLOOD PRESSURE: 132 MMHG | DIASTOLIC BLOOD PRESSURE: 78 MMHG | TEMPERATURE: 97.7 F | BODY MASS INDEX: 14.09 KG/M2

## 2023-05-17 DIAGNOSIS — R63.4 UNEXPLAINED WEIGHT LOSS: Chronic | ICD-10-CM

## 2023-05-17 DIAGNOSIS — D50.8 OTHER IRON DEFICIENCY ANEMIA: Primary | ICD-10-CM

## 2023-05-17 ASSESSMENT — ENCOUNTER SYMPTOMS
GASTROINTESTINAL NEGATIVE: 1
RESPIRATORY NEGATIVE: 1
EYES NEGATIVE: 1
ALLERGIC/IMMUNOLOGIC NEGATIVE: 1

## 2023-05-17 NOTE — PROGRESS NOTES
into the lungs every 4 hours (while awake), Disp: 360 mL, Rfl: 0    Cholecalciferol (VITAMIN D3) 50 MCG ( UT) CAPS, Take 1 capsule by mouth daily, Disp: , Rfl:     vitamin C (ASCORBIC ACID) 500 MG tablet, Take 1 tablet by mouth daily, Disp: , Rfl:   Allergies   Allergen Reactions    Pcn [Penicillins] Other (See Comments)     Social History     Socioeconomic History    Marital status:      Spouse name: Not on file    Number of children: Not on file    Years of education: Not on file    Highest education level: Not on file   Occupational History    Not on file   Tobacco Use    Smoking status: Some Days     Packs/day: 0.20     Years: 39.00     Pack years: 7.80     Types: Cigarettes     Start date:      Last attempt to quit: 2022     Years since quittin.4    Smokeless tobacco: Never    Tobacco comments:     1 cig a few times weekly.  trying to quit (2023)   Vaping Use    Vaping Use: Never used   Substance and Sexual Activity    Alcohol use: Not Currently    Drug use: Not Currently    Sexual activity: Not Currently   Other Topics Concern    Not on file   Social History Narrative    1 cup coffee daily             Established  after not being seen since     Smoker  quit       COPD sees Dr. 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

## 2023-05-24 ENCOUNTER — OFFICE VISIT (OUTPATIENT)
Dept: PRIMARY CARE CLINIC | Age: 68
End: 2023-05-24
Payer: MEDICARE

## 2023-05-24 ENCOUNTER — TELEPHONE (OUTPATIENT)
Dept: PRIMARY CARE CLINIC | Age: 68
End: 2023-05-24

## 2023-05-24 VITALS
SYSTOLIC BLOOD PRESSURE: 128 MMHG | DIASTOLIC BLOOD PRESSURE: 78 MMHG | BODY MASS INDEX: 14.19 KG/M2 | HEIGHT: 68 IN | HEART RATE: 90 BPM | OXYGEN SATURATION: 87 % | WEIGHT: 93.6 LBS | TEMPERATURE: 98.6 F

## 2023-05-24 DIAGNOSIS — J20.8 ACUTE BRONCHITIS DUE TO OTHER SPECIFIED ORGANISMS: Primary | ICD-10-CM

## 2023-05-24 DIAGNOSIS — R35.0 URINE FREQUENCY: ICD-10-CM

## 2023-05-24 DIAGNOSIS — J01.80 ACUTE NON-RECURRENT SINUSITIS OF OTHER SINUS: ICD-10-CM

## 2023-05-24 DIAGNOSIS — R20.0 NUMBNESS AND TINGLING OF BOTH LOWER EXTREMITIES: ICD-10-CM

## 2023-05-24 DIAGNOSIS — R20.2 NUMBNESS AND TINGLING OF BOTH LOWER EXTREMITIES: ICD-10-CM

## 2023-05-24 PROCEDURE — 4004F PT TOBACCO SCREEN RCVD TLK: CPT | Performed by: FAMILY MEDICINE

## 2023-05-24 PROCEDURE — G8428 CUR MEDS NOT DOCUMENT: HCPCS | Performed by: FAMILY MEDICINE

## 2023-05-24 PROCEDURE — G8400 PT W/DXA NO RESULTS DOC: HCPCS | Performed by: FAMILY MEDICINE

## 2023-05-24 PROCEDURE — 96372 THER/PROPH/DIAG INJ SC/IM: CPT | Performed by: FAMILY MEDICINE

## 2023-05-24 PROCEDURE — 3074F SYST BP LT 130 MM HG: CPT | Performed by: FAMILY MEDICINE

## 2023-05-24 PROCEDURE — 3078F DIAST BP <80 MM HG: CPT | Performed by: FAMILY MEDICINE

## 2023-05-24 PROCEDURE — 1090F PRES/ABSN URINE INCON ASSESS: CPT | Performed by: FAMILY MEDICINE

## 2023-05-24 PROCEDURE — 3017F COLORECTAL CA SCREEN DOC REV: CPT | Performed by: FAMILY MEDICINE

## 2023-05-24 PROCEDURE — 1123F ACP DISCUSS/DSCN MKR DOCD: CPT | Performed by: FAMILY MEDICINE

## 2023-05-24 PROCEDURE — G8419 CALC BMI OUT NRM PARAM NOF/U: HCPCS | Performed by: FAMILY MEDICINE

## 2023-05-24 PROCEDURE — 99214 OFFICE O/P EST MOD 30 MIN: CPT | Performed by: FAMILY MEDICINE

## 2023-05-24 RX ORDER — DOXYCYCLINE HYCLATE 100 MG
100 TABLET ORAL 2 TIMES DAILY
Qty: 20 TABLET | Refills: 0 | Status: SHIPPED | OUTPATIENT
Start: 2023-05-24 | End: 2023-06-03

## 2023-05-24 RX ORDER — PREDNISONE 10 MG/1
10 TABLET ORAL
Qty: 15 TABLET | Refills: 0 | Status: SHIPPED | OUTPATIENT
Start: 2023-05-24 | End: 2023-05-29

## 2023-05-24 RX ORDER — METHYLPREDNISOLONE ACETATE 80 MG/ML
40 INJECTION, SUSPENSION INTRA-ARTICULAR; INTRALESIONAL; INTRAMUSCULAR; SOFT TISSUE ONCE
Status: COMPLETED | OUTPATIENT
Start: 2023-05-24 | End: 2023-05-24

## 2023-05-24 RX ADMIN — METHYLPREDNISOLONE ACETATE 40 MG: 80 INJECTION, SUSPENSION INTRA-ARTICULAR; INTRALESIONAL; INTRAMUSCULAR; SOFT TISSUE at 15:04

## 2023-05-24 ASSESSMENT — ENCOUNTER SYMPTOMS
RHINORRHEA: 1
COUGH: 1
GASTROINTESTINAL NEGATIVE: 1
EYES NEGATIVE: 1
ALLERGIC/IMMUNOLOGIC NEGATIVE: 1

## 2023-05-24 NOTE — PROGRESS NOTES
23  Name: Anupam Thompson    : 1955    Sex: female    Age: 77 y.o. Subjective:  Chief Complaint: Patient is here for  cough adamaris  sinus mucus   tinge both feet     cough  three days  no  tmep  chills  no cp  no icn  sob    Feet numb nd tingle  sicne nov--no  painmore at hs    Urine  freq for months      Review of Systems   Constitutional: Negative. HENT:  Positive for rhinorrhea. Eyes: Negative. Respiratory:  Positive for cough. Cardiovascular: Negative. Gastrointestinal: Negative. Endocrine: Negative. Genitourinary: Negative. Musculoskeletal: Negative. Skin: Negative. Allergic/Immunologic: Negative. Neurological:  Positive for numbness. Hematological: Negative. Psychiatric/Behavioral: Negative. Current Outpatient Medications:     doxycycline hyclate (VIBRA-TABS) 100 MG tablet, Take 1 tablet by mouth 2 times daily for 10 days, Disp: 20 tablet, Rfl: 0    predniSONE (DELTASONE) 10 MG tablet, Take 1 tablet by mouth 3 times daily (with meals) for 5 days, Disp: 15 tablet, Rfl: 0    Budeson-Glycopyrrol-Formoterol (BREZTRI AEROSPHERE) 160-9-4.8 MCG/ACT AERO, Inhale 2 puffs into the lungs 2 times daily, Disp: 1 each, Rfl: 0    traZODone (DESYREL) 50 MG tablet, Take 1 tablet by mouth nightly as needed for Sleep, Disp: 30 tablet, Rfl: 1    clonazePAM (KLONOPIN) 0.5 MG tablet, Take 0.5 tablets by mouth 2 times daily as needed for Anxiety for up to 19 days.  Max Daily Amount: 0.5 mg, Disp: 10 tablet, Rfl: 0    benzonatate (TESSALON) 100 MG capsule, Take 1 capsule by mouth 3 times daily as needed for Cough, Disp: 60 capsule, Rfl: 2    busPIRone (BUSPAR) 10 MG tablet, Take 1 tablet by mouth 3 times daily, Disp: 270 tablet, Rfl: 1    nicotine (NICODERM CQ) 21 MG/24HR, Place 1 patch onto the skin every 24 hours, Disp: 30 patch, Rfl: 3    OXYGEN, Inhale 2 L into the lungs as needed, Disp: , Rfl:     apixaban (ELIQUIS) 5 MG TABS tablet, Take 1 tablet by mouth 2 times

## 2023-05-24 NOTE — TELEPHONE ENCOUNTER
Send note to  [facility] in  [facility] that pt need to  be on  oxygen at  3 liters from now on

## 2023-05-24 NOTE — TELEPHONE ENCOUNTER
Note sent to [facility] increasing oxygen to 3 liters from now on

## 2023-06-09 ENCOUNTER — HOSPITAL ENCOUNTER (OUTPATIENT)
Dept: INFUSION THERAPY | Age: 68
Discharge: HOME | End: 2023-06-09
Payer: MEDICARE

## 2023-06-09 ENCOUNTER — OFFICE VISIT (OUTPATIENT)
Dept: ONCOLOGY | Age: 68
End: 2023-06-09
Payer: MEDICARE

## 2023-06-09 VITALS
DIASTOLIC BLOOD PRESSURE: 86 MMHG | HEIGHT: 68 IN | OXYGEN SATURATION: 91 % | WEIGHT: 90.5 LBS | HEART RATE: 94 BPM | BODY MASS INDEX: 13.72 KG/M2 | SYSTOLIC BLOOD PRESSURE: 200 MMHG | TEMPERATURE: 98.1 F

## 2023-06-09 DIAGNOSIS — D64.9 ANEMIA, UNSPECIFIED TYPE: ICD-10-CM

## 2023-06-09 DIAGNOSIS — R63.4 UNEXPLAINED WEIGHT LOSS: ICD-10-CM

## 2023-06-09 DIAGNOSIS — E05.90 HYPERTHYROIDISM: Primary | ICD-10-CM

## 2023-06-09 LAB
ANISOCYTOSIS: ABNORMAL
BASOPHILS # BLD: 0.07 E9/L (ref 0–0.2)
BASOPHILS # BLD: 0.09 E9/L (ref 0–0.2)
BASOPHILS NFR BLD: 0.8 % (ref 0–2)
BASOPHILS NFR BLD: 1.1 % (ref 0–2)
DAT POLY-SP REAG RBC QL: NORMAL
EOSINOPHIL # BLD: 0.09 E9/L (ref 0.05–0.5)
EOSINOPHIL # BLD: 0.11 E9/L (ref 0.05–0.5)
EOSINOPHIL NFR BLD: 1.1 % (ref 0–6)
EOSINOPHIL NFR BLD: 1.3 % (ref 0–6)
ERYTHROCYTE [DISTWIDTH] IN BLOOD BY AUTOMATED COUNT: 15.8 FL (ref 11.5–15)
ERYTHROCYTE [DISTWIDTH] IN BLOOD BY AUTOMATED COUNT: 15.9 FL (ref 11.5–15)
FERRITIN SERPL-MCNC: 18 NG/ML
FOLATE SERPL-MCNC: >20 NG/ML (ref 4.8–24.2)
HCT VFR BLD AUTO: 35.2 % (ref 34–48)
HCT VFR BLD AUTO: 35.2 % (ref 34–48)
HGB BLD-MCNC: 10.3 G/DL (ref 11.5–15.5)
HGB BLD-MCNC: 9.9 G/DL (ref 11.5–15.5)
HYPOCHROMIA: ABNORMAL
IMM GRANULOCYTES # BLD: 0.03 E9/L
IMM GRANULOCYTES # BLD: 0.04 E9/L
IMM GRANULOCYTES NFR BLD: 0.4 % (ref 0–5)
IMM GRANULOCYTES NFR BLD: 0.5 % (ref 0–5)
IMMATURE RETIC FRACT: 11.8 % (ref 3–15.9)
IRON SATN MFR SERPL: 8 % (ref 15–50)
IRON SERPL-MCNC: 30 MCG/DL (ref 37–145)
LYMPHOCYTES # BLD: 1.44 E9/L (ref 1.5–4)
LYMPHOCYTES # BLD: 1.44 E9/L (ref 1.5–4)
LYMPHOCYTES NFR BLD: 17.2 % (ref 20–42)
LYMPHOCYTES NFR BLD: 17.5 % (ref 20–42)
MCH RBC QN AUTO: 26.1 PG (ref 26–35)
MCH RBC QN AUTO: 27 PG (ref 26–35)
MCHC RBC AUTO-ENTMCNC: 28.1 % (ref 32–34.5)
MCHC RBC AUTO-ENTMCNC: 29.3 % (ref 32–34.5)
MCV RBC AUTO: 92.1 FL (ref 80–99.9)
MCV RBC AUTO: 92.6 FL (ref 80–99.9)
MONOCYTES # BLD: 0.48 E9/L (ref 0.1–0.95)
MONOCYTES # BLD: 0.52 E9/L (ref 0.1–0.95)
MONOCYTES NFR BLD: 5.7 % (ref 2–12)
MONOCYTES NFR BLD: 6.3 % (ref 2–12)
NEUTROPHILS # BLD: 6.08 E9/L (ref 1.8–7.3)
NEUTROPHILS # BLD: 6.23 E9/L (ref 1.8–7.3)
NEUTS SEG NFR BLD: 73.6 % (ref 43–80)
NEUTS SEG NFR BLD: 74.5 % (ref 43–80)
OVALOCYTES: ABNORMAL
PLATELET # BLD AUTO: 309 E9/L (ref 130–450)
PLATELET # BLD AUTO: 322 E9/L (ref 130–450)
PMV BLD AUTO: 9 FL (ref 7–12)
PMV BLD AUTO: 9.2 FL (ref 7–12)
POIKILOCYTES: ABNORMAL
POLYCHROMASIA: ABNORMAL
RBC # BLD AUTO: 3.8 E12/L (ref 3.5–5.5)
RBC # BLD AUTO: 3.82 E12/L (ref 3.5–5.5)
RETIC HGB EQUIVALENT: 25.8 PG (ref 28.2–36.6)
RETICS/RBC NFR: 1.1 % (ref 0.4–1.9)
RETICULOCYTE ABSOLUTE COUNT: 0.04 E12/L
SPHEROCYTES: ABNORMAL
TARGET CELLS: ABNORMAL
TIBC SERPL-MCNC: 370 MCG/DL (ref 250–450)
VIT B12 SERPL-MCNC: 1251 PG/ML (ref 211–946)
WBC # BLD: 8.3 E9/L (ref 4.5–11.5)
WBC # BLD: 8.4 E9/L (ref 4.5–11.5)

## 2023-06-09 PROCEDURE — G8419 CALC BMI OUT NRM PARAM NOF/U: HCPCS | Performed by: INTERNAL MEDICINE

## 2023-06-09 PROCEDURE — 36415 COLL VENOUS BLD VENIPUNCTURE: CPT

## 2023-06-09 PROCEDURE — 4004F PT TOBACCO SCREEN RCVD TLK: CPT | Performed by: INTERNAL MEDICINE

## 2023-06-09 PROCEDURE — 1090F PRES/ABSN URINE INCON ASSESS: CPT | Performed by: INTERNAL MEDICINE

## 2023-06-09 PROCEDURE — 99205 OFFICE O/P NEW HI 60 MIN: CPT | Performed by: INTERNAL MEDICINE

## 2023-06-09 PROCEDURE — 3078F DIAST BP <80 MM HG: CPT | Performed by: INTERNAL MEDICINE

## 2023-06-09 PROCEDURE — G8400 PT W/DXA NO RESULTS DOC: HCPCS | Performed by: INTERNAL MEDICINE

## 2023-06-09 PROCEDURE — 82274 ASSAY TEST FOR BLOOD FECAL: CPT | Performed by: INTERNAL MEDICINE

## 2023-06-09 PROCEDURE — 1123F ACP DISCUSS/DSCN MKR DOCD: CPT | Performed by: INTERNAL MEDICINE

## 2023-06-09 PROCEDURE — G8427 DOCREV CUR MEDS BY ELIG CLIN: HCPCS | Performed by: INTERNAL MEDICINE

## 2023-06-09 PROCEDURE — 3074F SYST BP LT 130 MM HG: CPT | Performed by: INTERNAL MEDICINE

## 2023-06-09 PROCEDURE — 3017F COLORECTAL CA SCREEN DOC REV: CPT | Performed by: INTERNAL MEDICINE

## 2023-06-09 RX ORDER — CHOLECALCIFEROL (VITAMIN D3) 125 MCG
500 CAPSULE ORAL DAILY
COMMUNITY

## 2023-06-09 NOTE — PROGRESS NOTES
[facility]  [address] [facility] [address]  Dept: [address]: [phone]  Attending Consult Note      Reason for Visit: Anemia and unexplained weight loss. Referring Physician:  Galina Gar DO      PCP:  Galina Gar DO    History of Present Illness:      Mrs. Ramón Macias is a very pleasant 64-year-old lady, with a past medical history significant for A-fib, on chronic anticoagulation with Eliquis COPD, O2 dependent, hypertension, hyperlipidemia, vitamin B12 deficiency, and hyperparathyroidism, who was referred to our office for evaluation of anemia and unexplained weight loss. The patient had lost about 30 pounds over the course of 6 months, in December 2022 her hemoglobin was 10.8-12 g/dl, on 5/10/2023 hemoglobin was 9.7, hematocrit 34, MCV 90.9, normal white count and platelet count, the patient had an EGD done on 3/27/2023, revealing esophagitis and gastritis, she had a colonoscopy done on 5/15/2023, was normal.  CT scan of the abdomen the pelvis from 12/30/2020 was negative for malignancy, chest CTA from 12/15/2022 had revealed right lower lobe pneumonia, she has a follow-up chest CT ordered to be done in August, she follows for with Dr. Luis Alfredo Duran for the COPD. Review of Systems;  CONSTITUTIONAL: No fever, chills. Good appetite, pos for fatigue, and weight loss about 30 lbs over the course of 6 months. ENMT: Eyes: No diplopia; Nose: No epistaxis. Mouth: No sore throat. RESPIRATORY: No hemoptysis, pos for shortness of breath, and cough. CARDIOVASCULAR: No chest pain, palpitations. GASTROINTESTINAL: No nausea/vomiting, abdominal pain, diarrhea/constipation. GENITOURINARY: No dysuria, urinary frequency, hematuria. NEURO: No syncope, presyncope, headache.   Remainder:  ROS NEGATIVE    Past Medical History:      Diagnosis Date    Acute exacerbation of chronic obstructive pulmonary disease (COPD) ([facility] [address] 7/14/2022    Acute
Dr. Kathi Rivero notifed of abnormal vital signs 200/86 on paper as requested.
Patient provided with discharge instructions, received printed AVS.  All questions answered. Patient understands follow up plan of care.
5 MG TABS tablet, Take 1 tablet by mouth 2 times daily, Disp: 180 tablet, Rfl: 1       Past Medical History:   Diagnosis Date    Acute exacerbation of chronic obstructive pulmonary disease (COPD) ([address] 2022    Acute respiratory failure ([address] 2022    Acute respiratory failure with hypoxia ([address] 2022    Acute respiratory failure with hypoxia (HCC) 2022    Chronic obstructive pulmonary disease ([address] 12/15/2022    COPD (chronic obstructive pulmonary disease) (HCC)     COPD exacerbation ([address] 2022    COPD exacerbation ([address] 2022    COPD with acute exacerbation (HCC)     COPD with acute exacerbation ([address]     COVID-19 2022    Hypertension     Pneumonia due to infectious organism     Uncontrolled hypertension        Past Surgical History:   Procedure Laterality Date    BACK SURGERY      LEFT OOPHORECTOMY         Family History   Problem Relation Age of Onset    Heart Disease Mother     Dementia Father     Diabetes Maternal Grandmother        Social History     Socioeconomic History    Marital status:      Spouse name: Not on file    Number of children: Not on file    Years of education: Not on file    Highest education level: Not on file   Occupational History    Not on file   Tobacco Use    Smoking status: Some Days     Packs/day: 0.20     Years: 39.00     Pack years: 7.80     Types: Cigarettes     Start date:      Last attempt to quit: 2022     Years since quittin.5    Smokeless tobacco: Never    Tobacco comments:     1 cig a few times weekly.  trying to quit (2023)   Vaping Use    Vaping Use: Never used   Substance and Sexual Activity    Alcohol use: Not Currently    Drug use: Not Currently    Sexual activity: Not Currently   Other Topics Concern    Not on file   Social History Narrative    1 cup coffee daily             Established  after not being seen since     Smoker  quit       COPD sees Dr. Pipe Bergman    Hypertension

## 2023-06-11 LAB — EPO SERPL-ACNC: 28 MU/ML (ref 4–27)

## 2023-06-26 ENCOUNTER — TELEPHONE (OUTPATIENT)
Dept: PRIMARY CARE CLINIC | Age: 68
End: 2023-06-26

## 2023-06-26 ENCOUNTER — OFFICE VISIT (OUTPATIENT)
Dept: PRIMARY CARE CLINIC | Age: 68
End: 2023-06-26
Payer: MEDICARE

## 2023-06-26 VITALS
WEIGHT: 87 LBS | SYSTOLIC BLOOD PRESSURE: 126 MMHG | TEMPERATURE: 98.6 F | DIASTOLIC BLOOD PRESSURE: 74 MMHG | BODY MASS INDEX: 13.23 KG/M2

## 2023-06-26 DIAGNOSIS — E05.90 HYPERTHYROIDISM: Primary | ICD-10-CM

## 2023-06-26 DIAGNOSIS — F41.9 ANXIETY: ICD-10-CM

## 2023-06-26 DIAGNOSIS — E44.1 MILD PROTEIN-CALORIE MALNUTRITION (HCC): ICD-10-CM

## 2023-06-26 DIAGNOSIS — J43.8 OTHER EMPHYSEMA (HCC): ICD-10-CM

## 2023-06-26 PROBLEM — E46 PROTEIN CALORIE MALNUTRITION (HCC): Status: ACTIVE | Noted: 2023-06-26

## 2023-06-26 PROCEDURE — 3078F DIAST BP <80 MM HG: CPT | Performed by: FAMILY MEDICINE

## 2023-06-26 PROCEDURE — G8419 CALC BMI OUT NRM PARAM NOF/U: HCPCS | Performed by: FAMILY MEDICINE

## 2023-06-26 PROCEDURE — 99214 OFFICE O/P EST MOD 30 MIN: CPT | Performed by: FAMILY MEDICINE

## 2023-06-26 PROCEDURE — G8428 CUR MEDS NOT DOCUMENT: HCPCS | Performed by: FAMILY MEDICINE

## 2023-06-26 PROCEDURE — 3023F SPIROM DOC REV: CPT | Performed by: FAMILY MEDICINE

## 2023-06-26 PROCEDURE — 3074F SYST BP LT 130 MM HG: CPT | Performed by: FAMILY MEDICINE

## 2023-06-26 PROCEDURE — G8400 PT W/DXA NO RESULTS DOC: HCPCS | Performed by: FAMILY MEDICINE

## 2023-06-26 PROCEDURE — 1090F PRES/ABSN URINE INCON ASSESS: CPT | Performed by: FAMILY MEDICINE

## 2023-06-26 PROCEDURE — 3017F COLORECTAL CA SCREEN DOC REV: CPT | Performed by: FAMILY MEDICINE

## 2023-06-26 PROCEDURE — 1123F ACP DISCUSS/DSCN MKR DOCD: CPT | Performed by: FAMILY MEDICINE

## 2023-06-26 PROCEDURE — 4004F PT TOBACCO SCREEN RCVD TLK: CPT | Performed by: FAMILY MEDICINE

## 2023-06-26 RX ORDER — PAROXETINE HCL 10 MG
10 TABLET ORAL EVERY MORNING
Qty: 30 TABLET | Refills: 3
Start: 2023-06-26

## 2023-06-26 ASSESSMENT — PATIENT HEALTH QUESTIONNAIRE - PHQ9
1. LITTLE INTEREST OR PLEASURE IN DOING THINGS: 1
SUM OF ALL RESPONSES TO PHQ QUESTIONS 1-9: 1
2. FEELING DOWN, DEPRESSED OR HOPELESS: 0
SUM OF ALL RESPONSES TO PHQ QUESTIONS 1-9: 1
SUM OF ALL RESPONSES TO PHQ9 QUESTIONS 1 & 2: 1

## 2023-06-26 ASSESSMENT — ENCOUNTER SYMPTOMS
GASTROINTESTINAL NEGATIVE: 1
RESPIRATORY NEGATIVE: 1
ALLERGIC/IMMUNOLOGIC NEGATIVE: 1
EYES NEGATIVE: 1

## 2023-06-29 ENCOUNTER — HOSPITAL ENCOUNTER (OUTPATIENT)
Dept: NEUROLOGY | Age: 68
Discharge: HOME | End: 2023-06-29
Payer: MEDICARE

## 2023-06-29 ENCOUNTER — TELEPHONE (OUTPATIENT)
Dept: PRIMARY CARE CLINIC | Age: 68
End: 2023-06-29

## 2023-06-29 DIAGNOSIS — R20.0 NUMBNESS AND TINGLING OF BOTH LOWER EXTREMITIES: ICD-10-CM

## 2023-06-29 DIAGNOSIS — R20.2 NUMBNESS AND TINGLING OF BOTH LOWER EXTREMITIES: ICD-10-CM

## 2023-06-29 PROCEDURE — 95911 NRV CNDJ TEST 9-10 STUDIES: CPT | Performed by: PSYCHIATRY & NEUROLOGY

## 2023-06-29 PROCEDURE — 95886 MUSC TEST DONE W/N TEST COMP: CPT | Performed by: PSYCHIATRY & NEUROLOGY

## 2023-06-29 NOTE — TELEPHONE ENCOUNTER
LM for pt to call, not sure what she wanted, something about her oxygen, but I couldn't tell what, voicemail wasn't clear.

## 2023-06-30 ENCOUNTER — TELEPHONE (OUTPATIENT)
Dept: INFUSION THERAPY | Age: 68
End: 2023-06-30

## 2023-06-30 ENCOUNTER — OFFICE VISIT (OUTPATIENT)
Dept: ONCOLOGY | Age: 68
End: 2023-06-30
Payer: MEDICARE

## 2023-06-30 ENCOUNTER — HOSPITAL ENCOUNTER (OUTPATIENT)
Dept: INFUSION THERAPY | Age: 68
Discharge: HOME | End: 2023-06-30
Payer: MEDICARE

## 2023-06-30 ENCOUNTER — TELEPHONE (OUTPATIENT)
Dept: PRIMARY CARE CLINIC | Age: 68
End: 2023-06-30

## 2023-06-30 VITALS
SYSTOLIC BLOOD PRESSURE: 152 MMHG | OXYGEN SATURATION: 92 % | WEIGHT: 85 LBS | TEMPERATURE: 97 F | DIASTOLIC BLOOD PRESSURE: 64 MMHG | HEIGHT: 68 IN | BODY MASS INDEX: 12.88 KG/M2 | HEART RATE: 97 BPM

## 2023-06-30 DIAGNOSIS — D64.9 ANEMIA, UNSPECIFIED TYPE: Primary | ICD-10-CM

## 2023-06-30 LAB
CONTROL: PRESENT
HEMOCCULT STL QL: POSITIVE

## 2023-06-30 PROCEDURE — 1123F ACP DISCUSS/DSCN MKR DOCD: CPT | Performed by: INTERNAL MEDICINE

## 2023-06-30 PROCEDURE — 3074F SYST BP LT 130 MM HG: CPT | Performed by: INTERNAL MEDICINE

## 2023-06-30 PROCEDURE — G8419 CALC BMI OUT NRM PARAM NOF/U: HCPCS | Performed by: INTERNAL MEDICINE

## 2023-06-30 PROCEDURE — 4004F PT TOBACCO SCREEN RCVD TLK: CPT | Performed by: INTERNAL MEDICINE

## 2023-06-30 PROCEDURE — G8400 PT W/DXA NO RESULTS DOC: HCPCS | Performed by: INTERNAL MEDICINE

## 2023-06-30 PROCEDURE — 6370000000 HC RX 637 (ALT 250 FOR IP): Performed by: INTERNAL MEDICINE

## 2023-06-30 PROCEDURE — 99214 OFFICE O/P EST MOD 30 MIN: CPT | Performed by: INTERNAL MEDICINE

## 2023-06-30 PROCEDURE — G8427 DOCREV CUR MEDS BY ELIG CLIN: HCPCS | Performed by: INTERNAL MEDICINE

## 2023-06-30 PROCEDURE — 1090F PRES/ABSN URINE INCON ASSESS: CPT | Performed by: INTERNAL MEDICINE

## 2023-06-30 PROCEDURE — 3017F COLORECTAL CA SCREEN DOC REV: CPT | Performed by: INTERNAL MEDICINE

## 2023-06-30 PROCEDURE — 3078F DIAST BP <80 MM HG: CPT | Performed by: INTERNAL MEDICINE

## 2023-06-30 RX ORDER — FERROUS SULFATE 325(65) MG
325 TABLET ORAL
Qty: 60 TABLET | Refills: 1 | Status: SHIPPED | OUTPATIENT
Start: 2023-06-30

## 2023-06-30 RX ORDER — ACETAMINOPHEN 325 MG/1
650 TABLET ORAL ONCE
Status: COMPLETED | OUTPATIENT
Start: 2023-06-30 | End: 2023-06-30

## 2023-06-30 RX ADMIN — ACETAMINOPHEN 650 MG: 325 TABLET, FILM COATED ORAL at 14:34

## 2023-06-30 ASSESSMENT — PAIN - FUNCTIONAL ASSESSMENT: PAIN_FUNCTIONAL_ASSESSMENT: ACTIVITIES ARE NOT PREVENTED

## 2023-06-30 ASSESSMENT — PAIN DESCRIPTION - LOCATION: LOCATION: HEAD

## 2023-06-30 ASSESSMENT — PAIN DESCRIPTION - DESCRIPTORS: DESCRIPTORS: ACHING;POUNDING;SORE

## 2023-06-30 ASSESSMENT — PAIN DESCRIPTION - ORIENTATION: ORIENTATION: MID

## 2023-06-30 ASSESSMENT — PAIN SCALES - GENERAL: PAINLEVEL_OUTOF10: 8

## 2023-06-30 NOTE — TELEPHONE ENCOUNTER
Pt says paperwork was sent to us on the 23rd for Dr to fill out so she can get the [employer] system, asking for us to  do that. Also, says that the Paxil isnt working, she needs something that will work right away, anxiety \"through the roof. \"

## 2023-07-03 DIAGNOSIS — F51.01 PRIMARY INSOMNIA: ICD-10-CM

## 2023-07-03 RX ORDER — TRAZODONE HYDROCHLORIDE 50 MG/1
50 TABLET ORAL NIGHTLY PRN
Qty: 30 TABLET | Refills: 5 | Status: SHIPPED | OUTPATIENT
Start: 2023-07-03

## 2023-07-05 ENCOUNTER — TELEPHONE (OUTPATIENT)
Dept: PRIMARY CARE CLINIC | Age: 68
End: 2023-07-05

## 2023-07-05 NOTE — TELEPHONE ENCOUNTER
Pt called for status of [employer] oxygen unit. Advised we never received fax from company.   Gave her fax number to use for back nurses station

## 2023-07-06 ENCOUNTER — OFFICE VISIT (OUTPATIENT)
Dept: ENDOCRINOLOGY | Age: 68
End: 2023-07-06
Payer: MEDICARE

## 2023-07-06 VITALS
WEIGHT: 88 LBS | SYSTOLIC BLOOD PRESSURE: 178 MMHG | HEART RATE: 81 BPM | HEIGHT: 68 IN | DIASTOLIC BLOOD PRESSURE: 81 MMHG | RESPIRATION RATE: 18 BRPM | OXYGEN SATURATION: 95 % | BODY MASS INDEX: 13.34 KG/M2

## 2023-07-06 DIAGNOSIS — E05.90 HYPERTHYROIDISM: ICD-10-CM

## 2023-07-06 DIAGNOSIS — E55.9 VITAMIN D DEFICIENCY: ICD-10-CM

## 2023-07-06 DIAGNOSIS — M81.0 OSTEOPOROSIS, UNSPECIFIED OSTEOPOROSIS TYPE, UNSPECIFIED PATHOLOGICAL FRACTURE PRESENCE: ICD-10-CM

## 2023-07-06 DIAGNOSIS — E05.90 HYPERTHYROIDISM: Primary | ICD-10-CM

## 2023-07-06 LAB
T3 SERPL-MCNC: 77.36 NG/DL (ref 80–200)
T4 FREE SERPL-MCNC: 1.44 NG/DL (ref 0.93–1.7)
TSH SERPL-MCNC: 0.01 UIU/ML (ref 0.27–4.2)

## 2023-07-06 PROCEDURE — 4004F PT TOBACCO SCREEN RCVD TLK: CPT | Performed by: INTERNAL MEDICINE

## 2023-07-06 PROCEDURE — G8419 CALC BMI OUT NRM PARAM NOF/U: HCPCS | Performed by: INTERNAL MEDICINE

## 2023-07-06 PROCEDURE — 3017F COLORECTAL CA SCREEN DOC REV: CPT | Performed by: INTERNAL MEDICINE

## 2023-07-06 PROCEDURE — G8427 DOCREV CUR MEDS BY ELIG CLIN: HCPCS | Performed by: INTERNAL MEDICINE

## 2023-07-06 PROCEDURE — 99204 OFFICE O/P NEW MOD 45 MIN: CPT | Performed by: INTERNAL MEDICINE

## 2023-07-06 PROCEDURE — 3074F SYST BP LT 130 MM HG: CPT | Performed by: INTERNAL MEDICINE

## 2023-07-06 PROCEDURE — G8400 PT W/DXA NO RESULTS DOC: HCPCS | Performed by: INTERNAL MEDICINE

## 2023-07-06 PROCEDURE — 3078F DIAST BP <80 MM HG: CPT | Performed by: INTERNAL MEDICINE

## 2023-07-06 PROCEDURE — 1090F PRES/ABSN URINE INCON ASSESS: CPT | Performed by: INTERNAL MEDICINE

## 2023-07-06 PROCEDURE — 1123F ACP DISCUSS/DSCN MKR DOCD: CPT | Performed by: INTERNAL MEDICINE

## 2023-07-06 RX ORDER — DILTIAZEM HYDROCHLORIDE 120 MG/1
120 CAPSULE, COATED, EXTENDED RELEASE ORAL DAILY
Qty: 30 CAPSULE | Refills: 5 | Status: SHIPPED | OUTPATIENT
Start: 2023-07-06

## 2023-07-06 RX ORDER — METHIMAZOLE 10 MG/1
TABLET ORAL
Qty: 60 TABLET | Refills: 3 | Status: SHIPPED | OUTPATIENT
Start: 2023-07-06

## 2023-07-07 ENCOUNTER — TELEPHONE (OUTPATIENT)
Dept: ENDOCRINOLOGY | Age: 68
End: 2023-07-07

## 2023-07-07 ENCOUNTER — TELEPHONE (OUTPATIENT)
Dept: PRIMARY CARE CLINIC | Age: 68
End: 2023-07-07

## 2023-07-07 DIAGNOSIS — F41.9 ANXIETY: Primary | ICD-10-CM

## 2023-07-07 NOTE — TELEPHONE ENCOUNTER
Notify patient  Thyroid hormones are normal.    Before starting any medications or any further work-up please check if she is currently taking heart medication called amiodarone

## 2023-07-07 NOTE — TELEPHONE ENCOUNTER
I am running out of things that I can do for her.   I put a referral in for psychiatrist.  If okay with her

## 2023-07-10 ENCOUNTER — HOSPITAL ENCOUNTER (OUTPATIENT)
Dept: GENERAL RADIOLOGY | Age: 68
Discharge: HOME | End: 2023-07-12
Payer: MEDICARE

## 2023-07-10 DIAGNOSIS — M81.0 OSTEOPOROSIS, UNSPECIFIED OSTEOPOROSIS TYPE, UNSPECIFIED PATHOLOGICAL FRACTURE PRESENCE: ICD-10-CM

## 2023-07-10 LAB — TSI SER-ACNC: <0.1 IU/L

## 2023-07-10 PROCEDURE — 77080 DXA BONE DENSITY AXIAL: CPT

## 2023-07-12 LAB
THYROGLOB IGG SER-ACNC: <12 IU/ML (ref 0–40)
THYROPEROXIDASE IGG SERPL-ACNC: <4 IU/ML (ref 0–25)

## 2023-07-13 ENCOUNTER — TELEPHONE (OUTPATIENT)
Dept: PRIMARY CARE CLINIC | Age: 68
End: 2023-07-13

## 2023-07-14 ENCOUNTER — HOSPITAL ENCOUNTER (OUTPATIENT)
Dept: ULTRASOUND IMAGING | Age: 68
End: 2023-07-14
Payer: MEDICARE

## 2023-07-14 DIAGNOSIS — E05.90 HYPERTHYROIDISM: ICD-10-CM

## 2023-07-14 PROCEDURE — 76536 US EXAM OF HEAD AND NECK: CPT

## 2023-07-18 ENCOUNTER — TELEPHONE (OUTPATIENT)
Dept: ENDOCRINOLOGY | Age: 68
End: 2023-07-18

## 2023-07-18 DIAGNOSIS — M81.0 OSTEOPOROSIS, UNSPECIFIED OSTEOPOROSIS TYPE, UNSPECIFIED PATHOLOGICAL FRACTURE PRESENCE: ICD-10-CM

## 2023-07-18 DIAGNOSIS — E05.90 HYPERTHYROIDISM: Primary | ICD-10-CM

## 2023-07-18 NOTE — TELEPHONE ENCOUNTER
Called, spoke to patient and gave results and recommendations/instructions  Pt is not taking amiodarone she is taking Diltiazem

## 2023-07-18 NOTE — TELEPHONE ENCOUNTER
Opened by: Tigist Aburto DO                on Thu Jul 6, 2023  5:39 AM         Doned by: Tigist Aburto DO                on Fri Jul 7, 2023  1:35 PM

## 2023-07-19 NOTE — TELEPHONE ENCOUNTER
Notify patient  DEXA scan shows severe osteoporosis at the hip, spine, and forearm with a high fracture risk. I strongly recommend starting medications for treatment of this degree of osteoporosis.   Prolia every 6 months would be the best option for you

## 2023-07-22 RX ORDER — BUSPIRONE HYDROCHLORIDE 10 MG/1
10 TABLET ORAL 3 TIMES DAILY
Qty: 270 TABLET | Refills: 1 | Status: SHIPPED | OUTPATIENT
Start: 2023-07-22

## 2023-07-23 ENCOUNTER — TELEPHONE (OUTPATIENT)
Dept: ENDOCRINOLOGY | Age: 68
End: 2023-07-23

## 2023-07-23 DIAGNOSIS — E05.90 HYPERTHYROIDISM: Primary | ICD-10-CM

## 2023-07-23 NOTE — TELEPHONE ENCOUNTER
Endocrine staff,   Please notify pt that I have reviewed your recent results. Work up is consistent with thyroiditis which usually cause transient hyperthyroidism.      I recommend recheck TSH, Free T4, T3 in mid August

## 2023-07-24 NOTE — TELEPHONE ENCOUNTER
VOICEMAIL TO RETURN CALL LEFT. Tazorac Counseling:  Patient advised that medication is irritating and drying.  Patient may need to apply sparingly and wash off after an hour before eventually leaving it on overnight.  The patient verbalized understanding of the proper use and possible adverse effects of tazorac.  All of the patient's questions and concerns were addressed.

## 2023-07-25 DIAGNOSIS — M81.0 OSTEOPOROSIS, UNSPECIFIED OSTEOPOROSIS TYPE, UNSPECIFIED PATHOLOGICAL FRACTURE PRESENCE: ICD-10-CM

## 2023-08-04 ENCOUNTER — OFFICE VISIT (OUTPATIENT)
Dept: ONCOLOGY | Age: 68
End: 2023-08-04
Payer: MEDICARE

## 2023-08-04 ENCOUNTER — HOSPITAL ENCOUNTER (OUTPATIENT)
Dept: INFUSION THERAPY | Age: 68
Discharge: HOME | End: 2023-08-04
Payer: MEDICARE

## 2023-08-04 VITALS
WEIGHT: 88.1 LBS | DIASTOLIC BLOOD PRESSURE: 60 MMHG | SYSTOLIC BLOOD PRESSURE: 132 MMHG | HEIGHT: 68 IN | BODY MASS INDEX: 13.35 KG/M2 | HEART RATE: 88 BPM | OXYGEN SATURATION: 94 % | TEMPERATURE: 97.4 F

## 2023-08-04 DIAGNOSIS — D64.9 ANEMIA, UNSPECIFIED TYPE: ICD-10-CM

## 2023-08-04 DIAGNOSIS — D64.9 ANEMIA, UNSPECIFIED TYPE: Primary | ICD-10-CM

## 2023-08-04 DIAGNOSIS — R63.4 UNEXPLAINED WEIGHT LOSS: ICD-10-CM

## 2023-08-04 LAB
BASOPHILS # BLD: 0.14 K/UL (ref 0–0.2)
BASOPHILS NFR BLD: 2 % (ref 0–2)
EOSINOPHIL # BLD: 0.25 K/UL (ref 0.05–0.5)
EOSINOPHILS RELATIVE PERCENT: 3 % (ref 0–6)
ERYTHROCYTE [DISTWIDTH] IN BLOOD BY AUTOMATED COUNT: 19.5 % (ref 11.5–15)
FERRITIN SERPL-MCNC: 52 NG/ML
HCT VFR BLD AUTO: 38.3 % (ref 34–48)
HGB BLD-MCNC: 11.1 G/DL (ref 11.5–15.5)
IMM GRANULOCYTES # BLD AUTO: 0.04 K/UL (ref 0–0.58)
IMM GRANULOCYTES NFR BLD: 1 % (ref 0–5)
IRON SATN MFR SERPL: 19 % (ref 15–50)
IRON SERPL-MCNC: 59 UG/DL (ref 37–145)
LYMPHOCYTES NFR BLD: 1.3 K/UL (ref 1.5–4)
LYMPHOCYTES RELATIVE PERCENT: 15 % (ref 20–42)
MCH RBC QN AUTO: 28 PG (ref 26–35)
MCHC RBC AUTO-ENTMCNC: 29 G/DL (ref 32–34.5)
MCV RBC AUTO: 96.5 FL (ref 80–99.9)
MONOCYTES NFR BLD: 0.53 K/UL (ref 0.1–0.95)
MONOCYTES NFR BLD: 6 % (ref 2–12)
NEUTROPHILS NFR BLD: 74 % (ref 43–80)
NEUTS SEG NFR BLD: 6.38 K/UL (ref 1.8–7.3)
PLATELET # BLD AUTO: 341 K/UL (ref 130–450)
PMV BLD AUTO: 9.5 FL (ref 7–12)
RBC # BLD AUTO: 3.97 M/UL (ref 3.5–5.5)
TIBC SERPL-MCNC: 304 UG/DL (ref 250–450)
WBC OTHER # BLD: 8.6 K/UL (ref 4.5–11.5)

## 2023-08-04 PROCEDURE — 99213 OFFICE O/P EST LOW 20 MIN: CPT | Performed by: INTERNAL MEDICINE

## 2023-08-04 PROCEDURE — 99212 OFFICE O/P EST SF 10 MIN: CPT

## 2023-08-04 PROCEDURE — 36415 COLL VENOUS BLD VENIPUNCTURE: CPT

## 2023-08-04 PROCEDURE — 83550 IRON BINDING TEST: CPT

## 2023-08-04 PROCEDURE — 3078F DIAST BP <80 MM HG: CPT | Performed by: INTERNAL MEDICINE

## 2023-08-04 PROCEDURE — G8427 DOCREV CUR MEDS BY ELIG CLIN: HCPCS | Performed by: INTERNAL MEDICINE

## 2023-08-04 PROCEDURE — 82728 ASSAY OF FERRITIN: CPT

## 2023-08-04 PROCEDURE — 3017F COLORECTAL CA SCREEN DOC REV: CPT | Performed by: INTERNAL MEDICINE

## 2023-08-04 PROCEDURE — G8399 PT W/DXA RESULTS DOCUMENT: HCPCS | Performed by: INTERNAL MEDICINE

## 2023-08-04 PROCEDURE — 1090F PRES/ABSN URINE INCON ASSESS: CPT | Performed by: INTERNAL MEDICINE

## 2023-08-04 PROCEDURE — 1123F ACP DISCUSS/DSCN MKR DOCD: CPT | Performed by: INTERNAL MEDICINE

## 2023-08-04 PROCEDURE — 83540 ASSAY OF IRON: CPT

## 2023-08-04 PROCEDURE — 4004F PT TOBACCO SCREEN RCVD TLK: CPT | Performed by: INTERNAL MEDICINE

## 2023-08-04 PROCEDURE — G8419 CALC BMI OUT NRM PARAM NOF/U: HCPCS | Performed by: INTERNAL MEDICINE

## 2023-08-04 PROCEDURE — 85025 COMPLETE CBC W/AUTO DIFF WBC: CPT

## 2023-08-04 PROCEDURE — 3074F SYST BP LT 130 MM HG: CPT | Performed by: INTERNAL MEDICINE

## 2023-08-07 NOTE — PROGRESS NOTES
Patient provided with discharge instructions, received printed AVS.  All questions answered. Patient understands follow up plan of care.
[phone]

## 2023-08-08 ENCOUNTER — OFFICE VISIT (OUTPATIENT)
Dept: CARDIOLOGY CLINIC | Age: 68
End: 2023-08-08
Payer: MEDICARE

## 2023-08-08 VITALS
OXYGEN SATURATION: 90 % | HEIGHT: 68 IN | DIASTOLIC BLOOD PRESSURE: 82 MMHG | HEART RATE: 79 BPM | RESPIRATION RATE: 16 BRPM | BODY MASS INDEX: 13.37 KG/M2 | SYSTOLIC BLOOD PRESSURE: 122 MMHG | WEIGHT: 88.2 LBS

## 2023-08-08 DIAGNOSIS — Z99.81 ON HOME O2: ICD-10-CM

## 2023-08-08 DIAGNOSIS — J43.9 PULMONARY EMPHYSEMA, UNSPECIFIED EMPHYSEMA TYPE (HCC): ICD-10-CM

## 2023-08-08 DIAGNOSIS — I48.0 PAROXYSMAL ATRIAL FIBRILLATION (HCC): Primary | ICD-10-CM

## 2023-08-08 DIAGNOSIS — I10 PRIMARY HYPERTENSION: ICD-10-CM

## 2023-08-08 PROCEDURE — 1123F ACP DISCUSS/DSCN MKR DOCD: CPT | Performed by: INTERNAL MEDICINE

## 2023-08-08 PROCEDURE — G8419 CALC BMI OUT NRM PARAM NOF/U: HCPCS | Performed by: INTERNAL MEDICINE

## 2023-08-08 PROCEDURE — G8399 PT W/DXA RESULTS DOCUMENT: HCPCS | Performed by: INTERNAL MEDICINE

## 2023-08-08 PROCEDURE — 4004F PT TOBACCO SCREEN RCVD TLK: CPT | Performed by: INTERNAL MEDICINE

## 2023-08-08 PROCEDURE — 93000 ELECTROCARDIOGRAM COMPLETE: CPT | Performed by: INTERNAL MEDICINE

## 2023-08-08 PROCEDURE — 99214 OFFICE O/P EST MOD 30 MIN: CPT | Performed by: INTERNAL MEDICINE

## 2023-08-08 PROCEDURE — 3074F SYST BP LT 130 MM HG: CPT | Performed by: INTERNAL MEDICINE

## 2023-08-08 PROCEDURE — 1090F PRES/ABSN URINE INCON ASSESS: CPT | Performed by: INTERNAL MEDICINE

## 2023-08-08 PROCEDURE — 3078F DIAST BP <80 MM HG: CPT | Performed by: INTERNAL MEDICINE

## 2023-08-08 PROCEDURE — 3023F SPIROM DOC REV: CPT | Performed by: INTERNAL MEDICINE

## 2023-08-08 PROCEDURE — G8427 DOCREV CUR MEDS BY ELIG CLIN: HCPCS | Performed by: INTERNAL MEDICINE

## 2023-08-08 PROCEDURE — 3017F COLORECTAL CA SCREEN DOC REV: CPT | Performed by: INTERNAL MEDICINE

## 2023-08-08 NOTE — PROGRESS NOTES
OFFICE VISIT     PRIMARY CARE PHYSICIAN:      Clyde Gonzalez DO       ALLERGIES / SENSITIVITIES:        Allergies   Allergen Reactions    Pcn [Penicillins] Other (See Comments)          REVIEWED MEDICATIONS:        Current Outpatient Medications:     busPIRone (BUSPAR) 10 MG tablet, Take 1 tablet by mouth 3 times daily, Disp: 270 tablet, Rfl: 1    apixaban (ELIQUIS) 5 MG TABS tablet, Take 1 tablet by mouth 2 times daily, Disp: 60 tablet, Rfl: 5    dilTIAZem (CARDIZEM CD) 120 MG extended release capsule, Take 1 capsule by mouth daily, Disp: 30 capsule, Rfl: 5    methIMAzole (TAPAZOLE) 10 MG tablet, Take 1 tablet twice a day, Disp: 60 tablet, Rfl: 3    traZODone (DESYREL) 50 MG tablet, Take 1 tablet by mouth nightly as needed for Sleep, Disp: 30 tablet, Rfl: 5    ferrous sulfate (IRON 325) 325 (65 Fe) MG tablet, Take 1 tablet by mouth daily (with breakfast), Disp: 60 tablet, Rfl: 1    PAXIL 10 MG tablet, Take 1 tablet by mouth every morning For anxiety, Disp: 30 tablet, Rfl: 3    vitamin B-12 (CYANOCOBALAMIN) 500 MCG tablet, Take 1 tablet by mouth daily, Disp: , Rfl:     Budeson-Glycopyrrol-Formoterol (BREZTRI AEROSPHERE) 160-9-4.8 MCG/ACT AERO, Inhale 2 puffs into the lungs 2 times daily, Disp: 1 each, Rfl: 0    OXYGEN, Inhale 2 L into the lungs as needed, Disp: , Rfl:     budesonide-formoterol (SYMBICORT) 160-4.5 MCG/ACT AERO, Inhale 2 puffs into the lungs 2 times daily, Disp: 1 each, Rfl: 3    ipratropium-albuterol (DUONEB) 0.5-2.5 (3) MG/3ML SOLN nebulizer solution, Inhale 3 mLs into the lungs every 4 hours (while awake), Disp: 360 mL, Rfl: 0    Cholecalciferol (VITAMIN D3) 50 MCG (2000 UT) CAPS, Take 1 capsule by mouth daily, Disp: , Rfl:     vitamin C (ASCORBIC ACID) 500 MG tablet, Take 1 tablet by mouth daily, Disp: , Rfl:       S: REASON FOR VISIT:       Chief Complaint   Patient presents with    Atrial Fibrillation     Ov.  Pt has no cardiac complaints          History of Present Illness:       Office Visit

## 2023-08-24 ENCOUNTER — TELEPHONE (OUTPATIENT)
Dept: ENDOCRINOLOGY | Age: 68
End: 2023-08-24

## 2023-08-25 DIAGNOSIS — M81.0 OSTEOPOROSIS, UNSPECIFIED OSTEOPOROSIS TYPE, UNSPECIFIED PATHOLOGICAL FRACTURE PRESENCE: Primary | ICD-10-CM

## 2023-08-25 DIAGNOSIS — Z01.812 PRE-PROCEDURE LAB EXAM: ICD-10-CM

## 2023-08-28 DIAGNOSIS — Z01.812 PRE-PROCEDURE LAB EXAM: ICD-10-CM

## 2023-08-28 DIAGNOSIS — M81.0 OSTEOPOROSIS, UNSPECIFIED OSTEOPOROSIS TYPE, UNSPECIFIED PATHOLOGICAL FRACTURE PRESENCE: Primary | ICD-10-CM

## 2023-08-28 DIAGNOSIS — E55.9 VITAMIN D DEFICIENCY: ICD-10-CM

## 2023-09-01 RX ORDER — BUDESONIDE AND FORMOTEROL FUMARATE DIHYDRATE 160; 4.5 UG/1; UG/1
2 AEROSOL RESPIRATORY (INHALATION) 2 TIMES DAILY
Qty: 1 EACH | Refills: 3 | Status: SHIPPED | OUTPATIENT
Start: 2023-09-01

## 2023-09-05 ENCOUNTER — OFFICE VISIT (OUTPATIENT)
Dept: ENDOCRINOLOGY | Age: 68
End: 2023-09-05
Payer: MEDICARE

## 2023-09-05 VITALS
SYSTOLIC BLOOD PRESSURE: 174 MMHG | HEIGHT: 68 IN | DIASTOLIC BLOOD PRESSURE: 84 MMHG | BODY MASS INDEX: 13.79 KG/M2 | RESPIRATION RATE: 18 BRPM | WEIGHT: 91 LBS | OXYGEN SATURATION: 91 % | HEART RATE: 84 BPM

## 2023-09-05 DIAGNOSIS — E05.90 HYPERTHYROIDISM: ICD-10-CM

## 2023-09-05 DIAGNOSIS — M81.0 OSTEOPOROSIS, UNSPECIFIED OSTEOPOROSIS TYPE, UNSPECIFIED PATHOLOGICAL FRACTURE PRESENCE: ICD-10-CM

## 2023-09-05 DIAGNOSIS — M81.0 OSTEOPOROSIS, UNSPECIFIED OSTEOPOROSIS TYPE, UNSPECIFIED PATHOLOGICAL FRACTURE PRESENCE: Primary | ICD-10-CM

## 2023-09-05 LAB
ANION GAP SERPL CALCULATED.3IONS-SCNC: 12 MMOL/L (ref 7–16)
BUN BLDV-MCNC: 9 MG/DL (ref 6–23)
CALCIUM SERPL-MCNC: 8.8 MG/DL (ref 8.6–10.2)
CHLORIDE BLD-SCNC: 92 MMOL/L (ref 98–107)
CO2: 34 MMOL/L (ref 22–29)
CREAT SERPL-MCNC: 0.6 MG/DL (ref 0.5–1)
GFR SERPL CREATININE-BSD FRML MDRD: >60 ML/MIN/1.73M2
GLUCOSE BLD-MCNC: 74 MG/DL (ref 74–99)
POTASSIUM SERPL-SCNC: 4.1 MMOL/L (ref 3.5–5)
SODIUM BLD-SCNC: 138 MMOL/L (ref 132–146)
T4 FREE: 0.2 NG/DL (ref 0.9–1.7)
TSH SERPL DL<=0.05 MIU/L-ACNC: 15.25 UIU/ML (ref 0.27–4.2)

## 2023-09-05 PROCEDURE — 3078F DIAST BP <80 MM HG: CPT | Performed by: INTERNAL MEDICINE

## 2023-09-05 PROCEDURE — 1090F PRES/ABSN URINE INCON ASSESS: CPT | Performed by: INTERNAL MEDICINE

## 2023-09-05 PROCEDURE — 3074F SYST BP LT 130 MM HG: CPT | Performed by: INTERNAL MEDICINE

## 2023-09-05 PROCEDURE — 3017F COLORECTAL CA SCREEN DOC REV: CPT | Performed by: INTERNAL MEDICINE

## 2023-09-05 PROCEDURE — G8419 CALC BMI OUT NRM PARAM NOF/U: HCPCS | Performed by: INTERNAL MEDICINE

## 2023-09-05 PROCEDURE — 4004F PT TOBACCO SCREEN RCVD TLK: CPT | Performed by: INTERNAL MEDICINE

## 2023-09-05 PROCEDURE — G8399 PT W/DXA RESULTS DOCUMENT: HCPCS | Performed by: INTERNAL MEDICINE

## 2023-09-05 PROCEDURE — 99214 OFFICE O/P EST MOD 30 MIN: CPT | Performed by: INTERNAL MEDICINE

## 2023-09-05 PROCEDURE — 1123F ACP DISCUSS/DSCN MKR DOCD: CPT | Performed by: INTERNAL MEDICINE

## 2023-09-05 PROCEDURE — G8427 DOCREV CUR MEDS BY ELIG CLIN: HCPCS | Performed by: INTERNAL MEDICINE

## 2023-09-05 NOTE — PROGRESS NOTES
[address] [facility] Department of Endocrinology Diabetes and Metabolism   [facility] [facility] [address]   Phone: [phone]  Fax: [phone]    Date of Service: 9/5/2023  Primary Care Physician: Dottie Parson DO  Referring physician: No ref. provider found  Provider: Sang Mccullough MD    Reason for the visit:  Hyperthyroidism osteoporosis    History of Present Illness: The history is provided by the patient. No  was used. Accuracy of the patient data is excellent. Ángela Rockwell is a very pleasant 77 y.o. female seen today for evaluation and management of hyperthyroidism     Ángela Rockwell was diagnosed with hyperthyroidism in May/2023. At that time she was c/o  palpitations, weight loss, change in appetite, nervousness, anxiety, irritability, tremor, sweating, heat intolerance, but denies changes in bowel habits, muscle weakness or difficulty sleeping. Lab 5/10/2023 showed undetectable TSH   The patient was started on methimazole July 2023 and tolerating it very well.   She is currently on methimazole 10 mg twice daily    Lab Results   Component Value Date/Time    TSH 0.011 (L) 07/06/2023 01:31 PM    T4FREE 1.44 07/06/2023 01:31 PM    G0PRKJM 7.4 05/10/2023 03:12 PM    O9KZSAT 77.36 (L) 07/06/2023 01:31 PM    TSI <0.10 07/06/2023 01:31 PM    TPOABS <4.0 07/06/2023 01:31 PM       No recent DXA scan     On VitD 3 supplement 2000 daily OTC     The patient has h/o Afib     PAST MEDICAL HISTORY   Past Medical History:   Diagnosis Date    Acute exacerbation of chronic obstructive pulmonary disease (COPD) ([address]) 07/14/2022    Acute respiratory failure ([address]) 11/27/2022    Acute respiratory failure with hypoxia ([address]) 06/27/2022    Acute respiratory failure with hypoxia (HCC) 11/27/2022    Atrial fibrillation (HCC)     Chronic obstructive pulmonary disease ([address]) 12/15/2022    COPD (chronic obstructive pulmonary disease) (HCC)     COPD exacerbation ([address]) 07/14/2022

## 2023-09-06 LAB — VITAMIN D 25-HYDROXY: 66.6 NG/ML (ref 30–100)

## 2023-09-10 ENCOUNTER — TELEPHONE (OUTPATIENT)
Dept: ENDOCRINOLOGY | Age: 68
End: 2023-09-10

## 2023-09-10 NOTE — TELEPHONE ENCOUNTER
Notify patient  Thyroid hormones are very low. Please confirm her current dose of methimazole and decrease the dose by 50%. And repeat thyroid function test in 4 weeks.   Please check TSH and free T4 in 4 weeks

## 2023-09-12 ENCOUNTER — HOSPITAL ENCOUNTER (OUTPATIENT)
Dept: INFUSION THERAPY | Age: 68
Setting detail: INFUSION SERIES
Discharge: HOME | End: 2023-09-12
Payer: MEDICARE

## 2023-09-12 VITALS
BODY MASS INDEX: 14.25 KG/M2 | HEIGHT: 68 IN | TEMPERATURE: 97.6 F | OXYGEN SATURATION: 96 % | WEIGHT: 94 LBS | RESPIRATION RATE: 16 BRPM | DIASTOLIC BLOOD PRESSURE: 66 MMHG | HEART RATE: 81 BPM | SYSTOLIC BLOOD PRESSURE: 154 MMHG

## 2023-09-12 DIAGNOSIS — M81.0 AGE-RELATED OSTEOPOROSIS WITHOUT CURRENT PATHOLOGICAL FRACTURE: Primary | ICD-10-CM

## 2023-09-12 PROCEDURE — 96372 THER/PROPH/DIAG INJ SC/IM: CPT

## 2023-09-12 PROCEDURE — 6360000002 HC RX W HCPCS: Performed by: INTERNAL MEDICINE

## 2023-09-12 RX ADMIN — DENOSUMAB 60 MG: 60 INJECTION SUBCUTANEOUS at 10:59

## 2023-09-13 ENCOUNTER — TELEPHONE (OUTPATIENT)
Dept: PRIMARY CARE CLINIC | Age: 68
End: 2023-09-13

## 2023-09-13 NOTE — TELEPHONE ENCOUNTER
----- Message from Magan Sahu sent at 9/13/2023  8:07 AM EDT -----  Subject: Message to Provider    QUESTIONS  Information for Provider? Pt is requesting a call back to discuss her   oxygen tanks. ---------------------------------------------------------------------------  --------------  Marcus BRUNSON  [phone]; OK to leave message on voicemail  ---------------------------------------------------------------------------  --------------  SCRIPT ANSWERS  Relationship to Patient?  Self

## 2023-09-13 NOTE — TELEPHONE ENCOUNTER
Spoke to pt and she is on the [employer] system for her Oxygen needs-currently on 3L, She uses [facility] in [place] to supply her. She is requesting 2-3 additional larger tanks in case her power goes out. I LM for [employer] to call to see if they need anything from us.  (Script wise)

## 2023-09-18 ENCOUNTER — OFFICE VISIT (OUTPATIENT)
Dept: PRIMARY CARE CLINIC | Age: 68
End: 2023-09-18
Payer: MEDICARE

## 2023-09-18 VITALS
WEIGHT: 96 LBS | TEMPERATURE: 97.3 F | BODY MASS INDEX: 14.55 KG/M2 | DIASTOLIC BLOOD PRESSURE: 70 MMHG | HEIGHT: 68 IN | SYSTOLIC BLOOD PRESSURE: 134 MMHG

## 2023-09-18 DIAGNOSIS — I48.0 PAROXYSMAL ATRIAL FIBRILLATION (HCC): ICD-10-CM

## 2023-09-18 DIAGNOSIS — E78.2 MIXED HYPERLIPIDEMIA: ICD-10-CM

## 2023-09-18 DIAGNOSIS — M51.16 HERNIATION OF LUMBAR INTERVERTEBRAL DISC WITH RADICULOPATHY: Primary | ICD-10-CM

## 2023-09-18 DIAGNOSIS — F41.9 ANXIETY: ICD-10-CM

## 2023-09-18 PROCEDURE — G8428 CUR MEDS NOT DOCUMENT: HCPCS | Performed by: FAMILY MEDICINE

## 2023-09-18 PROCEDURE — 90694 VACC AIIV4 NO PRSRV 0.5ML IM: CPT | Performed by: FAMILY MEDICINE

## 2023-09-18 PROCEDURE — G8419 CALC BMI OUT NRM PARAM NOF/U: HCPCS | Performed by: FAMILY MEDICINE

## 2023-09-18 PROCEDURE — 4004F PT TOBACCO SCREEN RCVD TLK: CPT | Performed by: FAMILY MEDICINE

## 2023-09-18 PROCEDURE — 3017F COLORECTAL CA SCREEN DOC REV: CPT | Performed by: FAMILY MEDICINE

## 2023-09-18 PROCEDURE — G8399 PT W/DXA RESULTS DOCUMENT: HCPCS | Performed by: FAMILY MEDICINE

## 2023-09-18 PROCEDURE — 3075F SYST BP GE 130 - 139MM HG: CPT | Performed by: FAMILY MEDICINE

## 2023-09-18 PROCEDURE — G0008 ADMIN INFLUENZA VIRUS VAC: HCPCS | Performed by: FAMILY MEDICINE

## 2023-09-18 PROCEDURE — 99214 OFFICE O/P EST MOD 30 MIN: CPT | Performed by: FAMILY MEDICINE

## 2023-09-18 PROCEDURE — 1090F PRES/ABSN URINE INCON ASSESS: CPT | Performed by: FAMILY MEDICINE

## 2023-09-18 PROCEDURE — 3078F DIAST BP <80 MM HG: CPT | Performed by: FAMILY MEDICINE

## 2023-09-18 PROCEDURE — 1123F ACP DISCUSS/DSCN MKR DOCD: CPT | Performed by: FAMILY MEDICINE

## 2023-09-18 RX ORDER — PAROXETINE 10 MG/1
10 TABLET, FILM COATED ORAL EVERY MORNING
Qty: 90 TABLET | Refills: 3 | Status: SHIPPED | OUTPATIENT
Start: 2023-09-18

## 2023-09-18 SDOH — ECONOMIC STABILITY: FOOD INSECURITY: WITHIN THE PAST 12 MONTHS, THE FOOD YOU BOUGHT JUST DIDN'T LAST AND YOU DIDN'T HAVE MONEY TO GET MORE.: NEVER TRUE

## 2023-09-18 SDOH — ECONOMIC STABILITY: FOOD INSECURITY: WITHIN THE PAST 12 MONTHS, YOU WORRIED THAT YOUR FOOD WOULD RUN OUT BEFORE YOU GOT MONEY TO BUY MORE.: NEVER TRUE

## 2023-09-18 SDOH — ECONOMIC STABILITY: HOUSING INSECURITY
IN THE LAST 12 MONTHS, WAS THERE A TIME WHEN YOU DID NOT HAVE A STEADY PLACE TO SLEEP OR SLEPT IN A SHELTER (INCLUDING NOW)?: NO

## 2023-09-18 SDOH — ECONOMIC STABILITY: INCOME INSECURITY: HOW HARD IS IT FOR YOU TO PAY FOR THE VERY BASICS LIKE FOOD, HOUSING, MEDICAL CARE, AND HEATING?: NOT VERY HARD

## 2023-09-18 ASSESSMENT — ENCOUNTER SYMPTOMS
ALLERGIC/IMMUNOLOGIC NEGATIVE: 1
EYES NEGATIVE: 1
GASTROINTESTINAL NEGATIVE: 1
RESPIRATORY NEGATIVE: 1
BACK PAIN: 1

## 2023-09-18 ASSESSMENT — PATIENT HEALTH QUESTIONNAIRE - PHQ9
2. FEELING DOWN, DEPRESSED OR HOPELESS: 0
SUM OF ALL RESPONSES TO PHQ QUESTIONS 1-9: 0
SUM OF ALL RESPONSES TO PHQ QUESTIONS 1-9: 0
1. LITTLE INTEREST OR PLEASURE IN DOING THINGS: 0
SUM OF ALL RESPONSES TO PHQ QUESTIONS 1-9: 0
SUM OF ALL RESPONSES TO PHQ QUESTIONS 1-9: 0
SUM OF ALL RESPONSES TO PHQ9 QUESTIONS 1 & 2: 0

## 2023-09-18 NOTE — PROGRESS NOTES
23  Name: Shahla Farmer    : 1955    Sex: female    Age: 77 y.o. Legs ache  emg ntoed  wadivsed mri       brheaing ok  seeign  gi pulm         Subjective:  Chief Complaint: Patient is here for  ck  re   back an dleg pain    anx  copd  thyroid  weight      No cp   breaitng ok   had  emg  and  radic   and adivsed mri          Review of Systems   Constitutional: Negative. HENT: Negative. Eyes: Negative. Respiratory: Negative. Cardiovascular: Negative. Gastrointestinal: Negative. Endocrine: Negative. Genitourinary: Negative. Musculoskeletal:  Positive for arthralgias and back pain. Skin: Negative. Allergic/Immunologic: Negative. Neurological: Negative. Hematological: Negative. Psychiatric/Behavioral: Negative.            Current Outpatient Medications:     PARoxetine (PAXIL) 10 MG tablet, Take 1 tablet by mouth every morning For anxiety, Disp: 90 tablet, Rfl: 3    budesonide-formoterol (SYMBICORT) 160-4.5 MCG/ACT AERO, Inhale 2 puffs into the lungs 2 times daily, Disp: 1 each, Rfl: 3    busPIRone (BUSPAR) 10 MG tablet, Take 1 tablet by mouth 3 times daily, Disp: 270 tablet, Rfl: 1    apixaban (ELIQUIS) 5 MG TABS tablet, Take 1 tablet by mouth 2 times daily, Disp: 60 tablet, Rfl: 5    dilTIAZem (CARDIZEM CD) 120 MG extended release capsule, Take 1 capsule by mouth daily, Disp: 30 capsule, Rfl: 5    methIMAzole (TAPAZOLE) 10 MG tablet, Take 1 tablet twice a day, Disp: 60 tablet, Rfl: 3    traZODone (DESYREL) 50 MG tablet, Take 1 tablet by mouth nightly as needed for Sleep, Disp: 30 tablet, Rfl: 5    ferrous sulfate (IRON 325) 325 (65 Fe) MG tablet, Take 1 tablet by mouth daily (with breakfast), Disp: 60 tablet, Rfl: 1    vitamin B-12 (CYANOCOBALAMIN) 500 MCG tablet, Take 1 tablet by mouth daily, Disp: , Rfl:     Budeson-Glycopyrrol-Formoterol (BREZTRI AEROSPHERE) 160-9-4.8 MCG/ACT AERO, Inhale 2 puffs into the lungs 2 times daily, Disp: 1 each, Rfl: 0    OXYGEN,

## 2023-09-20 ENCOUNTER — TELEPHONE (OUTPATIENT)
Dept: PRIMARY CARE CLINIC | Age: 68
End: 2023-09-20

## 2023-09-20 NOTE — TELEPHONE ENCOUNTER
The pt is calling because she is having a procedure where she has to swallow a capsule with a camera in it and they are telling her that she can't take her iron tablet for a week before, she wanted to make sure you are okay with that

## 2023-09-27 NOTE — TELEPHONE ENCOUNTER
The pt confirmed that she's taking methimazole 10mg 2x/day.  She will now take 10mg just in the am. I updated her med list.

## 2023-09-28 ENCOUNTER — TELEPHONE (OUTPATIENT)
Dept: PRIMARY CARE CLINIC | Age: 68
End: 2023-09-28

## 2023-09-28 NOTE — TELEPHONE ENCOUNTER
Pt needs script faxed over to [facility] for oxygen because the other company is pulling out of the network. Fax number [phone].

## 2023-09-28 NOTE — TELEPHONE ENCOUNTER
----- Message from [facility] CROW Holguin sent at 9/28/2023 12:00 PM EDT -----  Subject: Medication Problem    Medication: Other - Buspirone (Buspar)   Dosage: 10 mg tablet   Ordering Provider: Elena Gonzalez    Question/Problem: The pt stated that [facility] will be discontinuing this   medication and will not be able to pay for it. The pt would like to know   if there is something that can be given to her to replace this medication. Please follow up with the pt to further assist. The pt also stated that   she will be switching oxygen companies and will be needing a new   prescription for oxygen in the future.       Pharmacy: [facility] [place], [facility] [phone]    ---------------------------------------------------------------------------  --------------  Sheri Shanks INFO  [phone]; OK to leave message on voicemail  ---------------------------------------------------------------------------  --------------    SCRIPT ANSWERS  Relationship to Patient: Self

## 2023-10-03 NOTE — TELEPHONE ENCOUNTER
Pt calling back stating script needs to be for evaluation of oxygen sent to [facility] not just a script for oxygen

## 2023-10-06 ENCOUNTER — OFFICE VISIT (OUTPATIENT)
Dept: PRIMARY CARE CLINIC | Age: 68
End: 2023-10-06

## 2023-10-06 VITALS
SYSTOLIC BLOOD PRESSURE: 132 MMHG | BODY MASS INDEX: 14.7 KG/M2 | HEIGHT: 68 IN | HEART RATE: 80 BPM | OXYGEN SATURATION: 94 % | DIASTOLIC BLOOD PRESSURE: 75 MMHG | TEMPERATURE: 97.7 F | WEIGHT: 97 LBS

## 2023-10-06 DIAGNOSIS — J43.8 OTHER EMPHYSEMA (HCC): ICD-10-CM

## 2023-10-06 DIAGNOSIS — Z00.00 MEDICARE ANNUAL WELLNESS VISIT, SUBSEQUENT: Primary | ICD-10-CM

## 2023-10-06 DIAGNOSIS — M51.16 HERNIATION OF LUMBAR INTERVERTEBRAL DISC WITH RADICULOPATHY: ICD-10-CM

## 2023-10-06 RX ORDER — METHYLPREDNISOLONE ACETATE 40 MG/ML
40 INJECTION, SUSPENSION INTRA-ARTICULAR; INTRALESIONAL; INTRAMUSCULAR; SOFT TISSUE ONCE
Status: COMPLETED | OUTPATIENT
Start: 2023-10-06 | End: 2023-10-06

## 2023-10-06 RX ADMIN — METHYLPREDNISOLONE ACETATE 40 MG: 40 INJECTION, SUSPENSION INTRA-ARTICULAR; INTRALESIONAL; INTRAMUSCULAR; SOFT TISSUE at 11:47

## 2023-10-06 ASSESSMENT — LIFESTYLE VARIABLES: HOW MANY STANDARD DRINKS CONTAINING ALCOHOL DO YOU HAVE ON A TYPICAL DAY: PATIENT DOES NOT DRINK

## 2023-10-06 NOTE — PROGRESS NOTES
10/6/23  Name: Torrey Alvarez    : 1955    Sex: female    Age: 77 y.o.         Subjective:  Chief Complaint: Patient is here for ***     HPI    Review of Systems      Current Outpatient Medications:     PARoxetine (PAXIL) 10 MG tablet, Take 1 tablet by mouth every morning For anxiety, Disp: 90 tablet, Rfl: 3    budesonide-formoterol (SYMBICORT) 160-4.5 MCG/ACT AERO, Inhale 2 puffs into the lungs 2 times daily, Disp: 1 each, Rfl: 3    busPIRone (BUSPAR) 10 MG tablet, Take 1 tablet by mouth 3 times daily, Disp: 270 tablet, Rfl: 1    apixaban (ELIQUIS) 5 MG TABS tablet, Take 1 tablet by mouth 2 times daily, Disp: 60 tablet, Rfl: 5    dilTIAZem (CARDIZEM CD) 120 MG extended release capsule, Take 1 capsule by mouth daily, Disp: 30 capsule, Rfl: 5    methIMAzole (TAPAZOLE) 10 MG tablet, Take 1 tablet twice a day (Patient taking differently: Take 1 tablet by mouth daily), Disp: 60 tablet, Rfl: 3    traZODone (DESYREL) 50 MG tablet, Take 1 tablet by mouth nightly as needed for Sleep, Disp: 30 tablet, Rfl: 5    ferrous sulfate (IRON 325) 325 (65 Fe) MG tablet, Take 1 tablet by mouth daily (with breakfast), Disp: 60 tablet, Rfl: 1    vitamin B-12 (CYANOCOBALAMIN) 500 MCG tablet, Take 1 tablet by mouth daily, Disp: , Rfl:     Budeson-Glycopyrrol-Formoterol (BREZTRI AEROSPHERE) 160-9-4.8 MCG/ACT AERO, Inhale 2 puffs into the lungs 2 times daily, Disp: 1 each, Rfl: 0    OXYGEN, Inhale 2 L into the lungs as needed, Disp: , Rfl:     ipratropium-albuterol (DUONEB) 0.5-2.5 (3) MG/3ML SOLN nebulizer solution, Inhale 3 mLs into the lungs every 4 hours (while awake), Disp: 360 mL, Rfl: 0    Cholecalciferol (VITAMIN D3) 50 MCG (2000 UT) CAPS, Take 1 capsule by mouth daily, Disp: , Rfl:     vitamin C (ASCORBIC ACID) 500 MG tablet, Take 1 tablet by mouth daily, Disp: , Rfl:   Allergies   Allergen Reactions    Pcn [Penicillins] Other (See Comments)     Social History     Socioeconomic History    Marital status:      Spouse

## 2023-10-09 DIAGNOSIS — F41.9 ANXIETY: ICD-10-CM

## 2023-10-09 RX ORDER — IPRATROPIUM BROMIDE AND ALBUTEROL SULFATE 2.5; .5 MG/3ML; MG/3ML
3 SOLUTION RESPIRATORY (INHALATION)
Qty: 360 ML | Refills: 5 | Status: ON HOLD | OUTPATIENT
Start: 2023-10-09

## 2023-10-09 RX ORDER — PAROXETINE 10 MG/1
10 TABLET, FILM COATED ORAL EVERY MORNING
Qty: 90 TABLET | Refills: 3 | Status: ON HOLD | OUTPATIENT
Start: 2023-10-09

## 2023-10-09 NOTE — TELEPHONE ENCOUNTER
----- Message from Girish Aldridge sent at 10/6/2023  2:08 PM EDT -----  Subject: Refill Request    QUESTIONS  Name of Medication? ipratropium-albuterol (DUONEB) 0.5-2.5 (3) MG/3ML SOLN   nebulizer solution  Patient-reported dosage and instructions? As prescribed  How many days do you have left? 1  Preferred Pharmacy? [id number]  Pharmacy phone number (if available)? [phone]  ---------------------------------------------------------------------------  --------------  CALL BACK INFO  What is the best way for the office to contact you? OK to leave message on   voicemail  Preferred Call Back Phone Number? [phone]  ---------------------------------------------------------------------------  --------------  SCRIPT ANSWERS  Relationship to Patient?  Self

## 2023-10-09 NOTE — TELEPHONE ENCOUNTER
----- Message from Srinivasan Scott sent at 10/6/2023  2:10 PM EDT -----  Subject: Refill Request    QUESTIONS  Name of Medication? PARoxetine (PAXIL) 10 MG tablet  Patient-reported dosage and instructions? Once a day  How many days do you have left? 3  Preferred Pharmacy? [id number]  Pharmacy phone number (if available)? [phone]  ---------------------------------------------------------------------------  --------------  CALL BACK INFO  What is the best way for the office to contact you? OK to leave message on   voicemail  Preferred Call Back Phone Number? [phone]  ---------------------------------------------------------------------------  --------------  SCRIPT ANSWERS  Relationship to Patient?  Self

## 2023-10-13 ENCOUNTER — APPOINTMENT (OUTPATIENT)
Dept: CT IMAGING | Age: 68
DRG: 378 | End: 2023-10-13
Payer: MEDICARE

## 2023-10-13 ENCOUNTER — OFFICE VISIT (OUTPATIENT)
Dept: PRIMARY CARE CLINIC | Age: 68
End: 2023-10-13

## 2023-10-13 ENCOUNTER — APPOINTMENT (OUTPATIENT)
Dept: GENERAL RADIOLOGY | Age: 68
DRG: 378 | End: 2023-10-13
Payer: MEDICARE

## 2023-10-13 ENCOUNTER — HOSPITAL ENCOUNTER (INPATIENT)
Age: 68
LOS: 4 days | Discharge: HOME | DRG: 378 | End: 2023-10-18
Attending: STUDENT IN AN ORGANIZED HEALTH CARE EDUCATION/TRAINING PROGRAM | Admitting: STUDENT IN AN ORGANIZED HEALTH CARE EDUCATION/TRAINING PROGRAM
Payer: MEDICARE

## 2023-10-13 VITALS
TEMPERATURE: 97.8 F | BODY MASS INDEX: 14.06 KG/M2 | WEIGHT: 92.8 LBS | OXYGEN SATURATION: 91 % | HEART RATE: 84 BPM | SYSTOLIC BLOOD PRESSURE: 127 MMHG | HEIGHT: 68 IN | DIASTOLIC BLOOD PRESSURE: 78 MMHG

## 2023-10-13 DIAGNOSIS — J44.1 COPD EXACERBATION (HCC): ICD-10-CM

## 2023-10-13 DIAGNOSIS — J43.8 OTHER EMPHYSEMA (HCC): Primary | ICD-10-CM

## 2023-10-13 DIAGNOSIS — K59.09 OTHER CONSTIPATION: Chronic | ICD-10-CM

## 2023-10-13 DIAGNOSIS — R31.29 MICROSCOPIC HEMATURIA: ICD-10-CM

## 2023-10-13 DIAGNOSIS — R63.4 UNEXPLAINED WEIGHT LOSS: Chronic | ICD-10-CM

## 2023-10-13 DIAGNOSIS — D64.9 ACUTE ON CHRONIC ANEMIA: ICD-10-CM

## 2023-10-13 DIAGNOSIS — D64.9 SYMPTOMATIC ANEMIA: Primary | ICD-10-CM

## 2023-10-13 DIAGNOSIS — E46 PROTEIN DEFICIENCY (HCC): Chronic | ICD-10-CM

## 2023-10-13 DIAGNOSIS — K92.2 GASTROINTESTINAL HEMORRHAGE, UNSPECIFIED GASTROINTESTINAL HEMORRHAGE TYPE: ICD-10-CM

## 2023-10-13 LAB
ALBUMIN SERPL-MCNC: 3.8 G/DL (ref 3.5–5.2)
ALP SERPL-CCNC: 71 U/L (ref 35–104)
ALT SERPL-CCNC: 23 U/L (ref 0–32)
ANION GAP SERPL CALCULATED.3IONS-SCNC: 8 MMOL/L (ref 7–16)
AST SERPL-CCNC: 46 U/L (ref 0–31)
BACTERIA URNS QL MICRO: ABNORMAL
BASOPHILS # BLD: 0.08 K/UL (ref 0–0.2)
BASOPHILS NFR BLD: 1 % (ref 0–2)
BILIRUB SERPL-MCNC: <0.2 MG/DL (ref 0–1.2)
BILIRUB UR QL STRIP: NEGATIVE
BNP SERPL-MCNC: 303 PG/ML (ref 0–125)
BUN SERPL-MCNC: 9 MG/DL (ref 6–23)
CALCIUM SERPL-MCNC: 9.5 MG/DL (ref 8.6–10.2)
CHLORIDE SERPL-SCNC: 89 MMOL/L (ref 98–107)
CLARITY UR: CLEAR
CO2 SERPL-SCNC: 39 MMOL/L (ref 22–29)
COLOR UR: YELLOW
CREAT SERPL-MCNC: 0.6 MG/DL (ref 0.5–1)
CRYSTALS URNS MICRO: ABNORMAL /HPF
EOSINOPHIL # BLD: 0.08 K/UL (ref 0.05–0.5)
EOSINOPHILS RELATIVE PERCENT: 1 % (ref 0–6)
ERYTHROCYTE [DISTWIDTH] IN BLOOD BY AUTOMATED COUNT: 16.6 % (ref 11.5–15)
GFR SERPL CREATININE-BSD FRML MDRD: >60 ML/MIN/1.73M2
GLUCOSE SERPL-MCNC: 96 MG/DL (ref 74–99)
GLUCOSE UR STRIP-MCNC: NEGATIVE MG/DL
HCT VFR BLD AUTO: 26.2 % (ref 34–48)
HGB BLD-MCNC: 7.8 G/DL (ref 11.5–15.5)
HGB UR QL STRIP.AUTO: ABNORMAL
IMM GRANULOCYTES # BLD AUTO: <0.03 K/UL (ref 0–0.58)
IMM GRANULOCYTES NFR BLD: 0 % (ref 0–5)
INFLUENZA A BY PCR: NOT DETECTED
INFLUENZA B BY PCR: NOT DETECTED
KETONES UR STRIP-MCNC: NEGATIVE MG/DL
LACTATE BLDV-SCNC: 0.9 MMOL/L (ref 0.5–2.2)
LEUKOCYTE ESTERASE UR QL STRIP: NEGATIVE
LIPASE SERPL-CCNC: 38 U/L (ref 13–60)
LYMPHOCYTES NFR BLD: 1.21 K/UL (ref 1.5–4)
LYMPHOCYTES RELATIVE PERCENT: 18 % (ref 20–42)
MCH RBC QN AUTO: 31.5 PG (ref 26–35)
MCHC RBC AUTO-ENTMCNC: 29.8 G/DL (ref 32–34.5)
MCV RBC AUTO: 105.6 FL (ref 80–99.9)
MONOCYTES NFR BLD: 0.51 K/UL (ref 0.1–0.95)
MONOCYTES NFR BLD: 7 % (ref 2–12)
NEUTROPHILS NFR BLD: 73 % (ref 43–80)
NEUTS SEG NFR BLD: 4.99 K/UL (ref 1.8–7.3)
NITRITE UR QL STRIP: NEGATIVE
PH UR STRIP: 6 [PH] (ref 5–9)
PLATELET # BLD AUTO: 329 K/UL (ref 130–450)
PMV BLD AUTO: 8.5 FL (ref 7–12)
POTASSIUM SERPL-SCNC: 3.7 MMOL/L (ref 3.5–5)
PROT SERPL-MCNC: 7 G/DL (ref 6.4–8.3)
PROT UR STRIP-MCNC: 30 MG/DL
RBC # BLD AUTO: 2.48 M/UL (ref 3.5–5.5)
RBC #/AREA URNS HPF: ABNORMAL /HPF
SODIUM SERPL-SCNC: 136 MMOL/L (ref 132–146)
SP GR UR STRIP: 1.02 (ref 1–1.03)
TROPONIN I SERPL HS-MCNC: 34 NG/L (ref 0–9)
UROBILINOGEN UR STRIP-ACNC: 0.2 EU/DL (ref 0–1)
WBC #/AREA URNS HPF: ABNORMAL /HPF
WBC OTHER # BLD: 6.9 K/UL (ref 4.5–11.5)

## 2023-10-13 PROCEDURE — 6370000000 HC RX 637 (ALT 250 FOR IP): Performed by: STUDENT IN AN ORGANIZED HEALTH CARE EDUCATION/TRAINING PROGRAM

## 2023-10-13 PROCEDURE — 83880 ASSAY OF NATRIURETIC PEPTIDE: CPT

## 2023-10-13 PROCEDURE — 74177 CT ABD & PELVIS W/CONTRAST: CPT

## 2023-10-13 PROCEDURE — 80053 COMPREHEN METABOLIC PANEL: CPT

## 2023-10-13 PROCEDURE — 96375 TX/PRO/DX INJ NEW DRUG ADDON: CPT

## 2023-10-13 PROCEDURE — 85025 COMPLETE CBC W/AUTO DIFF WBC: CPT

## 2023-10-13 PROCEDURE — 6360000004 HC RX CONTRAST MEDICATION: Performed by: RADIOLOGY

## 2023-10-13 PROCEDURE — 87502 INFLUENZA DNA AMP PROBE: CPT

## 2023-10-13 PROCEDURE — 96374 THER/PROPH/DIAG INJ IV PUSH: CPT

## 2023-10-13 PROCEDURE — 99285 EMERGENCY DEPT VISIT HI MDM: CPT

## 2023-10-13 PROCEDURE — 6360000002 HC RX W HCPCS: Performed by: STUDENT IN AN ORGANIZED HEALTH CARE EDUCATION/TRAINING PROGRAM

## 2023-10-13 PROCEDURE — 71046 X-RAY EXAM CHEST 2 VIEWS: CPT

## 2023-10-13 PROCEDURE — 93005 ELECTROCARDIOGRAM TRACING: CPT | Performed by: STUDENT IN AN ORGANIZED HEALTH CARE EDUCATION/TRAINING PROGRAM

## 2023-10-13 PROCEDURE — 83690 ASSAY OF LIPASE: CPT

## 2023-10-13 PROCEDURE — 81001 URINALYSIS AUTO W/SCOPE: CPT

## 2023-10-13 PROCEDURE — 84484 ASSAY OF TROPONIN QUANT: CPT

## 2023-10-13 PROCEDURE — 94640 AIRWAY INHALATION TREATMENT: CPT

## 2023-10-13 PROCEDURE — 83605 ASSAY OF LACTIC ACID: CPT

## 2023-10-13 RX ORDER — IPRATROPIUM BROMIDE AND ALBUTEROL SULFATE 2.5; .5 MG/3ML; MG/3ML
3 SOLUTION RESPIRATORY (INHALATION) ONCE
Status: COMPLETED | OUTPATIENT
Start: 2023-10-13 | End: 2023-10-13

## 2023-10-13 RX ADMIN — METHYLPREDNISOLONE SODIUM SUCCINATE 125 MG: 125 INJECTION, POWDER, FOR SOLUTION INTRAMUSCULAR; INTRAVENOUS at 18:57

## 2023-10-13 RX ADMIN — IPRATROPIUM BROMIDE AND ALBUTEROL SULFATE 3 DOSE: .5; 2.5 SOLUTION RESPIRATORY (INHALATION) at 18:49

## 2023-10-13 RX ADMIN — IOPAMIDOL 75 ML: 755 INJECTION, SOLUTION INTRAVENOUS at 20:54

## 2023-10-13 ASSESSMENT — ENCOUNTER SYMPTOMS
CONSTIPATION: 1
ALLERGIC/IMMUNOLOGIC NEGATIVE: 1
EYES NEGATIVE: 1
SHORTNESS OF BREATH: 1
NAUSEA: 1

## 2023-10-13 ASSESSMENT — LIFESTYLE VARIABLES
HOW MANY STANDARD DRINKS CONTAINING ALCOHOL DO YOU HAVE ON A TYPICAL DAY: PATIENT DOES NOT DRINK
HOW OFTEN DO YOU HAVE A DRINK CONTAINING ALCOHOL: NEVER

## 2023-10-13 ASSESSMENT — PAIN - FUNCTIONAL ASSESSMENT: PAIN_FUNCTIONAL_ASSESSMENT: NONE - DENIES PAIN

## 2023-10-13 NOTE — ED PROVIDER NOTES
[address] ENCOUNTER        Pt Name: Lucía Contreras  MRN: [id number]  [address] [facility] [place] 1955  Date of evaluation: 10/13/2023  Provider: Yohan Abbott DO  PCP: Jeannette Anderson DO  Note Started: 6:08 PM EDT 10/13/23    CHIEF COMPLAINT       Chief Complaint   Patient presents with    Shortness of Breath     Sent in by Dr. Cheryl Florez, has home O2    Nausea       HISTORY OF PRESENT ILLNESS: 1 or more Elements   History From: Patient     Limitations to history : None    Lucía Contreras is a 77 y.o. female with past medical history of GERD, COPD on chronic oxygen, A-fib on Eliquis, hypertension and anxiety who presents to the emergency department due to shortness of breath. Patient was sent in by her PCP for further assessment. She endorses general illness. She has shortness of breath but denies any chest pain. Symptoms are somewhat worse with exertion. Patient states that she has been having vague abdominal discomfort as well. She states that her pain is diffuse to her abdomen and. It is dull, intermittent and nonradiating. Nothing in particular makes it better or worse. It is mild to moderate in severity. Patient is endorsing urinary frequency with no dysuria or hematuria. She also states that she has intermittent diarrhea but feels constipated. Patient reports nausea with no vomiting. She has had a cough as well but she does not feel like she is bringing up any sputum. She is denying other symptoms otherwise including fever, chills, headache, lightheadedness, dizziness, syncope, unilateral weakness, numbness, tingling, flank pain, rash or weakness/fatigue. On initial assessment, patient is nontoxic-appearing and in no acute distress. Mild respiratory distress noted with increased work of breathing. No nasal flaring, grunting, cyanosis or stridor. Patient is breathing somewhat comfortably on her normal oxygen requirements.

## 2023-10-13 NOTE — ED NOTES
Department of Emergency Medicine  FIRST PROVIDER TRIAGE NOTE             Independent MLP           10/13/23  3:27 PM EDT    Date of Encounter: 10/13/23   MRN: [id number]      HPI: Marga Ag is a 77 y.o. female who presents to the ED for Shortness of Breath (Sent in by Dr. Tavo King, has home O2) and Nausea   HAS BEEN NEEDING TO [facility]. ROS: Negative for fever or cough. PE: Gen Appearance/Constitutional: alert  HEENT: NC/NT. PERRLA,  Airway patent. Initial Plan of Care: All treatment areas with department are currently occupied. Plan to order/Initiate the following while awaiting opening in ED: imaging studies.   Initiate Treatment-Testing, Proceed toTreatment Area When Bed Available for ED Attending/ANA to Continue Care    Electronically signed by REGI Cruz CNP   DD: 10/13/23      REGI Cruz CNP  10/13/23 [id number]

## 2023-10-13 NOTE — PROGRESS NOTES
cousin---edwige (Wojciech Jaime) tim---and  other cousin omer---quit    Admit  with copd exac -----------------------------------------------------------------  dr Nathalie Harper pulm    abnormal EKG  in the hospital referred to cardiology--------dr Garcia    Early hyperthyroid      refer to endo    Vit d  defic       Vit b 12 defic   start shtos      Ct    abd pelvis      mass left lung base and advsied  pet scan--- and pulm consult    Pos cologuard    referred to dr Di Ramos but pt refused to go    Pt evicted from apartment-----failed to see specialist    Admit 2022 with exacerbation of COPD,   stress test was positive atrial fibrillation started Eliquis    Dr Luis Gracia     then on  with influenza A in  for constipation    Will discharge again 1218 with right lower lobe pneumonia COPD    Pulmonary evaluation Dr. Kraig Swift 2023    Vit b  12  defic      Brother        71 yrs old cva--pt   now Alex live with his ex------last name aj---his wife [facility]---- is sister with  Osiris Hendrickson my pt    Colon     [facility]----    Anemia     refer to hem    Numb feet      sicne --ordered  emg     Social Determinants of Health     Financial Resource Strain: Low Risk  (2023)    Overall Financial Resource Strain (CARDIA)     Difficulty of Paying Living Expenses: Not very hard   Food Insecurity: No Food Insecurity (2023)    Hunger Vital Sign     Worried About [place] in the Last Year: Never true     [address] in the Last Year: Never true   Transportation Needs: Unknown (2023)    PRAPARE - Transportation     Lack of Transportation (Medical): Not on file     Lack of Transportation (Non-Medical):  No   Physical Activity: Inactive (10/6/2023)    Exercise Vital Sign     Days of Exercise per Week: 0 days     Minutes of Exercise per Session: 0 min   Stress: Not on file   Social Connections: Not on file   Intimate

## 2023-10-13 NOTE — PROGRESS NOTES
[facility] Progress Note    Admitting Date and Time: 12/5/2022  5:20 PM  Admit Dx: Influenza A [J10.1]  COPD exacerbation ([address] [J44.1]  Acute respiratory failure with hypoxia (HCC) [J96.01]  Acute on chronic respiratory failure with hypoxia (HCC) [J96.21]    Subjective:  Patient is being followed for Influenza A [J10.1]  COPD exacerbation ([address] [J44.1]  Acute respiratory failure with hypoxia ([address] [J96.01]  Acute on chronic respiratory failure with hypoxia ([address] [J96.21]   Pt feels cough improved, though still constipated. Per RN: No major concerns except constipation. ROS: denies fever, chills, cp, sob, n/v, HA unless stated above.       bisacodyl  10 mg Rectal Daily    methylPREDNISolone  40 mg IntraVENous Daily    sodium chloride flush  5-40 mL IntraVENous 2 times per day    ipratropium-albuterol  1 ampule Inhalation Q4H    apixaban  5 mg Oral BID    Vitamin D  2,000 Units Oral Daily    dilTIAZem  120 mg Oral Daily    vitamin C  500 mg Oral Daily    Arformoterol Tartrate  15 mcg Nebulization BID    And    budesonide  0.5 mg Nebulization BID    oseltamivir  75 mg Oral BID    docusate sodium  100 mg Oral BID     bisacodyl, 10 mg, Daily PRN  magnesium hydroxide, 30 mL, Daily PRN  zolpidem, 5 mg, Nightly PRN  sodium chloride flush, 5-40 mL, PRN  sodium chloride, 25 mL, PRN  ondansetron, 4 mg, Q8H PRN   Or  ondansetron, 4 mg, Q6H PRN  polyethylene glycol, 17 g, Daily PRN  acetaminophen, 650 mg, Q6H PRN   Or  acetaminophen, 650 mg, Q6H PRN  guaiFENesin-dextromethorphan, 5 mL, Q4H PRN  benzonatate, 100 mg, TID PRN       Objective:    BP (!) 146/91   Pulse 83   Temp 98.6 °F (37 °C) (Oral)   Resp 20   Ht 5' 8\" (1.727 m)   Wt 100 lb (45.4 kg)   SpO2 92%   BMI 15.20 kg/m²     General Appearance: alert and oriented to person, place and time and in no acute distress  Skin: warm and dry  Head: normocephalic and atraumatic  Eyes: pupils equal, round, and reactive to light, extraocular eye From: Dinesh Mehta  To: Marlin Bourne  Sent: 10/13/2023 11:13 AM CDT  Subject: Low Vitamin D    Hi I had blood work done with Ivett Andrews and my Vitamin D was 19. I have not yet heard from her but I figured I would Craig back to you to see about doing additional blood work. Done to address this.  Thanks Valerio Jauregui movements intact, conjunctivae normal  Neck: neck supple and non tender without mass   Pulmonary/Chest: clear to auscultation bilaterally- no wheezes, rales or rhonchi, normal air movement, no respiratory distress  Cardiovascular: normal rate, normal S1 and S2 and no carotid bruits  Abdomen: soft, non-tender, + distended, normal bowel sounds, no masses or organomegaly  Extremities: no cyanosis, no clubbing and no edema  Neurologic: no cranial nerve deficit and speech normal        Recent Labs     12/09/22  0145 12/09/22  1120     --    K 5.5* 4.6   CL 96*  --    CO2 34*  --    BUN 19  --    CREATININE 0.6  --    GLUCOSE 96  --    CALCIUM 9.0  --        Recent Labs     12/07/22  0613 12/09/22  0145   WBC 9.7 15.5*   RBC 3.70 3.89   HGB 11.1* 11.8   HCT 34.7 36.8   MCV 93.8 94.6   MCH 30.0 30.3   MCHC 32.0 32.1   RDW 13.6 13.7    363   MPV 10.1 10.0       Micro:  No components found for: [facility])    Radiology:   XR CHEST PORTABLE    Result Date: 12/5/2022  EXAMINATION: ONE XRAY VIEW OF THE CHEST 12/5/2022 6:15 pm HISTORY: ORDERING SYSTEM PROVIDED HISTORY: Shortness of breath TECHNOLOGIST PROVIDED HISTORY: Reason for exam:->Shortness of breath FINDINGS: The lungs are hyperaerated. There are chronic changes at the lung apices and bases. The heart is not enlarged. No pneumothorax or pleural effusion. No acute cardiopulmonary process. COPD. Assessment:    Principal Problem:    Acute respiratory failure with hypoxia (HCC)  Resolved Problems:    * No resolved hospital problems. *      Plan:  1. Ac hypoxia - improved - underlying Influenza A & COPD exacerbation: Continue Tamiflu 75 mg BID. Duonebs every 4 hours. Tessalon PRN for cough. Continue oxygen therapy PRN. 2 liters at baseline. Solumedrol IV. Continue Duonebs every 4 hours and PRN. Continue Brovana and Pulmicort. 3. Atrial fibrillation: Continue Cardizem and Eliquis. Monitor telemetry.  Currently in NSR.      4. Hypertension: Continue Cardizem. 5. History of hyperthyroidism: TSH 0.039 and free T4 was 1. 19. Monitor. 6. Constipation, - prn laxatives, enema, reviewed KUB     Code Status: full   DVT prophylaxis: Eliquis  Possible dc tomorrow, if clinically improved. NOTE: This report was transcribed using voice recognition software. Every effort was made to ensure accuracy; however, inadvertent computerized transcription errors may be present.   Electronically signed by Erendira Hi MD on 12/9/2022 at 6:00 PM

## 2023-10-14 PROBLEM — K92.2 GI BLEED: Status: ACTIVE | Noted: 2023-10-14

## 2023-10-14 PROBLEM — D64.9 ACUTE ON CHRONIC ANEMIA: Status: ACTIVE | Noted: 2023-10-14

## 2023-10-14 LAB
ANION GAP SERPL CALCULATED.3IONS-SCNC: 8 MMOL/L (ref 7–16)
BASOPHILS # BLD: 0.02 K/UL (ref 0–0.2)
BASOPHILS NFR BLD: 0 % (ref 0–2)
BUN SERPL-MCNC: 9 MG/DL (ref 6–23)
CALCIUM SERPL-MCNC: 8.9 MG/DL (ref 8.6–10.2)
CHLORIDE SERPL-SCNC: 87 MMOL/L (ref 98–107)
CO2 SERPL-SCNC: 38 MMOL/L (ref 22–29)
CREAT SERPL-MCNC: 0.5 MG/DL (ref 0.5–1)
EKG ATRIAL RATE: 65 BPM
EKG Q-T INTERVAL: 404 MS
EKG QRS DURATION: 76 MS
EKG QTC CALCULATION (BAZETT): 420 MS
EKG R AXIS: 68 DEGREES
EKG T AXIS: 74 DEGREES
EKG VENTRICULAR RATE: 65 BPM
EOSINOPHIL # BLD: 0 K/UL (ref 0.05–0.5)
EOSINOPHILS RELATIVE PERCENT: 0 % (ref 0–6)
ERYTHROCYTE [DISTWIDTH] IN BLOOD BY AUTOMATED COUNT: 16.9 % (ref 11.5–15)
ETHANOLAMINE SERPL-MCNC: <10 MG/DL
FERRITIN SERPL-MCNC: 93 NG/ML
FOLATE SERPL-MCNC: 13.2 NG/ML (ref 4.8–24.2)
GFR SERPL CREATININE-BSD FRML MDRD: >60 ML/MIN/1.73M2
GLUCOSE SERPL-MCNC: 139 MG/DL (ref 74–99)
HCT VFR BLD AUTO: 23.5 % (ref 34–48)
HCT VFR BLD AUTO: 25.9 % (ref 34–48)
HGB BLD-MCNC: 7.1 G/DL (ref 11.5–15.5)
HGB BLD-MCNC: 7.6 G/DL (ref 11.5–15.5)
IMM GRANULOCYTES # BLD AUTO: <0.03 K/UL (ref 0–0.58)
IMM GRANULOCYTES NFR BLD: 0 % (ref 0–5)
IMM RETICS NFR: 16.8 % (ref 3–15.9)
IRON SATN MFR SERPL: 7 % (ref 15–50)
IRON SERPL-MCNC: 27 UG/DL (ref 37–145)
LYMPHOCYTES NFR BLD: 0.31 K/UL (ref 1.5–4)
LYMPHOCYTES RELATIVE PERCENT: 7 % (ref 20–42)
MCH RBC QN AUTO: 31 PG (ref 26–35)
MCHC RBC AUTO-ENTMCNC: 29.3 G/DL (ref 32–34.5)
MCV RBC AUTO: 105.7 FL (ref 80–99.9)
MONOCYTES NFR BLD: 0.03 K/UL (ref 0.1–0.95)
MONOCYTES NFR BLD: 1 % (ref 2–12)
NEUTROPHILS NFR BLD: 92 % (ref 43–80)
NEUTS SEG NFR BLD: 4.22 K/UL (ref 1.8–7.3)
PLATELET # BLD AUTO: 323 K/UL (ref 130–450)
PMV BLD AUTO: 8.4 FL (ref 7–12)
POTASSIUM SERPL-SCNC: 3.8 MMOL/L (ref 3.5–5)
RBC # BLD AUTO: 2.45 M/UL (ref 3.5–5.5)
RETIC HEMOGLOBIN: 29.1 PG (ref 28.2–36.6)
RETICS # AUTO: 0.1 M/UL
RETICS/RBC NFR AUTO: 6.3 % (ref 0.4–1.9)
SODIUM SERPL-SCNC: 133 MMOL/L (ref 132–146)
T4 FREE SERPL-MCNC: 0.4 NG/DL (ref 0.9–1.7)
TIBC SERPL-MCNC: 368 UG/DL (ref 250–450)
TROPONIN I SERPL HS-MCNC: 30 NG/L (ref 0–9)
TSH SERPL DL<=0.05 MIU/L-ACNC: 16.73 UIU/ML (ref 0.27–4.2)
VIT B12 SERPL-MCNC: 1411 PG/ML (ref 211–946)
WBC OTHER # BLD: 4.6 K/UL (ref 4.5–11.5)

## 2023-10-14 PROCEDURE — 6360000002 HC RX W HCPCS: Performed by: STUDENT IN AN ORGANIZED HEALTH CARE EDUCATION/TRAINING PROGRAM

## 2023-10-14 PROCEDURE — 6360000002 HC RX W HCPCS: Performed by: FAMILY MEDICINE

## 2023-10-14 PROCEDURE — 84439 ASSAY OF FREE THYROXINE: CPT

## 2023-10-14 PROCEDURE — 85025 COMPLETE CBC W/AUTO DIFF WBC: CPT

## 2023-10-14 PROCEDURE — 85045 AUTOMATED RETICULOCYTE COUNT: CPT

## 2023-10-14 PROCEDURE — 2060000000 HC ICU INTERMEDIATE R&B

## 2023-10-14 PROCEDURE — 94640 AIRWAY INHALATION TREATMENT: CPT

## 2023-10-14 PROCEDURE — 82607 VITAMIN B-12: CPT

## 2023-10-14 PROCEDURE — C9113 INJ PANTOPRAZOLE SODIUM, VIA: HCPCS | Performed by: STUDENT IN AN ORGANIZED HEALTH CARE EDUCATION/TRAINING PROGRAM

## 2023-10-14 PROCEDURE — 82746 ASSAY OF FOLIC ACID SERUM: CPT

## 2023-10-14 PROCEDURE — G0480 DRUG TEST DEF 1-7 CLASSES: HCPCS

## 2023-10-14 PROCEDURE — A4216 STERILE WATER/SALINE, 10 ML: HCPCS | Performed by: STUDENT IN AN ORGANIZED HEALTH CARE EDUCATION/TRAINING PROGRAM

## 2023-10-14 PROCEDURE — 83550 IRON BINDING TEST: CPT

## 2023-10-14 PROCEDURE — 6370000000 HC RX 637 (ALT 250 FOR IP): Performed by: STUDENT IN AN ORGANIZED HEALTH CARE EDUCATION/TRAINING PROGRAM

## 2023-10-14 PROCEDURE — 2580000003 HC RX 258: Performed by: STUDENT IN AN ORGANIZED HEALTH CARE EDUCATION/TRAINING PROGRAM

## 2023-10-14 PROCEDURE — 85014 HEMATOCRIT: CPT

## 2023-10-14 PROCEDURE — 99222 1ST HOSP IP/OBS MODERATE 55: CPT | Performed by: STUDENT IN AN ORGANIZED HEALTH CARE EDUCATION/TRAINING PROGRAM

## 2023-10-14 PROCEDURE — 6370000000 HC RX 637 (ALT 250 FOR IP): Performed by: FAMILY MEDICINE

## 2023-10-14 PROCEDURE — 85018 HEMOGLOBIN: CPT

## 2023-10-14 PROCEDURE — 6370000000 HC RX 637 (ALT 250 FOR IP): Performed by: NURSE PRACTITIONER

## 2023-10-14 PROCEDURE — 93010 ELECTROCARDIOGRAM REPORT: CPT | Performed by: INTERNAL MEDICINE

## 2023-10-14 PROCEDURE — 2700000000 HC OXYGEN THERAPY PER DAY

## 2023-10-14 PROCEDURE — 84443 ASSAY THYROID STIM HORMONE: CPT

## 2023-10-14 PROCEDURE — 80048 BASIC METABOLIC PNL TOTAL CA: CPT

## 2023-10-14 PROCEDURE — 82728 ASSAY OF FERRITIN: CPT

## 2023-10-14 PROCEDURE — 87389 HIV-1 AG W/HIV-1&-2 AB AG IA: CPT

## 2023-10-14 PROCEDURE — 83540 ASSAY OF IRON: CPT

## 2023-10-14 PROCEDURE — 86317 IMMUNOASSAY INFECTIOUS AGENT: CPT

## 2023-10-14 PROCEDURE — 80074 ACUTE HEPATITIS PANEL: CPT

## 2023-10-14 RX ORDER — ACETAMINOPHEN 325 MG/1
650 TABLET ORAL EVERY 6 HOURS PRN
Status: DISCONTINUED | OUTPATIENT
Start: 2023-10-14 | End: 2023-10-18 | Stop reason: HOSPADM

## 2023-10-14 RX ORDER — ARFORMOTEROL TARTRATE 15 UG/2ML
15 SOLUTION RESPIRATORY (INHALATION)
Status: DISCONTINUED | OUTPATIENT
Start: 2023-10-14 | End: 2023-10-18 | Stop reason: HOSPADM

## 2023-10-14 RX ORDER — SODIUM CHLORIDE, SODIUM LACTATE, POTASSIUM CHLORIDE, CALCIUM CHLORIDE 600; 310; 30; 20 MG/100ML; MG/100ML; MG/100ML; MG/100ML
INJECTION, SOLUTION INTRAVENOUS CONTINUOUS
Status: DISCONTINUED | OUTPATIENT
Start: 2023-10-14 | End: 2023-10-18 | Stop reason: HOSPADM

## 2023-10-14 RX ORDER — LORAZEPAM 0.5 MG/1
0.5 TABLET ORAL EVERY 6 HOURS PRN
Status: DISCONTINUED | OUTPATIENT
Start: 2023-10-14 | End: 2023-10-18 | Stop reason: HOSPADM

## 2023-10-14 RX ORDER — IPRATROPIUM BROMIDE AND ALBUTEROL SULFATE 2.5; .5 MG/3ML; MG/3ML
1 SOLUTION RESPIRATORY (INHALATION) EVERY 4 HOURS PRN
Status: DISCONTINUED | OUTPATIENT
Start: 2023-10-14 | End: 2023-10-18 | Stop reason: HOSPADM

## 2023-10-14 RX ORDER — POLYETHYLENE GLYCOL 3350 17 G/17G
17 POWDER, FOR SOLUTION ORAL 2 TIMES DAILY
Status: DISCONTINUED | OUTPATIENT
Start: 2023-10-14 | End: 2023-10-18 | Stop reason: HOSPADM

## 2023-10-14 RX ORDER — ACETAMINOPHEN 650 MG/1
650 SUPPOSITORY RECTAL EVERY 6 HOURS PRN
Status: DISCONTINUED | OUTPATIENT
Start: 2023-10-14 | End: 2023-10-18 | Stop reason: HOSPADM

## 2023-10-14 RX ORDER — SODIUM CHLORIDE 0.9 % (FLUSH) 0.9 %
5-40 SYRINGE (ML) INJECTION EVERY 12 HOURS SCHEDULED
Status: DISCONTINUED | OUTPATIENT
Start: 2023-10-14 | End: 2023-10-18 | Stop reason: HOSPADM

## 2023-10-14 RX ORDER — SENNOSIDES A AND B 8.6 MG/1
2 TABLET, FILM COATED ORAL NIGHTLY PRN
Status: DISCONTINUED | OUTPATIENT
Start: 2023-10-14 | End: 2023-10-18 | Stop reason: HOSPADM

## 2023-10-14 RX ORDER — ONDANSETRON 2 MG/ML
4 INJECTION INTRAMUSCULAR; INTRAVENOUS EVERY 6 HOURS PRN
Status: DISCONTINUED | OUTPATIENT
Start: 2023-10-14 | End: 2023-10-18 | Stop reason: HOSPADM

## 2023-10-14 RX ORDER — POLYETHYLENE GLYCOL 3350 17 G/17G
17 POWDER, FOR SOLUTION ORAL DAILY PRN
Status: DISCONTINUED | OUTPATIENT
Start: 2023-10-14 | End: 2023-10-14

## 2023-10-14 RX ORDER — SODIUM CHLORIDE 9 MG/ML
INJECTION, SOLUTION INTRAVENOUS PRN
Status: DISCONTINUED | OUTPATIENT
Start: 2023-10-14 | End: 2023-10-18 | Stop reason: HOSPADM

## 2023-10-14 RX ORDER — SODIUM CHLORIDE 0.9 % (FLUSH) 0.9 %
5-40 SYRINGE (ML) INJECTION PRN
Status: DISCONTINUED | OUTPATIENT
Start: 2023-10-14 | End: 2023-10-18 | Stop reason: HOSPADM

## 2023-10-14 RX ORDER — ONDANSETRON 4 MG/1
4 TABLET, ORALLY DISINTEGRATING ORAL EVERY 8 HOURS PRN
Status: DISCONTINUED | OUTPATIENT
Start: 2023-10-14 | End: 2023-10-18 | Stop reason: HOSPADM

## 2023-10-14 RX ORDER — BUDESONIDE 0.5 MG/2ML
0.5 INHALANT ORAL
Status: DISCONTINUED | OUTPATIENT
Start: 2023-10-14 | End: 2023-10-18 | Stop reason: HOSPADM

## 2023-10-14 RX ORDER — BUDESONIDE AND FORMOTEROL FUMARATE DIHYDRATE 160; 4.5 UG/1; UG/1
2 AEROSOL RESPIRATORY (INHALATION) 2 TIMES DAILY
Status: DISCONTINUED | OUTPATIENT
Start: 2023-10-14 | End: 2023-10-14 | Stop reason: ALTCHOICE

## 2023-10-14 RX ORDER — LORAZEPAM 2 MG/ML
0.5 INJECTION INTRAMUSCULAR EVERY 6 HOURS PRN
Status: DISCONTINUED | OUTPATIENT
Start: 2023-10-14 | End: 2023-10-18 | Stop reason: HOSPADM

## 2023-10-14 RX ADMIN — ARFORMOTEROL TARTRATE 15 MCG: 15 SOLUTION RESPIRATORY (INHALATION) at 21:08

## 2023-10-14 RX ADMIN — IPRATROPIUM BROMIDE 0.5 MG: 0.5 SOLUTION RESPIRATORY (INHALATION) at 08:00

## 2023-10-14 RX ADMIN — IPRATROPIUM BROMIDE AND ALBUTEROL SULFATE 1 DOSE: .5; 2.5 SOLUTION RESPIRATORY (INHALATION) at 06:00

## 2023-10-14 RX ADMIN — IPRATROPIUM BROMIDE 0.5 MG: 0.5 SOLUTION RESPIRATORY (INHALATION) at 15:40

## 2023-10-14 RX ADMIN — SODIUM CHLORIDE, POTASSIUM CHLORIDE, SODIUM LACTATE AND CALCIUM CHLORIDE: 600; 310; 30; 20 INJECTION, SOLUTION INTRAVENOUS at 18:26

## 2023-10-14 RX ADMIN — LORAZEPAM 0.5 MG: 0.5 TABLET ORAL at 21:34

## 2023-10-14 RX ADMIN — SODIUM CHLORIDE, PRESERVATIVE FREE 40 MG: 5 INJECTION INTRAVENOUS at 15:05

## 2023-10-14 RX ADMIN — IPRATROPIUM BROMIDE 0.5 MG: 0.5 SOLUTION RESPIRATORY (INHALATION) at 12:36

## 2023-10-14 RX ADMIN — SODIUM CHLORIDE 80 MG: 9 INJECTION, SOLUTION INTRAMUSCULAR; INTRAVENOUS; SUBCUTANEOUS at 02:50

## 2023-10-14 RX ADMIN — IPRATROPIUM BROMIDE 0.5 MG: 0.5 SOLUTION RESPIRATORY (INHALATION) at 21:08

## 2023-10-14 RX ADMIN — Medication 10 ML: at 21:38

## 2023-10-14 RX ADMIN — POLYETHYLENE GLYCOL 3350 17 G: 17 POWDER, FOR SOLUTION ORAL at 21:34

## 2023-10-14 RX ADMIN — ACETAMINOPHEN 650 MG: 325 TABLET ORAL at 09:30

## 2023-10-14 RX ADMIN — BUDESONIDE INHALATION 500 MCG: 0.5 SUSPENSION RESPIRATORY (INHALATION) at 21:08

## 2023-10-14 RX ADMIN — SODIUM CHLORIDE, POTASSIUM CHLORIDE, SODIUM LACTATE AND CALCIUM CHLORIDE: 600; 310; 30; 20 INJECTION, SOLUTION INTRAVENOUS at 04:24

## 2023-10-14 RX ADMIN — BUDESONIDE INHALATION 500 MCG: 0.5 SUSPENSION RESPIRATORY (INHALATION) at 08:00

## 2023-10-14 RX ADMIN — LORAZEPAM 0.5 MG: 2 INJECTION INTRAMUSCULAR; INTRAVENOUS at 12:14

## 2023-10-14 RX ADMIN — ARFORMOTEROL TARTRATE 15 MCG: 15 SOLUTION RESPIRATORY (INHALATION) at 08:00

## 2023-10-14 ASSESSMENT — PAIN DESCRIPTION - LOCATION: LOCATION: HEAD

## 2023-10-14 ASSESSMENT — PAIN DESCRIPTION - DESCRIPTORS: DESCRIPTORS: ACHING

## 2023-10-14 ASSESSMENT — PAIN SCALES - GENERAL
PAINLEVEL_OUTOF10: 10
PAINLEVEL_OUTOF10: 0

## 2023-10-14 ASSESSMENT — PAIN DESCRIPTION - ORIENTATION: ORIENTATION: MID;LEFT;RIGHT

## 2023-10-14 ASSESSMENT — PAIN - FUNCTIONAL ASSESSMENT: PAIN_FUNCTIONAL_ASSESSMENT: ACTIVITIES ARE NOT PREVENTED

## 2023-10-14 NOTE — PROGRESS NOTES
January 11, 2021       Kevin Rogers, DO  [address] [facility] [address]  Via Fax: [phone]      Patient: Alexei Hendricks   YOB: 1947   Date of Visit: 1/11/2021       Dear Dr. Rogers:    Thank you for referring Alexei Hendricks to me for evaluation. Below are my notes for this visit with him.    If you have questions, please do not hesitate to call me. I look forward to following your patient along with you.      Sincerely,        Migue Soriano MD        CC: No Recipients  Migue Soriano MD  1/11/2021  1:43 PM  Signed  Due to COVID-19 ACTION PLAN, the patient's office visit was converted to a phone visit as agreed upon by the patient.    It has been 20 months since the patient's last in-office visit to Dr. Soriano.    Alexei Hendricks has verbally agreed to a telephone visit and is speaking to me from their home.  The call initiated at 1:13 PM and terminated at 1:31 PM.    A total of 18 minutes were spent on the phone.    Patient notes the following changes in medical history since last visit:   Chief Complaint   Patient presents with   • Telephonic Visit     1 yr fu        ROS     Patient offers the following concerns: The patient is followed by us for a nonischemic cardiomyopathy with biventricular diastolic CHF, CAD, status post remote OM stenting 11 years ago with a 90% distal lesion not amenable to intervention.  Other problems include HTN, hyperlipidemia, sleep apnea, morbid obesity, CKD 3, and history of PE along with PAF on chronic Coumadin and amiodarone therapy.  His last nuclear stress test 4 years ago showed a fixed lateral ischemic defect corresponding to his known non-Q wave MI in the OM distribution preceding his PCI.    He has not had any anginal symptoms and other than moderate dyspnea on exertion he has not had any orthopnea, PND, or increased leg edema.  He also denies any palpitations or lightheadedness he has been quarantining aggressively in the  New consult sent to Dr. Nando Vincent at this time, Dr. Devora Escobar covering context of the Covid pandemic and has not seen any physicians in person for several months.  Patient is doing home BP checks: {Yes 90/60    The following testing was reviewed during this phone visit: None    Medication and allergy reconciliation completed over the phone.     Conditions:  #1 continue present meds  #2 recheck thyroid, liver panel and chest x-ray next month as part of his amiodarone follow-up  #3 follow-up with Dr. Soriano in person in 6 months    The following problems were addressed during today's call:  Problem List Items Addressed This Visit        Circulatory    Non-ischemic cardiomyopathy (CMS/HCC)    Essential hypertension    Chronic combined systolic and diastolic congestive heart failure (CMS/HCC)    Coronary artery disease involving native coronary artery of native heart without angina pectoris - Primary    Paroxysmal atrial fibrillation (CMS/HCC)       Digestive    Obesity, Class III, BMI 40-49.9 (morbid obesity) (CMS/HCC)       Urinary    Chronic renal impairment, stage 3 (moderate) (CMS/HCC)       Endocrine    Mixed hyperlipidemia    Diabetes mellitus type 2, insulin dependent (CMS/HCC)       Other    Encounter for monitoring amiodarone therapy          The following was ordered during today's call:   No orders of the defined types were placed in this encounter.       Testing should be completed prior to next visit. New prescriptions / refills sent to the pharmacy.    Patient was advised to call if they experience any new or worsening symptoms.    Return in about 6 months (around 7/11/2021).

## 2023-10-14 NOTE — CONSULTS
Name:  Shi Arenas  :  1955  MRN:  [id number]  Room:  [facility]/1355-E  DOS:  10/14/2023    [facility]  [facility]  Dr. Yaakov Amador M.D. Dr. Roslyn Brown M.D. Dr. Brianda Torres D.O. Dr. Melissa Velasquez M.D. Dr. Jennie Ambriz D.O.     -NP Consult Note-    PCP:  Elijah Rosas DO  Admitting Physician:  Mattie Miller MD  Consultants:  GI, heme/onc  Chief Complaint:    Chief Complaint   Patient presents with    Shortness of Breath     Sent in by Dr. Beckie Rangel, has home O2    Nausea     Reason for Consult: Anemia    History of Present Illness  Shi Arenas is a 77 y.o. female patient on consult for anemia. Patient reports that she was seen by her PCP with complaints of upset stomach and shortness of breath. She was sent to the emergency room. Patient reports that she has been having worsening symptoms of upset stomach over the past 10 days. She describes nausea, worse in the mornings. Denies vomiting. Specifically denies hematemesis or emesis of coffee-ground material.  Most recent bowel movement reported as yesterday. Stools described as black and soft. She has a history of constipation. She reports that she generally moves her bowels 3 days/week. Denies any hematochezia. She denies chest pain. Complains of cough. Cough is productive, describing small amounts of yellow, thin mucus. Patient reports weight loss. She states that she has lost almost 30 pounds over the past 11 months. Weight loss was unintentional.  She reports that her appetite and intake seem adequate, but continues to lose weight. PMH: Atrial fibrillation, hypertension, COPD, back pain, arthritis, hypothyroidism. PSH: Back surgery x2, unilateral oophorectomy, tonsillectomy. EGD and colonoscopy as described below. Family history: Mother with heart disease. Father with Alzheimer's. Paternal aunts x2 with breast cancer. Maternal aunt x1 with breast cancer.   She is unaware of any other personal or blunted. Pronounced obstructive airways disease. Curvilinear scarring and or atelectasis at the left lower lobe. Assessment and Plan  Patient is a 77 y.o. female patient on consult for anemia. Anemia  -Acute on chronic  -Macrocytic  -Peripheral blood smear 6/9/2023 showing normocytic anemia, lymphopenia  -Colonoscopy 5/15/2023 by Dr. Dorota Olson showing unremarkable appearing colonic mucosa throughout, small internal hemorrhoids, few skin tags, no bleeding source identified  -EGD 3/27/2023 by Dr. Dorota Olson showing 1 to 2 cm sliding hiatal hernia, otherwise unremarkable esophageal mucosa, normal examined stomach, normal examined proximal small bowel  -History of iron deficiency - repeat pending  -Reports NSAID use several times per week, advised on cessation  -Monitor hemoglobin every 8 hours  -Keep PRBCs on hold  -Defer transfusion to admitting  -Plan for EGD after Eliquis has been held 48 to 72 hours    2. Heartburn / nausea  -EGD 3/27/2023 by Dr. Dorota Olson showing 1 to 2 cm sliding hiatal hernia, otherwise unremarkable esophageal mucosa, normal examined stomach, normal examined proximal small bowel  -Patient not consistently using medications at home, Maalox as needed only  -Protonix 40 mg twice daily  -Antiemetics as needed  -Reports NSAID use several times per week, advised on cessation  -Plan for EGD after Eliquis has been held 48-72 hours    3. Abnormal CT esophagus  -CT abdomen pelvis 10/13/2023 showing thickening of the distal esophagus  -PPI twice daily  -EGD 3/27/2023 by Dr. Dorota Olson showing 1 to 2 cm sliding hiatal hernia, otherwise unremarkable esophageal mucosa, normal examined stomach, normal examined proximal small bowel  -Avoid NSAIDs  -Plan for EGD after Eliquis has been held 48 to 72 hours    4.   Constipation  -Chronic per patient description  -CT abdomen pelvis 10/13/2023 showing large stool burden  -Colonoscopy 5/15/2023 by Dr. Dorota Olson showing unremarkable appearing colonic mucosa throughout,

## 2023-10-14 NOTE — H&P
[facility] History and Physical      CHIEF COMPLAINT: Dizziness, shortness of breath on exertion, fatigue    History of Present Illness:    Ms. Meghana Ramires is a 17-year-old female with past medical history significant for COPD, chronic hypoxic respiratory failure requiring supplemental oxygen via nasal cannula (baseline 3 L), paroxysmal atrial fibrillation on Eliquis, hypertension, COVID-19, vitamin B12 deficiency, hyperthyroidism who presents with dizziness, shortness of breath on exertion, and fatigue over the past few days. In talking with Ms. Sandra Echevarria, she states she has noticed over the past few days that she has been dizzy, more short of breath on exertion than usual and fatigue. She states that walking across the room or up some stairs makes her very fatigued and she has to stop to catch her breath. She denies fevers or chills. She denies cough. She also reports feeling dizzy, but not lightheaded. She does not feel like she is going to pass out. She is currently being worked up for anemia and underwent capsule endoscopy around 1 week ago. She does not know the results of the study yet. She follows with Dr. Darnell Wolf, gastroenterology. She underwent EGD on 3/27/2023 revealing esophagitis and gastritis. She underwent colonoscopy on 5/15/2023 which was normal.  She is also following with a hematologist for anemia, Dr. Nolan Hernández. She decided to present to the [facility] emergency department for further evaluation. In the ED, vitals on presentation were temp 98.4, RR 20, HR 70, /112, O2 sat 100% on 3 L supplemental oxygen via nasal cannula. Lab work was obtained which revealed serum sodium 136, potassium 3.7, bicarb 39, creatinine 0.6, lactic acid 0.9, blood glucose 96, high-sensitivity troponin 34 followed by 30. CBC was obtained which revealed WBC 6.9, hemoglobin 7.8 with .6, platelets 905Q.     Chest x-ray was obtained which revealed pronounced obstructive airways disease. Curvilinear scarring and/or atelectasis at the left lower lobe. Is followed up with CT abdomen/pelvis with IV contrast which revealed no acute abdominal pelvic abnormality. Large volume stool throughout the colon. Mild thickening of the distal esophageal wall, suggesting reflux esophagitis. In the ED, patient was given IV Protonix 80 mg once, as well as IV Solu-Medrol 125 mg once, DuoNeb breathing treatment. Informant(s) for H&P: Patient    REVIEW OF SYSTEMS:  A comprehensive review of systems was negative except for: what is in the HPI      PMH:  Past Medical History:   Diagnosis Date    Acute exacerbation of chronic obstructive pulmonary disease (COPD) ([address]) 07/14/2022    Acute respiratory failure ([address]) 11/27/2022    Acute respiratory failure with hypoxia ([address]) 06/27/2022    Acute respiratory failure with hypoxia (HCC) 11/27/2022    Atrial fibrillation (HCC)     Chronic obstructive pulmonary disease ([address]) 12/15/2022    COPD (chronic obstructive pulmonary disease) (HCC)     COPD exacerbation ([address]) 07/14/2022    COPD exacerbation ([address]) 07/14/2022    COPD with acute exacerbation (HCC)     COPD with acute exacerbation ([address])     COVID-19 07/16/2022    Hypertension     Pneumonia due to infectious organism     Uncontrolled hypertension        Surgical History:  Past Surgical History:   Procedure Laterality Date    BACK SURGERY      LEFT OOPHORECTOMY         Medications Prior to Admission:    Prior to Admission medications    Medication Sig Start Date End Date Taking?  Authorizing Provider   PARoxetine (PAXIL) 10 MG tablet Take 1 tablet by mouth every morning For anxiety 10/9/23   Clyde Gonzalez, DO   ipratropium 0.5 mg-albuterol 2.5 mg (DUONEB) 0.5-2.5 (3) MG/3ML SOLN nebulizer solution Inhale 3 mLs into the lungs every 4 hours (while awake) 10/9/23   Clyde Gonzalez, DO   budesonide-formoterol (SYMBICORT) 160-4.5 MCG/ACT AERO Inhale 2 puffs into the lungs 2 times daily 9/1/23

## 2023-10-14 NOTE — CONSULTS
[facility]  Dr. Greer Gonzalez      Patient Name: Shi Arenas  YOB: 1955  PCP: Elijah Rosas DO   Referring Provider:      Reason for Consultation:   Chief Complaint   Patient presents with    Shortness of Breath     Sent in by Dr. Beckie Rangel, has home O2    Nausea        History of Present Illness: This pt is a 78 yo female who follows with Dr. Susana Blake for anemia. She has a pmhx of A-fib on Eliquis, COPD, O2 dependent, hypertension, hyperlipidemia, vitamin B12 deficiency, and hyperparathyroidism. She has had an EGD done on 3/27/2023, revealing esophagitis and gastritis, she had a colonoscopy done on 5/15/2023, was normal. She was also following for ~30 pound weight loss, had CT C/A/P all negative for neoplasm. She had a + FIT test x 2, and was to undergo VCE with GI. She had evidence of JULIO CESAR and was placed on PO iron. No B12/folate, Cu, zinc deficiencies. MM work up negative. Negative dena and no evidence of hemolysis. She was last seen 8/7/23 and recommended to continue PO iron and RTC in 3 months. Apparently underwent VCE ~1 week prior to admission    She presented to the ER with dizziness, PACHECO and fatigue for several days. No B symptoms. CBC today with normal WBC and platelets. Hgb 7.6, . Iron profile from her 8/23 visit with Dr. Susana Blake showed correction and Hgb at that time was 11.1.  She has been admitted with suspected GI bleeding     Diagnostic Data:     Past Medical History:   Diagnosis Date    Acute exacerbation of chronic obstructive pulmonary disease (COPD) ([address]) 07/14/2022    Acute respiratory failure ([address]) 11/27/2022    Acute respiratory failure with hypoxia ([address]) 06/27/2022    Acute respiratory failure with hypoxia (HCC) 11/27/2022    Atrial fibrillation (HCC)     Chronic obstructive pulmonary disease ([address]) 12/15/2022    COPD (chronic obstructive pulmonary disease) (HCC)     COPD exacerbation ([address]) 07/14/2022    COPD

## 2023-10-14 NOTE — ED NOTES
No room available at this time for pt to be on tele monitor.      Angella Granger, RAMO  10/13/23 2167

## 2023-10-14 NOTE — PROGRESS NOTES
[facility] ([facility]) Hospitalist Progress Note    Admission Date  10/13/2023  5:48 PM  Chief Complaint PACHECO and fatigue  Chief Complaint   Patient presents with    Shortness of Breath     Sent in by Dr. Arnulfo Gates, has home O2    Nausea     Admit Dx   GI bleed [K92.2]  Microscopic hematuria [R31.29]  COPD exacerbation ([address]) [J44.1]  Symptomatic anemia [D64.9]  Gastrointestinal hemorrhage, unspecified gastrointestinal hemorrhage type [K92.2]  Acute on chronic anemia [D64.9]    Subjective  History of Present Illness  Pt presented to ER for worsening fatigue and PACHECO. Started on Protonix 40mg q12 and LR in addition to NPO. GI consulted. Was anxious today after home meds held. Given ativan which was successful. Review of Systems and Physical Exam already performed this calendar date by admitting physician    Assessment / Plan  Past Medical History:   Diagnosis Date    Acute exacerbation of chronic obstructive pulmonary disease (COPD) ([address]) 07/14/2022    Acute respiratory failure ([address]) 11/27/2022    Acute respiratory failure with hypoxia ([address]) 06/27/2022    Acute respiratory failure with hypoxia (HCC) 11/27/2022    Atrial fibrillation (HCC)     Chronic obstructive pulmonary disease ([address]) 12/15/2022    COPD (chronic obstructive pulmonary disease) (HCC)     COPD exacerbation ([address]) 07/14/2022    COPD exacerbation ([address]) 07/14/2022    COPD with acute exacerbation (HCC)     COPD with acute exacerbation ([address])     COVID-19 07/16/2022    Hypertension     Pneumonia due to infectious organism     Uncontrolled hypertension      Continue NPO, trend H&H and transfuse if needed. GI info appreciated. Pt's PMH otherwise pertinent for none. Continue outpt med regimen; if any particular issues regarding these items, will address and move to active problem list above. Please see orders for further plan of care.     Code status  full  Full Code   DVT prophylaxis SCD  Disposition  TBD    Electronically signed by Herminio Juarez DO on 10/14/2023 at 1:16 PM

## 2023-10-14 NOTE — PROGRESS NOTES
Patient very anxious, stating that she is having a panic attack and would like something to be ordered to calm her down. Dr. Dianna Sellers notified, awaiting new orders.

## 2023-10-14 NOTE — PROGRESS NOTES
4 Eyes Skin Assessment     NAME:  Arcelia Vizcarra  YOB: 1955  MEDICAL RECORD NUMBER:  [id number]    The patient is being assessed for  Admission    I agree that at least one RN has performed a thorough Head to Toe Skin Assessment on the patient. ALL assessment sites listed below have been assessed. Areas assessed by both nurses:    Head, Face, Ears, Shoulders, Back, Chest, Arms, Elbows, Hands, Sacrum. Buttock, Coccyx, Ischium, Legs. Feet and Heels, and Under Medical Devices         Does the Patient have a Wound?  No noted wound(s)       Guy Prevention initiated by RN: No  Wound Care Orders initiated by RN: No    Pressure Injury (Stage 3,4, Unstageable, DTI, NWPT, and Complex wounds) if present, place Wound referral order by RN under : No    New Ostomies, if present place, Ostomy referral order under : No     Nurse 1 eSignature: Electronically signed by Mingo Alejandra RN on 37/88/06 at 5:09 AM EDT    **SHARE this note so that the co-signing nurse can place an eSignature**    Nurse 2 eSignature: Electronically signed by Chitra Anderson RN on 10/14/23 at 5:11 AM EDT

## 2023-10-15 LAB
ALBUMIN SERPL-MCNC: 3.6 G/DL (ref 3.5–5.2)
ALP SERPL-CCNC: 63 U/L (ref 35–104)
ALT SERPL-CCNC: 20 U/L (ref 0–32)
ANION GAP SERPL CALCULATED.3IONS-SCNC: 5 MMOL/L (ref 7–16)
AST SERPL-CCNC: 42 U/L (ref 0–31)
BASOPHILS # BLD: 0.05 K/UL (ref 0–0.2)
BASOPHILS NFR BLD: 1 % (ref 0–2)
BILIRUB DIRECT SERPL-MCNC: <0.2 MG/DL (ref 0–0.3)
BILIRUB INDIRECT SERPL-MCNC: ABNORMAL MG/DL (ref 0–1)
BILIRUB SERPL-MCNC: <0.2 MG/DL (ref 0–1.2)
BUN SERPL-MCNC: 11 MG/DL (ref 6–23)
CALCIUM SERPL-MCNC: 7.9 MG/DL (ref 8.6–10.2)
CHLORIDE SERPL-SCNC: 93 MMOL/L (ref 98–107)
CO2 SERPL-SCNC: 37 MMOL/L (ref 22–29)
CREAT SERPL-MCNC: 0.6 MG/DL (ref 0.5–1)
EOSINOPHIL # BLD: 0.08 K/UL (ref 0.05–0.5)
EOSINOPHILS RELATIVE PERCENT: 1 % (ref 0–6)
ERYTHROCYTE [DISTWIDTH] IN BLOOD BY AUTOMATED COUNT: 17.2 % (ref 11.5–15)
GFR SERPL CREATININE-BSD FRML MDRD: >60 ML/MIN/1.73M2
GLUCOSE SERPL-MCNC: 90 MG/DL (ref 74–99)
HCT VFR BLD AUTO: 23.5 % (ref 34–48)
HCT VFR BLD AUTO: 24.5 % (ref 34–48)
HCT VFR BLD AUTO: 25.4 % (ref 34–48)
HGB BLD-MCNC: 7.1 G/DL (ref 11.5–15.5)
HGB BLD-MCNC: 7.5 G/DL (ref 11.5–15.5)
HGB BLD-MCNC: 7.6 G/DL (ref 11.5–15.5)
IMM GRANULOCYTES # BLD AUTO: <0.03 K/UL (ref 0–0.58)
IMM GRANULOCYTES NFR BLD: 0 % (ref 0–5)
INR PPP: 1.1
LYMPHOCYTES NFR BLD: 1.43 K/UL (ref 1.5–4)
LYMPHOCYTES RELATIVE PERCENT: 20 % (ref 20–42)
MCH RBC QN AUTO: 32.3 PG (ref 26–35)
MCHC RBC AUTO-ENTMCNC: 30.6 G/DL (ref 32–34.5)
MCV RBC AUTO: 105.6 FL (ref 80–99.9)
MONOCYTES NFR BLD: 0.41 K/UL (ref 0.1–0.95)
MONOCYTES NFR BLD: 6 % (ref 2–12)
NEUTROPHILS NFR BLD: 72 % (ref 43–80)
NEUTS SEG NFR BLD: 5.06 K/UL (ref 1.8–7.3)
PLATELET # BLD AUTO: 307 K/UL (ref 130–450)
PMV BLD AUTO: 8.5 FL (ref 7–12)
POTASSIUM SERPL-SCNC: 4.1 MMOL/L (ref 3.5–5)
PROT SERPL-MCNC: 6.2 G/DL (ref 6.4–8.3)
PROTHROMBIN TIME: 12 SEC (ref 9.3–12.4)
RBC # BLD AUTO: 2.32 M/UL (ref 3.5–5.5)
SODIUM SERPL-SCNC: 135 MMOL/L (ref 132–146)
WBC OTHER # BLD: 7.1 K/UL (ref 4.5–11.5)

## 2023-10-15 PROCEDURE — 2580000003 HC RX 258: Performed by: STUDENT IN AN ORGANIZED HEALTH CARE EDUCATION/TRAINING PROGRAM

## 2023-10-15 PROCEDURE — 85014 HEMATOCRIT: CPT

## 2023-10-15 PROCEDURE — 85018 HEMOGLOBIN: CPT

## 2023-10-15 PROCEDURE — 80053 COMPREHEN METABOLIC PANEL: CPT

## 2023-10-15 PROCEDURE — 99233 SBSQ HOSP IP/OBS HIGH 50: CPT | Performed by: INTERNAL MEDICINE

## 2023-10-15 PROCEDURE — 6370000000 HC RX 637 (ALT 250 FOR IP): Performed by: FAMILY MEDICINE

## 2023-10-15 PROCEDURE — 2060000000 HC ICU INTERMEDIATE R&B

## 2023-10-15 PROCEDURE — 85610 PROTHROMBIN TIME: CPT

## 2023-10-15 PROCEDURE — 6360000002 HC RX W HCPCS: Performed by: STUDENT IN AN ORGANIZED HEALTH CARE EDUCATION/TRAINING PROGRAM

## 2023-10-15 PROCEDURE — 94640 AIRWAY INHALATION TREATMENT: CPT

## 2023-10-15 PROCEDURE — C9113 INJ PANTOPRAZOLE SODIUM, VIA: HCPCS | Performed by: STUDENT IN AN ORGANIZED HEALTH CARE EDUCATION/TRAINING PROGRAM

## 2023-10-15 PROCEDURE — A4216 STERILE WATER/SALINE, 10 ML: HCPCS | Performed by: STUDENT IN AN ORGANIZED HEALTH CARE EDUCATION/TRAINING PROGRAM

## 2023-10-15 PROCEDURE — 6360000002 HC RX W HCPCS: Performed by: INTERNAL MEDICINE

## 2023-10-15 PROCEDURE — 82248 BILIRUBIN DIRECT: CPT

## 2023-10-15 PROCEDURE — 85025 COMPLETE CBC W/AUTO DIFF WBC: CPT

## 2023-10-15 PROCEDURE — 6370000000 HC RX 637 (ALT 250 FOR IP): Performed by: NURSE PRACTITIONER

## 2023-10-15 PROCEDURE — 2580000003 HC RX 258: Performed by: INTERNAL MEDICINE

## 2023-10-15 PROCEDURE — 99222 1ST HOSP IP/OBS MODERATE 55: CPT | Performed by: INTERNAL MEDICINE

## 2023-10-15 PROCEDURE — 2700000000 HC OXYGEN THERAPY PER DAY

## 2023-10-15 RX ADMIN — SODIUM CHLORIDE, PRESERVATIVE FREE 40 MG: 5 INJECTION INTRAVENOUS at 05:52

## 2023-10-15 RX ADMIN — IPRATROPIUM BROMIDE 0.5 MG: 0.5 SOLUTION RESPIRATORY (INHALATION) at 13:02

## 2023-10-15 RX ADMIN — IPRATROPIUM BROMIDE 0.5 MG: 0.5 SOLUTION RESPIRATORY (INHALATION) at 15:56

## 2023-10-15 RX ADMIN — ARFORMOTEROL TARTRATE 15 MCG: 15 SOLUTION RESPIRATORY (INHALATION) at 08:12

## 2023-10-15 RX ADMIN — BUDESONIDE INHALATION 500 MCG: 0.5 SUSPENSION RESPIRATORY (INHALATION) at 20:45

## 2023-10-15 RX ADMIN — SODIUM CHLORIDE, PRESERVATIVE FREE 10 ML: 5 INJECTION INTRAVENOUS at 11:54

## 2023-10-15 RX ADMIN — IPRATROPIUM BROMIDE 0.5 MG: 0.5 SOLUTION RESPIRATORY (INHALATION) at 20:45

## 2023-10-15 RX ADMIN — POLYETHYLENE GLYCOL 3350 17 G: 17 POWDER, FOR SOLUTION ORAL at 11:53

## 2023-10-15 RX ADMIN — IPRATROPIUM BROMIDE 0.5 MG: 0.5 SOLUTION RESPIRATORY (INHALATION) at 08:12

## 2023-10-15 RX ADMIN — SODIUM CHLORIDE, POTASSIUM CHLORIDE, SODIUM LACTATE AND CALCIUM CHLORIDE: 600; 310; 30; 20 INJECTION, SOLUTION INTRAVENOUS at 21:20

## 2023-10-15 RX ADMIN — Medication 10 ML: at 11:59

## 2023-10-15 RX ADMIN — SODIUM CHLORIDE, POTASSIUM CHLORIDE, SODIUM LACTATE AND CALCIUM CHLORIDE: 600; 310; 30; 20 INJECTION, SOLUTION INTRAVENOUS at 05:58

## 2023-10-15 RX ADMIN — SODIUM CHLORIDE, PRESERVATIVE FREE 40 MG: 5 INJECTION INTRAVENOUS at 18:30

## 2023-10-15 RX ADMIN — IRON SUCROSE 200 MG: 20 INJECTION, SOLUTION INTRAVENOUS at 12:27

## 2023-10-15 RX ADMIN — BUDESONIDE INHALATION 500 MCG: 0.5 SUSPENSION RESPIRATORY (INHALATION) at 08:12

## 2023-10-15 RX ADMIN — LORAZEPAM 0.5 MG: 0.5 TABLET ORAL at 21:18

## 2023-10-15 RX ADMIN — ARFORMOTEROL TARTRATE 15 MCG: 15 SOLUTION RESPIRATORY (INHALATION) at 20:45

## 2023-10-15 NOTE — CONSULTS
ENDOCRINOLOGY INITIAL CONSULTATION NOTE    Date of Admission: 10/13/2023  Date of Service: 10/15/2023  Admitting Physician: Kimberley Navarro MD   Primary Care Physician: Damir Fuchs DO  Consultant physician: Dylan Astorga MD     Reason for the consultation:  Hyperthyroidism     History of Present Illness: The history is provided by the patient. Accuracy of the patient data is excellent. Michelle Ponce is a very pleasant 77 y.o. old female with past medical history of A-fib, COPD, hyperthyroidism, hyperlipidemia and other listed below admitted to [address] [place] [address] on 10/13/2023 because of dyspnea on exertion, endocrine service was consulted for management of hyperthyroidism    Patient was diagnosed with hyperthyroidism in May/2023. At that time she was c/o  palpitations, weight loss, change in appetite, nervousness, anxiety, irritability, tremor, sweating, heat intolerance, but denies changes in bowel habits, muscle weakness or difficulty sleeping and work-up showed severe hyperthyroidism. The patient was started on methimazole in July 2023 and prior to admission she was on 10 mg once daily. Dose was recently decreased from twice daily to daily. Today, patient reports feeling tired and fatigued and dry skin. She denies palpitations.     Past Medical History   Past Medical History:   Diagnosis Date    Acute exacerbation of chronic obstructive pulmonary disease (COPD) ([address]) 07/14/2022    Acute respiratory failure ([address]) 11/27/2022    Acute respiratory failure with hypoxia (HCC) 06/27/2022    Acute respiratory failure with hypoxia (HCC) 11/27/2022    Atrial fibrillation (HCC)     Chronic obstructive pulmonary disease ([address]) 12/15/2022    COPD (chronic obstructive pulmonary disease) (HCC)     COPD exacerbation ([address]) 07/14/2022    COPD exacerbation ([address]) 07/14/2022    COPD with acute exacerbation (HCC)     COPD with acute exacerbation ([address])     COVID-19 07/16/2022    Hypertension     Pneumonia due to infectious with the Primary Service/Nursing staff      The above issues were reviewed with the patient who understood and agreed with the plan. Thank you for allowing us to participate in the care of this patient. Please do not hesitate to contact us with any additional questions. Victorina Grady MD  Endocrinologist, [facility] Diabetes Care and Endocrinology   [facility] [facility] [address]   Phone: [phone]  Fax: [phone]  ---------------------------------  An electronic signature was used to authenticate this note.  Carmen Li MD on 10/15/2023 at 12:00 PM

## 2023-10-15 NOTE — PLAN OF CARE
Problem: Discharge Planning  Goal: Discharge to home or other facility with appropriate resources  10/15/2023 0800 by Anna Benoit RN  Outcome: Progressing  10/15/2023 0145 by Arsen Bah RN  Outcome: Progressing     Problem: Pain  Goal: Verbalizes/displays adequate comfort level or baseline comfort level  10/15/2023 0800 by Anna Benoit RN  Outcome: Progressing  10/15/2023 0145 by Arsen Bah RN  Outcome: Progressing     Problem: ABCDS Injury Assessment  Goal: Absence of physical injury  10/15/2023 0800 by Anna Benoit RN  Outcome: Progressing  10/15/2023 0145 by Arsen Bah RN  Outcome: Progressing     Problem: Safety - Adult  Goal: Free from fall injury  10/15/2023 0800 by Anna Benoit RN  Outcome: Progressing  10/15/2023 0145 by Arsen Bah RN  Outcome: Progressing

## 2023-10-15 NOTE — PROGRESS NOTES
When this nurse came on shift, Lactated Ringers not running. Pt states \"I pee too much and I just really needed those fluids shut off\" Fly Noriega NP notified.

## 2023-10-15 NOTE — PROGRESS NOTES
[facility]    Admitting Date and Time: 10/13/2023  5:48 PM  Admit Dx: GI bleed [K92.2]  Microscopic hematuria [R31.29]  COPD exacerbation (HCC) [J44.1]  Symptomatic anemia [D64.9]  Gastrointestinal hemorrhage, unspecified gastrointestinal hemorrhage type [K92.2]  Acute on chronic anemia [D64.9]    Subjective: Patient was seen by PCP on 13th, further evaluation of nausea as well as breathing issues and back pain. Impression in PCP office of unexplained weight loss, constipation, patient was sent to ED. Impression from ED of symptomatic anemia due to GI bleed as well as COPD exacerbation. Admitted by medicine on 14th for further management of dizziness, fatigue and shortness of breath. Patient with known COPD, chronic hypoxic respiratory failure requiring 3 L of oxygen at home, known PAF on Eliquis, hypertension, COVID-19, B12 deficiency, hyperthyroidism. Admitted by medicine, GI as well as hematology on consult. Eliquis remains on hold. Current smoker for 40 years. As per hematology anemia multifactorial.  Likely JULIO CESAR due to blood loss. Patient seen by GI, had colonoscopy in May of this year with normal colonic mucosa throughout, patient does have small internal hemorrhoids. Also patient had a EGD in March which had shown 1 to 2 cm sliding hiatal hernia otherwise normal exam.  Patient do have use of NSAIDs. Do plan for EGD after Eliquis on hold for 48-72 hours. Did suggest hepatitis panel, HIV and TFTs for the unintentional weight loss. Iron saturations of 7, TIBC of 368 on 14th. Patient was admitted with GI bleed [K92.2]  Microscopic hematuria [R31.29]  COPD exacerbation (HCC) [J44.1]  Symptomatic anemia [D64.9]  Gastrointestinal hemorrhage, unspecified gastrointestinal hemorrhage type [K92.2]  Acute on chronic anemia [D64.9]. Patient feels comfortable, at this time awake alert, sitting in bed, does communicate well, cachectic, on supplemental oxygen.

## 2023-10-15 NOTE — PLAN OF CARE
Problem: Discharge Planning  Goal: Discharge to home or other facility with appropriate resources  10/15/2023 0145 by Duran Montague RN  Outcome: Progressing  10/14/2023 1341 by Sri Curtis RN  Outcome: Progressing     Problem: Pain  Goal: Verbalizes/displays adequate comfort level or baseline comfort level  10/15/2023 0145 by Duran Montague RN  Outcome: Progressing  10/14/2023 1341 by Sri Curtis RN  Outcome: Progressing     Problem: ABCDS Injury Assessment  Goal: Absence of physical injury  10/15/2023 0145 by Duran Montague RN  Outcome: Progressing  10/14/2023 1341 by Sri Curtis RN  Outcome: Progressing     Problem: Safety - Adult  Goal: Free from fall injury  10/15/2023 0145 by Duran Montague RN  Outcome: Progressing  10/14/2023 1341 by Sri Curtis RN  Outcome: Progressing

## 2023-10-16 ENCOUNTER — TELEPHONE (OUTPATIENT)
Dept: PRIMARY CARE CLINIC | Age: 68
End: 2023-10-16

## 2023-10-16 ENCOUNTER — ANESTHESIA EVENT (OUTPATIENT)
Dept: ENDOSCOPY | Age: 68
DRG: 378 | End: 2023-10-16
Payer: MEDICARE

## 2023-10-16 ENCOUNTER — ANESTHESIA (OUTPATIENT)
Dept: ENDOSCOPY | Age: 68
DRG: 378 | End: 2023-10-16
Payer: MEDICARE

## 2023-10-16 LAB
HAV IGM SERPL QL IA: NONREACTIVE
HBV CORE IGM SERPL QL IA: NONREACTIVE
HBV SURFACE AB SERPL IA-ACNC: <3.1 MIU/ML (ref 0–9.99)
HBV SURFACE AG SERPL QL IA: NONREACTIVE
HCT VFR BLD AUTO: 25.4 % (ref 34–48)
HCT VFR BLD AUTO: 25.4 % (ref 34–48)
HCV AB SERPL QL IA: NONREACTIVE
HGB BLD-MCNC: 7.6 G/DL (ref 11.5–15.5)
HGB BLD-MCNC: 7.8 G/DL (ref 11.5–15.5)
HIV 1+2 AB+HIV1 P24 AG SERPL QL IA: NONREACTIVE
T4 FREE SERPL-MCNC: 0.4 NG/DL (ref 0.9–1.7)

## 2023-10-16 PROCEDURE — 7100000010 HC PHASE II RECOVERY - FIRST 15 MIN: Performed by: INTERNAL MEDICINE

## 2023-10-16 PROCEDURE — 3609017100 HC EGD: Performed by: INTERNAL MEDICINE

## 2023-10-16 PROCEDURE — 2060000000 HC ICU INTERMEDIATE R&B

## 2023-10-16 PROCEDURE — 2580000003 HC RX 258: Performed by: STUDENT IN AN ORGANIZED HEALTH CARE EDUCATION/TRAINING PROGRAM

## 2023-10-16 PROCEDURE — 0DJ08ZZ INSPECTION OF UPPER INTESTINAL TRACT, VIA NATURAL OR ARTIFICIAL OPENING ENDOSCOPIC: ICD-10-PCS | Performed by: INTERNAL MEDICINE

## 2023-10-16 PROCEDURE — 94640 AIRWAY INHALATION TREATMENT: CPT

## 2023-10-16 PROCEDURE — 85018 HEMOGLOBIN: CPT

## 2023-10-16 PROCEDURE — 2580000003 HC RX 258: Performed by: NURSE ANESTHETIST, CERTIFIED REGISTERED

## 2023-10-16 PROCEDURE — C9113 INJ PANTOPRAZOLE SODIUM, VIA: HCPCS | Performed by: STUDENT IN AN ORGANIZED HEALTH CARE EDUCATION/TRAINING PROGRAM

## 2023-10-16 PROCEDURE — 85014 HEMATOCRIT: CPT

## 2023-10-16 PROCEDURE — 3700000000 HC ANESTHESIA ATTENDED CARE: Performed by: INTERNAL MEDICINE

## 2023-10-16 PROCEDURE — 6360000002 HC RX W HCPCS: Performed by: NURSE ANESTHETIST, CERTIFIED REGISTERED

## 2023-10-16 PROCEDURE — A4216 STERILE WATER/SALINE, 10 ML: HCPCS | Performed by: STUDENT IN AN ORGANIZED HEALTH CARE EDUCATION/TRAINING PROGRAM

## 2023-10-16 PROCEDURE — 6360000002 HC RX W HCPCS: Performed by: STUDENT IN AN ORGANIZED HEALTH CARE EDUCATION/TRAINING PROGRAM

## 2023-10-16 PROCEDURE — 6370000000 HC RX 637 (ALT 250 FOR IP): Performed by: NURSE PRACTITIONER

## 2023-10-16 PROCEDURE — 99232 SBSQ HOSP IP/OBS MODERATE 35: CPT | Performed by: INTERNAL MEDICINE

## 2023-10-16 PROCEDURE — 2709999900 HC NON-CHARGEABLE SUPPLY: Performed by: INTERNAL MEDICINE

## 2023-10-16 PROCEDURE — 2700000000 HC OXYGEN THERAPY PER DAY

## 2023-10-16 PROCEDURE — 7100000011 HC PHASE II RECOVERY - ADDTL 15 MIN: Performed by: INTERNAL MEDICINE

## 2023-10-16 PROCEDURE — 6370000000 HC RX 637 (ALT 250 FOR IP): Performed by: FAMILY MEDICINE

## 2023-10-16 PROCEDURE — 84439 ASSAY OF FREE THYROXINE: CPT

## 2023-10-16 PROCEDURE — 36415 COLL VENOUS BLD VENIPUNCTURE: CPT

## 2023-10-16 RX ORDER — SODIUM CHLORIDE 9 MG/ML
INJECTION, SOLUTION INTRAVENOUS PRN
Status: DISCONTINUED | OUTPATIENT
Start: 2023-10-16 | End: 2023-10-16 | Stop reason: HOSPADM

## 2023-10-16 RX ORDER — SODIUM CHLORIDE 0.9 % (FLUSH) 0.9 %
5-40 SYRINGE (ML) INJECTION EVERY 12 HOURS SCHEDULED
Status: DISCONTINUED | OUTPATIENT
Start: 2023-10-16 | End: 2023-10-16 | Stop reason: HOSPADM

## 2023-10-16 RX ORDER — FENTANYL CITRATE 50 UG/ML
25 INJECTION, SOLUTION INTRAMUSCULAR; INTRAVENOUS EVERY 5 MIN PRN
Status: DISCONTINUED | OUTPATIENT
Start: 2023-10-16 | End: 2023-10-16 | Stop reason: HOSPADM

## 2023-10-16 RX ORDER — PROPOFOL 10 MG/ML
INJECTION, EMULSION INTRAVENOUS PRN
Status: DISCONTINUED | OUTPATIENT
Start: 2023-10-16 | End: 2023-10-16 | Stop reason: SDUPTHER

## 2023-10-16 RX ORDER — DIPHENHYDRAMINE HYDROCHLORIDE 50 MG/ML
12.5 INJECTION INTRAMUSCULAR; INTRAVENOUS
Status: DISCONTINUED | OUTPATIENT
Start: 2023-10-16 | End: 2023-10-16 | Stop reason: HOSPADM

## 2023-10-16 RX ORDER — PROCHLORPERAZINE EDISYLATE 5 MG/ML
5 INJECTION INTRAMUSCULAR; INTRAVENOUS
Status: DISCONTINUED | OUTPATIENT
Start: 2023-10-16 | End: 2023-10-16 | Stop reason: HOSPADM

## 2023-10-16 RX ORDER — SODIUM CHLORIDE 9 MG/ML
INJECTION, SOLUTION INTRAVENOUS CONTINUOUS PRN
Status: DISCONTINUED | OUTPATIENT
Start: 2023-10-16 | End: 2023-10-16 | Stop reason: SDUPTHER

## 2023-10-16 RX ORDER — MEPERIDINE HYDROCHLORIDE 25 MG/ML
12.5 INJECTION INTRAMUSCULAR; INTRAVENOUS; SUBCUTANEOUS ONCE
Status: DISCONTINUED | OUTPATIENT
Start: 2023-10-16 | End: 2023-10-16 | Stop reason: HOSPADM

## 2023-10-16 RX ORDER — LABETALOL HYDROCHLORIDE 5 MG/ML
5 INJECTION, SOLUTION INTRAVENOUS
Status: DISCONTINUED | OUTPATIENT
Start: 2023-10-16 | End: 2023-10-16 | Stop reason: HOSPADM

## 2023-10-16 RX ORDER — SODIUM CHLORIDE 0.9 % (FLUSH) 0.9 %
5-40 SYRINGE (ML) INJECTION PRN
Status: DISCONTINUED | OUTPATIENT
Start: 2023-10-16 | End: 2023-10-16 | Stop reason: HOSPADM

## 2023-10-16 RX ORDER — HYDRALAZINE HYDROCHLORIDE 20 MG/ML
5 INJECTION INTRAMUSCULAR; INTRAVENOUS
Status: DISCONTINUED | OUTPATIENT
Start: 2023-10-16 | End: 2023-10-16 | Stop reason: HOSPADM

## 2023-10-16 RX ADMIN — PROPOFOL 60 MG: 10 INJECTION, EMULSION INTRAVENOUS at 17:52

## 2023-10-16 RX ADMIN — IPRATROPIUM BROMIDE 0.5 MG: 0.5 SOLUTION RESPIRATORY (INHALATION) at 07:43

## 2023-10-16 RX ADMIN — SODIUM CHLORIDE, PRESERVATIVE FREE 40 MG: 5 INJECTION INTRAVENOUS at 21:39

## 2023-10-16 RX ADMIN — LORAZEPAM 0.5 MG: 0.5 TABLET ORAL at 21:39

## 2023-10-16 RX ADMIN — BUDESONIDE INHALATION 500 MCG: 0.5 SUSPENSION RESPIRATORY (INHALATION) at 07:43

## 2023-10-16 RX ADMIN — IPRATROPIUM BROMIDE 0.5 MG: 0.5 SOLUTION RESPIRATORY (INHALATION) at 19:41

## 2023-10-16 RX ADMIN — ARFORMOTEROL TARTRATE 15 MCG: 15 SOLUTION RESPIRATORY (INHALATION) at 07:42

## 2023-10-16 RX ADMIN — SODIUM CHLORIDE, PRESERVATIVE FREE 40 MG: 5 INJECTION INTRAVENOUS at 05:58

## 2023-10-16 RX ADMIN — SODIUM CHLORIDE, POTASSIUM CHLORIDE, SODIUM LACTATE AND CALCIUM CHLORIDE: 600; 310; 30; 20 INJECTION, SOLUTION INTRAVENOUS at 00:36

## 2023-10-16 RX ADMIN — IPRATROPIUM BROMIDE 0.5 MG: 0.5 SOLUTION RESPIRATORY (INHALATION) at 11:25

## 2023-10-16 RX ADMIN — SODIUM CHLORIDE: 9 INJECTION, SOLUTION INTRAVENOUS at 17:50

## 2023-10-16 RX ADMIN — IPRATROPIUM BROMIDE 0.5 MG: 0.5 SOLUTION RESPIRATORY (INHALATION) at 15:33

## 2023-10-16 RX ADMIN — Medication 10 ML: at 21:39

## 2023-10-16 RX ADMIN — ARFORMOTEROL TARTRATE 15 MCG: 15 SOLUTION RESPIRATORY (INHALATION) at 19:41

## 2023-10-16 RX ADMIN — BUDESONIDE INHALATION 500 MCG: 0.5 SUSPENSION RESPIRATORY (INHALATION) at 19:41

## 2023-10-16 RX ADMIN — POLYETHYLENE GLYCOL 3350 17 G: 17 POWDER, FOR SOLUTION ORAL at 21:39

## 2023-10-16 ASSESSMENT — ENCOUNTER SYMPTOMS
SHORTNESS OF BREATH: 1
DYSPNEA ACTIVITY LEVEL: NO INTERVAL CHANGE

## 2023-10-16 ASSESSMENT — LIFESTYLE VARIABLES: SMOKING_STATUS: 1

## 2023-10-16 ASSESSMENT — COPD QUESTIONNAIRES: CAT_SEVERITY: SEVERE

## 2023-10-16 ASSESSMENT — PAIN SCALES - GENERAL: PAINLEVEL_OUTOF10: 0

## 2023-10-16 NOTE — ACP (ADVANCE CARE PLANNING)
Advance Care Planning   Healthcare Decision Maker:    Primary Decision Maker: sue ward - Brother/Sister - [phone]      Today we documented Decision Maker(s) consistent with Legal Next of Kin hierarchy.

## 2023-10-16 NOTE — PROGRESS NOTES
Patient seen and examined prior to procedure. Pertinent notes/labs reviewed. Patient complaining of being very hungry -  will plan to order regular tray which will be held and given after EGD is completed. H&P reviewed -no new changes except as described above.   Vitals:    10/16/23 1127   BP:    Pulse:    Resp:    Temp:    SpO2: 99%      Plan: Proceed with EGD later today    Heena Burrows MD

## 2023-10-16 NOTE — ANESTHESIA PRE PROCEDURE
abnormalities     Neuro/Psych:   (+) neuromuscular disease (Neuropathy):, psychiatric history:depression/anxiety             GI/Hepatic/Renal:   (+) GERD:,          ROS comment: GI bleed. Endo/Other:    (+) Diabetes (Diet controlled)Type II DM, , hyperthyroidism, blood dyscrasia (Eliquis; 7.5/24. 5): anticoagulation therapy and anemia:., .          Pt had no PAT visit       Abdominal:         (-) obese       Vascular: negative vascular ROS. Other Findings:           Anesthesia Plan      MAC     ASA 4       Induction: intravenous. Anesthetic plan and risks discussed with patient. Plan discussed with CESAR.                 Emelia Thornton DO   10/16/2023

## 2023-10-16 NOTE — ANESTHESIA POSTPROCEDURE EVALUATION
Department of Anesthesiology  Postprocedure Note    Patient: Farhad Mcnally  MRN: [id number]  YOB: 1955  Date of evaluation: 10/16/2023      Procedure Summary     Date: 10/16/23 Room / Location: [facility] / [facility]    Anesthesia Start: 1750 Anesthesia Stop: 1800    Procedure: EGD ESOPHAGOGASTRODUODENOSCOPY Diagnosis:       Anemia, unspecified type      (Anemia, unspecified type [D64.9])    Surgeons: Nae Cabrera DO Responsible Provider: Elvi De La Fuente DO    Anesthesia Type: MAC ASA Status: 4          Anesthesia Type: MAC    Salvador Phase I: Salvador Score: 10    Salvador Phase II:        Anesthesia Post Evaluation    Patient location during evaluation: PACU  Patient participation: complete - patient participated  Level of consciousness: sleepy but conscious  Pain score: 0  Airway patency: patent  Nausea & Vomiting: no vomiting and no nausea  Complications: no  Cardiovascular status: hemodynamically stable  Respiratory status: nasal cannula  Hydration status: stable  Comments: Seen and examined. Progressing as expected. No questions at this time.   Pain management: adequate

## 2023-10-16 NOTE — CARE COORDINATION
Social work / Discharge Planning:        Social work met with patient for initial assessment and explained role. She lives with her sister in law in a mobile home and uses no DME. Patient has home oxygen from [employer] (3 liters continuously) and a nebulizer. No history of HC or ANKIT. Awaiting EGD. Patient anticipates discharge home with no needs.     Electronically signed by ELIGIO Herndon on 10/16/2023 at 1:46 PM

## 2023-10-16 NOTE — TELEPHONE ENCOUNTER
Pt currently admitted. Said she can't get any answers from anyone. Wants to know what's going on.   Has only had iftikhar since yesterday and she's worried about losing more weight

## 2023-10-17 PROBLEM — D64.9 SYMPTOMATIC ANEMIA: Status: ACTIVE | Noted: 2023-10-17

## 2023-10-17 LAB
ANION GAP SERPL CALCULATED.3IONS-SCNC: 8 MMOL/L (ref 7–16)
BASOPHILS # BLD: 0.06 K/UL (ref 0–0.2)
BASOPHILS NFR BLD: 1 % (ref 0–2)
BUN SERPL-MCNC: 10 MG/DL (ref 6–23)
CALCIUM SERPL-MCNC: 8.7 MG/DL (ref 8.6–10.2)
CHLORIDE SERPL-SCNC: 98 MMOL/L (ref 98–107)
CO2 SERPL-SCNC: 30 MMOL/L (ref 22–29)
CREAT SERPL-MCNC: 0.6 MG/DL (ref 0.5–1)
EOSINOPHIL # BLD: 0.1 K/UL (ref 0.05–0.5)
EOSINOPHILS RELATIVE PERCENT: 1 % (ref 0–6)
ERYTHROCYTE [DISTWIDTH] IN BLOOD BY AUTOMATED COUNT: 16.4 % (ref 11.5–15)
GFR SERPL CREATININE-BSD FRML MDRD: >60 ML/MIN/1.73M2
GLUCOSE SERPL-MCNC: 78 MG/DL (ref 74–99)
HCT VFR BLD AUTO: 26 % (ref 34–48)
HCT VFR BLD AUTO: 27.5 % (ref 34–48)
HCT VFR BLD AUTO: 29.1 % (ref 34–48)
HGB BLD-MCNC: 7.9 G/DL (ref 11.5–15.5)
HGB BLD-MCNC: 8.2 G/DL (ref 11.5–15.5)
HGB BLD-MCNC: 8.7 G/DL (ref 11.5–15.5)
IMM GRANULOCYTES # BLD AUTO: 0.04 K/UL (ref 0–0.58)
IMM GRANULOCYTES NFR BLD: 0 % (ref 0–5)
LYMPHOCYTES NFR BLD: 1.08 K/UL (ref 1.5–4)
LYMPHOCYTES RELATIVE PERCENT: 12 % (ref 20–42)
MCH RBC QN AUTO: 31.7 PG (ref 26–35)
MCHC RBC AUTO-ENTMCNC: 30.4 G/DL (ref 32–34.5)
MCV RBC AUTO: 104.4 FL (ref 80–99.9)
MONOCYTES NFR BLD: 0.76 K/UL (ref 0.1–0.95)
MONOCYTES NFR BLD: 8 % (ref 2–12)
NEUTROPHILS NFR BLD: 78 % (ref 43–80)
NEUTS SEG NFR BLD: 7.04 K/UL (ref 1.8–7.3)
PLATELET # BLD AUTO: 297 K/UL (ref 130–450)
PMV BLD AUTO: 8.3 FL (ref 7–12)
POTASSIUM SERPL-SCNC: 3.5 MMOL/L (ref 3.5–5)
RBC # BLD AUTO: 2.49 M/UL (ref 3.5–5.5)
SODIUM SERPL-SCNC: 136 MMOL/L (ref 132–146)
T4 FREE SERPL-MCNC: 0.4 NG/DL (ref 0.9–1.7)
WBC OTHER # BLD: 9.1 K/UL (ref 4.5–11.5)

## 2023-10-17 PROCEDURE — 2700000000 HC OXYGEN THERAPY PER DAY

## 2023-10-17 PROCEDURE — 84439 ASSAY OF FREE THYROXINE: CPT

## 2023-10-17 PROCEDURE — 85014 HEMATOCRIT: CPT

## 2023-10-17 PROCEDURE — 6370000000 HC RX 637 (ALT 250 FOR IP): Performed by: HOSPITALIST

## 2023-10-17 PROCEDURE — 2580000003 HC RX 258: Performed by: STUDENT IN AN ORGANIZED HEALTH CARE EDUCATION/TRAINING PROGRAM

## 2023-10-17 PROCEDURE — C9113 INJ PANTOPRAZOLE SODIUM, VIA: HCPCS | Performed by: STUDENT IN AN ORGANIZED HEALTH CARE EDUCATION/TRAINING PROGRAM

## 2023-10-17 PROCEDURE — 6370000000 HC RX 637 (ALT 250 FOR IP)

## 2023-10-17 PROCEDURE — 80048 BASIC METABOLIC PNL TOTAL CA: CPT

## 2023-10-17 PROCEDURE — 1200000000 HC SEMI PRIVATE

## 2023-10-17 PROCEDURE — 99232 SBSQ HOSP IP/OBS MODERATE 35: CPT | Performed by: INTERNAL MEDICINE

## 2023-10-17 PROCEDURE — 85018 HEMOGLOBIN: CPT

## 2023-10-17 PROCEDURE — 6370000000 HC RX 637 (ALT 250 FOR IP): Performed by: STUDENT IN AN ORGANIZED HEALTH CARE EDUCATION/TRAINING PROGRAM

## 2023-10-17 PROCEDURE — 99232 SBSQ HOSP IP/OBS MODERATE 35: CPT | Performed by: STUDENT IN AN ORGANIZED HEALTH CARE EDUCATION/TRAINING PROGRAM

## 2023-10-17 PROCEDURE — 6370000000 HC RX 637 (ALT 250 FOR IP): Performed by: FAMILY MEDICINE

## 2023-10-17 PROCEDURE — 85025 COMPLETE CBC W/AUTO DIFF WBC: CPT

## 2023-10-17 PROCEDURE — 36415 COLL VENOUS BLD VENIPUNCTURE: CPT

## 2023-10-17 PROCEDURE — 6360000002 HC RX W HCPCS: Performed by: STUDENT IN AN ORGANIZED HEALTH CARE EDUCATION/TRAINING PROGRAM

## 2023-10-17 PROCEDURE — 6370000000 HC RX 637 (ALT 250 FOR IP): Performed by: NURSE PRACTITIONER

## 2023-10-17 PROCEDURE — 94640 AIRWAY INHALATION TREATMENT: CPT

## 2023-10-17 RX ORDER — LACTULOSE 10 G/15ML
30 SOLUTION ORAL ONCE
Status: COMPLETED | OUTPATIENT
Start: 2023-10-17 | End: 2023-10-17

## 2023-10-17 RX ORDER — BENZONATATE 100 MG/1
100 CAPSULE ORAL 3 TIMES DAILY PRN
Status: DISCONTINUED | OUTPATIENT
Start: 2023-10-17 | End: 2023-10-18 | Stop reason: HOSPADM

## 2023-10-17 RX ADMIN — ARFORMOTEROL TARTRATE 15 MCG: 15 SOLUTION RESPIRATORY (INHALATION) at 20:03

## 2023-10-17 RX ADMIN — ACETAMINOPHEN 650 MG: 325 TABLET ORAL at 20:42

## 2023-10-17 RX ADMIN — LACTULOSE 30 G: 20 SOLUTION ORAL at 14:23

## 2023-10-17 RX ADMIN — IPRATROPIUM BROMIDE 0.5 MG: 0.5 SOLUTION RESPIRATORY (INHALATION) at 20:03

## 2023-10-17 RX ADMIN — IPRATROPIUM BROMIDE 0.5 MG: 0.5 SOLUTION RESPIRATORY (INHALATION) at 16:04

## 2023-10-17 RX ADMIN — BENZONATATE 100 MG: 100 CAPSULE ORAL at 04:51

## 2023-10-17 RX ADMIN — Medication 10 ML: at 08:31

## 2023-10-17 RX ADMIN — IPRATROPIUM BROMIDE 0.5 MG: 0.5 SOLUTION RESPIRATORY (INHALATION) at 08:38

## 2023-10-17 RX ADMIN — ARFORMOTEROL TARTRATE 15 MCG: 15 SOLUTION RESPIRATORY (INHALATION) at 08:39

## 2023-10-17 RX ADMIN — LORAZEPAM 0.5 MG: 0.5 TABLET ORAL at 20:42

## 2023-10-17 RX ADMIN — IPRATROPIUM BROMIDE 0.5 MG: 0.5 SOLUTION RESPIRATORY (INHALATION) at 12:36

## 2023-10-17 RX ADMIN — POLYETHYLENE GLYCOL 3350 17 G: 17 POWDER, FOR SOLUTION ORAL at 08:30

## 2023-10-17 RX ADMIN — POLYETHYLENE GLYCOL 3350 17 G: 17 POWDER, FOR SOLUTION ORAL at 20:42

## 2023-10-17 RX ADMIN — BUDESONIDE INHALATION 500 MCG: 0.5 SUSPENSION RESPIRATORY (INHALATION) at 20:02

## 2023-10-17 RX ADMIN — Medication 10 ML: at 20:42

## 2023-10-17 RX ADMIN — BUDESONIDE INHALATION 500 MCG: 0.5 SUSPENSION RESPIRATORY (INHALATION) at 08:39

## 2023-10-17 RX ADMIN — SODIUM CHLORIDE, PRESERVATIVE FREE 40 MG: 5 INJECTION INTRAVENOUS at 04:51

## 2023-10-17 ASSESSMENT — PAIN SCALES - GENERAL: PAINLEVEL_OUTOF10: 8

## 2023-10-17 NOTE — CARE COORDINATION
Social work / Discharge Planning:         Discharge plan is home with her sister. Patient is at her baseline for home oxygen currently (3 liters). No discharge needs identified at this time. Discharge anticipated for tomorrow pending labs.      Electronically signed by ELIGIO Ruffin on 10/17/2023 at 12:05 PM

## 2023-10-17 NOTE — PROGRESS NOTES
Yolanda Frankel [address] [place] [address] 6W MED SURG  [facility],[address] [address]  Dept: [phone]  Loc: [phone]    Inpatient Medical Oncology/Hematology Progress Note    Patient Name: Lucía Contreras  YOB: 1955    DATE OF ADMISSION: 10/13/2023  DATE OF CONSULTATION: 10/14/23  CONSULTING PROVIDER: Yobany Kim MD  REASON FOR CONSULTATION: \"Acute on chronic macrocytic anemia, patient known to you\"  PCP: Jeannette Anderson    Room: 4167/1327-K      CHIEF COMPLAINT:  Dizziness, shortness of breath on exertion, fatigue    Subjective:  No major events. Patient is feeling better. Abdominal discomfort is improving. Still has dark tarry stools, but starting to clear up. HPI from Initial Inpatient Consultation (10/14/23): This pt is a 78 yo female who follows with Dr. Robert Beatty for anemia. She has a pmhx of A-fib on Eliquis, COPD, O2 dependent, hypertension, hyperlipidemia, vitamin B12 deficiency, and hyperparathyroidism. She has had an EGD done on 3/27/2023, revealing esophagitis and gastritis, she had a colonoscopy done on 5/15/2023, was normal. She was also following for ~30 pound weight loss, had CT C/A/P all negative for neoplasm. She had a + FIT test x 2, and was to undergo VCE with GI. She had evidence of JULIO CESAR and was placed on PO iron. No B12/folate, Cu, zinc deficiencies. MM work up negative. Negative dena and no evidence of hemolysis. She was last seen 8/7/23 and recommended to continue PO iron and RTC in 3 months. Apparently underwent VCE ~1 week prior to admission     She presented to the ER with dizziness, PACHECO and fatigue for several days. No B symptoms. CBC today with normal WBC and platelets. Hgb 7.6, . Iron profile from her 8/23 visit with Dr. Robert Beatty showed correction and Hgb at that time was 11.1.  She has been admitted with suspected GI bleeding         HEMATOLOGIC HISTORY:    Mrs. Dylan Fernández is a very pleasant 77-year-old lady, with a past medical history ALT 20 10/15/2023    AST 42 (H) 10/15/2023    ALKPHOS 63 10/15/2023    BILITOT <0.2 10/15/2023     Lab Results   Component Value Date    CREATININE 0.6 10/17/2023    BUN 10 10/17/2023     10/17/2023    K 3.5 10/17/2023    CL 98 10/17/2023    CO2 30 (H) 10/17/2023       Pathology:     Radiology:      ASSESSMENT:    Macrocytic anemia, likely associated with blood loss  Erosive gastritis and stomach ulcers  Iron deficiency anemia  Hyperthyroidism on methimazole  Atrial fibrillation on Eliquis    The patient is a 77 y.o. female comes in for dyspnea and worsening anemia, concerning for GI bleed. We were consulted due to patient being known to us for her anemia. PLAN  S/p EGD yesterday; appreciate GI recs  Continue PPI  Patient feeling better  Hgb is gradually uptrending; and MCV is decreasing  Monitor for bleed. Melena is clearing up  May transfuse to keep Hgb >7; she has not needed blood transfusion this admission  Received a dose of Venofer yesterday  May continue PO iron if able to tolerate but c/o constipation  Could consider additional doses of IV iron outpatient  GI is ok with restarting her Eliquis  Scheduled for follow up with Dr. Deepa Mckeon on 10/27/23      Approximately spent 36 minutes with patient, discussing the laboratory, imaging, and clinical findings; and documentation, and I have discussed the clinical implications and recommendations. More than 50% of time was spent counseling patient. The patient verbalized understanding.       Gerald Ayers MD  [address]  10/17/2023    [facility] Office  Orestes Casey: [phone]  F: [phone]    [facility] mccauley) Office  P: [phone]  F: [phone]    [facility] Office  P: [phone]

## 2023-10-18 VITALS
HEIGHT: 68 IN | BODY MASS INDEX: 14.74 KG/M2 | WEIGHT: 97.3 LBS | RESPIRATION RATE: 18 BRPM | SYSTOLIC BLOOD PRESSURE: 141 MMHG | HEART RATE: 70 BPM | DIASTOLIC BLOOD PRESSURE: 68 MMHG | TEMPERATURE: 98 F | OXYGEN SATURATION: 100 %

## 2023-10-18 LAB
HCT VFR BLD AUTO: 25.2 % (ref 34–48)
HGB BLD-MCNC: 7.5 G/DL (ref 11.5–15.5)
T4 FREE SERPL-MCNC: 0.3 NG/DL (ref 0.9–1.7)

## 2023-10-18 PROCEDURE — 2580000003 HC RX 258: Performed by: STUDENT IN AN ORGANIZED HEALTH CARE EDUCATION/TRAINING PROGRAM

## 2023-10-18 PROCEDURE — 99239 HOSP IP/OBS DSCHRG MGMT >30: CPT | Performed by: INTERNAL MEDICINE

## 2023-10-18 PROCEDURE — C9113 INJ PANTOPRAZOLE SODIUM, VIA: HCPCS | Performed by: INTERNAL MEDICINE

## 2023-10-18 PROCEDURE — 36415 COLL VENOUS BLD VENIPUNCTURE: CPT

## 2023-10-18 PROCEDURE — 84439 ASSAY OF FREE THYROXINE: CPT

## 2023-10-18 PROCEDURE — 6370000000 HC RX 637 (ALT 250 FOR IP): Performed by: INTERNAL MEDICINE

## 2023-10-18 PROCEDURE — A4216 STERILE WATER/SALINE, 10 ML: HCPCS | Performed by: INTERNAL MEDICINE

## 2023-10-18 PROCEDURE — 6360000002 HC RX W HCPCS: Performed by: STUDENT IN AN ORGANIZED HEALTH CARE EDUCATION/TRAINING PROGRAM

## 2023-10-18 PROCEDURE — 85018 HEMOGLOBIN: CPT

## 2023-10-18 PROCEDURE — 85014 HEMATOCRIT: CPT

## 2023-10-18 PROCEDURE — 94640 AIRWAY INHALATION TREATMENT: CPT

## 2023-10-18 PROCEDURE — 2580000003 HC RX 258: Performed by: INTERNAL MEDICINE

## 2023-10-18 PROCEDURE — 6360000002 HC RX W HCPCS: Performed by: INTERNAL MEDICINE

## 2023-10-18 PROCEDURE — 99232 SBSQ HOSP IP/OBS MODERATE 35: CPT | Performed by: INTERNAL MEDICINE

## 2023-10-18 PROCEDURE — 2700000000 HC OXYGEN THERAPY PER DAY

## 2023-10-18 RX ORDER — LEVOTHYROXINE SODIUM 0.03 MG/1
25 TABLET ORAL DAILY
Status: DISCONTINUED | OUTPATIENT
Start: 2023-10-18 | End: 2023-10-18 | Stop reason: HOSPADM

## 2023-10-18 RX ORDER — LEVOTHYROXINE SODIUM 0.03 MG/1
25 TABLET ORAL DAILY
Qty: 30 TABLET | Refills: 3 | Status: SHIPPED | OUTPATIENT
Start: 2023-10-19

## 2023-10-18 RX ORDER — PANTOPRAZOLE SODIUM 40 MG/1
40 TABLET, DELAYED RELEASE ORAL
Qty: 30 TABLET | Refills: 0 | Status: SHIPPED | OUTPATIENT
Start: 2023-10-18

## 2023-10-18 RX ADMIN — PANTOPRAZOLE SODIUM 40 MG: 40 INJECTION, POWDER, FOR SOLUTION INTRAVENOUS at 09:20

## 2023-10-18 RX ADMIN — BUDESONIDE INHALATION 500 MCG: 0.5 SUSPENSION RESPIRATORY (INHALATION) at 08:44

## 2023-10-18 RX ADMIN — LEVOTHYROXINE SODIUM 25 MCG: 25 TABLET ORAL at 09:20

## 2023-10-18 RX ADMIN — ARFORMOTEROL TARTRATE 15 MCG: 15 SOLUTION RESPIRATORY (INHALATION) at 08:44

## 2023-10-18 RX ADMIN — APIXABAN 5 MG: 5 TABLET, FILM COATED ORAL at 09:21

## 2023-10-18 RX ADMIN — IPRATROPIUM BROMIDE 0.5 MG: 0.5 SOLUTION RESPIRATORY (INHALATION) at 08:44

## 2023-10-18 RX ADMIN — Medication 10 ML: at 09:25

## 2023-10-18 ASSESSMENT — PAIN SCALES - GENERAL: PAINLEVEL_OUTOF10: 0

## 2023-10-18 NOTE — DISCHARGE INSTRUCTIONS
Activity as tolerated    Be compliant with your medications and take them as prescribed. Diet: common adult    Special Instructions:   Please take Synthroid and Protonix at least 30 minutes before breakfast every morning  Continue your other home medications as prescribed, no other changes made      Hospital Follow up: With PCP within 1 week of discharge    Other Follow-Ups:  Endocrinology  Hematology/oncology      Other than any new prescriptions given to you today, the list of home going meds on this After Visit Summary are based on information provided to us from you. This information, including the list, dose, and frequency of medications is only as accurate as the information you provided. If you have any questions or concerns about your home  medications, please contact your Primary Care Physician for further clarification. Information obtained by:   By signing below, I understand that if any problems occur once I leave the hospital I am to contact [email or web address]. I understand and acknowledge receipt of the instruction indicated above.

## 2023-10-18 NOTE — DISCHARGE SUMMARY
[facility] Physician Discharge Summary       Carlos [address], Janene Dodson MD  [facility] [place] [address] [phone]    Follow up        Activity level: As tolerated     Dispo: Home      Condition on discharge: Stable     Patient ID:  Dawit Gordon  [id number]  77 y.o.  1955    Admit date: 10/13/2023    Discharge date and time:  10/18/2023  10:09 AM    Admission Diagnoses: Principal Problem:    GI bleed  Active Problems:    Osteoporosis    Elevated troponin level not due to acute coronary syndrome    Acute on chronic anemia    Symptomatic anemia  Resolved Problems:    * No resolved hospital problems. *      Discharge Diagnoses: Principal Problem:    GI bleed  Active Problems:    Osteoporosis    Elevated troponin level not due to acute coronary syndrome    Acute on chronic anemia    Symptomatic anemia  Resolved Problems:    * No resolved hospital problems. *      Consults:  IP CONSULT TO GI  IP CONSULT TO HEM/ONC  IP CONSULT TO ENDOCRINOLOGY    Hospital Course:   Patient Dawit Gordon is a 77 y.o. presented with GI bleed [K92.2]  Microscopic hematuria [R31.29]  COPD exacerbation (HCC) [J44.1]  Symptomatic anemia [D64.9]  Gastrointestinal hemorrhage, unspecified gastrointestinal hemorrhage type [K92.2]  Acute on chronic anemia [D64.9]    Ms. Coni Hammonds is a 64-year-old female with past medical history significant for COPD, chronic hypoxic respiratory failure requiring supplemental oxygen via nasal cannula (baseline 3 L), paroxysmal atrial fibrillation on Eliquis, hypertension, COVID-19, vitamin B12 deficiency, hyperthyroidism who presents with dizziness, shortness of breath on exertion, and fatigue over the past few days. In talking with Ms. Lucas Bhat, she states she has noticed over the past few days that she has been dizzy, more short of breath on exertion than usual and fatigue.   She states that vitamin C 500 MG tablet  Commonly known as: ASCORBIC ACID     Vitamin D3 50 MCG (2000 UT) Caps            STOP taking these medications      albuterol sulfate  (90 Base) MCG/ACT inhaler  Commonly known as: Ventolin HFA     methIMAzole 10 MG tablet  Commonly known as: TAPAZOLE               Where to Get Your Medications        These medications were sent to [address] [place] [id number]      Phone: [phone]   levothyroxine 25 MCG tablet  pantoprazole 40 MG tablet           Note that more than 30 minutes was spent in preparing discharge papers, discussing discharge with patient, medication review, etc.    Signed:  Electronically signed by Yonis Blankenship MD on 10/18/2023 at 10:09 AM

## 2023-10-19 ENCOUNTER — OFFICE VISIT (OUTPATIENT)
Dept: PAIN MANAGEMENT | Age: 68
End: 2023-10-19
Payer: MEDICARE

## 2023-10-19 ENCOUNTER — CARE COORDINATION (OUTPATIENT)
Dept: CARE COORDINATION | Age: 68
End: 2023-10-19

## 2023-10-19 VITALS
OXYGEN SATURATION: 96 % | WEIGHT: 97 LBS | DIASTOLIC BLOOD PRESSURE: 54 MMHG | HEART RATE: 67 BPM | SYSTOLIC BLOOD PRESSURE: 151 MMHG | BODY MASS INDEX: 14.7 KG/M2 | HEIGHT: 68 IN

## 2023-10-19 DIAGNOSIS — G89.4 CHRONIC PAIN SYNDROME: Primary | ICD-10-CM

## 2023-10-19 DIAGNOSIS — G89.29 CHRONIC BILATERAL LOW BACK PAIN WITH BILATERAL SCIATICA: ICD-10-CM

## 2023-10-19 DIAGNOSIS — M54.16 LUMBAR RADICULOPATHY: ICD-10-CM

## 2023-10-19 DIAGNOSIS — M79.10 MYALGIA: ICD-10-CM

## 2023-10-19 DIAGNOSIS — M51.9 LUMBAR DISC DISORDER: ICD-10-CM

## 2023-10-19 DIAGNOSIS — M54.42 CHRONIC BILATERAL LOW BACK PAIN WITH BILATERAL SCIATICA: ICD-10-CM

## 2023-10-19 DIAGNOSIS — M54.41 CHRONIC BILATERAL LOW BACK PAIN WITH BILATERAL SCIATICA: ICD-10-CM

## 2023-10-19 PROCEDURE — 99204 OFFICE O/P NEW MOD 45 MIN: CPT | Performed by: PAIN MEDICINE

## 2023-10-19 PROCEDURE — 3077F SYST BP >= 140 MM HG: CPT | Performed by: PAIN MEDICINE

## 2023-10-19 PROCEDURE — 4004F PT TOBACCO SCREEN RCVD TLK: CPT | Performed by: PAIN MEDICINE

## 2023-10-19 PROCEDURE — 1123F ACP DISCUSS/DSCN MKR DOCD: CPT | Performed by: PAIN MEDICINE

## 2023-10-19 PROCEDURE — 1111F DSCHRG MED/CURRENT MED MERGE: CPT | Performed by: PAIN MEDICINE

## 2023-10-19 PROCEDURE — G8419 CALC BMI OUT NRM PARAM NOF/U: HCPCS | Performed by: PAIN MEDICINE

## 2023-10-19 PROCEDURE — G8484 FLU IMMUNIZE NO ADMIN: HCPCS | Performed by: PAIN MEDICINE

## 2023-10-19 PROCEDURE — 1090F PRES/ABSN URINE INCON ASSESS: CPT | Performed by: PAIN MEDICINE

## 2023-10-19 PROCEDURE — G8427 DOCREV CUR MEDS BY ELIG CLIN: HCPCS | Performed by: PAIN MEDICINE

## 2023-10-19 PROCEDURE — 3078F DIAST BP <80 MM HG: CPT | Performed by: PAIN MEDICINE

## 2023-10-19 PROCEDURE — 3017F COLORECTAL CA SCREEN DOC REV: CPT | Performed by: PAIN MEDICINE

## 2023-10-19 PROCEDURE — G8399 PT W/DXA RESULTS DOCUMENT: HCPCS | Performed by: PAIN MEDICINE

## 2023-10-19 RX ORDER — PREGABALIN 25 MG/1
CAPSULE ORAL
Qty: 69 CAPSULE | Refills: 0 | Status: SHIPPED | OUTPATIENT
Start: 2023-10-19 | End: 2023-11-18

## 2023-10-19 NOTE — CARE COORDINATION
Care Transitions Initial Follow Up Call    Call within 2 business days of discharge: Yes    Care Transition Nurse attempted initial CT outreach leaving Hip[facility] VM to primary/secondary numbers, purpose of call, my contact, and appt reminder. Patient: Mattie Owens Patient : 1955   MRN: <[id number]>  Reason for Admission: 10/13/2023 - 10/18/2023 [facility] IP. GIB, Constipation, EGD shows mild erosive gastritis with ulcerations. Discharge Date: 10/18/23 RARS: Readmission Risk Score: 20.9  NR  CT    Labs 10/27 1:00  Lumbar MRI 10/21 10:30    Hosp FU PCP 10/23 2:00  MedOnc/[place] 10/27 1:00  Endo/[place]  12:15  Pain/[facility] [place] 10/19 12:30    START taking:  levothyroxine (SYNTHROID)  Start taking on: 2023  pantoprazole (PROTONIX)  STOP taking:  albuterol sulfate  (90 Base) MCG/ACT  inhaler (Ventolin HFA)  methIMAzole 10 MG tablet (TAPAZOLE)    PDM: Rosina Senters [phone]   PMH: Smoker, COPD, Chronic hypoxic respiratory failure requiring supplemental oxygen via nasal cannula (baseline 3 L), Paroxysmal atrial fibrillation on Eliquis, Hypertension, COVID-19, Hyperthyroidism, HLD, GIB. Last Discharge Facility       Date Complaint Diagnosis Description Type Department Provider    10/13/23 Shortness of Breath; Nausea Symptomatic anemia . .. ED to Hosp-Admission (Discharged) (ADMITTED) JOE Lucas MD; Brayden Nieto ...             John Paul Pritchett RN

## 2023-10-19 NOTE — PROGRESS NOTES
Katlyn Moreira presents to the [facility] on 10/19/2023. Willie Arroyo is complaining of pain in back. The pain is constant. The pain is described as shooting and sharp. Pain is rated on her best day at a 2, on her worst day at a 6, and on average at a 3 on the VAS scale. She took her last dose of Tylenol yesterday. Any procedures since your last visit: No.    Pacemaker or defibrillator: No.    She is  on NSAIDS and is  on anticoagulation medications to include Eliquis and is managed by Dr. Connie Mendez. Medication Contract and Consent for Opioid Use Documents Filed        No documents found                    There were no vitals taken for this visit. No LMP recorded.  Patient is postmenopausal.
(2023)   Vaping Use    Vaping Use: Never used   Substance and Sexual Activity    Alcohol use: Not Currently    Drug use: Not Currently    Sexual activity: Not Currently   Other Topics Concern    Not on file   Social History Narrative    1 cup coffee daily             Established  after not being seen since     Smoker  quit       COPD sees Dr. 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---edwige (Mirella Remedphoebe) izabellagibran---and  other cousin omer---quit    Admit  with copd exac -----------------------------------------------------------------  dr Jose Hugo pulm    abnormal EKG  in the hospital referred to cardiology--------dr Garica    Early hyperthyroid      refer to endo    Vit d  defic       Vit b 12 defic   start shtos      Ct    abd pelvis      mass left lung base and advsied  pet scan--- and pulm consult    Pos cologuard    referred to dr Hamlet Garcia but pt refused to go    Pt evicted from apartment-----failed to see specialist    Admit 2022 with exacerbation of COPD,   stress test was positive atrial fibrillation started Eliquis    Dr Fredy James     then on  with influenza A in  for constipation    Will discharge again 1218 with right lower lobe pneumonia COPD    Pulmonary evaluation Dr. Poonam Rios 2023    Vit b  12  defic      Brother        71 yrs old cva--pt   now [address] [place] [address] live with his ex------last name aj---his wife Elizabeth Velasco---- is sister with  Daisy Sunderland my pt    Colon     [facility]----    Anemia     refer to hem    Numb feet

## 2023-10-20 ENCOUNTER — CARE COORDINATION (OUTPATIENT)
Dept: CARE COORDINATION | Age: 68
End: 2023-10-20

## 2023-10-20 DIAGNOSIS — K92.2 GASTROINTESTINAL HEMORRHAGE, UNSPECIFIED GASTROINTESTINAL HEMORRHAGE TYPE: Primary | ICD-10-CM

## 2023-10-20 PROCEDURE — 1111F DSCHRG MED/CURRENT MED MERGE: CPT | Performed by: FAMILY MEDICINE

## 2023-10-20 NOTE — CARE COORDINATION
color. No abd pain, N/V, or abd bloat. States she is eating well and taking Miralax. May consider Mucinex cookies for extra fiber. States she hydrates well daily and mostly chooses water. Instructed to avoid dehydration and sip fluids throughout the day. She admits some exertional SOB and she is wearing her 2 L NC con't. Performing her ADLs. No HHC needs. Has/taking meds as directed. Pt v/u to take her thyroid pill first thing in AM and wait 30-60 min  before taking her other morning meds and meals. Care Transition Nurse reviewed discharge instructions with patient who verbalized understanding. The patient was given an opportunity to ask questions and does not have any further questions or concerns at this time. Were discharge instructions available to patient? Yes. Reviewed appropriate site of care based on symptoms and resources available to patient including: PCP  Urgent care clinics  [facility]  When to call 911  Condition related references. The patient agrees to contact the PCP office for questions related to their healthcare. Advance Care Planning:   Does patient have an Advance Directive: reviewed and current. Medication reconciliation was performed with patient, who verbalizes understanding of administration of home medications. Medications reviewed, 1111F entered: yes    Was patient discharged with a pulse oximeter? no    Non-face-to-face services provided:      Offered patient enrollment in the Remote Patient Monitoring (RPM) program for in-home monitoring: NA.    Care Transitions 24 Hour Call    Do you have any ongoing symptoms?: Yes  Patient-reported symptoms: Constipation  Were you discharged with any Home Care or Post Acute Services: No  Care Transitions Interventions                                   Discussed follow-up appointments. If no appointment was previously scheduled, appointment scheduling offered: Yes. Is follow up appointment scheduled within 7 days of discharge?

## 2023-10-21 ENCOUNTER — HOSPITAL ENCOUNTER (OUTPATIENT)
Dept: MRI IMAGING | Age: 68
End: 2023-10-21
Payer: MEDICARE

## 2023-10-21 DIAGNOSIS — M51.16 HERNIATION OF LUMBAR INTERVERTEBRAL DISC WITH RADICULOPATHY: ICD-10-CM

## 2023-10-21 PROCEDURE — 72148 MRI LUMBAR SPINE W/O DYE: CPT

## 2023-10-23 ENCOUNTER — OFFICE VISIT (OUTPATIENT)
Dept: PRIMARY CARE CLINIC | Age: 68
End: 2023-10-23

## 2023-10-23 VITALS
HEART RATE: 82 BPM | OXYGEN SATURATION: 90 % | HEIGHT: 68 IN | TEMPERATURE: 97.6 F | BODY MASS INDEX: 13.97 KG/M2 | WEIGHT: 92.2 LBS

## 2023-10-23 DIAGNOSIS — R91.8 MULTIPLE NODULES OF LUNG: Chronic | ICD-10-CM

## 2023-10-23 DIAGNOSIS — D64.9 ACUTE ON CHRONIC ANEMIA: Primary | ICD-10-CM

## 2023-10-23 DIAGNOSIS — J01.80 ACUTE NON-RECURRENT SINUSITIS OF OTHER SINUS: ICD-10-CM

## 2023-10-23 DIAGNOSIS — Z09 HOSPITAL DISCHARGE FOLLOW-UP: ICD-10-CM

## 2023-10-23 DIAGNOSIS — E11.9 DIET-CONTROLLED DIABETES MELLITUS (HCC): Chronic | ICD-10-CM

## 2023-10-23 DIAGNOSIS — R09.02 HYPOXIA: ICD-10-CM

## 2023-10-23 DIAGNOSIS — J43.8 OTHER EMPHYSEMA (HCC): ICD-10-CM

## 2023-10-23 PROBLEM — J15.9 BACTERIAL PNEUMONIA: Status: RESOLVED | Noted: 2022-12-15 | Resolved: 2023-10-23

## 2023-10-23 RX ORDER — AZITHROMYCIN 250 MG/1
250 TABLET, FILM COATED ORAL SEE ADMIN INSTRUCTIONS
Qty: 6 TABLET | Refills: 2 | Status: SHIPPED | OUTPATIENT
Start: 2023-10-23 | End: 2023-10-28

## 2023-10-23 RX ORDER — FERROUS SULFATE 325(65) MG
TABLET ORAL
Qty: 60 TABLET | Refills: 1 | Status: SHIPPED | OUTPATIENT
Start: 2023-10-23

## 2023-10-23 NOTE — PROGRESS NOTES
equal, round, and reactive to light, extraocular eye movements intact, conjunctivae normal  ENT: tympanic membrane, external ear and ear canal normal bilaterally, nose without deformity, nasal mucosa and turbinates normal without polyps  Neck: supple and non-tender without mass, no thyromegaly or thyroid nodules, no cervical lymphadenopathy  Pulmonary/Chest: pos wheezze  no rales, normal air movement, no respiratory distress  Cardiovascular: normal rate, regular rhythm, normal S1 and S2, no murmurs, rubs, clicks, or gallops, distal pulses intact, no carotid bruits  Abdomen: soft, non-tender, non-distended, normal bowel sounds, no masses or organomegaly  Extremities: no cyanosis, clubbing or edema  Musculoskeletal: normal range of motion, no joint swelling, deformity or tenderness  Neurologic: reflexes normal and symmetric, no cranial nerve deficit, gait, coordination and speech normal      Sees  hem this week    dc  hb   7-5      no  abd pain   on  pantotpazole      nayeli if  any  bleding    Inc diet      I educated the patient about all medications. Make sure they were correct and educated  on the risk associated with missing meds or taking them incorrectly. A list of medications is being sent home with patient today. Check blood pressure at home twice a day. Low-salt low caffeine diet. Call if systolic blood pressure is above 225 and diastolic blood pressures above 85. Only use a upper arm digital cuff. I informed patient about the risk associated with noncompliance of medication and taking medications incorrectly. Appropriate follow-up with myself and all specialist.  Encourage family members to take active role in assisting with medications and medical care. If any confusion should develop to notify my office immediately to avoid risk of worsening medical condition        A great deal of time spent reviewing medications, diet, exercise, social issues.  Also reviewing the chart before entering the

## 2023-10-25 DIAGNOSIS — D64.9 ACUTE ON CHRONIC ANEMIA: Primary | ICD-10-CM

## 2023-10-26 ENCOUNTER — CARE COORDINATION (OUTPATIENT)
Dept: CARE COORDINATION | Age: 68
End: 2023-10-26

## 2023-10-26 NOTE — CARE COORDINATION
Care Transitions Outreach Attempt    Call within 2 business days of discharge: Yes   Attempted to reach patient for Subsequent transitions of care follow up. Unable to reach patient. LPN/CTN left HIPAA compliant message requesting return call. PCP Hosp F/U completed 10/23/23   - Z-Atilio for acute non recurrent sinusitis    Pain Mgmt OV completed   - Tens Unit   - Lyrica 25 mg QHS x 7 days, then BID x 7 days, then TID thereafter    Patient: Jack Farrell Patient : 1955 MRN: [id number]  Reason for Admission: [facility] 10/14-10/18/23 GIB; S/P EGD 10/16/23  Discharge Date: 10/18/23 RARS: Readmission Risk Score: 20.9    Last Discharge Facility       Date Complaint Diagnosis Description Type Department Provider    10/13/23 Shortness of Breath; Nausea Symptomatic anemia . .. ED to Hosp-Admission (Discharged) (ADMITTED) JOE 6W Nina Oscar MD; Alivia Odom, ... Was this an external facility discharge?  No     Noted following upcoming appointments from discharge chart review:   [facility] follow up appointment(s):   Future Appointments   Date Time Provider [address]   10/27/2023  1:00 PM Jan Potter MD MED ONC [facility]   10/27/2023  1:00 PM SEYZ MED ONC FAST TRACK 3 SEYZ Med Onc [facility]   2023 11:45 AM Annel Au MD AFL PULM CC [facility]   2023  2:15 PM DO MARCIA SantiagoM PAIN MAR [facility]   2023 12:15 PM Jorje Delgado MD BDM ENDO [facility]   3/6/2024 11:45 AM Jorje Hobbs MD BDM [facility]   3/12/2024 11:00 AM SEB INF CLINIC RM 1 SEB [facility]   3/18/2024 12:45 PM Julia Gonzalez DO N LIMA PC [facility]     Nemo Ag LPN

## 2023-10-27 ENCOUNTER — TELEPHONE (OUTPATIENT)
Dept: INFUSION THERAPY | Age: 68
End: 2023-10-27

## 2023-10-27 ENCOUNTER — OFFICE VISIT (OUTPATIENT)
Dept: ONCOLOGY | Age: 68
End: 2023-10-27
Payer: MEDICARE

## 2023-10-27 ENCOUNTER — HOSPITAL ENCOUNTER (OUTPATIENT)
Dept: INFUSION THERAPY | Age: 68
Discharge: HOME | End: 2023-10-27
Payer: MEDICARE

## 2023-10-27 VITALS
WEIGHT: 93.3 LBS | TEMPERATURE: 98.1 F | HEART RATE: 77 BPM | DIASTOLIC BLOOD PRESSURE: 70 MMHG | SYSTOLIC BLOOD PRESSURE: 172 MMHG | BODY MASS INDEX: 14.19 KG/M2 | OXYGEN SATURATION: 98 %

## 2023-10-27 DIAGNOSIS — D64.9 ACUTE ON CHRONIC ANEMIA: ICD-10-CM

## 2023-10-27 DIAGNOSIS — D64.9 ANEMIA, UNSPECIFIED TYPE: Primary | ICD-10-CM

## 2023-10-27 DIAGNOSIS — E05.90 HYPERTHYROIDISM: ICD-10-CM

## 2023-10-27 LAB
BASOPHILS # BLD: 0.08 K/UL (ref 0–0.2)
BASOPHILS NFR BLD: 2 % (ref 0–2)
EOSINOPHIL # BLD: 0.06 K/UL (ref 0.05–0.5)
EOSINOPHILS RELATIVE PERCENT: 1 % (ref 0–6)
ERYTHROCYTE [DISTWIDTH] IN BLOOD BY AUTOMATED COUNT: 14.2 % (ref 11.5–15)
FERRITIN SERPL-MCNC: 62 NG/ML
HCT VFR BLD AUTO: 29.7 % (ref 34–48)
HGB BLD-MCNC: 8.7 G/DL (ref 11.5–15.5)
IMM GRANULOCYTES # BLD AUTO: <0.03 K/UL (ref 0–0.58)
IMM GRANULOCYTES NFR BLD: 0 % (ref 0–5)
IRON SATN MFR SERPL: 34 % (ref 15–50)
IRON SERPL-MCNC: 118 UG/DL (ref 37–145)
LYMPHOCYTES NFR BLD: 0.92 K/UL (ref 1.5–4)
LYMPHOCYTES RELATIVE PERCENT: 18 % (ref 20–42)
MCH RBC QN AUTO: 31.3 PG (ref 26–35)
MCHC RBC AUTO-ENTMCNC: 29.3 G/DL (ref 32–34.5)
MCV RBC AUTO: 106.8 FL (ref 80–99.9)
MONOCYTES NFR BLD: 0.35 K/UL (ref 0.1–0.95)
MONOCYTES NFR BLD: 7 % (ref 2–12)
NEUTROPHILS NFR BLD: 72 % (ref 43–80)
NEUTS SEG NFR BLD: 3.7 K/UL (ref 1.8–7.3)
PLATELET # BLD AUTO: 290 K/UL (ref 130–450)
PMV BLD AUTO: 8.7 FL (ref 7–12)
RBC # BLD AUTO: 2.78 M/UL (ref 3.5–5.5)
TIBC SERPL-MCNC: 344 UG/DL (ref 250–450)
WBC OTHER # BLD: 5.1 K/UL (ref 4.5–11.5)

## 2023-10-27 PROCEDURE — 1123F ACP DISCUSS/DSCN MKR DOCD: CPT | Performed by: INTERNAL MEDICINE

## 2023-10-27 PROCEDURE — G8419 CALC BMI OUT NRM PARAM NOF/U: HCPCS | Performed by: INTERNAL MEDICINE

## 2023-10-27 PROCEDURE — 99213 OFFICE O/P EST LOW 20 MIN: CPT | Performed by: INTERNAL MEDICINE

## 2023-10-27 PROCEDURE — G8399 PT W/DXA RESULTS DOCUMENT: HCPCS | Performed by: INTERNAL MEDICINE

## 2023-10-27 PROCEDURE — 83550 IRON BINDING TEST: CPT

## 2023-10-27 PROCEDURE — 1090F PRES/ABSN URINE INCON ASSESS: CPT | Performed by: INTERNAL MEDICINE

## 2023-10-27 PROCEDURE — G8484 FLU IMMUNIZE NO ADMIN: HCPCS | Performed by: INTERNAL MEDICINE

## 2023-10-27 PROCEDURE — 3078F DIAST BP <80 MM HG: CPT | Performed by: INTERNAL MEDICINE

## 2023-10-27 PROCEDURE — 3017F COLORECTAL CA SCREEN DOC REV: CPT | Performed by: INTERNAL MEDICINE

## 2023-10-27 PROCEDURE — 3074F SYST BP LT 130 MM HG: CPT | Performed by: INTERNAL MEDICINE

## 2023-10-27 PROCEDURE — 85025 COMPLETE CBC W/AUTO DIFF WBC: CPT

## 2023-10-27 PROCEDURE — 83540 ASSAY OF IRON: CPT

## 2023-10-27 PROCEDURE — 82728 ASSAY OF FERRITIN: CPT

## 2023-10-27 PROCEDURE — 4004F PT TOBACCO SCREEN RCVD TLK: CPT | Performed by: INTERNAL MEDICINE

## 2023-10-27 PROCEDURE — 1111F DSCHRG MED/CURRENT MED MERGE: CPT | Performed by: INTERNAL MEDICINE

## 2023-10-27 PROCEDURE — 99212 OFFICE O/P EST SF 10 MIN: CPT

## 2023-10-27 PROCEDURE — G8427 DOCREV CUR MEDS BY ELIG CLIN: HCPCS | Performed by: INTERNAL MEDICINE

## 2023-10-27 PROCEDURE — 36415 COLL VENOUS BLD VENIPUNCTURE: CPT

## 2023-10-27 NOTE — PROGRESS NOTES
[address] [facility] MED ONCOLOGY  [address] [facility] [address] [place] [id number]  Dept: [phone]  Loc: [phone]  Attending Progress Note      Reason for Visit: Anemia and unexplained weight loss. Referring Physician:  Daphne Welsh DO      PCP:  Daphne Welsh DO    History of Present Illness:      Mrs. Gregorio Hallman is a very pleasant 75-year-old lady, with a past medical history significant for A-fib, on chronic anticoagulation with Eliquis COPD, O2 dependent, hypertension, hyperlipidemia, vitamin B12 deficiency, and hyperparathyroidism, who was referred to our office for evaluation of anemia and unexplained weight loss. The patient had lost about 30 pounds over the course of 6 months, in December 2022 her hemoglobin was 10.8-12 g/dl, on 5/10/2023 hemoglobin was 9.7, hematocrit 34, MCV 90.9, normal white count and platelet count, the patient had an EGD done on 3/27/2023, revealing esophagitis and gastritis, she had a colonoscopy done on 5/15/2023, was normal.  CT scan of the abdomen the pelvis from 12/30/2020 was negative for malignancy, chest CTA from 12/15/2022 had revealed right lower lobe pneumonia, she has a follow-up chest CT ordered to be done in August, she follows for with Dr. Faraz Esparza for the COPD. The patient was started on methimazole by Endo. She is tolerating the oral iron well overall, she was admitted to the hospital in recently with anemia secondary to GI bleed, she had an EGD done on 10/16/2023, revealing mild erosive gastritis with ulcerations no signs of bleeding. Review of Systems;  CONSTITUTIONAL: No fever, chills. Good appetite, pos for fatigue, and weight loss about 30 lbs over the course of 6 months. ENMT: Eyes: No diplopia; Nose: No epistaxis. Mouth: No sore throat. RESPIRATORY: No hemoptysis, pos for shortness of breath, and cough. CARDIOVASCULAR: No chest pain, palpitations.   GASTROINTESTINAL: No nausea/vomiting, abdominal pain,

## 2023-10-27 NOTE — TELEPHONE ENCOUNTER
This nurse reached out to Dr Archibald Stands office, spoke to [place] at [phone]. Per Dr Ben Mo, our office would like copy of capsule endoscopy. [place] states that results are not read yet and they will send copy of results when completed.  Patient has follow up appointment with Dr Kee Heaton to go over results on 11/13/23

## 2023-10-30 ENCOUNTER — TELEPHONE (OUTPATIENT)
Dept: PRIMARY CARE CLINIC | Age: 68
End: 2023-10-30

## 2023-10-30 RX ORDER — METHIMAZOLE 10 MG/1
10 TABLET ORAL DAILY
Qty: 30 TABLET | Refills: 3 | Status: SHIPPED | OUTPATIENT
Start: 2023-10-30

## 2023-10-30 NOTE — TELEPHONE ENCOUNTER
Ov note must state patient mobile within the home in order for them to provide her with a portable oxygen concentrator.   Note not included with order to [facility]

## 2023-10-31 NOTE — TELEPHONE ENCOUNTER
LM to return call. When looking into order for oxygen, noticed order was sent by Dr. Kathie Souza.   She needs to contact their office

## 2023-11-01 ENCOUNTER — TELEPHONE (OUTPATIENT)
Dept: PRIMARY CARE CLINIC | Age: 68
End: 2023-11-01

## 2023-11-01 NOTE — TELEPHONE ENCOUNTER
Tell patient to stop the antibiotic. Take a probiotic over-the-counter at any dose or name brand twice a day.  Imodium over-the-counter 3 a day as needed.   If not better in a day or 2, the [facility] or ER if dehydrated

## 2023-11-03 ENCOUNTER — CARE COORDINATION (OUTPATIENT)
Dept: CARE COORDINATION | Age: 68
End: 2023-11-03

## 2023-11-03 NOTE — CARE COORDINATION
Care Transitions Follow Up Call    Care Transition Nurse attempted subsequent CT outreach leaving Hipaa VM, purpose of call, my contact info. Patient: Rosita Rivas  Patient : 1955   MRN: [id number]  Reason for Admission:  10/13/2023 - 10/18/2023 [facility] IP. GIB, Constipation, EGD shows mild erosive gastritis with ulcerations. Discharge Date: 10/18/23 RARS: Readmission Risk Score: 20.9  NR  CT     Labs 10/27 1:00  Lumbar MRI 10/21 10:30     Hosp FU PCP 10/23 2:00. Attended. MedOnc/[place] 10/27 1:00 Attended  Endo/[place]  12:15  Pain/[place] 10/19 12:30 Attended      PDM: Sampson Layne [phone]   PMH: Smoker, COPD, Chronic hypoxic respiratory failure requiring supplemental oxygen via nasal cannula (baseline 3 L), Paroxysmal atrial fibrillation on Eliquis, Hypertension, COVID-19, Hyperthyroidism, HLD, GIB.     Lyndsey Erazo RN

## 2023-11-08 ENCOUNTER — CARE COORDINATION (OUTPATIENT)
Dept: CARE COORDINATION | Age: 68
End: 2023-11-08

## 2023-11-08 NOTE — CARE COORDINATION
Care Transitions Follow Up Call    Care Transition Nurse attempted second subsequent CT outreach leaving Hipaa VM, purpose of call, my contact. CTN s/o. Patient: Arcelia Vizcarra  Patient : 1955   MRN: [id number]  Reason for Admission:  10/13/2023 - 10/18/2023 [facility] IP. GIB, Constipation, EGD shows mild erosive gastritis with ulcerations. Discharge Date: 10/18/23 RARS: Readmission Risk Score: 20.9  NR  CT     Labs 10/27 1:00  Lumbar MRI 10/21 10:30     Hosp FU PCP 10/23 2:00. Attended. MedOnc/[place] 10/27 1:00 Attended  Endo/[place]  12:15  Pain/M [place] 10/19 12:30 Attended      PDM: Leticia Abts [phone]   PMH: Smoker, COPD, Chronic hypoxic respiratory failure requiring supplemental oxygen via nasal cannula (baseline 3 L), Paroxysmal atrial fibrillation on Eliquis, Hypertension, COVID-19, Hyperthyroidism, HLD, GIB.     Unice Severin, RN

## 2023-11-10 DIAGNOSIS — M51.9 LUMBAR DISC DISORDER: ICD-10-CM

## 2023-11-10 DIAGNOSIS — G89.29 CHRONIC BILATERAL LOW BACK PAIN WITH BILATERAL SCIATICA: ICD-10-CM

## 2023-11-10 DIAGNOSIS — M54.42 CHRONIC BILATERAL LOW BACK PAIN WITH BILATERAL SCIATICA: ICD-10-CM

## 2023-11-10 DIAGNOSIS — G89.4 CHRONIC PAIN SYNDROME: ICD-10-CM

## 2023-11-10 DIAGNOSIS — M54.41 CHRONIC BILATERAL LOW BACK PAIN WITH BILATERAL SCIATICA: ICD-10-CM

## 2023-11-10 DIAGNOSIS — M54.16 LUMBAR RADICULOPATHY: ICD-10-CM

## 2023-11-10 DIAGNOSIS — M79.10 MYALGIA: ICD-10-CM

## 2023-11-10 RX ORDER — LEVOTHYROXINE SODIUM 0.03 MG/1
25 TABLET ORAL DAILY
Qty: 30 TABLET | Refills: 3 | Status: SHIPPED | OUTPATIENT
Start: 2023-11-10

## 2023-11-10 RX ORDER — PREGABALIN 25 MG/1
CAPSULE ORAL
Qty: 69 CAPSULE | Refills: 0 | Status: SHIPPED | OUTPATIENT
Start: 2023-11-10 | End: 2023-12-10

## 2023-11-10 RX ORDER — PANTOPRAZOLE SODIUM 40 MG/1
40 TABLET, DELAYED RELEASE ORAL
Qty: 30 TABLET | Refills: 0 | Status: SHIPPED | OUTPATIENT
Start: 2023-11-10

## 2023-11-17 ENCOUNTER — OFFICE VISIT (OUTPATIENT)
Dept: PAIN MANAGEMENT | Age: 68
End: 2023-11-17
Payer: MEDICARE

## 2023-11-17 ENCOUNTER — PREP FOR PROCEDURE (OUTPATIENT)
Dept: PAIN MANAGEMENT | Age: 68
End: 2023-11-17

## 2023-11-17 VITALS
RESPIRATION RATE: 16 BRPM | BODY MASS INDEX: 14.25 KG/M2 | DIASTOLIC BLOOD PRESSURE: 73 MMHG | HEIGHT: 68 IN | TEMPERATURE: 97.5 F | HEART RATE: 74 BPM | SYSTOLIC BLOOD PRESSURE: 162 MMHG | OXYGEN SATURATION: 93 % | WEIGHT: 94 LBS

## 2023-11-17 DIAGNOSIS — M54.16 LUMBAR RADICULOPATHY: ICD-10-CM

## 2023-11-17 DIAGNOSIS — M51.9 LUMBAR DISC DISORDER: ICD-10-CM

## 2023-11-17 DIAGNOSIS — M54.16 LUMBAR RADICULOPATHY: Primary | ICD-10-CM

## 2023-11-17 DIAGNOSIS — M54.41 CHRONIC BILATERAL LOW BACK PAIN WITH BILATERAL SCIATICA: ICD-10-CM

## 2023-11-17 DIAGNOSIS — G89.29 CHRONIC BILATERAL LOW BACK PAIN WITH BILATERAL SCIATICA: ICD-10-CM

## 2023-11-17 DIAGNOSIS — M79.10 MYALGIA: ICD-10-CM

## 2023-11-17 DIAGNOSIS — M54.42 CHRONIC BILATERAL LOW BACK PAIN WITH BILATERAL SCIATICA: ICD-10-CM

## 2023-11-17 DIAGNOSIS — G89.4 CHRONIC PAIN SYNDROME: Primary | ICD-10-CM

## 2023-11-17 PROCEDURE — G8399 PT W/DXA RESULTS DOCUMENT: HCPCS | Performed by: PAIN MEDICINE

## 2023-11-17 PROCEDURE — 1111F DSCHRG MED/CURRENT MED MERGE: CPT | Performed by: PAIN MEDICINE

## 2023-11-17 PROCEDURE — 1090F PRES/ABSN URINE INCON ASSESS: CPT | Performed by: PAIN MEDICINE

## 2023-11-17 PROCEDURE — 3017F COLORECTAL CA SCREEN DOC REV: CPT | Performed by: PAIN MEDICINE

## 2023-11-17 PROCEDURE — G8427 DOCREV CUR MEDS BY ELIG CLIN: HCPCS | Performed by: PAIN MEDICINE

## 2023-11-17 PROCEDURE — 4004F PT TOBACCO SCREEN RCVD TLK: CPT | Performed by: PAIN MEDICINE

## 2023-11-17 PROCEDURE — 1123F ACP DISCUSS/DSCN MKR DOCD: CPT | Performed by: PAIN MEDICINE

## 2023-11-17 PROCEDURE — 99214 OFFICE O/P EST MOD 30 MIN: CPT | Performed by: PAIN MEDICINE

## 2023-11-17 PROCEDURE — 99213 OFFICE O/P EST LOW 20 MIN: CPT | Performed by: PAIN MEDICINE

## 2023-11-17 PROCEDURE — 3078F DIAST BP <80 MM HG: CPT | Performed by: PAIN MEDICINE

## 2023-11-17 PROCEDURE — G8484 FLU IMMUNIZE NO ADMIN: HCPCS | Performed by: PAIN MEDICINE

## 2023-11-17 PROCEDURE — G8419 CALC BMI OUT NRM PARAM NOF/U: HCPCS | Performed by: PAIN MEDICINE

## 2023-11-17 PROCEDURE — 3077F SYST BP >= 140 MM HG: CPT | Performed by: PAIN MEDICINE

## 2023-11-17 RX ORDER — PREGABALIN 50 MG/1
50 CAPSULE ORAL 3 TIMES DAILY
Qty: 90 CAPSULE | Refills: 1 | Status: SHIPPED | OUTPATIENT
Start: 2023-11-17 | End: 2024-01-16

## 2023-11-20 ENCOUNTER — OFFICE VISIT (OUTPATIENT)
Dept: ENDOCRINOLOGY | Age: 68
End: 2023-11-20
Payer: MEDICARE

## 2023-11-20 VITALS
HEIGHT: 68 IN | SYSTOLIC BLOOD PRESSURE: 114 MMHG | HEART RATE: 72 BPM | OXYGEN SATURATION: 93 % | BODY MASS INDEX: 14.25 KG/M2 | RESPIRATION RATE: 18 BRPM | DIASTOLIC BLOOD PRESSURE: 60 MMHG | WEIGHT: 94 LBS

## 2023-11-20 DIAGNOSIS — E55.9 VITAMIN D DEFICIENCY: ICD-10-CM

## 2023-11-20 DIAGNOSIS — E05.90 HYPERTHYROIDISM: ICD-10-CM

## 2023-11-20 DIAGNOSIS — M81.0 OSTEOPOROSIS, UNSPECIFIED OSTEOPOROSIS TYPE, UNSPECIFIED PATHOLOGICAL FRACTURE PRESENCE: Primary | ICD-10-CM

## 2023-11-20 PROCEDURE — 1123F ACP DISCUSS/DSCN MKR DOCD: CPT | Performed by: INTERNAL MEDICINE

## 2023-11-20 PROCEDURE — 3078F DIAST BP <80 MM HG: CPT | Performed by: INTERNAL MEDICINE

## 2023-11-20 PROCEDURE — 1090F PRES/ABSN URINE INCON ASSESS: CPT | Performed by: INTERNAL MEDICINE

## 2023-11-20 PROCEDURE — G8427 DOCREV CUR MEDS BY ELIG CLIN: HCPCS | Performed by: INTERNAL MEDICINE

## 2023-11-20 PROCEDURE — G8419 CALC BMI OUT NRM PARAM NOF/U: HCPCS | Performed by: INTERNAL MEDICINE

## 2023-11-20 PROCEDURE — 99214 OFFICE O/P EST MOD 30 MIN: CPT | Performed by: INTERNAL MEDICINE

## 2023-11-20 PROCEDURE — 4004F PT TOBACCO SCREEN RCVD TLK: CPT | Performed by: INTERNAL MEDICINE

## 2023-11-20 PROCEDURE — G8484 FLU IMMUNIZE NO ADMIN: HCPCS | Performed by: INTERNAL MEDICINE

## 2023-11-20 PROCEDURE — 3074F SYST BP LT 130 MM HG: CPT | Performed by: INTERNAL MEDICINE

## 2023-11-20 PROCEDURE — 3017F COLORECTAL CA SCREEN DOC REV: CPT | Performed by: INTERNAL MEDICINE

## 2023-11-20 PROCEDURE — G8399 PT W/DXA RESULTS DOCUMENT: HCPCS | Performed by: INTERNAL MEDICINE

## 2023-11-20 RX ORDER — METHIMAZOLE 10 MG/1
10 TABLET ORAL DAILY
Qty: 30 TABLET | Refills: 11 | Status: SHIPPED | OUTPATIENT
Start: 2023-11-20

## 2023-11-20 NOTE — PROGRESS NOTES
[address] [facility] Department of Endocrinology Diabetes and Metabolism   [address] [facility] [address]   Phone: [phone]  Fax: [phone]    Date of Service: 11/20/2023  Primary Care Physician: Juan J Azul DO  Referring physician: No ref. provider found  Provider: Olena Preston MD    Reason for the visit:  Hyperthyroidism osteoporosis    History of Present Illness: The history is provided by the patient. No  was used. Accuracy of the patient data is excellent. Farhad Mcnally is a very pleasant 77 y.o. female seen today for evaluation and management of hyperthyroidism     Farhad Mcnally was diagnosed with hyperthyroidism in May/2023. At that time she was c/o  palpitations, weight loss, change in appetite, nervousness, anxiety, irritability, tremor, sweating, heat intolerance, but denies changes in bowel habits, muscle weakness or difficulty sleeping. Lab 5/10/2023 showed undetectable TSH   The patient was started on methimazole July 2023 and tolerating it very well.     She is currently on methimazole 10 mg twice daily    Lab Results   Component Value Date/Time    TSH 16.73 (H) 10/14/2023 06:23 PM    T4FREE 0.3 (L) 10/18/2023 04:51 AM    L8LJGOB 7.4 05/10/2023 03:12 PM    L9RDBJC 77.36 (L) 07/06/2023 01:31 PM    TSI <0.10 07/06/2023 01:31 PM    TPOABS <4.0 07/06/2023 01:31 PM       No recent DXA scan     On VitD 3 supplement 2000 daily OTC     The patient has h/o Afib     PAST MEDICAL HISTORY   Past Medical History:   Diagnosis Date    Acute exacerbation of chronic obstructive pulmonary disease (COPD) ([address]) 07/14/2022    Acute respiratory failure ([address]) 11/27/2022    Acute respiratory failure with hypoxia ([address]) 06/27/2022    Acute respiratory failure with hypoxia (HCC) 11/27/2022    Atrial fibrillation (HCC)     Bacterial pneumonia 12/15/2022    Chronic obstructive pulmonary disease ([address]) 12/15/2022    COPD (chronic obstructive pulmonary disease) ([address])

## 2023-11-24 DIAGNOSIS — Z01.812 PRE-PROCEDURE LAB EXAM: ICD-10-CM

## 2023-11-24 DIAGNOSIS — M81.0 OSTEOPOROSIS, UNSPECIFIED OSTEOPOROSIS TYPE, UNSPECIFIED PATHOLOGICAL FRACTURE PRESENCE: ICD-10-CM

## 2023-11-24 DIAGNOSIS — E05.90 HYPERTHYROIDISM: ICD-10-CM

## 2023-11-24 DIAGNOSIS — E55.9 VITAMIN D DEFICIENCY: ICD-10-CM

## 2023-11-24 LAB
ANION GAP SERPL CALCULATED.3IONS-SCNC: 9 MMOL/L (ref 7–16)
BUN BLDV-MCNC: 8 MG/DL (ref 6–23)
CALCIUM SERPL-MCNC: 9 MG/DL (ref 8.6–10.2)
CHLORIDE BLD-SCNC: 93 MMOL/L (ref 98–107)
CO2: 38 MMOL/L (ref 22–29)
CREAT SERPL-MCNC: 0.5 MG/DL (ref 0.5–1)
GFR SERPL CREATININE-BSD FRML MDRD: >60 ML/MIN/1.73M2
GLUCOSE BLD-MCNC: 86 MG/DL (ref 74–99)
POTASSIUM SERPL-SCNC: 3.3 MMOL/L (ref 3.5–5)
SODIUM BLD-SCNC: 140 MMOL/L (ref 132–146)
T4 FREE: 1.4 NG/DL (ref 0.9–1.7)
TSH SERPL DL<=0.05 MIU/L-ACNC: 0.05 UIU/ML (ref 0.27–4.2)

## 2023-11-26 ENCOUNTER — TELEPHONE (OUTPATIENT)
Dept: ENDOCRINOLOGY | Age: 68
End: 2023-11-26

## 2023-11-26 DIAGNOSIS — E55.9 VITAMIN D DEFICIENCY: ICD-10-CM

## 2023-11-26 DIAGNOSIS — E05.90 HYPERTHYROIDISM: ICD-10-CM

## 2023-11-26 DIAGNOSIS — M81.0 OSTEOPOROSIS, UNSPECIFIED OSTEOPOROSIS TYPE, UNSPECIFIED PATHOLOGICAL FRACTURE PRESENCE: Primary | ICD-10-CM

## 2023-11-26 NOTE — TELEPHONE ENCOUNTER
Notify patient   thyroid hormones are higher than before      Please confirm her current dose of methimazole

## 2023-11-27 RX ORDER — METHIMAZOLE 10 MG/1
TABLET ORAL
Qty: 45 TABLET | Refills: 3 | Status: SHIPPED | OUTPATIENT
Start: 2023-11-27

## 2023-11-27 NOTE — TELEPHONE ENCOUNTER
Spoke w/ pt - medication instructions provided and repeat labs in 6 weeks. Pt verbalized understanding.

## 2023-11-27 NOTE — TELEPHONE ENCOUNTER
Change methimazole 10 mg to 1-1/2 tablet daily for total of 15 mg daily.   We will recheck TFTs again in 6 weeks

## 2023-12-05 DIAGNOSIS — D64.9 ACUTE ON CHRONIC ANEMIA: Primary | ICD-10-CM

## 2023-12-08 ENCOUNTER — HOSPITAL ENCOUNTER (OUTPATIENT)
Dept: INFUSION THERAPY | Age: 68
Discharge: HOME | End: 2023-12-08
Payer: MEDICARE

## 2023-12-08 ENCOUNTER — OFFICE VISIT (OUTPATIENT)
Dept: ONCOLOGY | Age: 68
End: 2023-12-08
Payer: MEDICARE

## 2023-12-08 VITALS
DIASTOLIC BLOOD PRESSURE: 88 MMHG | OXYGEN SATURATION: 94 % | TEMPERATURE: 97.8 F | HEIGHT: 68 IN | WEIGHT: 90 LBS | SYSTOLIC BLOOD PRESSURE: 140 MMHG | BODY MASS INDEX: 13.64 KG/M2 | HEART RATE: 102 BPM

## 2023-12-08 DIAGNOSIS — D64.9 ANEMIA, UNSPECIFIED TYPE: Primary | ICD-10-CM

## 2023-12-08 DIAGNOSIS — D64.9 ACUTE ON CHRONIC ANEMIA: ICD-10-CM

## 2023-12-08 LAB
ALBUMIN SERPL-MCNC: 4.2 G/DL (ref 3.5–5.2)
ALP SERPL-CCNC: 71 U/L (ref 35–104)
ALT SERPL-CCNC: 17 U/L (ref 0–32)
ANION GAP SERPL CALCULATED.3IONS-SCNC: 8 MMOL/L (ref 7–16)
AST SERPL-CCNC: 32 U/L (ref 0–31)
BASOPHILS # BLD: 0.12 K/UL (ref 0–0.2)
BASOPHILS NFR BLD: 2 % (ref 0–2)
BILIRUB SERPL-MCNC: <0.2 MG/DL (ref 0–1.2)
BUN SERPL-MCNC: 6 MG/DL (ref 6–23)
CALCIUM SERPL-MCNC: 9.9 MG/DL (ref 8.6–10.2)
CHLORIDE SERPL-SCNC: 94 MMOL/L (ref 98–107)
CO2 SERPL-SCNC: 37 MMOL/L (ref 22–29)
CREAT SERPL-MCNC: 0.5 MG/DL (ref 0.5–1)
EOSINOPHIL # BLD: 0.14 K/UL (ref 0.05–0.5)
EOSINOPHILS RELATIVE PERCENT: 2 % (ref 0–6)
ERYTHROCYTE [DISTWIDTH] IN BLOOD BY AUTOMATED COUNT: 14.2 % (ref 11.5–15)
FERRITIN SERPL-MCNC: 62 NG/ML
GFR SERPL CREATININE-BSD FRML MDRD: >60 ML/MIN/1.73M2
GLUCOSE SERPL-MCNC: 85 MG/DL (ref 74–99)
HCT VFR BLD AUTO: 39.5 % (ref 34–48)
HGB BLD-MCNC: 11.5 G/DL (ref 11.5–15.5)
IMM GRANULOCYTES # BLD AUTO: <0.03 K/UL (ref 0–0.58)
IMM GRANULOCYTES NFR BLD: 0 % (ref 0–5)
IRON SATN MFR SERPL: 39 % (ref 15–50)
IRON SERPL-MCNC: 120 UG/DL (ref 37–145)
LYMPHOCYTES NFR BLD: 1.42 K/UL (ref 1.5–4)
LYMPHOCYTES RELATIVE PERCENT: 19 % (ref 20–42)
MCH RBC QN AUTO: 30.1 PG (ref 26–35)
MCHC RBC AUTO-ENTMCNC: 29.1 G/DL (ref 32–34.5)
MCV RBC AUTO: 103.4 FL (ref 80–99.9)
MONOCYTES NFR BLD: 0.51 K/UL (ref 0.1–0.95)
MONOCYTES NFR BLD: 7 % (ref 2–12)
NEUTROPHILS NFR BLD: 70 % (ref 43–80)
NEUTS SEG NFR BLD: 5.21 K/UL (ref 1.8–7.3)
PLATELET # BLD AUTO: 289 K/UL (ref 130–450)
PMV BLD AUTO: 9.2 FL (ref 7–12)
POTASSIUM SERPL-SCNC: 4.4 MMOL/L (ref 3.5–5)
PROT SERPL-MCNC: 7.4 G/DL (ref 6.4–8.3)
RBC # BLD AUTO: 3.82 M/UL (ref 3.5–5.5)
SODIUM SERPL-SCNC: 139 MMOL/L (ref 132–146)
TIBC SERPL-MCNC: 309 UG/DL (ref 250–450)
WBC OTHER # BLD: 7.4 K/UL (ref 4.5–11.5)

## 2023-12-08 PROCEDURE — 80053 COMPREHEN METABOLIC PANEL: CPT

## 2023-12-08 PROCEDURE — 83550 IRON BINDING TEST: CPT

## 2023-12-08 PROCEDURE — 4004F PT TOBACCO SCREEN RCVD TLK: CPT | Performed by: INTERNAL MEDICINE

## 2023-12-08 PROCEDURE — 3017F COLORECTAL CA SCREEN DOC REV: CPT | Performed by: INTERNAL MEDICINE

## 2023-12-08 PROCEDURE — 99213 OFFICE O/P EST LOW 20 MIN: CPT | Performed by: INTERNAL MEDICINE

## 2023-12-08 PROCEDURE — 3077F SYST BP >= 140 MM HG: CPT | Performed by: INTERNAL MEDICINE

## 2023-12-08 PROCEDURE — G8419 CALC BMI OUT NRM PARAM NOF/U: HCPCS | Performed by: INTERNAL MEDICINE

## 2023-12-08 PROCEDURE — G8427 DOCREV CUR MEDS BY ELIG CLIN: HCPCS | Performed by: INTERNAL MEDICINE

## 2023-12-08 PROCEDURE — 83540 ASSAY OF IRON: CPT

## 2023-12-08 PROCEDURE — G8484 FLU IMMUNIZE NO ADMIN: HCPCS | Performed by: INTERNAL MEDICINE

## 2023-12-08 PROCEDURE — 82728 ASSAY OF FERRITIN: CPT

## 2023-12-08 PROCEDURE — 1123F ACP DISCUSS/DSCN MKR DOCD: CPT | Performed by: INTERNAL MEDICINE

## 2023-12-08 PROCEDURE — 85025 COMPLETE CBC W/AUTO DIFF WBC: CPT

## 2023-12-08 PROCEDURE — 36415 COLL VENOUS BLD VENIPUNCTURE: CPT

## 2023-12-08 PROCEDURE — 1090F PRES/ABSN URINE INCON ASSESS: CPT | Performed by: INTERNAL MEDICINE

## 2023-12-08 PROCEDURE — 3079F DIAST BP 80-89 MM HG: CPT | Performed by: INTERNAL MEDICINE

## 2023-12-08 PROCEDURE — G8399 PT W/DXA RESULTS DOCUMENT: HCPCS | Performed by: INTERNAL MEDICINE

## 2023-12-09 RX ORDER — PANTOPRAZOLE SODIUM 40 MG/1
40 TABLET, DELAYED RELEASE ORAL
Qty: 30 TABLET | Refills: 5 | Status: SHIPPED | OUTPATIENT
Start: 2023-12-09

## 2023-12-11 ENCOUNTER — TELEPHONE (OUTPATIENT)
Dept: PRIMARY CARE CLINIC | Age: 68
End: 2023-12-11

## 2023-12-11 NOTE — TELEPHONE ENCOUNTER
Patient has not had bowel movement in 5 days. Her hematology stopped her iron on Friday. She has tried stool softeners, miralax, and laxative. Wanting to know what else you can recommend.

## 2023-12-12 NOTE — TELEPHONE ENCOUNTER
Purchase an over-the-counter bottle of magnesium citrate. Take half the bottle if no bowel movement 6 hours the other half the bottle.   If pain go to ER

## 2023-12-13 ENCOUNTER — TELEPHONE (OUTPATIENT)
Dept: PAIN MANAGEMENT | Age: 68
End: 2023-12-13

## 2023-12-13 NOTE — TELEPHONE ENCOUNTER
LM for Lana to make her aware that we do not have an ok or her to stop her medications.  Will need to be rescheduled.

## 2023-12-14 ENCOUNTER — TELEPHONE (OUTPATIENT)
Dept: VASCULAR SURGERY | Age: 68
End: 2023-12-14

## 2023-12-14 ENCOUNTER — OFFICE VISIT (OUTPATIENT)
Dept: PRIMARY CARE CLINIC | Age: 68
End: 2023-12-14
Payer: MEDICARE

## 2023-12-14 VITALS
TEMPERATURE: 97.1 F | DIASTOLIC BLOOD PRESSURE: 78 MMHG | WEIGHT: 91.8 LBS | BODY MASS INDEX: 13.91 KG/M2 | SYSTOLIC BLOOD PRESSURE: 128 MMHG | HEIGHT: 68 IN

## 2023-12-14 DIAGNOSIS — I73.9 PERIPHERAL VASCULAR DISEASE (HCC): ICD-10-CM

## 2023-12-14 DIAGNOSIS — I48.0 PAROXYSMAL ATRIAL FIBRILLATION (HCC): Primary | ICD-10-CM

## 2023-12-14 DIAGNOSIS — I10 PRIMARY HYPERTENSION: ICD-10-CM

## 2023-12-14 DIAGNOSIS — J43.8 OTHER EMPHYSEMA (HCC): Chronic | ICD-10-CM

## 2023-12-14 DIAGNOSIS — E11.9 DIET-CONTROLLED DIABETES MELLITUS (HCC): Chronic | ICD-10-CM

## 2023-12-14 PROCEDURE — 3017F COLORECTAL CA SCREEN DOC REV: CPT | Performed by: FAMILY MEDICINE

## 2023-12-14 PROCEDURE — 4004F PT TOBACCO SCREEN RCVD TLK: CPT | Performed by: FAMILY MEDICINE

## 2023-12-14 PROCEDURE — 2022F DILAT RTA XM EVC RTNOPTHY: CPT | Performed by: FAMILY MEDICINE

## 2023-12-14 PROCEDURE — 99214 OFFICE O/P EST MOD 30 MIN: CPT | Performed by: FAMILY MEDICINE

## 2023-12-14 PROCEDURE — 3044F HG A1C LEVEL LT 7.0%: CPT | Performed by: FAMILY MEDICINE

## 2023-12-14 PROCEDURE — 3074F SYST BP LT 130 MM HG: CPT | Performed by: FAMILY MEDICINE

## 2023-12-14 PROCEDURE — G8399 PT W/DXA RESULTS DOCUMENT: HCPCS | Performed by: FAMILY MEDICINE

## 2023-12-14 PROCEDURE — 1090F PRES/ABSN URINE INCON ASSESS: CPT | Performed by: FAMILY MEDICINE

## 2023-12-14 PROCEDURE — G8428 CUR MEDS NOT DOCUMENT: HCPCS | Performed by: FAMILY MEDICINE

## 2023-12-14 PROCEDURE — G8419 CALC BMI OUT NRM PARAM NOF/U: HCPCS | Performed by: FAMILY MEDICINE

## 2023-12-14 PROCEDURE — 3078F DIAST BP <80 MM HG: CPT | Performed by: FAMILY MEDICINE

## 2023-12-14 PROCEDURE — 1123F ACP DISCUSS/DSCN MKR DOCD: CPT | Performed by: FAMILY MEDICINE

## 2023-12-14 PROCEDURE — 3023F SPIROM DOC REV: CPT | Performed by: FAMILY MEDICINE

## 2023-12-14 PROCEDURE — G8484 FLU IMMUNIZE NO ADMIN: HCPCS | Performed by: FAMILY MEDICINE

## 2023-12-14 SDOH — ECONOMIC STABILITY: FOOD INSECURITY: WITHIN THE PAST 12 MONTHS, THE FOOD YOU BOUGHT JUST DIDN'T LAST AND YOU DIDN'T HAVE MONEY TO GET MORE.: NEVER TRUE

## 2023-12-14 SDOH — ECONOMIC STABILITY: FOOD INSECURITY: WITHIN THE PAST 12 MONTHS, YOU WORRIED THAT YOUR FOOD WOULD RUN OUT BEFORE YOU GOT MONEY TO BUY MORE.: NEVER TRUE

## 2023-12-14 SDOH — ECONOMIC STABILITY: INCOME INSECURITY: HOW HARD IS IT FOR YOU TO PAY FOR THE VERY BASICS LIKE FOOD, HOUSING, MEDICAL CARE, AND HEATING?: NOT HARD AT ALL

## 2023-12-14 ASSESSMENT — PATIENT HEALTH QUESTIONNAIRE - PHQ9
SUM OF ALL RESPONSES TO PHQ9 QUESTIONS 1 & 2: 0
2. FEELING DOWN, DEPRESSED OR HOPELESS: 0
SUM OF ALL RESPONSES TO PHQ QUESTIONS 1-9: 0
1. LITTLE INTEREST OR PLEASURE IN DOING THINGS: 0
SUM OF ALL RESPONSES TO PHQ QUESTIONS 1-9: 0

## 2023-12-14 NOTE — PROGRESS NOTES
23  Name: Sheila Dubin    : 1955    Sex: female    Age: 77 y.o. Subjective:  Chief Complaint: Patient is here for ck  re  breating copd  eliquis   iron  def anemia    Thryoid  weight      anx   AF  feet      Feel ok    weraign o2  faield fu    needs  ok tohold  eliquis for injection   due on   Seeing hem  endo  On   dec thryoid  Left foot feels cold at hs        Review of Systems   Constitutional: Negative. HENT: Negative. Eyes: Negative. Respiratory:  Negative for cough and shortness of breath. Cardiovascular:  Negative for chest pain, palpitations and leg swelling. Hpi   Gastrointestinal: Negative. Endocrine: Negative. Genitourinary: Negative. Musculoskeletal:  Positive for arthralgias. Skin: Negative. Allergic/Immunologic: Negative. Neurological: Negative. Hematological: Negative. Psychiatric/Behavioral: Negative. Current Outpatient Medications:     pantoprazole (PROTONIX) 40 MG tablet, Take 1 tablet by mouth every morning (before breakfast), Disp: 30 tablet, Rfl: 5    methIMAzole (TAPAZOLE) 10 MG tablet, Take 1-1/2 tablet daily, Disp: 45 tablet, Rfl: 3    pregabalin (LYRICA) 50 MG capsule, Take 1 capsule by mouth 3 times daily for 60 days.  Max Daily Amount: 150 mg, Disp: 90 capsule, Rfl: 1    Budeson-Glycopyrrol-Formoterol (BREZTRI AEROSPHERE) 160-9-4.8 MCG/ACT AERO, Inhale 2 puffs into the lungs 2 times daily, Disp: 1 each, Rfl: 4    FEROSUL 325 (65 Fe) MG tablet, take 1 tablet by mouth every morning with breakfast, Disp: 60 tablet, Rfl: 1    PARoxetine (PAXIL) 10 MG tablet, Take 1 tablet by mouth every morning For anxiety, Disp: 90 tablet, Rfl: 3    ipratropium 0.5 mg-albuterol 2.5 mg (DUONEB) 0.5-2.5 (3) MG/3ML SOLN nebulizer solution, Inhale 3 mLs into the lungs every 4 hours (while awake), Disp: 360 mL, Rfl: 5    busPIRone (BUSPAR) 10 MG tablet, Take 1 tablet by mouth 3 times daily, Disp: 270 tablet, Rfl: 1    apixaban (ELIQUIS)

## 2023-12-14 NOTE — TELEPHONE ENCOUNTER
Received referral from Dr. Raul Dale for PVD, left message for patient to schedule appointment to see DR. Vidales.

## 2023-12-20 NOTE — PROGRESS NOTES
[address] [facility] PAIN MANAGEMENT  INSTRUCTIONS  . .......................................................................................................................................... [x] Parking the day of Surgery is located in the [facility].   Upon entering the door, make immediate right into the surgery reception room    [x]  Bring photo ID and insurance card    [x] You may have a light breakfast day of procedure    [x]  Wear loose comfortable clothing    [x]  Please follow instructions for medications as given per Dr's office    [x] You can expect a call the business day prior to procedure to notify you of your arrival time     [x] Please arrange for     []  Other instructions

## 2023-12-23 ENCOUNTER — HOSPITAL ENCOUNTER (INPATIENT)
Age: 68
LOS: 1 days | Discharge: HOME | DRG: 190 | End: 2023-12-25
Attending: EMERGENCY MEDICINE | Admitting: INTERNAL MEDICINE
Payer: MEDICARE

## 2023-12-23 ENCOUNTER — APPOINTMENT (OUTPATIENT)
Dept: GENERAL RADIOLOGY | Age: 68
DRG: 190 | End: 2023-12-23
Payer: MEDICARE

## 2023-12-23 DIAGNOSIS — J44.1 COPD WITH ACUTE EXACERBATION (HCC): ICD-10-CM

## 2023-12-23 DIAGNOSIS — M54.41 CHRONIC BILATERAL LOW BACK PAIN WITH BILATERAL SCIATICA: ICD-10-CM

## 2023-12-23 DIAGNOSIS — J96.02 ACUTE RESPIRATORY FAILURE WITH HYPERCAPNIA (HCC): ICD-10-CM

## 2023-12-23 DIAGNOSIS — R19.7 DIARRHEA, UNSPECIFIED TYPE: Primary | ICD-10-CM

## 2023-12-23 DIAGNOSIS — R20.0 NUMBNESS AND TINGLING OF BOTH LOWER EXTREMITIES: ICD-10-CM

## 2023-12-23 DIAGNOSIS — M54.42 CHRONIC BILATERAL LOW BACK PAIN WITH BILATERAL SCIATICA: ICD-10-CM

## 2023-12-23 DIAGNOSIS — G89.29 CHRONIC BILATERAL LOW BACK PAIN WITH BILATERAL SCIATICA: ICD-10-CM

## 2023-12-23 DIAGNOSIS — R20.2 LEFT LEG PARESTHESIAS: ICD-10-CM

## 2023-12-23 DIAGNOSIS — R20.2 NUMBNESS AND TINGLING OF BOTH LOWER EXTREMITIES: ICD-10-CM

## 2023-12-23 LAB
ABO + RH BLD: NORMAL
ALBUMIN SERPL-MCNC: 3.9 G/DL (ref 3.5–5.2)
ALP SERPL-CCNC: 88 U/L (ref 35–104)
ALT SERPL-CCNC: 30 U/L (ref 0–32)
ANION GAP SERPL CALCULATED.3IONS-SCNC: 8 MMOL/L (ref 7–16)
ARM BAND NUMBER: NORMAL
AST SERPL-CCNC: 53 U/L (ref 0–31)
B PARAP IS1001 DNA NPH QL NAA+NON-PROBE: NOT DETECTED
B PERT DNA SPEC QL NAA+PROBE: NOT DETECTED
B.E.: 14.1 MMOL/L (ref -3–3)
BASOPHILS # BLD: 0.09 K/UL (ref 0–0.2)
BASOPHILS NFR BLD: 1 % (ref 0–2)
BILIRUB SERPL-MCNC: <0.2 MG/DL (ref 0–1.2)
BLOOD BANK SAMPLE EXPIRATION: NORMAL
BLOOD GROUP ANTIBODIES SERPL: NEGATIVE
BNP SERPL-MCNC: 2947 PG/ML (ref 0–125)
BUN SERPL-MCNC: 9 MG/DL (ref 6–23)
C PNEUM DNA NPH QL NAA+NON-PROBE: NOT DETECTED
CALCIUM SERPL-MCNC: 9.2 MG/DL (ref 8.6–10.2)
CHLORIDE SERPL-SCNC: 95 MMOL/L (ref 98–107)
CO2 SERPL-SCNC: 38 MMOL/L (ref 22–29)
COHB: 1.4 % (ref 0–1.5)
CREAT SERPL-MCNC: 0.5 MG/DL (ref 0.5–1)
CRITICAL: ABNORMAL
DATE ANALYZED: ABNORMAL
DATE OF COLLECTION: ABNORMAL
EOSINOPHIL # BLD: 0.13 K/UL (ref 0.05–0.5)
EOSINOPHILS RELATIVE PERCENT: 2 % (ref 0–6)
ERYTHROCYTE [DISTWIDTH] IN BLOOD BY AUTOMATED COUNT: 13.7 % (ref 11.5–15)
FLUAV RNA NPH QL NAA+NON-PROBE: NOT DETECTED
FLUBV RNA NPH QL NAA+NON-PROBE: NOT DETECTED
GFR SERPL CREATININE-BSD FRML MDRD: >60 ML/MIN/1.73M2
GLUCOSE SERPL-MCNC: 122 MG/DL (ref 74–99)
HADV DNA NPH QL NAA+NON-PROBE: NOT DETECTED
HCO3: 41.7 MMOL/L (ref 22–26)
HCOV 229E RNA NPH QL NAA+NON-PROBE: NOT DETECTED
HCOV HKU1 RNA NPH QL NAA+NON-PROBE: NOT DETECTED
HCOV NL63 RNA NPH QL NAA+NON-PROBE: NOT DETECTED
HCOV OC43 RNA NPH QL NAA+NON-PROBE: NOT DETECTED
HCT VFR BLD AUTO: 35.4 % (ref 34–48)
HGB BLD-MCNC: 10.4 G/DL (ref 11.5–15.5)
HHB: 1.4 % (ref 0–5)
HMPV RNA NPH QL NAA+NON-PROBE: NOT DETECTED
HPIV1 RNA NPH QL NAA+NON-PROBE: NOT DETECTED
HPIV2 RNA NPH QL NAA+NON-PROBE: NOT DETECTED
HPIV3 RNA NPH QL NAA+NON-PROBE: NOT DETECTED
HPIV4 RNA NPH QL NAA+NON-PROBE: NOT DETECTED
IMM GRANULOCYTES # BLD AUTO: 0.03 K/UL (ref 0–0.58)
IMM GRANULOCYTES NFR BLD: 0 % (ref 0–5)
LAB: ABNORMAL
LACTATE BLDV-SCNC: 0.9 MMOL/L (ref 0.5–2.2)
LYMPHOCYTES NFR BLD: 1.27 K/UL (ref 1.5–4)
LYMPHOCYTES RELATIVE PERCENT: 18 % (ref 20–42)
Lab: 2148
M PNEUMO DNA NPH QL NAA+NON-PROBE: NOT DETECTED
MCH RBC QN AUTO: 29.3 PG (ref 26–35)
MCHC RBC AUTO-ENTMCNC: 29.4 G/DL (ref 32–34.5)
MCV RBC AUTO: 99.7 FL (ref 80–99.9)
METHB: 0.2 % (ref 0–1.5)
MODE: ABNORMAL
MONOCYTES NFR BLD: 0.5 K/UL (ref 0.1–0.95)
MONOCYTES NFR BLD: 7 % (ref 2–12)
NEUTROPHILS NFR BLD: 71 % (ref 43–80)
NEUTS SEG NFR BLD: 5.01 K/UL (ref 1.8–7.3)
O2 CONTENT: 16 ML/DL
O2 SATURATION: 98.6 % (ref 92–98.5)
O2HB: 97 % (ref 94–97)
OPERATOR ID: ABNORMAL
PATIENT TEMP: 37 C
PCO2: 69.2 MMHG (ref 35–45)
PH BLOOD GAS: 7.4 (ref 7.35–7.45)
PLATELET # BLD AUTO: 250 K/UL (ref 130–450)
PMV BLD AUTO: 9.6 FL (ref 7–12)
PO2: 118.2 MMHG (ref 75–100)
POTASSIUM SERPL-SCNC: 3.5 MMOL/L (ref 3.5–5)
PROT SERPL-MCNC: 7 G/DL (ref 6.4–8.3)
RBC # BLD AUTO: 3.55 M/UL (ref 3.5–5.5)
RSV RNA NPH QL NAA+NON-PROBE: NOT DETECTED
RV+EV RNA NPH QL NAA+NON-PROBE: NOT DETECTED
SARS-COV-2 RNA NPH QL NAA+NON-PROBE: NOT DETECTED
SODIUM SERPL-SCNC: 141 MMOL/L (ref 132–146)
SOURCE, BLOOD GAS: ABNORMAL
SPECIMEN DESCRIPTION: NORMAL
THB: 11.6 G/DL (ref 11.5–16.5)
TIME ANALYZED: 2148
TROPONIN I SERPL HS-MCNC: 43 NG/L (ref 0–9)
WBC OTHER # BLD: 7 K/UL (ref 4.5–11.5)

## 2023-12-23 PROCEDURE — 6370000000 HC RX 637 (ALT 250 FOR IP): Performed by: NURSE PRACTITIONER

## 2023-12-23 PROCEDURE — 71045 X-RAY EXAM CHEST 1 VIEW: CPT

## 2023-12-23 PROCEDURE — 6370000000 HC RX 637 (ALT 250 FOR IP): Performed by: EMERGENCY MEDICINE

## 2023-12-23 PROCEDURE — 86900 BLOOD TYPING SEROLOGIC ABO: CPT

## 2023-12-23 PROCEDURE — 84484 ASSAY OF TROPONIN QUANT: CPT

## 2023-12-23 PROCEDURE — 0202U NFCT DS 22 TRGT SARS-COV-2: CPT

## 2023-12-23 PROCEDURE — 83880 ASSAY OF NATRIURETIC PEPTIDE: CPT

## 2023-12-23 PROCEDURE — 86901 BLOOD TYPING SEROLOGIC RH(D): CPT

## 2023-12-23 PROCEDURE — 82805 BLOOD GASES W/O2 SATURATION: CPT

## 2023-12-23 PROCEDURE — 94640 AIRWAY INHALATION TREATMENT: CPT

## 2023-12-23 PROCEDURE — 99285 EMERGENCY DEPT VISIT HI MDM: CPT

## 2023-12-23 PROCEDURE — 86850 RBC ANTIBODY SCREEN: CPT

## 2023-12-23 PROCEDURE — 80053 COMPREHEN METABOLIC PANEL: CPT

## 2023-12-23 PROCEDURE — 83605 ASSAY OF LACTIC ACID: CPT

## 2023-12-23 PROCEDURE — 85025 COMPLETE CBC W/AUTO DIFF WBC: CPT

## 2023-12-23 RX ORDER — IPRATROPIUM BROMIDE AND ALBUTEROL SULFATE 2.5; .5 MG/3ML; MG/3ML
3 SOLUTION RESPIRATORY (INHALATION) ONCE
Status: COMPLETED | OUTPATIENT
Start: 2023-12-23 | End: 2023-12-23

## 2023-12-23 RX ORDER — ACETAMINOPHEN 325 MG/1
650 TABLET ORAL ONCE
Status: COMPLETED | OUTPATIENT
Start: 2023-12-23 | End: 2023-12-23

## 2023-12-23 RX ADMIN — IPRATROPIUM BROMIDE AND ALBUTEROL SULFATE 3 DOSE: 2.5; .5 SOLUTION RESPIRATORY (INHALATION) at 21:42

## 2023-12-23 RX ADMIN — ACETAMINOPHEN 650 MG: 325 TABLET ORAL at 23:23

## 2023-12-24 ENCOUNTER — APPOINTMENT (OUTPATIENT)
Dept: CT IMAGING | Age: 68
DRG: 190 | End: 2023-12-24
Payer: MEDICARE

## 2023-12-24 PROBLEM — J44.1 COPD WITH ACUTE EXACERBATION (HCC): Status: ACTIVE | Noted: 2023-12-24

## 2023-12-24 LAB
B.E.: 12.6 MMOL/L (ref -3–3)
COHB: 0.9 % (ref 0–1.5)
CRITICAL: ABNORMAL
DATE ANALYZED: ABNORMAL
DATE OF COLLECTION: ABNORMAL
HCO3: 40.5 MMOL/L (ref 22–26)
HHB: 5.2 % (ref 0–5)
LAB: ABNORMAL
Lab: 150
METHB: 0.2 % (ref 0–1.5)
MODE: ABNORMAL
O2 CONTENT: 14.3 ML/DL
O2 SATURATION: 94.7 % (ref 92–98.5)
O2HB: 93.7 % (ref 94–97)
OPERATOR ID: 2860
PATIENT TEMP: 37 C
PCO2: 72.5 MMHG (ref 35–45)
PH BLOOD GAS: 7.37 (ref 7.35–7.45)
PO2: 76.7 MMHG (ref 75–100)
PROCALCITONIN SERPL-MCNC: 0.03 NG/ML (ref 0–0.08)
SOURCE, BLOOD GAS: ABNORMAL
THB: 10.8 G/DL (ref 11.5–16.5)
TIME ANALYZED: 159
TROPONIN I SERPL HS-MCNC: 42 NG/L (ref 0–9)

## 2023-12-24 PROCEDURE — 82805 BLOOD GASES W/O2 SATURATION: CPT

## 2023-12-24 PROCEDURE — 6360000004 HC RX CONTRAST MEDICATION: Performed by: RADIOLOGY

## 2023-12-24 PROCEDURE — 6370000000 HC RX 637 (ALT 250 FOR IP): Performed by: EMERGENCY MEDICINE

## 2023-12-24 PROCEDURE — 94660 CPAP INITIATION&MGMT: CPT

## 2023-12-24 PROCEDURE — 74177 CT ABD & PELVIS W/CONTRAST: CPT

## 2023-12-24 PROCEDURE — 94640 AIRWAY INHALATION TREATMENT: CPT

## 2023-12-24 PROCEDURE — 6370000000 HC RX 637 (ALT 250 FOR IP): Performed by: INTERNAL MEDICINE

## 2023-12-24 PROCEDURE — 6360000002 HC RX W HCPCS: Performed by: INTERNAL MEDICINE

## 2023-12-24 PROCEDURE — 36415 COLL VENOUS BLD VENIPUNCTURE: CPT

## 2023-12-24 PROCEDURE — 2580000003 HC RX 258: Performed by: INTERNAL MEDICINE

## 2023-12-24 PROCEDURE — 84484 ASSAY OF TROPONIN QUANT: CPT

## 2023-12-24 PROCEDURE — 84145 PROCALCITONIN (PCT): CPT

## 2023-12-24 PROCEDURE — 71275 CT ANGIOGRAPHY CHEST: CPT

## 2023-12-24 PROCEDURE — 2700000000 HC OXYGEN THERAPY PER DAY

## 2023-12-24 PROCEDURE — 99223 1ST HOSP IP/OBS HIGH 75: CPT | Performed by: SURGERY

## 2023-12-24 PROCEDURE — 6370000000 HC RX 637 (ALT 250 FOR IP)

## 2023-12-24 PROCEDURE — 5A09357 ASSISTANCE WITH RESPIRATORY VENTILATION, LESS THAN 24 CONSECUTIVE HOURS, CONTINUOUS POSITIVE AIRWAY PRESSURE: ICD-10-PCS | Performed by: INTERNAL MEDICINE

## 2023-12-24 PROCEDURE — 2140000000 HC CCU INTERMEDIATE R&B

## 2023-12-24 RX ORDER — ARFORMOTEROL TARTRATE 15 UG/2ML
15 SOLUTION RESPIRATORY (INHALATION)
Status: DISCONTINUED | OUTPATIENT
Start: 2023-12-24 | End: 2023-12-25 | Stop reason: HOSPADM

## 2023-12-24 RX ORDER — DILTIAZEM HYDROCHLORIDE 120 MG/1
120 CAPSULE, COATED, EXTENDED RELEASE ORAL DAILY
Status: DISCONTINUED | OUTPATIENT
Start: 2023-12-24 | End: 2023-12-25 | Stop reason: HOSPADM

## 2023-12-24 RX ORDER — POLYETHYLENE GLYCOL 3350 17 G/17G
17 POWDER, FOR SOLUTION ORAL DAILY PRN
Status: DISCONTINUED | OUTPATIENT
Start: 2023-12-24 | End: 2023-12-25 | Stop reason: HOSPADM

## 2023-12-24 RX ORDER — BUSPIRONE HYDROCHLORIDE 10 MG/1
10 TABLET ORAL 3 TIMES DAILY
Status: DISCONTINUED | OUTPATIENT
Start: 2023-12-24 | End: 2023-12-25 | Stop reason: HOSPADM

## 2023-12-24 RX ORDER — TRAZODONE HYDROCHLORIDE 50 MG/1
50 TABLET ORAL NIGHTLY PRN
Status: DISCONTINUED | OUTPATIENT
Start: 2023-12-24 | End: 2023-12-25 | Stop reason: HOSPADM

## 2023-12-24 RX ORDER — SODIUM CHLORIDE 0.9 % (FLUSH) 0.9 %
5-40 SYRINGE (ML) INJECTION PRN
Status: DISCONTINUED | OUTPATIENT
Start: 2023-12-24 | End: 2023-12-25 | Stop reason: HOSPADM

## 2023-12-24 RX ORDER — POTASSIUM CHLORIDE 20 MEQ/1
40 TABLET, EXTENDED RELEASE ORAL PRN
Status: DISCONTINUED | OUTPATIENT
Start: 2023-12-24 | End: 2023-12-25 | Stop reason: HOSPADM

## 2023-12-24 RX ORDER — PAROXETINE HYDROCHLORIDE 20 MG/1
10 TABLET, FILM COATED ORAL EVERY MORNING
Status: DISCONTINUED | OUTPATIENT
Start: 2023-12-24 | End: 2023-12-25 | Stop reason: HOSPADM

## 2023-12-24 RX ORDER — ACETAMINOPHEN 325 MG/1
650 TABLET ORAL EVERY 6 HOURS PRN
Status: DISCONTINUED | OUTPATIENT
Start: 2023-12-24 | End: 2023-12-25 | Stop reason: HOSPADM

## 2023-12-24 RX ORDER — SODIUM CHLORIDE 9 MG/ML
INJECTION, SOLUTION INTRAVENOUS PRN
Status: DISCONTINUED | OUTPATIENT
Start: 2023-12-24 | End: 2023-12-24 | Stop reason: SDUPTHER

## 2023-12-24 RX ORDER — IPRATROPIUM BROMIDE AND ALBUTEROL SULFATE 2.5; .5 MG/3ML; MG/3ML
1 SOLUTION RESPIRATORY (INHALATION)
Status: DISCONTINUED | OUTPATIENT
Start: 2023-12-24 | End: 2023-12-25 | Stop reason: HOSPADM

## 2023-12-24 RX ORDER — POTASSIUM CHLORIDE 7.45 MG/ML
10 INJECTION INTRAVENOUS PRN
Status: DISCONTINUED | OUTPATIENT
Start: 2023-12-24 | End: 2023-12-25 | Stop reason: HOSPADM

## 2023-12-24 RX ORDER — ACETAMINOPHEN 325 MG/1
650 TABLET ORAL EVERY 4 HOURS PRN
Status: DISCONTINUED | OUTPATIENT
Start: 2023-12-24 | End: 2023-12-24

## 2023-12-24 RX ORDER — PANTOPRAZOLE SODIUM 40 MG/1
40 TABLET, DELAYED RELEASE ORAL
Status: DISCONTINUED | OUTPATIENT
Start: 2023-12-25 | End: 2023-12-25 | Stop reason: HOSPADM

## 2023-12-24 RX ORDER — METHIMAZOLE 5 MG/1
15 TABLET ORAL DAILY
Status: DISCONTINUED | OUTPATIENT
Start: 2023-12-24 | End: 2023-12-25 | Stop reason: HOSPADM

## 2023-12-24 RX ORDER — MAGNESIUM SULFATE IN WATER 40 MG/ML
2000 INJECTION, SOLUTION INTRAVENOUS PRN
Status: DISCONTINUED | OUTPATIENT
Start: 2023-12-24 | End: 2023-12-25 | Stop reason: HOSPADM

## 2023-12-24 RX ORDER — ONDANSETRON 2 MG/ML
4 INJECTION INTRAMUSCULAR; INTRAVENOUS EVERY 6 HOURS PRN
Status: DISCONTINUED | OUTPATIENT
Start: 2023-12-24 | End: 2023-12-25 | Stop reason: HOSPADM

## 2023-12-24 RX ORDER — SODIUM CHLORIDE 0.9 % (FLUSH) 0.9 %
5-40 SYRINGE (ML) INJECTION EVERY 12 HOURS SCHEDULED
Status: DISCONTINUED | OUTPATIENT
Start: 2023-12-24 | End: 2023-12-25 | Stop reason: HOSPADM

## 2023-12-24 RX ORDER — SODIUM CHLORIDE 9 MG/ML
INJECTION, SOLUTION INTRAVENOUS PRN
Status: DISCONTINUED | OUTPATIENT
Start: 2023-12-24 | End: 2023-12-25 | Stop reason: HOSPADM

## 2023-12-24 RX ORDER — IPRATROPIUM BROMIDE AND ALBUTEROL SULFATE 2.5; .5 MG/3ML; MG/3ML
1 SOLUTION RESPIRATORY (INHALATION)
Status: COMPLETED | OUTPATIENT
Start: 2023-12-24 | End: 2023-12-24

## 2023-12-24 RX ORDER — ASPIRIN 81 MG/1
81 TABLET, CHEWABLE ORAL DAILY
Status: DISCONTINUED | OUTPATIENT
Start: 2023-12-24 | End: 2023-12-25

## 2023-12-24 RX ORDER — HYDROXYZINE PAMOATE 25 MG/1
25 CAPSULE ORAL 3 TIMES DAILY PRN
Status: DISCONTINUED | OUTPATIENT
Start: 2023-12-24 | End: 2023-12-25 | Stop reason: HOSPADM

## 2023-12-24 RX ORDER — ACETAMINOPHEN 650 MG/1
650 SUPPOSITORY RECTAL EVERY 6 HOURS PRN
Status: DISCONTINUED | OUTPATIENT
Start: 2023-12-24 | End: 2023-12-25 | Stop reason: HOSPADM

## 2023-12-24 RX ORDER — ONDANSETRON 4 MG/1
4 TABLET, ORALLY DISINTEGRATING ORAL EVERY 8 HOURS PRN
Status: DISCONTINUED | OUTPATIENT
Start: 2023-12-24 | End: 2023-12-25 | Stop reason: HOSPADM

## 2023-12-24 RX ORDER — PREGABALIN 50 MG/1
50 CAPSULE ORAL 3 TIMES DAILY
Status: DISCONTINUED | OUTPATIENT
Start: 2023-12-24 | End: 2023-12-25 | Stop reason: HOSPADM

## 2023-12-24 RX ORDER — BUDESONIDE 0.5 MG/2ML
0.5 INHALANT ORAL
Status: DISCONTINUED | OUTPATIENT
Start: 2023-12-24 | End: 2023-12-25 | Stop reason: HOSPADM

## 2023-12-24 RX ADMIN — IPRATROPIUM BROMIDE AND ALBUTEROL SULFATE 1 DOSE: 2.5; .5 SOLUTION RESPIRATORY (INHALATION) at 03:12

## 2023-12-24 RX ADMIN — IPRATROPIUM BROMIDE AND ALBUTEROL SULFATE 1 DOSE: 2.5; .5 SOLUTION RESPIRATORY (INHALATION) at 20:16

## 2023-12-24 RX ADMIN — METHYLPREDNISOLONE SODIUM SUCCINATE 40 MG: 40 INJECTION, POWDER, LYOPHILIZED, FOR SOLUTION INTRAMUSCULAR; INTRAVENOUS at 10:38

## 2023-12-24 RX ADMIN — IOPAMIDOL 75 ML: 755 INJECTION, SOLUTION INTRAVENOUS at 00:50

## 2023-12-24 RX ADMIN — SODIUM CHLORIDE, PRESERVATIVE FREE 10 ML: 5 INJECTION INTRAVENOUS at 21:23

## 2023-12-24 RX ADMIN — WATER 40 MG: 1 INJECTION INTRAMUSCULAR; INTRAVENOUS; SUBCUTANEOUS at 23:47

## 2023-12-24 RX ADMIN — ASPIRIN 81 MG: 81 TABLET, CHEWABLE ORAL at 13:52

## 2023-12-24 RX ADMIN — BUSPIRONE HYDROCHLORIDE 10 MG: 10 TABLET ORAL at 21:23

## 2023-12-24 RX ADMIN — DILTIAZEM HYDROCHLORIDE 120 MG: 120 CAPSULE, COATED, EXTENDED RELEASE ORAL at 10:41

## 2023-12-24 RX ADMIN — METHIMAZOLE 15 MG: 5 TABLET ORAL at 11:39

## 2023-12-24 RX ADMIN — APIXABAN 5 MG: 5 TABLET, FILM COATED ORAL at 21:23

## 2023-12-24 RX ADMIN — BUDESONIDE 500 MCG: 0.5 INHALANT RESPIRATORY (INHALATION) at 10:07

## 2023-12-24 RX ADMIN — ACETAMINOPHEN 650 MG: 325 TABLET ORAL at 11:44

## 2023-12-24 RX ADMIN — PAROXETINE HYDROCHLORIDE 10 MG: 20 TABLET, FILM COATED ORAL at 10:38

## 2023-12-24 RX ADMIN — PREGABALIN 50 MG: 50 CAPSULE ORAL at 21:28

## 2023-12-24 RX ADMIN — PREGABALIN 50 MG: 50 CAPSULE ORAL at 10:38

## 2023-12-24 RX ADMIN — ARFORMOTEROL TARTRATE 15 MCG: 15 SOLUTION RESPIRATORY (INHALATION) at 10:07

## 2023-12-24 RX ADMIN — SODIUM CHLORIDE, PRESERVATIVE FREE 10 ML: 5 INJECTION INTRAVENOUS at 10:42

## 2023-12-24 RX ADMIN — TRAZODONE HYDROCHLORIDE 50 MG: 50 TABLET ORAL at 21:23

## 2023-12-24 RX ADMIN — SODIUM CHLORIDE, PRESERVATIVE FREE 10 ML: 5 INJECTION INTRAVENOUS at 23:48

## 2023-12-24 RX ADMIN — Medication 10 ML: at 21:29

## 2023-12-24 RX ADMIN — BUSPIRONE HYDROCHLORIDE 10 MG: 10 TABLET ORAL at 13:52

## 2023-12-24 RX ADMIN — APIXABAN 5 MG: 5 TABLET, FILM COATED ORAL at 10:38

## 2023-12-24 RX ADMIN — ARFORMOTEROL TARTRATE 15 MCG: 15 SOLUTION RESPIRATORY (INHALATION) at 20:16

## 2023-12-24 RX ADMIN — IPRATROPIUM BROMIDE AND ALBUTEROL SULFATE 1 DOSE: 2.5; .5 SOLUTION RESPIRATORY (INHALATION) at 16:20

## 2023-12-24 RX ADMIN — IPRATROPIUM BROMIDE AND ALBUTEROL SULFATE 1 DOSE: 2.5; .5 SOLUTION RESPIRATORY (INHALATION) at 03:14

## 2023-12-24 RX ADMIN — PREGABALIN 50 MG: 50 CAPSULE ORAL at 13:52

## 2023-12-24 RX ADMIN — IPRATROPIUM BROMIDE AND ALBUTEROL SULFATE 1 DOSE: 2.5; .5 SOLUTION RESPIRATORY (INHALATION) at 10:07

## 2023-12-24 RX ADMIN — BUDESONIDE 500 MCG: 0.5 INHALANT RESPIRATORY (INHALATION) at 20:16

## 2023-12-24 RX ADMIN — HYDROXYZINE PAMOATE 25 MG: 25 CAPSULE ORAL at 11:45

## 2023-12-24 RX ADMIN — BUSPIRONE HYDROCHLORIDE 10 MG: 10 TABLET ORAL at 10:38

## 2023-12-24 RX ADMIN — IPRATROPIUM BROMIDE AND ALBUTEROL SULFATE 1 DOSE: 2.5; .5 SOLUTION RESPIRATORY (INHALATION) at 03:13

## 2023-12-24 RX ADMIN — SODIUM CHLORIDE, PRESERVATIVE FREE 10 ML: 5 INJECTION INTRAVENOUS at 21:28

## 2023-12-24 NOTE — PLAN OF CARE
Problem: Skin/Tissue Integrity  Goal: Absence of new skin breakdown  Description: 1. Monitor for areas of redness and/or skin breakdown  2. Assess vascular access sites hourly  3. Every 4-6 hours minimum:  Change oxygen saturation probe site  4. Every 4-6 hours:  If on nasal continuous positive airway pressure, respiratory therapy assess nares and determine need for appliance change or resting period. 12/24/2023 1119 by Marga Hernández RN  Outcome: Progressing  12/24/2023 0449 by Joann Osorio RN  Outcome: Progressing     Problem: ABCDS Injury Assessment  Goal: Absence of physical injury  12/24/2023 1119 by Marga Hernández RN  Outcome: Progressing  12/24/2023 0449 by Joann Osorio RN  Outcome: Progressing     Problem: Chronic Conditions and Co-morbidities  Goal: Patient's chronic conditions and co-morbidity symptoms are monitored and maintained or improved  12/24/2023 1119 by Marga Hernández RN  Outcome: Progressing  12/24/2023 0449 by Joann Osorio RN  Outcome: Progressing     Problem: Discharge Planning  Goal: Discharge to home or other facility with appropriate resources  12/24/2023 0449 by Joann Osorio RN  Outcome: Progressing     Problem: Skin/Tissue Integrity  Goal: Absence of new skin breakdown  Description: 1. Monitor for areas of redness and/or skin breakdown  2. Assess vascular access sites hourly  3. Every 4-6 hours minimum:  Change oxygen saturation probe site  4. Every 4-6 hours:  If on nasal continuous positive airway pressure, respiratory therapy assess nares and determine need for appliance change or resting period.   12/24/2023 1119 by Marga Hernández RN  Outcome: Progressing  12/24/2023 0449 by Joann Osorio RN  Outcome: Progressing     Problem: ABCDS Injury Assessment  Goal: Absence of physical injury  12/24/2023 1119 by Marga Hernández RN  Outcome: Progressing  12/24/2023 0449 by Joann Osorio RN  Outcome: Progressing     Problem: Chronic Conditions

## 2023-12-24 NOTE — ED PROVIDER NOTES
HCO3 40.5 (H) 22.0 - 26.0 mmol/L    B.E. 12.6 (H) -3.0 - 3.0 mmol/L    O2 Sat 94.7 92.0 - 98.5 %    O2Hb 93.7 (L) 94.0 - 97.0 %    COHb 0.9 0.0 - 1.5 %    MetHb 0.2 0.0 - 1.5 %    O2 Content 14.3 mL/dL    HHb 5.2 (H) 0.0 - 5.0 %    tHb (est) 10.8 (L) 11.5 - 16.5 g/dL    Mode NC-4  L     Date Of Collection 20231224     Time Collected 0150     Pt Temp 37.0 C     ID [id number]     Lab [id number]     Critical(s) Notified Handed report to Dr/RN    EKG 12 Lead   Result Value Ref Range    Ventricular Rate 94 BPM    Atrial Rate 94 BPM    P-R Interval 140 ms    QRS Duration 74 ms    Q-T Interval 376 ms    QTc Calculation (Bazett) 470 ms    P Axis 90 degrees    R Axis 78 degrees    T Axis 80 degrees   TYPE AND SCREEN   Result Value Ref Range    Blood Bank Sample Expiration 12/26/2023,2359     Arm Band Number [id number]     ABO/Rh O NEGATIVE     Antibody Screen NEGATIVE        RADIOLOGY:  Interpreted by Radiologist.  CTA CHEST W CONTRAST   Final Result   1. No evidence of acute pulmonary embolism. 2. 1.8 cm focal opacification in the lingula, differential includes   atelectasis/scar, small focus of pneumonia, and nodule. Recommend follow-up   chest CT in 3 months to assess for resolution. 3. Moderate to severe emphysema. 4. Multiple small bowel loops appear relatively thick, which raises the   possibility of a nonspecific enteritis. 5. Small hiatal hernia. 6. Nonobstructive right renal calculi. CT ABDOMEN PELVIS W IV CONTRAST Additional Contrast? None   Final Result   1. No evidence of acute pulmonary embolism. 2. 1.8 cm focal opacification in the lingula, differential includes   atelectasis/scar, small focus of pneumonia, and nodule. Recommend follow-up   chest CT in 3 months to assess for resolution. 3. Moderate to severe emphysema. 4. Multiple small bowel loops appear relatively thick, which raises the   possibility of a nonspecific enteritis. 5. Small hiatal hernia.    6. Nonobstructive right renal

## 2023-12-24 NOTE — CONSULTS
VASCULAR SURGERY CONSULT NOTE    Reason for Consult: Ischemic foot ulcer    HPI:    77 y.o. female who is admitted to the hospital for treatment of chest pain and respiratory distress. Vascular surgery is consulted for evaluation and treatment of left lower extremity pain and skin changes. Patient reports to the emergency department because she was short of breath. States she was having a multitude of issues including constipation, diarrhea, dyspnea, chest pain and left foot pain. Patient does have a noticeable color change to her left lower leg and foot compared to the right. She states that she has noticed this for approximately 2 weeks. She is a smoker, states she smokes approximately 3 cigarettes a day, used to smoke a lot more. She denies any history of vascular surgeries. Denies any history of diabetes. States that she does have chronic low back pain, requiring multiple surgeries and persistent sciatic nerve pain. There is also a small wound on one of her digits of her left foot. She believes this has been here for several days to 2 weeks. She does describe paresthesia in her left lower extremity that is not new. Additionally patient's past medical history is pertinent for PE. Patient states she has been placed on Eliquis for approximately 1 year due to this.       ROS: Negative if blank [], Positive if [x]  General Vascular   [] Fevers [] Claudication (Blocks)   [] Chills [x] Rest Pain   [] Weight Loss [] Tissue Loss   [] Chest Pain [] Clotting Disorder   [x] SOB at rest [] Leg Swelling   [x] SOB with exertion [x] DVT/PE      [] Nausea    [] Vomiting [] Stroke/TIA   [] Abdominal Pain [] Focal weakness   [] Melena [] Slurred Speech   [] Hematochezia [] Vision Changes   [] Hematuria    [] Dysuria [] Hx of Central Catheters   [] Wears Glasses/Contacts [] Dialysis and If so date initiated   [] Blindness       [] Difficulty swallowing        Past Medical History:   Diagnosis Date    Acute
100
PCO2 72.5*   HCO3 40.5*   BE 12.6*   O2SAT 94.7   METHB 0.2   O2HB 93.7*   COHB 0.9   O2CON 14.3   HHB 5.2*   THB 10.8*     FiO2 : 100 %       Radiology Review:  CTA chest reviewed with (-) PE, better aeration right lower lobe, patchy opacity peripheral lingula, similar left upper lobe nodule, and similar emphysema compared to previous    IMPRESSION/RECOMMENDATIONS:      COPD  Hypoxia  Lung nodule    Cont with steroids, taper as tolerated  Cont with nebs, observe respiratory function  Cont with oxygen, taper as tolerated  Similar lung nodule, new opacity, await procal, viral panel (-), feeling better, observe off antibiotics for now  Vascular to assess foot if anything needs done  OOB to chair, ambulate as tolerated      Time at the bedside, reviewing labs and radiographs, reviewing notes and consultations, discussing with staff and family was more than 55 minutes. Thanks for letting us see this patient in consultation. Please contact us with any questions. Office [phone] or after hours through TUNJI, [phone].

## 2023-12-24 NOTE — H&P
[address], [address] [place] [address]                              HISTORY AND PHYSICAL    PATIENT NAME: Soraya Porter                      :        1955  MED REC NO:   [id number]                            ROOM:       [id number]  ACCOUNT NO:   [de-identified]                           ADMIT DATE: 2023  PROVIDER:     Dolph Primrose, DO    CHIEF COMPLAINT:  Shortness of breath. HISTORY OF PRESENT ILLNESS:  The patient is a 47-year-old   female, presented to the emergency room complaining of increasing  shortness of breath, cough productive of yellow sputum, chills,  constipation, diarrhea. She was on chronic O2. She is still smoking. She was seen in the emergency room and admitted for further evaluation  and treatment. MEDICATIONS PRIOR TO ADMISSION:  Eliquis, Breztri, BuSpar, vitamin D3,  Cardizem CD, Feosol, DuoNeb, Tapazole, oxygen, Protonix, Paxil, Lyrica,  Desyrel, vitamin B12, vitamin C. PAST MEDICAL HISTORY:  COPD, chronic hypoxic respiratory failure on  chronic O2, paroxysmal atrial fibrillation, chronic Eliquis therapy,  hypertension, vitamin B12 deficiency, hyperthyroidism. [facility] PRIMARY CARE PHYSICIAN:  Carlos Alberto Lockhart DO    PAST SURGICAL HISTORY:  Back surgery, left oophorectomy, tonsillectomy,  appendectomy, wisdom teeth extraction. SOCIAL HISTORY:  The patient smokes two cigarettes a week. No alcohol. REVIEW OF SYSTEMS:  Remarkable for above-stated chief complaint plus  left second toe ulcer. ALLERGIES:  PENICILLIN. PHYSICAL EXAMINATION:  GENERAL APPEARANCE:  Reveals a 47-year-old  female, who is  alert and oriented x3, cooperative and a good historian. VITAL SIGNS:  On admission; temperature 97.6, pulse 91, respirations 20,  blood pressure 107/89. HEENT:  Head:  Normocephalic, atraumatic. Eyes:  Pupils equal and  reactive to light. Extraocular muscles intact.   Fundi not

## 2023-12-24 NOTE — PLAN OF CARE
Problem: Skin/Tissue Integrity  Goal: Absence of new skin breakdown  Description: 1. Monitor for areas of redness and/or skin breakdown  2. Assess vascular access sites hourly  3. Every 4-6 hours minimum:  Change oxygen saturation probe site  4. Every 4-6 hours:  If on nasal continuous positive airway pressure, respiratory therapy assess nares and determine need for appliance change or resting period.   Outcome: Progressing     Problem: ABCDS Injury Assessment  Goal: Absence of physical injury  Outcome: Progressing     Problem: Chronic Conditions and Co-morbidities  Goal: Patient's chronic conditions and co-morbidity symptoms are monitored and maintained or improved  Outcome: Progressing     Problem: Discharge Planning  Goal: Discharge to home or other facility with appropriate resources  Outcome: Progressing

## 2023-12-25 VITALS
HEIGHT: 68 IN | WEIGHT: 91 LBS | TEMPERATURE: 98.2 F | RESPIRATION RATE: 20 BRPM | SYSTOLIC BLOOD PRESSURE: 144 MMHG | OXYGEN SATURATION: 97 % | DIASTOLIC BLOOD PRESSURE: 72 MMHG | HEART RATE: 81 BPM | BODY MASS INDEX: 13.79 KG/M2

## 2023-12-25 PROBLEM — I70.222 CRITICAL LIMB ISCHEMIA OF LEFT LOWER EXTREMITY WITH REST PAIN (HCC): Chronic | Status: ACTIVE | Noted: 2023-12-25

## 2023-12-25 LAB
CHOLEST SERPL-MCNC: 233 MG/DL
HBA1C MFR BLD: 5.5 % (ref 4–5.6)
HDLC SERPL-MCNC: 105 MG/DL
LDLC SERPL CALC-MCNC: 115 MG/DL
TRIGL SERPL-MCNC: 63 MG/DL
VLDLC SERPL CALC-MCNC: 13 MG/DL

## 2023-12-25 PROCEDURE — 94660 CPAP INITIATION&MGMT: CPT

## 2023-12-25 PROCEDURE — 6360000002 HC RX W HCPCS: Performed by: INTERNAL MEDICINE

## 2023-12-25 PROCEDURE — 94640 AIRWAY INHALATION TREATMENT: CPT

## 2023-12-25 PROCEDURE — 83036 HEMOGLOBIN GLYCOSYLATED A1C: CPT

## 2023-12-25 PROCEDURE — 6370000000 HC RX 637 (ALT 250 FOR IP)

## 2023-12-25 PROCEDURE — 80061 LIPID PANEL: CPT

## 2023-12-25 PROCEDURE — 6370000000 HC RX 637 (ALT 250 FOR IP): Performed by: INTERNAL MEDICINE

## 2023-12-25 PROCEDURE — 2580000003 HC RX 258: Performed by: INTERNAL MEDICINE

## 2023-12-25 PROCEDURE — 2700000000 HC OXYGEN THERAPY PER DAY

## 2023-12-25 PROCEDURE — 36415 COLL VENOUS BLD VENIPUNCTURE: CPT

## 2023-12-25 RX ORDER — ALBUTEROL SULFATE 90 UG/1
2 AEROSOL, METERED RESPIRATORY (INHALATION) 4 TIMES DAILY PRN
Qty: 54 G | Refills: 1 | Status: SHIPPED | OUTPATIENT
Start: 2023-12-25

## 2023-12-25 RX ORDER — ATORVASTATIN CALCIUM 40 MG/1
40 TABLET, FILM COATED ORAL NIGHTLY
Qty: 30 TABLET | Refills: 0 | Status: SHIPPED | OUTPATIENT
Start: 2023-12-25

## 2023-12-25 RX ORDER — PREDNISONE 10 MG/1
TABLET ORAL
Qty: 30 TABLET | Refills: 0 | Status: SHIPPED | OUTPATIENT
Start: 2023-12-25

## 2023-12-25 RX ORDER — ATORVASTATIN CALCIUM 40 MG/1
40 TABLET, FILM COATED ORAL NIGHTLY
Status: DISCONTINUED | OUTPATIENT
Start: 2023-12-25 | End: 2023-12-25 | Stop reason: HOSPADM

## 2023-12-25 RX ORDER — ASPIRIN 81 MG/1
81 TABLET ORAL DAILY
Qty: 90 TABLET | Refills: 1 | COMMUNITY
Start: 2023-12-25

## 2023-12-25 RX ADMIN — WATER 40 MG: 1 INJECTION INTRAMUSCULAR; INTRAVENOUS; SUBCUTANEOUS at 16:39

## 2023-12-25 RX ADMIN — PAROXETINE HYDROCHLORIDE 10 MG: 20 TABLET, FILM COATED ORAL at 08:49

## 2023-12-25 RX ADMIN — PANTOPRAZOLE SODIUM 40 MG: 40 TABLET, DELAYED RELEASE ORAL at 06:42

## 2023-12-25 RX ADMIN — PREGABALIN 50 MG: 50 CAPSULE ORAL at 16:38

## 2023-12-25 RX ADMIN — WATER 40 MG: 1 INJECTION INTRAMUSCULAR; INTRAVENOUS; SUBCUTANEOUS at 08:59

## 2023-12-25 RX ADMIN — IPRATROPIUM BROMIDE AND ALBUTEROL SULFATE 1 DOSE: 2.5; .5 SOLUTION RESPIRATORY (INHALATION) at 09:25

## 2023-12-25 RX ADMIN — BUDESONIDE 500 MCG: 0.5 INHALANT RESPIRATORY (INHALATION) at 09:25

## 2023-12-25 RX ADMIN — PREGABALIN 50 MG: 50 CAPSULE ORAL at 08:50

## 2023-12-25 RX ADMIN — SODIUM CHLORIDE, PRESERVATIVE FREE 10 ML: 5 INJECTION INTRAVENOUS at 08:52

## 2023-12-25 RX ADMIN — ARFORMOTEROL TARTRATE 15 MCG: 15 SOLUTION RESPIRATORY (INHALATION) at 09:25

## 2023-12-25 RX ADMIN — IPRATROPIUM BROMIDE AND ALBUTEROL SULFATE 1 DOSE: 2.5; .5 SOLUTION RESPIRATORY (INHALATION) at 13:22

## 2023-12-25 RX ADMIN — ASPIRIN 81 MG: 81 TABLET, CHEWABLE ORAL at 08:51

## 2023-12-25 RX ADMIN — Medication 10 ML: at 09:40

## 2023-12-25 RX ADMIN — IPRATROPIUM BROMIDE AND ALBUTEROL SULFATE 1 DOSE: 2.5; .5 SOLUTION RESPIRATORY (INHALATION) at 16:23

## 2023-12-25 RX ADMIN — BUSPIRONE HYDROCHLORIDE 10 MG: 10 TABLET ORAL at 08:50

## 2023-12-25 RX ADMIN — DILTIAZEM HYDROCHLORIDE 120 MG: 120 CAPSULE, COATED, EXTENDED RELEASE ORAL at 08:51

## 2023-12-25 RX ADMIN — METHIMAZOLE 15 MG: 5 TABLET ORAL at 08:50

## 2023-12-25 RX ADMIN — BUSPIRONE HYDROCHLORIDE 10 MG: 10 TABLET ORAL at 16:38

## 2023-12-25 NOTE — DISCHARGE INSTRUCTIONS
F/U with Dr. Markell Lael ([facility]) Vascular Surgery   - office will call to arrange for outpatient angiogram  - hold eliquis 2 days prior to angiogram  - Office # [phone]  - call with worsening issues, wounds with left foot

## 2023-12-25 NOTE — PLAN OF CARE
Problem: Skin/Tissue Integrity  Goal: Absence of new skin breakdown  Description: 1. Monitor for areas of redness and/or skin breakdown  2. Assess vascular access sites hourly  3. Every 4-6 hours minimum:  Change oxygen saturation probe site  4. Every 4-6 hours:  If on nasal continuous positive airway pressure, respiratory therapy assess nares and determine need for appliance change or resting period.   Outcome: Progressing     Problem: ABCDS Injury Assessment  Goal: Absence of physical injury  Outcome: Progressing     Problem: Chronic Conditions and Co-morbidities  Goal: Patient's chronic conditions and co-morbidity symptoms are monitored and maintained or improved  Outcome: Progressing  Flowsheets (Taken 12/24/2023 2016)  Care Plan - Patient's Chronic Conditions and Co-Morbidity Symptoms are Monitored and Maintained or Improved: Monitor and assess patient's chronic conditions and comorbid symptoms for stability, deterioration, or improvement     Problem: Discharge Planning  Goal: Discharge to home or other facility with appropriate resources  Outcome: Progressing  Flowsheets (Taken 12/24/2023 2016)  Discharge to home or other facility with appropriate resources: Identify barriers to discharge with patient and caregiver     Problem: Safety - Adult  Goal: Free from fall injury  Outcome: Progressing

## 2023-12-27 ENCOUNTER — CARE COORDINATION (OUTPATIENT)
Dept: CARE COORDINATION | Age: 68
End: 2023-12-27

## 2023-12-27 ENCOUNTER — TELEPHONE (OUTPATIENT)
Dept: VASCULAR SURGERY | Age: 68
End: 2023-12-27

## 2023-12-27 LAB
EKG ATRIAL RATE: 94 BPM
EKG P AXIS: 90 DEGREES
EKG P-R INTERVAL: 140 MS
EKG Q-T INTERVAL: 376 MS
EKG QRS DURATION: 74 MS
EKG QTC CALCULATION (BAZETT): 470 MS
EKG R AXIS: 78 DEGREES
EKG T AXIS: 80 DEGREES
EKG VENTRICULAR RATE: 94 BPM

## 2023-12-27 NOTE — CARE COORDINATION
Care Transitions Initial Follow Up Call    Call within 2 business days of discharge: Yes    Patient Current Location:  Home: [address] [place] [address]  [id number] [place] [address]    Care Transition Nurse contacted the patient by telephone to perform post hospital discharge assessment. Provided introduction to self, and explanation of the Care Transition Nurse role. Patient: Mattie Owens Patient : 1955   MRN: [id number]  Reason for Admission: COPD with acute exacerbation  Discharge Date: 23 RARS: Readmission Risk Score: 19.7      Last Discharge Facility       Date Complaint Diagnosis Description Type Department Provider    23 Shortness of Breath; Abdominal Pain; Tingling Diarrhea, unspecified type . .. ED to Hosp-Admission (Discharged) [place] [facility] [place] [facility] Aftab Jarrett DO; Charo Whittington ... Was this an external facility discharge? No [place] [facility] [place]    Challenges to be reviewed by the provider   Additional needs identified to be addressed with provider: No  none               Method of communication with provider: none.      -Spoke with patient for initial care transition call post hospital discharge.   -Patient admitted to [facility] ([facility]-) on 23 with symptoms of increasing SOB, productive cough of yellow mucus, chills, constipation and diarrhea.    -Patient reports improvement, states her breathing is back to her baseline and her productive cough is lessened. States her bowels are \"iffy\" but she did have a small normal BM today.  -Wears 3L NC oxygen with oximeter reading 94-97%. States using nebulizer.  -Patient denies any needs, questions, or concerns at this time, was appreciative of call, and is agreeable to continued follow up from [facility]. Care Transition Nurse reviewed discharge instructions with patient who verbalized understanding. The patient was given an opportunity to ask questions and does not have any further questions or concerns at this time.  Were discharge instructions

## 2023-12-28 ENCOUNTER — HOSPITAL ENCOUNTER (OUTPATIENT)
Dept: GENERAL RADIOLOGY | Age: 68
Discharge: HOME | End: 2023-12-30
Attending: PAIN MEDICINE
Payer: MEDICARE

## 2023-12-28 ENCOUNTER — HOSPITAL ENCOUNTER (OUTPATIENT)
Age: 68
Setting detail: OUTPATIENT SURGERY
Discharge: HOME | End: 2023-12-28
Attending: PAIN MEDICINE | Admitting: PAIN MEDICINE
Payer: MEDICARE

## 2023-12-28 VITALS
BODY MASS INDEX: 13.94 KG/M2 | TEMPERATURE: 97 F | RESPIRATION RATE: 18 BRPM | OXYGEN SATURATION: 100 % | HEIGHT: 68 IN | HEART RATE: 67 BPM | SYSTOLIC BLOOD PRESSURE: 172 MMHG | WEIGHT: 92 LBS | DIASTOLIC BLOOD PRESSURE: 77 MMHG

## 2023-12-28 DIAGNOSIS — R52 PAIN MANAGEMENT: ICD-10-CM

## 2023-12-28 LAB — GLUCOSE BLD-MCNC: 79 MG/DL (ref 74–99)

## 2023-12-28 PROCEDURE — 2500000003 HC RX 250 WO HCPCS: Performed by: PAIN MEDICINE

## 2023-12-28 PROCEDURE — 3600000002 HC SURGERY LEVEL 2 BASE: Performed by: PAIN MEDICINE

## 2023-12-28 PROCEDURE — 7100000010 HC PHASE II RECOVERY - FIRST 15 MIN: Performed by: PAIN MEDICINE

## 2023-12-28 PROCEDURE — 62323 NJX INTERLAMINAR LMBR/SAC: CPT | Performed by: PAIN MEDICINE

## 2023-12-28 PROCEDURE — 6360000004 HC RX CONTRAST MEDICATION: Performed by: PAIN MEDICINE

## 2023-12-28 PROCEDURE — A4216 STERILE WATER/SALINE, 10 ML: HCPCS | Performed by: PAIN MEDICINE

## 2023-12-28 PROCEDURE — 2709999900 HC NON-CHARGEABLE SUPPLY: Performed by: PAIN MEDICINE

## 2023-12-28 PROCEDURE — 82962 GLUCOSE BLOOD TEST: CPT

## 2023-12-28 PROCEDURE — 2580000003 HC RX 258: Performed by: PAIN MEDICINE

## 2023-12-28 PROCEDURE — 7100000011 HC PHASE II RECOVERY - ADDTL 15 MIN: Performed by: PAIN MEDICINE

## 2023-12-28 PROCEDURE — 6360000002 HC RX W HCPCS: Performed by: PAIN MEDICINE

## 2023-12-28 RX ORDER — METHYLPREDNISOLONE ACETATE 40 MG/ML
INJECTION, SUSPENSION INTRA-ARTICULAR; INTRALESIONAL; INTRAMUSCULAR; SOFT TISSUE PRN
Status: DISCONTINUED | OUTPATIENT
Start: 2023-12-28 | End: 2023-12-28 | Stop reason: ALTCHOICE

## 2023-12-28 RX ORDER — LIDOCAINE HYDROCHLORIDE 5 MG/ML
INJECTION, SOLUTION INFILTRATION; INTRAVENOUS PRN
Status: DISCONTINUED | OUTPATIENT
Start: 2023-12-28 | End: 2023-12-28 | Stop reason: ALTCHOICE

## 2023-12-28 RX ORDER — IOPAMIDOL 612 MG/ML
INJECTION, SOLUTION INTRATHECAL PRN
Status: DISCONTINUED | OUTPATIENT
Start: 2023-12-28 | End: 2023-12-28 | Stop reason: ALTCHOICE

## 2023-12-28 RX ORDER — SODIUM CHLORIDE 9 MG/ML
INJECTION INTRAVENOUS PRN
Status: DISCONTINUED | OUTPATIENT
Start: 2023-12-28 | End: 2023-12-28 | Stop reason: ALTCHOICE

## 2023-12-28 NOTE — DISCHARGE INSTRUCTIONS
Christy Means Clause  Dr. Elizabeth Quarles Block/Radiofrequency  Home Going Instructions    1-Go home, rest for the remainder of the day  2-Please do not lift over 20 pounds the day of the injection  3-If you received sedation No: alcohol, driving, operating lawn mowers, plows, tractors or other dangerous equipment until next morning. Do not make important decisions or sign legal documents for 24 hours. You may experience light headedness, dizziness, nausea or sleepiness after sedation. Do not stay alone. A responsible adult must be with you for 24 hours. You could be nauseated from the medications you have received. Your IV site may be sore and bruised. 4-No dietary restrictions     5-Resume all medications the same day, blood thinners to be resumed 24 hours after injection if you were instructed to stop any. 6-Keep the surgical site clean and dry, you may shower the next morning and remove the      dressing. 7- No sitz baths, tub baths or hot tubs/swimming for 24 hours. 8- If you have any pain at the injection site(s), application of an ice pack to the area should be       helpful, 20 minutes on/20 minutes off for next 48 hours. 9- [facility] immediately at if you develop.   Fever greater than 100.4 F  Have bleeding or drainage from the puncture site  Have progressive Leg/arm numbness and or weakness  Loss of control of bowel and or bladder (wet/soil yourself)  Severe headache with inability to lift head  10-You may return to work the next day

## 2023-12-28 NOTE — H&P
Sleep 12/19/23   Clyde Gonzalez DO   pantoprazole (PROTONIX) 40 MG tablet Take 1 tablet by mouth every morning (before breakfast) 12/9/23   Clyde Gonzalez DO   methIMAzole (TAPAZOLE) 10 MG tablet Take 1-1/2 tablet daily 11/27/23   Divya Delgado MD   pregabalin (LYRICA) 50 MG capsule Take 1 capsule by mouth 3 times daily for 60 days.  Max Daily Amount: 150 mg 11/17/23 1/16/24  Mago Guerrier DO   Budeson-Glycopyrrol-Formoterol (BREZTRI AEROSPHERE) 160-9-4.8 MCG/ACT AERO Inhale 2 puffs into the lungs 2 times daily 11/8/23   Jacob Navarro MD   PARoxetine (PAXIL) 10 MG tablet Take 1 tablet by mouth every morning For anxiety 10/9/23   Clyde Gonzalez DO   ipratropium 0.5 mg-albuterol 2.5 mg (DUONEB) 0.5-2.5 (3) MG/3ML SOLN nebulizer solution Inhale 3 mLs into the lungs every 4 hours (while awake) 10/9/23   Clyde Gonzalez DO   busPIRone (BUSPAR) 10 MG tablet Take 1 tablet by mouth 3 times daily 7/22/23   Clyde Gonzalez DO   apixaban (ELIQUIS) 5 MG TABS tablet Take 1 tablet by mouth 2 times daily 7/6/23   Clyde Gonzalez DO   dilTIAZem (CARDIZEM CD) 120 MG extended release capsule Take 1 capsule by mouth daily 7/6/23   Clyde Gonzalez DO   vitamin B-12 (CYANOCOBALAMIN) 500 MCG tablet Take 1 tablet by mouth daily    Freida Welsh MD   OXYGEN Inhale 2 L into the lungs as needed    Freida Welsh MD   Cholecalciferol (VITAMIN D3) 50 MCG (2000 UT) CAPS Take 1 capsule by mouth daily    Freida Welsh MD   vitamin C (ASCORBIC ACID) 500 MG tablet Take 1 tablet by mouth daily    Freida Welsh MD       Allergies   Allergen Reactions    Pcn [Penicillins] Other (See Comments)       Social History     Socioeconomic History    Marital status:      Spouse name: Not on file    Number of children: Not on file    Years of education: Not on file    Highest education level: Not on file   Occupational History    Not on file   Tobacco Use    Smoking status: Some Days

## 2023-12-28 NOTE — PROGRESS NOTES
Patient arrived in preop-  anxious with labored breathing  O2 sat 97% on O2 3L that she wears continuous at home. BP elevated.   Patient more relaxed after preop screen complete    Pain Management dept notied

## 2023-12-28 NOTE — OP NOTE
injected . There was a clear outline of the epidural space and visualization of the sacral roots. The needle was then removed and a sterile Band-Aid was applied to the puncture site. Disposition the patient tolerated the procedure well and there were no complications . Vital signs remained stable throughout the procedure. The patient was escorted to the recovery area where they remained until discharge and written discharge instructions for the procedure were given. Plan: Aicha Garcia will return to our pain management center as scheduled.      Purcell Burkitt, DO

## 2024-01-05 ENCOUNTER — OFFICE VISIT (OUTPATIENT)
Dept: PRIMARY CARE CLINIC | Age: 69
End: 2024-01-05

## 2024-01-05 VITALS
TEMPERATURE: 96.8 F | SYSTOLIC BLOOD PRESSURE: 138 MMHG | BODY MASS INDEX: 14.25 KG/M2 | DIASTOLIC BLOOD PRESSURE: 78 MMHG | WEIGHT: 94 LBS | HEIGHT: 68 IN

## 2024-01-05 DIAGNOSIS — I48.0 PAROXYSMAL ATRIAL FIBRILLATION (HCC): ICD-10-CM

## 2024-01-05 DIAGNOSIS — J44.1 COPD WITH ACUTE EXACERBATION (HCC): ICD-10-CM

## 2024-01-05 DIAGNOSIS — E46 PROTEIN DEFICIENCY (HCC): ICD-10-CM

## 2024-01-05 DIAGNOSIS — I70.222 CRITICAL LIMB ISCHEMIA OF LEFT LOWER EXTREMITY WITH REST PAIN (HCC): ICD-10-CM

## 2024-01-05 DIAGNOSIS — Z09 HOSPITAL DISCHARGE FOLLOW-UP: Primary | ICD-10-CM

## 2024-01-05 DIAGNOSIS — E11.9 DIET-CONTROLLED DIABETES MELLITUS (HCC): ICD-10-CM

## 2024-01-05 PROBLEM — D68.69 SECONDARY HYPERCOAGULABLE STATE (HCC): Status: ACTIVE | Noted: 2024-01-05

## 2024-01-05 NOTE — PROGRESS NOTES
Post-Discharge Transitional Care  Follow Up      Lana Phillips   YOB: 1955    Date of Office Visit:  1/5/2024  Date of Hospital Admission: 12/28/23  Date of Hospital Discharge: 12/28/23  Risk of hospital readmission (high >=14%. Medium >=10%) :Readmission Risk Score: 19.7      Care management risk score Rising risk (score 2-5) and Complex Care (Scores >=6): No Risk Score On File     Non face to face  following discharge, date last encounter closed (first attempt may have been earlier): 12/27/2023    Call initiated 2 business days of discharge: Yes    ASSESSMENT/PLAN:   Hospital discharge follow-up  -     NC DISCHARGE MEDS RECONCILED W/ CURRENT OUTPATIENT MED LIST  Paroxysmal atrial fibrillation (HCC)  Protein deficiency (HCC)  COPD with acute exacerbation (HCC)  Critical limb ischemia of left lower extremity with rest pain (HCC)  Diet-controlled diabetes mellitus (HCC)      Medical Decision Making: high complexity  Return for Reg Appt.    On this date 1/5/2024 I have spent 45 minutes reviewing previous notes, test results and face to face with the patient discussing the diagnosis and importance of compliance with the treatment plan as well as documenting on the day of the visit.       Subjective:   HPI:  Follow up of Hospital problems/diagnosis(es): copd      pvd    iron def anemia  Arth          Inpatient course: Discharge summary reviewed- see chart.    Interval history/Current status: dc with  copd   and found  por  circ feet and fro arteriogram soon    Patient Active Problem List   Diagnosis    Primary hypertension    COPD (chronic obstructive pulmonary disease) (HCC)    Gastroesophageal reflux disease    Constipation    Unexplained weight loss    Protein deficiency (HCC)    Diet-controlled diabetes mellitus (HCC)    Multiple nodules of lung    Abnormal EKG    Mixed hyperlipidemia    Hyperthyroidism    Vitamin B12 deficiency (dietary) anemia    Osteoporosis    Elevated troponin level not due to

## 2024-01-08 ENCOUNTER — APPOINTMENT (OUTPATIENT)
Dept: CT IMAGING | Age: 69
DRG: 190 | End: 2024-01-08
Payer: MEDICARE

## 2024-01-08 ENCOUNTER — TELEPHONE (OUTPATIENT)
Dept: PRIMARY CARE CLINIC | Age: 69
End: 2024-01-08

## 2024-01-08 ENCOUNTER — APPOINTMENT (OUTPATIENT)
Dept: GENERAL RADIOLOGY | Age: 69
DRG: 190 | End: 2024-01-08
Payer: MEDICARE

## 2024-01-08 ENCOUNTER — HOSPITAL ENCOUNTER (INPATIENT)
Age: 69
LOS: 4 days | Discharge: HOME | DRG: 190 | End: 2024-01-13
Attending: EMERGENCY MEDICINE | Admitting: INTERNAL MEDICINE
Payer: MEDICARE

## 2024-01-08 DIAGNOSIS — R94.31 ABNORMAL EKG: ICD-10-CM

## 2024-01-08 DIAGNOSIS — R07.9 CHEST PAIN, UNSPECIFIED TYPE: ICD-10-CM

## 2024-01-08 DIAGNOSIS — J96.01 ACUTE RESPIRATORY FAILURE WITH HYPOXIA (HCC): Primary | ICD-10-CM

## 2024-01-08 DIAGNOSIS — J44.1 ACUTE EXACERBATION OF CHRONIC OBSTRUCTIVE PULMONARY DISEASE (COPD) (HCC): ICD-10-CM

## 2024-01-08 LAB
ALBUMIN SERPL-MCNC: 4.1 G/DL (ref 3.5–5.2)
ALP SERPL-CCNC: 65 U/L (ref 35–104)
ALT SERPL-CCNC: 23 U/L (ref 0–32)
ANION GAP SERPL CALCULATED.3IONS-SCNC: 8 MMOL/L (ref 7–16)
AST SERPL-CCNC: 49 U/L (ref 0–31)
BASOPHILS # BLD: 0.08 K/UL (ref 0–0.2)
BASOPHILS NFR BLD: 1 % (ref 0–2)
BILIRUB SERPL-MCNC: 0.2 MG/DL (ref 0–1.2)
BNP SERPL-MCNC: 1499 PG/ML (ref 0–125)
BUN SERPL-MCNC: 14 MG/DL (ref 6–23)
CALCIUM SERPL-MCNC: 8.9 MG/DL (ref 8.6–10.2)
CHLORIDE SERPL-SCNC: 90 MMOL/L (ref 98–107)
CO2 SERPL-SCNC: 37 MMOL/L (ref 22–29)
CREAT SERPL-MCNC: 0.5 MG/DL (ref 0.5–1)
EOSINOPHIL # BLD: 0.09 K/UL (ref 0.05–0.5)
EOSINOPHILS RELATIVE PERCENT: 1 % (ref 0–6)
ERYTHROCYTE [DISTWIDTH] IN BLOOD BY AUTOMATED COUNT: 14.1 % (ref 11.5–15)
ERYTHROCYTE [DISTWIDTH] IN BLOOD BY AUTOMATED COUNT: 14.4 % (ref 11.5–15)
FLUAV RNA RESP QL NAA+PROBE: NOT DETECTED
FLUBV RNA RESP QL NAA+PROBE: NOT DETECTED
GFR SERPL CREATININE-BSD FRML MDRD: >60 ML/MIN/1.73M2
GLUCOSE SERPL-MCNC: 137 MG/DL (ref 74–99)
HCT VFR BLD AUTO: 32.1 % (ref 34–48)
HCT VFR BLD AUTO: 33.5 % (ref 34–48)
HGB BLD-MCNC: 9.6 G/DL (ref 11.5–15.5)
HGB BLD-MCNC: 9.9 G/DL (ref 11.5–15.5)
IMM GRANULOCYTES # BLD AUTO: 0.06 K/UL (ref 0–0.58)
IMM GRANULOCYTES NFR BLD: 1 % (ref 0–5)
LYMPHOCYTES NFR BLD: 1.44 K/UL (ref 1.5–4)
LYMPHOCYTES RELATIVE PERCENT: 12 % (ref 20–42)
MCH RBC QN AUTO: 28.9 PG (ref 26–35)
MCH RBC QN AUTO: 29.1 PG (ref 26–35)
MCHC RBC AUTO-ENTMCNC: 29.6 G/DL (ref 32–34.5)
MCHC RBC AUTO-ENTMCNC: 29.9 G/DL (ref 32–34.5)
MCV RBC AUTO: 96.7 FL (ref 80–99.9)
MCV RBC AUTO: 98.5 FL (ref 80–99.9)
MONOCYTES NFR BLD: 0.7 K/UL (ref 0.1–0.95)
MONOCYTES NFR BLD: 6 % (ref 2–12)
NEUTROPHILS NFR BLD: 80 % (ref 43–80)
NEUTS SEG NFR BLD: 9.36 K/UL (ref 1.8–7.3)
PARTIAL THROMBOPLASTIN TIME: 29.3 SEC (ref 24.5–35.1)
PLATELET # BLD AUTO: 274 K/UL (ref 130–450)
PLATELET # BLD AUTO: 283 K/UL (ref 130–450)
PMV BLD AUTO: 9.3 FL (ref 7–12)
PMV BLD AUTO: 9.5 FL (ref 7–12)
POTASSIUM SERPL-SCNC: 3.8 MMOL/L (ref 3.5–5)
PROT SERPL-MCNC: 6.9 G/DL (ref 6.4–8.3)
RBC # BLD AUTO: 3.32 M/UL (ref 3.5–5.5)
RBC # BLD AUTO: 3.4 M/UL (ref 3.5–5.5)
SARS-COV-2 RNA RESP QL NAA+PROBE: NOT DETECTED
SODIUM SERPL-SCNC: 135 MMOL/L (ref 132–146)
SOURCE: NORMAL
SPECIMEN DESCRIPTION: NORMAL
TROPONIN I SERPL HS-MCNC: 29 NG/L (ref 0–9)
TROPONIN I SERPL HS-MCNC: 32 NG/L (ref 0–9)
WBC OTHER # BLD: 10.2 K/UL (ref 4.5–11.5)
WBC OTHER # BLD: 11.7 K/UL (ref 4.5–11.5)

## 2024-01-08 PROCEDURE — 6360000002 HC RX W HCPCS: Performed by: EMERGENCY MEDICINE

## 2024-01-08 PROCEDURE — 83880 ASSAY OF NATRIURETIC PEPTIDE: CPT

## 2024-01-08 PROCEDURE — 85025 COMPLETE CBC W/AUTO DIFF WBC: CPT

## 2024-01-08 PROCEDURE — 94640 AIRWAY INHALATION TREATMENT: CPT

## 2024-01-08 PROCEDURE — 2580000003 HC RX 258

## 2024-01-08 PROCEDURE — 6370000000 HC RX 637 (ALT 250 FOR IP)

## 2024-01-08 PROCEDURE — 71275 CT ANGIOGRAPHY CHEST: CPT

## 2024-01-08 PROCEDURE — 93005 ELECTROCARDIOGRAM TRACING: CPT | Performed by: EMERGENCY MEDICINE

## 2024-01-08 PROCEDURE — 80053 COMPREHEN METABOLIC PANEL: CPT

## 2024-01-08 PROCEDURE — 93005 ELECTROCARDIOGRAM TRACING: CPT

## 2024-01-08 PROCEDURE — 96365 THER/PROPH/DIAG IV INF INIT: CPT

## 2024-01-08 PROCEDURE — 85730 THROMBOPLASTIN TIME PARTIAL: CPT

## 2024-01-08 PROCEDURE — 87636 SARSCOV2 & INF A&B AMP PRB: CPT

## 2024-01-08 PROCEDURE — 6360000002 HC RX W HCPCS

## 2024-01-08 PROCEDURE — 85027 COMPLETE CBC AUTOMATED: CPT

## 2024-01-08 PROCEDURE — 96375 TX/PRO/DX INJ NEW DRUG ADDON: CPT

## 2024-01-08 PROCEDURE — 6360000004 HC RX CONTRAST MEDICATION: Performed by: RADIOLOGY

## 2024-01-08 PROCEDURE — 71045 X-RAY EXAM CHEST 1 VIEW: CPT

## 2024-01-08 PROCEDURE — 94660 CPAP INITIATION&MGMT: CPT

## 2024-01-08 PROCEDURE — 99285 EMERGENCY DEPT VISIT HI MDM: CPT

## 2024-01-08 PROCEDURE — 84484 ASSAY OF TROPONIN QUANT: CPT

## 2024-01-08 RX ORDER — HEPARIN SODIUM 10000 [USP'U]/100ML
5-30 INJECTION, SOLUTION INTRAVENOUS CONTINUOUS
Status: DISCONTINUED | OUTPATIENT
Start: 2024-01-08 | End: 2024-01-10

## 2024-01-08 RX ORDER — HEPARIN SODIUM 1000 [USP'U]/ML
60 INJECTION, SOLUTION INTRAVENOUS; SUBCUTANEOUS ONCE
Status: COMPLETED | OUTPATIENT
Start: 2024-01-08 | End: 2024-01-08

## 2024-01-08 RX ORDER — LORAZEPAM 2 MG/ML
0.5 INJECTION INTRAMUSCULAR ONCE
Status: COMPLETED | OUTPATIENT
Start: 2024-01-08 | End: 2024-01-08

## 2024-01-08 RX ORDER — LORAZEPAM 1 MG/1
1 TABLET ORAL ONCE
Status: DISCONTINUED | OUTPATIENT
Start: 2024-01-08 | End: 2024-01-08

## 2024-01-08 RX ORDER — HEPARIN SODIUM 1000 [USP'U]/ML
60 INJECTION, SOLUTION INTRAVENOUS; SUBCUTANEOUS PRN
Status: DISCONTINUED | OUTPATIENT
Start: 2024-01-08 | End: 2024-01-10

## 2024-01-08 RX ORDER — HEPARIN SODIUM 1000 [USP'U]/ML
30 INJECTION, SOLUTION INTRAVENOUS; SUBCUTANEOUS PRN
Status: DISCONTINUED | OUTPATIENT
Start: 2024-01-08 | End: 2024-01-10

## 2024-01-08 RX ORDER — IPRATROPIUM BROMIDE AND ALBUTEROL SULFATE 2.5; .5 MG/3ML; MG/3ML
3 SOLUTION RESPIRATORY (INHALATION) ONCE
Status: COMPLETED | OUTPATIENT
Start: 2024-01-08 | End: 2024-01-08

## 2024-01-08 RX ORDER — MAGNESIUM SULFATE IN WATER 40 MG/ML
2000 INJECTION, SOLUTION INTRAVENOUS ONCE
Status: COMPLETED | OUTPATIENT
Start: 2024-01-08 | End: 2024-01-08

## 2024-01-08 RX ADMIN — HEPARIN SODIUM 2560 UNITS: 1000 INJECTION INTRAVENOUS; SUBCUTANEOUS at 22:24

## 2024-01-08 RX ADMIN — IOPAMIDOL 60 ML: 755 INJECTION, SOLUTION INTRAVENOUS at 21:37

## 2024-01-08 RX ADMIN — WATER 125 MG: 1 INJECTION INTRAMUSCULAR; INTRAVENOUS; SUBCUTANEOUS at 17:04

## 2024-01-08 RX ADMIN — HEPARIN SODIUM 12 UNITS/KG/HR: 10000 INJECTION, SOLUTION INTRAVENOUS at 22:23

## 2024-01-08 RX ADMIN — IPRATROPIUM BROMIDE AND ALBUTEROL SULFATE 1 DOSE: .5; 3 SOLUTION RESPIRATORY (INHALATION) at 19:14

## 2024-01-08 RX ADMIN — MAGNESIUM SULFATE HEPTAHYDRATE 2000 MG: 40 INJECTION, SOLUTION INTRAVENOUS at 22:16

## 2024-01-08 RX ADMIN — LORAZEPAM 0.5 MG: 2 INJECTION INTRAMUSCULAR; INTRAVENOUS at 21:31

## 2024-01-08 NOTE — TELEPHONE ENCOUNTER
----- Message from Karrie Dawn sent at 1/8/2024 12:26 PM EST -----  Subject: Message to Provider    QUESTIONS  Information for Provider? Manuela a nurse  with [employer] called in   to leave a message for the patient's provider. She stated that the   pharmacy did a medication reconciliation, patient is taking a medication   for hyperactive thyroid and underactive thyroid. And one of the   medications was prescribed by another provider. Manuela is wanting to know   which medication the patient is supposed to be taking. Please contact   Manuela at [phone] extension [phone] to further assist.   ---------------------------------------------------------------------------  --------------  CALL BACK INFO  [phone]; OK to leave message on voicemail  ---------------------------------------------------------------------------  --------------  SCRIPT ANSWERS  Relationship to Patient? Covered Entity  Covered Entity Type? Health Insurance?  Representative Name? Manuela- Nurse  with [employer]

## 2024-01-08 NOTE — ED PROVIDER NOTES
ATTENDING PROVIDER ATTESTATION:     Lana Phillips presented to the emergency department for evaluation of Shortness of Breath (Patient usually on 3 L increased to 6 in pivot. Patient states she is having an anxiety attack because she is having a hard time breathing. ) and Chest Pain (Left sided nagging pain with inhale)   and was initially evaluated by the Medical Resident. See Original ED Note for H&P and ED course above.     I have reviewed and discussed the case, including pertinent history (medical, surgical, family and social) and exam findings with the Medical Resident assigned to Lana Phillips.  I have personally performed and/or participated in the history, exam, medical decision making, and procedures and agree with all pertinent clinical information and any additional changes or corrections are noted below in my assessment and plan. I have discussed this patient in detail with the resident, and provided the instruction and education,       I have reviewed my findings and recommendations with the assigned Medical Resident, Lana Phillips and members of family present at the time of disposition.      I have performed a history and physical examination of this patient and directly supervised the resident caring for this patient              [facility] EMERGENCY DEPARTMENT  EMERGENCY DEPARTMENT ENCOUNTER        Pt Name: Lana Phillips  MRN: [id number]  Birthdate 1955  Date of evaluation: 1/8/2024  Provider: Miguel Angel Early MD  PCP: Clyde Gonzalez DO  Note Started: 4:51 PM EST 1/8/24    CHIEF COMPLAINT       Chief Complaint   Patient presents with    Shortness of Breath     Patient usually on 3 L increased to 6 in pivot. Patient states she is having an anxiety attack because she is having a hard time breathing.     Chest Pain     Left sided nagging pain with inhale       HISTORY OF PRESENT ILLNESS: 1 or more Elements     Limitations to history : None    Lana Phillips  hospitalization.                CONSULTS:   Interventional Cardiology  Internal Medicine            PROCEDURES   Unless otherwise noted below, none      CRITICAL CARE TIME (.cct)   CRITICAL CARE:  Please note that the withdrawal or failure to initiate urgent interventions for this patient would likely result in a life threatening deterioration or permanent disability.      Accordingly this patient received 30 minutes of critical care time, including coordination of care, and direct bedside care and excluding separately billable procedures.          I am the Primary Clinician of Record.    FINAL IMPRESSION      1. Acute respiratory failure with hypoxia (HCC)    2. Acute exacerbation of chronic obstructive pulmonary disease (COPD) (HCC)    3. Abnormal EKG    4. Chest pain, unspecified type          DISPOSITION/PLAN     DISPOSITION Decision To Admit 01/08/2024 10:40:24 PM      PATIENT REFERRED TO:  No follow-up provider specified.    DISCHARGE MEDICATIONS:  New Prescriptions    No medications on file       DISCONTINUED MEDICATIONS:  Discontinued Medications    No medications on file              (Please note that portions of this note were completed with a voice recognition program.  Efforts were made to edit the dictations but occasionally words are mis-transcribed.)    Miguel Angel Early MD (electronically signed)           Miguel Angel Early MD  01/08/24 [id number]

## 2024-01-08 NOTE — ED PROVIDER NOTES
[facility] EMERGENCY DEPARTMENT  EMERGENCY DEPARTMENT ENCOUNTER        Pt Name: Lana Phillips  MRN: [id number]  Birthdate 1955  Date of evaluation: 1/8/2024  Provider: Blane Piedra DO  PCP: Clyde Gonzalez DO  Note Started: 4:32 PM EST 1/8/24    CHIEF COMPLAINT       Chief Complaint   Patient presents with    Shortness of Breath     Patient usually on 3 L increased to 6 in pivot. Patient states she is having an anxiety attack because she is having a hard time breathing.     Chest Pain     Left sided nagging pain with inhale       HISTORY OF PRESENT ILLNESS: 1 or more Elements   History From: Patient    Limitations to history : None    Lana Phillips is a 68 y.o. female who presents to the emergency department with chief complaint of shortness of breath.  Patient states shortness of breath is been gradually worsening over the past couple of days but significantly worsened this morning.  She wears 3 L nasal cannula at baseline and today on arrival was hypoxic and placed on 6 L nasal cannula.  Patient states she is feeling very anxious because of how dyspneic she has.  She states she is also having some left anterior chest wall pain.  This pain is not reproducible.  Nothing seems to make patient symptoms better or worse.  She has been compliant with her medical regimen with breathing treatments.    Patient denies any headache, lightheadedness or dizziness, fever or chills, nausea or vomiting, abdominal pain, hematuria or dysuria, constipation or diarrhea.    Nursing Notes were all reviewed and agreed with or any disagreements were addressed in the HPI.    REVIEW OF SYSTEMS :      Positives and Pertinent negatives as per HPI.     PAST MEDICAL HISTORY/Chronic Conditions Affecting Care      has a past medical history of Acute exacerbation of chronic obstructive pulmonary disease (COPD) ([facility]) (07/14/2022), Acute respiratory failure ([facility]) (11/27/2022), Acute respiratory failure

## 2024-01-09 ENCOUNTER — CARE COORDINATION (OUTPATIENT)
Dept: CARE COORDINATION | Age: 69
End: 2024-01-09

## 2024-01-09 ENCOUNTER — TELEPHONE (OUTPATIENT)
Dept: PRIMARY CARE CLINIC | Age: 69
End: 2024-01-09

## 2024-01-09 PROBLEM — J96.01 ACUTE RESPIRATORY FAILURE WITH HYPOXIA (HCC): Status: ACTIVE | Noted: 2024-01-09

## 2024-01-09 LAB
B PARAP IS1001 DNA NPH QL NAA+NON-PROBE: NOT DETECTED
B PERT DNA SPEC QL NAA+PROBE: NOT DETECTED
C PNEUM DNA NPH QL NAA+NON-PROBE: NOT DETECTED
EKG ATRIAL RATE: 64 BPM
EKG ATRIAL RATE: 71 BPM
EKG P-R INTERVAL: 142 MS
EKG P-R INTERVAL: 144 MS
EKG Q-T INTERVAL: 426 MS
EKG Q-T INTERVAL: 436 MS
EKG QRS DURATION: 74 MS
EKG QRS DURATION: 88 MS
EKG QTC CALCULATION (BAZETT): 449 MS
EKG QTC CALCULATION (BAZETT): 462 MS
EKG R AXIS: 69 DEGREES
EKG R AXIS: 78 DEGREES
EKG T AXIS: 84 DEGREES
EKG T AXIS: 94 DEGREES
EKG VENTRICULAR RATE: 64 BPM
EKG VENTRICULAR RATE: 71 BPM
FLUAV RNA NPH QL NAA+NON-PROBE: NOT DETECTED
FLUBV RNA NPH QL NAA+NON-PROBE: NOT DETECTED
HADV DNA NPH QL NAA+NON-PROBE: NOT DETECTED
HCOV 229E RNA NPH QL NAA+NON-PROBE: NOT DETECTED
HCOV HKU1 RNA NPH QL NAA+NON-PROBE: NOT DETECTED
HCOV NL63 RNA NPH QL NAA+NON-PROBE: NOT DETECTED
HCOV OC43 RNA NPH QL NAA+NON-PROBE: NOT DETECTED
HMPV RNA NPH QL NAA+NON-PROBE: NOT DETECTED
HPIV1 RNA NPH QL NAA+NON-PROBE: NOT DETECTED
HPIV2 RNA NPH QL NAA+NON-PROBE: NOT DETECTED
HPIV3 RNA NPH QL NAA+NON-PROBE: NOT DETECTED
HPIV4 RNA NPH QL NAA+NON-PROBE: NOT DETECTED
M PNEUMO DNA NPH QL NAA+NON-PROBE: NOT DETECTED
PARTIAL THROMBOPLASTIN TIME: 27.3 SEC (ref 24.5–35.1)
PARTIAL THROMBOPLASTIN TIME: 34.5 SEC (ref 24.5–35.1)
PARTIAL THROMBOPLASTIN TIME: 43.4 SEC (ref 24.5–35.1)
PARTIAL THROMBOPLASTIN TIME: 57.2 SEC (ref 24.5–35.1)
RSV RNA NPH QL NAA+NON-PROBE: NOT DETECTED
RV+EV RNA NPH QL NAA+NON-PROBE: NOT DETECTED
SARS-COV-2 RNA NPH QL NAA+NON-PROBE: NOT DETECTED
SPECIMEN DESCRIPTION: NORMAL

## 2024-01-09 PROCEDURE — 6360000002 HC RX W HCPCS: Performed by: INTERNAL MEDICINE

## 2024-01-09 PROCEDURE — 6370000000 HC RX 637 (ALT 250 FOR IP): Performed by: INTERNAL MEDICINE

## 2024-01-09 PROCEDURE — 85730 THROMBOPLASTIN TIME PARTIAL: CPT

## 2024-01-09 PROCEDURE — 2580000003 HC RX 258: Performed by: INTERNAL MEDICINE

## 2024-01-09 PROCEDURE — 93010 ELECTROCARDIOGRAM REPORT: CPT | Performed by: INTERNAL MEDICINE

## 2024-01-09 PROCEDURE — 6360000002 HC RX W HCPCS: Performed by: EMERGENCY MEDICINE

## 2024-01-09 PROCEDURE — 36415 COLL VENOUS BLD VENIPUNCTURE: CPT

## 2024-01-09 PROCEDURE — 2700000000 HC OXYGEN THERAPY PER DAY

## 2024-01-09 PROCEDURE — 94660 CPAP INITIATION&MGMT: CPT

## 2024-01-09 PROCEDURE — 2140000000 HC CCU INTERMEDIATE R&B

## 2024-01-09 PROCEDURE — 0202U NFCT DS 22 TRGT SARS-COV-2: CPT

## 2024-01-09 PROCEDURE — 94640 AIRWAY INHALATION TREATMENT: CPT

## 2024-01-09 RX ORDER — BUDESONIDE 0.5 MG/2ML
0.5 INHALANT ORAL
Status: DISCONTINUED | OUTPATIENT
Start: 2024-01-09 | End: 2024-01-13 | Stop reason: HOSPADM

## 2024-01-09 RX ORDER — POTASSIUM CHLORIDE 20 MEQ/1
40 TABLET, EXTENDED RELEASE ORAL PRN
Status: DISCONTINUED | OUTPATIENT
Start: 2024-01-09 | End: 2024-01-13 | Stop reason: HOSPADM

## 2024-01-09 RX ORDER — POLYETHYLENE GLYCOL 3350 17 G/17G
17 POWDER, FOR SOLUTION ORAL DAILY PRN
Status: DISCONTINUED | OUTPATIENT
Start: 2024-01-09 | End: 2024-01-13 | Stop reason: HOSPADM

## 2024-01-09 RX ORDER — MAGNESIUM SULFATE IN WATER 40 MG/ML
2000 INJECTION, SOLUTION INTRAVENOUS PRN
Status: DISCONTINUED | OUTPATIENT
Start: 2024-01-09 | End: 2024-01-13 | Stop reason: HOSPADM

## 2024-01-09 RX ORDER — ATORVASTATIN CALCIUM 40 MG/1
40 TABLET, FILM COATED ORAL NIGHTLY
Status: DISCONTINUED | OUTPATIENT
Start: 2024-01-09 | End: 2024-01-13 | Stop reason: HOSPADM

## 2024-01-09 RX ORDER — BUSPIRONE HYDROCHLORIDE 10 MG/1
10 TABLET ORAL 3 TIMES DAILY
Status: DISCONTINUED | OUTPATIENT
Start: 2024-01-09 | End: 2024-01-13 | Stop reason: HOSPADM

## 2024-01-09 RX ORDER — IPRATROPIUM BROMIDE AND ALBUTEROL SULFATE 2.5; .5 MG/3ML; MG/3ML
1 SOLUTION RESPIRATORY (INHALATION) EVERY 4 HOURS PRN
COMMUNITY

## 2024-01-09 RX ORDER — SODIUM CHLORIDE 9 MG/ML
INJECTION, SOLUTION INTRAVENOUS PRN
Status: DISCONTINUED | OUTPATIENT
Start: 2024-01-09 | End: 2024-01-13 | Stop reason: HOSPADM

## 2024-01-09 RX ORDER — ONDANSETRON 2 MG/ML
4 INJECTION INTRAMUSCULAR; INTRAVENOUS EVERY 6 HOURS PRN
Status: DISCONTINUED | OUTPATIENT
Start: 2024-01-09 | End: 2024-01-13 | Stop reason: HOSPADM

## 2024-01-09 RX ORDER — ONDANSETRON 4 MG/1
4 TABLET, ORALLY DISINTEGRATING ORAL EVERY 8 HOURS PRN
Status: DISCONTINUED | OUTPATIENT
Start: 2024-01-09 | End: 2024-01-13 | Stop reason: HOSPADM

## 2024-01-09 RX ORDER — ARFORMOTEROL TARTRATE 15 UG/2ML
15 SOLUTION RESPIRATORY (INHALATION)
Status: DISCONTINUED | OUTPATIENT
Start: 2024-01-09 | End: 2024-01-13 | Stop reason: HOSPADM

## 2024-01-09 RX ORDER — LEVOTHYROXINE SODIUM 0.03 MG/1
25 TABLET ORAL DAILY
COMMUNITY

## 2024-01-09 RX ORDER — POTASSIUM CHLORIDE 7.45 MG/ML
10 INJECTION INTRAVENOUS PRN
Status: DISCONTINUED | OUTPATIENT
Start: 2024-01-09 | End: 2024-01-13 | Stop reason: HOSPADM

## 2024-01-09 RX ORDER — SODIUM CHLORIDE 0.9 % (FLUSH) 0.9 %
5-40 SYRINGE (ML) INJECTION EVERY 12 HOURS SCHEDULED
Status: DISCONTINUED | OUTPATIENT
Start: 2024-01-09 | End: 2024-01-13 | Stop reason: HOSPADM

## 2024-01-09 RX ORDER — PREGABALIN 50 MG/1
50 CAPSULE ORAL 3 TIMES DAILY
Status: DISCONTINUED | OUTPATIENT
Start: 2024-01-09 | End: 2024-01-09

## 2024-01-09 RX ORDER — BUSPIRONE HYDROCHLORIDE 10 MG/1
10 TABLET ORAL 3 TIMES DAILY
Status: DISCONTINUED | OUTPATIENT
Start: 2024-01-09 | End: 2024-01-09

## 2024-01-09 RX ORDER — IPRATROPIUM BROMIDE AND ALBUTEROL SULFATE 2.5; .5 MG/3ML; MG/3ML
1 SOLUTION RESPIRATORY (INHALATION)
Status: DISCONTINUED | OUTPATIENT
Start: 2024-01-09 | End: 2024-01-13 | Stop reason: HOSPADM

## 2024-01-09 RX ORDER — PAROXETINE 10 MG/1
10 TABLET, FILM COATED ORAL EVERY MORNING
COMMUNITY

## 2024-01-09 RX ORDER — ACETAMINOPHEN 325 MG/1
650 TABLET ORAL EVERY 6 HOURS PRN
Status: DISCONTINUED | OUTPATIENT
Start: 2024-01-09 | End: 2024-01-13 | Stop reason: HOSPADM

## 2024-01-09 RX ORDER — METHIMAZOLE 5 MG/1
15 TABLET ORAL DAILY
Status: DISCONTINUED | OUTPATIENT
Start: 2024-01-09 | End: 2024-01-13 | Stop reason: HOSPADM

## 2024-01-09 RX ORDER — ASPIRIN 81 MG/1
81 TABLET ORAL DAILY
Status: DISCONTINUED | OUTPATIENT
Start: 2024-01-09 | End: 2024-01-13 | Stop reason: HOSPADM

## 2024-01-09 RX ORDER — PAROXETINE 10 MG/1
10 TABLET, FILM COATED ORAL EVERY MORNING
Status: DISCONTINUED | OUTPATIENT
Start: 2024-01-09 | End: 2024-01-13 | Stop reason: HOSPADM

## 2024-01-09 RX ORDER — PREGABALIN 50 MG/1
50 CAPSULE ORAL 3 TIMES DAILY
Status: DISCONTINUED | OUTPATIENT
Start: 2024-01-09 | End: 2024-01-13 | Stop reason: HOSPADM

## 2024-01-09 RX ORDER — METHYLPREDNISOLONE SODIUM SUCCINATE 40 MG/ML
INJECTION, POWDER, LYOPHILIZED, FOR SOLUTION INTRAMUSCULAR; INTRAVENOUS
Status: COMPLETED
Start: 2024-01-09 | End: 2024-01-09

## 2024-01-09 RX ORDER — TRAZODONE HYDROCHLORIDE 50 MG/1
50 TABLET ORAL NIGHTLY
COMMUNITY

## 2024-01-09 RX ORDER — PANTOPRAZOLE SODIUM 40 MG/1
40 TABLET, DELAYED RELEASE ORAL
Status: DISCONTINUED | OUTPATIENT
Start: 2024-01-09 | End: 2024-01-13 | Stop reason: HOSPADM

## 2024-01-09 RX ORDER — DILTIAZEM HYDROCHLORIDE 120 MG/1
120 CAPSULE, COATED, EXTENDED RELEASE ORAL DAILY
Status: DISCONTINUED | OUTPATIENT
Start: 2024-01-09 | End: 2024-01-13 | Stop reason: HOSPADM

## 2024-01-09 RX ORDER — SODIUM CHLORIDE 0.9 % (FLUSH) 0.9 %
5-40 SYRINGE (ML) INJECTION PRN
Status: DISCONTINUED | OUTPATIENT
Start: 2024-01-09 | End: 2024-01-13 | Stop reason: HOSPADM

## 2024-01-09 RX ORDER — TRAZODONE HYDROCHLORIDE 50 MG/1
50 TABLET ORAL NIGHTLY PRN
Status: DISCONTINUED | OUTPATIENT
Start: 2024-01-09 | End: 2024-01-13 | Stop reason: HOSPADM

## 2024-01-09 RX ORDER — METHIMAZOLE 10 MG/1
15 TABLET ORAL DAILY
COMMUNITY

## 2024-01-09 RX ORDER — ACETAMINOPHEN 650 MG/1
650 SUPPOSITORY RECTAL EVERY 6 HOURS PRN
Status: DISCONTINUED | OUTPATIENT
Start: 2024-01-09 | End: 2024-01-13 | Stop reason: HOSPADM

## 2024-01-09 RX ADMIN — METHYLPREDNISOLONE SODIUM SUCCINATE 40 MG: 40 INJECTION, POWDER, FOR SOLUTION INTRAMUSCULAR; INTRAVENOUS at 17:04

## 2024-01-09 RX ADMIN — METHIMAZOLE 15 MG: 5 TABLET ORAL at 12:18

## 2024-01-09 RX ADMIN — PREGABALIN 50 MG: 50 CAPSULE ORAL at 12:18

## 2024-01-09 RX ADMIN — ARFORMOTEROL TARTRATE 15 MCG: 15 SOLUTION RESPIRATORY (INHALATION) at 22:14

## 2024-01-09 RX ADMIN — HEPARIN SODIUM 2560 UNITS: 1000 INJECTION INTRAVENOUS; SUBCUTANEOUS at 18:56

## 2024-01-09 RX ADMIN — BUDESONIDE 500 MCG: 0.5 SUSPENSION RESPIRATORY (INHALATION) at 22:14

## 2024-01-09 RX ADMIN — ATORVASTATIN CALCIUM 40 MG: 40 TABLET, FILM COATED ORAL at 21:51

## 2024-01-09 RX ADMIN — ACETAMINOPHEN 650 MG: 325 TABLET ORAL at 21:51

## 2024-01-09 RX ADMIN — BUSPIRONE HYDROCHLORIDE 10 MG: 10 TABLET ORAL at 21:51

## 2024-01-09 RX ADMIN — PREGABALIN 50 MG: 50 CAPSULE ORAL at 21:51

## 2024-01-09 RX ADMIN — ASPIRIN 81 MG: 81 TABLET, COATED ORAL at 12:18

## 2024-01-09 RX ADMIN — PAROXETINE 10 MG: 10 TABLET, FILM COATED ORAL at 12:18

## 2024-01-09 RX ADMIN — HEPARIN SODIUM 1280 UNITS: 1000 INJECTION INTRAVENOUS; SUBCUTANEOUS at 05:05

## 2024-01-09 RX ADMIN — BUSPIRONE HYDROCHLORIDE 10 MG: 10 TABLET ORAL at 12:18

## 2024-01-09 RX ADMIN — Medication 10 ML: at 21:51

## 2024-01-09 RX ADMIN — BUSPIRONE HYDROCHLORIDE 10 MG: 10 TABLET ORAL at 17:00

## 2024-01-09 RX ADMIN — IPRATROPIUM BROMIDE AND ALBUTEROL SULFATE 1 DOSE: 2.5; .5 SOLUTION RESPIRATORY (INHALATION) at 12:31

## 2024-01-09 RX ADMIN — POLYETHYLENE GLYCOL 3350 17 G: 17 POWDER, FOR SOLUTION ORAL at 10:41

## 2024-01-09 RX ADMIN — IPRATROPIUM BROMIDE AND ALBUTEROL SULFATE 1 DOSE: 2.5; .5 SOLUTION RESPIRATORY (INHALATION) at 08:38

## 2024-01-09 RX ADMIN — DILTIAZEM HYDROCHLORIDE 120 MG: 120 CAPSULE, COATED, EXTENDED RELEASE ORAL at 12:18

## 2024-01-09 RX ADMIN — IPRATROPIUM BROMIDE AND ALBUTEROL SULFATE 1 DOSE: 2.5; .5 SOLUTION RESPIRATORY (INHALATION) at 22:14

## 2024-01-09 RX ADMIN — METHYLPREDNISOLONE SODIUM SUCCINATE 40 MG: 40 INJECTION, POWDER, FOR SOLUTION INTRAMUSCULAR; INTRAVENOUS at 12:23

## 2024-01-09 RX ADMIN — HEPARIN SODIUM 16 UNITS/KG/HR: 10000 INJECTION, SOLUTION INTRAVENOUS at 11:02

## 2024-01-09 RX ADMIN — IPRATROPIUM BROMIDE AND ALBUTEROL SULFATE 1 DOSE: 2.5; .5 SOLUTION RESPIRATORY (INHALATION) at 15:59

## 2024-01-09 RX ADMIN — TRAZODONE HYDROCHLORIDE 50 MG: 50 TABLET ORAL at 21:51

## 2024-01-09 RX ADMIN — PREGABALIN 50 MG: 50 CAPSULE ORAL at 17:00

## 2024-01-09 RX ADMIN — Medication 10 ML: at 10:29

## 2024-01-09 RX ADMIN — HEPARIN SODIUM 1280 UNITS: 1000 INJECTION INTRAVENOUS; SUBCUTANEOUS at 11:02

## 2024-01-09 ASSESSMENT — PAIN SCALES - GENERAL: PAINLEVEL_OUTOF10: 6

## 2024-01-09 ASSESSMENT — PAIN DESCRIPTION - PAIN TYPE: TYPE: CHRONIC PAIN

## 2024-01-09 ASSESSMENT — PAIN DESCRIPTION - FREQUENCY: FREQUENCY: CONTINUOUS

## 2024-01-09 ASSESSMENT — PAIN DESCRIPTION - DESCRIPTORS: DESCRIPTORS: DISCOMFORT;DULL

## 2024-01-09 ASSESSMENT — PAIN DESCRIPTION - LOCATION: LOCATION: FOOT;BACK

## 2024-01-09 NOTE — PROGRESS NOTES
Patient was admitted with shortness of breath, repeat EKG showed T wave changes in the anterior leads, I was contacted since I was the interventional cardiologist on-call, upon evaluating the patient she was tachypneic, breathing 35 times a minute, on BiPAP, complaining of left upper chest discomfort, when she takes a deep breath, point tenderness, no radiation, case discussed with Dr. Earyl, no indication for emergent cardiac catheterization, will continue aspirin, anticoagulation, continue respiratory support and respiratory treatment.

## 2024-01-09 NOTE — ED NOTES
The following labs were labeled with appropriate pt sticker and tubed to lab:     [] Blue     [] Lavender   [] on ice  [x] Green/yellow  [] Green/black [] on ice  [] Grey  [] on ice  [] Yellow  [] Red  [] Type/ Screen  [] ABG  [] VBG    [] COVID-19 swab    [] Rapid  [] PCR  [] Flu swab  [] Peds Viral Panel     [] Urine Sample  [] Fecal Sample  [] Pelvic Cultures  [] Blood Cultures  [] X 2  [] STREP Cultures

## 2024-01-09 NOTE — H&P
[facility]                  [address] [place] [address]                              HISTORY AND PHYSICAL    PATIENT NAME: ANGIE SHELTON CM                     :        1955  MED REC NO:   [id number]                            ROOM:       22  ACCOUNT NO:   [id number]                           ADMIT DATE: 2024  PROVIDER:     Baldemar Jarrett DO    CHIEF COMPLAINT:  Shortness of breath.    HISTORY OF PRESENT ILLNESS:  The patient is a 68-year-old   female who presented to the emergency room complaining of several-day  history increasing shortness of breath.  She was seen in the emergency  room.  EKG was abnormal.  She was started on IV heparin and Cardiology  was consulted.  She was started on BiPAP.  She is on BiPAP at home and  oxygen.    PAST MEDICAL HISTORY:  COPD, chronic hypoxic respiratory failure on  chronic O2, paroxysmal atrial fibrillation, chronic Eliquis therapy,  hypertension, vitamin B12 deficiency, hyperthyroidism.    PRIMARY CARE PROVIDER:  Clyde Gonzalez DO    PAST SURGICAL HISTORY:  Back surgery, left oophorectomy, tonsillectomy,  appendectomy, wisdom teeth extraction.    SOCIAL HISTORY:  The patient smokes two cigarettes a week.  No alcohol.    REVIEW OF SYSTEMS:  Remarkable for above-stated chief complaint.    ALLERGIES:  To PENICILLIN.    MEDICATIONS PRIOR TO ADMISSION:  Albuterol inhaler, Eliquis, aspirin,  Lipitor, Breztri inhaler, BuSpar, vitamin D, diltiazem CD, DuoNeb  nebulizer, Tapazole, oxygen, Protonix, Paxil, Lyrica, Desyrel, vitamin  B12, vitamin C.    PHYSICAL EXAMINATION:  GENERAL APPEARANCE:  Reveals a 68-year-old  female who is seen  in the emergency room on BiPAP.  She is alert, cooperative and a good  historian.  VITAL SIGNS:  On admission temperature 97.2, pulse 73, respirations 19,  blood pressure 119/56.  HEENT:  Head:  Normocephalic, atraumatic.  Eyes, pupils equal and  reactive to  light.  Extraocular muscles intact.  Fundi not well  visualized.  Nose, no obstruction, polyp or discharge noted.  Mouth,  mucosa without lesion.  Pharynx noninjected without exudate.  NECK:  Supple.  No JVD.  No thyromegaly.  No carotid bruits.  HEART:  Regular rate and rhythm without murmur.  LUNGS:  With minimal inspiratory wheeze.  ABDOMEN:  Positive bowel sounds, soft, nontender.  No rebound.  No  guarding.  No hepatosplenomegaly.  No masses.  BACK:  With increased thoracic kyphosis.  EXTREMITIES:  Without edema.  LYMPH NODES:  No adenopathy noted.  SKIN:  Dry skin bilateral lower extremities.    IMPRESSION:  Acute-on-chronic hypoxic respiratory failure, COPD  exacerbation, chronic Eliquis therapy, paroxysmal atrial fibrillation,  hyperthyroidism, anxiety, vitamin B12 deficiency, tobacco abuse.    PLAN:  Admit.  Aerosols, steroids.  Pulmonary to see.  Cardiology has  been consulted by the emergency room for abnormal EKG.  Discharge plan,  home when stable.  Smoking cessation.        SHAWN BRASWELL DO    D: 01/09/2024 7:18:27       T: 01/09/2024 7:20:49     [email or web address]  Job#: [id number]     Doc#: [id number]    CC:

## 2024-01-09 NOTE — TELEPHONE ENCOUNTER
----- Message from Karrie Dawn sent at 1/8/2024 12:26 PM EST -----  Subject: Message to Provider    QUESTIONS  Information for Provider? Manuela a nurse  with [employer] called in   to leave a message for the patient's provider. She stated that the   pharmacy did a medication reconciliation, patient is taking a medication   for hyperactive thyroid and underactive thyroid. And one of the   medications was prescribed by another provider. Manuela is wanting to know   which medication the patient is supposed to be taking. Please contact   Manuela at [phone] extension [phone] to further assist.   ---------------------------------------------------------------------------  --------------  CALL BACK INFO  [phone]; OK to leave message on voicemail  ---------------------------------------------------------------------------  --------------  SCRIPT ANSWERS  Relationship to Patient? Covered Entity  Covered Entity Type? Health Insurance?  Representative Name? Manuela- Nurse  with [employer]

## 2024-01-09 NOTE — CONSULTS
[facility]  [address], [address]  [place] [address]    Pulmonary Consultation      Reason for Consult:  sob    Requesting Physician:  Clyde Gonzalez DO    CHIEF COMPLAINT:  sob    History Obtained From:  patient    HISTORY OF PRESENT ILLNESS:                The patient is a 68 y.o. female with significant past medical history of COPD who presents with increased sob for about a day. Claims may have not been feeling well on Sunday, remained stable until yesterday when developed sob and some chest pain due to sob and cough, continued to get worse. Claims was using her oxygen and compliant with medications. Since here, doing some better with breathing, coughing less with mod sputum production, on 6 li HFNC, placed on NIPPV 12/5 60% but didn't use for too long as didn't like it. Restarted smoking but currently at 1 cigarette/week.    Past Medical History:        Diagnosis Date    Acute exacerbation of chronic obstructive pulmonary disease (COPD) (HCC) 07/14/2022    Acute respiratory failure (HCC) 11/27/2022    Acute respiratory failure with hypoxia (HCC) 06/27/2022    Acute respiratory failure with hypoxia (HCC) 11/27/2022    Atrial fibrillation (HCC)     Chronic obstructive pulmonary disease (HCC) 12/15/2022    COPD (chronic obstructive pulmonary disease) (HCC)     COPD exacerbation (HCC) 07/14/2022    COVID-19 07/16/2022    Critical limb ischemia of left lower extremity with rest pain ([facility]) 12/25/2023    Hypertension     Pneumonia due to infectious organism     Thyroid disease     Uncontrolled hypertension        Past Surgical History:        Procedure Laterality Date    BACK SURGERY      x2    LEFT OOPHORECTOMY      PAIN MANAGEMENT PROCEDURE N/A 12/28/2023    CAUDAL EPIDURAL STEROID INJECTION performed by Lilia Cabrera DO at [facility] OR    UPPER GASTROINTESTINAL ENDOSCOPY N/A 10/16/2023    EGD ESOPHAGOGASTRODUODENOSCOPY performed by  hours.    ABG:   No results for input(s): \"PH\", \"PO2\", \"PCO2\", \"HCO3\", \"BE\", \"O2SAT\", \"METHB\", \"O2HB\", \"COHB\", \"O2CON\", \"HHB\", \"THB\" in the last 72 hours.  FiO2 : 60 %       Radiology Review:  CTA chest reviewed with (-) PE and emphysematous lesions compared to previous    IMPRESSION/RECOMMENDATIONS:      COPD  hypoxia    Viral panel (-), check sputum culture  Cont with steroids, taper as tolerated  Cont with duonebs, start pulmicort and brovana bid, observe respiratory function  Cont with oxygen, taper as tolerated  OOB to chair, ambulate as tolerated      Time at the bedside, reviewing labs and radiographs, reviewing notes and consultations, discussing with staff and family was more than 55 minutes.    Thanks for letting us see this patient in consultation.  Please contact us with any questions. Office [phone] or after hours through [email or web address], x [phone].

## 2024-01-09 NOTE — PROGRESS NOTES
Date: 1/8/2024    Time: 8:25 PM    Patient Placed On BIPAP/CPAP/ Non-Invasive Ventilation?  Yes    If no must comment.  Facial area red/color change? No           If YES are Blister/Lesion present?No   If yes must notify nursing staff  BIPAP/CPAP skin barrier?  Yes    Skin barrier type:mepilexlite       Comments:        Yonatan Reddy RCP

## 2024-01-10 PROBLEM — E43 SEVERE PROTEIN-CALORIE MALNUTRITION (HCC): Status: ACTIVE | Noted: 2023-06-26

## 2024-01-10 LAB
ERYTHROCYTE [DISTWIDTH] IN BLOOD BY AUTOMATED COUNT: 14.6 % (ref 11.5–15)
HCT VFR BLD AUTO: 30.2 % (ref 34–48)
HGB BLD-MCNC: 9.2 G/DL (ref 11.5–15.5)
MCH RBC QN AUTO: 29.6 PG (ref 26–35)
MCHC RBC AUTO-ENTMCNC: 30.5 G/DL (ref 32–34.5)
MCV RBC AUTO: 97.1 FL (ref 80–99.9)
PARTIAL THROMBOPLASTIN TIME: 45.7 SEC (ref 24.5–35.1)
PLATELET # BLD AUTO: 282 K/UL (ref 130–450)
PMV BLD AUTO: 9.7 FL (ref 7–12)
PROCALCITONIN SERPL-MCNC: 0.04 NG/ML (ref 0–0.08)
RBC # BLD AUTO: 3.11 M/UL (ref 3.5–5.5)
WBC OTHER # BLD: 8.6 K/UL (ref 4.5–11.5)

## 2024-01-10 PROCEDURE — 6360000002 HC RX W HCPCS: Performed by: EMERGENCY MEDICINE

## 2024-01-10 PROCEDURE — 85027 COMPLETE CBC AUTOMATED: CPT

## 2024-01-10 PROCEDURE — 6360000002 HC RX W HCPCS: Performed by: INTERNAL MEDICINE

## 2024-01-10 PROCEDURE — 36415 COLL VENOUS BLD VENIPUNCTURE: CPT

## 2024-01-10 PROCEDURE — 6370000000 HC RX 637 (ALT 250 FOR IP): Performed by: INTERNAL MEDICINE

## 2024-01-10 PROCEDURE — 2700000000 HC OXYGEN THERAPY PER DAY

## 2024-01-10 PROCEDURE — 85730 THROMBOPLASTIN TIME PARTIAL: CPT

## 2024-01-10 PROCEDURE — 94660 CPAP INITIATION&MGMT: CPT

## 2024-01-10 PROCEDURE — 94640 AIRWAY INHALATION TREATMENT: CPT

## 2024-01-10 PROCEDURE — 2140000000 HC CCU INTERMEDIATE R&B

## 2024-01-10 PROCEDURE — 2580000003 HC RX 258: Performed by: INTERNAL MEDICINE

## 2024-01-10 PROCEDURE — 84145 PROCALCITONIN (PCT): CPT

## 2024-01-10 RX ORDER — GUAIFENESIN/DEXTROMETHORPHAN 100-10MG/5
10 SYRUP ORAL EVERY 6 HOURS PRN
Status: DISCONTINUED | OUTPATIENT
Start: 2024-01-10 | End: 2024-01-13 | Stop reason: HOSPADM

## 2024-01-10 RX ADMIN — ASPIRIN 81 MG: 81 TABLET, COATED ORAL at 08:52

## 2024-01-10 RX ADMIN — PREGABALIN 50 MG: 50 CAPSULE ORAL at 14:37

## 2024-01-10 RX ADMIN — IPRATROPIUM BROMIDE AND ALBUTEROL SULFATE 1 DOSE: 2.5; .5 SOLUTION RESPIRATORY (INHALATION) at 20:01

## 2024-01-10 RX ADMIN — BUSPIRONE HYDROCHLORIDE 10 MG: 10 TABLET ORAL at 14:37

## 2024-01-10 RX ADMIN — METHYLPREDNISOLONE SODIUM SUCCINATE 40 MG: 40 INJECTION, POWDER, FOR SOLUTION INTRAMUSCULAR; INTRAVENOUS at 00:53

## 2024-01-10 RX ADMIN — ATORVASTATIN CALCIUM 40 MG: 40 TABLET, FILM COATED ORAL at 20:17

## 2024-01-10 RX ADMIN — ARFORMOTEROL TARTRATE 15 MCG: 15 SOLUTION RESPIRATORY (INHALATION) at 20:01

## 2024-01-10 RX ADMIN — IPRATROPIUM BROMIDE AND ALBUTEROL SULFATE 1 DOSE: 2.5; .5 SOLUTION RESPIRATORY (INHALATION) at 10:15

## 2024-01-10 RX ADMIN — Medication 10 ML: at 08:53

## 2024-01-10 RX ADMIN — BUDESONIDE 500 MCG: 0.5 SUSPENSION RESPIRATORY (INHALATION) at 10:15

## 2024-01-10 RX ADMIN — APIXABAN 5 MG: 5 TABLET, FILM COATED ORAL at 08:52

## 2024-01-10 RX ADMIN — PAROXETINE 10 MG: 10 TABLET, FILM COATED ORAL at 08:52

## 2024-01-10 RX ADMIN — PREGABALIN 50 MG: 50 CAPSULE ORAL at 20:18

## 2024-01-10 RX ADMIN — METHYLPREDNISOLONE SODIUM SUCCINATE 40 MG: 40 INJECTION, POWDER, FOR SOLUTION INTRAMUSCULAR; INTRAVENOUS at 09:37

## 2024-01-10 RX ADMIN — Medication 10 ML: at 20:17

## 2024-01-10 RX ADMIN — ACETAMINOPHEN 650 MG: 325 TABLET ORAL at 20:58

## 2024-01-10 RX ADMIN — APIXABAN 5 MG: 5 TABLET, FILM COATED ORAL at 20:18

## 2024-01-10 RX ADMIN — BUDESONIDE 500 MCG: 0.5 SUSPENSION RESPIRATORY (INHALATION) at 20:01

## 2024-01-10 RX ADMIN — BUSPIRONE HYDROCHLORIDE 10 MG: 10 TABLET ORAL at 08:52

## 2024-01-10 RX ADMIN — GUAIFENESIN SYRUP AND DEXTROMETHORPHAN 10 ML: 100; 10 SYRUP ORAL at 08:52

## 2024-01-10 RX ADMIN — POLYETHYLENE GLYCOL 3350 17 G: 17 POWDER, FOR SOLUTION ORAL at 09:40

## 2024-01-10 RX ADMIN — METHYLPREDNISOLONE SODIUM SUCCINATE 40 MG: 40 INJECTION, POWDER, FOR SOLUTION INTRAMUSCULAR; INTRAVENOUS at 17:21

## 2024-01-10 RX ADMIN — TRAZODONE HYDROCHLORIDE 50 MG: 50 TABLET ORAL at 21:05

## 2024-01-10 RX ADMIN — PREGABALIN 50 MG: 50 CAPSULE ORAL at 08:53

## 2024-01-10 RX ADMIN — BUSPIRONE HYDROCHLORIDE 10 MG: 10 TABLET ORAL at 20:18

## 2024-01-10 RX ADMIN — GUAIFENESIN SYRUP AND DEXTROMETHORPHAN 10 ML: 100; 10 SYRUP ORAL at 14:37

## 2024-01-10 RX ADMIN — ARFORMOTEROL TARTRATE 15 MCG: 15 SOLUTION RESPIRATORY (INHALATION) at 10:15

## 2024-01-10 RX ADMIN — IPRATROPIUM BROMIDE AND ALBUTEROL SULFATE 1 DOSE: 2.5; .5 SOLUTION RESPIRATORY (INHALATION) at 16:10

## 2024-01-10 RX ADMIN — GUAIFENESIN SYRUP AND DEXTROMETHORPHAN 10 ML: 100; 10 SYRUP ORAL at 20:58

## 2024-01-10 RX ADMIN — METHIMAZOLE 15 MG: 5 TABLET ORAL at 11:40

## 2024-01-10 RX ADMIN — HEPARIN SODIUM 2000 UNITS: 1000 INJECTION INTRAVENOUS; SUBCUTANEOUS at 04:23

## 2024-01-10 RX ADMIN — PANTOPRAZOLE SODIUM 40 MG: 40 TABLET, DELAYED RELEASE ORAL at 06:52

## 2024-01-10 RX ADMIN — DILTIAZEM HYDROCHLORIDE 120 MG: 120 CAPSULE, COATED, EXTENDED RELEASE ORAL at 08:52

## 2024-01-10 ASSESSMENT — PAIN DESCRIPTION - PAIN TYPE: TYPE: CHRONIC PAIN

## 2024-01-10 ASSESSMENT — PAIN SCALES - GENERAL
PAINLEVEL_OUTOF10: 6
PAINLEVEL_OUTOF10: 0

## 2024-01-10 ASSESSMENT — PAIN DESCRIPTION - LOCATION: LOCATION: FOOT;BACK

## 2024-01-10 ASSESSMENT — PAIN DESCRIPTION - ONSET: ONSET: ON-GOING

## 2024-01-10 ASSESSMENT — PAIN DESCRIPTION - ORIENTATION: ORIENTATION: LOWER

## 2024-01-10 ASSESSMENT — PAIN DESCRIPTION - DESCRIPTORS: DESCRIPTORS: ACHING;DISCOMFORT;SORE

## 2024-01-10 ASSESSMENT — PAIN DESCRIPTION - FREQUENCY: FREQUENCY: CONTINUOUS

## 2024-01-10 ASSESSMENT — PAIN - FUNCTIONAL ASSESSMENT: PAIN_FUNCTIONAL_ASSESSMENT: PREVENTS OR INTERFERES SOME ACTIVE ACTIVITIES AND ADLS

## 2024-01-10 NOTE — PROGRESS NOTES
4 Eyes Skin Assessment     NAME:  Lana Phillips  YOB: 1955  MEDICAL RECORD NUMBER:  [id number]    The patient is being assessed for  Admission    I agree that at least one RN has performed a thorough Head to Toe Skin Assessment on the patient. ALL assessment sites listed below have been assessed.      Areas assessed by both nurses:    Head, Face, Ears, Shoulders, Back, Chest, Arms, Elbows, Hands, Sacrum. Buttock, Coccyx, Ischium, Legs. Feet and Heels, and Under Medical Devices         Does the Patient have a Wound? Yes wound(s) were present on assessment. LDA wound assessment was Initiated and completed by RN       Guy Prevention initiated by RN: Yes  Wound Care Orders initiated by RN: Yes    Pressure Injury (Stage 3,4, Unstageable, DTI, NWPT, and Complex wounds) if present, place Wound referral order by RN under : No    New Ostomies, if present place, Ostomy referral order under : No     Nurse 1 eSignature: Electronically signed by Cyndi Ocasio RN on 1/9/24 at 11:13 PM EST    **SHARE this note so that the co-signing nurse can place an eSignature**    Nurse 2 eSignature: Electronically signed by Beata Francis RN on 1/9/24 at 11:13 PM EST

## 2024-01-10 NOTE — PROGRESS NOTES
[facility] Medicine    Subjective:  pt alert conversive breathing better      Current Facility-Administered Medications:     guaiFENesin-dextromethorphan (ROBITUSSIN DM) 100-10 MG/5ML syrup 10 mL, 10 mL, Oral, Q6H PRN, Baldemar Jarrett DO    apixaban (ELIQUIS) tablet 5 mg, 5 mg, Oral, BID, Baldemar Jarrett DO    aspirin EC tablet 81 mg, 81 mg, Oral, Daily, Baldemar Jarrett DO, 81 mg at 01/09/24 1218    atorvastatin (LIPITOR) tablet 40 mg, 40 mg, Oral, Nightly, Baldemar Jarrett DO, 40 mg at 01/09/24 2151    dilTIAZem (CARDIZEM CD) extended release capsule 120 mg, 120 mg, Oral, Daily, Baldemar Jarrett DO, 120 mg at 01/09/24 1218    ipratropium 0.5 mg-albuterol 2.5 mg (DUONEB) nebulizer solution 1 Dose, 1 Dose, Inhalation, Q4H WA RT, Baldemar Jarrett DO, 1 Dose at 01/09/24 2214    methIMAzole (TAPAZOLE) tablet 15 mg, 15 mg, Oral, Daily, Baldemar Jarrett DO, 15 mg at 01/09/24 1218    pantoprazole (PROTONIX) tablet 40 mg, 40 mg, Oral, QAM AC, Baldemar Jarrett DO, 40 mg at 01/10/24 0652    PARoxetine (PAXIL) tablet 10 mg, 10 mg, Oral, QAM, Baldemar Jarrett, , 10 mg at 01/09/24 1218    traZODone (DESYREL) tablet 50 mg, 50 mg, Oral, Nightly PRN, Baldemar Jarrett DO, 50 mg at 01/09/24 2151    sodium chloride flush 0.9 % injection 5-40 mL, 5-40 mL, IntraVENous, 2 times per day, Baldemar Jarrett DO, 10 mL at 01/09/24 2151    sodium chloride flush 0.9 % injection 5-40 mL, 5-40 mL, IntraVENous, PRN, Baldemar Jarrett DO    0.9 % sodium chloride infusion, , IntraVENous, PRN, Baldemar Jarrett DO    potassium chloride (KLOR-CON M) extended release tablet 40 mEq, 40 mEq, Oral, PRN **OR** potassium bicarb-citric acid (EFFER-K) effervescent tablet 40 mEq, 40 mEq, Oral, PRN **OR** potassium chloride 10 mEq/100 mL IVPB (Peripheral Line), 10 mEq, IntraVENous, PRN, Baldemar Jarrett, DO    magnesium sulfate 2000 mg in 50 mL IVPB premix, 2,000 mg, IntraVENous, PRN, Baldemar Jarrett, DO    ondansetron (ZOFRAN-ODT)  Value Date/Time     01/08/2024 08:10 PM    K 3.8 01/08/2024 08:10 PM    K 3.3 12/30/2022 02:49 AM    CL 90 01/08/2024 08:10 PM    CO2 37 01/08/2024 08:10 PM    BUN 14 01/08/2024 08:10 PM    CREATININE 0.5 01/08/2024 08:10 PM    GFRAA >60 07/28/2022 09:31 AM    LABGLOM >60 01/08/2024 08:10 PM    GLUCOSE 137 01/08/2024 08:10 PM    PROT 6.9 01/08/2024 08:10 PM    LABALBU 4.1 01/08/2024 08:10 PM    CALCIUM 8.9 01/08/2024 08:10 PM    BILITOT 0.2 01/08/2024 08:10 PM    ALKPHOS 65 01/08/2024 08:10 PM    AST 49 01/08/2024 08:10 PM    ALT 23 01/08/2024 08:10 PM     Warfarin PT/INR:    Lab Results   Component Value Date    INR 1.1 10/15/2023    INR 1.0 12/02/2022    PROTIME 12.0 10/15/2023    PROTIME 11.2 12/02/2022       Assessment:    Principal Problem:    Acute respiratory failure with hypoxia (HCC)  Resolved Problems:    * No resolved hospital problems. *      Plan:  Change to po eliquis wean steroids cont aerosols        Baldemar Jarrett DO  7:49 AM  1/10/2024

## 2024-01-10 NOTE — PLAN OF CARE
Problem: Chronic Conditions and Co-morbidities  Goal: Patient's chronic conditions and co-morbidity symptoms are monitored and maintained or improved  1/10/2024 1645 by Britni Fernandez RN  Outcome: Progressing  1/10/2024 0609 by Beata Francis RN  Outcome: Progressing     Problem: Discharge Planning  Goal: Discharge to home or other facility with appropriate resources  1/10/2024 1645 by Britni Fernandez RN  Outcome: Progressing  1/10/2024 0609 by Beata Francis RN  Outcome: Progressing     Problem: Pain  Goal: Verbalizes/displays adequate comfort level or baseline comfort level  1/10/2024 1645 by Britni Fernandez RN  Outcome: Progressing  1/10/2024 0609 by Beata Francis RN  Outcome: Progressing     Problem: Skin/Tissue Integrity  Goal: Absence of new skin breakdown  Description: 1.  Monitor for areas of redness and/or skin breakdown  2.  Assess vascular access sites hourly  3.  Every 4-6 hours minimum:  Change oxygen saturation probe site  4.  Every 4-6 hours:  If on nasal continuous positive airway pressure, respiratory therapy assess nares and determine need for appliance change or resting period.  1/10/2024 1645 by Britni Fernandez RN  Outcome: Progressing  1/10/2024 0609 by Beata Francis RN  Outcome: Progressing     Problem: ABCDS Injury Assessment  Goal: Absence of physical injury  1/10/2024 0609 by Beata Francis RN  Outcome: Progressing     Problem: Safety - Adult  Goal: Free from fall injury  1/10/2024 0609 by Beata Francis RN  Outcome: Progressing

## 2024-01-10 NOTE — CARE COORDINATION
Chart reviewed for transition of care at discharge. Patient admitted with acute respiratory failure with hypoxia. Patient is for abdominal aortogram. She sees vascular for toe wound. O2 at 4 L at 98%. She has a baseline of 3 L home O2 with [employer]. Heparin drip d/c'd. Per attending note, heparin to po eliquis and wean steroids , and cont aerosols. Met with patient at bedside who stated that she lives with her sister in law Pat, in a ranch home. She is independent and uses no assistive devices. She has a nebulizer and a bp monitor at home and no other DME. She has no history of SNF or HHC. Her PCP is Dr Gonzalez, and her pharmacy is [facility] in [place]. Her discharge plan is home when medically stable, with her sister in law transporting at discharge. CM/SW will follow.  Electronically signed by Cuate Sanchez RN on 1/10/2024 at 2:14 PM   Case Management Assessment  Initial Evaluation    Date/Time of Evaluation: 1/10/2024 2:18 PM  Assessment Completed by: Cuate Sanchez RN    If patient is discharged prior to next notation, then this note serves as note for discharge by case management.    Patient Name: Lana Phillips                   YOB: 1955  Diagnosis: Abnormal EKG [R94.31]  Acute exacerbation of chronic obstructive pulmonary disease (COPD) ([facility]) [J44.1]  Acute respiratory failure with hypoxia ([facility]) [J96.01]  Chest pain, unspecified type [R07.9]                   Date / Time: 1/8/2024  4:17 PM    Patient Admission Status: Inpatient   Readmission Risk (Low < 19, Mod (19-27), High > 27): Readmission Risk Score: 26.1    Current PCP: Clyde Gonzalez, DO  PCP verified by CM? Yes    Chart Reviewed: Yes      History Provided by: Patient  Patient Orientation: Alert and Oriented, Person, Place, Situation    Patient Cognition: Alert    Hospitalization in the last 30 days (Readmission):  Yes    If yes, Readmission Assessment in CM Navigator will be completed.    Advance Directives:      Code Status:

## 2024-01-10 NOTE — PROGRESS NOTES
Comprehensive Nutrition Assessment    Type and Reason for Visit:  Initial, Positive Nutrition Screen    Nutrition Recommendations/Plan:   Continue current diet  Start Ensure TID per discussion w/ pt   Will monitor     Malnutrition Assessment:  Malnutrition Status:  Severe malnutrition (01/10/24 1524)    Context:  Chronic Illness     Findings of the 6 clinical characteristics of malnutrition:  Energy Intake:  75% or less estimated energy requirements for 1 month or longer  Weight Loss:  No significant weight loss     Body Fat Loss:  Severe body fat loss Orbital, Triceps, Fat Overlying Ribs   Muscle Mass Loss:  Severe muscle mass loss Temples (temporalis), Clavicles (pectoralis & deltoids), Thigh (quadraceps), Calf (gastrocnemius)  Fluid Accumulation:  No significant fluid accumulation     Strength:  Not Performed    Nutrition Assessment:    pt adm d/t SOB/chest pain; PMhx of COPD,HTN,AFIB; pt tells me she has a bit of abd pain d/t constipation; pt tells me she feels like her appetite/oral intake are good since adm; pt tells me she has had diarrhea PTA; pt drinks Ensure at home- willing to have Ensure on trays TID this adm; pt meets criteria for severe malnutrition; will start ONS per discussion w/ pt and monitor.    Nutrition Related Findings:    severe muscle/fat wasting; I/O WNL; A&Ox4; poor dentition; active BS; trace edema Wound Type: Wound Consult Pending (toe wound noted)       Current Nutrition Intake & Therapies:    Average Meal Intake: % (x2 intakes this adm; pt tells me her appetite/oral intake are good since adm)  Average Supplements Intake: None Ordered  ADULT DIET; Regular  ADULT ORAL NUTRITION SUPPLEMENT; Breakfast, Lunch, Dinner; Standard High Calorie/High Protein Oral Supplement    Anthropometric Measures:  Height: 172.7 cm (5' 7.99\")  Ideal Body Weight (IBW): 140 lbs (64 kg)    Admission Body Weight: 42.8 kg (94 lb 5.7 oz) (1/9-SS)  Current Body Weight: 42.8 kg (94 lb 5.7 oz) (1/9-Standing  scale ; RD BS wt of 43kg noted), 67.4 % IBW. Weight Source: Standing Scale  Current BMI (kg/m2): 14.4  Usual Body Weight: 41.8 kg (92 lb 2.4 oz) (10/23/23 actual wt ; pt tells me her UBW is ~120# however recently has been ~90-95#)  % Weight Change (Calculated): 2.4  Weight Adjustment For: No Adjustment                 BMI Categories: Underweight (BMI less than 22) age over 65    Estimated Daily Nutrient Needs:  Energy Requirements Based On: Kcal/kg  Weight Used for Energy Requirements: Current  Energy (kcal/day): 33-35kcal/gpaBYK=2171-3673  Weight Used for Protein Requirements: Current  Protein (g/day): 1.5-1.8g/kgxCBW=65-80g  Method Used for Fluid Requirements: 1 ml/kcal  Fluid (ml/day): 5219-1875    Nutrition Diagnosis:   Severe malnutrition, In context of chronic illness related to catabolic illness as evidenced by Criteria as identified in malnutrition assessment    Nutrition Interventions:   Food and/or Nutrient Delivery: Continue Current Diet, Start Oral Nutrition Supplement (Ensure TID per discussion w/ pt)  Nutrition Education/Counseling: Education not indicated  Coordination of Nutrition Care: Continue to monitor while inpatient       Goals:     Goals: PO intake 75% or greater       Nutrition Monitoring and Evaluation:   Behavioral-Environmental Outcomes: None Identified  Food/Nutrient Intake Outcomes: Food and Nutrient Intake, Supplement Intake  Physical Signs/Symptoms Outcomes: Nutrition Focused Physical Findings, Biochemical Data, Chewing or Swallowing, Skin, Weight, GI Status, Fluid Status or Edema    Discharge Planning:    Continue Oral Nutrition Supplement (Ensure (pt says she drinks this ONS at home))     Viky Mckeon RD, LD  Contact: [phone]

## 2024-01-10 NOTE — PATIENT CARE CONFERENCE
P Quality Flow/Interdisciplinary Rounds Progress Note        Quality Flow Rounds held on January 10, 2024    Disciplines Attending:  Bedside Nurse, , , and Nursing Unit Leadership    Lana Phillips was admitted on 1/8/2024  4:17 PM    Anticipated Discharge Date:       Disposition:    Gyu Score:  Guy Scale Score: 16    Readmission Risk              Risk of Unplanned Readmission:  20           Discussed patient goal for the day, patient clinical progression, and barriers to discharge.  The following Goal(s) of the Day/Commitment(s) have been identified:    report labs/diagnostics     Ashlyn Simon RN  January 10, 2024

## 2024-01-10 NOTE — PROGRESS NOTES
[facility]  [address], [address]  [place] [address]      Pulmonary Progress Note      SUBJECTIVE:  claims doing a bit better with breathing, still with cough but not much sputum production. Ambulating in room on 3 li NC    OBJECTIVE    Medications    Continuous Infusions:   sodium chloride         Scheduled Meds:   apixaban  5 mg Oral BID    methylPREDNISolone  40 mg IntraVENous Q8H    aspirin  81 mg Oral Daily    atorvastatin  40 mg Oral Nightly    dilTIAZem  120 mg Oral Daily    ipratropium 0.5 mg-albuterol 2.5 mg  1 Dose Inhalation Q4H WA RT    methIMAzole  15 mg Oral Daily    pantoprazole  40 mg Oral QAM AC    PARoxetine  10 mg Oral QAM    sodium chloride flush  5-40 mL IntraVENous 2 times per day    pregabalin  50 mg Oral TID    busPIRone  10 mg Oral TID    budesonide  0.5 mg Nebulization BID RT    arformoterol tartrate  15 mcg Nebulization BID RT       PRN Meds:guaiFENesin-dextromethorphan, traZODone, sodium chloride flush, sodium chloride, potassium chloride **OR** potassium alternative oral replacement **OR** potassium chloride, magnesium sulfate, ondansetron **OR** ondansetron, polyethylene glycol, acetaminophen **OR** acetaminophen    Physical    VITALS:  BP (!) 140/65   Pulse 78   Temp 97.3 °F (36.3 °C) (Temporal)   Resp 19   Ht 1.727 m (5' 7.99\")   Wt 42.8 kg (94 lb 6.4 oz)   SpO2 96%   BMI 14.36 kg/m²     24HR INTAKE/OUTPUT:      Intake/Output Summary (Last 24 hours) at 1/10/2024 1619  Last data filed at 1/10/2024 1432  Gross per 24 hour   Intake 740 ml   Output --   Net 740 ml       24HR PULSE OXIMETRY RANGE:    SpO2  Av.2 %  Min: 90 %  Max: 99 %    General appearance: alert, appears stated age, and cooperative  Lungs: diminished breath sounds bilaterally and rhonchi bilaterally  Heart: regular rate and rhythm, S1, S2 normal, no murmur, click, rub or gallop  Abdomen: soft, non-tender; bowel sounds normal; no masses,

## 2024-01-10 NOTE — PLAN OF CARE

## 2024-01-11 LAB
ANION GAP SERPL CALCULATED.3IONS-SCNC: 9 MMOL/L (ref 7–16)
BUN SERPL-MCNC: 14 MG/DL (ref 6–23)
CALCIUM SERPL-MCNC: 8.7 MG/DL (ref 8.6–10.2)
CHLORIDE SERPL-SCNC: 95 MMOL/L (ref 98–107)
CO2 SERPL-SCNC: 34 MMOL/L (ref 22–29)
CREAT SERPL-MCNC: 0.6 MG/DL (ref 0.5–1)
GFR SERPL CREATININE-BSD FRML MDRD: >60 ML/MIN/1.73M2
GLUCOSE SERPL-MCNC: 230 MG/DL (ref 74–99)
POTASSIUM SERPL-SCNC: 4.5 MMOL/L (ref 3.5–5)
SODIUM SERPL-SCNC: 138 MMOL/L (ref 132–146)

## 2024-01-11 PROCEDURE — 93005 ELECTROCARDIOGRAM TRACING: CPT | Performed by: INTERNAL MEDICINE

## 2024-01-11 PROCEDURE — 6360000002 HC RX W HCPCS: Performed by: INTERNAL MEDICINE

## 2024-01-11 PROCEDURE — 36415 COLL VENOUS BLD VENIPUNCTURE: CPT

## 2024-01-11 PROCEDURE — 2700000000 HC OXYGEN THERAPY PER DAY

## 2024-01-11 PROCEDURE — 2140000000 HC CCU INTERMEDIATE R&B

## 2024-01-11 PROCEDURE — 94640 AIRWAY INHALATION TREATMENT: CPT

## 2024-01-11 PROCEDURE — 94660 CPAP INITIATION&MGMT: CPT

## 2024-01-11 PROCEDURE — 80048 BASIC METABOLIC PNL TOTAL CA: CPT

## 2024-01-11 PROCEDURE — 2580000003 HC RX 258: Performed by: INTERNAL MEDICINE

## 2024-01-11 PROCEDURE — 6370000000 HC RX 637 (ALT 250 FOR IP): Performed by: INTERNAL MEDICINE

## 2024-01-11 RX ORDER — BUSPIRONE HYDROCHLORIDE 10 MG/1
10 TABLET ORAL 3 TIMES DAILY
Qty: 270 TABLET | Refills: 1 | Status: SHIPPED | OUTPATIENT
Start: 2024-01-11

## 2024-01-11 RX ADMIN — PANTOPRAZOLE SODIUM 40 MG: 40 TABLET, DELAYED RELEASE ORAL at 05:00

## 2024-01-11 RX ADMIN — Medication 10 ML: at 20:48

## 2024-01-11 RX ADMIN — IPRATROPIUM BROMIDE AND ALBUTEROL SULFATE 1 DOSE: 2.5; .5 SOLUTION RESPIRATORY (INHALATION) at 13:05

## 2024-01-11 RX ADMIN — GUAIFENESIN SYRUP AND DEXTROMETHORPHAN 10 ML: 100; 10 SYRUP ORAL at 13:17

## 2024-01-11 RX ADMIN — DILTIAZEM HYDROCHLORIDE 120 MG: 120 CAPSULE, COATED, EXTENDED RELEASE ORAL at 09:03

## 2024-01-11 RX ADMIN — APIXABAN 5 MG: 5 TABLET, FILM COATED ORAL at 09:03

## 2024-01-11 RX ADMIN — TRAZODONE HYDROCHLORIDE 50 MG: 50 TABLET ORAL at 20:48

## 2024-01-11 RX ADMIN — IPRATROPIUM BROMIDE AND ALBUTEROL SULFATE 1 DOSE: 2.5; .5 SOLUTION RESPIRATORY (INHALATION) at 10:33

## 2024-01-11 RX ADMIN — METHYLPREDNISOLONE SODIUM SUCCINATE 40 MG: 40 INJECTION, POWDER, LYOPHILIZED, FOR SOLUTION INTRAMUSCULAR; INTRAVENOUS at 16:50

## 2024-01-11 RX ADMIN — ATORVASTATIN CALCIUM 40 MG: 40 TABLET, FILM COATED ORAL at 20:48

## 2024-01-11 RX ADMIN — PREGABALIN 50 MG: 50 CAPSULE ORAL at 09:04

## 2024-01-11 RX ADMIN — ONDANSETRON 4 MG: 2 INJECTION INTRAMUSCULAR; INTRAVENOUS at 09:07

## 2024-01-11 RX ADMIN — PREGABALIN 50 MG: 50 CAPSULE ORAL at 13:17

## 2024-01-11 RX ADMIN — POLYETHYLENE GLYCOL 3350 17 G: 17 POWDER, FOR SOLUTION ORAL at 20:49

## 2024-01-11 RX ADMIN — BUSPIRONE HYDROCHLORIDE 10 MG: 10 TABLET ORAL at 13:17

## 2024-01-11 RX ADMIN — BUDESONIDE 500 MCG: 0.5 SUSPENSION RESPIRATORY (INHALATION) at 10:32

## 2024-01-11 RX ADMIN — PREGABALIN 50 MG: 50 CAPSULE ORAL at 20:48

## 2024-01-11 RX ADMIN — METHYLPREDNISOLONE SODIUM SUCCINATE 40 MG: 40 INJECTION, POWDER, FOR SOLUTION INTRAMUSCULAR; INTRAVENOUS at 00:46

## 2024-01-11 RX ADMIN — ARFORMOTEROL TARTRATE 15 MCG: 15 SOLUTION RESPIRATORY (INHALATION) at 20:26

## 2024-01-11 RX ADMIN — ONDANSETRON 4 MG: 2 INJECTION INTRAMUSCULAR; INTRAVENOUS at 20:56

## 2024-01-11 RX ADMIN — ARFORMOTEROL TARTRATE 15 MCG: 15 SOLUTION RESPIRATORY (INHALATION) at 10:33

## 2024-01-11 RX ADMIN — Medication 10 ML: at 09:05

## 2024-01-11 RX ADMIN — ACETAMINOPHEN 650 MG: 325 TABLET ORAL at 20:48

## 2024-01-11 RX ADMIN — METHIMAZOLE 15 MG: 5 TABLET ORAL at 09:03

## 2024-01-11 RX ADMIN — BUDESONIDE 500 MCG: 0.5 SUSPENSION RESPIRATORY (INHALATION) at 20:27

## 2024-01-11 RX ADMIN — APIXABAN 5 MG: 5 TABLET, FILM COATED ORAL at 20:48

## 2024-01-11 RX ADMIN — BUSPIRONE HYDROCHLORIDE 10 MG: 10 TABLET ORAL at 09:04

## 2024-01-11 RX ADMIN — ASPIRIN 81 MG: 81 TABLET, COATED ORAL at 09:04

## 2024-01-11 RX ADMIN — GUAIFENESIN SYRUP AND DEXTROMETHORPHAN 10 ML: 100; 10 SYRUP ORAL at 20:48

## 2024-01-11 RX ADMIN — IPRATROPIUM BROMIDE AND ALBUTEROL SULFATE 1 DOSE: 2.5; .5 SOLUTION RESPIRATORY (INHALATION) at 16:56

## 2024-01-11 RX ADMIN — METHYLPREDNISOLONE SODIUM SUCCINATE 40 MG: 40 INJECTION, POWDER, LYOPHILIZED, FOR SOLUTION INTRAMUSCULAR; INTRAVENOUS at 09:05

## 2024-01-11 RX ADMIN — IPRATROPIUM BROMIDE AND ALBUTEROL SULFATE 1 DOSE: 2.5; .5 SOLUTION RESPIRATORY (INHALATION) at 20:26

## 2024-01-11 RX ADMIN — BUSPIRONE HYDROCHLORIDE 10 MG: 10 TABLET ORAL at 20:48

## 2024-01-11 RX ADMIN — PAROXETINE 10 MG: 10 TABLET, FILM COATED ORAL at 09:04

## 2024-01-11 ASSESSMENT — PAIN - FUNCTIONAL ASSESSMENT: PAIN_FUNCTIONAL_ASSESSMENT: PREVENTS OR INTERFERES SOME ACTIVE ACTIVITIES AND ADLS

## 2024-01-11 ASSESSMENT — PAIN DESCRIPTION - ORIENTATION: ORIENTATION: LEFT;LOWER

## 2024-01-11 ASSESSMENT — PAIN DESCRIPTION - LOCATION: LOCATION: BACK;FOOT

## 2024-01-11 ASSESSMENT — PAIN DESCRIPTION - PAIN TYPE: TYPE: ACUTE PAIN;SURGICAL PAIN

## 2024-01-11 ASSESSMENT — PAIN SCALES - GENERAL
PAINLEVEL_OUTOF10: 5
PAINLEVEL_OUTOF10: 0

## 2024-01-11 ASSESSMENT — PAIN DESCRIPTION - DESCRIPTORS: DESCRIPTORS: ACHING;DISCOMFORT;SORE

## 2024-01-11 ASSESSMENT — PAIN DESCRIPTION - FREQUENCY: FREQUENCY: CONTINUOUS

## 2024-01-11 ASSESSMENT — PAIN DESCRIPTION - ONSET: ONSET: ON-GOING

## 2024-01-11 NOTE — CARE COORDINATION
Transition of care update: IV solumedrol 40mg q 8 hrs. O2 at 3l/nc. Hgb 9.2 and other labs noted. Met with pt in room. Her plan is to return home with her sister in law when medically ready. Denies any needs for home. Pt said she has home o2 at 3l/nc from [facility]. Cm/sw will follow.

## 2024-01-11 NOTE — PATIENT CARE CONFERENCE
[facility] Quality Flow/Interdisciplinary Rounds Progress Note        Quality Flow Rounds held on January 11, 2024    Disciplines Attending:  Bedside Nurse, , , and Nursing Unit Leadership    Lana Phillips was admitted on 1/8/2024  4:17 PM    Anticipated Discharge Date:       Disposition:    Guy Score:  Guy Scale Score: 19    Readmission Risk              Risk of Unplanned Readmission:  20           Discussed patient goal for the day, patient clinical progression, and barriers to discharge.  The following Goal(s) of the Day/Commitment(s) have been identified:  downgrade/discharge planning      Ashlyn Simon RN  January 11, 2024

## 2024-01-11 NOTE — PLAN OF CARE
Problem: Chronic Conditions and Co-morbidities  Goal: Patient's chronic conditions and co-morbidity symptoms are monitored and maintained or improved  1/10/2024 2204 by Jane Parada RN  Outcome: Progressing     Problem: Discharge Planning  Goal: Discharge to home or other facility with appropriate resources  1/10/2024 2204 by Jane Parada RN  Outcome: Progressing     Problem: Pain  Goal: Verbalizes/displays adequate comfort level or baseline comfort level  1/10/2024 2204 by Jane Parada RN  Outcome: Progressing       Problem: Skin/Tissue Integrity  Goal: Absence of new skin breakdown  Description: 1.  Monitor for areas of redness and/or skin breakdown  2.  Assess vascular access sites hourly  3.  Every 4-6 hours minimum:  Change oxygen saturation probe site  4.  Every 4-6 hours:  If on nasal continuous positive airway pressure, respiratory therapy assess nares and determine need for appliance change or resting period.  1/10/2024 2204 by Jane Parada RN  Outcome: Progressing     Problem: ABCDS Injury Assessment  Goal: Absence of physical injury  Outcome: Progressing     Problem: Safety - Adult  Goal: Free from fall injury  Outcome: Progressing     Problem: Nutrition Deficit:  Goal: Optimize nutritional status  Outcome: Progressing

## 2024-01-11 NOTE — PROGRESS NOTES
[facility]  [address], [address]  [place] [address]      Pulmonary Progress Note      SUBJECTIVE:  claims doing a bit better with breathing, similar with cough. Ambulating in room on 3 li NC, getting EZPAP not sure if helping (?) may be tolerating ok though occ thinks suffocating her during use (going through NIPPV mask)    OBJECTIVE    Medications    Continuous Infusions:   sodium chloride         Scheduled Meds:   methylPREDNISolone sodium succ (SOLU-MEDROL) 40 mg in sterile water 1 mL injection  40 mg IntraVENous Q8H    apixaban  5 mg Oral BID    aspirin  81 mg Oral Daily    atorvastatin  40 mg Oral Nightly    dilTIAZem  120 mg Oral Daily    ipratropium 0.5 mg-albuterol 2.5 mg  1 Dose Inhalation Q4H WA RT    methIMAzole  15 mg Oral Daily    pantoprazole  40 mg Oral QAM AC    PARoxetine  10 mg Oral QAM    sodium chloride flush  5-40 mL IntraVENous 2 times per day    pregabalin  50 mg Oral TID    busPIRone  10 mg Oral TID    budesonide  0.5 mg Nebulization BID RT    arformoterol tartrate  15 mcg Nebulization BID RT       PRN Meds:guaiFENesin-dextromethorphan, traZODone, sodium chloride flush, sodium chloride, potassium chloride **OR** potassium alternative oral replacement **OR** potassium chloride, magnesium sulfate, ondansetron **OR** ondansetron, polyethylene glycol, acetaminophen **OR** acetaminophen    Physical    VITALS:  BP (!) 145/63   Pulse 79   Temp 99.1 °F (37.3 °C) (Temporal)   Resp 20   Ht 1.727 m (5' 7.99\")   Wt 42.8 kg (94 lb 6.4 oz)   SpO2 96%   BMI 14.36 kg/m²     24HR INTAKE/OUTPUT:      Intake/Output Summary (Last 24 hours) at 2024 1527  Last data filed at 2024 1415  Gross per 24 hour   Intake 1220 ml   Output --   Net 1220 ml         24HR PULSE OXIMETRY RANGE:    SpO2  Av.9 %  Min: 95 %  Max: 99 %    General appearance: alert, appears stated age, and cooperative  Lungs: diminished breath sounds  bilaterally and rhonchi bilaterally  Heart: regular rate and rhythm, S1, S2 normal, no murmur, click, rub or gallop  Abdomen: soft, non-tender; bowel sounds normal; no masses,  no organomegaly  Extremities: extremities normal, atraumatic, no cyanosis or edema  Neurologic: Mental status: Alert, oriented, thought content appropriate    Data    CBC:   Recent Labs     01/08/24  1643 01/08/24  2127 01/10/24  0433   WBC 11.7* 10.2 8.6   HGB 9.9* 9.6* 9.2*   HCT 33.5* 32.1* 30.2*   MCV 98.5 96.7 97.1    274 282         BMP:  Recent Labs     01/08/24 2010 01/11/24  0921    138   K 3.8 4.5   CL 90* 95*   CO2 37* 34*   BUN 14 14   CREATININE 0.5 0.6      ALB:3,BILIDIR:3,BILITOT:3,ALKPHOS:3)@    PT/INR: No results for input(s): \"PROTIME\", \"INR\" in the last 72 hours.    ABG:   No results for input(s): \"PH\", \"PO2\", \"PCO2\", \"HCO3\", \"BE\", \"O2SAT\", \"METHB\", \"O2HB\", \"COHB\", \"O2CON\", \"HHB\", \"THB\" in the last 72 hours.  FiO2 : 60 %       Radiology/Other tests reviewed: none    Assessment:     Principal Problem:    Acute respiratory failure with hypoxia (HCC)  Active Problems:    Severe protein-calorie malnutrition (HCC)  Resolved Problems:    * No resolved hospital problems. *      Plan:       Cont with steroids, taper as tolerated  Cont with nebs and EZPAP, observe respiratory function, observe tolerance to EZPAP  Cont with oxygen, taper as tolerated  OOB to chair, ambulate as tolerated      Time at the bedside, reviewing labs and radiographs, reviewing notes and consultations, discussing with staff and family was more than 50 minutes.      Thanks for letting us see this patient in consultation.  Please contact us with any questions. Office [phone] or after hours through [employer], x [phone].

## 2024-01-11 NOTE — PLAN OF CARE
Problem: Chronic Conditions and Co-morbidities  Goal: Patient's chronic conditions and co-morbidity symptoms are monitored and maintained or improved  Outcome: Progressing     Problem: Discharge Planning  Goal: Discharge to home or other facility with appropriate resources  Outcome: Progressing     Problem: Pain  Goal: Verbalizes/displays adequate comfort level or baseline comfort level  Outcome: Progressing     Problem: Skin/Tissue Integrity  Goal: Absence of new skin breakdown  Description: 1.  Monitor for areas of redness and/or skin breakdown  2.  Assess vascular access sites hourly  3.  Every 4-6 hours minimum:  Change oxygen saturation probe site  4.  Every 4-6 hours:  If on nasal continuous positive airway pressure, respiratory therapy assess nares and determine need for appliance change or resting period.  Outcome: Progressing

## 2024-01-11 NOTE — PROGRESS NOTES
[facility]    Subjective:  pt seen this am alert conversive      Current Facility-Administered Medications:     methylPREDNISolone sodium succ (SOLU-MEDROL) 40 mg in sterile water 1 mL injection, 40 mg, IntraVENous, Q8H, Baldemar Jarrett DO, 40 mg at 01/11/24 0905    guaiFENesin-dextromethorphan (ROBITUSSIN DM) 100-10 MG/5ML syrup 10 mL, 10 mL, Oral, Q6H PRN, Baldemar Jarrett DO, 10 mL at 01/10/24 2058    apixaban (ELIQUIS) tablet 5 mg, 5 mg, Oral, BID, Baldemar Jarrett DO, 5 mg at 01/11/24 0903    aspirin EC tablet 81 mg, 81 mg, Oral, Daily, Baldemar Jarrett DO, 81 mg at 01/11/24 0904    atorvastatin (LIPITOR) tablet 40 mg, 40 mg, Oral, Nightly, Baldemar Jarrett DO, 40 mg at 01/10/24 2017    dilTIAZem (CARDIZEM CD) extended release capsule 120 mg, 120 mg, Oral, Daily, Baldemar Jarrett DO, 120 mg at 01/11/24 0903    ipratropium 0.5 mg-albuterol 2.5 mg (DUONEB) nebulizer solution 1 Dose, 1 Dose, Inhalation, Q4H WA RT, Baldemar Jarrett DO, 1 Dose at 01/11/24 1033    methIMAzole (TAPAZOLE) tablet 15 mg, 15 mg, Oral, Daily, Baldemar Jarrett DO, 15 mg at 01/11/24 0903    pantoprazole (PROTONIX) tablet 40 mg, 40 mg, Oral, QAM AC, Baldemar Jarrett DO, 40 mg at 01/11/24 0500    PARoxetine (PAXIL) tablet 10 mg, 10 mg, Oral, QAM, Baldemar Jarrett DO, 10 mg at 01/11/24 0904    traZODone (DESYREL) tablet 50 mg, 50 mg, Oral, Nightly PRN, Baldemar Jarrett DO, 50 mg at 01/10/24 2105    sodium chloride flush 0.9 % injection 5-40 mL, 5-40 mL, IntraVENous, 2 times per day, Baldemar Jarrett DO, 10 mL at 01/11/24 0905    sodium chloride flush 0.9 % injection 5-40 mL, 5-40 mL, IntraVENous, PRN, Baldemar Jarrett,     0.9 % sodium chloride infusion, , IntraVENous, PRN, Baldemar Jarrett, DO    potassium chloride (KLOR-CON M) extended release tablet 40 mEq, 40 mEq, Oral, PRN **OR** potassium bicarb-citric acid (EFFER-K) effervescent tablet 40 mEq, 40 mEq, Oral, PRN **OR** potassium chloride 10 mEq/100 mL  04:43 PM    BASOSABS 0.08 01/08/2024 04:43 PM     CMP:    Lab Results   Component Value Date/Time     01/11/2024 09:21 AM    K 4.5 01/11/2024 09:21 AM    K 3.3 12/30/2022 02:49 AM    CL 95 01/11/2024 09:21 AM    CO2 34 01/11/2024 09:21 AM    BUN 14 01/11/2024 09:21 AM    CREATININE 0.6 01/11/2024 09:21 AM    GFRAA >60 07/28/2022 09:31 AM    LABGLOM >60 01/11/2024 09:21 AM    GLUCOSE 230 01/11/2024 09:21 AM    PROT 6.9 01/08/2024 08:10 PM    LABALBU 4.1 01/08/2024 08:10 PM    CALCIUM 8.7 01/11/2024 09:21 AM    BILITOT 0.2 01/08/2024 08:10 PM    ALKPHOS 65 01/08/2024 08:10 PM    AST 49 01/08/2024 08:10 PM    ALT 23 01/08/2024 08:10 PM     Warfarin PT/INR:    Lab Results   Component Value Date    INR 1.1 10/15/2023    INR 1.0 12/02/2022    PROTIME 12.0 10/15/2023    PROTIME 11.2 12/02/2022       Assessment:    Principal Problem:    Acute respiratory failure with hypoxia (HCC)  Active Problems:    Severe protein-calorie malnutrition (HCC)  Resolved Problems:    * No resolved hospital problems. *      Plan:  Cont aerosols steroids        Baldemar Jarrett DO  12:10 PM  1/11/2024

## 2024-01-12 PROBLEM — I70.229 ATHEROSCLEROSIS OF ARTERY OF EXTREMITY WITH REST PAIN (HCC): Status: ACTIVE | Noted: 2024-01-12

## 2024-01-12 LAB
EKG ATRIAL RATE: 71 BPM
EKG P-R INTERVAL: 122 MS
EKG Q-T INTERVAL: 382 MS
EKG QRS DURATION: 78 MS
EKG QTC CALCULATION (BAZETT): 415 MS
EKG R AXIS: 69 DEGREES
EKG T AXIS: 88 DEGREES
EKG VENTRICULAR RATE: 71 BPM

## 2024-01-12 PROCEDURE — 2580000003 HC RX 258: Performed by: INTERNAL MEDICINE

## 2024-01-12 PROCEDURE — 6360000002 HC RX W HCPCS: Performed by: INTERNAL MEDICINE

## 2024-01-12 PROCEDURE — 6370000000 HC RX 637 (ALT 250 FOR IP): Performed by: INTERNAL MEDICINE

## 2024-01-12 PROCEDURE — 2700000000 HC OXYGEN THERAPY PER DAY

## 2024-01-12 PROCEDURE — 94660 CPAP INITIATION&MGMT: CPT

## 2024-01-12 PROCEDURE — 2140000000 HC CCU INTERMEDIATE R&B

## 2024-01-12 PROCEDURE — 94640 AIRWAY INHALATION TREATMENT: CPT

## 2024-01-12 PROCEDURE — 93010 ELECTROCARDIOGRAM REPORT: CPT | Performed by: INTERNAL MEDICINE

## 2024-01-12 RX ORDER — PREDNISONE 20 MG/1
40 TABLET ORAL DAILY
Status: DISCONTINUED | OUTPATIENT
Start: 2024-01-13 | End: 2024-01-13 | Stop reason: HOSPADM

## 2024-01-12 RX ADMIN — Medication 10 ML: at 09:10

## 2024-01-12 RX ADMIN — ASPIRIN 81 MG: 81 TABLET, COATED ORAL at 09:09

## 2024-01-12 RX ADMIN — APIXABAN 5 MG: 5 TABLET, FILM COATED ORAL at 21:04

## 2024-01-12 RX ADMIN — PREGABALIN 50 MG: 50 CAPSULE ORAL at 14:32

## 2024-01-12 RX ADMIN — TRAZODONE HYDROCHLORIDE 50 MG: 50 TABLET ORAL at 21:06

## 2024-01-12 RX ADMIN — Medication 10 ML: at 21:10

## 2024-01-12 RX ADMIN — PANTOPRAZOLE SODIUM 40 MG: 40 TABLET, DELAYED RELEASE ORAL at 06:23

## 2024-01-12 RX ADMIN — ARFORMOTEROL TARTRATE 15 MCG: 15 SOLUTION RESPIRATORY (INHALATION) at 09:28

## 2024-01-12 RX ADMIN — BUSPIRONE HYDROCHLORIDE 10 MG: 10 TABLET ORAL at 14:32

## 2024-01-12 RX ADMIN — BUSPIRONE HYDROCHLORIDE 10 MG: 10 TABLET ORAL at 21:04

## 2024-01-12 RX ADMIN — APIXABAN 5 MG: 5 TABLET, FILM COATED ORAL at 09:09

## 2024-01-12 RX ADMIN — BUDESONIDE 500 MCG: 0.5 SUSPENSION RESPIRATORY (INHALATION) at 09:28

## 2024-01-12 RX ADMIN — POLYETHYLENE GLYCOL 3350 17 G: 17 POWDER, FOR SOLUTION ORAL at 11:17

## 2024-01-12 RX ADMIN — ATORVASTATIN CALCIUM 40 MG: 40 TABLET, FILM COATED ORAL at 21:04

## 2024-01-12 RX ADMIN — METHYLPREDNISOLONE SODIUM SUCCINATE 40 MG: 40 INJECTION, POWDER, LYOPHILIZED, FOR SOLUTION INTRAMUSCULAR; INTRAVENOUS at 17:59

## 2024-01-12 RX ADMIN — IPRATROPIUM BROMIDE AND ALBUTEROL SULFATE 1 DOSE: 2.5; .5 SOLUTION RESPIRATORY (INHALATION) at 12:56

## 2024-01-12 RX ADMIN — PAROXETINE 10 MG: 10 TABLET, FILM COATED ORAL at 09:09

## 2024-01-12 RX ADMIN — ACETAMINOPHEN 650 MG: 325 TABLET ORAL at 19:25

## 2024-01-12 RX ADMIN — IPRATROPIUM BROMIDE AND ALBUTEROL SULFATE 1 DOSE: 2.5; .5 SOLUTION RESPIRATORY (INHALATION) at 20:10

## 2024-01-12 RX ADMIN — METHIMAZOLE 15 MG: 5 TABLET ORAL at 09:09

## 2024-01-12 RX ADMIN — IPRATROPIUM BROMIDE AND ALBUTEROL SULFATE 1 DOSE: 2.5; .5 SOLUTION RESPIRATORY (INHALATION) at 09:28

## 2024-01-12 RX ADMIN — METHYLPREDNISOLONE SODIUM SUCCINATE 40 MG: 40 INJECTION, POWDER, LYOPHILIZED, FOR SOLUTION INTRAMUSCULAR; INTRAVENOUS at 00:14

## 2024-01-12 RX ADMIN — SODIUM CHLORIDE, PRESERVATIVE FREE 10 ML: 5 INJECTION INTRAVENOUS at 17:59

## 2024-01-12 RX ADMIN — PETROLATUM: 420 OINTMENT TOPICAL at 14:32

## 2024-01-12 RX ADMIN — PREGABALIN 50 MG: 50 CAPSULE ORAL at 21:04

## 2024-01-12 RX ADMIN — DILTIAZEM HYDROCHLORIDE 120 MG: 120 CAPSULE, COATED, EXTENDED RELEASE ORAL at 09:09

## 2024-01-12 RX ADMIN — METHYLPREDNISOLONE SODIUM SUCCINATE 40 MG: 40 INJECTION, POWDER, LYOPHILIZED, FOR SOLUTION INTRAMUSCULAR; INTRAVENOUS at 09:10

## 2024-01-12 RX ADMIN — ARFORMOTEROL TARTRATE 15 MCG: 15 SOLUTION RESPIRATORY (INHALATION) at 20:10

## 2024-01-12 RX ADMIN — GUAIFENESIN SYRUP AND DEXTROMETHORPHAN 10 ML: 100; 10 SYRUP ORAL at 19:02

## 2024-01-12 RX ADMIN — BUSPIRONE HYDROCHLORIDE 10 MG: 10 TABLET ORAL at 09:09

## 2024-01-12 RX ADMIN — PREGABALIN 50 MG: 50 CAPSULE ORAL at 09:09

## 2024-01-12 RX ADMIN — IPRATROPIUM BROMIDE AND ALBUTEROL SULFATE 1 DOSE: 2.5; .5 SOLUTION RESPIRATORY (INHALATION) at 16:38

## 2024-01-12 RX ADMIN — BUDESONIDE 500 MCG: 0.5 SUSPENSION RESPIRATORY (INHALATION) at 20:10

## 2024-01-12 ASSESSMENT — PAIN SCALES - GENERAL
PAINLEVEL_OUTOF10: 6
PAINLEVEL_OUTOF10: 0

## 2024-01-12 ASSESSMENT — PAIN DESCRIPTION - PAIN TYPE: TYPE: ACUTE PAIN

## 2024-01-12 ASSESSMENT — PAIN DESCRIPTION - DESCRIPTORS: DESCRIPTORS: SORE;ACHING;DISCOMFORT

## 2024-01-12 ASSESSMENT — PAIN - FUNCTIONAL ASSESSMENT: PAIN_FUNCTIONAL_ASSESSMENT: ACTIVITIES ARE NOT PREVENTED

## 2024-01-12 ASSESSMENT — PAIN DESCRIPTION - ORIENTATION: ORIENTATION: MID

## 2024-01-12 ASSESSMENT — PAIN DESCRIPTION - LOCATION: LOCATION: HEAD

## 2024-01-12 ASSESSMENT — PAIN DESCRIPTION - ONSET: ONSET: ON-GOING

## 2024-01-12 ASSESSMENT — PAIN DESCRIPTION - FREQUENCY: FREQUENCY: INTERMITTENT

## 2024-01-12 NOTE — PLAN OF CARE
Patient hopes to go home soon. Patient remains on solu medrol and on 3 liters of oxygen.    Patient's plan is to go home

## 2024-01-12 NOTE — FLOWSHEET NOTE
Inpatient Wound Care (Initial consult) [id number]    Admit Date: 1/8/2024  4:17 PM    Reason for consult:  Toe    Significant history:  Per H&P    CHIEF COMPLAINT:  Shortness of breath.     HISTORY OF PRESENT ILLNESS:  The patient is a 68-year-old   female who presented to the emergency room complaining of several-day  history increasing shortness of breath.  She was seen in the emergency  room.  EKG was abnormal.  She was started on IV heparin and Cardiology  was consulted.  She was started on BiPAP.  She is on BiPAP at home and  oxygen.    Findings:     01/12/24 1355   Skin Integumentary    Skin Integrity Ecchymosis   Location BUE   Skin Integrity Site 2   Skin Integrity Location 2   (dry flaky)   Location 2 BLE   Wound 01/09/24 Left 2nd toe   Date First Assessed/Time First Assessed: 01/09/24 2135   Present on Original Admission: Yes  Primary Wound Type: Arterial Ulcer  Wound Description (Comments): Left 2nd toe   Wound Image    Wound Etiology Arterial   Dressing Status New dressing applied   Wound Cleansed Cleansed with saline   Dressing/Treatment Dry dressing;Non adherent;Pharmaceutical agent (see MAR);Roll gauze   Wound Length (cm) 0.7 cm   Wound Width (cm) 1.7 cm   Wound Depth (cm)   (unable to determine)   Wound Surface Area (cm^2) 1.19 cm^2   Wound Assessment   (maroon)   Drainage Amount None (dry)   Odor None       **Informed Consent**    The patient has given verbal consent to have photos taken of wound and inserted into their chart as part of their permanent medical record for purposes of documentation, treatment management and/or medical review.   All Images taken on 1/12/24 of patient name: Lana MARGARITA Phillips were transmitted and stored on secured Epic  Site located within Media Folder Tab by a registered [employer] Mobile Application Device.        Impression:  Left 2nd toe; Arterial    Plan: Aquaphor,, non-adherent, 2x2, kerlix  Patient will need continued preventative care.      Sara Martinez  RN 1/12/2024 2:15 PM

## 2024-01-12 NOTE — PROGRESS NOTES
[facility]    Subjective:  pt alert conversive breathing better      Current Facility-Administered Medications:     methylPREDNISolone sodium succ (SOLU-MEDROL) 40 mg in sterile water 1 mL injection, 40 mg, IntraVENous, Q8H, Baldemar Jarrett DO, 40 mg at 01/12/24 0014    white petrolatum ointment, , Topical, BID PRN, Baldemar Jarrett DO    guaiFENesin-dextromethorphan (ROBITUSSIN DM) 100-10 MG/5ML syrup 10 mL, 10 mL, Oral, Q6H PRN, Baldemar Jarrett DO, 10 mL at 01/11/24 2048    apixaban (ELIQUIS) tablet 5 mg, 5 mg, Oral, BID, Baldemar Jarrett DO, 5 mg at 01/11/24 2048    aspirin EC tablet 81 mg, 81 mg, Oral, Daily, Baldemar Jarrett DO, 81 mg at 01/11/24 0904    atorvastatin (LIPITOR) tablet 40 mg, 40 mg, Oral, Nightly, Baldemar Jarrett DO, 40 mg at 01/11/24 2048    dilTIAZem (CARDIZEM CD) extended release capsule 120 mg, 120 mg, Oral, Daily, Baldemar Jarrett DO, 120 mg at 01/11/24 0903    ipratropium 0.5 mg-albuterol 2.5 mg (DUONEB) nebulizer solution 1 Dose, 1 Dose, Inhalation, Q4H WA RT, Baldemar Jarrett DO, 1 Dose at 01/11/24 2026    methIMAzole (TAPAZOLE) tablet 15 mg, 15 mg, Oral, Daily, Baldemar Jarrett DO, 15 mg at 01/11/24 0903    pantoprazole (PROTONIX) tablet 40 mg, 40 mg, Oral, QAM AC, Baldemar Jarrett DO, 40 mg at 01/12/24 0623    PARoxetine (PAXIL) tablet 10 mg, 10 mg, Oral, QAKolby VU Michael M, , 10 mg at 01/11/24 0904    traZODone (DESYREL) tablet 50 mg, 50 mg, Oral, Nightly PRN, Baldemar Jarrett DO, 50 mg at 01/11/24 2048    sodium chloride flush 0.9 % injection 5-40 mL, 5-40 mL, IntraVENous, 2 times per day, Baldemar Jarrett, , 10 mL at 01/11/24 2048    sodium chloride flush 0.9 % injection 5-40 mL, 5-40 mL, IntraVENous, PRN, Baldemar Jarrett,     0.9 % sodium chloride infusion, , IntraVENous, PRN, Baldemar Jarrett, DO    potassium chloride (KLOR-CON M) extended release tablet 40 mEq, 40 mEq, Oral, PRN **OR** potassium bicarb-citric acid (EFFER-K)

## 2024-01-12 NOTE — CARE COORDINATION
Transition of care update: IV solumedrol 40mg q 8 hrs x 5 doses. O2 at 3l/nc. Met with pt in room. Plans remains home with with her sister in law when medically ready. Denies any needs for home. Declined HHC. Pt said she has home o2 at 3l/nc from [facility]. Family to transport. Cm/sw will follow.

## 2024-01-12 NOTE — PROGRESS NOTES
[facility]  [address], [address]  [place] [address]      Pulmonary Progress Note      SUBJECTIVE:  claims doing a bit better with breathing (?) close to baseline, similar with cough with not much sputum production. Ambulating in room on 3 li NC, not tolerating EZPAP    OBJECTIVE    Medications    Continuous Infusions:   sodium chloride         Scheduled Meds:   methylPREDNISolone sodium succ (SOLU-MEDROL) 40 mg in sterile water 1 mL injection  40 mg IntraVENous Q8H    apixaban  5 mg Oral BID    aspirin  81 mg Oral Daily    atorvastatin  40 mg Oral Nightly    dilTIAZem  120 mg Oral Daily    ipratropium 0.5 mg-albuterol 2.5 mg  1 Dose Inhalation Q4H WA RT    methIMAzole  15 mg Oral Daily    pantoprazole  40 mg Oral QAM AC    PARoxetine  10 mg Oral QAM    sodium chloride flush  5-40 mL IntraVENous 2 times per day    pregabalin  50 mg Oral TID    busPIRone  10 mg Oral TID    budesonide  0.5 mg Nebulization BID RT    arformoterol tartrate  15 mcg Nebulization BID RT       PRN Meds:white petrolatum, guaiFENesin-dextromethorphan, traZODone, sodium chloride flush, sodium chloride, potassium chloride **OR** potassium alternative oral replacement **OR** potassium chloride, magnesium sulfate, ondansetron **OR** ondansetron, polyethylene glycol, acetaminophen **OR** acetaminophen    Physical    VITALS:  /62   Pulse 71   Temp 98.2 °F (36.8 °C) (Temporal)   Resp 17   Ht 1.727 m (5' 7.99\")   Wt 42.8 kg (94 lb 6.4 oz)   SpO2 96%   BMI 14.36 kg/m²     24HR INTAKE/OUTPUT:      Intake/Output Summary (Last 24 hours) at 2024 1420  Last data filed at 2024 1121  Gross per 24 hour   Intake 1080 ml   Output --   Net 1080 ml         24HR PULSE OXIMETRY RANGE:    SpO2  Av %  Min: 94 %  Max: 100 %    General appearance: alert, appears stated age, and cooperative  Lungs: diminished breath sounds bilaterally and rhonchi bilaterally  Heart:

## 2024-01-12 NOTE — PATIENT CARE CONFERENCE
P Quality Flow/Interdisciplinary Rounds Progress Note        Quality Flow Rounds held on January 12, 2024    Disciplines Attending:  Bedside Nurse, , , and Nursing Unit Leadership    Lana Phillips was admitted on 1/8/2024  4:17 PM    Anticipated Discharge Date:       Disposition:    Gyu Score:  Guy Scale Score: 19    Readmission Risk              Risk of Unplanned Readmission:  20           Discussed patient goal for the day, patient clinical progression, and barriers to discharge.  The following Goal(s) of the Day/Commitment(s) have been identified:  Labs - Report Results      Yuki Miranda RN  January 12, 2024

## 2024-01-12 NOTE — PROGRESS NOTES
Dr. Jarrett notified patient was in and out of junctional/sinus this a.m. but sinus at this time (rate 80's). Patient denies any pain.

## 2024-01-12 NOTE — PLAN OF CARE
Problem: Chronic Conditions and Co-morbidities  Goal: Patient's chronic conditions and co-morbidity symptoms are monitored and maintained or improved  1/11/2024 2302 by Jane Parada RN  Outcome: Progressing     Problem: Discharge Planning  Goal: Discharge to home or other facility with appropriate resources  1/11/2024 2302 by Jane Parada RN  Outcome: Progressing     Problem: Pain  Goal: Verbalizes/displays adequate comfort level or baseline comfort level  1/11/2024 2302 by Jane Parada RN  Outcome: Progressing     Problem: Skin/Tissue Integrity  Goal: Absence of new skin breakdown  Description: 1.  Monitor for areas of redness and/or skin breakdown  2.  Assess vascular access sites hourly  3.  Every 4-6 hours minimum:  Change oxygen saturation probe site  4.  Every 4-6 hours:  If on nasal continuous positive airway pressure, respiratory therapy assess nares and determine need for appliance change or resting period.  1/11/2024 2302 by Jane Parada RN  Outcome: Progressing     Problem: ABCDS Injury Assessment  Goal: Absence of physical injury  Outcome: Progressing     Problem: Safety - Adult  Goal: Free from fall injury  Outcome: Progressing     Problem: Nutrition Deficit:  Goal: Optimize nutritional status  Outcome: Progressing

## 2024-01-13 VITALS
DIASTOLIC BLOOD PRESSURE: 67 MMHG | SYSTOLIC BLOOD PRESSURE: 146 MMHG | WEIGHT: 94.4 LBS | OXYGEN SATURATION: 95 % | RESPIRATION RATE: 22 BRPM | BODY MASS INDEX: 14.31 KG/M2 | TEMPERATURE: 97.8 F | HEIGHT: 68 IN | HEART RATE: 85 BPM

## 2024-01-13 PROCEDURE — 6370000000 HC RX 637 (ALT 250 FOR IP): Performed by: INTERNAL MEDICINE

## 2024-01-13 PROCEDURE — 94640 AIRWAY INHALATION TREATMENT: CPT

## 2024-01-13 PROCEDURE — 94660 CPAP INITIATION&MGMT: CPT

## 2024-01-13 PROCEDURE — 6360000002 HC RX W HCPCS: Performed by: INTERNAL MEDICINE

## 2024-01-13 PROCEDURE — 2700000000 HC OXYGEN THERAPY PER DAY

## 2024-01-13 RX ORDER — PREDNISONE 10 MG/1
TABLET ORAL
Qty: 30 TABLET | Refills: 0 | Status: SHIPPED | OUTPATIENT
Start: 2024-01-13

## 2024-01-13 RX ORDER — GUAIFENESIN/DEXTROMETHORPHAN 100-10MG/5
10 SYRUP ORAL EVERY 6 HOURS PRN
Qty: 240 ML | Refills: 0 | Status: SHIPPED | OUTPATIENT
Start: 2024-01-13 | End: 2024-01-23

## 2024-01-13 RX ADMIN — PANTOPRAZOLE SODIUM 40 MG: 40 TABLET, DELAYED RELEASE ORAL at 06:28

## 2024-01-13 RX ADMIN — ASPIRIN 81 MG: 81 TABLET, COATED ORAL at 13:53

## 2024-01-13 RX ADMIN — DILTIAZEM HYDROCHLORIDE 120 MG: 120 CAPSULE, COATED, EXTENDED RELEASE ORAL at 13:53

## 2024-01-13 RX ADMIN — ARFORMOTEROL TARTRATE 15 MCG: 15 SOLUTION RESPIRATORY (INHALATION) at 09:33

## 2024-01-13 RX ADMIN — PREGABALIN 50 MG: 50 CAPSULE ORAL at 13:55

## 2024-01-13 RX ADMIN — BUDESONIDE 500 MCG: 0.5 SUSPENSION RESPIRATORY (INHALATION) at 09:32

## 2024-01-13 RX ADMIN — APIXABAN 5 MG: 5 TABLET, FILM COATED ORAL at 13:52

## 2024-01-13 RX ADMIN — PREDNISONE 40 MG: 20 TABLET ORAL at 13:52

## 2024-01-13 RX ADMIN — ACETAMINOPHEN 650 MG: 325 TABLET ORAL at 13:58

## 2024-01-13 RX ADMIN — IPRATROPIUM BROMIDE AND ALBUTEROL SULFATE 1 DOSE: 2.5; .5 SOLUTION RESPIRATORY (INHALATION) at 13:01

## 2024-01-13 RX ADMIN — BUSPIRONE HYDROCHLORIDE 10 MG: 10 TABLET ORAL at 13:52

## 2024-01-13 RX ADMIN — PAROXETINE 10 MG: 10 TABLET, FILM COATED ORAL at 13:55

## 2024-01-13 RX ADMIN — IPRATROPIUM BROMIDE AND ALBUTEROL SULFATE 1 DOSE: 2.5; .5 SOLUTION RESPIRATORY (INHALATION) at 09:33

## 2024-01-13 RX ADMIN — PREGABALIN 50 MG: 50 CAPSULE ORAL at 13:51

## 2024-01-13 ASSESSMENT — PAIN SCALES - GENERAL: PAINLEVEL_OUTOF10: 4

## 2024-01-13 ASSESSMENT — PAIN DESCRIPTION - DESCRIPTORS: DESCRIPTORS: ACHING

## 2024-01-13 ASSESSMENT — PAIN DESCRIPTION - LOCATION: LOCATION: CHEST

## 2024-01-13 NOTE — PROGRESS NOTES
[facility]    Subjective:  pt alert conversive breathing better      Current Facility-Administered Medications:     white petrolatum ointment, , Topical, Daily, Baldemar Jarrett DO, Given at 01/12/24 1432    predniSONE (DELTASONE) tablet 40 mg, 40 mg, Oral, Daily, Jose Price MD    guaiFENesin-dextromethorphan (ROBITUSSIN DM) 100-10 MG/5ML syrup 10 mL, 10 mL, Oral, Q6H PRN, Baldemar Jarrett DO, 10 mL at 01/12/24 1902    apixaban (ELIQUIS) tablet 5 mg, 5 mg, Oral, BID, Baldemar Jarrett DO, 5 mg at 01/12/24 2104    aspirin EC tablet 81 mg, 81 mg, Oral, Daily, Baldemar Jarrett DO, 81 mg at 01/12/24 0909    atorvastatin (LIPITOR) tablet 40 mg, 40 mg, Oral, Nightly, Baldemar Jarrett DO, 40 mg at 01/12/24 2104    dilTIAZem (CARDIZEM CD) extended release capsule 120 mg, 120 mg, Oral, Daily, Baldemar Jarrett DO, 120 mg at 01/12/24 0909    ipratropium 0.5 mg-albuterol 2.5 mg (DUONEB) nebulizer solution 1 Dose, 1 Dose, Inhalation, Q4H WA RT, Baldemar Jarrett DO, 1 Dose at 01/13/24 0933    methIMAzole (TAPAZOLE) tablet 15 mg, 15 mg, Oral, Daily, Baldemar Jarrett DO, 15 mg at 01/12/24 0909    pantoprazole (PROTONIX) tablet 40 mg, 40 mg, Oral, QAM AC, Baldemar Jarrett DO, 40 mg at 01/13/24 0628    PARoxetine (PAXIL) tablet 10 mg, 10 mg, Oral, QAKolby VU Michael M, DO, 10 mg at 01/12/24 0909    traZODone (DESYREL) tablet 50 mg, 50 mg, Oral, Nightly PRN, Baldemar Jarrett DO, 50 mg at 01/12/24 2106    sodium chloride flush 0.9 % injection 5-40 mL, 5-40 mL, IntraVENous, 2 times per day, Baldemar Jarrett, , 10 mL at 01/12/24 2110    sodium chloride flush 0.9 % injection 5-40 mL, 5-40 mL, IntraVENous, PRN, Baldemar Jarrett, , 10 mL at 01/12/24 1759    0.9 % sodium chloride infusion, , IntraVENous, PRN, Baldemar Jarrett, DO    potassium chloride (KLOR-CON M) extended release tablet 40 mEq, 40 mEq, Oral, PRN **OR** potassium bicarb-citric acid (EFFER-K) effervescent tablet 40 mEq, 40 mEq, Oral,  PRN **OR** potassium chloride 10 mEq/100 mL IVPB (Peripheral Line), 10 mEq, IntraVENous, PRN, Baldemar Jarrett,     magnesium sulfate 2000 mg in 50 mL IVPB premix, 2,000 mg, IntraVENous, PRN, Baldemar Jarrett DO    ondansetron (ZOFRAN-ODT) disintegrating tablet 4 mg, 4 mg, Oral, Q8H PRN **OR** ondansetron (ZOFRAN) injection 4 mg, 4 mg, IntraVENous, Q6H PRN, Baldemar Jarrett, , 4 mg at 01/11/24 2056    polyethylene glycol (GLYCOLAX) packet 17 g, 17 g, Oral, Daily PRN, Baldemar Jarrett DO, 17 g at 01/12/24 1117    acetaminophen (TYLENOL) tablet 650 mg, 650 mg, Oral, Q6H PRN, 650 mg at 01/12/24 1925 **OR** acetaminophen (TYLENOL) suppository 650 mg, 650 mg, Rectal, Q6H PRN, Baldemar Jarrett DO    pregabalin (LYRICA) capsule 50 mg, 50 mg, Oral, TID, Baldemar Jarrett, , 50 mg at 01/12/24 2104    busPIRone (BUSPAR) tablet 10 mg, 10 mg, Oral, TID, Baldemar Jarrett DO, 10 mg at 01/12/24 2104    budesonide (PULMICORT) nebulizer suspension 500 mcg, 0.5 mg, Nebulization, BID RT, Jose Price MD, 500 mcg at 01/13/24 0932    arformoterol tartrate (BROVANA) nebulizer solution 15 mcg, 15 mcg, Nebulization, BID RT, Jose Price MD, 15 mcg at 01/13/24 0933    Objective:    BP (!) 140/67   Pulse 74   Temp 98.6 °F (37 °C) (Temporal)   Resp 18   Ht 1.727 m (5' 7.99\")   Wt 42.8 kg (94 lb 6.4 oz)   SpO2 99%   BMI 14.36 kg/m²     Heart:  reg  Lungs:  min rhonchi  Abd: + bs soft nontender  Extrem:  w/o edema    CBC with Differential:    Lab Results   Component Value Date/Time    WBC 8.6 01/10/2024 04:33 AM    RBC 3.11 01/10/2024 04:33 AM    HGB 9.2 01/10/2024 04:33 AM    HCT 30.2 01/10/2024 04:33 AM     01/10/2024 04:33 AM    MCV 97.1 01/10/2024 04:33 AM    MCH 29.6 01/10/2024 04:33 AM    MCHC 30.5 01/10/2024 04:33 AM    RDW 14.6 01/10/2024 04:33 AM    LYMPHOPCT 12 01/08/2024 04:43 PM    MONOPCT 6 01/08/2024 04:43 PM    BASOPCT 1 01/08/2024 04:43 PM    MONOSABS 0.70 01/08/2024 04:43 PM    LYMPHSABS 1.44

## 2024-01-13 NOTE — PLAN OF CARE
Problem: Chronic Conditions and Co-morbidities  Goal: Patient's chronic conditions and co-morbidity symptoms are monitored and maintained or improved  Outcome: Progressing     Problem: Discharge Planning  Goal: Discharge to home or other facility with appropriate resources  Outcome: Progressing     Problem: Pain  Goal: Verbalizes/displays adequate comfort level or baseline comfort level  Outcome: Progressing  Flowsheets  Taken 1/12/2024 2330  Verbalizes/displays adequate comfort level or baseline comfort level: Encourage patient to monitor pain and request assistance  Taken 1/12/2024 1920  Verbalizes/displays adequate comfort level or baseline comfort level: Encourage patient to monitor pain and request assistance     Problem: ABCDS Injury Assessment  Goal: Absence of physical injury  Outcome: Progressing

## 2024-01-13 NOTE — PATIENT CARE CONFERENCE
[facility] Quality Flow/Interdisciplinary Rounds Progress Note        Quality Flow Rounds held on January 13, 2024    Disciplines Attending:  Bedside Nurse, , , and Nursing Unit Leadership    Lana Phillips was admitted on 1/8/2024  4:17 PM    Anticipated Discharge Date:       Disposition:    Guy Score:  Guy Scale Score: 20    Readmission Risk              Risk of Unplanned Readmission:  19           Discussed patient goal for the day, patient clinical progression, and barriers to discharge.  The following Goal(s) of the Day/Commitment(s) have been identified:  downgrade/discharge planning       Ashlyn Simon RN  January 13, 2024

## 2024-01-13 NOTE — PROGRESS NOTES
Discharged to home goals met. Discharge instructions given verbalize an understanding. Monitor removed and placed in nurses station.

## 2024-01-13 NOTE — PROGRESS NOTES
Pulmonary Progress Note    Admit Date: 2024  Hospital day                               PCP: Clyde Gonzalez DO    Chief Complaint (s):  Patient Active Problem List   Diagnosis    Primary hypertension    COPD (chronic obstructive pulmonary disease) (HCC)    Gastroesophageal reflux disease    Constipation    Unexplained weight loss    Protein deficiency (HCC)    Diet-controlled diabetes mellitus (HCC)    Multiple nodules of lung    Abnormal EKG    Mixed hyperlipidemia    Hyperthyroidism    Vitamin B12 deficiency (dietary) anemia    Osteoporosis    Elevated troponin level not due to acute coronary syndrome    Tachycardia, unspecified    Pneumonia due to organism    Atrial fibrillation (HCC)    Hypoxia    Severe protein-calorie malnutrition (HCC)    Anxiety    Numbness and tingling of both lower extremities    GI bleed    Acute on chronic anemia    Symptomatic anemia    Chronic pain syndrome [G89.4]    Chronic bilateral low back pain with bilateral sciatica    Lumbar radiculopathy    Lumbar disc disorder    COPD with acute exacerbation (HCC)    Critical limb ischemia of left lower extremity with rest pain (HCC)    Secondary hypercoagulable state (HCC)    Acute respiratory failure with hypoxia (HCC)    Atherosclerosis of artery of extremity with rest pain (HCC)       Subjective:  Patient seen in bed on 3 L nasal cannula. She has no breathing complaints. States she has nebulizer and oxygen at home.       Vitals:  VITALS:  BP (!) 140/67   Pulse 74   Temp 98.6 °F (37 °C) (Temporal)   Resp 18   Ht 1.727 m (5' 7.99\")   Wt 42.8 kg (94 lb 6.4 oz)   SpO2 99%   BMI 14.36 kg/m²     24HR INTAKE/OUTPUT:      Intake/Output Summary (Last 24 hours) at 2024 1021  Last data filed at 2024 0625  Gross per 24 hour   Intake 1080 ml   Output --   Net 1080 ml       24HR PULSE OXIMETRY RANGE:    SpO2  Av.5 %  Min: 92 %  Max: 100 %    Medications:  IV:   sodium chloride         Scheduled Meds:

## 2024-01-15 ENCOUNTER — TELEPHONE (OUTPATIENT)
Dept: ADMINISTRATIVE | Age: 69
End: 2024-01-15

## 2024-01-15 ENCOUNTER — CARE COORDINATION (OUTPATIENT)
Dept: CARE COORDINATION | Age: 69
End: 2024-01-15

## 2024-01-15 RX ORDER — DILTIAZEM HYDROCHLORIDE 120 MG/1
120 CAPSULE, COATED, EXTENDED RELEASE ORAL DAILY
Qty: 30 CAPSULE | Refills: 5 | Status: SHIPPED | OUTPATIENT
Start: 2024-01-15

## 2024-01-15 RX ORDER — DILTIAZEM HYDROCHLORIDE 120 MG/1
120 CAPSULE, COATED, EXTENDED RELEASE ORAL DAILY
Qty: 30 CAPSULE | Refills: 5 | Status: CANCELLED | OUTPATIENT
Start: 2024-01-15

## 2024-01-15 NOTE — CARE COORDINATION
Care Transitions Initial Follow Up Call    Call within 2 business days of discharge: Yes    Patient Current Location:  Home: [address] [place] [id number]    Care Transition Nurse contacted the patient by telephone to perform post hospital discharge assessment. Verified name and  with patient as identifiers. Provided introduction to self, and explanation of the Care Transition Nurse role.     Patient: Lana Phillips Patient : 1955   MRN: [id number]  Reason for Admission: acute respiratory failure with hypoxia   Discharge Date: 24 RARS: Readmission Risk Score: 22.5    Last Discharge Facility       Date Complaint Diagnosis Description Type Department Provider    24 Shortness of Breath; Chest Pain Acute respiratory failure with hypoxia (HCC) ... ED to Hosp-Admission (Discharged) (ADMITTED) CEDRIC CRAIN [facility] Baldemar Jarrett DO; Charmaine Early...   Spoke with Lana for initial care transition call post hospital discharge. She reports feeling \"so much better\" today. She feels her breathing is at baseline and she is wearing her baseline of 3L O2 at all times. Her pulse ox readings have been 95-98% since discharge. Lana reports that she is able to perform her normal activities of daily living.   She kindly declined full med review at this time and confirmed she plans to  the newly prescribed medications today from the pharmacy.   Lana denies any needs, questions, or concerns at this time.      Care Transition Nurse reviewed discharge instructions, medical action plan, and red flags with patient who verbalized understanding. The patient was given an opportunity to ask questions and does not have any further questions or concerns at this time. Were discharge instructions available to patient? Yes. Reviewed appropriate site of care based on symptoms and resources available to patient including: PCP  Specialist. The patient agrees to contact the PCP office for questions related to their healthcare.

## 2024-01-17 ENCOUNTER — HOSPITAL ENCOUNTER (OUTPATIENT)
Dept: INFUSION THERAPY | Age: 69
Discharge: HOME | End: 2024-01-17
Payer: MEDICARE

## 2024-01-17 ENCOUNTER — OFFICE VISIT (OUTPATIENT)
Dept: PRIMARY CARE CLINIC | Age: 69
End: 2024-01-17

## 2024-01-17 DIAGNOSIS — I70.222 CRITICAL LIMB ISCHEMIA OF LEFT LOWER EXTREMITY WITH REST PAIN (HCC): Chronic | ICD-10-CM

## 2024-01-17 DIAGNOSIS — D64.9 ACUTE ON CHRONIC ANEMIA: ICD-10-CM

## 2024-01-17 DIAGNOSIS — Z12.31 OTHER SCREENING MAMMOGRAM: ICD-10-CM

## 2024-01-17 DIAGNOSIS — J44.9 COPD WITHOUT EXACERBATION (HCC): ICD-10-CM

## 2024-01-17 DIAGNOSIS — Z09 HOSPITAL DISCHARGE FOLLOW-UP: Primary | ICD-10-CM

## 2024-01-17 LAB
ALBUMIN SERPL-MCNC: 3.9 G/DL (ref 3.5–5.2)
ALP SERPL-CCNC: 62 U/L (ref 35–104)
ALT SERPL-CCNC: 23 U/L (ref 0–32)
ANION GAP SERPL CALCULATED.3IONS-SCNC: 7 MMOL/L (ref 7–16)
AST SERPL-CCNC: 42 U/L (ref 0–31)
BASOPHILS # BLD: 0.01 K/UL (ref 0–0.2)
BASOPHILS NFR BLD: 0 % (ref 0–2)
BILIRUB SERPL-MCNC: 0.2 MG/DL (ref 0–1.2)
BUN SERPL-MCNC: 15 MG/DL (ref 6–23)
CALCIUM SERPL-MCNC: 9.4 MG/DL (ref 8.6–10.2)
CHLORIDE SERPL-SCNC: 95 MMOL/L (ref 98–107)
CO2 SERPL-SCNC: 37 MMOL/L (ref 22–29)
CREAT SERPL-MCNC: 0.6 MG/DL (ref 0.5–1)
EOSINOPHIL # BLD: 0.11 K/UL (ref 0.05–0.5)
EOSINOPHILS RELATIVE PERCENT: 1 % (ref 0–6)
ERYTHROCYTE [DISTWIDTH] IN BLOOD BY AUTOMATED COUNT: 14.6 % (ref 11.5–15)
FERRITIN SERPL-MCNC: 25 NG/ML
GFR SERPL CREATININE-BSD FRML MDRD: >60 ML/MIN/1.73M2
GLUCOSE SERPL-MCNC: 82 MG/DL (ref 74–99)
HCT VFR BLD AUTO: 32.1 % (ref 34–48)
HGB BLD-MCNC: 9.6 G/DL (ref 11.5–15.5)
IMM GRANULOCYTES # BLD AUTO: 0.06 K/UL (ref 0–0.58)
IMM GRANULOCYTES NFR BLD: 1 % (ref 0–5)
IRON SATN MFR SERPL: 16 % (ref 15–50)
IRON SERPL-MCNC: 63 UG/DL (ref 37–145)
LYMPHOCYTES NFR BLD: 1.49 K/UL (ref 1.5–4)
LYMPHOCYTES RELATIVE PERCENT: 12 % (ref 20–42)
MCH RBC QN AUTO: 29.4 PG (ref 26–35)
MCHC RBC AUTO-ENTMCNC: 29.9 G/DL (ref 32–34.5)
MCV RBC AUTO: 98.2 FL (ref 80–99.9)
MONOCYTES NFR BLD: 0.8 K/UL (ref 0.1–0.95)
MONOCYTES NFR BLD: 7 % (ref 2–12)
NEUTROPHILS NFR BLD: 80 % (ref 43–80)
NEUTS SEG NFR BLD: 9.72 K/UL (ref 1.8–7.3)
PLATELET # BLD AUTO: 336 K/UL (ref 130–450)
PMV BLD AUTO: 9 FL (ref 7–12)
POTASSIUM SERPL-SCNC: 3.6 MMOL/L (ref 3.5–5)
PROT SERPL-MCNC: 6.6 G/DL (ref 6.4–8.3)
RBC # BLD AUTO: 3.27 M/UL (ref 3.5–5.5)
SODIUM SERPL-SCNC: 139 MMOL/L (ref 132–146)
TIBC SERPL-MCNC: 382 UG/DL (ref 250–450)
WBC OTHER # BLD: 12.2 K/UL (ref 4.5–11.5)

## 2024-01-17 PROCEDURE — 85025 COMPLETE CBC W/AUTO DIFF WBC: CPT

## 2024-01-17 PROCEDURE — 80053 COMPREHEN METABOLIC PANEL: CPT

## 2024-01-17 PROCEDURE — 83540 ASSAY OF IRON: CPT

## 2024-01-17 PROCEDURE — 36415 COLL VENOUS BLD VENIPUNCTURE: CPT

## 2024-01-17 PROCEDURE — 83550 IRON BINDING TEST: CPT

## 2024-01-17 PROCEDURE — 82728 ASSAY OF FERRITIN: CPT

## 2024-01-17 NOTE — PROGRESS NOTES
Post-Discharge Transitional Care  Follow Up      Lana Phillips   YOB: 1955    Date of Office Visit:  1/17/2024  Date of Hospital Admission: 1/8/24  Date of Hospital Discharge: 1/13/24  Risk of hospital readmission (high >=14%. Medium >=10%) :Readmission Risk Score: 22.5      Care management risk score Rising risk (score 2-5) and Complex Care (Scores >=6): No Risk Score On File     Non face to face  following discharge, date last encounter closed (first attempt may have been earlier): 01/15/2024    Call initiated 2 business days of discharge: Yes    ASSESSMENT/PLAN:   Hospital discharge follow-up  -     TX DISCHARGE MEDS RECONCILED W/ CURRENT OUTPATIENT MED LIST  Other screening mammogram  -     SITA SHADI DIGITAL SCREEN BILATERAL; Future  COPD without exacerbation (HCC)      Medical Decision Making: high complexity  Return for Reg Appt.    On this date 1/17/2024 I have spent 45 minutes reviewing previous notes, test results and face to face with the patient discussing the diagnosis and importance of compliance with the treatment plan as well as documenting on the day of the visit.       Subjective:   HPI:  Follow up of Hospital problems/diagnosis(es): copd    Inpatient course: Discharge summary reviewed- see chart.    Interval history/Current status: Patient presents for hospital follow-up with exacerbation of COPD she is doing much better.  No chest pain or shortness of breath.  She is scheduled for peripheral vascular surgery in a couple days.  She to follow-up with pulmonary    Patient Active Problem List   Diagnosis    Primary hypertension    COPD (chronic obstructive pulmonary disease) (HCC)    Gastroesophageal reflux disease    Constipation    Unexplained weight loss    Protein deficiency (HCC)    Diet-controlled diabetes mellitus (HCC)    Multiple nodules of lung    Abnormal EKG    Mixed hyperlipidemia    Hyperthyroidism    Vitamin B12 deficiency (dietary) anemia    Osteoporosis    Elevated

## 2024-01-18 VITALS
WEIGHT: 95.2 LBS | RESPIRATION RATE: 17 BRPM | TEMPERATURE: 97.6 F | BODY MASS INDEX: 14.48 KG/M2 | SYSTOLIC BLOOD PRESSURE: 134 MMHG | DIASTOLIC BLOOD PRESSURE: 78 MMHG

## 2024-01-18 PROBLEM — D68.69 SECONDARY HYPERCOAGULABLE STATE (HCC): Status: RESOLVED | Noted: 2024-01-05 | Resolved: 2024-01-18

## 2024-01-18 ASSESSMENT — PATIENT HEALTH QUESTIONNAIRE - PHQ9
SUM OF ALL RESPONSES TO PHQ QUESTIONS 1-9: 0
SUM OF ALL RESPONSES TO PHQ9 QUESTIONS 1 & 2: 0
SUM OF ALL RESPONSES TO PHQ QUESTIONS 1-9: 0
2. FEELING DOWN, DEPRESSED OR HOPELESS: 0
1. LITTLE INTEREST OR PLEASURE IN DOING THINGS: 0

## 2024-01-18 NOTE — H&P
Vascular Surgery History & Physical Exam    Chief Complaint: Peripheral vascular disease, L LE rest pain    HISTORY OF PRESENT ILLNESS:    The patient is a 68 y.o. female who presents to the hospital for elective arteriogram with possible intervention.  The patient has a history of peripheral vascular disease and L LE rest pain.      ROS : All others Negative if blank [], Positive if [x]  General   [] Fevers   [] Chills   [] Weight Loss   Skin   [] Tissue Loss   Eyes   [] Wears Glasses/Contacts   [] Vision Changes   Respiratory    [] Shortness of breath   Cardiovascular   [] Chest Pain   [] Shortness of breath with exertion   Gastrointestinal   [] Abdominal Pain     Past Medical History:   Diagnosis Date    Acute exacerbation of chronic obstructive pulmonary disease (COPD) ([facility]) 07/14/2022    Acute respiratory failure ([facility]) 11/27/2022    Acute respiratory failure with hypoxia ([facility]) 06/27/2022    Acute respiratory failure with hypoxia ([facility]) 11/27/2022    Atrial fibrillation ([facility])     Chronic obstructive pulmonary disease ([facility]) 12/15/2022    COPD (chronic obstructive pulmonary disease) ([facility])     COPD exacerbation ([facility]) 07/14/2022    COVID-19 07/16/2022    Critical limb ischemia of left lower extremity with rest pain ([facility]) 12/25/2023    Hypertension     Pneumonia due to infectious organism     Thyroid disease     Uncontrolled hypertension      Past Surgical History:   Procedure Laterality Date    BACK SURGERY      x2    LEFT OOPHORECTOMY      PAIN MANAGEMENT PROCEDURE N/A 12/28/2023    CAUDAL EPIDURAL STEROID INJECTION performed by Lilia Cabrera DO at [facility] OR    UPPER GASTROINTESTINAL ENDOSCOPY N/A 10/16/2023    EGD ESOPHAGOGASTRODUODENOSCOPY performed by Willie Chow DO at [facility] ENDOSCOPY     Current Medications:   No current facility-administered medications for this encounter.    Current Outpatient Medications:     dilTIAZem (CARDIZEM CD) 120 MG extended release capsule, Take 1 capsule by mouth daily, Disp: 30  refused to go    Pt evicted from apartment-----failed to see specialist    Admit 2022 with exacerbation of COPD,   stress test was positive atrial fibrillation started Gurinder Cody     then on  with influenza A in  for constipation    Will discharge again 1218 with right lower lobe pneumonia COPD    Pulmonary evaluation Dr. Figueroa 2023    Vit b  12  defic      Brother        69 yrs old cva--pt   now von live with his ex------last name aj---his wife Lana---- is sister with  Silvina Leyva my pt    Colon     [facility]----    Anemia     refer to hem    Numb feet      sicne --ordered  emg    Admit wit gi bleed   10-23   w ork up with dr longoria    camera swallow    Anemia-----------------------------------------------------dr Christopher daven[place]    Admit   with exac copd and gerardo vas dis    Arteriogram     dr BUTLER-----surgery       Admit    COPD flare     Social Determinants of Health     Financial Resource Strain: Low Risk  (2023)    Overall Financial Resource Strain (CARDIA)     Difficulty of Paying Living Expenses: Not hard at all   Food Insecurity: No Food Insecurity (2024)    Hunger Vital Sign     Worried About Running Out of Food in the Last Year: Never true     Ran Out of Food in the Last Year: Never true   Transportation Needs: No Transportation Needs (2024)    PRAPARE - Transportation     Lack of Transportation (Medical): No     Lack of Transportation (Non-Medical): No   Physical Activity: Inactive (10/6/2023)    Exercise Vital Sign     Days of Exercise per Week: 0 days     Minutes of Exercise per Session: 0 min   Stress: Not on file   Social Connections: Not on file   Intimate Partner Violence: Not on file   Housing Stability: Low Risk  (2024)    Housing Stability Vital Sign     Unable to Pay for Housing in the Last Year: No     Number of Places Lived in the Last Year: 2     Unstable Housing in the

## 2024-01-19 ENCOUNTER — HOSPITAL ENCOUNTER (OUTPATIENT)
Age: 69
Setting detail: OBSERVATION
Discharge: HOME | End: 2024-01-20
Attending: SURGERY | Admitting: SURGERY
Payer: MEDICARE

## 2024-01-19 ENCOUNTER — CARE COORDINATION (OUTPATIENT)
Dept: CARE COORDINATION | Age: 69
End: 2024-01-19

## 2024-01-19 DIAGNOSIS — I73.9 PERIPHERAL VASCULAR DISEASE, UNSPECIFIED (HCC): ICD-10-CM

## 2024-01-19 PROBLEM — I70.245 ATHEROSCLEROSIS OF NATIVE ARTERIES OF LEFT LEG WITH ULCERATION OF OTHER PART OF FOOT (HCC): Chronic | Status: ACTIVE | Noted: 2024-01-19

## 2024-01-19 LAB
ABO + RH BLD: NORMAL
ARM BAND NUMBER: NORMAL
BLOOD BANK SAMPLE EXPIRATION: NORMAL
BLOOD GROUP ANTIBODIES SERPL: NEGATIVE

## 2024-01-19 PROCEDURE — 6360000002 HC RX W HCPCS: Performed by: SURGERY

## 2024-01-19 PROCEDURE — 6360000004 HC RX CONTRAST MEDICATION

## 2024-01-19 PROCEDURE — 86850 RBC ANTIBODY SCREEN: CPT

## 2024-01-19 PROCEDURE — 6370000000 HC RX 637 (ALT 250 FOR IP): Performed by: SURGERY

## 2024-01-19 PROCEDURE — 2709999900 HC NON-CHARGEABLE SUPPLY: Performed by: SURGERY

## 2024-01-19 PROCEDURE — C2623 CATH, TRANSLUMIN, DRUG-COAT: HCPCS | Performed by: SURGERY

## 2024-01-19 PROCEDURE — C1725 CATH, TRANSLUMIN NON-LASER: HCPCS | Performed by: SURGERY

## 2024-01-19 PROCEDURE — G0378 HOSPITAL OBSERVATION PER HR: HCPCS

## 2024-01-19 PROCEDURE — 2500000003 HC RX 250 WO HCPCS: Performed by: SURGERY

## 2024-01-19 PROCEDURE — 37225 HC FEM POP TERRITORY ATHERECTOMY: CPT | Performed by: SURGERY

## 2024-01-19 PROCEDURE — C1894 INTRO/SHEATH, NON-LASER: HCPCS | Performed by: SURGERY

## 2024-01-19 PROCEDURE — 2700000000 HC OXYGEN THERAPY PER DAY

## 2024-01-19 PROCEDURE — 94640 AIRWAY INHALATION TREATMENT: CPT

## 2024-01-19 PROCEDURE — C1714 CATH, TRANS ATHERECTOMY, DIR: HCPCS | Performed by: SURGERY

## 2024-01-19 PROCEDURE — 6370000000 HC RX 637 (ALT 250 FOR IP)

## 2024-01-19 PROCEDURE — C1884 EMBOLIZATION PROTECT SYST: HCPCS | Performed by: SURGERY

## 2024-01-19 PROCEDURE — 75710 ARTERY X-RAYS ARM/LEG: CPT | Performed by: SURGERY

## 2024-01-19 PROCEDURE — 75774 ARTERY X-RAY EACH VESSEL: CPT | Performed by: SURGERY

## 2024-01-19 PROCEDURE — C1769 GUIDE WIRE: HCPCS | Performed by: SURGERY

## 2024-01-19 PROCEDURE — C1760 CLOSURE DEV, VASC: HCPCS | Performed by: SURGERY

## 2024-01-19 PROCEDURE — 37228 HC TIB PER TERRITORY PLASTY: CPT | Performed by: SURGERY

## 2024-01-19 PROCEDURE — C1887 CATHETER, GUIDING: HCPCS | Performed by: SURGERY

## 2024-01-19 PROCEDURE — 86900 BLOOD TYPING SEROLOGIC ABO: CPT

## 2024-01-19 PROCEDURE — 86901 BLOOD TYPING SEROLOGIC RH(D): CPT

## 2024-01-19 PROCEDURE — 96374 THER/PROPH/DIAG INJ IV PUSH: CPT

## 2024-01-19 RX ORDER — BUSPIRONE HYDROCHLORIDE 10 MG/1
10 TABLET ORAL ONCE
Status: COMPLETED | OUTPATIENT
Start: 2024-01-19 | End: 2024-01-19

## 2024-01-19 RX ORDER — ATORVASTATIN CALCIUM 40 MG/1
40 TABLET, FILM COATED ORAL NIGHTLY
Status: DISCONTINUED | OUTPATIENT
Start: 2024-01-19 | End: 2024-01-20 | Stop reason: HOSPADM

## 2024-01-19 RX ORDER — CLOPIDOGREL BISULFATE 75 MG/1
TABLET ORAL PRN
Status: DISCONTINUED | OUTPATIENT
Start: 2024-01-19 | End: 2024-01-19 | Stop reason: HOSPADM

## 2024-01-19 RX ORDER — ALBUTEROL SULFATE 2.5 MG/3ML
2.5 SOLUTION RESPIRATORY (INHALATION) 4 TIMES DAILY PRN
Status: DISCONTINUED | OUTPATIENT
Start: 2024-01-19 | End: 2024-01-20 | Stop reason: HOSPADM

## 2024-01-19 RX ORDER — SODIUM CHLORIDE 9 MG/ML
INJECTION, SOLUTION INTRAVENOUS PRN
Status: DISCONTINUED | OUTPATIENT
Start: 2024-01-19 | End: 2024-01-19

## 2024-01-19 RX ORDER — ONDANSETRON 2 MG/ML
4 INJECTION INTRAMUSCULAR; INTRAVENOUS EVERY 6 HOURS PRN
Status: DISCONTINUED | OUTPATIENT
Start: 2024-01-19 | End: 2024-01-20 | Stop reason: HOSPADM

## 2024-01-19 RX ORDER — BISACODYL 10 MG
10 SUPPOSITORY, RECTAL RECTAL DAILY PRN
Status: DISCONTINUED | OUTPATIENT
Start: 2024-01-19 | End: 2024-01-20 | Stop reason: HOSPADM

## 2024-01-19 RX ORDER — PAROXETINE 10 MG/1
10 TABLET, FILM COATED ORAL EVERY MORNING
Status: DISCONTINUED | OUTPATIENT
Start: 2024-01-20 | End: 2024-01-20 | Stop reason: HOSPADM

## 2024-01-19 RX ORDER — PANTOPRAZOLE SODIUM 40 MG/1
40 TABLET, DELAYED RELEASE ORAL
Status: DISCONTINUED | OUTPATIENT
Start: 2024-01-20 | End: 2024-01-20 | Stop reason: HOSPADM

## 2024-01-19 RX ORDER — HYDRALAZINE HYDROCHLORIDE 20 MG/ML
10 INJECTION INTRAMUSCULAR; INTRAVENOUS
Status: DISCONTINUED | OUTPATIENT
Start: 2024-01-19 | End: 2024-01-20 | Stop reason: HOSPADM

## 2024-01-19 RX ORDER — BUSPIRONE HYDROCHLORIDE 10 MG/1
10 TABLET ORAL 3 TIMES DAILY
Status: DISCONTINUED | OUTPATIENT
Start: 2024-01-19 | End: 2024-01-20 | Stop reason: HOSPADM

## 2024-01-19 RX ORDER — ACETAMINOPHEN 325 MG/1
650 TABLET ORAL EVERY 4 HOURS PRN
Status: DISCONTINUED | OUTPATIENT
Start: 2024-01-19 | End: 2024-01-20 | Stop reason: HOSPADM

## 2024-01-19 RX ORDER — SODIUM CHLORIDE 9 MG/ML
INJECTION, SOLUTION INTRAVENOUS CONTINUOUS
Status: DISCONTINUED | OUTPATIENT
Start: 2024-01-19 | End: 2024-01-19

## 2024-01-19 RX ORDER — SODIUM CHLORIDE 0.9 % (FLUSH) 0.9 %
5-40 SYRINGE (ML) INJECTION PRN
Status: DISCONTINUED | OUTPATIENT
Start: 2024-01-19 | End: 2024-01-20 | Stop reason: HOSPADM

## 2024-01-19 RX ORDER — OXYCODONE HYDROCHLORIDE 5 MG/1
5 TABLET ORAL EVERY 4 HOURS PRN
Status: DISCONTINUED | OUTPATIENT
Start: 2024-01-19 | End: 2024-01-20 | Stop reason: HOSPADM

## 2024-01-19 RX ORDER — LEVOTHYROXINE SODIUM 0.03 MG/1
25 TABLET ORAL DAILY
Status: DISCONTINUED | OUTPATIENT
Start: 2024-01-19 | End: 2024-01-20 | Stop reason: HOSPADM

## 2024-01-19 RX ORDER — FENTANYL CITRATE 50 UG/ML
INJECTION, SOLUTION INTRAMUSCULAR; INTRAVENOUS PRN
Status: DISCONTINUED | OUTPATIENT
Start: 2024-01-19 | End: 2024-01-19 | Stop reason: HOSPADM

## 2024-01-19 RX ORDER — GUAIFENESIN/DEXTROMETHORPHAN 100-10MG/5
10 SYRUP ORAL EVERY 6 HOURS PRN
Status: DISCONTINUED | OUTPATIENT
Start: 2024-01-19 | End: 2024-01-20 | Stop reason: HOSPADM

## 2024-01-19 RX ORDER — MIDAZOLAM HYDROCHLORIDE 1 MG/ML
INJECTION INTRAMUSCULAR; INTRAVENOUS PRN
Status: DISCONTINUED | OUTPATIENT
Start: 2024-01-19 | End: 2024-01-19 | Stop reason: HOSPADM

## 2024-01-19 RX ORDER — LABETALOL HYDROCHLORIDE 5 MG/ML
10 INJECTION, SOLUTION INTRAVENOUS
Status: DISCONTINUED | OUTPATIENT
Start: 2024-01-19 | End: 2024-01-20 | Stop reason: HOSPADM

## 2024-01-19 RX ORDER — OXYCODONE HYDROCHLORIDE 5 MG/1
2.5 TABLET ORAL EVERY 4 HOURS PRN
Status: DISCONTINUED | OUTPATIENT
Start: 2024-01-19 | End: 2024-01-20 | Stop reason: HOSPADM

## 2024-01-19 RX ORDER — DILTIAZEM HYDROCHLORIDE 120 MG/1
120 CAPSULE, COATED, EXTENDED RELEASE ORAL DAILY
Status: DISCONTINUED | OUTPATIENT
Start: 2024-01-19 | End: 2024-01-20 | Stop reason: HOSPADM

## 2024-01-19 RX ORDER — HYDROXYZINE HYDROCHLORIDE 10 MG/1
10 TABLET, FILM COATED ORAL ONCE
Status: COMPLETED | OUTPATIENT
Start: 2024-01-19 | End: 2024-01-19

## 2024-01-19 RX ORDER — SODIUM CHLORIDE 0.9 % (FLUSH) 0.9 %
5-40 SYRINGE (ML) INJECTION EVERY 12 HOURS SCHEDULED
Status: DISCONTINUED | OUTPATIENT
Start: 2024-01-19 | End: 2024-01-19

## 2024-01-19 RX ORDER — METHIMAZOLE 5 MG/1
15 TABLET ORAL DAILY
Status: DISCONTINUED | OUTPATIENT
Start: 2024-01-19 | End: 2024-01-20 | Stop reason: HOSPADM

## 2024-01-19 RX ORDER — ASPIRIN 81 MG/1
81 TABLET ORAL DAILY
Status: DISCONTINUED | OUTPATIENT
Start: 2024-01-20 | End: 2024-01-20 | Stop reason: HOSPADM

## 2024-01-19 RX ORDER — HYDRALAZINE HYDROCHLORIDE 20 MG/ML
10 INJECTION INTRAMUSCULAR; INTRAVENOUS ONCE
Status: COMPLETED | OUTPATIENT
Start: 2024-01-19 | End: 2024-01-19

## 2024-01-19 RX ORDER — PREGABALIN 50 MG/1
50 CAPSULE ORAL 3 TIMES DAILY
Status: DISCONTINUED | OUTPATIENT
Start: 2024-01-19 | End: 2024-01-20 | Stop reason: HOSPADM

## 2024-01-19 RX ORDER — IPRATROPIUM BROMIDE AND ALBUTEROL SULFATE 2.5; .5 MG/3ML; MG/3ML
1 SOLUTION RESPIRATORY (INHALATION)
Status: COMPLETED | OUTPATIENT
Start: 2024-01-19 | End: 2024-01-19

## 2024-01-19 RX ORDER — TRAZODONE HYDROCHLORIDE 50 MG/1
50 TABLET ORAL NIGHTLY
Status: DISCONTINUED | OUTPATIENT
Start: 2024-01-19 | End: 2024-01-20 | Stop reason: HOSPADM

## 2024-01-19 RX ORDER — HEPARIN SODIUM 10000 [USP'U]/ML
INJECTION, SOLUTION INTRAVENOUS; SUBCUTANEOUS PRN
Status: DISCONTINUED | OUTPATIENT
Start: 2024-01-19 | End: 2024-01-19 | Stop reason: HOSPADM

## 2024-01-19 RX ADMIN — GUAIFENESIN SYRUP AND DEXTROMETHORPHAN 10 ML: 100; 10 SYRUP ORAL at 18:04

## 2024-01-19 RX ADMIN — METHIMAZOLE 15 MG: 5 TABLET ORAL at 18:04

## 2024-01-19 RX ADMIN — HYDROXYZINE HYDROCHLORIDE 10 MG: 10 TABLET, FILM COATED ORAL at 18:04

## 2024-01-19 RX ADMIN — TRAZODONE HYDROCHLORIDE 50 MG: 50 TABLET ORAL at 22:01

## 2024-01-19 RX ADMIN — BUSPIRONE HYDROCHLORIDE 10 MG: 10 TABLET ORAL at 14:43

## 2024-01-19 RX ADMIN — ATORVASTATIN CALCIUM 40 MG: 40 TABLET, FILM COATED ORAL at 22:00

## 2024-01-19 RX ADMIN — PREGABALIN 50 MG: 50 CAPSULE ORAL at 22:00

## 2024-01-19 RX ADMIN — IPRATROPIUM BROMIDE AND ALBUTEROL SULFATE 1 DOSE: 2.5; .5 SOLUTION RESPIRATORY (INHALATION) at 15:29

## 2024-01-19 RX ADMIN — DILTIAZEM HYDROCHLORIDE 120 MG: 120 CAPSULE, COATED, EXTENDED RELEASE ORAL at 18:04

## 2024-01-19 RX ADMIN — BUSPIRONE HYDROCHLORIDE 10 MG: 10 TABLET ORAL at 22:01

## 2024-01-19 RX ADMIN — PREGABALIN 50 MG: 50 CAPSULE ORAL at 16:43

## 2024-01-19 RX ADMIN — HYDRALAZINE HYDROCHLORIDE 10 MG: 20 INJECTION, SOLUTION INTRAMUSCULAR; INTRAVENOUS at 14:36

## 2024-01-19 ASSESSMENT — PAIN SCALES - GENERAL
PAINLEVEL_OUTOF10: 0
PAINLEVEL_OUTOF10: 0

## 2024-01-19 NOTE — CARE COORDINATION
Care Transitions Follow Up Call    Patient: Lana Phillips  Patient : 1955   MRN: [id number]  Reason for Admission:   Discharge Date: 24 RARS: Readmission Risk Score: 22.5    NO CALL MADE, PER CHART REVIEW PATIENT IS PRESENTLY AT [facility] FOR A SCHEDULED PROCEDURE.   DID ATTEND HFU 24.     Follow Up  Future Appointments   Date Time Provider Department Center   2024 10:00 AM Lilia Cabrera DO BDM PAIN MAR [facility]   2024  3:00 PM SEYZ ABDU SITA RM 2 SEYZ ABDU BC [facility] Rad/Car   2024 11:30 AM Clyde Gonzalez DO N LIMA PC [facility]   2024 10:30 AM Robert Delgado MD BDM ENDO [facility]   3/5/2024  1:15 PM SEYZ MED ONC FAST TRACK 1 SEYZ Med Onc [facility]   3/5/2024  1:30 PM Viridiana Potter MD MED ONC [facility]   3/6/2024 11:45 AM Robert Delgado MD BDM ENDO [facility]   3/12/2024 11:00 AM SEB INF CLINIC RM 1 SEBZ Inf Ctr [facility]   3/14/2024 11:45 AM Jose Price MD AFL PULM CC [facility]   3/18/2024 12:45 PM Clyde Gonzalez DO N LIMA PC [facility]   3/26/2024 11:00 AM Bautista Humphreys MD YTOWN CARDIO [facility]     Soumya Goldberg RN

## 2024-01-19 NOTE — OP NOTE
Cardiovascular Lab Procedure Report    Lana Phillips  1955    Date : 1/19/2024  Surgeon: Lazaro Vidales M.D.  Pre-procedure Diagnosis: L LE tissue loss  Post-procedure Diagnosis: Same  Procedure:      Right  common femoral artery access   with US guidance    8 fr angioseal used for closure    TF Left lower extremity angiogram   15554  70314 Angiogram with catheter in Left   common femoral, superficial femoral, popliteal artery   34301 Left SFA, Popliteal   atherectomy (Turbohawk - hawk one 9.6 cm 7 fr)    Left SFA, Popliteal angioplasty with   5 x 250 DCB impact  6x220 balloon     # 5 Spyder  used   54661 Left AT plasty with 3x220 baloon   Anesthesia: Local with IV sedation  Assistants: Cath Lab Staff  Estimated Blood Loss: Minimal  Complications: none  Findings: Abdominal aorta :   Left Left s/p Intervention   Common Femoral Art Patent    Superficial Femoral Art Multiple areas of occlusion    Profunda Femoral Art patent    AK Popliteal Art Occluded     BK Popliteal Art > 80% stenosis    Anterior Tibial Art Patent but diffusely diseased, with multiple > 80% stenoses flow stops at ankle and fills collaterals Patent to ankle, fills collaterals   Tibioperoneal Trunk > 50% stenosis > 50% stenosis   Peroneal Art Patent diseased but flow to ankle and into foot Patent, diseased but flow to ankle and into foot   Posterior Tibial Art occluded occluded     Procedure Details :  There was not previous CTA  or catheter based diagnostic imaging preformed prior to today's procedure.  Timeout preformed identifying pt and procedure. Groins prepped and draped in sterile fashion. Patient given sedation as needed throughout the case.     Right  common femoral artery was noted to be patent and was accessed under ultrasound guidance after infiltrating with local.  Micropuncture placed, exchanged out for 5 fr sheath.     Advantage glide wire and contra catheter advanced into distal aorta. Catheter and wire used to access Left  not limited to  Age  PVD  Calcified vessels  CKD  Diabetes    She was also having increased issues with agitation and oozing from right groin site.      Lazaro Vidales MD

## 2024-01-19 NOTE — DISCHARGE INSTRUCTIONS
Ok to resume eliquis tomorrow 1/20/24    Discharge Instructions for Lower extremity angiogram    Keep dressing on groin until tomorrow.  Remove tomorrow and replace with dry dressing daily until no longer draining.   Call Dr. Vidales's office [phone] with any questions.  Follow up in the wound care center in 2 weeks. [phone]    Groin Care  - keep clean and dry  - ok to shower or sponge bath  - ok to clean site with lukewarm water and mild soap  - use a soft wash cloth to gently wipe the incision area  - do not scrub the incision areas  - no swimming or baths    Home Care    Follow these guidelines after surgery:   Rest. Try to move as tolerated. A mix of rest and light activity improves healing.   The incision area may be sore for a few days. To minimize pain and soreness:   Take pain medicine as directed.   Avoid strenuous activity and heavy lifting.       Diet    You can return to your regular diet. You may work with a dietician who will help you follow a heart-healthy diet.   Physical Activity    You will feel sore after the surgery. Try to walk steadily within two weeks. You may be able to return to normal activities within 1-3 weeks. While recovering, you will need to avoid strenuous activities, like heavy lifting.   Ask your doctor when you will be able to return to work.    Do not drive unless your doctor has given you permission to do so.    Medications    Your doctor may recommend:   Over-the-counter or prescription pain medicine   Aspirin or a cholesterol-lowering drug to prevent complications   If you had to stop medicines before the procedure, ask your doctor when you can start again. Medicines that may have been stopped include:   Anti-inflammatory drugs (eg, aspirin, ibuprofen)   Blood thinners, like warfarin (Coumadin)   Clopidogrel (Plavix)   When taking medicines:   Take your medicine as directed. Do not change the amount or the schedule.   Do not stop taking them without talking to

## 2024-01-19 NOTE — PROGRESS NOTES
Pt had anxiety upon arrival to unit, which quickly enhanced. BP and HR elevated. Pt c/o constipation and was very restless, causing right groin to begin to ooze. Dr BUTLER notified. Received orders for hydralazine and dulcolax RS. Very small results, however no stool was noted in rectum with suppository insertion. Abdomen flat and soft. Anxiety continued to increase, causing difficulty breathing. Aerosol's ordered and given by resp therapy. Anxiety decreased and resp difficulty subsided shortly after Dr BUTLER came to bedside and gave orders that she will be staying in hosp overnight. Changed quick clot to right femoral artery as it was saturated with blood. No further bleeding. No hematoma.

## 2024-01-20 VITALS
RESPIRATION RATE: 18 BRPM | OXYGEN SATURATION: 98 % | HEART RATE: 79 BPM | TEMPERATURE: 97.8 F | SYSTOLIC BLOOD PRESSURE: 117 MMHG | DIASTOLIC BLOOD PRESSURE: 61 MMHG

## 2024-01-20 PROCEDURE — 94640 AIRWAY INHALATION TREATMENT: CPT

## 2024-01-20 PROCEDURE — 6360000002 HC RX W HCPCS

## 2024-01-20 PROCEDURE — G0378 HOSPITAL OBSERVATION PER HR: HCPCS

## 2024-01-20 PROCEDURE — 2700000000 HC OXYGEN THERAPY PER DAY

## 2024-01-20 PROCEDURE — 6370000000 HC RX 637 (ALT 250 FOR IP)

## 2024-01-20 RX ADMIN — PANTOPRAZOLE SODIUM 40 MG: 40 TABLET, DELAYED RELEASE ORAL at 06:48

## 2024-01-20 RX ADMIN — APIXABAN 5 MG: 5 TABLET, FILM COATED ORAL at 10:43

## 2024-01-20 RX ADMIN — METHIMAZOLE 15 MG: 5 TABLET ORAL at 10:43

## 2024-01-20 RX ADMIN — ALBUTEROL SULFATE 2.5 MG: 2.5 SOLUTION RESPIRATORY (INHALATION) at 12:14

## 2024-01-20 RX ADMIN — DILTIAZEM HYDROCHLORIDE 120 MG: 120 CAPSULE, COATED, EXTENDED RELEASE ORAL at 13:50

## 2024-01-20 RX ADMIN — PREGABALIN 50 MG: 50 CAPSULE ORAL at 15:34

## 2024-01-20 RX ADMIN — ASPIRIN 81 MG: 81 TABLET, COATED ORAL at 10:43

## 2024-01-20 RX ADMIN — ACETAMINOPHEN 650 MG: 325 TABLET ORAL at 10:50

## 2024-01-20 RX ADMIN — OXYCODONE 5 MG: 5 TABLET ORAL at 04:41

## 2024-01-20 RX ADMIN — LEVOTHYROXINE SODIUM 25 MCG: 0.03 TABLET ORAL at 06:48

## 2024-01-20 RX ADMIN — PREGABALIN 50 MG: 50 CAPSULE ORAL at 10:00

## 2024-01-20 RX ADMIN — ALBUTEROL SULFATE 2.5 MG: 2.5 SOLUTION RESPIRATORY (INHALATION) at 04:49

## 2024-01-20 RX ADMIN — BUSPIRONE HYDROCHLORIDE 10 MG: 10 TABLET ORAL at 15:35

## 2024-01-20 RX ADMIN — BUSPIRONE HYDROCHLORIDE 10 MG: 10 TABLET ORAL at 10:43

## 2024-01-20 RX ADMIN — PAROXETINE 10 MG: 10 TABLET, FILM COATED ORAL at 13:49

## 2024-01-20 ASSESSMENT — PAIN SCALES - GENERAL
PAINLEVEL_OUTOF10: 3
PAINLEVEL_OUTOF10: 0
PAINLEVEL_OUTOF10: 7
PAINLEVEL_OUTOF10: 2
PAINLEVEL_OUTOF10: 0
PAINLEVEL_OUTOF10: 0

## 2024-01-20 ASSESSMENT — PAIN DESCRIPTION - DESCRIPTORS: DESCRIPTORS: ACHING

## 2024-01-20 ASSESSMENT — PAIN DESCRIPTION - LOCATION
LOCATION: HEAD
LOCATION: LEG

## 2024-01-20 ASSESSMENT — PAIN DESCRIPTION - ORIENTATION
ORIENTATION: MID
ORIENTATION: LEFT

## 2024-01-20 NOTE — DISCHARGE SUMMARY
Physician Discharge Summary     Patient ID:  Lana Phillips  [id number]  68 y.o.  1955    Admit date: 1/19/2024    Discharge date and time: No discharge date for patient encounter.     Admitting Physician: Lazaro Vidales MD     Admission Diagnoses: Peripheral vascular disease, unspecified ([facility]) [I73.9]  PVD (peripheral vascular disease) ([facility]) [I73.9]    Discharge Diagnoses: Principal Problem:    Atherosclerosis 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 (HCC)  Active Problems:    Peripheral vascular disease, unspecified (HCC)    Atherosclerosis of artery of extremity with rest pain (HCC)    PVD (peripheral vascular disease) ([facility])  Resolved Problems:    * No resolved hospital problems. *      Admission Condition: good    Discharged Condition: stable      Hospital Course:  Lana Phillips is a 68 y.o. female who presented for elective aortogram 1/19. She tolerated procedure well but was having anxiety post procedurally and also developed bleeding from groin access site, so decision was made to keep her overnight. She did well, anxiety resolved, no hematoma on groin site. She is stable for discharge home.       Consults:   None    Significant Diagnostic Studies:   No results found.    Discharge Exam:  CONSTITUTIONAL:  awake, alert, cooperative, no apparent distress, and appears stated age  NECK:  supple, symmetrical, trachea midline  LUNGS:  no increased work of breathing, good air exchange   CARDIOVASCULAR:  regular rate and rhythm  ABDOMEN:  soft, non-distended and non-tenderl         Disposition: home    In process/preliminary results:  Outstanding Order Results       Date and Time Order Name Status Description    1/19/2024 12:31 PM Invasive vascular procedure In process             Patient Instructions:   Current Discharge Medication List        CONTINUE these medications which have NOT CHANGED    Details   dilTIAZem (CARDIZEM CD) 120 MG extended release capsule Take 1 capsule

## 2024-01-20 NOTE — PROGRESS NOTES
Vascular Surgery Progress Note    Pt is being seen in f/u today regarding L LE tissue loss  Subjective  Pt s/e.  No acute issues overnight.  Feeling much better.  Both feet feel better.  Current Medications:      sodium chloride flush, acetaminophen, ondansetron, bisacodyl, hydrALAZINE, labetalol, albuterol, guaiFENesin-dextromethorphan, oxyCODONE **OR** oxyCODONE    dilTIAZem  120 mg Oral Daily    levothyroxine  25 mcg Oral Daily    pregabalin  50 mg Oral TID    aspirin  81 mg Oral Daily    atorvastatin  40 mg Oral Nightly    methIMAzole  15 mg Oral Daily    busPIRone  10 mg Oral TID    pantoprazole  40 mg Oral QAM AC    PARoxetine  10 mg Oral QAM    traZODone  50 mg Oral Nightly    apixaban  5 mg Oral BID      PHYSICAL EXAM:    /61   Pulse 79   Temp 97.8 °F (36.6 °C) (Temporal)   Resp 18   SpO2 98%     Intake/Output Summary (Last 24 hours) at 1/20/2024 1243  Last data filed at 1/20/2024 1134  Gross per 24 hour   Intake 880 ml   Output 2600 ml   Net -1720 ml        Gen awake and alert  CVS S1S2  Resp + resp excursion, + nc o2  Abd soft nt nd  R LE No hematoma   DP and PT weakly biphasic  L LE AT 1+, DP biphasic, PT biphasic  LABS:    Lab Results   Component Value Date    WBC 12.2 (H) 01/17/2024    HGB 9.6 (L) 01/17/2024    HCT 32.1 (L) 01/17/2024     01/17/2024    PROTIME 12.0 10/15/2023    INR 1.1 10/15/2023    APTT 45.7 (H) 01/10/2024    K 3.6 01/17/2024    BUN 15 01/17/2024    CREATININE 0.6 01/17/2024     A/P L LE angiogram, L SFA, pop atherectomy DCB, AT plasty  Feeling better today  Denies current foot pain bilaterally  Vascular exam stable  No hematoma  Will arrange fu as outpt  Lazaro Vidales MD

## 2024-01-20 NOTE — CARE COORDINATION
Patient admitted for atherosclerosis 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. Patient lives in a one story with her sister in law there are 3 steps to enter. Patient independent prior to admission. She is on 3 L O2 and has nebulizer supplied by [employer]. PCP is Dr. Gonzalez. Patient is declining [facility] and sister in law will transport home.  Case Management Assessment  Initial Evaluation    Date/Time of Evaluation: 1/20/2024 8:54 AM  Assessment Completed by: ELIGIO Ortiz    If patient is discharged prior to next notation, then this note serves as note for discharge by case management.    Patient Name: Lana Phillips                   YOB: 1955  Diagnosis: Peripheral vascular disease, unspecified (HCC) [I73.9]  PVD (peripheral vascular disease) (HCC) [I73.9]                   Date / Time: 1/19/2024  7:20 AM    Patient Admission Status: Observation   Readmission Risk (Low < 19, Mod (19-27), High > 27): Readmission Risk Score: 22.5    Current PCP: Clyde Gonzalez, DO  PCP verified by CM? Yes    Chart Reviewed: Yes      History Provided by: Patient  Patient Orientation: Alert and Oriented    Patient Cognition: Alert    Hospitalization in the last 30 days (Readmission):  Yes    If yes, Readmission Assessment in  Navigator will be completed.    Advance Directives:      Code Status: Prior   Patient's Primary Decision Maker is:      Primary Decision Maker: sue ward - Brother/Sister - [phone]    Discharge Planning:    Patient lives with: Family Members Type of Home: House  Primary Care Giver: Self  Patient Support Systems include: Family Members   Current Financial resources:    Current community resources:    Current services prior to admission: Oxygen Therapy            Current DME:              Type of Home Care services:  None    ADLS  Prior functional level: Independent in ADLs/IADLs  Current functional level: Independent in ADLs/IADLs    PT AM-PAC:   /24  OT  AM-PAC:   /24    Family can provide assistance at DC: Yes  Would you like Case Management to discuss the discharge plan with any other family members/significant others, and if so, who? No  Plans to Return to Present Housing: Yes  Other Identified Issues/Barriers to RETURNING to current housing: YES  Potential Assistance needed at discharge: N/A            Potential DME:    Patient expects to discharge to: House  Plan for transportation at discharge:      Financial    Payor: [employer] MEDICARE / Plan: [employer] GOLD PLUS HMO / Product Type: *No Product type* /     Does insurance require precert for SNF: Yes    Potential assistance Purchasing Medications:    Meds-to-Beds request:        [id number] - [place], [place] - [address] [phone] -  [phone]  [address] [place] [id number]  Phone: [phone] Fax: [phone]      Notes:    Factors facilitating achievement of predicted outcomes: Family support, Motivated, Cooperative, and Pleasant    Barriers to discharge: Pain, Anxiety, Long standing deficits, and Medical complications    Additional Case Management Notes: None    The Plan for Transition of Care is related to the following treatment goals of Peripheral vascular disease, unspecified (HCC) [I73.9]  PVD (peripheral vascular disease) (HCC) [I73.9]    IF APPLICABLE: The Patient and/or patient representative Lana and her family were provided with a choice of provider and agrees with the discharge plan. Freedom of choice list with basic dialogue that supports the patient's individualized plan of care/goals and shares the quality data associated with the providers was provided to:     Patient Representative Name:       The Patient and/or Patient Representative Agree with the Discharge Plan?      ELIGIO Ortiz  Case Management Department  Ph: [phone] Fax: [phone]  Readmission Assessment  Number of Days since last admission?: 8-30 days  Previous Disposition:

## 2024-01-20 NOTE — PROGRESS NOTES
Right groin site has remained soft. No hematoma present. Reviewed with Pt to splint rt groin with her hands with coughing, sneezing any movement. Reviewed with Pt if hematoma formed and became painful to apply pressure. And notify the

## 2024-01-20 NOTE — ACP (ADVANCE CARE PLANNING)
Advance Care Planning   Healthcare Decision Maker:    Primary Decision Maker: sue ward - Brother/Sister - [phone]

## 2024-01-20 NOTE — PROGRESS NOTES
Discharged to home Goals met. Discharge instructions given verbalized an understanding. Monitor removed and placed in nurses station.      Pt went to bathroom, attempted to have a bowel movement developed a hematoma to RT groin.  back to bed manual pressure applied x 10 mins to rt groin tolerated well.     Dr BUTLER updated via phone. Will watch for an hour and if ok will proceed with discharge.

## 2024-01-22 ENCOUNTER — CARE COORDINATION (OUTPATIENT)
Dept: CARE COORDINATION | Age: 69
End: 2024-01-22

## 2024-01-22 NOTE — CARE COORDINATION
Care Transitions Follow Up Call    Patient: Lana Phillips  Patient : 1955   MRN: [id number]  Reason for Admission: SEY 24-24 acute respiratory failure with hypoxia  Elective aortogram 24-24   Discharge Date: 24 RARS: Readmission Risk Score: 22.5  First attempt to reach the patient for initial Care Transition call post hospital discharge for elective aortogram. Call went straight to voicemail, HIPAA compliant message left with CTN's contact information requesting return phone call.      Follow Up  Future Appointments   Date Time Provider Department Center   2024 10:00 AM Lilia Cabrera DO BDMIRELLA PAIN MAR [facility]   2024  1:30 PM Lazaro Vidales MD SEYZ WOUND [facility]   2024  3:00 PM SEYZ ABDU SITA RM 2 SEYZ ABDU [facility]   2024 11:30 AM Clyde Gonzalez DO N LIMA PC [facility]   2024 10:30 AM Robert Delgado MD BDM ENDO [facility]   3/5/2024  1:15 PM SEYZ MED ONC FAST TRACK 1 SEYZ Med Onc [facility]   3/5/2024  1:30 PM Viridiana Potter MD MED ONC [facility]   3/6/2024 11:45 AM Robert Delgado MD BDM ENDO [facility]   3/12/2024 11:00 AM SEB INF CLINIC RM 1 SEBZ Inf Ctr [facility]   3/14/2024 11:45 AM Jose Price MD AFL PULM CC [facility]   3/18/2024 12:45 PM Clyde Gonzalez DO N LIMA PC [facility]   3/26/2024 11:00 AM Bautista Humphreys MD [facility] CARDIO [facility]     Soumya Goldberg RN

## 2024-01-23 ENCOUNTER — CARE COORDINATION (OUTPATIENT)
Dept: CARE COORDINATION | Age: 69
End: 2024-01-23

## 2024-01-24 ENCOUNTER — OFFICE VISIT (OUTPATIENT)
Dept: PRIMARY CARE CLINIC | Age: 69
End: 2024-01-24

## 2024-01-24 VITALS
WEIGHT: 95 LBS | TEMPERATURE: 98.9 F | DIASTOLIC BLOOD PRESSURE: 68 MMHG | SYSTOLIC BLOOD PRESSURE: 120 MMHG | BODY MASS INDEX: 14.45 KG/M2

## 2024-01-24 DIAGNOSIS — I70.245 ATHEROSCLEROSIS OF NATIVE ARTERIES OF LEFT LEG WITH ULCERATION OF OTHER PART OF FOOT (HCC): Chronic | ICD-10-CM

## 2024-01-24 DIAGNOSIS — J44.1 COPD WITH ACUTE EXACERBATION (HCC): Primary | ICD-10-CM

## 2024-01-24 DIAGNOSIS — E78.2 MIXED HYPERLIPIDEMIA: ICD-10-CM

## 2024-01-24 DIAGNOSIS — F41.9 ANXIETY: ICD-10-CM

## 2024-01-24 DIAGNOSIS — J01.80 ACUTE NON-RECURRENT SINUSITIS OF OTHER SINUS: ICD-10-CM

## 2024-01-24 RX ORDER — DOXYCYCLINE HYCLATE 100 MG
100 TABLET ORAL 2 TIMES DAILY
Qty: 20 TABLET | Refills: 0 | Status: SHIPPED | OUTPATIENT
Start: 2024-01-24 | End: 2024-02-03

## 2024-01-24 ASSESSMENT — ENCOUNTER SYMPTOMS
WHEEZING: 1
EYES NEGATIVE: 1
GASTROINTESTINAL NEGATIVE: 1
ALLERGIC/IMMUNOLOGIC NEGATIVE: 1

## 2024-01-24 NOTE — PROGRESS NOTES
inhaler, Inhale 2 puffs into the lungs 4 times daily as needed for Wheezing, Disp: 54 g, Rfl: 1    atorvastatin (LIPITOR) 40 MG tablet, Take 1 tablet by mouth nightly, Disp: 30 tablet, Rfl: 0    aspirin 81 MG EC tablet, Take 1 tablet by mouth daily, Disp: 90 tablet, Rfl: 1    pantoprazole (PROTONIX) 40 MG tablet, Take 1 tablet by mouth every morning (before breakfast), Disp: 30 tablet, Rfl: 5    pregabalin (LYRICA) 50 MG capsule, Take 1 capsule by mouth 3 times daily for 60 days. Max Daily Amount: 150 mg, Disp: 90 capsule, Rfl: 1    Budeson-Glycopyrrol-Formoterol (BREZTRI AEROSPHERE) 160-9-4.8 MCG/ACT AERO, Inhale 2 puffs into the lungs 2 times daily, Disp: 1 each, Rfl: 4    vitamin B-12 (CYANOCOBALAMIN) 500 MCG tablet, Take 1 tablet by mouth daily, Disp: , Rfl:     OXYGEN, Inhale 3 L into the lungs continuous, Disp: , Rfl:     Cholecalciferol (VITAMIN D3) 125 MCG (5000 UT) TABS, Take 1 tablet by mouth daily, Disp: , Rfl:     vitamin C (ASCORBIC ACID) 500 MG tablet, Take 1 tablet by mouth daily, Disp: , Rfl:   Allergies   Allergen Reactions    Pcn [Penicillins] Other (See Comments)     Social History     Socioeconomic History    Marital status:      Spouse name: Not on file    Number of children: Not on file    Years of education: Not on file    Highest education level: Not on file   Occupational History    Not on file   Tobacco Use    Smoking status: Some Days     Current packs/day: 0.20     Average packs/day: 0.2 packs/day for 41.1 years (8.2 ttl pk-yrs)     Types: Cigarettes     Start date: 1983    Smokeless tobacco: Never    Tobacco comments:     2 cigarettes a week   Vaping Use    Vaping Use: Never used   Substance and Sexual Activity    Alcohol use: Not Currently    Drug use: Not Currently    Sexual activity: Not on file   Other Topics Concern    Not on file   Social History Narrative    1 cup coffee daily             Established 7-22 after not being seen since 2008    Smoker  quit   5-2022    COPD

## 2024-01-30 NOTE — DISCHARGE INSTRUCTIONS
Visit Discharge/Physician Orders    Discharge condition: Stable    Assessment of pain at discharge: yes    Anesthetic used: 4% lidocaine    Discharge to: Home    Left via:Private automobile    Accompanied by: self    ECF/HHA:  [facility]     Dressing Orders: Left 2nd toe: Cleanse with saline. Apply mindi and cover and secure with dry dressing. Change daily.     Treatment Orders:Eat a diet high in protein and vitamin C. Take a multiple vitamin daily unless contraindicated.  Keep wound dry at all all times!    [facility] followup visit ____________1 week ____________  (Please note your next appointment above and if you are unable to keep, kindly give a 24 hour notice. Thank you.)    Physician signature:__________________________      If you experience any of the following, please call the Wound Care Center during business hours:    * Increase in Pain  * Temperature over 101  * Increase in drainage from your wound  * Drainage with a foul odor  * Bleeding  * Increase in swelling  * Need for compression bandage changes due to slippage, breakthrough drainage.    If you need medical attention outside of the business hours of the Wound Care Centers please contact your PCP or go to the nearest emergency room.

## 2024-01-31 ENCOUNTER — HOSPITAL ENCOUNTER (OUTPATIENT)
Dept: WOUND CARE | Age: 69
Discharge: HOME | End: 2024-01-31
Payer: MEDICARE

## 2024-01-31 VITALS
RESPIRATION RATE: 22 BRPM | HEART RATE: 80 BPM | DIASTOLIC BLOOD PRESSURE: 70 MMHG | TEMPERATURE: 97.1 F | WEIGHT: 95 LBS | BODY MASS INDEX: 14.4 KG/M2 | HEIGHT: 68 IN | SYSTOLIC BLOOD PRESSURE: 152 MMHG

## 2024-01-31 DIAGNOSIS — G89.4 CHRONIC PAIN SYNDROME: ICD-10-CM

## 2024-01-31 DIAGNOSIS — J01.80 ACUTE NON-RECURRENT SINUSITIS OF OTHER SINUS: ICD-10-CM

## 2024-01-31 DIAGNOSIS — M79.10 MYALGIA: ICD-10-CM

## 2024-01-31 DIAGNOSIS — M54.16 LUMBAR RADICULOPATHY: ICD-10-CM

## 2024-01-31 DIAGNOSIS — M51.9 LUMBAR DISC DISORDER: ICD-10-CM

## 2024-01-31 DIAGNOSIS — I70.245 ATHEROSCLEROSIS OF NATIVE ARTERIES OF LEFT LEG WITH ULCERATION OF OTHER PART OF FOOT (HCC): Primary | Chronic | ICD-10-CM

## 2024-01-31 DIAGNOSIS — M54.42 CHRONIC BILATERAL LOW BACK PAIN WITH BILATERAL SCIATICA: ICD-10-CM

## 2024-01-31 DIAGNOSIS — M54.41 CHRONIC BILATERAL LOW BACK PAIN WITH BILATERAL SCIATICA: ICD-10-CM

## 2024-01-31 DIAGNOSIS — G89.29 CHRONIC BILATERAL LOW BACK PAIN WITH BILATERAL SCIATICA: ICD-10-CM

## 2024-01-31 PROCEDURE — 99213 OFFICE O/P EST LOW 20 MIN: CPT

## 2024-01-31 PROCEDURE — 11042 DBRDMT SUBQ TIS 1ST 20SQCM/<: CPT

## 2024-01-31 RX ORDER — LIDOCAINE 40 MG/G
CREAM TOPICAL ONCE
OUTPATIENT
Start: 2024-01-31 | End: 2024-01-31

## 2024-01-31 RX ORDER — LIDOCAINE HYDROCHLORIDE 20 MG/ML
JELLY TOPICAL ONCE
OUTPATIENT
Start: 2024-01-31 | End: 2024-01-31

## 2024-01-31 RX ORDER — TRIAMCINOLONE ACETONIDE 1 MG/G
OINTMENT TOPICAL ONCE
OUTPATIENT
Start: 2024-01-31 | End: 2024-01-31

## 2024-01-31 RX ORDER — CLOBETASOL PROPIONATE 0.5 MG/G
OINTMENT TOPICAL ONCE
OUTPATIENT
Start: 2024-01-31 | End: 2024-01-31

## 2024-01-31 RX ORDER — DOXYCYCLINE HYCLATE 100 MG
100 TABLET ORAL 2 TIMES DAILY
Qty: 20 TABLET | Refills: 0 | Status: SHIPPED | OUTPATIENT
Start: 2024-01-31 | End: 2024-02-10

## 2024-01-31 RX ORDER — BETAMETHASONE DIPROPIONATE 0.5 MG/G
CREAM TOPICAL ONCE
OUTPATIENT
Start: 2024-01-31 | End: 2024-01-31

## 2024-01-31 RX ORDER — LIDOCAINE HYDROCHLORIDE 40 MG/ML
SOLUTION TOPICAL ONCE
OUTPATIENT
Start: 2024-01-31 | End: 2024-01-31

## 2024-01-31 RX ORDER — LIDOCAINE HYDROCHLORIDE 40 MG/ML
SOLUTION TOPICAL ONCE
Status: COMPLETED | OUTPATIENT
Start: 2024-01-31 | End: 2024-01-31

## 2024-01-31 RX ORDER — PREGABALIN 50 MG/1
50 CAPSULE ORAL 3 TIMES DAILY
Qty: 90 CAPSULE | Refills: 1 | Status: SHIPPED | OUTPATIENT
Start: 2024-01-31 | End: 2024-03-31

## 2024-01-31 RX ORDER — BACITRACIN ZINC 500 [USP'U]/G
OINTMENT TOPICAL ONCE
OUTPATIENT
Start: 2024-01-31 | End: 2024-01-31

## 2024-01-31 RX ORDER — IBUPROFEN 200 MG
TABLET ORAL ONCE
OUTPATIENT
Start: 2024-01-31 | End: 2024-01-31

## 2024-01-31 RX ORDER — GENTAMICIN SULFATE 1 MG/G
OINTMENT TOPICAL ONCE
OUTPATIENT
Start: 2024-01-31 | End: 2024-01-31

## 2024-01-31 RX ORDER — BACITRACIN ZINC AND POLYMYXIN B SULFATE 500; 1000 [USP'U]/G; [USP'U]/G
OINTMENT TOPICAL ONCE
OUTPATIENT
Start: 2024-01-31 | End: 2024-01-31

## 2024-01-31 RX ORDER — LIDOCAINE 50 MG/G
OINTMENT TOPICAL ONCE
OUTPATIENT
Start: 2024-01-31 | End: 2024-01-31

## 2024-01-31 RX ORDER — LEVOTHYROXINE SODIUM 0.03 MG/1
25 TABLET ORAL DAILY
Qty: 30 TABLET | Refills: 3 | Status: SHIPPED | OUTPATIENT
Start: 2024-01-31

## 2024-01-31 RX ORDER — SODIUM CHLOR/HYPOCHLOROUS ACID 0.033 %
SOLUTION, IRRIGATION IRRIGATION ONCE
OUTPATIENT
Start: 2024-01-31 | End: 2024-01-31

## 2024-01-31 RX ADMIN — LIDOCAINE HYDROCHLORIDE 8 ML: 40 SOLUTION TOPICAL at 13:36

## 2024-01-31 ASSESSMENT — PAIN DESCRIPTION - FREQUENCY: FREQUENCY: INTERMITTENT

## 2024-01-31 NOTE — PROGRESS NOTES
Wound Care Supplies      Supply Company:     [facility] [address] [facility] [address] [place] [address]  p: [phone] f: [phone]     Ordering Center:     [facility]  [address] [facility] [address]  [phone]  WOUND CARE Dept: [phone]   FAX NUMBER [phone]    Patient Information:      Angie Phillips  [address] [facility] [address]   I have attempted without success to contact this patient by phone for Preadmission Testing phone assessment.   Message left for pt to return call.   : 1955  AGE: 69 y.o.     GENDER: female   EPISODE DATE: 2024    Insurance:      PRIMARY INSURANCE:  Plan: [employer] GOLD PLUS HMO  Coverage: HU[employer] MEDICARE  Effective Date: 2021  Group Number: [unfilled]  Subscriber Number: [id number] - (Medicare Managed)    Payer/Plan Subscr  Sex Relation Sub. Ins. ID Effective Group Num   1. 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) - Primary (Chronic)       WOUNDS REQUIRING DRESSING SUPPLIES:     Puncture 23 Coccyx (Active)   Number of days: 34       Wound 24 Toe (Comment  which one) Left;Distal #1 2nd toe (Active)   Wound Image   24 1331   Wound Etiology Arterial 24 1331   Wound Length (cm) 1 cm 24 1331   Wound Width (cm) 1.1 cm 24 1331   Wound Depth (cm) 0.1 cm 24 1331   Wound Surface Area (cm^2) 1.1 cm^2 24 1331   Wound Volume (cm^3) 0.11 cm^3 24 1331   Post-Procedure Length (cm) 1.2 cm 24 1348   Post-Procedure Width (cm) 1.1 cm 24 1348   Post-Procedure Depth (cm) 0.2 cm 24 1348   Post-Procedure Surface Area (cm^2) 1.32 cm^2 24 1348   Post-Procedure Volume (cm^3) 0.264 
drainage.    If you need medical attention outside of the business hours of the Wound Care Centers please contact your PCP or go to the nearest emergency room.        Electronically signed by Lazaro Vidales MD

## 2024-01-31 NOTE — PLAN OF CARE
Problem: Cognitive:  Goal: Knowledge of wound care  Description: Knowledge of wound care  Outcome: Progressing  Goal: Understands risk factors for wounds  Description: Understands risk factors for wounds  Outcome: Progressing     Problem: Arterial:  Goal: Optimize blood flow for wound healing  Description: Optimize blood flow for wound healing  Outcome: Progressing     Problem: Smoking cessation:  Goal: Ability to formulate a plan to maintain a tobacco-free life will be supported  Description: Ability to formulate a plan to maintain a tobacco-free life will be supported  Outcome: Progressing

## 2024-01-31 NOTE — TELEPHONE ENCOUNTER
----- Message from Kristie Lucas sent at 1/31/2024 10:33 AM EST -----  Subject: Refill Request    QUESTIONS  Name of Medication? doxycycline hyclate (VIBRA-TABS) 100 MG tablet  Patient-reported dosage and instructions? Take 1 tablet by mouth 2 times   daily for 10 days  How many days do you have left? 1  Preferred Pharmacy? [id number]  Pharmacy phone number (if available)? [phone]  ---------------------------------------------------------------------------  --------------  CALL BACK INFO  What is the best way for the office to contact you? OK to leave message on   voicemail  Preferred Call Back Phone Number? [phone]  ---------------------------------------------------------------------------  --------------  SCRIPT ANSWERS  Relationship to Patient? Self

## 2024-02-05 ENCOUNTER — HOSPITAL ENCOUNTER (INPATIENT)
Age: 69
LOS: 4 days | Discharge: HOME HEALTH | End: 2024-02-09
Attending: EMERGENCY MEDICINE | Admitting: INTERNAL MEDICINE
Payer: MEDICARE

## 2024-02-05 ENCOUNTER — APPOINTMENT (OUTPATIENT)
Dept: GENERAL RADIOLOGY | Age: 69
End: 2024-02-05
Payer: MEDICARE

## 2024-02-05 DIAGNOSIS — J44.1 COPD EXACERBATION (HCC): Primary | ICD-10-CM

## 2024-02-05 LAB
ALBUMIN SERPL-MCNC: 3.9 G/DL (ref 3.5–5.2)
ALP SERPL-CCNC: 66 U/L (ref 35–104)
ALT SERPL-CCNC: 16 U/L (ref 0–32)
ANION GAP SERPL CALCULATED.3IONS-SCNC: 7 MMOL/L (ref 7–16)
AST SERPL-CCNC: 39 U/L (ref 0–31)
B PARAP IS1001 DNA NPH QL NAA+NON-PROBE: NOT DETECTED
B PERT DNA SPEC QL NAA+PROBE: NOT DETECTED
BASOPHILS # BLD: 0.06 K/UL (ref 0–0.2)
BASOPHILS NFR BLD: 1 % (ref 0–2)
BILIRUB SERPL-MCNC: 0.2 MG/DL (ref 0–1.2)
BNP SERPL-MCNC: 739 PG/ML (ref 0–125)
BUN SERPL-MCNC: 8 MG/DL (ref 6–23)
C PNEUM DNA NPH QL NAA+NON-PROBE: NOT DETECTED
CALCIUM SERPL-MCNC: 9.6 MG/DL (ref 8.6–10.2)
CHLORIDE SERPL-SCNC: 91 MMOL/L (ref 98–107)
CO2 SERPL-SCNC: 39 MMOL/L (ref 22–29)
CREAT SERPL-MCNC: 0.6 MG/DL (ref 0.5–1)
EOSINOPHIL # BLD: 0.06 K/UL (ref 0.05–0.5)
EOSINOPHILS RELATIVE PERCENT: 1 % (ref 0–6)
ERYTHROCYTE [DISTWIDTH] IN BLOOD BY AUTOMATED COUNT: 14.8 % (ref 11.5–15)
FLUAV RNA NPH QL NAA+NON-PROBE: NOT DETECTED
FLUBV RNA NPH QL NAA+NON-PROBE: NOT DETECTED
GFR SERPL CREATININE-BSD FRML MDRD: >60 ML/MIN/1.73M2
GLUCOSE SERPL-MCNC: 80 MG/DL (ref 74–99)
HADV DNA NPH QL NAA+NON-PROBE: NOT DETECTED
HCOV 229E RNA NPH QL NAA+NON-PROBE: NOT DETECTED
HCOV HKU1 RNA NPH QL NAA+NON-PROBE: NOT DETECTED
HCOV NL63 RNA NPH QL NAA+NON-PROBE: NOT DETECTED
HCOV OC43 RNA NPH QL NAA+NON-PROBE: NOT DETECTED
HCT VFR BLD AUTO: 27.6 % (ref 34–48)
HGB BLD-MCNC: 8.4 G/DL (ref 11.5–15.5)
HMPV RNA NPH QL NAA+NON-PROBE: NOT DETECTED
HPIV1 RNA NPH QL NAA+NON-PROBE: NOT DETECTED
HPIV2 RNA NPH QL NAA+NON-PROBE: NOT DETECTED
HPIV3 RNA NPH QL NAA+NON-PROBE: NOT DETECTED
HPIV4 RNA NPH QL NAA+NON-PROBE: NOT DETECTED
IMM GRANULOCYTES # BLD AUTO: 0.04 K/UL (ref 0–0.58)
IMM GRANULOCYTES NFR BLD: 1 % (ref 0–5)
LYMPHOCYTES NFR BLD: 0.94 K/UL (ref 1.5–4)
LYMPHOCYTES RELATIVE PERCENT: 11 % (ref 20–42)
M PNEUMO DNA NPH QL NAA+NON-PROBE: NOT DETECTED
MCH RBC QN AUTO: 28.3 PG (ref 26–35)
MCHC RBC AUTO-ENTMCNC: 30.4 G/DL (ref 32–34.5)
MCV RBC AUTO: 92.9 FL (ref 80–99.9)
MONOCYTES NFR BLD: 0.8 K/UL (ref 0.1–0.95)
MONOCYTES NFR BLD: 9 % (ref 2–12)
NEUTROPHILS NFR BLD: 79 % (ref 43–80)
NEUTS SEG NFR BLD: 6.98 K/UL (ref 1.8–7.3)
PLATELET # BLD AUTO: 465 K/UL (ref 130–450)
PMV BLD AUTO: 9.4 FL (ref 7–12)
POTASSIUM SERPL-SCNC: 3.7 MMOL/L (ref 3.5–5)
PROT SERPL-MCNC: 6.9 G/DL (ref 6.4–8.3)
RBC # BLD AUTO: 2.97 M/UL (ref 3.5–5.5)
RSV RNA NPH QL NAA+NON-PROBE: NOT DETECTED
RV+EV RNA NPH QL NAA+NON-PROBE: NOT DETECTED
SARS-COV-2 RNA NPH QL NAA+NON-PROBE: NOT DETECTED
SODIUM SERPL-SCNC: 137 MMOL/L (ref 132–146)
SPECIMEN DESCRIPTION: NORMAL
TROPONIN I SERPL HS-MCNC: 27 NG/L (ref 0–9)
TROPONIN I SERPL HS-MCNC: 30 NG/L (ref 0–9)
WBC OTHER # BLD: 8.9 K/UL (ref 4.5–11.5)

## 2024-02-05 PROCEDURE — 80053 COMPREHEN METABOLIC PANEL: CPT

## 2024-02-05 PROCEDURE — 96375 TX/PRO/DX INJ NEW DRUG ADDON: CPT

## 2024-02-05 PROCEDURE — 2060000000 HC ICU INTERMEDIATE R&B

## 2024-02-05 PROCEDURE — 6360000002 HC RX W HCPCS: Performed by: EMERGENCY MEDICINE

## 2024-02-05 PROCEDURE — 0202U NFCT DS 22 TRGT SARS-COV-2: CPT

## 2024-02-05 PROCEDURE — 6370000000 HC RX 637 (ALT 250 FOR IP): Performed by: EMERGENCY MEDICINE

## 2024-02-05 PROCEDURE — 93005 ELECTROCARDIOGRAM TRACING: CPT | Performed by: EMERGENCY MEDICINE

## 2024-02-05 PROCEDURE — 85025 COMPLETE CBC W/AUTO DIFF WBC: CPT

## 2024-02-05 PROCEDURE — 84484 ASSAY OF TROPONIN QUANT: CPT

## 2024-02-05 PROCEDURE — 99285 EMERGENCY DEPT VISIT HI MDM: CPT

## 2024-02-05 PROCEDURE — 71045 X-RAY EXAM CHEST 1 VIEW: CPT

## 2024-02-05 PROCEDURE — 2580000003 HC RX 258: Performed by: EMERGENCY MEDICINE

## 2024-02-05 PROCEDURE — 96374 THER/PROPH/DIAG INJ IV PUSH: CPT

## 2024-02-05 PROCEDURE — 94640 AIRWAY INHALATION TREATMENT: CPT

## 2024-02-05 PROCEDURE — 83880 ASSAY OF NATRIURETIC PEPTIDE: CPT

## 2024-02-05 RX ORDER — IPRATROPIUM BROMIDE AND ALBUTEROL SULFATE 2.5; .5 MG/3ML; MG/3ML
1 SOLUTION RESPIRATORY (INHALATION)
Status: DISPENSED | OUTPATIENT
Start: 2024-02-05 | End: 2024-02-05

## 2024-02-05 RX ORDER — IPRATROPIUM BROMIDE AND ALBUTEROL SULFATE 2.5; .5 MG/3ML; MG/3ML
1 SOLUTION RESPIRATORY (INHALATION)
Status: COMPLETED | OUTPATIENT
Start: 2024-02-05 | End: 2024-02-05

## 2024-02-05 RX ORDER — ONDANSETRON 2 MG/ML
4 INJECTION INTRAMUSCULAR; INTRAVENOUS ONCE
Status: COMPLETED | OUTPATIENT
Start: 2024-02-05 | End: 2024-02-05

## 2024-02-05 RX ADMIN — IPRATROPIUM BROMIDE AND ALBUTEROL SULFATE 1 DOSE: .5; 3 SOLUTION RESPIRATORY (INHALATION) at 17:46

## 2024-02-05 RX ADMIN — METHYLPREDNISOLONE SODIUM SUCCINATE 125 MG: 125 INJECTION, POWDER, LYOPHILIZED, FOR SOLUTION INTRAMUSCULAR; INTRAVENOUS at 16:16

## 2024-02-05 RX ADMIN — IPRATROPIUM BROMIDE AND ALBUTEROL SULFATE 1 DOSE: .5; 3 SOLUTION RESPIRATORY (INHALATION) at 17:45

## 2024-02-05 RX ADMIN — IPRATROPIUM BROMIDE AND ALBUTEROL SULFATE 1 DOSE: .5; 3 SOLUTION RESPIRATORY (INHALATION) at 17:44

## 2024-02-05 RX ADMIN — ONDANSETRON 4 MG: 2 INJECTION INTRAMUSCULAR; INTRAVENOUS at 18:23

## 2024-02-05 ASSESSMENT — PAIN SCALES - GENERAL: PAINLEVEL_OUTOF10: 0

## 2024-02-05 NOTE — DISCHARGE INSTRUCTIONS
Visit Discharge/Physician Orders     Discharge condition: Stable     Assessment of pain at discharge: yes     Anesthetic used: 4% lidocaine     Discharge to: Home     Left via:Private automobile     Accompanied by: self     ECF/HHA:  [facility]      Dressing Orders: Left 2nd toe: Cleanse with saline. Apply mindi and cover and secure with dry dressing. Change daily.      Treatment Orders:Eat a diet high in protein and vitamin C. Take a multiple vitamin daily unless contraindicated.  Keep wound dry at all all times!     [facility] followup visit ____________1 week ____________  (Please note your next appointment above and if you are unable to keep, kindly give a 24 hour notice. Thank you.)     Physician signature:__________________________        If you experience any of the following, please call the Wound Care Center during business hours:     * Increase in Pain  * Temperature over 101  * Increase in drainage from your wound  * Drainage with a foul odor  * Bleeding  * Increase in swelling  * Need for compression bandage changes due to slippage, breakthrough drainage.     If you need medical attention outside of the business hours of the Wound Care Centers please contact your PCP or go to the nearest emergency room.

## 2024-02-06 ENCOUNTER — APPOINTMENT (OUTPATIENT)
Dept: ULTRASOUND IMAGING | Age: 69
End: 2024-02-06
Payer: MEDICARE

## 2024-02-06 LAB
ALBUMIN SERPL-MCNC: 3.9 G/DL (ref 3.5–5.2)
ALP SERPL-CCNC: 63 U/L (ref 35–104)
ALT SERPL-CCNC: 16 U/L (ref 0–32)
ANION GAP SERPL CALCULATED.3IONS-SCNC: 7 MMOL/L (ref 7–16)
AST SERPL-CCNC: 41 U/L (ref 0–31)
BILIRUB SERPL-MCNC: 0.2 MG/DL (ref 0–1.2)
BUN SERPL-MCNC: 14 MG/DL (ref 6–23)
CALCIUM SERPL-MCNC: 8.9 MG/DL (ref 8.6–10.2)
CHLORIDE SERPL-SCNC: 90 MMOL/L (ref 98–107)
CO2 SERPL-SCNC: 38 MMOL/L (ref 22–29)
CREAT SERPL-MCNC: 0.6 MG/DL (ref 0.5–1)
ERYTHROCYTE [DISTWIDTH] IN BLOOD BY AUTOMATED COUNT: 14.9 % (ref 11.5–15)
GFR SERPL CREATININE-BSD FRML MDRD: >60 ML/MIN/1.73M2
GLUCOSE SERPL-MCNC: 121 MG/DL (ref 74–99)
HCT VFR BLD AUTO: 27.5 % (ref 34–48)
HGB BLD-MCNC: 8.2 G/DL (ref 11.5–15.5)
MCH RBC QN AUTO: 27.8 PG (ref 26–35)
MCHC RBC AUTO-ENTMCNC: 29.8 G/DL (ref 32–34.5)
MCV RBC AUTO: 93.2 FL (ref 80–99.9)
PLATELET # BLD AUTO: 476 K/UL (ref 130–450)
PMV BLD AUTO: 9.4 FL (ref 7–12)
POTASSIUM SERPL-SCNC: 4.5 MMOL/L (ref 3.5–5)
PROCALCITONIN SERPL-MCNC: 0.03 NG/ML (ref 0–0.08)
PROT SERPL-MCNC: 7 G/DL (ref 6.4–8.3)
RBC # BLD AUTO: 2.95 M/UL (ref 3.5–5.5)
SODIUM SERPL-SCNC: 135 MMOL/L (ref 132–146)
WBC OTHER # BLD: 5.1 K/UL (ref 4.5–11.5)

## 2024-02-06 PROCEDURE — 2580000003 HC RX 258: Performed by: INTERNAL MEDICINE

## 2024-02-06 PROCEDURE — 80053 COMPREHEN METABOLIC PANEL: CPT

## 2024-02-06 PROCEDURE — 2060000000 HC ICU INTERMEDIATE R&B

## 2024-02-06 PROCEDURE — 6370000000 HC RX 637 (ALT 250 FOR IP): Performed by: INTERNAL MEDICINE

## 2024-02-06 PROCEDURE — 36415 COLL VENOUS BLD VENIPUNCTURE: CPT

## 2024-02-06 PROCEDURE — 84145 PROCALCITONIN (PCT): CPT

## 2024-02-06 PROCEDURE — 94640 AIRWAY INHALATION TREATMENT: CPT

## 2024-02-06 PROCEDURE — 76705 ECHO EXAM OF ABDOMEN: CPT

## 2024-02-06 PROCEDURE — 6360000002 HC RX W HCPCS: Performed by: INTERNAL MEDICINE

## 2024-02-06 PROCEDURE — 85027 COMPLETE CBC AUTOMATED: CPT

## 2024-02-06 RX ORDER — MAGNESIUM SULFATE IN WATER 40 MG/ML
2000 INJECTION, SOLUTION INTRAVENOUS PRN
Status: DISCONTINUED | OUTPATIENT
Start: 2024-02-06 | End: 2024-02-09 | Stop reason: HOSPADM

## 2024-02-06 RX ORDER — SODIUM CHLORIDE 9 MG/ML
INJECTION, SOLUTION INTRAVENOUS PRN
Status: DISCONTINUED | OUTPATIENT
Start: 2024-02-06 | End: 2024-02-09 | Stop reason: HOSPADM

## 2024-02-06 RX ORDER — ACETAMINOPHEN 650 MG/1
650 SUPPOSITORY RECTAL EVERY 6 HOURS PRN
Status: DISCONTINUED | OUTPATIENT
Start: 2024-02-06 | End: 2024-02-09 | Stop reason: HOSPADM

## 2024-02-06 RX ORDER — BUSPIRONE HYDROCHLORIDE 10 MG/1
10 TABLET ORAL 3 TIMES DAILY
Status: DISCONTINUED | OUTPATIENT
Start: 2024-02-06 | End: 2024-02-09 | Stop reason: HOSPADM

## 2024-02-06 RX ORDER — TRAZODONE HYDROCHLORIDE 50 MG/1
50 TABLET ORAL NIGHTLY
Status: DISCONTINUED | OUTPATIENT
Start: 2024-02-06 | End: 2024-02-09 | Stop reason: HOSPADM

## 2024-02-06 RX ORDER — LACTULOSE 10 G/15ML
20 SOLUTION ORAL 3 TIMES DAILY
Status: COMPLETED | OUTPATIENT
Start: 2024-02-06 | End: 2024-02-06

## 2024-02-06 RX ORDER — BUDESONIDE 0.5 MG/2ML
0.5 INHALANT ORAL
Status: DISCONTINUED | OUTPATIENT
Start: 2024-02-06 | End: 2024-02-09 | Stop reason: HOSPADM

## 2024-02-06 RX ORDER — ASPIRIN 81 MG/1
81 TABLET ORAL DAILY
Status: DISCONTINUED | OUTPATIENT
Start: 2024-02-06 | End: 2024-02-09 | Stop reason: HOSPADM

## 2024-02-06 RX ORDER — POTASSIUM CHLORIDE 7.45 MG/ML
10 INJECTION INTRAVENOUS PRN
Status: DISCONTINUED | OUTPATIENT
Start: 2024-02-06 | End: 2024-02-09 | Stop reason: HOSPADM

## 2024-02-06 RX ORDER — LEVOTHYROXINE SODIUM 0.03 MG/1
25 TABLET ORAL DAILY
Status: DISCONTINUED | OUTPATIENT
Start: 2024-02-06 | End: 2024-02-07

## 2024-02-06 RX ORDER — PAROXETINE 10 MG/1
10 TABLET, FILM COATED ORAL EVERY MORNING
Status: DISCONTINUED | OUTPATIENT
Start: 2024-02-06 | End: 2024-02-09 | Stop reason: HOSPADM

## 2024-02-06 RX ORDER — ACETAMINOPHEN 325 MG/1
650 TABLET ORAL EVERY 6 HOURS PRN
Status: DISCONTINUED | OUTPATIENT
Start: 2024-02-06 | End: 2024-02-09 | Stop reason: HOSPADM

## 2024-02-06 RX ORDER — ARFORMOTEROL TARTRATE 15 UG/2ML
15 SOLUTION RESPIRATORY (INHALATION)
Status: DISCONTINUED | OUTPATIENT
Start: 2024-02-06 | End: 2024-02-09 | Stop reason: HOSPADM

## 2024-02-06 RX ORDER — ONDANSETRON 2 MG/ML
4 INJECTION INTRAMUSCULAR; INTRAVENOUS EVERY 6 HOURS PRN
Status: DISCONTINUED | OUTPATIENT
Start: 2024-02-06 | End: 2024-02-09 | Stop reason: HOSPADM

## 2024-02-06 RX ORDER — SODIUM CHLORIDE 0.9 % (FLUSH) 0.9 %
5-40 SYRINGE (ML) INJECTION PRN
Status: DISCONTINUED | OUTPATIENT
Start: 2024-02-06 | End: 2024-02-09 | Stop reason: HOSPADM

## 2024-02-06 RX ORDER — IPRATROPIUM BROMIDE AND ALBUTEROL SULFATE 2.5; .5 MG/3ML; MG/3ML
1 SOLUTION RESPIRATORY (INHALATION)
Status: DISCONTINUED | OUTPATIENT
Start: 2024-02-06 | End: 2024-02-09 | Stop reason: HOSPADM

## 2024-02-06 RX ORDER — ATORVASTATIN CALCIUM 40 MG/1
40 TABLET, FILM COATED ORAL NIGHTLY
Status: DISCONTINUED | OUTPATIENT
Start: 2024-02-06 | End: 2024-02-09 | Stop reason: HOSPADM

## 2024-02-06 RX ORDER — METHIMAZOLE 5 MG/1
10 TABLET ORAL DAILY
Status: DISCONTINUED | OUTPATIENT
Start: 2024-02-06 | End: 2024-02-07

## 2024-02-06 RX ORDER — DILTIAZEM HYDROCHLORIDE 120 MG/1
120 CAPSULE, COATED, EXTENDED RELEASE ORAL DAILY
Status: DISCONTINUED | OUTPATIENT
Start: 2024-02-06 | End: 2024-02-09 | Stop reason: HOSPADM

## 2024-02-06 RX ORDER — PANTOPRAZOLE SODIUM 40 MG/1
40 TABLET, DELAYED RELEASE ORAL
Status: DISCONTINUED | OUTPATIENT
Start: 2024-02-06 | End: 2024-02-09 | Stop reason: HOSPADM

## 2024-02-06 RX ORDER — ONDANSETRON 4 MG/1
4 TABLET, ORALLY DISINTEGRATING ORAL EVERY 8 HOURS PRN
Status: DISCONTINUED | OUTPATIENT
Start: 2024-02-06 | End: 2024-02-09 | Stop reason: HOSPADM

## 2024-02-06 RX ORDER — POTASSIUM CHLORIDE 20 MEQ/1
40 TABLET, EXTENDED RELEASE ORAL PRN
Status: DISCONTINUED | OUTPATIENT
Start: 2024-02-06 | End: 2024-02-09 | Stop reason: HOSPADM

## 2024-02-06 RX ORDER — PREGABALIN 50 MG/1
50 CAPSULE ORAL 3 TIMES DAILY
Status: DISCONTINUED | OUTPATIENT
Start: 2024-02-06 | End: 2024-02-09 | Stop reason: HOSPADM

## 2024-02-06 RX ORDER — SODIUM CHLORIDE 0.9 % (FLUSH) 0.9 %
5-40 SYRINGE (ML) INJECTION EVERY 12 HOURS SCHEDULED
Status: DISCONTINUED | OUTPATIENT
Start: 2024-02-06 | End: 2024-02-09 | Stop reason: HOSPADM

## 2024-02-06 RX ORDER — POLYETHYLENE GLYCOL 3350 17 G/17G
17 POWDER, FOR SOLUTION ORAL DAILY PRN
Status: DISCONTINUED | OUTPATIENT
Start: 2024-02-06 | End: 2024-02-09

## 2024-02-06 RX ADMIN — ARFORMOTEROL TARTRATE 15 MCG: 15 SOLUTION RESPIRATORY (INHALATION) at 10:41

## 2024-02-06 RX ADMIN — IPRATROPIUM BROMIDE AND ALBUTEROL SULFATE 1 DOSE: .5; 2.5 SOLUTION RESPIRATORY (INHALATION) at 10:42

## 2024-02-06 RX ADMIN — PREGABALIN 50 MG: 50 CAPSULE ORAL at 21:01

## 2024-02-06 RX ADMIN — BUSPIRONE HYDROCHLORIDE 10 MG: 10 TABLET ORAL at 13:34

## 2024-02-06 RX ADMIN — SODIUM CHLORIDE, PRESERVATIVE FREE 10 ML: 5 INJECTION INTRAVENOUS at 21:00

## 2024-02-06 RX ADMIN — LEVOTHYROXINE SODIUM 25 MCG: 0.03 TABLET ORAL at 19:19

## 2024-02-06 RX ADMIN — LACTULOSE 20 G: 20 SOLUTION ORAL at 13:34

## 2024-02-06 RX ADMIN — ATORVASTATIN CALCIUM 40 MG: 40 TABLET, FILM COATED ORAL at 21:01

## 2024-02-06 RX ADMIN — DILTIAZEM HYDROCHLORIDE 120 MG: 120 CAPSULE, COATED, EXTENDED RELEASE ORAL at 09:36

## 2024-02-06 RX ADMIN — IPRATROPIUM BROMIDE AND ALBUTEROL SULFATE 1 DOSE: .5; 2.5 SOLUTION RESPIRATORY (INHALATION) at 13:56

## 2024-02-06 RX ADMIN — APIXABAN 5 MG: 5 TABLET, FILM COATED ORAL at 21:01

## 2024-02-06 RX ADMIN — IPRATROPIUM BROMIDE AND ALBUTEROL SULFATE 1 DOSE: .5; 2.5 SOLUTION RESPIRATORY (INHALATION) at 16:33

## 2024-02-06 RX ADMIN — ACETAMINOPHEN 325MG 650 MG: 325 TABLET ORAL at 22:23

## 2024-02-06 RX ADMIN — LACTULOSE 20 G: 20 SOLUTION ORAL at 09:38

## 2024-02-06 RX ADMIN — APIXABAN 5 MG: 5 TABLET, FILM COATED ORAL at 09:38

## 2024-02-06 RX ADMIN — ASPIRIN 81 MG: 81 TABLET, COATED ORAL at 09:38

## 2024-02-06 RX ADMIN — PREGABALIN 50 MG: 50 CAPSULE ORAL at 13:34

## 2024-02-06 RX ADMIN — PANTOPRAZOLE SODIUM 40 MG: 40 TABLET, DELAYED RELEASE ORAL at 09:38

## 2024-02-06 RX ADMIN — BUDESONIDE INHALATION 500 MCG: 0.5 SUSPENSION RESPIRATORY (INHALATION) at 10:41

## 2024-02-06 RX ADMIN — BUSPIRONE HYDROCHLORIDE 10 MG: 10 TABLET ORAL at 21:01

## 2024-02-06 RX ADMIN — BUDESONIDE INHALATION 500 MCG: 0.5 SUSPENSION RESPIRATORY (INHALATION) at 18:57

## 2024-02-06 RX ADMIN — ARFORMOTEROL TARTRATE 15 MCG: 15 SOLUTION RESPIRATORY (INHALATION) at 18:57

## 2024-02-06 RX ADMIN — PREGABALIN 50 MG: 50 CAPSULE ORAL at 09:38

## 2024-02-06 RX ADMIN — METHYLPREDNISOLONE SODIUM SUCCINATE 40 MG: 40 INJECTION, POWDER, LYOPHILIZED, FOR SOLUTION INTRAMUSCULAR; INTRAVENOUS at 09:37

## 2024-02-06 RX ADMIN — METHYLPREDNISOLONE SODIUM SUCCINATE 40 MG: 40 INJECTION, POWDER, LYOPHILIZED, FOR SOLUTION INTRAMUSCULAR; INTRAVENOUS at 13:34

## 2024-02-06 RX ADMIN — APIXABAN 5 MG: 5 TABLET, FILM COATED ORAL at 02:10

## 2024-02-06 RX ADMIN — LACTULOSE 20 G: 20 SOLUTION ORAL at 22:23

## 2024-02-06 RX ADMIN — METHIMAZOLE 10 MG: 5 TABLET ORAL at 20:59

## 2024-02-06 RX ADMIN — METHYLPREDNISOLONE SODIUM SUCCINATE 40 MG: 40 INJECTION, POWDER, LYOPHILIZED, FOR SOLUTION INTRAMUSCULAR; INTRAVENOUS at 21:00

## 2024-02-06 RX ADMIN — BUSPIRONE HYDROCHLORIDE 10 MG: 10 TABLET ORAL at 09:38

## 2024-02-06 RX ADMIN — METHYLPREDNISOLONE SODIUM SUCCINATE 40 MG: 40 INJECTION, POWDER, LYOPHILIZED, FOR SOLUTION INTRAMUSCULAR; INTRAVENOUS at 02:11

## 2024-02-06 RX ADMIN — TRAZODONE HYDROCHLORIDE 50 MG: 50 TABLET ORAL at 21:01

## 2024-02-06 RX ADMIN — PAROXETINE 10 MG: 10 TABLET, FILM COATED ORAL at 09:36

## 2024-02-06 RX ADMIN — IPRATROPIUM BROMIDE AND ALBUTEROL SULFATE 1 DOSE: .5; 2.5 SOLUTION RESPIRATORY (INHALATION) at 18:57

## 2024-02-06 ASSESSMENT — PAIN DESCRIPTION - DESCRIPTORS: DESCRIPTORS: ACHING;TENDER;SORE

## 2024-02-06 ASSESSMENT — PAIN SCALES - GENERAL
PAINLEVEL_OUTOF10: 5
PAINLEVEL_OUTOF10: 0
PAINLEVEL_OUTOF10: 4

## 2024-02-06 ASSESSMENT — PAIN DESCRIPTION - LOCATION: LOCATION: GENERALIZED

## 2024-02-06 NOTE — ED PROVIDER NOTES
Department of Emergency Medicine   ED  Provider Note  Admit Date/RoomTime: 2/5/2024  3:22 PM  ED Room: [facility]          History of Present Illness:  2/5/24, Time: 7:47 PM EST  Chief Complaint   Patient presents with    Shortness of Breath     On going for a week, breathing erratic                Lana Phillips is a 69 y.o. female presenting to the ED for shortness of breath.  Patient been short of breath for the past week.  Does have a cough, feels that there needs to be phlegm, would not come out.  Does have a history of COPD, does continue to smoke.  No change in bowel or bladder, no paresthesias, no back pain.  Subjective fevers and chills.  No other symptoms or complaints.    Review of Systems:   Pertinent positives and negatives are stated within HPI, all other systems reviewed and are negative.        --------------------------------------------- PAST HISTORY ---------------------------------------------  Past Medical History:  has a past medical history of Acute exacerbation of chronic obstructive pulmonary disease (COPD) (HCC), Acute respiratory failure (HCC), Acute respiratory failure with hypoxia (HCC), Acute respiratory failure with hypoxia (HCC), Atherosclerosis 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 (HCC), Atrial fibrillation (HCC), Chronic obstructive pulmonary disease (HCC), COPD (chronic obstructive pulmonary disease) (HCC), COPD exacerbation (HCC), COVID-19, Critical limb ischemia of left lower extremity with rest pain (HCC), Hypertension, Pneumonia due to infectious organism, Thyroid disease, and Uncontrolled hypertension.    Past Surgical History:  has a past surgical history that includes back surgery; Left oophorectomy; Upper gastrointestinal endoscopy (N/A, 10/16/2023); Pain management procedure (N/A, 12/28/2023); invasive vascular (N/A, 1/19/2024); and invasive vascular (N/A, 1/19/2024).    Social History:  reports that she has been smoking cigarettes. She started  1616)   ipratropium 0.5 mg-albuterol 2.5 mg (DUONEB) nebulizer solution 1 Dose (1 Dose Inhalation Given 2/5/24 3151)   ondansetron (ZOFRAN) injection 4 mg (4 mg IntraVENous Given 2/5/24 8184)           The cardiac monitor revealed sinus with a heart rate in the 80s as interpreted by me. The cardiac monitor was ordered secondary to the patient's SOB and to monitor the patient for dysrhythmia.   CPT 44724         Medical Decision Making:     Patient presents for shortness of breath.  Concern for COPD, CHF, pneumonia, or other pathologies.  She was in distress on arrival.  Received DuoNeb x 3 along with Solu-Medrol.    EKG inter by me shows sinus rhythm, rate 85, no STEMI, no ischemic change    Laboratory data interpreted by me me shows a troponin of 70, , normal CBC and CMP, respiratory panel is negative    Chest x-ray inter myself shows no hemothorax, radiology agrees    Chart reviewed, patient was seen by vascular surgery on 1/19/2024 for PVD    Reevaluation, patient's rest, breathing is improved.  Discussed with the hospitalist, patient be admitted.      Counseling:   The emergency provider has spoken with the patient and discussed today’s results, in addition to providing specific details for the plan of care and counseling regarding the diagnosis and prognosis.  Questions are answered at this time and they are agreeable with the plan.       --------------------------------- IMPRESSION AND DISPOSITION ---------------------------------    IMPRESSION  1. COPD exacerbation (HCC)        DISPOSITION  Disposition: Admit to telemetry  Patient condition is stable        NOTE: This report was transcribed using voice recognition software. Every effort was made to ensure accuracy; however, inadvertent computerized transcription errors may be present        Olena Escalante MD  02/06/24 [id number]

## 2024-02-06 NOTE — CONSULTS
[facility]  [address], [address]  [place] [address]    Pulmonary Consultation      Reason for Consult:  sob and cough    Requesting Physician:  Clyde Gonzalez DO    CHIEF COMPLAINT:  sob and cough    History Obtained From:  patient    HISTORY OF PRESENT ILLNESS:                The patient is a 69 y.o. female with significant past medical history of COPD who presents with increased sob and cough for the past week, gradually getting worse. Recently discharged from hospital on 1/20 after elective aortogram with some bleeding at access site. Discharged from hospital on 1/13 for COPD exacerbation. Admits to continued smoking of about 1 cigarette/week and last smoked was 1 week ago. Currently on 4 li NC, slightly better with cough and breathing, not much sputum production, sitting up in stretcher when seen.    Past Medical History:        Diagnosis Date    Acute exacerbation of chronic obstructive pulmonary disease (COPD) ([facility]) 07/14/2022    Acute respiratory failure ([facility]) 11/27/2022    Acute respiratory failure with hypoxia ([facility]) 06/27/2022    Acute respiratory failure with hypoxia ([facility]) 11/27/2022    Atherosclerosis 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 ([facility]) 01/19/2024    Atrial fibrillation (HCC)     Chronic obstructive pulmonary disease (HCC) 12/15/2022    COPD (chronic obstructive pulmonary disease) (Former[facility])     COPD exacerbation (HCC) 07/14/2022    COVID-19 07/16/2022    Critical limb ischemia of left lower extremity with rest pain ([facility]) 12/25/2023    Hypertension     Pneumonia due to infectious organism     Thyroid disease     Uncontrolled hypertension        Past Surgical History:        Procedure Laterality Date    BACK SURGERY      x2    INVASIVE VASCULAR N/A 1/19/2024    Aortagram abdominal performed by Lazaro Vidales MD at [facility]    INVASIVE VASCULAR N/A 1/19/2024    Angioplasty

## 2024-02-06 NOTE — H&P
[facility]                  [address] [place] [id number]                              HISTORY AND PHYSICAL    PATIENT NAME: ANGIE SHELTON CM                     :        1955  MED REC NO:   [id number]                            ROOM:       33  ACCOUNT NO:   [id number]                           ADMIT DATE: 2024  PROVIDER:     Baldemar Jarrett DO    PRIMARY CARE PHYSICIAN:  Clyde Gonzalez DO    CHIEF COMPLAINT:  Shortness of breath.    HISTORY OF PRESENT ILLNESS:  The patient is a 69-year-old   female who presented to the emergency room complaining of several day  history of increasing shortness of breath.  Also she has had nausea,  abdominal cramping, and constipation.  She states she had an upper and  lower scope per Dr. Vivas in 2023.  She was seen in the emergency  room.  She was admitted for further evaluation and treatment.  She is on  chronic oxygen at home.  She still smokes about two cigarettes a week.   Her pulmonologist is Dr. Price.    MEDICATIONS:  Prior to admission; albuterol inhaler, Eliquis, aspirin,  Lipitor, Breztri, BuSpar, vitamin D, Cardizem CD, doxycycline, DuoNeb,  Synthroid, Tapazole, Protonix, vitamin C, Desyrel, Lyrica, Paxil, and  vitamin B12.    PAST MEDICAL HISTORY:  COPD; chronic hypoxic respiratory failure, on  chronic O2; paroxysmal atrial fibrillation, chronic Eliquis therapy;  hypertension; vitamin B12 deficiency; hyperthyroidism; and peripheral  artery disease.    PAST SURGICAL HISTORY:  Back surgery, left oophorectomy, tonsillectomy,  appendectomy, and wisdom teeth extraction.    SOCIAL HISTORY:  The patient smokes two cigarettes a week.  No alcohol.    REVIEW OF SYSTEMS:  Remarkable for above-stated chief complaint.    ALLERGIES:  To PENICILLIN.    PHYSICAL EXAMINATION:  GENERAL APPEARANCE:  Reveals a 69-year-old  female who is alert  and oriented x3, cooperative and a good

## 2024-02-07 ENCOUNTER — HOSPITAL ENCOUNTER (OUTPATIENT)
Dept: WOUND CARE | Age: 69
Discharge: HOME | End: 2024-02-07

## 2024-02-07 PROBLEM — E43 SEVERE PROTEIN-CALORIE MALNUTRITION (HCC): Chronic | Status: ACTIVE | Noted: 2023-06-26

## 2024-02-07 LAB
ANION GAP SERPL CALCULATED.3IONS-SCNC: 14 MMOL/L (ref 7–16)
BUN SERPL-MCNC: 15 MG/DL (ref 6–23)
CALCIUM SERPL-MCNC: 8.8 MG/DL (ref 8.6–10.2)
CHLORIDE SERPL-SCNC: 93 MMOL/L (ref 98–107)
CO2 SERPL-SCNC: 32 MMOL/L (ref 22–29)
CREAT SERPL-MCNC: 0.6 MG/DL (ref 0.5–1)
GFR SERPL CREATININE-BSD FRML MDRD: >60 ML/MIN/1.73M2
GLUCOSE SERPL-MCNC: 112 MG/DL (ref 74–99)
POTASSIUM SERPL-SCNC: 4.5 MMOL/L (ref 3.5–5)
SODIUM SERPL-SCNC: 139 MMOL/L (ref 132–146)
T3 SERPL-MCNC: 38 NG/DL (ref 80–200)
T4 FREE SERPL-MCNC: 0.7 NG/DL (ref 0.9–1.7)
TSH SERPL DL<=0.05 MIU/L-ACNC: 1.62 UIU/ML (ref 0.27–4.2)

## 2024-02-07 PROCEDURE — 84439 ASSAY OF FREE THYROXINE: CPT

## 2024-02-07 PROCEDURE — 2580000003 HC RX 258: Performed by: INTERNAL MEDICINE

## 2024-02-07 PROCEDURE — 2060000000 HC ICU INTERMEDIATE R&B

## 2024-02-07 PROCEDURE — 84480 ASSAY TRIIODOTHYRONINE (T3): CPT

## 2024-02-07 PROCEDURE — 6370000000 HC RX 637 (ALT 250 FOR IP): Performed by: INTERNAL MEDICINE

## 2024-02-07 PROCEDURE — 80048 BASIC METABOLIC PNL TOTAL CA: CPT

## 2024-02-07 PROCEDURE — 6360000002 HC RX W HCPCS: Performed by: INTERNAL MEDICINE

## 2024-02-07 PROCEDURE — 36415 COLL VENOUS BLD VENIPUNCTURE: CPT

## 2024-02-07 PROCEDURE — 2700000000 HC OXYGEN THERAPY PER DAY

## 2024-02-07 PROCEDURE — 84443 ASSAY THYROID STIM HORMONE: CPT

## 2024-02-07 PROCEDURE — 94640 AIRWAY INHALATION TREATMENT: CPT

## 2024-02-07 RX ORDER — BISACODYL 5 MG/1
10 TABLET, DELAYED RELEASE ORAL ONCE
Status: COMPLETED | OUTPATIENT
Start: 2024-02-07 | End: 2024-02-07

## 2024-02-07 RX ORDER — GUAIFENESIN/DEXTROMETHORPHAN 100-10MG/5
5 SYRUP ORAL EVERY 4 HOURS PRN
Status: DISCONTINUED | OUTPATIENT
Start: 2024-02-07 | End: 2024-02-09 | Stop reason: HOSPADM

## 2024-02-07 RX ORDER — METHIMAZOLE 5 MG/1
15 TABLET ORAL DAILY
Status: DISCONTINUED | OUTPATIENT
Start: 2024-02-08 | End: 2024-02-09 | Stop reason: HOSPADM

## 2024-02-07 RX ADMIN — ARFORMOTEROL TARTRATE 15 MCG: 15 SOLUTION RESPIRATORY (INHALATION) at 20:27

## 2024-02-07 RX ADMIN — PREGABALIN 50 MG: 50 CAPSULE ORAL at 20:59

## 2024-02-07 RX ADMIN — BUDESONIDE INHALATION 500 MCG: 0.5 SUSPENSION RESPIRATORY (INHALATION) at 20:27

## 2024-02-07 RX ADMIN — METHIMAZOLE 10 MG: 5 TABLET ORAL at 08:32

## 2024-02-07 RX ADMIN — GUAIFENESIN SYRUP AND DEXTROMETHORPHAN 5 ML: 100; 10 SYRUP ORAL at 17:05

## 2024-02-07 RX ADMIN — SODIUM CHLORIDE, PRESERVATIVE FREE 10 ML: 5 INJECTION INTRAVENOUS at 21:01

## 2024-02-07 RX ADMIN — PREGABALIN 50 MG: 50 CAPSULE ORAL at 08:32

## 2024-02-07 RX ADMIN — GUAIFENESIN SYRUP AND DEXTROMETHORPHAN 5 ML: 100; 10 SYRUP ORAL at 21:01

## 2024-02-07 RX ADMIN — BUSPIRONE HYDROCHLORIDE 10 MG: 10 TABLET ORAL at 20:59

## 2024-02-07 RX ADMIN — PANTOPRAZOLE SODIUM 40 MG: 40 TABLET, DELAYED RELEASE ORAL at 06:05

## 2024-02-07 RX ADMIN — BUSPIRONE HYDROCHLORIDE 10 MG: 10 TABLET ORAL at 14:43

## 2024-02-07 RX ADMIN — DILTIAZEM HYDROCHLORIDE 120 MG: 120 CAPSULE, COATED, EXTENDED RELEASE ORAL at 08:32

## 2024-02-07 RX ADMIN — ARFORMOTEROL TARTRATE 15 MCG: 15 SOLUTION RESPIRATORY (INHALATION) at 08:40

## 2024-02-07 RX ADMIN — IPRATROPIUM BROMIDE AND ALBUTEROL SULFATE 1 DOSE: .5; 2.5 SOLUTION RESPIRATORY (INHALATION) at 13:06

## 2024-02-07 RX ADMIN — SODIUM CHLORIDE, PRESERVATIVE FREE 10 ML: 5 INJECTION INTRAVENOUS at 08:33

## 2024-02-07 RX ADMIN — POLYETHYLENE GLYCOL 3350 17 G: 17 POWDER, FOR SOLUTION ORAL at 08:40

## 2024-02-07 RX ADMIN — PAROXETINE 10 MG: 10 TABLET, FILM COATED ORAL at 08:32

## 2024-02-07 RX ADMIN — BUDESONIDE INHALATION 500 MCG: 0.5 SUSPENSION RESPIRATORY (INHALATION) at 08:40

## 2024-02-07 RX ADMIN — IPRATROPIUM BROMIDE AND ALBUTEROL SULFATE 1 DOSE: .5; 2.5 SOLUTION RESPIRATORY (INHALATION) at 16:23

## 2024-02-07 RX ADMIN — METHYLPREDNISOLONE SODIUM SUCCINATE 40 MG: 40 INJECTION, POWDER, LYOPHILIZED, FOR SOLUTION INTRAMUSCULAR; INTRAVENOUS at 04:48

## 2024-02-07 RX ADMIN — IPRATROPIUM BROMIDE AND ALBUTEROL SULFATE 1 DOSE: .5; 2.5 SOLUTION RESPIRATORY (INHALATION) at 20:27

## 2024-02-07 RX ADMIN — APIXABAN 5 MG: 5 TABLET, FILM COATED ORAL at 20:59

## 2024-02-07 RX ADMIN — BISACODYL 10 MG: 5 TABLET, COATED ORAL at 10:21

## 2024-02-07 RX ADMIN — IPRATROPIUM BROMIDE AND ALBUTEROL SULFATE 1 DOSE: .5; 2.5 SOLUTION RESPIRATORY (INHALATION) at 08:40

## 2024-02-07 RX ADMIN — TRAZODONE HYDROCHLORIDE 50 MG: 50 TABLET ORAL at 20:59

## 2024-02-07 RX ADMIN — BUSPIRONE HYDROCHLORIDE 10 MG: 10 TABLET ORAL at 08:32

## 2024-02-07 RX ADMIN — WATER 40 MG: 1 INJECTION INTRAMUSCULAR; INTRAVENOUS; SUBCUTANEOUS at 17:04

## 2024-02-07 RX ADMIN — ATORVASTATIN CALCIUM 40 MG: 40 TABLET, FILM COATED ORAL at 20:59

## 2024-02-07 RX ADMIN — LEVOTHYROXINE SODIUM 25 MCG: 0.03 TABLET ORAL at 06:05

## 2024-02-07 RX ADMIN — ASPIRIN 81 MG: 81 TABLET, COATED ORAL at 08:32

## 2024-02-07 RX ADMIN — APIXABAN 5 MG: 5 TABLET, FILM COATED ORAL at 08:32

## 2024-02-07 RX ADMIN — PREGABALIN 50 MG: 50 CAPSULE ORAL at 14:43

## 2024-02-07 NOTE — PROGRESS NOTES
[facility]  [address], [address]  [place] [address]      Pulmonary Progress Note      SUBJECTIVE:  still feeling sob, no significant change compared to admission, claims dry cough though sounding a bit ronchorous, sitting up in bed on 3 li NC.    OBJECTIVE    Medications    Continuous Infusions:   sodium chloride         Scheduled Meds:   methylPREDNISolone  40 mg IntraVENous Q12H    [START ON 2024] methIMAzole  15 mg Oral Daily    apixaban  5 mg Oral BID    aspirin  81 mg Oral Daily    atorvastatin  40 mg Oral Nightly    busPIRone  10 mg Oral TID    dilTIAZem  120 mg Oral Daily    ipratropium 0.5 mg-albuterol 2.5 mg  1 Dose Nebulization Q4H WA RT    pantoprazole  40 mg Oral QAM AC    PARoxetine  10 mg Oral QAM    pregabalin  50 mg Oral TID    traZODone  50 mg Oral Nightly    budesonide  0.5 mg Nebulization BID RT    arformoterol tartrate  15 mcg Nebulization BID RT    sodium chloride flush  5-40 mL IntraVENous 2 times per day       PRN Meds:guaiFENesin-dextromethorphan, sodium chloride flush, sodium chloride, potassium chloride **OR** potassium alternative oral replacement **OR** potassium chloride, magnesium sulfate, ondansetron **OR** ondansetron, polyethylene glycol, acetaminophen **OR** acetaminophen    Physical    VITALS:  /63   Pulse 88   Temp 97.7 °F (36.5 °C)   Resp 14   Ht 1.727 m (5' 7.99\")   Wt 43.1 kg (95 lb)   SpO2 95%   BMI 14.45 kg/m²     24HR INTAKE/OUTPUT:    No intake or output data in the 24 hours ending 24 1634    24HR PULSE OXIMETRY RANGE:    SpO2  Av.3 %  Min: 95 %  Max: 100 %    General appearance: alert, appears stated age, and cooperative  Lungs: diminished breath sounds bilaterally with cough  Heart: regular rate and rhythm, S1, S2 normal, no murmur, click, rub or gallop  Abdomen: soft, non-tender; bowel sounds normal; no masses,  no organomegaly  Extremities: extremities normal,

## 2024-02-07 NOTE — ACP (ADVANCE CARE PLANNING)
Advance Care Planning   Healthcare Decision Maker:    Primary Decision Maker: OtispabloLana - Other Relative - [phone]    Primary Decision Maker: sue ward - Brother/Sister - [phone]    Click here to complete Healthcare Decision Makers including selection of the Healthcare Decision Maker Relationship (ie \"Primary\").

## 2024-02-07 NOTE — PROGRESS NOTES
4 Eyes Skin Assessment     NAME:  Lana Phillips  YOB: 1955  MEDICAL RECORD NUMBER:  [id number]    The patient is being assessed for  Admission    I agree that at least one RN has performed a thorough Head to Toe Skin Assessment on the patient. ALL assessment sites listed below have been assessed.      Areas assessed by both nurses:    Head, Face, Ears, Shoulders, Back, Chest, Arms, Elbows, Hands, Sacrum. Buttock, Coccyx, Ischium, and Legs. Feet and Heels        Does the Patient have a Wound? No noted wound(s)       Guy Prevention initiated by RN: Yes  Wound Care Orders initiated by RN: No    Pressure Injury (Stage 3,4, Unstageable, DTI, NWPT, and Complex wounds) if present, place Wound referral order by RN under : No    New Ostomies, if present place, Ostomy referral order under : No     Nurse 1 eSignature: Electronically signed by Tierney Egan RN on 2/6/24 at 7:02 PM EST    **SHARE this note so that the co-signing nurse can place an eSignature**    Nurse 2 eSignature: {Esignature:[id number]}

## 2024-02-07 NOTE — CARE COORDINATION
Readmit here for SOB.Duonebs nebulizers and IV solumedrol.Xray chest- Emphysematous changes lung parenchyma.  No superimposed acute cardiopulmonary process.Pulmonary consult.Currently on 3 liters  which is what she wears @ home. Met with patient to discuss role transition of care. She lives in 1 story home  with her sister in law Lana. With 3 steps to enter.  Her PCP is Dr Gonzalez. She has oxygen @ 3 liters and nebulizers from [employer].  No hhc or stone history. Is interested in [facility]- gave HHC list. Cm/sw to follow.Electronically signed by Tiffany Velez RN on 2/7/2024 at 1:33 PM      Case Management Assessment  Initial Evaluation    Date/Time of Evaluation: 2/7/2024 1:36 PM  Assessment Completed by: Tiffany Velez RN    If patient is discharged prior to next notation, then this note serves as note for discharge by case management.    Patient Name: Lana Phillips                   YOB: 1955  Diagnosis: COPD exacerbation (HCC) [J44.1]                   Date / Time: 2/5/2024  3:22 PM    Patient Admission Status: Inpatient   Readmission Risk (Low < 19, Mod (19-27), High > 27): Readmission Risk Score: 26.1    Current PCP: Clyde Gonzalez, DO  PCP verified by CM? Yes    Chart Reviewed: Yes      History Provided by: Patient  Patient Orientation: Alert and Oriented    Patient Cognition: Alert    Hospitalization in the last 30 days (Readmission):  Yes    If yes, Readmission Assessment in  Navigator will be completed.    Advance Directives:      Code Status: Full Code   Patient's Primary Decision Maker is: Legal Next of Kin    Primary Decision Maker: sue ward - Brother/Sister - [phone]    Discharge Planning:    Patient lives with: Family Members Type of Home: House  Primary Care Giver: Self  Patient Support Systems include: Family Members   Current Financial resources:    Current community resources:    Current services prior to admission: Oxygen Therapy            Current DME:              Type of

## 2024-02-07 NOTE — PROGRESS NOTES
[facility] Medicine    Subjective:  pt alert conversive breathing better      Current Facility-Administered Medications:     methylPREDNISolone sodium succ (SOLU-MEDROL) 40 mg in sterile water 1 mL injection, 40 mg, IntraVENous, Q12H, Baldemar Jarrett DO    bisacodyl (DULCOLAX) EC tablet 10 mg, 10 mg, Oral, Once, Baldemar Jarrett DO    apixaban (ELIQUIS) tablet 5 mg, 5 mg, Oral, BID, Baldemar Jarrett DO, 5 mg at 02/07/24 0832    aspirin EC tablet 81 mg, 81 mg, Oral, Daily, Baldemar Jarrett DO, 81 mg at 02/07/24 0832    atorvastatin (LIPITOR) tablet 40 mg, 40 mg, Oral, Nightly, Baldemar Jarrett DO, 40 mg at 02/06/24 2101    busPIRone (BUSPAR) tablet 10 mg, 10 mg, Oral, TID, Baldemar Jarrett DO, 10 mg at 02/07/24 0832    dilTIAZem (CARDIZEM CD) extended release capsule 120 mg, 120 mg, Oral, Daily, Baldemar Jarrett DO, 120 mg at 02/07/24 0832    ipratropium 0.5 mg-albuterol 2.5 mg (DUONEB) nebulizer solution 1 Dose, 1 Dose, Nebulization, Q4H WA RT, Baldemar Jarrett DO, 1 Dose at 02/07/24 0840    pantoprazole (PROTONIX) tablet 40 mg, 40 mg, Oral, QAM AC, Baldemar Jarrett DO, 40 mg at 02/07/24 0605    PARoxetine (PAXIL) tablet 10 mg, 10 mg, Oral, QAM, Baldemar Jarrett DO, 10 mg at 02/07/24 0832    pregabalin (LYRICA) capsule 50 mg, 50 mg, Oral, TID, Baldemar Jarrett DO, 50 mg at 02/07/24 0832    traZODone (DESYREL) tablet 50 mg, 50 mg, Oral, Nightly, Baldemar Jarrett DO, 50 mg at 02/06/24 2101    budesonide (PULMICORT) nebulizer suspension 500 mcg, 0.5 mg, Nebulization, BID RT, Baldemar Jarrett DO, 500 mcg at 02/07/24 0840    arformoterol tartrate (BROVANA) nebulizer solution 15 mcg, 15 mcg, Nebulization, BID RT, Baldemar Jarrett, , 15 mcg at 02/07/24 0840    levothyroxine (SYNTHROID) tablet 25 mcg, 25 mcg, Oral, Daily, Baldemar Jarrett DO, 25 mcg at 02/07/24 0605    methIMAzole (TAPAZOLE) tablet 10 mg, 10 mg, Oral, Daily, Baldemar Jarrett DO, 10 mg at 02/07/24 0832    sodium chloride  flush 0.9 % injection 5-40 mL, 5-40 mL, IntraVENous, 2 times per day, Baldemar Jarrett, , 10 mL at 02/07/24 0833    sodium chloride flush 0.9 % injection 5-40 mL, 5-40 mL, IntraVENous, PRN, Baldemar Jarrett,     0.9 % sodium chloride infusion, , IntraVENous, PRN, Baldemar Jarrett,     potassium chloride (KLOR-CON M) extended release tablet 40 mEq, 40 mEq, Oral, PRN **OR** potassium bicarb-citric acid (EFFER-K) effervescent tablet 40 mEq, 40 mEq, Oral, PRN **OR** potassium chloride 10 mEq/100 mL IVPB (Peripheral Line), 10 mEq, IntraVENous, PRN, Baldemar Jarrett DO    magnesium sulfate 2000 mg in 50 mL IVPB premix, 2,000 mg, IntraVENous, PRN, Baldemar Jarrett,     ondansetron (ZOFRAN-ODT) disintegrating tablet 4 mg, 4 mg, Oral, Q8H PRN **OR** ondansetron (ZOFRAN) injection 4 mg, 4 mg, IntraVENous, Q6H PRN, Baldemar Jarrett DO    polyethylene glycol (GLYCOLAX) packet 17 g, 17 g, Oral, Daily PRN, Baldemar Jarrett DO, 17 g at 02/07/24 0840    acetaminophen (TYLENOL) tablet 650 mg, 650 mg, Oral, Q6H PRN, 650 mg at 02/06/24 2223 **OR** acetaminophen (TYLENOL) suppository 650 mg, 650 mg, Rectal, Q6H PRN, Baldemar Jarrett DO    Objective:    BP (!) 118/95   Pulse 74   Temp 98.3 °F (36.8 °C) (Temporal)   Resp 16   Ht 1.727 m (5' 7.99\") Comment: 5'8\"  Wt 43.1 kg (95 lb)   SpO2 98%   BMI 14.45 kg/m²     Heart:  reg  Lungs:  min wheeze rhonchi  Abd: + bs soft nontender  Extrem:  w/o edema    CBC with Differential:    Lab Results   Component Value Date/Time    WBC 5.1 02/06/2024 09:01 AM    RBC 2.95 02/06/2024 09:01 AM    HGB 8.2 02/06/2024 09:01 AM    HCT 27.5 02/06/2024 09:01 AM     02/06/2024 09:01 AM    MCV 93.2 02/06/2024 09:01 AM    MCH 27.8 02/06/2024 09:01 AM    MCHC 29.8 02/06/2024 09:01 AM    RDW 14.9 02/06/2024 09:01 AM    LYMPHOPCT 11 02/05/2024 04:06 PM    MONOPCT 9 02/05/2024 04:06 PM    BASOPCT 1 02/05/2024 04:06 PM    MONOSABS 0.80 02/05/2024 04:06 PM    LYMPHSABS 0.94 02/05/2024

## 2024-02-07 NOTE — PROGRESS NOTES
Comprehensive Nutrition Assessment    Type and Reason for Visit:  Initial, Positive Nutrition Screen    Nutrition Recommendations/Plan:   Continue current diet  Start Ensure TID to promote oral intake  Will monitor     Malnutrition Assessment:  Malnutrition Status:  Severe malnutrition (02/07/24 1141)    Context:  Chronic Illness     Findings of the 6 clinical characteristics of malnutrition:  Energy Intake:  75% or less estimated energy requirements for 1 month or longer  Weight Loss:  Unable to assess     Body Fat Loss:  Severe body fat loss Orbital, Triceps, Fat Overlying Ribs   Muscle Mass Loss:  Severe muscle mass loss Temples (temporalis), Clavicles (pectoralis & deltoids), Calf (gastrocnemius)  Fluid Accumulation:  No significant fluid accumulation     Strength:  Not Performed    Nutrition Assessment:    pt adm d/t COPD exacerbation/SOB; PMhx of COPD,HTN,AFIB; pt meets criteria for severe malnutrition; will start Ensure TID to promote oral intake ; will monitor.    Nutrition Related Findings:    severe muscle/fat wasting; A&Ox4; poor dentition; abd WNL; no edema; EUGENIA I/O Wound Type: None       Current Nutrition Intake & Therapies:    Average Meal Intake: Unable to assess (d/t lack of intakes recorded this adm)  Average Supplements Intake: None Ordered  ADULT DIET; Regular  ADULT ORAL NUTRITION SUPPLEMENT; Breakfast, Lunch, Dinner; Standard High Calorie/High Protein Oral Supplement    Anthropometric Measures:  Height: 172.7 cm (5' 7.99\")  Ideal Body Weight (IBW): 140 lbs (64 kg)       Current Body Weight: 43.1 kg (95 lb 0.3 oz) (2/6-no method ; recent standing scale wt of 42.8kg noted on 1/9), 67.9 % IBW.    Current BMI (kg/m2): 14.5  Usual Body Weight: 43.8 kg (96 lb 9 oz) (10/16/23-bedscale)  % Weight Change (Calculated): -1.6  Weight Adjustment For: No Adjustment                 BMI Categories: Underweight (BMI less than 22) age over 65    Estimated Daily Nutrient Needs:  Energy Requirements Based On:

## 2024-02-08 LAB
EKG ATRIAL RATE: 85 BPM
EKG P AXIS: 71 DEGREES
EKG P-R INTERVAL: 124 MS
EKG Q-T INTERVAL: 392 MS
EKG QRS DURATION: 76 MS
EKG QTC CALCULATION (BAZETT): 466 MS
EKG R AXIS: 79 DEGREES
EKG T AXIS: 89 DEGREES
EKG VENTRICULAR RATE: 85 BPM

## 2024-02-08 PROCEDURE — 6370000000 HC RX 637 (ALT 250 FOR IP): Performed by: INTERNAL MEDICINE

## 2024-02-08 PROCEDURE — 6360000002 HC RX W HCPCS: Performed by: INTERNAL MEDICINE

## 2024-02-08 PROCEDURE — 94640 AIRWAY INHALATION TREATMENT: CPT

## 2024-02-08 PROCEDURE — 2060000000 HC ICU INTERMEDIATE R&B

## 2024-02-08 PROCEDURE — 93010 ELECTROCARDIOGRAM REPORT: CPT | Performed by: INTERNAL MEDICINE

## 2024-02-08 PROCEDURE — 2580000003 HC RX 258: Performed by: INTERNAL MEDICINE

## 2024-02-08 PROCEDURE — 2700000000 HC OXYGEN THERAPY PER DAY

## 2024-02-08 RX ORDER — PREDNISONE 10 MG/1
TABLET ORAL
Qty: 18 TABLET | Refills: 0 | Status: SHIPPED | OUTPATIENT
Start: 2024-02-09 | End: 2024-02-13

## 2024-02-08 RX ORDER — MAGNESIUM CARB/ALUMINUM HYDROX 105-160MG
296 TABLET,CHEWABLE ORAL ONCE
Status: COMPLETED | OUTPATIENT
Start: 2024-02-08 | End: 2024-02-08

## 2024-02-08 RX ORDER — ENEMA 19; 7 G/133ML; G/133ML
1 ENEMA RECTAL
Status: DISCONTINUED | OUTPATIENT
Start: 2024-02-08 | End: 2024-02-09 | Stop reason: HOSPADM

## 2024-02-08 RX ADMIN — PAROXETINE 10 MG: 10 TABLET, FILM COATED ORAL at 09:05

## 2024-02-08 RX ADMIN — APIXABAN 5 MG: 5 TABLET, FILM COATED ORAL at 09:03

## 2024-02-08 RX ADMIN — PREGABALIN 50 MG: 50 CAPSULE ORAL at 09:03

## 2024-02-08 RX ADMIN — IPRATROPIUM BROMIDE AND ALBUTEROL SULFATE 1 DOSE: .5; 2.5 SOLUTION RESPIRATORY (INHALATION) at 20:29

## 2024-02-08 RX ADMIN — SODIUM CHLORIDE, PRESERVATIVE FREE 10 ML: 5 INJECTION INTRAVENOUS at 20:18

## 2024-02-08 RX ADMIN — PANTOPRAZOLE SODIUM 40 MG: 40 TABLET, DELAYED RELEASE ORAL at 06:16

## 2024-02-08 RX ADMIN — ARFORMOTEROL TARTRATE 15 MCG: 15 SOLUTION RESPIRATORY (INHALATION) at 09:21

## 2024-02-08 RX ADMIN — WATER 40 MG: 1 INJECTION INTRAMUSCULAR; INTRAVENOUS; SUBCUTANEOUS at 06:16

## 2024-02-08 RX ADMIN — POLYETHYLENE GLYCOL 3350 17 G: 17 POWDER, FOR SOLUTION ORAL at 11:26

## 2024-02-08 RX ADMIN — ATORVASTATIN CALCIUM 40 MG: 40 TABLET, FILM COATED ORAL at 20:17

## 2024-02-08 RX ADMIN — PREGABALIN 50 MG: 50 CAPSULE ORAL at 13:57

## 2024-02-08 RX ADMIN — TRAZODONE HYDROCHLORIDE 50 MG: 50 TABLET ORAL at 20:17

## 2024-02-08 RX ADMIN — BUSPIRONE HYDROCHLORIDE 10 MG: 10 TABLET ORAL at 20:17

## 2024-02-08 RX ADMIN — IPRATROPIUM BROMIDE AND ALBUTEROL SULFATE 1 DOSE: .5; 2.5 SOLUTION RESPIRATORY (INHALATION) at 16:46

## 2024-02-08 RX ADMIN — IPRATROPIUM BROMIDE AND ALBUTEROL SULFATE 1 DOSE: .5; 2.5 SOLUTION RESPIRATORY (INHALATION) at 13:22

## 2024-02-08 RX ADMIN — BUSPIRONE HYDROCHLORIDE 10 MG: 10 TABLET ORAL at 09:03

## 2024-02-08 RX ADMIN — PREGABALIN 50 MG: 50 CAPSULE ORAL at 20:17

## 2024-02-08 RX ADMIN — BUSPIRONE HYDROCHLORIDE 10 MG: 10 TABLET ORAL at 13:57

## 2024-02-08 RX ADMIN — MAGNESIUM CITRATE 296 ML: 1.75 LIQUID ORAL at 17:26

## 2024-02-08 RX ADMIN — METHIMAZOLE 15 MG: 5 TABLET ORAL at 09:05

## 2024-02-08 RX ADMIN — BUDESONIDE INHALATION 500 MCG: 0.5 SUSPENSION RESPIRATORY (INHALATION) at 09:21

## 2024-02-08 RX ADMIN — SODIUM CHLORIDE, PRESERVATIVE FREE 10 ML: 5 INJECTION INTRAVENOUS at 09:05

## 2024-02-08 RX ADMIN — IPRATROPIUM BROMIDE AND ALBUTEROL SULFATE 1 DOSE: .5; 2.5 SOLUTION RESPIRATORY (INHALATION) at 09:21

## 2024-02-08 RX ADMIN — DILTIAZEM HYDROCHLORIDE 120 MG: 120 CAPSULE, COATED, EXTENDED RELEASE ORAL at 09:04

## 2024-02-08 RX ADMIN — ARFORMOTEROL TARTRATE 15 MCG: 15 SOLUTION RESPIRATORY (INHALATION) at 20:29

## 2024-02-08 RX ADMIN — ACETAMINOPHEN 325MG 650 MG: 325 TABLET ORAL at 21:16

## 2024-02-08 RX ADMIN — BUDESONIDE INHALATION 500 MCG: 0.5 SUSPENSION RESPIRATORY (INHALATION) at 20:29

## 2024-02-08 RX ADMIN — APIXABAN 5 MG: 5 TABLET, FILM COATED ORAL at 20:17

## 2024-02-08 RX ADMIN — ASPIRIN 81 MG: 81 TABLET, COATED ORAL at 09:05

## 2024-02-08 RX ADMIN — WATER 40 MG: 1 INJECTION INTRAMUSCULAR; INTRAVENOUS; SUBCUTANEOUS at 17:26

## 2024-02-08 ASSESSMENT — PAIN DESCRIPTION - DESCRIPTORS: DESCRIPTORS: ACHING;DISCOMFORT;DULL

## 2024-02-08 ASSESSMENT — PAIN SCALES - GENERAL
PAINLEVEL_OUTOF10: 3
PAINLEVEL_OUTOF10: 7
PAINLEVEL_OUTOF10: 0
PAINLEVEL_OUTOF10: 0

## 2024-02-08 ASSESSMENT — PAIN DESCRIPTION - ORIENTATION: ORIENTATION: ANTERIOR

## 2024-02-08 ASSESSMENT — PAIN - FUNCTIONAL ASSESSMENT: PAIN_FUNCTIONAL_ASSESSMENT: PREVENTS OR INTERFERES SOME ACTIVE ACTIVITIES AND ADLS

## 2024-02-08 ASSESSMENT — PAIN DESCRIPTION - LOCATION: LOCATION: HEAD

## 2024-02-08 NOTE — PLAN OF CARE
Problem: Skin/Tissue Integrity  Goal: Absence of new skin breakdown  Description: 1.  Monitor for areas of redness and/or skin breakdown  2.  Assess vascular access sites hourly  3.  Every 4-6 hours minimum:  Change oxygen saturation probe site  4.  Every 4-6 hours:  If on nasal continuous positive airway pressure, respiratory therapy assess nares and determine need for appliance change or resting period.  Outcome: Progressing     Problem: Chronic Conditions and Co-morbidities  Goal: Patient's chronic conditions and co-morbidity symptoms are monitored and maintained or improved  Outcome: Progressing     Problem: Discharge Planning  Goal: Discharge to home or other facility with appropriate resources  Outcome: Progressing     Problem: Pain  Goal: Verbalizes/displays adequate comfort level or baseline comfort level  Outcome: Progressing     Problem: Safety - Adult  Goal: Free from fall injury  Outcome: Progressing     Problem: Nutrition Deficit:  Goal: Optimize nutritional status  Outcome: Progressing

## 2024-02-08 NOTE — PROGRESS NOTES
[facility]  [address], [address]  [place] [address]      Pulmonary Progress Note      SUBJECTIVE:  feeling some better with breathing, lying down in bed on 3 li NC, some help with EZPAP with both breathing and coughing.    OBJECTIVE    Medications    Continuous Infusions:   sodium chloride         Scheduled Meds:   Magnesium Citrate  296 mL Oral Once    methylPREDNISolone  40 mg IntraVENous Q12H    methIMAzole  15 mg Oral Daily    apixaban  5 mg Oral BID    aspirin  81 mg Oral Daily    atorvastatin  40 mg Oral Nightly    busPIRone  10 mg Oral TID    dilTIAZem  120 mg Oral Daily    ipratropium 0.5 mg-albuterol 2.5 mg  1 Dose Nebulization Q4H WA RT    pantoprazole  40 mg Oral QAM AC    PARoxetine  10 mg Oral QAM    pregabalin  50 mg Oral TID    traZODone  50 mg Oral Nightly    budesonide  0.5 mg Nebulization BID RT    arformoterol tartrate  15 mcg Nebulization BID RT    sodium chloride flush  5-40 mL IntraVENous 2 times per day       PRN Meds:sodium phosphate, guaiFENesin-dextromethorphan, sodium chloride flush, sodium chloride, potassium chloride **OR** potassium alternative oral replacement **OR** potassium chloride, magnesium sulfate, ondansetron **OR** ondansetron, polyethylene glycol, acetaminophen **OR** acetaminophen    Physical    VITALS:  /67   Pulse 93   Temp 98.2 °F (36.8 °C) (Axillary)   Resp 19   Ht 1.727 m (5' 7.99\")   Wt 43.1 kg (95 lb)   SpO2 96%   BMI 14.45 kg/m²     24HR INTAKE/OUTPUT:    No intake or output data in the 24 hours ending 24 1648    24HR PULSE OXIMETRY RANGE:    SpO2  Av.5 %  Min: 95 %  Max: 100 %    General appearance: alert, appears stated age, and cooperative  Lungs: diminished breath sounds bilaterally with cough  Heart: regular rate and rhythm, S1, S2 normal, no murmur, click, rub or gallop  Abdomen: soft, non-tender; bowel sounds normal; no masses,  no organomegaly  Extremities:

## 2024-02-08 NOTE — HOME CARE
[facility] referral received, awaiting final orders. Spoke with patient and verified demographics. Will follow.   Desi Kenny LPN [facility].

## 2024-02-08 NOTE — PROGRESS NOTES
[facility] Medicine    Subjective:  pt alert conversive breathing improving      Current Facility-Administered Medications:     methylPREDNISolone sodium succ (SOLU-MEDROL) 40 mg in sterile water 1 mL injection, 40 mg, IntraVENous, Q12H, Baldemar Jarrett DO, 40 mg at 02/08/24 0616    methIMAzole (TAPAZOLE) tablet 15 mg, 15 mg, Oral, Daily, Baldemar Jarrett DO    guaiFENesin-dextromethorphan (ROBITUSSIN DM) 100-10 MG/5ML syrup 5 mL, 5 mL, Oral, Q4H PRN, Jose Price MD, 5 mL at 02/07/24 2101    apixaban (ELIQUIS) tablet 5 mg, 5 mg, Oral, BID, Baldemar Jarrett DO, 5 mg at 02/07/24 2059    aspirin EC tablet 81 mg, 81 mg, Oral, Daily, Baldemar Jarrett DO, 81 mg at 02/07/24 0832    atorvastatin (LIPITOR) tablet 40 mg, 40 mg, Oral, Nightly, Baldemar Jarrett DO, 40 mg at 02/07/24 2059    busPIRone (BUSPAR) tablet 10 mg, 10 mg, Oral, TID, Baldemar Jarrett DO, 10 mg at 02/07/24 2059    dilTIAZem (CARDIZEM CD) extended release capsule 120 mg, 120 mg, Oral, Daily, Baldemar Jarrett DO, 120 mg at 02/07/24 0832    ipratropium 0.5 mg-albuterol 2.5 mg (DUONEB) nebulizer solution 1 Dose, 1 Dose, Nebulization, Q4H WA RT, Baldemar Jarrett DO, 1 Dose at 02/07/24 2027    pantoprazole (PROTONIX) tablet 40 mg, 40 mg, Oral, QAM AC, Baldemar Jarrett DO, 40 mg at 02/08/24 0616    PARoxetine (PAXIL) tablet 10 mg, 10 mg, Oral, QAM, Baldemar Jarrett, , 10 mg at 02/07/24 0832    pregabalin (LYRICA) capsule 50 mg, 50 mg, Oral, TID, Baldemar Jarrett DO, 50 mg at 02/07/24 2059    traZODone (DESYREL) tablet 50 mg, 50 mg, Oral, Nightly, Baldemar Jarrett, , 50 mg at 02/07/24 2059    budesonide (PULMICORT) nebulizer suspension 500 mcg, 0.5 mg, Nebulization, BID RT, Baldemar Jarrett, , 500 mcg at 02/07/24 2027    arformoterol tartrate (BROVANA) nebulizer solution 15 mcg, 15 mcg, Nebulization, BID RT, Baldemar Jarrett, , 15 mcg at 02/07/24 2027    sodium chloride flush 0.9 % injection 5-40 mL, 5-40 mL, IntraVENous, 2

## 2024-02-08 NOTE — CARE COORDINATION
SOCIAL WORK/CASEMANAGEMENT TRANSITION OF CARE PLANNING( KATELYNN STERLING, [phone]): I met with pt in the room this a.m. she Is on 3l nc with that at home pta. Pt is also on iv solumedrol with pulmonary following. Pt is in agreement to [facility] for nsg. Choices offered but pt has no preference. Pt is in agreement to [facility], referral made. Will need order for cpa, medication compliance with ok to start care on 2/14. Left note for pcp and rn. .Katelynn Banuelos, [facility]  .2/8/2024    The Plan for Transition of Care is related to the following treatment goals: sotne   The Patient and/or patient representative  was provided with a choice of provider and agrees   with the discharge plan. [x] Yes [] No  Freedom of choice list was provided with basic dialogue that supports the patient's individualized plan of care/goals, treatment preferences and shares the quality data associated with the providers. [x] Yes [] No

## 2024-02-09 VITALS
HEART RATE: 73 BPM | RESPIRATION RATE: 18 BRPM | OXYGEN SATURATION: 99 % | DIASTOLIC BLOOD PRESSURE: 84 MMHG | SYSTOLIC BLOOD PRESSURE: 121 MMHG | TEMPERATURE: 97.5 F | WEIGHT: 95 LBS | BODY MASS INDEX: 14.4 KG/M2 | HEIGHT: 68 IN

## 2024-02-09 PROCEDURE — 94640 AIRWAY INHALATION TREATMENT: CPT

## 2024-02-09 PROCEDURE — 6370000000 HC RX 637 (ALT 250 FOR IP): Performed by: INTERNAL MEDICINE

## 2024-02-09 PROCEDURE — 2580000003 HC RX 258: Performed by: INTERNAL MEDICINE

## 2024-02-09 PROCEDURE — 2700000000 HC OXYGEN THERAPY PER DAY

## 2024-02-09 RX ORDER — DOCUSATE SODIUM 100 MG/1
100 CAPSULE, LIQUID FILLED ORAL 2 TIMES DAILY
Status: DISCONTINUED | OUTPATIENT
Start: 2024-02-09 | End: 2024-02-09 | Stop reason: HOSPADM

## 2024-02-09 RX ORDER — POLYETHYLENE GLYCOL 3350 17 G/17G
17 POWDER, FOR SOLUTION ORAL DAILY
Status: DISCONTINUED | OUTPATIENT
Start: 2024-02-09 | End: 2024-02-09 | Stop reason: HOSPADM

## 2024-02-09 RX ORDER — HYDROXYZINE PAMOATE 25 MG/1
25 CAPSULE ORAL 3 TIMES DAILY PRN
Status: DISCONTINUED | OUTPATIENT
Start: 2024-02-09 | End: 2024-02-09 | Stop reason: HOSPADM

## 2024-02-09 RX ADMIN — GUAIFENESIN SYRUP AND DEXTROMETHORPHAN 5 ML: 100; 10 SYRUP ORAL at 10:15

## 2024-02-09 RX ADMIN — PREGABALIN 50 MG: 50 CAPSULE ORAL at 14:01

## 2024-02-09 RX ADMIN — APIXABAN 5 MG: 5 TABLET, FILM COATED ORAL at 09:08

## 2024-02-09 RX ADMIN — PAROXETINE 10 MG: 10 TABLET, FILM COATED ORAL at 09:08

## 2024-02-09 RX ADMIN — ASPIRIN 81 MG: 81 TABLET, COATED ORAL at 09:08

## 2024-02-09 RX ADMIN — IPRATROPIUM BROMIDE AND ALBUTEROL SULFATE 1 DOSE: .5; 2.5 SOLUTION RESPIRATORY (INHALATION) at 12:52

## 2024-02-09 RX ADMIN — DILTIAZEM HYDROCHLORIDE 120 MG: 120 CAPSULE, COATED, EXTENDED RELEASE ORAL at 09:08

## 2024-02-09 RX ADMIN — PREDNISONE 30 MG: 10 TABLET ORAL at 09:08

## 2024-02-09 RX ADMIN — DOCUSATE SODIUM 100 MG: 100 CAPSULE, LIQUID FILLED ORAL at 09:07

## 2024-02-09 RX ADMIN — METHIMAZOLE 15 MG: 5 TABLET ORAL at 09:08

## 2024-02-09 RX ADMIN — POLYETHYLENE GLYCOL 3350 17 G: 17 POWDER, FOR SOLUTION ORAL at 09:08

## 2024-02-09 RX ADMIN — PREGABALIN 50 MG: 50 CAPSULE ORAL at 09:08

## 2024-02-09 RX ADMIN — BUSPIRONE HYDROCHLORIDE 10 MG: 10 TABLET ORAL at 09:08

## 2024-02-09 RX ADMIN — HYDROXYZINE PAMOATE 25 MG: 25 CAPSULE ORAL at 11:54

## 2024-02-09 RX ADMIN — PANTOPRAZOLE SODIUM 40 MG: 40 TABLET, DELAYED RELEASE ORAL at 05:35

## 2024-02-09 RX ADMIN — BUSPIRONE HYDROCHLORIDE 10 MG: 10 TABLET ORAL at 14:01

## 2024-02-09 RX ADMIN — SODIUM CHLORIDE, PRESERVATIVE FREE 10 ML: 5 INJECTION INTRAVENOUS at 09:08

## 2024-02-09 NOTE — PROGRESS NOTES
[facility] Medicine    Subjective:  pt alert conversive had small bm breathing continues to improve      Current Facility-Administered Medications:     docusate sodium (COLACE) capsule 100 mg, 100 mg, Oral, BID, Baldemar Jarrett DO    polyethylene glycol (GLYCOLAX) packet 17 g, 17 g, Oral, Daily, Baldemar Jarrett DO    sodium phosphate (FLEET) rectal enema 1 enema, 1 enema, Rectal, Once PRN, Baldemar Jarrett DO    predniSONE (DELTASONE) tablet 30 mg, 30 mg, Oral, Daily, Jose Price MD    methIMAzole (TAPAZOLE) tablet 15 mg, 15 mg, Oral, Daily, Baldemar Jarrett DO, 15 mg at 02/08/24 0905    guaiFENesin-dextromethorphan (ROBITUSSIN DM) 100-10 MG/5ML syrup 5 mL, 5 mL, Oral, Q4H PRN, Jose Price MD, 5 mL at 02/07/24 2101    apixaban (ELIQUIS) tablet 5 mg, 5 mg, Oral, BID, Baldemar Jarrett DO, 5 mg at 02/08/24 2017    aspirin EC tablet 81 mg, 81 mg, Oral, Daily, Baldemar Jarrett DO, 81 mg at 02/08/24 0905    atorvastatin (LIPITOR) tablet 40 mg, 40 mg, Oral, Nightly, Baldemar Jarrett DO, 40 mg at 02/08/24 2017    busPIRone (BUSPAR) tablet 10 mg, 10 mg, Oral, TID, Baldemar Jarrett DO, 10 mg at 02/08/24 2017    dilTIAZem (CARDIZEM CD) extended release capsule 120 mg, 120 mg, Oral, Daily, Baldemar Jarrett DO, 120 mg at 02/08/24 0904    ipratropium 0.5 mg-albuterol 2.5 mg (DUONEB) nebulizer solution 1 Dose, 1 Dose, Nebulization, Q4H WA RT, Baldemar Jarrett DO, 1 Dose at 02/08/24 2029    pantoprazole (PROTONIX) tablet 40 mg, 40 mg, Oral, QAM AC, Baldemar Jarrett DO, 40 mg at 02/09/24 0535    PARoxetine (PAXIL) tablet 10 mg, 10 mg, Oral, QAM, Baldemar Jarrett, , 10 mg at 02/08/24 0905    pregabalin (LYRICA) capsule 50 mg, 50 mg, Oral, TID, Baldemar Jarrett DO, 50 mg at 02/08/24 2017    traZODone (DESYREL) tablet 50 mg, 50 mg, Oral, Nightly, Baldemar Jarrett, , 50 mg at 02/08/24 2017    budesonide (PULMICORT) nebulizer suspension 500 mcg, 0.5 mg, Nebulization, BID RT, Baldemar Jarrett DO,

## 2024-02-09 NOTE — CARE COORDINATION
SOCIAL WORK/CASEMANAGEMENT TRANSITION OF CARE PLANNING( KATELYNN BANUELOS, [phone]): discharge home today. [facility] ,orders are in epic and soc will be 2/14. Pt requesting a 3:1 commode and wants to use [employer] but they will not do home delivery and they dont carry the product. [facility] who will bring it to the room today. Let for pt on discharge instructions the number for [facility] and explained it to pt. .Katelynn Banuelos, [facility]  2/9/2024    The Plan for Transition of Care is related to the following treatment goals:  dme and hhc   The Patient and/or patient representative  was provided with a choice of provider and agrees   with the discharge plan. [x] Yes [] No  Freedom of choice list was provided with basic dialogue that supports the patient's individualized plan of care/goals, treatment preferences and shares the quality data associated with the providers. [x] Yes [] No

## 2024-02-09 NOTE — PROGRESS NOTES
Physician Progress Note      PATIENT:               ANGIE SHELTON  [facility] #:                  [id number]  :                       1955  ADMIT DATE:       2024 3:22 PM  DISCH DATE:  RESPONDING  PROVIDER #:        Baldemar Jarrett DO          QUERY TEXT:    Patient was admitted with COPD exacerbation, noted to have \"Paroxysmal atrial   fibrillation\" in H&P note on  and is maintained on Eliquis. If possible,   please document in progress notes and discharge summary if you are evaluating   and/or treating any of the following:    The medical record reflects the following:  Risk Factors: Age 69 F, HTN  Clinical Indicators:  H&P noted \"Paroxysmal atrial fibrillation\" and is   maintained on Eliquis.  Treatment: Eliquis.  Options provided:  -- Secondary hypercoagulable state in a patient with atrial fibrillation  -- Other - I will add my own diagnosis  -- Disagree - Not applicable / Not valid  -- Disagree - Clinically unable to determine / Unknown  -- Refer to Clinical Documentation Reviewer    PROVIDER RESPONSE TEXT:    This patient has secondary hypercoagulable state in a patient with atrial   fibrillation.    Query created by: Nat Woods on 2024 2:14 PM      Electronically signed by:  Baldemar Jarrett DO 2024 2:18 PM

## 2024-02-09 NOTE — PLAN OF CARE
Problem: Skin/Tissue Integrity  Goal: Absence of new skin breakdown  Description: 1.  Monitor for areas of redness and/or skin breakdown  2.  Assess vascular access sites hourly  3.  Every 4-6 hours minimum:  Change oxygen saturation probe site  4.  Every 4-6 hours:  If on nasal continuous positive airway pressure, respiratory therapy assess nares and determine need for appliance change or resting period.  2/9/2024 0134 by Britta Serrato RN  Outcome: Progressing  2/8/2024 1555 by Malachi Talavera RN  Outcome: Progressing     Problem: Chronic Conditions and Co-morbidities  Goal: Patient's chronic conditions and co-morbidity symptoms are monitored and maintained or improved  2/9/2024 0134 by Britta Serrato RN  Outcome: Progressing  2/8/2024 1555 by Malachi Talavera RN  Outcome: Progressing     Problem: Discharge Planning  Goal: Discharge to home or other facility with appropriate resources  2/9/2024 0134 by Britta Serrato RN  Outcome: Progressing  2/8/2024 1555 by Malachi Talavera RN  Outcome: Progressing     Problem: Pain  Goal: Verbalizes/displays adequate comfort level or baseline comfort level  2/9/2024 0134 by Britta Serrato RN  Outcome: Progressing  2/8/2024 1555 by Malachi Talavera RN  Outcome: Progressing     Problem: Safety - Adult  Goal: Free from fall injury  2/9/2024 0134 by Britta Serrato RN  Outcome: Progressing  2/8/2024 1555 by Malachi Talavera RN  Outcome: Progressing     Problem: Nutrition Deficit:  Goal: Optimize nutritional status  2/9/2024 0134 by Britta Serrato RN  Outcome: Progressing  2/8/2024 1555 by Malachi Talavera RN  Outcome: Progressing

## 2024-02-09 NOTE — DISCHARGE INSTRUCTIONS
[facility] for assistance with activities of daily living call them , takes 4-6 weeks if eligible for any services to be put in to place at [phone]. They also setup durable medical equipment, alert buttons, and meal delivery.

## 2024-02-12 ENCOUNTER — CARE COORDINATION (OUTPATIENT)
Dept: CARE COORDINATION | Age: 69
End: 2024-02-12

## 2024-02-12 NOTE — CARE COORDINATION
Care Transitions Initial Follow Up Call    Call within 2 business days of discharge: Yes    Care Transition Nurse attempted initial CTN outreach leaving [employer] VM to primary/secondary numbers leaving my contact and purpose for call. Advised of PCP hosp fu appt and instructed to review AVS for medications, appts, and advisements. Noting pt declined to be seen by [facility].     Patient: Lana Phillips Patient : 1955   MRN: [id number]  Reason for Admission: 2024 - 2024 [facility] IP. COPD, Malnutrition.  Discharge Date: 24 RARS: Readmission Risk Score: 21.5  Readmit. Offered/declined RPM 1/15/24  CT    [facility]T [facility] states they contacted pt & she declined a home visit and told them she will call them when she is ready. On hold.  Hospital provided info on [facility].  Provided 3:1 Commode    Hosp FU/PCP  1:00  PM/K Santos  1:45  Endo/M Abusag  10:30  Wound/P Kakkasseril  2:00  MedOnc/Z El Tereso 3/5 1:30  Cardio/C Petr 3/26 11:00    START taking:  predniSONE (DELTASONE)  STOP taking:  doxycycline hyclate 100 MG tablet (VIBRA-TABS)    PDM: Ade Diaz/sibling [phone] and Lana Reeves/NADJA [phone]  PMH: She has oxygen @ 3L & nebulizer from [employer], COPD, Smoker, PVD, Diet managed DM, AF, Lung nodules.      Last Discharge Facility       Date Complaint Diagnosis Description Type Department Provider    24 Shortness of Breath COPD exacerbation (HCC) ED to Hosp-Admission (Discharged) (ADMITTED) [facility] Baldemar Jarrett DO; Rey Escalante...          Radha Elliott RN

## 2024-02-13 ENCOUNTER — CARE COORDINATION (OUTPATIENT)
Dept: CARE COORDINATION | Age: 69
End: 2024-02-13

## 2024-02-13 ENCOUNTER — OFFICE VISIT (OUTPATIENT)
Dept: PRIMARY CARE CLINIC | Age: 69
End: 2024-02-13

## 2024-02-13 VITALS — WEIGHT: 94 LBS | BODY MASS INDEX: 14.25 KG/M2 | TEMPERATURE: 98.2 F | HEIGHT: 68 IN

## 2024-02-13 DIAGNOSIS — F41.9 ANXIETY: ICD-10-CM

## 2024-02-13 DIAGNOSIS — E11.9 DIET-CONTROLLED DIABETES MELLITUS (HCC): Chronic | ICD-10-CM

## 2024-02-13 DIAGNOSIS — J43.8 OTHER EMPHYSEMA (HCC): Chronic | ICD-10-CM

## 2024-02-13 DIAGNOSIS — Z09 HOSPITAL DISCHARGE FOLLOW-UP: Primary | ICD-10-CM

## 2024-02-13 DIAGNOSIS — I48.0 PAROXYSMAL ATRIAL FIBRILLATION (HCC): ICD-10-CM

## 2024-02-13 DIAGNOSIS — I73.9 PERIPHERAL VASCULAR DISEASE, UNSPECIFIED (HCC): ICD-10-CM

## 2024-02-13 RX ORDER — PAROXETINE HYDROCHLORIDE 20 MG/1
20 TABLET, FILM COATED ORAL EVERY MORNING
Qty: 90 TABLET | Refills: 5 | Status: SHIPPED | OUTPATIENT
Start: 2024-02-13

## 2024-02-13 NOTE — PROGRESS NOTES
Post-Discharge Transitional Care  Follow Up      Lana Phillips   YOB: 1955    Date of Office Visit:  2/13/2024  Date of Hospital Admission: 2/5/24  Date of Hospital Discharge: 2/9/24  Risk of hospital readmission (high >=14%. Medium >=10%) :Readmission Risk Score: 21.5      Care management risk score Rising risk (score 2-5) and Complex Care (Scores >=6): No Risk Score On File     Non face to face  following discharge, date last encounter closed (first attempt may have been earlier): 02/13/2024    Call initiated 2 business days of discharge: Yes    ASSESSMENT/PLAN:   Hospital discharge follow-up  -     MA DISCHARGE MEDS RECONCILED W/ CURRENT OUTPATIENT MED LIST  Paroxysmal atrial fibrillation (HCC)  Other emphysema (HCC)  Peripheral vascular disease, unspecified (HCC)  Anxiety  -     External Referral To Psychiatry  Diet-controlled diabetes mellitus (HCC)      Medical Decision Making: high complexity  Return for cancel all other appt she has with me--see me  three months.    On this date 2/13/2024 I have spent 45 minutes reviewing previous notes, test results and face to face with the patient discussing the diagnosis and importance of compliance with the treatment plan as well as documenting on the day of the visit.       Subjective:   HPI:  Follow up of Hospital problems/diagnosis(es): copd  anxiety    Inpatient course: Discharge summary reviewed- see chart.    Interval history/Current status: Patient presents for hospital follow-up.  She went in with shortness of breath found to have anxiety COPD flareup.  Paroxysmal atrial fibrillation.  She was seen by specialist.  Reviewed markedly improved.  She feels a lot of her shortness of breath issues are related to anxiety.  She wants to change into medication.  She is taking Paxil 10 mg BuSpar twice daily.  We referred her to psych in the past she felt ago.  Will try to make that happen today.        Patient Active Problem List   Diagnosis    Primary

## 2024-02-13 NOTE — CARE COORDINATION
Care Transitions Initial Follow Up Call    Call within 2 business days of discharge: Yes    Care Transition Nurse attempted second initial CT outreach leaving [employer] VM, purpose of call, my contact info, and appt reminders. CTN s/o.    Patient: Lana Phillips Patient : 1955   MRN: [id number]  Reason for Admission:  2024 - 2024 [facility]. COPD, Malnutrition.  Discharge Date: 24 RARS: Readmission Risk Score: 21.5  Readmit. Offered/declined RPM 1/15/24  CT     [facility]T [facility] states they contacted pt & she declined a home visit and told them she will call them when she is ready. On hold.  Hospital provided info on [facility].  Provided 3:1 Commode     Hosp FU/PCP  1:00  PM/K Santos  1:45  Endo/M Abusag  10:30  Wound/P Kakkasseril  2:00  MedOnc/Z El Tereso 3/5 1:30  Cardio/C Petr 3/26 11:00     START taking:  predniSONE (DELTASONE)  STOP taking:  doxycycline hyclate 100 MG tablet (VIBRA-TABS)     PDM: dAe Diaz/sibling [phone] and Lana Reeves/NADJA [phone]  PMH: She has oxygen @ 3L & nebulizer from [employer], COPD, Smoker, PVD, Diet managed DM, AF, Lung nodules.    Last Discharge Facility       Date Complaint Diagnosis Description Type Department Provider    24 Shortness of Breath COPD exacerbation (HCC) ED to Hosp-Admission (Discharged) (ADMITTED) [facility] Baldemar Jarrett DO; Rey Escalante...            Radha Elliott RN

## 2024-02-14 ENCOUNTER — OFFICE VISIT (OUTPATIENT)
Dept: PAIN MANAGEMENT | Age: 69
End: 2024-02-14
Payer: MEDICARE

## 2024-02-14 VITALS
RESPIRATION RATE: 16 BRPM | BODY MASS INDEX: 14.75 KG/M2 | HEIGHT: 67 IN | DIASTOLIC BLOOD PRESSURE: 65 MMHG | WEIGHT: 94 LBS | SYSTOLIC BLOOD PRESSURE: 163 MMHG | OXYGEN SATURATION: 91 % | TEMPERATURE: 97.2 F | HEART RATE: 85 BPM

## 2024-02-14 DIAGNOSIS — M54.41 CHRONIC BILATERAL LOW BACK PAIN WITH BILATERAL SCIATICA: ICD-10-CM

## 2024-02-14 DIAGNOSIS — M54.16 LUMBAR RADICULOPATHY: ICD-10-CM

## 2024-02-14 DIAGNOSIS — M51.9 LUMBAR DISC DISORDER: ICD-10-CM

## 2024-02-14 DIAGNOSIS — M79.10 MYALGIA: ICD-10-CM

## 2024-02-14 DIAGNOSIS — G89.29 CHRONIC BILATERAL LOW BACK PAIN WITH BILATERAL SCIATICA: ICD-10-CM

## 2024-02-14 DIAGNOSIS — G89.4 CHRONIC PAIN SYNDROME: Primary | ICD-10-CM

## 2024-02-14 DIAGNOSIS — M54.42 CHRONIC BILATERAL LOW BACK PAIN WITH BILATERAL SCIATICA: ICD-10-CM

## 2024-02-14 PROBLEM — K92.2 GI BLEED: Status: RESOLVED | Noted: 2023-10-14 | Resolved: 2024-02-14

## 2024-02-14 PROBLEM — E43 SEVERE PROTEIN-CALORIE MALNUTRITION (HCC): Chronic | Status: RESOLVED | Noted: 2023-06-26 | Resolved: 2024-02-14

## 2024-02-14 PROBLEM — I73.9 PVD (PERIPHERAL VASCULAR DISEASE) (HCC): Status: RESOLVED | Noted: 2024-01-19 | Resolved: 2024-02-14

## 2024-02-14 PROBLEM — J96.01 ACUTE RESPIRATORY FAILURE WITH HYPOXIA (HCC): Status: RESOLVED | Noted: 2024-01-09 | Resolved: 2024-02-14

## 2024-02-14 PROBLEM — J44.1 COPD EXACERBATION (HCC): Status: RESOLVED | Noted: 2024-02-05 | Resolved: 2024-02-14

## 2024-02-14 PROBLEM — J44.1 COPD WITH ACUTE EXACERBATION (HCC): Status: RESOLVED | Noted: 2023-12-24 | Resolved: 2024-02-14

## 2024-02-14 PROCEDURE — 99213 OFFICE O/P EST LOW 20 MIN: CPT | Performed by: PHYSICIAN ASSISTANT

## 2024-02-14 PROCEDURE — 3017F COLORECTAL CA SCREEN DOC REV: CPT | Performed by: PHYSICIAN ASSISTANT

## 2024-02-14 PROCEDURE — 1036F TOBACCO NON-USER: CPT | Performed by: PHYSICIAN ASSISTANT

## 2024-02-14 PROCEDURE — 1111F DSCHRG MED/CURRENT MED MERGE: CPT | Performed by: PHYSICIAN ASSISTANT

## 2024-02-14 PROCEDURE — G8484 FLU IMMUNIZE NO ADMIN: HCPCS | Performed by: PHYSICIAN ASSISTANT

## 2024-02-14 PROCEDURE — G8399 PT W/DXA RESULTS DOCUMENT: HCPCS | Performed by: PHYSICIAN ASSISTANT

## 2024-02-14 PROCEDURE — 1123F ACP DISCUSS/DSCN MKR DOCD: CPT | Performed by: PHYSICIAN ASSISTANT

## 2024-02-14 PROCEDURE — 3077F SYST BP >= 140 MM HG: CPT | Performed by: PHYSICIAN ASSISTANT

## 2024-02-14 PROCEDURE — 1090F PRES/ABSN URINE INCON ASSESS: CPT | Performed by: PHYSICIAN ASSISTANT

## 2024-02-14 PROCEDURE — 3078F DIAST BP <80 MM HG: CPT | Performed by: PHYSICIAN ASSISTANT

## 2024-02-14 PROCEDURE — G8419 CALC BMI OUT NRM PARAM NOF/U: HCPCS | Performed by: PHYSICIAN ASSISTANT

## 2024-02-14 PROCEDURE — G8427 DOCREV CUR MEDS BY ELIG CLIN: HCPCS | Performed by: PHYSICIAN ASSISTANT

## 2024-02-14 RX ORDER — PREGABALIN 50 MG/1
50 CAPSULE ORAL 3 TIMES DAILY
Qty: 90 CAPSULE | Refills: 0 | Status: SHIPPED | OUTPATIENT
Start: 2024-03-31 | End: 2024-04-30

## 2024-02-14 NOTE — PROGRESS NOTES
[facility]  [address] [place], [place] [address]    Follow up Note      Lana AVILA Alan     Date of Visit:  2/14/2024    CC:  Patient presents for follow up   Chief Complaint   Patient presents with    Follow-up     Low back pain        HPI:    Pain is better.  Appropriate analgesia with current medications regimen: yes.    Change in quality of symptoms:no.    Medication side effects:none.   Recent diagnostic testing:none.   Recent interventional procedures: 12/28/2023 PROCEDURE PERFORMED: Therapeutic Caudal Epidural done under fluoroscopic guidance - 70% relief with some diminishing effects at this point .    She has been on anticoagulation medications to include ELIQUIS and has not been on herbal supplements.  She is not diabetic.     Imaging:   10/2023 lumbar MRI w/o -  FINDINGS:  BONES/ALIGNMENT: No fracture or joint dislocation is noted.  There is  straightening of the lumbar spine, which may be due to positioning or  muscular spasm. The vertebral body heights are preserved.  No marrow edema or  infiltrative process is noted.     SPINAL CORD: The conus terminates normally.     SOFT TISSUES: No paraspinal mass identified.     L1-L2: Disc desiccation with a minimal disc bulge.  No significant central  canal, lateral recess or neural foraminal stenoses.     L2-L3: Disc desiccation with mild loss of disc height and a disc bulge.  Mild  central canal, lateral recess and neural foraminal stenoses.     L3-L4: Severe loss of disc height with a small disc bulge.  No significant  central canal stenosis.  Mild lateral recess stenoses.  Mild-to-moderate  neural foraminal stenoses.     L4-L5: Prominent loss of disc height, more so towards the left.  No  significant central canal or lateral recess stenosis.  Mild left neural  foraminal stenosis.  Status post right hemilaminectomy.     L5-S1: Prominent loss of disc height with minimal disc osteophyte complex.  Status post left hemilaminectomy.  Mild to

## 2024-02-14 NOTE — PROGRESS NOTES
Lana Phillips presents to the [facility] on 2/14/2024. Lana is complaining of pain in her low back. The pain is constant. The pain is described as aching. Pain is rated on her best day at a 6, on her worst day at a 8, and on average at a 6 on the VAS scale. She took her last dose of Lyrica today.     Any procedures since your last visit: No    Pacemaker or defibrillator: No     She is not on NSAIDS and is  on anticoagulation medications to include ASA and Eliquis and is managed by Clyde Gonzalez DO.     Medication Contract and Consent for Opioid Use Documents Filed        No documents found                    BP (!) 163/65   Pulse 85   Temp 97.2 °F (36.2 °C) (Infrared)   Resp 16   Ht 1.702 m (5' 7\")   Wt 42.6 kg (94 lb)   SpO2 91% Comment: 3l O2 via n/c  BMI 14.72 kg/m²      No LMP recorded. Patient is postmenopausal.

## 2024-02-20 ENCOUNTER — TELEPHONE (OUTPATIENT)
Dept: PRIMARY CARE CLINIC | Age: 69
End: 2024-02-20

## 2024-02-21 ENCOUNTER — HOSPITAL ENCOUNTER (INPATIENT)
Age: 69
LOS: 4 days | Discharge: HOME | DRG: 811 | End: 2024-02-25
Attending: EMERGENCY MEDICINE | Admitting: INTERNAL MEDICINE
Payer: MEDICARE

## 2024-02-21 ENCOUNTER — APPOINTMENT (OUTPATIENT)
Dept: GENERAL RADIOLOGY | Age: 69
DRG: 811 | End: 2024-02-21
Payer: MEDICARE

## 2024-02-21 DIAGNOSIS — J44.1 COPD WITH ACUTE EXACERBATION (HCC): Primary | ICD-10-CM

## 2024-02-21 DIAGNOSIS — R07.9 CHEST PAIN, UNSPECIFIED TYPE: ICD-10-CM

## 2024-02-21 LAB
ALBUMIN SERPL-MCNC: 3.8 G/DL (ref 3.5–5.2)
ALP SERPL-CCNC: 59 U/L (ref 35–104)
ALT SERPL-CCNC: 17 U/L (ref 0–32)
ANION GAP SERPL CALCULATED.3IONS-SCNC: 3 MMOL/L (ref 7–16)
AST SERPL-CCNC: 38 U/L (ref 0–31)
B.E.: 11.6 MMOL/L (ref -3–3)
BASOPHILS # BLD: 0 K/UL (ref 0–0.2)
BASOPHILS NFR BLD: 0 % (ref 0–2)
BILIRUB SERPL-MCNC: <0.2 MG/DL (ref 0–1.2)
BNP SERPL-MCNC: 382 PG/ML (ref 0–125)
BUN SERPL-MCNC: 7 MG/DL (ref 6–23)
CALCIUM SERPL-MCNC: 9.1 MG/DL (ref 8.6–10.2)
CHLORIDE SERPL-SCNC: 92 MMOL/L (ref 98–107)
CO2 SERPL-SCNC: 43 MMOL/L (ref 22–29)
COHB: 2 % (ref 0–1.5)
CREAT SERPL-MCNC: 0.6 MG/DL (ref 0.5–1)
CRITICAL: ABNORMAL
DATE ANALYZED: ABNORMAL
DATE OF COLLECTION: ABNORMAL
EOSINOPHIL # BLD: 0 K/UL (ref 0.05–0.5)
EOSINOPHILS RELATIVE PERCENT: 0 % (ref 0–6)
ERYTHROCYTE [DISTWIDTH] IN BLOOD BY AUTOMATED COUNT: 16.1 % (ref 11.5–15)
GFR SERPL CREATININE-BSD FRML MDRD: >60 ML/MIN/1.73M2
GLUCOSE SERPL-MCNC: 86 MG/DL (ref 74–99)
HCO3: 38.2 MMOL/L (ref 22–26)
HCT VFR BLD AUTO: 25.4 % (ref 34–48)
HGB BLD-MCNC: 7.1 G/DL (ref 11.5–15.5)
HHB: 0.8 % (ref 0–5)
LAB: ABNORMAL
LYMPHOCYTES NFR BLD: 1.6 K/UL (ref 1.5–4)
LYMPHOCYTES RELATIVE PERCENT: 15 % (ref 20–42)
Lab: 2045
MCH RBC QN AUTO: 25.7 PG (ref 26–35)
MCHC RBC AUTO-ENTMCNC: 28 G/DL (ref 32–34.5)
MCV RBC AUTO: 92 FL (ref 80–99.9)
METHB: 0.1 % (ref 0–1.5)
MODE: ABNORMAL
MONOCYTES NFR BLD: 0.28 K/UL (ref 0.1–0.95)
MONOCYTES NFR BLD: 3 % (ref 2–12)
NEUTROPHILS NFR BLD: 83 % (ref 43–80)
NEUTS SEG NFR BLD: 8.92 K/UL (ref 1.8–7.3)
O2 CONTENT: 11 ML/DL
O2 SATURATION: 99.2 % (ref 92–98.5)
O2HB: 97.1 % (ref 94–97)
OPERATOR ID: 405
PATIENT TEMP: 37 C
PCO2: 66.2 MMHG (ref 35–45)
PH BLOOD GAS: 7.38 (ref 7.35–7.45)
PLATELET # BLD AUTO: 310 K/UL (ref 130–450)
PMV BLD AUTO: 9 FL (ref 7–12)
PO2: 147.2 MMHG (ref 75–100)
POTASSIUM SERPL-SCNC: 4.2 MMOL/L (ref 3.5–5)
PROT SERPL-MCNC: 6.4 G/DL (ref 6.4–8.3)
RBC # BLD AUTO: 2.76 M/UL (ref 3.5–5.5)
RBC # BLD: ABNORMAL 10*6/UL
SODIUM SERPL-SCNC: 138 MMOL/L (ref 132–146)
SOURCE, BLOOD GAS: ABNORMAL
THB: 7.8 G/DL (ref 11.5–16.5)
TIME ANALYZED: 2049
TROPONIN I SERPL HS-MCNC: 34 NG/L (ref 0–9)
TROPONIN I SERPL HS-MCNC: 36 NG/L (ref 0–9)
WBC OTHER # BLD: 10.8 K/UL (ref 4.5–11.5)

## 2024-02-21 PROCEDURE — 84484 ASSAY OF TROPONIN QUANT: CPT

## 2024-02-21 PROCEDURE — 80053 COMPREHEN METABOLIC PANEL: CPT

## 2024-02-21 PROCEDURE — 6370000000 HC RX 637 (ALT 250 FOR IP): Performed by: EMERGENCY MEDICINE

## 2024-02-21 PROCEDURE — 2060000000 HC ICU INTERMEDIATE R&B

## 2024-02-21 PROCEDURE — 96375 TX/PRO/DX INJ NEW DRUG ADDON: CPT

## 2024-02-21 PROCEDURE — 85025 COMPLETE CBC W/AUTO DIFF WBC: CPT

## 2024-02-21 PROCEDURE — 71045 X-RAY EXAM CHEST 1 VIEW: CPT

## 2024-02-21 PROCEDURE — 2580000003 HC RX 258: Performed by: EMERGENCY MEDICINE

## 2024-02-21 PROCEDURE — 96365 THER/PROPH/DIAG IV INF INIT: CPT

## 2024-02-21 PROCEDURE — 83880 ASSAY OF NATRIURETIC PEPTIDE: CPT

## 2024-02-21 PROCEDURE — 82805 BLOOD GASES W/O2 SATURATION: CPT

## 2024-02-21 PROCEDURE — 6370000000 HC RX 637 (ALT 250 FOR IP)

## 2024-02-21 PROCEDURE — 94640 AIRWAY INHALATION TREATMENT: CPT

## 2024-02-21 PROCEDURE — 6360000002 HC RX W HCPCS: Performed by: EMERGENCY MEDICINE

## 2024-02-21 PROCEDURE — 99285 EMERGENCY DEPT VISIT HI MDM: CPT

## 2024-02-21 PROCEDURE — 93005 ELECTROCARDIOGRAM TRACING: CPT | Performed by: EMERGENCY MEDICINE

## 2024-02-21 RX ORDER — IPRATROPIUM BROMIDE AND ALBUTEROL SULFATE 2.5; .5 MG/3ML; MG/3ML
1 SOLUTION RESPIRATORY (INHALATION)
Status: COMPLETED | OUTPATIENT
Start: 2024-02-21 | End: 2024-02-21

## 2024-02-21 RX ORDER — MAGNESIUM SULFATE IN WATER 40 MG/ML
2000 INJECTION, SOLUTION INTRAVENOUS ONCE
Status: COMPLETED | OUTPATIENT
Start: 2024-02-21 | End: 2024-02-21

## 2024-02-21 RX ORDER — SODIUM CHLORIDE 9 MG/ML
INJECTION, SOLUTION INTRAVENOUS PRN
Status: DISCONTINUED | OUTPATIENT
Start: 2024-02-21 | End: 2024-02-22 | Stop reason: SDUPTHER

## 2024-02-21 RX ORDER — SODIUM CHLORIDE 0.9 % (FLUSH) 0.9 %
5-40 SYRINGE (ML) INJECTION PRN
Status: DISCONTINUED | OUTPATIENT
Start: 2024-02-21 | End: 2024-02-25 | Stop reason: HOSPADM

## 2024-02-21 RX ORDER — SODIUM CHLORIDE 0.9 % (FLUSH) 0.9 %
5-40 SYRINGE (ML) INJECTION EVERY 12 HOURS SCHEDULED
Status: DISCONTINUED | OUTPATIENT
Start: 2024-02-22 | End: 2024-02-25 | Stop reason: HOSPADM

## 2024-02-21 RX ORDER — ACETAMINOPHEN 325 MG/1
650 TABLET ORAL EVERY 4 HOURS PRN
Status: DISCONTINUED | OUTPATIENT
Start: 2024-02-21 | End: 2024-02-22 | Stop reason: SDUPTHER

## 2024-02-21 RX ORDER — IPRATROPIUM BROMIDE AND ALBUTEROL SULFATE 2.5; .5 MG/3ML; MG/3ML
1 SOLUTION RESPIRATORY (INHALATION)
Status: DISCONTINUED | OUTPATIENT
Start: 2024-02-22 | End: 2024-02-22

## 2024-02-21 RX ORDER — BUSPIRONE HYDROCHLORIDE 10 MG/1
10 TABLET ORAL ONCE
Status: COMPLETED | OUTPATIENT
Start: 2024-02-21 | End: 2024-02-21

## 2024-02-21 RX ORDER — ONDANSETRON 2 MG/ML
4 INJECTION INTRAMUSCULAR; INTRAVENOUS EVERY 6 HOURS PRN
Status: DISCONTINUED | OUTPATIENT
Start: 2024-02-21 | End: 2024-02-25 | Stop reason: HOSPADM

## 2024-02-21 RX ORDER — ONDANSETRON 4 MG/1
4 TABLET, ORALLY DISINTEGRATING ORAL EVERY 8 HOURS PRN
Status: DISCONTINUED | OUTPATIENT
Start: 2024-02-21 | End: 2024-02-25 | Stop reason: HOSPADM

## 2024-02-21 RX ORDER — ENOXAPARIN SODIUM 100 MG/ML
30 INJECTION SUBCUTANEOUS DAILY
Status: DISCONTINUED | OUTPATIENT
Start: 2024-02-22 | End: 2024-02-22

## 2024-02-21 RX ORDER — IPRATROPIUM BROMIDE AND ALBUTEROL SULFATE 2.5; .5 MG/3ML; MG/3ML
3 SOLUTION RESPIRATORY (INHALATION) ONCE
Status: COMPLETED | OUTPATIENT
Start: 2024-02-21 | End: 2024-02-21

## 2024-02-21 RX ADMIN — WATER 125 MG: 1 INJECTION INTRAMUSCULAR; INTRAVENOUS; SUBCUTANEOUS at 20:48

## 2024-02-21 RX ADMIN — IPRATROPIUM BROMIDE AND ALBUTEROL SULFATE 1 DOSE: .5; 2.5 SOLUTION RESPIRATORY (INHALATION) at 20:51

## 2024-02-21 RX ADMIN — IPRATROPIUM BROMIDE AND ALBUTEROL SULFATE 3 DOSE: .5; 2.5 SOLUTION RESPIRATORY (INHALATION) at 23:38

## 2024-02-21 RX ADMIN — BUSPIRONE HYDROCHLORIDE 10 MG: 10 TABLET ORAL at 22:14

## 2024-02-21 RX ADMIN — MAGNESIUM SULFATE HEPTAHYDRATE 2000 MG: 40 INJECTION, SOLUTION INTRAVENOUS at 22:15

## 2024-02-21 RX ADMIN — IPRATROPIUM BROMIDE AND ALBUTEROL SULFATE 1 DOSE: .5; 2.5 SOLUTION RESPIRATORY (INHALATION) at 20:45

## 2024-02-21 RX ADMIN — IPRATROPIUM BROMIDE AND ALBUTEROL SULFATE 1 DOSE: .5; 2.5 SOLUTION RESPIRATORY (INHALATION) at 20:39

## 2024-02-22 ENCOUNTER — TELEPHONE (OUTPATIENT)
Dept: PRIMARY CARE CLINIC | Age: 69
End: 2024-02-22

## 2024-02-22 LAB
B PARAP IS1001 DNA NPH QL NAA+NON-PROBE: NOT DETECTED
B PERT DNA SPEC QL NAA+PROBE: NOT DETECTED
C PNEUM DNA NPH QL NAA+NON-PROBE: NOT DETECTED
FLUAV RNA NPH QL NAA+NON-PROBE: NOT DETECTED
FLUBV RNA NPH QL NAA+NON-PROBE: NOT DETECTED
HADV DNA NPH QL NAA+NON-PROBE: NOT DETECTED
HCOV 229E RNA NPH QL NAA+NON-PROBE: NOT DETECTED
HCOV HKU1 RNA NPH QL NAA+NON-PROBE: NOT DETECTED
HCOV NL63 RNA NPH QL NAA+NON-PROBE: NOT DETECTED
HCOV OC43 RNA NPH QL NAA+NON-PROBE: NOT DETECTED
HMPV RNA NPH QL NAA+NON-PROBE: NOT DETECTED
HPIV1 RNA NPH QL NAA+NON-PROBE: NOT DETECTED
HPIV2 RNA NPH QL NAA+NON-PROBE: NOT DETECTED
HPIV3 RNA NPH QL NAA+NON-PROBE: NOT DETECTED
HPIV4 RNA NPH QL NAA+NON-PROBE: NOT DETECTED
M PNEUMO DNA NPH QL NAA+NON-PROBE: NOT DETECTED
RSV RNA NPH QL NAA+NON-PROBE: NOT DETECTED
RV+EV RNA NPH QL NAA+NON-PROBE: NOT DETECTED
SARS-COV-2 RNA NPH QL NAA+NON-PROBE: NOT DETECTED
SPECIMEN DESCRIPTION: NORMAL

## 2024-02-22 PROCEDURE — 6360000002 HC RX W HCPCS: Performed by: INTERNAL MEDICINE

## 2024-02-22 PROCEDURE — 6370000000 HC RX 637 (ALT 250 FOR IP): Performed by: INTERNAL MEDICINE

## 2024-02-22 PROCEDURE — 2060000000 HC ICU INTERMEDIATE R&B

## 2024-02-22 PROCEDURE — 6370000000 HC RX 637 (ALT 250 FOR IP): Performed by: EMERGENCY MEDICINE

## 2024-02-22 PROCEDURE — 94640 AIRWAY INHALATION TREATMENT: CPT

## 2024-02-22 PROCEDURE — 51798 US URINE CAPACITY MEASURE: CPT

## 2024-02-22 PROCEDURE — 2580000003 HC RX 258: Performed by: INTERNAL MEDICINE

## 2024-02-22 PROCEDURE — 0202U NFCT DS 22 TRGT SARS-COV-2: CPT

## 2024-02-22 PROCEDURE — 2700000000 HC OXYGEN THERAPY PER DAY

## 2024-02-22 PROCEDURE — 99222 1ST HOSP IP/OBS MODERATE 55: CPT | Performed by: STUDENT IN AN ORGANIZED HEALTH CARE EDUCATION/TRAINING PROGRAM

## 2024-02-22 RX ORDER — PREGABALIN 50 MG/1
50 CAPSULE ORAL 3 TIMES DAILY
Status: DISCONTINUED | OUTPATIENT
Start: 2024-02-22 | End: 2024-02-22

## 2024-02-22 RX ORDER — ACETAMINOPHEN 650 MG/1
650 SUPPOSITORY RECTAL EVERY 6 HOURS PRN
Status: DISCONTINUED | OUTPATIENT
Start: 2024-02-22 | End: 2024-02-25 | Stop reason: HOSPADM

## 2024-02-22 RX ORDER — METHIMAZOLE 5 MG/1
15 TABLET ORAL DAILY
Status: DISCONTINUED | OUTPATIENT
Start: 2024-02-22 | End: 2024-02-25 | Stop reason: HOSPADM

## 2024-02-22 RX ORDER — POTASSIUM CHLORIDE 7.45 MG/ML
10 INJECTION INTRAVENOUS PRN
Status: DISCONTINUED | OUTPATIENT
Start: 2024-02-22 | End: 2024-02-25 | Stop reason: HOSPADM

## 2024-02-22 RX ORDER — BUDESONIDE 0.5 MG/2ML
0.5 INHALANT ORAL
Status: DISCONTINUED | OUTPATIENT
Start: 2024-02-22 | End: 2024-02-25 | Stop reason: HOSPADM

## 2024-02-22 RX ORDER — LANOLIN ALCOHOL/MO/W.PET/CERES
500 CREAM (GRAM) TOPICAL DAILY
Status: DISCONTINUED | OUTPATIENT
Start: 2024-02-22 | End: 2024-02-25 | Stop reason: HOSPADM

## 2024-02-22 RX ORDER — PANTOPRAZOLE SODIUM 40 MG/1
40 TABLET, DELAYED RELEASE ORAL
Status: DISCONTINUED | OUTPATIENT
Start: 2024-02-23 | End: 2024-02-25 | Stop reason: HOSPADM

## 2024-02-22 RX ORDER — HYDROXYZINE PAMOATE 25 MG/1
25 CAPSULE ORAL 3 TIMES DAILY PRN
Status: DISCONTINUED | OUTPATIENT
Start: 2024-02-22 | End: 2024-02-25 | Stop reason: HOSPADM

## 2024-02-22 RX ORDER — LIDOCAINE 4 G/G
1 PATCH TOPICAL DAILY
Status: DISCONTINUED | OUTPATIENT
Start: 2024-02-22 | End: 2024-02-25 | Stop reason: HOSPADM

## 2024-02-22 RX ORDER — PREGABALIN 50 MG/1
50 CAPSULE ORAL 3 TIMES DAILY
Status: DISCONTINUED | OUTPATIENT
Start: 2024-02-22 | End: 2024-02-25 | Stop reason: HOSPADM

## 2024-02-22 RX ORDER — ACETAMINOPHEN 325 MG/1
650 TABLET ORAL EVERY 6 HOURS PRN
Status: DISCONTINUED | OUTPATIENT
Start: 2024-02-22 | End: 2024-02-25 | Stop reason: HOSPADM

## 2024-02-22 RX ORDER — POLYETHYLENE GLYCOL 3350 17 G/17G
17 POWDER, FOR SOLUTION ORAL DAILY PRN
Status: DISCONTINUED | OUTPATIENT
Start: 2024-02-22 | End: 2024-02-25 | Stop reason: HOSPADM

## 2024-02-22 RX ORDER — ASPIRIN 81 MG/1
81 TABLET ORAL DAILY
Status: DISCONTINUED | OUTPATIENT
Start: 2024-02-22 | End: 2024-02-25 | Stop reason: HOSPADM

## 2024-02-22 RX ORDER — ONDANSETRON 2 MG/ML
4 INJECTION INTRAMUSCULAR; INTRAVENOUS EVERY 6 HOURS PRN
Status: CANCELLED | OUTPATIENT
Start: 2024-02-22

## 2024-02-22 RX ORDER — BUSPIRONE HYDROCHLORIDE 10 MG/1
10 TABLET ORAL 3 TIMES DAILY
Status: DISCONTINUED | OUTPATIENT
Start: 2024-02-22 | End: 2024-02-25 | Stop reason: HOSPADM

## 2024-02-22 RX ORDER — SODIUM CHLORIDE 9 MG/ML
INJECTION, SOLUTION INTRAVENOUS PRN
Status: DISCONTINUED | OUTPATIENT
Start: 2024-02-22 | End: 2024-02-25 | Stop reason: HOSPADM

## 2024-02-22 RX ORDER — BISACODYL 5 MG/1
10 TABLET, DELAYED RELEASE ORAL ONCE
Status: COMPLETED | OUTPATIENT
Start: 2024-02-22 | End: 2024-02-22

## 2024-02-22 RX ORDER — SODIUM CHLORIDE 0.9 % (FLUSH) 0.9 %
5-40 SYRINGE (ML) INJECTION EVERY 12 HOURS SCHEDULED
Status: DISCONTINUED | OUTPATIENT
Start: 2024-02-22 | End: 2024-02-25 | Stop reason: HOSPADM

## 2024-02-22 RX ORDER — PREDNISONE 20 MG/1
20 TABLET ORAL DAILY
Status: DISCONTINUED | OUTPATIENT
Start: 2024-02-22 | End: 2024-02-25

## 2024-02-22 RX ORDER — ATORVASTATIN CALCIUM 40 MG/1
40 TABLET, FILM COATED ORAL NIGHTLY
Status: DISCONTINUED | OUTPATIENT
Start: 2024-02-22 | End: 2024-02-25 | Stop reason: HOSPADM

## 2024-02-22 RX ORDER — MAGNESIUM SULFATE IN WATER 40 MG/ML
2000 INJECTION, SOLUTION INTRAVENOUS PRN
Status: DISCONTINUED | OUTPATIENT
Start: 2024-02-22 | End: 2024-02-25 | Stop reason: HOSPADM

## 2024-02-22 RX ORDER — ONDANSETRON 4 MG/1
4 TABLET, ORALLY DISINTEGRATING ORAL EVERY 8 HOURS PRN
Status: CANCELLED | OUTPATIENT
Start: 2024-02-22

## 2024-02-22 RX ORDER — DILTIAZEM HYDROCHLORIDE 120 MG/1
120 CAPSULE, COATED, EXTENDED RELEASE ORAL DAILY
Status: DISCONTINUED | OUTPATIENT
Start: 2024-02-22 | End: 2024-02-25 | Stop reason: HOSPADM

## 2024-02-22 RX ORDER — ENEMA 19; 7 G/133ML; G/133ML
1 ENEMA RECTAL ONCE
Status: DISCONTINUED | OUTPATIENT
Start: 2024-02-22 | End: 2024-02-25 | Stop reason: HOSPADM

## 2024-02-22 RX ORDER — PAROXETINE HYDROCHLORIDE 20 MG/1
20 TABLET, FILM COATED ORAL EVERY MORNING
Status: DISCONTINUED | OUTPATIENT
Start: 2024-02-22 | End: 2024-02-25 | Stop reason: HOSPADM

## 2024-02-22 RX ORDER — TRAZODONE HYDROCHLORIDE 50 MG/1
50 TABLET ORAL NIGHTLY
Status: DISCONTINUED | OUTPATIENT
Start: 2024-02-22 | End: 2024-02-25 | Stop reason: HOSPADM

## 2024-02-22 RX ORDER — ARFORMOTEROL TARTRATE 15 UG/2ML
15 SOLUTION RESPIRATORY (INHALATION)
Status: DISCONTINUED | OUTPATIENT
Start: 2024-02-22 | End: 2024-02-25 | Stop reason: HOSPADM

## 2024-02-22 RX ORDER — DOCUSATE SODIUM 100 MG/1
100 CAPSULE, LIQUID FILLED ORAL 2 TIMES DAILY
Status: DISCONTINUED | OUTPATIENT
Start: 2024-02-22 | End: 2024-02-25 | Stop reason: HOSPADM

## 2024-02-22 RX ORDER — POTASSIUM CHLORIDE 20 MEQ/1
40 TABLET, EXTENDED RELEASE ORAL PRN
Status: DISCONTINUED | OUTPATIENT
Start: 2024-02-22 | End: 2024-02-25 | Stop reason: HOSPADM

## 2024-02-22 RX ORDER — SODIUM CHLORIDE 0.9 % (FLUSH) 0.9 %
5-40 SYRINGE (ML) INJECTION PRN
Status: DISCONTINUED | OUTPATIENT
Start: 2024-02-22 | End: 2024-02-25 | Stop reason: HOSPADM

## 2024-02-22 RX ORDER — IPRATROPIUM BROMIDE AND ALBUTEROL SULFATE 2.5; .5 MG/3ML; MG/3ML
1 SOLUTION RESPIRATORY (INHALATION)
Status: DISCONTINUED | OUTPATIENT
Start: 2024-02-22 | End: 2024-02-25 | Stop reason: HOSPADM

## 2024-02-22 RX ADMIN — TRAZODONE HYDROCHLORIDE 50 MG: 50 TABLET ORAL at 20:29

## 2024-02-22 RX ADMIN — BUDESONIDE 500 MCG: 0.5 SUSPENSION RESPIRATORY (INHALATION) at 20:45

## 2024-02-22 RX ADMIN — IPRATROPIUM BROMIDE AND ALBUTEROL SULFATE 1 DOSE: .5; 2.5 SOLUTION RESPIRATORY (INHALATION) at 20:45

## 2024-02-22 RX ADMIN — BUSPIRONE HYDROCHLORIDE 10 MG: 10 TABLET ORAL at 13:02

## 2024-02-22 RX ADMIN — PREGABALIN 50 MG: 50 CAPSULE ORAL at 17:43

## 2024-02-22 RX ADMIN — PREDNISONE 20 MG: 20 TABLET ORAL at 15:54

## 2024-02-22 RX ADMIN — HYDROXYZINE PAMOATE 25 MG: 25 CAPSULE ORAL at 15:17

## 2024-02-22 RX ADMIN — ACETAMINOPHEN 650 MG: 325 TABLET ORAL at 02:34

## 2024-02-22 RX ADMIN — ACETAMINOPHEN 650 MG: 325 TABLET ORAL at 13:05

## 2024-02-22 RX ADMIN — ASPIRIN 81 MG: 81 TABLET, COATED ORAL at 10:51

## 2024-02-22 RX ADMIN — BUDESONIDE 500 MCG: 0.5 SUSPENSION RESPIRATORY (INHALATION) at 12:03

## 2024-02-22 RX ADMIN — IPRATROPIUM BROMIDE AND ALBUTEROL SULFATE 1 DOSE: 2.5; .5 SOLUTION RESPIRATORY (INHALATION) at 07:50

## 2024-02-22 RX ADMIN — SODIUM CHLORIDE, PRESERVATIVE FREE 10 ML: 5 INJECTION INTRAVENOUS at 20:33

## 2024-02-22 RX ADMIN — METHIMAZOLE 15 MG: 5 TABLET ORAL at 10:52

## 2024-02-22 RX ADMIN — ARFORMOTEROL TARTRATE 15 MCG: 15 SOLUTION RESPIRATORY (INHALATION) at 12:03

## 2024-02-22 RX ADMIN — ATORVASTATIN CALCIUM 40 MG: 40 TABLET, FILM COATED ORAL at 20:30

## 2024-02-22 RX ADMIN — BISACODYL 10 MG: 5 TABLET, COATED ORAL at 13:02

## 2024-02-22 RX ADMIN — DILTIAZEM HYDROCHLORIDE 120 MG: 120 CAPSULE, COATED, EXTENDED RELEASE ORAL at 10:51

## 2024-02-22 RX ADMIN — DOCUSATE SODIUM 100 MG: 100 CAPSULE, LIQUID FILLED ORAL at 13:02

## 2024-02-22 RX ADMIN — PAROXETINE HYDROCHLORIDE 20 MG: 20 TABLET, FILM COATED ORAL at 10:51

## 2024-02-22 RX ADMIN — IPRATROPIUM BROMIDE AND ALBUTEROL SULFATE 1 DOSE: .5; 2.5 SOLUTION RESPIRATORY (INHALATION) at 12:03

## 2024-02-22 RX ADMIN — DOCUSATE SODIUM 100 MG: 100 CAPSULE, LIQUID FILLED ORAL at 20:30

## 2024-02-22 RX ADMIN — IPRATROPIUM BROMIDE AND ALBUTEROL SULFATE 1 DOSE: 2.5; .5 SOLUTION RESPIRATORY (INHALATION) at 04:16

## 2024-02-22 RX ADMIN — BUSPIRONE HYDROCHLORIDE 10 MG: 10 TABLET ORAL at 20:29

## 2024-02-22 RX ADMIN — SODIUM CHLORIDE, PRESERVATIVE FREE 10 ML: 5 INJECTION INTRAVENOUS at 20:34

## 2024-02-22 RX ADMIN — ARFORMOTEROL TARTRATE 15 MCG: 15 SOLUTION RESPIRATORY (INHALATION) at 20:45

## 2024-02-22 RX ADMIN — SODIUM CHLORIDE, PRESERVATIVE FREE 10 ML: 5 INJECTION INTRAVENOUS at 10:50

## 2024-02-22 RX ADMIN — ACETAMINOPHEN 650 MG: 325 TABLET ORAL at 21:40

## 2024-02-22 RX ADMIN — CYANOCOBALAMIN TAB 1000 MCG 500 MCG: 1000 TAB at 10:52

## 2024-02-22 RX ADMIN — PREGABALIN 50 MG: 50 CAPSULE ORAL at 10:50

## 2024-02-22 RX ADMIN — APIXABAN 5 MG: 5 TABLET, FILM COATED ORAL at 13:06

## 2024-02-22 RX ADMIN — IPRATROPIUM BROMIDE AND ALBUTEROL SULFATE 1 DOSE: .5; 2.5 SOLUTION RESPIRATORY (INHALATION) at 16:19

## 2024-02-22 RX ADMIN — APIXABAN 5 MG: 5 TABLET, FILM COATED ORAL at 20:29

## 2024-02-22 RX ADMIN — HYDROXYZINE PAMOATE 25 MG: 25 CAPSULE ORAL at 23:01

## 2024-02-22 ASSESSMENT — PAIN DESCRIPTION - DESCRIPTORS
DESCRIPTORS: ACHING;DISCOMFORT
DESCRIPTORS: ACHING;DISCOMFORT

## 2024-02-22 ASSESSMENT — PAIN SCALES - GENERAL
PAINLEVEL_OUTOF10: 9
PAINLEVEL_OUTOF10: 7
PAINLEVEL_OUTOF10: 7

## 2024-02-22 ASSESSMENT — PAIN DESCRIPTION - FREQUENCY: FREQUENCY: CONTINUOUS

## 2024-02-22 ASSESSMENT — PAIN DESCRIPTION - PAIN TYPE: TYPE: ACUTE PAIN

## 2024-02-22 ASSESSMENT — PAIN DESCRIPTION - ONSET: ONSET: ON-GOING

## 2024-02-22 ASSESSMENT — PAIN - FUNCTIONAL ASSESSMENT: PAIN_FUNCTIONAL_ASSESSMENT: ACTIVITIES ARE NOT PREVENTED

## 2024-02-22 ASSESSMENT — PAIN DESCRIPTION - ORIENTATION
ORIENTATION: LEFT
ORIENTATION: LEFT

## 2024-02-22 ASSESSMENT — PAIN DESCRIPTION - LOCATION
LOCATION: CHEST
LOCATION: CHEST

## 2024-02-22 NOTE — ED PROVIDER NOTES
HPI:  2/21/24, Time: 8:27 PM DIONY Phillips is a 69 y.o. female history of COPD history of respiratory failure history of A-fib history of GI bleed hypertension pneumonia history of thyroid disease presenting to the ED for shortness of breath and left-sided chest, beginning 3 days ago.  The complaint has been persistent, moderate in severity, and worsened by nothing.  Patient presenting here because of shortness of breath for the last several days.  Patient reporting left-sided chest pain has been constant.  Patient reporting no productive cough she reports no fever patient reporting some abdominal pain.  There is no vomiting.  Patient reporting no fever no chills she reports no leg pain or swelling.  Patient reporting no syncopal event.    ROS:   Pertinent positives and negatives are stated within HPI, all other systems reviewed and are negative.  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    Past Surgical History:  has a past surgical history that includes back surgery; Left  Arterial   Result Value Ref Range    Date Analyzed 20240221     Time Analyzed 2049     Source: Blood Arterial     pH, Blood Gas 7.379 7.350 - 7.450    PCO2 66.2 (H) 35.0 - 45.0 mmHg    PO2 147.2 (H) 75.0 - 100.0 mmHg    HCO3 38.2 (H) 22.0 - 26.0 mmol/L    B.E. 11.6 (H) -3.0 - 3.0 mmol/L    O2 Sat 99.2 (H) 92.0 - 98.5 %    O2Hb 97.1 (H) 94.0 - 97.0 %    COHb 2.0 (H) 0.0 - 1.5 %    MetHb 0.1 0.0 - 1.5 %    O2 Content 11.0 mL/dL    HHb 0.8 0.0 - 5.0 %    tHb (est) 7.8 (L) 11.5 - 16.5 g/dL    Mode NC- 6 L     Date Of Collection 20240221     Time Collected 2045     Pt Temp 37.0 C     ID [id number]     Lab [id number]     Critical(s) Notified . No Critical Values    Troponin   Result Value Ref Range    Troponin, High Sensitivity 36 (H) 0 - 9 ng/L       RADIOLOGY:  Interpreted by Radiologist.  XR CHEST PORTABLE   Final Result   No acute process.             EKG:  This EKG is signed and interpreted by me.    Rate: 76  Rhythm: Sinus  Interpretation: no acute changes  Comparison: stable as compared to patient's most recent EKG February 5, 2024      ------------------------- NURSING NOTES AND VITALS REVIEWED ---------------------------   The nursing notes within the ED encounter and vital signs as below have been reviewed by myself.  BP (!) 142/78   Pulse 77   Temp 98.2 °F (36.8 °C) (Temporal)   Resp 22   Ht 1.702 m (5' 7\")   Wt 44.5 kg (98 lb)   SpO2 100%   BMI 15.35 kg/m²   Oxygen Saturation Interpretation: Normal    The patient’s available past medical records and past encounters were reviewed.        ------------------------------ ED COURSE/MEDICAL DECISION MAKING----------------------  Medications   ipratropium 0.5 mg-albuterol 2.5 mg (DUONEB) nebulizer solution 3 Dose (has no administration in time range)   ipratropium 0.5 mg-albuterol 2.5 mg (DUONEB) nebulizer solution 1 Dose (1 Dose Inhalation Given 2/21/24 2051)   methylPREDNISolone sodium succ (SOLU-MEDROL) 125 mg in sterile water 2 mL injection (125 mg

## 2024-02-22 NOTE — H&P
[address] [place] [address]                           HISTORY & PHYSICAL      PATIENT NAME: ANGIE SHELTON CM               : 1955  MED REC NO: [id number]                        ROOM: [facility]  ACCOUNT NO: [id number]                       ADMIT DATE: 2024  PROVIDER: Baldemar Jarrett DO      PRIMARY CARE PHYSICIAN:  Clyde Gonzalez DO    CHIEF COMPLAINT:  Shortness of breath.    HISTORY OF PRESENT ILLNESS:  The patient is a 69-year-old  female who presented to the emergency room complaining of increasing shortness of breath.  She is normally on 3 L.  She had increased to 4 L oxygen at home.  She has not smoked since her last recent admission.  Prior to that, she was smoking two cigarettes a week.  Her pulmonologist is Dr. Price.  She was seen in the emergency room.  Diagnostic evaluation was remarkable for hemoglobin of 7.1.  She states she had an upper and lower endoscopy per Dr. Vivas in 2023.  She follows with Hematology, Dr. Christopher Rider.  She was on iron in the past, which she could not tolerate orally.    PAST MEDICAL HISTORY:  COPD; chronic hypoxic respiratory failure, on chronic O2; paroxysmal atrial fibrillation; chronic Eliquis therapy; hypertension; vitamin B12 deficiency; hyperthyroidism; peripheral artery disease.    PAST SURGICAL HISTORY:  Back surgery, left oophorectomy, tonsillectomy, appendectomy, wisdom tooth extraction.    SOCIAL HISTORY:  The patient quit tobacco about 2 weeks ago.  Prior to that, smoked about 2 cigarettes a week.  No alcohol.    REVIEW OF SYSTEMS:  Remarkable for above-stated chief complaint.    ALLERGIES:  TO PENICILLIN AND CHRONIC LEFT FOOT TOE ULCER.      MEDICATIONS:  Prior to admission:  Lyrica, Paxil, Cardizem CD, Eliquis, BuSpar, DuoNeb nebulizer p.r.n., Tapazole, Desyrel, albuterol inhaler, Lipitor, aspirin, Protonix, Breztri inhaler, vitamin B12, oxygen, vitamin D3,

## 2024-02-22 NOTE — ACP (ADVANCE CARE PLANNING)
Advance Care Planning   Healthcare Decision Maker:    Primary Decision Maker: Lana Reeves - Other Relative - [phone]    Primary Decision Maker: sue ward - Brother/Sister - [phone]    Click here to complete Healthcare Decision Makers including selection of the Healthcare Decision Maker Relationship (ie \"Primary\").          provided pt with advance directives. ELIGIO Beltrán  2/22/2024

## 2024-02-22 NOTE — PLAN OF CARE
Problem: Chronic Conditions and Co-morbidities  Goal: Patient's chronic conditions and co-morbidity symptoms are monitored and maintained or improved  Outcome: Progressing     Problem: Discharge Planning  Goal: Discharge to home or other facility with appropriate resources  Outcome: Progressing     Problem: Safety - Adult  Goal: Free from fall injury  Outcome: Progressing     Problem: ABCDS Injury Assessment  Goal: Absence of physical injury  Outcome: Progressing  Flowsheets (Taken 2/22/2024 1055)  Absence of Physical Injury: Implement safety measures based on patient assessment     Problem: Pain  Goal: Verbalizes/displays adequate comfort level or baseline comfort level  Outcome: Progressing

## 2024-02-22 NOTE — CONSULTS
muscles intact, sclera clear, conjunctiva normal  ENT:  Normocephalic, without obvious abnormality, atraumatic, sinuses nontender on palpation, external ears without lesions, oral pharynx with moist mucus membranes, tonsils without erythema or exudates, gums normal and good dentition.  LUNGS:  minimal ronchi bilateral with cough  CARDIOVASCULAR:  Normal apical impulse, regular rate and rhythm, normal S1 and S2, no S3 or S4, and no murmur noted  ABDOMEN:  No scars, normal bowel sounds, soft, non-distended, non-tender, no masses palpated, no hepatosplenomegally  MUSCULOSKELETAL:  There is no redness, warmth, or swelling of the joints.  Full range of motion noted.  NEUROLOGIC:  Awake, alert, oriented to name, place and time.  Cranial nerves II-XII are grossly intact.    DATA:    CBC:   Recent Labs     02/21/24 2042   WBC 10.8   HGB 7.1*   HCT 25.4*   MCV 92.0          BMP:  Recent Labs     02/21/24 2042      K 4.2   CL 92*   CO2 43*   BUN 7   CREATININE 0.6    ALB:3,BILIDIR:3,BILITOT:3,ALKPHOS:3)@    PT/INR: No results for input(s): \"PROTIME\", \"INR\" in the last 72 hours.    ABG:   Recent Labs     02/21/24 2045   PH 7.379   PO2 147.2*   PCO2 66.2*   HCO3 38.2*   BE 11.6*   O2SAT 99.2*   METHB 0.1   O2HB 97.1*   COHB 2.0*   O2CON 11.0   HHB 0.8   THB 7.8*             Radiology Review:  CXR reviewed looking similar compared to previous    IMPRESSION/RECOMMENDATIONS:      COPD  Hypoxia  Anxiety  Chest pain    Viral panel (-), stable/slightly better with symptoms, can hold off on antibiotics for now  Lidocaine patch to chest, troponins no change from previous, (?) musculoskeletal, observe if no better or worse  Cont with nebs, observe respiratory function  Cont with oxygen, taper as tolerated  Can do short course prednisone taper  Anxiety as per PCP  OOB to chair, PT/OT      Time at the bedside, reviewing labs and radiographs, reviewing notes and consultations, discussing with staff and family was more than  55 minutes.    Thanks for letting us see this patient in consultation.  Please contact us with any questions. Office [phone] or after hours through [facility], x [phone].

## 2024-02-22 NOTE — FLOWSHEET NOTE
Pt presents to the ED secondary to SOB. Pt states that she is also experiencing anxiety and feels this may be causing her SOB. Pt at this time is speaking in full sentences but has an elevated RR. Pt denies any chest pain or dizziness. Pt has PMH of COPD. Pt has no further complaints at this time.

## 2024-02-22 NOTE — PROGRESS NOTES
Called and left message for Dr. Jarrett about pt wanting laxative, enema and bedside nurse holding Eliquis.    Per Dr. Jarrett, okay for Eliquis. Orders were given to order Colace  mg , fleet enema once and Dulcolax 10 mg PO once per Dr. Jarrett.

## 2024-02-22 NOTE — ED NOTES
Pt assisted to the bathroom via wheel chair. Pt educated on keeping all medical monitoring equipment on while in the room.

## 2024-02-22 NOTE — ED NOTES
Pt redirected to please keep medical monitoring equipment on and to please not remove it. Pt stated she understood.

## 2024-02-22 NOTE — PROGRESS NOTES
4 Eyes Skin Assessment     NAME:  Lana Phillips  YOB: 1955  MEDICAL RECORD NUMBER:  [id number]    The patient is being assessed for  Admission    I agree that at least one RN has performed a thorough Head to Toe Skin Assessment on the patient. ALL assessment sites listed below have been assessed.      Areas assessed by both nurses:    Head, Face, Ears, Shoulders, Back, Chest, Arms, Elbows, Hands, Sacrum. Buttock, Coccyx, Ischium, Legs. Feet and Heels, and Under Medical Devices         Does the Patient have a Wound? No noted wound(s)       Guy Prevention initiated by RN: No  Wound Care Orders initiated by RN: No    Pressure Injury (Stage 3,4, Unstageable, DTI, NWPT, and Complex wounds) if present, place Wound referral order by RN under : No    New Ostomies, if present place, Ostomy referral order under : No     Nurse 1 eSignature: Electronically signed by Archana Carlson RN on 2/22/24 at 11:55 AM EST    **SHARE this note so that the co-signing nurse can place an eSignature**    Nurse 2 eSignature: Electronically signed by Shyanne Ruby RN on 2/22/24 at 12:00 PM EST

## 2024-02-22 NOTE — ED NOTES
Pt began yelling help from her room. I responded to her bedside. Pt stated that she was feeling anxious. ER MD updated and medication ordered/administered for her anxiety. Pt call light was within reach of Pt. Education provided on using the call light so staff can be notified of her request for help.

## 2024-02-22 NOTE — CONSULTS
Heart sounds: No murmur heard.  Pulmonary:      Effort: Pulmonary effort is normal. No respiratory distress.      Comments: Distant lung sounds  Abdominal:      General: Abdomen is flat. There is no distension.      Palpations: Abdomen is soft. There is no mass.      Tenderness: There is no abdominal tenderness.   Musculoskeletal:      Cervical back: Normal range of motion. No rigidity.      Right lower leg: No edema.      Left lower leg: No edema.   Skin:     Coloration: Skin is not jaundiced or pale.      Findings: No lesion or rash.   Neurological:      General: No focal deficit present.      Mental Status: She is alert and oriented to person, place, and time.   Psychiatric:         Mood and Affect: Mood normal.         Behavior: Behavior normal.         Thought Content: Thought content normal.          ECOG PS: 1        No intake or output data in the 24 hours ending 02/22/24 1544        DIAGNOSTIC DATA:   Lab Results   Component Value Date    WBC 10.8 02/21/2024    HGB 7.1 (L) 02/21/2024    HCT 25.4 (L) 02/21/2024    MCV 92.0 02/21/2024     02/21/2024     Lab Results   Component Value Date    NEUTROABS 8.92 (H) 02/21/2024     Lab Results   Component Value Date    ALT 17 02/21/2024    AST 38 (H) 02/21/2024    ALKPHOS 59 02/21/2024    BILITOT <0.2 02/21/2024     Lab Results   Component Value Date    CREATININE 0.6 02/21/2024    BUN 7 02/21/2024     02/21/2024    K 4.2 02/21/2024    CL 92 (L) 02/21/2024    CO2 43 (HH) 02/21/2024       Pathology:     Radiology:        ASSESSMENT     Normocytic anemia  Mild neutropenia  COPD  Afib on Eliquis  PAD on aspirin    2/22/2024: The patient is a 69 y.o. female who comes in for COPD exacerbation. The patient is known to us in which she has anemia. The anemia appears to be a chronic at least for a year. Hgb on this admission was 7.1. She denies of bleed/melena/hematochezia. Patient has had endoscopies in recent several months. She has had capsule endoscopy as  well in which had attempted to obtain records. Pt is unsure of results. Otherwise, consider possibly at least in part, her anemia could from iron deficiency. This has been addressed on previous visits with us in the office. R/o bleed. Consider anemia could also be due to chronic disease/inflammation.     PLAN     Labs orders to include iron studies, ferritin, b12/folate, reticulocyte count, haptoglobin, LDH  Will request capsule endoscopy report from Dr. Pelayo's office  Monitor for bleed  Pulmonology following   Transfuse to keep Hgb >7  Will order Type and screen for the AM      Approximately spent 61 minutes with patient, discussing the laboratory, imaging, and clinical findings; and documentation, and I have discussed the clinical implications and recommendations. More than 50% of time was spent counseling patient. The patient verbalized understanding.      Foreign Zuniga MD  MEDICAL ONCOLOGY  [facility]  2/22/2024    [facility]) Office  P: [phone]  F: [phone]    [facility]) Office  P: [phone]  F: [phone]    [facility]) Office  P: [phone]  F: [phone]

## 2024-02-22 NOTE — ED NOTES
Requested Pt at this time to please leave medical monitoring devices on. Pt refused stating, \"I can't sleep with this on.\"

## 2024-02-22 NOTE — ED NOTES
This RN assisted patient to restroom. Patient states she was unable to urinated and insistent that this RN straight caths her to empty to her bladder.     Patient very persistent on her demands. This RN straight cathed patient. Patient only put out 50 ml. Per patient she has not had a bowel movement for 4-5 days. Patient states she has been asking for a laxative.

## 2024-02-22 NOTE — FLOWSHEET NOTE
02/22/24 1054   Belongings   Dental Appliances Partials;At home   Vision - Corrective Lenses Eyeglasses;At home   Hearing Aid None   Clothing Shirt;Pajamas;Sweater;Slippers;Socks   Jewelry None   Body Piercings Removed N/A   Electronic Devices None;At home   Weapons (Notify Protective Services/Security) None   Other Valuables Home Medical Equipment;Purse  (oxygen)   Home Medications None   Valuables Given To Patient   Provide Name(s) of Who Valuable(s) Were Given To self     Patient arrived with the above belongings

## 2024-02-22 NOTE — ED NOTES
Pt assisted to the bathroom via wheel chair at this time. Pt educated on the use of the call light and requested to please not yell from her room unless it is an emergency. Pt states that she understands education and use of call light. Call light has been within reach of Pt at all times.

## 2024-02-22 NOTE — CARE COORDINATION
I met with pt in the room this a.m. she was recently discharge less than 2 weeks ago with [facility] but when I called them pt had declined services when they called to set up appt. Pt lives with her sister in law, noah. They have a ranch home with 3 steps to enter. Her pcp is dr. Gonzalez and she last saw him on 2/13 after her last discharge home. Pt has a 3:1 commode we [facility]. Pulmonary, hem/onc and internal medicine was consulted. Wound care was consulted. Pt was just admitted to the floor this a.m. will follow. Pt is able to bathe and dress self. Noah helps with meals. No stone history. ELIGIO Beltrán  .2/22/2024  Provided pt with advance directives. She said she has some at home but not completed. Offered for witnesses if is ready. Pt wants to wait for her sister in law to arrive. Pt has a fww at home as well.     Case Management Assessment  Initial Evaluation    Date/Time of Evaluation: 2/22/2024 11:24 AM  Assessment Completed by: ELIGIO Beltrán    If patient is discharged prior to next notation, then this note serves as note for discharge by case management.    Patient Name: Noah Phillips                   YOB: 1955  Diagnosis: COPD with acute exacerbation (HCC) [J44.1]  Chest pain, unspecified type [R07.9]                   Date / Time: 2/21/2024  8:16 PM    Patient Admission Status: Inpatient   Readmission Risk (Low < 19, Mod (19-27), High > 27): Readmission Risk Score: 29.7    Current PCP: Clyde Gonzalez, DO  PCP verified by ? Yes    Chart Reviewed: Yes      History Provided by: Patient  Patient Orientation: Alert and Oriented    Patient Cognition: Alert    Hospitalization in the last 30 days (Readmission):  Yes    If yes, Readmission Assessment in  Navigator will be completed.    Advance Directives:      Code Status: Full Code   Patient's Primary Decision Maker is: Legal Next of Kin    Primary Decision

## 2024-02-22 NOTE — ED TRIAGE NOTES
Department of Emergency Medicine  FIRST PROVIDER TRIAGE NOTE             Independent MLP           2/21/24  8:09 PM EST    Date of Encounter: 2/21/24   MRN: [id number]      HPI: Lana Phillips is a 69 y.o. female who presents to the ED for Chest Pain (Constant left side chest pain x 3 days.) and Shortness of Breath (X 3 days. 3LO2 baseline)        ROS: Negative for fever or cough.    PE: Gen Appearance/Constitutional: alert  HEENT: NC/NT. PERRLA,  Airway patent.     Initial Plan of Care: All treatment areas with department are currently occupied. Plan to order/Initiate the following while awaiting opening in ED: .  Initiate Treatment-Testing, Proceed toTreatment Area When Bed Available for ED Attending/MLP to Continue Care    Electronically signed by REGI Espino CNP   DD: 2/21/24

## 2024-02-23 ENCOUNTER — TELEPHONE (OUTPATIENT)
Dept: PRIMARY CARE CLINIC | Age: 69
End: 2024-02-23

## 2024-02-23 DIAGNOSIS — F41.9 ANXIETY: Primary | ICD-10-CM

## 2024-02-23 LAB
EKG ATRIAL RATE: 76 BPM
EKG P AXIS: 83 DEGREES
EKG P-R INTERVAL: 144 MS
EKG Q-T INTERVAL: 384 MS
EKG QRS DURATION: 76 MS
EKG QTC CALCULATION (BAZETT): 432 MS
EKG R AXIS: 76 DEGREES
EKG T AXIS: 67 DEGREES
EKG VENTRICULAR RATE: 76 BPM
ERYTHROCYTE [DISTWIDTH] IN BLOOD BY AUTOMATED COUNT: 16.4 % (ref 11.5–15)
FERRITIN SERPL-MCNC: 12 NG/ML
FOLATE SERPL-MCNC: 12.8 NG/ML (ref 4.8–24.2)
HAPTOGLOB SERPL-MCNC: 132 MG/DL (ref 30–200)
HCT VFR BLD AUTO: 23.8 % (ref 34–48)
HCT VFR BLD AUTO: 27.3 % (ref 34–48)
HGB BLD-MCNC: 6.9 G/DL (ref 11.5–15.5)
HGB BLD-MCNC: 8.3 G/DL (ref 11.5–15.5)
IMM RETICS NFR: 14.5 % (ref 3–15.9)
IRON SATN MFR SERPL: 5 % (ref 15–50)
IRON SERPL-MCNC: 15 UG/DL (ref 37–145)
LDH SERPL-CCNC: 198 U/L (ref 135–214)
MCH RBC QN AUTO: 26.3 PG (ref 26–35)
MCHC RBC AUTO-ENTMCNC: 29.4 G/DL (ref 32–34.5)
MCV RBC AUTO: 89.7 FL (ref 80–99.9)
PLATELET # BLD AUTO: 336 K/UL (ref 130–450)
PMV BLD AUTO: 9.5 FL (ref 7–12)
RBC # BLD AUTO: 2.62 M/UL (ref 3.5–5.5)
RETIC HEMOGLOBIN: 20.6 PG (ref 28.2–36.6)
RETICS # AUTO: 0.08 M/UL
RETICS/RBC NFR AUTO: 3.4 % (ref 0.4–1.9)
TIBC SERPL-MCNC: 301 UG/DL (ref 250–450)
VIT B12 SERPL-MCNC: 1094 PG/ML (ref 211–946)
WBC OTHER # BLD: 7.5 K/UL (ref 4.5–11.5)

## 2024-02-23 PROCEDURE — 2700000000 HC OXYGEN THERAPY PER DAY

## 2024-02-23 PROCEDURE — 85018 HEMOGLOBIN: CPT

## 2024-02-23 PROCEDURE — 2580000003 HC RX 258: Performed by: INTERNAL MEDICINE

## 2024-02-23 PROCEDURE — 82607 VITAMIN B-12: CPT

## 2024-02-23 PROCEDURE — 83540 ASSAY OF IRON: CPT

## 2024-02-23 PROCEDURE — 85014 HEMATOCRIT: CPT

## 2024-02-23 PROCEDURE — 85045 AUTOMATED RETICULOCYTE COUNT: CPT

## 2024-02-23 PROCEDURE — 36430 TRANSFUSION BLD/BLD COMPNT: CPT

## 2024-02-23 PROCEDURE — 86850 RBC ANTIBODY SCREEN: CPT

## 2024-02-23 PROCEDURE — 86923 COMPATIBILITY TEST ELECTRIC: CPT

## 2024-02-23 PROCEDURE — 83550 IRON BINDING TEST: CPT

## 2024-02-23 PROCEDURE — 6370000000 HC RX 637 (ALT 250 FOR IP): Performed by: INTERNAL MEDICINE

## 2024-02-23 PROCEDURE — 86901 BLOOD TYPING SEROLOGIC RH(D): CPT

## 2024-02-23 PROCEDURE — 99232 SBSQ HOSP IP/OBS MODERATE 35: CPT | Performed by: INTERNAL MEDICINE

## 2024-02-23 PROCEDURE — 6360000002 HC RX W HCPCS: Performed by: INTERNAL MEDICINE

## 2024-02-23 PROCEDURE — 94640 AIRWAY INHALATION TREATMENT: CPT

## 2024-02-23 PROCEDURE — 30233N1 TRANSFUSION OF NONAUTOLOGOUS RED BLOOD CELLS INTO PERIPHERAL VEIN, PERCUTANEOUS APPROACH: ICD-10-PCS | Performed by: INTERNAL MEDICINE

## 2024-02-23 PROCEDURE — 2060000000 HC ICU INTERMEDIATE R&B

## 2024-02-23 PROCEDURE — 82728 ASSAY OF FERRITIN: CPT

## 2024-02-23 PROCEDURE — 83010 ASSAY OF HAPTOGLOBIN QUANT: CPT

## 2024-02-23 PROCEDURE — 85027 COMPLETE CBC AUTOMATED: CPT

## 2024-02-23 PROCEDURE — 36415 COLL VENOUS BLD VENIPUNCTURE: CPT

## 2024-02-23 PROCEDURE — 82746 ASSAY OF FOLIC ACID SERUM: CPT

## 2024-02-23 PROCEDURE — 83615 LACTATE (LD) (LDH) ENZYME: CPT

## 2024-02-23 PROCEDURE — 93010 ELECTROCARDIOGRAM REPORT: CPT | Performed by: INTERNAL MEDICINE

## 2024-02-23 PROCEDURE — 86900 BLOOD TYPING SEROLOGIC ABO: CPT

## 2024-02-23 PROCEDURE — P9016 RBC LEUKOCYTES REDUCED: HCPCS

## 2024-02-23 RX ORDER — GUAIFENESIN/DEXTROMETHORPHAN 100-10MG/5
5 SYRUP ORAL EVERY 4 HOURS PRN
Status: DISCONTINUED | OUTPATIENT
Start: 2024-02-23 | End: 2024-02-25 | Stop reason: HOSPADM

## 2024-02-23 RX ORDER — PREDNISONE 10 MG/1
TABLET ORAL
Qty: 9 TABLET | Refills: 0 | Status: ON HOLD | OUTPATIENT
Start: 2024-02-24 | End: 2024-03-12

## 2024-02-23 RX ORDER — SODIUM CHLORIDE 9 MG/ML
INJECTION, SOLUTION INTRAVENOUS PRN
Status: DISCONTINUED | OUTPATIENT
Start: 2024-02-23 | End: 2024-02-25 | Stop reason: HOSPADM

## 2024-02-23 RX ADMIN — GUAIFENESIN SYRUP AND DEXTROMETHORPHAN 5 ML: 100; 10 SYRUP ORAL at 09:54

## 2024-02-23 RX ADMIN — BUDESONIDE 500 MCG: 0.5 SUSPENSION RESPIRATORY (INHALATION) at 08:53

## 2024-02-23 RX ADMIN — ACETAMINOPHEN 650 MG: 325 TABLET ORAL at 21:20

## 2024-02-23 RX ADMIN — DOCUSATE SODIUM 100 MG: 100 CAPSULE, LIQUID FILLED ORAL at 21:20

## 2024-02-23 RX ADMIN — APIXABAN 5 MG: 5 TABLET, FILM COATED ORAL at 09:54

## 2024-02-23 RX ADMIN — IPRATROPIUM BROMIDE AND ALBUTEROL SULFATE 1 DOSE: .5; 2.5 SOLUTION RESPIRATORY (INHALATION) at 19:51

## 2024-02-23 RX ADMIN — PREGABALIN 50 MG: 50 CAPSULE ORAL at 21:21

## 2024-02-23 RX ADMIN — APIXABAN 5 MG: 5 TABLET, FILM COATED ORAL at 21:20

## 2024-02-23 RX ADMIN — IPRATROPIUM BROMIDE AND ALBUTEROL SULFATE 1 DOSE: .5; 2.5 SOLUTION RESPIRATORY (INHALATION) at 12:20

## 2024-02-23 RX ADMIN — BUSPIRONE HYDROCHLORIDE 10 MG: 10 TABLET ORAL at 09:54

## 2024-02-23 RX ADMIN — DILTIAZEM HYDROCHLORIDE 120 MG: 120 CAPSULE, COATED, EXTENDED RELEASE ORAL at 09:54

## 2024-02-23 RX ADMIN — ARFORMOTEROL TARTRATE 15 MCG: 15 SOLUTION RESPIRATORY (INHALATION) at 08:53

## 2024-02-23 RX ADMIN — DOCUSATE SODIUM 100 MG: 100 CAPSULE, LIQUID FILLED ORAL at 09:54

## 2024-02-23 RX ADMIN — METHIMAZOLE 15 MG: 5 TABLET ORAL at 09:54

## 2024-02-23 RX ADMIN — CYANOCOBALAMIN TAB 1000 MCG 500 MCG: 1000 TAB at 09:54

## 2024-02-23 RX ADMIN — SODIUM CHLORIDE, PRESERVATIVE FREE 10 ML: 5 INJECTION INTRAVENOUS at 09:54

## 2024-02-23 RX ADMIN — BUSPIRONE HYDROCHLORIDE 10 MG: 10 TABLET ORAL at 13:16

## 2024-02-23 RX ADMIN — IPRATROPIUM BROMIDE AND ALBUTEROL SULFATE 1 DOSE: .5; 2.5 SOLUTION RESPIRATORY (INHALATION) at 16:08

## 2024-02-23 RX ADMIN — GUAIFENESIN SYRUP AND DEXTROMETHORPHAN 5 ML: 100; 10 SYRUP ORAL at 14:20

## 2024-02-23 RX ADMIN — PAROXETINE HYDROCHLORIDE 20 MG: 20 TABLET, FILM COATED ORAL at 09:54

## 2024-02-23 RX ADMIN — IPRATROPIUM BROMIDE AND ALBUTEROL SULFATE 1 DOSE: .5; 2.5 SOLUTION RESPIRATORY (INHALATION) at 08:53

## 2024-02-23 RX ADMIN — PREDNISONE 20 MG: 20 TABLET ORAL at 09:54

## 2024-02-23 RX ADMIN — ASPIRIN 81 MG: 81 TABLET, COATED ORAL at 09:54

## 2024-02-23 RX ADMIN — HYDROXYZINE PAMOATE 25 MG: 25 CAPSULE ORAL at 21:20

## 2024-02-23 RX ADMIN — POLYETHYLENE GLYCOL 3350 17 G: 17 POWDER, FOR SOLUTION ORAL at 10:02

## 2024-02-23 RX ADMIN — ATORVASTATIN CALCIUM 40 MG: 40 TABLET, FILM COATED ORAL at 21:21

## 2024-02-23 RX ADMIN — HYDROXYZINE PAMOATE 25 MG: 25 CAPSULE ORAL at 13:16

## 2024-02-23 RX ADMIN — BUSPIRONE HYDROCHLORIDE 10 MG: 10 TABLET ORAL at 21:20

## 2024-02-23 RX ADMIN — BUDESONIDE 500 MCG: 0.5 SUSPENSION RESPIRATORY (INHALATION) at 19:51

## 2024-02-23 RX ADMIN — PANTOPRAZOLE SODIUM 40 MG: 40 TABLET, DELAYED RELEASE ORAL at 05:27

## 2024-02-23 RX ADMIN — GUAIFENESIN SYRUP AND DEXTROMETHORPHAN 5 ML: 100; 10 SYRUP ORAL at 21:43

## 2024-02-23 RX ADMIN — PREGABALIN 50 MG: 50 CAPSULE ORAL at 15:01

## 2024-02-23 RX ADMIN — PREGABALIN 50 MG: 50 CAPSULE ORAL at 09:54

## 2024-02-23 RX ADMIN — SODIUM CHLORIDE, PRESERVATIVE FREE 10 ML: 5 INJECTION INTRAVENOUS at 21:00

## 2024-02-23 RX ADMIN — ARFORMOTEROL TARTRATE 15 MCG: 15 SOLUTION RESPIRATORY (INHALATION) at 19:51

## 2024-02-23 RX ADMIN — TRAZODONE HYDROCHLORIDE 50 MG: 50 TABLET ORAL at 21:21

## 2024-02-23 ASSESSMENT — PAIN DESCRIPTION - LOCATION: LOCATION: ABDOMEN;CHEST

## 2024-02-23 ASSESSMENT — PAIN DESCRIPTION - DESCRIPTORS: DESCRIPTORS: ACHING;DISCOMFORT

## 2024-02-23 ASSESSMENT — PAIN SCALES - GENERAL: PAINLEVEL_OUTOF10: 8

## 2024-02-23 NOTE — PROGRESS NOTES
Spoke with Mitzi from [facility], she stated that their system crashed a couple months ago and they no longer have any records for this pt.

## 2024-02-23 NOTE — PROGRESS NOTES
[facility]  [address], [address]  [place] [address]      Pulmonary Progress Note      SUBJECTIVE:  claims feeling some better with breathing, has lidocaine patch at left upper chest and feels helping with pain, on 2 li NC sitting up in bed, still dyspneic with min-mod exertion though (?) close to baseline, not much cough/congestion.    OBJECTIVE    Medications    Continuous Infusions:   sodium chloride         Scheduled Meds:   apixaban  5 mg Oral BID    aspirin  81 mg Oral Daily    atorvastatin  40 mg Oral Nightly    busPIRone  10 mg Oral TID    dilTIAZem  120 mg Oral Daily    ipratropium 0.5 mg-albuterol 2.5 mg  1 Dose Nebulization Q4H WA RT    methIMAzole  15 mg Oral Daily    pantoprazole  40 mg Oral QAM AC    PARoxetine  20 mg Oral QAM    traZODone  50 mg Oral Nightly    vitamin B-12  500 mcg Oral Daily    sodium chloride flush  5-40 mL IntraVENous 2 times per day    budesonide  0.5 mg Nebulization BID RT    arformoterol tartrate  15 mcg Nebulization BID RT    docusate sodium  100 mg Oral BID    sodium phosphate  1 enema Rectal Once    pregabalin  50 mg Oral TID    lidocaine  1 patch TransDERmal Daily    predniSONE  20 mg Oral Daily    sodium chloride flush  5-40 mL IntraVENous 2 times per day       PRN Meds:guaiFENesin-dextromethorphan, white petrolatum, sodium chloride flush, sodium chloride, potassium chloride **OR** potassium alternative oral replacement **OR** potassium chloride, magnesium sulfate, polyethylene glycol, acetaminophen **OR** acetaminophen, hydrOXYzine pamoate, sodium chloride flush, ondansetron **OR** ondansetron    Physical    VITALS:  BP (!) 144/73   Pulse 82   Temp 98.3 °F (36.8 °C) (Temporal)   Resp 18   Ht 1.702 m (5' 7\")   Wt 44.5 kg (98 lb)   SpO2 97%   BMI 15.35 kg/m²     24HR INTAKE/OUTPUT:      Intake/Output Summary (Last 24 hours) at 2/23/2024 1230  Last data filed at 2/22/2024 2142  Gross per 24  hour   Intake --   Output 170 ml   Net -170 ml       24HR PULSE OXIMETRY RANGE:    SpO2  Av.7 %  Min: 94 %  Max: 99 %    General appearance: alert, appears stated age, and cooperative  Lungs: rhonchi bilaterally minimal with cough  Heart: regular rate and rhythm, S1, S2 normal, no murmur, click, rub or gallop  Abdomen: soft, non-tender; bowel sounds normal; no masses,  no organomegaly  Extremities: extremities normal, atraumatic, no cyanosis or edema  Neurologic: Mental status: Alert, oriented, thought content appropriate    Data    CBC:   Recent Labs     24  1037   WBC 10.8 7.5   HGB 7.1* 6.9*   HCT 25.4* 23.8*   MCV 92.0 89.7    336       BMP:  Recent Labs     24      K 4.2   CL 92*   CO2 43*   BUN 7   CREATININE 0.6    ALB:3,BILIDIR:3,BILITOT:3,ALKPHOS:3)@    PT/INR: No results for input(s): \"PROTIME\", \"INR\" in the last 72 hours.    ABG:   Recent Labs     24   PH 7.379   PO2 147.2*   PCO2 66.2*   HCO3 38.2*   BE 11.6*   O2SAT 99.2*   METHB 0.1   O2HB 97.1*   COHB 2.0*   O2CON 11.0   HHB 0.8   THB 7.8*             Radiology/Other tests reviewed: none    Assessment:     Principal Problem:    COPD with acute exacerbation (HCC)  Resolved Problems:    * No resolved hospital problems. *  Hypoxia  Anxiety      Plan:       Cont with nebs, should be able to resume usual medications as out-pt  Cont with steroids, taper as tolerted  Cont with oxygen use continuously  OOB to chair, PT/OT  Can be discharged from pulmonary pov over the weekend      Time at the bedside, reviewing labs and radiographs, reviewing notes and consultations, discussing with staff and family was more than 50 minutes.      Thanks for letting us see this patient in consultation.  Please contact us with any questions. Office [phone] or after hours through [employer], x [phone].

## 2024-02-23 NOTE — PROGRESS NOTES
Patient concerned she is retaining urine, lower abdominal discomfort, post void residual of 170cc measured

## 2024-02-23 NOTE — PROGRESS NOTES
[facility]    Subjective:  pt alert conversive breathing a little better      Current Facility-Administered Medications:     apixaban (ELIQUIS) tablet 5 mg, 5 mg, Oral, BID, Baldemar Jarrett DO, 5 mg at 02/22/24 2029    aspirin EC tablet 81 mg, 81 mg, Oral, Daily, Baldemar Jarrett DO, 81 mg at 02/22/24 1051    atorvastatin (LIPITOR) tablet 40 mg, 40 mg, Oral, Nightly, Baldemar Jarrett DO, 40 mg at 02/22/24 2030    busPIRone (BUSPAR) tablet 10 mg, 10 mg, Oral, TID, Baldemar Jarrett DO, 10 mg at 02/22/24 2029    dilTIAZem (CARDIZEM CD) extended release capsule 120 mg, 120 mg, Oral, Daily, Baldemar Jarrett DO, 120 mg at 02/22/24 1051    ipratropium 0.5 mg-albuterol 2.5 mg (DUONEB) nebulizer solution 1 Dose, 1 Dose, Nebulization, Q4H WA RT, Baldemar Jarrett DO, 1 Dose at 02/22/24 2045    methIMAzole (TAPAZOLE) tablet 15 mg, 15 mg, Oral, Daily, Baledmar Jarrett DO, 15 mg at 02/22/24 1052    pantoprazole (PROTONIX) tablet 40 mg, 40 mg, Oral, QAM AC, Baldemar Jarrett DO, 40 mg at 02/23/24 0527    PARoxetine (PAXIL) tablet 20 mg, 20 mg, Oral, QAM, Baldemar Jarrett DO, 20 mg at 02/22/24 1051    traZODone (DESYREL) tablet 50 mg, 50 mg, Oral, Nightly, Baldemar Jarrett DO, 50 mg at 02/22/24 2029    vitamin B-12 (CYANOCOBALAMIN) tablet 500 mcg, 500 mcg, Oral, Daily, Baldemar Jarrett DO, 500 mcg at 02/22/24 1052    sodium chloride flush 0.9 % injection 5-40 mL, 5-40 mL, IntraVENous, 2 times per day, Baldemar Jarrett DO, 10 mL at 02/22/24 2033    sodium chloride flush 0.9 % injection 5-40 mL, 5-40 mL, IntraVENous, PRN, Baldemar Jarrett, DO    0.9 % sodium chloride infusion, , IntraVENous, PRN, Baldemar Jarrett, DO    potassium chloride (KLOR-CON M) extended release tablet 40 mEq, 40 mEq, Oral, PRN **OR** potassium bicarb-citric acid (EFFER-K) effervescent tablet 40 mEq, 40 mEq, Oral, PRN **OR** potassium chloride 10 mEq/100 mL IVPB (Peripheral Line), 10 mEq, IntraVENous, PRN, Baldemar Jarrett,

## 2024-02-23 NOTE — TELEPHONE ENCOUNTER
The pt is calling to see if you can refer her to see a psychiatrist for her anxiety, she is in the hospital right now and probably won't be home till Sunday, she says if we call before Sunday we can call her room at [phone] or if it is after Sunday we can reach her at [phone]

## 2024-02-23 NOTE — PROGRESS NOTES
Spoke with Mitzi at [facility] regarding faxing over Endoscopy Report. Fax sent to office

## 2024-02-23 NOTE — PROGRESS NOTES
Attempted to call nurse to nurse to [facility] x2. Spoke to Lana. Lana took nurses station number and per Lana the nurse will call me back.

## 2024-02-23 NOTE — CARE COORDINATION
SOCIAL WORK/CASEMANAGEMENT TRANSITION OF CARE PLANNING( KATELYNN BANUELOS, [phone]): Pt is on 3l nc here and at baseline labs are pending. Pulmonary and oncology are following. Plan is home with her sister in law , noah. Will follow. Katelynn Banuelos, ELIGIO  2/23/2024

## 2024-02-23 NOTE — PLAN OF CARE
Problem: Chronic Conditions and Co-morbidities  Goal: Patient's chronic conditions and co-morbidity symptoms are monitored and maintained or improved  Outcome: Progressing     Problem: Discharge Planning  Goal: Discharge to home or other facility with appropriate resources  Outcome: Progressing     Problem: Safety - Adult  Goal: Free from fall injury  Outcome: Progressing  Flowsheets (Taken 2/23/2024 1031)  Free From Fall Injury: Instruct family/caregiver on patient safety     Problem: ABCDS Injury Assessment  Goal: Absence of physical injury  Outcome: Progressing  Flowsheets (Taken 2/23/2024 1031)  Absence of Physical Injury: Implement safety measures based on patient assessment     Problem: Pain  Goal: Verbalizes/displays adequate comfort level or baseline comfort level  Outcome: Progressing     Problem: Skin/Tissue Integrity  Goal: Absence of new skin breakdown  Description: 1.  Monitor for areas of redness and/or skin breakdown  2.  Assess vascular access sites hourly  3.  Every 4-6 hours minimum:  Change oxygen saturation probe site  4.  Every 4-6 hours:  If on nasal continuous positive airway pressure, respiratory therapy assess nares and determine need for appliance change or resting period.  Outcome: Progressing

## 2024-02-23 NOTE — PROGRESS NOTES
CLINICAL PHARMACY NOTE: MEDS TO BEDS    Total # of Prescriptions Filled: 1   The following medications were delivered to the patient:  Prednisone 10    Additional Documentation:  Delivered to pt

## 2024-02-23 NOTE — PROGRESS NOTES
Comprehensive Nutrition Assessment    Type and Reason for Visit:  Initial, Positive Nutrition Screen    Nutrition Recommendations/Plan:   Continue current diet. Recommend and start ONS : Ensure + TID to optimize PO intake. Will monitor.     Malnutrition Assessment:  Malnutrition Status:  Severe malnutrition (02/23/24 1352)    Context:  Chronic Illness     Findings of the 6 clinical characteristics of malnutrition:  Energy Intake:  75% or less estimated energy requirements for 1 month or longer  Weight Loss:  Unable to assess     Body Fat Loss:  Severe body fat loss Orbital, Triceps, Fat Overlying Ribs   Muscle Mass Loss:  Severe muscle mass loss Temples (temporalis), Clavicles (pectoralis & deltoids), Calf (gastrocnemius)  Fluid Accumulation:  No significant fluid accumulation     Strength:  Not Performed    Nutrition Assessment:    Pt. admitted d/t COPD exacerbation/SOB; PMhx of COPD,AFIB. Hx of malnutrition. Pt does meet criteria for severe malnutrition; will start Ensure TID to promote oral intake and monitor.    Nutrition Related Findings:    A&Ox4; poor dentition; flat/soft abd, +BS, +constipation,  no edema; -I/O, severe muscle/fat wasting; Wound Type: None       Current Nutrition Intake & Therapies:    Average Meal Intake: Unable to assess  Average Supplements Intake: None Ordered  ADULT DIET; Regular  ADULT ORAL NUTRITION SUPPLEMENT; Breakfast, Dinner; Standard High Calorie/High Protein Oral Supplement    Anthropometric Measures:  Height: 170.2 cm (5' 7.01\")  Ideal Body Weight (IBW): 135 lbs (61 kg)       Current Body Weight: 44.5 kg (98 lb 1.7 oz) (2/21 stated ; unable to obtain CBW at this time), 72.7 % IBW. Weight Source: Stated  Current BMI (kg/m2): 15.4  Usual Body Weight: 43.8 kg (96 lb 9 oz) (10/16/23-[facility])  % Weight Change (Calculated): 1.6  Weight Adjustment For: No Adjustment                 BMI Categories: Underweight (BMI less than 22) age over 65    Estimated Daily Nutrient

## 2024-02-24 LAB
ABO/RH: NORMAL
ANION GAP SERPL CALCULATED.3IONS-SCNC: 9 MMOL/L (ref 7–16)
ANTIBODY SCREEN: NEGATIVE
ARM BAND NUMBER: NORMAL
BLOOD BANK BLOOD PRODUCT EXPIRATION DATE: NORMAL
BLOOD BANK DISPENSE STATUS: NORMAL
BLOOD BANK ISBT PRODUCT BLOOD TYPE: 9500
BLOOD BANK PRODUCT CODE: NORMAL
BLOOD BANK SAMPLE EXPIRATION: NORMAL
BLOOD BANK UNIT TYPE AND RH: NORMAL
BPU ID: NORMAL
BUN SERPL-MCNC: 10 MG/DL (ref 6–23)
CALCIUM SERPL-MCNC: 8.7 MG/DL (ref 8.6–10.2)
CHLORIDE SERPL-SCNC: 95 MMOL/L (ref 98–107)
CO2 SERPL-SCNC: 34 MMOL/L (ref 22–29)
COMPONENT: NORMAL
CREAT SERPL-MCNC: 0.6 MG/DL (ref 0.5–1)
CROSSMATCH RESULT: NORMAL
ERYTHROCYTE [DISTWIDTH] IN BLOOD BY AUTOMATED COUNT: 16.3 % (ref 11.5–15)
GFR SERPL CREATININE-BSD FRML MDRD: >60 ML/MIN/1.73M2
GLUCOSE SERPL-MCNC: 131 MG/DL (ref 74–99)
HCT VFR BLD AUTO: 30.7 % (ref 34–48)
HGB BLD-MCNC: 9.2 G/DL (ref 11.5–15.5)
MCH RBC QN AUTO: 26.5 PG (ref 26–35)
MCHC RBC AUTO-ENTMCNC: 30 G/DL (ref 32–34.5)
MCV RBC AUTO: 88.5 FL (ref 80–99.9)
PLATELET # BLD AUTO: 339 K/UL (ref 130–450)
PMV BLD AUTO: 9.2 FL (ref 7–12)
POTASSIUM SERPL-SCNC: 4.1 MMOL/L (ref 3.5–5)
RBC # BLD AUTO: 3.47 M/UL (ref 3.5–5.5)
SODIUM SERPL-SCNC: 138 MMOL/L (ref 132–146)
TRANSFUSION STATUS: NORMAL
UNIT DIVISION: 0
UNIT ISSUE DATE/TIME: NORMAL
WBC OTHER # BLD: 10.3 K/UL (ref 4.5–11.5)

## 2024-02-24 PROCEDURE — 99232 SBSQ HOSP IP/OBS MODERATE 35: CPT | Performed by: INTERNAL MEDICINE

## 2024-02-24 PROCEDURE — 36415 COLL VENOUS BLD VENIPUNCTURE: CPT

## 2024-02-24 PROCEDURE — 2700000000 HC OXYGEN THERAPY PER DAY

## 2024-02-24 PROCEDURE — 6360000002 HC RX W HCPCS: Performed by: INTERNAL MEDICINE

## 2024-02-24 PROCEDURE — 80048 BASIC METABOLIC PNL TOTAL CA: CPT

## 2024-02-24 PROCEDURE — 85027 COMPLETE CBC AUTOMATED: CPT

## 2024-02-24 PROCEDURE — 2060000000 HC ICU INTERMEDIATE R&B

## 2024-02-24 PROCEDURE — 2580000003 HC RX 258: Performed by: INTERNAL MEDICINE

## 2024-02-24 PROCEDURE — 94640 AIRWAY INHALATION TREATMENT: CPT

## 2024-02-24 PROCEDURE — 6370000000 HC RX 637 (ALT 250 FOR IP): Performed by: INTERNAL MEDICINE

## 2024-02-24 RX ORDER — ENEMA 19; 7 G/133ML; G/133ML
1 ENEMA RECTAL ONCE
Status: COMPLETED | OUTPATIENT
Start: 2024-02-24 | End: 2024-02-24

## 2024-02-24 RX ADMIN — DOCUSATE SODIUM 100 MG: 100 CAPSULE, LIQUID FILLED ORAL at 08:27

## 2024-02-24 RX ADMIN — ACETAMINOPHEN 650 MG: 325 TABLET ORAL at 14:14

## 2024-02-24 RX ADMIN — HYDROXYZINE PAMOATE 25 MG: 25 CAPSULE ORAL at 14:34

## 2024-02-24 RX ADMIN — BUDESONIDE 500 MCG: 0.5 SUSPENSION RESPIRATORY (INHALATION) at 19:44

## 2024-02-24 RX ADMIN — PREGABALIN 50 MG: 50 CAPSULE ORAL at 13:47

## 2024-02-24 RX ADMIN — ATORVASTATIN CALCIUM 40 MG: 40 TABLET, FILM COATED ORAL at 19:56

## 2024-02-24 RX ADMIN — BUDESONIDE 500 MCG: 0.5 SUSPENSION RESPIRATORY (INHALATION) at 08:10

## 2024-02-24 RX ADMIN — PREDNISONE 20 MG: 20 TABLET ORAL at 08:27

## 2024-02-24 RX ADMIN — GUAIFENESIN SYRUP AND DEXTROMETHORPHAN 5 ML: 100; 10 SYRUP ORAL at 14:15

## 2024-02-24 RX ADMIN — IPRATROPIUM BROMIDE AND ALBUTEROL SULFATE 1 DOSE: .5; 2.5 SOLUTION RESPIRATORY (INHALATION) at 19:44

## 2024-02-24 RX ADMIN — CYANOCOBALAMIN TAB 1000 MCG 500 MCG: 1000 TAB at 08:28

## 2024-02-24 RX ADMIN — PAROXETINE HYDROCHLORIDE 20 MG: 20 TABLET, FILM COATED ORAL at 08:27

## 2024-02-24 RX ADMIN — BUSPIRONE HYDROCHLORIDE 10 MG: 10 TABLET ORAL at 19:56

## 2024-02-24 RX ADMIN — ARFORMOTEROL TARTRATE 15 MCG: 15 SOLUTION RESPIRATORY (INHALATION) at 08:10

## 2024-02-24 RX ADMIN — IPRATROPIUM BROMIDE AND ALBUTEROL SULFATE 1 DOSE: .5; 2.5 SOLUTION RESPIRATORY (INHALATION) at 11:53

## 2024-02-24 RX ADMIN — Medication 1 ENEMA: at 09:55

## 2024-02-24 RX ADMIN — DILTIAZEM HYDROCHLORIDE 120 MG: 120 CAPSULE, COATED, EXTENDED RELEASE ORAL at 08:27

## 2024-02-24 RX ADMIN — PANTOPRAZOLE SODIUM 40 MG: 40 TABLET, DELAYED RELEASE ORAL at 05:26

## 2024-02-24 RX ADMIN — ACETAMINOPHEN 650 MG: 325 TABLET ORAL at 05:30

## 2024-02-24 RX ADMIN — ASPIRIN 81 MG: 81 TABLET, COATED ORAL at 08:27

## 2024-02-24 RX ADMIN — BUSPIRONE HYDROCHLORIDE 10 MG: 10 TABLET ORAL at 08:27

## 2024-02-24 RX ADMIN — APIXABAN 5 MG: 5 TABLET, FILM COATED ORAL at 08:27

## 2024-02-24 RX ADMIN — DOCUSATE SODIUM 100 MG: 100 CAPSULE, LIQUID FILLED ORAL at 20:04

## 2024-02-24 RX ADMIN — HYDROXYZINE PAMOATE 25 MG: 25 CAPSULE ORAL at 22:42

## 2024-02-24 RX ADMIN — PREGABALIN 50 MG: 50 CAPSULE ORAL at 19:56

## 2024-02-24 RX ADMIN — APIXABAN 5 MG: 5 TABLET, FILM COATED ORAL at 19:56

## 2024-02-24 RX ADMIN — IPRATROPIUM BROMIDE AND ALBUTEROL SULFATE 1 DOSE: .5; 2.5 SOLUTION RESPIRATORY (INHALATION) at 16:14

## 2024-02-24 RX ADMIN — SODIUM CHLORIDE, PRESERVATIVE FREE 10 ML: 5 INJECTION INTRAVENOUS at 20:01

## 2024-02-24 RX ADMIN — METHIMAZOLE 15 MG: 5 TABLET ORAL at 08:29

## 2024-02-24 RX ADMIN — TRAZODONE HYDROCHLORIDE 50 MG: 50 TABLET ORAL at 19:56

## 2024-02-24 RX ADMIN — BUSPIRONE HYDROCHLORIDE 10 MG: 10 TABLET ORAL at 13:47

## 2024-02-24 RX ADMIN — SODIUM CHLORIDE, PRESERVATIVE FREE 10 ML: 5 INJECTION INTRAVENOUS at 20:00

## 2024-02-24 RX ADMIN — ACETAMINOPHEN 650 MG: 325 TABLET ORAL at 19:57

## 2024-02-24 RX ADMIN — PREGABALIN 50 MG: 50 CAPSULE ORAL at 08:27

## 2024-02-24 RX ADMIN — ARFORMOTEROL TARTRATE 15 MCG: 15 SOLUTION RESPIRATORY (INHALATION) at 19:44

## 2024-02-24 RX ADMIN — SODIUM CHLORIDE, PRESERVATIVE FREE 10 ML: 5 INJECTION INTRAVENOUS at 08:30

## 2024-02-24 RX ADMIN — IPRATROPIUM BROMIDE AND ALBUTEROL SULFATE 1 DOSE: .5; 2.5 SOLUTION RESPIRATORY (INHALATION) at 08:10

## 2024-02-24 RX ADMIN — GUAIFENESIN SYRUP AND DEXTROMETHORPHAN 5 ML: 100; 10 SYRUP ORAL at 05:28

## 2024-02-24 RX ADMIN — SODIUM CHLORIDE 125 MG: 9 INJECTION, SOLUTION INTRAVENOUS at 11:33

## 2024-02-24 ASSESSMENT — PAIN DESCRIPTION - DESCRIPTORS: DESCRIPTORS: DULL

## 2024-02-24 ASSESSMENT — PAIN SCALES - GENERAL: PAINLEVEL_OUTOF10: 2

## 2024-02-24 ASSESSMENT — PAIN - FUNCTIONAL ASSESSMENT: PAIN_FUNCTIONAL_ASSESSMENT: ACTIVITIES ARE NOT PREVENTED

## 2024-02-24 ASSESSMENT — PAIN DESCRIPTION - LOCATION: LOCATION: HEAD

## 2024-02-24 NOTE — PROGRESS NOTES
[facility] Medicine    Subjective:  pt alert conversive c/o constipation      Current Facility-Administered Medications:     sodium phosphate (FLEET) rectal enema 1 enema, 1 enema, Rectal, Once, Baldemar Jarrett DO    guaiFENesin-dextromethorphan (ROBITUSSIN DM) 100-10 MG/5ML syrup 5 mL, 5 mL, Oral, Q4H PRN, Baldemar Jarrett DO, 5 mL at 02/24/24 0528    white petrolatum ointment, , Topical, BID PRN, Baldemar Jarrett DO    0.9 % sodium chloride infusion, , IntraVENous, PRN, Viridiana Potter MD    ferric gluconate (FERRLECIT) 125 mg in sodium chloride 0.9 % 100 mL IVPB, 125 mg, IntraVENous, Once, Viridiana Potter MD    apixaban (ELIQUIS) tablet 5 mg, 5 mg, Oral, BID, aBldemar Jarrett DO, 5 mg at 02/23/24 2120    aspirin EC tablet 81 mg, 81 mg, Oral, Daily, Baldemar Jarrett DO, 81 mg at 02/23/24 0954    atorvastatin (LIPITOR) tablet 40 mg, 40 mg, Oral, Nightly, Baldemar Jarrett DO, 40 mg at 02/23/24 2121    busPIRone (BUSPAR) tablet 10 mg, 10 mg, Oral, TID, Baldemar Jarrett DO, 10 mg at 02/23/24 2120    dilTIAZem (CARDIZEM CD) extended release capsule 120 mg, 120 mg, Oral, Daily, Baldemar Jarrett DO, 120 mg at 02/23/24 0954    ipratropium 0.5 mg-albuterol 2.5 mg (DUONEB) nebulizer solution 1 Dose, 1 Dose, Nebulization, Q4H WA RT, Baldemar Jarrett DO, 1 Dose at 02/23/24 1951    methIMAzole (TAPAZOLE) tablet 15 mg, 15 mg, Oral, Daily, Baldemar Jarrett DO, 15 mg at 02/23/24 0954    pantoprazole (PROTONIX) tablet 40 mg, 40 mg, Oral, QAM AC, Baldemar Jarrett DO, 40 mg at 02/24/24 0526    PARoxetine (PAXIL) tablet 20 mg, 20 mg, Oral, QAM, Baldemar Jarrett, , 20 mg at 02/23/24 0954    traZODone (DESYREL) tablet 50 mg, 50 mg, Oral, Nightly, Baldemar Jarrett, , 50 mg at 02/23/24 2121    vitamin B-12 (CYANOCOBALAMIN) tablet 500 mcg, 500 mcg, Oral, Daily, Baldemar Jarrett DO, 500 mcg at 02/23/24 0954    sodium chloride flush 0.9 % injection 5-40 mL, 5-40 mL, IntraVENous, 2 times per day, [place]  2100    sodium chloride flush 0.9 % injection 5-40 mL, 5-40 mL, IntraVENous, PRN, Baldemar Jarrett, DO    ondansetron (ZOFRAN-ODT) disintegrating tablet 4 mg, 4 mg, Oral, Q8H PRN **OR** ondansetron (ZOFRAN) injection 4 mg, 4 mg, IntraVENous, Q6H PRN, Baldemar Jarrett, DO    Objective:    BP (!) 152/73   Pulse 68   Temp 97.6 °F (36.4 °C) (Temporal)   Resp 18   Ht 1.702 m (5' 7.01\")   Wt 44.5 kg (98 lb)   SpO2 99%   BMI 15.35 kg/m²     Heart:  reg  Lungs:  ctab  Abd: + bs soft nontender  Extrem:  w/o edema    CBC with Differential:    Lab Results   Component Value Date/Time    WBC 7.5 02/23/2024 10:37 AM    RBC 2.62 02/23/2024 10:37 AM    HGB 8.3 02/23/2024 07:27 PM    HCT 27.3 02/23/2024 07:27 PM     02/23/2024 10:37 AM    MCV 89.7 02/23/2024 10:37 AM    MCH 26.3 02/23/2024 10:37 AM    MCHC 29.4 02/23/2024 10:37 AM    RDW 16.4 02/23/2024 10:37 AM    LYMPHOPCT 15 02/21/2024 08:42 PM    MONOPCT 3 02/21/2024 08:42 PM    BASOPCT 0 02/21/2024 08:42 PM    MONOSABS 0.28 02/21/2024 08:42 PM    LYMPHSABS 1.60 02/21/2024 08:42 PM    EOSABS 0.00 02/21/2024 08:42 PM    BASOSABS 0.00 02/21/2024 08:42 PM     CMP:    Lab Results   Component Value Date/Time     02/21/2024 08:42 PM    K 4.2 02/21/2024 08:42 PM    K 3.3 12/30/2022 02:49 AM    CL 92 02/21/2024 08:42 PM    CO2 43 02/21/2024 08:42 PM    BUN 7 02/21/2024 08:42 PM    CREATININE 0.6 02/21/2024 08:42 PM    GFRAA >60 07/28/2022 09:31 AM    LABGLOM >60 02/21/2024 08:42 PM    GLUCOSE 86 02/21/2024 08:42 PM    PROT 6.4 02/21/2024 08:42 PM    LABALBU 3.8 02/21/2024 08:42 PM    CALCIUM 9.1 02/21/2024 08:42 PM    BILITOT <0.2 02/21/2024 08:42 PM    ALKPHOS 59 02/21/2024 08:42 PM    AST 38 02/21/2024 08:42 PM    ALT 17 02/21/2024 08:42 PM     Warfarin PT/INR:    Lab Results   Component Value Date    INR 1.1 10/15/2023    INR 1.0 12/02/2022    PROTIME 12.0 10/15/2023    PROTIME 11.2 12/02/2022       Assessment:    Principal Problem:    COPD with acute

## 2024-02-24 NOTE — PROGRESS NOTES
Pulmonary Progress Note    Admit Date: 2024  Hospital day                               PCP: Clyde Gonzalez DO    Chief Complaint (s):  Patient Active Problem List   Diagnosis    Primary hypertension    COPD (chronic obstructive pulmonary disease) (HCC)    Gastroesophageal reflux disease    Constipation    Unexplained weight loss    Protein deficiency (HCC)    Diet-controlled diabetes mellitus (HCC)    Multiple nodules of lung    Abnormal EKG    Mixed hyperlipidemia    Hyperthyroidism    Vitamin B12 deficiency (dietary) anemia    Osteoporosis    Elevated troponin level not due to acute coronary syndrome    Tachycardia, unspecified    Pneumonia due to organism    Atrial fibrillation (HCC)    Hypoxia    Severe protein-calorie malnutrition (HCC)    Anxiety    Numbness and tingling of both lower extremities    Acute on chronic anemia    Chronic pain syndrome [G89.4]    Chronic bilateral low back pain with bilateral sciatica    Lumbar radiculopathy    Lumbar disc disorder    Critical limb ischemia of left lower extremity with rest pain (HCC)    Atherosclerosis of artery of extremity with rest pain (HCC)    Peripheral vascular disease, unspecified (HCC)    Atherosclerosis 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 (HCC)    COPD with acute exacerbation (HCC)       Subjective:  Patient seen on 3L . She does have some chest discomfort to her right upper chest. That has resolved. No other breathing complaints.       Vitals:  VITALS:  BP (!) 152/73   Pulse 68   Temp 97.6 °F (36.4 °C) (Temporal)   Resp 18   Ht 1.702 m (5' 7.01\")   Wt 44.5 kg (98 lb)   SpO2 99%   BMI 15.35 kg/m²     24HR INTAKE/OUTPUT:      Intake/Output Summary (Last 24 hours) at 2024 0948  Last data filed at 2024 1807  Gross per 24 hour   Intake 359 ml   Output --   Net 359 ml       24HR PULSE OXIMETRY RANGE:    SpO2  Av.2 %  Min: 96 %  Max: 100 %    Medications:  IV:   sodium chloride    signed note.    Gopi Rosas MD,  M.D., F.C.C.P.  [facility]    [facility], [address] [place] [address], [address], [place] [address]  Office visits:  [address] [place] [address]

## 2024-02-24 NOTE — PROGRESS NOTES
[facility]    Hematology/oncology inpatient follow-up      Patient Name: Lana Phillips  YOB: 1955    DATE OF ADMISSION: Hematology/oncology inpatient follow-up 2/21/2024  DATE OF CONSULTATION: 2/23/2024  CONSULTING PROVIDER: Baldemar Jarrett DO  REASON FOR CONSULTATION: \"anemia\"  PCP: Clyde Gonzalez    Room: [address] [place] [address]      CHIEF COMPLAINT:  Shortness of breath    Subjective:   The patient denies any bleeding, the shortness of breath had improved.        HEMATOLOGIC HISTORY:   Mrs. Phillips is a very pleasant 68-year-old lady, with a past medical history significant for A-fib, on chronic anticoagulation with Eliquis COPD, O2 dependent, hypertension, hyperlipidemia, vitamin B12 deficiency, and hyperparathyroidism, who was referred to our office for evaluation of anemia and unexplained weight loss.  The patient had lost about 30 pounds over the course of 6 months, in December 2022 her hemoglobin was 10.8-12 g/dl, on 5/10/2023 hemoglobin was 9.7, hematocrit 34, MCV 90.9, normal white count and platelet count, the patient had an EGD done on 3/27/2023, revealing esophagitis and gastritis, she had a colonoscopy done on 5/15/2023, was normal.      The patient has significant weight loss, CT scans of the chest, abdomen and pelvis were negative for malignancy, she was diagnosed with right lung pneumonia in December 2022, she follows with Dr. Price, a chest CT is ordered to be done in August.  The patient has hypothyroidism, TSH is less than 0.01, which could be causing the weight loss, the patient was referred to Endo, she was not on methimazole.  Weight is stable.      Normocytic anemia, the patient has a history of positive fit test, EGD and colonoscopy were negative for malignancy/bleeding, repeat fit test is positive, the patient was referred to GI for a capsule endoscopy.  Recommended a work-up to evaluate for nutritional deficiencies, hemolysis, the hyperthyroidism could also be  MIRELLA DO   10 mg at 02/23/24 1316    dilTIAZem (CARDIZEM CD) extended release capsule 120 mg  120 mg Oral Daily Baldemar Jarrett DO   120 mg at 02/23/24 0954    ipratropium 0.5 mg-albuterol 2.5 mg (DUONEB) nebulizer solution 1 Dose  1 Dose Nebulization Q4H WA RT Baldemar Jarrett DO   1 Dose at 02/23/24 1608    methIMAzole (TAPAZOLE) tablet 15 mg  15 mg Oral Daily Baldemar Jarrett DO   15 mg at 02/23/24 0954    pantoprazole (PROTONIX) tablet 40 mg  40 mg Oral LUCIANOM AC Baldemar Jarrett DO   40 mg at 02/23/24 0527    PARoxetine (PAXIL) tablet 20 mg  20 mg Oral LUCIANOM Baldemar Jarrett DO   20 mg at 02/23/24 0954    traZODone (DESYREL) tablet 50 mg  50 mg Oral Nightly Baldemar Jarrett DO   50 mg at 02/22/24 2029    vitamin B-12 (CYANOCOBALAMIN) tablet 500 mcg  500 mcg Oral Daily Baldemar Jarrett DO   500 mcg at 02/23/24 0954    sodium chloride flush 0.9 % injection 5-40 mL  5-40 mL IntraVENous 2 times per day Baldemar Jarrett DO   10 mL at 02/23/24 0954    sodium chloride flush 0.9 % injection 5-40 mL  5-40 mL IntraVENous PRN Baldemar Jarrett DO        0.9 % sodium chloride infusion   IntraVENous PRN Baldemar Jarrett DO        potassium chloride (KLOR-CON M) extended release tablet 40 mEq  40 mEq Oral PRN Baldemar Jarrett DO        Or    potassium bicarb-citric acid (EFFER-K) effervescent tablet 40 mEq  40 mEq Oral PRN Baldemar Jarrett DO        Or    potassium chloride 10 mEq/100 mL IVPB (Peripheral Line)  10 mEq IntraVENous PRN Baldemar Jarrett DO        magnesium sulfate 2000 mg in 50 mL IVPB premix  2,000 mg IntraVENous PRN Baldemar Jarrett DO        polyethylene glycol (GLYCOLAX) packet 17 g  17 g Oral Daily PRN Baldemar Jarrett DO   17 g at 02/23/24 1002    acetaminophen (TYLENOL) tablet 650 mg  650 mg Oral Q6H PRN Baldemar Jarrett DO   650 mg at 02/22/24 2140    Or    acetaminophen (TYLENOL) suppository 650 mg  650 mg Rectal Q6H PRN Baldemar Jarrett DO        budesonide (PULMICORT)

## 2024-02-24 NOTE — PROGRESS NOTES
[facility]    Hematology/oncology inpatient follow-up      Patient Name: Lana Phillips  YOB: 1955    DATE OF ADMISSION: Hematology/oncology inpatient follow-up 2/21/2024  DATE OF CONSULTATION: 2/24/2024  CONSULTING PROVIDER: Baldemar Jarrett DO  REASON FOR CONSULTATION: \"anemia\"  PCP: Clyde Gonzalez    Room: [address] [place] [address]      CHIEF COMPLAINT:  Shortness of breath    Subjective:   Patient was seen and examined, feeling better overall, the shortness of breath improved, she denies any bleeding, no side effects from from Ferrlecit.    HEMATOLOGIC HISTORY:   Mrs. Phillips is a very pleasant 68-year-old lady, with a past medical history significant for A-fib, on chronic anticoagulation with Eliquis COPD, O2 dependent, hypertension, hyperlipidemia, vitamin B12 deficiency, and hyperparathyroidism, who was referred to our office for evaluation of anemia and unexplained weight loss.  The patient had lost about 30 pounds over the course of 6 months, in December 2022 her hemoglobin was 10.8-12 g/dl, on 5/10/2023 hemoglobin was 9.7, hematocrit 34, MCV 90.9, normal white count and platelet count, the patient had an EGD done on 3/27/2023, revealing esophagitis and gastritis, she had a colonoscopy done on 5/15/2023, was normal.      The patient has significant weight loss, CT scans of the chest, abdomen and pelvis were negative for malignancy, she was diagnosed with right lung pneumonia in December 2022, she follows with Dr. Price, a chest CT is ordered to be done in August.  The patient has hypothyroidism, TSH is less than 0.01, which could be causing the weight loss, the patient was referred to Endo, she was not on methimazole.  Weight is stable.      Normocytic anemia, the patient has a history of positive fit test, EGD and colonoscopy were negative for malignancy/bleeding, repeat fit test is positive, the patient was referred to GI for a capsule endoscopy.  Recommended a work-up to evaluate for

## 2024-02-25 VITALS
HEIGHT: 67 IN | OXYGEN SATURATION: 100 % | SYSTOLIC BLOOD PRESSURE: 152 MMHG | WEIGHT: 98 LBS | RESPIRATION RATE: 16 BRPM | TEMPERATURE: 97.3 F | BODY MASS INDEX: 15.38 KG/M2 | DIASTOLIC BLOOD PRESSURE: 86 MMHG | HEART RATE: 81 BPM

## 2024-02-25 PROCEDURE — 2580000003 HC RX 258: Performed by: INTERNAL MEDICINE

## 2024-02-25 PROCEDURE — 6370000000 HC RX 637 (ALT 250 FOR IP): Performed by: INTERNAL MEDICINE

## 2024-02-25 PROCEDURE — 94640 AIRWAY INHALATION TREATMENT: CPT

## 2024-02-25 PROCEDURE — 6360000002 HC RX W HCPCS: Performed by: INTERNAL MEDICINE

## 2024-02-25 PROCEDURE — 99232 SBSQ HOSP IP/OBS MODERATE 35: CPT | Performed by: INTERNAL MEDICINE

## 2024-02-25 PROCEDURE — 2700000000 HC OXYGEN THERAPY PER DAY

## 2024-02-25 RX ORDER — PREDNISONE 10 MG/1
10 TABLET ORAL DAILY
Status: DISCONTINUED | OUTPATIENT
Start: 2024-02-26 | End: 2024-02-25 | Stop reason: HOSPADM

## 2024-02-25 RX ORDER — PREDNISONE 10 MG/1
10 TABLET ORAL DAILY
Status: DISCONTINUED | OUTPATIENT
Start: 2024-02-26 | End: 2024-02-25

## 2024-02-25 RX ORDER — PROCHLORPERAZINE MALEATE 10 MG
10 TABLET ORAL EVERY 6 HOURS PRN
Qty: 60 TABLET | Refills: 1 | Status: SHIPPED | OUTPATIENT
Start: 2024-02-25

## 2024-02-25 RX ADMIN — GUAIFENESIN SYRUP AND DEXTROMETHORPHAN 5 ML: 100; 10 SYRUP ORAL at 11:41

## 2024-02-25 RX ADMIN — PREGABALIN 50 MG: 50 CAPSULE ORAL at 10:35

## 2024-02-25 RX ADMIN — BUDESONIDE 500 MCG: 0.5 SUSPENSION RESPIRATORY (INHALATION) at 08:58

## 2024-02-25 RX ADMIN — ACETAMINOPHEN 650 MG: 325 TABLET ORAL at 05:47

## 2024-02-25 RX ADMIN — SODIUM CHLORIDE 125 MG: 9 INJECTION, SOLUTION INTRAVENOUS at 11:39

## 2024-02-25 RX ADMIN — APIXABAN 5 MG: 5 TABLET, FILM COATED ORAL at 10:36

## 2024-02-25 RX ADMIN — ONDANSETRON 4 MG: 4 TABLET, ORALLY DISINTEGRATING ORAL at 10:36

## 2024-02-25 RX ADMIN — DOCUSATE SODIUM 100 MG: 100 CAPSULE, LIQUID FILLED ORAL at 10:35

## 2024-02-25 RX ADMIN — HYDROXYZINE PAMOATE 25 MG: 25 CAPSULE ORAL at 11:44

## 2024-02-25 RX ADMIN — POLYETHYLENE GLYCOL 3350 17 G: 17 POWDER, FOR SOLUTION ORAL at 05:48

## 2024-02-25 RX ADMIN — METHIMAZOLE 15 MG: 5 TABLET ORAL at 10:37

## 2024-02-25 RX ADMIN — PAROXETINE HYDROCHLORIDE 20 MG: 20 TABLET, FILM COATED ORAL at 10:37

## 2024-02-25 RX ADMIN — IPRATROPIUM BROMIDE AND ALBUTEROL SULFATE 1 DOSE: .5; 2.5 SOLUTION RESPIRATORY (INHALATION) at 08:58

## 2024-02-25 RX ADMIN — ARFORMOTEROL TARTRATE 15 MCG: 15 SOLUTION RESPIRATORY (INHALATION) at 08:58

## 2024-02-25 RX ADMIN — PREGABALIN 50 MG: 50 CAPSULE ORAL at 14:34

## 2024-02-25 RX ADMIN — ASPIRIN 81 MG: 81 TABLET, COATED ORAL at 10:36

## 2024-02-25 RX ADMIN — BUSPIRONE HYDROCHLORIDE 10 MG: 10 TABLET ORAL at 10:35

## 2024-02-25 RX ADMIN — IPRATROPIUM BROMIDE AND ALBUTEROL SULFATE 1 DOSE: .5; 2.5 SOLUTION RESPIRATORY (INHALATION) at 13:07

## 2024-02-25 RX ADMIN — DILTIAZEM HYDROCHLORIDE 120 MG: 120 CAPSULE, COATED, EXTENDED RELEASE ORAL at 10:37

## 2024-02-25 RX ADMIN — HYDROXYZINE PAMOATE 25 MG: 25 CAPSULE ORAL at 06:28

## 2024-02-25 RX ADMIN — BUSPIRONE HYDROCHLORIDE 10 MG: 10 TABLET ORAL at 14:35

## 2024-02-25 NOTE — PROGRESS NOTES
Pulmonary Progress Note    Admit Date: 2024  Hospital day                               PCP: Clyde Gonzalez DO    Chief Complaint (s):  Patient Active Problem List   Diagnosis    Primary hypertension    COPD (chronic obstructive pulmonary disease) (HCC)    Gastroesophageal reflux disease    Constipation    Unexplained weight loss    Protein deficiency (HCC)    Diet-controlled diabetes mellitus (HCC)    Multiple nodules of lung    Abnormal EKG    Mixed hyperlipidemia    Hyperthyroidism    Vitamin B12 deficiency (dietary) anemia    Osteoporosis    Elevated troponin level not due to acute coronary syndrome    Tachycardia, unspecified    Pneumonia due to organism    Atrial fibrillation (HCC)    Hypoxia    Severe protein-calorie malnutrition (HCC)    Anxiety    Numbness and tingling of both lower extremities    Acute on chronic anemia    Chronic pain syndrome [G89.4]    Chronic bilateral low back pain with bilateral sciatica    Lumbar radiculopathy    Lumbar disc disorder    Critical limb ischemia of left lower extremity with rest pain (HCC)    Atherosclerosis of artery of extremity with rest pain (HCC)    Peripheral vascular disease, unspecified (HCC)    Atherosclerosis 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 (HCC)    COPD with acute exacerbation (HCC)       Subjective:  Patient seen on 3 L . She state further improvement today. Waiting to discharge.        Vitals:  VITALS:  BP (!) 152/86   Pulse 80   Temp 97.3 °F (36.3 °C) (Temporal)   Resp 21   Ht 1.702 m (5' 7.01\")   Wt 44.5 kg (98 lb)   SpO2 100%   BMI 15.35 kg/m²     24HR INTAKE/OUTPUT:      Intake/Output Summary (Last 24 hours) at 2024 1006  Last data filed at 2024 1805  Gross per 24 hour   Intake 1330 ml   Output --   Net 1330 ml         24HR PULSE OXIMETRY RANGE:    SpO2  Av.2 %  Min: 92 %  Max: 100 %    Medications:  IV:   sodium chloride      sodium chloride         Scheduled Meds:   ferric

## 2024-02-25 NOTE — CARE COORDINATION
Discharge order noted. Plan is home with no needs. Reach out to case management with any discharge needs that arise (TF)      RY Cuellar,RN  Case Management  [phone]

## 2024-02-25 NOTE — PROGRESS NOTES
[facility] Medicine    Subjective:  pt alert conversive + bm yesterday      Current Facility-Administered Medications:     ferric gluconate (FERRLECIT) 125 mg in sodium chloride 0.9 % 100 mL IVPB, 125 mg, IntraVENous, Once, Viridiana Potter MD    guaiFENesin-dextromethorphan (ROBITUSSIN DM) 100-10 MG/5ML syrup 5 mL, 5 mL, Oral, Q4H PRN, Baldemar Jarrett DO, 5 mL at 02/24/24 1415    white petrolatum ointment, , Topical, BID PRN, Baldemar Jarrett DO    0.9 % sodium chloride infusion, , IntraVENous, PRN, Viridiana Potter MD    apixaban (ELIQUIS) tablet 5 mg, 5 mg, Oral, BID, Baldemar Jarrett DO, 5 mg at 02/24/24 1956    aspirin EC tablet 81 mg, 81 mg, Oral, Daily, Baldemar Jarrett DO, 81 mg at 02/24/24 0827    atorvastatin (LIPITOR) tablet 40 mg, 40 mg, Oral, Nightly, Baldemar Jarrett DO, 40 mg at 02/24/24 1956    busPIRone (BUSPAR) tablet 10 mg, 10 mg, Oral, TID, Baldemar Jarrett DO, 10 mg at 02/24/24 1956    dilTIAZem (CARDIZEM CD) extended release capsule 120 mg, 120 mg, Oral, Daily, Baldemar Jarrett DO, 120 mg at 02/24/24 0827    ipratropium 0.5 mg-albuterol 2.5 mg (DUONEB) nebulizer solution 1 Dose, 1 Dose, Nebulization, Q4H WA RT, Baldemar Jarrett DO, 1 Dose at 02/24/24 1944    methIMAzole (TAPAZOLE) tablet 15 mg, 15 mg, Oral, Daily, Baldemar Jarrett DO, 15 mg at 02/24/24 0829    pantoprazole (PROTONIX) tablet 40 mg, 40 mg, Oral, QAM AC, Baldemar Jarrett DO, 40 mg at 02/24/24 0526    PARoxetine (PAXIL) tablet 20 mg, 20 mg, Oral, QAKolby VU Michael M, DO, 20 mg at 02/24/24 0827    traZODone (DESYREL) tablet 50 mg, 50 mg, Oral, Nightly, Baldemar Jarrett DO, 50 mg at 02/24/24 1956    vitamin B-12 (CYANOCOBALAMIN) tablet 500 mcg, 500 mcg, Oral, Daily, Baldemar Jarrett DO, 500 mcg at 02/24/24 0828    sodium chloride flush 0.9 % injection 5-40 mL, 5-40 mL, IntraVENous, 2 times per day, Baldemar Jarrett DO, 10 mL at 02/24/24 2000    sodium chloride flush 0.9 % injection 5-40 mL, 5-40 mL,  DO    ondansetron (ZOFRAN-ODT) disintegrating tablet 4 mg, 4 mg, Oral, Q8H PRN **OR** ondansetron (ZOFRAN) injection 4 mg, 4 mg, IntraVENous, Q6H PRN, Baldemar Jarrett, DO    Objective:    /64   Pulse 81   Temp 97.8 °F (36.6 °C) (Oral)   Resp 20   Ht 1.702 m (5' 7.01\")   Wt 44.5 kg (98 lb)   SpO2 93%   BMI 15.35 kg/m²     Heart:  reg  Lungs:  ctab  Abd: + bs soft nontender  Extrem:  w/o edema    CBC with Differential:    Lab Results   Component Value Date/Time    WBC 10.3 02/24/2024 08:46 AM    RBC 3.47 02/24/2024 08:46 AM    HGB 9.2 02/24/2024 08:46 AM    HCT 30.7 02/24/2024 08:46 AM     02/24/2024 08:46 AM    MCV 88.5 02/24/2024 08:46 AM    MCH 26.5 02/24/2024 08:46 AM    MCHC 30.0 02/24/2024 08:46 AM    RDW 16.3 02/24/2024 08:46 AM    LYMPHOPCT 15 02/21/2024 08:42 PM    MONOPCT 3 02/21/2024 08:42 PM    BASOPCT 0 02/21/2024 08:42 PM    MONOSABS 0.28 02/21/2024 08:42 PM    LYMPHSABS 1.60 02/21/2024 08:42 PM    EOSABS 0.00 02/21/2024 08:42 PM    BASOSABS 0.00 02/21/2024 08:42 PM     CMP:    Lab Results   Component Value Date/Time     02/24/2024 08:46 AM    K 4.1 02/24/2024 08:46 AM    K 3.3 12/30/2022 02:49 AM    CL 95 02/24/2024 08:46 AM    CO2 34 02/24/2024 08:46 AM    BUN 10 02/24/2024 08:46 AM    CREATININE 0.6 02/24/2024 08:46 AM    GFRAA >60 07/28/2022 09:31 AM    LABGLOM >60 02/24/2024 08:46 AM    GLUCOSE 131 02/24/2024 08:46 AM    PROT 6.4 02/21/2024 08:42 PM    LABALBU 3.8 02/21/2024 08:42 PM    CALCIUM 8.7 02/24/2024 08:46 AM    BILITOT <0.2 02/21/2024 08:42 PM    ALKPHOS 59 02/21/2024 08:42 PM    AST 38 02/21/2024 08:42 PM    ALT 17 02/21/2024 08:42 PM     Warfarin PT/INR:    Lab Results   Component Value Date    INR 1.1 10/15/2023    INR 1.0 12/02/2022    PROTIME 12.0 10/15/2023    PROTIME 11.2 12/02/2022       Assessment:    Principal Problem:    COPD with acute exacerbation (HCC)  Active Problems:    Severe protein-calorie malnutrition (HCC)  Resolved Problems:    * No

## 2024-02-25 NOTE — PROGRESS NOTES
[facility]    Hematology/oncology inpatient follow-up      Patient Name: Lana Phillips  YOB: 1955    DATE OF ADMISSION: Hematology/oncology inpatient follow-up 2/21/2024  DATE OF CONSULTATION: 2/25/2024  CONSULTING PROVIDER: Baldemar Jarrett DO  REASON FOR CONSULTATION: \"anemia\"  PCP: Clyde Gonzalez    Room: [address] [place] [address]      CHIEF COMPLAINT:  Shortness of breath    Subjective:   Patient was seen and examined, the shortness of breath had improved, she denies any bleeding, got Ferrlecit earlier, no side effects.  She has constipation and nausea    HEMATOLOGIC HISTORY:   Mrs. Phillips is a very pleasant 68-year-old lady, with a past medical history significant for A-fib, on chronic anticoagulation with Eliquis COPD, O2 dependent, hypertension, hyperlipidemia, vitamin B12 deficiency, and hyperparathyroidism, who was referred to our office for evaluation of anemia and unexplained weight loss.  The patient had lost about 30 pounds over the course of 6 months, in December 2022 her hemoglobin was 10.8-12 g/dl, on 5/10/2023 hemoglobin was 9.7, hematocrit 34, MCV 90.9, normal white count and platelet count, the patient had an EGD done on 3/27/2023, revealing esophagitis and gastritis, she had a colonoscopy done on 5/15/2023, was normal.      The patient has significant weight loss, CT scans of the chest, abdomen and pelvis were negative for malignancy, she was diagnosed with right lung pneumonia in December 2022, she follows with Dr. Price, a chest CT is ordered to be done in August.  The patient has hypothyroidism, TSH is less than 0.01, which could be causing the weight loss, the patient was referred to Endo, she was not on methimazole.  Weight is stable.      Normocytic anemia, the patient has a history of positive fit test, EGD and colonoscopy were negative for malignancy/bleeding, repeat fit test is positive, the patient was referred to GI for a capsule endoscopy.  Recommended a work-up to

## 2024-02-27 ENCOUNTER — CARE COORDINATION (OUTPATIENT)
Dept: CARE COORDINATION | Age: 69
End: 2024-02-27

## 2024-02-27 NOTE — CARE COORDINATION
Care Transitions Initial Follow Up Call    Call within 2 business days of discharge: Yes    Patient: Lana Phillips Patient : 1955   MRN: [id number]  Reason for Admission: COPD exac  Discharge Date: 24 RARS: Readmission Risk Score: 27.4    Last Discharge Facility       Date Complaint Diagnosis Description Type Department Provider    24 Chest Pain; Shortness of Breath COPD with acute exacerbation (HCC) ... ED to Hosp-Admission (Discharged) (ADMITTED) [facility] Baldemar Jarrett, ; Stan, ...   First attempt to reach the patient for initial Care Transition call post hospital discharge. HIPAA compliant message left with CTN's contact information requesting return phone call.    Follow Up  Future Appointments   Date Time Provider Department Center   2024 10:45 AM Chapis Santos PA BDM PAIN MAR [facility]   3/5/2024  1:15 PM SEYZ MED ONC FAST TRACK 1 SEYZ Med Onc [facility]   3/5/2024  1:30 PM Viridiana Potter MD MED ONC [facility]   3/6/2024 11:45 AM Robert Delgado MD BDM ENDO [facility]   3/6/2024  1:30 PM Clyde Gonzalez DO N LIMA PC [facility]   3/12/2024 11:00 AM SEB INF CLINIC RM 1 [facility] Inf Ctr [facility]   3/13/2024  2:00 PM Lazaro Vidales MD SEYZ WOUND [facility]   3/14/2024 11:45 AM Jose Price MD AFL PULM CC [facility]   3/26/2024 11:00 AM Bautista Humphreys MD YTOWN CARDIO [facility]   4/3/2024  2:00 PM SEYZ ABDU SITA RM 1 [facility] ABDU [facility] Rad/Car   5/10/2024  2:00 PM Lilia Cabrera DO BDM PAIN MAR [facility]   2024  1:00 PM Clyde Gonzalez DO N TO PC [facility]   Soumya Goldberg RN

## 2024-02-28 ENCOUNTER — TELEPHONE (OUTPATIENT)
Dept: ENDOCRINOLOGY | Age: 69
End: 2024-02-28

## 2024-02-28 ENCOUNTER — CARE COORDINATION (OUTPATIENT)
Dept: CARE COORDINATION | Age: 69
End: 2024-02-28

## 2024-02-28 NOTE — PROGRESS NOTES
[facility]  [address] [place] [id number]    Follow up Note      Lana AVILA Alan     Date of Visit:  2/29/2024    CC:  Patient presents for follow up   Chief Complaint   Patient presents with    Follow-up     Low back pain       HPI:    Pain is a little worse to her lower back and upper back.    Appropriate analgesia with current medications regimen: yes.    Change in quality of symptoms:no.    Medication side effects:none.   Recent diagnostic testing:none.   Recent interventional procedures: None.    She has been on anticoagulation medications to include ELIQUIS and has not been on herbal supplements.  She is not diabetic.     Imaging:   10/2023 lumbar MRI w/o -  FINDINGS:  BONES/ALIGNMENT: No fracture or joint dislocation is noted.  There is  straightening of the lumbar spine, which may be due to positioning or  muscular spasm. The vertebral body heights are preserved.  No marrow edema or  infiltrative process is noted.     SPINAL CORD: The conus terminates normally.     SOFT TISSUES: No paraspinal mass identified.     L1-L2: Disc desiccation with a minimal disc bulge.  No significant central  canal, lateral recess or neural foraminal stenoses.     L2-L3: Disc desiccation with mild loss of disc height and a disc bulge.  Mild  central canal, lateral recess and neural foraminal stenoses.     L3-L4: Severe loss of disc height with a small disc bulge.  No significant  central canal stenosis.  Mild lateral recess stenoses.  Mild-to-moderate  neural foraminal stenoses.     L4-L5: Prominent loss of disc height, more so towards the left.  No  significant central canal or lateral recess stenosis.  Mild left neural  foraminal stenosis.  Status post right hemilaminectomy.     L5-S1: Prominent loss of disc height with minimal disc osteophyte complex.  Status post left hemilaminectomy.  Mild to moderate neural foraminal  stenoses.  No significant central canal or lateral recess stenosis.

## 2024-02-28 NOTE — CARE COORDINATION
Care Transitions Initial Follow Up Call    Call within 2 business days of discharge: Yes    Patient: Lana Phillips Patient : 1955   MRN: [id number]  Reason for Admission: COPD exac  Discharge Date: 24 RARS: Readmission Risk Score: 27.4    Last Discharge Facility       Date Complaint Diagnosis Description Type Department Provider    24 Chest Pain; Shortness of Breath COPD with acute exacerbation (HCC) ... ED to Hosp-Admission (Discharged) (ADMITTED) [facility] Baldemar Jarrett, ; Stan, ...   Second and final attempt to reach the patient for initial Care Transition call post hospital discharge. Voicemail full, unable to leave a message.   If no return call by the end of today, CTN will sign off and resolve CT program.    Follow Up  Future Appointments   Date Time Provider Department Center   2024 10:45 AM Chapis Santos PA BDM PAIN MAR [facility]   3/5/2024  1:15 PM SEYZ MED ONC FAST TRACK 1 SEY[facility] Med Onc [place]   3/5/2024  1:30 PM Viridiana Potter MD MED ONC [facility]   3/6/2024 11:45 AM Robert Delgado MD BDM ENDO [facility]   3/6/2024  1:30 PM Clyde Gonzalez DO N LIMA PC [facility]   3/12/2024 11:00 AM SEB INF CLINIC RM 1 SEB[facility] Inf Ctr [facility]   3/13/2024  2:00 PM Lazaro Vidales MD SEYZ WOUND [place]   3/14/2024 11:45 AM Jose Price MD AFL PULM CC [facility] PULM CC   3/26/2024 11:00 AM Bautista Humphreys MD YTOWN CARDIO [facility]   4/3/2024  2:00 PM SEYZ ABDU SITA RM 1 [facility] ABDU [facility] Rad/Car   5/10/2024  2:00 PM Lilia Cabrera DO BDM PAIN MAR [facility]   2024  1:00 PM Clyde Gonzalez DO N TO PC [facility]     Soumya Goldberg RN

## 2024-02-29 ENCOUNTER — OFFICE VISIT (OUTPATIENT)
Dept: PAIN MANAGEMENT | Age: 69
End: 2024-02-29
Payer: MEDICARE

## 2024-02-29 VITALS
RESPIRATION RATE: 18 BRPM | TEMPERATURE: 96.8 F | SYSTOLIC BLOOD PRESSURE: 152 MMHG | HEART RATE: 81 BPM | WEIGHT: 98.11 LBS | HEIGHT: 67 IN | OXYGEN SATURATION: 93 % | BODY MASS INDEX: 15.4 KG/M2 | DIASTOLIC BLOOD PRESSURE: 65 MMHG

## 2024-02-29 DIAGNOSIS — G89.4 CHRONIC PAIN SYNDROME: Primary | ICD-10-CM

## 2024-02-29 DIAGNOSIS — M54.16 LUMBAR RADICULOPATHY: ICD-10-CM

## 2024-02-29 DIAGNOSIS — M79.10 MYALGIA: ICD-10-CM

## 2024-02-29 DIAGNOSIS — M51.9 LUMBAR DISC DISORDER: ICD-10-CM

## 2024-02-29 DIAGNOSIS — G89.4 CHRONIC PAIN SYNDROME: ICD-10-CM

## 2024-02-29 DIAGNOSIS — M54.41 CHRONIC BILATERAL LOW BACK PAIN WITH BILATERAL SCIATICA: ICD-10-CM

## 2024-02-29 DIAGNOSIS — M54.42 CHRONIC BILATERAL LOW BACK PAIN WITH BILATERAL SCIATICA: ICD-10-CM

## 2024-02-29 DIAGNOSIS — G89.29 CHRONIC BILATERAL LOW BACK PAIN WITH BILATERAL SCIATICA: ICD-10-CM

## 2024-02-29 PROCEDURE — G8419 CALC BMI OUT NRM PARAM NOF/U: HCPCS | Performed by: PHYSICIAN ASSISTANT

## 2024-02-29 PROCEDURE — G8484 FLU IMMUNIZE NO ADMIN: HCPCS | Performed by: PHYSICIAN ASSISTANT

## 2024-02-29 PROCEDURE — 99213 OFFICE O/P EST LOW 20 MIN: CPT | Performed by: PHYSICIAN ASSISTANT

## 2024-02-29 PROCEDURE — 1111F DSCHRG MED/CURRENT MED MERGE: CPT | Performed by: PHYSICIAN ASSISTANT

## 2024-02-29 PROCEDURE — G8427 DOCREV CUR MEDS BY ELIG CLIN: HCPCS | Performed by: PHYSICIAN ASSISTANT

## 2024-02-29 PROCEDURE — G8399 PT W/DXA RESULTS DOCUMENT: HCPCS | Performed by: PHYSICIAN ASSISTANT

## 2024-02-29 PROCEDURE — 1090F PRES/ABSN URINE INCON ASSESS: CPT | Performed by: PHYSICIAN ASSISTANT

## 2024-02-29 PROCEDURE — 1123F ACP DISCUSS/DSCN MKR DOCD: CPT | Performed by: PHYSICIAN ASSISTANT

## 2024-02-29 PROCEDURE — 3078F DIAST BP <80 MM HG: CPT | Performed by: PHYSICIAN ASSISTANT

## 2024-02-29 PROCEDURE — 3077F SYST BP >= 140 MM HG: CPT | Performed by: PHYSICIAN ASSISTANT

## 2024-02-29 PROCEDURE — 3017F COLORECTAL CA SCREEN DOC REV: CPT | Performed by: PHYSICIAN ASSISTANT

## 2024-02-29 PROCEDURE — 1036F TOBACCO NON-USER: CPT | Performed by: PHYSICIAN ASSISTANT

## 2024-02-29 RX ORDER — PREGABALIN 50 MG/1
50 CAPSULE ORAL 3 TIMES DAILY
Qty: 90 CAPSULE | Refills: 2 | Status: SHIPPED | OUTPATIENT
Start: 2024-03-31 | End: 2024-04-30

## 2024-02-29 NOTE — PROGRESS NOTES
Lana Phillips presents to the [facility] on 2/29/2024. Lana is complaining of pain low back. The pain is constant. The pain is described as aching. Pain is rated on her best day at a 4, on her worst day at a 8, and on average at a 6 on the VAS scale. She took her last dose of Lyrica today.     Any procedures since your last visit: No.    Pacemaker or defibrillator: No.    She is not on NSAIDS and is  on anticoagulation medications to include ASA and Eliquis and is managed by Clyde Gonzalez DO.     Medication Contract and Consent for Opioid Use Documents Filed        No documents found                    BP (!) 152/65   Pulse 81   Temp 96.8 °F (36 °C) (Infrared)   Resp 18   Ht 1.702 m (5' 7.01\")   Wt 44.5 kg (98 lb 1.7 oz)   SpO2 93% Comment: 3L NC  BMI 15.36 kg/m²      No LMP recorded. Patient is postmenopausal.

## 2024-03-05 ENCOUNTER — HOSPITAL ENCOUNTER (OUTPATIENT)
Dept: INFUSION THERAPY | Age: 69
Discharge: HOME | End: 2024-03-05
Payer: MEDICARE

## 2024-03-05 ENCOUNTER — OFFICE VISIT (OUTPATIENT)
Dept: ONCOLOGY | Age: 69
End: 2024-03-05
Payer: MEDICARE

## 2024-03-05 VITALS
BODY MASS INDEX: 14.6 KG/M2 | HEART RATE: 97 BPM | WEIGHT: 93 LBS | HEIGHT: 67 IN | TEMPERATURE: 98.8 F | SYSTOLIC BLOOD PRESSURE: 144 MMHG | DIASTOLIC BLOOD PRESSURE: 62 MMHG | OXYGEN SATURATION: 92 %

## 2024-03-05 DIAGNOSIS — D64.9 ANEMIA, UNSPECIFIED TYPE: ICD-10-CM

## 2024-03-05 DIAGNOSIS — E05.90 HYPERTHYROIDISM: ICD-10-CM

## 2024-03-05 DIAGNOSIS — D64.9 ANEMIA, UNSPECIFIED TYPE: Primary | ICD-10-CM

## 2024-03-05 DIAGNOSIS — D50.9 IRON DEFICIENCY ANEMIA, UNSPECIFIED IRON DEFICIENCY ANEMIA TYPE: Primary | ICD-10-CM

## 2024-03-05 DIAGNOSIS — R63.4 UNEXPLAINED WEIGHT LOSS: ICD-10-CM

## 2024-03-05 LAB
BASOPHILS # BLD: 0.08 K/UL (ref 0–0.2)
BASOPHILS NFR BLD: 1 % (ref 0–2)
EOSINOPHIL # BLD: 0.17 K/UL (ref 0.05–0.5)
EOSINOPHILS RELATIVE PERCENT: 2 % (ref 0–6)
ERYTHROCYTE [DISTWIDTH] IN BLOOD BY AUTOMATED COUNT: 19.6 % (ref 11.5–15)
HCT VFR BLD AUTO: 32.4 % (ref 34–48)
HGB BLD-MCNC: 9.2 G/DL (ref 11.5–15.5)
IMM GRANULOCYTES # BLD AUTO: 0.06 K/UL (ref 0–0.58)
IMM GRANULOCYTES NFR BLD: 1 % (ref 0–5)
LYMPHOCYTES NFR BLD: 0.9 K/UL (ref 1.5–4)
LYMPHOCYTES RELATIVE PERCENT: 10 % (ref 20–42)
MCH RBC QN AUTO: 27.3 PG (ref 26–35)
MCHC RBC AUTO-ENTMCNC: 28.4 G/DL (ref 32–34.5)
MCV RBC AUTO: 96.1 FL (ref 80–99.9)
MONOCYTES NFR BLD: 0.61 K/UL (ref 0.1–0.95)
MONOCYTES NFR BLD: 7 % (ref 2–12)
NEUTROPHILS NFR BLD: 80 % (ref 43–80)
NEUTS SEG NFR BLD: 7.39 K/UL (ref 1.8–7.3)
PLATELET # BLD AUTO: 352 K/UL (ref 130–450)
PMV BLD AUTO: 9.3 FL (ref 7–12)
RBC # BLD AUTO: 3.37 M/UL (ref 3.5–5.5)
RBC # BLD: ABNORMAL 10*6/UL
WBC OTHER # BLD: 9.2 K/UL (ref 4.5–11.5)

## 2024-03-05 PROCEDURE — 3078F DIAST BP <80 MM HG: CPT | Performed by: INTERNAL MEDICINE

## 2024-03-05 PROCEDURE — G8427 DOCREV CUR MEDS BY ELIG CLIN: HCPCS | Performed by: INTERNAL MEDICINE

## 2024-03-05 PROCEDURE — 1123F ACP DISCUSS/DSCN MKR DOCD: CPT | Performed by: INTERNAL MEDICINE

## 2024-03-05 PROCEDURE — G8484 FLU IMMUNIZE NO ADMIN: HCPCS | Performed by: INTERNAL MEDICINE

## 2024-03-05 PROCEDURE — 1090F PRES/ABSN URINE INCON ASSESS: CPT | Performed by: INTERNAL MEDICINE

## 2024-03-05 PROCEDURE — 85025 COMPLETE CBC W/AUTO DIFF WBC: CPT

## 2024-03-05 PROCEDURE — 3077F SYST BP >= 140 MM HG: CPT | Performed by: INTERNAL MEDICINE

## 2024-03-05 PROCEDURE — 99214 OFFICE O/P EST MOD 30 MIN: CPT | Performed by: INTERNAL MEDICINE

## 2024-03-05 PROCEDURE — 1036F TOBACCO NON-USER: CPT | Performed by: INTERNAL MEDICINE

## 2024-03-05 PROCEDURE — G8399 PT W/DXA RESULTS DOCUMENT: HCPCS | Performed by: INTERNAL MEDICINE

## 2024-03-05 PROCEDURE — G8419 CALC BMI OUT NRM PARAM NOF/U: HCPCS | Performed by: INTERNAL MEDICINE

## 2024-03-05 PROCEDURE — 3017F COLORECTAL CA SCREEN DOC REV: CPT | Performed by: INTERNAL MEDICINE

## 2024-03-05 PROCEDURE — 1111F DSCHRG MED/CURRENT MED MERGE: CPT | Performed by: INTERNAL MEDICINE

## 2024-03-05 PROCEDURE — 36415 COLL VENOUS BLD VENIPUNCTURE: CPT

## 2024-03-05 RX ORDER — SODIUM CHLORIDE 9 MG/ML
INJECTION, SOLUTION INTRAVENOUS CONTINUOUS
Status: CANCELLED | OUTPATIENT
Start: 2024-03-05

## 2024-03-05 RX ORDER — ONDANSETRON 2 MG/ML
8 INJECTION INTRAMUSCULAR; INTRAVENOUS
Status: CANCELLED | OUTPATIENT
Start: 2024-03-05

## 2024-03-05 RX ORDER — ACETAMINOPHEN 325 MG/1
650 TABLET ORAL
Status: CANCELLED | OUTPATIENT
Start: 2024-03-05

## 2024-03-05 RX ORDER — DIPHENHYDRAMINE HYDROCHLORIDE 50 MG/ML
50 INJECTION INTRAMUSCULAR; INTRAVENOUS
Status: CANCELLED | OUTPATIENT
Start: 2024-03-05

## 2024-03-05 RX ORDER — SODIUM CHLORIDE 0.9 % (FLUSH) 0.9 %
5-40 SYRINGE (ML) INJECTION PRN
Status: CANCELLED | OUTPATIENT
Start: 2024-03-05

## 2024-03-05 RX ORDER — SODIUM CHLORIDE 9 MG/ML
5-250 INJECTION, SOLUTION INTRAVENOUS PRN
Status: CANCELLED | OUTPATIENT
Start: 2024-03-05

## 2024-03-05 RX ORDER — ALBUTEROL SULFATE 90 UG/1
4 AEROSOL, METERED RESPIRATORY (INHALATION) PRN
Status: CANCELLED | OUTPATIENT
Start: 2024-03-05

## 2024-03-05 RX ORDER — EPINEPHRINE 1 MG/ML
0.3 INJECTION, SOLUTION, CONCENTRATE INTRAVENOUS PRN
Status: CANCELLED | OUTPATIENT
Start: 2024-03-05

## 2024-03-05 RX ORDER — HEPARIN 100 UNIT/ML
500 SYRINGE INTRAVENOUS PRN
Status: CANCELLED | OUTPATIENT
Start: 2024-03-05

## 2024-03-06 ENCOUNTER — OFFICE VISIT (OUTPATIENT)
Dept: ENDOCRINOLOGY | Age: 69
End: 2024-03-06
Payer: MEDICARE

## 2024-03-06 ENCOUNTER — OFFICE VISIT (OUTPATIENT)
Dept: PRIMARY CARE CLINIC | Age: 69
End: 2024-03-06

## 2024-03-06 VITALS
SYSTOLIC BLOOD PRESSURE: 138 MMHG | TEMPERATURE: 98.9 F | DIASTOLIC BLOOD PRESSURE: 83 MMHG | BODY MASS INDEX: 14.14 KG/M2 | WEIGHT: 93 LBS

## 2024-03-06 VITALS
WEIGHT: 93 LBS | SYSTOLIC BLOOD PRESSURE: 157 MMHG | BODY MASS INDEX: 14.09 KG/M2 | OXYGEN SATURATION: 93 % | DIASTOLIC BLOOD PRESSURE: 68 MMHG | HEART RATE: 94 BPM | HEIGHT: 68 IN

## 2024-03-06 DIAGNOSIS — F51.01 PRIMARY INSOMNIA: ICD-10-CM

## 2024-03-06 DIAGNOSIS — J43.8 OTHER EMPHYSEMA (HCC): Chronic | ICD-10-CM

## 2024-03-06 DIAGNOSIS — Z09 HOSPITAL DISCHARGE FOLLOW-UP: ICD-10-CM

## 2024-03-06 DIAGNOSIS — E05.90 HYPERTHYROIDISM: Primary | ICD-10-CM

## 2024-03-06 DIAGNOSIS — E55.9 VITAMIN D DEFICIENCY: ICD-10-CM

## 2024-03-06 DIAGNOSIS — M81.0 OSTEOPOROSIS, UNSPECIFIED OSTEOPOROSIS TYPE, UNSPECIFIED PATHOLOGICAL FRACTURE PRESENCE: ICD-10-CM

## 2024-03-06 DIAGNOSIS — I48.0 PAROXYSMAL ATRIAL FIBRILLATION (HCC): ICD-10-CM

## 2024-03-06 DIAGNOSIS — F41.9 ANXIETY: Primary | ICD-10-CM

## 2024-03-06 PROCEDURE — 1090F PRES/ABSN URINE INCON ASSESS: CPT | Performed by: INTERNAL MEDICINE

## 2024-03-06 PROCEDURE — G8419 CALC BMI OUT NRM PARAM NOF/U: HCPCS | Performed by: INTERNAL MEDICINE

## 2024-03-06 PROCEDURE — G8427 DOCREV CUR MEDS BY ELIG CLIN: HCPCS | Performed by: INTERNAL MEDICINE

## 2024-03-06 PROCEDURE — 99214 OFFICE O/P EST MOD 30 MIN: CPT | Performed by: INTERNAL MEDICINE

## 2024-03-06 PROCEDURE — G8484 FLU IMMUNIZE NO ADMIN: HCPCS | Performed by: INTERNAL MEDICINE

## 2024-03-06 PROCEDURE — 1036F TOBACCO NON-USER: CPT | Performed by: INTERNAL MEDICINE

## 2024-03-06 PROCEDURE — 1111F DSCHRG MED/CURRENT MED MERGE: CPT | Performed by: INTERNAL MEDICINE

## 2024-03-06 PROCEDURE — 3078F DIAST BP <80 MM HG: CPT | Performed by: INTERNAL MEDICINE

## 2024-03-06 PROCEDURE — 3077F SYST BP >= 140 MM HG: CPT | Performed by: INTERNAL MEDICINE

## 2024-03-06 PROCEDURE — 1123F ACP DISCUSS/DSCN MKR DOCD: CPT | Performed by: INTERNAL MEDICINE

## 2024-03-06 PROCEDURE — 3017F COLORECTAL CA SCREEN DOC REV: CPT | Performed by: INTERNAL MEDICINE

## 2024-03-06 PROCEDURE — G8399 PT W/DXA RESULTS DOCUMENT: HCPCS | Performed by: INTERNAL MEDICINE

## 2024-03-06 RX ORDER — METHIMAZOLE 10 MG/1
15 TABLET ORAL DAILY
Qty: 45 TABLET | Refills: 5 | Status: SHIPPED | OUTPATIENT
Start: 2024-03-06

## 2024-03-06 RX ORDER — CITALOPRAM 20 MG/1
20 TABLET ORAL DAILY
Qty: 90 TABLET | Refills: 3 | Status: SHIPPED | OUTPATIENT
Start: 2024-03-06

## 2024-03-06 RX ORDER — TRAZODONE HYDROCHLORIDE 100 MG/1
100 TABLET ORAL NIGHTLY
Qty: 90 TABLET | Refills: 5 | Status: SHIPPED | OUTPATIENT
Start: 2024-03-06

## 2024-03-06 ASSESSMENT — ENCOUNTER SYMPTOMS
GASTROINTESTINAL NEGATIVE: 1
ALLERGIC/IMMUNOLOGIC NEGATIVE: 1
EYES NEGATIVE: 1
RESPIRATORY NEGATIVE: 1

## 2024-03-06 ASSESSMENT — PATIENT HEALTH QUESTIONNAIRE - PHQ9
1. LITTLE INTEREST OR PLEASURE IN DOING THINGS: 0
2. FEELING DOWN, DEPRESSED OR HOPELESS: 0
SUM OF ALL RESPONSES TO PHQ QUESTIONS 1-9: 0
SUM OF ALL RESPONSES TO PHQ9 QUESTIONS 1 & 2: 0

## 2024-03-06 NOTE — PROGRESS NOTES
Normal range of motion.   Skin:     General: Skin is warm.   Neurological:      Mental Status: She is alert and oriented to person, place, and time.   Psychiatric:         Behavior: Behavior normal.         Lana was seen today for follow-up from hospital.    Diagnoses and all orders for this visit:    Anxiety  -     citalopram (CELEXA) 20 MG tablet; Take 1 tablet by mouth daily Replaces  paroxitene    Primary insomnia  -     traZODone (DESYREL) 100 MG tablet; Take 1 tablet by mouth nightly    Other emphysema (HCC)    Paroxysmal atrial fibrillation (HCC)        Comments: again  asap  to psych    chg    paxil to    caelexa     icn  trazdone  100 mg    she   ask for benzo   but I feel not safe      I educated the patient about all medications.  Make sure they were correct and educated  on the risk associated with missing meds or taking them incorrectly.  A list of medications is being sent home with patient today.    I educated the patient about all medications.  Make sure they were correct and educated  on the risk associated with missing meds or taking them incorrectly.  A list of medications is being sent home with patient today.    Check blood pressure at home twice a day.  Low-salt low caffeine diet.  Call if systolic blood pressure is above 150 and diastolic blood pressures above 85.  Only use a upper arm digital cuff.  A great deal of time spent reviewing medications, diet, exercise, social issues. Also reviewing the chart before entering the room with patient and finishing charting after leaving patient's room. More than half of that time was spent face to face with the patient in counseling and coordinating care.      Follow Up: Return for Reg Appt, See Referral.     Seen by:  Clyde Gonzalez DO

## 2024-03-06 NOTE — PROGRESS NOTES
normal. No tremors   Psych: normal mood, and affect    Review of Laboratory Data:  I have reviewed the following:  Lab Results   Component Value Date/Time    WBC 9.2 03/05/2024 02:18 PM    RBC 3.37 (L) 03/05/2024 02:18 PM    HGB 9.2 (L) 03/05/2024 02:18 PM    HCT 32.4 (L) 03/05/2024 02:18 PM    MCV 96.1 03/05/2024 02:18 PM    MCH 27.3 03/05/2024 02:18 PM    MCHC 28.4 (L) 03/05/2024 02:18 PM    RDW 19.6 (H) 03/05/2024 02:18 PM     03/05/2024 02:18 PM    MPV 9.3 03/05/2024 02:18 PM      Lab Results   Component Value Date/Time     02/24/2024 08:46 AM    K 4.1 02/24/2024 08:46 AM    K 3.3 (L) 12/30/2022 02:49 AM    CO2 34 (H) 02/24/2024 08:46 AM    BUN 10 02/24/2024 08:46 AM    CREATININE 0.6 02/24/2024 08:46 AM    CALCIUM 8.7 02/24/2024 08:46 AM    LABGLOM >60 02/24/2024 08:46 AM    GFRAA >60 07/28/2022 09:31 AM      Lab Results   Component Value Date/Time    TSH 1.62 02/07/2024 08:38 AM    T4FREE 0.7 (L) 02/07/2024 08:38 AM    T2AUAIU 7.4 05/10/2023 03:12 PM    W7GXYAC 38 (L) 02/07/2024 08:38 AM    TSI <0.10 07/06/2023 01:31 PM    TPOABS <4.0 07/06/2023 01:31 PM     Lab Results   Component Value Date/Time    LABA1C 5.5 12/25/2023 05:27 AM    GLUCOSE 131 02/24/2024 08:46 AM     Lab Results   Component Value Date/Time    TRIG 63 12/25/2023 05:27 AM     12/25/2023 05:27 AM    LDLCALC 108 05/10/2023 03:12 PM    CHOL 233 12/25/2023 05:27 AM    CHOL 226 05/10/2023 03:12 PM     Lab Results   Component Value Date/Time    VITD25 66.6 09/05/2023 01:13 PM    VITD25 27 05/10/2023 03:12 PM       ASSESSMENT & RECOMMENDATIONS   Lana MARGARITA Ferrisse, a 69 y.o.-old female seen in for management of following issues      Graves Hyperthyroidism  Recent blood work on February 7, 2024 showed a TSH of 1.6 and a free T40.7.  She is currently on methimazole 15 mg daily   I am going to recheck thyroid level again in 6 weeks  Reviewed potential side effects of Methimazole, including agranulocytosis and liver dysfunction

## 2024-03-06 NOTE — PROGRESS NOTES
Patient provided with discharge instructions, received printed AVS.  All questions answered.  Patient understands follow up plan of care.   
       67 yrs old cva--pt   now von live with his ex------last name aj---his wife Lana---- is sister with  Silvina Leyva my pt    Colon     [facility]----    Anemia     refer to hem    Numb feet      sicne --ordered  emg    Admit wit gi bleed   10-23   w ork up with dr swati nelson swallow    Anemia-----------------------------------------------------dr Christopher davenport    Admit   with [facility] copd and gerardo vas dis    Arteriogram     dr BUTLER-----surgery       Admit    COPD flare     Social Determinants of Health     Financial Resource Strain: Low Risk  (2023)    Overall Financial Resource Strain (CARDIA)     Difficulty of Paying Living Expenses: Not hard at all   Food Insecurity: No Food Insecurity (2024)    Hunger Vital Sign     Worried About Running Out of Food in the Last Year: Never true     Ran Out of Food in the Last Year: Never true   Transportation Needs: No Transportation Needs (2024)    PRAPARE - Transportation     Lack of Transportation (Medical): No     Lack of Transportation (Non-Medical): No   Physical Activity: Inactive (10/6/2023)    Exercise Vital Sign     Days of Exercise per Week: 0 days     Minutes of Exercise per Session: 0 min   Stress: Not on file   Social Connections: Not on file   Intimate Partner Violence: Not on file   Housing Stability: Low Risk  (2024)    Housing Stability Vital Sign     Unable to Pay for Housing in the Last Year: No     Number of Places Lived in the Last Year: 1     Unstable Housing in the Last Year: No       Allergies:  Allergies   Allergen Reactions    Pcn [Penicillins] Other (See Comments)       Physical Exam:  BP (!) 144/62   Pulse 97   Temp 98.8 °F (37.1 °C)   Ht 1.702 m (5' 7\")   Wt 42.2 kg (93 lb)   SpO2 92%   BMI 14.57 kg/m²     GENERAL: Alert, oriented x 3, not in acute distress.  HEENT: PERRLA; EOMI. Oropharynx clear.   NECK: Supple. No palpable cervical or supraclavicular

## 2024-03-07 ENCOUNTER — TELEPHONE (OUTPATIENT)
Dept: PRIMARY CARE CLINIC | Age: 69
End: 2024-03-07

## 2024-03-07 NOTE — TELEPHONE ENCOUNTER
Patient called in stating the new medication (Trazodone 100 mg) that was prescribed is 100% too strong for her. It is making her fel overwhelmed. Wondering if there was something else you could give her instead. Would l;анна it sent to [facility] [place]

## 2024-03-08 RX ORDER — ACETAMINOPHEN 325 MG/1
650 TABLET ORAL
Status: CANCELLED | OUTPATIENT
Start: 2024-03-08

## 2024-03-08 RX ORDER — ONDANSETRON 2 MG/ML
8 INJECTION INTRAMUSCULAR; INTRAVENOUS
OUTPATIENT
Start: 2024-03-08

## 2024-03-08 RX ORDER — EPINEPHRINE 1 MG/ML
0.3 INJECTION, SOLUTION, CONCENTRATE INTRAVENOUS PRN
Status: CANCELLED | OUTPATIENT
Start: 2024-03-08

## 2024-03-08 RX ORDER — SODIUM CHLORIDE 9 MG/ML
5-250 INJECTION, SOLUTION INTRAVENOUS PRN
OUTPATIENT
Start: 2024-03-08

## 2024-03-08 RX ORDER — SODIUM CHLORIDE 9 MG/ML
5-250 INJECTION, SOLUTION INTRAVENOUS PRN
Status: CANCELLED | OUTPATIENT
Start: 2024-03-08

## 2024-03-08 RX ORDER — SODIUM CHLORIDE 0.9 % (FLUSH) 0.9 %
5-40 SYRINGE (ML) INJECTION PRN
OUTPATIENT
Start: 2024-03-08

## 2024-03-08 RX ORDER — SODIUM CHLORIDE 9 MG/ML
INJECTION, SOLUTION INTRAVENOUS CONTINUOUS
Status: CANCELLED | OUTPATIENT
Start: 2024-03-08

## 2024-03-08 RX ORDER — EPINEPHRINE 1 MG/ML
0.3 INJECTION, SOLUTION, CONCENTRATE INTRAVENOUS PRN
OUTPATIENT
Start: 2024-03-08

## 2024-03-08 RX ORDER — ALBUTEROL SULFATE 90 UG/1
4 AEROSOL, METERED RESPIRATORY (INHALATION) PRN
OUTPATIENT
Start: 2024-03-08

## 2024-03-08 RX ORDER — HEPARIN 100 UNIT/ML
500 SYRINGE INTRAVENOUS PRN
OUTPATIENT
Start: 2024-03-08

## 2024-03-08 RX ORDER — ONDANSETRON 2 MG/ML
8 INJECTION INTRAMUSCULAR; INTRAVENOUS
Status: CANCELLED | OUTPATIENT
Start: 2024-03-08

## 2024-03-08 RX ORDER — DIPHENHYDRAMINE HYDROCHLORIDE 50 MG/ML
50 INJECTION INTRAMUSCULAR; INTRAVENOUS
OUTPATIENT
Start: 2024-03-08

## 2024-03-08 RX ORDER — HEPARIN 100 UNIT/ML
500 SYRINGE INTRAVENOUS PRN
Status: CANCELLED | OUTPATIENT
Start: 2024-03-08

## 2024-03-08 RX ORDER — DIPHENHYDRAMINE HYDROCHLORIDE 50 MG/ML
50 INJECTION INTRAMUSCULAR; INTRAVENOUS
Status: CANCELLED | OUTPATIENT
Start: 2024-03-08

## 2024-03-08 RX ORDER — SODIUM CHLORIDE 9 MG/ML
INJECTION, SOLUTION INTRAVENOUS CONTINUOUS
OUTPATIENT
Start: 2024-03-08

## 2024-03-08 RX ORDER — ALBUTEROL SULFATE 90 UG/1
4 AEROSOL, METERED RESPIRATORY (INHALATION) PRN
Status: CANCELLED | OUTPATIENT
Start: 2024-03-08

## 2024-03-08 RX ORDER — SODIUM CHLORIDE 0.9 % (FLUSH) 0.9 %
5-40 SYRINGE (ML) INJECTION PRN
Status: CANCELLED | OUTPATIENT
Start: 2024-03-08

## 2024-03-08 RX ORDER — ACETAMINOPHEN 325 MG/1
650 TABLET ORAL
OUTPATIENT
Start: 2024-03-08

## 2024-03-10 RX ORDER — HYDROXYZINE PAMOATE 25 MG/1
25 CAPSULE ORAL
Qty: 30 CAPSULE | Refills: 5 | Status: ON HOLD | OUTPATIENT
Start: 2024-03-10

## 2024-03-11 ENCOUNTER — HOSPITAL ENCOUNTER (INPATIENT)
Age: 69
LOS: 7 days | Discharge: NURSING, REHAB OR LONG-TERM CARE | DRG: 189 | End: 2024-03-19
Attending: EMERGENCY MEDICINE | Admitting: INTERNAL MEDICINE
Payer: MEDICARE

## 2024-03-11 ENCOUNTER — APPOINTMENT (OUTPATIENT)
Dept: GENERAL RADIOLOGY | Age: 69
DRG: 189 | End: 2024-03-11
Payer: MEDICARE

## 2024-03-11 DIAGNOSIS — R06.09 DOE (DYSPNEA ON EXERTION): ICD-10-CM

## 2024-03-11 DIAGNOSIS — J96.01 ACUTE RESPIRATORY FAILURE WITH HYPOXIA AND HYPERCARBIA (HCC): ICD-10-CM

## 2024-03-11 DIAGNOSIS — J96.02 ACUTE RESPIRATORY FAILURE WITH HYPOXIA AND HYPERCARBIA (HCC): ICD-10-CM

## 2024-03-11 DIAGNOSIS — J44.1 COPD EXACERBATION (HCC): Primary | ICD-10-CM

## 2024-03-11 LAB
ALBUMIN SERPL-MCNC: 3.7 G/DL (ref 3.5–5.2)
ALP SERPL-CCNC: 65 U/L (ref 35–104)
ALT SERPL-CCNC: 12 U/L (ref 0–32)
ANION GAP SERPL CALCULATED.3IONS-SCNC: 6 MMOL/L (ref 7–16)
AST SERPL-CCNC: 36 U/L (ref 0–31)
B.E.: 13.5 MMOL/L (ref -3–3)
BASOPHILS # BLD: 0.15 K/UL (ref 0–0.2)
BASOPHILS NFR BLD: 2 % (ref 0–2)
BILIRUB SERPL-MCNC: <0.2 MG/DL (ref 0–1.2)
BUN SERPL-MCNC: 8 MG/DL (ref 6–23)
CALCIUM SERPL-MCNC: 9.3 MG/DL (ref 8.6–10.2)
CHLORIDE SERPL-SCNC: 93 MMOL/L (ref 98–107)
CO2 SERPL-SCNC: 42 MMOL/L (ref 22–29)
COHB: 2.2 % (ref 0–1.5)
CREAT SERPL-MCNC: 0.6 MG/DL (ref 0.5–1)
CRITICAL: ABNORMAL
DATE ANALYZED: ABNORMAL
DATE OF COLLECTION: ABNORMAL
EOSINOPHIL # BLD: 0 K/UL (ref 0.05–0.5)
EOSINOPHILS RELATIVE PERCENT: 0 % (ref 0–6)
ERYTHROCYTE [DISTWIDTH] IN BLOOD BY AUTOMATED COUNT: 19 % (ref 11.5–15)
GFR SERPL CREATININE-BSD FRML MDRD: >60 ML/MIN/1.73M2
GLUCOSE SERPL-MCNC: 88 MG/DL (ref 74–99)
HCO3: 40.4 MMOL/L (ref 22–26)
HCT VFR BLD AUTO: 32 % (ref 34–48)
HGB BLD-MCNC: 9.1 G/DL (ref 11.5–15.5)
HHB: 1.8 % (ref 0–5)
LAB: ABNORMAL
LYMPHOCYTES NFR BLD: 1.83 K/UL (ref 1.5–4)
LYMPHOCYTES RELATIVE PERCENT: 21 % (ref 20–42)
Lab: 2133
MCH RBC QN AUTO: 27.2 PG (ref 26–35)
MCHC RBC AUTO-ENTMCNC: 28.4 G/DL (ref 32–34.5)
MCV RBC AUTO: 95.5 FL (ref 80–99.9)
METHB: 0 % (ref 0–1.5)
MODE: ABNORMAL
MONOCYTES NFR BLD: 0.38 K/UL (ref 0.1–0.95)
MONOCYTES NFR BLD: 4 % (ref 2–12)
NEUTROPHILS NFR BLD: 73 % (ref 43–80)
NEUTS SEG NFR BLD: 6.33 K/UL (ref 1.8–7.3)
O2 SATURATION: 98.2 % (ref 92–98.5)
O2HB: 96 % (ref 94–97)
OPERATOR ID: 7296
PATIENT TEMP: 37 C
PCO2: 66.3 MMHG (ref 35–45)
PH BLOOD GAS: 7.4 (ref 7.35–7.45)
PLATELET # BLD AUTO: 273 K/UL (ref 130–450)
PMV BLD AUTO: 9.6 FL (ref 7–12)
PO2: 118.5 MMHG (ref 75–100)
POTASSIUM SERPL-SCNC: 4 MMOL/L (ref 3.5–5)
PROT SERPL-MCNC: 6.5 G/DL (ref 6.4–8.3)
RBC # BLD AUTO: 3.35 M/UL (ref 3.5–5.5)
RBC # BLD: ABNORMAL 10*6/UL
SODIUM SERPL-SCNC: 141 MMOL/L (ref 132–146)
SOURCE, BLOOD GAS: ABNORMAL
THB: 10.1 G/DL (ref 11.5–16.5)
TIME ANALYZED: 2137
TROPONIN I SERPL HS-MCNC: 30 NG/L (ref 0–9)
TROPONIN I SERPL HS-MCNC: 33 NG/L (ref 0–9)
WBC OTHER # BLD: 8.7 K/UL (ref 4.5–11.5)

## 2024-03-11 PROCEDURE — 84484 ASSAY OF TROPONIN QUANT: CPT

## 2024-03-11 PROCEDURE — 6370000000 HC RX 637 (ALT 250 FOR IP)

## 2024-03-11 PROCEDURE — 94640 AIRWAY INHALATION TREATMENT: CPT

## 2024-03-11 PROCEDURE — 6360000002 HC RX W HCPCS

## 2024-03-11 PROCEDURE — 96374 THER/PROPH/DIAG INJ IV PUSH: CPT

## 2024-03-11 PROCEDURE — 80053 COMPREHEN METABOLIC PANEL: CPT

## 2024-03-11 PROCEDURE — 2580000003 HC RX 258

## 2024-03-11 PROCEDURE — 99285 EMERGENCY DEPT VISIT HI MDM: CPT

## 2024-03-11 PROCEDURE — 85025 COMPLETE CBC W/AUTO DIFF WBC: CPT

## 2024-03-11 PROCEDURE — 82805 BLOOD GASES W/O2 SATURATION: CPT

## 2024-03-11 PROCEDURE — 71045 X-RAY EXAM CHEST 1 VIEW: CPT

## 2024-03-11 PROCEDURE — 93005 ELECTROCARDIOGRAM TRACING: CPT

## 2024-03-11 RX ORDER — SODIUM CHLORIDE FOR INHALATION 3 %
4 VIAL, NEBULIZER (ML) INHALATION ONCE
Status: COMPLETED | OUTPATIENT
Start: 2024-03-11 | End: 2024-03-11

## 2024-03-11 RX ORDER — IPRATROPIUM BROMIDE AND ALBUTEROL SULFATE 2.5; .5 MG/3ML; MG/3ML
1 SOLUTION RESPIRATORY (INHALATION)
Status: COMPLETED | OUTPATIENT
Start: 2024-03-11 | End: 2024-03-11

## 2024-03-11 RX ORDER — IBUPROFEN 400 MG/1
400 TABLET ORAL ONCE
Status: COMPLETED | OUTPATIENT
Start: 2024-03-12 | End: 2024-03-12

## 2024-03-11 RX ORDER — IPRATROPIUM BROMIDE AND ALBUTEROL SULFATE 2.5; .5 MG/3ML; MG/3ML
1 SOLUTION RESPIRATORY (INHALATION)
Status: COMPLETED | OUTPATIENT
Start: 2024-03-12 | End: 2024-03-12

## 2024-03-11 RX ADMIN — IPRATROPIUM BROMIDE AND ALBUTEROL SULFATE 1 DOSE: .5; 3 SOLUTION RESPIRATORY (INHALATION) at 19:45

## 2024-03-11 RX ADMIN — IPRATROPIUM BROMIDE AND ALBUTEROL SULFATE 1 DOSE: .5; 3 SOLUTION RESPIRATORY (INHALATION) at 19:42

## 2024-03-11 RX ADMIN — IPRATROPIUM BROMIDE AND ALBUTEROL SULFATE 1 DOSE: .5; 3 SOLUTION RESPIRATORY (INHALATION) at 19:46

## 2024-03-11 RX ADMIN — WATER 125 MG: 1 INJECTION INTRAMUSCULAR; INTRAVENOUS; SUBCUTANEOUS at 19:34

## 2024-03-11 RX ADMIN — SODIUM CHLORIDE SOLN NEBU 3% 4 ML: 3 NEBU SOLN at 19:46

## 2024-03-11 NOTE — ED NOTES
Department of Emergency Medicine  FIRST PROVIDER TRIAGE NOTE             Independent MLP           3/11/24  6:56 PM EDT    Date of Encounter: 3/11/24   MRN: [id number]      HPI: Lana Phillips is a 69 y.o. female who presents to the ED for Shortness of Breath (C/O SOB that started last night. Wears 3L n/c baseline. States her anxiety has been bad lately. )   Developed chest pain with onset of SOB last night, mid sternal without radiation.     ROS: Negative for abd pain or back pain.    PE: Gen Appearance/Constitutional: alert  HEENT: NC/NT. PERRLA,  Airway patent.  Neck: supple  CV: regular rate  Pulm: abnormal breath sounds auscultated  GI: soft and NT  Musculoskeletal: moves all extremities x 4  Lymphatics: no edema     Initial Plan of Care: All treatment areas with department are currently occupied. Proceed toTreatment Area When Bed Available for ED Attending/MLP to Continue Care    Electronically signed by REGI Antonio - ALMA ROSA   DD: 3/11/24

## 2024-03-11 NOTE — ED PROVIDER NOTES
abdominal tenderness.   Musculoskeletal:         General: No swelling or deformity. Normal range of motion.      Cervical back: Normal range of motion and neck supple. No rigidity.   Skin:     General: Skin is warm and dry.      Coloration: Skin is not jaundiced or pale.   Neurological:      General: No focal deficit present.      Mental Status: She is alert and oriented to person, place, and time. Mental status is at baseline.      Motor: No weakness.   Psychiatric:         Mood and Affect: Mood normal.         Behavior: Behavior normal.          Medical Decision Making/Differential Diagnosis:    CC/HPI Summary, social determinants of health, records reviewed, DDX, testing done/not done, ED course, reassessment, disposition considerations/shared decision making with patient, consults, disposition:    Medical Decision Making  Amount and/or Complexity of Data Reviewed  Independent Historian: friend  Labs: ordered. Decision-making details documented in ED Course.  Radiology: ordered and independent interpretation performed. Decision-making details documented in ED Course.  ECG/medicine tests: ordered and independent interpretation performed. Decision-making details documented in ED Course.    Risk  Prescription drug management.  Decision regarding hospitalization.        Emergency Department Course  Vitals:    Vitals:    03/12/24 1035 03/12/24 1151 03/12/24 1212 03/12/24 1534   BP:       Pulse:       Resp:       Temp:       TempSrc:       SpO2: 94%  98% 97%   Weight:       Height:  1.727 m (5' 8\")         Patient was given the following medications:  Medications   apixaban (ELIQUIS) tablet 5 mg (5 mg Oral Given 3/12/24 0907)   aspirin EC tablet 81 mg (81 mg Oral Given 3/12/24 0907)   atorvastatin (LIPITOR) tablet 40 mg (has no administration in time range)   Budeson-Glycopyrrol-Formoterol 160-9-4.8 MCG/ACT AERO 2 puff (Patient Supplied) (2 puffs Inhalation Given 3/12/24 0908)   busPIRone (BUSPAR) tablet 10 mg (10 mg

## 2024-03-12 PROBLEM — E43 SEVERE PROTEIN-CALORIE MALNUTRITION (HCC): Chronic | Status: ACTIVE | Noted: 2024-03-12

## 2024-03-12 PROBLEM — J96.01 ACUTE RESPIRATORY FAILURE WITH HYPOXIA AND HYPERCAPNIA (HCC): Status: ACTIVE | Noted: 2024-03-12

## 2024-03-12 PROBLEM — J96.02 ACUTE RESPIRATORY FAILURE WITH HYPOXIA AND HYPERCAPNIA (HCC): Status: ACTIVE | Noted: 2024-03-12

## 2024-03-12 LAB
ALBUMIN SERPL-MCNC: 3.7 G/DL (ref 3.5–5.2)
ALP SERPL-CCNC: 67 U/L (ref 35–104)
ALT SERPL-CCNC: 14 U/L (ref 0–32)
ANION GAP SERPL CALCULATED.3IONS-SCNC: 5 MMOL/L (ref 7–16)
AST SERPL-CCNC: 34 U/L (ref 0–31)
B PARAP IS1001 DNA NPH QL NAA+NON-PROBE: NOT DETECTED
B PERT DNA SPEC QL NAA+PROBE: NOT DETECTED
BILIRUB SERPL-MCNC: 0.2 MG/DL (ref 0–1.2)
BNP SERPL-MCNC: 168 PG/ML (ref 0–125)
BUN SERPL-MCNC: 10 MG/DL (ref 6–23)
C PNEUM DNA NPH QL NAA+NON-PROBE: NOT DETECTED
CALCIUM SERPL-MCNC: 9.7 MG/DL (ref 8.6–10.2)
CHLORIDE SERPL-SCNC: 91 MMOL/L (ref 98–107)
CK SERPL-CCNC: 146 U/L (ref 20–180)
CO2 SERPL-SCNC: 42 MMOL/L (ref 22–29)
CREAT SERPL-MCNC: 0.6 MG/DL (ref 0.5–1)
CRP SERPL HS-MCNC: 7 MG/L (ref 0–5)
ERYTHROCYTE [SEDIMENTATION RATE] IN BLOOD BY WESTERGREN METHOD: 31 MM/HR (ref 0–20)
FLUAV RNA NPH QL NAA+NON-PROBE: NOT DETECTED
FLUBV RNA NPH QL NAA+NON-PROBE: NOT DETECTED
GFR SERPL CREATININE-BSD FRML MDRD: >60 ML/MIN/1.73M2
GLUCOSE SERPL-MCNC: 123 MG/DL (ref 74–99)
HADV DNA NPH QL NAA+NON-PROBE: NOT DETECTED
HCOV 229E RNA NPH QL NAA+NON-PROBE: NOT DETECTED
HCOV HKU1 RNA NPH QL NAA+NON-PROBE: NOT DETECTED
HCOV NL63 RNA NPH QL NAA+NON-PROBE: NOT DETECTED
HCOV OC43 RNA NPH QL NAA+NON-PROBE: NOT DETECTED
HMPV RNA NPH QL NAA+NON-PROBE: NOT DETECTED
HPIV1 RNA NPH QL NAA+NON-PROBE: NOT DETECTED
HPIV2 RNA NPH QL NAA+NON-PROBE: NOT DETECTED
HPIV3 RNA NPH QL NAA+NON-PROBE: NOT DETECTED
HPIV4 RNA NPH QL NAA+NON-PROBE: NOT DETECTED
M PNEUMO DNA NPH QL NAA+NON-PROBE: NOT DETECTED
POTASSIUM SERPL-SCNC: 4.9 MMOL/L (ref 3.5–5)
PROCALCITONIN SERPL-MCNC: 0.05 NG/ML (ref 0–0.08)
PROT SERPL-MCNC: 6.5 G/DL (ref 6.4–8.3)
RSV RNA NPH QL NAA+NON-PROBE: NOT DETECTED
RV+EV RNA NPH QL NAA+NON-PROBE: NOT DETECTED
SARS-COV-2 RNA NPH QL NAA+NON-PROBE: NOT DETECTED
SODIUM SERPL-SCNC: 138 MMOL/L (ref 132–146)
SPECIMEN DESCRIPTION: NORMAL
TROPONIN I SERPL HS-MCNC: 26 NG/L (ref 0–9)

## 2024-03-12 PROCEDURE — 82550 ASSAY OF CK (CPK): CPT

## 2024-03-12 PROCEDURE — 6370000000 HC RX 637 (ALT 250 FOR IP): Performed by: NURSE PRACTITIONER

## 2024-03-12 PROCEDURE — 6370000000 HC RX 637 (ALT 250 FOR IP)

## 2024-03-12 PROCEDURE — 80053 COMPREHEN METABOLIC PANEL: CPT

## 2024-03-12 PROCEDURE — 0202U NFCT DS 22 TRGT SARS-COV-2: CPT

## 2024-03-12 PROCEDURE — 87899 AGENT NOS ASSAY W/OPTIC: CPT

## 2024-03-12 PROCEDURE — 2580000003 HC RX 258: Performed by: NURSE PRACTITIONER

## 2024-03-12 PROCEDURE — 85652 RBC SED RATE AUTOMATED: CPT

## 2024-03-12 PROCEDURE — 36415 COLL VENOUS BLD VENIPUNCTURE: CPT

## 2024-03-12 PROCEDURE — 86140 C-REACTIVE PROTEIN: CPT

## 2024-03-12 PROCEDURE — APPSS180 APP SPLIT SHARED TIME > 60 MINUTES: Performed by: NURSE PRACTITIONER

## 2024-03-12 PROCEDURE — 6360000002 HC RX W HCPCS: Performed by: INTERNAL MEDICINE

## 2024-03-12 PROCEDURE — 99222 1ST HOSP IP/OBS MODERATE 55: CPT | Performed by: INTERNAL MEDICINE

## 2024-03-12 PROCEDURE — 6360000002 HC RX W HCPCS: Performed by: STUDENT IN AN ORGANIZED HEALTH CARE EDUCATION/TRAINING PROGRAM

## 2024-03-12 PROCEDURE — 2580000003 HC RX 258: Performed by: STUDENT IN AN ORGANIZED HEALTH CARE EDUCATION/TRAINING PROGRAM

## 2024-03-12 PROCEDURE — 84484 ASSAY OF TROPONIN QUANT: CPT

## 2024-03-12 PROCEDURE — 84145 PROCALCITONIN (PCT): CPT

## 2024-03-12 PROCEDURE — 6370000000 HC RX 637 (ALT 250 FOR IP): Performed by: STUDENT IN AN ORGANIZED HEALTH CARE EDUCATION/TRAINING PROGRAM

## 2024-03-12 PROCEDURE — 87449 NOS EACH ORGANISM AG IA: CPT

## 2024-03-12 PROCEDURE — 1200000000 HC SEMI PRIVATE

## 2024-03-12 PROCEDURE — 94640 AIRWAY INHALATION TREATMENT: CPT

## 2024-03-12 PROCEDURE — 2700000000 HC OXYGEN THERAPY PER DAY

## 2024-03-12 PROCEDURE — 83880 ASSAY OF NATRIURETIC PEPTIDE: CPT

## 2024-03-12 PROCEDURE — 93005 ELECTROCARDIOGRAM TRACING: CPT | Performed by: STUDENT IN AN ORGANIZED HEALTH CARE EDUCATION/TRAINING PROGRAM

## 2024-03-12 RX ORDER — MAGNESIUM SULFATE IN WATER 40 MG/ML
2000 INJECTION, SOLUTION INTRAVENOUS PRN
Status: DISCONTINUED | OUTPATIENT
Start: 2024-03-12 | End: 2024-03-19 | Stop reason: HOSPADM

## 2024-03-12 RX ORDER — PANTOPRAZOLE SODIUM 40 MG/1
40 TABLET, DELAYED RELEASE ORAL
Status: DISCONTINUED | OUTPATIENT
Start: 2024-03-12 | End: 2024-03-19 | Stop reason: HOSPADM

## 2024-03-12 RX ORDER — ONDANSETRON 4 MG/1
4 TABLET, ORALLY DISINTEGRATING ORAL EVERY 8 HOURS PRN
Status: DISCONTINUED | OUTPATIENT
Start: 2024-03-12 | End: 2024-03-19 | Stop reason: HOSPADM

## 2024-03-12 RX ORDER — POTASSIUM CHLORIDE 20 MEQ/1
40 TABLET, EXTENDED RELEASE ORAL PRN
Status: DISCONTINUED | OUTPATIENT
Start: 2024-03-12 | End: 2024-03-19 | Stop reason: HOSPADM

## 2024-03-12 RX ORDER — ACETAMINOPHEN 325 MG/1
650 TABLET ORAL EVERY 6 HOURS PRN
Status: DISCONTINUED | OUTPATIENT
Start: 2024-03-12 | End: 2024-03-19 | Stop reason: HOSPADM

## 2024-03-12 RX ORDER — CITALOPRAM 20 MG/1
20 TABLET ORAL DAILY
Status: DISCONTINUED | OUTPATIENT
Start: 2024-03-12 | End: 2024-03-19 | Stop reason: HOSPADM

## 2024-03-12 RX ORDER — METHIMAZOLE 5 MG/1
15 TABLET ORAL DAILY
Status: DISCONTINUED | OUTPATIENT
Start: 2024-03-12 | End: 2024-03-19 | Stop reason: HOSPADM

## 2024-03-12 RX ORDER — ARFORMOTEROL TARTRATE 15 UG/2ML
15 SOLUTION RESPIRATORY (INHALATION)
Status: DISCONTINUED | OUTPATIENT
Start: 2024-03-12 | End: 2024-03-19 | Stop reason: HOSPADM

## 2024-03-12 RX ORDER — ALBUTEROL SULFATE 90 UG/1
2 AEROSOL, METERED RESPIRATORY (INHALATION) 4 TIMES DAILY PRN
Status: DISCONTINUED | OUTPATIENT
Start: 2024-03-12 | End: 2024-03-12 | Stop reason: CLARIF

## 2024-03-12 RX ORDER — POTASSIUM CHLORIDE 7.45 MG/ML
10 INJECTION INTRAVENOUS PRN
Status: DISCONTINUED | OUTPATIENT
Start: 2024-03-12 | End: 2024-03-19 | Stop reason: HOSPADM

## 2024-03-12 RX ORDER — SODIUM CHLORIDE 9 MG/ML
INJECTION, SOLUTION INTRAVENOUS PRN
Status: DISCONTINUED | OUTPATIENT
Start: 2024-03-12 | End: 2024-03-19 | Stop reason: HOSPADM

## 2024-03-12 RX ORDER — SODIUM CHLORIDE 0.9 % (FLUSH) 0.9 %
10 SYRINGE (ML) INJECTION PRN
Status: DISCONTINUED | OUTPATIENT
Start: 2024-03-12 | End: 2024-03-19 | Stop reason: HOSPADM

## 2024-03-12 RX ORDER — ACETAMINOPHEN 650 MG/1
650 SUPPOSITORY RECTAL EVERY 6 HOURS PRN
Status: DISCONTINUED | OUTPATIENT
Start: 2024-03-12 | End: 2024-03-19 | Stop reason: HOSPADM

## 2024-03-12 RX ORDER — ONDANSETRON 2 MG/ML
4 INJECTION INTRAMUSCULAR; INTRAVENOUS EVERY 6 HOURS PRN
Status: DISCONTINUED | OUTPATIENT
Start: 2024-03-12 | End: 2024-03-19 | Stop reason: HOSPADM

## 2024-03-12 RX ORDER — ASPIRIN 81 MG/1
81 TABLET ORAL DAILY
Status: DISCONTINUED | OUTPATIENT
Start: 2024-03-12 | End: 2024-03-19 | Stop reason: HOSPADM

## 2024-03-12 RX ORDER — DILTIAZEM HYDROCHLORIDE 120 MG/1
120 CAPSULE, COATED, EXTENDED RELEASE ORAL DAILY
Status: DISCONTINUED | OUTPATIENT
Start: 2024-03-12 | End: 2024-03-19 | Stop reason: HOSPADM

## 2024-03-12 RX ORDER — SENNOSIDES A AND B 8.6 MG/1
1 TABLET, FILM COATED ORAL DAILY PRN
Status: DISCONTINUED | OUTPATIENT
Start: 2024-03-12 | End: 2024-03-18

## 2024-03-12 RX ORDER — BUDESONIDE 0.5 MG/2ML
0.5 INHALANT ORAL
Status: DISCONTINUED | OUTPATIENT
Start: 2024-03-12 | End: 2024-03-19 | Stop reason: HOSPADM

## 2024-03-12 RX ORDER — IPRATROPIUM BROMIDE AND ALBUTEROL SULFATE 2.5; .5 MG/3ML; MG/3ML
1 SOLUTION RESPIRATORY (INHALATION) EVERY 4 HOURS PRN
Status: DISCONTINUED | OUTPATIENT
Start: 2024-03-12 | End: 2024-03-19 | Stop reason: HOSPADM

## 2024-03-12 RX ORDER — ALPRAZOLAM 1 MG/1
1 TABLET ORAL ONCE
Status: COMPLETED | OUTPATIENT
Start: 2024-03-12 | End: 2024-03-12

## 2024-03-12 RX ORDER — TRAZODONE HYDROCHLORIDE 50 MG/1
100 TABLET ORAL NIGHTLY
Status: DISCONTINUED | OUTPATIENT
Start: 2024-03-12 | End: 2024-03-19 | Stop reason: HOSPADM

## 2024-03-12 RX ORDER — CITALOPRAM 20 MG/1
20 TABLET ORAL DAILY
COMMUNITY

## 2024-03-12 RX ORDER — ATORVASTATIN CALCIUM 40 MG/1
40 TABLET, FILM COATED ORAL NIGHTLY
Status: DISCONTINUED | OUTPATIENT
Start: 2024-03-12 | End: 2024-03-19 | Stop reason: HOSPADM

## 2024-03-12 RX ORDER — SODIUM CHLORIDE 0.9 % (FLUSH) 0.9 %
10 SYRINGE (ML) INJECTION EVERY 12 HOURS SCHEDULED
Status: DISCONTINUED | OUTPATIENT
Start: 2024-03-12 | End: 2024-03-19 | Stop reason: HOSPADM

## 2024-03-12 RX ORDER — PROCHLORPERAZINE MALEATE 10 MG
10 TABLET ORAL EVERY 6 HOURS PRN
Status: DISCONTINUED | OUTPATIENT
Start: 2024-03-12 | End: 2024-03-19 | Stop reason: HOSPADM

## 2024-03-12 RX ORDER — ARFORMOTEROL TARTRATE 15 UG/2ML
15 SOLUTION RESPIRATORY (INHALATION)
Status: DISCONTINUED | OUTPATIENT
Start: 2024-03-12 | End: 2024-03-12

## 2024-03-12 RX ORDER — BUSPIRONE HYDROCHLORIDE 10 MG/1
10 TABLET ORAL 3 TIMES DAILY
Status: DISCONTINUED | OUTPATIENT
Start: 2024-03-12 | End: 2024-03-19 | Stop reason: HOSPADM

## 2024-03-12 RX ORDER — LEVOTHYROXINE SODIUM 0.03 MG/1
25 TABLET ORAL DAILY
Status: ON HOLD | COMMUNITY
End: 2024-03-19 | Stop reason: HOSPADM

## 2024-03-12 RX ORDER — PREGABALIN 50 MG/1
50 CAPSULE ORAL 3 TIMES DAILY
Status: DISCONTINUED | OUTPATIENT
Start: 2024-03-31 | End: 2024-03-19 | Stop reason: HOSPADM

## 2024-03-12 RX ORDER — ALBUTEROL SULFATE 2.5 MG/3ML
2.5 SOLUTION RESPIRATORY (INHALATION) 4 TIMES DAILY
Status: DISCONTINUED | OUTPATIENT
Start: 2024-03-12 | End: 2024-03-19 | Stop reason: HOSPADM

## 2024-03-12 RX ORDER — HYDROXYZINE PAMOATE 25 MG/1
25 CAPSULE ORAL
Status: DISCONTINUED | OUTPATIENT
Start: 2024-03-12 | End: 2024-03-13

## 2024-03-12 RX ADMIN — IBUPROFEN 400 MG: 400 TABLET, FILM COATED ORAL at 00:50

## 2024-03-12 RX ADMIN — BUSPIRONE HYDROCHLORIDE 10 MG: 10 TABLET ORAL at 13:16

## 2024-03-12 RX ADMIN — CITALOPRAM HYDROBROMIDE 20 MG: 20 TABLET ORAL at 09:06

## 2024-03-12 RX ADMIN — DILTIAZEM HYDROCHLORIDE 120 MG: 120 CAPSULE, EXTENDED RELEASE ORAL at 09:06

## 2024-03-12 RX ADMIN — BUSPIRONE HYDROCHLORIDE 10 MG: 10 TABLET ORAL at 20:41

## 2024-03-12 RX ADMIN — HYDROXYZINE PAMOATE 25 MG: 25 CAPSULE ORAL at 02:13

## 2024-03-12 RX ADMIN — APIXABAN 5 MG: 5 TABLET, FILM COATED ORAL at 20:41

## 2024-03-12 RX ADMIN — APIXABAN 5 MG: 5 TABLET, FILM COATED ORAL at 09:07

## 2024-03-12 RX ADMIN — IPRATROPIUM BROMIDE AND ALBUTEROL SULFATE 1 DOSE: .5; 2.5 SOLUTION RESPIRATORY (INHALATION) at 00:22

## 2024-03-12 RX ADMIN — PANTOPRAZOLE SODIUM 40 MG: 40 TABLET, DELAYED RELEASE ORAL at 09:06

## 2024-03-12 RX ADMIN — ALPRAZOLAM 1 MG: 1 TABLET ORAL at 10:01

## 2024-03-12 RX ADMIN — ACETAMINOPHEN 650 MG: 325 TABLET ORAL at 22:07

## 2024-03-12 RX ADMIN — ALBUTEROL SULFATE 2.5 MG: 2.5 SOLUTION RESPIRATORY (INHALATION) at 22:45

## 2024-03-12 RX ADMIN — ASPIRIN 81 MG: 81 TABLET, COATED ORAL at 09:07

## 2024-03-12 RX ADMIN — ALBUTEROL SULFATE 2.5 MG: 2.5 SOLUTION RESPIRATORY (INHALATION) at 20:16

## 2024-03-12 RX ADMIN — APIXABAN 5 MG: 5 TABLET, FILM COATED ORAL at 02:13

## 2024-03-12 RX ADMIN — BUDESONIDE 500 MCG: 0.5 SUSPENSION RESPIRATORY (INHALATION) at 20:16

## 2024-03-12 RX ADMIN — ALBUTEROL SULFATE 2.5 MG: 2.5 SOLUTION RESPIRATORY (INHALATION) at 15:33

## 2024-03-12 RX ADMIN — ARFORMOTEROL TARTRATE 15 MCG: 15 SOLUTION RESPIRATORY (INHALATION) at 20:16

## 2024-03-12 RX ADMIN — SODIUM CHLORIDE, PRESERVATIVE FREE 10 ML: 5 INJECTION INTRAVENOUS at 20:41

## 2024-03-12 RX ADMIN — HYDROXYZINE PAMOATE 25 MG: 25 CAPSULE ORAL at 20:40

## 2024-03-12 RX ADMIN — IPRATROPIUM BROMIDE AND ALBUTEROL SULFATE 1 DOSE: .5; 2.5 SOLUTION RESPIRATORY (INHALATION) at 00:24

## 2024-03-12 RX ADMIN — ALBUTEROL SULFATE 2.5 MG: 2.5 SOLUTION RESPIRATORY (INHALATION) at 09:00

## 2024-03-12 RX ADMIN — ALBUTEROL SULFATE 2.5 MG: 2.5 SOLUTION RESPIRATORY (INHALATION) at 12:11

## 2024-03-12 RX ADMIN — WATER 40 MG: 1 INJECTION INTRAMUSCULAR; INTRAVENOUS; SUBCUTANEOUS at 16:17

## 2024-03-12 RX ADMIN — TRAZODONE HYDROCHLORIDE 100 MG: 50 TABLET ORAL at 20:41

## 2024-03-12 RX ADMIN — IPRATROPIUM BROMIDE AND ALBUTEROL SULFATE 1 DOSE: .5; 2.5 SOLUTION RESPIRATORY (INHALATION) at 00:23

## 2024-03-12 RX ADMIN — ATORVASTATIN CALCIUM 40 MG: 40 TABLET, FILM COATED ORAL at 20:40

## 2024-03-12 RX ADMIN — WATER 40 MG: 1 INJECTION INTRAMUSCULAR; INTRAVENOUS; SUBCUTANEOUS at 09:05

## 2024-03-12 RX ADMIN — BUSPIRONE HYDROCHLORIDE 10 MG: 10 TABLET ORAL at 09:06

## 2024-03-12 RX ADMIN — SODIUM CHLORIDE, PRESERVATIVE FREE 10 ML: 5 INJECTION INTRAVENOUS at 09:06

## 2024-03-12 ASSESSMENT — PAIN DESCRIPTION - ONSET
ONSET: GRADUAL
ONSET: GRADUAL

## 2024-03-12 ASSESSMENT — PAIN - FUNCTIONAL ASSESSMENT
PAIN_FUNCTIONAL_ASSESSMENT: PREVENTS OR INTERFERES SOME ACTIVE ACTIVITIES AND ADLS
PAIN_FUNCTIONAL_ASSESSMENT: PREVENTS OR INTERFERES SOME ACTIVE ACTIVITIES AND ADLS

## 2024-03-12 ASSESSMENT — PAIN DESCRIPTION - DESCRIPTORS
DESCRIPTORS: ACHING
DESCRIPTORS: ACHING;SHARP;SORE
DESCRIPTORS: ACHING
DESCRIPTORS: ACHING;DISCOMFORT;SHARP
DESCRIPTORS: THROBBING

## 2024-03-12 ASSESSMENT — PAIN DESCRIPTION - ORIENTATION
ORIENTATION: LEFT

## 2024-03-12 ASSESSMENT — PAIN DESCRIPTION - LOCATION
LOCATION: CHEST

## 2024-03-12 ASSESSMENT — PAIN SCALES - GENERAL
PAINLEVEL_OUTOF10: 7
PAINLEVEL_OUTOF10: 0
PAINLEVEL_OUTOF10: 7
PAINLEVEL_OUTOF10: 2
PAINLEVEL_OUTOF10: 7
PAINLEVEL_OUTOF10: 8

## 2024-03-12 ASSESSMENT — PAIN DESCRIPTION - PAIN TYPE
TYPE: ACUTE PAIN
TYPE: ACUTE PAIN

## 2024-03-12 ASSESSMENT — PAIN DESCRIPTION - FREQUENCY
FREQUENCY: INTERMITTENT
FREQUENCY: INTERMITTENT

## 2024-03-12 NOTE — CONSULTS
Inpatient Cardiology Consultation      Reason for Consult:  Chest Pain     Consulting Physician: Dr. Calvin     Requesting Physician:  Dr. Madison     Date of Consultation: 3/12/2024    HISTORY OF PRESENT ILLNESS:   Ms. Phillips is a 69 year old  female who follows with Dr. Humphreys.  She was most recently seen in the office with Dr. Humphreys on 08/8/2023.  Her medical history as stated below.  Ms. Phillips presented to [facility] ED on 03/11/2024 with complaints of dyspnea, anxiety, and chest pain.  She states that prior to presentation over the past 3 weeks she has been having diffuse chest pain that has been constant in nature.  She states that her chest discomfort worsens with exertion, deep inspiration, and food ingestion.  She states that she has also had several episodes of anxiety and PACHECO.  She states that she has also had several episodes of nausea and vomiting attributed to panic attacks.  She is currently refusing to answer further questions, and there is no family present at the bedside.  Upon arrival to the ED her VS were oral temperature 97.8-93-18-94% on 3 L by nasal cannula-131/65.  EKG SR, LVH, OLGA.  CXR unremarkable.  Troponin 33, 30.  BUN/SCR 10/0.6.  .  K4.9.  WBC 8.7.  H&H 9.1/32.  She received Advil 400 mg, DuoNeb, Solu-Medrol 125 mg IV.  Pulmonology was consulted.  She was admitted to a telemetry monitored unit.  Cardiology was consulted for management of chest pain.  Currently, Mr. Phillips is sitting up in bed and falls asleep during mid conversation as she states she did not sleep much overnight.  VS stable.  Telemetry monitoring SR. she continues to have chest pain that is reproducible with palpation.      Please note: past medical records were reviewed per electronic medical record (EMR) - see detailed reports under Past Medical/ Surgical History.   Past Medical and Surgical History:    TTE 06/29/2022 (Dr Yoo): Normal LV size.  LV systolic function is hyperdynamic.  EF is

## 2024-03-12 NOTE — DISCHARGE INSTRUCTIONS
Visit Discharge/Physician Orders    Discharge condition: {STABLE/UNSTABLE:369167781}    Assessment of pain at discharge:    Anesthetic used:     Discharge to: {[facility] Wound Discharge To:20937}    Left via:{Left Via:20938}    Accompanied by: accompanied by {:[id number]}    ECF/HHA:     Dressing Orders:    Treatment Orders:    [facility] followup visit _____________________________  (Please note your next appointment above and if you are unable to keep, kindly give a 24 hour notice. Thank you.)    Physician signature:__________________________      If you experience any of the following, please call the Wound Care Center during business hours:    * Increase in Pain  * Temperature over 101  * Increase in drainage from your wound  * Drainage with a foul odor  * Bleeding  * Increase in swelling  * Need for compression bandage changes due to slippage, breakthrough drainage.    If you need medical attention outside of the business hours of the Wound Care Centers please contact your PCP or go to the nearest emergency room.

## 2024-03-12 NOTE — H&P
Internal Medicine History & Physical     Name: Lana Phillips  : 1955  Chief Complaint: Shortness of Breath (C/O SOB that started last night. Wears 3L n/c baseline. States her anxiety has been bad lately. )  Primary Care Physician: Clyde Gonzalez DO  Admission date: 3/11/2024  Date of service: 3/12/2024     History of Present Illness  Lana is a 69 y.o. year old female who presented with a chief complaint of shortness of breath and coughing. Has uncontrolled anxiety asking for ativan today despite using prn buspar and prn hydroxizine already. Had some ativan today seems to be doing better. States she came to the hospital because she felt short of breath however she always feels short of breath but it felt worse than normal. She is cachectic on exam. Also complaining of chest pain. Cardiology and pulmonary consults.     Past Medical History:   Diagnosis Date    Acute exacerbation of chronic obstructive pulmonary disease (COPD) ([facility]) 2022    Acute respiratory failure ([facility]) 2022    Acute respiratory failure with hypoxia ([facility]) 2022    Acute respiratory failure with hypoxia ([facility]) 2022    Acute respiratory failure with hypoxia ([facility]) 2024    Atherosclerosis 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 ([facility]) 2024    Atrial fibrillation ([facility])     Chronic obstructive pulmonary disease ([facility]) 12/15/2022    COPD (chronic obstructive pulmonary disease) ([facility])     COPD exacerbation ([facility]) 2022    COPD exacerbation ([facility]) 2024    COPD with acute exacerbation ([facility]) 2023    COVID-19 2022    Critical limb ischemia of left lower extremity with rest pain ([facility]) 2023    GI bleed 10/14/2023    Hypertension     Pneumonia due to infectious organism     PVD (peripheral vascular disease) ([facility]) 2024    Severe protein-calorie malnutrition ([facility]) 2023    Thyroid disease     Uncontrolled hypertension        Past Surgical History:

## 2024-03-12 NOTE — CONSULTS
[facility]  [address], [address]  [place] [address]    Pulmonary Consultation      Reason for Consult:  sob and anxiety    Requesting Physician:  Clyde Gonzalez DO    CHIEF COMPLAINT:  sob and anxiety    History Obtained From:  patient    HISTORY OF PRESENT ILLNESS:                The patient is a 69 y.o. female with significant past medical history of COPD oxygen dependent who presents with sob, chest pain, and anxiety for a few days prior to admission. Was recently discharged from hospital on 2/25, claims was doing ok with respiratory function, at baseline with dyspnea, claims started having problems with anxiety over the weekend and started developing sob and chest pain as a result. Denies starting back on smoking and claims had been taking her medications and using her oxygen consistently. Currently on 5 li HFNC, looking slightly anxious and tachypneic, minimal cough with not much sputum production.    Past Medical History:        Diagnosis Date    Acute exacerbation of chronic obstructive pulmonary disease (COPD) ([facility]) 07/14/2022    Acute respiratory failure ([facility]) 11/27/2022    Acute respiratory failure with hypoxia ([facility]) 06/27/2022    Acute respiratory failure with hypoxia ([facility]) 11/27/2022    Acute respiratory failure with hypoxia ([facility]) 01/09/2024    Atherosclerosis 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 ([facility]) 01/19/2024    Atrial fibrillation ([facility])     Chronic obstructive pulmonary disease ([facility]) 12/15/2022    COPD (chronic obstructive pulmonary disease) ([facility])     COPD exacerbation ([facility]) 07/14/2022    COPD exacerbation ([facility]) 02/05/2024    COPD with acute exacerbation ([facility]) 12/24/2023    COVID-19 07/16/2022    Critical limb ischemia of left lower extremity with rest pain ([facility]) 12/25/2023    GI bleed 10/14/2023    Hypertension     Pneumonia due to infectious organism     PVD (peripheral vascular disease)

## 2024-03-12 NOTE — CARE COORDINATION
Internal Medicine On-call Care Coordination Note    I was called by the ED physician because they recommended admission for this patient and we cover their PCP.  The history as I understand it after discussion with the ED physician is as follows:    Presents with shortness of breath  Hx COPD, 3LNC baseline  Recent admit for same, still not feeling better    I placed admission orders.  Including:    Pulmonology consult     Dr. Madison, or our coverage will see the patient tomorrow for H&P.    Electronically signed by REGI Fitch CNP on 3/12/2024 at 1:51 AM

## 2024-03-13 ENCOUNTER — APPOINTMENT (OUTPATIENT)
Age: 69
DRG: 189 | End: 2024-03-13
Payer: MEDICARE

## 2024-03-13 ENCOUNTER — HOSPITAL ENCOUNTER (OUTPATIENT)
Dept: WOUND CARE | Age: 69
Discharge: HOME | End: 2024-03-13
Attending: SURGERY

## 2024-03-13 LAB
ALBUMIN SERPL-MCNC: 3.7 G/DL (ref 3.5–5.2)
ALP SERPL-CCNC: 59 U/L (ref 35–104)
ALT SERPL-CCNC: 13 U/L (ref 0–32)
ANION GAP SERPL CALCULATED.3IONS-SCNC: 8 MMOL/L (ref 7–16)
AST SERPL-CCNC: 29 U/L (ref 0–31)
BASOPHILS # BLD: 0.01 K/UL (ref 0–0.2)
BASOPHILS NFR BLD: 0 % (ref 0–2)
BILIRUB SERPL-MCNC: <0.2 MG/DL (ref 0–1.2)
BUN SERPL-MCNC: 16 MG/DL (ref 6–23)
CALCIUM SERPL-MCNC: 9.5 MG/DL (ref 8.6–10.2)
CHLORIDE SERPL-SCNC: 95 MMOL/L (ref 98–107)
CO2 SERPL-SCNC: 38 MMOL/L (ref 22–29)
CREAT SERPL-MCNC: 0.6 MG/DL (ref 0.5–1)
EKG ATRIAL RATE: 77 BPM
EKG ATRIAL RATE: 90 BPM
EKG P AXIS: 88 DEGREES
EKG P AXIS: 90 DEGREES
EKG P-R INTERVAL: 140 MS
EKG P-R INTERVAL: 150 MS
EKG Q-T INTERVAL: 362 MS
EKG Q-T INTERVAL: 372 MS
EKG QRS DURATION: 74 MS
EKG QRS DURATION: 74 MS
EKG QTC CALCULATION (BAZETT): 420 MS
EKG QTC CALCULATION (BAZETT): 442 MS
EKG R AXIS: 65 DEGREES
EKG R AXIS: 83 DEGREES
EKG T AXIS: 76 DEGREES
EKG T AXIS: 83 DEGREES
EKG VENTRICULAR RATE: 77 BPM
EKG VENTRICULAR RATE: 90 BPM
EOSINOPHIL # BLD: 0 K/UL (ref 0.05–0.5)
EOSINOPHILS RELATIVE PERCENT: 0 % (ref 0–6)
ERYTHROCYTE [DISTWIDTH] IN BLOOD BY AUTOMATED COUNT: 18.8 % (ref 11.5–15)
GFR SERPL CREATININE-BSD FRML MDRD: >60 ML/MIN/1.73M2
GLUCOSE SERPL-MCNC: 166 MG/DL (ref 74–99)
HCT VFR BLD AUTO: 31.7 % (ref 34–48)
HGB BLD-MCNC: 9.1 G/DL (ref 11.5–15.5)
IMM GRANULOCYTES # BLD AUTO: 0.07 K/UL (ref 0–0.58)
IMM GRANULOCYTES NFR BLD: 1 % (ref 0–5)
L PNEUMO1 AG UR QL IA.RAPID: NEGATIVE
LYMPHOCYTES NFR BLD: 0.38 K/UL (ref 1.5–4)
LYMPHOCYTES RELATIVE PERCENT: 5 % (ref 20–42)
MCH RBC QN AUTO: 26.8 PG (ref 26–35)
MCHC RBC AUTO-ENTMCNC: 28.7 G/DL (ref 32–34.5)
MCV RBC AUTO: 93.2 FL (ref 80–99.9)
MONOCYTES NFR BLD: 0.18 K/UL (ref 0.1–0.95)
MONOCYTES NFR BLD: 2 % (ref 2–12)
NEUTROPHILS NFR BLD: 92 % (ref 43–80)
NEUTS SEG NFR BLD: 7.34 K/UL (ref 1.8–7.3)
PLATELET # BLD AUTO: 300 K/UL (ref 130–450)
PMV BLD AUTO: 9.9 FL (ref 7–12)
POTASSIUM SERPL-SCNC: 4.8 MMOL/L (ref 3.5–5)
PROT SERPL-MCNC: 6.3 G/DL (ref 6.4–8.3)
RBC # BLD AUTO: 3.4 M/UL (ref 3.5–5.5)
RBC # BLD: ABNORMAL 10*6/UL
S PNEUM AG SPEC QL: NEGATIVE
SODIUM SERPL-SCNC: 141 MMOL/L (ref 132–146)
SPECIMEN SOURCE: NORMAL
T4 FREE SERPL-MCNC: 0.4 NG/DL (ref 0.9–1.7)
TSH SERPL DL<=0.05 MIU/L-ACNC: 1.86 UIU/ML (ref 0.27–4.2)
WBC OTHER # BLD: 8 K/UL (ref 4.5–11.5)

## 2024-03-13 PROCEDURE — 84443 ASSAY THYROID STIM HORMONE: CPT

## 2024-03-13 PROCEDURE — 80053 COMPREHEN METABOLIC PANEL: CPT

## 2024-03-13 PROCEDURE — 2580000003 HC RX 258: Performed by: STUDENT IN AN ORGANIZED HEALTH CARE EDUCATION/TRAINING PROGRAM

## 2024-03-13 PROCEDURE — 6360000002 HC RX W HCPCS: Performed by: INTERNAL MEDICINE

## 2024-03-13 PROCEDURE — 6360000002 HC RX W HCPCS: Performed by: STUDENT IN AN ORGANIZED HEALTH CARE EDUCATION/TRAINING PROGRAM

## 2024-03-13 PROCEDURE — 36415 COLL VENOUS BLD VENIPUNCTURE: CPT

## 2024-03-13 PROCEDURE — 1200000000 HC SEMI PRIVATE

## 2024-03-13 PROCEDURE — 6370000000 HC RX 637 (ALT 250 FOR IP): Performed by: STUDENT IN AN ORGANIZED HEALTH CARE EDUCATION/TRAINING PROGRAM

## 2024-03-13 PROCEDURE — 2580000003 HC RX 258: Performed by: INTERNAL MEDICINE

## 2024-03-13 PROCEDURE — 2580000003 HC RX 258: Performed by: NURSE PRACTITIONER

## 2024-03-13 PROCEDURE — 85025 COMPLETE CBC W/AUTO DIFF WBC: CPT

## 2024-03-13 PROCEDURE — 84439 ASSAY OF FREE THYROXINE: CPT

## 2024-03-13 PROCEDURE — 93010 ELECTROCARDIOGRAM REPORT: CPT | Performed by: INTERNAL MEDICINE

## 2024-03-13 PROCEDURE — 6370000000 HC RX 637 (ALT 250 FOR IP): Performed by: NURSE PRACTITIONER

## 2024-03-13 PROCEDURE — 2700000000 HC OXYGEN THERAPY PER DAY

## 2024-03-13 PROCEDURE — 94640 AIRWAY INHALATION TREATMENT: CPT

## 2024-03-13 RX ORDER — HYDROXYZINE PAMOATE 25 MG/1
25 CAPSULE ORAL 3 TIMES DAILY PRN
Status: DISCONTINUED | OUTPATIENT
Start: 2024-03-13 | End: 2024-03-19 | Stop reason: HOSPADM

## 2024-03-13 RX ORDER — LORAZEPAM 2 MG/ML
0.5 INJECTION INTRAMUSCULAR 2 TIMES DAILY PRN
Status: DISCONTINUED | OUTPATIENT
Start: 2024-03-13 | End: 2024-03-19 | Stop reason: HOSPADM

## 2024-03-13 RX ADMIN — WATER 40 MG: 1 INJECTION INTRAMUSCULAR; INTRAVENOUS; SUBCUTANEOUS at 00:25

## 2024-03-13 RX ADMIN — ARFORMOTEROL TARTRATE 15 MCG: 15 SOLUTION RESPIRATORY (INHALATION) at 08:07

## 2024-03-13 RX ADMIN — ALBUTEROL SULFATE 2.5 MG: 2.5 SOLUTION RESPIRATORY (INHALATION) at 20:35

## 2024-03-13 RX ADMIN — ALBUTEROL SULFATE 2.5 MG: 2.5 SOLUTION RESPIRATORY (INHALATION) at 11:41

## 2024-03-13 RX ADMIN — BUDESONIDE 500 MCG: 0.5 SUSPENSION RESPIRATORY (INHALATION) at 08:07

## 2024-03-13 RX ADMIN — ASPIRIN 81 MG: 81 TABLET, COATED ORAL at 09:08

## 2024-03-13 RX ADMIN — SODIUM CHLORIDE, PRESERVATIVE FREE 10 ML: 5 INJECTION INTRAVENOUS at 09:10

## 2024-03-13 RX ADMIN — METHIMAZOLE 15 MG: 5 TABLET ORAL at 09:07

## 2024-03-13 RX ADMIN — METHYLPREDNISOLONE SODIUM SUCCINATE 40 MG: 40 INJECTION INTRAMUSCULAR; INTRAVENOUS at 20:23

## 2024-03-13 RX ADMIN — BUSPIRONE HYDROCHLORIDE 10 MG: 10 TABLET ORAL at 20:23

## 2024-03-13 RX ADMIN — HYDROXYZINE PAMOATE 25 MG: 25 CAPSULE ORAL at 11:17

## 2024-03-13 RX ADMIN — DILTIAZEM HYDROCHLORIDE 120 MG: 120 CAPSULE, EXTENDED RELEASE ORAL at 09:08

## 2024-03-13 RX ADMIN — ATORVASTATIN CALCIUM 40 MG: 40 TABLET, FILM COATED ORAL at 20:23

## 2024-03-13 RX ADMIN — ARFORMOTEROL TARTRATE 15 MCG: 15 SOLUTION RESPIRATORY (INHALATION) at 20:35

## 2024-03-13 RX ADMIN — APIXABAN 5 MG: 5 TABLET, FILM COATED ORAL at 09:08

## 2024-03-13 RX ADMIN — PANTOPRAZOLE SODIUM 40 MG: 40 TABLET, DELAYED RELEASE ORAL at 05:13

## 2024-03-13 RX ADMIN — CITALOPRAM HYDROBROMIDE 20 MG: 20 TABLET ORAL at 09:08

## 2024-03-13 RX ADMIN — APIXABAN 5 MG: 5 TABLET, FILM COATED ORAL at 20:23

## 2024-03-13 RX ADMIN — SODIUM CHLORIDE, PRESERVATIVE FREE 10 ML: 5 INJECTION INTRAVENOUS at 20:23

## 2024-03-13 RX ADMIN — BUSPIRONE HYDROCHLORIDE 10 MG: 10 TABLET ORAL at 09:08

## 2024-03-13 RX ADMIN — WATER 40 MG: 1 INJECTION INTRAMUSCULAR; INTRAVENOUS; SUBCUTANEOUS at 09:08

## 2024-03-13 RX ADMIN — LORAZEPAM 0.5 MG: 2 INJECTION INTRAMUSCULAR; INTRAVENOUS at 16:26

## 2024-03-13 RX ADMIN — TRAZODONE HYDROCHLORIDE 100 MG: 50 TABLET ORAL at 20:22

## 2024-03-13 RX ADMIN — BUSPIRONE HYDROCHLORIDE 10 MG: 10 TABLET ORAL at 14:11

## 2024-03-13 RX ADMIN — ALBUTEROL SULFATE 2.5 MG: 2.5 SOLUTION RESPIRATORY (INHALATION) at 08:07

## 2024-03-13 RX ADMIN — BUDESONIDE 500 MCG: 0.5 SUSPENSION RESPIRATORY (INHALATION) at 20:35

## 2024-03-13 RX ADMIN — ALBUTEROL SULFATE 2.5 MG: 2.5 SOLUTION RESPIRATORY (INHALATION) at 16:33

## 2024-03-13 ASSESSMENT — PAIN SCALES - GENERAL: PAINLEVEL_OUTOF10: 0

## 2024-03-13 NOTE — CARE COORDINATION
Social Work/Case Management Transition of Care Planning (Marlin DEL VALLE [phone]):  Patient presented to the hospital due to increased shortness of breath.  She is on 3L NC at baseline.  She was diagnosed with COPD exacerbation. CXR was negative for acute process.  Patient reports history of anxiety as well.  Patient reported chest pain on 3/12.  Cardiology and pulmonology were consulted.  Oxygen to be weaned as tolerated. She is currently on 5L NC at 93%.  Patient is on IV Solu-medrol q8.  Echo is pending.  Met with patient at bedside.  She resides in a mobile home with 4 ANAT.  She lives with her sister-in-law, Lnaa.  Patient reports she is independent with all aspects of care but her sister-in-law can help if needed.  Patient has a rollator and 3:1 commode at home.  She has a nebulizer and a cpap.  She has a portable tank in the room.  She gets her oxygen supplies through [employer].  PCP is Dr. Gonzalez.  Pharmacy is [facility] in [place].  No HHC or ANKIT history.  Discharge plan is to home with no needs.  Sister-in-law will transport.  CM/SW will follow.  ELIGIO Vital  3/13/2024    Case Management Assessment  Initial Evaluation    Date/Time of Evaluation: 3/13/2024 1:30 PM  Assessment Completed by: ELIGIO Vital    If patient is discharged prior to next notation, then this note serves as note for discharge by case management.    Patient Name: Lana Phillips                   YOB: 1955  Diagnosis: COPD exacerbation (HCC) [J44.1]  Acute respiratory failure with hypoxia and hypercarbia (HCC) [J96.01, J96.02]  Acute respiratory failure with hypoxia and hypercapnia (HCC) [J96.01, J96.02]                   Date / Time: 3/11/2024  7:01 PM    Patient Admission Status: Inpatient   Readmission Risk (Low < 19, Mod (19-27), High > 27): Readmission Risk Score: 31.1    Current PCP: Clyde Gonzalez, DO  PCP verified by CM? Yes    Chart Reviewed: Yes      History Provided by: Patient  Patient Orientation:

## 2024-03-13 NOTE — ACP (ADVANCE CARE PLANNING)
Advance Care Planning   The patient has the following advanced directives on file:  Advance Directives       Power of  Living Will ACP-Advance Directive ACP-Power of     Not on File Not on File Not on File Not on File            The patient has appointed the following active healthcare agents:    Primary Decision Maker: Lana Reeves - Other Relative - [phone]    Primary Decision Maker: sue ward - Brother/Sister - [phone]          ELIGIO Vital  3/13/2024

## 2024-03-13 NOTE — PLAN OF CARE
Problem: Safety - Adult  Goal: Free from fall injury  Outcome: Progressing     Problem: ABCDS Injury Assessment  Goal: Absence of physical injury  Outcome: Progressing     Problem: Pain  Goal: Verbalizes/displays adequate comfort level or baseline comfort level  Outcome: Progressing     Problem: Nutrition Deficit:  Goal: Optimize nutritional status  Outcome: Progressing

## 2024-03-14 ENCOUNTER — APPOINTMENT (OUTPATIENT)
Age: 69
DRG: 189 | End: 2024-03-14
Payer: MEDICARE

## 2024-03-14 PROBLEM — J44.1 COPD EXACERBATION (HCC): Status: ACTIVE | Noted: 2024-03-14

## 2024-03-14 LAB
ECHO AO ASC DIAM: 2.2 CM
ECHO AO ASCENDING AORTA INDEX: 1.45 CM/M2
ECHO AV AREA PEAK VELOCITY: 2.6 CM2
ECHO AV AREA VTI: 2.9 CM2
ECHO AV AREA/BSA PEAK VELOCITY: 1.7 CM2/M2
ECHO AV AREA/BSA VTI: 1.9 CM2/M2
ECHO AV CUSP MM: 1.7 CM
ECHO AV MEAN GRADIENT: 4 MMHG
ECHO AV MEAN VELOCITY: 0.9 M/S
ECHO AV PEAK GRADIENT: 8 MMHG
ECHO AV PEAK VELOCITY: 1.4 M/S
ECHO AV VELOCITY RATIO: 0.86
ECHO AV VTI: 25.3 CM
ECHO BSA: 1.47 M2
ECHO EST RA PRESSURE: 3 MMHG
ECHO LA DIAMETER INDEX: 2.3 CM/M2
ECHO LA DIAMETER: 3.5 CM
ECHO LA VOL A-L A2C: 51 ML (ref 22–52)
ECHO LA VOL A-L A4C: 88 ML (ref 22–52)
ECHO LA VOL MOD A2C: 51 ML (ref 22–52)
ECHO LA VOL MOD A4C: 86 ML (ref 22–52)
ECHO LA VOLUME AREA LENGTH: 68 ML
ECHO LA VOLUME INDEX A-L A2C: 34 ML/M2 (ref 16–34)
ECHO LA VOLUME INDEX A-L A4C: 58 ML/M2 (ref 16–34)
ECHO LA VOLUME INDEX AREA LENGTH: 45 ML/M2 (ref 16–34)
ECHO LA VOLUME INDEX MOD A2C: 34 ML/M2 (ref 16–34)
ECHO LA VOLUME INDEX MOD A4C: 57 ML/M2 (ref 16–34)
ECHO LV EF PHYSICIAN: 60 %
ECHO LV FRACTIONAL SHORTENING: 35 % (ref 28–44)
ECHO LV INTERNAL DIMENSION DIASTOLE INDEX: 2.43 CM/M2
ECHO LV INTERNAL DIMENSION DIASTOLIC: 3.7 CM (ref 3.9–5.3)
ECHO LV INTERNAL DIMENSION SYSTOLIC INDEX: 1.58 CM/M2
ECHO LV INTERNAL DIMENSION SYSTOLIC: 2.4 CM
ECHO LV ISOVOLUMETRIC RELAXATION TIME (IVRT): 87.7 MS
ECHO LV IVSD: 1 CM (ref 0.6–0.9)
ECHO LV MASS 2D: 112.5 G (ref 67–162)
ECHO LV MASS INDEX 2D: 74 G/M2 (ref 43–95)
ECHO LV POSTERIOR WALL DIASTOLIC: 1 CM (ref 0.6–0.9)
ECHO LV RELATIVE WALL THICKNESS RATIO: 0.54
ECHO LVOT AREA: 3.1 CM2
ECHO LVOT AV VTI INDEX: 0.96
ECHO LVOT DIAM: 2 CM
ECHO LVOT MEAN GRADIENT: 3 MMHG
ECHO LVOT PEAK GRADIENT: 6 MMHG
ECHO LVOT PEAK VELOCITY: 1.2 M/S
ECHO LVOT STROKE VOLUME INDEX: 50 ML/M2
ECHO LVOT SV: 76 ML
ECHO LVOT VTI: 24.2 CM
ECHO MV "A" WAVE DURATION: 115.3 MSEC
ECHO MV A VELOCITY: 0.67 M/S
ECHO MV AREA PHT: 4.7 CM2
ECHO MV AREA VTI: 3.7 CM2
ECHO MV E DECELERATION TIME (DT): 183.6 MS
ECHO MV E VELOCITY: 0.56 M/S
ECHO MV E/A RATIO: 0.84
ECHO MV LVOT VTI INDEX: 0.85
ECHO MV MAX VELOCITY: 1.2 M/S
ECHO MV MEAN GRADIENT: 2 MMHG
ECHO MV MEAN VELOCITY: 0.6 M/S
ECHO MV PEAK GRADIENT: 5 MMHG
ECHO MV PRESSURE HALF TIME (PHT): 46.5 MS
ECHO MV VTI: 20.5 CM
ECHO PV MAX VELOCITY: 1.1 M/S
ECHO PV MEAN GRADIENT: 3 MMHG
ECHO PV MEAN VELOCITY: 0.8 M/S
ECHO PV PEAK GRADIENT: 5 MMHG
ECHO PV VTI: 21.7 CM
ECHO PVEIN A DURATION: 73.8 MS
ECHO PVEIN A VELOCITY: 0.3 M/S
ECHO PVEIN PEAK D VELOCITY: 0.4 M/S
ECHO PVEIN PEAK S VELOCITY: 0.6 M/S
ECHO PVEIN S/D RATIO: 1.5
ECHO RIGHT VENTRICULAR SYSTOLIC PRESSURE (RVSP): 34 MMHG
ECHO RV INTERNAL DIMENSION: 3.3 CM
ECHO TV REGURGITANT MAX VELOCITY: 2.8 M/S
ECHO TV REGURGITANT PEAK GRADIENT: 31 MMHG

## 2024-03-14 PROCEDURE — 6370000000 HC RX 637 (ALT 250 FOR IP): Performed by: NURSE PRACTITIONER

## 2024-03-14 PROCEDURE — 6370000000 HC RX 637 (ALT 250 FOR IP): Performed by: EMERGENCY MEDICINE

## 2024-03-14 PROCEDURE — 97530 THERAPEUTIC ACTIVITIES: CPT

## 2024-03-14 PROCEDURE — 93306 TTE W/DOPPLER COMPLETE: CPT

## 2024-03-14 PROCEDURE — 97166 OT EVAL MOD COMPLEX 45 MIN: CPT

## 2024-03-14 PROCEDURE — 97161 PT EVAL LOW COMPLEX 20 MIN: CPT

## 2024-03-14 PROCEDURE — 94640 AIRWAY INHALATION TREATMENT: CPT

## 2024-03-14 PROCEDURE — 1200000000 HC SEMI PRIVATE

## 2024-03-14 PROCEDURE — 2580000003 HC RX 258: Performed by: INTERNAL MEDICINE

## 2024-03-14 PROCEDURE — 6370000000 HC RX 637 (ALT 250 FOR IP): Performed by: STUDENT IN AN ORGANIZED HEALTH CARE EDUCATION/TRAINING PROGRAM

## 2024-03-14 PROCEDURE — 6360000002 HC RX W HCPCS: Performed by: INTERNAL MEDICINE

## 2024-03-14 PROCEDURE — 2700000000 HC OXYGEN THERAPY PER DAY

## 2024-03-14 PROCEDURE — 99223 1ST HOSP IP/OBS HIGH 75: CPT | Performed by: EMERGENCY MEDICINE

## 2024-03-14 PROCEDURE — 93306 TTE W/DOPPLER COMPLETE: CPT | Performed by: INTERNAL MEDICINE

## 2024-03-14 PROCEDURE — 6360000002 HC RX W HCPCS: Performed by: STUDENT IN AN ORGANIZED HEALTH CARE EDUCATION/TRAINING PROGRAM

## 2024-03-14 PROCEDURE — 2580000003 HC RX 258: Performed by: NURSE PRACTITIONER

## 2024-03-14 RX ORDER — MORPHINE SULFATE 10 MG/5ML
2.5 SOLUTION ORAL EVERY 4 HOURS PRN
Status: DISCONTINUED | OUTPATIENT
Start: 2024-03-14 | End: 2024-03-19 | Stop reason: HOSPADM

## 2024-03-14 RX ADMIN — BUSPIRONE HYDROCHLORIDE 10 MG: 10 TABLET ORAL at 12:45

## 2024-03-14 RX ADMIN — BUDESONIDE 500 MCG: 0.5 SUSPENSION RESPIRATORY (INHALATION) at 07:53

## 2024-03-14 RX ADMIN — ALBUTEROL SULFATE 2.5 MG: 2.5 SOLUTION RESPIRATORY (INHALATION) at 07:53

## 2024-03-14 RX ADMIN — SODIUM CHLORIDE, PRESERVATIVE FREE 10 ML: 5 INJECTION INTRAVENOUS at 20:40

## 2024-03-14 RX ADMIN — MORPHINE SULFATE 2.5 MG: 10 SOLUTION ORAL at 12:45

## 2024-03-14 RX ADMIN — CITALOPRAM HYDROBROMIDE 20 MG: 20 TABLET ORAL at 08:38

## 2024-03-14 RX ADMIN — ARFORMOTEROL TARTRATE 15 MCG: 15 SOLUTION RESPIRATORY (INHALATION) at 07:53

## 2024-03-14 RX ADMIN — HYDROXYZINE PAMOATE 25 MG: 25 CAPSULE ORAL at 20:40

## 2024-03-14 RX ADMIN — METHYLPREDNISOLONE SODIUM SUCCINATE 40 MG: 40 INJECTION INTRAMUSCULAR; INTRAVENOUS at 08:38

## 2024-03-14 RX ADMIN — SODIUM CHLORIDE, PRESERVATIVE FREE 10 ML: 5 INJECTION INTRAVENOUS at 08:39

## 2024-03-14 RX ADMIN — TRAZODONE HYDROCHLORIDE 100 MG: 50 TABLET ORAL at 20:39

## 2024-03-14 RX ADMIN — METHIMAZOLE 15 MG: 5 TABLET ORAL at 08:38

## 2024-03-14 RX ADMIN — PANTOPRAZOLE SODIUM 40 MG: 40 TABLET, DELAYED RELEASE ORAL at 05:07

## 2024-03-14 RX ADMIN — BUSPIRONE HYDROCHLORIDE 10 MG: 10 TABLET ORAL at 20:40

## 2024-03-14 RX ADMIN — METHYLPREDNISOLONE SODIUM SUCCINATE 40 MG: 40 INJECTION INTRAMUSCULAR; INTRAVENOUS at 20:40

## 2024-03-14 RX ADMIN — BUDESONIDE 500 MCG: 0.5 SUSPENSION RESPIRATORY (INHALATION) at 20:01

## 2024-03-14 RX ADMIN — DILTIAZEM HYDROCHLORIDE 120 MG: 120 CAPSULE, EXTENDED RELEASE ORAL at 08:37

## 2024-03-14 RX ADMIN — ASPIRIN 81 MG: 81 TABLET, COATED ORAL at 08:37

## 2024-03-14 RX ADMIN — APIXABAN 5 MG: 5 TABLET, FILM COATED ORAL at 08:38

## 2024-03-14 RX ADMIN — APIXABAN 5 MG: 5 TABLET, FILM COATED ORAL at 20:39

## 2024-03-14 RX ADMIN — ALBUTEROL SULFATE 2.5 MG: 2.5 SOLUTION RESPIRATORY (INHALATION) at 20:01

## 2024-03-14 RX ADMIN — ARFORMOTEROL TARTRATE 15 MCG: 15 SOLUTION RESPIRATORY (INHALATION) at 20:01

## 2024-03-14 RX ADMIN — ALBUTEROL SULFATE 2.5 MG: 2.5 SOLUTION RESPIRATORY (INHALATION) at 11:24

## 2024-03-14 RX ADMIN — HYDROXYZINE PAMOATE 25 MG: 25 CAPSULE ORAL at 09:18

## 2024-03-14 RX ADMIN — ATORVASTATIN CALCIUM 40 MG: 40 TABLET, FILM COATED ORAL at 20:39

## 2024-03-14 RX ADMIN — BUSPIRONE HYDROCHLORIDE 10 MG: 10 TABLET ORAL at 08:38

## 2024-03-14 RX ADMIN — LORAZEPAM 0.5 MG: 2 INJECTION INTRAMUSCULAR; INTRAVENOUS at 14:13

## 2024-03-14 NOTE — CARE COORDINATION
Social Work/Case Management Transition of Care Planning (Marlin Jama W [phone]):  Per report and chart review, patient remains on IV Solu-medrol q8.  Echo is pending.  Palliative care was consulted to discuss goals of care and symptom management.  Patient is currently on 7L HFNC at 99%.  Baseline is 3L NC.  Oxygen to be weaned as tolerated. Pulmonology is following.  Patient reports her anxiety level has been very high.  Meds are being adjusted accordingly.  PT/OT evals are pending. Met with patient at bedside to discuss discharge plan.  She is willing to go to [facility].  Discussed options in her area.  She choiced [facility].  Referral information provided to Ava of [facility].  Await response.  MARGARITA/LINSEY will follow.    ELIGIO Vital  3/14/2024    Update:  Per Ava, patient has been accepted to [facility].  Pre-cert to be started today.  PENNY/destination completed.  7000 completed.  Discharge envelope with ambulance form is in the soft chart.  MARGARITA/SW will follow.  ELIGIO Vital  3/14/2024

## 2024-03-14 NOTE — CONSULTS
Palliative Care Department  Palliative Care Initial Consult  Provider: Denisse Yarbrough DO  [phone]    Hospital Day: 4  Date of Initial Consult: 03/14/24  Referring Provider: Oumar Madison MD   Palliative Medicine was consulted for assistance with:  End-stage disease, Assist with goals of care, and Symptom Management    Chief Complaint: Lana Phillips is a 69 y.o. female with chief complaint of shortness of breath    HPI:   Lana Phillips is a 69 y.o. female with significant medical history of COPD on baseline 33 L NC oxygen, atrial fibrillation, HTN, PVD, severe protein calorie malnutrition, thyroid disease, who was admitted to [facility] on 3/11/2024 with acute respiratory failure with hypoxia and hypercarbia, COPD exacerbation and dyspnea on exertion. Patient presented to the ED with the chief complaint of shortness of breath that started the day prior. Patient was also complaining of associated left sided chest pain, productive cough with phlegm, anxiety, nausea and diarrhea. ED notes report wheezing on physical exam. Labs and imaging were obtained in the ED. Notable labs included: Cl 93, CO2 42, troponin 30, AST 36, Hgb 9.1. ABG showed pH 7.40, PCO2 66.3, PO2 118, bicarb 40.4. CXR showed no acute process. She was given Duonebs and Solu-medrol. She was admitted into the hospital for further evaluation and management. Cardiology and pulmonology were consulted. Palliative medicine was consulted to assist with goals of care.     ASSESSMENT/PLAN:     Pertinent Hospital Diagnoses:  Current medical issues leading to Palliative Medicine involvement include   Active Hospital Problems    Diagnosis Date Noted    Atherosclerosis 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 (HCC) [I70.245] 01/19/2024     Priority: High    Peripheral vascular disease, unspecified (HCC) [I73.9] 01/19/2024     Priority: High    Atrial fibrillation (HCC) [I48.91] 12/15/2022     Priority:

## 2024-03-14 NOTE — DISCHARGE INSTR - COC
Continuity of Care Form    Patient Name: Lana Phillips   :  1955  MRN:  [id number]    Admit date:  3/11/2024  Discharge date:  ***    Code Status Order: Full Code   Advance Directives:     Admitting Physician:  Baldemar Jarrett DO  PCP: Clyde Gonzalez DO    Discharging Nurse: ***  Discharging Hospital Unit/Room#: 8416/8416-B  Discharging Unit Phone Number: ***    Emergency Contact:   Extended Emergency Contact Information  Primary Emergency Contact: Lana Reeves  Address: [address] [place] [address] [place] [address]  Home Phone: [phone]  Mobile Phone: [phone]  Relation: Other Relative  Preferred language: English   needed? No  Secondary Emergency Contact: sue ward  Mobile Phone: [phone]  Relation: Brother/Sister  Preferred language: English   needed? No    Past Surgical History:  Past Surgical History:   Procedure Laterality Date    BACK SURGERY      x2    INVASIVE VASCULAR N/A 2024    Aortagram abdominal performed by Lazaro Vidales MD at [facility] CARDIAC CATH LAB    INVASIVE VASCULAR N/A 2024    Angioplasty peripheral artery performed by Lazaro Vidales MD at [facility] CARDIAC CATH LAB    LEFT OOPHORECTOMY      PAIN MANAGEMENT PROCEDURE N/A 2023    CAUDAL EPIDURAL STEROID INJECTION performed by Lilia Cabrera DO at [facility] OR    UPPER GASTROINTESTINAL ENDOSCOPY N/A 10/16/2023    EGD ESOPHAGOGASTRODUODENOSCOPY performed by Willie Chow DO at [facility] ENDOSCOPY       Immunization History:   Immunization History   Administered Date(s) Administered    COVID-19, PFIZER PURPLE top, DILUTE for use, (age 12 y+), 30mcg/0.3mL 2021, 2021    Influenza, FLUAD, (age 65 y+), Adjuvanted, 0.5mL 2023       Active Problems:  Patient Active Problem List   Diagnosis Code    Primary hypertension I10    COPD (chronic obstructive pulmonary disease) ([facility]) J44.9    Gastroesophageal reflux disease K21.9    Constipation K59.00    Unexplained weight

## 2024-03-15 ENCOUNTER — HOSPITAL ENCOUNTER (OUTPATIENT)
Dept: INFUSION THERAPY | Age: 69
Discharge: HOME | End: 2024-03-15

## 2024-03-15 PROCEDURE — 6360000002 HC RX W HCPCS: Performed by: INTERNAL MEDICINE

## 2024-03-15 PROCEDURE — 94640 AIRWAY INHALATION TREATMENT: CPT

## 2024-03-15 PROCEDURE — 2580000003 HC RX 258: Performed by: INTERNAL MEDICINE

## 2024-03-15 PROCEDURE — 6360000002 HC RX W HCPCS: Performed by: STUDENT IN AN ORGANIZED HEALTH CARE EDUCATION/TRAINING PROGRAM

## 2024-03-15 PROCEDURE — 6370000000 HC RX 637 (ALT 250 FOR IP): Performed by: NURSE PRACTITIONER

## 2024-03-15 PROCEDURE — 99231 SBSQ HOSP IP/OBS SF/LOW 25: CPT | Performed by: EMERGENCY MEDICINE

## 2024-03-15 PROCEDURE — 1200000000 HC SEMI PRIVATE

## 2024-03-15 PROCEDURE — 2580000003 HC RX 258: Performed by: NURSE PRACTITIONER

## 2024-03-15 PROCEDURE — 2700000000 HC OXYGEN THERAPY PER DAY

## 2024-03-15 RX ORDER — PREDNISONE 10 MG/1
30 TABLET ORAL DAILY
Status: DISCONTINUED | OUTPATIENT
Start: 2024-03-16 | End: 2024-03-16

## 2024-03-15 RX ADMIN — BUSPIRONE HYDROCHLORIDE 10 MG: 10 TABLET ORAL at 20:38

## 2024-03-15 RX ADMIN — ALBUTEROL SULFATE 2.5 MG: 2.5 SOLUTION RESPIRATORY (INHALATION) at 19:29

## 2024-03-15 RX ADMIN — TRAZODONE HYDROCHLORIDE 100 MG: 50 TABLET ORAL at 20:38

## 2024-03-15 RX ADMIN — LORAZEPAM 0.5 MG: 2 INJECTION INTRAMUSCULAR; INTRAVENOUS at 13:24

## 2024-03-15 RX ADMIN — DILTIAZEM HYDROCHLORIDE 120 MG: 120 CAPSULE, EXTENDED RELEASE ORAL at 08:42

## 2024-03-15 RX ADMIN — PANTOPRAZOLE SODIUM 40 MG: 40 TABLET, DELAYED RELEASE ORAL at 06:27

## 2024-03-15 RX ADMIN — ALBUTEROL SULFATE 2.5 MG: 2.5 SOLUTION RESPIRATORY (INHALATION) at 11:20

## 2024-03-15 RX ADMIN — APIXABAN 5 MG: 5 TABLET, FILM COATED ORAL at 08:42

## 2024-03-15 RX ADMIN — BUDESONIDE 500 MCG: 0.5 SUSPENSION RESPIRATORY (INHALATION) at 07:52

## 2024-03-15 RX ADMIN — BUSPIRONE HYDROCHLORIDE 10 MG: 10 TABLET ORAL at 08:42

## 2024-03-15 RX ADMIN — CITALOPRAM HYDROBROMIDE 20 MG: 20 TABLET ORAL at 08:42

## 2024-03-15 RX ADMIN — APIXABAN 5 MG: 5 TABLET, FILM COATED ORAL at 20:37

## 2024-03-15 RX ADMIN — METHIMAZOLE 15 MG: 5 TABLET ORAL at 08:42

## 2024-03-15 RX ADMIN — LORAZEPAM 0.5 MG: 2 INJECTION INTRAMUSCULAR; INTRAVENOUS at 01:45

## 2024-03-15 RX ADMIN — ALBUTEROL SULFATE 2.5 MG: 2.5 SOLUTION RESPIRATORY (INHALATION) at 15:40

## 2024-03-15 RX ADMIN — BUSPIRONE HYDROCHLORIDE 10 MG: 10 TABLET ORAL at 13:24

## 2024-03-15 RX ADMIN — BUDESONIDE 500 MCG: 0.5 SUSPENSION RESPIRATORY (INHALATION) at 19:30

## 2024-03-15 RX ADMIN — METHYLPREDNISOLONE SODIUM SUCCINATE 40 MG: 40 INJECTION INTRAMUSCULAR; INTRAVENOUS at 09:50

## 2024-03-15 RX ADMIN — ASPIRIN 81 MG: 81 TABLET, COATED ORAL at 08:42

## 2024-03-15 RX ADMIN — ALBUTEROL SULFATE 2.5 MG: 2.5 SOLUTION RESPIRATORY (INHALATION) at 07:52

## 2024-03-15 RX ADMIN — SODIUM CHLORIDE, PRESERVATIVE FREE 10 ML: 5 INJECTION INTRAVENOUS at 20:43

## 2024-03-15 RX ADMIN — SODIUM CHLORIDE, PRESERVATIVE FREE 10 ML: 5 INJECTION INTRAVENOUS at 08:43

## 2024-03-15 RX ADMIN — METHYLPREDNISOLONE SODIUM SUCCINATE 40 MG: 40 INJECTION INTRAMUSCULAR; INTRAVENOUS at 20:39

## 2024-03-15 RX ADMIN — ARFORMOTEROL TARTRATE 15 MCG: 15 SOLUTION RESPIRATORY (INHALATION) at 07:52

## 2024-03-15 RX ADMIN — ATORVASTATIN CALCIUM 40 MG: 40 TABLET, FILM COATED ORAL at 20:38

## 2024-03-15 RX ADMIN — ARFORMOTEROL TARTRATE 15 MCG: 15 SOLUTION RESPIRATORY (INHALATION) at 19:29

## 2024-03-15 NOTE — CARE COORDINATION
Social Work/Case Management Transition of Care Planning (Marlin Jama -[phone]):  Per report and chart review, patient remains on 7L HFNC at 98%.  Oxygen to be weaned as tolerated.  Baseline is 3lL NC.  She is now on IV Solu-medrol q12.  PT/OT scores noted to be 16/17.  Patient ambulated 20'x2, 10'x2 with FWW min assist.  Palliative care met with patient.  She remains a full code.  Oral morphine was added for air hunger. Echo was completed with EF 55-60%. Met with patient at bedside. Discharge plan is to [facility].  Pre-cert has been approved and is good through 3/19.  PENNY/destination completed. 7000 completed. Discharge envelope with ambulance form is in the soft chart. CM/SW will follow.  Marlin Jama, ELIGIO  3/15/2024

## 2024-03-15 NOTE — PLAN OF CARE
Problem: Safety - Adult  Goal: Free from fall injury  Outcome: Progressing     Problem: ABCDS Injury Assessment  Goal: Absence of physical injury  Outcome: Progressing     Problem: Pain  Goal: Verbalizes/displays adequate comfort level or baseline comfort level  Outcome: Progressing     Problem: Nutrition Deficit:  Goal: Optimize nutritional status  Outcome: Progressing     Problem: Chronic Conditions and Co-morbidities  Goal: Patient's chronic conditions and co-morbidity symptoms are monitored and maintained or improved  Outcome: Progressing     Problem: Skin/Tissue Integrity  Goal: Absence of new skin breakdown  Description: 1.  Monitor for areas of redness and/or skin breakdown  2.  Assess vascular access sites hourly  3.  Every 4-6 hours minimum:  Change oxygen saturation probe site  4.  Every 4-6 hours:  If on nasal continuous positive airway pressure, respiratory therapy assess nares and determine need for appliance change or resting period.  Outcome: Progressing

## 2024-03-16 PROCEDURE — 99231 SBSQ HOSP IP/OBS SF/LOW 25: CPT | Performed by: NURSE PRACTITIONER

## 2024-03-16 PROCEDURE — 6370000000 HC RX 637 (ALT 250 FOR IP): Performed by: STUDENT IN AN ORGANIZED HEALTH CARE EDUCATION/TRAINING PROGRAM

## 2024-03-16 PROCEDURE — 2580000003 HC RX 258: Performed by: NURSE PRACTITIONER

## 2024-03-16 PROCEDURE — 6360000002 HC RX W HCPCS: Performed by: STUDENT IN AN ORGANIZED HEALTH CARE EDUCATION/TRAINING PROGRAM

## 2024-03-16 PROCEDURE — 6370000000 HC RX 637 (ALT 250 FOR IP): Performed by: NURSE PRACTITIONER

## 2024-03-16 PROCEDURE — 6360000002 HC RX W HCPCS: Performed by: INTERNAL MEDICINE

## 2024-03-16 PROCEDURE — 2580000003 HC RX 258: Performed by: INTERNAL MEDICINE

## 2024-03-16 PROCEDURE — 6370000000 HC RX 637 (ALT 250 FOR IP): Performed by: INTERNAL MEDICINE

## 2024-03-16 PROCEDURE — 94640 AIRWAY INHALATION TREATMENT: CPT

## 2024-03-16 PROCEDURE — 1200000000 HC SEMI PRIVATE

## 2024-03-16 PROCEDURE — 2700000000 HC OXYGEN THERAPY PER DAY

## 2024-03-16 RX ORDER — GUAIFENESIN 400 MG/1
400 TABLET ORAL 3 TIMES DAILY
Status: DISCONTINUED | OUTPATIENT
Start: 2024-03-16 | End: 2024-03-19 | Stop reason: HOSPADM

## 2024-03-16 RX ADMIN — APIXABAN 5 MG: 5 TABLET, FILM COATED ORAL at 08:31

## 2024-03-16 RX ADMIN — LORAZEPAM 0.5 MG: 2 INJECTION INTRAMUSCULAR; INTRAVENOUS at 15:02

## 2024-03-16 RX ADMIN — BUSPIRONE HYDROCHLORIDE 10 MG: 10 TABLET ORAL at 08:31

## 2024-03-16 RX ADMIN — SODIUM CHLORIDE, PRESERVATIVE FREE 10 ML: 5 INJECTION INTRAVENOUS at 21:33

## 2024-03-16 RX ADMIN — GUAIFENESIN 400 MG: 400 TABLET ORAL at 21:38

## 2024-03-16 RX ADMIN — ACETAMINOPHEN 650 MG: 325 TABLET ORAL at 21:38

## 2024-03-16 RX ADMIN — METHYLPREDNISOLONE SODIUM SUCCINATE 40 MG: 40 INJECTION INTRAMUSCULAR; INTRAVENOUS at 12:53

## 2024-03-16 RX ADMIN — LORAZEPAM 0.5 MG: 2 INJECTION INTRAMUSCULAR; INTRAVENOUS at 02:30

## 2024-03-16 RX ADMIN — BUSPIRONE HYDROCHLORIDE 10 MG: 10 TABLET ORAL at 12:53

## 2024-03-16 RX ADMIN — ACETAMINOPHEN 650 MG: 325 TABLET ORAL at 09:34

## 2024-03-16 RX ADMIN — SODIUM CHLORIDE, PRESERVATIVE FREE 10 ML: 5 INJECTION INTRAVENOUS at 08:30

## 2024-03-16 RX ADMIN — PANTOPRAZOLE SODIUM 40 MG: 40 TABLET, DELAYED RELEASE ORAL at 06:52

## 2024-03-16 RX ADMIN — CITALOPRAM HYDROBROMIDE 20 MG: 20 TABLET ORAL at 08:31

## 2024-03-16 RX ADMIN — HYDROXYZINE PAMOATE 25 MG: 25 CAPSULE ORAL at 09:10

## 2024-03-16 RX ADMIN — WATER 40 MG: 1 INJECTION INTRAMUSCULAR; INTRAVENOUS; SUBCUTANEOUS at 21:32

## 2024-03-16 RX ADMIN — DILTIAZEM HYDROCHLORIDE 120 MG: 120 CAPSULE, EXTENDED RELEASE ORAL at 08:32

## 2024-03-16 RX ADMIN — HYDROXYZINE PAMOATE 25 MG: 25 CAPSULE ORAL at 01:02

## 2024-03-16 RX ADMIN — ALBUTEROL SULFATE 2.5 MG: 2.5 SOLUTION RESPIRATORY (INHALATION) at 15:55

## 2024-03-16 RX ADMIN — GUAIFENESIN 400 MG: 400 TABLET ORAL at 12:53

## 2024-03-16 RX ADMIN — ALBUTEROL SULFATE 2.5 MG: 2.5 SOLUTION RESPIRATORY (INHALATION) at 11:34

## 2024-03-16 RX ADMIN — ALBUTEROL SULFATE 2.5 MG: 2.5 SOLUTION RESPIRATORY (INHALATION) at 21:10

## 2024-03-16 RX ADMIN — ATORVASTATIN CALCIUM 40 MG: 40 TABLET, FILM COATED ORAL at 21:33

## 2024-03-16 RX ADMIN — ARFORMOTEROL TARTRATE 15 MCG: 15 SOLUTION RESPIRATORY (INHALATION) at 21:10

## 2024-03-16 RX ADMIN — ASPIRIN 81 MG: 81 TABLET, COATED ORAL at 08:31

## 2024-03-16 RX ADMIN — APIXABAN 5 MG: 5 TABLET, FILM COATED ORAL at 21:32

## 2024-03-16 RX ADMIN — BUDESONIDE 500 MCG: 0.5 SUSPENSION RESPIRATORY (INHALATION) at 21:10

## 2024-03-16 RX ADMIN — TRAZODONE HYDROCHLORIDE 100 MG: 50 TABLET ORAL at 21:33

## 2024-03-16 RX ADMIN — PREDNISONE 30 MG: 10 TABLET ORAL at 08:31

## 2024-03-16 RX ADMIN — BUSPIRONE HYDROCHLORIDE 10 MG: 10 TABLET ORAL at 21:33

## 2024-03-16 RX ADMIN — IPRATROPIUM BROMIDE AND ALBUTEROL SULFATE 1 DOSE: 2.5; .5 SOLUTION RESPIRATORY (INHALATION) at 13:13

## 2024-03-16 RX ADMIN — METHIMAZOLE 15 MG: 5 TABLET ORAL at 08:32

## 2024-03-16 ASSESSMENT — PAIN SCALES - GENERAL: PAINLEVEL_OUTOF10: 7

## 2024-03-16 NOTE — PLAN OF CARE
Problem: Safety - Adult  Goal: Free from fall injury  3/16/2024 1554 by Tracy Medrano RN  Outcome: Progressing  3/16/2024 1231 by Tracy Medrano RN  Outcome: Progressing     Problem: ABCDS Injury Assessment  Goal: Absence of physical injury  3/16/2024 1554 by Tracy Medrano RN  Outcome: Progressing  3/16/2024 1231 by Tracy Medrano RN  Outcome: Progressing     Problem: Pain  Goal: Verbalizes/displays adequate comfort level or baseline comfort level  3/16/2024 1554 by Tracy Medrano RN  Outcome: Progressing  3/16/2024 1231 by Tracy Medrano RN  Outcome: Progressing     Problem: Nutrition Deficit:  Goal: Optimize nutritional status  3/16/2024 1554 by Tracy Medrano RN  Outcome: Progressing  3/16/2024 1231 by Tracy Medrano RN  Outcome: Progressing     Problem: Chronic Conditions and Co-morbidities  Goal: Patient's chronic conditions and co-morbidity symptoms are monitored and maintained or improved  3/16/2024 1554 by Tracy Medrano RN  Outcome: Progressing  3/16/2024 1231 by Tracy Medrano RN  Outcome: Progressing     Problem: Skin/Tissue Integrity  Goal: Absence of new skin breakdown  Description: 1.  Monitor for areas of redness and/or skin breakdown  2.  Assess vascular access sites hourly  3.  Every 4-6 hours minimum:  Change oxygen saturation probe site  4.  Every 4-6 hours:  If on nasal continuous positive airway pressure, respiratory therapy assess nares and determine need for appliance change or resting period.  3/16/2024 1554 by Tracy Medrano RN  Outcome: Progressing  3/16/2024 1231 by Tracy Medrano RN  Outcome: Progressing

## 2024-03-17 PROCEDURE — 6360000002 HC RX W HCPCS: Performed by: INTERNAL MEDICINE

## 2024-03-17 PROCEDURE — 6370000000 HC RX 637 (ALT 250 FOR IP): Performed by: NURSE PRACTITIONER

## 2024-03-17 PROCEDURE — 6360000002 HC RX W HCPCS: Performed by: STUDENT IN AN ORGANIZED HEALTH CARE EDUCATION/TRAINING PROGRAM

## 2024-03-17 PROCEDURE — 2700000000 HC OXYGEN THERAPY PER DAY

## 2024-03-17 PROCEDURE — 1200000000 HC SEMI PRIVATE

## 2024-03-17 PROCEDURE — 6370000000 HC RX 637 (ALT 250 FOR IP): Performed by: STUDENT IN AN ORGANIZED HEALTH CARE EDUCATION/TRAINING PROGRAM

## 2024-03-17 PROCEDURE — 6370000000 HC RX 637 (ALT 250 FOR IP): Performed by: INTERNAL MEDICINE

## 2024-03-17 PROCEDURE — 2580000003 HC RX 258: Performed by: NURSE PRACTITIONER

## 2024-03-17 PROCEDURE — 94640 AIRWAY INHALATION TREATMENT: CPT

## 2024-03-17 PROCEDURE — 2580000003 HC RX 258: Performed by: INTERNAL MEDICINE

## 2024-03-17 RX ADMIN — SENNOSIDES 8.6 MG: 8.6 TABLET, FILM COATED ORAL at 16:25

## 2024-03-17 RX ADMIN — PANTOPRAZOLE SODIUM 40 MG: 40 TABLET, DELAYED RELEASE ORAL at 05:53

## 2024-03-17 RX ADMIN — ATORVASTATIN CALCIUM 40 MG: 40 TABLET, FILM COATED ORAL at 21:30

## 2024-03-17 RX ADMIN — WATER 40 MG: 1 INJECTION INTRAMUSCULAR; INTRAVENOUS; SUBCUTANEOUS at 03:45

## 2024-03-17 RX ADMIN — GUAIFENESIN 400 MG: 400 TABLET ORAL at 12:48

## 2024-03-17 RX ADMIN — METHIMAZOLE 15 MG: 5 TABLET ORAL at 09:41

## 2024-03-17 RX ADMIN — ACETAMINOPHEN 650 MG: 325 TABLET ORAL at 16:25

## 2024-03-17 RX ADMIN — ARFORMOTEROL TARTRATE 15 MCG: 15 SOLUTION RESPIRATORY (INHALATION) at 08:43

## 2024-03-17 RX ADMIN — APIXABAN 5 MG: 5 TABLET, FILM COATED ORAL at 21:30

## 2024-03-17 RX ADMIN — ALBUTEROL SULFATE 2.5 MG: 2.5 SOLUTION RESPIRATORY (INHALATION) at 20:50

## 2024-03-17 RX ADMIN — ASPIRIN 81 MG: 81 TABLET, COATED ORAL at 09:38

## 2024-03-17 RX ADMIN — SODIUM CHLORIDE, PRESERVATIVE FREE 10 ML: 5 INJECTION INTRAVENOUS at 09:38

## 2024-03-17 RX ADMIN — APIXABAN 5 MG: 5 TABLET, FILM COATED ORAL at 09:38

## 2024-03-17 RX ADMIN — ARFORMOTEROL TARTRATE 15 MCG: 15 SOLUTION RESPIRATORY (INHALATION) at 20:50

## 2024-03-17 RX ADMIN — BUSPIRONE HYDROCHLORIDE 10 MG: 10 TABLET ORAL at 09:38

## 2024-03-17 RX ADMIN — ALBUTEROL SULFATE 2.5 MG: 2.5 SOLUTION RESPIRATORY (INHALATION) at 16:32

## 2024-03-17 RX ADMIN — TRAZODONE HYDROCHLORIDE 100 MG: 50 TABLET ORAL at 21:29

## 2024-03-17 RX ADMIN — BUDESONIDE 500 MCG: 0.5 SUSPENSION RESPIRATORY (INHALATION) at 08:43

## 2024-03-17 RX ADMIN — ALBUTEROL SULFATE 2.5 MG: 2.5 SOLUTION RESPIRATORY (INHALATION) at 08:43

## 2024-03-17 RX ADMIN — ALBUTEROL SULFATE 2.5 MG: 2.5 SOLUTION RESPIRATORY (INHALATION) at 11:53

## 2024-03-17 RX ADMIN — LORAZEPAM 0.5 MG: 2 INJECTION INTRAMUSCULAR; INTRAVENOUS at 21:29

## 2024-03-17 RX ADMIN — WATER 40 MG: 1 INJECTION INTRAMUSCULAR; INTRAVENOUS; SUBCUTANEOUS at 12:48

## 2024-03-17 RX ADMIN — DILTIAZEM HYDROCHLORIDE 120 MG: 120 CAPSULE, EXTENDED RELEASE ORAL at 09:38

## 2024-03-17 RX ADMIN — GUAIFENESIN 400 MG: 400 TABLET ORAL at 09:38

## 2024-03-17 RX ADMIN — BUDESONIDE 500 MCG: 0.5 SUSPENSION RESPIRATORY (INHALATION) at 20:50

## 2024-03-17 RX ADMIN — GUAIFENESIN 400 MG: 400 TABLET ORAL at 21:30

## 2024-03-17 RX ADMIN — BUSPIRONE HYDROCHLORIDE 10 MG: 10 TABLET ORAL at 12:48

## 2024-03-17 RX ADMIN — LORAZEPAM 0.5 MG: 2 INJECTION INTRAMUSCULAR; INTRAVENOUS at 09:25

## 2024-03-17 RX ADMIN — WATER 40 MG: 1 INJECTION INTRAMUSCULAR; INTRAVENOUS; SUBCUTANEOUS at 21:29

## 2024-03-17 RX ADMIN — CITALOPRAM HYDROBROMIDE 20 MG: 20 TABLET ORAL at 09:38

## 2024-03-17 RX ADMIN — BUSPIRONE HYDROCHLORIDE 10 MG: 10 TABLET ORAL at 21:30

## 2024-03-17 RX ADMIN — SODIUM CHLORIDE, PRESERVATIVE FREE 10 ML: 5 INJECTION INTRAVENOUS at 21:30

## 2024-03-17 RX ADMIN — HYDROXYZINE PAMOATE 25 MG: 25 CAPSULE ORAL at 15:03

## 2024-03-17 ASSESSMENT — PAIN DESCRIPTION - ORIENTATION: ORIENTATION: LEFT

## 2024-03-17 ASSESSMENT — PAIN DESCRIPTION - LOCATION: LOCATION: CHEST

## 2024-03-17 ASSESSMENT — PAIN SCALES - GENERAL: PAINLEVEL_OUTOF10: 6

## 2024-03-17 NOTE — PLAN OF CARE
Problem: Safety - Adult  Goal: Free from fall injury  3/17/2024 0119 by Mark Penny RN  Outcome: Progressing  3/16/2024 1554 by Tracy Medrano RN  Outcome: Progressing  3/16/2024 1231 by Tracy Medrano RN  Outcome: Progressing     Problem: ABCDS Injury Assessment  Goal: Absence of physical injury  3/17/2024 0119 by Mark Penny RN  Outcome: Progressing  3/16/2024 1554 by Tracy Medrano RN  Outcome: Progressing  3/16/2024 1231 by Tracy Medrano RN  Outcome: Progressing     Problem: Pain  Goal: Verbalizes/displays adequate comfort level or baseline comfort level  3/17/2024 0119 by Mark Penny RN  Outcome: Progressing  3/16/2024 1554 by Tracy Medrano RN  Outcome: Progressing  3/16/2024 1231 by Tracy Medrano RN  Outcome: Progressing     Problem: Nutrition Deficit:  Goal: Optimize nutritional status  3/17/2024 0119 by Mark Penny RN  Outcome: Progressing  3/16/2024 1554 by Tracy Medrano RN  Outcome: Progressing  3/16/2024 1231 by Tracy Medrano RN  Outcome: Progressing     Problem: Chronic Conditions and Co-morbidities  Goal: Patient's chronic conditions and co-morbidity symptoms are monitored and maintained or improved  3/17/2024 0119 by Mark Penny RN  Outcome: Progressing  3/16/2024 1554 by Tracy Medrano RN  Outcome: Progressing  3/16/2024 1231 by Tracy Medrano RN  Outcome: Progressing     Problem: Skin/Tissue Integrity  Goal: Absence of new skin breakdown  Description: 1.  Monitor for areas of redness and/or skin breakdown  2.  Assess vascular access sites hourly  3.  Every 4-6 hours minimum:  Change oxygen saturation probe site  4.  Every 4-6 hours:  If on nasal continuous positive airway pressure, respiratory therapy assess nares and determine need for appliance change or resting period.  3/17/2024 0119 by Mark Penny RN  Outcome: Progressing  3/16/2024 1554 by Tracy Medrano RN  Outcome: Progressing  3/16/2024 1231 by Tracy Medrano RN  Outcome: Progressing

## 2024-03-17 NOTE — PLAN OF CARE
Problem: Safety - Adult  Goal: Free from fall injury  3/17/2024 1500 by Tracy Medrano RN  Outcome: Progressing  3/17/2024 0119 by Mark Penny RN  Outcome: Progressing     Problem: ABCDS Injury Assessment  Goal: Absence of physical injury  3/17/2024 1500 by Tracy Medrano RN  Outcome: Progressing  3/17/2024 0119 by Mark Penny RN  Outcome: Progressing     Problem: Pain  Goal: Verbalizes/displays adequate comfort level or baseline comfort level  3/17/2024 1500 by Tracy Medrano RN  Outcome: Progressing  3/17/2024 0119 by Mark Penny RN  Outcome: Progressing     Problem: Nutrition Deficit:  Goal: Optimize nutritional status  3/17/2024 1500 by Tracy Medrano RN  Outcome: Progressing  3/17/2024 0119 by Mark Penny RN  Outcome: Progressing     Problem: Chronic Conditions and Co-morbidities  Goal: Patient's chronic conditions and co-morbidity symptoms are monitored and maintained or improved  3/17/2024 1500 by Tracy Medrano RN  Outcome: Progressing  3/17/2024 0119 by Mark Penny RN  Outcome: Progressing     Problem: Skin/Tissue Integrity  Goal: Absence of new skin breakdown  Description: 1.  Monitor for areas of redness and/or skin breakdown  2.  Assess vascular access sites hourly  3.  Every 4-6 hours minimum:  Change oxygen saturation probe site  4.  Every 4-6 hours:  If on nasal continuous positive airway pressure, respiratory therapy assess nares and determine need for appliance change or resting period.  3/17/2024 1500 by Tracy Medrano RN  Outcome: Progressing  3/17/2024 0119 by Mark Penny RN  Outcome: Progressing

## 2024-03-18 PROCEDURE — 2580000003 HC RX 258: Performed by: INTERNAL MEDICINE

## 2024-03-18 PROCEDURE — 6370000000 HC RX 637 (ALT 250 FOR IP): Performed by: NURSE PRACTITIONER

## 2024-03-18 PROCEDURE — 6370000000 HC RX 637 (ALT 250 FOR IP): Performed by: STUDENT IN AN ORGANIZED HEALTH CARE EDUCATION/TRAINING PROGRAM

## 2024-03-18 PROCEDURE — 99231 SBSQ HOSP IP/OBS SF/LOW 25: CPT | Performed by: NURSE PRACTITIONER

## 2024-03-18 PROCEDURE — 6360000002 HC RX W HCPCS: Performed by: STUDENT IN AN ORGANIZED HEALTH CARE EDUCATION/TRAINING PROGRAM

## 2024-03-18 PROCEDURE — 6360000002 HC RX W HCPCS: Performed by: INTERNAL MEDICINE

## 2024-03-18 PROCEDURE — 6370000000 HC RX 637 (ALT 250 FOR IP): Performed by: INTERNAL MEDICINE

## 2024-03-18 PROCEDURE — 2700000000 HC OXYGEN THERAPY PER DAY

## 2024-03-18 PROCEDURE — 94640 AIRWAY INHALATION TREATMENT: CPT

## 2024-03-18 PROCEDURE — 1200000000 HC SEMI PRIVATE

## 2024-03-18 PROCEDURE — 2580000003 HC RX 258: Performed by: NURSE PRACTITIONER

## 2024-03-18 RX ORDER — POLYETHYLENE GLYCOL 3350 17 G/17G
17 POWDER, FOR SOLUTION ORAL DAILY PRN
Status: DISCONTINUED | OUTPATIENT
Start: 2024-03-18 | End: 2024-03-19 | Stop reason: HOSPADM

## 2024-03-18 RX ORDER — SENNA AND DOCUSATE SODIUM 50; 8.6 MG/1; MG/1
1 TABLET, FILM COATED ORAL DAILY
Status: DISCONTINUED | OUTPATIENT
Start: 2024-03-18 | End: 2024-03-19 | Stop reason: HOSPADM

## 2024-03-18 RX ADMIN — PANTOPRAZOLE SODIUM 40 MG: 40 TABLET, DELAYED RELEASE ORAL at 05:49

## 2024-03-18 RX ADMIN — LORAZEPAM 0.5 MG: 2 INJECTION INTRAMUSCULAR; INTRAVENOUS at 21:16

## 2024-03-18 RX ADMIN — HYDROXYZINE PAMOATE 25 MG: 25 CAPSULE ORAL at 06:27

## 2024-03-18 RX ADMIN — METHIMAZOLE 15 MG: 5 TABLET ORAL at 09:57

## 2024-03-18 RX ADMIN — ALBUTEROL SULFATE 2.5 MG: 2.5 SOLUTION RESPIRATORY (INHALATION) at 18:17

## 2024-03-18 RX ADMIN — APIXABAN 5 MG: 5 TABLET, FILM COATED ORAL at 21:16

## 2024-03-18 RX ADMIN — BUSPIRONE HYDROCHLORIDE 10 MG: 10 TABLET ORAL at 09:54

## 2024-03-18 RX ADMIN — ALBUTEROL SULFATE 2.5 MG: 2.5 SOLUTION RESPIRATORY (INHALATION) at 15:13

## 2024-03-18 RX ADMIN — ATORVASTATIN CALCIUM 40 MG: 40 TABLET, FILM COATED ORAL at 21:16

## 2024-03-18 RX ADMIN — BUDESONIDE 500 MCG: 0.5 SUSPENSION RESPIRATORY (INHALATION) at 18:17

## 2024-03-18 RX ADMIN — CITALOPRAM HYDROBROMIDE 20 MG: 20 TABLET ORAL at 09:54

## 2024-03-18 RX ADMIN — ASPIRIN 81 MG: 81 TABLET, COATED ORAL at 09:54

## 2024-03-18 RX ADMIN — SODIUM CHLORIDE, PRESERVATIVE FREE 10 ML: 5 INJECTION INTRAVENOUS at 10:01

## 2024-03-18 RX ADMIN — HYDROXYZINE PAMOATE 25 MG: 25 CAPSULE ORAL at 14:25

## 2024-03-18 RX ADMIN — BUSPIRONE HYDROCHLORIDE 10 MG: 10 TABLET ORAL at 21:16

## 2024-03-18 RX ADMIN — ARFORMOTEROL TARTRATE 15 MCG: 15 SOLUTION RESPIRATORY (INHALATION) at 18:17

## 2024-03-18 RX ADMIN — SODIUM CHLORIDE, PRESERVATIVE FREE 10 ML: 5 INJECTION INTRAVENOUS at 21:17

## 2024-03-18 RX ADMIN — LORAZEPAM 0.5 MG: 2 INJECTION INTRAMUSCULAR; INTRAVENOUS at 09:58

## 2024-03-18 RX ADMIN — GUAIFENESIN 400 MG: 400 TABLET ORAL at 21:16

## 2024-03-18 RX ADMIN — BUSPIRONE HYDROCHLORIDE 10 MG: 10 TABLET ORAL at 13:15

## 2024-03-18 RX ADMIN — BUDESONIDE 500 MCG: 0.5 SUSPENSION RESPIRATORY (INHALATION) at 08:03

## 2024-03-18 RX ADMIN — ALBUTEROL SULFATE 2.5 MG: 2.5 SOLUTION RESPIRATORY (INHALATION) at 08:03

## 2024-03-18 RX ADMIN — GUAIFENESIN 400 MG: 400 TABLET ORAL at 09:54

## 2024-03-18 RX ADMIN — WATER 40 MG: 1 INJECTION INTRAMUSCULAR; INTRAVENOUS; SUBCUTANEOUS at 04:05

## 2024-03-18 RX ADMIN — WATER 40 MG: 1 INJECTION INTRAMUSCULAR; INTRAVENOUS; SUBCUTANEOUS at 18:11

## 2024-03-18 RX ADMIN — ALBUTEROL SULFATE 2.5 MG: 2.5 SOLUTION RESPIRATORY (INHALATION) at 12:21

## 2024-03-18 RX ADMIN — TRAZODONE HYDROCHLORIDE 100 MG: 50 TABLET ORAL at 21:16

## 2024-03-18 RX ADMIN — ARFORMOTEROL TARTRATE 15 MCG: 15 SOLUTION RESPIRATORY (INHALATION) at 08:03

## 2024-03-18 RX ADMIN — APIXABAN 5 MG: 5 TABLET, FILM COATED ORAL at 09:53

## 2024-03-18 RX ADMIN — GUAIFENESIN 400 MG: 400 TABLET ORAL at 13:15

## 2024-03-18 RX ADMIN — DILTIAZEM HYDROCHLORIDE 120 MG: 120 CAPSULE, EXTENDED RELEASE ORAL at 09:54

## 2024-03-18 ASSESSMENT — PAIN SCALES - GENERAL
PAINLEVEL_OUTOF10: 0
PAINLEVEL_OUTOF10: 0

## 2024-03-18 NOTE — PLAN OF CARE
Problem: Safety - Adult  Goal: Free from fall injury  Outcome: Progressing     Problem: ABCDS Injury Assessment  Goal: Absence of physical injury  Outcome: Progressing     Problem: Pain  Goal: Verbalizes/displays adequate comfort level or baseline comfort level  Outcome: Progressing     Problem: Nutrition Deficit:  Goal: Optimize nutritional status  Outcome: Progressing  Flowsheets (Taken 3/18/2024 1116 by Diego Sanabria RD)  Nutrient intake appropriate for improving, restoring, or maintaining nutritional needs:   Monitor oral intake, labs, and treatment plans   Recommend appropriate diets, oral nutritional supplements, and vitamin/mineral supplements     Problem: Chronic Conditions and Co-morbidities  Goal: Patient's chronic conditions and co-morbidity symptoms are monitored and maintained or improved  Outcome: Progressing

## 2024-03-18 NOTE — CARE COORDINATION
Social Work/Case Management Transition of Care Planning (Marlin Jama -[phone]):  Per report and chart review, patient is now on 4L NC at 97%.  She is on IV Solu-medrol q8.  Per attending, the plan is to wean to oral steroids with anticipated discharge tomorrow.   EZPAP BID has been ordered.  Met with patient at bedside.  Discharge plan is to [facility]. Per Ava, pre-cert has been approved and is good through 3/20.  PENNY/destination completed. 7000 completed. Discharge envelope with ambulance form is in the soft chart. CM/SW will follow.  ELIGIO Vital  3/18/2024

## 2024-03-18 NOTE — PLAN OF CARE
Problem: Safety - Adult  Goal: Free from fall injury  3/18/2024 0113 by Mark Penny RN  Outcome: Progressing  3/17/2024 1500 by Tracy Medrano RN  Outcome: Progressing     Problem: ABCDS Injury Assessment  Goal: Absence of physical injury  3/18/2024 0113 by Mark Penny RN  Outcome: Progressing  3/17/2024 1500 by Tracy Medrano RN  Outcome: Progressing     Problem: Pain  Goal: Verbalizes/displays adequate comfort level or baseline comfort level  3/18/2024 0113 by Mark Penny RN  Outcome: Progressing  3/17/2024 1500 by Tracy Medrano RN  Outcome: Progressing     Problem: Nutrition Deficit:  Goal: Optimize nutritional status  3/18/2024 0113 by Mark Penny RN  Outcome: Progressing  3/17/2024 1500 by Tracy Medrano RN  Outcome: Progressing     Problem: Chronic Conditions and Co-morbidities  Goal: Patient's chronic conditions and co-morbidity symptoms are monitored and maintained or improved  3/18/2024 0113 by Mark Penny RN  Outcome: Progressing  3/17/2024 1500 by Tracy Medrano RN  Outcome: Progressing     Problem: Skin/Tissue Integrity  Goal: Absence of new skin breakdown  Description: 1.  Monitor for areas of redness and/or skin breakdown  2.  Assess vascular access sites hourly  3.  Every 4-6 hours minimum:  Change oxygen saturation probe site  4.  Every 4-6 hours:  If on nasal continuous positive airway pressure, respiratory therapy assess nares and determine need for appliance change or resting period.  3/18/2024 0113 by Mark Penny RN  Outcome: Progressing  3/17/2024 1500 by Tracy Medrano RN  Outcome: Progressing

## 2024-03-19 VITALS
BODY MASS INDEX: 14.85 KG/M2 | HEART RATE: 79 BPM | TEMPERATURE: 97 F | RESPIRATION RATE: 18 BRPM | HEIGHT: 68 IN | DIASTOLIC BLOOD PRESSURE: 69 MMHG | SYSTOLIC BLOOD PRESSURE: 150 MMHG | WEIGHT: 98 LBS | OXYGEN SATURATION: 95 %

## 2024-03-19 PROCEDURE — 2580000003 HC RX 258: Performed by: INTERNAL MEDICINE

## 2024-03-19 PROCEDURE — 6360000002 HC RX W HCPCS: Performed by: INTERNAL MEDICINE

## 2024-03-19 PROCEDURE — 94640 AIRWAY INHALATION TREATMENT: CPT

## 2024-03-19 PROCEDURE — 2700000000 HC OXYGEN THERAPY PER DAY

## 2024-03-19 PROCEDURE — 6370000000 HC RX 637 (ALT 250 FOR IP): Performed by: NURSE PRACTITIONER

## 2024-03-19 PROCEDURE — 99231 SBSQ HOSP IP/OBS SF/LOW 25: CPT

## 2024-03-19 PROCEDURE — 6370000000 HC RX 637 (ALT 250 FOR IP): Performed by: INTERNAL MEDICINE

## 2024-03-19 PROCEDURE — 6370000000 HC RX 637 (ALT 250 FOR IP): Performed by: STUDENT IN AN ORGANIZED HEALTH CARE EDUCATION/TRAINING PROGRAM

## 2024-03-19 PROCEDURE — 2580000003 HC RX 258: Performed by: NURSE PRACTITIONER

## 2024-03-19 PROCEDURE — 6360000002 HC RX W HCPCS: Performed by: STUDENT IN AN ORGANIZED HEALTH CARE EDUCATION/TRAINING PROGRAM

## 2024-03-19 RX ORDER — ACETAMINOPHEN 325 MG/1
650 TABLET ORAL EVERY 6 HOURS PRN
Qty: 120 TABLET | Refills: 0 | COMMUNITY
Start: 2024-03-19

## 2024-03-19 RX ORDER — POLYETHYLENE GLYCOL 3350 17 G/17G
17 POWDER, FOR SOLUTION ORAL DAILY PRN
Qty: 527 G | Refills: 0 | COMMUNITY
Start: 2024-03-19 | End: 2024-04-18

## 2024-03-19 RX ORDER — HYDROXYZINE PAMOATE 25 MG/1
25 CAPSULE ORAL 3 TIMES DAILY PRN
Qty: 90 CAPSULE | Refills: 0
Start: 2024-03-19

## 2024-03-19 RX ORDER — GUAIFENESIN 400 MG/1
400 TABLET ORAL 3 TIMES DAILY
Qty: 56 TABLET | Refills: 0
Start: 2024-03-19

## 2024-03-19 RX ORDER — ALBUTEROL SULFATE 2.5 MG/3ML
2.5 SOLUTION RESPIRATORY (INHALATION) EVERY 4 HOURS PRN
Qty: 120 EACH | Refills: 0
Start: 2024-03-19

## 2024-03-19 RX ORDER — SENNA AND DOCUSATE SODIUM 50; 8.6 MG/1; MG/1
1 TABLET, FILM COATED ORAL DAILY
COMMUNITY
Start: 2024-03-20

## 2024-03-19 RX ADMIN — DILTIAZEM HYDROCHLORIDE 120 MG: 120 CAPSULE, EXTENDED RELEASE ORAL at 08:34

## 2024-03-19 RX ADMIN — SODIUM CHLORIDE, PRESERVATIVE FREE 10 ML: 5 INJECTION INTRAVENOUS at 08:34

## 2024-03-19 RX ADMIN — LORAZEPAM 0.5 MG: 2 INJECTION INTRAMUSCULAR; INTRAVENOUS at 11:29

## 2024-03-19 RX ADMIN — HYDROXYZINE PAMOATE 25 MG: 25 CAPSULE ORAL at 09:41

## 2024-03-19 RX ADMIN — PANTOPRAZOLE SODIUM 40 MG: 40 TABLET, DELAYED RELEASE ORAL at 05:11

## 2024-03-19 RX ADMIN — BUSPIRONE HYDROCHLORIDE 10 MG: 10 TABLET ORAL at 08:34

## 2024-03-19 RX ADMIN — BUDESONIDE 500 MCG: 0.5 SUSPENSION RESPIRATORY (INHALATION) at 07:35

## 2024-03-19 RX ADMIN — ALBUTEROL SULFATE 2.5 MG: 2.5 SOLUTION RESPIRATORY (INHALATION) at 07:35

## 2024-03-19 RX ADMIN — GUAIFENESIN 400 MG: 400 TABLET ORAL at 08:34

## 2024-03-19 RX ADMIN — CITALOPRAM HYDROBROMIDE 20 MG: 20 TABLET ORAL at 08:34

## 2024-03-19 RX ADMIN — ASPIRIN 81 MG: 81 TABLET, COATED ORAL at 08:34

## 2024-03-19 RX ADMIN — WATER 40 MG: 1 INJECTION INTRAMUSCULAR; INTRAVENOUS; SUBCUTANEOUS at 04:09

## 2024-03-19 RX ADMIN — SENNOSIDES AND DOCUSATE SODIUM 1 TABLET: 8.6; 5 TABLET ORAL at 08:33

## 2024-03-19 RX ADMIN — APIXABAN 5 MG: 5 TABLET, FILM COATED ORAL at 08:34

## 2024-03-19 RX ADMIN — ARFORMOTEROL TARTRATE 15 MCG: 15 SOLUTION RESPIRATORY (INHALATION) at 07:35

## 2024-03-19 NOTE — PLAN OF CARE
Problem: Safety - Adult  Goal: Free from fall injury  3/19/2024 0954 by Daysi Mccord RN  Outcome: Progressing  3/18/2024 2240 by Trevor Andrew RN  Outcome: Progressing     Problem: ABCDS Injury Assessment  Goal: Absence of physical injury  3/19/2024 0954 by Daysi Mccord RN  Outcome: Progressing  3/18/2024 2240 by Trevor Andrew RN  Outcome: Progressing     Problem: Pain  Goal: Verbalizes/displays adequate comfort level or baseline comfort level  3/19/2024 0954 by Daysi Mccord RN  Outcome: Progressing  3/18/2024 2240 by Trevor Andrew RN  Outcome: Progressing     Problem: Nutrition Deficit:  Goal: Optimize nutritional status  3/19/2024 0954 by Daysi Mccord RN  Outcome: Progressing  3/18/2024 2240 by Trevor Andrew RN  Outcome: Progressing     Problem: Chronic Conditions and Co-morbidities  Goal: Patient's chronic conditions and co-morbidity symptoms are monitored and maintained or improved  3/19/2024 0954 by Daysi Mccord RN  Outcome: Progressing  3/18/2024 2240 by Trevor Andrew RN  Outcome: Progressing     Problem: Skin/Tissue Integrity  Goal: Absence of new skin breakdown  Description: 1.  Monitor for areas of redness and/or skin breakdown  2.  Assess vascular access sites hourly  3.  Every 4-6 hours minimum:  Change oxygen saturation probe site  4.  Every 4-6 hours:  If on nasal continuous positive airway pressure, respiratory therapy assess nares and determine need for appliance change or resting period.  3/19/2024 0954 by Daysi Mccord RN  Outcome: Progressing  3/18/2024 2240 by Trevor Andrew RN  Outcome: Progressing

## 2024-03-19 NOTE — PLAN OF CARE
Problem: Safety - Adult  Goal: Free from fall injury  3/19/2024 1011 by Daysi Mccord RN  Outcome: Adequate for Discharge  3/19/2024 0954 by Daysi Mccord RN  Outcome: Progressing  3/18/2024 2240 by Trevor Andrew RN  Outcome: Progressing     Problem: ABCDS Injury Assessment  Goal: Absence of physical injury  3/19/2024 1011 by Daysi Mccord RN  Outcome: Adequate for Discharge  3/19/2024 0954 by Daysi Mccord RN  Outcome: Progressing  3/18/2024 2240 by Trevor Andrew RN  Outcome: Progressing     Problem: Pain  Goal: Verbalizes/displays adequate comfort level or baseline comfort level  3/19/2024 1011 by Daysi Mccord RN  Outcome: Adequate for Discharge  3/19/2024 0954 by Daysi Mccord RN  Outcome: Progressing  3/18/2024 2240 by Trevor Andrew RN  Outcome: Progressing     Problem: Nutrition Deficit:  Goal: Optimize nutritional status  3/19/2024 1011 by Daysi Mccord RN  Outcome: Adequate for Discharge  3/19/2024 0954 by Daysi Mccord RN  Outcome: Progressing  3/18/2024 2240 by Trevor Andrew RN  Outcome: Progressing     Problem: Chronic Conditions and Co-morbidities  Goal: Patient's chronic conditions and co-morbidity symptoms are monitored and maintained or improved  3/19/2024 1011 by Daysi Mccord RN  Outcome: Adequate for Discharge  3/19/2024 0954 by Daysi Mccord RN  Outcome: Progressing  3/18/2024 2240 by Trevor Andrew RN  Outcome: Progressing     Problem: Skin/Tissue Integrity  Goal: Absence of new skin breakdown  Description: 1.  Monitor for areas of redness and/or skin breakdown  2.  Assess vascular access sites hourly  3.  Every 4-6 hours minimum:  Change oxygen saturation probe site  4.  Every 4-6 hours:  If on nasal continuous positive airway pressure, respiratory therapy assess nares and determine need for appliance change or resting period.  3/19/2024 1011 by Daysi Mccord RN  Outcome: Adequate for Discharge  3/19/2024 0954 by Daysi Mccord RN  Outcome: Progressing  3/18/2024

## 2024-03-19 NOTE — PROGRESS NOTES
03/14/24 0904   Encounter Summary   Encounter Overview/Reason  Spiritual/Emotional Needs;Palliative Care   Service Provided For: Patient   Referral/Consult From: Palliative Care;Rounding   Support System Family members   Last Encounter  03/14/24  (P-DL)   Complexity of Encounter High   Spiritual/Emotional needs   Type Spiritual Support   Palliative Care   Type Palliative Care, Initial/Spiritual Assessment   Assessment/Intervention/Outcome   Assessment Calm;Coping   Intervention Active listening;Discussed belief system/Tenriism practices/jenni;Discussed illness injury and it’s impact;Explored/Affirmed feelings, thoughts, concerns;Nurtured Hope;Prayer (assurance of)/[place];Sustaining Presence/Ministry of presence   Outcome Comfort;Engaged in conversation;Expressed feelings, needs, and concerns;Expressed Gratitude       Lana was sitting up in bed alert during  visit. Lana states that she is breathing a little better and feeling good about that. She lives with her sister-in-law who is a good support for her and states she is her POA. I suggested she bring a copy of documents for her chart.. Lana is of the Buddhist jenni and Fr. Gamez has anointed her and she has been receiving communion which she appreciates. Breakfast arrived and prayers were said.    Chaplain Malini Schultz, [facility], [facility] Contact at Ext: [phone]  
  [facility]  [address], [address]  [place] [address]      Pulmonary Progress Note      SUBJECTIVE:  mentions increased sob/anxiety earlier when walked to bathroom and back, on 5 li HFNC, sitting up at side of bed, some help with EZPAP use with breathing and coughing    OBJECTIVE    Medications    Continuous Infusions:   sodium chloride         Scheduled Meds:   guaiFENesin  400 mg Oral TID    methylPREDNISolone  40 mg IntraVENous Q8H    apixaban  5 mg Oral BID    aspirin  81 mg Oral Daily    atorvastatin  40 mg Oral Nightly    busPIRone  10 mg Oral TID    citalopram  20 mg Oral Daily    dilTIAZem  120 mg Oral Daily    methIMAzole  15 mg Oral Daily    pantoprazole  40 mg Oral QAM AC    traZODone  100 mg Oral Nightly    [START ON 3/31/2024] pregabalin  50 mg Oral TID    sodium chloride flush  10 mL IntraVENous 2 times per day    albuterol  2.5 mg Nebulization 4x Daily    budesonide  0.5 mg Nebulization BID RT    arformoterol tartrate  15 mcg Nebulization BID RT       PRN Meds:morphine, perflutren lipid microspheres, hydrOXYzine pamoate, LORazepam, ipratropium 0.5 mg-albuterol 2.5 mg, prochlorperazine, sodium chloride flush, sodium chloride, potassium chloride **OR** potassium alternative oral replacement **OR** potassium chloride, magnesium sulfate, ondansetron **OR** ondansetron, senna, acetaminophen **OR** acetaminophen    Physical    VITALS:  /69   Pulse 77   Temp 97.3 °F (36.3 °C) (Temporal)   Resp 16   Ht 1.727 m (5' 8\")   Wt 41.2 kg (90 lb 13.3 oz)   SpO2 97%   BMI 13.81 kg/m²     24HR INTAKE/OUTPUT:    No intake or output data in the 24 hours ending 24 1510      24HR PULSE OXIMETRY RANGE:    SpO2  Av.2 %  Min: 95 %  Max: 98 %    General appearance: alert, appears stated age, and cooperative  Lungs: diminished breath sounds bilaterally and rhonchi bibasilar  Heart: regular rate and rhythm, S1, S2 normal, no murmur, 
  [facility]  [address], [address]  [place] [address]      Pulmonary Progress Note      SUBJECTIVE:  looking similar with breathing today, on 4 li HFNC, sitting up in bed looking sleepy when seen, still with some complaints with anxiety and has received 0.5 mg Ativan 2x today    OBJECTIVE    Medications    Continuous Infusions:   sodium chloride         Scheduled Meds:   methylPREDNISolone  40 mg IntraVENous Q12H    apixaban  5 mg Oral BID    aspirin  81 mg Oral Daily    atorvastatin  40 mg Oral Nightly    busPIRone  10 mg Oral TID    citalopram  20 mg Oral Daily    dilTIAZem  120 mg Oral Daily    methIMAzole  15 mg Oral Daily    pantoprazole  40 mg Oral QAM AC    traZODone  100 mg Oral Nightly    [START ON 3/31/2024] pregabalin  50 mg Oral TID    sodium chloride flush  10 mL IntraVENous 2 times per day    albuterol  2.5 mg Nebulization 4x Daily    budesonide  0.5 mg Nebulization BID RT    arformoterol tartrate  15 mcg Nebulization BID RT       PRN Meds:morphine, perflutren lipid microspheres, hydrOXYzine pamoate, LORazepam, ipratropium 0.5 mg-albuterol 2.5 mg, prochlorperazine, sodium chloride flush, sodium chloride, potassium chloride **OR** potassium alternative oral replacement **OR** potassium chloride, magnesium sulfate, ondansetron **OR** ondansetron, senna, acetaminophen **OR** acetaminophen    Physical    VITALS:  BP (!) 141/83 Comment: notify the nurse  Pulse 83   Temp 98.4 °F (36.9 °C) (Temporal)   Resp 20   Ht 1.727 m (5' 8\")   Wt 42.6 kg (94 lb)   SpO2 90%   BMI 14.29 kg/m²     24HR INTAKE/OUTPUT:      Intake/Output Summary (Last 24 hours) at 3/15/2024 1522  Last data filed at 3/15/2024 1027  Gross per 24 hour   Intake 120 ml   Output --   Net 120 ml         24HR PULSE OXIMETRY RANGE:    SpO2  Av.2 %  Min: 90 %  Max: 100 %    General appearance: alert, appears stated age, and cooperative  Lungs: diminished breath 
  [facility]  [address], [address]  [place] [address]      Pulmonary Progress Note      SUBJECTIVE:  looking some better with breathing today, on 7 li HFNC, claims slightly increased cough with minimal looser sputum production, sitting up in bed when seen, still with some complaints with anxiety    OBJECTIVE    Medications    Continuous Infusions:   sodium chloride         Scheduled Meds:   apixaban  5 mg Oral BID    aspirin  81 mg Oral Daily    atorvastatin  40 mg Oral Nightly    busPIRone  10 mg Oral TID    citalopram  20 mg Oral Daily    dilTIAZem  120 mg Oral Daily    methIMAzole  15 mg Oral Daily    pantoprazole  40 mg Oral QAM AC    traZODone  100 mg Oral Nightly    [START ON 3/31/2024] pregabalin  50 mg Oral TID    sodium chloride flush  10 mL IntraVENous 2 times per day    albuterol  2.5 mg Nebulization 4x Daily    methylPREDNISolone  40 mg IntraVENous Q8H    budesonide  0.5 mg Nebulization BID RT    arformoterol tartrate  15 mcg Nebulization BID RT       PRN Meds:perflutren lipid microspheres, hydrOXYzine pamoate, ipratropium 0.5 mg-albuterol 2.5 mg, prochlorperazine, sodium chloride flush, sodium chloride, potassium chloride **OR** potassium alternative oral replacement **OR** potassium chloride, magnesium sulfate, ondansetron **OR** ondansetron, senna, acetaminophen **OR** acetaminophen    Physical    VITALS:  /64   Pulse 89   Temp 97.3 °F (36.3 °C) (Temporal)   Resp 20   Ht 1.727 m (5' 8\")   Wt 44.9 kg (99 lb)   SpO2 95%   BMI 15.05 kg/m²     24HR INTAKE/OUTPUT:      Intake/Output Summary (Last 24 hours) at 3/13/2024 6082  Last data filed at 3/13/2024 0930  Gross per 24 hour   Intake 360 ml   Output --   Net 360 ml       24HR PULSE OXIMETRY RANGE:    SpO2  Av.7 %  Min: 93 %  Max: 100 %    General appearance: alert, appears stated age, and cooperative  Lungs: diminished breath sounds bilaterally and rhonchi 
  [facility]  [address], [address]  [place] [address]      Pulmonary Progress Note      SUBJECTIVE:  mentions worsening respiratory function this morning, feeling some better currently, on 5 li HFNC, sitting up in bed similar with sob, looking slightly tachypneic    OBJECTIVE    Medications    Continuous Infusions:   sodium chloride         Scheduled Meds:   guaiFENesin  400 mg Oral TID    methylPREDNISolone  40 mg IntraVENous Q12H    apixaban  5 mg Oral BID    aspirin  81 mg Oral Daily    atorvastatin  40 mg Oral Nightly    busPIRone  10 mg Oral TID    citalopram  20 mg Oral Daily    dilTIAZem  120 mg Oral Daily    methIMAzole  15 mg Oral Daily    pantoprazole  40 mg Oral QAM AC    traZODone  100 mg Oral Nightly    [START ON 3/31/2024] pregabalin  50 mg Oral TID    sodium chloride flush  10 mL IntraVENous 2 times per day    albuterol  2.5 mg Nebulization 4x Daily    budesonide  0.5 mg Nebulization BID RT    arformoterol tartrate  15 mcg Nebulization BID RT       PRN Meds:morphine, perflutren lipid microspheres, hydrOXYzine pamoate, LORazepam, ipratropium 0.5 mg-albuterol 2.5 mg, prochlorperazine, sodium chloride flush, sodium chloride, potassium chloride **OR** potassium alternative oral replacement **OR** potassium chloride, magnesium sulfate, ondansetron **OR** ondansetron, senna, acetaminophen **OR** acetaminophen    Physical    VITALS:  /63   Pulse 79   Temp 98.8 °F (37.1 °C) (Temporal)   Resp 18   Ht 1.727 m (5' 8\")   Wt 43.5 kg (96 lb)   SpO2 94%   BMI 14.60 kg/m²     24HR INTAKE/OUTPUT:      Intake/Output Summary (Last 24 hours) at 3/16/2024 1452  Last data filed at 3/16/2024 0651  Gross per 24 hour   Intake 240 ml   Output 400 ml   Net -160 ml         24HR PULSE OXIMETRY RANGE:    SpO2  Av.3 %  Min: 94 %  Max: 98 %    General appearance: alert, appears stated age, and cooperative  Lungs: diminished breath sounds 
  Hospital Medicine    Subjective:  pt alert conversive agreeable to rehab      Current Facility-Administered Medications:     methylPREDNISolone sodium succ (SOLU-MEDROL) 40 mg in sterile water 1 mL injection, 40 mg, IntraVENous, Q12H, Baldemar Jarrett DO, 40 mg at 03/19/24 0409    sennosides-docusate sodium (SENOKOT-S) 8.6-50 MG tablet 1 tablet, 1 tablet, Oral, Daily, Ana Laura Gonsalves APRN - CNP    polyethylene glycol (GLYCOLAX) packet 17 g, 17 g, Oral, Daily PRN, Ana Laura Gonsalves APRN - CNP    guaiFENesin tablet 400 mg, 400 mg, Oral, TID, Masoud Joseph MD, 400 mg at 03/18/24 2116    morphine 10 MG/5ML solution 2.5 mg, 2.5 mg, Oral, Q4H PRN, Denisse Yarbrough DO, 2.5 mg at 03/14/24 1245    perflutren lipid microspheres (DEFINITY) injection 1.5 mL, 1.5 mL, IntraVENous, ONCE PRN, Trinity Isaacs APRN - CNP    hydrOXYzine pamoate (VISTARIL) capsule 25 mg, 25 mg, Oral, TID PRN, Oumar Madison MD, 25 mg at 03/18/24 1425    LORazepam (ATIVAN) injection 0.5 mg, 0.5 mg, IntraVENous, BID PRN, Oumar Madison MD, 0.5 mg at 03/18/24 2116    apixaban (ELIQUIS) tablet 5 mg, 5 mg, Oral, BID, Lulú Llanes APRN - CNP, 5 mg at 03/18/24 2116    aspirin EC tablet 81 mg, 81 mg, Oral, Daily, Lulú Llanes APRN - CNP, 81 mg at 03/18/24 0954    atorvastatin (LIPITOR) tablet 40 mg, 40 mg, Oral, Nightly, Lulú Llnaes APRN - CNP, 40 mg at 03/18/24 2116    busPIRone (BUSPAR) tablet 10 mg, 10 mg, Oral, TID, Lulú Llanes APRN - CNP, 10 mg at 03/18/24 2116    citalopram (CELEXA) tablet 20 mg, 20 mg, Oral, Daily, Lulú Llanes APRN - CNP, 20 mg at 03/18/24 0954    dilTIAZem (CARDIZEM CD) extended release capsule 120 mg, 120 mg, Oral, Daily, Lulú Llanes APRN - CNP, 120 mg at 03/18/24 0954    ipratropium 0.5 mg-albuterol 2.5 mg (DUONEB) nebulizer solution 1 Dose, 1 Dose, Nebulization, Q4H PRN, Lulú Llanes APRN - CNP, 1 Dose at 03/16/24 1313    methIMAzole (TAPAZOLE) tablet 15 mg, 15 mg, Oral, Daily, 
  Hospital Medicine    Subjective:  pt seen this am alert conversive breathing better      Current Facility-Administered Medications:     methylPREDNISolone sodium succ (SOLU-MEDROL) 40 mg in sterile water 1 mL injection, 40 mg, IntraVENous, Q12H, Baldemar Jarrett DO, 40 mg at 03/18/24 1811    sennosides-docusate sodium (SENOKOT-S) 8.6-50 MG tablet 1 tablet, 1 tablet, Oral, Daily, Ana Laura Gonsalves APRN - CNP    polyethylene glycol (GLYCOLAX) packet 17 g, 17 g, Oral, Daily PRN, Ana Laura Gonsalves APRN - CNP    guaiFENesin tablet 400 mg, 400 mg, Oral, TID, Masoud Joseph MD, 400 mg at 03/18/24 1315    morphine 10 MG/5ML solution 2.5 mg, 2.5 mg, Oral, Q4H PRN, Denisse Yarbrough DO, 2.5 mg at 03/14/24 1245    perflutren lipid microspheres (DEFINITY) injection 1.5 mL, 1.5 mL, IntraVENous, ONCE PRN, Trinity Isaacs APRN - CNP    hydrOXYzine pamoate (VISTARIL) capsule 25 mg, 25 mg, Oral, TID PRN, Oumar Madison MD, 25 mg at 03/18/24 1425    LORazepam (ATIVAN) injection 0.5 mg, 0.5 mg, IntraVENous, BID PRN, Oumar Madison MD, 0.5 mg at 03/18/24 0958    apixaban (ELIQUIS) tablet 5 mg, 5 mg, Oral, BID, Lulú Llanes APRN - CNP, 5 mg at 03/18/24 0953    aspirin EC tablet 81 mg, 81 mg, Oral, Daily, Lulú Llanes APRN - CNP, 81 mg at 03/18/24 0954    atorvastatin (LIPITOR) tablet 40 mg, 40 mg, Oral, Nightly, Lulú Llanes APRN - CNP, 40 mg at 03/17/24 2130    busPIRone (BUSPAR) tablet 10 mg, 10 mg, Oral, TID, Lulú Llanes APRN - CNP, 10 mg at 03/18/24 1315    citalopram (CELEXA) tablet 20 mg, 20 mg, Oral, Daily, Lulú Llanes APRN - CNP, 20 mg at 03/18/24 0954    dilTIAZem (CARDIZEM CD) extended release capsule 120 mg, 120 mg, Oral, Daily, Lulú Llanes APRN - CNP, 120 mg at 03/18/24 0954    ipratropium 0.5 mg-albuterol 2.5 mg (DUONEB) nebulizer solution 1 Dose, 1 Dose, Nebulization, Q4H PRN, Lulú Llanes APRN - CNP, 1 Dose at 03/16/24 1313    methIMAzole (TAPAZOLE) tablet 15 mg, 15 mg, 
  Palliative Care Department  Palliative Care Progress Note  Provider: Dai Garrison, APRN - CNP  [phone]    Hospital Day: 9  Date of Initial Consult: 03/14/24  Referring Provider: Oumar Madison MD   Palliative Medicine was consulted for assistance with:  End-stage disease, Assist with goals of care, and Symptom Management    Chief Complaint: Lana Phillips is a 69 y.o. female with chief complaint of shortness of breath    HPI:   Lana Phillips is a 69 y.o. female with significant medical history of COPD on baseline 33 L NC oxygen, atrial fibrillation, HTN, PVD, severe protein calorie malnutrition, thyroid disease, who was admitted to [facility] on 3/11/2024 with acute respiratory failure with hypoxia and hypercarbia, COPD exacerbation and dyspnea on exertion. Patient presented to the ED with the chief complaint of shortness of breath that started the day prior. Patient was also complaining of associated left sided chest pain, productive cough with phlegm, anxiety, nausea and diarrhea. ED notes report wheezing on physical exam. Labs and imaging were obtained in the ED. Notable labs included: Cl 93, CO2 42, troponin 30, AST 36, Hgb 9.1. ABG showed pH 7.40, PCO2 66.3, PO2 118, bicarb 40.4. CXR showed no acute process. She was given Duonebs and Solu-medrol. She was admitted into the hospital for further evaluation and management. Cardiology and pulmonology were consulted. Palliative medicine was consulted to assist with goals of care.     ASSESSMENT/PLAN:     Pertinent Hospital Diagnoses:  Current medical issues leading to Palliative Medicine involvement include   Active Hospital Problems    Diagnosis Date Noted    Atherosclerosis 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 (HCC) [I70.245] 01/19/2024     Priority: High    Peripheral vascular disease, unspecified (HCC) [I73.9] 01/19/2024     Priority: High    Atrial fibrillation (HCC) [I48.91] 12/15/2022     
  Physician Progress Note      PATIENT:               ANGIE SHELTON  [facility] #:                  [id number]  :                       1955  ADMIT DATE:       3/11/2024 7:01 PM  DISCH DATE:  RESPONDING  PROVIDER #:        Oumar Madison MD          QUERY TEXT:    Pt admitted with AECOPD.  Noted documentation of patient is admitted to the   hospital for COPD exacerbation and acute respiratory failure with hypoxia and   hypercarbia on 3/11/24 by ordered ED consultant.  If possible, please document   in progress notes and discharge summary:    The medical record reflects the following:  Risk Factors: Chronic respiratory failure, Home o2 at 3LNC  Clinical Indicators: ABG is ordered in addition after this showing what   appears to be chronic hypercarbia with associated chronic elevations and   bicarb and is oxygenating well on 8 L but her baseline is 3 L.  She did   require increased to 8 L due to several episodes of hypoxia noted on the   monitor on her baseline 3 L dropping down into the low 80%'s.  patient is   admitted to the hospital for COPD exacerbation and acute respiratory failure   with hypoxia and hypercarbia.  Treatment: 8-5LNC, O2 monitoring, attempting to wean back to home level,   Respiratory tx, meds for anxiety given    Thank you, Katelynn Dickinson RN, CDS [phone]  Options provided:  -- Acute respiratory failure with hypoxia and hypercarbia confirmed present on   admission  -- Acute respiratory failure with hypoxia and hypercarbia ruled out pt, with   Chronic respiratory only  -- Defer to ED consultant documentation regarding Acute respiratory failure   with hypoxia and hypercarbia  -- Other - I will add my own diagnosis  -- Disagree - Not applicable / Not valid  -- Disagree - Clinically unable to determine / Unknown  -- Refer to Clinical Documentation Reviewer    PROVIDER RESPONSE TEXT:    The diagnosis of Acute respiratory failure with hypoxia and hypercarbia was   confirmed as present on 
4 Eyes Skin Assessment     NAME:  Lana Phillips  YOB: 1955  MEDICAL RECORD NUMBER:  [id number]    The patient is being assessed for  Admission    I agree that at least one RN has performed a thorough Head to Toe Skin Assessment on the patient. ALL assessment sites listed below have been assessed.      Areas assessed by both nurses:    Head, Face, Ears, Shoulders, Back, Chest, Arms, Elbows, Hands, Sacrum. Buttock, Coccyx, Ischium, Legs. Feet and Heels, and Under Medical Devices   -Abrasions L elbow, L toes   -Blanchable redness elbows, buttocks  -Dry/flaky bilateral heels   -Ecchymosis BUE   -Scar mid abdomen  -Small black specks all over L thigh (did not come off after bathing)      Does the Patient have a Wound? No noted wound(s)       Guy Prevention initiated by RN: No  Wound Care Orders initiated by RN: No    Pressure Injury (Stage 3,4, Unstageable, DTI, NWPT, and Complex wounds) if present, place Wound referral order by RN under : No    New Ostomies, if present place, Ostomy referral order under : No     Nurse 1 eSignature: Electronically signed by Linda Hall RN on 3/12/24 at 10:05 AM EDT    **SHARE this note so that the co-signing nurse can place an eSignature**    Nurse 2 eSignature: Electronically signed by Chery Mattson RN on 3/12/24 at 10:12 AM EDT   
Cardiology notified of new consult via [facility].   
Comprehensive Nutrition Assessment    Type and Reason for Visit:  Initial, Positive Nutrition Screen    Nutrition Recommendations/Plan:   Recommend and start Ensure plus high protein supplement BID and Magic cup supplement daily to help meet increased nutritional needs.           Malnutrition Assessment:  Malnutrition Status:  Severe malnutrition (03/12/24 1213)    Context:  Chronic Illness     Findings of the 6 clinical characteristics of malnutrition:  Energy Intake:  75% or less estimated energy requirements for 1 month or longer  Weight Loss:  Unable to assess (d/t limited actual weight history)     Body Fat Loss:  Severe body fat loss Orbital, Triceps, Fat Overlying Ribs   Muscle Mass Loss:  Severe muscle mass loss Temples (temporalis), Clavicles (pectoralis & deltoids), Calf (gastrocnemius)  Fluid Accumulation:  No significant fluid accumulation     Strength:  Not Performed    Nutrition Assessment:    Patient adm w/ SOB and chest pain ; noted COPD exacerbation and acute respiratory failure with hypoxia/hypercarbia ; recent admission ; hx of COPD/PVD/CAD ; hx of severe malnutrition ; pt does meet criteria for severe malnutrition ; noted decreased po intake PTA ; s/p aortagram on 1/19/24 ; will continue and modify ONS    Nutrition Related Findings:    I&Os WNL, no edema, missing teeth, A&O x 4, active BS, redness to buttocks/heels, muscle/fat wasting, nausea/diarrhea PTA ; Wound Type: None       Current Nutrition Intake & Therapies:    Average Meal Intake: 51-75%     ADULT DIET; Regular  ADULT ORAL NUTRITION SUPPLEMENT; Breakfast, Lunch, Dinner; Standard High Calorie/High Protein Oral Supplement  ADULT ORAL NUTRITION SUPPLEMENT; Breakfast, Lunch, AM Snack, Dinner, PM Snack; Standard High Calorie/High Protein Oral Supplement    Anthropometric Measures:  Height: 172.7 cm (5' 8\")  Ideal Body Weight (IBW): 140 lbs (64 kg)       Current Body Weight: 44.9 kg (99 lb) (3/12, stated ; unable to obtain bedscale weight 
Comprehensive Nutrition Assessment    Type and Reason for Visit:  Reassess    Nutrition Recommendations/Plan:   Continue current diet w/ ONS to optmize nutrient/PO intake. Will continue to monitor.     Malnutrition Assessment:  Malnutrition Status:  Severe malnutrition (03/12/24 1213)    Context:  Chronic Illness     Findings of the 6 clinical characteristics of malnutrition:  Energy Intake:  75% or less estimated energy requirements for 1 month or longer  Weight Loss:  Unable to assess (d/t limited actual weight history)     Body Fat Loss:  Severe body fat loss Orbital, Triceps, Fat Overlying Ribs   Muscle Mass Loss:  Severe muscle mass loss Temples (temporalis), Clavicles (pectoralis & deltoids), Calf (gastrocnemius)  Fluid Accumulation:  No significant fluid accumulation     Strength:  Not Performed    Nutrition Assessment:    Patient adm w/ SOB and chest pain ; noted COPD exacerbation and acute respiratory failure with hypoxia/hypercarbia. Hx of COPD/PVD/CAD ; hx of severe malnutrition ; pt remains at risk d/t severe malnutrition; noted decreased po intake PTA, now stable since admit and consuming % at most meals. Will continue ONS and monitor.    Nutrition Related Findings:    +I/O, no edema, missing teeth, A&O x 4, soft/flat abd, active BS, hyperglycemia, redness to buttocks/heels, muscle/fat wasting, nausea/diarrhea PTA ; Wound Type: None       Current Nutrition Intake & Therapies:    Average Meal Intake: %  Average Supplements Intake: Unable to assess  ADULT DIET; Regular  ADULT ORAL NUTRITION SUPPLEMENT; Breakfast, Dinner; Standard High Calorie/High Protein Oral Supplement  ADULT ORAL NUTRITION SUPPLEMENT; Lunch; Frozen Oral Supplement    Anthropometric Measures:  Height: 172.7 cm (5' 7.99\")  Ideal Body Weight (IBW): 140 lbs (64 kg)    Admission Body Weight: 42.8 kg (94 lb 5.7 oz) (3/15 first measured)  Current Body Weight: 41.2 kg (90 lb 13.3 oz) (3/17 Actual BS), 64.9 % IBW. Weight Source: 
Coordination of care discussion and chart review with PM team. LSW met with pt to assess needs and discuss advance care planning as she mentioned with team. She tells me she would like ot appoint her sister in law Lana as her HCPOA. Pt not feeling well enough to complete forms at this time. She asks for a follow up.   
EKG done and placed in blue chart.   
Internal Medicine Progress Note    Patient's name: Lana Phillips  : 1955  Chief complaints (on day of admission): Shortness of Breath (C/O SOB that started last night. Wears 3L n/c baseline. States her anxiety has been bad lately. )  Admission date: 3/11/2024  Date of service: 3/13/2024   Room: [address] MED SURG ONC  Primary care physician: Clyde Gonzalez DO  Reason for visit: Follow-up for COPD    Subjective  Lana was seen and examined at bedside     Her biggest problem is her anxiety   She can not tell me what is making her anxious   I asked if she thinks it is her breathing   She states she does not think so   This morning we tried to increase her prn vistaril to tid   This did not work through out the day so I have added prn xanax   Discussed with nursing     Review of Systems  There are no new complaints of chest pain, shortness of breath, abdominal pain, nausea, vomiting, diarrhea, constipation unless otherwise mentioned above.     Hospital Medications  Current Facility-Administered Medications   Medication Dose Route Frequency Provider Last Rate Last Admin    apixaban (ELIQUIS) tablet 5 mg  5 mg Oral BID Lulú Llanes APRN - CNP   5 mg at 24    aspirin EC tablet 81 mg  81 mg Oral Daily AshleyivLulú schmitz APRN - CNP   81 mg at 24    atorvastatin (LIPITOR) tablet 40 mg  40 mg Oral Nightly LacivLulú schmitz APRN - CNP   40 mg at 24    busPIRone (BUSPAR) tablet 10 mg  10 mg Oral TID Lulú Llanes APRN - CNP   10 mg at 24    citalopram (CELEXA) tablet 20 mg  20 mg Oral Daily LacivLulú schmitz, APRN - CNP   20 mg at 24    dilTIAZem (CARDIZEM CD) extended release capsule 120 mg  120 mg Oral Daily LacivLulú schmitz, APRN - CNP   120 mg at 24    ipratropium 0.5 mg-albuterol 2.5 mg (DUONEB) nebulizer solution 1 Dose  1 Dose Nebulization Q4H PRN Lulú Llanes APRN - CNP        methIMAzole (TAPAZOLE) tablet 15 mg  15 mg Oral Daily 
LSW met with pt to follow up on advance directives.She wants to speak with her sister in law Lana about this before completing them. She has left a message for her sister in law Lana.    Addendum: Follow up visit with pt, Advance directive forms provided and reviewed. She wants to complete them in person with her sister in law, possibly tomorrow.  
Lana MARGARITA Phillips was ordered Breztri Aerosphere 160-9-4.8 which is a nonformulary medication.  This medication will need to be supplied by the patient as the pharmacy does not carry this non-formulary medication.    If the medication has not been administered by 1400 on the following day from the time the order was placed, a pharmacist will follow-up with the nurse of the patient to assess the capability of the patient to bring in the medication.    If it is determined that the patient cannot supply the medication and it is not available to be dispensed from the pharmacy, the provider will be notified.    
Nurse to nurse called  
OCCUPATIONAL THERAPY INITIAL EVALUATION    [facility] [address], [place]       Date:3/14/2024                                                  Patient Name: Lana Phillips  MRN: [id number]  : 1955  Room: [address] [place] [address]    Evaluating OT: Mitchell Zuñiga OTR/L [id number]    Referring Provider: Oumar Madison MD      Specific Provider Orders/Date: OT evaluation and treatment 3/13/24 0815    Diagnosis:  COPD exacerbation (HCC) [J44.1]  Acute respiratory failure with hypoxia and hypercarbia (HCC) [J96.01, J96.02]  Acute respiratory failure with hypoxia and hypercapnia (HCC) [J96.01, J96.02]      Pertinent Medical History:  has a past medical history of Acute exacerbation of chronic obstructive pulmonary disease (COPD) (HCC), Acute respiratory failure (HCC), Acute respiratory failure with hypoxia (HCC), Acute respiratory failure with hypoxia (HCC), Acute respiratory failure with hypoxia (HCC), 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   Past Surgical History:   Procedure Laterality Date    BACK SURGERY      x2    INVASIVE VASCULAR N/A 2024    Aortagram abdominal performed by Lazaro Vidales MD at [facility] CARDIAC CATH LAB    INVASIVE VASCULAR N/A 2024    Angioplasty peripheral artery performed by Lazaro Vidales MD at [facility] CARDIAC CATH LAB    LEFT OOPHORECTOMY      PAIN MANAGEMENT PROCEDURE N/A 
Patient ambulated 10ft on 3L NC, SPO2 was 90% on the 3L. Patient could not tolerate walking and became SOB and anxious. Returned to bed on 5L NC, SPO2 100% while resting in bed. Weaned to 4L.   
Patient c/o 7/10 L chest pain. VSS 97.8 91 20 122/73 94% 5L. Resting comfortably in bed at this time. MD notified via [facility].   
Patient c/o having an anxiety attack. Vistaril is once a day at bedtime, requesting something to help calm her down. MD notified via FRUCT.   
Provider notified of elevated BP.   
Priority: Medium    Hyperthyroidism [E05.90] 08/03/2022     Priority: Medium    Mixed hyperlipidemia [E78.2] 08/03/2022     Priority: Medium    Diet-controlled diabetes mellitus (HCC) [E11.9] 07/28/2022     Priority: Medium    Multiple nodules of lung [R91.8] 07/28/2022     Priority: Medium    COPD (chronic obstructive pulmonary disease) (HCC) [J44.9] 07/14/2022     Priority: Medium    Primary hypertension [I10]      Priority: Medium    COPD exacerbation (HCC) [J44.1] 03/14/2024    Acute respiratory failure with hypoxia and hypercapnia (HCC) [J96.01, J96.02] 03/12/2024    Severe protein-calorie malnutrition (HCC) [E43] 03/12/2024    Iron deficiency anemia, unspecified [D50.9] 03/05/2024    COPD with acute exacerbation (HCC) [J44.1] 02/21/2024    Anemia [D64.9] 10/14/2023    Anxiety [F41.9] 06/26/2023         Palliative Care Encounter / Counseling Regarding Goals of Care:  Please see detailed goals of care discussion as below.  At this time, Lana MARGARITA Ferrisse, Does have capacity for medical decision-making.  Capacity is time limited and situation/question specific.  During encounter no surrogate medical decision-maker  Outcome of goals of care meeting:   Continue current care, get better, live longer  Continue with FULL code.  Symptom management -anxiety, breathing  She is hopeful to return home  Code status: Full Code    Advanced Directives: None in EPIC EMR   Surrogate/Legal NOK:   -  Ade Emily (Sibling): [phone]  -  Lana Reeves (NADJA): [phone]  Symptom Management:  Dyspnea/ air hunger related to Severe COPD:  Morphine oral susp 2.5 mg PO Q 4 hr PRN air hunger/dyspnea and to hold for respirations less 8/min and/or sedation.   Discussed risks, benefits, alternatives with patient- She is agreeable to try  PDMP reviewed and consistent.  Try first morphine sulfate for air hunger/dyspnea prior to anxiolytic.   Bowel Regimen  Last bowel movement 2-3 days ago  Senna  Anxiety:  Multifactorial - Likely due to 
role of PT, importance of functional mobility during hospital stay, safety with functional mobility    Patient response to education:   Pt verbalized understanding Pt demonstrated skill Pt requires further education in this area   yes partial yes     ASSESSMENT:    Conditions Requiring Skilled Therapeutic Intervention:    [x]Decreased strength     []Decreased ROM  [x]Decreased functional mobility  [x]Decreased balance   [x]Decreased endurance   [x]Decreased posture  []Decreased sensation  []Decreased coordination   []Decreased vision  [x]Decreased safety awareness   [x]Increased pain       Comments:  Pt supine in bed upon entering, pt agreeable to participate. Pt instructed to transfer to EOB, completing transfer with no physical assistance. Pt sitting upright with good sitting balance. Pt with no c/o dizziness with position change. Pt then cued for hand placement and instructed to stand from EOB. Pt standing with fair balance with ww. Pt instructed to ambulate to tolerance. Pt ambulating with decreased nargis and short step length, cueing provided for energy conservation. Pt demonstrated limited tolerance to ambulation due to SOB. Pt was assisted back to bedside and was transferred to bedside chair. Pt positioned for comfort with all needs met and call bell in reach prior to exiting.    Treatment:  Patient practiced and was instructed in the following treatment:    Bed mobility training - pt given verbal and tactile cues to facilitate proper sequencing and safety during rolling and supine>sit as well as provided with stand by assistance to complete task   STS and pivot transfer training - pt educated on proper hand and foot placement, safety and sequencing, and use of verbal and tactile cues to safely complete sit<>stand and pivot transfers with physical assistance to complete task safely   Gait training- pt was given verbal and tactile cues to facilitate pt safety with ww use during ambulation as well as provided 
(SENOKOT) tablet 8.6 mg  1 tablet Oral Daily PRN LacivLulú schmitz, APRN - CNP        acetaminophen (TYLENOL) tablet 650 mg  650 mg Oral Q6H PRN Lulú Llanes APRN - CNP   650 mg at 03/16/24 2138    Or    acetaminophen (TYLENOL) suppository 650 mg  650 mg Rectal Q6H PRN LacivitaLulú, APRN - CNP        albuterol (PROVENTIL) (2.5 MG/3ML) 0.083% nebulizer solution 2.5 mg  2.5 mg Nebulization 4x Daily Baldemar Jarrett, DO   2.5 mg at 03/17/24 1153    budesonide (PULMICORT) nebulizer suspension 500 mcg  0.5 mg Nebulization BID RT Jose Price MD   500 mcg at 03/17/24 0843    arformoterol tartrate (BROVANA) nebulizer solution 15 mcg  15 mcg Nebulization BID RT Jose Price MD   15 mcg at 03/17/24 0843       PRN Medications  morphine, perflutren lipid microspheres, hydrOXYzine pamoate, LORazepam, ipratropium 0.5 mg-albuterol 2.5 mg, prochlorperazine, sodium chloride flush, sodium chloride, potassium chloride **OR** potassium alternative oral replacement **OR** potassium chloride, magnesium sulfate, ondansetron **OR** ondansetron, senna, acetaminophen **OR** acetaminophen    Objective  Most Recent Recorded Vitals  /69   Pulse 77   Temp 97.3 °F (36.3 °C) (Temporal)   Resp 16   Ht 1.727 m (5' 8\")   Wt 41.2 kg (90 lb 13.3 oz)   SpO2 97%   BMI 13.81 kg/m²   I/O last 3 completed shifts:  In: -   Out: 400 [Urine:400]  No intake/output data recorded.    Physical Exam:  General: AAO to person/place/time/purpose, NAD, no labored breathing  Eyes: conjunctivae/corneas clear, sclera non icteric  Skin: color/texture/turgor normal, no rashes or lesions  Lungs: diminished air movement all fields   Heart: regular rate, regular rhythm, no murmur  Abdomen: soft, NT, bowel sounds normal  Extremities: atraumatic, no edema  Neurologic: cranial nerves 2-12 grossly intact, no slurred speech    Most Recent Labs  Lab Results   Component Value Date    WBC 8.0 03/13/2024    HGB 9.1 (L) 03/13/2024    HCT 31.7 (L) 03/13/2024 
(TYLENOL) suppository 650 mg  650 mg Rectal Q6H PRN Lulú Llanes, APRN - CNP        albuterol (PROVENTIL) (2.5 MG/3ML) 0.083% nebulizer solution 2.5 mg  2.5 mg Nebulization 4x Daily ErnestokarstenBaldemar MIRELLA, DO   2.5 mg at 03/14/24 0753    budesonide (PULMICORT) nebulizer suspension 500 mcg  0.5 mg Nebulization BID RT Jose Price MD   500 mcg at 03/14/24 0753    arformoterol tartrate (BROVANA) nebulizer solution 15 mcg  15 mcg Nebulization BID RT Jose Price MD   15 mcg at 03/14/24 0753       PRN Medications  perflutren lipid microspheres, hydrOXYzine pamoate, LORazepam, ipratropium 0.5 mg-albuterol 2.5 mg, prochlorperazine, sodium chloride flush, sodium chloride, potassium chloride **OR** potassium alternative oral replacement **OR** potassium chloride, magnesium sulfate, ondansetron **OR** ondansetron, senna, acetaminophen **OR** acetaminophen    Objective  Most Recent Recorded Vitals  BP (!) 145/61   Pulse 71   Temp 97.5 °F (36.4 °C) (Temporal)   Resp 18   Ht 1.727 m (5' 8\")   Wt 44.9 kg (99 lb)   SpO2 98%   BMI 15.05 kg/m²   I/O last 3 completed shifts:  In: 480 [P.O.:480]  Out: -   No intake/output data recorded.    Physical Exam:  General: AAO to person/place/time/purpose, NAD, no labored breathing  Eyes: conjunctivae/corneas clear, sclera non icteric  Skin: color/texture/turgor normal, no rashes or lesions  Lungs: diminished air movement all fields   Heart: regular rate, regular rhythm, no murmur  Abdomen: soft, NT, bowel sounds normal  Extremities: atraumatic, no edema  Neurologic: cranial nerves 2-12 grossly intact, no slurred speech    Most Recent Labs  Lab Results   Component Value Date    WBC 8.0 03/13/2024    HGB 9.1 (L) 03/13/2024    HCT 31.7 (L) 03/13/2024     03/13/2024     03/13/2024    K 4.8 03/13/2024    CL 95 (L) 03/13/2024    CREATININE 0.6 03/13/2024    BUN 16 03/13/2024    CO2 38 (H) 03/13/2024    GLUCOSE 166 (H) 03/13/2024    ALT 13 03/13/2024    AST 29 03/13/2024    
CNP        senna (SENOKOT) tablet 8.6 mg  1 tablet Oral Daily PRN LacLulú smiley APRN - CNP        acetaminophen (TYLENOL) tablet 650 mg  650 mg Oral Q6H PRN Lulú Llanes APRN - CNP   650 mg at 03/16/24 0934    Or    acetaminophen (TYLENOL) suppository 650 mg  650 mg Rectal Q6H PRN LacivLulú schmitz, APRN - CNP        albuterol (PROVENTIL) (2.5 MG/3ML) 0.083% nebulizer solution 2.5 mg  2.5 mg Nebulization 4x Daily Baldemar Jarrett, DO   2.5 mg at 03/16/24 1134    budesonide (PULMICORT) nebulizer suspension 500 mcg  0.5 mg Nebulization BID RT Jose Pirce MD   500 mcg at 03/15/24 1930    arformoterol tartrate (BROVANA) nebulizer solution 15 mcg  15 mcg Nebulization BID RT Jose Price MD   15 mcg at 03/15/24 1929       PRN Medications  morphine, perflutren lipid microspheres, hydrOXYzine pamoate, LORazepam, ipratropium 0.5 mg-albuterol 2.5 mg, prochlorperazine, sodium chloride flush, sodium chloride, potassium chloride **OR** potassium alternative oral replacement **OR** potassium chloride, magnesium sulfate, ondansetron **OR** ondansetron, senna, acetaminophen **OR** acetaminophen    Objective  Most Recent Recorded Vitals  /63   Pulse 79   Temp 98.8 °F (37.1 °C) (Temporal)   Resp 18   Ht 1.727 m (5' 8\")   Wt 43.5 kg (96 lb)   SpO2 94%   BMI 14.60 kg/m²   I/O last 3 completed shifts:  In: 360 [P.O.:360]  Out: 400 [Urine:400]  No intake/output data recorded.    Physical Exam:  General: AAO to person/place/time/purpose, NAD, no labored breathing  Eyes: conjunctivae/corneas clear, sclera non icteric  Skin: color/texture/turgor normal, no rashes or lesions  Lungs: diminished air movement all fields   Heart: regular rate, regular rhythm, no murmur  Abdomen: soft, NT, bowel sounds normal  Extremities: atraumatic, no edema  Neurologic: cranial nerves 2-12 grossly intact, no slurred speech    Most Recent Labs  Lab Results   Component Value Date    WBC 8.0 03/13/2024    HGB 9.1 (L) 03/13/2024    HCT 
minutes.      Thanks for letting us see this patient in consultation.  Please contact us with any questions. Office [phone] or after hours through [facility], x [phone].  l  
patient in consultation.  Please contact us with any questions. Office [phone] or after hours through [facility], x [phone].  l  
(L) 03/13/2024     03/13/2024     03/13/2024    K 4.8 03/13/2024    CL 95 (L) 03/13/2024    CREATININE 0.6 03/13/2024    BUN 16 03/13/2024    CO2 38 (H) 03/13/2024    GLUCOSE 166 (H) 03/13/2024    ALT 13 03/13/2024    AST 29 03/13/2024    INR 1.1 10/15/2023    APTT 45.7 (H) 01/10/2024    TSH 1.86 03/13/2024    LABA1C 5.5 12/25/2023       XR CHEST 1 VIEW   Final Result   No acute process.             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 (HCC) [I70.245]      Priority: High    Peripheral vascular disease, unspecified (HCC) [I73.9]      Priority: High    Atrial fibrillation (HCC) [I48.91]      Priority: Medium    Hyperthyroidism [E05.90]      Priority: Medium    Mixed hyperlipidemia [E78.2]      Priority: Medium    Diet-controlled diabetes mellitus (HCC) [E11.9]      Priority: Medium    Multiple nodules of lung [R91.8]      Priority: Medium    COPD (chronic obstructive pulmonary disease) (HCC) [J44.9]      Priority: Medium    Primary hypertension [I10]      Priority: Medium    COPD exacerbation (HCC) [J44.1]     Acute respiratory failure with hypoxia and hypercapnia (HCC) [J96.01, J96.02]     Severe protein-calorie malnutrition (HCC) [E43]     Iron deficiency anemia, unspecified [D50.9]     COPD with acute exacerbation (HCC) [J44.1]     Anemia [D64.9]     Anxiety [F41.9]          Plan  COPD   Still smoking tobacco   Baseline 3 L now on 7 liters  CXR no acute process  No WBC   pH is compensated   Chronic CO2 retention   Ambulate with pulse ox   Pulmonary input noted  Continue IV steroids today oral steroids to be started tomorrow wean oxygen as able    Severely Uncontrolled anxiety   Continue buspar and prn vistaril TID   Poor disease insight, still smoking   Check TSH and Free T4   Add as needed xanax hopefully short term   Palliative care consult is appreciated     Chest pain 
protein-calorie malnutrition (HCC) 06/26/2023    Thyroid disease     Uncontrolled hypertension        Past Surgical History:   Procedure Laterality Date    BACK SURGERY      x2    INVASIVE VASCULAR N/A 1/19/2024    Aortagram abdominal performed by Lazaro Vidales MD at [facility] CARDIAC CATH LAB    INVASIVE VASCULAR N/A 1/19/2024    Angioplasty peripheral artery performed by Lazaro Vidales MD at [facility] CARDIAC CATH LAB    LEFT OOPHORECTOMY      PAIN MANAGEMENT PROCEDURE N/A 12/28/2023    CAUDAL EPIDURAL STEROID INJECTION performed by Lilia Cabrera DO at [facility] OR    UPPER GASTROINTESTINAL ENDOSCOPY N/A 10/16/2023    EGD ESOPHAGOGASTRODUODENOSCOPY performed by Willie Chow DO at [facility] ENDOSCOPY       Family History   Problem Relation Age of Onset    Heart Disease Mother     Heart Disease Father     Dementia Father     Other Maternal Aunt         leukemia    Breast Cancer Paternal Aunt     Breast Cancer Maternal Grandmother     Diabetes Maternal Grandmother        Allergies   Allergen Reactions    Pcn [Penicillins] Other (See Comments)     Social History:  Denies alcohol or illicit drug use  Former smoker- she reports she quit 3 months ago    ROS: UNLESS STATED ABOVE PATIENT DENIES:  CONSTITUTIONAL:  fever, chills, fatigue.  HEENT: blurry vision, double vision, dysphagia, odynophagia  RESPIRATORY: cough, shortness of breath, sputum expectoration.  CARDIOVASCULAR:  Chest pain/pressure, palpitation, syncope   GASTROINTESTINAL:  abdominal pain, nausea, vomiting, diarrhea, constipation  GENITOURINARY:  Burning, frequency, urgency   INTEGUMENTARY: rash, wound, pruritis  HEMATOLOGIC/LYMPHATIC:  Swelling, sores, easy bleeding/ bruising   MUSCULOSKELETAL:  pain, edema, joint swelling or redness  NEUROLOGICAL:  headache, dizziness, loss of consciousness, weakness, change in memory, seizures, tremors  Psych: Anxiety    Physical Exam:  BP (!) 182/78   Pulse 87   Temp 97.7 °F (36.5 °C) (Temporal)   Resp 20   
dizziness, loss of consciousness, weakness, change in memory, seizures, tremors  Psych: Anxiety    Physical Exam:  BP (!) 141/83 Comment: notify the nurse  Pulse 83   Temp 98.4 °F (36.9 °C) (Temporal)   Resp 20   Ht 1.727 m (5' 8\")   Wt 42.6 kg (94 lb)   SpO2 90%   BMI 14.29 kg/m²     Gen:  Cachetic, ill-appearing, lying in bed, in no acute resp distress. No family present.   HEENT:  Normocephalic, atraumatic. Conjunctiva clear, PERRL. Oral mucosa moist  Neck:  Supple, trachea midline. No cervical lymphadenopathy.   Lungs:  4L HFNC. Diminished breath sounds bilaterally, mild wheezes bilaterally.   Heart: RRR, no murmur.  Abd:  BS present. Soft, no distension. No tenderness to palpation.   M/S/Ext:  Moves all limbs, no edema.  Skin:  Warm and dry  Neuro:  Lethargic, oriented x 3. Converses appropriately. No gross focal neurological deficit. No active tremulous or seizure-like activity.  Psych: Calm. She is not anxious or agitated at this time.     Objective data reviewed: labs, images, records, medication use, vitals, and chart    CXR 03/11/24  FINDINGS:  The lungs are without acute focal process.  There is no effusion or  pneumothorax. The cardiomediastinal silhouette is without acute process. The  osseous structures are without acute process.      Impression:       No acute process.           MDM/Time:  The total encounter time on this service date was 25 minutes, which was spent performing a face-to-face encounter and personally completing the provider-level activities documented in the note.  This includes time spent prior to the visit and after the visit in direct care of the patient.  This time does not include time spent in any separately reportable services.    Denisse Yarbrough,   Palliative Medicine    Patient and the plan of care discussed with the other IDT members of Palliative Care Team, and with consultants, Primary Attending, patient, family, and floor nurses, as appropriate and

## 2024-03-19 NOTE — CARE COORDINATION
Social Work/Case Management Transition of Care Planning (Marlin Jama -[phone]):  Discharge order noted.  Discharge plan is to [facility]. Transport via [facility] with an 11:30am  time.  Notified patient, floor nurse and Ava of [facility].  Patient indicated she will notify family.  PENNY/destination completed. 7000 completed. Discharge envelope with ambulance form is in the soft chart. ELIGIO Vital  3/19/2024

## 2024-03-22 LAB
ANION GAP SERPL CALCULATED.3IONS-SCNC: 6 MMOL/L (ref 7–16)
BASOPHILS # BLD: 0.01 K/UL (ref 0–0.2)
BASOPHILS NFR BLD: 0 % (ref 0–2)
BUN SERPL-MCNC: 12 MG/DL (ref 6–23)
CALCIUM SERPL-MCNC: 8.5 MG/DL (ref 8.6–10.2)
CHLORIDE SERPL-SCNC: 100 MMOL/L (ref 98–107)
CO2 SERPL-SCNC: 36 MMOL/L (ref 22–29)
CREAT SERPL-MCNC: 0.6 MG/DL (ref 0.5–1)
EOSINOPHIL # BLD: 0.16 K/UL (ref 0.05–0.5)
EOSINOPHILS RELATIVE PERCENT: 1 % (ref 0–6)
ERYTHROCYTE [DISTWIDTH] IN BLOOD BY AUTOMATED COUNT: 19.3 % (ref 11.5–15)
GFR SERPL CREATININE-BSD FRML MDRD: >60 ML/MIN/1.73M2
GLUCOSE SERPL-MCNC: 74 MG/DL (ref 74–99)
HCT VFR BLD AUTO: 27.3 % (ref 34–48)
HGB BLD-MCNC: 8.3 G/DL (ref 11.5–15.5)
IMM GRANULOCYTES # BLD AUTO: 0.14 K/UL (ref 0–0.58)
IMM GRANULOCYTES NFR BLD: 1 % (ref 0–5)
LYMPHOCYTES NFR BLD: 1.53 K/UL (ref 1.5–4)
LYMPHOCYTES RELATIVE PERCENT: 14 % (ref 20–42)
MCH RBC QN AUTO: 28.1 PG (ref 26–35)
MCHC RBC AUTO-ENTMCNC: 30.4 G/DL (ref 32–34.5)
MCV RBC AUTO: 92.5 FL (ref 80–99.9)
MONOCYTES NFR BLD: 1.04 K/UL (ref 0.1–0.95)
MONOCYTES NFR BLD: 9 % (ref 2–12)
NEUTROPHILS NFR BLD: 74 % (ref 43–80)
NEUTS SEG NFR BLD: 8.25 K/UL (ref 1.8–7.3)
PLATELET # BLD AUTO: 306 K/UL (ref 130–450)
PMV BLD AUTO: 10 FL (ref 7–12)
POTASSIUM SERPL-SCNC: 4.1 MMOL/L (ref 3.5–5)
RBC # BLD AUTO: 2.95 M/UL (ref 3.5–5.5)
SODIUM SERPL-SCNC: 142 MMOL/L (ref 132–146)
WBC OTHER # BLD: 11.1 K/UL (ref 4.5–11.5)

## 2024-03-27 LAB
25(OH)D3 SERPL-MCNC: 34.1 NG/ML (ref 30–100)
ALBUMIN SERPL-MCNC: 3.4 G/DL (ref 3.5–5.2)
ALP SERPL-CCNC: 66 U/L (ref 35–104)
ALT SERPL-CCNC: 31 U/L (ref 0–32)
ANION GAP SERPL CALCULATED.3IONS-SCNC: 6 MMOL/L (ref 7–16)
AST SERPL-CCNC: 35 U/L (ref 0–31)
BASOPHILS # BLD: 0.04 K/UL (ref 0–0.2)
BASOPHILS NFR BLD: 0 % (ref 0–2)
BILIRUB DIRECT SERPL-MCNC: <0.2 MG/DL (ref 0–0.3)
BILIRUB INDIRECT SERPL-MCNC: ABNORMAL MG/DL (ref 0–1)
BILIRUB SERPL-MCNC: <0.2 MG/DL (ref 0–1.2)
BUN SERPL-MCNC: 8 MG/DL (ref 6–23)
CALCIUM SERPL-MCNC: 9 MG/DL (ref 8.6–10.2)
CHLORIDE SERPL-SCNC: 102 MMOL/L (ref 98–107)
CHOLEST SERPL-MCNC: 187 MG/DL
CO2 SERPL-SCNC: 35 MMOL/L (ref 22–29)
CREAT SERPL-MCNC: 0.5 MG/DL (ref 0.5–1)
EOSINOPHIL # BLD: 0.13 K/UL (ref 0.05–0.5)
EOSINOPHILS RELATIVE PERCENT: 1 % (ref 0–6)
ERYTHROCYTE [DISTWIDTH] IN BLOOD BY AUTOMATED COUNT: 19.7 % (ref 11.5–15)
GFR SERPL CREATININE-BSD FRML MDRD: >90 ML/MIN/1.73M2
GLUCOSE SERPL-MCNC: 68 MG/DL (ref 74–99)
HCT VFR BLD AUTO: 28.2 % (ref 34–48)
HDLC SERPL-MCNC: 100 MG/DL
HGB BLD-MCNC: 8.3 G/DL (ref 11.5–15.5)
IMM GRANULOCYTES # BLD AUTO: 0.07 K/UL (ref 0–0.58)
IMM GRANULOCYTES NFR BLD: 1 % (ref 0–5)
LDLC SERPL CALC-MCNC: 72 MG/DL
LYMPHOCYTES NFR BLD: 1.35 K/UL (ref 1.5–4)
LYMPHOCYTES RELATIVE PERCENT: 13 % (ref 20–42)
MCH RBC QN AUTO: 27.3 PG (ref 26–35)
MCHC RBC AUTO-ENTMCNC: 29.4 G/DL (ref 32–34.5)
MCV RBC AUTO: 92.8 FL (ref 80–99.9)
MONOCYTES NFR BLD: 0.8 K/UL (ref 0.1–0.95)
MONOCYTES NFR BLD: 8 % (ref 2–12)
NEUTROPHILS NFR BLD: 77 % (ref 43–80)
NEUTS SEG NFR BLD: 7.91 K/UL (ref 1.8–7.3)
PLATELET # BLD AUTO: 330 K/UL (ref 130–450)
PMV BLD AUTO: 9.6 FL (ref 7–12)
POTASSIUM SERPL-SCNC: 4.1 MMOL/L (ref 3.5–5)
PROT SERPL-MCNC: 6.2 G/DL (ref 6.4–8.3)
RBC # BLD AUTO: 3.04 M/UL (ref 3.5–5.5)
SODIUM SERPL-SCNC: 143 MMOL/L (ref 132–146)
TRIGL SERPL-MCNC: 75 MG/DL
VLDLC SERPL CALC-MCNC: 15 MG/DL
WBC OTHER # BLD: 10.3 K/UL (ref 4.5–11.5)

## 2024-03-29 ENCOUNTER — OFFICE VISIT (OUTPATIENT)
Dept: PRIMARY CARE CLINIC | Age: 69
End: 2024-03-29

## 2024-03-29 ENCOUNTER — HOSPITAL ENCOUNTER (OUTPATIENT)
Dept: INFUSION THERAPY | Age: 69
Discharge: HOME | End: 2024-03-29
Payer: MEDICARE

## 2024-03-29 VITALS
OXYGEN SATURATION: 90 % | SYSTOLIC BLOOD PRESSURE: 126 MMHG | RESPIRATION RATE: 17 BRPM | HEART RATE: 92 BPM | DIASTOLIC BLOOD PRESSURE: 68 MMHG | TEMPERATURE: 97.5 F

## 2024-03-29 VITALS
SYSTOLIC BLOOD PRESSURE: 143 MMHG | RESPIRATION RATE: 20 BRPM | OXYGEN SATURATION: 98 % | DIASTOLIC BLOOD PRESSURE: 71 MMHG | HEART RATE: 85 BPM | TEMPERATURE: 98.4 F

## 2024-03-29 DIAGNOSIS — Z09 HOSPITAL DISCHARGE FOLLOW-UP: Primary | ICD-10-CM

## 2024-03-29 DIAGNOSIS — D64.9 ANEMIA, UNSPECIFIED TYPE: ICD-10-CM

## 2024-03-29 DIAGNOSIS — D50.9 IRON DEFICIENCY ANEMIA, UNSPECIFIED IRON DEFICIENCY ANEMIA TYPE: Primary | ICD-10-CM

## 2024-03-29 DIAGNOSIS — J43.8 OTHER EMPHYSEMA (HCC): Chronic | ICD-10-CM

## 2024-03-29 DIAGNOSIS — F41.9 ANXIETY: ICD-10-CM

## 2024-03-29 DIAGNOSIS — E43 SEVERE PROTEIN-CALORIE MALNUTRITION (HCC): Chronic | ICD-10-CM

## 2024-03-29 PROCEDURE — 2580000003 HC RX 258: Performed by: INTERNAL MEDICINE

## 2024-03-29 PROCEDURE — 6360000002 HC RX W HCPCS: Performed by: INTERNAL MEDICINE

## 2024-03-29 PROCEDURE — 96374 THER/PROPH/DIAG INJ IV PUSH: CPT

## 2024-03-29 RX ORDER — SODIUM CHLORIDE 9 MG/ML
5-250 INJECTION, SOLUTION INTRAVENOUS PRN
Status: DISCONTINUED | OUTPATIENT
Start: 2024-03-29 | End: 2024-03-30 | Stop reason: HOSPADM

## 2024-03-29 RX ORDER — EPINEPHRINE 1 MG/ML
0.3 INJECTION, SOLUTION, CONCENTRATE INTRAVENOUS PRN
OUTPATIENT
Start: 2024-04-01

## 2024-03-29 RX ORDER — BUSPIRONE HYDROCHLORIDE 15 MG/1
15 TABLET ORAL 3 TIMES DAILY PRN
Qty: 90 TABLET | Refills: 5 | Status: SHIPPED | OUTPATIENT
Start: 2024-03-29

## 2024-03-29 RX ORDER — SODIUM CHLORIDE 0.9 % (FLUSH) 0.9 %
5-40 SYRINGE (ML) INJECTION PRN
OUTPATIENT
Start: 2024-04-01

## 2024-03-29 RX ORDER — SODIUM CHLORIDE 0.9 % (FLUSH) 0.9 %
5-40 SYRINGE (ML) INJECTION PRN
Status: DISCONTINUED | OUTPATIENT
Start: 2024-03-29 | End: 2024-03-30 | Stop reason: HOSPADM

## 2024-03-29 RX ORDER — SODIUM CHLORIDE 9 MG/ML
5-250 INJECTION, SOLUTION INTRAVENOUS PRN
OUTPATIENT
Start: 2024-04-01

## 2024-03-29 RX ORDER — HEPARIN 100 UNIT/ML
500 SYRINGE INTRAVENOUS PRN
OUTPATIENT
Start: 2024-04-01

## 2024-03-29 RX ORDER — DIPHENHYDRAMINE HYDROCHLORIDE 50 MG/ML
50 INJECTION INTRAMUSCULAR; INTRAVENOUS
OUTPATIENT
Start: 2024-04-01

## 2024-03-29 RX ORDER — ALBUTEROL SULFATE 90 UG/1
4 AEROSOL, METERED RESPIRATORY (INHALATION) PRN
OUTPATIENT
Start: 2024-04-01

## 2024-03-29 RX ORDER — SODIUM CHLORIDE 9 MG/ML
INJECTION, SOLUTION INTRAVENOUS CONTINUOUS
OUTPATIENT
Start: 2024-04-01

## 2024-03-29 RX ORDER — ACETAMINOPHEN 325 MG/1
650 TABLET ORAL
OUTPATIENT
Start: 2024-04-01

## 2024-03-29 RX ORDER — ONDANSETRON 2 MG/ML
8 INJECTION INTRAMUSCULAR; INTRAVENOUS
OUTPATIENT
Start: 2024-04-01

## 2024-03-29 RX ADMIN — SODIUM CHLORIDE 200 ML/HR: 9 INJECTION, SOLUTION INTRAVENOUS at 14:12

## 2024-03-29 RX ADMIN — SODIUM CHLORIDE, PRESERVATIVE FREE 10 ML: 5 INJECTION INTRAVENOUS at 14:12

## 2024-03-29 RX ADMIN — IRON SUCROSE 200 MG: 20 INJECTION, SOLUTION INTRAVENOUS at 14:14

## 2024-03-29 NOTE — PROGRESS NOTES
Post-Discharge Transitional Care  Follow Up      Lana Phillips   YOB: 1955    Date of Office Visit:  3/29/2024  Date of Hospital Admission: 3/11/24  Date of Hospital Discharge: 3/19/24  Risk of hospital readmission (high >=14%. Medium >=10%) :Readmission Risk Score: 28.5      Care management risk score Rising risk (score 2-5) and Complex Care (Scores >=6): No Risk Score On File     Non face to face  following discharge, date last encounter closed (first attempt may have been earlier): *No documented post hospital discharge outreach found in the last 14 days    Call initiated 2 business days of discharge: *No response recorded in the last 14 days    ASSESSMENT/PLAN:   Hospital discharge follow-up  -     ME DISCHARGE MEDS RECONCILED W/ CURRENT OUTPATIENT MED LIST  Anxiety  -     busPIRone (BUSPAR) 15 MG tablet; Take 15 mg by mouth 3 times daily as needed (anxiety), Disp-90 tablet, R-5Print  Other emphysema (HCC)  Severe protein-calorie malnutrition (HCC)      Medical Decision Making: high complexity  Return in about 1 week (around 4/5/2024).    On this date 3/29/2024 I have spent 45 minutes reviewing previous notes, test results and face to face with the patient discussing the diagnosis and importance of compliance with the treatment plan as well as documenting on the day of the visit.       Subjective:   HPI:  Follow up of Hospital problems/diagnosis(es): COPD, anxiety    Inpatient course: Discharge summary reviewed- see chart.    Interval history/Current status: Patient presents unaccompanied today from assisted living wearing her oxygen.  She was recently discharged from March 20 with acute exacerbation of COPD and respiratory distress.        She has no chest pain.  She does get dyspneic on exertion but much better than before.  She has her oxygen on continuously.  She is very anxious she apparently has explained with the psychiatrist coming up.    Patient Active Problem List   Diagnosis

## 2024-03-29 NOTE — PROGRESS NOTES
Patient tolerated infusion well. Patient alert and oriented x 3. Vital signs stable. Patient denies any new or worsening pain. Pt had c/o of anxiety and SOB after coughing fit, O2 3L NC, resolved with relaxation and therapeutic breathing. She stated she was in a hurry for next appt. Patient denies questions regarding treatment or medication; verbalized understanding, offered patient information and discharge material; patient declined; Patient denies needs, all questions answered.

## 2024-04-03 LAB
ANION GAP SERPL CALCULATED.3IONS-SCNC: 10 MMOL/L (ref 7–16)
BASOPHILS # BLD: 0.05 K/UL (ref 0–0.2)
BASOPHILS NFR BLD: 1 % (ref 0–2)
BUN SERPL-MCNC: 9 MG/DL (ref 6–23)
CALCIUM SERPL-MCNC: 8.8 MG/DL (ref 8.6–10.2)
CHLORIDE SERPL-SCNC: 98 MMOL/L (ref 98–107)
CO2 SERPL-SCNC: 35 MMOL/L (ref 22–29)
CREAT SERPL-MCNC: 0.5 MG/DL (ref 0.5–1)
EOSINOPHIL # BLD: 0.15 K/UL (ref 0.05–0.5)
EOSINOPHILS RELATIVE PERCENT: 2 % (ref 0–6)
ERYTHROCYTE [DISTWIDTH] IN BLOOD BY AUTOMATED COUNT: 20.1 % (ref 11.5–15)
GFR SERPL CREATININE-BSD FRML MDRD: >90 ML/MIN/1.73M2
GLUCOSE SERPL-MCNC: 81 MG/DL (ref 74–99)
HCT VFR BLD AUTO: 26.1 % (ref 34–48)
HGB BLD-MCNC: 7.7 G/DL (ref 11.5–15.5)
IMM GRANULOCYTES # BLD AUTO: 0.05 K/UL (ref 0–0.58)
IMM GRANULOCYTES NFR BLD: 1 % (ref 0–5)
LYMPHOCYTES NFR BLD: 0.9 K/UL (ref 1.5–4)
LYMPHOCYTES RELATIVE PERCENT: 10 % (ref 20–42)
MCH RBC QN AUTO: 27.2 PG (ref 26–35)
MCHC RBC AUTO-ENTMCNC: 29.5 G/DL (ref 32–34.5)
MCV RBC AUTO: 92.2 FL (ref 80–99.9)
MONOCYTES NFR BLD: 0.75 K/UL (ref 0.1–0.95)
MONOCYTES NFR BLD: 8 % (ref 2–12)
NEUTROPHILS NFR BLD: 79 % (ref 43–80)
NEUTS SEG NFR BLD: 7.07 K/UL (ref 1.8–7.3)
PLATELET # BLD AUTO: 328 K/UL (ref 130–450)
PMV BLD AUTO: 9.6 FL (ref 7–12)
POTASSIUM SERPL-SCNC: 3.9 MMOL/L (ref 3.5–5)
RBC # BLD AUTO: 2.83 M/UL (ref 3.5–5.5)
RBC # BLD: ABNORMAL 10*6/UL
SODIUM SERPL-SCNC: 143 MMOL/L (ref 132–146)
WBC OTHER # BLD: 9 K/UL (ref 4.5–11.5)

## 2024-04-05 ENCOUNTER — TELEPHONE (OUTPATIENT)
Dept: PRIMARY CARE CLINIC | Age: 69
End: 2024-04-05

## 2024-04-05 ENCOUNTER — APPOINTMENT (OUTPATIENT)
Dept: GENERAL RADIOLOGY | Age: 69
DRG: 189 | End: 2024-04-05
Payer: MEDICARE

## 2024-04-05 ENCOUNTER — HOSPITAL ENCOUNTER (INPATIENT)
Age: 69
LOS: 5 days | Discharge: HOME | DRG: 189 | End: 2024-04-11
Attending: STUDENT IN AN ORGANIZED HEALTH CARE EDUCATION/TRAINING PROGRAM | Admitting: INTERNAL MEDICINE
Payer: MEDICARE

## 2024-04-05 DIAGNOSIS — D64.9 ACUTE ON CHRONIC ANEMIA: ICD-10-CM

## 2024-04-05 DIAGNOSIS — J44.1 COPD EXACERBATION (HCC): ICD-10-CM

## 2024-04-05 DIAGNOSIS — J06.9 VIRAL URI: ICD-10-CM

## 2024-04-05 DIAGNOSIS — J96.02 ACUTE RESPIRATORY FAILURE WITH HYPOXIA AND HYPERCARBIA (HCC): Primary | ICD-10-CM

## 2024-04-05 DIAGNOSIS — D64.9 NORMOCYTIC ANEMIA: ICD-10-CM

## 2024-04-05 DIAGNOSIS — J96.01 ACUTE RESPIRATORY FAILURE WITH HYPOXIA AND HYPERCARBIA (HCC): Primary | ICD-10-CM

## 2024-04-05 DIAGNOSIS — B34.8 RHINOVIRUS INFECTION: ICD-10-CM

## 2024-04-05 LAB
ALBUMIN SERPL-MCNC: 3.4 G/DL (ref 3.5–5.2)
ALP SERPL-CCNC: 73 U/L (ref 35–104)
ALT SERPL-CCNC: 15 U/L (ref 0–32)
ANION GAP SERPL CALCULATED.3IONS-SCNC: 3 MMOL/L (ref 7–16)
AST SERPL-CCNC: 23 U/L (ref 0–31)
B PARAP IS1001 DNA NPH QL NAA+NON-PROBE: NOT DETECTED
B PERT DNA SPEC QL NAA+PROBE: NOT DETECTED
B.E.: 12.4 MMOL/L (ref -3–3)
BASOPHILS # BLD: 0.08 K/UL (ref 0–0.2)
BASOPHILS NFR BLD: 1 % (ref 0–2)
BILIRUB SERPL-MCNC: <0.2 MG/DL (ref 0–1.2)
BNP SERPL-MCNC: 380 PG/ML (ref 0–125)
BUN SERPL-MCNC: 8 MG/DL (ref 6–23)
C PNEUM DNA NPH QL NAA+NON-PROBE: NOT DETECTED
CALCIUM SERPL-MCNC: 9 MG/DL (ref 8.6–10.2)
CHLORIDE SERPL-SCNC: 97 MMOL/L (ref 98–107)
CO2 SERPL-SCNC: 40 MMOL/L (ref 22–29)
COHB: 1.1 % (ref 0–1.5)
CREAT SERPL-MCNC: 0.5 MG/DL (ref 0.5–1)
CRITICAL: ABNORMAL
DATE ANALYZED: ABNORMAL
DATE OF COLLECTION: ABNORMAL
EOSINOPHIL # BLD: 0.16 K/UL (ref 0.05–0.5)
EOSINOPHILS RELATIVE PERCENT: 2 % (ref 0–6)
ERYTHROCYTE [DISTWIDTH] IN BLOOD BY AUTOMATED COUNT: 21.9 % (ref 11.5–15)
FLUAV RNA NPH QL NAA+NON-PROBE: NOT DETECTED
FLUBV RNA NPH QL NAA+NON-PROBE: NOT DETECTED
GFR SERPL CREATININE-BSD FRML MDRD: >90 ML/MIN/1.73M2
GLUCOSE SERPL-MCNC: 145 MG/DL (ref 74–99)
HADV DNA NPH QL NAA+NON-PROBE: NOT DETECTED
HCO3: 39.8 MMOL/L (ref 22–26)
HCOV 229E RNA NPH QL NAA+NON-PROBE: NOT DETECTED
HCOV HKU1 RNA NPH QL NAA+NON-PROBE: NOT DETECTED
HCOV NL63 RNA NPH QL NAA+NON-PROBE: NOT DETECTED
HCOV OC43 RNA NPH QL NAA+NON-PROBE: NOT DETECTED
HCT VFR BLD AUTO: 23.5 % (ref 34–48)
HGB BLD-MCNC: 7.2 G/DL (ref 11.5–15.5)
HHB: 7.8 % (ref 0–5)
HMPV RNA NPH QL NAA+NON-PROBE: NOT DETECTED
HPIV1 RNA NPH QL NAA+NON-PROBE: NOT DETECTED
HPIV2 RNA NPH QL NAA+NON-PROBE: NOT DETECTED
HPIV3 RNA NPH QL NAA+NON-PROBE: NOT DETECTED
HPIV4 RNA NPH QL NAA+NON-PROBE: NOT DETECTED
LAB: ABNORMAL
LYMPHOCYTES NFR BLD: 1.25 K/UL (ref 1.5–4)
LYMPHOCYTES RELATIVE PERCENT: 14 % (ref 20–42)
Lab: 2010
M PNEUMO DNA NPH QL NAA+NON-PROBE: NOT DETECTED
MCH RBC QN AUTO: 27.5 PG (ref 26–35)
MCHC RBC AUTO-ENTMCNC: 30.6 G/DL (ref 32–34.5)
MCV RBC AUTO: 89.7 FL (ref 80–99.9)
METHB: 0.3 % (ref 0–1.5)
MONOCYTES NFR BLD: 0.47 K/UL (ref 0.1–0.95)
MONOCYTES NFR BLD: 5 % (ref 2–12)
NEUTROPHILS NFR BLD: 78 % (ref 43–80)
NEUTS SEG NFR BLD: 7.04 K/UL (ref 1.8–7.3)
O2 SATURATION: 92.1 % (ref 92–98.5)
O2HB: 90.8 % (ref 94–97)
OPERATOR ID: ABNORMAL
PATIENT TEMP: 37 C
PCO2: 74.3 MMHG (ref 35–45)
PH BLOOD GAS: 7.35 (ref 7.35–7.45)
PLATELET # BLD AUTO: 383 K/UL (ref 130–450)
PMV BLD AUTO: 10.3 FL (ref 7–12)
PO2: 71.9 MMHG (ref 75–100)
POTASSIUM SERPL-SCNC: 3.9 MMOL/L (ref 3.5–5)
PROT SERPL-MCNC: 6.5 G/DL (ref 6.4–8.3)
RBC # BLD AUTO: 2.62 M/UL (ref 3.5–5.5)
RBC # BLD: ABNORMAL 10*6/UL
RSV RNA NPH QL NAA+NON-PROBE: NOT DETECTED
RV+EV RNA NPH QL NAA+NON-PROBE: DETECTED
SARS-COV-2 RNA NPH QL NAA+NON-PROBE: NOT DETECTED
SODIUM SERPL-SCNC: 140 MMOL/L (ref 132–146)
SOURCE, BLOOD GAS: ABNORMAL
SPECIMEN DESCRIPTION: ABNORMAL
THB: 8.2 G/DL (ref 11.5–16.5)
TIME ANALYZED: 2030
TROPONIN I SERPL HS-MCNC: 27 NG/L (ref 0–9)
WBC OTHER # BLD: 9 K/UL (ref 4.5–11.5)

## 2024-04-05 PROCEDURE — 83880 ASSAY OF NATRIURETIC PEPTIDE: CPT

## 2024-04-05 PROCEDURE — 6370000000 HC RX 637 (ALT 250 FOR IP): Performed by: STUDENT IN AN ORGANIZED HEALTH CARE EDUCATION/TRAINING PROGRAM

## 2024-04-05 PROCEDURE — 96375 TX/PRO/DX INJ NEW DRUG ADDON: CPT

## 2024-04-05 PROCEDURE — 99285 EMERGENCY DEPT VISIT HI MDM: CPT

## 2024-04-05 PROCEDURE — 71045 X-RAY EXAM CHEST 1 VIEW: CPT

## 2024-04-05 PROCEDURE — 5A09357 ASSISTANCE WITH RESPIRATORY VENTILATION, LESS THAN 24 CONSECUTIVE HOURS, CONTINUOUS POSITIVE AIRWAY PRESSURE: ICD-10-PCS | Performed by: STUDENT IN AN ORGANIZED HEALTH CARE EDUCATION/TRAINING PROGRAM

## 2024-04-05 PROCEDURE — 82805 BLOOD GASES W/O2 SATURATION: CPT

## 2024-04-05 PROCEDURE — 85025 COMPLETE CBC W/AUTO DIFF WBC: CPT

## 2024-04-05 PROCEDURE — 0202U NFCT DS 22 TRGT SARS-COV-2: CPT

## 2024-04-05 PROCEDURE — 6360000002 HC RX W HCPCS: Performed by: STUDENT IN AN ORGANIZED HEALTH CARE EDUCATION/TRAINING PROGRAM

## 2024-04-05 PROCEDURE — 2580000003 HC RX 258: Performed by: STUDENT IN AN ORGANIZED HEALTH CARE EDUCATION/TRAINING PROGRAM

## 2024-04-05 PROCEDURE — 93005 ELECTROCARDIOGRAM TRACING: CPT

## 2024-04-05 PROCEDURE — 84484 ASSAY OF TROPONIN QUANT: CPT

## 2024-04-05 PROCEDURE — 80053 COMPREHEN METABOLIC PANEL: CPT

## 2024-04-05 PROCEDURE — 94640 AIRWAY INHALATION TREATMENT: CPT

## 2024-04-05 PROCEDURE — 94660 CPAP INITIATION&MGMT: CPT

## 2024-04-05 PROCEDURE — 96365 THER/PROPH/DIAG IV INF INIT: CPT

## 2024-04-05 RX ORDER — IPRATROPIUM BROMIDE AND ALBUTEROL SULFATE 2.5; .5 MG/3ML; MG/3ML
3 SOLUTION RESPIRATORY (INHALATION) ONCE
Status: COMPLETED | OUTPATIENT
Start: 2024-04-05 | End: 2024-04-05

## 2024-04-05 RX ORDER — MAGNESIUM SULFATE IN WATER 40 MG/ML
2000 INJECTION, SOLUTION INTRAVENOUS ONCE
Status: COMPLETED | OUTPATIENT
Start: 2024-04-05 | End: 2024-04-05

## 2024-04-05 RX ADMIN — WATER 125 MG: 1 INJECTION INTRAMUSCULAR; INTRAVENOUS; SUBCUTANEOUS at 19:46

## 2024-04-05 RX ADMIN — IPRATROPIUM BROMIDE AND ALBUTEROL SULFATE 3 DOSE: .5; 3 SOLUTION RESPIRATORY (INHALATION) at 21:11

## 2024-04-05 RX ADMIN — MAGNESIUM SULFATE HEPTAHYDRATE 2000 MG: 40 INJECTION, SOLUTION INTRAVENOUS at 19:49

## 2024-04-05 ASSESSMENT — PAIN DESCRIPTION - ONSET: ONSET: ON-GOING

## 2024-04-05 ASSESSMENT — PAIN SCALES - GENERAL: PAINLEVEL_OUTOF10: 4

## 2024-04-05 ASSESSMENT — PAIN DESCRIPTION - PAIN TYPE: TYPE: ACUTE PAIN

## 2024-04-05 ASSESSMENT — PAIN DESCRIPTION - ORIENTATION: ORIENTATION: RIGHT

## 2024-04-05 ASSESSMENT — PAIN DESCRIPTION - DESCRIPTORS: DESCRIPTORS: TIGHTNESS

## 2024-04-05 ASSESSMENT — PAIN DESCRIPTION - FREQUENCY: FREQUENCY: CONTINUOUS

## 2024-04-05 ASSESSMENT — PAIN DESCRIPTION - LOCATION: LOCATION: CHEST

## 2024-04-05 ASSESSMENT — PAIN - FUNCTIONAL ASSESSMENT: PAIN_FUNCTIONAL_ASSESSMENT: 0-10

## 2024-04-05 NOTE — TELEPHONE ENCOUNTER
Pt says she doesn't feel ready to go home from rehab yet, says that you need to call rehab and tell them she's not ready to go home. She says they want her out today.  She is in [facility], ph. [phone]  I advised pt to contact the Rehab Dr to discuss this with him and she said she would try to get ahold of him but hasn't seen him since she's been in there.

## 2024-04-06 LAB
AADO2: 169.7 MMHG
ANION GAP SERPL CALCULATED.3IONS-SCNC: 6 MMOL/L (ref 7–16)
B.E.: 11.1 MMOL/L (ref -3–3)
BASOPHILS # BLD: 0.06 K/UL (ref 0–0.2)
BASOPHILS NFR BLD: 1 % (ref 0–2)
BUN SERPL-MCNC: 12 MG/DL (ref 6–23)
CALCIUM SERPL-MCNC: 8.7 MG/DL (ref 8.6–10.2)
CHLORIDE SERPL-SCNC: 96 MMOL/L (ref 98–107)
CO2 SERPL-SCNC: 36 MMOL/L (ref 22–29)
COHB: 1.1 % (ref 0–1.5)
CREAT SERPL-MCNC: 0.6 MG/DL (ref 0.5–1)
CRITICAL: ABNORMAL
DATE ANALYZED: ABNORMAL
DATE OF COLLECTION: ABNORMAL
EOSINOPHIL # BLD: 0 K/UL (ref 0.05–0.5)
EOSINOPHILS RELATIVE PERCENT: 0 % (ref 0–6)
ERYTHROCYTE [DISTWIDTH] IN BLOOD BY AUTOMATED COUNT: 20.6 % (ref 11.5–15)
FIO2: 60 %
GFR SERPL CREATININE-BSD FRML MDRD: >90 ML/MIN/1.73M2
GLUCOSE SERPL-MCNC: 108 MG/DL (ref 74–99)
HCO3: 38.5 MMOL/L (ref 22–26)
HCT VFR BLD AUTO: 24.6 % (ref 34–48)
HGB BLD-MCNC: 7.2 G/DL (ref 11.5–15.5)
HHB: 0.7 % (ref 0–5)
LAB: ABNORMAL
LYMPHOCYTES NFR BLD: 0.99 K/UL (ref 1.5–4)
LYMPHOCYTES RELATIVE PERCENT: 14 % (ref 20–42)
Lab: 111
MCH RBC QN AUTO: 27 PG (ref 26–35)
MCHC RBC AUTO-ENTMCNC: 29.3 G/DL (ref 32–34.5)
MCV RBC AUTO: 92.1 FL (ref 80–99.9)
METHB: 0.4 % (ref 0–1.5)
MODE: ABNORMAL
MONOCYTES NFR BLD: 0.19 K/UL (ref 0.1–0.95)
MONOCYTES NFR BLD: 3 % (ref 2–12)
NEUTROPHILS NFR BLD: 83 % (ref 43–80)
NEUTS SEG NFR BLD: 5.87 K/UL (ref 1.8–7.3)
NUCLEATED RED BLOOD CELLS: 1 PER 100 WBC
O2 CONTENT: 12 ML/DL
O2 SATURATION: 99.3 % (ref 92–98.5)
O2HB: 97.8 % (ref 94–97)
OPERATOR ID: 7221
PATIENT TEMP: 37 C
PCO2: 72.4 MMHG (ref 35–45)
PEEP/CPAP: 5 CMH2O
PFO2: 2.97 MMHG/%
PH BLOOD GAS: 7.34 (ref 7.35–7.45)
PIP: 10 CMH2O
PLATELET # BLD AUTO: 448 K/UL (ref 130–450)
PMV BLD AUTO: 9.5 FL (ref 7–12)
PO2: 178.5 MMHG (ref 75–100)
POTASSIUM SERPL-SCNC: 4.9 MMOL/L (ref 3.5–5)
RBC # BLD AUTO: 2.67 M/UL (ref 3.5–5.5)
RBC # BLD: ABNORMAL 10*6/UL
RI(T): 0.95
SODIUM SERPL-SCNC: 138 MMOL/L (ref 132–146)
SOURCE, BLOOD GAS: ABNORMAL
THB: 8.4 G/DL (ref 11.5–16.5)
TIME ANALYZED: 115
TROPONIN I SERPL HS-MCNC: 20 NG/L (ref 0–9)
WBC OTHER # BLD: 7.1 K/UL (ref 4.5–11.5)

## 2024-04-06 PROCEDURE — 94660 CPAP INITIATION&MGMT: CPT

## 2024-04-06 PROCEDURE — 6370000000 HC RX 637 (ALT 250 FOR IP): Performed by: STUDENT IN AN ORGANIZED HEALTH CARE EDUCATION/TRAINING PROGRAM

## 2024-04-06 PROCEDURE — 1200000000 HC SEMI PRIVATE

## 2024-04-06 PROCEDURE — 6370000000 HC RX 637 (ALT 250 FOR IP): Performed by: INTERNAL MEDICINE

## 2024-04-06 PROCEDURE — 80048 BASIC METABOLIC PNL TOTAL CA: CPT

## 2024-04-06 PROCEDURE — 2580000003 HC RX 258: Performed by: INTERNAL MEDICINE

## 2024-04-06 PROCEDURE — 84484 ASSAY OF TROPONIN QUANT: CPT

## 2024-04-06 PROCEDURE — 82805 BLOOD GASES W/O2 SATURATION: CPT

## 2024-04-06 PROCEDURE — 85025 COMPLETE CBC W/AUTO DIFF WBC: CPT

## 2024-04-06 PROCEDURE — 6360000002 HC RX W HCPCS: Performed by: INTERNAL MEDICINE

## 2024-04-06 PROCEDURE — 6360000002 HC RX W HCPCS: Performed by: STUDENT IN AN ORGANIZED HEALTH CARE EDUCATION/TRAINING PROGRAM

## 2024-04-06 PROCEDURE — 94640 AIRWAY INHALATION TREATMENT: CPT

## 2024-04-06 RX ORDER — POTASSIUM CHLORIDE 20 MEQ/1
40 TABLET, EXTENDED RELEASE ORAL PRN
Status: DISCONTINUED | OUTPATIENT
Start: 2024-04-06 | End: 2024-04-11 | Stop reason: HOSPADM

## 2024-04-06 RX ORDER — BUSPIRONE HYDROCHLORIDE 10 MG/1
10 TABLET ORAL 3 TIMES DAILY
Status: ON HOLD | COMMUNITY
End: 2024-04-11 | Stop reason: HOSPADM

## 2024-04-06 RX ORDER — IPRATROPIUM BROMIDE AND ALBUTEROL SULFATE 2.5; .5 MG/3ML; MG/3ML
3 SOLUTION RESPIRATORY (INHALATION) ONCE
Status: COMPLETED | OUTPATIENT
Start: 2024-04-06 | End: 2024-04-06

## 2024-04-06 RX ORDER — DILTIAZEM HYDROCHLORIDE 120 MG/1
120 CAPSULE, COATED, EXTENDED RELEASE ORAL DAILY
Status: DISCONTINUED | OUTPATIENT
Start: 2024-04-06 | End: 2024-04-11 | Stop reason: HOSPADM

## 2024-04-06 RX ORDER — TRAZODONE HYDROCHLORIDE 50 MG/1
100 TABLET ORAL NIGHTLY
Status: DISCONTINUED | OUTPATIENT
Start: 2024-04-06 | End: 2024-04-11 | Stop reason: HOSPADM

## 2024-04-06 RX ORDER — ALBUTEROL SULFATE 2.5 MG/3ML
2.5 SOLUTION RESPIRATORY (INHALATION)
Status: DISCONTINUED | OUTPATIENT
Start: 2024-04-06 | End: 2024-04-11 | Stop reason: HOSPADM

## 2024-04-06 RX ORDER — LORAZEPAM 2 MG/ML
1 INJECTION INTRAMUSCULAR ONCE
Status: COMPLETED | OUTPATIENT
Start: 2024-04-06 | End: 2024-04-06

## 2024-04-06 RX ORDER — ATORVASTATIN CALCIUM 40 MG/1
40 TABLET, FILM COATED ORAL NIGHTLY
Status: DISCONTINUED | OUTPATIENT
Start: 2024-04-06 | End: 2024-04-11 | Stop reason: HOSPADM

## 2024-04-06 RX ORDER — BISACODYL 10 MG
10 SUPPOSITORY, RECTAL RECTAL DAILY PRN
Status: ON HOLD | COMMUNITY

## 2024-04-06 RX ORDER — ASPIRIN 81 MG/1
81 TABLET ORAL DAILY
Status: DISCONTINUED | OUTPATIENT
Start: 2024-04-06 | End: 2024-04-11 | Stop reason: HOSPADM

## 2024-04-06 RX ORDER — BUDESONIDE 0.5 MG/2ML
0.5 INHALANT ORAL
Status: DISCONTINUED | OUTPATIENT
Start: 2024-04-06 | End: 2024-04-11 | Stop reason: HOSPADM

## 2024-04-06 RX ORDER — SODIUM CHLORIDE 0.9 % (FLUSH) 0.9 %
5-40 SYRINGE (ML) INJECTION EVERY 12 HOURS SCHEDULED
Status: DISCONTINUED | OUTPATIENT
Start: 2024-04-06 | End: 2024-04-11 | Stop reason: HOSPADM

## 2024-04-06 RX ORDER — PANTOPRAZOLE SODIUM 40 MG/1
40 TABLET, DELAYED RELEASE ORAL
Status: DISCONTINUED | OUTPATIENT
Start: 2024-04-07 | End: 2024-04-11 | Stop reason: HOSPADM

## 2024-04-06 RX ORDER — ACETAMINOPHEN 325 MG/1
650 TABLET ORAL EVERY 6 HOURS PRN
Status: DISCONTINUED | OUTPATIENT
Start: 2024-04-06 | End: 2024-04-11 | Stop reason: HOSPADM

## 2024-04-06 RX ORDER — MAGNESIUM SULFATE IN WATER 40 MG/ML
2000 INJECTION, SOLUTION INTRAVENOUS PRN
Status: DISCONTINUED | OUTPATIENT
Start: 2024-04-06 | End: 2024-04-11 | Stop reason: HOSPADM

## 2024-04-06 RX ORDER — PREGABALIN 50 MG/1
50 CAPSULE ORAL 3 TIMES DAILY
Status: DISCONTINUED | OUTPATIENT
Start: 2024-04-06 | End: 2024-04-11 | Stop reason: HOSPADM

## 2024-04-06 RX ORDER — PROCHLORPERAZINE MALEATE 10 MG
10 TABLET ORAL EVERY 6 HOURS PRN
Status: DISCONTINUED | OUTPATIENT
Start: 2024-04-06 | End: 2024-04-11 | Stop reason: HOSPADM

## 2024-04-06 RX ORDER — POLYETHYLENE GLYCOL 3350 17 G/17G
17 POWDER, FOR SOLUTION ORAL DAILY
Status: DISCONTINUED | OUTPATIENT
Start: 2024-04-06 | End: 2024-04-11 | Stop reason: HOSPADM

## 2024-04-06 RX ORDER — CITALOPRAM 20 MG/1
20 TABLET ORAL DAILY
Status: DISCONTINUED | OUTPATIENT
Start: 2024-04-06 | End: 2024-04-11 | Stop reason: HOSPADM

## 2024-04-06 RX ORDER — HYDROXYZINE PAMOATE 25 MG/1
25 CAPSULE ORAL 3 TIMES DAILY PRN
Status: DISCONTINUED | OUTPATIENT
Start: 2024-04-06 | End: 2024-04-07

## 2024-04-06 RX ORDER — METHIMAZOLE 5 MG/1
15 TABLET ORAL DAILY
Status: DISCONTINUED | OUTPATIENT
Start: 2024-04-06 | End: 2024-04-11 | Stop reason: HOSPADM

## 2024-04-06 RX ORDER — SENNA AND DOCUSATE SODIUM 50; 8.6 MG/1; MG/1
1 TABLET, FILM COATED ORAL DAILY
Status: DISCONTINUED | OUTPATIENT
Start: 2024-04-06 | End: 2024-04-11 | Stop reason: HOSPADM

## 2024-04-06 RX ORDER — SODIUM CHLORIDE 0.9 % (FLUSH) 0.9 %
5-40 SYRINGE (ML) INJECTION PRN
Status: DISCONTINUED | OUTPATIENT
Start: 2024-04-06 | End: 2024-04-11 | Stop reason: HOSPADM

## 2024-04-06 RX ORDER — POTASSIUM CHLORIDE 7.45 MG/ML
10 INJECTION INTRAVENOUS PRN
Status: DISCONTINUED | OUTPATIENT
Start: 2024-04-06 | End: 2024-04-11 | Stop reason: HOSPADM

## 2024-04-06 RX ORDER — SODIUM CHLORIDE 9 MG/ML
INJECTION, SOLUTION INTRAVENOUS PRN
Status: DISCONTINUED | OUTPATIENT
Start: 2024-04-06 | End: 2024-04-11 | Stop reason: HOSPADM

## 2024-04-06 RX ORDER — BUSPIRONE HYDROCHLORIDE 15 MG/1
15 TABLET ORAL EVERY 8 HOURS PRN
Status: ON HOLD | COMMUNITY

## 2024-04-06 RX ORDER — ARFORMOTEROL TARTRATE 15 UG/2ML
15 SOLUTION RESPIRATORY (INHALATION)
Status: DISCONTINUED | OUTPATIENT
Start: 2024-04-06 | End: 2024-04-11 | Stop reason: HOSPADM

## 2024-04-06 RX ORDER — GUAIFENESIN 400 MG/1
400 TABLET ORAL 3 TIMES DAILY
Status: DISCONTINUED | OUTPATIENT
Start: 2024-04-06 | End: 2024-04-11 | Stop reason: HOSPADM

## 2024-04-06 RX ORDER — POLYETHYLENE GLYCOL 3350 17 G/17G
17 POWDER, FOR SOLUTION ORAL DAILY PRN
Status: DISCONTINUED | OUTPATIENT
Start: 2024-04-06 | End: 2024-04-11 | Stop reason: HOSPADM

## 2024-04-06 RX ORDER — ROSUVASTATIN CALCIUM 20 MG/1
20 TABLET, COATED ORAL DAILY
Status: ON HOLD | COMMUNITY

## 2024-04-06 RX ADMIN — DILTIAZEM HYDROCHLORIDE 120 MG: 120 CAPSULE, EXTENDED RELEASE ORAL at 13:15

## 2024-04-06 RX ADMIN — SENNOSIDES AND DOCUSATE SODIUM 1 TABLET: 8.6; 5 TABLET ORAL at 11:53

## 2024-04-06 RX ADMIN — GUAIFENESIN 400 MG: 400 TABLET ORAL at 13:15

## 2024-04-06 RX ADMIN — ATORVASTATIN CALCIUM 40 MG: 40 TABLET, FILM COATED ORAL at 20:04

## 2024-04-06 RX ADMIN — IPRATROPIUM BROMIDE 0.5 MG: 0.5 SOLUTION RESPIRATORY (INHALATION) at 11:28

## 2024-04-06 RX ADMIN — APIXABAN 5 MG: 5 TABLET, FILM COATED ORAL at 20:05

## 2024-04-06 RX ADMIN — LORAZEPAM 1 MG: 2 INJECTION INTRAMUSCULAR; INTRAVENOUS at 03:04

## 2024-04-06 RX ADMIN — BUSPIRONE HYDROCHLORIDE 15 MG: 5 TABLET ORAL at 11:08

## 2024-04-06 RX ADMIN — PREGABALIN 50 MG: 50 CAPSULE ORAL at 20:05

## 2024-04-06 RX ADMIN — ASPIRIN 81 MG: 81 TABLET, COATED ORAL at 11:08

## 2024-04-06 RX ADMIN — SODIUM CHLORIDE, PRESERVATIVE FREE 10 ML: 5 INJECTION INTRAVENOUS at 20:10

## 2024-04-06 RX ADMIN — IPRATROPIUM BROMIDE 0.5 MG: 0.5 SOLUTION RESPIRATORY (INHALATION) at 20:26

## 2024-04-06 RX ADMIN — ARFORMOTEROL TARTRATE 15 MCG: 15 SOLUTION RESPIRATORY (INHALATION) at 11:29

## 2024-04-06 RX ADMIN — TRAZODONE HYDROCHLORIDE 100 MG: 50 TABLET ORAL at 20:04

## 2024-04-06 RX ADMIN — CITALOPRAM HYDROBROMIDE 20 MG: 20 TABLET ORAL at 11:08

## 2024-04-06 RX ADMIN — ALBUTEROL SULFATE 2.5 MG: 2.5 SOLUTION RESPIRATORY (INHALATION) at 20:26

## 2024-04-06 RX ADMIN — PREGABALIN 50 MG: 50 CAPSULE ORAL at 13:15

## 2024-04-06 RX ADMIN — BUDESONIDE INHALATION 500 MCG: 0.5 SUSPENSION RESPIRATORY (INHALATION) at 11:29

## 2024-04-06 RX ADMIN — BUDESONIDE INHALATION 500 MCG: 0.5 SUSPENSION RESPIRATORY (INHALATION) at 20:26

## 2024-04-06 RX ADMIN — ALBUTEROL SULFATE 2.5 MG: 2.5 SOLUTION RESPIRATORY (INHALATION) at 11:28

## 2024-04-06 RX ADMIN — HYDROXYZINE PAMOATE 25 MG: 25 CAPSULE ORAL at 12:24

## 2024-04-06 RX ADMIN — POLYETHYLENE GLYCOL 3350 17 G: 17 POWDER, FOR SOLUTION ORAL at 11:53

## 2024-04-06 RX ADMIN — ALBUTEROL SULFATE 2.5 MG: 2.5 SOLUTION RESPIRATORY (INHALATION) at 16:07

## 2024-04-06 RX ADMIN — GUAIFENESIN 400 MG: 400 TABLET ORAL at 20:04

## 2024-04-06 RX ADMIN — SODIUM CHLORIDE, PRESERVATIVE FREE 10 ML: 5 INJECTION INTRAVENOUS at 11:09

## 2024-04-06 RX ADMIN — ACETAMINOPHEN 650 MG: 325 TABLET ORAL at 09:32

## 2024-04-06 RX ADMIN — ARFORMOTEROL TARTRATE 15 MCG: 15 SOLUTION RESPIRATORY (INHALATION) at 20:26

## 2024-04-06 RX ADMIN — IPRATROPIUM BROMIDE 0.5 MG: 0.5 SOLUTION RESPIRATORY (INHALATION) at 16:07

## 2024-04-06 RX ADMIN — IPRATROPIUM BROMIDE AND ALBUTEROL SULFATE 3 DOSE: .5; 2.5 SOLUTION RESPIRATORY (INHALATION) at 01:31

## 2024-04-06 RX ADMIN — APIXABAN 5 MG: 5 TABLET, FILM COATED ORAL at 11:53

## 2024-04-06 ASSESSMENT — PAIN DESCRIPTION - LOCATION: LOCATION: HEAD

## 2024-04-06 ASSESSMENT — PAIN SCALES - GENERAL
PAINLEVEL_OUTOF10: 0
PAINLEVEL_OUTOF10: 4

## 2024-04-06 NOTE — ED NOTES
Pt insisting on ambulating to the restroom. Pt has used the bed pan twice without complication. Pt adamant that she is unable to use the bedpan again and that she is ok to walk. Pt has not let respiratory put a the NIPPV on her because she has been waiting to walk to the restroom. Provider made aware. Risks explained to pt and pt states she understand and wants to ambulate to the restroom anyway. Pt assisted to ambulate to the restroom with 4L nc on.

## 2024-04-06 NOTE — ED PROVIDER NOTES
PO2/FIO2 2.97 mmHg/%    AaDO2 169.7 mmHg    RI(T) 0.95     O2Hb 97.8 (H) 94.0 - 97.0 %    COHb 1.1 0.0 - 1.5 %    MetHb 0.4 0.0 - 1.5 %    O2 Content 12.0 mL/dL    HHb 0.7 0.0 - 5.0 %    tHb (est) 8.4 (L) 11.5 - 16.5 g/dL    Mode Bagging     FIO2 60.0 %    Peep/Cpap 5.0 cmH2O    PIP 10.0 cmH2O    Date Of Collection [id number]     Time Collected 0111     Pt Temp 37.0 C     ID [id number]     Lab [id number]     Critical(s) Notified Handed report to Dr/RN    CBC with Auto Differential   Result Value Ref Range    WBC 7.1 4.5 - 11.5 k/uL    RBC 2.67 (L) 3.50 - 5.50 m/uL    Hemoglobin 7.2 (L) 11.5 - 15.5 g/dL    Hematocrit 24.6 (L) 34.0 - 48.0 %    MCV 92.1 80.0 - 99.9 fL    MCH 27.0 26.0 - 35.0 pg    MCHC 29.3 (L) 32.0 - 34.5 g/dL    RDW 20.6 (H) 11.5 - 15.0 %    Platelets 448 130 - 450 k/uL    MPV 9.5 7.0 - 12.0 fL    Neutrophils % 83 (H) 43.0 - 80.0 %    Lymphocytes % 14 (L) 20.0 - 42.0 %    Monocytes % 3 2.0 - 12.0 %    Eosinophils % 0 0 - 6 %    Basophils % 1 0.0 - 2.0 %    nRBC 1 per 100 WBC    Neutrophils Absolute 5.87 1.80 - 7.30 k/uL    Lymphocytes Absolute 0.99 (L) 1.50 - 4.00 k/uL    Monocytes Absolute 0.19 0.10 - 0.95 k/uL    Eosinophils Absolute 0.00 (L) 0.05 - 0.50 k/uL    Basophils Absolute 0.06 0.00 - 0.20 k/uL    RBC Morphology 2+ ANISOCYTOSIS     RBC Morphology 2+ HYPOCHROMIA     RBC Morphology 1+ OVALOCYTES     RBC Morphology 2+ POIKILOCYTOSIS     RBC Morphology 1+ POLYCHROMASIA     RBC Morphology 2+ TARGET CELLS    Basic Metabolic Panel   Result Value Ref Range    Sodium 138 132 - 146 mmol/L    Potassium 4.9 3.5 - 5.0 mmol/L    Chloride 96 (L) 98 - 107 mmol/L    CO2 36 (H) 22 - 29 mmol/L    Anion Gap 6 (L) 7 - 16 mmol/L    Glucose 108 (H) 74 - 99 mg/dL    BUN 12 6 - 23 mg/dL    Creatinine 0.6 0.50 - 1.00 mg/dL    Est, Glom Filt Rate >90 >60 mL/min/1.73m2    Calcium 8.7 8.6 - 10.2 mg/dL   EKG 12 Lead   Result Value Ref Range    Ventricular Rate 78 BPM    Atrial Rate 78 BPM    P-R Interval 152 ms    QRS  Duration 84 ms    Q-T Interval 418 ms    QTc Calculation (Bazett) 476 ms    P Axis 89 degrees    R Axis 70 degrees    T Axis 83 degrees         Radiology:   Non-plain film images such as CT, Ultrasound and MRI are read by the radiologist. Plain radiographic images are visualized and preliminarily interpreted by the ED Provider in the MDM section.    Interpretation per the Radiologist below, if available at the time of this note:    XR CHEST PORTABLE   Final Result   No acute process.      Stable appearance of the chest compared to 03/11/2024.           XR CHEST PORTABLE    Result Date: 4/5/2024  EXAMINATION: ONE XRAY VIEW OF THE CHEST 4/5/2024 8:42 pm COMPARISON: 03/11/2024 HISTORY: ORDERING SYSTEM PROVIDED HISTORY: sob, hx copd TECHNOLOGIST PROVIDED HISTORY: Reason for exam:->sob, hx copd What reading provider will be dictating this exam?->CRC FINDINGS: The lungs are without acute focal process.  There is no effusion or pneumothorax. The cardiomediastinal silhouette is without acute process. The osseous structures are without acute process.     No acute process. Stable appearance of the chest compared to 03/11/2024.       No results found.    ------------------- NURSING NOTES & VITALS -------------------    The nursing notes within the ED encounter and vital signs were reviewed.     Vitals:    04/07/24 0015   BP: (!) 111/58   Pulse: 75   Resp: 20   Temp: 97.7 °F (36.5 °C)   SpO2: 99%        Patient Vitals for the past 8 hrs:   BP Temp Temp src Pulse Resp SpO2 Height Weight   04/07/24 0015 (!) 111/58 97.7 °F (36.5 °C) Axillary 75 20 99 % -- --   04/06/24 2000 139/61 98.2 °F (36.8 °C) Oral 76 20 98 % -- --   04/06/24 1800 -- -- -- -- -- -- 1.651 m (5' 5\") 44.5 kg (98 lb)   04/06/24 1730 110/73 98.3 °F (36.8 °C) Oral 75 20 98 % -- --         ------------- Final Impression, Disposition & Plan ---------------    Final Impression:   1. Acute respiratory failure with hypoxia and hypercarbia (HCC)    2. COPD exacerbation

## 2024-04-06 NOTE — CONSULTS
Pulmonary Consultation    Admit Date: 4/5/2024  Requesting Physician: Clyde Gonzalez DO    Reason for consultation:  Acute on chronic hypercapnic respiratory failure, COPD  HPI:  The patient is a 69-year-old female known to our practice from the inpatient and outpatient setting.  Last seen in February of this year by myself, the patient was admitted to the hospital with respiratory failure.  Previous pulmonary functions from 2023 evidenced GOLD class 4 COPD.    Upon presentation here, the patient tested positive for rhinovirus/enterovirus.  In addition to being anemic from her known iron deficiency, the patient was significantly hypercapnic.  Noninvasive ventilation was started.    Seen and evaluated in the emergency room, the patient has learned to tolerate noninvasive ventilation and is awake and alert breathing somewhat better.    PMH:    Past Medical History:   Diagnosis Date    Acute exacerbation of chronic obstructive pulmonary disease (COPD) ([facility]) 07/14/2022    Acute respiratory failure ([facility]) 11/27/2022    Acute respiratory failure with hypoxia ([facility]) 06/27/2022    Acute respiratory failure with hypoxia ([facility]) 11/27/2022    Acute respiratory failure with hypoxia ([facility]) 01/09/2024    Atherosclerosis 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 ([facility]) 01/19/2024    Atrial fibrillation ([facility])     Chronic obstructive pulmonary disease ([facility]) 12/15/2022    COPD (chronic obstructive pulmonary disease) ([facility])     COPD exacerbation ([facility]) 07/14/2022    COPD exacerbation ([facility]) 02/05/2024    COPD with acute exacerbation ([facility]) 12/24/2023    COVID-19 07/16/2022    Critical limb ischemia of left lower extremity with rest pain ([facility]) 12/25/2023    GI bleed 10/14/2023    Hypertension     Pneumonia due to infectious organism     PVD (peripheral vascular disease) ([facility]) 01/19/2024    Severe protein-calorie malnutrition ([facility]) 06/26/2023    Thyroid disease     Uncontrolled hypertension

## 2024-04-06 NOTE — ED NOTES
Pt turned on her call light and she is requesting to ambulate to the restroom. Pt told she could not do this because she needs to stay on the NIV. As noted earlier when pt was permitted to ambulate with 4L she became significantly hypoxic. Pt then agreeable to using the bedpan and was place don the bedpan. Pt complaining of anxiety with the bipap as well. Provider made aware.

## 2024-04-06 NOTE — ED NOTES
Pt 79% on 4L nc after ambulating to and from the restroom. Respiratory at bedside to put pt on NIPPV.

## 2024-04-06 NOTE — PROGRESS NOTES
4 Eyes Skin Assessment     NAME:  Lana Phillips  YOB: 1955  MEDICAL RECORD NUMBER:  [id number]    The patient is being assessed for  Admission    I agree that at least one RN has performed a thorough Head to Toe Skin Assessment on the patient. ALL assessment sites listed below have been assessed.      Areas assessed by both nurses:    Head, Face, Ears, Shoulders, Back, Chest, Arms, Elbows, Hands, Sacrum. Buttock, Coccyx, Ischium, Legs. Feet and Heels, and Under Medical Devices         Does the Patient have a Wound? No noted wound(s)       Guy Prevention initiated by RN: No  Wound Care Orders initiated by RN: No    Pressure Injury (Stage 3,4, Unstageable, DTI, NWPT, and Complex wounds) if present, place Wound referral order by RN under : No    New Ostomies, if present place, Ostomy referral order under : No     Nurse 1 eSignature: Electronically signed by Charlotte Dixon RN on 4/6/24 at 6:14 PM EDT    **SHARE this note so that the co-signing nurse can place an eSignature**    Nurse 2 eSignature: {Esignature:[id number]}

## 2024-04-06 NOTE — ED NOTES
Pt removed NIV again. It was put back on. Pt oxygen remained 97%. Pt only had it off for a brief time period before I went in the room and put it back on. Pt on bedside tele monitor. Pt is alert and oriented. Safety measures maintained. No concerns at this time.

## 2024-04-06 NOTE — H&P
[facility]              [address] [place] [address]                           HISTORY & PHYSICAL      PATIENT NAME: ANGIE SHELTON CM               : 1955  MED REC NO: [id number]                        ROOM: 18A  ACCOUNT NO: [id number]                       ADMIT DATE: 2024  PROVIDER: Baldemar Jarrett DO      PRIMARY CARE PHYSICIAN:  Clyde Gonzalez DO    CHIEF COMPLAINT:  Shortness of breath.    HISTORY OF PRESENT ILLNESS:  The patient is a 69-year-old  female, who presented to the emergency room complaining of increasing shortness of breath for 24 hours.  She is on O2 chronically.  Diagnostic evaluation in the emergency room revealed CO2 retention with positive rhinovirus.  She was admitted for further evaluation and treatment, started on BiPAP.    PAST MEDICAL HISTORY:  Chronic constipation status post upper and lower scope per Dr. Vivas in 2023, chronic hypoxic respiratory failure, tobacco abuse, COPD, paroxysmal atrial fibrillation, chronic Eliquis therapy, hypertension, vitamin B12 deficiency,; hyperthyroidism, peripheral artery disease.    PAST SURGICAL HISTORY:  Back surgery, left oophorectomy, tonsillectomy, appendectomy, wisdom teeth extraction.    SOCIAL HISTORY:  The patient has a history of tobacco use, few cigarettes a week.  No alcohol.    REVIEW OF SYSTEMS:  Remarkable for above-stated chief complaint.    ALLERGIES:  TO PENICILLIN.      MEDICATIONS PRIOR TO ADMISSION:  Tylenol p.r.n., albuterol nebulizer p.r.n., Eliquis, aspirin, Lipitor, Breztri, BuSpar, vitamin D3, Celexa, Cardizem CD, guaifenesin, Vistaril, Tapazole, Protonix, GlycoLax, Lyrica, Compazine, Senokot, Desyrel, vitamin B12, vitamin C.    PHYSICAL EXAMINATION:  GENERAL APPEARANCE:  Reveals a 69-year-old  female, who is alert and oriented x3, cooperative, and a good historian.  She is seen in the emergency room.  She is on BiPAP.  VITAL  SIGNS:  On admission, temperature 98 degrees, pulse 82, respirations 18, blood pressure 169/76.  HEAD:  Normocephalic, atraumatic.  EYES:  Pupils equal and reactive to light.  Extraocular muscles intact.  Fundi, not well visualized.  NOSE:  No obstruction, polyp, or discharge noted.  MOUTH:  Mucosa without lesion.  PHARYNX:  Noninjected without exudate.  NECK:  Supple.  No JVD.  No thyromegaly.  No carotid bruits.  HEART:  Regular rate and rhythm without murmur.  LUNGS:  With upper airway rhonchi.  ABDOMEN:  Positive bowel sounds.  Soft, nontender.  No rebound.  No guarding.  No hepatosplenomegaly.  No masses.  BACK:  With increased thoracic kyphosis.  EXTREMITIES:  Without edema.  LYMPH NODES:  No adenopathy noted.  SKIN:  Left second toe previously noted ulcer healed without erythema or discharge.    IMPRESSION:    1. Acute respiratory failure with hypoxia and hypercapnia.  2. Chronic Eliquis therapy.  3. Paroxysmal atrial fibrillation.  4. Hyperthyroidism.  5. Anxiety.  6. Vitamin B12 deficiency.  7. Tobacco abuse.  8. Peripheral artery disease.  9. Left second toe ulcer, healed.  10. Rhinovirus infection.    PLAN:  Admit.  Wean BiPAP as able.  Pulmonary to see.  Aerosols.  Discharge plan, home when stable.          SHAWN BRASWELL DO      D:  04/06/2024 10:52:50     T:  04/06/2024 11:36:17     CHAPITO/KARTHIKEYAN  Job #:  [id number]     Doc#:  [id number]

## 2024-04-07 LAB
ANION GAP SERPL CALCULATED.3IONS-SCNC: 8 MMOL/L (ref 7–16)
BUN SERPL-MCNC: 17 MG/DL (ref 6–23)
CALCIUM SERPL-MCNC: 8.5 MG/DL (ref 8.6–10.2)
CHLORIDE SERPL-SCNC: 97 MMOL/L (ref 98–107)
CO2 SERPL-SCNC: 35 MMOL/L (ref 22–29)
CREAT SERPL-MCNC: 0.6 MG/DL (ref 0.5–1)
ERYTHROCYTE [DISTWIDTH] IN BLOOD BY AUTOMATED COUNT: 20.7 % (ref 11.5–15)
GFR SERPL CREATININE-BSD FRML MDRD: >90 ML/MIN/1.73M2
GLUCOSE SERPL-MCNC: 140 MG/DL (ref 74–99)
HCT VFR BLD AUTO: 22.5 % (ref 34–48)
HCT VFR BLD AUTO: 28.6 % (ref 34–48)
HGB BLD-MCNC: 6.4 G/DL (ref 11.5–15.5)
HGB BLD-MCNC: 8.4 G/DL (ref 11.5–15.5)
MCH RBC QN AUTO: 26.7 PG (ref 26–35)
MCHC RBC AUTO-ENTMCNC: 28.4 G/DL (ref 32–34.5)
MCV RBC AUTO: 93.8 FL (ref 80–99.9)
PLATELET # BLD AUTO: 367 K/UL (ref 130–450)
PMV BLD AUTO: 9.5 FL (ref 7–12)
POTASSIUM SERPL-SCNC: 3.5 MMOL/L (ref 3.5–5)
RBC # BLD AUTO: 2.4 M/UL (ref 3.5–5.5)
SODIUM SERPL-SCNC: 140 MMOL/L (ref 132–146)
WBC OTHER # BLD: 9 K/UL (ref 4.5–11.5)

## 2024-04-07 PROCEDURE — 86850 RBC ANTIBODY SCREEN: CPT

## 2024-04-07 PROCEDURE — P9016 RBC LEUKOCYTES REDUCED: HCPCS

## 2024-04-07 PROCEDURE — 36415 COLL VENOUS BLD VENIPUNCTURE: CPT

## 2024-04-07 PROCEDURE — 2580000003 HC RX 258: Performed by: INTERNAL MEDICINE

## 2024-04-07 PROCEDURE — 94660 CPAP INITIATION&MGMT: CPT

## 2024-04-07 PROCEDURE — 2700000000 HC OXYGEN THERAPY PER DAY

## 2024-04-07 PROCEDURE — 6370000000 HC RX 637 (ALT 250 FOR IP): Performed by: INTERNAL MEDICINE

## 2024-04-07 PROCEDURE — 80048 BASIC METABOLIC PNL TOTAL CA: CPT

## 2024-04-07 PROCEDURE — 86900 BLOOD TYPING SEROLOGIC ABO: CPT

## 2024-04-07 PROCEDURE — 36430 TRANSFUSION BLD/BLD COMPNT: CPT

## 2024-04-07 PROCEDURE — 86901 BLOOD TYPING SEROLOGIC RH(D): CPT

## 2024-04-07 PROCEDURE — 6360000002 HC RX W HCPCS: Performed by: INTERNAL MEDICINE

## 2024-04-07 PROCEDURE — 85014 HEMATOCRIT: CPT

## 2024-04-07 PROCEDURE — 1200000000 HC SEMI PRIVATE

## 2024-04-07 PROCEDURE — 86923 COMPATIBILITY TEST ELECTRIC: CPT

## 2024-04-07 PROCEDURE — 85027 COMPLETE CBC AUTOMATED: CPT

## 2024-04-07 PROCEDURE — 94640 AIRWAY INHALATION TREATMENT: CPT

## 2024-04-07 PROCEDURE — 30233N1 TRANSFUSION OF NONAUTOLOGOUS RED BLOOD CELLS INTO PERIPHERAL VEIN, PERCUTANEOUS APPROACH: ICD-10-PCS | Performed by: INTERNAL MEDICINE

## 2024-04-07 PROCEDURE — 85018 HEMOGLOBIN: CPT

## 2024-04-07 RX ORDER — SODIUM CHLORIDE 9 MG/ML
INJECTION, SOLUTION INTRAVENOUS PRN
Status: DISCONTINUED | OUTPATIENT
Start: 2024-04-07 | End: 2024-04-11 | Stop reason: HOSPADM

## 2024-04-07 RX ORDER — HYDROXYZINE PAMOATE 25 MG/1
25 CAPSULE ORAL EVERY 6 HOURS PRN
Status: DISCONTINUED | OUTPATIENT
Start: 2024-04-07 | End: 2024-04-11 | Stop reason: HOSPADM

## 2024-04-07 RX ADMIN — TRAZODONE HYDROCHLORIDE 100 MG: 50 TABLET ORAL at 20:07

## 2024-04-07 RX ADMIN — BUSPIRONE HYDROCHLORIDE 15 MG: 5 TABLET ORAL at 10:09

## 2024-04-07 RX ADMIN — APIXABAN 5 MG: 5 TABLET, FILM COATED ORAL at 09:57

## 2024-04-07 RX ADMIN — POLYETHYLENE GLYCOL 3350 17 G: 17 POWDER, FOR SOLUTION ORAL at 09:56

## 2024-04-07 RX ADMIN — GUAIFENESIN 400 MG: 400 TABLET ORAL at 14:33

## 2024-04-07 RX ADMIN — PANTOPRAZOLE SODIUM 40 MG: 40 TABLET, DELAYED RELEASE ORAL at 06:04

## 2024-04-07 RX ADMIN — PROCHLORPERAZINE MALEATE 10 MG: 10 TABLET ORAL at 09:56

## 2024-04-07 RX ADMIN — ALBUTEROL SULFATE 2.5 MG: 2.5 SOLUTION RESPIRATORY (INHALATION) at 19:22

## 2024-04-07 RX ADMIN — HYDROXYZINE PAMOATE 25 MG: 25 CAPSULE ORAL at 14:33

## 2024-04-07 RX ADMIN — PREGABALIN 50 MG: 50 CAPSULE ORAL at 20:49

## 2024-04-07 RX ADMIN — PROCHLORPERAZINE MALEATE 10 MG: 10 TABLET ORAL at 06:05

## 2024-04-07 RX ADMIN — METHIMAZOLE 15 MG: 5 TABLET ORAL at 09:57

## 2024-04-07 RX ADMIN — SENNOSIDES AND DOCUSATE SODIUM 1 TABLET: 8.6; 5 TABLET ORAL at 09:57

## 2024-04-07 RX ADMIN — GUAIFENESIN 400 MG: 400 TABLET ORAL at 09:57

## 2024-04-07 RX ADMIN — PREGABALIN 50 MG: 50 CAPSULE ORAL at 09:57

## 2024-04-07 RX ADMIN — SODIUM CHLORIDE, PRESERVATIVE FREE 10 ML: 5 INJECTION INTRAVENOUS at 09:57

## 2024-04-07 RX ADMIN — DILTIAZEM HYDROCHLORIDE 120 MG: 120 CAPSULE, EXTENDED RELEASE ORAL at 09:57

## 2024-04-07 RX ADMIN — ATORVASTATIN CALCIUM 40 MG: 40 TABLET, FILM COATED ORAL at 20:06

## 2024-04-07 RX ADMIN — ASPIRIN 81 MG: 81 TABLET, COATED ORAL at 09:57

## 2024-04-07 RX ADMIN — ALBUTEROL SULFATE 2.5 MG: 2.5 SOLUTION RESPIRATORY (INHALATION) at 07:31

## 2024-04-07 RX ADMIN — BUDESONIDE INHALATION 500 MCG: 0.5 SUSPENSION RESPIRATORY (INHALATION) at 19:22

## 2024-04-07 RX ADMIN — BUDESONIDE INHALATION 500 MCG: 0.5 SUSPENSION RESPIRATORY (INHALATION) at 07:31

## 2024-04-07 RX ADMIN — ALBUTEROL SULFATE 2.5 MG: 2.5 SOLUTION RESPIRATORY (INHALATION) at 11:18

## 2024-04-07 RX ADMIN — IPRATROPIUM BROMIDE 0.5 MG: 0.5 SOLUTION RESPIRATORY (INHALATION) at 11:18

## 2024-04-07 RX ADMIN — IPRATROPIUM BROMIDE 0.5 MG: 0.5 SOLUTION RESPIRATORY (INHALATION) at 19:22

## 2024-04-07 RX ADMIN — BUSPIRONE HYDROCHLORIDE 15 MG: 5 TABLET ORAL at 20:07

## 2024-04-07 RX ADMIN — HYDROXYZINE PAMOATE 25 MG: 25 CAPSULE ORAL at 06:04

## 2024-04-07 RX ADMIN — PREGABALIN 50 MG: 50 CAPSULE ORAL at 14:32

## 2024-04-07 RX ADMIN — ALBUTEROL SULFATE 2.5 MG: 2.5 SOLUTION RESPIRATORY (INHALATION) at 14:50

## 2024-04-07 RX ADMIN — SODIUM CHLORIDE, PRESERVATIVE FREE 10 ML: 5 INJECTION INTRAVENOUS at 20:08

## 2024-04-07 RX ADMIN — HYDROXYZINE PAMOATE 25 MG: 25 CAPSULE ORAL at 20:07

## 2024-04-07 RX ADMIN — IPRATROPIUM BROMIDE 0.5 MG: 0.5 SOLUTION RESPIRATORY (INHALATION) at 07:31

## 2024-04-07 RX ADMIN — IPRATROPIUM BROMIDE 0.5 MG: 0.5 SOLUTION RESPIRATORY (INHALATION) at 14:50

## 2024-04-07 RX ADMIN — CITALOPRAM HYDROBROMIDE 20 MG: 20 TABLET ORAL at 09:57

## 2024-04-07 RX ADMIN — APIXABAN 5 MG: 5 TABLET, FILM COATED ORAL at 20:49

## 2024-04-07 RX ADMIN — ARFORMOTEROL TARTRATE 15 MCG: 15 SOLUTION RESPIRATORY (INHALATION) at 07:31

## 2024-04-07 RX ADMIN — ARFORMOTEROL TARTRATE 15 MCG: 15 SOLUTION RESPIRATORY (INHALATION) at 19:22

## 2024-04-07 RX ADMIN — GUAIFENESIN 400 MG: 400 TABLET ORAL at 20:07

## 2024-04-07 NOTE — PLAN OF CARE
Problem: Chronic Conditions and Co-morbidities  Goal: Patient's chronic conditions and co-morbidity symptoms are monitored and maintained or improved  4/6/2024 2332 by Gopi Arteaga RN  Outcome: Progressing  4/6/2024 2331 by Gopi Arteaga RN  Outcome: Progressing     Problem: Discharge Planning  Goal: Discharge to home or other facility with appropriate resources  4/6/2024 2332 by Gopi Arteaga RN  Outcome: Progressing  4/6/2024 2331 by Gopi Arteaga RN  Outcome: Progressing     Problem: Pain  Goal: Verbalizes/displays adequate comfort level or baseline comfort level  4/6/2024 2332 by Gopi Arteaga RN  Outcome: Progressing  4/6/2024 2331 by Gopi rAteaga RN  Outcome: Progressing     Problem: Skin/Tissue Integrity  Goal: Absence of new skin breakdown  Description: 1.  Monitor for areas of redness and/or skin breakdown  2.  Assess vascular access sites hourly  3.  Every 4-6 hours minimum:  Change oxygen saturation probe site  4.  Every 4-6 hours:  If on nasal continuous positive airway pressure, respiratory therapy assess nares and determine need for appliance change or resting period.  4/6/2024 2332 by Gopi Arteaga RN  Outcome: Progressing  4/6/2024 2331 by Gopi Arteaga RN  Outcome: Progressing     Problem: Safety - Adult  Goal: Free from fall injury  4/6/2024 2332 by Gopi Arteaga RN  Outcome: Progressing  4/6/2024 2331 by Gopi Arteaga RN  Outcome: Progressing     Problem: Respiratory - Adult  Goal: Achieves optimal ventilation and oxygenation  Outcome: Progressing

## 2024-04-07 NOTE — PROGRESS NOTES
[facility]    Subjective:  pt alert conversive anxious      Current Facility-Administered Medications:     0.9 % sodium chloride infusion, , IntraVENous, PRN, Baldemar Jarrett DO    acetaminophen (TYLENOL) tablet 650 mg, 650 mg, Oral, Q6H PRN, Baldemar Jarrett DO, 650 mg at 04/06/24 0932    albuterol (PROVENTIL) (2.5 MG/3ML) 0.083% nebulizer solution 2.5 mg, 2.5 mg, Nebulization, Q4H While awake, Baldemar Jarrett DO, 2.5 mg at 04/07/24 0731    apixaban (ELIQUIS) tablet 5 mg, 5 mg, Oral, BID, Baldemar Jarrett DO, 5 mg at 04/07/24 0957    aspirin EC tablet 81 mg, 81 mg, Oral, Daily, Baldemar Jarrett DO, 81 mg at 04/07/24 0957    atorvastatin (LIPITOR) tablet 40 mg, 40 mg, Oral, Nightly, Baldemar Jarrett DO, 40 mg at 04/06/24 2004    busPIRone (BUSPAR) tablet 15 mg, 15 mg, Oral, TID PRN, Baldemar Jarrett DO, 15 mg at 04/07/24 1009    citalopram (CELEXA) tablet 20 mg, 20 mg, Oral, Daily, Baldemar Jarrett DO, 20 mg at 04/07/24 0957    dilTIAZem (CARDIZEM CD) extended release capsule 120 mg, 120 mg, Oral, Daily, Baldemar Jarrett DO, 120 mg at 04/07/24 0957    guaiFENesin tablet 400 mg, 400 mg, Oral, TID, Baldemar Jarrett DO, 400 mg at 04/07/24 0957    hydrOXYzine pamoate (VISTARIL) capsule 25 mg, 25 mg, Oral, TID PRN, Baldemar Jarrett DO, 25 mg at 04/07/24 0604    methIMAzole (TAPAZOLE) tablet 15 mg, 15 mg, Oral, Daily, Baldemar Jarrett DO, 15 mg at 04/07/24 0957    pantoprazole (PROTONIX) tablet 40 mg, 40 mg, Oral, QAM AC, Baldemar Jarrett DO, 40 mg at 04/07/24 0604    polyethylene glycol (GLYCOLAX) packet 17 g, 17 g, Oral, Daily, Baldemar Jarrett DO, 17 g at 04/07/24 0956    pregabalin (LYRICA) capsule 50 mg, 50 mg, Oral, TID, Baldemar Jarrett DO, 50 mg at 04/07/24 0957    prochlorperazine (COMPAZINE) tablet 10 mg, 10 mg, Oral, Q6H PRN, Baldemar Jarrett DO, 10 mg at 04/07/24 0956    sennosides-docusate sodium (SENOKOT-S) 8.6-50 MG tablet 1 tablet, 1 tablet, Oral, Daily, [place]

## 2024-04-08 LAB
ABO/RH: NORMAL
ANTIBODY SCREEN: NEGATIVE
ARM BAND NUMBER: NORMAL
BASOPHILS # BLD: 0 K/UL (ref 0–0.2)
BASOPHILS NFR BLD: 0 % (ref 0–2)
BLOOD BANK BLOOD PRODUCT EXPIRATION DATE: NORMAL
BLOOD BANK DISPENSE STATUS: NORMAL
BLOOD BANK ISBT PRODUCT BLOOD TYPE: 9500
BLOOD BANK PRODUCT CODE: NORMAL
BLOOD BANK SAMPLE EXPIRATION: NORMAL
BLOOD BANK UNIT TYPE AND RH: NORMAL
BPU ID: NORMAL
COMPONENT: NORMAL
CROSSMATCH RESULT: NORMAL
EOSINOPHIL # BLD: 0.52 K/UL (ref 0.05–0.5)
EOSINOPHILS RELATIVE PERCENT: 5 % (ref 0–6)
ERYTHROCYTE [DISTWIDTH] IN BLOOD BY AUTOMATED COUNT: 21.6 % (ref 11.5–15)
FERRITIN SERPL-MCNC: 81 NG/ML
HAPTOGLOB SERPL-MCNC: 260 MG/DL (ref 30–200)
HCT VFR BLD AUTO: 31.7 % (ref 34–48)
HGB BLD-MCNC: 9.3 G/DL (ref 11.5–15.5)
IMM RETICS NFR: 19 % (ref 3–15.9)
IRON SATN MFR SERPL: 6 % (ref 15–50)
IRON SERPL-MCNC: 15 UG/DL (ref 37–145)
LDH SERPL-CCNC: 181 U/L (ref 135–214)
LYMPHOCYTES NFR BLD: 0.95 K/UL (ref 1.5–4)
LYMPHOCYTES RELATIVE PERCENT: 10 % (ref 20–42)
MCH RBC QN AUTO: 26.8 PG (ref 26–35)
MCHC RBC AUTO-ENTMCNC: 29.3 G/DL (ref 32–34.5)
MCV RBC AUTO: 91.4 FL (ref 80–99.9)
MONOCYTES NFR BLD: 0.95 K/UL (ref 0.1–0.95)
MONOCYTES NFR BLD: 10 % (ref 2–12)
MYELOCYTES ABSOLUTE COUNT: 0.09 K/UL
MYELOCYTES: 1 %
NEUTROPHILS NFR BLD: 75 % (ref 43–80)
NEUTS SEG NFR BLD: 7.4 K/UL (ref 1.8–7.3)
PLATELET # BLD AUTO: 384 K/UL (ref 130–450)
PMV BLD AUTO: 9.5 FL (ref 7–12)
RBC # BLD AUTO: 3.47 M/UL (ref 3.5–5.5)
RBC # BLD: ABNORMAL 10*6/UL
RETIC HEMOGLOBIN: 21.2 PG (ref 28.2–36.6)
RETICS # AUTO: 0.09 M/UL
RETICS/RBC NFR AUTO: 2.7 % (ref 0.4–1.9)
TIBC SERPL-MCNC: 254 UG/DL (ref 250–450)
TRANSFUSION STATUS: NORMAL
UNIT DIVISION: 0
UNIT ISSUE DATE/TIME: NORMAL
WBC OTHER # BLD: 9.9 K/UL (ref 4.5–11.5)

## 2024-04-08 PROCEDURE — 85025 COMPLETE CBC W/AUTO DIFF WBC: CPT

## 2024-04-08 PROCEDURE — 6370000000 HC RX 637 (ALT 250 FOR IP): Performed by: INTERNAL MEDICINE

## 2024-04-08 PROCEDURE — 94640 AIRWAY INHALATION TREATMENT: CPT

## 2024-04-08 PROCEDURE — 2700000000 HC OXYGEN THERAPY PER DAY

## 2024-04-08 PROCEDURE — 94660 CPAP INITIATION&MGMT: CPT

## 2024-04-08 PROCEDURE — 83010 ASSAY OF HAPTOGLOBIN QUANT: CPT

## 2024-04-08 PROCEDURE — 83550 IRON BINDING TEST: CPT

## 2024-04-08 PROCEDURE — 82728 ASSAY OF FERRITIN: CPT

## 2024-04-08 PROCEDURE — 2580000003 HC RX 258: Performed by: INTERNAL MEDICINE

## 2024-04-08 PROCEDURE — 36415 COLL VENOUS BLD VENIPUNCTURE: CPT

## 2024-04-08 PROCEDURE — 6360000002 HC RX W HCPCS: Performed by: INTERNAL MEDICINE

## 2024-04-08 PROCEDURE — 83540 ASSAY OF IRON: CPT

## 2024-04-08 PROCEDURE — 1200000000 HC SEMI PRIVATE

## 2024-04-08 PROCEDURE — 83615 LACTATE (LD) (LDH) ENZYME: CPT

## 2024-04-08 PROCEDURE — 99222 1ST HOSP IP/OBS MODERATE 55: CPT | Performed by: STUDENT IN AN ORGANIZED HEALTH CARE EDUCATION/TRAINING PROGRAM

## 2024-04-08 PROCEDURE — 85045 AUTOMATED RETICULOCYTE COUNT: CPT

## 2024-04-08 RX ORDER — GUAIFENESIN/DEXTROMETHORPHAN 100-10MG/5
10 SYRUP ORAL EVERY 6 HOURS PRN
Status: DISCONTINUED | OUTPATIENT
Start: 2024-04-08 | End: 2024-04-11 | Stop reason: HOSPADM

## 2024-04-08 RX ADMIN — CITALOPRAM HYDROBROMIDE 20 MG: 20 TABLET ORAL at 08:52

## 2024-04-08 RX ADMIN — HYDROXYZINE PAMOATE 25 MG: 25 CAPSULE ORAL at 15:32

## 2024-04-08 RX ADMIN — PREGABALIN 50 MG: 50 CAPSULE ORAL at 08:52

## 2024-04-08 RX ADMIN — POLYETHYLENE GLYCOL 3350 17 G: 17 POWDER, FOR SOLUTION ORAL at 08:53

## 2024-04-08 RX ADMIN — APIXABAN 5 MG: 5 TABLET, FILM COATED ORAL at 08:52

## 2024-04-08 RX ADMIN — TRAZODONE HYDROCHLORIDE 100 MG: 50 TABLET ORAL at 20:27

## 2024-04-08 RX ADMIN — BUDESONIDE INHALATION 500 MCG: 0.5 SUSPENSION RESPIRATORY (INHALATION) at 20:38

## 2024-04-08 RX ADMIN — ALBUTEROL SULFATE 2.5 MG: 2.5 SOLUTION RESPIRATORY (INHALATION) at 16:39

## 2024-04-08 RX ADMIN — ALBUTEROL SULFATE 2.5 MG: 2.5 SOLUTION RESPIRATORY (INHALATION) at 20:38

## 2024-04-08 RX ADMIN — IPRATROPIUM BROMIDE 0.5 MG: 0.5 SOLUTION RESPIRATORY (INHALATION) at 16:39

## 2024-04-08 RX ADMIN — ARFORMOTEROL TARTRATE 15 MCG: 15 SOLUTION RESPIRATORY (INHALATION) at 20:38

## 2024-04-08 RX ADMIN — ARFORMOTEROL TARTRATE 15 MCG: 15 SOLUTION RESPIRATORY (INHALATION) at 07:19

## 2024-04-08 RX ADMIN — PREGABALIN 50 MG: 50 CAPSULE ORAL at 20:27

## 2024-04-08 RX ADMIN — ATORVASTATIN CALCIUM 40 MG: 40 TABLET, FILM COATED ORAL at 20:27

## 2024-04-08 RX ADMIN — APIXABAN 5 MG: 5 TABLET, FILM COATED ORAL at 20:27

## 2024-04-08 RX ADMIN — METHIMAZOLE 15 MG: 5 TABLET ORAL at 08:52

## 2024-04-08 RX ADMIN — GUAIFENESIN 400 MG: 400 TABLET ORAL at 08:52

## 2024-04-08 RX ADMIN — BUSPIRONE HYDROCHLORIDE 15 MG: 5 TABLET ORAL at 20:24

## 2024-04-08 RX ADMIN — IPRATROPIUM BROMIDE 0.5 MG: 0.5 SOLUTION RESPIRATORY (INHALATION) at 20:38

## 2024-04-08 RX ADMIN — PREGABALIN 50 MG: 50 CAPSULE ORAL at 13:29

## 2024-04-08 RX ADMIN — GUAIFENESIN 400 MG: 400 TABLET ORAL at 13:29

## 2024-04-08 RX ADMIN — ACETAMINOPHEN 650 MG: 325 TABLET ORAL at 17:00

## 2024-04-08 RX ADMIN — DEXTROMETHORPHAN HYDROBROMIDE, GUAIFENESIN 10 ML: 10; 100 LIQUID ORAL at 16:04

## 2024-04-08 RX ADMIN — SODIUM CHLORIDE, PRESERVATIVE FREE 10 ML: 5 INJECTION INTRAVENOUS at 08:53

## 2024-04-08 RX ADMIN — ASPIRIN 81 MG: 81 TABLET, COATED ORAL at 08:52

## 2024-04-08 RX ADMIN — SENNOSIDES AND DOCUSATE SODIUM 1 TABLET: 8.6; 5 TABLET ORAL at 08:52

## 2024-04-08 RX ADMIN — WATER 40 MG: 1 INJECTION INTRAMUSCULAR; INTRAVENOUS; SUBCUTANEOUS at 10:24

## 2024-04-08 RX ADMIN — SODIUM CHLORIDE, PRESERVATIVE FREE 10 ML: 5 INJECTION INTRAVENOUS at 20:29

## 2024-04-08 RX ADMIN — DILTIAZEM HYDROCHLORIDE 120 MG: 120 CAPSULE, EXTENDED RELEASE ORAL at 08:52

## 2024-04-08 RX ADMIN — WATER 40 MG: 1 INJECTION INTRAMUSCULAR; INTRAVENOUS; SUBCUTANEOUS at 20:29

## 2024-04-08 RX ADMIN — IPRATROPIUM BROMIDE 0.5 MG: 0.5 SOLUTION RESPIRATORY (INHALATION) at 07:19

## 2024-04-08 RX ADMIN — ALBUTEROL SULFATE 2.5 MG: 2.5 SOLUTION RESPIRATORY (INHALATION) at 11:23

## 2024-04-08 RX ADMIN — GUAIFENESIN 400 MG: 400 TABLET ORAL at 20:27

## 2024-04-08 RX ADMIN — PANTOPRAZOLE SODIUM 40 MG: 40 TABLET, DELAYED RELEASE ORAL at 05:56

## 2024-04-08 RX ADMIN — BUDESONIDE INHALATION 500 MCG: 0.5 SUSPENSION RESPIRATORY (INHALATION) at 07:19

## 2024-04-08 RX ADMIN — ALBUTEROL SULFATE 2.5 MG: 2.5 SOLUTION RESPIRATORY (INHALATION) at 07:19

## 2024-04-08 RX ADMIN — IPRATROPIUM BROMIDE 0.5 MG: 0.5 SOLUTION RESPIRATORY (INHALATION) at 11:23

## 2024-04-08 ASSESSMENT — PAIN SCALES - GENERAL
PAINLEVEL_OUTOF10: 0
PAINLEVEL_OUTOF10: 3

## 2024-04-08 ASSESSMENT — PAIN DESCRIPTION - ORIENTATION: ORIENTATION: RIGHT;LEFT

## 2024-04-08 ASSESSMENT — PAIN - FUNCTIONAL ASSESSMENT: PAIN_FUNCTIONAL_ASSESSMENT: ACTIVITIES ARE NOT PREVENTED

## 2024-04-08 ASSESSMENT — PAIN DESCRIPTION - DESCRIPTORS: DESCRIPTORS: ACHING;DISCOMFORT;SORE

## 2024-04-08 ASSESSMENT — PAIN DESCRIPTION - LOCATION: LOCATION: SHOULDER

## 2024-04-08 NOTE — PROGRESS NOTES
Hospital Medicine    Subjective:  Pt alert conversive breathing better after prbc transfusion      Current Facility-Administered Medications:     0.9 % sodium chloride infusion, , IntraVENous, PRN, Baldemar Jarrett DO    hydrOXYzine pamoate (VISTARIL) capsule 25 mg, 25 mg, Oral, Q6H PRN, Baldemar Jarrett DO, 25 mg at 04/07/24 2007    acetaminophen (TYLENOL) tablet 650 mg, 650 mg, Oral, Q6H PRN, Baldemar Jarrett DO, 650 mg at 04/06/24 0932    albuterol (PROVENTIL) (2.5 MG/3ML) 0.083% nebulizer solution 2.5 mg, 2.5 mg, Nebulization, Q4H While awake, Baldemar Jarrett DO, 2.5 mg at 04/08/24 0719    apixaban (ELIQUIS) tablet 5 mg, 5 mg, Oral, BID, Baldemar Jarrett DO, 5 mg at 04/07/24 2049    aspirin EC tablet 81 mg, 81 mg, Oral, Daily, Baldemar Jarrett DO, 81 mg at 04/07/24 0957    atorvastatin (LIPITOR) tablet 40 mg, 40 mg, Oral, Nightly, Baldemar Jarrett DO, 40 mg at 04/07/24 2006    busPIRone (BUSPAR) tablet 15 mg, 15 mg, Oral, TID PRN, Baldemar Jarrett DO, 15 mg at 04/07/24 2007    citalopram (CELEXA) tablet 20 mg, 20 mg, Oral, Daily, Baldemar Jarrett DO, 20 mg at 04/07/24 0957    dilTIAZem (CARDIZEM CD) extended release capsule 120 mg, 120 mg, Oral, Daily, Baldemar Jarrett DO, 120 mg at 04/07/24 0957    guaiFENesin tablet 400 mg, 400 mg, Oral, TID, Baldemar Jarrett DO, 400 mg at 04/07/24 2007    methIMAzole (TAPAZOLE) tablet 15 mg, 15 mg, Oral, Daily, Baldemar Jarrett DO, 15 mg at 04/07/24 0957    pantoprazole (PROTONIX) tablet 40 mg, 40 mg, Oral, QAM AC, Baldemar Jarrett DO, 40 mg at 04/08/24 0556    polyethylene glycol (GLYCOLAX) packet 17 g, 17 g, Oral, Daily, Baldemar Jarrett DO, 17 g at 04/07/24 0956    pregabalin (LYRICA) capsule 50 mg, 50 mg, Oral, TID, Baldemar Jarrett DO, 50 mg at 04/07/24 2049    prochlorperazine (COMPAZINE) tablet 10 mg, 10 mg, Oral, Q6H PRN, Baldemar Jarrett DO, 10 mg at 04/07/24 0956    sennosides-docusate sodium (SENOKOT-S) 8.6-50 MG tablet 1 tablet,

## 2024-04-08 NOTE — ACP (ADVANCE CARE PLANNING)
Advance Care Planning   Healthcare Decision Maker:    Primary Decision Maker: sue ward - Brother/Sister - [phone]    Today we documented Decision Maker(s) consistent with Legal Next of Kin hierarchy.    Electronically signed by CAYLA Romero on 4/8/2024 at 10:06 AM

## 2024-04-08 NOTE — PROGRESS NOTES
[facility]  [address], [address]  [place] [address]      Pulmonary Progress Note      SUBJECTIVE:  reclining in bed on 6 li NC saturating 96%, claims still feeling some sob and anxious, claims wore NIV 12-18/8 CT=684 Fio2=50%. Had been sent to rehab when discharged here last month, (?) was sent home and went to ER shortly after as got worse with breathing.    OBJECTIVE    Medications    Continuous Infusions:   sodium chloride      sodium chloride         Scheduled Meds:   methylPREDNISolone  40 mg IntraVENous Q12H    albuterol  2.5 mg Nebulization Q4H While awake    apixaban  5 mg Oral BID    aspirin  81 mg Oral Daily    atorvastatin  40 mg Oral Nightly    citalopram  20 mg Oral Daily    dilTIAZem  120 mg Oral Daily    guaiFENesin  400 mg Oral TID    methIMAzole  15 mg Oral Daily    pantoprazole  40 mg Oral QAM AC    polyethylene glycol  17 g Oral Daily    pregabalin  50 mg Oral TID    sennosides-docusate sodium  1 tablet Oral Daily    traZODone  100 mg Oral Nightly    sodium chloride flush  5-40 mL IntraVENous 2 times per day    budesonide  0.5 mg Nebulization BID RT    And    arformoterol tartrate  15 mcg Nebulization BID RT    ipratropium  0.5 mg Nebulization 4x Daily RT       PRN Meds:guaiFENesin-dextromethorphan, sodium chloride, hydrOXYzine pamoate, acetaminophen, busPIRone, prochlorperazine, sodium chloride flush, sodium chloride, potassium chloride **OR** potassium alternative oral replacement **OR** potassium chloride, magnesium sulfate, polyethylene glycol    Physical    VITALS:  /69   Pulse 79   Temp 98 °F (36.7 °C) (Oral)   Resp 19   Ht 1.651 m (5' 5\")   Wt 44.5 kg (98 lb)   SpO2 94%   BMI 16.31 kg/m²     24HR INTAKE/OUTPUT:      Intake/Output Summary (Last 24 hours) at 4/8/2024 1613  Last data filed at 4/7/2024 2209  Gross per 24 hour   Intake 510.67 ml   Output --   Net 510.67 ml       24HR PULSE OXIMETRY RANGE:     SpO2  Av.9 %  Min: 88 %  Max: 100 %    General appearance: alert, appears stated age, and cooperative  Lungs: rhonchi bilaterally with cough  Heart: regular rate and rhythm, S1, S2 normal, no murmur, click, rub or gallop  Abdomen: soft, non-tender; bowel sounds normal; no masses,  no organomegaly  Extremities: extremities normal, atraumatic, no cyanosis or edema  Neurologic: Mental status: Alert, oriented, thought content appropriate    Data    CBC:   Recent Labs     24  1100 24  0453 24  2248 24  1100   WBC 7.1 9.0  --  9.9   HGB 7.2* 6.4* 8.4* 9.3*   HCT 24.6* 22.5* 28.6* 31.7*   MCV 92.1 93.8  --  91.4    367  --  384       BMP:  Recent Labs     24  2109 24  1100 24  0453    138 140   K 3.9 4.9 3.5   CL 97* 96* 97*   CO2 40* 36* 35*   BUN 8 12 17   CREATININE 0.5 0.6 0.6    ALB:3,BILIDIR:3,BILITOT:3,ALKPHOS:3)@    PT/INR: No results for input(s): \"PROTIME\", \"INR\" in the last 72 hours.    ABG:   Recent Labs     24  0111   PH 7.344*   PO2 178.5*   PCO2 72.4*   HCO3 38.5*   BE 11.1*   O2SAT 99.3*   METHB 0.4   O2HB 97.8*   COHB 1.1   O2CON 12.0   HHB 0.7   THB 8.4*     FiO2 : 50 %       Radiology/Other tests reviewed: none    Assessment:     Principal Problem:    Acute respiratory failure with hypoxia and hypercapnia (HCC)  Resolved Problems:    * No resolved hospital problems. *  Rhinovirus (+)  COPD  Hypoxia and hypercarbia  Anxiety d/o    Plan:       Cont with steroids, taper as tolerated, observe respiratory function  Cont with nebs, observe respiratory function  Start EZPAP bid and see if helps with respiratory function  Cont with oxygen, taper as tolerated  Cont with NIV use at night, (?) has NIPPV at home, will clarify with   Anxiety as per PCP  OOB to chair as tolerated      Time at the bedside, reviewing labs and radiographs, reviewing notes and consultations, discussing with staff and family was more than 50

## 2024-04-08 NOTE — CONSENT
Informed Consent for Blood Component Transfusion Note    I have discussed with the patient the rationale for blood component transfusion; its benefits in treating or preventing fatigue, organ damage, or death; and its risk which includes mild transfusion reactions, rare risk of blood borne infection, or more serious but rare reactions. I have discussed the alternatives to transfusion, including the risk and consequences of not receiving transfusion. The patient had an opportunity to ask questions and had agreed to proceed with transfusion of blood components.    Electronically signed by Baldemar Jarrett DO on 4/8/24 at 6:12 PM EDT

## 2024-04-08 NOTE — CARE COORDINATION
Patient presented to the ED due to shortness of breath; admitted for acute respiratory failure with hypoxia, recently discharged to [facility] [place] on 3/19. Spoke with patient for transition of care planning. Patient reports she lives in a mobile home with her sister-in-law, Lana, 4 steps to enter, she is currently independent in the room, ambulates without a device, drives; has a rollator, 3:1 commode, nebulizer, cpap, on 4L NC continuous at home (through [id number]). Uses [facility] ([place]) and PCP is Dr. Gonzalez. Patient plans to return home, reports no home going needs and her sister-in-law will transport home when discharged. Per nursing, patient on 6L NC, hgb 6.4 yesterday, she continues on IV Solumedrol Q12. Patient confirmed she was on her home O2 when discharged from the facility and acknowledged having a panic attack and was extremely short of breath.    Case Management Assessment  Initial Evaluation    Date/Time of Evaluation: 4/8/2024 10:03 AM  Assessment Completed by: CAYLA Romero    If patient is discharged prior to next notation, then this note serves as note for discharge by case management.    Patient Name: Lana Phillips                   YOB: 1955  Diagnosis: Normocytic anemia [D64.9]  Viral URI [J06.9]  COPD exacerbation (HCC) [J44.1]  Rhinovirus infection [B34.8]  Acute respiratory failure with hypoxia and hypercarbia (HCC) [J96.01, J96.02]  Acute respiratory failure with hypoxia and hypercapnia (HCC) [J96.01, J96.02]  Acute on chronic anemia [D64.9]                   Date / Time: 4/5/2024  7:15 PM    Patient Admission Status: Inpatient   Readmission Risk (Low < 19, Mod (19-27), High > 27): Readmission Risk Score: 34.8    Current PCP: Clyde Gonzalez, DO  PCP verified by CM? Yes    Chart Reviewed: Yes      History Provided by: Patient  Patient Orientation: Alert and Oriented    Patient Cognition: Alert    Hospitalization in the last 30 days (Readmission):  Yes   goals of Normocytic anemia [D64.9]  Viral URI [J06.9]  COPD exacerbation (HCC) [J44.1]  Rhinovirus infection [B34.8]  Acute respiratory failure with hypoxia and hypercarbia (HCC) [J96.01, J96.02]  Acute respiratory failure with hypoxia and hypercapnia (HCC) [J96.01, J96.02]  Acute on chronic anemia [D64.9]    IF APPLICABLE: The Patient and/or patient representative Lana and her family were provided with a choice of provider and agrees with the discharge plan. Freedom of choice list with basic dialogue that supports the patient's individualized plan of care/goals and shares the quality data associated with the providers was provided to: Patient   Patient Representative Name:       The Patient and/or Patient Representative Agree with the Discharge Plan? Yes    Electronically signed by CAYLA Romero on 4/8/2024 at 10:04 AM

## 2024-04-08 NOTE — CONSULTS
[facility]   Gastroenterology, Hepatology, &  Advanced Endoscopy    Consult     Reason for consult: Acute on chronic anemia    ASSESSMENT AND PLAN:     69y/F presents with COPD exacerbation and acute on chronic anemia requiring blood transfusion. She has had extensive endoscopic evaluation that has been negative for sources of bleeding.    PLAN:  - Trend Hgb daily and transfuse to Hgb >7.   - No plans for further endoscopic evaluation.  - Serologic evaluation for causes of anemia if this has not been performed recently. This would include Iron Studies, Haptoglobin/LDH, Smear, and Retic Count.   - Consider IV iron administration.     We will follow.    Thank you for including us in the care of this patient. Please do not hesitate to contact us with any additional questions or concerns.    Jaime Rivera MD  Gastroenterology/Hepatology  Advanced Endoscopy      HISTORY OF PRESENT ILLNESS:       Ms. Lana Phillips is a 69y/F w/ history of Afib, HTN, PVD, and COPD on home O2 who presented to the ED with SOB, R should pain, and chest tightness. She was brought in with EMS who have albuterol inhaler with improvement in symptoms. She has had several admissions for COPD. The is positive for Rhinovirus. The patient was found to be anemic on admission. She has been endoscopically evaluated for anemia and follows with Dr. Pelayo.     Endoscopic History:  -EGD 3/27/2023 by Dr. Pelayo showing 1 to 2 cm sliding hiatal hernia, otherwise unremarkable esophageal mucosa, normal examined stomach, normal examined proximal small bowel  -Colonoscopy 5/15/2023 by Dr. Pelayo showing unremarkable appearing colonic mucosa throughout, small internal hemorrhoids, few skin tags, no bleeding source identified  -EGD  10/16/23  Dr. Chow  \"Mild erosive gastritis with ulcerations or signs of bleeding.     The patient also reports having had a VCE last year with no bleeding or high risk lesions appreciated.     Vitals: HDS and afebrile.     Labwork  about 41 years ago. She has a 8.3 pack-year smoking history. She has never used smokeless tobacco.  ETOH:   reports that she does not currently use alcohol.  DRUGS:   reports that she does not currently use drugs.  Family History:       Problem Relation Age of Onset    Heart Disease Mother     Heart Disease Father     Dementia Father     Other Maternal Aunt         leukemia    Breast Cancer Paternal Aunt     Breast Cancer Maternal Grandmother     Diabetes Maternal Grandmother          Current Facility-Administered Medications:     0.9 % sodium chloride infusion, , IntraVENous, PRN, Baldemar Jarrett DO    hydrOXYzine pamoate (VISTARIL) capsule 25 mg, 25 mg, Oral, Q6H PRN, Baldemar Jarrett DO, 25 mg at 04/07/24 2007    acetaminophen (TYLENOL) tablet 650 mg, 650 mg, Oral, Q6H PRN, Baldemar Jarrett DO, 650 mg at 04/06/24 0932    albuterol (PROVENTIL) (2.5 MG/3ML) 0.083% nebulizer solution 2.5 mg, 2.5 mg, Nebulization, Q4H While awake, Baldemar Jarrett DO, 2.5 mg at 04/08/24 0719    apixaban (ELIQUIS) tablet 5 mg, 5 mg, Oral, BID, Baldemar Jarrett DO, 5 mg at 04/07/24 2049    aspirin EC tablet 81 mg, 81 mg, Oral, Daily, Baldemar Jarrett DO, 81 mg at 04/07/24 0957    atorvastatin (LIPITOR) tablet 40 mg, 40 mg, Oral, Nightly, Baldemar Jarrett DO, 40 mg at 04/07/24 2006    busPIRone (BUSPAR) tablet 15 mg, 15 mg, Oral, TID PRN, Baldemar Jarrett DO, 15 mg at 04/07/24 2007    citalopram (CELEXA) tablet 20 mg, 20 mg, Oral, Daily, Baldemar Jarrett DO, 20 mg at 04/07/24 0957    dilTIAZem (CARDIZEM CD) extended release capsule 120 mg, 120 mg, Oral, Daily, Baldemar Jarrett DO, 120 mg at 04/07/24 0957    guaiFENesin tablet 400 mg, 400 mg, Oral, TID, Baldemar Jarrett DO, 400 mg at 04/07/24 2007    methIMAzole (TAPAZOLE) tablet 15 mg, 15 mg, Oral, Daily, Baldemar Jarrett DO, 15 mg at 04/07/24 0957    pantoprazole (PROTONIX) tablet 40 mg, 40 mg, Oral, [facility], Baldemar Jarrett, , 40 mg at 04/08/24 0556

## 2024-04-09 ENCOUNTER — TELEPHONE (OUTPATIENT)
Dept: INFUSION THERAPY | Age: 69
End: 2024-04-09

## 2024-04-09 ENCOUNTER — TELEPHONE (OUTPATIENT)
Dept: PRIMARY CARE CLINIC | Age: 69
End: 2024-04-09

## 2024-04-09 LAB
ANION GAP SERPL CALCULATED.3IONS-SCNC: 6 MMOL/L (ref 7–16)
BUN SERPL-MCNC: 13 MG/DL (ref 6–23)
CALCIUM SERPL-MCNC: 8.7 MG/DL (ref 8.6–10.2)
CHLORIDE SERPL-SCNC: 97 MMOL/L (ref 98–107)
CO2 SERPL-SCNC: 37 MMOL/L (ref 22–29)
CREAT SERPL-MCNC: 0.5 MG/DL (ref 0.5–1)
EKG ATRIAL RATE: 78 BPM
EKG P AXIS: 89 DEGREES
EKG P-R INTERVAL: 152 MS
EKG Q-T INTERVAL: 418 MS
EKG QRS DURATION: 84 MS
EKG QTC CALCULATION (BAZETT): 476 MS
EKG R AXIS: 70 DEGREES
EKG T AXIS: 83 DEGREES
EKG VENTRICULAR RATE: 78 BPM
ERYTHROCYTE [DISTWIDTH] IN BLOOD BY AUTOMATED COUNT: 20.7 % (ref 11.5–15)
FOLATE SERPL-MCNC: 12.7 NG/ML (ref 4.8–24.2)
GFR SERPL CREATININE-BSD FRML MDRD: >90 ML/MIN/1.73M2
GLUCOSE SERPL-MCNC: 161 MG/DL (ref 74–99)
HCT VFR BLD AUTO: 30.4 % (ref 34–48)
HGB BLD-MCNC: 9.1 G/DL (ref 11.5–15.5)
MCH RBC QN AUTO: 27.2 PG (ref 26–35)
MCHC RBC AUTO-ENTMCNC: 29.9 G/DL (ref 32–34.5)
MCV RBC AUTO: 90.7 FL (ref 80–99.9)
PATH REV BLD -IMP: NORMAL
PLATELET # BLD AUTO: 437 K/UL (ref 130–450)
PMV BLD AUTO: 9.4 FL (ref 7–12)
POTASSIUM SERPL-SCNC: 4.4 MMOL/L (ref 3.5–5)
RBC # BLD AUTO: 3.35 M/UL (ref 3.5–5.5)
SODIUM SERPL-SCNC: 140 MMOL/L (ref 132–146)
VIT B12 SERPL-MCNC: 907 PG/ML (ref 211–946)
WBC OTHER # BLD: 11.4 K/UL (ref 4.5–11.5)

## 2024-04-09 PROCEDURE — 82607 VITAMIN B-12: CPT

## 2024-04-09 PROCEDURE — 2580000003 HC RX 258: Performed by: INTERNAL MEDICINE

## 2024-04-09 PROCEDURE — 94640 AIRWAY INHALATION TREATMENT: CPT

## 2024-04-09 PROCEDURE — 94660 CPAP INITIATION&MGMT: CPT

## 2024-04-09 PROCEDURE — 82746 ASSAY OF FOLIC ACID SERUM: CPT

## 2024-04-09 PROCEDURE — 36415 COLL VENOUS BLD VENIPUNCTURE: CPT

## 2024-04-09 PROCEDURE — 6370000000 HC RX 637 (ALT 250 FOR IP): Performed by: INTERNAL MEDICINE

## 2024-04-09 PROCEDURE — 6360000002 HC RX W HCPCS: Performed by: NURSE PRACTITIONER

## 2024-04-09 PROCEDURE — 6360000002 HC RX W HCPCS: Performed by: INTERNAL MEDICINE

## 2024-04-09 PROCEDURE — 6360000002 HC RX W HCPCS: Performed by: STUDENT IN AN ORGANIZED HEALTH CARE EDUCATION/TRAINING PROGRAM

## 2024-04-09 PROCEDURE — 99232 SBSQ HOSP IP/OBS MODERATE 35: CPT | Performed by: NURSE PRACTITIONER

## 2024-04-09 PROCEDURE — 80048 BASIC METABOLIC PNL TOTAL CA: CPT

## 2024-04-09 PROCEDURE — 2580000003 HC RX 258: Performed by: STUDENT IN AN ORGANIZED HEALTH CARE EDUCATION/TRAINING PROGRAM

## 2024-04-09 PROCEDURE — 6370000000 HC RX 637 (ALT 250 FOR IP): Performed by: NURSE PRACTITIONER

## 2024-04-09 PROCEDURE — 85027 COMPLETE CBC AUTOMATED: CPT

## 2024-04-09 PROCEDURE — 2700000000 HC OXYGEN THERAPY PER DAY

## 2024-04-09 PROCEDURE — 1200000000 HC SEMI PRIVATE

## 2024-04-09 RX ORDER — FOLIC ACID 1 MG/1
1 TABLET ORAL DAILY
Status: DISCONTINUED | OUTPATIENT
Start: 2024-04-09 | End: 2024-04-11 | Stop reason: HOSPADM

## 2024-04-09 RX ORDER — CYANOCOBALAMIN 1000 UG/ML
1000 INJECTION, SOLUTION INTRAMUSCULAR; SUBCUTANEOUS ONCE
Status: COMPLETED | OUTPATIENT
Start: 2024-04-09 | End: 2024-04-09

## 2024-04-09 RX ADMIN — WATER 40 MG: 1 INJECTION INTRAMUSCULAR; INTRAVENOUS; SUBCUTANEOUS at 09:55

## 2024-04-09 RX ADMIN — PREGABALIN 50 MG: 50 CAPSULE ORAL at 22:30

## 2024-04-09 RX ADMIN — BUSPIRONE HYDROCHLORIDE 15 MG: 5 TABLET ORAL at 09:55

## 2024-04-09 RX ADMIN — APIXABAN 5 MG: 5 TABLET, FILM COATED ORAL at 08:06

## 2024-04-09 RX ADMIN — APIXABAN 5 MG: 5 TABLET, FILM COATED ORAL at 22:31

## 2024-04-09 RX ADMIN — ARFORMOTEROL TARTRATE 15 MCG: 15 SOLUTION RESPIRATORY (INHALATION) at 20:55

## 2024-04-09 RX ADMIN — ARFORMOTEROL TARTRATE 15 MCG: 15 SOLUTION RESPIRATORY (INHALATION) at 07:48

## 2024-04-09 RX ADMIN — BUDESONIDE INHALATION 500 MCG: 0.5 SUSPENSION RESPIRATORY (INHALATION) at 20:55

## 2024-04-09 RX ADMIN — SODIUM CHLORIDE, PRESERVATIVE FREE 10 ML: 5 INJECTION INTRAVENOUS at 22:32

## 2024-04-09 RX ADMIN — SODIUM CHLORIDE 125 MG: 9 INJECTION, SOLUTION INTRAVENOUS at 10:01

## 2024-04-09 RX ADMIN — ALBUTEROL SULFATE 2.5 MG: 2.5 SOLUTION RESPIRATORY (INHALATION) at 15:24

## 2024-04-09 RX ADMIN — GUAIFENESIN 400 MG: 400 TABLET ORAL at 13:37

## 2024-04-09 RX ADMIN — ALBUTEROL SULFATE 2.5 MG: 2.5 SOLUTION RESPIRATORY (INHALATION) at 20:55

## 2024-04-09 RX ADMIN — WATER 40 MG: 1 INJECTION INTRAMUSCULAR; INTRAVENOUS; SUBCUTANEOUS at 22:32

## 2024-04-09 RX ADMIN — SODIUM CHLORIDE, PRESERVATIVE FREE 10 ML: 5 INJECTION INTRAVENOUS at 08:07

## 2024-04-09 RX ADMIN — IPRATROPIUM BROMIDE 0.5 MG: 0.5 SOLUTION RESPIRATORY (INHALATION) at 07:48

## 2024-04-09 RX ADMIN — DEXTROMETHORPHAN HYDROBROMIDE, GUAIFENESIN 10 ML: 10; 100 LIQUID ORAL at 08:07

## 2024-04-09 RX ADMIN — HYDROXYZINE PAMOATE 25 MG: 25 CAPSULE ORAL at 15:34

## 2024-04-09 RX ADMIN — PREGABALIN 50 MG: 50 CAPSULE ORAL at 08:07

## 2024-04-09 RX ADMIN — ALBUTEROL SULFATE 2.5 MG: 2.5 SOLUTION RESPIRATORY (INHALATION) at 11:43

## 2024-04-09 RX ADMIN — METHIMAZOLE 15 MG: 5 TABLET ORAL at 08:07

## 2024-04-09 RX ADMIN — DILTIAZEM HYDROCHLORIDE 120 MG: 120 CAPSULE, EXTENDED RELEASE ORAL at 08:07

## 2024-04-09 RX ADMIN — GUAIFENESIN 400 MG: 400 TABLET ORAL at 22:31

## 2024-04-09 RX ADMIN — HYDROXYZINE PAMOATE 25 MG: 25 CAPSULE ORAL at 09:55

## 2024-04-09 RX ADMIN — CITALOPRAM HYDROBROMIDE 20 MG: 20 TABLET ORAL at 08:07

## 2024-04-09 RX ADMIN — IPRATROPIUM BROMIDE 0.5 MG: 0.5 SOLUTION RESPIRATORY (INHALATION) at 15:24

## 2024-04-09 RX ADMIN — ASPIRIN 81 MG: 81 TABLET, COATED ORAL at 08:07

## 2024-04-09 RX ADMIN — GUAIFENESIN 400 MG: 400 TABLET ORAL at 08:07

## 2024-04-09 RX ADMIN — TRAZODONE HYDROCHLORIDE 100 MG: 50 TABLET ORAL at 22:30

## 2024-04-09 RX ADMIN — PREGABALIN 50 MG: 50 CAPSULE ORAL at 13:37

## 2024-04-09 RX ADMIN — IPRATROPIUM BROMIDE 0.5 MG: 0.5 SOLUTION RESPIRATORY (INHALATION) at 20:55

## 2024-04-09 RX ADMIN — ACETAMINOPHEN 650 MG: 325 TABLET ORAL at 22:31

## 2024-04-09 RX ADMIN — ACETAMINOPHEN 650 MG: 325 TABLET ORAL at 12:22

## 2024-04-09 RX ADMIN — IPRATROPIUM BROMIDE 0.5 MG: 0.5 SOLUTION RESPIRATORY (INHALATION) at 11:43

## 2024-04-09 RX ADMIN — CYANOCOBALAMIN 1000 MCG: 1000 INJECTION, SOLUTION INTRAMUSCULAR; SUBCUTANEOUS at 11:11

## 2024-04-09 RX ADMIN — BUSPIRONE HYDROCHLORIDE 15 MG: 5 TABLET ORAL at 15:34

## 2024-04-09 RX ADMIN — FOLIC ACID 1 MG: 1 TABLET ORAL at 11:10

## 2024-04-09 RX ADMIN — BUDESONIDE INHALATION 500 MCG: 0.5 SUSPENSION RESPIRATORY (INHALATION) at 07:48

## 2024-04-09 RX ADMIN — ALBUTEROL SULFATE 2.5 MG: 2.5 SOLUTION RESPIRATORY (INHALATION) at 07:48

## 2024-04-09 RX ADMIN — ATORVASTATIN CALCIUM 40 MG: 40 TABLET, FILM COATED ORAL at 22:31

## 2024-04-09 RX ADMIN — PANTOPRAZOLE SODIUM 40 MG: 40 TABLET, DELAYED RELEASE ORAL at 07:10

## 2024-04-09 ASSESSMENT — PAIN SCALES - GENERAL
PAINLEVEL_OUTOF10: 0
PAINLEVEL_OUTOF10: 0
PAINLEVEL_OUTOF10: 6
PAINLEVEL_OUTOF10: 0
PAINLEVEL_OUTOF10: 0
PAINLEVEL_OUTOF10: 3

## 2024-04-09 ASSESSMENT — PAIN DESCRIPTION - DESCRIPTORS
DESCRIPTORS: ACHING;SORE;DISCOMFORT
DESCRIPTORS: ACHING

## 2024-04-09 ASSESSMENT — PAIN - FUNCTIONAL ASSESSMENT: PAIN_FUNCTIONAL_ASSESSMENT: ACTIVITIES ARE NOT PREVENTED

## 2024-04-09 ASSESSMENT — PAIN DESCRIPTION - ORIENTATION: ORIENTATION: RIGHT;LEFT

## 2024-04-09 ASSESSMENT — PAIN DESCRIPTION - LOCATION
LOCATION: HEAD
LOCATION: SHOULDER

## 2024-04-09 NOTE — PROGRESS NOTES
[facility]   Gastroenterology, Hepatology, &  Advanced Endoscopy      Reason for consult: Acute on chronic anemia    ASSESSMENT AND PLAN:     69y/F presents with COPD exacerbation and acute on chronic anemia requiring blood transfusion. She has had extensive endoscopic evaluation that has been negative for sources of bleeding.    PLAN:  - Trend Hgb daily and transfuse to Hgb >7.  Hgb 9.1  - No plans for further endoscopic evaluation.  - Serologic evaluation for causes of anemia if this has not been performed recently. This would include Iron Studies, - - -  -Haptoglobin 260  ()  -LDH  181  -Smear noted below  Retic Count  0.090 WNL  -  IV iron administration started in the setting of JULIO CESAR  + Iron deficiency noted on Iron studies  - B12 INJ x 1  -folic acid  -check B12/folic acid levels    We will follow.    Thank you for including us in the care of this patient. Please do not hesitate to contact us with any additional questions or concerns.    Discussed with Jaime Rivera MD  Gastroenterology/Hepatology  Advanced Endoscopy      HISTORY OF PRESENT ILLNESS:       Ms. Lana Phillips is a 69y/F w/ history of Afib, HTN, PVD, and COPD on home O2 who presented to the ED with SOB, R should pain, and chest tightness. She was brought in with EMS who have albuterol inhaler with improvement in symptoms. She has had several admissions for COPD. The is positive for Rhinovirus. The patient was found to be anemic on admission. She has been endoscopically evaluated for anemia and follows with Dr. Pelayo.       PERIPHERAL SMEAR REVIEW: 4/8/24 Platelets are present in adequate numbers  and display normal morphology.  Red blood cells are normocytic  normochromic with moderate anisopoikilocytosis.  White blood cells   show a mild neutrophilia, mild eosinophilia and mild lymphopenia.  A  rare myelocyte is present.  Blasts are not identified.   IMPRESSION:   Normocytic anemia - causes of a normocytic anemia with an   increased RDW

## 2024-04-09 NOTE — PROGRESS NOTES
[facility]  [address], [address]  [place] [address]      Pulmonary Progress Note      SUBJECTIVE:  reclining in bed on 7 li NC, similar (?) close to baseline with respiratory function, claims wore NIV 12- YV=084 Fio2=50% last night though not recorded in flowsheet that she did.    OBJECTIVE    Medications    Continuous Infusions:   sodium chloride      sodium chloride         Scheduled Meds:   ferric gluconate (FERRLECIT) 125 mg in sodium chloride 0.9 % 100 mL IVPB  125 mg IntraVENous Daily    folic acid  1 mg Oral Daily    methylPREDNISolone  40 mg IntraVENous Q12H    albuterol  2.5 mg Nebulization Q4H While awake    apixaban  5 mg Oral BID    aspirin  81 mg Oral Daily    atorvastatin  40 mg Oral Nightly    citalopram  20 mg Oral Daily    dilTIAZem  120 mg Oral Daily    guaiFENesin  400 mg Oral TID    methIMAzole  15 mg Oral Daily    pantoprazole  40 mg Oral QAM AC    polyethylene glycol  17 g Oral Daily    pregabalin  50 mg Oral TID    sennosides-docusate sodium  1 tablet Oral Daily    traZODone  100 mg Oral Nightly    sodium chloride flush  5-40 mL IntraVENous 2 times per day    budesonide  0.5 mg Nebulization BID RT    And    arformoterol tartrate  15 mcg Nebulization BID RT    ipratropium  0.5 mg Nebulization 4x Daily RT       PRN Meds:guaiFENesin-dextromethorphan, sodium chloride, hydrOXYzine pamoate, acetaminophen, busPIRone, prochlorperazine, sodium chloride flush, sodium chloride, potassium chloride **OR** potassium alternative oral replacement **OR** potassium chloride, magnesium sulfate, polyethylene glycol    Physical    VITALS:  BP (!) 155/82   Pulse 81   Temp 97.4 °F (36.3 °C) (Temporal)   Resp 20   Ht 1.651 m (5' 5\")   Wt 44.5 kg (98 lb)   SpO2 94%   BMI 16.31 kg/m²     24HR INTAKE/OUTPUT:    No intake or output data in the 24 hours ending 24 1702      24HR PULSE OXIMETRY RANGE:    SpO2  Av.4 %  Min: 94 %

## 2024-04-09 NOTE — PROGRESS NOTES
Hospital Medicine    Subjective:  pt alert conversive breathing better      Current Facility-Administered Medications:     ferric gluconate (FERRLECIT) 125 mg in sodium chloride 0.9 % 100 mL IVPB, 125 mg, IntraVENous, Daily, Jaime Rivera MD    methylPREDNISolone sodium succ (SOLU-MEDROL) 40 mg in sterile water 1 mL injection, 40 mg, IntraVENous, Q12H, Baldemar Jarrett DO, 40 mg at 04/08/24 2029    guaiFENesin-dextromethorphan (ROBITUSSIN DM) 100-10 MG/5ML syrup 10 mL, 10 mL, Oral, Q6H PRN, Baldemar Jarrett DO, 10 mL at 04/08/24 1604    0.9 % sodium chloride infusion, , IntraVENous, PRN, Baldemar Jarrett DO    hydrOXYzine pamoate (VISTARIL) capsule 25 mg, 25 mg, Oral, Q6H PRN, Baldemar Jarrett DO, 25 mg at 04/08/24 1532    acetaminophen (TYLENOL) tablet 650 mg, 650 mg, Oral, Q6H PRN, Baldemar Jarrett DO, 650 mg at 04/08/24 1700    albuterol (PROVENTIL) (2.5 MG/3ML) 0.083% nebulizer solution 2.5 mg, 2.5 mg, Nebulization, Q4H While awake, Baldemar Jarrett DO, 2.5 mg at 04/08/24 2038    apixaban (ELIQUIS) tablet 5 mg, 5 mg, Oral, BID, Baldemar Jarrett DO, 5 mg at 04/08/24 2027    aspirin EC tablet 81 mg, 81 mg, Oral, Daily, Baldemar Jarrett DO, 81 mg at 04/08/24 0852    atorvastatin (LIPITOR) tablet 40 mg, 40 mg, Oral, Nightly, Baldemar Jarrett DO, 40 mg at 04/08/24 2027    busPIRone (BUSPAR) tablet 15 mg, 15 mg, Oral, TID PRN, Baldemar Jarrett DO, 15 mg at 04/08/24 2024    citalopram (CELEXA) tablet 20 mg, 20 mg, Oral, Daily, Baldemar Jarrett DO, 20 mg at 04/08/24 0852    dilTIAZem (CARDIZEM CD) extended release capsule 120 mg, 120 mg, Oral, Daily, Baldemar Jarrett, , 120 mg at 04/08/24 0852    guaiFENesin tablet 400 mg, 400 mg, Oral, TID, Baldemar Jarrett, , 400 mg at 04/08/24 2027    methIMAzole (TAPAZOLE) tablet 15 mg, 15 mg, Oral, Daily, Baldemar Jarrett, , 15 mg at 04/08/24 0852    pantoprazole (PROTONIX) tablet 40 mg, 40 mg, Oral, QAM AC, Baldemar Jarrett, , 40 mg at 04/09/24

## 2024-04-09 NOTE — TELEPHONE ENCOUNTER
Received notification from [employer] of possible drug interaction (scanned into media 04/09/24).  I forwarded information via email to Bo Altamirano RPH and Dr. [place] Rider.  Bo responded, \"I am not familiar with this patient, but looking at her chart she was complaining of nausea and also constipation. Due to the constipation, 5HT3 is not best option here as they can make it worse. So while Compazine may not be ideal, it is her best option and benefits most likely outweigh risks since she only uses it as needed.\" No further action needed/ordered.

## 2024-04-09 NOTE — PROGRESS NOTES
Date: 4/9/2024    Time: 12:50 AM    Patient Placed On BIPAP/CPAP/ Non-Invasive Ventilation?  Patient continues on bipap    If no must comment.  Facial area red/color change? No           If YES are Blister/Lesion present?No   If yes must notify nursing staff  BIPAP/CPAP skin barrier?  Yes    Skin barrier type:mepilexlite       Comments:        Nia Rosas RCP

## 2024-04-09 NOTE — TELEPHONE ENCOUNTER
Pt off thyroid medication, Endocrinology took her off of it, she has graves disease. She wanted you to know.

## 2024-04-10 PROCEDURE — 94660 CPAP INITIATION&MGMT: CPT

## 2024-04-10 PROCEDURE — 2580000003 HC RX 258: Performed by: INTERNAL MEDICINE

## 2024-04-10 PROCEDURE — 6360000002 HC RX W HCPCS: Performed by: INTERNAL MEDICINE

## 2024-04-10 PROCEDURE — 6370000000 HC RX 637 (ALT 250 FOR IP): Performed by: INTERNAL MEDICINE

## 2024-04-10 PROCEDURE — 1200000000 HC SEMI PRIVATE

## 2024-04-10 PROCEDURE — 97165 OT EVAL LOW COMPLEX 30 MIN: CPT

## 2024-04-10 PROCEDURE — 2700000000 HC OXYGEN THERAPY PER DAY

## 2024-04-10 PROCEDURE — 6370000000 HC RX 637 (ALT 250 FOR IP): Performed by: NURSE PRACTITIONER

## 2024-04-10 PROCEDURE — 97535 SELF CARE MNGMENT TRAINING: CPT

## 2024-04-10 PROCEDURE — 99232 SBSQ HOSP IP/OBS MODERATE 35: CPT | Performed by: NURSE PRACTITIONER

## 2024-04-10 PROCEDURE — 97530 THERAPEUTIC ACTIVITIES: CPT

## 2024-04-10 PROCEDURE — 2580000003 HC RX 258: Performed by: STUDENT IN AN ORGANIZED HEALTH CARE EDUCATION/TRAINING PROGRAM

## 2024-04-10 PROCEDURE — 6360000002 HC RX W HCPCS: Performed by: STUDENT IN AN ORGANIZED HEALTH CARE EDUCATION/TRAINING PROGRAM

## 2024-04-10 PROCEDURE — 97161 PT EVAL LOW COMPLEX 20 MIN: CPT

## 2024-04-10 PROCEDURE — 94640 AIRWAY INHALATION TREATMENT: CPT

## 2024-04-10 RX ORDER — PREDNISONE 20 MG/1
40 TABLET ORAL DAILY
Status: DISCONTINUED | OUTPATIENT
Start: 2024-04-11 | End: 2024-04-11 | Stop reason: HOSPADM

## 2024-04-10 RX ADMIN — PANTOPRAZOLE SODIUM 40 MG: 40 TABLET, DELAYED RELEASE ORAL at 05:20

## 2024-04-10 RX ADMIN — IPRATROPIUM BROMIDE 0.5 MG: 0.5 SOLUTION RESPIRATORY (INHALATION) at 07:38

## 2024-04-10 RX ADMIN — PREGABALIN 50 MG: 50 CAPSULE ORAL at 08:35

## 2024-04-10 RX ADMIN — SODIUM CHLORIDE, PRESERVATIVE FREE 10 ML: 5 INJECTION INTRAVENOUS at 08:35

## 2024-04-10 RX ADMIN — ALBUTEROL SULFATE 2.5 MG: 2.5 SOLUTION RESPIRATORY (INHALATION) at 16:09

## 2024-04-10 RX ADMIN — ALBUTEROL SULFATE 2.5 MG: 2.5 SOLUTION RESPIRATORY (INHALATION) at 19:34

## 2024-04-10 RX ADMIN — SODIUM CHLORIDE 125 MG: 9 INJECTION, SOLUTION INTRAVENOUS at 08:43

## 2024-04-10 RX ADMIN — GUAIFENESIN 400 MG: 400 TABLET ORAL at 20:44

## 2024-04-10 RX ADMIN — APIXABAN 5 MG: 5 TABLET, FILM COATED ORAL at 20:43

## 2024-04-10 RX ADMIN — HYDROXYZINE PAMOATE 25 MG: 25 CAPSULE ORAL at 03:29

## 2024-04-10 RX ADMIN — HYDROXYZINE PAMOATE 25 MG: 25 CAPSULE ORAL at 20:44

## 2024-04-10 RX ADMIN — PREGABALIN 50 MG: 50 CAPSULE ORAL at 20:43

## 2024-04-10 RX ADMIN — WATER 40 MG: 1 INJECTION INTRAMUSCULAR; INTRAVENOUS; SUBCUTANEOUS at 08:33

## 2024-04-10 RX ADMIN — GUAIFENESIN 400 MG: 400 TABLET ORAL at 13:34

## 2024-04-10 RX ADMIN — IPRATROPIUM BROMIDE 0.5 MG: 0.5 SOLUTION RESPIRATORY (INHALATION) at 11:28

## 2024-04-10 RX ADMIN — ATORVASTATIN CALCIUM 40 MG: 40 TABLET, FILM COATED ORAL at 20:44

## 2024-04-10 RX ADMIN — METHIMAZOLE 15 MG: 5 TABLET ORAL at 08:35

## 2024-04-10 RX ADMIN — ACETAMINOPHEN 650 MG: 325 TABLET ORAL at 13:43

## 2024-04-10 RX ADMIN — ASPIRIN 81 MG: 81 TABLET, COATED ORAL at 08:35

## 2024-04-10 RX ADMIN — BUSPIRONE HYDROCHLORIDE 15 MG: 5 TABLET ORAL at 19:04

## 2024-04-10 RX ADMIN — PREGABALIN 50 MG: 50 CAPSULE ORAL at 13:34

## 2024-04-10 RX ADMIN — ARFORMOTEROL TARTRATE 15 MCG: 15 SOLUTION RESPIRATORY (INHALATION) at 07:38

## 2024-04-10 RX ADMIN — CITALOPRAM HYDROBROMIDE 20 MG: 20 TABLET ORAL at 08:35

## 2024-04-10 RX ADMIN — BUSPIRONE HYDROCHLORIDE 15 MG: 5 TABLET ORAL at 08:34

## 2024-04-10 RX ADMIN — HYDROXYZINE PAMOATE 25 MG: 25 CAPSULE ORAL at 14:30

## 2024-04-10 RX ADMIN — APIXABAN 5 MG: 5 TABLET, FILM COATED ORAL at 08:35

## 2024-04-10 RX ADMIN — FOLIC ACID 1 MG: 1 TABLET ORAL at 08:35

## 2024-04-10 RX ADMIN — IPRATROPIUM BROMIDE 0.5 MG: 0.5 SOLUTION RESPIRATORY (INHALATION) at 19:34

## 2024-04-10 RX ADMIN — TRAZODONE HYDROCHLORIDE 100 MG: 50 TABLET ORAL at 20:43

## 2024-04-10 RX ADMIN — IPRATROPIUM BROMIDE 0.5 MG: 0.5 SOLUTION RESPIRATORY (INHALATION) at 16:09

## 2024-04-10 RX ADMIN — SODIUM CHLORIDE, PRESERVATIVE FREE 10 ML: 5 INJECTION INTRAVENOUS at 20:44

## 2024-04-10 RX ADMIN — ALBUTEROL SULFATE 2.5 MG: 2.5 SOLUTION RESPIRATORY (INHALATION) at 11:28

## 2024-04-10 RX ADMIN — WATER 40 MG: 1 INJECTION INTRAMUSCULAR; INTRAVENOUS; SUBCUTANEOUS at 20:44

## 2024-04-10 RX ADMIN — DILTIAZEM HYDROCHLORIDE 120 MG: 120 CAPSULE, EXTENDED RELEASE ORAL at 08:35

## 2024-04-10 RX ADMIN — BUDESONIDE INHALATION 500 MCG: 0.5 SUSPENSION RESPIRATORY (INHALATION) at 19:34

## 2024-04-10 RX ADMIN — HYDROXYZINE PAMOATE 25 MG: 25 CAPSULE ORAL at 08:34

## 2024-04-10 RX ADMIN — BUDESONIDE INHALATION 500 MCG: 0.5 SUSPENSION RESPIRATORY (INHALATION) at 07:38

## 2024-04-10 RX ADMIN — ARFORMOTEROL TARTRATE 15 MCG: 15 SOLUTION RESPIRATORY (INHALATION) at 19:34

## 2024-04-10 RX ADMIN — GUAIFENESIN 400 MG: 400 TABLET ORAL at 08:35

## 2024-04-10 RX ADMIN — ALBUTEROL SULFATE 2.5 MG: 2.5 SOLUTION RESPIRATORY (INHALATION) at 07:38

## 2024-04-10 ASSESSMENT — PAIN DESCRIPTION - LOCATION: LOCATION: HEAD

## 2024-04-10 ASSESSMENT — PAIN DESCRIPTION - DESCRIPTORS: DESCRIPTORS: ACHING;DISCOMFORT

## 2024-04-10 ASSESSMENT — PAIN SCALES - GENERAL
PAINLEVEL_OUTOF10: 7
PAINLEVEL_OUTOF10: 0
PAINLEVEL_OUTOF10: 0

## 2024-04-10 ASSESSMENT — PAIN DESCRIPTION - FREQUENCY: FREQUENCY: CONTINUOUS

## 2024-04-10 ASSESSMENT — PAIN DESCRIPTION - PAIN TYPE: TYPE: ACUTE PAIN

## 2024-04-10 NOTE — PROGRESS NOTES
[facility]   Gastroenterology, Hepatology, &  Advanced Endoscopy      Reason for consult: Acute on chronic anemia    ASSESSMENT AND PLAN:   Pt states she wakes up with anxiety, and SOB.   Concerned about possible discharge tomorrow.    69y/F presents with COPD exacerbation and acute on chronic anemia requiring blood transfusion. She has had extensive endoscopic evaluation that has been negative for sources of bleeding.    PLAN:  - Trend Hgb daily and transfuse to Hgb >7.  Hgb 9.1  - Pt on eliquis  - No plans for further endoscopic evaluation.  - Serologic evaluation for causes of anemia. This would include Iron Studies   -Haptoglobin 260  ()  -LDH  181  - B12 907  -CBC qd  -Folic acid 12.7  -Smear noted below  Retic Count  0.090 WNL  -  IV iron administration started in the setting of JULIO CESAR  + Iron deficiency noted on Iron studies  - B12 INJ x 1  -folic acid  -check B12/folic acid levels    We will follow.    Thank you for including us in the care of this patient. Please do not hesitate to contact us with any additional questions or concerns.    Discussed with Jaime Rivera MD  Gastroenterology/Hepatology  Advanced Endoscopy      HISTORY OF PRESENT ILLNESS:       Ms. Lana Phillips is a 69y/F w/ history of Afib, HTN, PVD, and COPD on home O2 who presented to the ED with SOB, R should pain, and chest tightness. She was brought in with EMS who have albuterol inhaler with improvement in symptoms. She has had several admissions for COPD. The is positive for Rhinovirus. The patient was found to be anemic on admission. She has been endoscopically evaluated for anemia and follows with Dr. Pelayo.       PERIPHERAL SMEAR REVIEW: 4/8/24 Platelets are present in adequate numbers  and display normal morphology.  Red blood cells are normocytic  normochromic with moderate anisopoikilocytosis.  White blood cells   show a mild neutrophilia, mild eosinophilia and mild lymphopenia.  A  rare myelocyte is present.  Blasts are not  suspension 500 mcg, 0.5 mg, Nebulization, BID RT, 500 mcg at 04/10/24 0738 **AND** arformoterol tartrate (BROVANA) nebulizer solution 15 mcg, 15 mcg, Nebulization, BID RT, Baldemar Jarrett, DO, 15 mcg at 04/10/24 0738    ipratropium (ATROVENT) 0.02 % nebulizer solution 0.5 mg, 0.5 mg, Nebulization, 4x Daily RT, Baldemar Jarrett, , 0.5 mg at 04/10/24 0738     Allergies:  Pcn [penicillins]    ROS:  General: Patient denies n/v/f/c or weight loss.  HEENT: Patient denies persistent postnasal drip, scleral icterus, drooling, persistent bleeding from nose/mouth.  Resp: Patient denies wheezing, productive cough. She has SOB.  Cards: Patient denies palpitations, significant edema. She had CP.  GI: As above.  Derm: Patient denies jaundice/rashes.   Musc: Patient denies diffuse/irregular joint swelling or myalgias.        No results for input(s): \"INR\", \"ALT\", \"AST\", \"ALKPHOS\", \"BILITOT\", \"GLOB\", \"GGT\", \"LABPROT\", \"LABALBU\", \"BILIDIR\" in the last 72 hours.      Lab Results   Component Value Date    WBC 11.4 04/09/2024    HGB 9.1 (L) 04/09/2024    HCT 30.4 (L) 04/09/2024     04/09/2024     04/09/2024    K 4.4 04/09/2024    CL 97 (L) 04/09/2024    CREATININE 0.5 04/09/2024    BUN 13 04/09/2024    CO2 37 (H) 04/09/2024    FOLATE 12.7 04/09/2024    AWEGIJOP23 907 04/09/2024    GLUCOSE 161 (H) 04/09/2024    INR 1.1 10/15/2023    APTT 45.7 (H) 01/10/2024    TSH 1.86 03/13/2024    LABA1C 5.5 12/25/2023         PHYSICAL EXAM:  BP (!) 155/91   Pulse 76   Temp 97.8 °F (36.6 °C) (Oral)   Resp 22   Ht 1.651 m (5' 5\")   Wt 44.5 kg (98 lb)   SpO2 96%   BMI 16.31 kg/m²   Physical Exam:  General: Overall well-appearing, NAD  HEENT: PERRLA, EOMI, Anicteric sclera, MMM, no rhinorrhea  Cards: RRR, no LE edema  Resp: Breathing comfortably O2 via NC, good air movement, no use of accessory muscles, no audible wheezing  Abdomen: soft, NT, ND.   Extremities: Moves all extremities, no effusions or bruising.  Skin: No rashes or

## 2024-04-10 NOTE — CARE COORDINATION
Patient evaluated by PT; [facility] score 17/24. Spoke with patient regarding evals; advised she is not appropriate for [facility], however she is able to private pay. Patient states she cannot afford to go to a facility. Current plan is home, discussed home health care services; patient declined list and has no preference on home care agency. Pulmonology has requested a NIV at discharge; referral made to Moise at [facility], he will work on it. Referral made to [facility]; pending acceptance. Patient's sister-in-law will transport home when discharged.    The Plan for Transition of Care is related to the following treatment goals: discharge planning    The Patient and/or patient representative Lana RAKAN Alan was provided with a choice of provider and agrees   with the discharge plan. [x] Yes [] No    Freedom of choice list was provided with basic dialogue that supports the patient's individualized plan of care/goals, treatment preferences and shares the quality data associated with the providers. [x] Yes [] No     Electronically signed by CAYLA Romero on 4/10/2024 at 3:52 PM

## 2024-04-10 NOTE — PROGRESS NOTES
Physical Therapy  Physical Therapy Initial Assessment       Name: Lana Phillips  : 1955  MRN: [id number]      Date of Service: 4/10/2024    Evaluating PT:  Maine Vazquez PT, DPT [id number]    Room #:  6401/6401-A  Diagnosis:  Normocytic anemia [D64.9]  Viral URI [J06.9]  COPD exacerbation (HCC) [J44.1]  Rhinovirus infection [B34.8]  Acute respiratory failure with hypoxia and hypercarbia (HCC) [J96.01, J96.02]  Acute respiratory failure with hypoxia and hypercapnia (HCC) [J96.01, J96.02]  Acute on chronic anemia [D64.9]  PMHx/PSHx:   has a past medical history of Acute exacerbation of chronic obstructive pulmonary disease (COPD) (HCC), Acute respiratory failure (HCC), Acute respiratory failure with hypoxia (HCC), Acute respiratory failure with hypoxia (HCC), Acute respiratory failure with hypoxia (HCC), 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    Procedure/Surgery:  none this admission   Precautions: Falls,  droplet precautions, O2, continuous pulse ox     SUBJECTIVE:    Pt lives with NADJA in a 1 story home with 4 stairs to enter and 2 rail(s).  Bed is on 1st floor and bath is on 1st floor.  Pt ambulated with rollator VS no AD PTA. Pt recently at SNF.     Equipment Owned: rollator  Equipment Needs:  none    OBJECTIVE:   Initial Evaluation  Date: 4/10/24 Treatment  Date:  Short Term/ Long Term   Goals   AM-PAC 6 Clicks      Was pt agreeable to Eval/treatment? Yes      Does pt have pain?

## 2024-04-10 NOTE — PROGRESS NOTES
Occupational Therapy  OCCUPATIONAL THERAPY INITIAL EVALUATION    [facility]  [address], [place]      Date:4/10/2024                                                Patient Name: Lana Phillips  MRN: [id number]  : 1955  Room: [address] [place] [address]-    Evaluating OT: Oumar Echevarria OTR/L #[id number]     Referring Provider: Baldemar Jarrett DO   Specific Provider Orders/Date: OT eval and treat 4/10/24    Diagnosis: Normocytic anemia [D64.9]  Viral URI [J06.9]  COPD exacerbation (HCC) [J44.1]  Rhinovirus infection [B34.8]  Acute respiratory failure with hypoxia and hypercarbia (HCC) [J96.01, J96.02]  Acute respiratory failure with hypoxia and hypercapnia (HCC) [J96.01, J96.02]  Acute on chronic anemia [D64.9]   Pt admitted to hospital with SOB, chest pain and anxiety     Pertinent Medical History:  has a past medical history of Acute exacerbation of chronic obstructive pulmonary disease (COPD) (HCC), Acute respiratory failure (HCC), Acute respiratory failure with hypoxia (HCC), Acute respiratory failure with hypoxia (HCC), Acute respiratory failure with hypoxia (HCC), 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       Precautions:  Fall Risk, droplet precautions, O2, continuous pulse ox    Assessment of current deficits    [x] Functional mobility  [x]ADLs  [x] Strength               []Cognition    [x] Functional transfers   [x] IADLs

## 2024-04-10 NOTE — CARE COORDINATION
Patient currently on 6L (baseline 4L through [employer]), IV Solumedrol Q12, daily IV ferrlecit; pulmonology following. Informed by nurse, patient now wants rehab. Spoke with patient, she reports she is getting up to the bathroom, feels she needs rehab and reports she is not getting up to well. Explained patient may not qualify for ANKIT and will have to discharge home; patient expressed understanding and appears anxious to discharge. Discussed home healthcare services. Patient will likely d/c home, no needs at this time and her sister-in-law to transport. If ANKIT is needed, patient would like to return to [facility]. Patient pending PT/OT evals, call made to therapy department for patient to be seen.     The Plan for Transition of Care is related to the following treatment goals: discharge planning    The Patient and/or patient representative Lana RAKAN Ferrisse was provided with a choice of provider and agrees   with the discharge plan. [x] Yes [] No    Freedom of choice list was provided with basic dialogue that supports the patient's individualized plan of care/goals, treatment preferences and shares the quality data associated with the providers. [x] Yes [] No     Electronically signed by CAYLA Romero on 4/10/2024 at 10:42 AM

## 2024-04-10 NOTE — DISCHARGE SUMMARY
[address] [place] [address]                            DISCHARGE SUMMARY      PATIENT NAME: ANGIE SHELTON CM               : 1955  MED REC NO: [id number]                        ROOM: 8501  ACCOUNT NO: [id number]                       ADMIT DATE: 2024  PROVIDER: Baldemar Jarrett DO      DISCHARGE DIAGNOSES:  Chronic obstructive pulmonary disease exacerbation, acute on chronic anemia, left 2nd toe ulcer, tobacco abuse, peripheral artery disease, chronic Eliquis therapy, paroxysmal atrial fibrillation, hyperthyroidism, anxiety, vitamin B12 deficiency.    HOSPITAL COURSE:  The patient is a 69-year-old  female who presented to the emergency room complaining of increasing shortness of breath.  She was seen in the emergency room.  Diagnostic evaluation was remarkable for hemoglobin of 7.1.  She was admitted to the hospital.  Seen by Pulmonary and treated with aerosols and steroids.  She was seen by Hematology and she was transfused packed red blood cells.  Her status improved and she was discharged home in stable condition on 2024.    DISCHARGE MEDICATIONS:  On day of discharge include Robitussin DM, Eliquis, aspirin, Lipitor, BuSpar, Cardizem CD, DuoNeb nebulizer, Tapazole, Protonix, Paxil, Desyrel, vitamin B12, Brovana, Pulmicort, Colace, Lyrica, Vistaril p.r.n., prednisone.    FOLLOWUP:  The patient was instructed follow up with Pulmonary, call office for appointment; with Hematology, call office for appointment.          BALDEMAR JARRETT DO      D:  2024 21:03:26     T:  04/10/2024 19:27:48     CHAPITO/KARTHIKEYAN  Job #:  [id number]     Doc#:  [id number]

## 2024-04-10 NOTE — PROGRESS NOTES
[facility]  [address], [address]  [place] [address]      Pulmonary Progress Note      SUBJECTIVE:  reclining in bed on 5 li NC, similar (?) close to baseline with respiratory function, unclear if placed on NIV - LF=998 Fio2=50% last night.    OBJECTIVE    Medications    Continuous Infusions:   sodium chloride      sodium chloride         Scheduled Meds:   [START ON 2024] predniSONE  30 mg Oral Daily    ferric gluconate (FERRLECIT) 125 mg in sodium chloride 0.9 % 100 mL IVPB  125 mg IntraVENous Daily    folic acid  1 mg Oral Daily    methylPREDNISolone  40 mg IntraVENous Q12H    albuterol  2.5 mg Nebulization Q4H While awake    apixaban  5 mg Oral BID    aspirin  81 mg Oral Daily    atorvastatin  40 mg Oral Nightly    citalopram  20 mg Oral Daily    dilTIAZem  120 mg Oral Daily    guaiFENesin  400 mg Oral TID    methIMAzole  15 mg Oral Daily    pantoprazole  40 mg Oral QAM AC    polyethylene glycol  17 g Oral Daily    pregabalin  50 mg Oral TID    sennosides-docusate sodium  1 tablet Oral Daily    traZODone  100 mg Oral Nightly    sodium chloride flush  5-40 mL IntraVENous 2 times per day    budesonide  0.5 mg Nebulization BID RT    And    arformoterol tartrate  15 mcg Nebulization BID RT    ipratropium  0.5 mg Nebulization 4x Daily RT       PRN Meds:guaiFENesin-dextromethorphan, sodium chloride, hydrOXYzine pamoate, acetaminophen, busPIRone, prochlorperazine, sodium chloride flush, sodium chloride, potassium chloride **OR** potassium alternative oral replacement **OR** potassium chloride, magnesium sulfate, polyethylene glycol    Physical    VITALS:  BP (!) 155/91   Pulse 86   Temp 97.8 °F (36.6 °C) (Oral)   Resp 13   Ht 1.651 m (5' 5\")   Wt 44.5 kg (98 lb)   SpO2 94%   BMI 16.31 kg/m²     24HR INTAKE/OUTPUT:    No intake or output data in the 24 hours ending 04/10/24 1448      24HR PULSE OXIMETRY RANGE:    SpO2  Av.5

## 2024-04-10 NOTE — PROGRESS NOTES
[facility] Medicine    Subjective:  pt alert conversive breathing better      Current Facility-Administered Medications:     ferric gluconate (FERRLECIT) 125 mg in sodium chloride 0.9 % 100 mL IVPB, 125 mg, IntraVENous, Daily, Jaime Rivera MD, Stopped at 04/09/24 1110    folic acid (FOLVITE) tablet 1 mg, 1 mg, Oral, Daily, Magan Leyva, APRN - CNP, 1 mg at 04/09/24 1110    methylPREDNISolone sodium succ (SOLU-MEDROL) 40 mg in sterile water 1 mL injection, 40 mg, IntraVENous, Q12H, Baldemar Jarrett DO, 40 mg at 04/09/24 2232    guaiFENesin-dextromethorphan (ROBITUSSIN DM) 100-10 MG/5ML syrup 10 mL, 10 mL, Oral, Q6H PRN, Baldemar Jarrett DO, 10 mL at 04/09/24 0807    0.9 % sodium chloride infusion, , IntraVENous, PRN, Baldemar Jarrett DO    hydrOXYzine pamoate (VISTARIL) capsule 25 mg, 25 mg, Oral, Q6H PRN, Baldemar Jarrett DO, 25 mg at 04/10/24 0329    acetaminophen (TYLENOL) tablet 650 mg, 650 mg, Oral, Q6H PRN, Baldemar Jarrett DO, 650 mg at 04/09/24 2231    albuterol (PROVENTIL) (2.5 MG/3ML) 0.083% nebulizer solution 2.5 mg, 2.5 mg, Nebulization, Q4H While awake, Baldemar Jarrett DO, 2.5 mg at 04/09/24 2055    apixaban (ELIQUIS) tablet 5 mg, 5 mg, Oral, BID, Baldemar Jarrett DO, 5 mg at 04/09/24 2231    aspirin EC tablet 81 mg, 81 mg, Oral, Daily, Baldemar Jarrett DO, 81 mg at 04/09/24 0807    atorvastatin (LIPITOR) tablet 40 mg, 40 mg, Oral, Nightly, Baldemar Jarrett DO, 40 mg at 04/09/24 2231    busPIRone (BUSPAR) tablet 15 mg, 15 mg, Oral, TID PRN, Baldemar Jarrett DO, 15 mg at 04/09/24 1534    citalopram (CELEXA) tablet 20 mg, 20 mg, Oral, Daily, Baldemar Jarrett DO, 20 mg at 04/09/24 0807    dilTIAZem (CARDIZEM CD) extended release capsule 120 mg, 120 mg, Oral, Daily, Baldemar Jarrett DO, 120 mg at 04/09/24 0807    guaiFENesin tablet 400 mg, 400 mg, Oral, TID, Baldemar Jarrett DO, 400 mg at 04/09/24 2231    methIMAzole (TAPAZOLE) tablet 15 mg, 15 mg, Oral, Daily, [place]

## 2024-04-10 NOTE — PLAN OF CARE
Problem: Chronic Conditions and Co-morbidities  Goal: Patient's chronic conditions and co-morbidity symptoms are monitored and maintained or improved  Outcome: Progressing     Problem: Discharge Planning  Goal: Discharge to home or other facility with appropriate resources  Outcome: Progressing     Problem: Pain  Goal: Verbalizes/displays adequate comfort level or baseline comfort level  Outcome: Progressing     Problem: Skin/Tissue Integrity  Goal: Absence of new skin breakdown  Description: 1.  Monitor for areas of redness and/or skin breakdown  2.  Assess vascular access sites hourly  3.  Every 4-6 hours minimum:  Change oxygen saturation probe site  4.  Every 4-6 hours:  If on nasal continuous positive airway pressure, respiratory therapy assess nares and determine need for appliance change or resting period.  Outcome: Progressing     Problem: Safety - Adult  Goal: Free from fall injury  Outcome: Progressing     Problem: Respiratory - Adult  Goal: Achieves optimal ventilation and oxygenation  Outcome: Progressing

## 2024-04-11 VITALS
TEMPERATURE: 98.8 F | HEIGHT: 65 IN | SYSTOLIC BLOOD PRESSURE: 136 MMHG | HEART RATE: 68 BPM | RESPIRATION RATE: 22 BRPM | OXYGEN SATURATION: 91 % | DIASTOLIC BLOOD PRESSURE: 72 MMHG | WEIGHT: 98 LBS | BODY MASS INDEX: 16.33 KG/M2

## 2024-04-11 LAB
BASOPHILS # BLD: 0 K/UL (ref 0–0.2)
BASOPHILS NFR BLD: 0 % (ref 0–2)
EOSINOPHIL # BLD: 0 K/UL (ref 0.05–0.5)
EOSINOPHILS RELATIVE PERCENT: 0 % (ref 0–6)
ERYTHROCYTE [DISTWIDTH] IN BLOOD BY AUTOMATED COUNT: 20.2 % (ref 11.5–15)
HCT VFR BLD AUTO: 30.5 % (ref 34–48)
HGB BLD-MCNC: 8.9 G/DL (ref 11.5–15.5)
LYMPHOCYTES NFR BLD: 0.75 K/UL (ref 1.5–4)
LYMPHOCYTES RELATIVE PERCENT: 6 % (ref 20–42)
MCH RBC QN AUTO: 26.6 PG (ref 26–35)
MCHC RBC AUTO-ENTMCNC: 29.2 G/DL (ref 32–34.5)
MCV RBC AUTO: 91.3 FL (ref 80–99.9)
MONOCYTES NFR BLD: 0.32 K/UL (ref 0.1–0.95)
MONOCYTES NFR BLD: 3 % (ref 2–12)
MYELOCYTES ABSOLUTE COUNT: 0.32 K/UL
MYELOCYTES: 3 %
NEUTROPHILS NFR BLD: 89 % (ref 43–80)
NEUTS SEG NFR BLD: 11 K/UL (ref 1.8–7.3)
PLATELET # BLD AUTO: 450 K/UL (ref 130–450)
PMV BLD AUTO: 9.7 FL (ref 7–12)
RBC # BLD AUTO: 3.34 M/UL (ref 3.5–5.5)
RBC # BLD: ABNORMAL 10*6/UL
WBC OTHER # BLD: 12.4 K/UL (ref 4.5–11.5)

## 2024-04-11 PROCEDURE — 2580000003 HC RX 258: Performed by: STUDENT IN AN ORGANIZED HEALTH CARE EDUCATION/TRAINING PROGRAM

## 2024-04-11 PROCEDURE — 85025 COMPLETE CBC W/AUTO DIFF WBC: CPT

## 2024-04-11 PROCEDURE — 6360000002 HC RX W HCPCS: Performed by: STUDENT IN AN ORGANIZED HEALTH CARE EDUCATION/TRAINING PROGRAM

## 2024-04-11 PROCEDURE — 2580000003 HC RX 258: Performed by: INTERNAL MEDICINE

## 2024-04-11 PROCEDURE — 99232 SBSQ HOSP IP/OBS MODERATE 35: CPT | Performed by: NURSE PRACTITIONER

## 2024-04-11 PROCEDURE — 6370000000 HC RX 637 (ALT 250 FOR IP): Performed by: INTERNAL MEDICINE

## 2024-04-11 PROCEDURE — 2700000000 HC OXYGEN THERAPY PER DAY

## 2024-04-11 PROCEDURE — 36415 COLL VENOUS BLD VENIPUNCTURE: CPT

## 2024-04-11 PROCEDURE — 6360000002 HC RX W HCPCS: Performed by: INTERNAL MEDICINE

## 2024-04-11 PROCEDURE — 94660 CPAP INITIATION&MGMT: CPT

## 2024-04-11 PROCEDURE — 94640 AIRWAY INHALATION TREATMENT: CPT

## 2024-04-11 PROCEDURE — 6370000000 HC RX 637 (ALT 250 FOR IP): Performed by: NURSE PRACTITIONER

## 2024-04-11 RX ORDER — DOXYCYCLINE HYCLATE 100 MG/1
100 CAPSULE ORAL EVERY 12 HOURS SCHEDULED
Qty: 20 CAPSULE | Refills: 0 | Status: ON HOLD | OUTPATIENT
Start: 2024-04-11 | End: 2024-04-21

## 2024-04-11 RX ORDER — PREDNISONE 10 MG/1
TABLET ORAL
Qty: 30 TABLET | Refills: 0 | Status: ON HOLD | OUTPATIENT
Start: 2024-04-11

## 2024-04-11 RX ORDER — FOLIC ACID 1 MG/1
1 TABLET ORAL DAILY
Qty: 30 TABLET | Refills: 0 | Status: ON HOLD | OUTPATIENT
Start: 2024-04-11

## 2024-04-11 RX ORDER — DOXYCYCLINE HYCLATE 100 MG/1
100 CAPSULE ORAL EVERY 12 HOURS SCHEDULED
Status: DISCONTINUED | OUTPATIENT
Start: 2024-04-11 | End: 2024-04-11 | Stop reason: HOSPADM

## 2024-04-11 RX ADMIN — METHIMAZOLE 15 MG: 5 TABLET ORAL at 08:55

## 2024-04-11 RX ADMIN — ASPIRIN 81 MG: 81 TABLET, COATED ORAL at 08:54

## 2024-04-11 RX ADMIN — SENNOSIDES AND DOCUSATE SODIUM 1 TABLET: 8.6; 5 TABLET ORAL at 08:54

## 2024-04-11 RX ADMIN — SODIUM CHLORIDE 125 MG: 9 INJECTION, SOLUTION INTRAVENOUS at 10:08

## 2024-04-11 RX ADMIN — HYDROXYZINE PAMOATE 25 MG: 25 CAPSULE ORAL at 11:35

## 2024-04-11 RX ADMIN — ALBUTEROL SULFATE 2.5 MG: 2.5 SOLUTION RESPIRATORY (INHALATION) at 00:18

## 2024-04-11 RX ADMIN — IPRATROPIUM BROMIDE 0.5 MG: 0.5 SOLUTION RESPIRATORY (INHALATION) at 07:26

## 2024-04-11 RX ADMIN — PREDNISONE 40 MG: 20 TABLET ORAL at 08:54

## 2024-04-11 RX ADMIN — PREGABALIN 50 MG: 50 CAPSULE ORAL at 08:54

## 2024-04-11 RX ADMIN — PREGABALIN 50 MG: 50 CAPSULE ORAL at 12:21

## 2024-04-11 RX ADMIN — FOLIC ACID 1 MG: 1 TABLET ORAL at 08:55

## 2024-04-11 RX ADMIN — BUSPIRONE HYDROCHLORIDE 15 MG: 5 TABLET ORAL at 12:21

## 2024-04-11 RX ADMIN — ARFORMOTEROL TARTRATE 15 MCG: 15 SOLUTION RESPIRATORY (INHALATION) at 07:25

## 2024-04-11 RX ADMIN — BUDESONIDE INHALATION 500 MCG: 0.5 SUSPENSION RESPIRATORY (INHALATION) at 07:25

## 2024-04-11 RX ADMIN — DILTIAZEM HYDROCHLORIDE 120 MG: 120 CAPSULE, EXTENDED RELEASE ORAL at 08:55

## 2024-04-11 RX ADMIN — HYDROXYZINE PAMOATE 25 MG: 25 CAPSULE ORAL at 05:28

## 2024-04-11 RX ADMIN — DOXYCYCLINE HYCLATE 100 MG: 100 CAPSULE ORAL at 08:55

## 2024-04-11 RX ADMIN — IPRATROPIUM BROMIDE 0.5 MG: 0.5 SOLUTION RESPIRATORY (INHALATION) at 11:31

## 2024-04-11 RX ADMIN — GUAIFENESIN 400 MG: 400 TABLET ORAL at 08:55

## 2024-04-11 RX ADMIN — APIXABAN 5 MG: 5 TABLET, FILM COATED ORAL at 08:55

## 2024-04-11 RX ADMIN — CITALOPRAM HYDROBROMIDE 20 MG: 20 TABLET ORAL at 08:55

## 2024-04-11 RX ADMIN — POLYETHYLENE GLYCOL 3350 17 G: 17 POWDER, FOR SOLUTION ORAL at 08:54

## 2024-04-11 RX ADMIN — ALBUTEROL SULFATE 2.5 MG: 2.5 SOLUTION RESPIRATORY (INHALATION) at 11:31

## 2024-04-11 RX ADMIN — PANTOPRAZOLE SODIUM 40 MG: 40 TABLET, DELAYED RELEASE ORAL at 05:28

## 2024-04-11 RX ADMIN — ALBUTEROL SULFATE 2.5 MG: 2.5 SOLUTION RESPIRATORY (INHALATION) at 07:25

## 2024-04-11 RX ADMIN — GUAIFENESIN 400 MG: 400 TABLET ORAL at 12:21

## 2024-04-11 RX ADMIN — SODIUM CHLORIDE, PRESERVATIVE FREE 10 ML: 5 INJECTION INTRAVENOUS at 08:56

## 2024-04-11 ASSESSMENT — PAIN SCALES - GENERAL: PAINLEVEL_OUTOF10: 0

## 2024-04-11 NOTE — PROGRESS NOTES
[facility]   Gastroenterology, Hepatology, &  Advanced Endoscopy      Reason for consult: Acute on chronic anemia    ASSESSMENT AND PLAN:   Pt states she wakes up with anxiety, and SOB.   Concerned about possible discharge today.  Very anxious in room today.     69y/F presents with COPD exacerbation and acute on chronic anemia requiring blood transfusion. She has had extensive endoscopic evaluation that has been negative for sources of bleeding.    PLAN:  OK for discharge from GI POV, scheduled for today at 2 PM  - Trend Hgb daily and transfuse to Hgb >7.  Hgb 9.1- >8.9  - Pt on eliquis  - No plans for further endoscopic evaluation.  - Serologic evaluation for causes of anemia. This included Iron Studies   -Haptoglobin 260  ()  -LDH  181  - B12 907  -CBC qd  -Folic acid 12.7  -Smear noted below  Retic Count  0.090 WNL  -  IV iron administration started in the setting of JULIO CESAR  + Iron deficiency noted on Iron studies  - B12 INJ x 1 given  -folic acid  -checked B12/folic acid levels - WNL    We will follow.    Thank you for including us in the care of this patient. Please do not hesitate to contact us with any additional questions or concerns.    Discussed with Jaime Rivera MD  Gastroenterology/Hepatology  Advanced Endoscopy      HISTORY OF PRESENT ILLNESS:       Ms. Lana Phillips is a 69y/F w/ history of Afib, HTN, PVD, and COPD on home O2 who presented to the ED with SOB, R should pain, and chest tightness. She was brought in with EMS who have albuterol inhaler with improvement in symptoms. She has had several admissions for COPD. The is positive for Rhinovirus. The patient was found to be anemic on admission. She has been endoscopically evaluated for anemia and follows with Dr. Pelayo.       PERIPHERAL SMEAR REVIEW: 4/8/24 Platelets are present in adequate numbers  and display normal morphology.  Red blood cells are normocytic  normochromic with moderate anisopoikilocytosis.  White blood cells   show a mild

## 2024-04-11 NOTE — CARE COORDINATION
Discharge order noted. Call made to Moise at [facility] to follow-up regarding the referral for the NIV; he is working on it; approval can take anywhere from 1 day up to 2 weeks. Perfect served pulmonology; left message for Dr. Price to check if patient is okay to discharge without the NIV; awaiting return call. Received a message from Justice at [facility]; they are able to accept the referral. Patient's sister-in-law will transport home.    10:00A  Received a return call from pulmonology and the patient is okay to discharge from his standpoint. Call made to patient's sister-in-law to verify what time she is able to pick the patient up; Lana states she will pick the patient up at 2P today. Patient updated on discharge, home care services, NIV and  time; all questions answered.    Electronically signed by CAYLA Romero on 4/11/2024 at 9:53 AM

## 2024-04-11 NOTE — PROGRESS NOTES
[facility] Medicine    Subjective:  pt alert conversive      Current Facility-Administered Medications:     doxycycline hyclate (VIBRAMYCIN) capsule 100 mg, 100 mg, Oral, 2 times per day, Baldemar Jarrett DO    predniSONE (DELTASONE) tablet 40 mg, 40 mg, Oral, Daily, Jose Price MD    ferric gluconate (FERRLECIT) 125 mg in sodium chloride 0.9 % 100 mL IVPB, 125 mg, IntraVENous, Daily, Jaime Rivera MD, Stopped at 04/10/24 0943    folic acid (FOLVITE) tablet 1 mg, 1 mg, Oral, Daily, Magan Leyva, APRN - CNP, 1 mg at 04/10/24 0835    guaiFENesin-dextromethorphan (ROBITUSSIN DM) 100-10 MG/5ML syrup 10 mL, 10 mL, Oral, Q6H PRN, Baldemar Jarrett DO, 10 mL at 04/09/24 0807    0.9 % sodium chloride infusion, , IntraVENous, PRN, Baldemar Jarrett DO    hydrOXYzine pamoate (VISTARIL) capsule 25 mg, 25 mg, Oral, Q6H PRN, Baldemar Jarrett DO, 25 mg at 04/11/24 0528    acetaminophen (TYLENOL) tablet 650 mg, 650 mg, Oral, Q6H PRN, Baldemar Jarrett DO, 650 mg at 04/10/24 1343    albuterol (PROVENTIL) (2.5 MG/3ML) 0.083% nebulizer solution 2.5 mg, 2.5 mg, Nebulization, Q4H While awake, Baldemar Jarrett DO, 2.5 mg at 04/11/24 0018    apixaban (ELIQUIS) tablet 5 mg, 5 mg, Oral, BID, Baldemar Jarrett DO, 5 mg at 04/10/24 2043    aspirin EC tablet 81 mg, 81 mg, Oral, Daily, Baldemar Jarrett DO, 81 mg at 04/10/24 0835    atorvastatin (LIPITOR) tablet 40 mg, 40 mg, Oral, Nightly, Baldemar Jarrett DO, 40 mg at 04/10/24 2044    busPIRone (BUSPAR) tablet 15 mg, 15 mg, Oral, TID PRN, Baldemar Jarrett DO, 15 mg at 04/10/24 1904    citalopram (CELEXA) tablet 20 mg, 20 mg, Oral, Daily, Baldemar Jarrett DO, 20 mg at 04/10/24 0835    dilTIAZem (CARDIZEM CD) extended release capsule 120 mg, 120 mg, Oral, Daily, Baldemar Jarrett DO, 120 mg at 04/10/24 0835    guaiFENesin tablet 400 mg, 400 mg, Oral, TID, Baldemar Jarrett DO, 400 mg at 04/10/24 2044    methIMAzole (TAPAZOLE) tablet 15 mg, 15 mg, Oral, Daily,  143/68   Pulse 70   Temp 97.9 °F (36.6 °C) (Oral)   Resp 20   Ht 1.651 m (5' 5\")   Wt 44.5 kg (98 lb)   SpO2 98%   BMI 16.31 kg/m²     Heart:  reg  Lungs:  rhonchi  Abd: + bs soft nontender  Extrem:  w/o edema    CBC with Differential:    Lab Results   Component Value Date/Time    WBC 12.4 04/11/2024 04:13 AM    RBC 3.34 04/11/2024 04:13 AM    HGB 8.9 04/11/2024 04:13 AM    HCT 30.5 04/11/2024 04:13 AM     04/11/2024 04:13 AM    MCV 91.3 04/11/2024 04:13 AM    MCH 26.6 04/11/2024 04:13 AM    MCHC 29.2 04/11/2024 04:13 AM    RDW 20.2 04/11/2024 04:13 AM    NRBC 1 04/06/2024 11:00 AM    LYMPHOPCT 6 04/11/2024 04:13 AM    MONOPCT 3 04/11/2024 04:13 AM    MYELOPCT 3 04/11/2024 04:13 AM    BASOPCT 0 04/11/2024 04:13 AM    MONOSABS 0.32 04/11/2024 04:13 AM    LYMPHSABS 0.75 04/11/2024 04:13 AM    EOSABS 0.00 04/11/2024 04:13 AM    BASOSABS 0.00 04/11/2024 04:13 AM     CMP:    Lab Results   Component Value Date/Time     04/09/2024 05:11 AM    K 4.4 04/09/2024 05:11 AM    K 3.3 12/30/2022 02:49 AM    CL 97 04/09/2024 05:11 AM    CO2 37 04/09/2024 05:11 AM    BUN 13 04/09/2024 05:11 AM    CREATININE 0.5 04/09/2024 05:11 AM    GFRAA >60 07/28/2022 09:31 AM    LABGLOM >90 04/09/2024 05:11 AM    GLUCOSE 161 04/09/2024 05:11 AM    PROT 6.5 04/05/2024 09:09 PM    LABALBU 3.4 04/05/2024 09:09 PM    CALCIUM 8.7 04/09/2024 05:11 AM    BILITOT <0.2 04/05/2024 09:09 PM    ALKPHOS 73 04/05/2024 09:09 PM    AST 23 04/05/2024 09:09 PM    ALT 15 04/05/2024 09:09 PM     Warfarin PT/INR:    Lab Results   Component Value Date    INR 1.1 10/15/2023    INR 1.0 12/02/2022    PROTIME 12.0 10/15/2023    PROTIME 11.2 12/02/2022       Assessment:    Principal Problem:    Acute respiratory failure with hypoxia and hypercapnia (HCC)  Resolved Problems:    * No resolved hospital problems. *      Plan:  Emory Jarrett DO  7:15 AM  4/11/2024     Transitions of Care Status:  1.  Name of PCP: Dr. Woods  2.  PCP Contacted on Admission: [s] Y    [ ] N    3.  PCP contacted at Discharge: [ ] Y    [ ] N    [ ] N/A  4.  Post-Discharge Appointment Date and Location:  5.  Summary of Handoff given to PCP:

## 2024-04-11 NOTE — PROGRESS NOTES
IV removed with no complications. All discharge instructions provided verbally and written to patient. Patient verbalizes understanding. Sister in law provided transport and brought  connector to patients personal portable oxygen tank. All belongings with patient and sister in law Lana. Patient discharged via car with sister in law. Left connected to 4L nasal cannula, patients home oxygen baseline. No difficulty breathing or complaints of discomfort.

## 2024-04-11 NOTE — PROGRESS NOTES
Walked with patient to and from bathroom. Patient was on 4l nasal cannula and maintained 90-94% oxygen saturation. She is having anxiety about going home.

## 2024-04-11 NOTE — PROGRESS NOTES
Patient confirmed with pharmacy they have a refill they can fill for her for anxiety medication. She states she will get them when she is home., she told them to go ahead and fill it.

## 2024-04-12 ENCOUNTER — CARE COORDINATION (OUTPATIENT)
Dept: CARE COORDINATION | Age: 69
End: 2024-04-12

## 2024-04-12 ENCOUNTER — TELEPHONE (OUTPATIENT)
Dept: INFUSION THERAPY | Age: 69
End: 2024-04-12

## 2024-04-12 NOTE — CARE COORDINATION
Care Transitions Initial Follow Up Call    Call within 2 business days of discharge: Yes    Care Transition Nurse attempted initial CT outreach to several numbers. I did leave HIPAA VM, purpose of call, my contact, and appt(s) reminder/reminder to schedule pulmonary appt. Joellen long/ Tamiko states ins pending prior auth for NIV arrangements and will contact pt for delivery date. Expand [facility] to start on Monday.    Patient: Lana Phillips Patient : 1955   MRN: [id number]  Reason for Admission: 2024 - 2024 [facility] IP. Rhinovirus (+), Acute respiratory failure with hypoxia and hypercarbia, NIV arrangement through [employer], A/C anemia.   Discharge Date: 24 RARS: Readmission Risk Score: 34.1  Readmit  CT    Expand [facility] sos 4/15/24.    PCP hosp fu  12:30.  Follow up with Jose Price MD (Pulmonary Disease) in 1 week.    START taking:  doxycycline hyclate (VIBRAMYCIN)  folic acid (FOLVITE)  predniSONE (DELTASONE)  CHANGE how you take:  busPIRone (BUSPAR)    Last Discharge Facility       Date Complaint Diagnosis Description Type Department Provider    24 Shortness of Breath Acute respiratory failure with hypoxia and hypercarbia (HCC) ... ED to Hosp-Admission (Discharged) (ADMITTED) YZ 6WE [facility] Baldemar Jarrett DO; Natalia, ...          Radha Elliott RN

## 2024-04-13 ENCOUNTER — APPOINTMENT (OUTPATIENT)
Dept: GENERAL RADIOLOGY | Age: 69
DRG: 853 | End: 2024-04-13
Payer: MEDICARE

## 2024-04-13 ENCOUNTER — HOSPITAL ENCOUNTER (INPATIENT)
Age: 69
LOS: 10 days | Discharge: HOME HEALTH | DRG: 853 | End: 2024-04-23
Attending: EMERGENCY MEDICINE | Admitting: INTERNAL MEDICINE
Payer: MEDICARE

## 2024-04-13 DIAGNOSIS — A41.9 SEPSIS, DUE TO UNSPECIFIED ORGANISM, UNSPECIFIED WHETHER ACUTE ORGAN DYSFUNCTION PRESENT (HCC): ICD-10-CM

## 2024-04-13 DIAGNOSIS — F41.9 ANXIETY: ICD-10-CM

## 2024-04-13 DIAGNOSIS — T17.500A MUCUS PLUGGING OF BRONCHI: ICD-10-CM

## 2024-04-13 DIAGNOSIS — J96.22 ACUTE ON CHRONIC RESPIRATORY FAILURE WITH HYPERCAPNIA (HCC): ICD-10-CM

## 2024-04-13 DIAGNOSIS — J18.9 PNEUMONIA DUE TO INFECTIOUS ORGANISM, UNSPECIFIED LATERALITY, UNSPECIFIED PART OF LUNG: Primary | ICD-10-CM

## 2024-04-13 LAB
ALBUMIN SERPL-MCNC: 3.6 G/DL (ref 3.5–5.2)
ALP SERPL-CCNC: 75 U/L (ref 35–104)
ALT SERPL-CCNC: 18 U/L (ref 0–32)
ANION GAP SERPL CALCULATED.3IONS-SCNC: 10 MMOL/L (ref 7–16)
AST SERPL-CCNC: 32 U/L (ref 0–31)
B PARAP IS1001 DNA NPH QL NAA+NON-PROBE: NOT DETECTED
B PERT DNA SPEC QL NAA+PROBE: NOT DETECTED
B.E.: 11.4 MMOL/L (ref -3–3)
B.E.: 13.9 MMOL/L (ref -3–3)
BASOPHILS # BLD: 0 K/UL (ref 0–0.2)
BASOPHILS NFR BLD: 0 % (ref 0–2)
BILIRUB SERPL-MCNC: 0.2 MG/DL (ref 0–1.2)
BNP SERPL-MCNC: 416 PG/ML (ref 0–125)
BUN SERPL-MCNC: 13 MG/DL (ref 6–23)
C PNEUM DNA NPH QL NAA+NON-PROBE: NOT DETECTED
CALCIUM SERPL-MCNC: 8.6 MG/DL (ref 8.6–10.2)
CHLORIDE SERPL-SCNC: 92 MMOL/L (ref 98–107)
CO2 SERPL-SCNC: 35 MMOL/L (ref 22–29)
COHB: 0.8 % (ref 0–1.5)
COHB: 0.9 % (ref 0–1.5)
COMMENT: ABNORMAL
COMMENT: ABNORMAL
CREAT SERPL-MCNC: 0.5 MG/DL (ref 0.5–1)
CRITICAL: ABNORMAL
CRITICAL: ABNORMAL
DATE ANALYZED: ABNORMAL
DATE ANALYZED: ABNORMAL
DATE OF COLLECTION: ABNORMAL
DATE OF COLLECTION: ABNORMAL
EOSINOPHIL # BLD: 0 K/UL (ref 0.05–0.5)
EOSINOPHILS RELATIVE PERCENT: 0 % (ref 0–6)
ERYTHROCYTE [DISTWIDTH] IN BLOOD BY AUTOMATED COUNT: 20.4 % (ref 11.5–15)
FLUAV RNA NPH QL NAA+NON-PROBE: NOT DETECTED
FLUAV RNA RESP QL NAA+PROBE: NOT DETECTED
FLUBV RNA NPH QL NAA+NON-PROBE: NOT DETECTED
FLUBV RNA RESP QL NAA+PROBE: NOT DETECTED
GFR SERPL CREATININE-BSD FRML MDRD: >90 ML/MIN/1.73M2
GLUCOSE SERPL-MCNC: 188 MG/DL (ref 74–99)
HADV DNA NPH QL NAA+NON-PROBE: NOT DETECTED
HCO3: 39 MMOL/L (ref 22–26)
HCO3: 42.2 MMOL/L (ref 22–26)
HCOV 229E RNA NPH QL NAA+NON-PROBE: NOT DETECTED
HCOV HKU1 RNA NPH QL NAA+NON-PROBE: NOT DETECTED
HCOV NL63 RNA NPH QL NAA+NON-PROBE: NOT DETECTED
HCOV OC43 RNA NPH QL NAA+NON-PROBE: NOT DETECTED
HCT VFR BLD AUTO: 34.2 % (ref 34–48)
HGB BLD-MCNC: 9.8 G/DL (ref 11.5–15.5)
HHB: 3.9 % (ref 0–5)
HHB: 4 % (ref 0–5)
HMPV RNA NPH QL NAA+NON-PROBE: NOT DETECTED
HPIV1 RNA NPH QL NAA+NON-PROBE: NOT DETECTED
HPIV2 RNA NPH QL NAA+NON-PROBE: NOT DETECTED
HPIV3 RNA NPH QL NAA+NON-PROBE: NOT DETECTED
HPIV4 RNA NPH QL NAA+NON-PROBE: NOT DETECTED
LAB: ABNORMAL
LAB: ABNORMAL
LACTATE BLDV-SCNC: 1.4 MMOL/L (ref 0.5–1.9)
LACTATE BLDV-SCNC: 3.1 MMOL/L (ref 0.5–1.9)
LYMPHOCYTES NFR BLD: 1.69 K/UL (ref 1.5–4)
LYMPHOCYTES RELATIVE PERCENT: 10 % (ref 20–42)
Lab: 642
Lab: 835
M PNEUMO DNA NPH QL NAA+NON-PROBE: NOT DETECTED
MCH RBC QN AUTO: 26.6 PG (ref 26–35)
MCHC RBC AUTO-ENTMCNC: 28.7 G/DL (ref 32–34.5)
MCV RBC AUTO: 92.9 FL (ref 80–99.9)
METHB: 0.1 % (ref 0–1.5)
METHB: 0.2 % (ref 0–1.5)
MODE: ABNORMAL
MODE: ABNORMAL
MONOCYTES NFR BLD: 1.85 K/UL (ref 0.1–0.95)
MONOCYTES NFR BLD: 10 % (ref 2–12)
NEUTROPHILS NFR BLD: 80 % (ref 43–80)
NEUTS SEG NFR BLD: 14.16 K/UL (ref 1.8–7.3)
O2 SATURATION: 96 % (ref 92–98.5)
O2 SATURATION: 96.1 % (ref 92–98.5)
O2HB: 95 % (ref 94–97)
O2HB: 95.1 % (ref 94–97)
OPERATOR ID: 2593
OPERATOR ID: 5100
PATIENT TEMP: 37 C
PATIENT TEMP: 37 C
PCO2: 72.2 MMHG (ref 35–45)
PCO2: 80.1 MMHG (ref 35–45)
PH BLOOD GAS: 7.34 (ref 7.35–7.45)
PH BLOOD GAS: 7.35 (ref 7.35–7.45)
PLATELET # BLD AUTO: 436 K/UL (ref 130–450)
PMV BLD AUTO: 9.8 FL (ref 7–12)
PO2: 87.2 MMHG (ref 75–100)
PO2: 92.1 MMHG (ref 75–100)
POTASSIUM SERPL-SCNC: 4.2 MMOL/L (ref 3.5–5)
PROT SERPL-MCNC: 6.5 G/DL (ref 6.4–8.3)
RBC # BLD AUTO: 3.68 M/UL (ref 3.5–5.5)
RBC # BLD: ABNORMAL 10*6/UL
RSV RNA NPH QL NAA+NON-PROBE: NOT DETECTED
RV+EV RNA NPH QL NAA+NON-PROBE: DETECTED
SARS-COV-2 RNA NPH QL NAA+NON-PROBE: NOT DETECTED
SARS-COV-2 RNA RESP QL NAA+PROBE: NOT DETECTED
SODIUM SERPL-SCNC: 137 MMOL/L (ref 132–146)
SOURCE, BLOOD GAS: ABNORMAL
SOURCE, BLOOD GAS: ABNORMAL
SOURCE: NORMAL
SPECIMEN DESCRIPTION: ABNORMAL
SPECIMEN DESCRIPTION: NORMAL
THB: 9.3 G/DL (ref 11.5–16.5)
THB: 9.9 G/DL (ref 11.5–16.5)
TIME ANALYZED: 648
TIME ANALYZED: 840
TROPONIN I SERPL HS-MCNC: 33 NG/L (ref 0–9)
TROPONIN I SERPL HS-MCNC: 35 NG/L (ref 0–9)
WBC OTHER # BLD: 17.7 K/UL (ref 4.5–11.5)

## 2024-04-13 PROCEDURE — 87899 AGENT NOS ASSAY W/OPTIC: CPT

## 2024-04-13 PROCEDURE — 87636 SARSCOV2 & INF A&B AMP PRB: CPT

## 2024-04-13 PROCEDURE — 87449 NOS EACH ORGANISM AG IA: CPT

## 2024-04-13 PROCEDURE — 99285 EMERGENCY DEPT VISIT HI MDM: CPT

## 2024-04-13 PROCEDURE — 83605 ASSAY OF LACTIC ACID: CPT

## 2024-04-13 PROCEDURE — 82805 BLOOD GASES W/O2 SATURATION: CPT

## 2024-04-13 PROCEDURE — 87040 BLOOD CULTURE FOR BACTERIA: CPT

## 2024-04-13 PROCEDURE — 6370000000 HC RX 637 (ALT 250 FOR IP)

## 2024-04-13 PROCEDURE — 94660 CPAP INITIATION&MGMT: CPT

## 2024-04-13 PROCEDURE — 2500000003 HC RX 250 WO HCPCS

## 2024-04-13 PROCEDURE — 6360000002 HC RX W HCPCS: Performed by: INTERNAL MEDICINE

## 2024-04-13 PROCEDURE — 96361 HYDRATE IV INFUSION ADD-ON: CPT

## 2024-04-13 PROCEDURE — 2140000000 HC CCU INTERMEDIATE R&B

## 2024-04-13 PROCEDURE — 2700000000 HC OXYGEN THERAPY PER DAY

## 2024-04-13 PROCEDURE — 2580000003 HC RX 258

## 2024-04-13 PROCEDURE — 85025 COMPLETE CBC W/AUTO DIFF WBC: CPT

## 2024-04-13 PROCEDURE — 6360000002 HC RX W HCPCS

## 2024-04-13 PROCEDURE — 0202U NFCT DS 22 TRGT SARS-COV-2: CPT

## 2024-04-13 PROCEDURE — 96374 THER/PROPH/DIAG INJ IV PUSH: CPT

## 2024-04-13 PROCEDURE — 94640 AIRWAY INHALATION TREATMENT: CPT

## 2024-04-13 PROCEDURE — 83880 ASSAY OF NATRIURETIC PEPTIDE: CPT

## 2024-04-13 PROCEDURE — 80053 COMPREHEN METABOLIC PANEL: CPT

## 2024-04-13 PROCEDURE — 96375 TX/PRO/DX INJ NEW DRUG ADDON: CPT

## 2024-04-13 PROCEDURE — 93005 ELECTROCARDIOGRAM TRACING: CPT | Performed by: EMERGENCY MEDICINE

## 2024-04-13 PROCEDURE — 2580000003 HC RX 258: Performed by: INTERNAL MEDICINE

## 2024-04-13 PROCEDURE — 36415 COLL VENOUS BLD VENIPUNCTURE: CPT

## 2024-04-13 PROCEDURE — 71045 X-RAY EXAM CHEST 1 VIEW: CPT

## 2024-04-13 PROCEDURE — 6370000000 HC RX 637 (ALT 250 FOR IP): Performed by: INTERNAL MEDICINE

## 2024-04-13 PROCEDURE — 5A09457 ASSISTANCE WITH RESPIRATORY VENTILATION, 24-96 CONSECUTIVE HOURS, CONTINUOUS POSITIVE AIRWAY PRESSURE: ICD-10-PCS | Performed by: INTERNAL MEDICINE

## 2024-04-13 PROCEDURE — 94664 DEMO&/EVAL PT USE INHALER: CPT

## 2024-04-13 PROCEDURE — 84484 ASSAY OF TROPONIN QUANT: CPT

## 2024-04-13 RX ORDER — PROCHLORPERAZINE MALEATE 10 MG
10 TABLET ORAL EVERY 6 HOURS PRN
Status: DISCONTINUED | OUTPATIENT
Start: 2024-04-13 | End: 2024-04-24 | Stop reason: HOSPADM

## 2024-04-13 RX ORDER — DOXYCYCLINE HYCLATE 100 MG/1
100 CAPSULE ORAL EVERY 12 HOURS SCHEDULED
Status: DISPENSED | OUTPATIENT
Start: 2024-04-13 | End: 2024-04-20

## 2024-04-13 RX ORDER — SODIUM CHLORIDE 0.9 % (FLUSH) 0.9 %
5-40 SYRINGE (ML) INJECTION PRN
Status: DISCONTINUED | OUTPATIENT
Start: 2024-04-13 | End: 2024-04-24 | Stop reason: HOSPADM

## 2024-04-13 RX ORDER — PREGABALIN 50 MG/1
50 CAPSULE ORAL 3 TIMES DAILY
Status: DISCONTINUED | OUTPATIENT
Start: 2024-04-13 | End: 2024-04-24 | Stop reason: HOSPADM

## 2024-04-13 RX ORDER — ARFORMOTEROL TARTRATE 15 UG/2ML
15 SOLUTION RESPIRATORY (INHALATION)
Status: DISCONTINUED | OUTPATIENT
Start: 2024-04-13 | End: 2024-04-24 | Stop reason: HOSPADM

## 2024-04-13 RX ORDER — LANOLIN ALCOHOL/MO/W.PET/CERES
500 CREAM (GRAM) TOPICAL DAILY
Status: DISCONTINUED | OUTPATIENT
Start: 2024-04-13 | End: 2024-04-24 | Stop reason: HOSPADM

## 2024-04-13 RX ORDER — IPRATROPIUM BROMIDE AND ALBUTEROL SULFATE 2.5; .5 MG/3ML; MG/3ML
3 SOLUTION RESPIRATORY (INHALATION) ONCE
Status: COMPLETED | OUTPATIENT
Start: 2024-04-13 | End: 2024-04-13

## 2024-04-13 RX ORDER — FOLIC ACID 1 MG/1
1 TABLET ORAL DAILY
Status: DISCONTINUED | OUTPATIENT
Start: 2024-04-13 | End: 2024-04-24 | Stop reason: HOSPADM

## 2024-04-13 RX ORDER — SODIUM CHLORIDE 0.9 % (FLUSH) 0.9 %
5-40 SYRINGE (ML) INJECTION EVERY 12 HOURS SCHEDULED
Status: DISCONTINUED | OUTPATIENT
Start: 2024-04-13 | End: 2024-04-24 | Stop reason: HOSPADM

## 2024-04-13 RX ORDER — TRAZODONE HYDROCHLORIDE 50 MG/1
100 TABLET ORAL NIGHTLY
Status: DISCONTINUED | OUTPATIENT
Start: 2024-04-13 | End: 2024-04-24 | Stop reason: HOSPADM

## 2024-04-13 RX ORDER — SODIUM CHLORIDE 9 MG/ML
INJECTION, SOLUTION INTRAVENOUS PRN
Status: DISCONTINUED | OUTPATIENT
Start: 2024-04-13 | End: 2024-04-24 | Stop reason: HOSPADM

## 2024-04-13 RX ORDER — ASCORBIC ACID 500 MG
500 TABLET ORAL DAILY
Status: DISCONTINUED | OUTPATIENT
Start: 2024-04-13 | End: 2024-04-24 | Stop reason: HOSPADM

## 2024-04-13 RX ORDER — ASPIRIN 81 MG/1
81 TABLET ORAL DAILY
Status: DISCONTINUED | OUTPATIENT
Start: 2024-04-13 | End: 2024-04-24 | Stop reason: HOSPADM

## 2024-04-13 RX ORDER — ROSUVASTATIN CALCIUM 20 MG/1
20 TABLET, COATED ORAL DAILY
Status: DISCONTINUED | OUTPATIENT
Start: 2024-04-13 | End: 2024-04-24 | Stop reason: HOSPADM

## 2024-04-13 RX ORDER — ACETAMINOPHEN 325 MG/1
650 TABLET ORAL EVERY 6 HOURS PRN
Status: DISCONTINUED | OUTPATIENT
Start: 2024-04-13 | End: 2024-04-24 | Stop reason: HOSPADM

## 2024-04-13 RX ORDER — BUDESONIDE 0.25 MG/2ML
0.25 INHALANT ORAL
Status: DISCONTINUED | OUTPATIENT
Start: 2024-04-13 | End: 2024-04-13

## 2024-04-13 RX ORDER — HYDROXYZINE PAMOATE 25 MG/1
25 CAPSULE ORAL 3 TIMES DAILY PRN
Status: DISCONTINUED | OUTPATIENT
Start: 2024-04-13 | End: 2024-04-24 | Stop reason: HOSPADM

## 2024-04-13 RX ORDER — SODIUM CHLORIDE FOR INHALATION 3 %
4 VIAL, NEBULIZER (ML) INHALATION 2 TIMES DAILY
Status: DISCONTINUED | OUTPATIENT
Start: 2024-04-13 | End: 2024-04-24 | Stop reason: HOSPADM

## 2024-04-13 RX ORDER — SENNA AND DOCUSATE SODIUM 50; 8.6 MG/1; MG/1
1 TABLET, FILM COATED ORAL DAILY
Status: DISCONTINUED | OUTPATIENT
Start: 2024-04-13 | End: 2024-04-24 | Stop reason: HOSPADM

## 2024-04-13 RX ORDER — ALBUTEROL SULFATE 2.5 MG/3ML
2.5 SOLUTION RESPIRATORY (INHALATION) EVERY 4 HOURS PRN
Status: DISCONTINUED | OUTPATIENT
Start: 2024-04-13 | End: 2024-04-24 | Stop reason: HOSPADM

## 2024-04-13 RX ORDER — BISACODYL 10 MG
10 SUPPOSITORY, RECTAL RECTAL DAILY PRN
Status: DISCONTINUED | OUTPATIENT
Start: 2024-04-13 | End: 2024-04-24 | Stop reason: HOSPADM

## 2024-04-13 RX ORDER — 0.9 % SODIUM CHLORIDE 0.9 %
30 INTRAVENOUS SOLUTION INTRAVENOUS ONCE
Status: COMPLETED | OUTPATIENT
Start: 2024-04-13 | End: 2024-04-13

## 2024-04-13 RX ORDER — METHIMAZOLE 5 MG/1
15 TABLET ORAL DAILY
Status: DISCONTINUED | OUTPATIENT
Start: 2024-04-13 | End: 2024-04-24 | Stop reason: HOSPADM

## 2024-04-13 RX ORDER — CITALOPRAM 20 MG/1
20 TABLET ORAL DAILY
Status: DISCONTINUED | OUTPATIENT
Start: 2024-04-13 | End: 2024-04-24 | Stop reason: HOSPADM

## 2024-04-13 RX ORDER — LEVALBUTEROL INHALATION SOLUTION 0.63 MG/3ML
0.63 SOLUTION RESPIRATORY (INHALATION)
Status: DISCONTINUED | OUTPATIENT
Start: 2024-04-13 | End: 2024-04-24 | Stop reason: HOSPADM

## 2024-04-13 RX ORDER — DILTIAZEM HYDROCHLORIDE 120 MG/1
120 CAPSULE, COATED, EXTENDED RELEASE ORAL DAILY
Status: DISCONTINUED | OUTPATIENT
Start: 2024-04-13 | End: 2024-04-17

## 2024-04-13 RX ORDER — GUAIFENESIN 400 MG/1
400 TABLET ORAL 3 TIMES DAILY
Status: DISCONTINUED | OUTPATIENT
Start: 2024-04-13 | End: 2024-04-24 | Stop reason: HOSPADM

## 2024-04-13 RX ORDER — POLYETHYLENE GLYCOL 3350 17 G/17G
17 POWDER, FOR SOLUTION ORAL DAILY PRN
Status: DISCONTINUED | OUTPATIENT
Start: 2024-04-13 | End: 2024-04-24 | Stop reason: HOSPADM

## 2024-04-13 RX ORDER — PANTOPRAZOLE SODIUM 40 MG/1
40 TABLET, DELAYED RELEASE ORAL
Status: DISCONTINUED | OUTPATIENT
Start: 2024-04-14 | End: 2024-04-24 | Stop reason: HOSPADM

## 2024-04-13 RX ORDER — BUDESONIDE 0.5 MG/2ML
1 INHALANT ORAL
Status: DISCONTINUED | OUTPATIENT
Start: 2024-04-13 | End: 2024-04-24 | Stop reason: HOSPADM

## 2024-04-13 RX ADMIN — SENNOSIDES AND DOCUSATE SODIUM 1 TABLET: 50; 8.6 TABLET ORAL at 12:32

## 2024-04-13 RX ADMIN — APIXABAN 5 MG: 5 TABLET, FILM COATED ORAL at 12:32

## 2024-04-13 RX ADMIN — LEVALBUTEROL HYDROCHLORIDE 0.63 MG: 0.63 SOLUTION RESPIRATORY (INHALATION) at 19:32

## 2024-04-13 RX ADMIN — CYANOCOBALAMIN TAB 1000 MCG 500 MCG: 1000 TAB at 12:34

## 2024-04-13 RX ADMIN — IPRATROPIUM BROMIDE AND ALBUTEROL SULFATE 3 DOSE: .5; 2.5 SOLUTION RESPIRATORY (INHALATION) at 06:23

## 2024-04-13 RX ADMIN — SODIUM CHLORIDE 1347 ML: 9 INJECTION, SOLUTION INTRAVENOUS at 07:07

## 2024-04-13 RX ADMIN — BUSPIRONE HYDROCHLORIDE 15 MG: 5 TABLET ORAL at 21:58

## 2024-04-13 RX ADMIN — FOLIC ACID 1 MG: 1 TABLET ORAL at 12:32

## 2024-04-13 RX ADMIN — BUDESONIDE INHALATION 1000 MCG: 0.5 SUSPENSION RESPIRATORY (INHALATION) at 19:31

## 2024-04-13 RX ADMIN — WATER 60 MG: 1 INJECTION INTRAMUSCULAR; INTRAVENOUS; SUBCUTANEOUS at 06:30

## 2024-04-13 RX ADMIN — APIXABAN 5 MG: 5 TABLET, FILM COATED ORAL at 19:32

## 2024-04-13 RX ADMIN — TRAZODONE HYDROCHLORIDE 100 MG: 50 TABLET ORAL at 19:31

## 2024-04-13 RX ADMIN — SODIUM CHLORIDE, PRESERVATIVE FREE 10 ML: 5 INJECTION INTRAVENOUS at 19:32

## 2024-04-13 RX ADMIN — LEVALBUTEROL HYDROCHLORIDE 0.63 MG: 0.63 SOLUTION RESPIRATORY (INHALATION) at 23:53

## 2024-04-13 RX ADMIN — ACETAMINOPHEN 650 MG: 325 TABLET ORAL at 23:08

## 2024-04-13 RX ADMIN — IPRATROPIUM BROMIDE 0.5 MG: 0.5 SOLUTION RESPIRATORY (INHALATION) at 15:35

## 2024-04-13 RX ADMIN — Medication 500 MG: at 12:32

## 2024-04-13 RX ADMIN — WATER 40 MG: 1 INJECTION INTRAMUSCULAR; INTRAVENOUS; SUBCUTANEOUS at 12:38

## 2024-04-13 RX ADMIN — GUAIFENESIN 400 MG: 400 TABLET ORAL at 19:32

## 2024-04-13 RX ADMIN — DOXYCYCLINE HYCLATE 100 MG: 100 CAPSULE ORAL at 19:31

## 2024-04-13 RX ADMIN — WATER 40 MG: 1 INJECTION INTRAMUSCULAR; INTRAVENOUS; SUBCUTANEOUS at 21:58

## 2024-04-13 RX ADMIN — PREGABALIN 50 MG: 50 CAPSULE ORAL at 12:33

## 2024-04-13 RX ADMIN — METHIMAZOLE 15 MG: 5 TABLET ORAL at 15:00

## 2024-04-13 RX ADMIN — SODIUM CHLORIDE, PRESERVATIVE FREE 10 ML: 5 INJECTION INTRAVENOUS at 07:58

## 2024-04-13 RX ADMIN — DILTIAZEM HYDROCHLORIDE 120 MG: 120 CAPSULE, COATED, EXTENDED RELEASE ORAL at 12:34

## 2024-04-13 RX ADMIN — WATER 2000 MG: 1 INJECTION INTRAMUSCULAR; INTRAVENOUS; SUBCUTANEOUS at 07:52

## 2024-04-13 RX ADMIN — CHOLECALCIFEROL TAB 125 MCG (5000 UNIT) 5000 UNITS: 125 TAB at 12:33

## 2024-04-13 RX ADMIN — GUAIFENESIN 400 MG: 400 TABLET ORAL at 12:32

## 2024-04-13 RX ADMIN — ROSUVASTATIN CALCIUM 20 MG: 20 TABLET, FILM COATED ORAL at 12:33

## 2024-04-13 RX ADMIN — PREGABALIN 50 MG: 50 CAPSULE ORAL at 19:32

## 2024-04-13 RX ADMIN — ARFORMOTEROL TARTRATE 15 MCG: 15 SOLUTION RESPIRATORY (INHALATION) at 19:31

## 2024-04-13 RX ADMIN — DOXYCYCLINE 100 MG: 100 INJECTION, POWDER, LYOPHILIZED, FOR SOLUTION INTRAVENOUS at 08:16

## 2024-04-13 RX ADMIN — CITALOPRAM HYDROBROMIDE 20 MG: 20 TABLET ORAL at 12:32

## 2024-04-13 RX ADMIN — ASPIRIN 81 MG: 81 TABLET, COATED ORAL at 12:32

## 2024-04-13 ASSESSMENT — PAIN - FUNCTIONAL ASSESSMENT: PAIN_FUNCTIONAL_ASSESSMENT: 0-10

## 2024-04-13 ASSESSMENT — PAIN DESCRIPTION - LOCATION: LOCATION: CHEST

## 2024-04-13 ASSESSMENT — PAIN DESCRIPTION - ORIENTATION: ORIENTATION: LEFT

## 2024-04-13 ASSESSMENT — PAIN SCALES - GENERAL
PAINLEVEL_OUTOF10: 0
PAINLEVEL_OUTOF10: 7
PAINLEVEL_OUTOF10: 0

## 2024-04-13 ASSESSMENT — PAIN DESCRIPTION - DESCRIPTORS: DESCRIPTORS: PRESSURE

## 2024-04-13 ASSESSMENT — PAIN DESCRIPTION - FREQUENCY: FREQUENCY: CONTINUOUS

## 2024-04-13 NOTE — PLAN OF CARE
Problem: Safety - Adult  Goal: Free from fall injury  Outcome: Progressing     Problem: Chronic Conditions and Co-morbidities  Goal: Patient's chronic conditions and co-morbidity symptoms are monitored and maintained or improved  Outcome: Progressing  Flowsheets (Taken 4/13/2024 1621)  Care Plan - Patient's Chronic Conditions and Co-Morbidity Symptoms are Monitored and Maintained or Improved: Monitor and assess patient's chronic conditions and comorbid symptoms for stability, deterioration, or improvement     Problem: Discharge Planning  Goal: Discharge to home or other facility with appropriate resources  Outcome: Progressing  Flowsheets (Taken 4/13/2024 1621)  Discharge to home or other facility with appropriate resources: Identify barriers to discharge with patient and caregiver     Problem: Pain  Goal: Verbalizes/displays adequate comfort level or baseline comfort level  Outcome: Progressing  Flowsheets (Taken 4/13/2024 1615)  Verbalizes/displays adequate comfort level or baseline comfort level: Encourage patient to monitor pain and request assistance

## 2024-04-13 NOTE — ED PROVIDER NOTES
[facility] EMERGENCY DEPARTMENT  EMERGENCY DEPARTMENT ENCOUNTER        Pt Name: Lana Phillips  MRN: [id number]  Birthdate 1955  Date of evaluation: 4/13/2024  Provider: Jose Vasquez DO  Attending Provider: Baldemar Jarrett DO  PCP: Clyde Gonzalez DO  Note Started: 5:55 AM EDT 4/13/24    CHIEF COMPLAINT       Chief Complaint   Patient presents with    Chest Pain     CP while sleeping; pt reports CP awaken her from sleep.  PT reports SOB.  L sided CP denies radiation.  PT reports nausea. PT wears 4-5L NC @ home.        HISTORY OF PRESENT ILLNESS: 1 or more Elements   History From: the patient        Lana Phillips is a 69 y.o. female with a past medical history of COPD on baseline 4 L oxygen, atrial fibrillation on Eliquis, hypertension presenting with chest pain.  Patient reports chest pain and shortness of breath.  Patient's chest pain is left-sided and woke her from sleep.  It is exacerbated by shortness of breath.  Shortness of breath exacerbated by ambulation and exertion.  Patient typically wears 4 L of oxygen at baseline.  Patient states that she was just admitted to the hospital and discharged home recently for similar symptoms.  She did notice improvement after being discharged home but her symptoms now started approximately 1-1 and half days ago.  Patient is a former smoker, denies alcohol use, denies drug use.    Nursing Notes were all reviewed and agreed with or any disagreements were addressed in the HPI.      REVIEW OF EXTERNAL NOTE :       ECHO 3/11/24:     Left Ventricle Normal left ventricular systolic function with a visually estimated EF of 55 - 60%. Left ventricle size is normal. Normal wall thickness. Normal wall motion. Grade I diastolic dysfunction with normal LAP.   Left Atrium Left atrium size is normal.   Interatrial Septum No interatrial shunt visualized with color Doppler.   Right Ventricle Right ventricle size is normal. Normal

## 2024-04-13 NOTE — H&P
[facility]  History and Physical      CHIEF COMPLAINT: Respiratory distress    Reason for Admission: Acute on chronic hypercapnia, COPD exacerbation    History Obtained From: Patient     PCP :  Clyde Gonzalez DO    [address] [address]      HISTORY OF PRESENT ILLNESS:      The patient is a 69 y.o. female presented to the emergency room because of shortness of breath, respiratory distress.  Patient was just recently released from the hospital couple days ago.  In the emergency room patient was noted to have increased work of breathing and shortness of breath.  She was also noted to be hypercapnic.  She needed BiPAP.  She was then admitted.  She denies any other complaints other than reproducible positional chest wall pain on the left side.  No fevers or chills.    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 ([facility]) 01/19/2024    Atrial fibrillation ([facility])     Chronic obstructive pulmonary disease ([facility]) 12/15/2022    COPD (chronic obstructive pulmonary disease) ([facility])     COPD exacerbation ([facility]) 07/14/2022    COPD exacerbation ([facility]) 02/05/2024    COPD with acute exacerbation ([facility]) 12/24/2023    COVID-19 07/16/2022    Critical limb ischemia of left lower extremity with rest pain ([facility]) 12/25/2023    GI bleed 10/14/2023    Hypertension     Pneumonia due to infectious organism     PVD (peripheral vascular disease) ([facility]) 01/19/2024    Severe protein-calorie malnutrition ([facility]) 06/26/2023    Thyroid disease     Uncontrolled hypertension      Past Surgical History:        Procedure

## 2024-04-13 NOTE — CONSULTS
[facility]  [address], [address]  [place] [address]    Pulmonary Consultation      Reason for Consult:  sob    Requesting Physician:  Clyde Gonzalez DO    CHIEF COMPLAINT:  sob    History Obtained From:  patient    HISTORY OF PRESENT ILLNESS:                The patient is a 69 y.o. female with significant past medical history of COPD oxygen dependent who presents with sob and chest pain. Was just discharged home on 11th, claims was ok at home, some difficulty ambulating due to sob, developed chest pain the next day at left side of chest and got sob and anxious so decided to come back here. Currently on 6 li NC saturating 90-93%, looking tachypneic but similar to last admission, no complaint of chest pain currently, cough with not much sputum production, NIPPV at her bedside.    Past Medical History:        Diagnosis Date    Acute exacerbation of chronic obstructive pulmonary disease (COPD) ([facility]) 07/14/2022    Acute respiratory failure ([facility]) 11/27/2022    Acute respiratory failure with hypoxia ([facility]) 06/27/2022    Acute respiratory failure with hypoxia ([facility]) 11/27/2022    Acute respiratory failure with hypoxia ([facility]) 01/09/2024    Atherosclerosis 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 ([facility]) 01/19/2024    Atrial fibrillation ([facility])     Chronic obstructive pulmonary disease ([facility]) 12/15/2022    COPD (chronic obstructive pulmonary disease) ([facility])     COPD exacerbation ([facility]) 07/14/2022    COPD exacerbation ([facility]) 02/05/2024    COPD with acute exacerbation ([facility]) 12/24/2023    COVID-19 07/16/2022    Critical limb ischemia of left lower extremity with rest pain ([facility]) 12/25/2023    GI bleed 10/14/2023    Hypertension     Pneumonia due to infectious organism     PVD (peripheral vascular disease) ([facility]) 01/19/2024    Severe protein-calorie malnutrition ([facility]) 06/26/2023    Thyroid disease     Uncontrolled hypertension

## 2024-04-13 NOTE — ED NOTES
Report given to RAMO Obrien from 62.   Report method by phone   The following was reviewed with receiving RN:   Current vital signs:  /77   Pulse 73   Temp 98.2 °F (36.8 °C) (Oral)   Resp 24   Ht 1.727 m (5' 8\")   Wt 44.9 kg (99 lb)   SpO2 99%   BMI 15.05 kg/m²                MEWS Score: 1     Any medication or safety alerts were reviewed. Any pending diagnostics and notifications were also reviewed, as well as any safety concerns or issues, abnormal labs, abnormal imaging, and abnormal assessment findings. Questions were answered.

## 2024-04-14 LAB
L PNEUMO1 AG UR QL IA.RAPID: NEGATIVE
PROCALCITONIN SERPL-MCNC: 0.19 NG/ML (ref 0–0.08)
S PNEUM AG SPEC QL: POSITIVE
SPECIMEN SOURCE: ABNORMAL

## 2024-04-14 PROCEDURE — 84145 PROCALCITONIN (PCT): CPT

## 2024-04-14 PROCEDURE — 6370000000 HC RX 637 (ALT 250 FOR IP): Performed by: INTERNAL MEDICINE

## 2024-04-14 PROCEDURE — 2580000003 HC RX 258

## 2024-04-14 PROCEDURE — 2580000003 HC RX 258: Performed by: INTERNAL MEDICINE

## 2024-04-14 PROCEDURE — 36415 COLL VENOUS BLD VENIPUNCTURE: CPT

## 2024-04-14 PROCEDURE — 6360000002 HC RX W HCPCS: Performed by: INTERNAL MEDICINE

## 2024-04-14 PROCEDURE — 2140000000 HC CCU INTERMEDIATE R&B

## 2024-04-14 PROCEDURE — 94640 AIRWAY INHALATION TREATMENT: CPT

## 2024-04-14 PROCEDURE — 94660 CPAP INITIATION&MGMT: CPT

## 2024-04-14 PROCEDURE — 2700000000 HC OXYGEN THERAPY PER DAY

## 2024-04-14 RX ADMIN — WATER 40 MG: 1 INJECTION INTRAMUSCULAR; INTRAVENOUS; SUBCUTANEOUS at 05:49

## 2024-04-14 RX ADMIN — ALBUTEROL SULFATE 2.5 MG: 2.5 SOLUTION RESPIRATORY (INHALATION) at 08:01

## 2024-04-14 RX ADMIN — Medication 500 MG: at 08:05

## 2024-04-14 RX ADMIN — SENNOSIDES AND DOCUSATE SODIUM 1 TABLET: 50; 8.6 TABLET ORAL at 08:05

## 2024-04-14 RX ADMIN — DOXYCYCLINE HYCLATE 100 MG: 100 CAPSULE ORAL at 08:06

## 2024-04-14 RX ADMIN — CYANOCOBALAMIN TAB 1000 MCG 500 MCG: 1000 TAB at 08:05

## 2024-04-14 RX ADMIN — METHIMAZOLE 15 MG: 5 TABLET ORAL at 08:08

## 2024-04-14 RX ADMIN — FOLIC ACID 1 MG: 1 TABLET ORAL at 08:07

## 2024-04-14 RX ADMIN — APIXABAN 5 MG: 5 TABLET, FILM COATED ORAL at 08:06

## 2024-04-14 RX ADMIN — LEVALBUTEROL HYDROCHLORIDE 0.63 MG: 0.63 SOLUTION RESPIRATORY (INHALATION) at 19:36

## 2024-04-14 RX ADMIN — ARFORMOTEROL TARTRATE 15 MCG: 15 SOLUTION RESPIRATORY (INHALATION) at 19:35

## 2024-04-14 RX ADMIN — WATER 40 MG: 1 INJECTION INTRAMUSCULAR; INTRAVENOUS; SUBCUTANEOUS at 13:14

## 2024-04-14 RX ADMIN — PREGABALIN 50 MG: 50 CAPSULE ORAL at 13:14

## 2024-04-14 RX ADMIN — DOXYCYCLINE HYCLATE 100 MG: 100 CAPSULE ORAL at 19:09

## 2024-04-14 RX ADMIN — HYDROXYZINE PAMOATE 25 MG: 25 CAPSULE ORAL at 15:26

## 2024-04-14 RX ADMIN — GUAIFENESIN 400 MG: 400 TABLET ORAL at 08:06

## 2024-04-14 RX ADMIN — LEVALBUTEROL HYDROCHLORIDE 0.63 MG: 0.63 SOLUTION RESPIRATORY (INHALATION) at 08:01

## 2024-04-14 RX ADMIN — Medication 4 ML: at 19:35

## 2024-04-14 RX ADMIN — TRAZODONE HYDROCHLORIDE 100 MG: 50 TABLET ORAL at 19:09

## 2024-04-14 RX ADMIN — GUAIFENESIN 400 MG: 400 TABLET ORAL at 13:13

## 2024-04-14 RX ADMIN — CITALOPRAM HYDROBROMIDE 20 MG: 20 TABLET ORAL at 08:06

## 2024-04-14 RX ADMIN — APIXABAN 5 MG: 5 TABLET, FILM COATED ORAL at 19:09

## 2024-04-14 RX ADMIN — ROSUVASTATIN CALCIUM 20 MG: 20 TABLET, FILM COATED ORAL at 08:05

## 2024-04-14 RX ADMIN — BUSPIRONE HYDROCHLORIDE 15 MG: 5 TABLET ORAL at 10:46

## 2024-04-14 RX ADMIN — PROCHLORPERAZINE MALEATE 10 MG: 10 TABLET ORAL at 15:26

## 2024-04-14 RX ADMIN — SODIUM CHLORIDE, PRESERVATIVE FREE 10 ML: 5 INJECTION INTRAVENOUS at 08:09

## 2024-04-14 RX ADMIN — ARFORMOTEROL TARTRATE 15 MCG: 15 SOLUTION RESPIRATORY (INHALATION) at 08:01

## 2024-04-14 RX ADMIN — CHOLECALCIFEROL TAB 125 MCG (5000 UNIT) 5000 UNITS: 125 TAB at 08:06

## 2024-04-14 RX ADMIN — LEVALBUTEROL HYDROCHLORIDE 0.63 MG: 0.63 SOLUTION RESPIRATORY (INHALATION) at 15:35

## 2024-04-14 RX ADMIN — DILTIAZEM HYDROCHLORIDE 120 MG: 120 CAPSULE, COATED, EXTENDED RELEASE ORAL at 08:07

## 2024-04-14 RX ADMIN — LEVALBUTEROL HYDROCHLORIDE 0.63 MG: 0.63 SOLUTION RESPIRATORY (INHALATION) at 10:16

## 2024-04-14 RX ADMIN — PANTOPRAZOLE SODIUM 40 MG: 40 TABLET, DELAYED RELEASE ORAL at 05:49

## 2024-04-14 RX ADMIN — HYDROXYZINE PAMOATE 25 MG: 25 CAPSULE ORAL at 06:48

## 2024-04-14 RX ADMIN — BUSPIRONE HYDROCHLORIDE 15 MG: 5 TABLET ORAL at 23:08

## 2024-04-14 RX ADMIN — WATER 40 MG: 1 INJECTION INTRAMUSCULAR; INTRAVENOUS; SUBCUTANEOUS at 22:16

## 2024-04-14 RX ADMIN — PROCHLORPERAZINE MALEATE 10 MG: 10 TABLET ORAL at 06:48

## 2024-04-14 RX ADMIN — Medication 4 ML: at 08:04

## 2024-04-14 RX ADMIN — PREGABALIN 50 MG: 50 CAPSULE ORAL at 19:09

## 2024-04-14 RX ADMIN — ACETAMINOPHEN 650 MG: 325 TABLET ORAL at 17:57

## 2024-04-14 RX ADMIN — ASPIRIN 81 MG: 81 TABLET, COATED ORAL at 08:07

## 2024-04-14 RX ADMIN — BUDESONIDE INHALATION 1000 MCG: 0.5 SUSPENSION RESPIRATORY (INHALATION) at 08:01

## 2024-04-14 RX ADMIN — PREGABALIN 50 MG: 50 CAPSULE ORAL at 08:07

## 2024-04-14 RX ADMIN — SODIUM CHLORIDE, PRESERVATIVE FREE 10 ML: 5 INJECTION INTRAVENOUS at 19:10

## 2024-04-14 RX ADMIN — BUDESONIDE INHALATION 1000 MCG: 0.5 SUSPENSION RESPIRATORY (INHALATION) at 19:35

## 2024-04-14 RX ADMIN — HYDROXYZINE PAMOATE 25 MG: 25 CAPSULE ORAL at 23:10

## 2024-04-14 RX ADMIN — GUAIFENESIN 400 MG: 400 TABLET ORAL at 19:09

## 2024-04-14 ASSESSMENT — PAIN DESCRIPTION - DESCRIPTORS: DESCRIPTORS: ACHING

## 2024-04-14 ASSESSMENT — PAIN DESCRIPTION - LOCATION: LOCATION: HEAD

## 2024-04-14 ASSESSMENT — PAIN SCALES - GENERAL
PAINLEVEL_OUTOF10: 0
PAINLEVEL_OUTOF10: 5
PAINLEVEL_OUTOF10: 0
PAINLEVEL_OUTOF10: 0

## 2024-04-14 ASSESSMENT — PAIN - FUNCTIONAL ASSESSMENT: PAIN_FUNCTIONAL_ASSESSMENT: ACTIVITIES ARE NOT PREVENTED

## 2024-04-14 NOTE — PLAN OF CARE
Problem: Safety - Adult  Goal: Free from fall injury  Outcome: Progressing     Problem: Chronic Conditions and Co-morbidities  Goal: Patient's chronic conditions and co-morbidity symptoms are monitored and maintained or improved  Outcome: Progressing  Flowsheets (Taken 4/14/2024 0856)  Care Plan - Patient's Chronic Conditions and Co-Morbidity Symptoms are Monitored and Maintained or Improved: Monitor and assess patient's chronic conditions and comorbid symptoms for stability, deterioration, or improvement     Problem: Discharge Planning  Goal: Discharge to home or other facility with appropriate resources  Outcome: Progressing  Flowsheets (Taken 4/14/2024 0856)  Discharge to home or other facility with appropriate resources: Identify barriers to discharge with patient and caregiver     Problem: Pain  Goal: Verbalizes/displays adequate comfort level or baseline comfort level  Outcome: Progressing

## 2024-04-15 LAB
EKG ATRIAL RATE: 92 BPM
EKG P AXIS: -29 DEGREES
EKG P-R INTERVAL: 144 MS
EKG Q-T INTERVAL: 366 MS
EKG QRS DURATION: 86 MS
EKG QTC CALCULATION (BAZETT): 452 MS
EKG R AXIS: -17 DEGREES
EKG T AXIS: -18 DEGREES
EKG VENTRICULAR RATE: 92 BPM

## 2024-04-15 PROCEDURE — 6360000002 HC RX W HCPCS: Performed by: INTERNAL MEDICINE

## 2024-04-15 PROCEDURE — 6370000000 HC RX 637 (ALT 250 FOR IP): Performed by: INTERNAL MEDICINE

## 2024-04-15 PROCEDURE — 94640 AIRWAY INHALATION TREATMENT: CPT

## 2024-04-15 PROCEDURE — 94669 MECHANICAL CHEST WALL OSCILL: CPT

## 2024-04-15 PROCEDURE — 2580000003 HC RX 258: Performed by: INTERNAL MEDICINE

## 2024-04-15 PROCEDURE — 2580000003 HC RX 258

## 2024-04-15 PROCEDURE — 94660 CPAP INITIATION&MGMT: CPT

## 2024-04-15 PROCEDURE — 93010 ELECTROCARDIOGRAM REPORT: CPT | Performed by: INTERNAL MEDICINE

## 2024-04-15 PROCEDURE — 2140000000 HC CCU INTERMEDIATE R&B

## 2024-04-15 PROCEDURE — 2700000000 HC OXYGEN THERAPY PER DAY

## 2024-04-15 RX ORDER — ALPRAZOLAM 0.25 MG/1
0.25 TABLET ORAL EVERY 8 HOURS PRN
Status: DISCONTINUED | OUTPATIENT
Start: 2024-04-15 | End: 2024-04-17

## 2024-04-15 RX ADMIN — APIXABAN 5 MG: 5 TABLET, FILM COATED ORAL at 21:01

## 2024-04-15 RX ADMIN — GUAIFENESIN 400 MG: 400 TABLET ORAL at 21:01

## 2024-04-15 RX ADMIN — WATER 40 MG: 1 INJECTION INTRAMUSCULAR; INTRAVENOUS; SUBCUTANEOUS at 06:09

## 2024-04-15 RX ADMIN — PREGABALIN 50 MG: 50 CAPSULE ORAL at 21:01

## 2024-04-15 RX ADMIN — HYDROXYZINE PAMOATE 25 MG: 25 CAPSULE ORAL at 06:09

## 2024-04-15 RX ADMIN — PREGABALIN 50 MG: 50 CAPSULE ORAL at 09:19

## 2024-04-15 RX ADMIN — CITALOPRAM HYDROBROMIDE 20 MG: 20 TABLET ORAL at 09:19

## 2024-04-15 RX ADMIN — BUSPIRONE HYDROCHLORIDE 15 MG: 5 TABLET ORAL at 14:17

## 2024-04-15 RX ADMIN — FOLIC ACID 1 MG: 1 TABLET ORAL at 09:19

## 2024-04-15 RX ADMIN — PREGABALIN 50 MG: 50 CAPSULE ORAL at 14:17

## 2024-04-15 RX ADMIN — PROCHLORPERAZINE MALEATE 10 MG: 10 TABLET ORAL at 12:04

## 2024-04-15 RX ADMIN — LEVALBUTEROL HYDROCHLORIDE 0.63 MG: 0.63 SOLUTION RESPIRATORY (INHALATION) at 16:47

## 2024-04-15 RX ADMIN — SODIUM CHLORIDE, PRESERVATIVE FREE 10 ML: 5 INJECTION INTRAVENOUS at 09:21

## 2024-04-15 RX ADMIN — LEVALBUTEROL HYDROCHLORIDE 0.63 MG: 0.63 SOLUTION RESPIRATORY (INHALATION) at 01:45

## 2024-04-15 RX ADMIN — METHIMAZOLE 15 MG: 5 TABLET ORAL at 09:20

## 2024-04-15 RX ADMIN — Medication 4 ML: at 08:51

## 2024-04-15 RX ADMIN — GUAIFENESIN 400 MG: 400 TABLET ORAL at 14:17

## 2024-04-15 RX ADMIN — Medication 500 MG: at 09:19

## 2024-04-15 RX ADMIN — ROSUVASTATIN CALCIUM 20 MG: 20 TABLET, FILM COATED ORAL at 09:19

## 2024-04-15 RX ADMIN — SODIUM CHLORIDE, PRESERVATIVE FREE 10 ML: 5 INJECTION INTRAVENOUS at 21:02

## 2024-04-15 RX ADMIN — LEVALBUTEROL HYDROCHLORIDE 0.63 MG: 0.63 SOLUTION RESPIRATORY (INHALATION) at 12:08

## 2024-04-15 RX ADMIN — ALPRAZOLAM 0.25 MG: 0.25 TABLET ORAL at 17:46

## 2024-04-15 RX ADMIN — GUAIFENESIN 400 MG: 400 TABLET ORAL at 09:19

## 2024-04-15 RX ADMIN — ARFORMOTEROL TARTRATE 15 MCG: 15 SOLUTION RESPIRATORY (INHALATION) at 20:04

## 2024-04-15 RX ADMIN — APIXABAN 5 MG: 5 TABLET, FILM COATED ORAL at 09:19

## 2024-04-15 RX ADMIN — TRAZODONE HYDROCHLORIDE 100 MG: 50 TABLET ORAL at 21:00

## 2024-04-15 RX ADMIN — CYANOCOBALAMIN TAB 1000 MCG 500 MCG: 1000 TAB at 09:19

## 2024-04-15 RX ADMIN — CHOLECALCIFEROL TAB 125 MCG (5000 UNIT) 5000 UNITS: 125 TAB at 09:19

## 2024-04-15 RX ADMIN — DOXYCYCLINE HYCLATE 100 MG: 100 CAPSULE ORAL at 09:20

## 2024-04-15 RX ADMIN — PANTOPRAZOLE SODIUM 40 MG: 40 TABLET, DELAYED RELEASE ORAL at 06:09

## 2024-04-15 RX ADMIN — Medication 4 ML: at 20:04

## 2024-04-15 RX ADMIN — ARFORMOTEROL TARTRATE 15 MCG: 15 SOLUTION RESPIRATORY (INHALATION) at 08:51

## 2024-04-15 RX ADMIN — ASPIRIN 81 MG: 81 TABLET, COATED ORAL at 09:19

## 2024-04-15 RX ADMIN — WATER 40 MG: 1 INJECTION INTRAMUSCULAR; INTRAVENOUS; SUBCUTANEOUS at 14:17

## 2024-04-15 RX ADMIN — DILTIAZEM HYDROCHLORIDE 120 MG: 120 CAPSULE, COATED, EXTENDED RELEASE ORAL at 09:19

## 2024-04-15 RX ADMIN — BUDESONIDE INHALATION 1000 MCG: 0.5 SUSPENSION RESPIRATORY (INHALATION) at 08:51

## 2024-04-15 RX ADMIN — HYDROXYZINE PAMOATE 25 MG: 25 CAPSULE ORAL at 12:04

## 2024-04-15 RX ADMIN — WATER 40 MG: 1 INJECTION INTRAMUSCULAR; INTRAVENOUS; SUBCUTANEOUS at 22:40

## 2024-04-15 RX ADMIN — DOXYCYCLINE HYCLATE 100 MG: 100 CAPSULE ORAL at 21:01

## 2024-04-15 RX ADMIN — LEVALBUTEROL HYDROCHLORIDE 0.63 MG: 0.63 SOLUTION RESPIRATORY (INHALATION) at 08:51

## 2024-04-15 RX ADMIN — BUDESONIDE INHALATION 1000 MCG: 0.5 SUSPENSION RESPIRATORY (INHALATION) at 20:04

## 2024-04-15 RX ADMIN — LEVALBUTEROL HYDROCHLORIDE 0.63 MG: 0.63 SOLUTION RESPIRATORY (INHALATION) at 20:04

## 2024-04-15 NOTE — ACP (ADVANCE CARE PLANNING)
Advance Care Planning   Healthcare Decision Maker:    Primary Decision Maker: [email or web address] valencia - Brother/Sister - [phone]    Today we documented Decision Maker(s) consistent with Legal Next of Kin hierarchy.    Electronically signed by CAYLA Romero on 4/15/2024 at 2:58 PM

## 2024-04-15 NOTE — CARE COORDINATION
Patient presented to the ED due to chest pain; admitted for pneumonia and acute on chronic respiratory failure with hypercapnia. Patient lives in a mobile home with her sister-in-law, Lana, 4 steps to enter, she is currently independent in the room, ambulates without a device, drives; has a rollator, 3:1 commode, nebulizer, cpap, on 4L NC continuous at home (through [facility]). Uses [facility] ([place]) and PCP is Dr. Gonzalez. Patient plans to return home, reports no home going needs and her sister-in-law will transport home when discharged. Call made to Moise at [facility] to check the status of the NIV referral from last week; he will follow-up and call SW with an update. Patient active with [facility]; spoke with them and patient returned to the hospital before they could open her up.     Case Management Assessment  Initial Evaluation    Date/Time of Evaluation: 4/15/2024 2:59 PM  Assessment Completed by: CAYLA Romero    If patient is discharged prior to next notation, then this note serves as note for discharge by case management.    Patient Name: Lana Phillips                   YOB: 1955  Diagnosis: Pneumonia due to organism [J18.9]  Acute on chronic respiratory failure with hypercapnia (HCC) [J96.22]  Pneumonia due to infectious organism, unspecified laterality, unspecified part of lung [J18.9]  Sepsis, due to unspecified organism, unspecified whether acute organ dysfunction present (HCC) [A41.9]                   Date / Time: 4/13/2024  5:39 AM    Patient Admission Status: Inpatient   Readmission Risk (Low < 19, Mod (19-27), High > 27): Readmission Risk Score: 33.2    Current PCP: Clyde Gonzalez, DO  PCP verified by ? Yes    Chart Reviewed: Yes      History Provided by: Patient  Patient Orientation: Alert and Oriented    Patient Cognition: Alert    Hospitalization in the last 30 days (Readmission):  Yes    If yes, Readmission Assessment in  Navigator will be

## 2024-04-15 NOTE — PLAN OF CARE
Problem: Safety - Adult  Goal: Free from fall injury  Outcome: Progressing     Problem: Chronic Conditions and Co-morbidities  Goal: Patient's chronic conditions and co-morbidity symptoms are monitored and maintained or improved  Outcome: Progressing  Flowsheets (Taken 4/15/2024 0945)  Care Plan - Patient's Chronic Conditions and Co-Morbidity Symptoms are Monitored and Maintained or Improved: Monitor and assess patient's chronic conditions and comorbid symptoms for stability, deterioration, or improvement     Problem: Discharge Planning  Goal: Discharge to home or other facility with appropriate resources  Outcome: Progressing  Flowsheets (Taken 4/15/2024 0945)  Discharge to home or other facility with appropriate resources: Identify barriers to discharge with patient and caregiver     Problem: Pain  Goal: Verbalizes/displays adequate comfort level or baseline comfort level  Outcome: Progressing  Flowsheets (Taken 4/15/2024 0915)  Verbalizes/displays adequate comfort level or baseline comfort level: Encourage patient to monitor pain and request assistance

## 2024-04-16 ENCOUNTER — ANESTHESIA EVENT (OUTPATIENT)
Dept: ENDOSCOPY | Age: 69
End: 2024-04-16
Payer: MEDICARE

## 2024-04-16 ENCOUNTER — ANESTHESIA (OUTPATIENT)
Dept: ENDOSCOPY | Age: 69
End: 2024-04-16
Payer: MEDICARE

## 2024-04-16 LAB
ANION GAP SERPL CALCULATED.3IONS-SCNC: 12 MMOL/L (ref 7–16)
BUN SERPL-MCNC: 18 MG/DL (ref 6–23)
CALCIUM SERPL-MCNC: 8.7 MG/DL (ref 8.6–10.2)
CHLORIDE SERPL-SCNC: 96 MMOL/L (ref 98–107)
CO2 SERPL-SCNC: 35 MMOL/L (ref 22–29)
CREAT SERPL-MCNC: 0.5 MG/DL (ref 0.5–1)
ERYTHROCYTE [DISTWIDTH] IN BLOOD BY AUTOMATED COUNT: 20.5 % (ref 11.5–15)
GFR SERPL CREATININE-BSD FRML MDRD: >90 ML/MIN/1.73M2
GLUCOSE BLD-MCNC: 138 MG/DL (ref 74–99)
GLUCOSE SERPL-MCNC: 154 MG/DL (ref 74–99)
HCT VFR BLD AUTO: 29.9 % (ref 34–48)
HGB BLD-MCNC: 8.8 G/DL (ref 11.5–15.5)
LACTATE BLDV-SCNC: 1.9 MMOL/L (ref 0.5–2.2)
MCH RBC QN AUTO: 27.8 PG (ref 26–35)
MCHC RBC AUTO-ENTMCNC: 29.4 G/DL (ref 32–34.5)
MCV RBC AUTO: 94.3 FL (ref 80–99.9)
PLATELET # BLD AUTO: 385 K/UL (ref 130–450)
PMV BLD AUTO: 9.8 FL (ref 7–12)
POTASSIUM SERPL-SCNC: 4.6 MMOL/L (ref 3.5–5)
RBC # BLD AUTO: 3.17 M/UL (ref 3.5–5.5)
SODIUM SERPL-SCNC: 143 MMOL/L (ref 132–146)
WBC OTHER # BLD: 15.2 K/UL (ref 4.5–11.5)

## 2024-04-16 PROCEDURE — 6360000002 HC RX W HCPCS: Performed by: INTERNAL MEDICINE

## 2024-04-16 PROCEDURE — 82962 GLUCOSE BLOOD TEST: CPT

## 2024-04-16 PROCEDURE — 2700000000 HC OXYGEN THERAPY PER DAY

## 2024-04-16 PROCEDURE — 87070 CULTURE OTHR SPECIMN AEROBIC: CPT

## 2024-04-16 PROCEDURE — 2580000003 HC RX 258

## 2024-04-16 PROCEDURE — 94660 CPAP INITIATION&MGMT: CPT

## 2024-04-16 PROCEDURE — 6370000000 HC RX 637 (ALT 250 FOR IP): Performed by: INTERNAL MEDICINE

## 2024-04-16 PROCEDURE — 6360000002 HC RX W HCPCS

## 2024-04-16 PROCEDURE — 3700000001 HC ADD 15 MINUTES (ANESTHESIA): Performed by: INTERNAL MEDICINE

## 2024-04-16 PROCEDURE — 87102 FUNGUS ISOLATION CULTURE: CPT

## 2024-04-16 PROCEDURE — 0B938ZX DRAINAGE OF RIGHT MAIN BRONCHUS, VIA NATURAL OR ARTIFICIAL OPENING ENDOSCOPIC, DIAGNOSTIC: ICD-10-PCS | Performed by: INTERNAL MEDICINE

## 2024-04-16 PROCEDURE — 87181 SC STD AGAR DILUTION PER AGT: CPT

## 2024-04-16 PROCEDURE — 83605 ASSAY OF LACTIC ACID: CPT

## 2024-04-16 PROCEDURE — 94640 AIRWAY INHALATION TREATMENT: CPT

## 2024-04-16 PROCEDURE — 87106 FUNGI IDENTIFICATION YEAST: CPT

## 2024-04-16 PROCEDURE — 6370000000 HC RX 637 (ALT 250 FOR IP)

## 2024-04-16 PROCEDURE — 85027 COMPLETE CBC AUTOMATED: CPT

## 2024-04-16 PROCEDURE — 36415 COLL VENOUS BLD VENIPUNCTURE: CPT

## 2024-04-16 PROCEDURE — 7100000001 HC PACU RECOVERY - ADDTL 15 MIN: Performed by: INTERNAL MEDICINE

## 2024-04-16 PROCEDURE — 3609027000 HC BRONCHOSCOPY: Performed by: INTERNAL MEDICINE

## 2024-04-16 PROCEDURE — 2580000003 HC RX 258: Performed by: INTERNAL MEDICINE

## 2024-04-16 PROCEDURE — 2709999900 HC NON-CHARGEABLE SUPPLY: Performed by: INTERNAL MEDICINE

## 2024-04-16 PROCEDURE — 87305 ASPERGILLUS AG IA: CPT

## 2024-04-16 PROCEDURE — 80048 BASIC METABOLIC PNL TOTAL CA: CPT

## 2024-04-16 PROCEDURE — 2500000003 HC RX 250 WO HCPCS

## 2024-04-16 PROCEDURE — 87116 MYCOBACTERIA CULTURE: CPT

## 2024-04-16 PROCEDURE — 87205 SMEAR GRAM STAIN: CPT

## 2024-04-16 PROCEDURE — 3700000000 HC ANESTHESIA ATTENDED CARE: Performed by: INTERNAL MEDICINE

## 2024-04-16 PROCEDURE — 2140000000 HC CCU INTERMEDIATE R&B

## 2024-04-16 PROCEDURE — 7100000000 HC PACU RECOVERY - FIRST 15 MIN: Performed by: INTERNAL MEDICINE

## 2024-04-16 PROCEDURE — 89051 BODY FLUID CELL COUNT: CPT

## 2024-04-16 PROCEDURE — 0WCQ8ZZ EXTIRPATION OF MATTER FROM RESPIRATORY TRACT, VIA NATURAL OR ARTIFICIAL OPENING ENDOSCOPIC: ICD-10-PCS | Performed by: INTERNAL MEDICINE

## 2024-04-16 PROCEDURE — 6360000002 HC RX W HCPCS: Performed by: ANESTHESIOLOGY

## 2024-04-16 PROCEDURE — 87206 SMEAR FLUORESCENT/ACID STAI: CPT

## 2024-04-16 PROCEDURE — 87077 CULTURE AEROBIC IDENTIFY: CPT

## 2024-04-16 RX ORDER — HYDRALAZINE HYDROCHLORIDE 20 MG/ML
5 INJECTION INTRAMUSCULAR; INTRAVENOUS EVERY 6 HOURS PRN
Status: DISCONTINUED | OUTPATIENT
Start: 2024-04-16 | End: 2024-04-24 | Stop reason: HOSPADM

## 2024-04-16 RX ORDER — HYDRALAZINE HYDROCHLORIDE 20 MG/ML
10 INJECTION INTRAMUSCULAR; INTRAVENOUS ONCE
Status: COMPLETED | OUTPATIENT
Start: 2024-04-16 | End: 2024-04-16

## 2024-04-16 RX ORDER — SODIUM CHLORIDE 0.9 % (FLUSH) 0.9 %
5-40 SYRINGE (ML) INJECTION PRN
Status: DISCONTINUED | OUTPATIENT
Start: 2024-04-16 | End: 2024-04-24 | Stop reason: HOSPADM

## 2024-04-16 RX ORDER — MIDAZOLAM HYDROCHLORIDE 2 MG/2ML
2 INJECTION, SOLUTION INTRAMUSCULAR; INTRAVENOUS ONCE
Status: COMPLETED | OUTPATIENT
Start: 2024-04-16 | End: 2024-04-16

## 2024-04-16 RX ORDER — MIDAZOLAM HYDROCHLORIDE 1 MG/ML
INJECTION INTRAMUSCULAR; INTRAVENOUS
Status: COMPLETED
Start: 2024-04-16 | End: 2024-04-16

## 2024-04-16 RX ORDER — LIDOCAINE HYDROCHLORIDE 20 MG/ML
SOLUTION OROPHARYNGEAL PRN
Status: DISCONTINUED | OUTPATIENT
Start: 2024-04-16 | End: 2024-04-16 | Stop reason: ALTCHOICE

## 2024-04-16 RX ORDER — SODIUM CHLORIDE 9 MG/ML
INJECTION, SOLUTION INTRAVENOUS CONTINUOUS PRN
Status: DISCONTINUED | OUTPATIENT
Start: 2024-04-16 | End: 2024-04-16 | Stop reason: SDUPTHER

## 2024-04-16 RX ORDER — SODIUM CHLORIDE 9 MG/ML
INJECTION, SOLUTION INTRAVENOUS PRN
Status: DISCONTINUED | OUTPATIENT
Start: 2024-04-16 | End: 2024-04-24 | Stop reason: HOSPADM

## 2024-04-16 RX ORDER — ALBUTEROL SULFATE 90 UG/1
AEROSOL, METERED RESPIRATORY (INHALATION) PRN
Status: DISCONTINUED | OUTPATIENT
Start: 2024-04-16 | End: 2024-04-16 | Stop reason: SDUPTHER

## 2024-04-16 RX ORDER — SODIUM CHLORIDE 0.9 % (FLUSH) 0.9 %
5-40 SYRINGE (ML) INJECTION EVERY 12 HOURS SCHEDULED
Status: DISCONTINUED | OUTPATIENT
Start: 2024-04-16 | End: 2024-04-24 | Stop reason: HOSPADM

## 2024-04-16 RX ORDER — PROPOFOL 10 MG/ML
INJECTION, EMULSION INTRAVENOUS CONTINUOUS PRN
Status: DISCONTINUED | OUTPATIENT
Start: 2024-04-16 | End: 2024-04-16 | Stop reason: SDUPTHER

## 2024-04-16 RX ORDER — LIDOCAINE HYDROCHLORIDE 20 MG/ML
INJECTION, SOLUTION EPIDURAL; INFILTRATION; INTRACAUDAL; PERINEURAL PRN
Status: DISCONTINUED | OUTPATIENT
Start: 2024-04-16 | End: 2024-04-16 | Stop reason: SDUPTHER

## 2024-04-16 RX ADMIN — ARFORMOTEROL TARTRATE 15 MCG: 15 SOLUTION RESPIRATORY (INHALATION) at 07:34

## 2024-04-16 RX ADMIN — BUSPIRONE HYDROCHLORIDE 15 MG: 5 TABLET ORAL at 19:32

## 2024-04-16 RX ADMIN — DOXYCYCLINE HYCLATE 100 MG: 100 CAPSULE ORAL at 19:32

## 2024-04-16 RX ADMIN — SODIUM CHLORIDE, PRESERVATIVE FREE 10 ML: 5 INJECTION INTRAVENOUS at 08:40

## 2024-04-16 RX ADMIN — WATER 40 MG: 1 INJECTION INTRAMUSCULAR; INTRAVENOUS; SUBCUTANEOUS at 21:22

## 2024-04-16 RX ADMIN — MIDAZOLAM 2 MG: 1 INJECTION INTRAMUSCULAR; INTRAVENOUS at 17:34

## 2024-04-16 RX ADMIN — HYDRALAZINE HYDROCHLORIDE 5 MG: 20 INJECTION, SOLUTION INTRAMUSCULAR; INTRAVENOUS at 12:15

## 2024-04-16 RX ADMIN — LEVALBUTEROL HYDROCHLORIDE 0.63 MG: 0.63 SOLUTION RESPIRATORY (INHALATION) at 07:34

## 2024-04-16 RX ADMIN — LEVALBUTEROL HYDROCHLORIDE 0.63 MG: 0.63 SOLUTION RESPIRATORY (INHALATION) at 15:24

## 2024-04-16 RX ADMIN — ALBUTEROL SULFATE 2 PUFF: 108 AEROSOL, METERED RESPIRATORY (INHALATION) at 15:48

## 2024-04-16 RX ADMIN — ARFORMOTEROL TARTRATE 15 MCG: 15 SOLUTION RESPIRATORY (INHALATION) at 20:17

## 2024-04-16 RX ADMIN — WATER 40 MG: 1 INJECTION INTRAMUSCULAR; INTRAVENOUS; SUBCUTANEOUS at 06:12

## 2024-04-16 RX ADMIN — SODIUM CHLORIDE: 9 INJECTION, SOLUTION INTRAVENOUS at 15:48

## 2024-04-16 RX ADMIN — PREGABALIN 50 MG: 50 CAPSULE ORAL at 19:33

## 2024-04-16 RX ADMIN — SODIUM CHLORIDE, PRESERVATIVE FREE 10 ML: 5 INJECTION INTRAVENOUS at 20:25

## 2024-04-16 RX ADMIN — BUDESONIDE INHALATION 1000 MCG: 0.5 SUSPENSION RESPIRATORY (INHALATION) at 20:17

## 2024-04-16 RX ADMIN — HYDROXYZINE PAMOATE 25 MG: 25 CAPSULE ORAL at 21:22

## 2024-04-16 RX ADMIN — PROPOFOL 50 MG: 10 INJECTION, EMULSION INTRAVENOUS at 15:52

## 2024-04-16 RX ADMIN — BUDESONIDE INHALATION 1000 MCG: 0.5 SUSPENSION RESPIRATORY (INHALATION) at 07:34

## 2024-04-16 RX ADMIN — GUAIFENESIN 400 MG: 400 TABLET ORAL at 19:33

## 2024-04-16 RX ADMIN — LEVALBUTEROL HYDROCHLORIDE 0.63 MG: 0.63 SOLUTION RESPIRATORY (INHALATION) at 20:18

## 2024-04-16 RX ADMIN — TRAZODONE HYDROCHLORIDE 100 MG: 50 TABLET ORAL at 19:32

## 2024-04-16 RX ADMIN — Medication 4 ML: at 07:34

## 2024-04-16 RX ADMIN — APIXABAN 5 MG: 5 TABLET, FILM COATED ORAL at 19:32

## 2024-04-16 RX ADMIN — Medication 4 ML: at 20:18

## 2024-04-16 RX ADMIN — MIDAZOLAM HYDROCHLORIDE 2 MG: 2 INJECTION, SOLUTION INTRAMUSCULAR; INTRAVENOUS at 17:34

## 2024-04-16 RX ADMIN — PANTOPRAZOLE SODIUM 40 MG: 40 TABLET, DELAYED RELEASE ORAL at 06:12

## 2024-04-16 RX ADMIN — HYDRALAZINE HYDROCHLORIDE 10 MG: 20 INJECTION INTRAMUSCULAR; INTRAVENOUS at 17:07

## 2024-04-16 RX ADMIN — LIDOCAINE HYDROCHLORIDE 50 MG: 20 INJECTION, SOLUTION EPIDURAL; INFILTRATION; INTRACAUDAL; PERINEURAL at 15:51

## 2024-04-16 RX ADMIN — DILTIAZEM HYDROCHLORIDE 120 MG: 120 CAPSULE, COATED, EXTENDED RELEASE ORAL at 07:38

## 2024-04-16 RX ADMIN — ALBUTEROL SULFATE 2.5 MG: 2.5 SOLUTION RESPIRATORY (INHALATION) at 12:32

## 2024-04-16 RX ADMIN — PROPOFOL 200 MCG/KG/MIN: 10 INJECTION, EMULSION INTRAVENOUS at 15:51

## 2024-04-16 RX ADMIN — ALBUTEROL SULFATE 2.5 MG: 2.5 SOLUTION RESPIRATORY (INHALATION) at 17:44

## 2024-04-16 ASSESSMENT — COPD QUESTIONNAIRES: CAT_SEVERITY: SEVERE

## 2024-04-16 ASSESSMENT — ENCOUNTER SYMPTOMS
DYSPNEA ACTIVITY LEVEL: NO INTERVAL CHANGE
SHORTNESS OF BREATH: 1

## 2024-04-16 ASSESSMENT — LIFESTYLE VARIABLES: SMOKING_STATUS: 1

## 2024-04-16 NOTE — CARE COORDINATION
Patient for bronchoscopy today; pulmonology following. Patient plans to return home, reports no home going needs and her sister-in-law will transport home when discharged. Patient active with [facility]; to start care when discharged. Message sent to Moise at [facility] to check the status of the NIV; referral made last week.    Electronically signed by CAYLA Romero on 4/16/2024 at 3:40 PM

## 2024-04-16 NOTE — PLAN OF CARE
Problem: Safety - Adult  Goal: Free from fall injury  Outcome: Progressing  Flowsheets (Taken 4/16/2024 0051)  Free From Fall Injury: Instruct family/caregiver on patient safety     Problem: Discharge Planning  Goal: Discharge to home or other facility with appropriate resources  Outcome: Progressing     Problem: Pain  Goal: Verbalizes/displays adequate comfort level or baseline comfort level  Outcome: Progressing

## 2024-04-16 NOTE — ANESTHESIA POSTPROCEDURE EVALUATION
Department of Anesthesiology  Postprocedure Note    Patient: Lana Phillips  MRN: [id number]  YOB: 1955  Date of evaluation: 4/16/2024    Procedure Summary       Date: 04/16/24 Room / Location: [place] [address] / [facility]    Anesthesia Start: 1548 Anesthesia Stop: 1611    Procedure: BRONCHOSCOPY DIAGNOSTIC OR CELL WASH ONLY Diagnosis:       Mucus plugging of bronchi      (Mucus plugging of bronchi [T17.500A])    Surgeons: Jose Price MD Responsible Provider: Baldemar Ge DO    Anesthesia Type: MAC ASA Status: 4            Anesthesia Type: MAC    Salvador Phase I:      Salvador Phase II:      Anesthesia Post Evaluation    Patient location during evaluation: bedside  Patient participation: complete - patient cannot participate  Level of consciousness: awake and alert  Airway patency: patent  Nausea & Vomiting: no nausea and no vomiting  Cardiovascular status: blood pressure returned to baseline  Respiratory status: acceptable  Hydration status: euvolemic  Pain management: adequate    No notable events documented.

## 2024-04-16 NOTE — ANESTHESIA PRE PROCEDURE
Department of Anesthesiology  Preprocedure Note       Name:  Lana Phillips   Age:  69 y.o.  :  1955                                          MRN:  [id number]         Date:  2024      Surgeon: Surgeon(s):  Jose Price MD    Procedure: Procedure(s):  BRONCHOSCOPY    Medications prior to admission:   Prior to Admission medications    Medication Sig Start Date End Date Taking? Authorizing Provider   predniSONE (DELTASONE) 10 MG tablet 4 qd x 3d 3 qd x 3d 2 qd x 3d 1 qd x 3d po 24   Baldemar Jarrett DO   folic acid (FOLVITE) 1 MG tablet Take 1 tablet by mouth daily 24   Baldemar Jarrett DO   doxycycline hyclate (VIBRAMYCIN) 100 MG capsule Take 1 capsule by mouth every 12 hours for 20 doses 24  Baldemar Jarrett DO   busPIRone (BUSPAR) 15 MG tablet Take 15 mg by mouth every 8 hours as needed (anxiety)    ProviderFreida MD   bisacodyl (DULCOLAX) 10 MG suppository Place 1 suppository rectally daily as needed for Constipation    Provider, MD Freida   magnesium hydroxide (MILK OF MAGNESIA) 400 MG/5ML suspension Take 30 mLs by mouth daily as needed for Constipation    ProviderFreida MD   rosuvastatin (CRESTOR) 20 MG tablet Take 1 tablet by mouth daily    ProviderFreida MD   acetaminophen (TYLENOL) 325 MG tablet Take 2 tablets by mouth every 6 hours as needed for Pain 3/19/24   Baldemar Jarrett DO   hydrOXYzine pamoate (VISTARIL) 25 MG capsule Take 1 capsule by mouth 3 times daily as needed for Anxiety 3/19/24   Baldemar Jarrett DO   albuterol (PROVENTIL) (2.5 MG/3ML) 0.083% nebulizer solution Take 3 mLs by nebulization every 4 hours as needed for Wheezing 3/19/24   Baldemar Jarrett DO   guaiFENesin 400 MG tablet Take 1 tablet by mouth 3 times daily 3/19/24   Baldemar Jarrett DO   polyethylene glycol (GLYCOLAX) 17 g packet Take 1 packet by mouth daily as needed for Constipation 3/19/24 4/18/24  Baldemar Jarrett DO   sennosides-docusate sodium

## 2024-04-17 PROCEDURE — 6360000002 HC RX W HCPCS: Performed by: INTERNAL MEDICINE

## 2024-04-17 PROCEDURE — 2580000003 HC RX 258: Performed by: INTERNAL MEDICINE

## 2024-04-17 PROCEDURE — 2580000003 HC RX 258

## 2024-04-17 PROCEDURE — 6370000000 HC RX 637 (ALT 250 FOR IP): Performed by: INTERNAL MEDICINE

## 2024-04-17 PROCEDURE — 94640 AIRWAY INHALATION TREATMENT: CPT

## 2024-04-17 PROCEDURE — 94660 CPAP INITIATION&MGMT: CPT

## 2024-04-17 PROCEDURE — 2700000000 HC OXYGEN THERAPY PER DAY

## 2024-04-17 PROCEDURE — 2140000000 HC CCU INTERMEDIATE R&B

## 2024-04-17 PROCEDURE — 6360000002 HC RX W HCPCS

## 2024-04-17 RX ORDER — DILTIAZEM HYDROCHLORIDE 180 MG/1
180 CAPSULE, COATED, EXTENDED RELEASE ORAL DAILY
Status: DISCONTINUED | OUTPATIENT
Start: 2024-04-17 | End: 2024-04-24 | Stop reason: HOSPADM

## 2024-04-17 RX ORDER — WATER 10 ML/10ML
INJECTION INTRAMUSCULAR; INTRAVENOUS; SUBCUTANEOUS
Status: COMPLETED
Start: 2024-04-17 | End: 2024-04-17

## 2024-04-17 RX ORDER — OXYBUTYNIN CHLORIDE 5 MG/1
5 TABLET, EXTENDED RELEASE ORAL NIGHTLY
Status: DISCONTINUED | OUTPATIENT
Start: 2024-04-17 | End: 2024-04-24 | Stop reason: HOSPADM

## 2024-04-17 RX ORDER — METHYLPREDNISOLONE SODIUM SUCCINATE 40 MG/ML
INJECTION, POWDER, LYOPHILIZED, FOR SOLUTION INTRAMUSCULAR; INTRAVENOUS
Status: COMPLETED
Start: 2024-04-17 | End: 2024-04-17

## 2024-04-17 RX ORDER — CLONAZEPAM 0.5 MG/1
0.5 TABLET ORAL EVERY 12 HOURS
Status: DISCONTINUED | OUTPATIENT
Start: 2024-04-17 | End: 2024-04-24 | Stop reason: HOSPADM

## 2024-04-17 RX ADMIN — WATER: 1 INJECTION INTRAMUSCULAR; INTRAVENOUS; SUBCUTANEOUS at 18:38

## 2024-04-17 RX ADMIN — HYDROXYZINE PAMOATE 25 MG: 25 CAPSULE ORAL at 06:26

## 2024-04-17 RX ADMIN — METHYLPREDNISOLONE SODIUM SUCCINATE: 40 INJECTION INTRAMUSCULAR; INTRAVENOUS at 18:31

## 2024-04-17 RX ADMIN — BUSPIRONE HYDROCHLORIDE 15 MG: 5 TABLET ORAL at 20:38

## 2024-04-17 RX ADMIN — LEVALBUTEROL HYDROCHLORIDE 0.63 MG: 0.63 SOLUTION RESPIRATORY (INHALATION) at 02:34

## 2024-04-17 RX ADMIN — ARFORMOTEROL TARTRATE 15 MCG: 15 SOLUTION RESPIRATORY (INHALATION) at 20:09

## 2024-04-17 RX ADMIN — PREGABALIN 50 MG: 50 CAPSULE ORAL at 10:03

## 2024-04-17 RX ADMIN — CLONAZEPAM 0.5 MG: 0.5 TABLET ORAL at 10:04

## 2024-04-17 RX ADMIN — Medication 4 ML: at 07:44

## 2024-04-17 RX ADMIN — SENNOSIDES AND DOCUSATE SODIUM 1 TABLET: 50; 8.6 TABLET ORAL at 10:04

## 2024-04-17 RX ADMIN — LEVALBUTEROL HYDROCHLORIDE 0.63 MG: 0.63 SOLUTION RESPIRATORY (INHALATION) at 12:42

## 2024-04-17 RX ADMIN — Medication 4 ML: at 20:09

## 2024-04-17 RX ADMIN — ROSUVASTATIN CALCIUM 20 MG: 20 TABLET, FILM COATED ORAL at 10:04

## 2024-04-17 RX ADMIN — CLONAZEPAM 0.5 MG: 0.5 TABLET ORAL at 20:39

## 2024-04-17 RX ADMIN — OXYBUTYNIN CHLORIDE 5 MG: 5 TABLET, EXTENDED RELEASE ORAL at 20:40

## 2024-04-17 RX ADMIN — CITALOPRAM HYDROBROMIDE 20 MG: 20 TABLET ORAL at 10:04

## 2024-04-17 RX ADMIN — SODIUM CHLORIDE, PRESERVATIVE FREE 10 ML: 5 INJECTION INTRAVENOUS at 10:05

## 2024-04-17 RX ADMIN — CYANOCOBALAMIN TAB 1000 MCG 500 MCG: 1000 TAB at 10:03

## 2024-04-17 RX ADMIN — APIXABAN 5 MG: 5 TABLET, FILM COATED ORAL at 20:39

## 2024-04-17 RX ADMIN — CHOLECALCIFEROL TAB 125 MCG (5000 UNIT) 5000 UNITS: 125 TAB at 10:04

## 2024-04-17 RX ADMIN — ALPRAZOLAM 0.25 MG: 0.25 TABLET ORAL at 03:08

## 2024-04-17 RX ADMIN — BUSPIRONE HYDROCHLORIDE 15 MG: 5 TABLET ORAL at 05:57

## 2024-04-17 RX ADMIN — DOXYCYCLINE HYCLATE 100 MG: 100 CAPSULE ORAL at 10:04

## 2024-04-17 RX ADMIN — BUDESONIDE INHALATION 1000 MCG: 0.5 SUSPENSION RESPIRATORY (INHALATION) at 20:09

## 2024-04-17 RX ADMIN — GUAIFENESIN 400 MG: 400 TABLET ORAL at 18:26

## 2024-04-17 RX ADMIN — HYDRALAZINE HYDROCHLORIDE 5 MG: 20 INJECTION, SOLUTION INTRAMUSCULAR; INTRAVENOUS at 03:57

## 2024-04-17 RX ADMIN — PREGABALIN 50 MG: 50 CAPSULE ORAL at 18:25

## 2024-04-17 RX ADMIN — LEVALBUTEROL HYDROCHLORIDE 0.63 MG: 0.63 SOLUTION RESPIRATORY (INHALATION) at 20:09

## 2024-04-17 RX ADMIN — ACETAMINOPHEN 650 MG: 325 TABLET ORAL at 05:55

## 2024-04-17 RX ADMIN — FOLIC ACID 1 MG: 1 TABLET ORAL at 10:04

## 2024-04-17 RX ADMIN — ACETAMINOPHEN 650 MG: 325 TABLET ORAL at 19:39

## 2024-04-17 RX ADMIN — PREGABALIN 50 MG: 50 CAPSULE ORAL at 20:38

## 2024-04-17 RX ADMIN — LEVALBUTEROL HYDROCHLORIDE 0.63 MG: 0.63 SOLUTION RESPIRATORY (INHALATION) at 07:44

## 2024-04-17 RX ADMIN — SODIUM CHLORIDE, PRESERVATIVE FREE 10 ML: 5 INJECTION INTRAVENOUS at 20:40

## 2024-04-17 RX ADMIN — PANTOPRAZOLE SODIUM 40 MG: 40 TABLET, DELAYED RELEASE ORAL at 05:42

## 2024-04-17 RX ADMIN — LEVALBUTEROL HYDROCHLORIDE 0.63 MG: 0.63 SOLUTION RESPIRATORY (INHALATION) at 15:53

## 2024-04-17 RX ADMIN — DOXYCYCLINE HYCLATE 100 MG: 100 CAPSULE ORAL at 20:40

## 2024-04-17 RX ADMIN — TRAZODONE HYDROCHLORIDE 100 MG: 50 TABLET ORAL at 20:40

## 2024-04-17 RX ADMIN — ASPIRIN 81 MG: 81 TABLET, COATED ORAL at 10:04

## 2024-04-17 RX ADMIN — WATER 40 MG: 1 INJECTION INTRAMUSCULAR; INTRAVENOUS; SUBCUTANEOUS at 18:31

## 2024-04-17 RX ADMIN — METHIMAZOLE 15 MG: 5 TABLET ORAL at 10:03

## 2024-04-17 RX ADMIN — ARFORMOTEROL TARTRATE 15 MCG: 15 SOLUTION RESPIRATORY (INHALATION) at 07:44

## 2024-04-17 RX ADMIN — SODIUM CHLORIDE, PRESERVATIVE FREE 10 ML: 5 INJECTION INTRAVENOUS at 22:12

## 2024-04-17 RX ADMIN — GUAIFENESIN 400 MG: 400 TABLET ORAL at 10:03

## 2024-04-17 RX ADMIN — Medication 500 MG: at 10:04

## 2024-04-17 RX ADMIN — WATER 40 MG: 1 INJECTION INTRAMUSCULAR; INTRAVENOUS; SUBCUTANEOUS at 05:42

## 2024-04-17 RX ADMIN — APIXABAN 5 MG: 5 TABLET, FILM COATED ORAL at 10:04

## 2024-04-17 RX ADMIN — BUDESONIDE INHALATION 1000 MCG: 0.5 SUSPENSION RESPIRATORY (INHALATION) at 07:44

## 2024-04-17 RX ADMIN — GUAIFENESIN 400 MG: 400 TABLET ORAL at 20:39

## 2024-04-17 RX ADMIN — DILTIAZEM HYDROCHLORIDE 180 MG: 180 CAPSULE, COATED, EXTENDED RELEASE ORAL at 11:19

## 2024-04-17 ASSESSMENT — PAIN - FUNCTIONAL ASSESSMENT
PAIN_FUNCTIONAL_ASSESSMENT: ACTIVITIES ARE NOT PREVENTED
PAIN_FUNCTIONAL_ASSESSMENT: ACTIVITIES ARE NOT PREVENTED

## 2024-04-17 ASSESSMENT — PAIN DESCRIPTION - ORIENTATION: ORIENTATION: LEFT

## 2024-04-17 ASSESSMENT — PAIN SCALES - GENERAL
PAINLEVEL_OUTOF10: 2
PAINLEVEL_OUTOF10: 4

## 2024-04-17 ASSESSMENT — PAIN DESCRIPTION - LOCATION: LOCATION: OTHER (COMMENT)

## 2024-04-17 ASSESSMENT — PAIN DESCRIPTION - DESCRIPTORS: DESCRIPTORS: ACHING;SORE;THROBBING

## 2024-04-17 NOTE — CARE COORDINATION
Patient continues on IV Solumedrol Q8, s/p bronchoscopy yesterday, results pending. Patient plans to return home, reports no home going needs and her sister-in-law will transport home when discharged. Patient active with [facility]; to start care when discharged. Awaiting return call from Moise at [facility] regarding the status of the NIV ordered for patient last week.    Electronically signed by CAYLA Romero on 4/17/2024 at 10:26 AM

## 2024-04-18 LAB
MICROORGANISM SPEC CULT: NORMAL
MICROORGANISM SPEC CULT: NORMAL
SERVICE CMNT-IMP: NORMAL
SERVICE CMNT-IMP: NORMAL
SPECIMEN DESCRIPTION: NORMAL
SPECIMEN DESCRIPTION: NORMAL

## 2024-04-18 PROCEDURE — 6370000000 HC RX 637 (ALT 250 FOR IP): Performed by: INTERNAL MEDICINE

## 2024-04-18 PROCEDURE — 6360000002 HC RX W HCPCS: Performed by: INTERNAL MEDICINE

## 2024-04-18 PROCEDURE — 2700000000 HC OXYGEN THERAPY PER DAY

## 2024-04-18 PROCEDURE — 2580000003 HC RX 258: Performed by: INTERNAL MEDICINE

## 2024-04-18 PROCEDURE — 94640 AIRWAY INHALATION TREATMENT: CPT

## 2024-04-18 PROCEDURE — 2140000000 HC CCU INTERMEDIATE R&B

## 2024-04-18 PROCEDURE — 94660 CPAP INITIATION&MGMT: CPT

## 2024-04-18 RX ADMIN — CLONAZEPAM 0.5 MG: 0.5 TABLET ORAL at 20:04

## 2024-04-18 RX ADMIN — CHOLECALCIFEROL TAB 125 MCG (5000 UNIT) 5000 UNITS: 125 TAB at 08:21

## 2024-04-18 RX ADMIN — BUSPIRONE HYDROCHLORIDE 15 MG: 5 TABLET ORAL at 13:30

## 2024-04-18 RX ADMIN — ASPIRIN 81 MG: 81 TABLET, COATED ORAL at 08:20

## 2024-04-18 RX ADMIN — Medication 4 ML: at 19:17

## 2024-04-18 RX ADMIN — GUAIFENESIN 400 MG: 400 TABLET ORAL at 13:30

## 2024-04-18 RX ADMIN — HYDROXYZINE PAMOATE 25 MG: 25 CAPSULE ORAL at 20:04

## 2024-04-18 RX ADMIN — LEVALBUTEROL HYDROCHLORIDE 0.63 MG: 0.63 SOLUTION RESPIRATORY (INHALATION) at 08:33

## 2024-04-18 RX ADMIN — Medication 4 ML: at 08:33

## 2024-04-18 RX ADMIN — LEVALBUTEROL HYDROCHLORIDE 0.63 MG: 0.63 SOLUTION RESPIRATORY (INHALATION) at 15:37

## 2024-04-18 RX ADMIN — APIXABAN 5 MG: 5 TABLET, FILM COATED ORAL at 20:07

## 2024-04-18 RX ADMIN — ACETAMINOPHEN 650 MG: 325 TABLET ORAL at 14:05

## 2024-04-18 RX ADMIN — PREGABALIN 50 MG: 50 CAPSULE ORAL at 08:20

## 2024-04-18 RX ADMIN — DOXYCYCLINE HYCLATE 100 MG: 100 CAPSULE ORAL at 08:21

## 2024-04-18 RX ADMIN — PREGABALIN 50 MG: 50 CAPSULE ORAL at 13:30

## 2024-04-18 RX ADMIN — APIXABAN 5 MG: 5 TABLET, FILM COATED ORAL at 08:19

## 2024-04-18 RX ADMIN — PREGABALIN 50 MG: 50 CAPSULE ORAL at 20:03

## 2024-04-18 RX ADMIN — CYANOCOBALAMIN TAB 1000 MCG 500 MCG: 1000 TAB at 08:19

## 2024-04-18 RX ADMIN — WATER 40 MG: 1 INJECTION INTRAMUSCULAR; INTRAVENOUS; SUBCUTANEOUS at 03:52

## 2024-04-18 RX ADMIN — LEVALBUTEROL HYDROCHLORIDE 0.63 MG: 0.63 SOLUTION RESPIRATORY (INHALATION) at 19:17

## 2024-04-18 RX ADMIN — FOLIC ACID 1 MG: 1 TABLET ORAL at 08:20

## 2024-04-18 RX ADMIN — GUAIFENESIN 400 MG: 400 TABLET ORAL at 20:04

## 2024-04-18 RX ADMIN — SENNOSIDES AND DOCUSATE SODIUM 1 TABLET: 50; 8.6 TABLET ORAL at 08:20

## 2024-04-18 RX ADMIN — ARFORMOTEROL TARTRATE 15 MCG: 15 SOLUTION RESPIRATORY (INHALATION) at 08:33

## 2024-04-18 RX ADMIN — ROSUVASTATIN CALCIUM 20 MG: 20 TABLET, FILM COATED ORAL at 08:20

## 2024-04-18 RX ADMIN — DILTIAZEM HYDROCHLORIDE 180 MG: 180 CAPSULE, COATED, EXTENDED RELEASE ORAL at 08:21

## 2024-04-18 RX ADMIN — LEVALBUTEROL HYDROCHLORIDE 0.63 MG: 0.63 SOLUTION RESPIRATORY (INHALATION) at 01:02

## 2024-04-18 RX ADMIN — PANTOPRAZOLE SODIUM 40 MG: 40 TABLET, DELAYED RELEASE ORAL at 05:49

## 2024-04-18 RX ADMIN — SODIUM CHLORIDE, PRESERVATIVE FREE 10 ML: 5 INJECTION INTRAVENOUS at 08:21

## 2024-04-18 RX ADMIN — BUDESONIDE INHALATION 1000 MCG: 0.5 SUSPENSION RESPIRATORY (INHALATION) at 19:17

## 2024-04-18 RX ADMIN — Medication 500 MG: at 08:21

## 2024-04-18 RX ADMIN — GUAIFENESIN 400 MG: 400 TABLET ORAL at 08:21

## 2024-04-18 RX ADMIN — SODIUM CHLORIDE, PRESERVATIVE FREE 10 ML: 5 INJECTION INTRAVENOUS at 08:22

## 2024-04-18 RX ADMIN — WATER 40 MG: 1 INJECTION INTRAMUSCULAR; INTRAVENOUS; SUBCUTANEOUS at 14:05

## 2024-04-18 RX ADMIN — ARFORMOTEROL TARTRATE 15 MCG: 15 SOLUTION RESPIRATORY (INHALATION) at 19:17

## 2024-04-18 RX ADMIN — SODIUM CHLORIDE, PRESERVATIVE FREE 10 ML: 5 INJECTION INTRAVENOUS at 20:04

## 2024-04-18 RX ADMIN — TRAZODONE HYDROCHLORIDE 100 MG: 50 TABLET ORAL at 20:04

## 2024-04-18 RX ADMIN — OXYBUTYNIN CHLORIDE 5 MG: 5 TABLET, EXTENDED RELEASE ORAL at 20:04

## 2024-04-18 RX ADMIN — CITALOPRAM HYDROBROMIDE 20 MG: 20 TABLET ORAL at 08:20

## 2024-04-18 RX ADMIN — BUDESONIDE INHALATION 1000 MCG: 0.5 SUSPENSION RESPIRATORY (INHALATION) at 08:33

## 2024-04-18 RX ADMIN — METHIMAZOLE 15 MG: 5 TABLET ORAL at 08:21

## 2024-04-18 RX ADMIN — DOXYCYCLINE HYCLATE 100 MG: 100 CAPSULE ORAL at 20:04

## 2024-04-18 RX ADMIN — CLONAZEPAM 0.5 MG: 0.5 TABLET ORAL at 08:20

## 2024-04-18 RX ADMIN — LEVALBUTEROL HYDROCHLORIDE 0.63 MG: 0.63 SOLUTION RESPIRATORY (INHALATION) at 11:22

## 2024-04-18 ASSESSMENT — PAIN SCALES - GENERAL
PAINLEVEL_OUTOF10: 0
PAINLEVEL_OUTOF10: 7
PAINLEVEL_OUTOF10: 3
PAINLEVEL_OUTOF10: 0

## 2024-04-18 ASSESSMENT — PAIN DESCRIPTION - DESCRIPTORS: DESCRIPTORS: ACHING

## 2024-04-18 ASSESSMENT — PAIN DESCRIPTION - LOCATION: LOCATION: HEAD

## 2024-04-18 ASSESSMENT — PAIN - FUNCTIONAL ASSESSMENT: PAIN_FUNCTIONAL_ASSESSMENT: ACTIVITIES ARE NOT PREVENTED

## 2024-04-18 NOTE — CARE COORDINATION
Mauri spoke with Dr Singh & he would like CM to look at Pulmonary Rehab for the pt.  He recommends [facility] at [phone] fax [phone] once she finishes [facility].    He may also send her home on her current duonebs which we will have to verify the cost & availability.  Electronically signed by Cesilia Greer RN on 4/18/2024 at 3:27 PM

## 2024-04-18 NOTE — PLAN OF CARE
Problem: Safety - Adult  Goal: Free from fall injury  Outcome: Progressing     Problem: Chronic Conditions and Co-morbidities  Goal: Patient's chronic conditions and co-morbidity symptoms are monitored and maintained or improved  Outcome: Progressing     Problem: Discharge Planning  Goal: Discharge to home or other facility with appropriate resources  Outcome: Progressing     Problem: Pain  Goal: Verbalizes/displays adequate comfort level or baseline comfort level  Outcome: Progressing

## 2024-04-18 NOTE — CARE COORDINATION
4/18:  Update CM Note:  Pt presented to the Er for hypercapnia & COPD from home.  Pt is on 5L/NC at 98%, Iv Solu-medrol, Breathing TX & PO Eliquis- old medication.  Pt was ordered NIV per Moise the insurance declined - he was reaching out to Dr Singh to advise & see if they could do a Bipap -ST.  Pt was to start care with [facility] & per Justice will need a new order - order placed.  Pt's dc plan is home & her sister in law can transport.  Sw/MARGARITA will continue to follow.  Electronically signed by Cesilia Greer RN on 4/18/2024 at 1:16 PM

## 2024-04-19 PROBLEM — E43 SEVERE PROTEIN-CALORIE MALNUTRITION (HCC): Chronic | Status: ACTIVE | Noted: 2024-04-19

## 2024-04-19 LAB
GALACTOMANNAN AG BAL QL: NEGATIVE
GALACTOMANNAN AG SPEC IA-ACNC: 0.13
MICROORGANISM SPEC CULT: ABNORMAL
MICROORGANISM SPEC CULT: ABNORMAL
MICROORGANISM/AGENT SPEC: ABNORMAL
SPECIMEN DESCRIPTION: ABNORMAL

## 2024-04-19 PROCEDURE — 97530 THERAPEUTIC ACTIVITIES: CPT

## 2024-04-19 PROCEDURE — 97165 OT EVAL LOW COMPLEX 30 MIN: CPT

## 2024-04-19 PROCEDURE — 2580000003 HC RX 258: Performed by: INTERNAL MEDICINE

## 2024-04-19 PROCEDURE — 6360000002 HC RX W HCPCS: Performed by: INTERNAL MEDICINE

## 2024-04-19 PROCEDURE — 2700000000 HC OXYGEN THERAPY PER DAY

## 2024-04-19 PROCEDURE — 94669 MECHANICAL CHEST WALL OSCILL: CPT

## 2024-04-19 PROCEDURE — 94640 AIRWAY INHALATION TREATMENT: CPT

## 2024-04-19 PROCEDURE — 6370000000 HC RX 637 (ALT 250 FOR IP): Performed by: INTERNAL MEDICINE

## 2024-04-19 PROCEDURE — 2140000000 HC CCU INTERMEDIATE R&B

## 2024-04-19 PROCEDURE — 97535 SELF CARE MNGMENT TRAINING: CPT

## 2024-04-19 PROCEDURE — 97161 PT EVAL LOW COMPLEX 20 MIN: CPT

## 2024-04-19 PROCEDURE — 94660 CPAP INITIATION&MGMT: CPT

## 2024-04-19 RX ADMIN — ROSUVASTATIN CALCIUM 20 MG: 20 TABLET, FILM COATED ORAL at 08:32

## 2024-04-19 RX ADMIN — LEVALBUTEROL HYDROCHLORIDE 0.63 MG: 0.63 SOLUTION RESPIRATORY (INHALATION) at 18:44

## 2024-04-19 RX ADMIN — Medication 500 MG: at 08:32

## 2024-04-19 RX ADMIN — OXYBUTYNIN CHLORIDE 5 MG: 5 TABLET, EXTENDED RELEASE ORAL at 20:23

## 2024-04-19 RX ADMIN — WATER 40 MG: 1 INJECTION INTRAMUSCULAR; INTRAVENOUS; SUBCUTANEOUS at 03:08

## 2024-04-19 RX ADMIN — SODIUM CHLORIDE, PRESERVATIVE FREE 10 ML: 5 INJECTION INTRAVENOUS at 20:23

## 2024-04-19 RX ADMIN — DOXYCYCLINE HYCLATE 100 MG: 100 CAPSULE ORAL at 08:31

## 2024-04-19 RX ADMIN — ACETAMINOPHEN 650 MG: 325 TABLET ORAL at 16:32

## 2024-04-19 RX ADMIN — PREGABALIN 50 MG: 50 CAPSULE ORAL at 20:23

## 2024-04-19 RX ADMIN — FOLIC ACID 1 MG: 1 TABLET ORAL at 08:32

## 2024-04-19 RX ADMIN — CLONAZEPAM 0.5 MG: 0.5 TABLET ORAL at 20:23

## 2024-04-19 RX ADMIN — CLONAZEPAM 0.5 MG: 0.5 TABLET ORAL at 08:32

## 2024-04-19 RX ADMIN — WATER 40 MG: 1 INJECTION INTRAMUSCULAR; INTRAVENOUS; SUBCUTANEOUS at 13:58

## 2024-04-19 RX ADMIN — DILTIAZEM HYDROCHLORIDE 180 MG: 180 CAPSULE, COATED, EXTENDED RELEASE ORAL at 08:31

## 2024-04-19 RX ADMIN — LEVALBUTEROL HYDROCHLORIDE 0.63 MG: 0.63 SOLUTION RESPIRATORY (INHALATION) at 02:47

## 2024-04-19 RX ADMIN — APIXABAN 5 MG: 5 TABLET, FILM COATED ORAL at 20:23

## 2024-04-19 RX ADMIN — LEVALBUTEROL HYDROCHLORIDE 0.63 MG: 0.63 SOLUTION RESPIRATORY (INHALATION) at 13:02

## 2024-04-19 RX ADMIN — BUDESONIDE INHALATION 1000 MCG: 0.5 SUSPENSION RESPIRATORY (INHALATION) at 07:36

## 2024-04-19 RX ADMIN — GUAIFENESIN 400 MG: 400 TABLET ORAL at 20:23

## 2024-04-19 RX ADMIN — Medication 4 ML: at 18:44

## 2024-04-19 RX ADMIN — PREGABALIN 50 MG: 50 CAPSULE ORAL at 08:31

## 2024-04-19 RX ADMIN — LEVALBUTEROL HYDROCHLORIDE 0.63 MG: 0.63 SOLUTION RESPIRATORY (INHALATION) at 08:01

## 2024-04-19 RX ADMIN — METHIMAZOLE 15 MG: 5 TABLET ORAL at 08:32

## 2024-04-19 RX ADMIN — CITALOPRAM HYDROBROMIDE 20 MG: 20 TABLET ORAL at 08:31

## 2024-04-19 RX ADMIN — HYDROXYZINE PAMOATE 25 MG: 25 CAPSULE ORAL at 17:44

## 2024-04-19 RX ADMIN — ARFORMOTEROL TARTRATE 15 MCG: 15 SOLUTION RESPIRATORY (INHALATION) at 07:36

## 2024-04-19 RX ADMIN — GUAIFENESIN 400 MG: 400 TABLET ORAL at 08:32

## 2024-04-19 RX ADMIN — BUDESONIDE INHALATION 1000 MCG: 0.5 SUSPENSION RESPIRATORY (INHALATION) at 18:43

## 2024-04-19 RX ADMIN — CYANOCOBALAMIN TAB 1000 MCG 500 MCG: 1000 TAB at 08:32

## 2024-04-19 RX ADMIN — SODIUM CHLORIDE, PRESERVATIVE FREE 10 ML: 5 INJECTION INTRAVENOUS at 08:32

## 2024-04-19 RX ADMIN — ARFORMOTEROL TARTRATE 15 MCG: 15 SOLUTION RESPIRATORY (INHALATION) at 18:44

## 2024-04-19 RX ADMIN — Medication 4 ML: at 07:37

## 2024-04-19 RX ADMIN — TRAZODONE HYDROCHLORIDE 100 MG: 50 TABLET ORAL at 20:23

## 2024-04-19 RX ADMIN — SENNOSIDES AND DOCUSATE SODIUM 1 TABLET: 50; 8.6 TABLET ORAL at 08:32

## 2024-04-19 RX ADMIN — CHOLECALCIFEROL TAB 125 MCG (5000 UNIT) 5000 UNITS: 125 TAB at 08:32

## 2024-04-19 RX ADMIN — APIXABAN 5 MG: 5 TABLET, FILM COATED ORAL at 08:32

## 2024-04-19 RX ADMIN — PREGABALIN 50 MG: 50 CAPSULE ORAL at 13:58

## 2024-04-19 RX ADMIN — ASPIRIN 81 MG: 81 TABLET, COATED ORAL at 08:32

## 2024-04-19 RX ADMIN — PANTOPRAZOLE SODIUM 40 MG: 40 TABLET, DELAYED RELEASE ORAL at 05:46

## 2024-04-19 RX ADMIN — GUAIFENESIN 400 MG: 400 TABLET ORAL at 13:57

## 2024-04-19 RX ADMIN — LEVALBUTEROL HYDROCHLORIDE 0.63 MG: 0.63 SOLUTION RESPIRATORY (INHALATION) at 15:38

## 2024-04-19 RX ADMIN — SODIUM CHLORIDE, PRESERVATIVE FREE 10 ML: 5 INJECTION INTRAVENOUS at 13:59

## 2024-04-19 RX ADMIN — BUSPIRONE HYDROCHLORIDE 15 MG: 5 TABLET ORAL at 08:31

## 2024-04-19 RX ADMIN — DOXYCYCLINE HYCLATE 100 MG: 100 CAPSULE ORAL at 20:23

## 2024-04-19 RX ADMIN — SODIUM CHLORIDE, PRESERVATIVE FREE 10 ML: 5 INJECTION INTRAVENOUS at 08:33

## 2024-04-19 ASSESSMENT — PAIN SCALES - GENERAL
PAINLEVEL_OUTOF10: 0
PAINLEVEL_OUTOF10: 7

## 2024-04-19 ASSESSMENT — PAIN DESCRIPTION - LOCATION: LOCATION: CHEST

## 2024-04-19 NOTE — CARE COORDINATION
Patient currently on 5L NC (baseline 4L NC continuous through [facility]), continues on IV Solumedrol Q12. Bipap ST approval pending and per Moise at [facility], he should be able to get the device in by Monday; patient to remain in house until the device is received. Spoke with patient regarding pulmonary rehab versus home healthcare. Pulmonology recommend pulmonary rehab at discharge, explained the benefits; patient declined and states she prefers to have home healthcare. [facility] active with patient; orders placed. Patient plans to return home, reports no home going needs and her sister-in-law will transport home when discharged.     Electronically signed by CAYLA Romero on 4/19/2024 at 3:40 PM

## 2024-04-19 NOTE — PLAN OF CARE
Problem: Safety - Adult  Goal: Free from fall injury  4/18/2024 2354 by Carlos Trevino RN  Outcome: Progressing  4/18/2024 1022 by Wanda Hoffman RN  Outcome: Progressing     Problem: Discharge Planning  Goal: Discharge to home or other facility with appropriate resources  4/18/2024 2354 by Carlos Trevino RN  Outcome: Progressing  4/18/2024 1022 by Wanda Hoffman RN  Outcome: Progressing     Problem: Pain  Goal: Verbalizes/displays adequate comfort level or baseline comfort level  4/18/2024 2354 by Carlos Trevino RN  Outcome: Progressing  4/18/2024 1022 by Wanda Hoffman RN  Outcome: Progressing

## 2024-04-19 NOTE — PLAN OF CARE
Problem: Safety - Adult  Goal: Free from fall injury  4/19/2024 0847 by Wanda Hoffman RN  Outcome: Progressing  4/18/2024 2354 by Carlos Trevino RN  Outcome: Progressing     Problem: Chronic Conditions and Co-morbidities  Goal: Patient's chronic conditions and co-morbidity symptoms are monitored and maintained or improved  4/19/2024 0847 by Wanda Hoffman RN  Outcome: Progressing  4/18/2024 2354 by Carlos Trevino RN  Outcome: Progressing     Problem: Discharge Planning  Goal: Discharge to home or other facility with appropriate resources  4/19/2024 0847 by Wanda Hoffman RN  Outcome: Progressing  4/18/2024 2354 by Carlos Trevino RN  Outcome: Progressing     Problem: Pain  Goal: Verbalizes/displays adequate comfort level or baseline comfort level  4/19/2024 0847 by Wanda Hoffman RN  Outcome: Progressing  4/18/2024 2354 by Carlos Trevino RN  Outcome: Progressing

## 2024-04-20 PROCEDURE — 6360000002 HC RX W HCPCS: Performed by: INTERNAL MEDICINE

## 2024-04-20 PROCEDURE — 94660 CPAP INITIATION&MGMT: CPT

## 2024-04-20 PROCEDURE — 6370000000 HC RX 637 (ALT 250 FOR IP): Performed by: INTERNAL MEDICINE

## 2024-04-20 PROCEDURE — 2580000003 HC RX 258: Performed by: INTERNAL MEDICINE

## 2024-04-20 PROCEDURE — 2140000000 HC CCU INTERMEDIATE R&B

## 2024-04-20 PROCEDURE — 2700000000 HC OXYGEN THERAPY PER DAY

## 2024-04-20 PROCEDURE — 94640 AIRWAY INHALATION TREATMENT: CPT

## 2024-04-20 RX ORDER — PREDNISONE 5 MG/1
10 TABLET ORAL 3 TIMES DAILY
Status: DISCONTINUED | OUTPATIENT
Start: 2024-04-20 | End: 2024-04-24 | Stop reason: HOSPADM

## 2024-04-20 RX ADMIN — TRAZODONE HYDROCHLORIDE 100 MG: 50 TABLET ORAL at 20:40

## 2024-04-20 RX ADMIN — PANTOPRAZOLE SODIUM 40 MG: 40 TABLET, DELAYED RELEASE ORAL at 05:25

## 2024-04-20 RX ADMIN — POLYETHYLENE GLYCOL 3350 17 G: 17 POWDER, FOR SOLUTION ORAL at 20:49

## 2024-04-20 RX ADMIN — GUAIFENESIN 400 MG: 400 TABLET ORAL at 14:18

## 2024-04-20 RX ADMIN — PREDNISONE 10 MG: 5 TABLET ORAL at 14:18

## 2024-04-20 RX ADMIN — WATER 40 MG: 1 INJECTION INTRAMUSCULAR; INTRAVENOUS; SUBCUTANEOUS at 02:17

## 2024-04-20 RX ADMIN — HYDROXYZINE PAMOATE 25 MG: 25 CAPSULE ORAL at 12:19

## 2024-04-20 RX ADMIN — APIXABAN 5 MG: 5 TABLET, FILM COATED ORAL at 20:40

## 2024-04-20 RX ADMIN — LEVALBUTEROL HYDROCHLORIDE 0.63 MG: 0.63 SOLUTION RESPIRATORY (INHALATION) at 15:29

## 2024-04-20 RX ADMIN — CLONAZEPAM 0.5 MG: 0.5 TABLET ORAL at 08:41

## 2024-04-20 RX ADMIN — PREDNISONE 10 MG: 5 TABLET ORAL at 08:41

## 2024-04-20 RX ADMIN — CITALOPRAM HYDROBROMIDE 20 MG: 20 TABLET ORAL at 08:41

## 2024-04-20 RX ADMIN — PREGABALIN 50 MG: 50 CAPSULE ORAL at 08:41

## 2024-04-20 RX ADMIN — LEVALBUTEROL HYDROCHLORIDE 0.63 MG: 0.63 SOLUTION RESPIRATORY (INHALATION) at 12:23

## 2024-04-20 RX ADMIN — SODIUM CHLORIDE, PRESERVATIVE FREE 10 ML: 5 INJECTION INTRAVENOUS at 20:41

## 2024-04-20 RX ADMIN — MAGNESIUM HYDROXIDE 30 ML: 400 SUSPENSION ORAL at 15:00

## 2024-04-20 RX ADMIN — CYANOCOBALAMIN TAB 1000 MCG 500 MCG: 1000 TAB at 08:41

## 2024-04-20 RX ADMIN — METHIMAZOLE 15 MG: 5 TABLET ORAL at 08:40

## 2024-04-20 RX ADMIN — CLONAZEPAM 0.5 MG: 0.5 TABLET ORAL at 20:41

## 2024-04-20 RX ADMIN — ACETAMINOPHEN 650 MG: 325 TABLET ORAL at 14:22

## 2024-04-20 RX ADMIN — HYDROXYZINE PAMOATE 25 MG: 25 CAPSULE ORAL at 20:17

## 2024-04-20 RX ADMIN — ASPIRIN 81 MG: 81 TABLET, COATED ORAL at 08:41

## 2024-04-20 RX ADMIN — BUDESONIDE INHALATION 1000 MCG: 0.5 SUSPENSION RESPIRATORY (INHALATION) at 19:44

## 2024-04-20 RX ADMIN — APIXABAN 5 MG: 5 TABLET, FILM COATED ORAL at 08:40

## 2024-04-20 RX ADMIN — DILTIAZEM HYDROCHLORIDE 180 MG: 180 CAPSULE, COATED, EXTENDED RELEASE ORAL at 08:41

## 2024-04-20 RX ADMIN — BUDESONIDE INHALATION 1000 MCG: 0.5 SUSPENSION RESPIRATORY (INHALATION) at 07:35

## 2024-04-20 RX ADMIN — PREGABALIN 50 MG: 50 CAPSULE ORAL at 20:41

## 2024-04-20 RX ADMIN — ROSUVASTATIN CALCIUM 20 MG: 20 TABLET, FILM COATED ORAL at 08:41

## 2024-04-20 RX ADMIN — ARFORMOTEROL TARTRATE 15 MCG: 15 SOLUTION RESPIRATORY (INHALATION) at 19:44

## 2024-04-20 RX ADMIN — FOLIC ACID 1 MG: 1 TABLET ORAL at 08:41

## 2024-04-20 RX ADMIN — CHOLECALCIFEROL TAB 125 MCG (5000 UNIT) 5000 UNITS: 125 TAB at 08:40

## 2024-04-20 RX ADMIN — SODIUM CHLORIDE, PRESERVATIVE FREE 10 ML: 5 INJECTION INTRAVENOUS at 08:40

## 2024-04-20 RX ADMIN — PREGABALIN 50 MG: 50 CAPSULE ORAL at 14:18

## 2024-04-20 RX ADMIN — PREDNISONE 10 MG: 5 TABLET ORAL at 20:41

## 2024-04-20 RX ADMIN — Medication 4 ML: at 19:44

## 2024-04-20 RX ADMIN — DOXYCYCLINE HYCLATE 100 MG: 100 CAPSULE ORAL at 08:41

## 2024-04-20 RX ADMIN — Medication 500 MG: at 08:41

## 2024-04-20 RX ADMIN — GUAIFENESIN 400 MG: 400 TABLET ORAL at 08:40

## 2024-04-20 RX ADMIN — BUSPIRONE HYDROCHLORIDE 15 MG: 5 TABLET ORAL at 14:18

## 2024-04-20 RX ADMIN — LEVALBUTEROL HYDROCHLORIDE 0.63 MG: 0.63 SOLUTION RESPIRATORY (INHALATION) at 07:35

## 2024-04-20 RX ADMIN — GUAIFENESIN 400 MG: 400 TABLET ORAL at 20:40

## 2024-04-20 RX ADMIN — ARFORMOTEROL TARTRATE 15 MCG: 15 SOLUTION RESPIRATORY (INHALATION) at 07:35

## 2024-04-20 RX ADMIN — Medication 4 ML: at 07:35

## 2024-04-20 RX ADMIN — LEVALBUTEROL HYDROCHLORIDE 0.63 MG: 0.63 SOLUTION RESPIRATORY (INHALATION) at 19:44

## 2024-04-20 RX ADMIN — SENNOSIDES AND DOCUSATE SODIUM 1 TABLET: 50; 8.6 TABLET ORAL at 08:41

## 2024-04-20 RX ADMIN — OXYBUTYNIN CHLORIDE 5 MG: 5 TABLET, EXTENDED RELEASE ORAL at 20:49

## 2024-04-20 ASSESSMENT — PAIN SCALES - GENERAL: PAINLEVEL_OUTOF10: 7

## 2024-04-20 ASSESSMENT — PAIN DESCRIPTION - LOCATION: LOCATION: HEAD

## 2024-04-20 NOTE — PLAN OF CARE
Problem: Safety - Adult  Goal: Free from fall injury  4/20/2024 1008 by Diane Arnold RN  Outcome: Progressing  4/20/2024 0058 by Marlys Valero RN  Outcome: Progressing  Flowsheets (Taken 4/20/2024 0054)  Free From Fall Injury: Instruct family/caregiver on patient safety     Problem: Chronic Conditions and Co-morbidities  Goal: Patient's chronic conditions and co-morbidity symptoms are monitored and maintained or improved  Outcome: Progressing  Flowsheets (Taken 4/20/2024 0054 by Marlys Valero RN)  Care Plan - Patient's Chronic Conditions and Co-Morbidity Symptoms are Monitored and Maintained or Improved:   Monitor and assess patient's chronic conditions and comorbid symptoms for stability, deterioration, or improvement   Collaborate with multidisciplinary team to address chronic and comorbid conditions and prevent exacerbation or deterioration   Update acute care plan with appropriate goals if chronic or comorbid symptoms are exacerbated and prevent overall improvement and discharge     Problem: Discharge Planning  Goal: Discharge to home or other facility with appropriate resources  4/20/2024 1008 by Diane Arnold RN  Outcome: Progressing  4/20/2024 0058 by Marlys Valero RN  Outcome: Progressing  Flowsheets (Taken 4/20/2024 0054)  Discharge to home or other facility with appropriate resources:   Identify barriers to discharge with patient and caregiver   Arrange for needed discharge resources and transportation as appropriate   Identify discharge learning needs (meds, wound care, etc)   Arrange for interpreters to assist at discharge as needed     Problem: Pain  Goal: Verbalizes/displays adequate comfort level or baseline comfort level  Recent Flowsheet Documentation  Taken 4/20/2024 0330 by Marlys Valero RN  Verbalizes/displays adequate comfort level or baseline comfort level:   Encourage patient to monitor pain and request assistance   Assess pain using appropriate pain scale   Administer

## 2024-04-20 NOTE — PLAN OF CARE
Problem: Safety - Adult  Goal: Free from fall injury  Outcome: Progressing  Flowsheets (Taken 4/20/2024 0054)  Free From Fall Injury: Instruct family/caregiver on patient safety     Problem: Discharge Planning  Goal: Discharge to home or other facility with appropriate resources  Outcome: Progressing  Flowsheets (Taken 4/20/2024 0054)  Discharge to home or other facility with appropriate resources:   Identify barriers to discharge with patient and caregiver   Arrange for needed discharge resources and transportation as appropriate   Identify discharge learning needs (meds, wound care, etc)   Arrange for interpreters to assist at discharge as needed     Problem: Pain  Goal: Verbalizes/displays adequate comfort level or baseline comfort level  Outcome: Progressing     Problem: Nutrition Deficit:  Goal: Optimize nutritional status  Outcome: Progressing

## 2024-04-21 PROCEDURE — 6370000000 HC RX 637 (ALT 250 FOR IP): Performed by: INTERNAL MEDICINE

## 2024-04-21 PROCEDURE — 6360000002 HC RX W HCPCS: Performed by: INTERNAL MEDICINE

## 2024-04-21 PROCEDURE — 2580000003 HC RX 258: Performed by: INTERNAL MEDICINE

## 2024-04-21 PROCEDURE — 94640 AIRWAY INHALATION TREATMENT: CPT

## 2024-04-21 PROCEDURE — 2140000000 HC CCU INTERMEDIATE R&B

## 2024-04-21 PROCEDURE — 2700000000 HC OXYGEN THERAPY PER DAY

## 2024-04-21 PROCEDURE — 94660 CPAP INITIATION&MGMT: CPT

## 2024-04-21 RX ADMIN — GUAIFENESIN 400 MG: 400 TABLET ORAL at 15:53

## 2024-04-21 RX ADMIN — Medication 500 MG: at 08:46

## 2024-04-21 RX ADMIN — Medication 4 ML: at 07:32

## 2024-04-21 RX ADMIN — ASPIRIN 81 MG: 81 TABLET, COATED ORAL at 08:46

## 2024-04-21 RX ADMIN — SODIUM CHLORIDE, PRESERVATIVE FREE 10 ML: 5 INJECTION INTRAVENOUS at 08:47

## 2024-04-21 RX ADMIN — TRAZODONE HYDROCHLORIDE 100 MG: 50 TABLET ORAL at 20:32

## 2024-04-21 RX ADMIN — APIXABAN 5 MG: 5 TABLET, FILM COATED ORAL at 08:46

## 2024-04-21 RX ADMIN — LEVALBUTEROL HYDROCHLORIDE 0.63 MG: 0.63 SOLUTION RESPIRATORY (INHALATION) at 20:22

## 2024-04-21 RX ADMIN — HYDROXYZINE PAMOATE 25 MG: 25 CAPSULE ORAL at 08:46

## 2024-04-21 RX ADMIN — PREDNISONE 10 MG: 5 TABLET ORAL at 15:52

## 2024-04-21 RX ADMIN — Medication 4 ML: at 20:22

## 2024-04-21 RX ADMIN — APIXABAN 5 MG: 5 TABLET, FILM COATED ORAL at 20:31

## 2024-04-21 RX ADMIN — BUSPIRONE HYDROCHLORIDE 15 MG: 5 TABLET ORAL at 15:53

## 2024-04-21 RX ADMIN — BUDESONIDE INHALATION 1000 MCG: 0.5 SUSPENSION RESPIRATORY (INHALATION) at 07:31

## 2024-04-21 RX ADMIN — PANTOPRAZOLE SODIUM 40 MG: 40 TABLET, DELAYED RELEASE ORAL at 05:03

## 2024-04-21 RX ADMIN — LEVALBUTEROL HYDROCHLORIDE 0.63 MG: 0.63 SOLUTION RESPIRATORY (INHALATION) at 15:31

## 2024-04-21 RX ADMIN — PREGABALIN 50 MG: 50 CAPSULE ORAL at 15:52

## 2024-04-21 RX ADMIN — GUAIFENESIN 400 MG: 400 TABLET ORAL at 20:31

## 2024-04-21 RX ADMIN — ROSUVASTATIN CALCIUM 20 MG: 20 TABLET, FILM COATED ORAL at 08:46

## 2024-04-21 RX ADMIN — LEVALBUTEROL HYDROCHLORIDE 0.63 MG: 0.63 SOLUTION RESPIRATORY (INHALATION) at 07:32

## 2024-04-21 RX ADMIN — CLONAZEPAM 0.5 MG: 0.5 TABLET ORAL at 20:32

## 2024-04-21 RX ADMIN — CITALOPRAM HYDROBROMIDE 20 MG: 20 TABLET ORAL at 08:46

## 2024-04-21 RX ADMIN — SODIUM CHLORIDE, PRESERVATIVE FREE 10 ML: 5 INJECTION INTRAVENOUS at 20:33

## 2024-04-21 RX ADMIN — LEVALBUTEROL HYDROCHLORIDE 0.63 MG: 0.63 SOLUTION RESPIRATORY (INHALATION) at 11:41

## 2024-04-21 RX ADMIN — BUDESONIDE INHALATION 1000 MCG: 0.5 SUSPENSION RESPIRATORY (INHALATION) at 20:22

## 2024-04-21 RX ADMIN — FOLIC ACID 1 MG: 1 TABLET ORAL at 08:46

## 2024-04-21 RX ADMIN — METHIMAZOLE 15 MG: 5 TABLET ORAL at 08:45

## 2024-04-21 RX ADMIN — SENNOSIDES AND DOCUSATE SODIUM 1 TABLET: 50; 8.6 TABLET ORAL at 08:47

## 2024-04-21 RX ADMIN — DILTIAZEM HYDROCHLORIDE 180 MG: 180 CAPSULE, COATED, EXTENDED RELEASE ORAL at 08:45

## 2024-04-21 RX ADMIN — PREDNISONE 10 MG: 5 TABLET ORAL at 20:31

## 2024-04-21 RX ADMIN — PREGABALIN 50 MG: 50 CAPSULE ORAL at 08:46

## 2024-04-21 RX ADMIN — ARFORMOTEROL TARTRATE 15 MCG: 15 SOLUTION RESPIRATORY (INHALATION) at 20:22

## 2024-04-21 RX ADMIN — OXYBUTYNIN CHLORIDE 5 MG: 5 TABLET, EXTENDED RELEASE ORAL at 20:32

## 2024-04-21 RX ADMIN — CHOLECALCIFEROL TAB 125 MCG (5000 UNIT) 5000 UNITS: 125 TAB at 08:47

## 2024-04-21 RX ADMIN — PREDNISONE 10 MG: 5 TABLET ORAL at 08:46

## 2024-04-21 RX ADMIN — ARFORMOTEROL TARTRATE 15 MCG: 15 SOLUTION RESPIRATORY (INHALATION) at 07:31

## 2024-04-21 RX ADMIN — CYANOCOBALAMIN TAB 1000 MCG 500 MCG: 1000 TAB at 08:46

## 2024-04-21 RX ADMIN — SODIUM CHLORIDE, PRESERVATIVE FREE 10 ML: 5 INJECTION INTRAVENOUS at 20:32

## 2024-04-21 RX ADMIN — GUAIFENESIN 400 MG: 400 TABLET ORAL at 08:46

## 2024-04-21 RX ADMIN — CLONAZEPAM 0.5 MG: 0.5 TABLET ORAL at 08:46

## 2024-04-21 RX ADMIN — PREGABALIN 50 MG: 50 CAPSULE ORAL at 20:32

## 2024-04-21 NOTE — PLAN OF CARE
Problem: Safety - Adult  Goal: Free from fall injury  4/20/2024 2230 by Marlys Valero RN  Outcome: Progressing  4/20/2024 1008 by Diane Arnold RN  Outcome: Progressing     Problem: Chronic Conditions and Co-morbidities  Goal: Patient's chronic conditions and co-morbidity symptoms are monitored and maintained or improved  Recent Flowsheet Documentation  Taken 4/20/2024 2000 by Marlys Valero RN  Care Plan - Patient's Chronic Conditions and Co-Morbidity Symptoms are Monitored and Maintained or Improved:   Monitor and assess patient's chronic conditions and comorbid symptoms for stability, deterioration, or improvement   Collaborate with multidisciplinary team to address chronic and comorbid conditions and prevent exacerbation or deterioration   Update acute care plan with appropriate goals if chronic or comorbid symptoms are exacerbated and prevent overall improvement and discharge  4/20/2024 1008 by Diane Arnold RN  Outcome: Progressing  Flowsheets (Taken 4/20/2024 0054 by Marlys Valero RN)  Care Plan - Patient's Chronic Conditions and Co-Morbidity Symptoms are Monitored and Maintained or Improved:   Monitor and assess patient's chronic conditions and comorbid symptoms for stability, deterioration, or improvement   Collaborate with multidisciplinary team to address chronic and comorbid conditions and prevent exacerbation or deterioration   Update acute care plan with appropriate goals if chronic or comorbid symptoms are exacerbated and prevent overall improvement and discharge     Problem: Discharge Planning  Goal: Discharge to home or other facility with appropriate resources  4/20/2024 2230 by Marlys Valero RN  Outcome: Progressing  Flowsheets (Taken 4/20/2024 2000)  Discharge to home or other facility with appropriate resources:   Identify barriers to discharge with patient and caregiver   Arrange for needed discharge resources and transportation as appropriate   Identify discharge learning needs

## 2024-04-22 LAB
ANION GAP SERPL CALCULATED.3IONS-SCNC: 9 MMOL/L (ref 7–16)
BUN SERPL-MCNC: 19 MG/DL (ref 6–23)
CALCIUM SERPL-MCNC: 8.7 MG/DL (ref 8.6–10.2)
CHLORIDE SERPL-SCNC: 92 MMOL/L (ref 98–107)
CO2 SERPL-SCNC: 39 MMOL/L (ref 22–29)
CREAT SERPL-MCNC: 0.6 MG/DL (ref 0.5–1)
ERYTHROCYTE [DISTWIDTH] IN BLOOD BY AUTOMATED COUNT: 21.6 % (ref 11.5–15)
GFR SERPL CREATININE-BSD FRML MDRD: >90 ML/MIN/1.73M2
GLUCOSE SERPL-MCNC: 144 MG/DL (ref 74–99)
HCT VFR BLD AUTO: 30.6 % (ref 34–48)
HGB BLD-MCNC: 8.7 G/DL (ref 11.5–15.5)
MCH RBC QN AUTO: 27.4 PG (ref 26–35)
MCHC RBC AUTO-ENTMCNC: 28.4 G/DL (ref 32–34.5)
MCV RBC AUTO: 96.5 FL (ref 80–99.9)
PLATELET # BLD AUTO: 237 K/UL (ref 130–450)
PMV BLD AUTO: 9.7 FL (ref 7–12)
POTASSIUM SERPL-SCNC: 4.6 MMOL/L (ref 3.5–5)
RBC # BLD AUTO: 3.17 M/UL (ref 3.5–5.5)
SODIUM SERPL-SCNC: 140 MMOL/L (ref 132–146)
WBC OTHER # BLD: 20.8 K/UL (ref 4.5–11.5)

## 2024-04-22 PROCEDURE — 2140000000 HC CCU INTERMEDIATE R&B

## 2024-04-22 PROCEDURE — 36415 COLL VENOUS BLD VENIPUNCTURE: CPT

## 2024-04-22 PROCEDURE — 6370000000 HC RX 637 (ALT 250 FOR IP): Performed by: INTERNAL MEDICINE

## 2024-04-22 PROCEDURE — 2580000003 HC RX 258: Performed by: INTERNAL MEDICINE

## 2024-04-22 PROCEDURE — 2700000000 HC OXYGEN THERAPY PER DAY

## 2024-04-22 PROCEDURE — 94640 AIRWAY INHALATION TREATMENT: CPT

## 2024-04-22 PROCEDURE — 6360000002 HC RX W HCPCS: Performed by: INTERNAL MEDICINE

## 2024-04-22 PROCEDURE — 85027 COMPLETE CBC AUTOMATED: CPT

## 2024-04-22 PROCEDURE — 80048 BASIC METABOLIC PNL TOTAL CA: CPT

## 2024-04-22 PROCEDURE — 94660 CPAP INITIATION&MGMT: CPT

## 2024-04-22 RX ORDER — CLONAZEPAM 0.5 MG/1
0.5 TABLET ORAL EVERY 12 HOURS
Qty: 14 TABLET | Refills: 0 | Status: ON HOLD | OUTPATIENT
Start: 2024-04-22 | End: 2024-04-29

## 2024-04-22 RX ORDER — DILTIAZEM HYDROCHLORIDE 180 MG/1
180 CAPSULE, COATED, EXTENDED RELEASE ORAL DAILY
Qty: 30 CAPSULE | Refills: 0 | Status: ON HOLD | OUTPATIENT
Start: 2024-04-23

## 2024-04-22 RX ORDER — ARFORMOTEROL TARTRATE 15 UG/2ML
15 SOLUTION RESPIRATORY (INHALATION)
Qty: 120 ML | Refills: 3 | Status: ON HOLD | OUTPATIENT
Start: 2024-04-22

## 2024-04-22 RX ORDER — PREDNISONE 10 MG/1
10 TABLET ORAL 3 TIMES DAILY
Qty: 30 TABLET | Refills: 0 | Status: SHIPPED | OUTPATIENT
Start: 2024-04-22 | End: 2024-04-22

## 2024-04-22 RX ORDER — BUDESONIDE 0.5 MG/2ML
1 INHALANT ORAL
Qty: 60 EACH | Refills: 3 | Status: ON HOLD | OUTPATIENT
Start: 2024-04-22

## 2024-04-22 RX ORDER — PREDNISONE 10 MG/1
TABLET ORAL
Qty: 12 TABLET | Refills: 0 | Status: ON HOLD | OUTPATIENT
Start: 2024-04-22

## 2024-04-22 RX ORDER — POLYETHYLENE GLYCOL 3350 17 G/17G
17 POWDER, FOR SOLUTION ORAL DAILY PRN
Qty: 527 G | Refills: 0 | Status: ON HOLD | COMMUNITY
Start: 2024-04-22 | End: 2024-05-22

## 2024-04-22 RX ADMIN — PREGABALIN 50 MG: 50 CAPSULE ORAL at 08:05

## 2024-04-22 RX ADMIN — SENNOSIDES AND DOCUSATE SODIUM 1 TABLET: 50; 8.6 TABLET ORAL at 08:04

## 2024-04-22 RX ADMIN — APIXABAN 5 MG: 5 TABLET, FILM COATED ORAL at 08:05

## 2024-04-22 RX ADMIN — PREGABALIN 50 MG: 50 CAPSULE ORAL at 14:15

## 2024-04-22 RX ADMIN — LEVALBUTEROL HYDROCHLORIDE 0.63 MG: 0.63 SOLUTION RESPIRATORY (INHALATION) at 11:43

## 2024-04-22 RX ADMIN — CLONAZEPAM 0.5 MG: 0.5 TABLET ORAL at 08:04

## 2024-04-22 RX ADMIN — CHOLECALCIFEROL TAB 125 MCG (5000 UNIT) 5000 UNITS: 125 TAB at 08:04

## 2024-04-22 RX ADMIN — ACETAMINOPHEN 650 MG: 325 TABLET ORAL at 17:10

## 2024-04-22 RX ADMIN — GUAIFENESIN 400 MG: 400 TABLET ORAL at 08:05

## 2024-04-22 RX ADMIN — SODIUM CHLORIDE, PRESERVATIVE FREE 10 ML: 5 INJECTION INTRAVENOUS at 20:06

## 2024-04-22 RX ADMIN — METHIMAZOLE 15 MG: 5 TABLET ORAL at 08:05

## 2024-04-22 RX ADMIN — GUAIFENESIN 400 MG: 400 TABLET ORAL at 20:05

## 2024-04-22 RX ADMIN — Medication 4 ML: at 07:24

## 2024-04-22 RX ADMIN — ARFORMOTEROL TARTRATE 15 MCG: 15 SOLUTION RESPIRATORY (INHALATION) at 07:24

## 2024-04-22 RX ADMIN — LEVALBUTEROL HYDROCHLORIDE 0.63 MG: 0.63 SOLUTION RESPIRATORY (INHALATION) at 15:45

## 2024-04-22 RX ADMIN — GUAIFENESIN 400 MG: 400 TABLET ORAL at 14:15

## 2024-04-22 RX ADMIN — ASPIRIN 81 MG: 81 TABLET, COATED ORAL at 08:04

## 2024-04-22 RX ADMIN — PANTOPRAZOLE SODIUM 40 MG: 40 TABLET, DELAYED RELEASE ORAL at 05:36

## 2024-04-22 RX ADMIN — APIXABAN 5 MG: 5 TABLET, FILM COATED ORAL at 20:05

## 2024-04-22 RX ADMIN — PREDNISONE 10 MG: 5 TABLET ORAL at 20:06

## 2024-04-22 RX ADMIN — DILTIAZEM HYDROCHLORIDE 180 MG: 180 CAPSULE, COATED, EXTENDED RELEASE ORAL at 08:04

## 2024-04-22 RX ADMIN — Medication 4 ML: at 19:32

## 2024-04-22 RX ADMIN — CITALOPRAM HYDROBROMIDE 20 MG: 20 TABLET ORAL at 08:04

## 2024-04-22 RX ADMIN — CYANOCOBALAMIN TAB 1000 MCG 500 MCG: 1000 TAB at 08:04

## 2024-04-22 RX ADMIN — PREDNISONE 10 MG: 5 TABLET ORAL at 14:15

## 2024-04-22 RX ADMIN — Medication 500 MG: at 08:04

## 2024-04-22 RX ADMIN — BUDESONIDE INHALATION 1000 MCG: 0.5 SUSPENSION RESPIRATORY (INHALATION) at 07:24

## 2024-04-22 RX ADMIN — CLONAZEPAM 0.5 MG: 0.5 TABLET ORAL at 20:06

## 2024-04-22 RX ADMIN — LEVALBUTEROL HYDROCHLORIDE 0.63 MG: 0.63 SOLUTION RESPIRATORY (INHALATION) at 19:32

## 2024-04-22 RX ADMIN — ROSUVASTATIN CALCIUM 20 MG: 20 TABLET, FILM COATED ORAL at 08:04

## 2024-04-22 RX ADMIN — SODIUM CHLORIDE, PRESERVATIVE FREE 10 ML: 5 INJECTION INTRAVENOUS at 08:05

## 2024-04-22 RX ADMIN — PREDNISONE 10 MG: 5 TABLET ORAL at 08:04

## 2024-04-22 RX ADMIN — FOLIC ACID 1 MG: 1 TABLET ORAL at 08:04

## 2024-04-22 RX ADMIN — BUDESONIDE INHALATION 1000 MCG: 0.5 SUSPENSION RESPIRATORY (INHALATION) at 19:32

## 2024-04-22 RX ADMIN — ARFORMOTEROL TARTRATE 15 MCG: 15 SOLUTION RESPIRATORY (INHALATION) at 19:32

## 2024-04-22 RX ADMIN — PREGABALIN 50 MG: 50 CAPSULE ORAL at 20:06

## 2024-04-22 RX ADMIN — TRAZODONE HYDROCHLORIDE 100 MG: 50 TABLET ORAL at 20:06

## 2024-04-22 RX ADMIN — LEVALBUTEROL HYDROCHLORIDE 0.63 MG: 0.63 SOLUTION RESPIRATORY (INHALATION) at 07:24

## 2024-04-22 RX ADMIN — OXYBUTYNIN CHLORIDE 5 MG: 5 TABLET, EXTENDED RELEASE ORAL at 20:05

## 2024-04-22 ASSESSMENT — PAIN SCALES - GENERAL
PAINLEVEL_OUTOF10: 2
PAINLEVEL_OUTOF10: 5

## 2024-04-22 ASSESSMENT — PAIN DESCRIPTION - ORIENTATION: ORIENTATION: MID;LOWER

## 2024-04-22 ASSESSMENT — PAIN DESCRIPTION - DESCRIPTORS: DESCRIPTORS: ACHING;SORE;DISCOMFORT

## 2024-04-22 ASSESSMENT — PAIN DESCRIPTION - LOCATION: LOCATION: BACK

## 2024-04-22 ASSESSMENT — PAIN - FUNCTIONAL ASSESSMENT: PAIN_FUNCTIONAL_ASSESSMENT: ACTIVITIES ARE NOT PREVENTED

## 2024-04-22 NOTE — PLAN OF CARE
Problem: Safety - Adult  Goal: Free from fall injury  4/22/2024 0017 by Maria Del Carmen Aldridge RN  Outcome: Progressing  4/21/2024 1849 by Santy Varela, RN  Outcome: Progressing     Problem: Chronic Conditions and Co-morbidities  Goal: Patient's chronic conditions and co-morbidity symptoms are monitored and maintained or improved  4/22/2024 0017 by Maria Del Carmen Aldridge RN  Outcome: Progressing  4/21/2024 1849 by Santy Varela, RN  Outcome: Progressing     Problem: Discharge Planning  Goal: Discharge to home or other facility with appropriate resources  4/22/2024 0017 by Maria Del Carmen Aldridge, RN  Outcome: Progressing     Problem: Nutrition Deficit:  Goal: Optimize nutritional status  4/22/2024 0017 by Maria Del Carmen Aldridge, RN  Outcome: Progressing

## 2024-04-22 NOTE — PLAN OF CARE
Problem: Safety - Adult  Goal: Free from fall injury  4/22/2024 1110 by Meghann Goldberg RN  Outcome: Progressing  4/22/2024 0017 by Maria Del Carmen Aldridge RN  Outcome: Progressing     Problem: Chronic Conditions and Co-morbidities  Goal: Patient's chronic conditions and co-morbidity symptoms are monitored and maintained or improved  4/22/2024 1110 by Meghann Goldberg RN  Outcome: Progressing  4/22/2024 0017 by Maria Del Carmen Aldridge RN  Outcome: Progressing     Problem: Discharge Planning  Goal: Discharge to home or other facility with appropriate resources  4/22/2024 0017 by Maria Del Carmen Aldridge RN  Outcome: Progressing     Problem: Pain  Goal: Verbalizes/displays adequate comfort level or baseline comfort level  4/22/2024 1110 by Meghann Goldberg RN  Outcome: Progressing  Flowsheets (Taken 4/22/2024 0745)  Verbalizes/displays adequate comfort level or baseline comfort level: Encourage patient to monitor pain and request assistance  4/22/2024 0017 by Maria Del Carmen Aldridge RN  Outcome: Progressing

## 2024-04-22 NOTE — CARE COORDINATION
Patient currently on 3L NC (baseline 4L through [facility]), remains independent in the room, plans to return home with [facility] when discharged. Call made to Moise at [facility] to check the status of the bipap ST. Moise reports they are still waiting for the script for the bipap ST to be signed by pulmonology; it was to be signed on Friday and faxed back to them but they never received it. Once the signed script is obtained, [facility] will have to schedule a respiratory therapist to go out to the patient's home; states they are booked for the day and will likely be able to get the device to the patient tomorrow. Perfect serve message sent to Dr. Price regarding the script for the device needing signed and faxed back to [facility]. Patient's sister-in-law will transport home when discharged.     Electronically signed by CAYLA Romero on 4/22/2024 at 9:37 AM

## 2024-04-23 VITALS
OXYGEN SATURATION: 95 % | SYSTOLIC BLOOD PRESSURE: 140 MMHG | WEIGHT: 99 LBS | TEMPERATURE: 98.2 F | DIASTOLIC BLOOD PRESSURE: 62 MMHG | BODY MASS INDEX: 15.01 KG/M2 | RESPIRATION RATE: 20 BRPM | HEART RATE: 82 BPM | HEIGHT: 68 IN

## 2024-04-23 PROCEDURE — 6360000002 HC RX W HCPCS: Performed by: INTERNAL MEDICINE

## 2024-04-23 PROCEDURE — 6370000000 HC RX 637 (ALT 250 FOR IP): Performed by: INTERNAL MEDICINE

## 2024-04-23 PROCEDURE — 94660 CPAP INITIATION&MGMT: CPT

## 2024-04-23 PROCEDURE — 2700000000 HC OXYGEN THERAPY PER DAY

## 2024-04-23 PROCEDURE — 94640 AIRWAY INHALATION TREATMENT: CPT

## 2024-04-23 PROCEDURE — 2580000003 HC RX 258: Performed by: INTERNAL MEDICINE

## 2024-04-23 RX ADMIN — Medication 4 ML: at 07:57

## 2024-04-23 RX ADMIN — Medication 500 MG: at 09:09

## 2024-04-23 RX ADMIN — LEVALBUTEROL HYDROCHLORIDE 0.63 MG: 0.63 SOLUTION RESPIRATORY (INHALATION) at 19:16

## 2024-04-23 RX ADMIN — FOLIC ACID 1 MG: 1 TABLET ORAL at 09:11

## 2024-04-23 RX ADMIN — BUDESONIDE INHALATION 1000 MCG: 0.5 SUSPENSION RESPIRATORY (INHALATION) at 19:16

## 2024-04-23 RX ADMIN — TRAZODONE HYDROCHLORIDE 100 MG: 50 TABLET ORAL at 20:20

## 2024-04-23 RX ADMIN — GUAIFENESIN 400 MG: 400 TABLET ORAL at 20:20

## 2024-04-23 RX ADMIN — SODIUM CHLORIDE, PRESERVATIVE FREE 10 ML: 5 INJECTION INTRAVENOUS at 09:19

## 2024-04-23 RX ADMIN — ASPIRIN 81 MG: 81 TABLET, COATED ORAL at 09:10

## 2024-04-23 RX ADMIN — PROCHLORPERAZINE MALEATE 10 MG: 10 TABLET ORAL at 01:58

## 2024-04-23 RX ADMIN — PREDNISONE 10 MG: 5 TABLET ORAL at 09:12

## 2024-04-23 RX ADMIN — CHOLECALCIFEROL TAB 125 MCG (5000 UNIT) 5000 UNITS: 125 TAB at 09:11

## 2024-04-23 RX ADMIN — CLONAZEPAM 0.5 MG: 0.5 TABLET ORAL at 20:20

## 2024-04-23 RX ADMIN — CYANOCOBALAMIN TAB 1000 MCG 500 MCG: 1000 TAB at 09:10

## 2024-04-23 RX ADMIN — SENNOSIDES AND DOCUSATE SODIUM 1 TABLET: 50; 8.6 TABLET ORAL at 09:09

## 2024-04-23 RX ADMIN — ROSUVASTATIN CALCIUM 20 MG: 20 TABLET, FILM COATED ORAL at 09:10

## 2024-04-23 RX ADMIN — PREGABALIN 50 MG: 50 CAPSULE ORAL at 15:03

## 2024-04-23 RX ADMIN — BUSPIRONE HYDROCHLORIDE 15 MG: 5 TABLET ORAL at 09:11

## 2024-04-23 RX ADMIN — GUAIFENESIN 400 MG: 400 TABLET ORAL at 15:03

## 2024-04-23 RX ADMIN — BUDESONIDE INHALATION 1000 MCG: 0.5 SUSPENSION RESPIRATORY (INHALATION) at 07:57

## 2024-04-23 RX ADMIN — LEVALBUTEROL HYDROCHLORIDE 0.63 MG: 0.63 SOLUTION RESPIRATORY (INHALATION) at 03:18

## 2024-04-23 RX ADMIN — ACETAMINOPHEN 650 MG: 325 TABLET ORAL at 01:27

## 2024-04-23 RX ADMIN — PANTOPRAZOLE SODIUM 40 MG: 40 TABLET, DELAYED RELEASE ORAL at 06:51

## 2024-04-23 RX ADMIN — HYDROXYZINE PAMOATE 25 MG: 25 CAPSULE ORAL at 01:59

## 2024-04-23 RX ADMIN — LEVALBUTEROL HYDROCHLORIDE 0.63 MG: 0.63 SOLUTION RESPIRATORY (INHALATION) at 07:57

## 2024-04-23 RX ADMIN — APIXABAN 5 MG: 5 TABLET, FILM COATED ORAL at 20:20

## 2024-04-23 RX ADMIN — BUSPIRONE HYDROCHLORIDE 15 MG: 5 TABLET ORAL at 20:20

## 2024-04-23 RX ADMIN — PREGABALIN 50 MG: 50 CAPSULE ORAL at 09:09

## 2024-04-23 RX ADMIN — BUSPIRONE HYDROCHLORIDE 15 MG: 5 TABLET ORAL at 01:27

## 2024-04-23 RX ADMIN — PREDNISONE 10 MG: 5 TABLET ORAL at 15:03

## 2024-04-23 RX ADMIN — SODIUM CHLORIDE, PRESERVATIVE FREE 10 ML: 5 INJECTION INTRAVENOUS at 09:08

## 2024-04-23 RX ADMIN — SODIUM CHLORIDE, PRESERVATIVE FREE 10 ML: 5 INJECTION INTRAVENOUS at 09:20

## 2024-04-23 RX ADMIN — PREGABALIN 50 MG: 50 CAPSULE ORAL at 20:20

## 2024-04-23 RX ADMIN — CLONAZEPAM 0.5 MG: 0.5 TABLET ORAL at 09:11

## 2024-04-23 RX ADMIN — DILTIAZEM HYDROCHLORIDE 180 MG: 180 CAPSULE, COATED, EXTENDED RELEASE ORAL at 09:13

## 2024-04-23 RX ADMIN — APIXABAN 5 MG: 5 TABLET, FILM COATED ORAL at 09:12

## 2024-04-23 RX ADMIN — CITALOPRAM HYDROBROMIDE 20 MG: 20 TABLET ORAL at 09:09

## 2024-04-23 RX ADMIN — HYDROXYZINE PAMOATE 25 MG: 25 CAPSULE ORAL at 09:12

## 2024-04-23 RX ADMIN — LEVALBUTEROL HYDROCHLORIDE 0.63 MG: 0.63 SOLUTION RESPIRATORY (INHALATION) at 12:22

## 2024-04-23 RX ADMIN — ARFORMOTEROL TARTRATE 15 MCG: 15 SOLUTION RESPIRATORY (INHALATION) at 07:57

## 2024-04-23 RX ADMIN — GUAIFENESIN 400 MG: 400 TABLET ORAL at 09:11

## 2024-04-23 RX ADMIN — ACETAMINOPHEN 650 MG: 325 TABLET ORAL at 06:51

## 2024-04-23 RX ADMIN — PREDNISONE 10 MG: 5 TABLET ORAL at 20:19

## 2024-04-23 RX ADMIN — ARFORMOTEROL TARTRATE 15 MCG: 15 SOLUTION RESPIRATORY (INHALATION) at 19:16

## 2024-04-23 RX ADMIN — OXYBUTYNIN CHLORIDE 5 MG: 5 TABLET, EXTENDED RELEASE ORAL at 20:19

## 2024-04-23 RX ADMIN — Medication 4 ML: at 19:15

## 2024-04-23 RX ADMIN — METHIMAZOLE 15 MG: 5 TABLET ORAL at 09:13

## 2024-04-23 RX ADMIN — LEVALBUTEROL HYDROCHLORIDE 0.63 MG: 0.63 SOLUTION RESPIRATORY (INHALATION) at 15:42

## 2024-04-23 RX ADMIN — HYDROXYZINE PAMOATE 25 MG: 25 CAPSULE ORAL at 20:20

## 2024-04-23 ASSESSMENT — PAIN DESCRIPTION - DESCRIPTORS
DESCRIPTORS: ACHING;CRAMPING
DESCRIPTORS: ACHING

## 2024-04-23 ASSESSMENT — PAIN SCALES - GENERAL
PAINLEVEL_OUTOF10: 0
PAINLEVEL_OUTOF10: 0
PAINLEVEL_OUTOF10: 6
PAINLEVEL_OUTOF10: 6

## 2024-04-23 ASSESSMENT — PAIN DESCRIPTION - ORIENTATION
ORIENTATION: RIGHT;LEFT;INNER
ORIENTATION: MID

## 2024-04-23 ASSESSMENT — PAIN DESCRIPTION - LOCATION
LOCATION: ABDOMEN;BACK
LOCATION: ABDOMEN;BACK

## 2024-04-23 NOTE — PLAN OF CARE
Problem: Safety - Adult  Goal: Free from fall injury  Outcome: Completed     Problem: Chronic Conditions and Co-morbidities  Goal: Patient's chronic conditions and co-morbidity symptoms are monitored and maintained or improved  Outcome: Completed     Problem: Discharge Planning  Goal: Discharge to home or other facility with appropriate resources  Outcome: Completed     Problem: Pain  Goal: Verbalizes/displays adequate comfort level or baseline comfort level  Outcome: Completed     Problem: Nutrition Deficit:  Goal: Optimize nutritional status  Outcome: Completed

## 2024-04-23 NOTE — CARE COORDINATION
Patient currently on 3L NC (baseline 4L through [facility]). Patient to discharge home later today. Call made to Moise at [facility] to check the status of the bipap ST; he reports they are able to deliver the device to the patient today before she is discharged. He will check with their respiratory therapist to determine what time they will coming to the hospital and will call SW back. Patient's sister-in-law to transport home and [facility] notified of patient's discharge today; orders placed.    10:30A  Received a message from Moise at [facility], their respiratory therapist will be in to see the patient today between 3p-5P; Bipap ST will be provided to patient.    3:09P  Call made to patient's sister-in-law, Lana regarding discharge, no answer; left detailed message.    3:20P  Received a call from patient's sister-in-law, Lana, provided update regarding discharge; she will pick the patient up today at 5:30p, once she gets off of work.    Electronically signed by CAYLA Romero on 4/23/2024 at 9:39 AM

## 2024-04-23 NOTE — PLAN OF CARE
Problem: Safety - Adult  Goal: Free from fall injury  4/23/2024 0044 by Parish Soto RN  Outcome: Progressing  4/22/2024 1110 by Meghann Goldberg RN  Outcome: Progressing     Problem: Chronic Conditions and Co-morbidities  Goal: Patient's chronic conditions and co-morbidity symptoms are monitored and maintained or improved  4/23/2024 0044 by Parish Soto RN  Outcome: Progressing  4/22/2024 1110 by Meghann Goldberg RN  Outcome: Progressing     Problem: Discharge Planning  Goal: Discharge to home or other facility with appropriate resources  Outcome: Progressing     Problem: Pain  Goal: Verbalizes/displays adequate comfort level or baseline comfort level  4/23/2024 0044 by Parish Soto RN  Outcome: Progressing  4/22/2024 1110 by Meghann Goldberg RN  Outcome: Progressing  Flowsheets (Taken 4/22/2024 0339)  Verbalizes/displays adequate comfort level or baseline comfort level: Encourage patient to monitor pain and request assistance     Problem: Nutrition Deficit:  Goal: Optimize nutritional status  Outcome: Progressing

## 2024-04-23 NOTE — PROGRESS NOTES
Pulmonary Progress Note    Admit Date: 2024  Hospital day                               PCP: Clyde Gonzalez DO    Chief Complaint (s):  Patient Active Problem List   Diagnosis    Primary hypertension    COPD (chronic obstructive pulmonary disease) (HCC)    Gastroesophageal reflux disease    Constipation    Unexplained weight loss    Protein deficiency (HCC)    Diet-controlled diabetes mellitus (HCC)    Multiple nodules of lung    Abnormal EKG    Mixed hyperlipidemia    Hyperthyroidism    Vitamin B12 deficiency (dietary) anemia    Osteoporosis    Elevated troponin level not due to acute coronary syndrome    Tachycardia, unspecified    Pneumonia due to organism    Atrial fibrillation (HCC)    Hypoxia    Severe protein-calorie malnutrition (HCC)    Anxiety    Numbness and tingling of both lower extremities    Acute on chronic anemia    Chronic pain syndrome [G89.4]    Chronic bilateral low back pain with bilateral sciatica    Lumbar radiculopathy    Lumbar disc disorder    Critical limb ischemia of left lower extremity with rest pain (HCC)    Atherosclerosis of artery of extremity with rest pain (HCC)    Peripheral vascular disease, unspecified (HCC)    Atherosclerosis 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 (HCC)    COPD with acute exacerbation (HCC)    Iron deficiency anemia, unspecified    Acute respiratory failure with hypoxia and hypercarbia (HCC)    COPD exacerbation (HCC)    Iron deficiency anemia       Subjective:  Patient seen resting on 4L nasal cannula. No breathing complaints. NIV compliant. Admits to some anxiety today.       Vitals:  VITALS:  BP (!) 142/72   Pulse 79   Temp 98.1 °F (36.7 °C) (Oral)   Resp 21   Ht 1.727 m (5' 8\")   Wt 44.9 kg (99 lb)   SpO2 95%   BMI 15.05 kg/m²     24HR INTAKE/OUTPUT:    No intake or output data in the 24 hours ending 24 0930    24HR PULSE OXIMETRY RANGE:    SpO2  Av.6 %  Min: 92 %  Max: 98 
           Pulmonary Progress Note    Admit Date: 4/13/2024  Hospital day                               PCP: Clyde Gonzalez DO    Chief Complaint (s):  Patient Active Problem List   Diagnosis    Primary hypertension    COPD (chronic obstructive pulmonary disease) (HCC)    Gastroesophageal reflux disease    Constipation    Unexplained weight loss    Protein deficiency (HCC)    Diet-controlled diabetes mellitus (HCC)    Multiple nodules of lung    Abnormal EKG    Mixed hyperlipidemia    Hyperthyroidism    Vitamin B12 deficiency (dietary) anemia    Osteoporosis    Elevated troponin level not due to acute coronary syndrome    Tachycardia, unspecified    Pneumonia due to organism    Atrial fibrillation (HCC)    Hypoxia    Severe protein-calorie malnutrition (HCC)    Anxiety    Numbness and tingling of both lower extremities    Acute on chronic anemia    Chronic pain syndrome [G89.4]    Chronic bilateral low back pain with bilateral sciatica    Lumbar radiculopathy    Lumbar disc disorder    Critical limb ischemia of left lower extremity with rest pain (HCC)    Atherosclerosis of artery of extremity with rest pain (HCC)    Peripheral vascular disease, unspecified (HCC)    Atherosclerosis 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 (HCC)    COPD with acute exacerbation (HCC)    Iron deficiency anemia, unspecified    Acute respiratory failure with hypoxia and hypercarbia (HCC)    COPD exacerbation (HCC)    Iron deficiency anemia       Subjective:  Patient seen resting on 4L nasal cannula. No breathing complaints. NIV compliant.      Vitals:  VITALS:  BP (!) 114/55   Pulse 61   Temp 98.2 °F (36.8 °C) (Axillary)   Resp 16   Ht 1.727 m (5' 8\")   Wt 44.9 kg (99 lb)   SpO2 100%   BMI 15.05 kg/m²     24HR INTAKE/OUTPUT:      Intake/Output Summary (Last 24 hours) at 4/21/2024 0813  Last data filed at 4/20/2024 2227  Gross per 24 hour   Intake 442 ml   Output --   Net 442 ml       24HR PULSE 
   04/14/24 1119   NIV Type   NIV Started/Stopped On   Equipment Type v60   Mode Bilevel   Mask Type Full face mask   Mask Size Small   Assessment   SpO2 98 %   Level of Consciousness 0   Comfort Level Good   Using Accessory Muscles No   Mask Compliance Good   Skin Assessment Clean, dry, & intact   Skin Protection for O2 Device Yes   Orientation Middle   Location Nose   Intervention(s) Skin Barrier   Settings/Measurements   CPAP/EPAP 8 cmH2O   Vt (Set, mL) 400 mL   Vt (Measured) 525 mL   Rate Ordered 14   FiO2  55 %   Minute Volume (L/min) 12.5 Liters   Mask Leak (lpm) 87 lpm   Patient's Home Machine No   Alarm Settings   Alarms On Y   Low Pressure (cmH2O) 8 cmH2O   High Pressure (cmH2O) 28 cmH2O   RR Low (bpm) 8   RR High (bpm) 40 br/min     Date: 4/14/2024    Time: 11:21 AM    Patient Placed On BIPAP/CPAP/ Non-Invasive Ventilation?  Yes    If no must comment.  Facial area red/color change? No           If YES are Blister/Lesion present?No   If yes must notify nursing staff  BIPAP/CPAP skin barrier?  Yes    Skin barrier type:mepilexlite       Comments:        Hayde Medellin RCP  
  [facility]    Subjective:  pt alert conversive less anxious on klonopin      Current Facility-Administered Medications:     clonazePAM (KLONOPIN) tablet 0.5 mg, 0.5 mg, Oral, Q12H, Baldemar Jarrett, DO, 0.5 mg at 04/17/24 2039    dilTIAZem (CARDIZEM CD) extended release capsule 180 mg, 180 mg, Oral, Daily, Baldemar Jarrett, DO, 180 mg at 04/17/24 1119    oxyBUTYnin (DITROPAN-XL) extended release tablet 5 mg, 5 mg, Oral, Nightly, Baldemar Jarrett, DO, 5 mg at 04/17/24 2040    methylPREDNISolone sodium succ (SOLU-MEDROL) 40 mg in sterile water 1 mL injection, 40 mg, IntraVENous, Q12H, Jose Price MD, 40 mg at 04/18/24 0352    hydrALAZINE (APRESOLINE) injection 5 mg, 5 mg, IntraVENous, Q6H PRN, Jose Price MD, 5 mg at 04/17/24 0357    sodium chloride flush 0.9 % injection 5-40 mL, 5-40 mL, IntraVENous, 2 times per day, Jose Price MD, 10 mL at 04/17/24 2040    sodium chloride flush 0.9 % injection 5-40 mL, 5-40 mL, IntraVENous, PRN, Jose Price MD    0.9 % sodium chloride infusion, , IntraVENous, PRN, Joes Price MD    sodium chloride flush 0.9 % injection 5-40 mL, 5-40 mL, IntraVENous, 2 times per day, Jose Price MD, 10 mL at 04/17/24 2212    sodium chloride flush 0.9 % injection 5-40 mL, 5-40 mL, IntraVENous, PRN, Jose Price MD    0.9 % sodium chloride infusion, , IntraVENous, PRN, Jose Price MD    ascorbic acid (VITAMIN C) tablet 500 mg, 500 mg, Oral, Daily, Jose Price MD, 500 mg at 04/17/24 1004    vitamin D3 (CHOLECALCIFEROL) tablet 5,000 Units, 5,000 Units, Oral, Daily, Jose Price MD, 5,000 Units at 04/17/24 1004    vitamin B-12 (CYANOCOBALAMIN) tablet 500 mcg, 500 mcg, Oral, Daily, Jose Price MD, 500 mcg at 04/17/24 1003    pantoprazole (PROTONIX) tablet 40 mg, 40 mg, Oral, QAM AC, Jose Price MD, 40 mg at 04/18/24 0549    aspirin EC tablet 81 mg, 81 mg, Oral, Daily, Jose Price MD, 81 mg at 04/17/24 1004    apixaban (ELIQUIS) tablet 5 mg, 5 
  [facility]  [address], [address]  [place] [address]      Pulmonary Progress Note      SUBJECTIVE:  ambulating in room on 5 li NC, claims similar with dyspnea and cough with minimal sputum production which is some better than when initially admitted.    OBJECTIVE    Medications    Continuous Infusions:   sodium chloride      sodium chloride         Scheduled Meds:   clonazePAM  0.5 mg Oral Q12H    dilTIAZem  180 mg Oral Daily    oxyBUTYnin  5 mg Oral Nightly    methylPREDNISolone  40 mg IntraVENous Q12H    sodium chloride flush  5-40 mL IntraVENous 2 times per day    sodium chloride flush  5-40 mL IntraVENous 2 times per day    vitamin C  500 mg Oral Daily    vitamin D3  5,000 Units Oral Daily    vitamin B-12  500 mcg Oral Daily    pantoprazole  40 mg Oral QAM AC    aspirin  81 mg Oral Daily    apixaban  5 mg Oral BID    pregabalin  50 mg Oral TID    methIMAzole  15 mg Oral Daily    traZODone  100 mg Oral Nightly    citalopram  20 mg Oral Daily    guaiFENesin  400 mg Oral TID    sennosides-docusate sodium  1 tablet Oral Daily    rosuvastatin  20 mg Oral Daily    folic acid  1 mg Oral Daily    doxycycline hyclate  100 mg Oral 2 times per day    arformoterol tartrate  15 mcg Nebulization BID RT    budesonide  1 mg Nebulization BID RT    levalbuterol  0.63 mg Nebulization Q4H While awake    sodium chloride (Inhalant)  4 mL Nebulization BID       PRN Meds:hydrALAZINE, sodium chloride flush, sodium chloride, sodium chloride flush, sodium chloride, prochlorperazine, acetaminophen, hydrOXYzine pamoate, albuterol, polyethylene glycol, busPIRone, bisacodyl, magnesium hydroxide    Physical    VITALS:  BP (!) 121/56   Pulse 78   Temp 98 °F (36.7 °C) (Temporal)   Resp 15   Ht 1.727 m (5' 8\")   Wt 44.9 kg (99 lb)   SpO2 92%   BMI 15.05 kg/m²     24HR INTAKE/OUTPUT:      Intake/Output Summary (Last 24 hours) at 4/19/2024 5631  Last data filed at 
  [facility]  [address], [address]  [place] [address]      Pulmonary Progress Note      SUBJECTIVE:  reclining in bed on 5 li NC, claims similar with dyspnea and cough with minimal sputum production (?) close to baseline    OBJECTIVE    Medications    Continuous Infusions:   sodium chloride      sodium chloride         Scheduled Meds:   predniSONE  10 mg Oral TID    clonazePAM  0.5 mg Oral Q12H    dilTIAZem  180 mg Oral Daily    oxyBUTYnin  5 mg Oral Nightly    sodium chloride flush  5-40 mL IntraVENous 2 times per day    sodium chloride flush  5-40 mL IntraVENous 2 times per day    vitamin C  500 mg Oral Daily    vitamin D3  5,000 Units Oral Daily    vitamin B-12  500 mcg Oral Daily    pantoprazole  40 mg Oral QAM AC    aspirin  81 mg Oral Daily    apixaban  5 mg Oral BID    pregabalin  50 mg Oral TID    methIMAzole  15 mg Oral Daily    traZODone  100 mg Oral Nightly    citalopram  20 mg Oral Daily    guaiFENesin  400 mg Oral TID    sennosides-docusate sodium  1 tablet Oral Daily    rosuvastatin  20 mg Oral Daily    folic acid  1 mg Oral Daily    arformoterol tartrate  15 mcg Nebulization BID RT    budesonide  1 mg Nebulization BID RT    levalbuterol  0.63 mg Nebulization Q4H While awake    sodium chloride (Inhalant)  4 mL Nebulization BID       PRN Meds:hydrALAZINE, sodium chloride flush, sodium chloride, sodium chloride flush, sodium chloride, prochlorperazine, acetaminophen, hydrOXYzine pamoate, albuterol, polyethylene glycol, busPIRone, bisacodyl, magnesium hydroxide    Physical    VITALS:  /89   Pulse 78   Temp 98.6 °F (37 °C) (Temporal)   Resp 22   Ht 1.727 m (5' 8\")   Wt 44.9 kg (99 lb)   SpO2 96%   BMI 15.05 kg/m²     24HR INTAKE/OUTPUT:      Intake/Output Summary (Last 24 hours) at 4/22/2024 1526  Last data filed at 4/21/2024 2032  Gross per 24 hour   Intake 490 ml   Output --   Net 490 ml         24HR PULSE OXIMETRY 
  [facility]  [address], [address]  [place] [address]      Pulmonary Progress Note      SUBJECTIVE:  sitting up in bed, claims slightly better with breathing, still with fair amount of cough and minimal sputum production.    OBJECTIVE    Medications    Continuous Infusions:   sodium chloride         Scheduled Meds:   sodium chloride flush  5-40 mL IntraVENous 2 times per day    vitamin C  500 mg Oral Daily    vitamin D3  5,000 Units Oral Daily    vitamin B-12  500 mcg Oral Daily    pantoprazole  40 mg Oral QAM AC    aspirin  81 mg Oral Daily    dilTIAZem  120 mg Oral Daily    apixaban  5 mg Oral BID    pregabalin  50 mg Oral TID    methIMAzole  15 mg Oral Daily    traZODone  100 mg Oral Nightly    citalopram  20 mg Oral Daily    guaiFENesin  400 mg Oral TID    sennosides-docusate sodium  1 tablet Oral Daily    rosuvastatin  20 mg Oral Daily    folic acid  1 mg Oral Daily    doxycycline hyclate  100 mg Oral 2 times per day    methylPREDNISolone  40 mg IntraVENous Q8H    arformoterol tartrate  15 mcg Nebulization BID RT    budesonide  1 mg Nebulization BID RT    levalbuterol  0.63 mg Nebulization Q4H While awake    sodium chloride (Inhalant)  4 mL Nebulization BID       PRN Meds:sodium chloride flush, sodium chloride, prochlorperazine, acetaminophen, hydrOXYzine pamoate, albuterol, polyethylene glycol, busPIRone, bisacodyl, magnesium hydroxide    Physical    VITALS:  BP (!) 145/66   Pulse 92   Temp 98.6 °F (37 °C) (Oral)   Resp 20   Ht 1.727 m (5' 8\")   Wt 44.9 kg (99 lb)   SpO2 100%   BMI 15.05 kg/m²     24HR INTAKE/OUTPUT:    No intake or output data in the 24 hours ending 24 1454    24HR PULSE OXIMETRY RANGE:    SpO2  Av.4 %  Min: 93 %  Max: 100 %    General appearance: alert, appears stated age, and cooperative  Lungs: rhonchi bilaterally and decreased breath sounds bilaterally  Heart: regular rate and rhythm, S1, S2 
  [facility]  [address], [address]  [place] [address]      Pulmonary Progress Note      SUBJECTIVE:  walking to bathroom and back, on 4 li NC, claims similar/slightly better with dyspnea and cough with minimal sputum production since bronchoscopy done yesterday, min-mod sticky clear mucus suctioned, cultures (-) so far.    OBJECTIVE    Medications    Continuous Infusions:   sodium chloride      sodium chloride         Scheduled Meds:   clonazePAM  0.5 mg Oral Q12H    dilTIAZem  180 mg Oral Daily    oxyBUTYnin  5 mg Oral Nightly    sodium chloride flush  5-40 mL IntraVENous 2 times per day    sodium chloride flush  5-40 mL IntraVENous 2 times per day    vitamin C  500 mg Oral Daily    vitamin D3  5,000 Units Oral Daily    vitamin B-12  500 mcg Oral Daily    pantoprazole  40 mg Oral QAM AC    aspirin  81 mg Oral Daily    apixaban  5 mg Oral BID    pregabalin  50 mg Oral TID    methIMAzole  15 mg Oral Daily    traZODone  100 mg Oral Nightly    citalopram  20 mg Oral Daily    guaiFENesin  400 mg Oral TID    sennosides-docusate sodium  1 tablet Oral Daily    rosuvastatin  20 mg Oral Daily    folic acid  1 mg Oral Daily    doxycycline hyclate  100 mg Oral 2 times per day    methylPREDNISolone  40 mg IntraVENous Q8H    arformoterol tartrate  15 mcg Nebulization BID RT    budesonide  1 mg Nebulization BID RT    levalbuterol  0.63 mg Nebulization Q4H While awake    sodium chloride (Inhalant)  4 mL Nebulization BID       PRN Meds:hydrALAZINE, sodium chloride flush, sodium chloride, sodium chloride flush, sodium chloride, prochlorperazine, acetaminophen, hydrOXYzine pamoate, albuterol, polyethylene glycol, busPIRone, bisacodyl, magnesium hydroxide    Physical    VITALS:  BP (!) 154/69   Pulse 78   Temp 98.3 °F (36.8 °C) (Oral)   Resp 21   Ht 1.727 m (5' 8\")   Wt 44.9 kg (99 lb)   SpO2 96%   BMI 15.05 kg/m²     24HR INTAKE/OUTPUT:  
  [facility]  [address], [address]  [place] [address]      Pulmonary Progress Note      SUBJECTIVE:  sitting up in bed, similar with dyspnea and cough with minimal sputum production, still with anxiety. Bronchoscopy currently scheduled for tomorrow afternoon.    OBJECTIVE    Medications    Continuous Infusions:   sodium chloride         Scheduled Meds:   sodium chloride flush  5-40 mL IntraVENous 2 times per day    vitamin C  500 mg Oral Daily    vitamin D3  5,000 Units Oral Daily    vitamin B-12  500 mcg Oral Daily    pantoprazole  40 mg Oral QAM AC    aspirin  81 mg Oral Daily    dilTIAZem  120 mg Oral Daily    apixaban  5 mg Oral BID    pregabalin  50 mg Oral TID    methIMAzole  15 mg Oral Daily    traZODone  100 mg Oral Nightly    citalopram  20 mg Oral Daily    guaiFENesin  400 mg Oral TID    sennosides-docusate sodium  1 tablet Oral Daily    rosuvastatin  20 mg Oral Daily    folic acid  1 mg Oral Daily    doxycycline hyclate  100 mg Oral 2 times per day    methylPREDNISolone  40 mg IntraVENous Q8H    arformoterol tartrate  15 mcg Nebulization BID RT    budesonide  1 mg Nebulization BID RT    levalbuterol  0.63 mg Nebulization Q4H While awake    sodium chloride (Inhalant)  4 mL Nebulization BID       PRN Meds:sodium chloride flush, sodium chloride, prochlorperazine, acetaminophen, hydrOXYzine pamoate, albuterol, polyethylene glycol, busPIRone, bisacodyl, magnesium hydroxide    Physical    VITALS:  BP (!) 147/56   Pulse 75   Temp 98 °F (36.7 °C) (Oral)   Resp 27   Ht 1.727 m (5' 8\")   Wt 44.9 kg (99 lb)   SpO2 94%   BMI 15.05 kg/m²     24HR INTAKE/OUTPUT:    No intake or output data in the 24 hours ending 04/15/24 1525    24HR PULSE OXIMETRY RANGE:    SpO2  Av.1 %  Min: 94 %  Max: 100 %    General appearance: alert, appears stated age, and cooperative  Lungs: rhonchi bilaterally and decreased breath sounds bilaterally  Heart: 
  [facility]  [address], [address]  [place] [address]      Pulmonary Progress Note      SUBJECTIVE:  reclining in bed on 5 li NC, claims similar with dyspnea and cough with minimal sputum production (?) close to baseline, able to get BIPAP ST later today as per DME    OBJECTIVE    Medications    Continuous Infusions:   sodium chloride      sodium chloride         Scheduled Meds:   predniSONE  10 mg Oral TID    clonazePAM  0.5 mg Oral Q12H    dilTIAZem  180 mg Oral Daily    oxyBUTYnin  5 mg Oral Nightly    sodium chloride flush  5-40 mL IntraVENous 2 times per day    sodium chloride flush  5-40 mL IntraVENous 2 times per day    vitamin C  500 mg Oral Daily    vitamin D3  5,000 Units Oral Daily    vitamin B-12  500 mcg Oral Daily    pantoprazole  40 mg Oral QAM AC    aspirin  81 mg Oral Daily    apixaban  5 mg Oral BID    pregabalin  50 mg Oral TID    methIMAzole  15 mg Oral Daily    traZODone  100 mg Oral Nightly    citalopram  20 mg Oral Daily    guaiFENesin  400 mg Oral TID    sennosides-docusate sodium  1 tablet Oral Daily    rosuvastatin  20 mg Oral Daily    folic acid  1 mg Oral Daily    arformoterol tartrate  15 mcg Nebulization BID RT    budesonide  1 mg Nebulization BID RT    levalbuterol  0.63 mg Nebulization Q4H While awake    sodium chloride (Inhalant)  4 mL Nebulization BID       PRN Meds:hydrALAZINE, sodium chloride flush, sodium chloride, sodium chloride flush, sodium chloride, prochlorperazine, acetaminophen, hydrOXYzine pamoate, albuterol, polyethylene glycol, busPIRone, bisacodyl, magnesium hydroxide    Physical    VITALS:  BP (!) 134/56   Pulse 79   Temp 98.6 °F (37 °C) (Oral)   Resp 20   Ht 1.727 m (5' 8\")   Wt 44.9 kg (99 lb)   SpO2 97%   BMI 15.05 kg/m²     24HR INTAKE/OUTPUT:      Intake/Output Summary (Last 24 hours) at 4/23/2024 1524  Last data filed at 4/23/2024 0658  Gross per 24 hour   Intake 240 ml   Output -- 
  [facility]    Subjective:  Pt alert conversive      Current Facility-Administered Medications:     predniSONE (DELTASONE) tablet 10 mg, 10 mg, Oral, TID, Radha Kraft, DO, 10 mg at 04/22/24 0804    clonazePAM (KLONOPIN) tablet 0.5 mg, 0.5 mg, Oral, Q12H, Baldemar Jarrett, DO, 0.5 mg at 04/22/24 0804    dilTIAZem (CARDIZEM CD) extended release capsule 180 mg, 180 mg, Oral, Daily, Baldemar Jarrett, DO, 180 mg at 04/22/24 0804    oxyBUTYnin (DITROPAN-XL) extended release tablet 5 mg, 5 mg, Oral, Nightly, Baldemar Jarrett, DO, 5 mg at 04/21/24 2032    hydrALAZINE (APRESOLINE) injection 5 mg, 5 mg, IntraVENous, Q6H PRN, Jose Price MD, 5 mg at 04/17/24 0357    sodium chloride flush 0.9 % injection 5-40 mL, 5-40 mL, IntraVENous, 2 times per day, Jose Price MD, 10 mL at 04/22/24 0805    sodium chloride flush 0.9 % injection 5-40 mL, 5-40 mL, IntraVENous, PRN, Jose Price MD    0.9 % sodium chloride infusion, , IntraVENous, PRN, Jose Price MD    sodium chloride flush 0.9 % injection 5-40 mL, 5-40 mL, IntraVENous, 2 times per day, Jose Price MD, 10 mL at 04/21/24 2033    sodium chloride flush 0.9 % injection 5-40 mL, 5-40 mL, IntraVENous, PRN, Jose Price MD, 10 mL at 04/19/24 1359    0.9 % sodium chloride infusion, , IntraVENous, PRN, Jose Price MD    ascorbic acid (VITAMIN C) tablet 500 mg, 500 mg, Oral, Daily, Jose Price MD, 500 mg at 04/22/24 0804    vitamin D3 (CHOLECALCIFEROL) tablet 5,000 Units, 5,000 Units, Oral, Daily, Jose Price MD, 5,000 Units at 04/22/24 0804    vitamin B-12 (CYANOCOBALAMIN) tablet 500 mcg, 500 mcg, Oral, Daily, Jose Price MD, 500 mcg at 04/22/24 0804    pantoprazole (PROTONIX) tablet 40 mg, 40 mg, Oral, QAM AC, Jose Price MD, 40 mg at 04/22/24 0536    aspirin EC tablet 81 mg, 81 mg, Oral, Daily, Jose Price MD, 81 mg at 04/22/24 0804    apixaban (ELIQUIS) tablet 5 mg, 5 mg, Oral, BID, Jose Price MD, 5 mg at 04/22/24 
  [facility]    Subjective:  pt alert conversive c/o cough congestion      Current Facility-Administered Medications:     clonazePAM (KLONOPIN) tablet 0.5 mg, 0.5 mg, Oral, Q12H, Baldemar Jarrett, , 0.5 mg at 04/18/24 2004    dilTIAZem (CARDIZEM CD) extended release capsule 180 mg, 180 mg, Oral, Daily, Baldemar Jarrett, , 180 mg at 04/18/24 0821    oxyBUTYnin (DITROPAN-XL) extended release tablet 5 mg, 5 mg, Oral, Nightly, Baldemar Jarrett, DO, 5 mg at 04/18/24 2004    methylPREDNISolone sodium succ (SOLU-MEDROL) 40 mg in sterile water 1 mL injection, 40 mg, IntraVENous, Q12H, Jose Price MD, 40 mg at 04/19/24 0308    hydrALAZINE (APRESOLINE) injection 5 mg, 5 mg, IntraVENous, Q6H PRN, Jose Price MD, 5 mg at 04/17/24 0357    sodium chloride flush 0.9 % injection 5-40 mL, 5-40 mL, IntraVENous, 2 times per day, Jose Price MD, 10 mL at 04/18/24 2004    sodium chloride flush 0.9 % injection 5-40 mL, 5-40 mL, IntraVENous, PRN, Jose Price MD    0.9 % sodium chloride infusion, , IntraVENous, PRN, Jose Price MD    sodium chloride flush 0.9 % injection 5-40 mL, 5-40 mL, IntraVENous, 2 times per day, Jose Price MD, 10 mL at 04/18/24 0822    sodium chloride flush 0.9 % injection 5-40 mL, 5-40 mL, IntraVENous, PRN, Jose Price MD    0.9 % sodium chloride infusion, , IntraVENous, PRN, Jose Price MD    ascorbic acid (VITAMIN C) tablet 500 mg, 500 mg, Oral, Daily, Jose Price MD, 500 mg at 04/18/24 0821    vitamin D3 (CHOLECALCIFEROL) tablet 5,000 Units, 5,000 Units, Oral, Daily, Jose Price MD, 5,000 Units at 04/18/24 0821    vitamin B-12 (CYANOCOBALAMIN) tablet 500 mcg, 500 mcg, Oral, Daily, Jose Price MD, 500 mcg at 04/18/24 0819    pantoprazole (PROTONIX) tablet 40 mg, 40 mg, Oral, QAM AC, Jose Price MD, 40 mg at 04/19/24 0546    aspirin EC tablet 81 mg, 81 mg, Oral, Daily, Jose Price MD, 81 mg at 04/18/24 0820    apixaban (ELIQUIS) tablet 5 mg, 5 mg, 
  [facility]    Subjective:  pt alert conversive c/o cough congestion      Current Facility-Administered Medications:     sodium chloride flush 0.9 % injection 5-40 mL, 5-40 mL, IntraVENous, 2 times per day, Keila Hernandez MD, 10 mL at 04/15/24 0921    sodium chloride flush 0.9 % injection 5-40 mL, 5-40 mL, IntraVENous, PRN, Keila Hernandez MD    0.9 % sodium chloride infusion, , IntraVENous, PRN, Keila Hernandez MD    ascorbic acid (VITAMIN C) tablet 500 mg, 500 mg, Oral, Daily, Masoud Joseph MD, 500 mg at 04/15/24 0919    vitamin D3 (CHOLECALCIFEROL) tablet 5,000 Units, 5,000 Units, Oral, Daily, Masoud Joseph MD, 5,000 Units at 04/15/24 0919    vitamin B-12 (CYANOCOBALAMIN) tablet 500 mcg, 500 mcg, Oral, Daily, Masoud Joseph MD, 500 mcg at 04/15/24 0919    pantoprazole (PROTONIX) tablet 40 mg, 40 mg, Oral, QAM AC, Masoud Joseph MD, 40 mg at 04/15/24 0609    aspirin EC tablet 81 mg, 81 mg, Oral, Daily, Masoud Joseph MD, 81 mg at 04/15/24 0919    dilTIAZem (CARDIZEM CD) extended release capsule 120 mg, 120 mg, Oral, Daily, Masoud Joseph MD, 120 mg at 04/15/24 0919    apixaban (ELIQUIS) tablet 5 mg, 5 mg, Oral, BID, Masoud Joseph MD, 5 mg at 04/15/24 0919    prochlorperazine (COMPAZINE) tablet 10 mg, 10 mg, Oral, Q6H PRN, Masoud Joseph MD, 10 mg at 04/15/24 1204    pregabalin (LYRICA) capsule 50 mg, 50 mg, Oral, TID, Masoud Joseph MD, 50 mg at 04/15/24 0919    methIMAzole (TAPAZOLE) tablet 15 mg, 15 mg, Oral, Daily, Masoud Joseph MD, 15 mg at 04/15/24 0920    traZODone (DESYREL) tablet 100 mg, 100 mg, Oral, Nightly, Masoud Joseph MD, 100 mg at 04/14/24 1909    citalopram (CELEXA) tablet 20 mg, 20 mg, Oral, Daily, Masoud Joseph MD, 20 mg at 04/15/24 0919    acetaminophen (TYLENOL) tablet 650 mg, 650 mg, Oral, Q6H PRN, Masoud Joseph MD, 650 mg at 04/14/24 1757    hydrOXYzine pamoate (VISTARIL) capsule 
  Physician Progress Note      PATIENT:               ANGIE SHELTON  CSN #:                  [id number]  :                       1955  ADMIT DATE:       2024 5:39 AM  DISCH DATE:  RESPONDING  PROVIDER #:        Baldemar Jarrett DO          QUERY TEXT:    Pt admitted with PNA.  Noted documentation of Sepsis per ED provider.  If   possible, please document in progress notes and discharge summary:    The medical record reflects the following:  Risk Factors: PNA, COPD  Clinical Indicators: WBC: 17.7, 15.2, Procalcitonin: 0.19, RR: 24  Treatment: NS bolus, Rocephin, Doxycycline,    Thank you,  RAMO Bonner [email or web address]  Options provided:  -- Sepsis confirmed present on admission  -- Sepsis ruled out  -- Other - I will add my own diagnosis  -- Disagree - Not applicable / Not valid  -- Disagree - Clinically unable to determine / Unknown  -- Refer to Clinical Documentation Reviewer    PROVIDER RESPONSE TEXT:    The diagnosis of sepsis was confirmed as present on admission.    Query created by: Susana Lemus on 2024 7:49 AM      Electronically signed by:  Baldemar Jarrett DO 2024 2:49 PM          
 answering service called about anxiety medication for patient.   
4 Eyes Skin Assessment     NAME:  Lana Phillips  YOB: 1955  MEDICAL RECORD NUMBER:  [id number]    The patient is being assessed for  Admission    I agree that at least one RN has performed a thorough Head to Toe Skin Assessment on the patient. ALL assessment sites listed below have been assessed.      Areas assessed by both nurses:    Head, Face, Ears, Shoulders, Back, Chest, Arms, Elbows, Hands, Sacrum. Buttock, Coccyx, Ischium, and Legs. Feet and Heels        Does the Patient have a Wound? No noted wound(s)       Guy Prevention initiated by RN: Yes  Wound Care Orders initiated by RN: No    Pressure Injury (Stage 3,4, Unstageable, DTI, NWPT, and Complex wounds) if present, place Wound referral order by RN under : No    New Ostomies, if present place, Ostomy referral order under : No     Nurse 1 eSignature: Electronically signed by Debbie Chaudhry RN on 4/13/24 at 6:24 PM EDT    **SHARE this note so that the co-signing nurse can place an eSignature**    Nurse 2 eSignature: Electronically signed by Rosana Ghotra RN on 4/14/24 at 9:28 AM EDT   
Comprehensive Nutrition Assessment    Type and Reason for Visit:  Initial, RD Nutrition Re-Screen/LOS    Nutrition Recommendations/Plan:   Continue Diet.    Will Start ONS and monitor.    If intake remains minimal x next ~1-2d, would then highly recommend considering EN support d/t minimal intake x ~7d now since adm.  Please consult for updated recs if EN needed.    Pt at high risk for Re-Feeding Syndrome d/t minimal intake x >7 days - Highly recommend starting at no more than half of goal rate x first 24hrs and increase slowly as tolerated to goal w/ close monitoring of BGL/Lytes Labs (x first ~3-5d post-nutrition initiation) and correct PRN both prior to and during initiation of nutrition support.      Malnutrition Assessment:  Malnutrition Status:  Severe malnutrition (04/19/24 1133)    Context:  Chronic Illness     Findings of the 6 clinical characteristics of malnutrition:  Energy Intake:  75% or less estimated energy requirements for 1 month or longer  Weight Loss:  Unable to assess (2/2 limited EMR wt hx since adm currently)     Body Fat Loss:  Severe body fat loss Orbital, Triceps, Fat Overlying Ribs   Muscle Mass Loss:  Severe muscle mass loss Temples (temporalis), Clavicles (pectoralis & deltoids), Calf (gastrocnemius)  Fluid Accumulation:  No significant fluid accumulation     Strength:  Not Performed    Nutrition Assessment:    Pt adm w/ SOB/CP just pta, s/p recent adm for same symptoms.  PMHx PVD, COPD, DM, chronic constipation, lung nodules, CLI LLE, JULIO CESAR, Severe Malnutrition (3/12/2024), s/p abd aortagram/angioplasty (1/19).  Adm w/ PNA, w/ ongoing SOB, s/p bronch 4/16.  At risk d/t pt currently meets criteria for Severe Malnutrition w/ ongoing minimal intake since adm w/ increased needs 2/2 COPD.  At risk for ReFeeding Syndrome, monitor/correct lytes PRN.  Will Start ONS and monitor.    Nutrition Related Findings:    A&O, poor dentition, Abd/BS WDL, no edema, I/O's WNL, hyperglycemia Wound Type: 
Date: 4/13/2024    Time: 10:44 PM    Patient Placed On BIPAP/CPAP/ Non-Invasive Ventilation?  Yes    If no must comment.  Facial area red/color change? No           If YES are Blister/Lesion present?No   If yes must notify nursing staff  BIPAP/CPAP skin barrier?  Yes    Skin barrier type:mepilexlite       Comments:        Mitchell Mallory RCP  
Date: 4/20/2024    Time: 10:20 PM    Patient Placed On BIPAP/CPAP/ Non-Invasive Ventilation?  Yes    If no must comment.  Facial area red/color change? No           If YES are Blister/Lesion present?Yes   If yes must notify nursing staff  BIPAP/CPAP skin barrier?  Yes    Skin barrier type:mepilexlite       Comments: patient has a scab on bridge of nose        Cindy Roth RCP  
Date: 4/20/2024    Time: 1:16 AM    Patient Placed On BIPAP/CPAP/ Non-Invasive Ventilation?  Yes    If no must comment.  Facial area red/color change? No           If YES are Blister/Lesion present?No   If yes must notify nursing staff  BIPAP/CPAP skin barrier?  Yes    Skin barrier type:mepilexlite       Comments:        Nia Rosas RCP  
Dr. Jarrett made aware of patient request for additional anxiety medications. PRN medications reviewed and No new orders at this time and stated to continue current regimen as ordered.   
Left message for  f to c/b and speak to Dion ( Charge nurse)  
Left message with answering service for Dr. Jarrett.  
Occupational Therapy  OCCUPATIONAL THERAPY INITIAL EVALUATION    [facility]  [address], [place]      Date:2024                                                Patient Name: Lana Phillips  MRN: [id number]  : 1955  Room: [address] [place] [address]-    Evaluating OT: Oumar Echevarria OTR/L #[id number]     Referring Provider: Baldemar Jarrett DO   Specific Provider Orders/Date: OT eval and treat 24    Diagnosis: Pneumonia due to organism [J18.9]  Acute on chronic respiratory failure with hypercapnia (HCC) [J96.22]  Pneumonia due to infectious organism, unspecified laterality, unspecified part of lung [J18.9]  Sepsis, due to unspecified organism, unspecified whether acute organ dysfunction present (HCC) [A41.9]   Pt admitted to hospital with SOB     Pertinent Medical History:  has a past medical history of Acute exacerbation of chronic obstructive pulmonary disease (COPD) (HCC), Acute respiratory failure (HCC), Acute respiratory failure with hypoxia (HCC), Acute respiratory failure with hypoxia (HCC), Acute respiratory failure with hypoxia (HCC), 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        Precautions:  Fall Risk, droplet precautions, O2     Assessment of current deficits    [x] Functional mobility          [x]ADLs           [x] Strength                  []Cognition    [x] Functional transfers        [x] IADLs         [x] Safety 
Patient's blood pressure in the 190s systolically.  New orders placed.  New IV placement needed prior to medication administration.  Medication administered.  Will recheck.  
Per Endo nurse Tasha milan to take IV hydralazine prior to bronchoscopy    
Physical Therapy Initial Evaluation    Name: Lana Phillips  : 1955  MRN: [id number]      Date of Service: 2024    Evaluating PT:  Baldemar Em, PT [id number]    Referring provider/PT Order:  PT Eval and Treat   24  PT eval and treat  Start:  24,   End:  24,   ONE TIME,   Standing Count:  1 Occurrences,   R         Kolby, Baldemar VU,      Room #:  6401/6401-A  Diagnosis:  Pneumonia due to organism [J18.9]  Acute on chronic respiratory failure with hypercapnia (HCC) [J96.22]  Pneumonia due to infectious organism, unspecified laterality, unspecified part of lung [J18.9]  Sepsis, due to unspecified organism, unspecified whether acute organ dysfunction present (HCC) [A41.9]  PMHx/PSHx:     has a past medical history of Acute exacerbation of chronic obstructive pulmonary disease (COPD) (HCC), Acute respiratory failure (HCC), Acute respiratory failure with hypoxia (HCC), Acute respiratory failure with hypoxia (HCC), Acute respiratory failure with hypoxia (HCC), 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    has a past surgical history that includes back surgery; Left oophorectomy; Upper gastrointestinal endoscopy (N/A, 10/16/2023); Pain management procedure (N/A, 2023); invasive vascular (N/A, 2024); invasive vascular (N/A, 2024); and bronchoscopy (N/A, 2024).     Procedure/Surgery:  none  Precautions:  Falls, O2 and droplet isolation    
Subjective:    Chief complaint:    \Feeling somewhat better  Breathing is stable    No problems overnight.  Denies chest pain, angina, and dyspnea.  Denies abdominal pain.  Tolerating diet.  No nausea or vomiting.    Objective:    BP (!) 145/66   Pulse 80   Temp 98.6 °F (37 °C) (Oral)   Resp 18   Ht 1.727 m (5' 8\")   Wt 44.9 kg (99 lb)   SpO2 98%   BMI 15.05 kg/m²   General : Awake ,alert,no distress.  Heart:  RRR, no murmurs, gallops, or rubs.  Lungs:  CTA bilaterally, no wheeze, rales or rhonchi  Abd: bowel sounds present, nontender, nondistended, no masses  Extrem:  No clubbing, cyanosis, or edema    CBC:   Lab Results   Component Value Date/Time    WBC 17.7 04/13/2024 06:02 AM    RBC 3.68 04/13/2024 06:02 AM    HGB 9.8 04/13/2024 06:02 AM    HCT 34.2 04/13/2024 06:02 AM    MCV 92.9 04/13/2024 06:02 AM    MCH 26.6 04/13/2024 06:02 AM    MCHC 28.7 04/13/2024 06:02 AM    RDW 20.4 04/13/2024 06:02 AM     04/13/2024 06:02 AM    MPV 9.8 04/13/2024 06:02 AM     BMP:    Lab Results   Component Value Date/Time     04/13/2024 06:02 AM    K 4.2 04/13/2024 06:02 AM    K 3.3 12/30/2022 02:49 AM    CL 92 04/13/2024 06:02 AM    CO2 35 04/13/2024 06:02 AM    BUN 13 04/13/2024 06:02 AM    LABALBU 3.6 04/13/2024 06:02 AM    CREATININE 0.5 04/13/2024 06:02 AM    CALCIUM 8.6 04/13/2024 06:02 AM    GFRAA >60 07/28/2022 09:31 AM    LABGLOM >90 04/13/2024 06:02 AM    GLUCOSE 188 04/13/2024 06:02 AM     PT/INR:    Lab Results   Component Value Date/Time    PROTIME 12.0 10/15/2023 06:05 AM    INR 1.1 10/15/2023 06:05 AM     Troponin:  No results found for: \"TROPONINI\"    No results for input(s): \"LABURIN\" in the last 72 hours.  No results for input(s): \"BC\" in the last 72 hours.  No results for input(s): \"BLOODCULT2\" in the last 72 hours.      Current Facility-Administered Medications:     sodium chloride flush 0.9 % injection 5-40 mL, 5-40 mL, IntraVENous, 2 times per day, Keila Hernandez MD, 10 mL at 04/14/24 0809    
Subjective:    The patient is awake and alert.  No problems overnight.  Denies chest pain, angina, and dyspnea.  Denies abdominal pain.  Tolerating diet.      Objective:    BP (!) 114/55   Pulse 61   Temp 98.2 °F (36.8 °C) (Axillary)   Resp 16   Ht 1.727 m (5' 8\")   Wt 44.9 kg (99 lb)   SpO2 100%   BMI 15.05 kg/m²   HEENT no adenopathy no bruits  Heart:  RRR, no murmurs, gallops, or rubs.  Lungs:  scattered rhonchi   Abd: bowel sounds present, nontender, nondistended, no masses  Extrem:  No clubbing, cyanosis, or edema    Scheduled Meds:   predniSONE  10 mg Oral TID    clonazePAM  0.5 mg Oral Q12H    dilTIAZem  180 mg Oral Daily    oxyBUTYnin  5 mg Oral Nightly    sodium chloride flush  5-40 mL IntraVENous 2 times per day    sodium chloride flush  5-40 mL IntraVENous 2 times per day    vitamin C  500 mg Oral Daily    vitamin D3  5,000 Units Oral Daily    vitamin B-12  500 mcg Oral Daily    pantoprazole  40 mg Oral QAM AC    aspirin  81 mg Oral Daily    apixaban  5 mg Oral BID    pregabalin  50 mg Oral TID    methIMAzole  15 mg Oral Daily    traZODone  100 mg Oral Nightly    citalopram  20 mg Oral Daily    guaiFENesin  400 mg Oral TID    sennosides-docusate sodium  1 tablet Oral Daily    rosuvastatin  20 mg Oral Daily    folic acid  1 mg Oral Daily    arformoterol tartrate  15 mcg Nebulization BID RT    budesonide  1 mg Nebulization BID RT    levalbuterol  0.63 mg Nebulization Q4H While awake    sodium chloride (Inhalant)  4 mL Nebulization BID     Continuous Infusions:   sodium chloride      sodium chloride       PRN Meds:.hydrALAZINE, sodium chloride flush, sodium chloride, sodium chloride flush, sodium chloride, prochlorperazine, acetaminophen, hydrOXYzine pamoate, albuterol, polyethylene glycol, busPIRone, bisacodyl, magnesium hydroxide  ADULT DIET; Regular  ADULT ORAL NUTRITION SUPPLEMENT; Breakfast, Lunch, Dinner; Diabetic Oral Supplement      Assessment:    Patient Active Problem List   Diagnosis    
[birth year]      24HR PULSE OXIMETRY RANGE:    SpO2  Av.8 %  Min: 95 %  Max: 100 %    General appearance: alert, appears stated age, and cooperative  Lungs: rhonchi bilaterally and decreased breath sounds bilaterally  Heart: regular rate and rhythm, S1, S2 normal, no murmur, click, rub or gallop  Abdomen: soft, non-tender; bowel sounds normal; no masses,  no organomegaly  Extremities: extremities normal, atraumatic, no cyanosis or edema  Neurologic: Mental status: Alert, oriented, thought content appropriate    Data    CBC:   Recent Labs     24   WBC 15.2*   HGB 8.8*   HCT 29.9*   MCV 94.3            BMP:  Recent Labs     24      K 4.6   CL 96*   CO2 35*   BUN 18   CREATININE 0.5      ALB:3,BILIDIR:3,BILITOT:3,ALKPHOS:3)@    PT/INR: No results for input(s): \"PROTIME\", \"INR\" in the last 72 hours.    ABG:   No results for input(s): \"PH\", \"PO2\", \"PCO2\", \"HCO3\", \"BE\", \"O2SAT\", \"METHB\", \"O2HB\", \"COHB\", \"O2CON\", \"HHB\", \"THB\" in the last 72 hours.    FiO2 : 55 %       Radiology/Other tests reviewed: none    Assessment:     Principal Problem:    Pneumonia due to organism  Resolved Problems:    * No resolved hospital problems. *      Plan:       Cont with steroids, taper as tolerated, can consider prednisone on weekend  Cont with nebs, EZPAP, and flutter device, observe respiratory function and cough/sputum production  Cont with oxygen daytime and NIV at night, will try to find out where in process of obtaining NIV through insurance is, appears may be able to get BIPAP ST instead for her (?) by Monday, may need to keep her here until then given her frequent rehospitalizations  Anxiety medications as per PCP, cont with klonopin bid  OOB to chair, ambulate as tolerated      Time at the bedside, reviewing labs and radiographs, reviewing notes and consultations, discussing with staff and family was more than 50 minutes.      Thanks for letting us see this patient in consultation.  Please 
ORAL NUTRITION SUPPLEMENT; Breakfast, Lunch, Dinner; Diabetic Oral Supplement      Assessment:    Patient Active Problem List   Diagnosis    Primary hypertension    COPD (chronic obstructive pulmonary disease) (HCC)    Gastroesophageal reflux disease    Constipation    Unexplained weight loss    Protein deficiency (HCC)    Diet-controlled diabetes mellitus (HCC)    Multiple nodules of lung    Abnormal EKG    Mixed hyperlipidemia    Hyperthyroidism    Vitamin B12 deficiency (dietary) anemia    Osteoporosis    Elevated troponin level not due to acute coronary syndrome    Tachycardia, unspecified    Pneumonia due to organism    Atrial fibrillation (HCC)    Hypoxia    Severe protein-calorie malnutrition (HCC)    Anxiety    Numbness and tingling of both lower extremities    Acute on chronic anemia    Chronic pain syndrome [G89.4]    Chronic bilateral low back pain with bilateral sciatica    Lumbar radiculopathy    Lumbar disc disorder    Critical limb ischemia of left lower extremity with rest pain (HCC)    Atherosclerosis of artery of extremity with rest pain (HCC)    Peripheral vascular disease, unspecified (HCC)    Atherosclerosis 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 (HCC)    COPD with acute exacerbation (HCC)    Iron deficiency anemia, unspecified    Acute respiratory failure with hypoxia and hypercarbia (HCC)    COPD exacerbation (HCC)    Iron deficiency anemia       Plan:  Cont weaning steroids  Increase activity  Discharge planning for home         Radha Kraft DO  6:21 AM  4/20/2024  
tartrate (BROVANA) nebulizer solution 15 mcg, 15 mcg, Nebulization, BID RT, 15 mcg at 04/17/24 0744 **AND** [DISCONTINUED] budesonide (PULMICORT) nebulizer suspension 250 mcg, 0.25 mg, Nebulization, BID RT, Masoud Joseph MD    budesonide (PULMICORT) nebulizer suspension 1,000 mcg, 1 mg, Nebulization, BID RT, Jose Price MD, 1,000 mcg at 04/17/24 0744    levalbuterol (XOPENEX) nebulization 0.63 mg, 0.63 mg, Nebulization, Q4H While awake, Jose Price MD, 0.63 mg at 04/17/24 0744    sodium chloride (Inhalant) 3 % nebulizer solution 4 mL, 4 mL, Nebulization, BID, Jose Price MD, 4 mL at 04/17/24 0744    Objective:    BP (!) 152/64   Pulse 85   Temp 98.6 °F (37 °C) (Axillary)   Resp 20   Ht 1.727 m (5' 8\")   Wt 44.9 kg (99 lb)   SpO2 96%   BMI 15.05 kg/m²     Heart:  reg  Lungs:  rhonchi  Abd: + bs soft nontender  Extrem:  w/o edema    CBC with Differential:    Lab Results   Component Value Date/Time    WBC 15.2 04/16/2024 04:13 AM    RBC 3.17 04/16/2024 04:13 AM    HGB 8.8 04/16/2024 04:13 AM    HCT 29.9 04/16/2024 04:13 AM     04/16/2024 04:13 AM    MCV 94.3 04/16/2024 04:13 AM    MCH 27.8 04/16/2024 04:13 AM    MCHC 29.4 04/16/2024 04:13 AM    RDW 20.5 04/16/2024 04:13 AM    NRBC 1 04/06/2024 11:00 AM    LYMPHOPCT 10 04/13/2024 06:02 AM    MONOPCT 10 04/13/2024 06:02 AM    MYELOPCT 3 04/11/2024 04:13 AM    BASOPCT 0 04/13/2024 06:02 AM    MONOSABS 1.85 04/13/2024 06:02 AM    LYMPHSABS 1.69 04/13/2024 06:02 AM    EOSABS 0.00 04/13/2024 06:02 AM    BASOSABS 0.00 04/13/2024 06:02 AM     CMP:    Lab Results   Component Value Date/Time     04/16/2024 04:13 AM    K 4.6 04/16/2024 04:13 AM    K 3.3 12/30/2022 02:49 AM    CL 96 04/16/2024 04:13 AM    CO2 35 04/16/2024 04:13 AM    BUN 18 04/16/2024 04:13 AM    CREATININE 0.5 04/16/2024 04:13 AM    GFRAA >60 07/28/2022 09:31 AM    LABGLOM >90 04/16/2024 04:13 AM    GLUCOSE 154 04/16/2024 04:13 AM    PROT 6.5 04/13/2024 06:02 AM    
04:57 AM    BILITOT 0.2 04/13/2024 06:02 AM    ALKPHOS 75 04/13/2024 06:02 AM    AST 32 04/13/2024 06:02 AM    ALT 18 04/13/2024 06:02 AM     Warfarin PT/INR:    Lab Results   Component Value Date    INR 1.1 10/15/2023    INR 1.0 12/02/2022    PROTIME 12.0 10/15/2023    PROTIME 11.2 12/02/2022       Assessment:    Principal Problem:    Pneumonia due to organism  Active Problems:    Severe protein-calorie malnutrition (HCC)  Resolved Problems:    * No resolved hospital problems. *      Plan:  Discharge home once arrangements made        Baldemar Jarrett DO  8:11 AM  4/23/2024    
INR 1.1 10/15/2023    INR 1.0 12/02/2022    PROTIME 12.0 10/15/2023    PROTIME 11.2 12/02/2022       Assessment:    Principal Problem:    Pneumonia due to organism  Resolved Problems:    * No resolved hospital problems. *      Plan:  For bronch today        Baldemar Jarrett DO  8:35 AM  4/16/2024

## 2024-04-24 ENCOUNTER — CARE COORDINATION (OUTPATIENT)
Dept: CARE COORDINATION | Age: 69
End: 2024-04-24

## 2024-04-24 NOTE — CARE COORDINATION
Care Transitions Initial Follow Up Call    Call within 2 business days of discharge: Yes    Care Transition Nurse attempted initial CT outreach leaving HIPAA VM, purpose of call, my contact, and reminder to schedule appt.     Patient: Lana Phillips Patient : 1955   MRN: [id number]  Reason for Admission: 2024 - 2024 [facility] IP. PNE s/p bronchoscopy wash, Malnutrition.   Discharge Date: 24 RARS: Readmission Risk Score: 37.3  Readmit  CT    [facility] [place] clinical pool routed to schedule 1 wk PCP appt.    Expand C    Next PCP  1:00  [facility]  1:30  PM/MIRELLA Carrillo 5/10 2:00  Follow up with Jose Price MD (Pulmonary Disease)     START taking:  arformoterol tartrate (BROVANA)  budesonide (PULMICORT)  clonazePAM (KLONOPIN)  CHANGE how you take:  dilTIAZem (CARDIZEM CD)  predniSONE (DELTASONE)  STOP taking:  Breztri Aerosphere 160-9-4.8 MCG/ACT Aero (Budeson-  Glycopyrrol-Formoterol)  doxycycline hyclate 100 MG capsule (VIBRAMYCIN)    Last Discharge Facility       Date Complaint Diagnosis Description Type Department Provider    24 Chest Pain Pneumonia due to infectious organism, unspecified laterality, unspecified part of lung ... ED to Hosp-Admission (Discharged) (ADMITTED) [facility] Baldemar Jarrett DO; Pedro,...            Radha Elliott RN

## 2024-04-25 ENCOUNTER — HOSPITAL ENCOUNTER (INPATIENT)
Age: 69
LOS: 4 days | Discharge: NURSING, REHAB OR LONG-TERM CARE | DRG: 190 | End: 2024-04-29
Attending: EMERGENCY MEDICINE | Admitting: INTERNAL MEDICINE
Payer: MEDICARE

## 2024-04-25 ENCOUNTER — CARE COORDINATION (OUTPATIENT)
Dept: CARE COORDINATION | Age: 69
End: 2024-04-25

## 2024-04-25 ENCOUNTER — APPOINTMENT (OUTPATIENT)
Dept: GENERAL RADIOLOGY | Age: 69
DRG: 190 | End: 2024-04-25
Payer: MEDICARE

## 2024-04-25 DIAGNOSIS — R06.89 DYSPNEA AND RESPIRATORY ABNORMALITIES: ICD-10-CM

## 2024-04-25 DIAGNOSIS — R06.00 DYSPNEA AND RESPIRATORY ABNORMALITIES: ICD-10-CM

## 2024-04-25 DIAGNOSIS — Z99.81 SUPPLEMENTAL OXYGEN DEPENDENT: ICD-10-CM

## 2024-04-25 DIAGNOSIS — F41.9 ANXIETY: ICD-10-CM

## 2024-04-25 DIAGNOSIS — J44.1 COPD WITH ACUTE EXACERBATION (HCC): Primary | ICD-10-CM

## 2024-04-25 LAB
ALBUMIN SERPL-MCNC: 3.3 G/DL (ref 3.5–5.2)
ALP SERPL-CCNC: 59 U/L (ref 35–104)
ALT SERPL-CCNC: 34 U/L (ref 0–32)
AMORPH SED URNS QL MICRO: PRESENT
ANION GAP SERPL CALCULATED.3IONS-SCNC: 10 MMOL/L (ref 7–16)
AST SERPL-CCNC: 35 U/L (ref 0–31)
B PARAP IS1001 DNA NPH QL NAA+NON-PROBE: NOT DETECTED
B PERT DNA SPEC QL NAA+PROBE: NOT DETECTED
B.E.: 15.8 MMOL/L (ref -3–3)
B.E.: 16.1 MMOL/L (ref -3–3)
BACTERIA URNS QL MICRO: ABNORMAL
BASOPHILS # BLD: 0 K/UL (ref 0–0.2)
BASOPHILS NFR BLD: 0 % (ref 0–2)
BILIRUB SERPL-MCNC: 0.3 MG/DL (ref 0–1.2)
BILIRUB UR QL STRIP: NEGATIVE
BNP SERPL-MCNC: 496 PG/ML (ref 0–125)
BUN SERPL-MCNC: 17 MG/DL (ref 6–23)
C PNEUM DNA NPH QL NAA+NON-PROBE: NOT DETECTED
CALCIUM SERPL-MCNC: 9 MG/DL (ref 8.6–10.2)
CHLORIDE SERPL-SCNC: 94 MMOL/L (ref 98–107)
CLARITY UR: CLEAR
CO2 SERPL-SCNC: 38 MMOL/L (ref 22–29)
COHB: 0.7 % (ref 0–1.5)
COHB: 1 % (ref 0–1.5)
COLOR UR: YELLOW
CREAT SERPL-MCNC: 0.5 MG/DL (ref 0.5–1)
CRITICAL: ABNORMAL
CRITICAL: ABNORMAL
DATE ANALYZED: ABNORMAL
DATE ANALYZED: ABNORMAL
DATE OF COLLECTION: ABNORMAL
DATE OF COLLECTION: ABNORMAL
EKG ATRIAL RATE: 63 BPM
EKG P-R INTERVAL: 152 MS
EKG Q-T INTERVAL: 424 MS
EKG QRS DURATION: 76 MS
EKG QTC CALCULATION (BAZETT): 433 MS
EKG R AXIS: 67 DEGREES
EKG T AXIS: 79 DEGREES
EKG VENTRICULAR RATE: 63 BPM
EOSINOPHIL # BLD: 0 K/UL (ref 0.05–0.5)
EOSINOPHILS RELATIVE PERCENT: 0 % (ref 0–6)
EPI CELLS #/AREA URNS HPF: ABNORMAL /HPF
ERYTHROCYTE [DISTWIDTH] IN BLOOD BY AUTOMATED COUNT: 23 % (ref 11.5–15)
FLUAV RNA NPH QL NAA+NON-PROBE: NOT DETECTED
FLUBV RNA NPH QL NAA+NON-PROBE: NOT DETECTED
GFR SERPL CREATININE-BSD FRML MDRD: >90 ML/MIN/1.73M2
GLUCOSE SERPL-MCNC: 86 MG/DL (ref 74–99)
GLUCOSE UR STRIP-MCNC: NEGATIVE MG/DL
HADV DNA NPH QL NAA+NON-PROBE: NOT DETECTED
HCO3: 42.5 MMOL/L (ref 22–26)
HCO3: 43.2 MMOL/L (ref 22–26)
HCOV 229E RNA NPH QL NAA+NON-PROBE: NOT DETECTED
HCOV HKU1 RNA NPH QL NAA+NON-PROBE: NOT DETECTED
HCOV NL63 RNA NPH QL NAA+NON-PROBE: NOT DETECTED
HCOV OC43 RNA NPH QL NAA+NON-PROBE: NOT DETECTED
HCT VFR BLD AUTO: 29.4 % (ref 34–48)
HGB BLD-MCNC: 8.5 G/DL (ref 11.5–15.5)
HGB UR QL STRIP.AUTO: ABNORMAL
HHB: 11.9 % (ref 0–5)
HHB: 6.1 % (ref 0–5)
HMPV RNA NPH QL NAA+NON-PROBE: NOT DETECTED
HPIV1 RNA NPH QL NAA+NON-PROBE: NOT DETECTED
HPIV2 RNA NPH QL NAA+NON-PROBE: NOT DETECTED
HPIV3 RNA NPH QL NAA+NON-PROBE: NOT DETECTED
HPIV4 RNA NPH QL NAA+NON-PROBE: NOT DETECTED
KETONES UR STRIP-MCNC: NEGATIVE MG/DL
LAB: ABNORMAL
LAB: ABNORMAL
LACTATE BLDV-SCNC: 1.3 MMOL/L (ref 0.5–1.9)
LACTATE BLDV-SCNC: 1.4 MMOL/L (ref 0.5–1.9)
LEUKOCYTE ESTERASE UR QL STRIP: ABNORMAL
LYMPHOCYTES NFR BLD: 1.51 K/UL (ref 1.5–4)
LYMPHOCYTES RELATIVE PERCENT: 6 % (ref 20–42)
Lab: 1220
Lab: 1530
M PNEUMO DNA NPH QL NAA+NON-PROBE: NOT DETECTED
MAGNESIUM SERPL-MCNC: 1.9 MG/DL (ref 1.6–2.6)
MCH RBC QN AUTO: 28 PG (ref 26–35)
MCHC RBC AUTO-ENTMCNC: 28.9 G/DL (ref 32–34.5)
MCV RBC AUTO: 96.7 FL (ref 80–99.9)
METHB: 0.1 % (ref 0–1.5)
METHB: 0.1 % (ref 0–1.5)
MICROORGANISM SPEC CULT: ABNORMAL
MICROORGANISM SPEC CULT: NORMAL
MICROORGANISM/AGENT SPEC: ABNORMAL
MICROORGANISM/AGENT SPEC: NORMAL
MODE: ABNORMAL
MODE: ABNORMAL
MONOCYTES NFR BLD: 0.65 K/UL (ref 0.1–0.95)
MONOCYTES NFR BLD: 3 % (ref 2–12)
MYELOCYTES ABSOLUTE COUNT: 0.43 K/UL
MYELOCYTES: 2 %
NEUTROPHILS NFR BLD: 90 % (ref 43–80)
NEUTS SEG NFR BLD: 22.21 K/UL (ref 1.8–7.3)
NITRITE UR QL STRIP: NEGATIVE
O2 SATURATION: 88 % (ref 92–98.5)
O2 SATURATION: 93.8 % (ref 92–98.5)
O2HB: 87.3 % (ref 94–97)
O2HB: 92.8 % (ref 94–97)
OPERATOR ID: 2067
OPERATOR ID: 2067
PATIENT TEMP: 37 C
PATIENT TEMP: 37 C
PCO2: 66 MMHG (ref 35–45)
PCO2: 68.4 MMHG (ref 35–45)
PH BLOOD GAS: 7.42 (ref 7.35–7.45)
PH BLOOD GAS: 7.43 (ref 7.35–7.45)
PH UR STRIP: 7.5 [PH] (ref 5–9)
PLATELET # BLD AUTO: 218 K/UL (ref 130–450)
PMV BLD AUTO: 11.7 FL (ref 7–12)
PO2: 56 MMHG (ref 75–100)
PO2: 71.2 MMHG (ref 75–100)
POTASSIUM SERPL-SCNC: 4.4 MMOL/L (ref 3.5–5)
PROCALCITONIN SERPL-MCNC: 0.35 NG/ML (ref 0–0.08)
PROT SERPL-MCNC: 5.7 G/DL (ref 6.4–8.3)
PROT UR STRIP-MCNC: ABNORMAL MG/DL
RBC # BLD AUTO: 3.04 M/UL (ref 3.5–5.5)
RBC # BLD: ABNORMAL 10*6/UL
RBC #/AREA URNS HPF: ABNORMAL /HPF
RSV RNA NPH QL NAA+NON-PROBE: NOT DETECTED
RV+EV RNA NPH QL NAA+NON-PROBE: NOT DETECTED
SARS-COV-2 RNA NPH QL NAA+NON-PROBE: NOT DETECTED
SODIUM SERPL-SCNC: 142 MMOL/L (ref 132–146)
SOURCE, BLOOD GAS: ABNORMAL
SOURCE, BLOOD GAS: ABNORMAL
SP GR UR STRIP: 1.01 (ref 1–1.03)
SPECIMEN DESCRIPTION: ABNORMAL
SPECIMEN DESCRIPTION: NORMAL
SPECIMEN DESCRIPTION: NORMAL
THB: 10 G/DL (ref 11.5–16.5)
THB: 10.1 G/DL (ref 11.5–16.5)
TIME ANALYZED: 1224
TIME ANALYZED: 1536
TROPONIN I SERPL HS-MCNC: 33 NG/L (ref 0–9)
TROPONIN I SERPL HS-MCNC: 43 NG/L (ref 0–9)
UROBILINOGEN UR STRIP-ACNC: 0.2 EU/DL (ref 0–1)
WBC #/AREA URNS HPF: ABNORMAL /HPF
WBC OTHER # BLD: 24.8 K/UL (ref 4.5–11.5)

## 2024-04-25 PROCEDURE — 0202U NFCT DS 22 TRGT SARS-COV-2: CPT

## 2024-04-25 PROCEDURE — 94640 AIRWAY INHALATION TREATMENT: CPT

## 2024-04-25 PROCEDURE — 96375 TX/PRO/DX INJ NEW DRUG ADDON: CPT

## 2024-04-25 PROCEDURE — 80053 COMPREHEN METABOLIC PANEL: CPT

## 2024-04-25 PROCEDURE — 2500000003 HC RX 250 WO HCPCS: Performed by: EMERGENCY MEDICINE

## 2024-04-25 PROCEDURE — 84484 ASSAY OF TROPONIN QUANT: CPT

## 2024-04-25 PROCEDURE — 83880 ASSAY OF NATRIURETIC PEPTIDE: CPT

## 2024-04-25 PROCEDURE — 71045 X-RAY EXAM CHEST 1 VIEW: CPT

## 2024-04-25 PROCEDURE — 6370000000 HC RX 637 (ALT 250 FOR IP): Performed by: EMERGENCY MEDICINE

## 2024-04-25 PROCEDURE — 94664 DEMO&/EVAL PT USE INHALER: CPT

## 2024-04-25 PROCEDURE — 93010 ELECTROCARDIOGRAM REPORT: CPT | Performed by: INTERNAL MEDICINE

## 2024-04-25 PROCEDURE — 2580000003 HC RX 258: Performed by: EMERGENCY MEDICINE

## 2024-04-25 PROCEDURE — 2140000000 HC CCU INTERMEDIATE R&B

## 2024-04-25 PROCEDURE — 6360000002 HC RX W HCPCS: Performed by: EMERGENCY MEDICINE

## 2024-04-25 PROCEDURE — 87040 BLOOD CULTURE FOR BACTERIA: CPT

## 2024-04-25 PROCEDURE — 5A09357 ASSISTANCE WITH RESPIRATORY VENTILATION, LESS THAN 24 CONSECUTIVE HOURS, CONTINUOUS POSITIVE AIRWAY PRESSURE: ICD-10-PCS | Performed by: INTERNAL MEDICINE

## 2024-04-25 PROCEDURE — 85025 COMPLETE CBC W/AUTO DIFF WBC: CPT

## 2024-04-25 PROCEDURE — 84145 PROCALCITONIN (PCT): CPT

## 2024-04-25 PROCEDURE — 93005 ELECTROCARDIOGRAM TRACING: CPT | Performed by: EMERGENCY MEDICINE

## 2024-04-25 PROCEDURE — 94660 CPAP INITIATION&MGMT: CPT

## 2024-04-25 PROCEDURE — 96365 THER/PROPH/DIAG IV INF INIT: CPT

## 2024-04-25 PROCEDURE — 83735 ASSAY OF MAGNESIUM: CPT

## 2024-04-25 PROCEDURE — 99285 EMERGENCY DEPT VISIT HI MDM: CPT

## 2024-04-25 PROCEDURE — 87154 CUL TYP ID BLD PTHGN 6+ TRGT: CPT

## 2024-04-25 PROCEDURE — 83605 ASSAY OF LACTIC ACID: CPT

## 2024-04-25 PROCEDURE — 2700000000 HC OXYGEN THERAPY PER DAY

## 2024-04-25 PROCEDURE — 82805 BLOOD GASES W/O2 SATURATION: CPT

## 2024-04-25 PROCEDURE — 6360000002 HC RX W HCPCS: Performed by: INTERNAL MEDICINE

## 2024-04-25 PROCEDURE — 81001 URINALYSIS AUTO W/SCOPE: CPT

## 2024-04-25 RX ORDER — BUDESONIDE, GLYCOPYRROLATE, AND FORMOTEROL FUMARATE 160; 9; 4.8 UG/1; UG/1; UG/1
2 AEROSOL, METERED RESPIRATORY (INHALATION) 2 TIMES DAILY
COMMUNITY
End: 2024-04-25

## 2024-04-25 RX ORDER — ACETAMINOPHEN 650 MG/1
650 SUPPOSITORY RECTAL EVERY 6 HOURS PRN
Status: DISCONTINUED | OUTPATIENT
Start: 2024-04-25 | End: 2024-04-29 | Stop reason: HOSPADM

## 2024-04-25 RX ORDER — HYDROXYZINE PAMOATE 25 MG/1
25 CAPSULE ORAL ONCE
Status: COMPLETED | OUTPATIENT
Start: 2024-04-25 | End: 2024-04-25

## 2024-04-25 RX ORDER — SODIUM CHLORIDE 0.9 % (FLUSH) 0.9 %
5-40 SYRINGE (ML) INJECTION EVERY 12 HOURS SCHEDULED
Status: DISCONTINUED | OUTPATIENT
Start: 2024-04-26 | End: 2024-04-29 | Stop reason: HOSPADM

## 2024-04-25 RX ORDER — BUDESONIDE 0.5 MG/2ML
1 INHALANT ORAL
Status: DISCONTINUED | OUTPATIENT
Start: 2024-04-26 | End: 2024-04-29 | Stop reason: HOSPADM

## 2024-04-25 RX ORDER — IPRATROPIUM BROMIDE AND ALBUTEROL SULFATE 2.5; .5 MG/3ML; MG/3ML
1 SOLUTION RESPIRATORY (INHALATION)
Status: DISCONTINUED | OUTPATIENT
Start: 2024-04-26 | End: 2024-04-29 | Stop reason: HOSPADM

## 2024-04-25 RX ORDER — MAGNESIUM SULFATE IN WATER 40 MG/ML
2000 INJECTION, SOLUTION INTRAVENOUS PRN
Status: DISCONTINUED | OUTPATIENT
Start: 2024-04-25 | End: 2024-04-29 | Stop reason: HOSPADM

## 2024-04-25 RX ORDER — POTASSIUM CHLORIDE 7.45 MG/ML
10 INJECTION INTRAVENOUS PRN
Status: DISCONTINUED | OUTPATIENT
Start: 2024-04-25 | End: 2024-04-29 | Stop reason: HOSPADM

## 2024-04-25 RX ORDER — CLONAZEPAM 0.5 MG/1
0.5 TABLET ORAL ONCE
Status: COMPLETED | OUTPATIENT
Start: 2024-04-25 | End: 2024-04-25

## 2024-04-25 RX ORDER — ARFORMOTEROL TARTRATE 15 UG/2ML
15 SOLUTION RESPIRATORY (INHALATION)
Status: DISCONTINUED | OUTPATIENT
Start: 2024-04-26 | End: 2024-04-29 | Stop reason: HOSPADM

## 2024-04-25 RX ORDER — GUAIFENESIN 400 MG/1
400 TABLET ORAL 3 TIMES DAILY
Status: DISCONTINUED | OUTPATIENT
Start: 2024-04-26 | End: 2024-04-29 | Stop reason: HOSPADM

## 2024-04-25 RX ORDER — ROSUVASTATIN CALCIUM 20 MG/1
20 TABLET, COATED ORAL NIGHTLY
Status: DISCONTINUED | OUTPATIENT
Start: 2024-04-26 | End: 2024-04-29 | Stop reason: HOSPADM

## 2024-04-25 RX ORDER — CLONAZEPAM 0.5 MG/1
0.5 TABLET ORAL EVERY 12 HOURS
Status: DISCONTINUED | OUTPATIENT
Start: 2024-04-26 | End: 2024-04-28

## 2024-04-25 RX ORDER — IPRATROPIUM BROMIDE AND ALBUTEROL SULFATE 2.5; .5 MG/3ML; MG/3ML
2 SOLUTION RESPIRATORY (INHALATION) ONCE
Status: COMPLETED | OUTPATIENT
Start: 2024-04-25 | End: 2024-04-25

## 2024-04-25 RX ORDER — ALBUTEROL SULFATE 90 UG/1
2 AEROSOL, METERED RESPIRATORY (INHALATION) EVERY 6 HOURS PRN
COMMUNITY

## 2024-04-25 RX ORDER — SODIUM CHLORIDE 0.9 % (FLUSH) 0.9 %
5-40 SYRINGE (ML) INJECTION PRN
Status: DISCONTINUED | OUTPATIENT
Start: 2024-04-25 | End: 2024-04-29 | Stop reason: HOSPADM

## 2024-04-25 RX ORDER — ACETAMINOPHEN 325 MG/1
650 TABLET ORAL EVERY 6 HOURS PRN
Status: DISCONTINUED | OUTPATIENT
Start: 2024-04-25 | End: 2024-04-29 | Stop reason: HOSPADM

## 2024-04-25 RX ORDER — SENNA AND DOCUSATE SODIUM 50; 8.6 MG/1; MG/1
1 TABLET, FILM COATED ORAL DAILY
Status: DISCONTINUED | OUTPATIENT
Start: 2024-04-26 | End: 2024-04-29 | Stop reason: HOSPADM

## 2024-04-25 RX ORDER — TRAZODONE HYDROCHLORIDE 50 MG/1
50 TABLET ORAL NIGHTLY PRN
Status: DISCONTINUED | OUTPATIENT
Start: 2024-04-25 | End: 2024-04-29 | Stop reason: HOSPADM

## 2024-04-25 RX ORDER — IPRATROPIUM BROMIDE AND ALBUTEROL SULFATE 2.5; .5 MG/3ML; MG/3ML
3 SOLUTION RESPIRATORY (INHALATION) ONCE
Status: COMPLETED | OUTPATIENT
Start: 2024-04-25 | End: 2024-04-25

## 2024-04-25 RX ORDER — PANTOPRAZOLE SODIUM 40 MG/1
40 TABLET, DELAYED RELEASE ORAL
Status: DISCONTINUED | OUTPATIENT
Start: 2024-04-26 | End: 2024-04-29 | Stop reason: HOSPADM

## 2024-04-25 RX ORDER — ASPIRIN 81 MG/1
81 TABLET ORAL DAILY
Status: DISCONTINUED | OUTPATIENT
Start: 2024-04-26 | End: 2024-04-29 | Stop reason: HOSPADM

## 2024-04-25 RX ORDER — DILTIAZEM HYDROCHLORIDE 180 MG/1
180 CAPSULE, COATED, EXTENDED RELEASE ORAL DAILY
Status: DISCONTINUED | OUTPATIENT
Start: 2024-04-26 | End: 2024-04-29 | Stop reason: HOSPADM

## 2024-04-25 RX ORDER — POTASSIUM CHLORIDE 20 MEQ/1
40 TABLET, EXTENDED RELEASE ORAL PRN
Status: DISCONTINUED | OUTPATIENT
Start: 2024-04-25 | End: 2024-04-29 | Stop reason: HOSPADM

## 2024-04-25 RX ORDER — FOLIC ACID 1 MG/1
1 TABLET ORAL DAILY
Status: DISCONTINUED | OUTPATIENT
Start: 2024-04-26 | End: 2024-04-29 | Stop reason: HOSPADM

## 2024-04-25 RX ORDER — SODIUM CHLORIDE 9 MG/ML
INJECTION, SOLUTION INTRAVENOUS PRN
Status: DISCONTINUED | OUTPATIENT
Start: 2024-04-25 | End: 2024-04-29 | Stop reason: HOSPADM

## 2024-04-25 RX ORDER — LORAZEPAM 2 MG/ML
0.5 INJECTION INTRAMUSCULAR ONCE
Status: COMPLETED | OUTPATIENT
Start: 2024-04-25 | End: 2024-04-25

## 2024-04-25 RX ORDER — METHIMAZOLE 5 MG/1
15 TABLET ORAL DAILY
Status: DISCONTINUED | OUTPATIENT
Start: 2024-04-26 | End: 2024-04-29 | Stop reason: HOSPADM

## 2024-04-25 RX ORDER — BUSPIRONE HYDROCHLORIDE 10 MG/1
10 TABLET ORAL 3 TIMES DAILY
Status: DISCONTINUED | OUTPATIENT
Start: 2024-04-26 | End: 2024-04-29 | Stop reason: HOSPADM

## 2024-04-25 RX ORDER — HYDROXYZINE PAMOATE 25 MG/1
25 CAPSULE ORAL 3 TIMES DAILY PRN
Status: DISCONTINUED | OUTPATIENT
Start: 2024-04-25 | End: 2024-04-29 | Stop reason: HOSPADM

## 2024-04-25 RX ORDER — LEVOFLOXACIN 5 MG/ML
750 INJECTION, SOLUTION INTRAVENOUS EVERY 24 HOURS
Status: DISCONTINUED | OUTPATIENT
Start: 2024-04-25 | End: 2024-04-29 | Stop reason: SDUPTHER

## 2024-04-25 RX ORDER — PROCHLORPERAZINE MALEATE 10 MG
10 TABLET ORAL EVERY 6 HOURS PRN
Status: DISCONTINUED | OUTPATIENT
Start: 2024-04-25 | End: 2024-04-29 | Stop reason: HOSPADM

## 2024-04-25 RX ORDER — POLYETHYLENE GLYCOL 3350 17 G/17G
17 POWDER, FOR SOLUTION ORAL DAILY PRN
Status: DISCONTINUED | OUTPATIENT
Start: 2024-04-25 | End: 2024-04-29 | Stop reason: HOSPADM

## 2024-04-25 RX ORDER — TRAZODONE HYDROCHLORIDE 50 MG/1
50 TABLET ORAL NIGHTLY PRN
COMMUNITY

## 2024-04-25 RX ORDER — PREGABALIN 50 MG/1
50 CAPSULE ORAL 3 TIMES DAILY
Status: DISCONTINUED | OUTPATIENT
Start: 2024-04-26 | End: 2024-04-29 | Stop reason: HOSPADM

## 2024-04-25 RX ORDER — CITALOPRAM 20 MG/1
20 TABLET ORAL DAILY
Status: DISCONTINUED | OUTPATIENT
Start: 2024-04-26 | End: 2024-04-29 | Stop reason: HOSPADM

## 2024-04-25 RX ADMIN — BUSPIRONE HYDROCHLORIDE 15 MG: 5 TABLET ORAL at 15:24

## 2024-04-25 RX ADMIN — IPRATROPIUM BROMIDE AND ALBUTEROL SULFATE 2 DOSE: 2.5; .5 SOLUTION RESPIRATORY (INHALATION) at 17:11

## 2024-04-25 RX ADMIN — IPRATROPIUM BROMIDE AND ALBUTEROL SULFATE 3 DOSE: 2.5; .5 SOLUTION RESPIRATORY (INHALATION) at 09:38

## 2024-04-25 RX ADMIN — DOXYCYCLINE 100 MG: 100 INJECTION, POWDER, LYOPHILIZED, FOR SOLUTION INTRAVENOUS at 17:01

## 2024-04-25 RX ADMIN — HYDROXYZINE PAMOATE 25 MG: 25 CAPSULE ORAL at 16:51

## 2024-04-25 RX ADMIN — LORAZEPAM 0.5 MG: 2 INJECTION INTRAMUSCULAR; INTRAVENOUS at 17:02

## 2024-04-25 RX ADMIN — CLONAZEPAM 0.5 MG: 0.5 TABLET ORAL at 15:24

## 2024-04-25 RX ADMIN — LEVOFLOXACIN 750 MG: 5 INJECTION, SOLUTION INTRAVENOUS at 20:30

## 2024-04-25 RX ADMIN — WATER 125 MG: 1 INJECTION INTRAMUSCULAR; INTRAVENOUS; SUBCUTANEOUS at 10:15

## 2024-04-25 ASSESSMENT — PAIN SCALES - GENERAL: PAINLEVEL_OUTOF10: 2

## 2024-04-25 ASSESSMENT — PAIN DESCRIPTION - LOCATION: LOCATION: CHEST

## 2024-04-25 NOTE — ED PROVIDER NOTES
critical care: minutes (Please note that the withdrawal or failure to initiate urgent interventions for this patient would likely result in a life threatening deterioration or permanent disability.      Accordingly this patient received 32 minutes of critical care time, excluding separately billable procedures.  )             CONSULTS: (Who and What was discussed)  IP CONSULT TO INTERNAL MEDICINE  IP CONSULT TO INTERNAL MEDICINE  IP CONSULT TO PULMONOLOGY         I am the Primary Clinician of Record.    FINAL IMPRESSION      1. COPD with acute exacerbation (HCC)    2. Dyspnea and respiratory abnormalities    3. Supplemental oxygen dependent          DISPOSITION/PLAN     DISPOSITION Admitted 04/25/2024 07:52:00 PM                (Please note that portions of this note were completed with a voice recognition program.  Efforts were made to edit the dictations but occasionally words are mis-transcribed.)    Jose Vasquez DO (electronically signed)           Jose Vasquez,   04/26/24 8997

## 2024-04-25 NOTE — ED NOTES
Attempted to ambulate pt. Pt walked less than 50 ft before stating \"I can't walk anymore.\" Spo2 dropped to 88 on 4L. Pt appeared unsteady while walking assisted by this RN.

## 2024-04-25 NOTE — PROGRESS NOTES
04/25/24 1355   NIV Type   $NIV $Daily Charge   NIV Started/Stopped On   Equipment Type V 60   Mode Bilevel   Mask Type Full face mask   Mask Size Small   Assessment   Pulse 71   Respirations 18   Settings/Measurements   PIP Observed 12 cm H20   IPAP 12 cmH20   CPAP/EPAP 6 cmH2O   Vt (Measured) 410 mL   Rate Ordered 16   Insp Rise Time (%) 2 %   FiO2  60 %   I Time/ I Time % 0.8 s   Minute Volume (L/min) 6.9 Liters   Mask Leak (lpm) 52 lpm   Patient's Home Machine No   Alarm Settings   Alarms On Y     Date: 4/25/2024    Time: 1:58 PM    Patient Placed On BIPAP/CPAP/ Non-Invasive Ventilation?  Yes    If no must comment.  Facial area red/color change? No           If YES are Blister/Lesion present?No   If yes must notify nursing staff  BIPAP/CPAP skin barrier?  Yes    Skin barrier type:mepilexlite       Comments:        Jaz Diaz RCP

## 2024-04-26 LAB
ERYTHROCYTE [DISTWIDTH] IN BLOOD BY AUTOMATED COUNT: 22.9 % (ref 11.5–15)
HCT VFR BLD AUTO: 25.8 % (ref 34–48)
HGB BLD-MCNC: 7.7 G/DL (ref 11.5–15.5)
MCH RBC QN AUTO: 28.2 PG (ref 26–35)
MCHC RBC AUTO-ENTMCNC: 29.8 G/DL (ref 32–34.5)
MCV RBC AUTO: 94.5 FL (ref 80–99.9)
PLATELET # BLD AUTO: 209 K/UL (ref 130–450)
PMV BLD AUTO: 10.2 FL (ref 7–12)
RBC # BLD AUTO: 2.73 M/UL (ref 3.5–5.5)
WBC OTHER # BLD: 22.7 K/UL (ref 4.5–11.5)

## 2024-04-26 PROCEDURE — 2580000003 HC RX 258: Performed by: INTERNAL MEDICINE

## 2024-04-26 PROCEDURE — 6360000002 HC RX W HCPCS: Performed by: INTERNAL MEDICINE

## 2024-04-26 PROCEDURE — 36415 COLL VENOUS BLD VENIPUNCTURE: CPT

## 2024-04-26 PROCEDURE — 94660 CPAP INITIATION&MGMT: CPT

## 2024-04-26 PROCEDURE — 85027 COMPLETE CBC AUTOMATED: CPT

## 2024-04-26 PROCEDURE — 2140000000 HC CCU INTERMEDIATE R&B

## 2024-04-26 PROCEDURE — 6370000000 HC RX 637 (ALT 250 FOR IP): Performed by: INTERNAL MEDICINE

## 2024-04-26 PROCEDURE — 94640 AIRWAY INHALATION TREATMENT: CPT

## 2024-04-26 PROCEDURE — 2700000000 HC OXYGEN THERAPY PER DAY

## 2024-04-26 RX ADMIN — ASPIRIN 81 MG: 81 TABLET, COATED ORAL at 09:48

## 2024-04-26 RX ADMIN — GUAIFENESIN 400 MG: 400 TABLET ORAL at 09:51

## 2024-04-26 RX ADMIN — METHIMAZOLE 15 MG: 5 TABLET ORAL at 09:51

## 2024-04-26 RX ADMIN — PREGABALIN 50 MG: 50 CAPSULE ORAL at 21:03

## 2024-04-26 RX ADMIN — PREGABALIN 50 MG: 50 CAPSULE ORAL at 13:43

## 2024-04-26 RX ADMIN — BUSPIRONE HYDROCHLORIDE 10 MG: 10 TABLET ORAL at 13:42

## 2024-04-26 RX ADMIN — IPRATROPIUM BROMIDE AND ALBUTEROL SULFATE 1 DOSE: 2.5; .5 SOLUTION RESPIRATORY (INHALATION) at 20:32

## 2024-04-26 RX ADMIN — BUSPIRONE HYDROCHLORIDE 10 MG: 10 TABLET ORAL at 21:03

## 2024-04-26 RX ADMIN — GUAIFENESIN 400 MG: 400 TABLET ORAL at 13:43

## 2024-04-26 RX ADMIN — LEVOFLOXACIN 750 MG: 5 INJECTION, SOLUTION INTRAVENOUS at 21:08

## 2024-04-26 RX ADMIN — ROSUVASTATIN 20 MG: 20 TABLET, FILM COATED ORAL at 00:07

## 2024-04-26 RX ADMIN — ARFORMOTEROL TARTRATE 15 MCG: 15 SOLUTION RESPIRATORY (INHALATION) at 07:58

## 2024-04-26 RX ADMIN — TRAZODONE HYDROCHLORIDE 50 MG: 50 TABLET ORAL at 22:07

## 2024-04-26 RX ADMIN — TRAZODONE HYDROCHLORIDE 50 MG: 50 TABLET ORAL at 00:07

## 2024-04-26 RX ADMIN — IPRATROPIUM BROMIDE AND ALBUTEROL SULFATE 1 DOSE: 2.5; .5 SOLUTION RESPIRATORY (INHALATION) at 15:47

## 2024-04-26 RX ADMIN — BUSPIRONE HYDROCHLORIDE 10 MG: 10 TABLET ORAL at 09:49

## 2024-04-26 RX ADMIN — APIXABAN 5 MG: 5 TABLET, FILM COATED ORAL at 21:03

## 2024-04-26 RX ADMIN — ROSUVASTATIN 20 MG: 20 TABLET, FILM COATED ORAL at 21:03

## 2024-04-26 RX ADMIN — ACETAMINOPHEN 650 MG: 325 TABLET ORAL at 15:42

## 2024-04-26 RX ADMIN — APIXABAN 5 MG: 5 TABLET, FILM COATED ORAL at 00:06

## 2024-04-26 RX ADMIN — APIXABAN 5 MG: 5 TABLET, FILM COATED ORAL at 09:48

## 2024-04-26 RX ADMIN — IPRATROPIUM BROMIDE AND ALBUTEROL SULFATE 1 DOSE: 2.5; .5 SOLUTION RESPIRATORY (INHALATION) at 07:58

## 2024-04-26 RX ADMIN — PREGABALIN 50 MG: 50 CAPSULE ORAL at 09:51

## 2024-04-26 RX ADMIN — ACETAMINOPHEN 650 MG: 325 TABLET ORAL at 22:11

## 2024-04-26 RX ADMIN — DILTIAZEM HYDROCHLORIDE 180 MG: 180 CAPSULE, COATED, EXTENDED RELEASE ORAL at 09:50

## 2024-04-26 RX ADMIN — HYDROXYZINE PAMOATE 25 MG: 25 CAPSULE ORAL at 00:07

## 2024-04-26 RX ADMIN — IPRATROPIUM BROMIDE AND ALBUTEROL SULFATE 1 DOSE: 2.5; .5 SOLUTION RESPIRATORY (INHALATION) at 11:23

## 2024-04-26 RX ADMIN — CLONAZEPAM 0.5 MG: 0.5 TABLET ORAL at 00:06

## 2024-04-26 RX ADMIN — BUDESONIDE INHALATION 1000 MCG: 0.5 SUSPENSION RESPIRATORY (INHALATION) at 20:33

## 2024-04-26 RX ADMIN — CLONAZEPAM 0.5 MG: 0.5 TABLET ORAL at 11:28

## 2024-04-26 RX ADMIN — HYDROXYZINE PAMOATE 25 MG: 25 CAPSULE ORAL at 18:42

## 2024-04-26 RX ADMIN — SENNOSIDES AND DOCUSATE SODIUM 1 TABLET: 8.6; 5 TABLET ORAL at 09:52

## 2024-04-26 RX ADMIN — HYDROXYZINE PAMOATE 25 MG: 25 CAPSULE ORAL at 09:52

## 2024-04-26 RX ADMIN — BUDESONIDE INHALATION 1000 MCG: 0.5 SUSPENSION RESPIRATORY (INHALATION) at 07:58

## 2024-04-26 RX ADMIN — SODIUM CHLORIDE, PRESERVATIVE FREE 10 ML: 5 INJECTION INTRAVENOUS at 09:52

## 2024-04-26 RX ADMIN — SODIUM CHLORIDE, PRESERVATIVE FREE 10 ML: 5 INJECTION INTRAVENOUS at 21:04

## 2024-04-26 RX ADMIN — CLONAZEPAM 0.5 MG: 0.5 TABLET ORAL at 23:42

## 2024-04-26 RX ADMIN — GUAIFENESIN 400 MG: 400 TABLET ORAL at 21:03

## 2024-04-26 RX ADMIN — CITALOPRAM HYDROBROMIDE 20 MG: 20 TABLET ORAL at 09:49

## 2024-04-26 RX ADMIN — ARFORMOTEROL TARTRATE 15 MCG: 15 SOLUTION RESPIRATORY (INHALATION) at 20:32

## 2024-04-26 RX ADMIN — Medication 1 MG: at 09:50

## 2024-04-26 RX ADMIN — WATER 40 MG: 1 INJECTION INTRAMUSCULAR; INTRAVENOUS; SUBCUTANEOUS at 15:36

## 2024-04-26 RX ADMIN — PANTOPRAZOLE SODIUM 40 MG: 40 TABLET, DELAYED RELEASE ORAL at 05:29

## 2024-04-26 ASSESSMENT — PAIN DESCRIPTION - LOCATION
LOCATION: BACK
LOCATION: CHEST

## 2024-04-26 ASSESSMENT — PAIN SCALES - GENERAL
PAINLEVEL_OUTOF10: 8
PAINLEVEL_OUTOF10: 0
PAINLEVEL_OUTOF10: 0

## 2024-04-26 ASSESSMENT — PAIN DESCRIPTION - DESCRIPTORS
DESCRIPTORS: ACHING;TENDER;SORE
DESCRIPTORS: ACHING

## 2024-04-26 ASSESSMENT — PAIN DESCRIPTION - ORIENTATION: ORIENTATION: MID

## 2024-04-26 NOTE — CONSULTS
[facility]  [address], [address]  [place] [address]    Pulmonary Consultation      Reason for Consult:  sob    Requesting Physician:  Clyde Gonzalez DO    CHIEF COMPLAINT:  sob    History Obtained From:  patient    HISTORY OF PRESENT ILLNESS:                The patient is a 69 y.o. female with significant past medical history of COPD oxygen-dependent and Anxiety who presents with increased sob. Was just discharged on 24th, claims was compliant with her oxygen and lung medications, used BIPAP that night and did ok with that, started having nausea/vomiting yesterday morning, became anxious and sob, came to hospital. Currently on 5 li NC, claims feeling similar with sob and minimal cough/sputum production, similar with anxiety, sitting up in bed when seen.    Past Medical History:        Diagnosis Date    Acute exacerbation of chronic obstructive pulmonary disease (COPD) ([facility]) 07/14/2022    Acute respiratory failure ([facility]) 11/27/2022    Acute respiratory failure with hypoxia ([facility]) 06/27/2022    Acute respiratory failure with hypoxia ([facility]) 11/27/2022    Acute respiratory failure with hypoxia ([facility]) 01/09/2024    Atherosclerosis 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 ([facility]) 01/19/2024    Atrial fibrillation ([facility])     Chronic obstructive pulmonary disease ([facility]) 12/15/2022    COPD (chronic obstructive pulmonary disease) ([facility])     COPD exacerbation ([facility]) 07/14/2022    COPD exacerbation ([facility]) 02/05/2024    COPD with acute exacerbation ([facility]) 12/24/2023    COVID-19 07/16/2022    Critical limb ischemia of left lower extremity with rest pain ([facility]) 12/25/2023    GI bleed 10/14/2023    Hypertension     Pneumonia due to infectious organism     PVD (peripheral vascular disease) ([facility]) 01/19/2024    Severe protein-calorie malnutrition ([facility]) 06/26/2023    Thyroid disease     Uncontrolled hypertension        Past Surgical History:

## 2024-04-26 NOTE — PROGRESS NOTES
4 Eyes Skin Assessment     NAME:  Lana Phillips  YOB: 1955  MEDICAL RECORD NUMBER:  [id number]    The patient is being assessed for  Admission    I agree that at least one RN has performed a thorough Head to Toe Skin Assessment on the patient. ALL assessment sites listed below have been assessed.      Areas assessed by both nurses:    Head, Face, Ears, Shoulders, Back, Chest, Arms, Elbows, Hands, Sacrum. Buttock, Coccyx, Ischium, and Legs. Feet and Heels        Does the Patient have a Wound? No noted wound(s)       Guy Prevention initiated by RN: Yes  Wound Care Orders initiated by RN: No    Pressure Injury (Stage 3,4, Unstageable, DTI, NWPT, and Complex wounds) if present, place Wound referral order by RN under : No    New Ostomies, if present place, Ostomy referral order under : No     Nurse 1 eSignature: Electronically signed by Jaz Salas RN on 4/26/24 at 12:00 AM EDT    **SHARE this note so that the co-signing nurse can place an eSignature**    Nurse 2 eSignature: {Esignature:[id number]}

## 2024-04-26 NOTE — PLAN OF CARE
Problem: Safety - Adult  Goal: Free from fall injury  4/26/2024 1112 by Cyndi Rivero RN  Outcome: Progressing  Flowsheets  Taken 4/26/2024 0800 by Cyndi Rivero RN  Free From Fall Injury: Instruct family/caregiver on patient safety  Taken 4/25/2024 2359 by Jaz Salas RN  Free From Fall Injury: Instruct family/caregiver on patient safety  4/25/2024 2358 by Jaz Salas RN  Outcome: Progressing     Problem: Chronic Conditions and Co-morbidities  Goal: Patient's chronic conditions and co-morbidity symptoms are monitored and maintained or improved  4/26/2024 1112 by Cyndi Rivero RN  Outcome: Progressing  4/25/2024 2358 by Jaz Salas RN  Outcome: Progressing     Problem: Discharge Planning  Goal: Discharge to home or other facility with appropriate resources  4/26/2024 1112 by Cyndi Rivero RN  Outcome: Progressing  4/25/2024 2358 by Jaz Salas RN  Outcome: Progressing     Problem: Pain  Goal: Verbalizes/displays adequate comfort level or baseline comfort level  4/26/2024 1112 by Cyndi Rivero RN  Outcome: Progressing  4/25/2024 2358 by Jaz Salas RN  Outcome: Progressing     Problem: ABCDS Injury Assessment  Goal: Absence of physical injury  Outcome: Progressing  Flowsheets  Taken 4/26/2024 0800 by Cyndi Rivero RN  Absence of Physical Injury: Implement safety measures based on patient assessment  Taken 4/25/2024 2359 by Jaz Salas RN  Absence of Physical Injury: Implement safety measures based on patient assessment

## 2024-04-26 NOTE — ED NOTES
Pt attempted to use bedpan independently. Total bed change and gerardo care completed at this time. Pt's gown was changed as well. Pt on bedside monitoring.

## 2024-04-26 NOTE — CARE COORDINATION
Patient presented to the ED from home due to hypoxia, nausea, vomiting and increased anxiety; admitted for COPD exacerbation. Met with patient at bedside for transition of care planning. Patient lives in a mobile home with her sister-in-law, Lana, 4 steps to enter, she is currently independent in the room, ambulates without a device, drives; has a rollator, 3:1 commode, nebulizer, cpap, bipap ST, on 4L NC continuous at home (through [employer]). Uses [facility] ([place]) and PCP is Dr. Gonzalez. Discussed [facility] placement due to several re-admissions this month; patient agreeable and list provided to patient to review. Patient would like a referral made to [facility] and [facility]. Referral made to [facility]; facility to review and follow-up regarding acceptance. Referral made to Karey at [facility]; she will review and follow-up.    2:25P  Received a call from Elissa at [facility]; no beds available.    Case Management Assessment  Initial Evaluation    Date/Time of Evaluation: 4/26/2024 2:44 PM  Assessment Completed by: CAYLA Romero    If patient is discharged prior to next notation, then this note serves as note for discharge by case management.    Patient Name: Lana Phillips                   YOB: 1955  Diagnosis: Dyspnea and respiratory abnormalities [R06.00, R06.89]  COPD exacerbation (HCC) [J44.1]  Supplemental oxygen dependent [Z99.81]  COPD with acute exacerbation (HCC) [J44.1]                   Date / Time: 4/25/2024  8:08 AM    Patient Admission Status: Inpatient   Readmission Risk (Low < 19, Mod (19-27), High > 27): Readmission Risk Score: 42.5    Current PCP: Clyde Gonzalez, DO  PCP verified by CM? Yes    Chart Reviewed: Yes      History Provided by: Patient  Patient Orientation: Alert and Oriented    Patient Cognition: Alert    Hospitalization in the last 30 days (Readmission):  Yes    If yes, Readmission Assessment in CM Navigator will be completed.    Advance

## 2024-04-26 NOTE — TELEPHONE ENCOUNTER
Opened by: Clyde Gonzalez DO on Sat Apr 6, 2024 5:14 AM Doned by: Clyde Gonzalez DO on Sat Apr 6, 2024 5:15 AM

## 2024-04-26 NOTE — ACP (ADVANCE CARE PLANNING)
Advance Care Planning   Healthcare Decision Maker:    Primary Decision Maker: [email or web address] valencia - Brother/Sister - [phone]    Today we documented Decision Maker(s) consistent with Legal Next of Kin hierarchy.    Electronically signed by CAYLA Romero on 4/26/2024 at 4:06 PM

## 2024-04-27 LAB
ACB COMPLEX DNA BLD POS QL NAA+NON-PROBE: NOT DETECTED
B FRAGILIS DNA BLD POS QL NAA+NON-PROBE: NOT DETECTED
C ALBICANS DNA BLD POS QL NAA+NON-PROBE: NOT DETECTED
C AURIS DNA BLD POS QL NAA+NON-PROBE: NOT DETECTED
C GATTII+NEOFOR DNA BLD POS QL NAA+N-PRB: NOT DETECTED
C GLABRATA DNA BLD POS QL NAA+NON-PROBE: NOT DETECTED
C KRUSEI DNA BLD POS QL NAA+NON-PROBE: NOT DETECTED
C PARAP DNA BLD POS QL NAA+NON-PROBE: NOT DETECTED
C TROPICLS DNA BLD POS QL NAA+NON-PROBE: NOT DETECTED
E CLOAC COMP DNA BLD POS NAA+NON-PROBE: NOT DETECTED
E COLI DNA BLD POS QL NAA+NON-PROBE: NOT DETECTED
E FAECALIS DNA BLD POS QL NAA+NON-PROBE: NOT DETECTED
E FAECIUM DNA BLD POS QL NAA+NON-PROBE: NOT DETECTED
ENTEROBACTERALES DNA BLD POS NAA+N-PRB: NOT DETECTED
GP B STREP DNA BLD POS QL NAA+NON-PROBE: NOT DETECTED
HAEM INFLU DNA BLD POS QL NAA+NON-PROBE: NOT DETECTED
K OXYTOCA DNA BLD POS QL NAA+NON-PROBE: NOT DETECTED
KLEBSIELLA SP DNA BLD POS QL NAA+NON-PRB: NOT DETECTED
KLEBSIELLA SP DNA BLD POS QL NAA+NON-PRB: NOT DETECTED
L MONOCYTOG DNA BLD POS QL NAA+NON-PROBE: NOT DETECTED
MICROORGANISM SPEC CULT: ABNORMAL
MICROORGANISM/AGENT SPEC: ABNORMAL
N MEN DNA BLD POS QL NAA+NON-PROBE: NOT DETECTED
P AERUGINOSA DNA BLD POS NAA+NON-PROBE: NOT DETECTED
PROTEUS SP DNA BLD POS QL NAA+NON-PROBE: NOT DETECTED
S AUREUS DNA BLD POS QL NAA+NON-PROBE: NOT DETECTED
S AUREUS+CONS DNA BLD POS NAA+NON-PROBE: DETECTED
S EPIDERMIDIS DNA BLD POS QL NAA+NON-PRB: NOT DETECTED
S LUGDUNENSIS DNA BLD POS QL NAA+NON-PRB: NOT DETECTED
S MALTOPHILIA DNA BLD POS QL NAA+NON-PRB: NOT DETECTED
S MARCESCENS DNA BLD POS NAA+NON-PROBE: NOT DETECTED
S PNEUM DNA BLD POS QL NAA+NON-PROBE: NOT DETECTED
S PYO DNA BLD POS QL NAA+NON-PROBE: NOT DETECTED
SALMONELLA DNA BLD POS QL NAA+NON-PROBE: NOT DETECTED
SERVICE CMNT-IMP: ABNORMAL
SPECIMEN DESCRIPTION: ABNORMAL
STREPTOCOCCUS DNA BLD POS NAA+NON-PROBE: NOT DETECTED

## 2024-04-27 PROCEDURE — 2580000003 HC RX 258: Performed by: INTERNAL MEDICINE

## 2024-04-27 PROCEDURE — 6360000002 HC RX W HCPCS: Performed by: INTERNAL MEDICINE

## 2024-04-27 PROCEDURE — 2140000000 HC CCU INTERMEDIATE R&B

## 2024-04-27 PROCEDURE — 2700000000 HC OXYGEN THERAPY PER DAY

## 2024-04-27 PROCEDURE — 94660 CPAP INITIATION&MGMT: CPT

## 2024-04-27 PROCEDURE — 6370000000 HC RX 637 (ALT 250 FOR IP): Performed by: INTERNAL MEDICINE

## 2024-04-27 PROCEDURE — 94640 AIRWAY INHALATION TREATMENT: CPT

## 2024-04-27 RX ADMIN — ASPIRIN 81 MG: 81 TABLET, COATED ORAL at 10:52

## 2024-04-27 RX ADMIN — CLONAZEPAM 0.5 MG: 0.5 TABLET ORAL at 23:36

## 2024-04-27 RX ADMIN — BUSPIRONE HYDROCHLORIDE 10 MG: 10 TABLET ORAL at 20:44

## 2024-04-27 RX ADMIN — GUAIFENESIN 400 MG: 400 TABLET ORAL at 10:51

## 2024-04-27 RX ADMIN — HYDROXYZINE PAMOATE 25 MG: 25 CAPSULE ORAL at 11:01

## 2024-04-27 RX ADMIN — WATER 40 MG: 1 INJECTION INTRAMUSCULAR; INTRAVENOUS; SUBCUTANEOUS at 03:01

## 2024-04-27 RX ADMIN — PREGABALIN 50 MG: 50 CAPSULE ORAL at 10:51

## 2024-04-27 RX ADMIN — GUAIFENESIN 400 MG: 400 TABLET ORAL at 20:44

## 2024-04-27 RX ADMIN — PROCHLORPERAZINE MALEATE 10 MG: 10 TABLET ORAL at 11:01

## 2024-04-27 RX ADMIN — GUAIFENESIN 400 MG: 400 TABLET ORAL at 14:17

## 2024-04-27 RX ADMIN — WATER 40 MG: 1 INJECTION INTRAMUSCULAR; INTRAVENOUS; SUBCUTANEOUS at 14:10

## 2024-04-27 RX ADMIN — Medication 1 MG: at 10:52

## 2024-04-27 RX ADMIN — POLYETHYLENE GLYCOL 3350 17 G: 17 POWDER, FOR SOLUTION ORAL at 18:34

## 2024-04-27 RX ADMIN — APIXABAN 5 MG: 5 TABLET, FILM COATED ORAL at 10:52

## 2024-04-27 RX ADMIN — SENNOSIDES AND DOCUSATE SODIUM 1 TABLET: 8.6; 5 TABLET ORAL at 11:01

## 2024-04-27 RX ADMIN — ARFORMOTEROL TARTRATE 15 MCG: 15 SOLUTION RESPIRATORY (INHALATION) at 18:56

## 2024-04-27 RX ADMIN — ARFORMOTEROL TARTRATE 15 MCG: 15 SOLUTION RESPIRATORY (INHALATION) at 07:44

## 2024-04-27 RX ADMIN — LEVOFLOXACIN 750 MG: 5 INJECTION, SOLUTION INTRAVENOUS at 20:48

## 2024-04-27 RX ADMIN — PREGABALIN 50 MG: 50 CAPSULE ORAL at 14:17

## 2024-04-27 RX ADMIN — BUDESONIDE INHALATION 1000 MCG: 0.5 SUSPENSION RESPIRATORY (INHALATION) at 07:44

## 2024-04-27 RX ADMIN — ACETAMINOPHEN 650 MG: 325 TABLET ORAL at 18:33

## 2024-04-27 RX ADMIN — CITALOPRAM HYDROBROMIDE 20 MG: 20 TABLET ORAL at 10:52

## 2024-04-27 RX ADMIN — IPRATROPIUM BROMIDE AND ALBUTEROL SULFATE 1 DOSE: 2.5; .5 SOLUTION RESPIRATORY (INHALATION) at 07:44

## 2024-04-27 RX ADMIN — DILTIAZEM HYDROCHLORIDE 180 MG: 180 CAPSULE, COATED, EXTENDED RELEASE ORAL at 10:53

## 2024-04-27 RX ADMIN — BUSPIRONE HYDROCHLORIDE 10 MG: 10 TABLET ORAL at 14:16

## 2024-04-27 RX ADMIN — ROSUVASTATIN 20 MG: 20 TABLET, FILM COATED ORAL at 20:44

## 2024-04-27 RX ADMIN — TRAZODONE HYDROCHLORIDE 50 MG: 50 TABLET ORAL at 20:53

## 2024-04-27 RX ADMIN — PANTOPRAZOLE SODIUM 40 MG: 40 TABLET, DELAYED RELEASE ORAL at 10:55

## 2024-04-27 RX ADMIN — IPRATROPIUM BROMIDE AND ALBUTEROL SULFATE 1 DOSE: 2.5; .5 SOLUTION RESPIRATORY (INHALATION) at 18:56

## 2024-04-27 RX ADMIN — IPRATROPIUM BROMIDE AND ALBUTEROL SULFATE 1 DOSE: 2.5; .5 SOLUTION RESPIRATORY (INHALATION) at 11:19

## 2024-04-27 RX ADMIN — METHIMAZOLE 15 MG: 5 TABLET ORAL at 10:53

## 2024-04-27 RX ADMIN — SODIUM CHLORIDE, PRESERVATIVE FREE 10 ML: 5 INJECTION INTRAVENOUS at 20:44

## 2024-04-27 RX ADMIN — APIXABAN 5 MG: 5 TABLET, FILM COATED ORAL at 20:44

## 2024-04-27 RX ADMIN — PREGABALIN 50 MG: 50 CAPSULE ORAL at 20:44

## 2024-04-27 RX ADMIN — SODIUM CHLORIDE, PRESERVATIVE FREE 10 ML: 5 INJECTION INTRAVENOUS at 11:03

## 2024-04-27 RX ADMIN — CLONAZEPAM 0.5 MG: 0.5 TABLET ORAL at 14:16

## 2024-04-27 RX ADMIN — BUDESONIDE INHALATION 1000 MCG: 0.5 SUSPENSION RESPIRATORY (INHALATION) at 18:56

## 2024-04-27 RX ADMIN — IPRATROPIUM BROMIDE AND ALBUTEROL SULFATE 1 DOSE: 2.5; .5 SOLUTION RESPIRATORY (INHALATION) at 15:35

## 2024-04-27 RX ADMIN — BUSPIRONE HYDROCHLORIDE 10 MG: 10 TABLET ORAL at 10:52

## 2024-04-27 ASSESSMENT — PAIN DESCRIPTION - PAIN TYPE: TYPE: CHRONIC PAIN

## 2024-04-27 ASSESSMENT — PAIN DESCRIPTION - LOCATION
LOCATION: BACK;HEAD
LOCATION: OTHER (COMMENT)

## 2024-04-27 ASSESSMENT — PAIN SCALES - GENERAL
PAINLEVEL_OUTOF10: 6
PAINLEVEL_OUTOF10: 5

## 2024-04-27 ASSESSMENT — PAIN DESCRIPTION - DESCRIPTORS: DESCRIPTORS: DISCOMFORT;ACHING

## 2024-04-27 ASSESSMENT — PAIN - FUNCTIONAL ASSESSMENT: PAIN_FUNCTIONAL_ASSESSMENT: ACTIVITIES ARE NOT PREVENTED

## 2024-04-27 NOTE — PROGRESS NOTES
Hospital Medicine    Subjective:  pt alert conversive agreeable to rehab placement      Current Facility-Administered Medications:     methylPREDNISolone sodium succ (SOLU-MEDROL) 40 mg in sterile water 1 mL injection, 40 mg, IntraVENous, Q12H, Jose Price MD, 40 mg at 04/27/24 0301    apixaban (ELIQUIS) tablet 5 mg, 5 mg, Oral, BID, Baldemar Jarrett DO, 5 mg at 04/27/24 1052    arformoterol tartrate (BROVANA) nebulizer solution 15 mcg, 15 mcg, Nebulization, BID RT, Baldemar Jarrett DO, 15 mcg at 04/27/24 0744    aspirin EC tablet 81 mg, 81 mg, Oral, Daily, Baldemar Jarrett DO, 81 mg at 04/27/24 1052    budesonide (PULMICORT) nebulizer suspension 1,000 mcg, 1 mg, Nebulization, BID RT, Baldemar Jarrett DO, 1,000 mcg at 04/27/24 0744    busPIRone (BUSPAR) tablet 10 mg, 10 mg, Oral, TID, Baldemar Jarrett DO, 10 mg at 04/27/24 1052    citalopram (CELEXA) tablet 20 mg, 20 mg, Oral, Daily, Baldemar Jarrett DO, 20 mg at 04/27/24 1052    clonazePAM (KLONOPIN) tablet 0.5 mg, 0.5 mg, Oral, Q12H, Baldemar Jarrett DO, 0.5 mg at 04/26/24 2342    dilTIAZem (CARDIZEM CD) extended release capsule 180 mg, 180 mg, Oral, Daily, Baldemar Jarrett DO, 180 mg at 04/27/24 1053    folic acid (FOLVITE) tablet 1 mg, 1 mg, Oral, Daily, Baldemar Jarrett DO, 1 mg at 04/27/24 1052    guaiFENesin tablet 400 mg, 400 mg, Oral, TID, Baldemar Jarrett DO, 400 mg at 04/27/24 1051    hydrOXYzine pamoate (VISTARIL) capsule 25 mg, 25 mg, Oral, TID PRN, Baldemar Jarrett DO, 25 mg at 04/27/24 1101    methIMAzole (TAPAZOLE) tablet 15 mg, 15 mg, Oral, Daily, Baldemar Jarrett, , 15 mg at 04/27/24 1053    pantoprazole (PROTONIX) tablet 40 mg, 40 mg, Oral, QAM AC, Baldemar Jarrett, , 40 mg at 04/27/24 1055    polyethylene glycol (GLYCOLAX) packet 17 g, 17 g, Oral, Daily PRN, Baldemar Jarrett,     pregabalin (LYRICA) capsule 50 mg, 50 mg, Oral, TID, Baldemar Jarrett DO, 50 mg at 04/27/24 1051    prochlorperazine (COMPAZINE)

## 2024-04-27 NOTE — PROGRESS NOTES
Pt reporting increased anxiety while eating dinner, states she feel like she has to have a bm but can not, stated she had a BM yesterday, but it was small.  Reports having a headache also.   VS WNL.  PRN medications refer to mar.  Pt calmed down, resting comfortably, repositions in bed, fresh ice water, ginger ale and crackers.  Dim lights and call light with in reach.

## 2024-04-27 NOTE — PROGRESS NOTES
[facility]  [address], [address]  [place] [address]      Pulmonary Progress Note      SUBJECTIVE:  sitting up in bed on 5 li NC, claims stable with respiratory function, minimal cough/sputum production    OBJECTIVE    Medications    Continuous Infusions:   sodium chloride         Scheduled Meds:   apixaban  5 mg Oral BID    arformoterol tartrate  15 mcg Nebulization BID RT    aspirin  81 mg Oral Daily    budesonide  1 mg Nebulization BID RT    busPIRone  10 mg Oral TID    citalopram  20 mg Oral Daily    clonazePAM  0.5 mg Oral Q12H    dilTIAZem  180 mg Oral Daily    folic acid  1 mg Oral Daily    guaiFENesin  400 mg Oral TID    methIMAzole  15 mg Oral Daily    pantoprazole  40 mg Oral QAM AC    pregabalin  50 mg Oral TID    rosuvastatin  20 mg Oral Nightly    sennosides-docusate sodium  1 tablet Oral Daily    sodium chloride flush  5-40 mL IntraVENous 2 times per day    ipratropium 0.5 mg-albuterol 2.5 mg  1 Dose Inhalation Q4H WA RT    levofloxacin  750 mg IntraVENous Q24H       PRN Meds:hydrOXYzine pamoate, polyethylene glycol, prochlorperazine, traZODone, sodium chloride flush, sodium chloride, potassium chloride **OR** potassium alternative oral replacement **OR** potassium chloride, magnesium sulfate, acetaminophen **OR** acetaminophen    Physical    VITALS:  BP (!) 146/65   Pulse 70   Temp 98.3 °F (36.8 °C) (Oral)   Resp 20   Ht 1.727 m (5' 8\")   Wt 44.9 kg (99 lb)   SpO2 98%   BMI 15.05 kg/m²     24HR INTAKE/OUTPUT:      Intake/Output Summary (Last 24 hours) at 2024 1441  Last data filed at 2024 2108  Gross per 24 hour   Intake 241 ml   Output --   Net 241 ml       24HR PULSE OXIMETRY RANGE:    SpO2  Av.8 %  Min: 93 %  Max: 100 %    General appearance: alert, appears stated age, and cooperative, cachectic  Lungs: diminished breath sounds bilaterally and rhonchi bilaterally  Heart: regular rate and rhythm, S1, S2

## 2024-04-27 NOTE — PLAN OF CARE
Problem: Safety - Adult  Goal: Free from fall injury  Outcome: Progressing  Flowsheets (Taken 4/26/2024 2008)  Free From Fall Injury: Instruct family/caregiver on patient safety     Problem: Discharge Planning  Goal: Discharge to home or other facility with appropriate resources  Outcome: Progressing     Problem: Pain  Goal: Verbalizes/displays adequate comfort level or baseline comfort level  Outcome: Progressing     Problem: ABCDS Injury Assessment  Goal: Absence of physical injury  Outcome: Progressing  Flowsheets (Taken 4/26/2024 2008)  Absence of Physical Injury: Implement safety measures based on patient assessment

## 2024-04-28 PROCEDURE — 6360000002 HC RX W HCPCS: Performed by: INTERNAL MEDICINE

## 2024-04-28 PROCEDURE — 2580000003 HC RX 258: Performed by: INTERNAL MEDICINE

## 2024-04-28 PROCEDURE — 6370000000 HC RX 637 (ALT 250 FOR IP): Performed by: INTERNAL MEDICINE

## 2024-04-28 PROCEDURE — 94660 CPAP INITIATION&MGMT: CPT

## 2024-04-28 PROCEDURE — 94640 AIRWAY INHALATION TREATMENT: CPT

## 2024-04-28 PROCEDURE — 2700000000 HC OXYGEN THERAPY PER DAY

## 2024-04-28 PROCEDURE — 2140000000 HC CCU INTERMEDIATE R&B

## 2024-04-28 RX ORDER — CLONAZEPAM 0.5 MG/1
1 TABLET ORAL EVERY 12 HOURS
Status: DISCONTINUED | OUTPATIENT
Start: 2024-04-28 | End: 2024-04-29 | Stop reason: HOSPADM

## 2024-04-28 RX ADMIN — CLONAZEPAM 1 MG: 0.5 TABLET ORAL at 23:47

## 2024-04-28 RX ADMIN — GUAIFENESIN 400 MG: 400 TABLET ORAL at 20:21

## 2024-04-28 RX ADMIN — BUDESONIDE INHALATION 1000 MCG: 0.5 SUSPENSION RESPIRATORY (INHALATION) at 19:38

## 2024-04-28 RX ADMIN — LEVOFLOXACIN 750 MG: 5 INJECTION, SOLUTION INTRAVENOUS at 20:24

## 2024-04-28 RX ADMIN — Medication 1 MG: at 09:23

## 2024-04-28 RX ADMIN — ASPIRIN 81 MG: 81 TABLET, COATED ORAL at 09:22

## 2024-04-28 RX ADMIN — BUSPIRONE HYDROCHLORIDE 10 MG: 10 TABLET ORAL at 12:48

## 2024-04-28 RX ADMIN — DILTIAZEM HYDROCHLORIDE 180 MG: 180 CAPSULE, COATED, EXTENDED RELEASE ORAL at 09:23

## 2024-04-28 RX ADMIN — METHIMAZOLE 15 MG: 5 TABLET ORAL at 09:23

## 2024-04-28 RX ADMIN — PANTOPRAZOLE SODIUM 40 MG: 40 TABLET, DELAYED RELEASE ORAL at 05:30

## 2024-04-28 RX ADMIN — IPRATROPIUM BROMIDE AND ALBUTEROL SULFATE 1 DOSE: 2.5; .5 SOLUTION RESPIRATORY (INHALATION) at 19:38

## 2024-04-28 RX ADMIN — BUDESONIDE INHALATION 1000 MCG: 0.5 SUSPENSION RESPIRATORY (INHALATION) at 10:13

## 2024-04-28 RX ADMIN — ARFORMOTEROL TARTRATE 15 MCG: 15 SOLUTION RESPIRATORY (INHALATION) at 19:38

## 2024-04-28 RX ADMIN — BUSPIRONE HYDROCHLORIDE 10 MG: 10 TABLET ORAL at 20:21

## 2024-04-28 RX ADMIN — GUAIFENESIN 400 MG: 400 TABLET ORAL at 12:48

## 2024-04-28 RX ADMIN — PREGABALIN 50 MG: 50 CAPSULE ORAL at 09:23

## 2024-04-28 RX ADMIN — ARFORMOTEROL TARTRATE 15 MCG: 15 SOLUTION RESPIRATORY (INHALATION) at 10:13

## 2024-04-28 RX ADMIN — ROSUVASTATIN 20 MG: 20 TABLET, FILM COATED ORAL at 20:21

## 2024-04-28 RX ADMIN — PREGABALIN 50 MG: 50 CAPSULE ORAL at 14:08

## 2024-04-28 RX ADMIN — SODIUM CHLORIDE, PRESERVATIVE FREE 10 ML: 5 INJECTION INTRAVENOUS at 09:26

## 2024-04-28 RX ADMIN — CITALOPRAM HYDROBROMIDE 20 MG: 20 TABLET ORAL at 09:23

## 2024-04-28 RX ADMIN — POLYETHYLENE GLYCOL 3350 17 G: 17 POWDER, FOR SOLUTION ORAL at 20:21

## 2024-04-28 RX ADMIN — SODIUM CHLORIDE, PRESERVATIVE FREE 10 ML: 5 INJECTION INTRAVENOUS at 20:22

## 2024-04-28 RX ADMIN — IPRATROPIUM BROMIDE AND ALBUTEROL SULFATE 1 DOSE: 2.5; .5 SOLUTION RESPIRATORY (INHALATION) at 10:13

## 2024-04-28 RX ADMIN — PREDNISONE 30 MG: 5 TABLET ORAL at 09:23

## 2024-04-28 RX ADMIN — IPRATROPIUM BROMIDE AND ALBUTEROL SULFATE 1 DOSE: 2.5; .5 SOLUTION RESPIRATORY (INHALATION) at 12:52

## 2024-04-28 RX ADMIN — PREGABALIN 50 MG: 50 CAPSULE ORAL at 20:21

## 2024-04-28 RX ADMIN — CLONAZEPAM 1 MG: 0.5 TABLET ORAL at 12:47

## 2024-04-28 RX ADMIN — APIXABAN 5 MG: 5 TABLET, FILM COATED ORAL at 09:22

## 2024-04-28 RX ADMIN — SENNOSIDES AND DOCUSATE SODIUM 1 TABLET: 8.6; 5 TABLET ORAL at 09:23

## 2024-04-28 RX ADMIN — HYDROXYZINE PAMOATE 25 MG: 25 CAPSULE ORAL at 10:34

## 2024-04-28 RX ADMIN — APIXABAN 5 MG: 5 TABLET, FILM COATED ORAL at 20:21

## 2024-04-28 RX ADMIN — IPRATROPIUM BROMIDE AND ALBUTEROL SULFATE 1 DOSE: 2.5; .5 SOLUTION RESPIRATORY (INHALATION) at 15:40

## 2024-04-28 RX ADMIN — GUAIFENESIN 400 MG: 400 TABLET ORAL at 09:23

## 2024-04-28 RX ADMIN — BUSPIRONE HYDROCHLORIDE 10 MG: 10 TABLET ORAL at 09:23

## 2024-04-28 NOTE — PROGRESS NOTES
[facility] Medicine    Subjective:  pt alert anxious      Current Facility-Administered Medications:     predniSONE (DELTASONE) tablet 30 mg, 30 mg, Oral, Daily, Jose Price MD, 30 mg at 04/28/24 0923    apixaban (ELIQUIS) tablet 5 mg, 5 mg, Oral, BID, Baldemar Jarrett DO, 5 mg at 04/28/24 0922    arformoterol tartrate (BROVANA) nebulizer solution 15 mcg, 15 mcg, Nebulization, BID RT, Baldemar Jarrett DO, 15 mcg at 04/28/24 1013    aspirin EC tablet 81 mg, 81 mg, Oral, Daily, Baldemar Jarrett DO, 81 mg at 04/28/24 0922    budesonide (PULMICORT) nebulizer suspension 1,000 mcg, 1 mg, Nebulization, BID RT, Baldemar Jarrett DO, 1,000 mcg at 04/28/24 1013    busPIRone (BUSPAR) tablet 10 mg, 10 mg, Oral, TID, Baldemar Jarrett DO, 10 mg at 04/28/24 0923    citalopram (CELEXA) tablet 20 mg, 20 mg, Oral, Daily, Baldemar Jarrett DO, 20 mg at 04/28/24 0923    clonazePAM (KLONOPIN) tablet 0.5 mg, 0.5 mg, Oral, Q12H, Baldemar Jarrett DO, 0.5 mg at 04/27/24 2336    dilTIAZem (CARDIZEM CD) extended release capsule 180 mg, 180 mg, Oral, Daily, Baldemar Jarrett DO, 180 mg at 04/28/24 0923    folic acid (FOLVITE) tablet 1 mg, 1 mg, Oral, Daily, Baldemar Jarrett DO, 1 mg at 04/28/24 0923    guaiFENesin tablet 400 mg, 400 mg, Oral, TID, Baldemar Jarrett DO, 400 mg at 04/28/24 0923    hydrOXYzine pamoate (VISTARIL) capsule 25 mg, 25 mg, Oral, TID PRN, Baldemar Jarrett DO, 25 mg at 04/27/24 1101    methIMAzole (TAPAZOLE) tablet 15 mg, 15 mg, Oral, Daily, Baldemar Jarrett DO, 15 mg at 04/28/24 0923    pantoprazole (PROTONIX) tablet 40 mg, 40 mg, Oral, QAM AC, Baldemar Jarrett, , 40 mg at 04/28/24 0530    polyethylene glycol (GLYCOLAX) packet 17 g, 17 g, Oral, Daily PRN, Baldemar Jarrett, , 17 g at 04/27/24 1834    pregabalin (LYRICA) capsule 50 mg, 50 mg, Oral, TID, Baldemar Jarrett, , 50 mg at 04/28/24 0923    prochlorperazine (COMPAZINE) tablet 10 mg, 10 mg, Oral, Q6H PRN, Baldemar Jarrett, , 10

## 2024-04-28 NOTE — PROGRESS NOTES
Pt reporting increase anxiety after eating breakfast and lunch, VS WNL, no c/o pain. Pursed lip breathing with patient helped to calm patient, refer to PRNS.  Talked with patient about slowing down with eating meals and making sure to take time for breathing. Admin afternoon dose clonazepam, pt able to rest comfortably this afternoon, placed on bipap while sleeping.  Pt off bipap at this time, resting comfortably in room, denies feeling of anxiousness at this time.

## 2024-04-28 NOTE — PROGRESS NOTES
Date: 4/27/2024    Time: 11:37 PM    Patient Placed On BIPAP/CPAP/ Non-Invasive Ventilation?  Yes    If no must comment.  Facial area red/color change? No           If YES are Blister/Lesion present?No   If yes must notify nursing staff  BIPAP/CPAP skin barrier?  Yes    Skin barrier type:mepilexlite       Comments:        Yonatan Reddy RCP

## 2024-04-28 NOTE — PLAN OF CARE
Problem: Safety - Adult  Goal: Free from fall injury  4/28/2024 1327 by Rae Robertson RN  Outcome: Progressing  4/28/2024 0314 by Marlys Valero RN  Outcome: Progressing  Flowsheets (Taken 4/27/2024 2000)  Free From Fall Injury:   Instruct family/caregiver on patient safety   Based on caregiver fall risk screen, instruct family/caregiver to ask for assistance with transferring infant if caregiver noted to have fall risk factors     Problem: Chronic Conditions and Co-morbidities  Goal: Patient's chronic conditions and co-morbidity symptoms are monitored and maintained or improved  Outcome: Progressing     Problem: Discharge Planning  Goal: Discharge to home or other facility with appropriate resources  4/28/2024 1327 by Rae Robertson RN  Outcome: Progressing  4/28/2024 0314 by Marlys Valero RN  Outcome: Progressing  Flowsheets (Taken 4/27/2024 2000)  Discharge to home or other facility with appropriate resources:   Identify barriers to discharge with patient and caregiver   Arrange for needed discharge resources and transportation as appropriate   Identify discharge learning needs (meds, wound care, etc)   Arrange for interpreters to assist at discharge as needed     Problem: Pain  Goal: Verbalizes/displays adequate comfort level or baseline comfort level  4/28/2024 1327 by Rae Robertson RN  Outcome: Progressing  4/28/2024 0314 by Marlys Valero RN  Outcome: Progressing  Flowsheets (Taken 4/27/2024 2000)  Verbalizes/displays adequate comfort level or baseline comfort level:   Encourage patient to monitor pain and request assistance   Assess pain using appropriate pain scale   Administer analgesics based on type and severity of pain and evaluate response   Implement non-pharmacological measures as appropriate and evaluate response   Consider cultural and social influences on pain and pain management   Notify Licensed Independent Practitioner if interventions unsuccessful or patient

## 2024-04-28 NOTE — PLAN OF CARE
Problem: Safety - Adult  Goal: Free from fall injury  Outcome: Progressing  Flowsheets (Taken 4/27/2024 2000)  Free From Fall Injury:   Instruct family/caregiver on patient safety   Based on caregiver fall risk screen, instruct family/caregiver to ask for assistance with transferring infant if caregiver noted to have fall risk factors     Problem: Discharge Planning  Goal: Discharge to home or other facility with appropriate resources  Outcome: Progressing  Flowsheets (Taken 4/27/2024 2000)  Discharge to home or other facility with appropriate resources:   Identify barriers to discharge with patient and caregiver   Arrange for needed discharge resources and transportation as appropriate   Identify discharge learning needs (meds, wound care, etc)   Arrange for interpreters to assist at discharge as needed     Problem: Pain  Goal: Verbalizes/displays adequate comfort level or baseline comfort level  Outcome: Progressing  Flowsheets (Taken 4/27/2024 2000)  Verbalizes/displays adequate comfort level or baseline comfort level:   Encourage patient to monitor pain and request assistance   Assess pain using appropriate pain scale   Administer analgesics based on type and severity of pain and evaluate response   Implement non-pharmacological measures as appropriate and evaluate response   Consider cultural and social influences on pain and pain management   Notify Licensed Independent Practitioner if interventions unsuccessful or patient reports new pain     Problem: ABCDS Injury Assessment  Goal: Absence of physical injury  Outcome: Progressing  Flowsheets (Taken 4/27/2024 2000)  Absence of Physical Injury: Implement safety measures based on patient assessment     Problem: Skin/Tissue Integrity  Goal: Absence of new skin breakdown  Description: 1.  Monitor for areas of redness and/or skin breakdown  2.  Assess vascular access sites hourly  3.  Every 4-6 hours minimum:  Change oxygen saturation probe site  4.  Every 4-6

## 2024-04-29 ENCOUNTER — TELEPHONE (OUTPATIENT)
Dept: ONCOLOGY | Age: 69
End: 2024-04-29

## 2024-04-29 VITALS
DIASTOLIC BLOOD PRESSURE: 67 MMHG | TEMPERATURE: 98.6 F | SYSTOLIC BLOOD PRESSURE: 100 MMHG | BODY MASS INDEX: 15.01 KG/M2 | HEIGHT: 68 IN | HEART RATE: 75 BPM | RESPIRATION RATE: 18 BRPM | WEIGHT: 99 LBS | OXYGEN SATURATION: 92 %

## 2024-04-29 PROCEDURE — 2580000003 HC RX 258: Performed by: INTERNAL MEDICINE

## 2024-04-29 PROCEDURE — 6360000002 HC RX W HCPCS: Performed by: INTERNAL MEDICINE

## 2024-04-29 PROCEDURE — 94660 CPAP INITIATION&MGMT: CPT

## 2024-04-29 PROCEDURE — 97166 OT EVAL MOD COMPLEX 45 MIN: CPT

## 2024-04-29 PROCEDURE — 6370000000 HC RX 637 (ALT 250 FOR IP): Performed by: INTERNAL MEDICINE

## 2024-04-29 PROCEDURE — 2700000000 HC OXYGEN THERAPY PER DAY

## 2024-04-29 PROCEDURE — 97530 THERAPEUTIC ACTIVITIES: CPT

## 2024-04-29 PROCEDURE — 97161 PT EVAL LOW COMPLEX 20 MIN: CPT

## 2024-04-29 PROCEDURE — 94640 AIRWAY INHALATION TREATMENT: CPT

## 2024-04-29 RX ORDER — LEVOFLOXACIN 750 MG/1
750 TABLET, FILM COATED ORAL DAILY
Status: DISCONTINUED | OUTPATIENT
Start: 2024-04-29 | End: 2024-04-29 | Stop reason: HOSPADM

## 2024-04-29 RX ORDER — CLONAZEPAM 1 MG/1
1 TABLET ORAL EVERY 12 HOURS
Qty: 60 TABLET | Refills: 0
Start: 2024-04-30 | End: 2024-05-30

## 2024-04-29 RX ORDER — LEVOFLOXACIN 750 MG/1
750 TABLET, FILM COATED ORAL DAILY
Qty: 4 TABLET | Refills: 0
Start: 2024-04-29 | End: 2024-05-03

## 2024-04-29 RX ORDER — PREDNISONE 10 MG/1
TABLET ORAL
Qty: 36 TABLET | Refills: 1 | DISCHARGE
Start: 2024-04-30

## 2024-04-29 RX ADMIN — APIXABAN 5 MG: 5 TABLET, FILM COATED ORAL at 08:44

## 2024-04-29 RX ADMIN — IPRATROPIUM BROMIDE AND ALBUTEROL SULFATE 1 DOSE: 2.5; .5 SOLUTION RESPIRATORY (INHALATION) at 11:47

## 2024-04-29 RX ADMIN — IPRATROPIUM BROMIDE AND ALBUTEROL SULFATE 1 DOSE: 2.5; .5 SOLUTION RESPIRATORY (INHALATION) at 07:42

## 2024-04-29 RX ADMIN — PROCHLORPERAZINE MALEATE 10 MG: 10 TABLET ORAL at 10:31

## 2024-04-29 RX ADMIN — BUSPIRONE HYDROCHLORIDE 10 MG: 10 TABLET ORAL at 14:16

## 2024-04-29 RX ADMIN — HYDROXYZINE PAMOATE 25 MG: 25 CAPSULE ORAL at 10:31

## 2024-04-29 RX ADMIN — CITALOPRAM HYDROBROMIDE 20 MG: 20 TABLET ORAL at 08:45

## 2024-04-29 RX ADMIN — CLONAZEPAM 1 MG: 0.5 TABLET ORAL at 12:36

## 2024-04-29 RX ADMIN — SENNOSIDES AND DOCUSATE SODIUM 1 TABLET: 8.6; 5 TABLET ORAL at 08:43

## 2024-04-29 RX ADMIN — BUSPIRONE HYDROCHLORIDE 10 MG: 10 TABLET ORAL at 08:44

## 2024-04-29 RX ADMIN — GUAIFENESIN 400 MG: 400 TABLET ORAL at 08:43

## 2024-04-29 RX ADMIN — SODIUM CHLORIDE, PRESERVATIVE FREE 10 ML: 5 INJECTION INTRAVENOUS at 08:42

## 2024-04-29 RX ADMIN — PREDNISONE 30 MG: 5 TABLET ORAL at 08:44

## 2024-04-29 RX ADMIN — GUAIFENESIN 400 MG: 400 TABLET ORAL at 14:16

## 2024-04-29 RX ADMIN — ARFORMOTEROL TARTRATE 15 MCG: 15 SOLUTION RESPIRATORY (INHALATION) at 07:42

## 2024-04-29 RX ADMIN — BUDESONIDE INHALATION 1000 MCG: 0.5 SUSPENSION RESPIRATORY (INHALATION) at 07:42

## 2024-04-29 RX ADMIN — ASPIRIN 81 MG: 81 TABLET, COATED ORAL at 08:45

## 2024-04-29 RX ADMIN — Medication 1 MG: at 08:44

## 2024-04-29 RX ADMIN — ACETAMINOPHEN 650 MG: 325 TABLET ORAL at 12:36

## 2024-04-29 RX ADMIN — DILTIAZEM HYDROCHLORIDE 180 MG: 180 CAPSULE, COATED, EXTENDED RELEASE ORAL at 08:45

## 2024-04-29 RX ADMIN — PREGABALIN 50 MG: 50 CAPSULE ORAL at 14:16

## 2024-04-29 RX ADMIN — IPRATROPIUM BROMIDE AND ALBUTEROL SULFATE 1 DOSE: 2.5; .5 SOLUTION RESPIRATORY (INHALATION) at 15:21

## 2024-04-29 RX ADMIN — PREGABALIN 50 MG: 50 CAPSULE ORAL at 08:43

## 2024-04-29 RX ADMIN — PANTOPRAZOLE SODIUM 40 MG: 40 TABLET, DELAYED RELEASE ORAL at 06:13

## 2024-04-29 RX ADMIN — METHIMAZOLE 15 MG: 5 TABLET ORAL at 08:43

## 2024-04-29 ASSESSMENT — PAIN SCALES - GENERAL
PAINLEVEL_OUTOF10: 0
PAINLEVEL_OUTOF10: 0
PAINLEVEL_OUTOF10: 5

## 2024-04-29 ASSESSMENT — PAIN DESCRIPTION - ORIENTATION: ORIENTATION: MID

## 2024-04-29 ASSESSMENT — PAIN - FUNCTIONAL ASSESSMENT: PAIN_FUNCTIONAL_ASSESSMENT: ACTIVITIES ARE NOT PREVENTED

## 2024-04-29 ASSESSMENT — PAIN DESCRIPTION - LOCATION: LOCATION: HEAD

## 2024-04-29 ASSESSMENT — PAIN DESCRIPTION - DESCRIPTORS: DESCRIPTORS: DULL;DISCOMFORT;NAGGING

## 2024-04-29 NOTE — PROGRESS NOTES
[facility]  [address], [address]  [place] [address]      Pulmonary Progress Note      SUBJECTIVE:  sitting up in bed on 4 li NC, claims stable with respiratory function, minimal cough/sputum production, tolerated NIPPV 12/6 50% last night and feels helps with breathing    OBJECTIVE    Medications    Continuous Infusions:   sodium chloride         Scheduled Meds:   levoFLOXacin  750 mg Oral Daily    clonazePAM  1 mg Oral Q12H    predniSONE  30 mg Oral Daily    apixaban  5 mg Oral BID    arformoterol tartrate  15 mcg Nebulization BID RT    aspirin  81 mg Oral Daily    budesonide  1 mg Nebulization BID RT    busPIRone  10 mg Oral TID    citalopram  20 mg Oral Daily    dilTIAZem  180 mg Oral Daily    folic acid  1 mg Oral Daily    guaiFENesin  400 mg Oral TID    methIMAzole  15 mg Oral Daily    pantoprazole  40 mg Oral QAM AC    pregabalin  50 mg Oral TID    rosuvastatin  20 mg Oral Nightly    sennosides-docusate sodium  1 tablet Oral Daily    sodium chloride flush  5-40 mL IntraVENous 2 times per day    ipratropium 0.5 mg-albuterol 2.5 mg  1 Dose Inhalation Q4H WA RT       PRN Meds:hydrOXYzine pamoate, polyethylene glycol, prochlorperazine, traZODone, sodium chloride flush, sodium chloride, potassium chloride **OR** potassium alternative oral replacement **OR** potassium chloride, magnesium sulfate, acetaminophen **OR** acetaminophen    Physical    VITALS:  BP (!) 155/70   Pulse 97   Temp 98.5 °F (36.9 °C) (Oral)   Resp 20   Ht 1.727 m (5' 8\")   Wt 44.9 kg (99 lb)   SpO2 94%   BMI 15.05 kg/m²     24HR INTAKE/OUTPUT:      Intake/Output Summary (Last 24 hours) at 2024 1424  Last data filed at 2024 0830  Gross per 24 hour   Intake 688.11 ml   Output --   Net 688.11 ml         24HR PULSE OXIMETRY RANGE:    SpO2  Av.9 %  Min: 91 %  Max: 100 %    General appearance: alert, appears stated age, and cooperative, cachectic  Lungs:

## 2024-04-29 NOTE — CARE COORDINATION
Received a message from Karey at [facility] nursing [facility]; they are able to accept the patient, can take prior to auth; need PT/OT evals. Call made to therapy department for PT/OT evals. PENNY, destination and ambulette form and Hens completed; checking with facility if they can cover transport, if patient okay to discharge today.    10:20A  Patient informed of acceptance at the facility. Per Karey at [facility], they will cover the cost of transport via [employer]. Call made to [facility]; transport tentatively set up for 5p. Charge nurse checking for discharge. Nurse informed.    Electronically signed by CAYLA Romero on 4/29/2024 at 10:19 AM

## 2024-04-29 NOTE — PROGRESS NOTES
Dr. Jarrett notified that the pt has been accepted to [facility] and Dr. Price is okay with discharge from pulmonology standpoint.

## 2024-04-29 NOTE — PROGRESS NOTES
OCCUPATIONAL THERAPY INITIAL EVALUATION    [facility] [address] [place], [place]       Date:2024                                                  Patient Name: Lana Phillips  MRN: [id number]  : 1955  Room: [address] [place] [address]    Evaluating OT: Mitchell Zuñiga OTR/L [id number]    Referring Provider:     Baldemar Jarrett DO        Specific Provider Orders/Date: OT evaluation and treatment 24 1015    Diagnosis:  Dyspnea and respiratory abnormalities [R06.00, R06.89]  COPD exacerbation (HCC) [J44.1]  Supplemental oxygen dependent [Z99.81]  COPD with acute exacerbation (HCC) [J44.1]      Pertinent Medical History:  has a past medical history of Acute exacerbation of chronic obstructive pulmonary disease (COPD) (HCC), Acute respiratory failure (HCC), Acute respiratory failure with hypoxia (HCC), Acute respiratory failure with hypoxia (HCC), Acute respiratory failure with hypoxia (HCC), 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   Past Surgical History:   Procedure Laterality Date    BACK SURGERY      x2    BRONCHOSCOPY N/A 2024    BRONCHOSCOPY DIAGNOSTIC OR CELL WASH ONLY performed by Jose Price MD at [facility]    INVASIVE VASCULAR N/A 2024    Aortagram abdominal performed by Lazaro Vidales MD at [facility] CARDIAC CATH LAB    INVASIVE VASCULAR N/A 2024    Angioplasty peripheral artery

## 2024-04-29 NOTE — PROGRESS NOTES
Called [facility] to give report. Spoke with Tierney who transferred me, reached voice mail. Report attempted to be given; left voice mail with return call phone number.

## 2024-04-29 NOTE — PLAN OF CARE
Problem: Safety - Adult  Goal: Free from fall injury  4/28/2024 2205 by Tasha Gonzales RN  Outcome: Progressing  4/28/2024 1327 by Rae Robertson RN  Outcome: Progressing     Problem: Chronic Conditions and Co-morbidities  Goal: Patient's chronic conditions and co-morbidity symptoms are monitored and maintained or improved  4/28/2024 2205 by Tasha Gonzales RN  Outcome: Progressing  4/28/2024 1327 by Rae Robertson RN  Outcome: Progressing     Problem: Discharge Planning  Goal: Discharge to home or other facility with appropriate resources  4/28/2024 2205 by Tasha Gonzales RN  Outcome: Progressing  4/28/2024 1327 by Rae Robertson RN  Outcome: Progressing     Problem: Pain  Goal: Verbalizes/displays adequate comfort level or baseline comfort level  4/28/2024 2205 by Tasha Gonzales RN  Outcome: Progressing  4/28/2024 1327 by Rae Robertson RN  Outcome: Progressing     Problem: ABCDS Injury Assessment  Goal: Absence of physical injury  4/28/2024 2205 by Tasha Gonzales RN  Outcome: Progressing  4/28/2024 1327 by Rae Robertson RN  Outcome: Progressing     Problem: Skin/Tissue Integrity  Goal: Absence of new skin breakdown  Description: 1.  Monitor for areas of redness and/or skin breakdown  2.  Assess vascular access sites hourly  3.  Every 4-6 hours minimum:  Change oxygen saturation probe site  4.  Every 4-6 hours:  If on nasal continuous positive airway pressure, respiratory therapy assess nares and determine need for appliance change or resting period.  4/28/2024 2205 by Tasha Gonzales RN  Outcome: Progressing  4/28/2024 1327 by Rae Robertson RN  Outcome: Progressing

## 2024-04-29 NOTE — PROGRESS NOTES
Physical Therapy  Physical Therapy Initial Assessment     Name: Lana Phillips  : 1955  MRN: [id number]      Date of Service: 2024    Evaluating PT:  Marbella Gurrola PT, DPT [id number]    Room #:  6403/6403-B  Diagnosis:  Dyspnea and respiratory abnormalities [R06.00, R06.89]  COPD exacerbation (HCC) [J44.1]  Supplemental oxygen dependent [Z99.81]  COPD with acute exacerbation (HCC) [J44.1]  PMHx/PSHx:   has a past medical history of Acute exacerbation of chronic obstructive pulmonary disease (COPD) (HCC), Acute respiratory failure (HCC), Acute respiratory failure with hypoxia (HCC), Acute respiratory failure with hypoxia (HCC), Acute respiratory failure with hypoxia (HCC), 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  Precautions:  Fall risk, O2, continuous O2 monitor  Equipment Needs:  TBD    SUBJECTIVE:    Pt lives with her sister-in-law in a 1-story mobile home with 4 steps to enter and no rail(s). Pt ambulated with no AD PTA. She owns a rollator and is on 4L O2 at baseline.    OBJECTIVE:   Initial Evaluation  Date: 24 Treatment Short Term/ Long Term   Goals   AM-PAC 6 Clicks 15/24     Was pt agreeable to Eval/treatment? Yes     Does pt have pain? No c/o pain     Bed Mobility  Rolling: NT  Supine to sit: Supervision  Sit to supine: NT  Scooting: Supervision  Rolling: Independent  Supine to sit: Independent  Sit to supine: Independent  Scooting: Independent   Transfers Sit to stand:

## 2024-04-29 NOTE — PROGRESS NOTES
Hospital Medicine    Subjective:  pt states less anxious today klonopin increased yesterday      Current Facility-Administered Medications:     clonazePAM (KLONOPIN) tablet 1 mg, 1 mg, Oral, Q12H, Baldemar Jarrett DO, 1 mg at 04/28/24 2347    predniSONE (DELTASONE) tablet 30 mg, 30 mg, Oral, Daily, Jose Price MD, 30 mg at 04/28/24 0923    apixaban (ELIQUIS) tablet 5 mg, 5 mg, Oral, BID, Baldemar Jarrett DO, 5 mg at 04/28/24 2021    arformoterol tartrate (BROVANA) nebulizer solution 15 mcg, 15 mcg, Nebulization, BID RT, Baldemar Jarrett DO, 15 mcg at 04/28/24 1938    aspirin EC tablet 81 mg, 81 mg, Oral, Daily, Baldemar Jarrett DO, 81 mg at 04/28/24 0922    budesonide (PULMICORT) nebulizer suspension 1,000 mcg, 1 mg, Nebulization, BID RT, Baldemar Jarrett DO, 1,000 mcg at 04/28/24 1938    busPIRone (BUSPAR) tablet 10 mg, 10 mg, Oral, TID, Baldemar Jarrett DO, 10 mg at 04/28/24 2021    citalopram (CELEXA) tablet 20 mg, 20 mg, Oral, Daily, Baldemar Jarrett DO, 20 mg at 04/28/24 0923    dilTIAZem (CARDIZEM CD) extended release capsule 180 mg, 180 mg, Oral, Daily, Baldemar Jarrett DO, 180 mg at 04/28/24 0923    folic acid (FOLVITE) tablet 1 mg, 1 mg, Oral, Daily, Baldemar Jarrett DO, 1 mg at 04/28/24 0923    guaiFENesin tablet 400 mg, 400 mg, Oral, TID, Baldemar Jarrett DO, 400 mg at 04/28/24 2021    hydrOXYzine pamoate (VISTARIL) capsule 25 mg, 25 mg, Oral, TID PRN, Baldemar Jarrett DO, 25 mg at 04/28/24 1034    methIMAzole (TAPAZOLE) tablet 15 mg, 15 mg, Oral, Daily, Baldemar Jarrett DO, 15 mg at 04/28/24 0923    pantoprazole (PROTONIX) tablet 40 mg, 40 mg, Oral, QAM AC, Baldemar Jarrett DO, 40 mg at 04/29/24 0613    polyethylene glycol (GLYCOLAX) packet 17 g, 17 g, Oral, Daily PRN, Baldemar Jarrett DO, 17 g at 04/28/24 2021    pregabalin (LYRICA) capsule 50 mg, 50 mg, Oral, TID, Baldemar Jarrett DO, 50 mg at 04/28/24 2021    prochlorperazine (COMPAZINE) tablet 10 mg, 10 mg, Oral,

## 2024-04-29 NOTE — TELEPHONE ENCOUNTER
04/29/24 patient called and stated she is inpatient at this time, will be transferred to rehab center and will contact us to reschedule this appointment

## 2024-04-29 NOTE — DISCHARGE INSTR - COC
Continuity of Care Form    Patient Name: Lana Phillips   :  1955  MRN:  [id number]    Admit date:  2024  Discharge date:  24      Code Status Order: Full Code   Advance Directives:     Admitting Physician:  Baldemar Jarrett DO  PCP: Clyde Gonzalez DO    Discharging Nurse: Adrienne Arauz  Discharging Hospital Unit/Room#: 6403/6403-B  Discharging Unit Phone Number: [phone]    Emergency Contact:   Extended Emergency Contact Information  Primary Emergency Contact: Lana Reeves  Address: [address] [place] [address] [place] [address]  Home Phone: [phone]  Work Phone: [phone]  Mobile Phone: [phone]  Relation: Other Relative  Preferred language: English   needed? No  Secondary Emergency Contact: sue ward  Mobile Phone: [phone]  Relation: Brother/Sister  Preferred language: English   needed? No    Past Surgical History:  Past Surgical History:   Procedure Laterality Date    BACK SURGERY      x2    BRONCHOSCOPY N/A 2024    BRONCHOSCOPY DIAGNOSTIC OR CELL WASH ONLY performed by Jose Price MD at [facility]    INVASIVE VASCULAR N/A 2024    Aortagram abdominal performed by Lazaro Vidales MD at [facility]    INVASIVE VASCULAR N/A 2024    Angioplasty peripheral artery performed by Lazaro Vidales MD at [facility]    LEFT OOPHORECTOMY      PAIN MANAGEMENT PROCEDURE N/A 2023    CAUDAL EPIDURAL STEROID INJECTION performed by Lilia Cabrera DO at [facility] OR    UPPER GASTROINTESTINAL ENDOSCOPY N/A 10/16/2023    EGD ESOPHAGOGASTRODUODENOSCOPY performed by Willie Chow DO at [facility] ENDOSCOPY       Immunization History:   Immunization History   Administered Date(s) Administered    COVID-19, PFIZER PURPLE top, DILUTE for use, (age 12 y+), 30mcg/0.3mL 2021, 2021    Influenza, FLUAD, (age 65 y+), Adjuvanted, 0.5mL 2023       Active Problems:  Patient Active Problem List   Diagnosis

## 2024-04-29 NOTE — PROGRESS NOTES
IV to PO Conversion Policy       Notification of IV to PO conversion:    This patient's order for Levaquin 750mg IV has been changed to Levaquin 750mg PO as approved by the [facility] INTRAVENOUS TO ORAL Policy.    If the patient should become strict NPO while on this therapy, contact the prescriber for further orders.    Rigoberto Dasilva RPH  4/29/2024  1:13 PM

## 2024-04-29 NOTE — PLAN OF CARE
Problem: Safety - Adult  Goal: Free from fall injury  Outcome: Progressing     Problem: Chronic Conditions and Co-morbidities  Goal: Patient's chronic conditions and co-morbidity symptoms are monitored and maintained or improved  Outcome: Progressing     Problem: Discharge Planning  Goal: Discharge to home or other facility with appropriate resources  Outcome: Progressing     Problem: Pain  Goal: Verbalizes/displays adequate comfort level or baseline comfort level  Outcome: Progressing     Problem: ABCDS Injury Assessment  Goal: Absence of physical injury  Outcome: Progressing     Problem: Skin/Tissue Integrity  Goal: Absence of new skin breakdown  Description: 1.  Monitor for areas of redness and/or skin breakdown  2.  Assess vascular access sites hourly  3.  Every 4-6 hours minimum:  Change oxygen saturation probe site  4.  Every 4-6 hours:  If on nasal continuous positive airway pressure, respiratory therapy assess nares and determine need for appliance change or resting period.  Outcome: Progressing

## 2024-04-30 ENCOUNTER — HOSPITAL ENCOUNTER (OUTPATIENT)
Dept: INFUSION THERAPY | Age: 69
Discharge: HOME | End: 2024-04-30

## 2024-04-30 LAB
MICROORGANISM SPEC CULT: NORMAL
SERVICE CMNT-IMP: NORMAL
SPECIMEN DESCRIPTION: NORMAL

## 2024-04-30 NOTE — CARE COORDINATION
Received a message from liaison at [facility] regarding the insurance requesting a peer to peer be done today; provider will need to call [phone], option 5 no later then 2p today. Perfect serve message sent to pulmonologist to provide update; provider will attempt to get this done, unsure if can be done by 2p. All information provided.    Electronically signed by CAYLA Romero on 4/30/2024 at 11:19 AM

## 2024-04-30 NOTE — PROGRESS NOTES
Physician Progress Note      PATIENT:               ANGIE SHELTON  [facility] #:                  [id number]  :                       1955  ADMIT DATE:       2024 8:08 AM  DISCH DATE:        2024 6:27 PM  RESPONDING  PROVIDER #:        Baldemar Jarrett DO          QUERY TEXT:    Acute on Chronic Respiratory Failure documented in the H&P, but dropped from   documentation. .  If possible, please document in the progress notes and   discharge summary if Acute on Chronic Respiratory Failure was:    The medical record reflects the following:  Risk Factors: COPDE  Clinical Indicators: H&P \"Acute on Chronic Respiratory Failure\". Ambulating   -Spo2 dropped to 88 on 4L. Breathing is Tachypneic and shallow w/ expiratory   grunting per nursing head to toe. Not in respiratory distress per ED notes.  Treatment: Baseline 4L. initially 78% on 4L, 80% on 6L, was on 5-6 O2. Now   3.5- 4L    Thank you,  Estefanía Ortiz RN, BSN, CRCR, Clinical Documentation Improvement  Options provided:  -- Acute on Chronic Respiratory Failure confirmed after study  -- Acute on Chronic Respiratory Failure treated and resolved  -- Acute on Chronic Respiratory Failure ruled out after study  -- Other - I will add my own diagnosis  -- Disagree - Not applicable / Not valid  -- Disagree - Clinically unable to determine / Unknown  -- Refer to Clinical Documentation Reviewer    PROVIDER RESPONSE TEXT:    Acute on Chronic Respiratory Failure confirmed after study.    Query created by: Estefanía Ortiz on 2024 5:26 AM      QUERY TEXT:    Patient with atrial fibrillation and is maintained on Eliquis. If possible,   please document in progress notes and discharge summary if you are evaluating   and/or treating any of the following:    The medical record reflects the following:  Risk Factors: hx PAD, HTN, female, age 69  Clinical Indicators: PAF  Treatment: Eliquis    Thank you,  Estefanía Ortiz RN, BSN, CRCR, Clinical Documentation Improvement  Options

## 2024-05-02 ENCOUNTER — APPOINTMENT (OUTPATIENT)
Dept: CT IMAGING | Age: 69
End: 2024-05-02
Payer: MEDICARE

## 2024-05-02 ENCOUNTER — APPOINTMENT (OUTPATIENT)
Dept: GENERAL RADIOLOGY | Age: 69
End: 2024-05-02
Payer: MEDICARE

## 2024-05-02 ENCOUNTER — HOSPITAL ENCOUNTER (EMERGENCY)
Age: 69
Discharge: HOME | End: 2024-05-02
Attending: EMERGENCY MEDICINE
Payer: MEDICARE

## 2024-05-02 VITALS
OXYGEN SATURATION: 94 % | RESPIRATION RATE: 17 BRPM | HEART RATE: 77 BPM | DIASTOLIC BLOOD PRESSURE: 81 MMHG | SYSTOLIC BLOOD PRESSURE: 138 MMHG | TEMPERATURE: 98.1 F

## 2024-05-02 DIAGNOSIS — S09.90XA CLOSED HEAD INJURY, INITIAL ENCOUNTER: Primary | ICD-10-CM

## 2024-05-02 DIAGNOSIS — S00.83XA CONTUSION OF FACE, INITIAL ENCOUNTER: ICD-10-CM

## 2024-05-02 DIAGNOSIS — R09.89 PULMONARY CONGESTION: ICD-10-CM

## 2024-05-02 DIAGNOSIS — J44.9 CHRONIC OBSTRUCTIVE PULMONARY DISEASE, UNSPECIFIED COPD TYPE (HCC): ICD-10-CM

## 2024-05-02 LAB
ALBUMIN SERPL-MCNC: 3.2 G/DL (ref 3.5–5.2)
ALP SERPL-CCNC: 78 U/L (ref 35–104)
ALT SERPL-CCNC: 31 U/L (ref 0–32)
ANION GAP SERPL CALCULATED.3IONS-SCNC: 6 MMOL/L (ref 7–16)
AST SERPL-CCNC: 28 U/L (ref 0–31)
BASOPHILS # BLD: 0 K/UL (ref 0–0.2)
BASOPHILS NFR BLD: 0 % (ref 0–2)
BILIRUB SERPL-MCNC: <0.2 MG/DL (ref 0–1.2)
BILIRUB UR QL STRIP: NEGATIVE
BNP SERPL-MCNC: 365 PG/ML (ref 0–125)
BUN SERPL-MCNC: 17 MG/DL (ref 6–23)
CALCIUM SERPL-MCNC: 9.2 MG/DL (ref 8.6–10.2)
CHLORIDE SERPL-SCNC: 96 MMOL/L (ref 98–107)
CLARITY UR: CLEAR
CO2 SERPL-SCNC: 40 MMOL/L (ref 22–29)
COLOR UR: YELLOW
CREAT SERPL-MCNC: 0.6 MG/DL (ref 0.5–1)
EOSINOPHIL # BLD: 0 K/UL (ref 0.05–0.5)
EOSINOPHILS RELATIVE PERCENT: 0 % (ref 0–6)
EPI CELLS #/AREA URNS HPF: ABNORMAL /HPF
ERYTHROCYTE [DISTWIDTH] IN BLOOD BY AUTOMATED COUNT: 22.6 % (ref 11.5–15)
GFR, ESTIMATED: >90 ML/MIN/1.73M2
GLUCOSE SERPL-MCNC: 113 MG/DL (ref 74–99)
GLUCOSE UR STRIP-MCNC: NEGATIVE MG/DL
HCT VFR BLD AUTO: 28.2 % (ref 34–48)
HGB BLD-MCNC: 8.1 G/DL (ref 11.5–15.5)
HGB UR QL STRIP.AUTO: ABNORMAL
KETONES UR STRIP-MCNC: NEGATIVE MG/DL
LEUKOCYTE ESTERASE UR QL STRIP: ABNORMAL
LYMPHOCYTES NFR BLD: 0.26 K/UL (ref 1.5–4)
LYMPHOCYTES RELATIVE PERCENT: 2 % (ref 20–42)
MCH RBC QN AUTO: 27.8 PG (ref 26–35)
MCHC RBC AUTO-ENTMCNC: 28.7 G/DL (ref 32–34.5)
MCV RBC AUTO: 96.9 FL (ref 80–99.9)
MICROORGANISM SPEC CULT: NORMAL
MICROORGANISM/AGENT SPEC: NORMAL
MONOCYTES NFR BLD: 0.66 K/UL (ref 0.1–0.95)
MONOCYTES NFR BLD: 4 % (ref 2–12)
NEUTROPHILS NFR BLD: 94 % (ref 43–80)
NEUTS SEG NFR BLD: 14.27 K/UL (ref 1.8–7.3)
NITRITE UR QL STRIP: NEGATIVE
PH UR STRIP: 6 [PH] (ref 5–9)
PLATELET # BLD AUTO: 250 K/UL (ref 130–450)
PMV BLD AUTO: 10 FL (ref 7–12)
POTASSIUM SERPL-SCNC: 4 MMOL/L (ref 3.5–5)
PROT SERPL-MCNC: 5.9 G/DL (ref 6.4–8.3)
PROT UR STRIP-MCNC: NEGATIVE MG/DL
RBC # BLD AUTO: 2.91 M/UL (ref 3.5–5.5)
RBC # BLD: ABNORMAL 10*6/UL
RBC #/AREA URNS HPF: ABNORMAL /HPF
SODIUM SERPL-SCNC: 142 MMOL/L (ref 132–146)
SP GR UR STRIP: 1.02 (ref 1–1.03)
SPECIMEN DESCRIPTION: NORMAL
TROPONIN I SERPL HS-MCNC: 30 NG/L (ref 0–9)
UROBILINOGEN UR STRIP-ACNC: 0.2 EU/DL (ref 0–1)
WBC #/AREA URNS HPF: ABNORMAL /HPF
WBC OTHER # BLD: 15.2 K/UL (ref 4.5–11.5)

## 2024-05-02 PROCEDURE — 72125 CT NECK SPINE W/O DYE: CPT

## 2024-05-02 PROCEDURE — 70450 CT HEAD/BRAIN W/O DYE: CPT

## 2024-05-02 PROCEDURE — 96374 THER/PROPH/DIAG INJ IV PUSH: CPT

## 2024-05-02 PROCEDURE — 94640 AIRWAY INHALATION TREATMENT: CPT

## 2024-05-02 PROCEDURE — 85025 COMPLETE CBC W/AUTO DIFF WBC: CPT

## 2024-05-02 PROCEDURE — 83880 ASSAY OF NATRIURETIC PEPTIDE: CPT

## 2024-05-02 PROCEDURE — 94664 DEMO&/EVAL PT USE INHALER: CPT

## 2024-05-02 PROCEDURE — 93005 ELECTROCARDIOGRAM TRACING: CPT | Performed by: EMERGENCY MEDICINE

## 2024-05-02 PROCEDURE — 84484 ASSAY OF TROPONIN QUANT: CPT

## 2024-05-02 PROCEDURE — 81001 URINALYSIS AUTO W/SCOPE: CPT

## 2024-05-02 PROCEDURE — 99285 EMERGENCY DEPT VISIT HI MDM: CPT

## 2024-05-02 PROCEDURE — 70486 CT MAXILLOFACIAL W/O DYE: CPT

## 2024-05-02 PROCEDURE — 6370000000 HC RX 637 (ALT 250 FOR IP): Performed by: EMERGENCY MEDICINE

## 2024-05-02 PROCEDURE — 6360000002 HC RX W HCPCS: Performed by: EMERGENCY MEDICINE

## 2024-05-02 PROCEDURE — 80053 COMPREHEN METABOLIC PANEL: CPT

## 2024-05-02 PROCEDURE — 87086 URINE CULTURE/COLONY COUNT: CPT

## 2024-05-02 PROCEDURE — 71045 X-RAY EXAM CHEST 1 VIEW: CPT

## 2024-05-02 RX ORDER — IPRATROPIUM BROMIDE AND ALBUTEROL SULFATE 2.5; .5 MG/3ML; MG/3ML
1 SOLUTION RESPIRATORY (INHALATION)
Status: COMPLETED | OUTPATIENT
Start: 2024-05-02 | End: 2024-05-02

## 2024-05-02 RX ORDER — FUROSEMIDE 10 MG/ML
20 INJECTION INTRAMUSCULAR; INTRAVENOUS ONCE
Status: COMPLETED | OUTPATIENT
Start: 2024-05-02 | End: 2024-05-02

## 2024-05-02 RX ADMIN — IPRATROPIUM BROMIDE AND ALBUTEROL SULFATE 1 DOSE: 2.5; .5 SOLUTION RESPIRATORY (INHALATION) at 13:01

## 2024-05-02 RX ADMIN — FUROSEMIDE 20 MG: 10 INJECTION, SOLUTION INTRAMUSCULAR; INTRAVENOUS at 18:38

## 2024-05-02 RX ADMIN — IPRATROPIUM BROMIDE AND ALBUTEROL SULFATE 1 DOSE: 2.5; .5 SOLUTION RESPIRATORY (INHALATION) at 13:07

## 2024-05-02 RX ADMIN — IPRATROPIUM BROMIDE AND ALBUTEROL SULFATE 1 DOSE: 2.5; .5 SOLUTION RESPIRATORY (INHALATION) at 12:55

## 2024-05-02 NOTE — ED PROVIDER NOTES
HPI:  5/2/24, Time: 12:32 PM EDT         Lana Phillips is a 69 y.o. female presenting to the ED for mechanical fall from home.  Patient states she was in the bathroom when she got lightheaded and fell striking her face.  Patient denies any LOC however nursing note indicates LOC.  Patient is on Eliquis.  Patient reports a headache and facial pain.  She denies any focal neurologic deficits, vision changes, chest pain, shortness of breath abdominal pain, nausea, vomiting, diarrhea.    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    Past Surgical History:  has a past surgical history that includes back surgery; Left oophorectomy; Upper gastrointestinal endoscopy (N/A, 10/16/2023); Pain management procedure (N/A, 12/28/2023); invasive vascular (N/A, 1/19/2024); invasive vascular (N/A, 1/19/2024); and bronchoscopy (N/A, 4/16/2024).    Social History:  reports that she has quit smoking. Her smoking use included cigarettes. She started smoking about 41 years ago. She has a 8.3 pack-year smoking history. She

## 2024-05-03 LAB
MICROORGANISM SPEC CULT: NO GROWTH
SPECIMEN DESCRIPTION: NORMAL

## 2024-05-04 LAB
EKG ATRIAL RATE: 66 BPM
EKG P-R INTERVAL: 136 MS
EKG Q-T INTERVAL: 414 MS
EKG QRS DURATION: 76 MS
EKG QTC CALCULATION (BAZETT): 434 MS
EKG R AXIS: 71 DEGREES
EKG T AXIS: 73 DEGREES
EKG VENTRICULAR RATE: 66 BPM

## 2024-05-04 PROCEDURE — 93010 ELECTROCARDIOGRAM REPORT: CPT | Performed by: INTERNAL MEDICINE

## 2024-05-06 ENCOUNTER — CARE COORDINATION (OUTPATIENT)
Dept: CARE COORDINATION | Age: 69
End: 2024-05-06

## 2024-05-06 NOTE — CARE COORDINATION
-ACM attempted to reach patient to offer enrollment into Care Coordination program & RPM services, however no answer.  -HIPAA compliant VM left introducing self, reason for call, and brief explanation of program.  -Left ACM's contact information, requesting call back.   Plan:  If no return call, will attempt outreach again.

## 2024-05-07 ENCOUNTER — CARE COORDINATION (OUTPATIENT)
Dept: CARE COORDINATION | Age: 69
End: 2024-05-07

## 2024-05-07 NOTE — CARE COORDINATION
-[facility]'s second attempted to reach patient to offer enrollment into Care Coordination program & RPM services, however no answer.  -HIPAA compliant VM left introducing self, reason for call, and brief explanation of program.  -Left [facility]'s contact information, requesting call back. If no return call, will attempt outreach again.  -Will mail Introduction letter to patient.

## 2024-05-09 LAB
MICROORGANISM SPEC CULT: NORMAL
MICROORGANISM/AGENT SPEC: NORMAL
SPECIMEN DESCRIPTION: NORMAL

## 2024-05-14 ENCOUNTER — HOSPITAL ENCOUNTER (INPATIENT)
Age: 69
LOS: 6 days | Discharge: HOME | DRG: 187 | End: 2024-05-20
Attending: EMERGENCY MEDICINE | Admitting: INTERNAL MEDICINE
Payer: MEDICARE

## 2024-05-14 ENCOUNTER — APPOINTMENT (OUTPATIENT)
Dept: CT IMAGING | Age: 69
DRG: 187 | End: 2024-05-14
Payer: MEDICARE

## 2024-05-14 ENCOUNTER — APPOINTMENT (OUTPATIENT)
Dept: GENERAL RADIOLOGY | Age: 69
DRG: 187 | End: 2024-05-14
Payer: MEDICARE

## 2024-05-14 DIAGNOSIS — J90 PLEURAL EFFUSION: ICD-10-CM

## 2024-05-14 DIAGNOSIS — F41.9 ANXIETY: ICD-10-CM

## 2024-05-14 DIAGNOSIS — J18.9 PNEUMONIA OF LEFT LOWER LOBE DUE TO INFECTIOUS ORGANISM: Primary | ICD-10-CM

## 2024-05-14 DIAGNOSIS — J96.01 ACUTE HYPOXIC RESPIRATORY FAILURE (HCC): ICD-10-CM

## 2024-05-14 LAB
ANION GAP SERPL CALCULATED.3IONS-SCNC: 10 MMOL/L (ref 7–16)
B PARAP IS1001 DNA NPH QL NAA+NON-PROBE: NOT DETECTED
B PERT DNA SPEC QL NAA+PROBE: NOT DETECTED
B.E.: 10.6 MMOL/L (ref -3–3)
B.E.: 6.9 MMOL/L (ref -3–3)
BACTERIA URNS QL MICRO: ABNORMAL
BASOPHILS # BLD: 0.03 K/UL (ref 0–0.2)
BASOPHILS NFR BLD: 0 % (ref 0–2)
BILIRUB UR QL STRIP: NEGATIVE
BNP SERPL-MCNC: 676 PG/ML (ref 0–125)
BUN SERPL-MCNC: 16 MG/DL (ref 6–23)
C PNEUM DNA NPH QL NAA+NON-PROBE: NOT DETECTED
CALCIUM SERPL-MCNC: 8.8 MG/DL (ref 8.6–10.2)
CHLORIDE SERPL-SCNC: 104 MMOL/L (ref 98–107)
CLARITY UR: CLEAR
CO2 SERPL-SCNC: 33 MMOL/L (ref 22–29)
COHB: 0.7 % (ref 0–1.5)
COHB: 1.2 % (ref 0–1.5)
COLOR UR: YELLOW
CREAT SERPL-MCNC: 0.6 MG/DL (ref 0.5–1)
CRITICAL: ABNORMAL
CRITICAL: ABNORMAL
DATE ANALYZED: ABNORMAL
DATE ANALYZED: ABNORMAL
DATE OF COLLECTION: ABNORMAL
DATE OF COLLECTION: ABNORMAL
EOSINOPHIL # BLD: 0.06 K/UL (ref 0.05–0.5)
EOSINOPHILS RELATIVE PERCENT: 1 % (ref 0–6)
EPI CELLS #/AREA URNS HPF: ABNORMAL /HPF
ERYTHROCYTE [DISTWIDTH] IN BLOOD BY AUTOMATED COUNT: 20.9 % (ref 11.5–15)
FLUAV RNA NPH QL NAA+NON-PROBE: NOT DETECTED
FLUBV RNA NPH QL NAA+NON-PROBE: NOT DETECTED
GFR, ESTIMATED: >90 ML/MIN/1.73M2
GLUCOSE SERPL-MCNC: 90 MG/DL (ref 74–99)
GLUCOSE UR STRIP-MCNC: NEGATIVE MG/DL
HADV DNA NPH QL NAA+NON-PROBE: NOT DETECTED
HCO3: 34 MMOL/L (ref 22–26)
HCO3: 37.8 MMOL/L (ref 22–26)
HCOV 229E RNA NPH QL NAA+NON-PROBE: NOT DETECTED
HCOV HKU1 RNA NPH QL NAA+NON-PROBE: NOT DETECTED
HCOV NL63 RNA NPH QL NAA+NON-PROBE: NOT DETECTED
HCOV OC43 RNA NPH QL NAA+NON-PROBE: NOT DETECTED
HCT VFR BLD AUTO: 25.6 % (ref 34–48)
HGB BLD-MCNC: 7.4 G/DL (ref 11.5–15.5)
HGB UR QL STRIP.AUTO: ABNORMAL
HHB: 0.8 % (ref 0–5)
HHB: 5.6 % (ref 0–5)
HMPV RNA NPH QL NAA+NON-PROBE: NOT DETECTED
HPIV1 RNA NPH QL NAA+NON-PROBE: NOT DETECTED
HPIV2 RNA NPH QL NAA+NON-PROBE: NOT DETECTED
HPIV3 RNA NPH QL NAA+NON-PROBE: NOT DETECTED
HPIV4 RNA NPH QL NAA+NON-PROBE: NOT DETECTED
IMM GRANULOCYTES # BLD AUTO: 0.05 K/UL (ref 0–0.58)
IMM GRANULOCYTES NFR BLD: 1 % (ref 0–5)
INFLUENZA A BY PCR: NOT DETECTED
INFLUENZA B BY PCR: NOT DETECTED
KETONES UR STRIP-MCNC: ABNORMAL MG/DL
LAB: ABNORMAL
LAB: ABNORMAL
LACTATE BLDV-SCNC: 1.4 MMOL/L (ref 0.5–1.9)
LEUKOCYTE ESTERASE UR QL STRIP: NEGATIVE
LYMPHOCYTES NFR BLD: 0.7 K/UL (ref 1.5–4)
LYMPHOCYTES RELATIVE PERCENT: 9 % (ref 20–42)
Lab: 1644
Lab: 919
M PNEUMO DNA NPH QL NAA+NON-PROBE: NOT DETECTED
MAGNESIUM SERPL-MCNC: 1.8 MG/DL (ref 1.6–2.6)
MCH RBC QN AUTO: 28.2 PG (ref 26–35)
MCHC RBC AUTO-ENTMCNC: 28.9 G/DL (ref 32–34.5)
MCV RBC AUTO: 97.7 FL (ref 80–99.9)
METHB: 0.4 % (ref 0–1.5)
METHB: 0.5 % (ref 0–1.5)
MODE: ABNORMAL
MONOCYTES NFR BLD: 0.76 K/UL (ref 0.1–0.95)
MONOCYTES NFR BLD: 10 % (ref 2–12)
NEUTROPHILS NFR BLD: 79 % (ref 43–80)
NEUTS SEG NFR BLD: 5.98 K/UL (ref 1.8–7.3)
NITRITE UR QL STRIP: NEGATIVE
O2 SATURATION: 94.3 % (ref 92–98.5)
O2 SATURATION: 99.2 % (ref 92–98.5)
O2HB: 92.8 % (ref 94–97)
O2HB: 98 % (ref 94–97)
OPERATOR ID: 1023
OPERATOR ID: 5323
PATIENT TEMP: 37 C
PATIENT TEMP: 37 C
PCO2: 66.8 MMHG (ref 35–45)
PCO2: 71.8 MMHG (ref 35–45)
PH BLOOD GAS: 7.32 (ref 7.35–7.45)
PH BLOOD GAS: 7.34 (ref 7.35–7.45)
PH UR STRIP: 6.5 [PH] (ref 5–9)
PLATELET # BLD AUTO: 191 K/UL (ref 130–450)
PMV BLD AUTO: 10.5 FL (ref 7–12)
PO2: 182.8 MMHG (ref 75–100)
PO2: 78.7 MMHG (ref 75–100)
POTASSIUM SERPL-SCNC: 4.3 MMOL/L (ref 3.5–5)
PROCALCITONIN SERPL-MCNC: 0.76 NG/ML (ref 0–0.08)
PROT UR STRIP-MCNC: 30 MG/DL
RBC # BLD AUTO: 2.62 M/UL (ref 3.5–5.5)
RBC # BLD: ABNORMAL 10*6/UL
RBC #/AREA URNS HPF: ABNORMAL /HPF
RSV RNA NPH QL NAA+NON-PROBE: NOT DETECTED
RV+EV RNA NPH QL NAA+NON-PROBE: NOT DETECTED
SARS-COV-2 RNA NPH QL NAA+NON-PROBE: NOT DETECTED
SODIUM SERPL-SCNC: 147 MMOL/L (ref 132–146)
SOURCE, BLOOD GAS: ABNORMAL
SOURCE, BLOOD GAS: ABNORMAL
SP GR UR STRIP: 1.02 (ref 1–1.03)
SPECIMEN DESCRIPTION: NORMAL
THB: 7.6 G/DL (ref 11.5–16.5)
THB: 7.7 G/DL (ref 11.5–16.5)
TIME ANALYZED: 1650
TIME ANALYZED: 924
TROPONIN I SERPL HS-MCNC: 117 NG/L (ref 0–9)
TROPONIN I SERPL HS-MCNC: 118 NG/L (ref 0–9)
UROBILINOGEN UR STRIP-ACNC: 0.2 EU/DL (ref 0–1)
WBC #/AREA URNS HPF: ABNORMAL /HPF
WBC OTHER # BLD: 7.6 K/UL (ref 4.5–11.5)

## 2024-05-14 PROCEDURE — 2580000003 HC RX 258

## 2024-05-14 PROCEDURE — 82805 BLOOD GASES W/O2 SATURATION: CPT

## 2024-05-14 PROCEDURE — 83735 ASSAY OF MAGNESIUM: CPT

## 2024-05-14 PROCEDURE — 83880 ASSAY OF NATRIURETIC PEPTIDE: CPT

## 2024-05-14 PROCEDURE — 71250 CT THORAX DX C-: CPT

## 2024-05-14 PROCEDURE — 94640 AIRWAY INHALATION TREATMENT: CPT

## 2024-05-14 PROCEDURE — 80048 BASIC METABOLIC PNL TOTAL CA: CPT

## 2024-05-14 PROCEDURE — 99285 EMERGENCY DEPT VISIT HI MDM: CPT

## 2024-05-14 PROCEDURE — 85025 COMPLETE CBC W/AUTO DIFF WBC: CPT

## 2024-05-14 PROCEDURE — 6370000000 HC RX 637 (ALT 250 FOR IP)

## 2024-05-14 PROCEDURE — 94664 DEMO&/EVAL PT USE INHALER: CPT

## 2024-05-14 PROCEDURE — 6370000000 HC RX 637 (ALT 250 FOR IP): Performed by: INTERNAL MEDICINE

## 2024-05-14 PROCEDURE — 87502 INFLUENZA DNA AMP PROBE: CPT

## 2024-05-14 PROCEDURE — 2140000000 HC CCU INTERMEDIATE R&B

## 2024-05-14 PROCEDURE — 87040 BLOOD CULTURE FOR BACTERIA: CPT

## 2024-05-14 PROCEDURE — 81001 URINALYSIS AUTO W/SCOPE: CPT

## 2024-05-14 PROCEDURE — 96374 THER/PROPH/DIAG INJ IV PUSH: CPT

## 2024-05-14 PROCEDURE — 2580000003 HC RX 258: Performed by: EMERGENCY MEDICINE

## 2024-05-14 PROCEDURE — 84484 ASSAY OF TROPONIN QUANT: CPT

## 2024-05-14 PROCEDURE — 83605 ASSAY OF LACTIC ACID: CPT

## 2024-05-14 PROCEDURE — 6360000002 HC RX W HCPCS: Performed by: EMERGENCY MEDICINE

## 2024-05-14 PROCEDURE — 6360000002 HC RX W HCPCS: Performed by: INTERNAL MEDICINE

## 2024-05-14 PROCEDURE — 2500000003 HC RX 250 WO HCPCS: Performed by: EMERGENCY MEDICINE

## 2024-05-14 PROCEDURE — 6360000002 HC RX W HCPCS

## 2024-05-14 PROCEDURE — 0202U NFCT DS 22 TRGT SARS-COV-2: CPT

## 2024-05-14 PROCEDURE — 84145 PROCALCITONIN (PCT): CPT

## 2024-05-14 PROCEDURE — 71045 X-RAY EXAM CHEST 1 VIEW: CPT

## 2024-05-14 RX ORDER — PREGABALIN 50 MG/1
50 CAPSULE ORAL 3 TIMES DAILY
Status: DISCONTINUED | OUTPATIENT
Start: 2024-05-14 | End: 2024-05-15

## 2024-05-14 RX ORDER — PREGABALIN 50 MG/1
50 CAPSULE ORAL 3 TIMES DAILY
Status: ON HOLD | COMMUNITY
End: 2024-05-20 | Stop reason: HOSPADM

## 2024-05-14 RX ORDER — ASPIRIN 81 MG/1
81 TABLET ORAL DAILY
Status: DISCONTINUED | OUTPATIENT
Start: 2024-05-15 | End: 2024-05-20 | Stop reason: HOSPADM

## 2024-05-14 RX ORDER — ROSUVASTATIN CALCIUM 20 MG/1
20 TABLET, COATED ORAL NIGHTLY
Status: DISCONTINUED | OUTPATIENT
Start: 2024-05-14 | End: 2024-05-20 | Stop reason: HOSPADM

## 2024-05-14 RX ORDER — BUDESONIDE 0.5 MG/2ML
1 INHALANT ORAL
Status: DISCONTINUED | OUTPATIENT
Start: 2024-05-14 | End: 2024-05-20 | Stop reason: HOSPADM

## 2024-05-14 RX ORDER — POLYETHYLENE GLYCOL 3350 17 G/17G
17 POWDER, FOR SOLUTION ORAL DAILY PRN
Status: DISCONTINUED | OUTPATIENT
Start: 2024-05-14 | End: 2024-05-20 | Stop reason: HOSPADM

## 2024-05-14 RX ORDER — ACETAMINOPHEN 325 MG/1
650 TABLET ORAL EVERY 6 HOURS PRN
Status: DISCONTINUED | OUTPATIENT
Start: 2024-05-14 | End: 2024-05-20 | Stop reason: HOSPADM

## 2024-05-14 RX ORDER — IPRATROPIUM BROMIDE AND ALBUTEROL SULFATE 2.5; .5 MG/3ML; MG/3ML
1 SOLUTION RESPIRATORY (INHALATION)
Status: COMPLETED | OUTPATIENT
Start: 2024-05-14 | End: 2024-05-14

## 2024-05-14 RX ORDER — DILTIAZEM HYDROCHLORIDE 180 MG/1
180 CAPSULE, COATED, EXTENDED RELEASE ORAL DAILY
Status: DISCONTINUED | OUTPATIENT
Start: 2024-05-14 | End: 2024-05-20 | Stop reason: HOSPADM

## 2024-05-14 RX ORDER — CITALOPRAM 20 MG/1
20 TABLET ORAL DAILY
Status: DISCONTINUED | OUTPATIENT
Start: 2024-05-14 | End: 2024-05-20 | Stop reason: HOSPADM

## 2024-05-14 RX ORDER — ONDANSETRON 2 MG/ML
4 INJECTION INTRAMUSCULAR; INTRAVENOUS EVERY 6 HOURS PRN
Status: DISCONTINUED | OUTPATIENT
Start: 2024-05-14 | End: 2024-05-20 | Stop reason: HOSPADM

## 2024-05-14 RX ORDER — IPRATROPIUM BROMIDE AND ALBUTEROL SULFATE 2.5; .5 MG/3ML; MG/3ML
1 SOLUTION RESPIRATORY (INHALATION) EVERY 4 HOURS PRN
COMMUNITY

## 2024-05-14 RX ORDER — LANOLIN ALCOHOL/MO/W.PET/CERES
500 CREAM (GRAM) TOPICAL DAILY
Status: DISCONTINUED | OUTPATIENT
Start: 2024-05-15 | End: 2024-05-20 | Stop reason: HOSPADM

## 2024-05-14 RX ORDER — HYDROXYZINE PAMOATE 25 MG/1
25 CAPSULE ORAL 3 TIMES DAILY PRN
Status: DISCONTINUED | OUTPATIENT
Start: 2024-05-14 | End: 2024-05-20 | Stop reason: HOSPADM

## 2024-05-14 RX ORDER — GUAIFENESIN 400 MG/1
400 TABLET ORAL 3 TIMES DAILY
Status: DISCONTINUED | OUTPATIENT
Start: 2024-05-14 | End: 2024-05-20 | Stop reason: HOSPADM

## 2024-05-14 RX ORDER — PANTOPRAZOLE SODIUM 40 MG/1
40 TABLET, DELAYED RELEASE ORAL
Status: DISCONTINUED | OUTPATIENT
Start: 2024-05-15 | End: 2024-05-20 | Stop reason: HOSPADM

## 2024-05-14 RX ORDER — TRAZODONE HYDROCHLORIDE 50 MG/1
50 TABLET ORAL NIGHTLY PRN
Status: DISCONTINUED | OUTPATIENT
Start: 2024-05-14 | End: 2024-05-20 | Stop reason: HOSPADM

## 2024-05-14 RX ORDER — IPRATROPIUM BROMIDE AND ALBUTEROL SULFATE 2.5; .5 MG/3ML; MG/3ML
1 SOLUTION RESPIRATORY (INHALATION)
Status: DISCONTINUED | OUTPATIENT
Start: 2024-05-14 | End: 2024-05-20 | Stop reason: HOSPADM

## 2024-05-14 RX ORDER — SODIUM CHLORIDE 0.9 % (FLUSH) 0.9 %
5-40 SYRINGE (ML) INJECTION EVERY 12 HOURS SCHEDULED
Status: DISCONTINUED | OUTPATIENT
Start: 2024-05-14 | End: 2024-05-20 | Stop reason: HOSPADM

## 2024-05-14 RX ORDER — LORAZEPAM 0.5 MG/1
0.5 TABLET ORAL ONCE
Status: COMPLETED | OUTPATIENT
Start: 2024-05-14 | End: 2024-05-14

## 2024-05-14 RX ORDER — SODIUM CHLORIDE 0.9 % (FLUSH) 0.9 %
5-40 SYRINGE (ML) INJECTION PRN
Status: DISCONTINUED | OUTPATIENT
Start: 2024-05-14 | End: 2024-05-20 | Stop reason: HOSPADM

## 2024-05-14 RX ORDER — POTASSIUM CHLORIDE 7.45 MG/ML
10 INJECTION INTRAVENOUS PRN
Status: DISCONTINUED | OUTPATIENT
Start: 2024-05-14 | End: 2024-05-20 | Stop reason: HOSPADM

## 2024-05-14 RX ORDER — ACETAMINOPHEN 650 MG/1
650 SUPPOSITORY RECTAL EVERY 6 HOURS PRN
Status: DISCONTINUED | OUTPATIENT
Start: 2024-05-14 | End: 2024-05-20 | Stop reason: HOSPADM

## 2024-05-14 RX ORDER — SODIUM CHLORIDE 9 MG/ML
INJECTION, SOLUTION INTRAVENOUS PRN
Status: DISCONTINUED | OUTPATIENT
Start: 2024-05-14 | End: 2024-05-20 | Stop reason: HOSPADM

## 2024-05-14 RX ORDER — METHIMAZOLE 5 MG/1
15 TABLET ORAL DAILY
Status: DISCONTINUED | OUTPATIENT
Start: 2024-05-15 | End: 2024-05-20 | Stop reason: HOSPADM

## 2024-05-14 RX ORDER — POTASSIUM CHLORIDE 20 MEQ/1
40 TABLET, EXTENDED RELEASE ORAL PRN
Status: DISCONTINUED | OUTPATIENT
Start: 2024-05-14 | End: 2024-05-20 | Stop reason: HOSPADM

## 2024-05-14 RX ORDER — ONDANSETRON 4 MG/1
4 TABLET, ORALLY DISINTEGRATING ORAL EVERY 8 HOURS PRN
Status: DISCONTINUED | OUTPATIENT
Start: 2024-05-14 | End: 2024-05-20 | Stop reason: HOSPADM

## 2024-05-14 RX ORDER — MAGNESIUM SULFATE IN WATER 40 MG/ML
2000 INJECTION, SOLUTION INTRAVENOUS PRN
Status: DISCONTINUED | OUTPATIENT
Start: 2024-05-14 | End: 2024-05-20 | Stop reason: HOSPADM

## 2024-05-14 RX ORDER — BUSPIRONE HYDROCHLORIDE 10 MG/1
10 TABLET ORAL 3 TIMES DAILY
Status: DISCONTINUED | OUTPATIENT
Start: 2024-05-14 | End: 2024-05-15

## 2024-05-14 RX ORDER — ARFORMOTEROL TARTRATE 15 UG/2ML
15 SOLUTION RESPIRATORY (INHALATION)
Status: DISCONTINUED | OUTPATIENT
Start: 2024-05-14 | End: 2024-05-20 | Stop reason: HOSPADM

## 2024-05-14 RX ORDER — FOLIC ACID 1 MG/1
1 TABLET ORAL DAILY
Status: DISCONTINUED | OUTPATIENT
Start: 2024-05-14 | End: 2024-05-20 | Stop reason: HOSPADM

## 2024-05-14 RX ORDER — SENNA AND DOCUSATE SODIUM 50; 8.6 MG/1; MG/1
1 TABLET, FILM COATED ORAL DAILY
Status: DISCONTINUED | OUTPATIENT
Start: 2024-05-14 | End: 2024-05-20 | Stop reason: HOSPADM

## 2024-05-14 RX ORDER — CLONAZEPAM 0.5 MG/1
1 TABLET ORAL EVERY 12 HOURS
Status: DISCONTINUED | OUTPATIENT
Start: 2024-05-14 | End: 2024-05-15

## 2024-05-14 RX ADMIN — DOXYCYCLINE 100 MG: 100 INJECTION, POWDER, LYOPHILIZED, FOR SOLUTION INTRAVENOUS at 13:46

## 2024-05-14 RX ADMIN — GUAIFENESIN 400 MG: 400 TABLET ORAL at 22:54

## 2024-05-14 RX ADMIN — WATER 125 MG: 1 INJECTION INTRAMUSCULAR; INTRAVENOUS; SUBCUTANEOUS at 09:53

## 2024-05-14 RX ADMIN — SENNOSIDES AND DOCUSATE SODIUM 1 TABLET: 50; 8.6 TABLET ORAL at 22:55

## 2024-05-14 RX ADMIN — LORAZEPAM 0.5 MG: 0.5 TABLET ORAL at 10:42

## 2024-05-14 RX ADMIN — CLONAZEPAM 1 MG: 0.5 TABLET ORAL at 22:54

## 2024-05-14 RX ADMIN — IPRATROPIUM BROMIDE AND ALBUTEROL SULFATE 1 DOSE: .5; 2.5 SOLUTION RESPIRATORY (INHALATION) at 21:02

## 2024-05-14 RX ADMIN — IPRATROPIUM BROMIDE AND ALBUTEROL SULFATE 1 DOSE: 2.5; .5 SOLUTION RESPIRATORY (INHALATION) at 09:38

## 2024-05-14 RX ADMIN — APIXABAN 5 MG: 5 TABLET, FILM COATED ORAL at 22:54

## 2024-05-14 RX ADMIN — ARFORMOTEROL TARTRATE 15 MCG: 15 SOLUTION RESPIRATORY (INHALATION) at 21:02

## 2024-05-14 RX ADMIN — CEFEPIME 2000 MG: 2 INJECTION, POWDER, FOR SOLUTION INTRAVENOUS at 12:21

## 2024-05-14 RX ADMIN — PREGABALIN 50 MG: 50 CAPSULE ORAL at 22:54

## 2024-05-14 RX ADMIN — FOLIC ACID 1 MG: 1 TABLET ORAL at 22:54

## 2024-05-14 RX ADMIN — IPRATROPIUM BROMIDE AND ALBUTEROL SULFATE 1 DOSE: 2.5; .5 SOLUTION RESPIRATORY (INHALATION) at 09:39

## 2024-05-14 RX ADMIN — IPRATROPIUM BROMIDE AND ALBUTEROL SULFATE 1 DOSE: 2.5; .5 SOLUTION RESPIRATORY (INHALATION) at 09:37

## 2024-05-14 RX ADMIN — BUDESONIDE INHALATION 1000 MCG: 0.5 SUSPENSION RESPIRATORY (INHALATION) at 21:02

## 2024-05-14 ASSESSMENT — PAIN - FUNCTIONAL ASSESSMENT: PAIN_FUNCTIONAL_ASSESSMENT: NONE - DENIES PAIN

## 2024-05-14 NOTE — ED NOTES
Patient expressing wants to leave. When this RN asked patient why she wants to leave she stated \"let me talk to your mom or your sister.\" Patient has intermittent confusion. Dr. Navarro notified.

## 2024-05-14 NOTE — ED PROVIDER NOTES
Department of Emergency Medicine     Written by: Larry Navarro MD  Patient Name: Lana Phillips  Admit Date: 2024  9:13 AM  MRN: [id number]                   : 1955    HPI  Chief Complaint   Patient presents with    Shortness of Breath     Patient sent from [place] with SOB and AMS , per EMS patient was 70% on normal 4L NC , patient arrived on 6L NC at 99%       Lana Phillips is a 69 y.o. female that presents to the ED with shortness of breath.  Patient chronically on 4 L nasal cannula oxygen with history of COPD.  She is chronic respiratory acidosis.  Unclear what baseline is however the MSE the patient is altered.  The patient is alert and oriented when I speak to her.  She is able to provide adequate and appropriate history.  She says she is not short of breath however nursing were concerned that she was hypoxic on her 4 L nasal cannula oxygen.  The patient is 93% on 5 L nasal cannula oxygen here.  She does feel warm however no reported fevers.  Denies cough congestion rhinorrhea.  She has no chest pain.  No abdominal pain nausea vomiting.  She has a history of atrial fibrillation, and has been taking her Eliquis    Review of systems:  Pertinent positives and negatives mentioned in the HPI/MDM.    Physical Exam  Constitutional:       General: She is not in acute distress.     Appearance: Normal appearance.   Eyes:      Extraocular Movements: Extraocular movements intact.      Pupils: Pupils are equal, round, and reactive to light.   Cardiovascular:      Rate and Rhythm: Normal rate and regular rhythm.   Pulmonary:      Effort: Pulmonary effort is normal. No respiratory distress.      Breath sounds: Wheezing present.   Chest:      Chest wall: No mass or tenderness.   Abdominal:      Palpations: Abdomen is soft.      Tenderness: There is no abdominal tenderness.   Musculoskeletal:      Right lower leg: No edema.      Left lower leg: No edema.   Skin:     General: Skin is warm.      Capillary Refill:

## 2024-05-15 PROBLEM — R41.0 ACUTE DELIRIUM: Status: ACTIVE | Noted: 2024-05-15

## 2024-05-15 LAB
B.E.: 8.6 MMOL/L (ref -3–3)
COHB: 0.8 % (ref 0–1.5)
CRITICAL: ABNORMAL
DATE ANALYZED: ABNORMAL
DATE OF COLLECTION: ABNORMAL
HCO3: 33.5 MMOL/L (ref 22–26)
HHB: 21.3 % (ref 0–5)
LAB: ABNORMAL
Lab: 815
METHB: 0.5 % (ref 0–1.5)
MODE: ABNORMAL
O2 CONTENT: 8.5 ML/DL
O2 SATURATION: 78.4 % (ref 92–98.5)
O2HB: 77.4 % (ref 94–97)
OPERATOR ID: 1222
PATIENT TEMP: 37 C
PCO2: 49.4 MMHG (ref 35–45)
PH BLOOD GAS: 7.45 (ref 7.35–7.45)
PO2: 41.3 MMHG (ref 75–100)
SOURCE, BLOOD GAS: ABNORMAL
THB: 7.8 G/DL (ref 11.5–16.5)
TIME ANALYZED: 817

## 2024-05-15 PROCEDURE — 6370000000 HC RX 637 (ALT 250 FOR IP): Performed by: INTERNAL MEDICINE

## 2024-05-15 PROCEDURE — 2700000000 HC OXYGEN THERAPY PER DAY

## 2024-05-15 PROCEDURE — 82805 BLOOD GASES W/O2 SATURATION: CPT

## 2024-05-15 PROCEDURE — 6360000002 HC RX W HCPCS: Performed by: INTERNAL MEDICINE

## 2024-05-15 PROCEDURE — 94640 AIRWAY INHALATION TREATMENT: CPT

## 2024-05-15 PROCEDURE — 2140000000 HC CCU INTERMEDIATE R&B

## 2024-05-15 PROCEDURE — 94660 CPAP INITIATION&MGMT: CPT

## 2024-05-15 PROCEDURE — 2580000003 HC RX 258: Performed by: INTERNAL MEDICINE

## 2024-05-15 PROCEDURE — 5A09357 ASSISTANCE WITH RESPIRATORY VENTILATION, LESS THAN 24 CONSECUTIVE HOURS, CONTINUOUS POSITIVE AIRWAY PRESSURE: ICD-10-PCS | Performed by: INTERNAL MEDICINE

## 2024-05-15 RX ORDER — CLONAZEPAM 0.5 MG/1
0.5 TABLET ORAL EVERY 12 HOURS PRN
Status: DISCONTINUED | OUTPATIENT
Start: 2024-05-15 | End: 2024-05-20 | Stop reason: HOSPADM

## 2024-05-15 RX ADMIN — ROSUVASTATIN 20 MG: 20 TABLET, FILM COATED ORAL at 21:52

## 2024-05-15 RX ADMIN — PANTOPRAZOLE SODIUM 40 MG: 40 TABLET, DELAYED RELEASE ORAL at 06:30

## 2024-05-15 RX ADMIN — SENNOSIDES AND DOCUSATE SODIUM 1 TABLET: 50; 8.6 TABLET ORAL at 11:44

## 2024-05-15 RX ADMIN — IPRATROPIUM BROMIDE AND ALBUTEROL SULFATE 1 DOSE: .5; 2.5 SOLUTION RESPIRATORY (INHALATION) at 14:54

## 2024-05-15 RX ADMIN — APIXABAN 5 MG: 5 TABLET, FILM COATED ORAL at 11:39

## 2024-05-15 RX ADMIN — IPRATROPIUM BROMIDE AND ALBUTEROL SULFATE 1 DOSE: .5; 2.5 SOLUTION RESPIRATORY (INHALATION) at 11:52

## 2024-05-15 RX ADMIN — HYDROXYZINE PAMOATE 25 MG: 25 CAPSULE ORAL at 20:53

## 2024-05-15 RX ADMIN — GUAIFENESIN 400 MG: 400 TABLET ORAL at 11:40

## 2024-05-15 RX ADMIN — ASPIRIN 81 MG: 81 TABLET, COATED ORAL at 11:39

## 2024-05-15 RX ADMIN — CYANOCOBALAMIN TAB 1000 MCG 500 MCG: 1000 TAB at 11:44

## 2024-05-15 RX ADMIN — ARFORMOTEROL TARTRATE 15 MCG: 15 SOLUTION RESPIRATORY (INHALATION) at 06:18

## 2024-05-15 RX ADMIN — SODIUM CHLORIDE, PRESERVATIVE FREE 10 ML: 5 INJECTION INTRAVENOUS at 11:45

## 2024-05-15 RX ADMIN — ARFORMOTEROL TARTRATE 15 MCG: 15 SOLUTION RESPIRATORY (INHALATION) at 21:56

## 2024-05-15 RX ADMIN — IPRATROPIUM BROMIDE AND ALBUTEROL SULFATE 1 DOSE: .5; 2.5 SOLUTION RESPIRATORY (INHALATION) at 21:56

## 2024-05-15 RX ADMIN — CITALOPRAM HYDROBROMIDE 20 MG: 20 TABLET ORAL at 11:40

## 2024-05-15 RX ADMIN — BUDESONIDE INHALATION 1000 MCG: 0.5 SUSPENSION RESPIRATORY (INHALATION) at 21:56

## 2024-05-15 RX ADMIN — FOLIC ACID 1 MG: 1 TABLET ORAL at 11:44

## 2024-05-15 RX ADMIN — GUAIFENESIN 400 MG: 400 TABLET ORAL at 13:47

## 2024-05-15 RX ADMIN — BUDESONIDE INHALATION 1000 MCG: 0.5 SUSPENSION RESPIRATORY (INHALATION) at 06:18

## 2024-05-15 RX ADMIN — DILTIAZEM HYDROCHLORIDE 180 MG: 180 CAPSULE, COATED, EXTENDED RELEASE ORAL at 11:39

## 2024-05-15 RX ADMIN — METHIMAZOLE 15 MG: 5 TABLET ORAL at 11:40

## 2024-05-15 RX ADMIN — HYDROXYZINE PAMOATE 25 MG: 25 CAPSULE ORAL at 11:39

## 2024-05-15 RX ADMIN — IPRATROPIUM BROMIDE AND ALBUTEROL SULFATE 1 DOSE: .5; 2.5 SOLUTION RESPIRATORY (INHALATION) at 06:18

## 2024-05-15 RX ADMIN — GUAIFENESIN 400 MG: 400 TABLET ORAL at 21:52

## 2024-05-15 NOTE — H&P
[facility]              [address] [place] [address]                           HISTORY & PHYSICAL      PATIENT NAME: ANGIE SHELTON CM               : 1955  MED REC NO: [id number]                        ROOM: [id number]  ACCOUNT NO: [id number]                       ADMIT DATE: 2024  PROVIDER: Baldemar Jarrett DO      CHIEF COMPLAINT:  Shortness of breath, altered mental status.    HISTORY OF PRESENT ILLNESS:  The patient is a 69-year-old  female who presented to the emergency room from a rehab facility with shortness of breath and altered mental status.  She has chronic respiratory failure with hypercarbia and hypoxia, on chronic O2.  The patient's pulmonologist is Dr. Price.  She is on multiple antianxiety medications including Klonopin, Celexa, BuSpar, Vistaril.  She was seen in the emergency room and admitted for further evaluation and treatment.  Diagnostic evaluation in the emergency room revealed a new infiltrate in the left lung base.  pH 7.339, pCO2 of 71.8, PO2 of 78.7, O2 saturation of 94.3 on 5 L nasal cannula.  Respiratory panel negative.  Hemoglobin 7.4.    PAST MEDICAL HISTORY:  COPD; chronic hypoxic/hypercarbic respiratory failure, on chronic O2; paroxysmal atrial fibrillation, chronic Eliquis therapy, hypertension; vitamin B12 deficiency; hyperthyroidism; peripheral artery disease; chronic anemia.    PAST SURGICAL HISTORY:  Back surgery, left oophorectomy, tonsillectomy, appendectomy, wisdom teeth extraction.    SOCIAL HISTORY:  The patient is a former smoker.  No alcohol.    REVIEW OF SYSTEMS:  Remarkable for above-stated chief complaint.    ALLERGIES:  PENICILLIN.      MEDICATIONS:  Prior to admission:  Tylenol p.r.n., albuterol p.r.n., Eliquis, Brovana, aspirin, Pulmicort, BuSpar, Celexa, Klonopin, Cardizem CD, folic acid, guaifenesin, Vistaril, DuoNeb nebulizer p.r.n., Tapazole, oxygen, Protonix, GlycoLax p.r.n., Lyrica,

## 2024-05-15 NOTE — ED NOTES
Patient resting comfortably in stretcher, no signs of distress noted. Pt denies discomfort at this time. Respirations easy and unlabored on 6 L NC. 1x1 safety sitter in place. No needs expressed at this time, patient informed of going upstairs once transport arrives

## 2024-05-15 NOTE — PLAN OF CARE
Problem: Chronic Conditions and Co-morbidities  Goal: Patient's chronic conditions and co-morbidity symptoms are monitored and maintained or improved  5/15/2024 1716 by Wanda Hoffman RN  Outcome: Progressing  5/15/2024 0542 by Nancy Olsen RN  Outcome: Progressing     Problem: Discharge Planning  Goal: Discharge to home or other facility with appropriate resources  5/15/2024 1716 by Wanda Hoffman RN  Outcome: Progressing  5/15/2024 0542 by Nancy Olsen RN  Outcome: Progressing     Problem: Skin/Tissue Integrity  Goal: Absence of new skin breakdown  Description: 1.  Monitor for areas of redness and/or skin breakdown  2.  Assess vascular access sites hourly  3.  Every 4-6 hours minimum:  Change oxygen saturation probe site  4.  Every 4-6 hours:  If on nasal continuous positive airway pressure, respiratory therapy assess nares and determine need for appliance change or resting period.  5/15/2024 1716 by Wnada Hoffman RN  Outcome: Progressing  5/15/2024 0542 by Nancy Olsen RN  Outcome: Progressing     Problem: Safety - Adult  Goal: Free from fall injury  5/15/2024 1716 by Wanda Hoffman RN  Outcome: Progressing  5/15/2024 0542 by Nancy Olsen RN  Outcome: Progressing

## 2024-05-16 ENCOUNTER — APPOINTMENT (OUTPATIENT)
Dept: ULTRASOUND IMAGING | Age: 69
DRG: 187 | End: 2024-05-16
Payer: MEDICARE

## 2024-05-16 ENCOUNTER — APPOINTMENT (OUTPATIENT)
Dept: GENERAL RADIOLOGY | Age: 69
DRG: 187 | End: 2024-05-16
Payer: MEDICARE

## 2024-05-16 ENCOUNTER — APPOINTMENT (OUTPATIENT)
Dept: CT IMAGING | Age: 69
DRG: 187 | End: 2024-05-16
Payer: MEDICARE

## 2024-05-16 LAB
ALBUMIN SERPL-MCNC: 3.2 G/DL (ref 3.5–5.2)
ALP SERPL-CCNC: 110 U/L (ref 35–104)
ALT SERPL-CCNC: 670 U/L (ref 0–32)
ANION GAP SERPL CALCULATED.3IONS-SCNC: 8 MMOL/L (ref 7–16)
APPEARANCE FLD: NORMAL
AST SERPL-CCNC: 280 U/L (ref 0–31)
BILIRUB DIRECT SERPL-MCNC: <0.2 MG/DL (ref 0–0.3)
BILIRUB INDIRECT SERPL-MCNC: ABNORMAL MG/DL (ref 0–1)
BILIRUB SERPL-MCNC: 0.3 MG/DL (ref 0–1.2)
BODY FLD TYPE: NORMAL
BUN SERPL-MCNC: 16 MG/DL (ref 6–23)
CALCIUM SERPL-MCNC: 8.7 MG/DL (ref 8.6–10.2)
CARCINOEMBRYONIC ANTIGEN: 1.7 NG/ML
CHLORIDE SERPL-SCNC: 98 MMOL/L (ref 98–107)
CLOT CHECK: NORMAL
CO2 SERPL-SCNC: 34 MMOL/L (ref 22–29)
COLOR FLD: YELLOW
CREAT SERPL-MCNC: 0.6 MG/DL (ref 0.5–1)
CRITICAL: NORMAL
DATE ANALYZED: NORMAL
DATE OF COLLECTION: NORMAL
ERYTHROCYTE [DISTWIDTH] IN BLOOD BY AUTOMATED COUNT: 20.6 % (ref 11.5–15)
GFR, ESTIMATED: >90 ML/MIN/1.73M2
GLUCOSE FLD-MCNC: 123 MG/DL
GLUCOSE SERPL-MCNC: 112 MG/DL (ref 74–99)
HCT VFR BLD AUTO: 21.8 % (ref 34–48)
HCT VFR BLD AUTO: 26.1 % (ref 34–48)
HGB BLD-MCNC: 6.4 G/DL (ref 11.5–15.5)
HGB BLD-MCNC: 7.9 G/DL (ref 11.5–15.5)
LAB: NORMAL
LDH SERPL-CCNC: 379 U/L (ref 135–214)
Lab: 1506
MCH RBC QN AUTO: 28.1 PG (ref 26–35)
MCHC RBC AUTO-ENTMCNC: 29.4 G/DL (ref 32–34.5)
MCV RBC AUTO: 95.6 FL (ref 80–99.9)
MICROORGANISM SPEC CULT: NORMAL
MICROORGANISM/AGENT SPEC: NORMAL
MONOCYTES NFR FLD: 54 %
NEUTROPHILS NFR FLD: 46 %
OPERATOR ID: 8217
PH FLUID: 7.47
PLATELET # BLD AUTO: 224 K/UL (ref 130–450)
PMV BLD AUTO: 9.9 FL (ref 7–12)
POTASSIUM SERPL-SCNC: 3.6 MMOL/L (ref 3.5–5)
PROT FLD-MCNC: 1.8 G/DL
PROT SERPL-MCNC: 5.8 G/DL (ref 6.4–8.3)
RBC # BLD AUTO: 2.28 M/UL (ref 3.5–5.5)
RBC # FLD: 2000 CELLS/UL
SODIUM SERPL-SCNC: 140 MMOL/L (ref 132–146)
SOURCE, BLOOD GAS: NORMAL
SPECIMEN DESCRIPTION: NORMAL
SPECIMEN TYPE: NORMAL
TIME ANALYZED: 1518
TRIGLYCERIDES FLUID: 13 MG/DL
WBC # FLD: 2869 CELLS/UL
WBC OTHER # BLD: 7.7 K/UL (ref 4.5–11.5)

## 2024-05-16 PROCEDURE — 86923 COMPATIBILITY TEST ELECTRIC: CPT

## 2024-05-16 PROCEDURE — 70450 CT HEAD/BRAIN W/O DYE: CPT

## 2024-05-16 PROCEDURE — 94640 AIRWAY INHALATION TREATMENT: CPT

## 2024-05-16 PROCEDURE — 88112 CYTOPATH CELL ENHANCE TECH: CPT

## 2024-05-16 PROCEDURE — 85027 COMPLETE CBC AUTOMATED: CPT

## 2024-05-16 PROCEDURE — 83986 ASSAY PH BODY FLUID NOS: CPT

## 2024-05-16 PROCEDURE — 36430 TRANSFUSION BLD/BLD COMPNT: CPT

## 2024-05-16 PROCEDURE — 2140000000 HC CCU INTERMEDIATE R&B

## 2024-05-16 PROCEDURE — 32555 ASPIRATE PLEURA W/ IMAGING: CPT

## 2024-05-16 PROCEDURE — 0W9B3ZZ DRAINAGE OF LEFT PLEURAL CAVITY, PERCUTANEOUS APPROACH: ICD-10-PCS | Performed by: INTERNAL MEDICINE

## 2024-05-16 PROCEDURE — 82378 CARCINOEMBRYONIC ANTIGEN: CPT

## 2024-05-16 PROCEDURE — 30233N1 TRANSFUSION OF NONAUTOLOGOUS RED BLOOD CELLS INTO PERIPHERAL VEIN, PERCUTANEOUS APPROACH: ICD-10-PCS | Performed by: INTERNAL MEDICINE

## 2024-05-16 PROCEDURE — 86850 RBC ANTIBODY SCREEN: CPT

## 2024-05-16 PROCEDURE — 36415 COLL VENOUS BLD VENIPUNCTURE: CPT

## 2024-05-16 PROCEDURE — 83615 LACTATE (LD) (LDH) ENZYME: CPT

## 2024-05-16 PROCEDURE — 89051 BODY FLUID CELL COUNT: CPT

## 2024-05-16 PROCEDURE — 6370000000 HC RX 637 (ALT 250 FOR IP): Performed by: INTERNAL MEDICINE

## 2024-05-16 PROCEDURE — 86900 BLOOD TYPING SEROLOGIC ABO: CPT

## 2024-05-16 PROCEDURE — 71045 X-RAY EXAM CHEST 1 VIEW: CPT

## 2024-05-16 PROCEDURE — 80053 COMPREHEN METABOLIC PANEL: CPT

## 2024-05-16 PROCEDURE — 94660 CPAP INITIATION&MGMT: CPT

## 2024-05-16 PROCEDURE — 84157 ASSAY OF PROTEIN OTHER: CPT

## 2024-05-16 PROCEDURE — 87070 CULTURE OTHR SPECIMN AEROBIC: CPT

## 2024-05-16 PROCEDURE — 82945 GLUCOSE OTHER FLUID: CPT

## 2024-05-16 PROCEDURE — 86901 BLOOD TYPING SEROLOGIC RH(D): CPT

## 2024-05-16 PROCEDURE — 85014 HEMATOCRIT: CPT

## 2024-05-16 PROCEDURE — 82248 BILIRUBIN DIRECT: CPT

## 2024-05-16 PROCEDURE — 84478 ASSAY OF TRIGLYCERIDES: CPT

## 2024-05-16 PROCEDURE — 88305 TISSUE EXAM BY PATHOLOGIST: CPT

## 2024-05-16 PROCEDURE — P9016 RBC LEUKOCYTES REDUCED: HCPCS

## 2024-05-16 PROCEDURE — 87205 SMEAR GRAM STAIN: CPT

## 2024-05-16 PROCEDURE — 2700000000 HC OXYGEN THERAPY PER DAY

## 2024-05-16 PROCEDURE — 6360000002 HC RX W HCPCS: Performed by: INTERNAL MEDICINE

## 2024-05-16 PROCEDURE — 85018 HEMOGLOBIN: CPT

## 2024-05-16 PROCEDURE — 2580000003 HC RX 258: Performed by: INTERNAL MEDICINE

## 2024-05-16 RX ORDER — SODIUM CHLORIDE 9 MG/ML
INJECTION, SOLUTION INTRAVENOUS PRN
Status: DISCONTINUED | OUTPATIENT
Start: 2024-05-16 | End: 2024-05-20 | Stop reason: HOSPADM

## 2024-05-16 RX ORDER — OXYCODONE HYDROCHLORIDE AND ACETAMINOPHEN 5; 325 MG/1; MG/1
1 TABLET ORAL ONCE
Status: COMPLETED | OUTPATIENT
Start: 2024-05-16 | End: 2024-05-17

## 2024-05-16 RX ORDER — LIDOCAINE 4 G/G
1 PATCH TOPICAL DAILY
Status: DISCONTINUED | OUTPATIENT
Start: 2024-05-16 | End: 2024-05-20 | Stop reason: HOSPADM

## 2024-05-16 RX ADMIN — ARFORMOTEROL TARTRATE 15 MCG: 15 SOLUTION RESPIRATORY (INHALATION) at 08:26

## 2024-05-16 RX ADMIN — BUDESONIDE INHALATION 1000 MCG: 0.5 SUSPENSION RESPIRATORY (INHALATION) at 20:22

## 2024-05-16 RX ADMIN — PANTOPRAZOLE SODIUM 40 MG: 40 TABLET, DELAYED RELEASE ORAL at 09:23

## 2024-05-16 RX ADMIN — CLONAZEPAM 0.5 MG: 0.5 TABLET ORAL at 23:28

## 2024-05-16 RX ADMIN — METHIMAZOLE 15 MG: 5 TABLET ORAL at 09:23

## 2024-05-16 RX ADMIN — IPRATROPIUM BROMIDE AND ALBUTEROL SULFATE 1 DOSE: .5; 2.5 SOLUTION RESPIRATORY (INHALATION) at 20:22

## 2024-05-16 RX ADMIN — ASPIRIN 81 MG: 81 TABLET, COATED ORAL at 09:23

## 2024-05-16 RX ADMIN — IPRATROPIUM BROMIDE AND ALBUTEROL SULFATE 1 DOSE: .5; 2.5 SOLUTION RESPIRATORY (INHALATION) at 08:25

## 2024-05-16 RX ADMIN — ARFORMOTEROL TARTRATE 15 MCG: 15 SOLUTION RESPIRATORY (INHALATION) at 20:22

## 2024-05-16 RX ADMIN — HYDROXYZINE PAMOATE 25 MG: 25 CAPSULE ORAL at 09:24

## 2024-05-16 RX ADMIN — BUDESONIDE INHALATION 1000 MCG: 0.5 SUSPENSION RESPIRATORY (INHALATION) at 08:27

## 2024-05-16 RX ADMIN — SENNOSIDES AND DOCUSATE SODIUM 1 TABLET: 50; 8.6 TABLET ORAL at 09:23

## 2024-05-16 RX ADMIN — CITALOPRAM HYDROBROMIDE 20 MG: 20 TABLET ORAL at 09:23

## 2024-05-16 RX ADMIN — FOLIC ACID 1 MG: 1 TABLET ORAL at 09:24

## 2024-05-16 RX ADMIN — CLONAZEPAM 0.5 MG: 0.5 TABLET ORAL at 01:46

## 2024-05-16 RX ADMIN — IPRATROPIUM BROMIDE AND ALBUTEROL SULFATE 1 DOSE: .5; 2.5 SOLUTION RESPIRATORY (INHALATION) at 14:29

## 2024-05-16 RX ADMIN — CLONAZEPAM 0.5 MG: 0.5 TABLET ORAL at 14:26

## 2024-05-16 RX ADMIN — GUAIFENESIN 400 MG: 400 TABLET ORAL at 20:22

## 2024-05-16 RX ADMIN — CYANOCOBALAMIN TAB 1000 MCG 500 MCG: 1000 TAB at 09:24

## 2024-05-16 RX ADMIN — GUAIFENESIN 400 MG: 400 TABLET ORAL at 09:24

## 2024-05-16 RX ADMIN — SODIUM CHLORIDE, PRESERVATIVE FREE 10 ML: 5 INJECTION INTRAVENOUS at 20:22

## 2024-05-16 RX ADMIN — ROSUVASTATIN 20 MG: 20 TABLET, FILM COATED ORAL at 20:22

## 2024-05-16 RX ADMIN — DILTIAZEM HYDROCHLORIDE 180 MG: 180 CAPSULE, COATED, EXTENDED RELEASE ORAL at 09:23

## 2024-05-16 RX ADMIN — GUAIFENESIN 400 MG: 400 TABLET ORAL at 14:27

## 2024-05-16 RX ADMIN — IPRATROPIUM BROMIDE AND ALBUTEROL SULFATE 1 DOSE: .5; 2.5 SOLUTION RESPIRATORY (INHALATION) at 16:41

## 2024-05-16 RX ADMIN — SODIUM CHLORIDE, PRESERVATIVE FREE 10 ML: 5 INJECTION INTRAVENOUS at 09:24

## 2024-05-16 NOTE — PLAN OF CARE
Problem: Chronic Conditions and Co-morbidities  Goal: Patient's chronic conditions and co-morbidity symptoms are monitored and maintained or improved  5/16/2024 0552 by Nancy Olsen RN  Outcome: Progressing     Problem: Discharge Planning  Goal: Discharge to home or other facility with appropriate resources  5/16/2024 0552 by Nancy Olsen RN  Outcome: Progressing     Problem: Skin/Tissue Integrity  Goal: Absence of new skin breakdown  Description: 1.  Monitor for areas of redness and/or skin breakdown  2.  Assess vascular access sites hourly  3.  Every 4-6 hours minimum:  Change oxygen saturation probe site  4.  Every 4-6 hours:  If on nasal continuous positive airway pressure, respiratory therapy assess nares and determine need for appliance change or resting period.  5/16/2024 0552 by Nancy Olsen RN  Outcome: Progressing     Problem: Safety - Adult  Goal: Free from fall injury  5/16/2024 0552 by Nancy Olsen, RN  Outcome: Progressing

## 2024-05-16 NOTE — PLAN OF CARE
Problem: Safety - Adult  Goal: Free from fall injury  5/16/2024 0552 by Nancy Olsen, RN  Outcome: Progressing     Problem: Discharge Planning  Goal: Discharge to home or other facility with appropriate resources  5/16/2024 1137 by Jaz Mccoy, RN  Outcome: Not Progressing  5/16/2024 0552 by Nancy Olsen, RN  Outcome: Progressing

## 2024-05-16 NOTE — PROCEDURES
PROCEDURE NOTE  Date: 5/16/2024   Name: Lana Phillips  YOB: 1955    Thoracentesis    Date/Time: 5/16/2024 3:04 PM    Performed by: Jose Price MD  Authorized by: Jose Price MD  Consent: Verbal consent obtained. Written consent obtained.  Risks and benefits: risks, benefits and alternatives were discussed  Consent given by: patient  Patient understanding: patient states understanding of the procedure being performed  Patient consent: the patient's understanding of the procedure matches consent given  Procedure consent: procedure consent matches procedure scheduled  Relevant documents: relevant documents present and verified  Test results: test results available and properly labeled  Site marked: the operative site was marked  Imaging studies: imaging studies available  Patient identity confirmed: verbally with patient  Time out: Immediately prior to procedure a \"time out\" was called to verify the correct patient, procedure, equipment, support staff and site/side marked as required.  Procedure purpose: diagnostic and therapeutic  Indications: pleural effusion  Preparation: Patient was prepped and draped in the usual sterile fashion.  Local anesthesia used: yes  Anesthesia: local infiltration    Anesthesia:  Local anesthesia used: yes  Local Anesthetic: lidocaine 1% without epinephrine  Anesthetic total: 5 mL    Sedation:  Patient sedated: no    Preparation: skin prepped with chlorhexidine  Patient position: sitting  Ultrasound guidance: yes  Location: left posterior  Puncture method: over-the-needle catheter  Drainage amount: 550 ml  Drainage characteristics: clear  Patient tolerance: patient tolerated the procedure well with no immediate complications  Comments: USG marking left lower chest mid scapular line. Finder needle introduced and yellowish colored fluid obtained. Thoracentesis catheter introduced and about 550 cc yellowish colored fluid removed. Catheter removed and band aid placed

## 2024-05-16 NOTE — CARE COORDINATION
5/16:  Transition of care:  Pt presented to the ER for SOB & AMS from [place].  Pt is on 15L/HF at 91% & Po Eliquis  - old medication.  CM left message for contacts in chart since pt is Ax1 & has a sitter.  Pulmonary consulted.  Pt's Hgh is 6.4 & order 1RBC.  Per Karey pt can return without any needs.  Will need PENNY,transportation form completed & envelope done.  Sw/MARGARITA will continue to follow.  Electronically signed by Cesilia Greer RN on 5/16/2024 at 12:41 PM    Case Management Assessment  Initial Evaluation    Date/Time of Evaluation: 5/16/2024 12:43 PM  Assessment Completed by: Cesilia Greer RN    If patient is discharged prior to next notation, then this note serves as note for discharge by case management.    Patient Name: Lana Phillips                   YOB: 1955  Diagnosis: Pneumonia due to organism [J18.9]  Pneumonia of left lower lobe due to infectious organism [J18.9]  Acute hypoxic respiratory failure (HCC) [J96.01]                   Date / Time: 5/14/2024  9:13 AM    Patient Admission Status: Inpatient   Readmission Risk (Low < 19, Mod (19-27), High > 27): Readmission Risk Score: 43.7    Current PCP: Clyde Gonzalez, DO  PCP verified by ? Yes    Chart Reviewed: Yes      History Provided by: Medical Record  Patient Orientation: Alert and Oriented    Patient Cognition: Alert    Hospitalization in the last 30 days (Readmission):  Yes    If yes, Readmission Assessment in  Navigator will be completed.    Advance Directives:      Code Status: Full Code   Patient's Primary Decision Maker is:      Primary Decision Maker: sue ward - Brother/Sister - [phone]    Discharge Planning:    Patient lives with: Family Members Type of Home: Skilled Nursing Facility  Primary Care Giver: Other (Comment) (SNF)  Patient Support Systems include: Family Members   Current Financial resources:    Current community resources:    Current services prior to admission: Oxygen Therapy            Current

## 2024-05-17 LAB
ABO/RH: NORMAL
ALBUMIN SERPL-MCNC: 3 G/DL (ref 3.5–5.2)
ALP SERPL-CCNC: 107 U/L (ref 35–104)
ALT SERPL-CCNC: 481 U/L (ref 0–32)
ANION GAP SERPL CALCULATED.3IONS-SCNC: 12 MMOL/L (ref 7–16)
ANTIBODY SCREEN: NEGATIVE
ARM BAND NUMBER: NORMAL
AST SERPL-CCNC: 123 U/L (ref 0–31)
BILIRUB SERPL-MCNC: 0.6 MG/DL (ref 0–1.2)
BLOOD BANK BLOOD PRODUCT EXPIRATION DATE: NORMAL
BLOOD BANK DISPENSE STATUS: NORMAL
BLOOD BANK ISBT PRODUCT BLOOD TYPE: 9500
BLOOD BANK PRODUCT CODE: NORMAL
BLOOD BANK SAMPLE EXPIRATION: NORMAL
BLOOD BANK UNIT TYPE AND RH: NORMAL
BPU ID: NORMAL
BUN SERPL-MCNC: 10 MG/DL (ref 6–23)
CALCIUM SERPL-MCNC: 8.6 MG/DL (ref 8.6–10.2)
CHLORIDE SERPL-SCNC: 99 MMOL/L (ref 98–107)
CO2 SERPL-SCNC: 32 MMOL/L (ref 22–29)
COMPONENT: NORMAL
CREAT SERPL-MCNC: 0.6 MG/DL (ref 0.5–1)
CROSSMATCH RESULT: NORMAL
ERYTHROCYTE [DISTWIDTH] IN BLOOD BY AUTOMATED COUNT: 19.1 % (ref 11.5–15)
GFR, ESTIMATED: >90 ML/MIN/1.73M2
GLUCOSE SERPL-MCNC: 82 MG/DL (ref 74–99)
HCT VFR BLD AUTO: 26.4 % (ref 34–48)
HGB BLD-MCNC: 8 G/DL (ref 11.5–15.5)
LDH FLD L TO P-CCNC: 198 U/L
MCH RBC QN AUTO: 28.3 PG (ref 26–35)
MCHC RBC AUTO-ENTMCNC: 30.3 G/DL (ref 32–34.5)
MCV RBC AUTO: 93.3 FL (ref 80–99.9)
PLATELET # BLD AUTO: 250 K/UL (ref 130–450)
PMV BLD AUTO: 10.1 FL (ref 7–12)
POTASSIUM SERPL-SCNC: 3.4 MMOL/L (ref 3.5–5)
PROT SERPL-MCNC: 5.5 G/DL (ref 6.4–8.3)
RBC # BLD AUTO: 2.83 M/UL (ref 3.5–5.5)
SODIUM SERPL-SCNC: 143 MMOL/L (ref 132–146)
SPECIMEN TYPE: NORMAL
TRANSFUSION STATUS: NORMAL
UNIT DIVISION: 0
UNIT ISSUE DATE/TIME: NORMAL
WBC OTHER # BLD: 7.2 K/UL (ref 4.5–11.5)

## 2024-05-17 PROCEDURE — 6360000002 HC RX W HCPCS: Performed by: INTERNAL MEDICINE

## 2024-05-17 PROCEDURE — 6370000000 HC RX 637 (ALT 250 FOR IP): Performed by: INTERNAL MEDICINE

## 2024-05-17 PROCEDURE — 36415 COLL VENOUS BLD VENIPUNCTURE: CPT

## 2024-05-17 PROCEDURE — 2700000000 HC OXYGEN THERAPY PER DAY

## 2024-05-17 PROCEDURE — 2580000003 HC RX 258: Performed by: INTERNAL MEDICINE

## 2024-05-17 PROCEDURE — 2140000000 HC CCU INTERMEDIATE R&B

## 2024-05-17 PROCEDURE — 94660 CPAP INITIATION&MGMT: CPT

## 2024-05-17 PROCEDURE — 80053 COMPREHEN METABOLIC PANEL: CPT

## 2024-05-17 PROCEDURE — 85027 COMPLETE CBC AUTOMATED: CPT

## 2024-05-17 PROCEDURE — 94640 AIRWAY INHALATION TREATMENT: CPT

## 2024-05-17 RX ORDER — PREDNISONE 5 MG/1
10 TABLET ORAL DAILY
Status: DISCONTINUED | OUTPATIENT
Start: 2024-05-17 | End: 2024-05-20 | Stop reason: HOSPADM

## 2024-05-17 RX ADMIN — ONDANSETRON 4 MG: 4 TABLET, ORALLY DISINTEGRATING ORAL at 09:30

## 2024-05-17 RX ADMIN — BUDESONIDE INHALATION 1000 MCG: 0.5 SUSPENSION RESPIRATORY (INHALATION) at 08:37

## 2024-05-17 RX ADMIN — IPRATROPIUM BROMIDE AND ALBUTEROL SULFATE 1 DOSE: .5; 2.5 SOLUTION RESPIRATORY (INHALATION) at 20:04

## 2024-05-17 RX ADMIN — PANTOPRAZOLE SODIUM 40 MG: 40 TABLET, DELAYED RELEASE ORAL at 05:10

## 2024-05-17 RX ADMIN — METHIMAZOLE 15 MG: 5 TABLET ORAL at 10:58

## 2024-05-17 RX ADMIN — IPRATROPIUM BROMIDE AND ALBUTEROL SULFATE 1 DOSE: .5; 2.5 SOLUTION RESPIRATORY (INHALATION) at 08:37

## 2024-05-17 RX ADMIN — BUDESONIDE INHALATION 1000 MCG: 0.5 SUSPENSION RESPIRATORY (INHALATION) at 20:04

## 2024-05-17 RX ADMIN — ARFORMOTEROL TARTRATE 15 MCG: 15 SOLUTION RESPIRATORY (INHALATION) at 08:37

## 2024-05-17 RX ADMIN — FOLIC ACID 1 MG: 1 TABLET ORAL at 10:53

## 2024-05-17 RX ADMIN — PREDNISONE 10 MG: 5 TABLET ORAL at 15:44

## 2024-05-17 RX ADMIN — CLONAZEPAM 0.5 MG: 0.5 TABLET ORAL at 23:14

## 2024-05-17 RX ADMIN — IPRATROPIUM BROMIDE AND ALBUTEROL SULFATE 1 DOSE: .5; 2.5 SOLUTION RESPIRATORY (INHALATION) at 16:02

## 2024-05-17 RX ADMIN — HYDROXYZINE PAMOATE 25 MG: 25 CAPSULE ORAL at 05:11

## 2024-05-17 RX ADMIN — CLONAZEPAM 0.5 MG: 0.5 TABLET ORAL at 10:52

## 2024-05-17 RX ADMIN — IPRATROPIUM BROMIDE AND ALBUTEROL SULFATE 1 DOSE: .5; 2.5 SOLUTION RESPIRATORY (INHALATION) at 14:01

## 2024-05-17 RX ADMIN — ACETAMINOPHEN 650 MG: 325 TABLET ORAL at 17:20

## 2024-05-17 RX ADMIN — ARFORMOTEROL TARTRATE 15 MCG: 15 SOLUTION RESPIRATORY (INHALATION) at 20:04

## 2024-05-17 RX ADMIN — SODIUM CHLORIDE, PRESERVATIVE FREE 10 ML: 5 INJECTION INTRAVENOUS at 20:28

## 2024-05-17 RX ADMIN — TRAZODONE HYDROCHLORIDE 50 MG: 50 TABLET ORAL at 23:14

## 2024-05-17 RX ADMIN — GUAIFENESIN 400 MG: 400 TABLET ORAL at 20:27

## 2024-05-17 RX ADMIN — CITALOPRAM HYDROBROMIDE 20 MG: 20 TABLET ORAL at 10:53

## 2024-05-17 RX ADMIN — ASPIRIN 81 MG: 81 TABLET, COATED ORAL at 10:52

## 2024-05-17 RX ADMIN — GUAIFENESIN 400 MG: 400 TABLET ORAL at 10:52

## 2024-05-17 RX ADMIN — DILTIAZEM HYDROCHLORIDE 180 MG: 180 CAPSULE, COATED, EXTENDED RELEASE ORAL at 10:58

## 2024-05-17 RX ADMIN — CYANOCOBALAMIN TAB 1000 MCG 500 MCG: 1000 TAB at 09:00

## 2024-05-17 RX ADMIN — OXYCODONE HYDROCHLORIDE AND ACETAMINOPHEN 1 TABLET: 5; 325 TABLET ORAL at 10:52

## 2024-05-17 RX ADMIN — HYDROXYZINE PAMOATE 25 MG: 25 CAPSULE ORAL at 17:20

## 2024-05-17 RX ADMIN — ACETAMINOPHEN 650 MG: 325 TABLET ORAL at 23:14

## 2024-05-17 ASSESSMENT — PAIN DESCRIPTION - LOCATION
LOCATION: BACK
LOCATION: BACK

## 2024-05-17 ASSESSMENT — PAIN DESCRIPTION - DESCRIPTORS: DESCRIPTORS: ACHING

## 2024-05-17 ASSESSMENT — PAIN SCALES - GENERAL
PAINLEVEL_OUTOF10: 7
PAINLEVEL_OUTOF10: 6
PAINLEVEL_OUTOF10: 3
PAINLEVEL_OUTOF10: 0

## 2024-05-17 ASSESSMENT — PAIN - FUNCTIONAL ASSESSMENT: PAIN_FUNCTIONAL_ASSESSMENT: PREVENTS OR INTERFERES WITH ALL ACTIVE AND SOME PASSIVE ACTIVITIES

## 2024-05-17 NOTE — PLAN OF CARE
Problem: Chronic Conditions and Co-morbidities  Goal: Patient's chronic conditions and co-morbidity symptoms are monitored and maintained or improved  Outcome: Progressing  Flowsheets (Taken 5/16/2024 2000)  Care Plan - Patient's Chronic Conditions and Co-Morbidity Symptoms are Monitored and Maintained or Improved: Monitor and assess patient's chronic conditions and comorbid symptoms for stability, deterioration, or improvement     Problem: Discharge Planning  Goal: Discharge to home or other facility with appropriate resources  5/16/2024 2140 by Latisha Siddiqui RN  Outcome: Progressing  Flowsheets (Taken 5/16/2024 2000)  Discharge to home or other facility with appropriate resources: Identify barriers to discharge with patient and caregiver  5/16/2024 1137 by Jaz Mccoy RN  Outcome: Not Progressing     Problem: Skin/Tissue Integrity  Goal: Absence of new skin breakdown  Description: 1.  Monitor for areas of redness and/or skin breakdown  2.  Assess vascular access sites hourly  3.  Every 4-6 hours minimum:  Change oxygen saturation probe site  4.  Every 4-6 hours:  If on nasal continuous positive airway pressure, respiratory therapy assess nares and determine need for appliance change or resting period.  5/16/2024 2140 by Latisha Siddiqui RN  Outcome: Progressing  5/16/2024 1137 by Jaz Mccoy RN  Outcome: Progressing     Problem: Safety - Adult  Goal: Free from fall injury  Outcome: Progressing  Flowsheets (Taken 5/16/2024 2140)  Free From Fall Injury: Instruct family/caregiver on patient safety     Problem: Discharge Planning  Goal: Discharge to home or other facility with appropriate resources  5/16/2024 2140 by Latisha Siddiqui RN  Outcome: Progressing  Flowsheets (Taken 5/16/2024 2000)  Discharge to home or other facility with appropriate resources: Identify barriers to discharge with patient and caregiver  5/16/2024 1137 by Jaz Mccoy RN  Outcome: Not Progressing

## 2024-05-17 NOTE — PLAN OF CARE
Problem: Chronic Conditions and Co-morbidities  Goal: Patient's chronic conditions and co-morbidity symptoms are monitored and maintained or improved  5/17/2024 0303 by Latisha Siddiqui RN  Outcome: Progressing  5/16/2024 2140 by Latisha Siddiqui RN  Outcome: Progressing  Flowsheets (Taken 5/16/2024 2000)  Care Plan - Patient's Chronic Conditions and Co-Morbidity Symptoms are Monitored and Maintained or Improved: Monitor and assess patient's chronic conditions and comorbid symptoms for stability, deterioration, or improvement     Problem: Discharge Planning  Goal: Discharge to home or other facility with appropriate resources  5/17/2024 0303 by Latisha Siddiqui RN  Outcome: Progressing  5/16/2024 2140 by Latisha Siddiqui RN  Outcome: Progressing  Flowsheets (Taken 5/16/2024 2000)  Discharge to home or other facility with appropriate resources: Identify barriers to discharge with patient and caregiver     Problem: Skin/Tissue Integrity  Goal: Absence of new skin breakdown  Description: 1.  Monitor for areas of redness and/or skin breakdown  2.  Assess vascular access sites hourly  3.  Every 4-6 hours minimum:  Change oxygen saturation probe site  4.  Every 4-6 hours:  If on nasal continuous positive airway pressure, respiratory therapy assess nares and determine need for appliance change or resting period.  5/17/2024 0303 by Latisha Siddiqui RN  Outcome: Progressing  5/16/2024 2140 by Latisha Siddiqui RN  Outcome: Progressing     Problem: Safety - Adult  Goal: Free from fall injury  5/17/2024 0303 by Latisha Siddiqui RN  Outcome: Progressing  Flowsheets (Taken 5/17/2024 0302)  Free From Fall Injury: Instruct family/caregiver on patient safety  5/16/2024 2140 by Latisha Siddiqui RN  Outcome: Progressing  Flowsheets (Taken 5/16/2024 2140)  Free From Fall Injury: Instruct family/caregiver on patient safety

## 2024-05-18 LAB
ALBUMIN SERPL-MCNC: 2.9 G/DL (ref 3.5–5.2)
ALP SERPL-CCNC: 101 U/L (ref 35–104)
ALT SERPL-CCNC: 346 U/L (ref 0–32)
ANION GAP SERPL CALCULATED.3IONS-SCNC: 11 MMOL/L (ref 7–16)
AST SERPL-CCNC: 57 U/L (ref 0–31)
BILIRUB SERPL-MCNC: 0.3 MG/DL (ref 0–1.2)
BUN SERPL-MCNC: 7 MG/DL (ref 6–23)
CALCIUM SERPL-MCNC: 8.4 MG/DL (ref 8.6–10.2)
CHLORIDE SERPL-SCNC: 99 MMOL/L (ref 98–107)
CO2 SERPL-SCNC: 34 MMOL/L (ref 22–29)
CREAT SERPL-MCNC: 0.5 MG/DL (ref 0.5–1)
ERYTHROCYTE [DISTWIDTH] IN BLOOD BY AUTOMATED COUNT: 18.8 % (ref 11.5–15)
GFR, ESTIMATED: >90 ML/MIN/1.73M2
GLUCOSE SERPL-MCNC: 105 MG/DL (ref 74–99)
HCT VFR BLD AUTO: 27.7 % (ref 34–48)
HGB BLD-MCNC: 8.2 G/DL (ref 11.5–15.5)
MAGNESIUM SERPL-MCNC: 1.8 MG/DL (ref 1.6–2.6)
MCH RBC QN AUTO: 28.1 PG (ref 26–35)
MCHC RBC AUTO-ENTMCNC: 29.6 G/DL (ref 32–34.5)
MCV RBC AUTO: 94.9 FL (ref 80–99.9)
PLATELET # BLD AUTO: 274 K/UL (ref 130–450)
PMV BLD AUTO: 9.8 FL (ref 7–12)
POTASSIUM SERPL-SCNC: 3.4 MMOL/L (ref 3.5–5)
PROT SERPL-MCNC: 5.3 G/DL (ref 6.4–8.3)
RBC # BLD AUTO: 2.92 M/UL (ref 3.5–5.5)
SODIUM SERPL-SCNC: 144 MMOL/L (ref 132–146)
WBC OTHER # BLD: 6.9 K/UL (ref 4.5–11.5)

## 2024-05-18 PROCEDURE — 94640 AIRWAY INHALATION TREATMENT: CPT

## 2024-05-18 PROCEDURE — 93005 ELECTROCARDIOGRAM TRACING: CPT | Performed by: NURSE PRACTITIONER

## 2024-05-18 PROCEDURE — 83735 ASSAY OF MAGNESIUM: CPT

## 2024-05-18 PROCEDURE — 85027 COMPLETE CBC AUTOMATED: CPT

## 2024-05-18 PROCEDURE — 6370000000 HC RX 637 (ALT 250 FOR IP): Performed by: INTERNAL MEDICINE

## 2024-05-18 PROCEDURE — 36415 COLL VENOUS BLD VENIPUNCTURE: CPT

## 2024-05-18 PROCEDURE — APPSS180 APP SPLIT SHARED TIME > 60 MINUTES: Performed by: NURSE PRACTITIONER

## 2024-05-18 PROCEDURE — 94660 CPAP INITIATION&MGMT: CPT

## 2024-05-18 PROCEDURE — 6360000002 HC RX W HCPCS: Performed by: INTERNAL MEDICINE

## 2024-05-18 PROCEDURE — 2580000003 HC RX 258: Performed by: INTERNAL MEDICINE

## 2024-05-18 PROCEDURE — 80053 COMPREHEN METABOLIC PANEL: CPT

## 2024-05-18 PROCEDURE — 99222 1ST HOSP IP/OBS MODERATE 55: CPT | Performed by: INTERNAL MEDICINE

## 2024-05-18 PROCEDURE — 2140000000 HC CCU INTERMEDIATE R&B

## 2024-05-18 PROCEDURE — 2700000000 HC OXYGEN THERAPY PER DAY

## 2024-05-18 RX ADMIN — IPRATROPIUM BROMIDE AND ALBUTEROL SULFATE 1 DOSE: .5; 2.5 SOLUTION RESPIRATORY (INHALATION) at 09:14

## 2024-05-18 RX ADMIN — SODIUM CHLORIDE, PRESERVATIVE FREE 10 ML: 5 INJECTION INTRAVENOUS at 20:59

## 2024-05-18 RX ADMIN — ACETAMINOPHEN 650 MG: 325 TABLET ORAL at 21:34

## 2024-05-18 RX ADMIN — TRAZODONE HYDROCHLORIDE 50 MG: 50 TABLET ORAL at 20:58

## 2024-05-18 RX ADMIN — BUDESONIDE INHALATION 1000 MCG: 0.5 SUSPENSION RESPIRATORY (INHALATION) at 20:43

## 2024-05-18 RX ADMIN — DILTIAZEM HYDROCHLORIDE 180 MG: 180 CAPSULE, COATED, EXTENDED RELEASE ORAL at 10:31

## 2024-05-18 RX ADMIN — GUAIFENESIN 400 MG: 400 TABLET ORAL at 10:29

## 2024-05-18 RX ADMIN — ARFORMOTEROL TARTRATE 15 MCG: 15 SOLUTION RESPIRATORY (INHALATION) at 09:14

## 2024-05-18 RX ADMIN — ASPIRIN 81 MG: 81 TABLET, COATED ORAL at 10:29

## 2024-05-18 RX ADMIN — SODIUM CHLORIDE, PRESERVATIVE FREE 10 ML: 5 INJECTION INTRAVENOUS at 10:38

## 2024-05-18 RX ADMIN — ACETAMINOPHEN 650 MG: 325 TABLET ORAL at 10:30

## 2024-05-18 RX ADMIN — ARFORMOTEROL TARTRATE 15 MCG: 15 SOLUTION RESPIRATORY (INHALATION) at 20:43

## 2024-05-18 RX ADMIN — PREDNISONE 10 MG: 5 TABLET ORAL at 10:29

## 2024-05-18 RX ADMIN — CYANOCOBALAMIN TAB 1000 MCG 500 MCG: 1000 TAB at 10:30

## 2024-05-18 RX ADMIN — IPRATROPIUM BROMIDE AND ALBUTEROL SULFATE 1 DOSE: .5; 2.5 SOLUTION RESPIRATORY (INHALATION) at 16:14

## 2024-05-18 RX ADMIN — METHIMAZOLE 15 MG: 5 TABLET ORAL at 10:31

## 2024-05-18 RX ADMIN — BUDESONIDE INHALATION 1000 MCG: 0.5 SUSPENSION RESPIRATORY (INHALATION) at 09:14

## 2024-05-18 RX ADMIN — CLONAZEPAM 0.5 MG: 0.5 TABLET ORAL at 20:58

## 2024-05-18 RX ADMIN — SENNOSIDES AND DOCUSATE SODIUM 1 TABLET: 50; 8.6 TABLET ORAL at 10:30

## 2024-05-18 RX ADMIN — FOLIC ACID 1 MG: 1 TABLET ORAL at 10:30

## 2024-05-18 RX ADMIN — HYDROXYZINE PAMOATE 25 MG: 25 CAPSULE ORAL at 10:29

## 2024-05-18 RX ADMIN — GUAIFENESIN 400 MG: 400 TABLET ORAL at 20:58

## 2024-05-18 RX ADMIN — GUAIFENESIN 400 MG: 400 TABLET ORAL at 14:33

## 2024-05-18 RX ADMIN — IPRATROPIUM BROMIDE AND ALBUTEROL SULFATE 1 DOSE: .5; 2.5 SOLUTION RESPIRATORY (INHALATION) at 20:43

## 2024-05-18 RX ADMIN — PANTOPRAZOLE SODIUM 40 MG: 40 TABLET, DELAYED RELEASE ORAL at 05:51

## 2024-05-18 RX ADMIN — CITALOPRAM HYDROBROMIDE 20 MG: 20 TABLET ORAL at 10:29

## 2024-05-18 ASSESSMENT — PAIN DESCRIPTION - LOCATION: LOCATION: SHOULDER;BACK

## 2024-05-18 ASSESSMENT — PAIN SCALES - GENERAL: PAINLEVEL_OUTOF10: 8

## 2024-05-18 ASSESSMENT — PAIN DESCRIPTION - ORIENTATION: ORIENTATION: RIGHT;LEFT

## 2024-05-18 NOTE — CONSULTS
Inpatient Cardiology Consultation      Reason for Consult: Atrial fibrillation with RVR    Consulting Physician:     Requesting Physician: Dr. Jarrett    Date of Consultation: 5/18/2024    HISTORY OF PRESENT ILLNESS:   Lana Phillips  is a 69 y.o.  female known to [facility] cardiology follows with Dr. Humphreys last outpatient office visit 8/8/2023 for atrial fibrillation follow-up.  Inpatient consult completed on 3/12/2024 per Dr. Calvin for chest pain.    PMH: see below    Patient presented to the emergency department for shortness of breath patient was sent from [place] with shortness of breath and AMS, patient was 70% on 4 L nasal cannula, arrived on 6 L nasal cannula at 99%.  Patient is chronically on 4 L nasal cannula oxygen with history of COPD  ED > 5/14/2024, 09 10, via EMS complaint shortness of breath.  Arrival vitals: T97.9 RR 18 P71, /63, SpO2 95% on 6 L nasal cannula  Significant Labs: , K3.4, BUN 7, creatinine 0.5, DFR greater than 90, mag 1.8, glucose 105, total protein 5.3, albumin 2.9, , AST 57, WBC 6.9, Hgb 6.4/8.2, HCT 27.7, , CEA 1.7  proBNP 676  Troponin 118/117  Negative respiratory panel  RAD:   XR CHEST PORTABLE   Final Result   1. Interval improvement opacity right base and smaller right effusion.   2. Interval worsening opacity left base with some obscuration left   hemidiaphragm, likely either atelectasis or developing infiltrate.   3. Similar mild vascular congestion.     ED treatment: Brovana nebulizer, Pulmicort nebulizer, [id number] mg IV, Klonopin 1 mg oral, doxycycline 100 mg IV, folic acid 1 mg oral, guaifenesin 400 mg oral, DuoNeb x 4, Solu-Medrol 125 mg IV, Lyrica 50 mg IV and Senokot 1 tablet oral.  ED diagnosis: Pneumonia of left lower lobe due to infectious organism, acute hypoxic respiratory failure.  ABG with chronic compensated respiratory acidosis with metabolic alkalosis  Patient seen and examined.  Recent vitals: T97.9, RR 15, P63, BP 
Nightly  sennosides-docusate sodium (SENOKOT-S) 8.6-50 MG tablet 1 tablet, 1 tablet, Oral, Daily  traZODone (DESYREL) tablet 50 mg, 50 mg, Oral, Nightly PRN  vitamin B-12 (CYANOCOBALAMIN) tablet 500 mcg, 500 mcg, Oral, Daily  sodium chloride flush 0.9 % injection 5-40 mL, 5-40 mL, IntraVENous, 2 times per day  sodium chloride flush 0.9 % injection 5-40 mL, 5-40 mL, IntraVENous, PRN  0.9 % sodium chloride infusion, , IntraVENous, PRN  potassium chloride (KLOR-CON M) extended release tablet 40 mEq, 40 mEq, Oral, PRN **OR** potassium bicarb-citric acid (EFFER-K) effervescent tablet 40 mEq, 40 mEq, Oral, PRN **OR** potassium chloride 10 mEq/100 mL IVPB (Peripheral Line), 10 mEq, IntraVENous, PRN  magnesium sulfate 2000 mg in 50 mL IVPB premix, 2,000 mg, IntraVENous, PRN  ondansetron (ZOFRAN-ODT) disintegrating tablet 4 mg, 4 mg, Oral, Q8H PRN **OR** ondansetron (ZOFRAN) injection 4 mg, 4 mg, IntraVENous, Q6H PRN  polyethylene glycol (GLYCOLAX) packet 17 g, 17 g, Oral, Daily PRN  acetaminophen (TYLENOL) tablet 650 mg, 650 mg, Oral, Q6H PRN **OR** acetaminophen (TYLENOL) suppository 650 mg, 650 mg, Rectal, Q6H PRN    Allergies:  Pcn [penicillins]    Social History:    TOBACCO:   reports that she has quit smoking. Her smoking use included cigarettes. She started smoking about 41 years ago. She has a 8.3 pack-year smoking history. She has never used smokeless tobacco.    Family History:       Problem Relation Age of Onset    Heart Disease Mother     Heart Disease Father     Dementia Father     Other Maternal Aunt         leukemia    Breast Cancer Paternal Aunt     Breast Cancer Maternal Grandmother     Diabetes Maternal Grandmother        REVIEW OF SYSTEMS:    Review of systems not obtained due to patient factors - mental status    PHYSICAL EXAM:      Vitals:    /66   Pulse 76   Temp 97.3 °F (36.3 °C) (Oral)   Resp 16   Ht 1.727 m (5' 7.99\")   Wt 46.9 kg (103 lb 6.3 oz)   SpO2 100%   BMI 15.72 kg/m²     EYES:

## 2024-05-19 LAB
ALBUMIN SERPL-MCNC: 2.9 G/DL (ref 3.5–5.2)
ALP SERPL-CCNC: 100 U/L (ref 35–104)
ALT SERPL-CCNC: 260 U/L (ref 0–32)
ANION GAP SERPL CALCULATED.3IONS-SCNC: 11 MMOL/L (ref 7–16)
AST SERPL-CCNC: 32 U/L (ref 0–31)
BILIRUB SERPL-MCNC: 0.2 MG/DL (ref 0–1.2)
BUN SERPL-MCNC: 9 MG/DL (ref 6–23)
CALCIUM SERPL-MCNC: 8.5 MG/DL (ref 8.6–10.2)
CHLORIDE SERPL-SCNC: 99 MMOL/L (ref 98–107)
CO2 SERPL-SCNC: 34 MMOL/L (ref 22–29)
CREAT SERPL-MCNC: 0.6 MG/DL (ref 0.5–1)
ERYTHROCYTE [DISTWIDTH] IN BLOOD BY AUTOMATED COUNT: 18.8 % (ref 11.5–15)
GFR, ESTIMATED: >90 ML/MIN/1.73M2
GLUCOSE SERPL-MCNC: 84 MG/DL (ref 74–99)
HCT VFR BLD AUTO: 26.2 % (ref 34–48)
HGB BLD-MCNC: 7.8 G/DL (ref 11.5–15.5)
MCH RBC QN AUTO: 28.4 PG (ref 26–35)
MCHC RBC AUTO-ENTMCNC: 29.8 G/DL (ref 32–34.5)
MCV RBC AUTO: 95.3 FL (ref 80–99.9)
MICROORGANISM SPEC CULT: NO GROWTH
MICROORGANISM SPEC CULT: NORMAL
MICROORGANISM SPEC CULT: NORMAL
MICROORGANISM/AGENT SPEC: NORMAL
PLATELET # BLD AUTO: 310 K/UL (ref 130–450)
PMV BLD AUTO: 9.9 FL (ref 7–12)
POTASSIUM SERPL-SCNC: 3.3 MMOL/L (ref 3.5–5)
PROT SERPL-MCNC: 5.1 G/DL (ref 6.4–8.3)
RBC # BLD AUTO: 2.75 M/UL (ref 3.5–5.5)
SERVICE CMNT-IMP: NORMAL
SERVICE CMNT-IMP: NORMAL
SODIUM SERPL-SCNC: 144 MMOL/L (ref 132–146)
SPECIMEN DESCRIPTION: NORMAL
WBC OTHER # BLD: 8.2 K/UL (ref 4.5–11.5)

## 2024-05-19 PROCEDURE — 6370000000 HC RX 637 (ALT 250 FOR IP): Performed by: INTERNAL MEDICINE

## 2024-05-19 PROCEDURE — 94640 AIRWAY INHALATION TREATMENT: CPT

## 2024-05-19 PROCEDURE — 2140000000 HC CCU INTERMEDIATE R&B

## 2024-05-19 PROCEDURE — 80053 COMPREHEN METABOLIC PANEL: CPT

## 2024-05-19 PROCEDURE — 6360000002 HC RX W HCPCS: Performed by: INTERNAL MEDICINE

## 2024-05-19 PROCEDURE — 94660 CPAP INITIATION&MGMT: CPT

## 2024-05-19 PROCEDURE — 36415 COLL VENOUS BLD VENIPUNCTURE: CPT

## 2024-05-19 PROCEDURE — 85027 COMPLETE CBC AUTOMATED: CPT

## 2024-05-19 PROCEDURE — 2580000003 HC RX 258: Performed by: INTERNAL MEDICINE

## 2024-05-19 PROCEDURE — 2700000000 HC OXYGEN THERAPY PER DAY

## 2024-05-19 RX ADMIN — BUDESONIDE INHALATION 1000 MCG: 0.5 SUSPENSION RESPIRATORY (INHALATION) at 19:57

## 2024-05-19 RX ADMIN — HYDROXYZINE PAMOATE 25 MG: 25 CAPSULE ORAL at 06:24

## 2024-05-19 RX ADMIN — SODIUM CHLORIDE, PRESERVATIVE FREE 10 ML: 5 INJECTION INTRAVENOUS at 09:37

## 2024-05-19 RX ADMIN — IPRATROPIUM BROMIDE AND ALBUTEROL SULFATE 1 DOSE: .5; 2.5 SOLUTION RESPIRATORY (INHALATION) at 15:39

## 2024-05-19 RX ADMIN — DILTIAZEM HYDROCHLORIDE 180 MG: 180 CAPSULE, COATED, EXTENDED RELEASE ORAL at 09:31

## 2024-05-19 RX ADMIN — GUAIFENESIN 400 MG: 400 TABLET ORAL at 09:36

## 2024-05-19 RX ADMIN — CITALOPRAM HYDROBROMIDE 20 MG: 20 TABLET ORAL at 09:28

## 2024-05-19 RX ADMIN — CYANOCOBALAMIN TAB 1000 MCG 500 MCG: 1000 TAB at 09:28

## 2024-05-19 RX ADMIN — ONDANSETRON 4 MG: 4 TABLET, ORALLY DISINTEGRATING ORAL at 15:15

## 2024-05-19 RX ADMIN — HYDROXYZINE PAMOATE 25 MG: 25 CAPSULE ORAL at 15:15

## 2024-05-19 RX ADMIN — TRAZODONE HYDROCHLORIDE 50 MG: 50 TABLET ORAL at 20:48

## 2024-05-19 RX ADMIN — CLONAZEPAM 0.5 MG: 0.5 TABLET ORAL at 20:48

## 2024-05-19 RX ADMIN — BUDESONIDE INHALATION 1000 MCG: 0.5 SUSPENSION RESPIRATORY (INHALATION) at 08:13

## 2024-05-19 RX ADMIN — PREDNISONE 10 MG: 5 TABLET ORAL at 09:28

## 2024-05-19 RX ADMIN — METHIMAZOLE 15 MG: 5 TABLET ORAL at 09:29

## 2024-05-19 RX ADMIN — IPRATROPIUM BROMIDE AND ALBUTEROL SULFATE 1 DOSE: .5; 2.5 SOLUTION RESPIRATORY (INHALATION) at 08:13

## 2024-05-19 RX ADMIN — ASPIRIN 81 MG: 81 TABLET, COATED ORAL at 09:28

## 2024-05-19 RX ADMIN — IPRATROPIUM BROMIDE AND ALBUTEROL SULFATE 1 DOSE: .5; 2.5 SOLUTION RESPIRATORY (INHALATION) at 19:57

## 2024-05-19 RX ADMIN — ACETAMINOPHEN 650 MG: 325 TABLET ORAL at 09:28

## 2024-05-19 RX ADMIN — ARFORMOTEROL TARTRATE 15 MCG: 15 SOLUTION RESPIRATORY (INHALATION) at 19:57

## 2024-05-19 RX ADMIN — FOLIC ACID 1 MG: 1 TABLET ORAL at 09:29

## 2024-05-19 RX ADMIN — GUAIFENESIN 400 MG: 400 TABLET ORAL at 14:41

## 2024-05-19 RX ADMIN — ACETAMINOPHEN 650 MG: 325 TABLET ORAL at 20:50

## 2024-05-19 RX ADMIN — GUAIFENESIN 400 MG: 400 TABLET ORAL at 20:48

## 2024-05-19 RX ADMIN — ARFORMOTEROL TARTRATE 15 MCG: 15 SOLUTION RESPIRATORY (INHALATION) at 08:13

## 2024-05-19 ASSESSMENT — PAIN DESCRIPTION - DESCRIPTORS: DESCRIPTORS: ACHING

## 2024-05-19 ASSESSMENT — PAIN SCALES - GENERAL
PAINLEVEL_OUTOF10: 0
PAINLEVEL_OUTOF10: 4
PAINLEVEL_OUTOF10: 7
PAINLEVEL_OUTOF10: 3

## 2024-05-19 ASSESSMENT — PAIN DESCRIPTION - ORIENTATION: ORIENTATION: RIGHT;LEFT;LOWER

## 2024-05-19 ASSESSMENT — PAIN DESCRIPTION - LOCATION
LOCATION: BACK
LOCATION: BACK

## 2024-05-19 ASSESSMENT — PAIN SCALES - WONG BAKER: WONGBAKER_NUMERICALRESPONSE: NO HURT

## 2024-05-19 NOTE — PLAN OF CARE
Problem: Chronic Conditions and Co-morbidities  Goal: Patient's chronic conditions and co-morbidity symptoms are monitored and maintained or improved  5/18/2024 2350 by Pati Boyle, RN  Outcome: Progressing  5/18/2024 1749 by Rae Robertson, RN  Outcome: Progressing

## 2024-05-19 NOTE — PLAN OF CARE
Problem: Chronic Conditions and Co-morbidities  Goal: Patient's chronic conditions and co-morbidity symptoms are monitored and maintained or improved  5/19/2024 1059 by Rae Robertson RN  Outcome: Progressing  5/18/2024 2350 by Pati Boyle RN  Outcome: Progressing     Problem: Discharge Planning  Goal: Discharge to home or other facility with appropriate resources  5/19/2024 1059 by Rae Robertson RN  Outcome: Progressing  5/18/2024 2350 by Pati Boyle RN  Outcome: Progressing     Problem: Skin/Tissue Integrity  Goal: Absence of new skin breakdown  Description: 1.  Monitor for areas of redness and/or skin breakdown  2.  Assess vascular access sites hourly  3.  Every 4-6 hours minimum:  Change oxygen saturation probe site  4.  Every 4-6 hours:  If on nasal continuous positive airway pressure, respiratory therapy assess nares and determine need for appliance change or resting period.  Outcome: Progressing     Problem: Safety - Adult  Goal: Free from fall injury  Outcome: Progressing     Problem: Pain  Goal: Verbalizes/displays adequate comfort level or baseline comfort level  Outcome: Progressing

## 2024-05-20 ENCOUNTER — APPOINTMENT (OUTPATIENT)
Dept: GENERAL RADIOLOGY | Age: 69
DRG: 187 | End: 2024-05-20
Payer: MEDICARE

## 2024-05-20 VITALS
HEIGHT: 68 IN | TEMPERATURE: 97.5 F | BODY MASS INDEX: 15.82 KG/M2 | HEART RATE: 72 BPM | RESPIRATION RATE: 18 BRPM | OXYGEN SATURATION: 96 % | DIASTOLIC BLOOD PRESSURE: 67 MMHG | WEIGHT: 104.39 LBS | SYSTOLIC BLOOD PRESSURE: 148 MMHG

## 2024-05-20 LAB
ALBUMIN SERPL-MCNC: 2.7 G/DL (ref 3.5–5.2)
ALP SERPL-CCNC: 91 U/L (ref 35–104)
ALT SERPL-CCNC: 201 U/L (ref 0–32)
ANION GAP SERPL CALCULATED.3IONS-SCNC: 9 MMOL/L (ref 7–16)
AST SERPL-CCNC: 27 U/L (ref 0–31)
BILIRUB SERPL-MCNC: 0.2 MG/DL (ref 0–1.2)
BUN SERPL-MCNC: 8 MG/DL (ref 6–23)
CALCIUM SERPL-MCNC: 8.4 MG/DL (ref 8.6–10.2)
CHLORIDE SERPL-SCNC: 100 MMOL/L (ref 98–107)
CO2 SERPL-SCNC: 34 MMOL/L (ref 22–29)
CREAT SERPL-MCNC: 0.6 MG/DL (ref 0.5–1)
EKG ATRIAL RATE: 66 BPM
EKG P AXIS: 25 DEGREES
EKG P-R INTERVAL: 124 MS
EKG Q-T INTERVAL: 426 MS
EKG QRS DURATION: 86 MS
EKG QTC CALCULATION (BAZETT): 446 MS
EKG R AXIS: 53 DEGREES
EKG T AXIS: 60 DEGREES
EKG VENTRICULAR RATE: 66 BPM
ERYTHROCYTE [DISTWIDTH] IN BLOOD BY AUTOMATED COUNT: 18.9 % (ref 11.5–15)
GFR, ESTIMATED: >90 ML/MIN/1.73M2
GLUCOSE SERPL-MCNC: 83 MG/DL (ref 74–99)
HCT VFR BLD AUTO: 27.7 % (ref 34–48)
HGB BLD-MCNC: 8.2 G/DL (ref 11.5–15.5)
MCH RBC QN AUTO: 28.7 PG (ref 26–35)
MCHC RBC AUTO-ENTMCNC: 29.6 G/DL (ref 32–34.5)
MCV RBC AUTO: 96.9 FL (ref 80–99.9)
NON-GYN CYTOLOGY REPORT: NORMAL
PLATELET # BLD AUTO: 342 K/UL (ref 130–450)
PMV BLD AUTO: 9.6 FL (ref 7–12)
POTASSIUM SERPL-SCNC: 3.4 MMOL/L (ref 3.5–5)
PROT SERPL-MCNC: 4.9 G/DL (ref 6.4–8.3)
RBC # BLD AUTO: 2.86 M/UL (ref 3.5–5.5)
SODIUM SERPL-SCNC: 143 MMOL/L (ref 132–146)
WBC OTHER # BLD: 8 K/UL (ref 4.5–11.5)

## 2024-05-20 PROCEDURE — 6370000000 HC RX 637 (ALT 250 FOR IP): Performed by: INTERNAL MEDICINE

## 2024-05-20 PROCEDURE — 80053 COMPREHEN METABOLIC PANEL: CPT

## 2024-05-20 PROCEDURE — 6360000002 HC RX W HCPCS: Performed by: INTERNAL MEDICINE

## 2024-05-20 PROCEDURE — 2700000000 HC OXYGEN THERAPY PER DAY

## 2024-05-20 PROCEDURE — 94640 AIRWAY INHALATION TREATMENT: CPT

## 2024-05-20 PROCEDURE — 85027 COMPLETE CBC AUTOMATED: CPT

## 2024-05-20 PROCEDURE — 2580000003 HC RX 258: Performed by: INTERNAL MEDICINE

## 2024-05-20 PROCEDURE — 36415 COLL VENOUS BLD VENIPUNCTURE: CPT

## 2024-05-20 PROCEDURE — 71045 X-RAY EXAM CHEST 1 VIEW: CPT

## 2024-05-20 PROCEDURE — 93010 ELECTROCARDIOGRAM REPORT: CPT | Performed by: INTERNAL MEDICINE

## 2024-05-20 PROCEDURE — 94660 CPAP INITIATION&MGMT: CPT

## 2024-05-20 RX ORDER — PREDNISONE 10 MG/1
10 TABLET ORAL DAILY
Qty: 10 TABLET | Refills: 0
Start: 2024-05-21 | End: 2024-05-31

## 2024-05-20 RX ORDER — CLONAZEPAM 1 MG/1
0.5 TABLET ORAL EVERY 12 HOURS
Qty: 30 TABLET | Refills: 0
Start: 2024-05-20 | End: 2024-06-19

## 2024-05-20 RX ORDER — POTASSIUM CHLORIDE 750 MG/1
10 TABLET, EXTENDED RELEASE ORAL
Status: DISCONTINUED | OUTPATIENT
Start: 2024-05-20 | End: 2024-05-20 | Stop reason: HOSPADM

## 2024-05-20 RX ADMIN — PREDNISONE 10 MG: 5 TABLET ORAL at 08:39

## 2024-05-20 RX ADMIN — IPRATROPIUM BROMIDE AND ALBUTEROL SULFATE 1 DOSE: .5; 2.5 SOLUTION RESPIRATORY (INHALATION) at 07:54

## 2024-05-20 RX ADMIN — METHIMAZOLE 15 MG: 5 TABLET ORAL at 08:34

## 2024-05-20 RX ADMIN — ONDANSETRON 4 MG: 4 TABLET, ORALLY DISINTEGRATING ORAL at 15:14

## 2024-05-20 RX ADMIN — ARFORMOTEROL TARTRATE 15 MCG: 15 SOLUTION RESPIRATORY (INHALATION) at 07:54

## 2024-05-20 RX ADMIN — FOLIC ACID 1 MG: 1 TABLET ORAL at 08:35

## 2024-05-20 RX ADMIN — DILTIAZEM HYDROCHLORIDE 180 MG: 180 CAPSULE, COATED, EXTENDED RELEASE ORAL at 08:38

## 2024-05-20 RX ADMIN — POLYETHYLENE GLYCOL 3350 17 G: 17 POWDER, FOR SOLUTION ORAL at 15:14

## 2024-05-20 RX ADMIN — IPRATROPIUM BROMIDE AND ALBUTEROL SULFATE 1 DOSE: .5; 2.5 SOLUTION RESPIRATORY (INHALATION) at 13:51

## 2024-05-20 RX ADMIN — SENNOSIDES AND DOCUSATE SODIUM 1 TABLET: 50; 8.6 TABLET ORAL at 08:37

## 2024-05-20 RX ADMIN — BUDESONIDE INHALATION 1000 MCG: 0.5 SUSPENSION RESPIRATORY (INHALATION) at 07:54

## 2024-05-20 RX ADMIN — IPRATROPIUM BROMIDE AND ALBUTEROL SULFATE 1 DOSE: .5; 2.5 SOLUTION RESPIRATORY (INHALATION) at 11:35

## 2024-05-20 RX ADMIN — GUAIFENESIN 400 MG: 400 TABLET ORAL at 15:14

## 2024-05-20 RX ADMIN — CITALOPRAM HYDROBROMIDE 20 MG: 20 TABLET ORAL at 08:38

## 2024-05-20 RX ADMIN — CYANOCOBALAMIN TAB 1000 MCG 500 MCG: 1000 TAB at 08:36

## 2024-05-20 RX ADMIN — CLONAZEPAM 0.5 MG: 0.5 TABLET ORAL at 11:53

## 2024-05-20 RX ADMIN — HYDROXYZINE PAMOATE 25 MG: 25 CAPSULE ORAL at 04:56

## 2024-05-20 RX ADMIN — PANTOPRAZOLE SODIUM 40 MG: 40 TABLET, DELAYED RELEASE ORAL at 06:40

## 2024-05-20 RX ADMIN — POTASSIUM CHLORIDE 10 MEQ: 750 TABLET, EXTENDED RELEASE ORAL at 08:35

## 2024-05-20 RX ADMIN — SODIUM CHLORIDE, PRESERVATIVE FREE 10 ML: 5 INJECTION INTRAVENOUS at 08:34

## 2024-05-20 RX ADMIN — GUAIFENESIN 400 MG: 400 TABLET ORAL at 08:34

## 2024-05-20 RX ADMIN — ASPIRIN 81 MG: 81 TABLET, COATED ORAL at 08:37

## 2024-05-20 ASSESSMENT — PAIN SCALES - GENERAL
PAINLEVEL_OUTOF10: 0
PAINLEVEL_OUTOF10: 0

## 2024-05-20 NOTE — ACP (ADVANCE CARE PLANNING)
Advance Care Planning   The patient has the following advanced directives on file:  Advance Directives       Power of  Living Will ACP-Advance Directive ACP-Power of     Not on File Not on File Not on File Not on File            The patient has appointed the following active healthcare agents:    Primary Decision Maker: sue ward - Brother/Sister - [phone]    The Patient has the following current code status:    Code Status: Full Code      Karey Deutsch, LSW  5/20/2024

## 2024-05-20 NOTE — CARE COORDINATION
Social Work/ Case Management Transition of Care Planning (Karey Deutsch, ELIGIO [phone]):     Discharge order noted. Pt going to [facility], ambulance form and envelope in soft chart. Jorge Hirsch  scheduled at 3:30pm, facility aware. RN aware.     Karey Deutsch, ELIGIO  5/20/2024

## 2024-05-20 NOTE — PLAN OF CARE
Problem: Chronic Conditions and Co-morbidities  Goal: Patient's chronic conditions and co-morbidity symptoms are monitored and maintained or improved  5/20/2024 0036 by Pati Boyle, RN  Outcome: Progressing  5/19/2024 1059 by Rae Robertson, RN  Outcome: Progressing

## 2024-05-20 NOTE — PLAN OF CARE
Problem: Chronic Conditions and Co-morbidities  Goal: Patient's chronic conditions and co-morbidity symptoms are monitored and maintained or improved  5/20/2024 0036 by Pati Boyle, RN  Outcome: Progressing  5/20/2024 0036 by Pati Boyle, RN  Outcome: Progressing     Problem: Skin/Tissue Integrity  Goal: Absence of new skin breakdown  Description: 1.  Monitor for areas of redness and/or skin breakdown  2.  Assess vascular access sites hourly  3.  Every 4-6 hours minimum:  Change oxygen saturation probe site  4.  Every 4-6 hours:  If on nasal continuous positive airway pressure, respiratory therapy assess nares and determine need for appliance change or resting period.  Outcome: Progressing

## 2024-05-20 NOTE — PLAN OF CARE
Problem: Chronic Conditions and Co-morbidities  Goal: Patient's chronic conditions and co-morbidity symptoms are monitored and maintained or improved  Outcome: Adequate for Discharge     Problem: Discharge Planning  Goal: Discharge to home or other facility with appropriate resources  Outcome: Adequate for Discharge     Problem: Skin/Tissue Integrity  Goal: Absence of new skin breakdown  Description: 1.  Monitor for areas of redness and/or skin breakdown  2.  Assess vascular access sites hourly  3.  Every 4-6 hours minimum:  Change oxygen saturation probe site  4.  Every 4-6 hours:  If on nasal continuous positive airway pressure, respiratory therapy assess nares and determine need for appliance change or resting period.  5/20/2024 1553 by Adrienne Arauz, RN  Outcome: Adequate for Discharge  5/20/2024 0656 by Pati Boyle RN  Outcome: Progressing     Problem: Safety - Adult  Goal: Free from fall injury  Outcome: Adequate for Discharge     Problem: Pain  Goal: Verbalizes/displays adequate comfort level or baseline comfort level  Outcome: Adequate for Discharge

## 2024-05-20 NOTE — PLAN OF CARE
Problem: Chronic Conditions and Co-morbidities  Goal: Patient's chronic conditions and co-morbidity symptoms are monitored and maintained or improved  5/20/2024 1700 by Adrienne Arauz RN  Outcome: Completed  5/20/2024 1553 by Adrienne Arauz RN  Outcome: Adequate for Discharge     Problem: Discharge Planning  Goal: Discharge to home or other facility with appropriate resources  5/20/2024 1700 by Adrienne Arauz, RN  Outcome: Completed  5/20/2024 1553 by Adrienne Arauz RN  Outcome: Adequate for Discharge     Problem: Skin/Tissue Integrity  Goal: Absence of new skin breakdown  Description: 1.  Monitor for areas of redness and/or skin breakdown  2.  Assess vascular access sites hourly  3.  Every 4-6 hours minimum:  Change oxygen saturation probe site  4.  Every 4-6 hours:  If on nasal continuous positive airway pressure, respiratory therapy assess nares and determine need for appliance change or resting period.  5/20/2024 1700 by Adrienne Arauz RN  Outcome: Completed  5/20/2024 1553 by Adrienne Arauz RN  Outcome: Adequate for Discharge  5/20/2024 0656 by Pati Boyle RN  Outcome: Progressing     Problem: Safety - Adult  Goal: Free from fall injury  5/20/2024 1700 by Adrienne Arauz RN  Outcome: Completed  5/20/2024 1553 by Adrienne Arauz RN  Outcome: Adequate for Discharge     Problem: Pain  Goal: Verbalizes/displays adequate comfort level or baseline comfort level  5/20/2024 1700 by Adrienne Arauz, RN  Outcome: Completed  5/20/2024 1553 by Adrienne Aaruz RN  Outcome: Adequate for Discharge

## 2024-05-20 NOTE — DISCHARGE INSTR - COC
Diagnosis Code    Primary hypertension I10    COPD (chronic obstructive pulmonary disease) ([facility]) J44.9    Gastroesophageal reflux disease K21.9    Constipation K59.00    Unexplained weight loss R63.4    Protein deficiency (HCC) E46    Diet-controlled diabetes mellitus ([facility]) E11.9    Multiple nodules of lung R91.8    Abnormal EKG R94.31    Mixed hyperlipidemia E78.2    Hyperthyroidism E05.90    Vitamin B12 deficiency (dietary) anemia D51.8    Osteoporosis M81.0    Elevated troponin level not due to acute coronary syndrome R79.89    Tachycardia, unspecified R00.0    Pneumonia due to organism J18.9    Atrial fibrillation ([facility]) I48.91    Hypoxia R09.02    Severe protein-calorie malnutrition ([facility]) E43    Anxiety F41.9    Numbness and tingling of both lower extremities R20.0, R20.2    Acute on chronic anemia D64.9    Chronic pain syndrome [G89.4] G89.4    Chronic bilateral low back pain with bilateral sciatica M54.42, M54.41, G89.29    Lumbar radiculopathy M54.16    Lumbar disc disorder M51.9    Critical limb ischemia of left lower extremity with rest pain ([facility]) I70.222    Atherosclerosis of artery of extremity with rest pain ([facility]) I70.229    Peripheral vascular disease, unspecified ([facility]) I73.9    Atherosclerosis 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 ([facility]) I70.245    COPD with acute exacerbation ([facility]) J44.1    Iron deficiency anemia, unspecified D50.9    Acute respiratory failure with hypoxia and hypercarbia ([facility]) J96.01, J96.02    COPD exacerbation ([facility]) J44.1    Iron deficiency anemia D50.9    Acute delirium R41.0       Isolation/Infection:   Isolation            No Isolation          Patient Infection Status       Infection Onset Added Last Indicated Last Indicated By Review Planned Expiration Resolved Resolved By    None active    Resolved    Rhinovirus 04/05/24 04/05/24 04/13/24 Respiratory Panel, Molecular, with COVID-19 (Restricted: peds pts or suitable admitted adults)   04/19/24

## 2024-05-21 ENCOUNTER — TELEPHONE (OUTPATIENT)
Dept: INFUSION THERAPY | Age: 69
End: 2024-05-21

## 2024-05-23 LAB
MICROORGANISM SPEC CULT: NORMAL
MICROORGANISM/AGENT SPEC: NORMAL
SPECIMEN DESCRIPTION: NORMAL

## 2024-05-24 NOTE — PROGRESS NOTES
[facility]  [address], [address]  [place] [address]      Pulmonary Progress Note      SUBJECTIVE:  claims stable with breathing, slightly increased cough/congestion, on 10 li HFNC saturating about 97%, claims wore NIPPV 12/5 40% last night    OBJECTIVE    Medications    Continuous Infusions:   sodium chloride      sodium chloride         Scheduled Meds:   potassium chloride  10 mEq Oral Daily with breakfast    predniSONE  10 mg Oral Daily    lidocaine  1 patch TransDERmal Daily    arformoterol tartrate  15 mcg Nebulization BID RT    aspirin  81 mg Oral Daily    budesonide  1 mg Nebulization BID RT    citalopram  20 mg Oral Daily    dilTIAZem  180 mg Oral Daily    folic acid  1 mg Oral Daily    guaiFENesin  400 mg Oral TID    ipratropium 0.5 mg-albuterol 2.5 mg  1 Dose Nebulization Q4H WA RT    methIMAzole  15 mg Oral Daily    pantoprazole  40 mg Oral QAM AC    [Held by provider] rosuvastatin  20 mg Oral Nightly    sennosides-docusate sodium  1 tablet Oral Daily    vitamin B-12  500 mcg Oral Daily    sodium chloride flush  5-40 mL IntraVENous 2 times per day       PRN Meds:sodium chloride, clonazePAM, hydrOXYzine pamoate, traZODone, sodium chloride flush, sodium chloride, potassium chloride **OR** potassium alternative oral replacement **OR** potassium chloride, magnesium sulfate, ondansetron **OR** ondansetron, polyethylene glycol, acetaminophen **OR** acetaminophen    Physical    VITALS:  BP (!) 152/65   Pulse 70   Temp 98.1 °F (36.7 °C) (Oral)   Resp 18   Ht 1.727 m (5' 7.99\")   Wt 47.4 kg (104 lb 6.2 oz)   SpO2 92%   BMI 15.88 kg/m²     24HR INTAKE/OUTPUT:      Intake/Output Summary (Last 24 hours) at 2024 1153  Last data filed at 2024 0500  Gross per 24 hour   Intake 830 ml   Output 650 ml   Net 180 ml         24HR PULSE OXIMETRY RANGE:    SpO2  Av.9 %  Min: 92 %  Max: 99 %    General appearance: alert, appears 
  [facility]  [address], [address]  [place] [address]      Pulmonary Progress Note      SUBJECTIVE:  claims stable with breathing, not much cough/congestion, on 10 li HFNC saturating about 97%, claims has been wearing NIPPV 12/5 40% at night    OBJECTIVE    Medications    Continuous Infusions:   sodium chloride      sodium chloride         Scheduled Meds:   predniSONE  10 mg Oral Daily    lidocaine  1 patch TransDERmal Daily    arformoterol tartrate  15 mcg Nebulization BID RT    aspirin  81 mg Oral Daily    budesonide  1 mg Nebulization BID RT    citalopram  20 mg Oral Daily    dilTIAZem  180 mg Oral Daily    folic acid  1 mg Oral Daily    guaiFENesin  400 mg Oral TID    ipratropium 0.5 mg-albuterol 2.5 mg  1 Dose Nebulization Q4H WA RT    methIMAzole  15 mg Oral Daily    pantoprazole  40 mg Oral QAM AC    [Held by provider] rosuvastatin  20 mg Oral Nightly    sennosides-docusate sodium  1 tablet Oral Daily    vitamin B-12  500 mcg Oral Daily    sodium chloride flush  5-40 mL IntraVENous 2 times per day       PRN Meds:sodium chloride, clonazePAM, hydrOXYzine pamoate, traZODone, sodium chloride flush, sodium chloride, potassium chloride **OR** potassium alternative oral replacement **OR** potassium chloride, magnesium sulfate, ondansetron **OR** ondansetron, polyethylene glycol, acetaminophen **OR** acetaminophen    Physical    VITALS:  BP (!) 143/70   Pulse 70   Temp 98.3 °F (36.8 °C) (Oral)   Resp 20   Ht 1.727 m (5' 7.99\")   Wt 47.4 kg (104 lb 7.7 oz)   SpO2 99%   BMI 15.89 kg/m²     24HR INTAKE/OUTPUT:      Intake/Output Summary (Last 24 hours) at 2024 1535  Last data filed at 2024 1448  Gross per 24 hour   Intake 1560 ml   Output 900 ml   Net 660 ml         24HR PULSE OXIMETRY RANGE:    SpO2  Av.4 %  Min: 95 %  Max: 100 %    General appearance: alert, appears stated age, and cooperative  Lungs: rhonchi bilaterally 
  [facility]  [address], [address]  [place] [address]      Pulmonary Progress Note      SUBJECTIVE:  claims feeling some better with breathing, not much cough/congestion, on 15 li HFNC (?) needs that much, saturating about 96% when checked and usually on 4 li NC when last discharged    OBJECTIVE    Medications    Continuous Infusions:   sodium chloride      sodium chloride         Scheduled Meds:   lidocaine  1 patch TransDERmal Daily    [Held by provider] apixaban  5 mg Oral BID    arformoterol tartrate  15 mcg Nebulization BID RT    aspirin  81 mg Oral Daily    budesonide  1 mg Nebulization BID RT    citalopram  20 mg Oral Daily    dilTIAZem  180 mg Oral Daily    folic acid  1 mg Oral Daily    guaiFENesin  400 mg Oral TID    ipratropium 0.5 mg-albuterol 2.5 mg  1 Dose Nebulization Q4H WA RT    methIMAzole  15 mg Oral Daily    pantoprazole  40 mg Oral QAM AC    [Held by provider] rosuvastatin  20 mg Oral Nightly    sennosides-docusate sodium  1 tablet Oral Daily    vitamin B-12  500 mcg Oral Daily    sodium chloride flush  5-40 mL IntraVENous 2 times per day       PRN Meds:sodium chloride, clonazePAM, hydrOXYzine pamoate, traZODone, sodium chloride flush, sodium chloride, potassium chloride **OR** potassium alternative oral replacement **OR** potassium chloride, magnesium sulfate, ondansetron **OR** ondansetron, polyethylene glycol, acetaminophen **OR** acetaminophen    Physical    VITALS:  /81   Pulse 83   Temp 98.2 °F (36.8 °C) (Oral)   Resp 16   Ht 1.727 m (5' 7.99\")   Wt 46.9 kg (103 lb 6.3 oz)   SpO2 99%   BMI 15.72 kg/m²     24HR INTAKE/OUTPUT:      Intake/Output Summary (Last 24 hours) at 2024 1502  Last data filed at 2024 0707  Gross per 24 hour   Intake 485 ml   Output 2 ml   Net 483 ml       24HR PULSE OXIMETRY RANGE:    SpO2  Av.1 %  Min: 95 %  Max: 100 %    General appearance: alert, appears stated age, 
  [facility]  [address], [address]  [place] [address]      Pulmonary Progress Note      SUBJECTIVE:  claims stable with breathing, not much cough/congestion, on 11 li HFNC saturating about 97%, claims has been wearing NIPPV 12/5 40% at night    OBJECTIVE    Medications    Continuous Infusions:   sodium chloride      sodium chloride         Scheduled Meds:   predniSONE  10 mg Oral Daily    lidocaine  1 patch TransDERmal Daily    [Held by provider] apixaban  5 mg Oral BID    arformoterol tartrate  15 mcg Nebulization BID RT    aspirin  81 mg Oral Daily    budesonide  1 mg Nebulization BID RT    citalopram  20 mg Oral Daily    dilTIAZem  180 mg Oral Daily    folic acid  1 mg Oral Daily    guaiFENesin  400 mg Oral TID    ipratropium 0.5 mg-albuterol 2.5 mg  1 Dose Nebulization Q4H WA RT    methIMAzole  15 mg Oral Daily    pantoprazole  40 mg Oral QAM AC    [Held by provider] rosuvastatin  20 mg Oral Nightly    sennosides-docusate sodium  1 tablet Oral Daily    vitamin B-12  500 mcg Oral Daily    sodium chloride flush  5-40 mL IntraVENous 2 times per day       PRN Meds:sodium chloride, clonazePAM, hydrOXYzine pamoate, traZODone, sodium chloride flush, sodium chloride, potassium chloride **OR** potassium alternative oral replacement **OR** potassium chloride, magnesium sulfate, ondansetron **OR** ondansetron, polyethylene glycol, acetaminophen **OR** acetaminophen    Physical    VITALS:  /87   Pulse 75   Temp 98.2 °F (36.8 °C) (Oral)   Resp 18   Ht 1.727 m (5' 7.99\")   Wt 46.9 kg (103 lb 6.3 oz)   SpO2 95%   BMI 15.72 kg/m²     24HR INTAKE/OUTPUT:      Intake/Output Summary (Last 24 hours) at 2024 1710  Last data filed at 2024 1353  Gross per 24 hour   Intake 730 ml   Output --   Net 730 ml         24HR PULSE OXIMETRY RANGE:    SpO2  Av.2 %  Min: 95 %  Max: 100 %    General appearance: alert, appears stated age, and 
  [facility]    Subjective:  pt alert conversive      Current Facility-Administered Medications:     potassium chloride (KLOR-CON M) extended release tablet 10 mEq, 10 mEq, Oral, Daily with breakfast, Baldemar Jarrett DO    predniSONE (DELTASONE) tablet 10 mg, 10 mg, Oral, Daily, Jose Price MD, 10 mg at 05/19/24 0928    0.9 % sodium chloride infusion, , IntraVENous, PRN, Baldemar Jrarett DO    lidocaine 4 % external patch 1 patch, 1 patch, TransDERmal, Daily, Jose Price MD, 1 patch at 05/19/24 0929    clonazePAM (KLONOPIN) tablet 0.5 mg, 0.5 mg, Oral, Q12H PRN, Baldemar Jarrett DO, 0.5 mg at 05/19/24 2048    arformoterol tartrate (BROVANA) nebulizer solution 15 mcg, 15 mcg, Nebulization, BID RT, Baldemar Jarrett DO, 15 mcg at 05/19/24 1957    aspirin EC tablet 81 mg, 81 mg, Oral, Daily, Baldemar Jarrett DO, 81 mg at 05/19/24 0928    budesonide (PULMICORT) nebulizer suspension 1,000 mcg, 1 mg, Nebulization, BID RT, Baldemar Jarrett DO, 1,000 mcg at 05/19/24 1957    citalopram (CELEXA) tablet 20 mg, 20 mg, Oral, Daily, Baldemar Jarrett DO, 20 mg at 05/19/24 0928    dilTIAZem (CARDIZEM CD) extended release capsule 180 mg, 180 mg, Oral, Daily, Baldemar Jarrett DO, 180 mg at 05/19/24 0931    folic acid (FOLVITE) tablet 1 mg, 1 mg, Oral, Daily, Baldemar Jarrett DO, 1 mg at 05/19/24 0929    guaiFENesin tablet 400 mg, 400 mg, Oral, TID, Baldemar Jarrett DO, 400 mg at 05/19/24 2048    hydrOXYzine pamoate (VISTARIL) capsule 25 mg, 25 mg, Oral, TID PRN, Baldemar Jarrett DO, 25 mg at 05/20/24 0456    ipratropium 0.5 mg-albuterol 2.5 mg (DUONEB) nebulizer solution 1 Dose, 1 Dose, Nebulization, Q4H WA RT, Baldemar Jarrett, , 1 Dose at 05/19/24 1957    methIMAzole (TAPAZOLE) tablet 15 mg, 15 mg, Oral, Daily, Baldemar Jarrett, , 15 mg at 05/19/24 0929    pantoprazole (PROTONIX) tablet 40 mg, 40 mg, Oral, [facility] AC, Baldemar Jarrett, , 40 mg at 05/20/24 0640    [Held by provider] 
  Hospital Medicine    Subjective:  pt alert conversive less confused today      Current Facility-Administered Medications:     clonazePAM (KLONOPIN) tablet 0.5 mg, 0.5 mg, Oral, Q12H PRN, Baldemar Jarrett DO, 0.5 mg at 05/16/24 0146    [Held by provider] apixaban (ELIQUIS) tablet 5 mg, 5 mg, Oral, BID, Baldemar Jarrett DO, 5 mg at 05/15/24 1139    arformoterol tartrate (BROVANA) nebulizer solution 15 mcg, 15 mcg, Nebulization, BID RT, Baldemar Jarrett DO, 15 mcg at 05/15/24 2156    aspirin EC tablet 81 mg, 81 mg, Oral, Daily, Baldemar Jarrett DO, 81 mg at 05/15/24 1139    budesonide (PULMICORT) nebulizer suspension 1,000 mcg, 1 mg, Nebulization, BID RT, Baldemar Jarrett DO, 1,000 mcg at 05/15/24 2156    citalopram (CELEXA) tablet 20 mg, 20 mg, Oral, Daily, Baldemar Jarrett DO, 20 mg at 05/15/24 1140    dilTIAZem (CARDIZEM CD) extended release capsule 180 mg, 180 mg, Oral, Daily, Baldemar Jarrett DO, 180 mg at 05/15/24 1139    folic acid (FOLVITE) tablet 1 mg, 1 mg, Oral, Daily, Baldemar Jarrett DO, 1 mg at 05/15/24 1144    guaiFENesin tablet 400 mg, 400 mg, Oral, TID, Baldemar Jarrett DO, 400 mg at 05/15/24 2152    hydrOXYzine pamoate (VISTARIL) capsule 25 mg, 25 mg, Oral, TID PRN, Baldemra Jarrett DO, 25 mg at 05/15/24 2053    ipratropium 0.5 mg-albuterol 2.5 mg (DUONEB) nebulizer solution 1 Dose, 1 Dose, Nebulization, Q4H WA RT, Baldemar Jarrett DO, 1 Dose at 05/15/24 2156    methIMAzole (TAPAZOLE) tablet 15 mg, 15 mg, Oral, Daily, Baldemar Jarrett DO, 15 mg at 05/15/24 1140    pantoprazole (PROTONIX) tablet 40 mg, 40 mg, Oral, QAM AC, Baldemar Jarrett, , 40 mg at 05/15/24 0630    rosuvastatin (CRESTOR) tablet 20 mg, 20 mg, Oral, Nightly, Baldemar Jarrett DO, 20 mg at 05/15/24 2152    sennosides-docusate sodium (SENOKOT-S) 8.6-50 MG tablet 1 tablet, 1 tablet, Oral, Daily, Baldemar Jarrett DO, 1 tablet at 05/15/24 1144    traZODone (DESYREL) tablet 50 mg, 50 mg, Oral, Nightly PRN, 
  [facility] Medicine    Subjective:  pt alert conversive      Current Facility-Administered Medications:     predniSONE (DELTASONE) tablet 10 mg, 10 mg, Oral, Daily, Jose Price MD, 10 mg at 05/19/24 0928    0.9 % sodium chloride infusion, , IntraVENous, PRN, Baldemar Jarrett DO    lidocaine 4 % external patch 1 patch, 1 patch, TransDERmal, Daily, Jose Price MD, 1 patch at 05/19/24 0929    clonazePAM (KLONOPIN) tablet 0.5 mg, 0.5 mg, Oral, Q12H PRN, Baldemar Jarrett DO, 0.5 mg at 05/18/24 2058    arformoterol tartrate (BROVANA) nebulizer solution 15 mcg, 15 mcg, Nebulization, BID RT, Baldemar Jarrett DO, 15 mcg at 05/19/24 0813    aspirin EC tablet 81 mg, 81 mg, Oral, Daily, Baldemar Jarrett DO, 81 mg at 05/19/24 0928    budesonide (PULMICORT) nebulizer suspension 1,000 mcg, 1 mg, Nebulization, BID RT, Baldemar Jarrett DO, 1,000 mcg at 05/19/24 0813    citalopram (CELEXA) tablet 20 mg, 20 mg, Oral, Daily, Baldemar Jarrett DO, 20 mg at 05/19/24 0928    dilTIAZem (CARDIZEM CD) extended release capsule 180 mg, 180 mg, Oral, Daily, Baldemar Jarrett DO, 180 mg at 05/19/24 0931    folic acid (FOLVITE) tablet 1 mg, 1 mg, Oral, Daily, Baldemar Jarrett DO, 1 mg at 05/19/24 0929    guaiFENesin tablet 400 mg, 400 mg, Oral, TID, Baldemar Jarrett DO, 400 mg at 05/19/24 0936    hydrOXYzine pamoate (VISTARIL) capsule 25 mg, 25 mg, Oral, TID PRN, Baldemar Jarrett DO, 25 mg at 05/19/24 0624    ipratropium 0.5 mg-albuterol 2.5 mg (DUONEB) nebulizer solution 1 Dose, 1 Dose, Nebulization, Q4H WA RT, Baldemar Jarrett DO, 1 Dose at 05/19/24 0813    methIMAzole (TAPAZOLE) tablet 15 mg, 15 mg, Oral, Daily, Baldemar Jarrett DO, 15 mg at 05/19/24 0929    pantoprazole (PROTONIX) tablet 40 mg, 40 mg, Oral, QAM AC, Baldemar Jarrett DO, 40 mg at 05/18/24 0551    [Held by provider] rosuvastatin (CRESTOR) tablet 20 mg, 20 mg, Oral, Nightly, Baldemar Jarrett DO, 20 mg at 05/16/24 2022    sennosides-docusate 
  Physician Progress Note      PATIENT:               ANGIE SHELTON  [facility] #:                  [id number]  :                       1955  ADMIT DATE:       2024 9:13 AM  DISCH DATE:        2024 4:47 PM  RESPONDING  PROVIDER #:        Baldemar Jarrett DO          QUERY TEXT:    Patient admitted with shortness of breath and pleural effusion, noted to have   COPD. If possible, please document in progress notes and d/c summary further   specificity regarding the type/underlying cause of pleural effusion:    The medical record reflects the following:  Risk Factors: COPD  Clinical Indicators: Per pulmonology progress note on  \"...Effusion   looking exudative with elevated LDH. Resume prednisone 10 mg daily, was   suppose to continue with this chronically for a few months and see if cuts   down on recurrent admission...\"  Treatment: Thoracentesis, pulmonology consult, IV Solu-medrol x1 dose, PO   Prednisone, Pulmicort, Brovana, Duo-neb, CXR, CT chest    Thank you,  Patricia Juarez, RN, BSN, CDIS  Clinical Documentation Integrity  [email or web address]  Options provided:  -- Pleural effusion due to COPD exacerbation  -- Pleural effusion due to COPD not in exacerbation  -- Pleural effusion due to ##Please specify cause, Please specify cause.  -- Other - I will add my own diagnosis  -- Disagree - Not applicable / Not valid  -- Disagree - Clinically unable to determine / Unknown  -- Refer to Clinical Documentation Reviewer    PROVIDER RESPONSE TEXT:    Patient has pleural effusion due to other    Query created by: Patricia Juarez on 2024 10:58 AM      Electronically signed by:  Baldemar Jarrett DO 2024 11:57 AM          
 visited Ms. Phillips while rounding.  Lana was laying in bed with the breathing mask on her face but was repeatedly telling the sitter that she did not want to wear the mask and wanted coffee.  We talked about her health and that her doctor wants her to wear the mask.  I asked her what would happen if she took off the mask and she said she would have a hard time breathing.  We talked about her sister-in-law and how they are close to each other.  We talked about how she has spent her entire life in the [facility].  She is of the [employer] and I informed her that one of our priests will be in to see her in the coming days.  I offered to pray for her and prayed for her health, family and for patience.  Ms. Phillips expressed gratitude.     If additional support is requested or needed please reach out to Spiritual Health ([phone]).    Chap. Phillip Reese MDIV, [facility]         05/15/24 1146   Encounter Summary   Encounter Overview/Reason Initial Encounter;Spiritual/Emotional Needs   Service Provided For Patient   Referral/Consult From [facility]   Support System Family members   Last Encounter  05/15/24  (*DS)   Complexity of Encounter Moderate   Spiritual/Emotional needs   Type Spiritual Support   Assessment/Intervention/Outcome   Assessment Calm   Intervention Active listening;Discussed death, afterlife;Discussed relationship with God;Explored/Affirmed feelings, thoughts, concerns;Discussed illness injury and it’s impact;Nurtured Hope;Discussed belief system/[employer] practices/jenni;Prayer (assurance of)/[place];Empowerment   Outcome Coping;Expressed feelings, needs, and concerns;Comfort;Expressed Gratitude;Encouraged;Engaged in conversation;Receptive   Plan and Referrals   Plan/Referrals Provided reading/devotional materials       
4 Eyes Skin Assessment     NAME:  Lana Phillips  YOB: 1955  MEDICAL RECORD NUMBER:  [id number]    The patient is being assessed for  Admission    I agree that at least one RN has performed a thorough Head to Toe Skin Assessment on the patient. ALL assessment sites listed below have been assessed.      Areas assessed by both nurses:    Head, Face, Ears, Shoulders, Back, Chest, Arms, Elbows, Hands, Sacrum. Buttock, Coccyx, Ischium, and Legs. Feet and Heels        Does the Patient have a Wound? No noted wound(s)       Guy Prevention initiated by RN: Yes  Wound Care Orders initiated by RN: No    Pressure Injury (Stage 3,4, Unstageable, DTI, NWPT, and Complex wounds) if present, place Wound referral order by RN under : No    New Ostomies, if present place, Ostomy referral order under : No     Nurse 1 eSignature: Electronically signed by Nancy Olsen RN on 5/15/24 at 5:08 AM EDT    **SHARE this note so that the co-signing nurse can place an eSignature**    Nurse 2 eSignature: Electronically signed by Tasha Gonzales RN on 5/15/24 at 6:55 AM EDT    
Consult called to Dr. Price, Pulmonology.  
Message sent to Dr. Jarrett's answering service to check discharge.    KENDRA RIVERA RN    
New consult sent via perfect serve to cardiology  
Notified Dr. Price of critical pO2 results from ABG of 41.3. Called respiratory for them to bring Bipap up and place patient on it.  Wanda Hoffman RN   
Paged Dr. Price re: pt complaints of 10/10 pain in [facility] site.  
Please call CT at [phone] when the patient is able to have exam.  
Please notify CT 3015 when patient is able to come down for exam.   
Report called to pt facility, [place]. Was not able to reach anyone.   
Spoke w/Dr. Jarrett re: Hgb 6.4.  Orders placed  
Spoke w/IR to confirm Thoracentesis is not being done in IR.   Paged Dr. Price to confirm bedside Thora.   
VM w/Pat POA for consent for Thoracentesis & Blood transfusion as pt is still slightly confused.  Also, LVM w/Lana to update & try to help reach Pat.     Consent obtained from Lana (sister-in-law, pt lives with also). Lana is a emergency contact. Updated on patient plan for day  
[Held by provider] rosuvastatin (CRESTOR) tablet 20 mg, 20 mg, Oral, Nightly, Baldemar Jarrett, , 20 mg at 05/16/24 2022    sennosides-docusate sodium (SENOKOT-S) 8.6-50 MG tablet 1 tablet, 1 tablet, Oral, Daily, Baldemar Jarrett DO, 1 tablet at 05/16/24 0923    traZODone (DESYREL) tablet 50 mg, 50 mg, Oral, Nightly PRN, Baldemar Jarrett DO, 50 mg at 05/17/24 2314    vitamin B-12 (CYANOCOBALAMIN) tablet 500 mcg, 500 mcg, Oral, Daily, Baldemar Jarrett DO, 500 mcg at 05/17/24 0900    sodium chloride flush 0.9 % injection 5-40 mL, 5-40 mL, IntraVENous, 2 times per day, Baldemar Jarrett DO, 10 mL at 05/17/24 2028    sodium chloride flush 0.9 % injection 5-40 mL, 5-40 mL, IntraVENous, PRN, Baldemar Jarrett DO    0.9 % sodium chloride infusion, , IntraVENous, PRN, Baldemar Jarrett,     potassium chloride (KLOR-CON M) extended release tablet 40 mEq, 40 mEq, Oral, PRN **OR** potassium bicarb-citric acid (EFFER-K) effervescent tablet 40 mEq, 40 mEq, Oral, PRN **OR** potassium chloride 10 mEq/100 mL IVPB (Peripheral Line), 10 mEq, IntraVENous, PRN, Baldemar Jarrett DO    magnesium sulfate 2000 mg in 50 mL IVPB premix, 2,000 mg, IntraVENous, PRN, Baldemar Jarrett,     ondansetron (ZOFRAN-ODT) disintegrating tablet 4 mg, 4 mg, Oral, Q8H PRN, 4 mg at 05/17/24 0930 **OR** ondansetron (ZOFRAN) injection 4 mg, 4 mg, IntraVENous, Q6H PRN, Baldemar Jarrett,     polyethylene glycol (GLYCOLAX) packet 17 g, 17 g, Oral, Daily PRN, Baldemar Jarrett DO    acetaminophen (TYLENOL) tablet 650 mg, 650 mg, Oral, Q6H PRN, 650 mg at 05/17/24 0504 **OR** acetaminophen (TYLENOL) suppository 650 mg, 650 mg, Rectal, Q6H PRN, Baldemar Jarrett, DO    Objective:    /61   Pulse 84   Temp 98.1 °F (36.7 °C) (Oral)   Resp 17   Ht 1.727 m (5' 7.99\")   Wt 46.9 kg (103 lb 6.3 oz)   SpO2 96%   BMI 15.72 kg/m²     Heart:  reg  Lungs:  ctab  Abd: + bs soft nontender  Extrem:  w/o edema    CBC with Differential:    Lab 
05/17/2024 04:37 AM    HGB 8.0 05/17/2024 04:37 AM    HCT 26.4 05/17/2024 04:37 AM     05/17/2024 04:37 AM    MCV 93.3 05/17/2024 04:37 AM    MCH 28.3 05/17/2024 04:37 AM    MCHC 30.3 05/17/2024 04:37 AM    RDW 19.1 05/17/2024 04:37 AM    NRBC 1 04/06/2024 11:00 AM    LYMPHOPCT 9 05/14/2024 09:39 AM    MONOPCT 10 05/14/2024 09:39 AM    MYELOPCT 2 04/25/2024 09:54 AM    EOSPCT 1 05/14/2024 09:39 AM    BASOPCT 0 05/14/2024 09:39 AM    MONOSABS 0.76 05/14/2024 09:39 AM    EOSABS 0.06 05/14/2024 09:39 AM    BASOSABS 0.03 05/14/2024 09:39 AM     CMP:    Lab Results   Component Value Date/Time     05/17/2024 04:37 AM    K 3.4 05/17/2024 04:37 AM    K 3.3 12/30/2022 02:49 AM    CL 99 05/17/2024 04:37 AM    CO2 32 05/17/2024 04:37 AM    BUN 10 05/17/2024 04:37 AM    CREATININE 0.6 05/17/2024 04:37 AM    GFRAA >60 07/28/2022 09:31 AM    LABGLOM >90 05/17/2024 04:37 AM    LABGLOM >90 04/25/2024 09:54 AM    GLUCOSE 82 05/17/2024 04:37 AM    CALCIUM 8.6 05/17/2024 04:37 AM    BILITOT 0.6 05/17/2024 04:37 AM    ALKPHOS 107 05/17/2024 04:37 AM     05/17/2024 04:37 AM     05/17/2024 04:37 AM     Warfarin PT/INR:    Lab Results   Component Value Date    INR 1.1 10/15/2023    INR 1.0 12/02/2022    PROTIME 12.0 10/15/2023    PROTIME 11.2 12/02/2022       Assessment:    Principal Problem:    Acute delirium  Resolved Problems:    * No resolved hospital problems. *  Pleural effusion s/p thoracentesis  Elevated lfts    Plan:  Hold crestor serial lab cont as per dottie Jarrett,   7:39 AM  5/17/2024

## 2024-05-29 ENCOUNTER — TELEPHONE (OUTPATIENT)
Dept: CARDIOLOGY CLINIC | Age: 69
End: 2024-05-29

## 2024-05-30 LAB
MICROORGANISM SPEC CULT: NORMAL
MICROORGANISM/AGENT SPEC: NORMAL
SPECIMEN DESCRIPTION: NORMAL

## 2024-06-12 ENCOUNTER — CARE COORDINATION (OUTPATIENT)
Dept: CARE COORDINATION | Age: 69
End: 2024-06-12

## 2024-06-12 NOTE — CARE COORDINATION
-[facility]'s 4th attempted to reach patient to offer enrollment into Care Coordination program & RPM services, however no answer.  -HIPAA compliant VM left introducing self, reason for call, and brief explanation of program.  -Left [facility]'s contact information, requesting call back. If no return call, will attempt outreach again.  -Will make one final attempt.

## 2024-06-17 ENCOUNTER — TELEPHONE (OUTPATIENT)
Dept: INFUSION THERAPY | Age: 69
End: 2024-06-17

## 2024-06-19 ENCOUNTER — CARE COORDINATION (OUTPATIENT)
Dept: CARE COORDINATION | Age: 69
End: 2024-06-19

## 2024-06-19 NOTE — CARE COORDINATION
-[facility]'s final attempt to reach patient  to offer enrollment into Care Coordination program & RPM services, however no answer.  -HIPAA compliant VM left introducing self, reason for call, brief explanation of program and [facility] final outreach to offer enrollment to patient.  -Patient encouraged to contact AC or inform PCP if she should change her mind.    -Left [facility]'s contact information.  -[facility] will remove name from care team if no return call by end of tomorrow..

## 2024-06-27 ENCOUNTER — APPOINTMENT (OUTPATIENT)
Dept: GENERAL RADIOLOGY | Age: 69
DRG: 948 | End: 2024-06-27
Payer: MEDICARE

## 2024-06-27 ENCOUNTER — APPOINTMENT (OUTPATIENT)
Dept: CT IMAGING | Age: 69
DRG: 948 | End: 2024-06-27
Attending: EMERGENCY MEDICINE
Payer: MEDICARE

## 2024-06-27 ENCOUNTER — HOSPITAL ENCOUNTER (INPATIENT)
Age: 69
LOS: 2 days | Discharge: NURSING, REHAB OR LONG-TERM CARE | DRG: 948 | End: 2024-06-29
Attending: EMERGENCY MEDICINE | Admitting: INTERNAL MEDICINE
Payer: MEDICARE

## 2024-06-27 DIAGNOSIS — N39.0 UTI (URINARY TRACT INFECTION), BACTERIAL: ICD-10-CM

## 2024-06-27 DIAGNOSIS — R41.82 ALTERED MENTAL STATUS, UNSPECIFIED ALTERED MENTAL STATUS TYPE: Primary | ICD-10-CM

## 2024-06-27 DIAGNOSIS — A49.9 UTI (URINARY TRACT INFECTION), BACTERIAL: ICD-10-CM

## 2024-06-27 LAB
ALBUMIN SERPL-MCNC: 3.5 G/DL (ref 3.5–5.2)
ALP SERPL-CCNC: 75 U/L (ref 35–104)
ALT SERPL-CCNC: 10 U/L (ref 0–32)
AMPHET UR QL SCN: NEGATIVE
ANION GAP SERPL CALCULATED.3IONS-SCNC: 13 MMOL/L (ref 7–16)
APAP SERPL-MCNC: <5 UG/ML (ref 10–30)
AST SERPL-CCNC: 29 U/L (ref 0–31)
B.E.: 4.1 MMOL/L (ref -3–3)
BACTERIA URNS QL MICRO: ABNORMAL
BARBITURATES UR QL SCN: NEGATIVE
BASOPHILS # BLD: 0.09 K/UL (ref 0–0.2)
BASOPHILS NFR BLD: 1 % (ref 0–2)
BENZODIAZ UR QL: POSITIVE
BILIRUB SERPL-MCNC: 0.2 MG/DL (ref 0–1.2)
BILIRUB UR QL STRIP: NEGATIVE
BUN SERPL-MCNC: 12 MG/DL (ref 6–23)
BUPRENORPHINE UR QL: NEGATIVE
CALCIUM SERPL-MCNC: 9.2 MG/DL (ref 8.6–10.2)
CANNABINOIDS UR QL SCN: NEGATIVE
CHLORIDE SERPL-SCNC: 95 MMOL/L (ref 98–107)
CLARITY UR: CLEAR
CO2 SERPL-SCNC: 30 MMOL/L (ref 22–29)
COCAINE UR QL SCN: NEGATIVE
COLOR UR: YELLOW
CREAT SERPL-MCNC: 0.7 MG/DL (ref 0.5–1)
CRITICAL: ABNORMAL
DATE ANALYZED: ABNORMAL
DATE OF COLLECTION: ABNORMAL
EKG ATRIAL RATE: 74 BPM
EKG P AXIS: 83 DEGREES
EKG P-R INTERVAL: 154 MS
EKG Q-T INTERVAL: 450 MS
EKG QRS DURATION: 82 MS
EKG QTC CALCULATION (BAZETT): 499 MS
EKG R AXIS: 62 DEGREES
EKG T AXIS: 80 DEGREES
EKG VENTRICULAR RATE: 74 BPM
EOSINOPHIL # BLD: 0.18 K/UL (ref 0.05–0.5)
EOSINOPHILS RELATIVE PERCENT: 2 % (ref 0–6)
ERYTHROCYTE [DISTWIDTH] IN BLOOD BY AUTOMATED COUNT: 17.1 % (ref 11.5–15)
ETHANOLAMINE SERPL-MCNC: <10 MG/DL (ref 0–0.08)
FENTANYL UR QL: NEGATIVE
GFR, ESTIMATED: 89 ML/MIN/1.73M2
GLUCOSE SERPL-MCNC: 87 MG/DL (ref 74–99)
GLUCOSE UR STRIP-MCNC: NEGATIVE MG/DL
HCO3: 29.1 MMOL/L (ref 22–26)
HCT VFR BLD AUTO: 33.8 % (ref 34–48)
HGB BLD-MCNC: 10.3 G/DL (ref 11.5–15.5)
HGB UR QL STRIP.AUTO: ABNORMAL
IMM GRANULOCYTES # BLD AUTO: 0.05 K/UL (ref 0–0.58)
IMM GRANULOCYTES NFR BLD: 1 % (ref 0–5)
KETONES UR STRIP-MCNC: NEGATIVE MG/DL
LAB: ABNORMAL
LEUKOCYTE ESTERASE UR QL STRIP: ABNORMAL
LYMPHOCYTES NFR BLD: 1.87 K/UL (ref 1.5–4)
LYMPHOCYTES RELATIVE PERCENT: 19 % (ref 20–42)
Lab: 110
MCH RBC QN AUTO: 27.4 PG (ref 26–35)
MCHC RBC AUTO-ENTMCNC: 30.5 G/DL (ref 32–34.5)
MCV RBC AUTO: 89.9 FL (ref 80–99.9)
METHADONE UR QL: NEGATIVE
MODE: ABNORMAL
MONOCYTES NFR BLD: 1 K/UL (ref 0.1–0.95)
MONOCYTES NFR BLD: 10 % (ref 2–12)
NEUTROPHILS NFR BLD: 68 % (ref 43–80)
NEUTS SEG NFR BLD: 6.61 K/UL (ref 1.8–7.3)
NITRITE UR QL STRIP: NEGATIVE
O2 SATURATION: 94.6 % (ref 92–98.5)
OPERATOR ID: ABNORMAL
OPIATES UR QL SCN: NEGATIVE
OXYCODONE UR QL SCN: NEGATIVE
PATIENT TEMP: 37 C
PCO2: 44.8 MMHG (ref 35–45)
PCP UR QL SCN: NEGATIVE
PH BLOOD GAS: 7.43 (ref 7.35–7.45)
PH UR STRIP: 6.5 [PH] (ref 5–9)
PLATELET # BLD AUTO: 309 K/UL (ref 130–450)
PMV BLD AUTO: 9 FL (ref 7–12)
PO2: 71 MMHG (ref 75–100)
POTASSIUM SERPL-SCNC: 3.3 MMOL/L (ref 3.5–5)
PROT SERPL-MCNC: 6.6 G/DL (ref 6.4–8.3)
PROT UR STRIP-MCNC: NEGATIVE MG/DL
RBC # BLD AUTO: 3.76 M/UL (ref 3.5–5.5)
RBC #/AREA URNS HPF: ABNORMAL /HPF
SALICYLATES SERPL-MCNC: <0.3 MG/DL (ref 0–30)
SODIUM SERPL-SCNC: 138 MMOL/L (ref 132–146)
SOURCE, BLOOD GAS: ABNORMAL
SP GR UR STRIP: 1.01 (ref 1–1.03)
TEST INFORMATION: ABNORMAL
TIME ANALYZED: 122
TOXIC TRICYCLIC SC,BLOOD: NEGATIVE
TROPONIN I SERPL HS-MCNC: 41 NG/L (ref 0–9)
TROPONIN I SERPL HS-MCNC: 43 NG/L (ref 0–9)
UROBILINOGEN UR STRIP-ACNC: 0.2 EU/DL (ref 0–1)
WBC #/AREA URNS HPF: ABNORMAL /HPF
WBC OTHER # BLD: 9.8 K/UL (ref 4.5–11.5)

## 2024-06-27 PROCEDURE — 80053 COMPREHEN METABOLIC PANEL: CPT

## 2024-06-27 PROCEDURE — 85025 COMPLETE CBC W/AUTO DIFF WBC: CPT

## 2024-06-27 PROCEDURE — 6370000000 HC RX 637 (ALT 250 FOR IP): Performed by: EMERGENCY MEDICINE

## 2024-06-27 PROCEDURE — 2580000003 HC RX 258: Performed by: EMERGENCY MEDICINE

## 2024-06-27 PROCEDURE — 82803 BLOOD GASES ANY COMBINATION: CPT

## 2024-06-27 PROCEDURE — 70450 CT HEAD/BRAIN W/O DYE: CPT

## 2024-06-27 PROCEDURE — 2700000000 HC OXYGEN THERAPY PER DAY

## 2024-06-27 PROCEDURE — 96374 THER/PROPH/DIAG INJ IV PUSH: CPT

## 2024-06-27 PROCEDURE — 93005 ELECTROCARDIOGRAM TRACING: CPT | Performed by: EMERGENCY MEDICINE

## 2024-06-27 PROCEDURE — 71045 X-RAY EXAM CHEST 1 VIEW: CPT

## 2024-06-27 PROCEDURE — 99285 EMERGENCY DEPT VISIT HI MDM: CPT

## 2024-06-27 PROCEDURE — 80179 DRUG ASSAY SALICYLATE: CPT

## 2024-06-27 PROCEDURE — 81001 URINALYSIS AUTO W/SCOPE: CPT

## 2024-06-27 PROCEDURE — 2580000003 HC RX 258: Performed by: INTERNAL MEDICINE

## 2024-06-27 PROCEDURE — 93010 ELECTROCARDIOGRAM REPORT: CPT | Performed by: INTERNAL MEDICINE

## 2024-06-27 PROCEDURE — 84484 ASSAY OF TROPONIN QUANT: CPT

## 2024-06-27 PROCEDURE — 6370000000 HC RX 637 (ALT 250 FOR IP): Performed by: INTERNAL MEDICINE

## 2024-06-27 PROCEDURE — 80307 DRUG TEST PRSMV CHEM ANLYZR: CPT

## 2024-06-27 PROCEDURE — 2060000000 HC ICU INTERMEDIATE R&B

## 2024-06-27 PROCEDURE — 94640 AIRWAY INHALATION TREATMENT: CPT

## 2024-06-27 PROCEDURE — 6360000002 HC RX W HCPCS: Performed by: EMERGENCY MEDICINE

## 2024-06-27 PROCEDURE — 80143 DRUG ASSAY ACETAMINOPHEN: CPT

## 2024-06-27 PROCEDURE — G0480 DRUG TEST DEF 1-7 CLASSES: HCPCS

## 2024-06-27 PROCEDURE — 94664 DEMO&/EVAL PT USE INHALER: CPT

## 2024-06-27 PROCEDURE — 6360000002 HC RX W HCPCS: Performed by: INTERNAL MEDICINE

## 2024-06-27 RX ORDER — SODIUM CHLORIDE 0.9 % (FLUSH) 0.9 %
5-40 SYRINGE (ML) INJECTION EVERY 12 HOURS SCHEDULED
Status: DISCONTINUED | OUTPATIENT
Start: 2024-06-27 | End: 2024-06-29 | Stop reason: HOSPADM

## 2024-06-27 RX ORDER — IPRATROPIUM BROMIDE AND ALBUTEROL SULFATE 2.5; .5 MG/3ML; MG/3ML
1 SOLUTION RESPIRATORY (INHALATION) EVERY 4 HOURS PRN
Status: DISCONTINUED | OUTPATIENT
Start: 2024-06-27 | End: 2024-06-29 | Stop reason: HOSPADM

## 2024-06-27 RX ORDER — PREDNISONE 10 MG/1
10 TABLET ORAL DAILY
Status: DISCONTINUED | OUTPATIENT
Start: 2024-06-27 | End: 2024-06-29 | Stop reason: HOSPADM

## 2024-06-27 RX ORDER — POTASSIUM CHLORIDE 20 MEQ/1
40 TABLET, EXTENDED RELEASE ORAL PRN
Status: DISCONTINUED | OUTPATIENT
Start: 2024-06-27 | End: 2024-06-29 | Stop reason: HOSPADM

## 2024-06-27 RX ORDER — ACETAMINOPHEN 650 MG/1
650 SUPPOSITORY RECTAL EVERY 6 HOURS PRN
Status: DISCONTINUED | OUTPATIENT
Start: 2024-06-27 | End: 2024-06-29 | Stop reason: HOSPADM

## 2024-06-27 RX ORDER — ASPIRIN 81 MG/1
81 TABLET ORAL DAILY
Status: DISCONTINUED | OUTPATIENT
Start: 2024-06-27 | End: 2024-06-29 | Stop reason: HOSPADM

## 2024-06-27 RX ORDER — PROCHLORPERAZINE MALEATE 10 MG
10 TABLET ORAL EVERY 6 HOURS PRN
Status: DISCONTINUED | OUTPATIENT
Start: 2024-06-27 | End: 2024-06-29 | Stop reason: HOSPADM

## 2024-06-27 RX ORDER — SODIUM CHLORIDE 9 MG/ML
INJECTION, SOLUTION INTRAVENOUS PRN
Status: DISCONTINUED | OUTPATIENT
Start: 2024-06-27 | End: 2024-06-29 | Stop reason: HOSPADM

## 2024-06-27 RX ORDER — ACETAMINOPHEN 325 MG/1
650 TABLET ORAL EVERY 6 HOURS PRN
Status: DISCONTINUED | OUTPATIENT
Start: 2024-06-27 | End: 2024-06-29 | Stop reason: HOSPADM

## 2024-06-27 RX ORDER — LANOLIN ALCOHOL/MO/W.PET/CERES
500 CREAM (GRAM) TOPICAL DAILY
Status: DISCONTINUED | OUTPATIENT
Start: 2024-06-27 | End: 2024-06-29 | Stop reason: HOSPADM

## 2024-06-27 RX ORDER — POLYETHYLENE GLYCOL 3350 17 G/17G
17 POWDER, FOR SOLUTION ORAL DAILY PRN
COMMUNITY

## 2024-06-27 RX ORDER — FOLIC ACID 1 MG/1
1 TABLET ORAL DAILY
Status: DISCONTINUED | OUTPATIENT
Start: 2024-06-27 | End: 2024-06-29 | Stop reason: HOSPADM

## 2024-06-27 RX ORDER — SODIUM CHLORIDE 0.9 % (FLUSH) 0.9 %
5-40 SYRINGE (ML) INJECTION PRN
Status: DISCONTINUED | OUTPATIENT
Start: 2024-06-27 | End: 2024-06-29 | Stop reason: HOSPADM

## 2024-06-27 RX ORDER — METHIMAZOLE 5 MG/1
15 TABLET ORAL DAILY
Status: DISCONTINUED | OUTPATIENT
Start: 2024-06-27 | End: 2024-06-29 | Stop reason: HOSPADM

## 2024-06-27 RX ORDER — CITALOPRAM 20 MG/1
20 TABLET ORAL DAILY
Status: DISCONTINUED | OUTPATIENT
Start: 2024-06-27 | End: 2024-06-29 | Stop reason: HOSPADM

## 2024-06-27 RX ORDER — DILTIAZEM HYDROCHLORIDE 180 MG/1
180 CAPSULE, COATED, EXTENDED RELEASE ORAL DAILY
Status: DISCONTINUED | OUTPATIENT
Start: 2024-06-27 | End: 2024-06-29 | Stop reason: HOSPADM

## 2024-06-27 RX ORDER — GUAIFENESIN 400 MG/1
400 TABLET ORAL 3 TIMES DAILY
Status: DISCONTINUED | OUTPATIENT
Start: 2024-06-27 | End: 2024-06-29 | Stop reason: HOSPADM

## 2024-06-27 RX ORDER — POLYETHYLENE GLYCOL 3350 17 G/17G
17 POWDER, FOR SOLUTION ORAL DAILY PRN
Status: DISCONTINUED | OUTPATIENT
Start: 2024-06-27 | End: 2024-06-29 | Stop reason: HOSPADM

## 2024-06-27 RX ORDER — POTASSIUM CHLORIDE 7.45 MG/ML
10 INJECTION INTRAVENOUS PRN
Status: DISCONTINUED | OUTPATIENT
Start: 2024-06-27 | End: 2024-06-29 | Stop reason: HOSPADM

## 2024-06-27 RX ORDER — ARFORMOTEROL TARTRATE 15 UG/2ML
15 SOLUTION RESPIRATORY (INHALATION)
Status: DISCONTINUED | OUTPATIENT
Start: 2024-06-27 | End: 2024-06-29 | Stop reason: HOSPADM

## 2024-06-27 RX ORDER — SENNA AND DOCUSATE SODIUM 50; 8.6 MG/1; MG/1
1 TABLET, FILM COATED ORAL DAILY
Status: DISCONTINUED | OUTPATIENT
Start: 2024-06-27 | End: 2024-06-29 | Stop reason: HOSPADM

## 2024-06-27 RX ORDER — PANTOPRAZOLE SODIUM 40 MG/1
40 TABLET, DELAYED RELEASE ORAL
Status: DISCONTINUED | OUTPATIENT
Start: 2024-06-28 | End: 2024-06-29 | Stop reason: HOSPADM

## 2024-06-27 RX ORDER — BUDESONIDE 0.5 MG/2ML
1 INHALANT ORAL
Status: DISCONTINUED | OUTPATIENT
Start: 2024-06-27 | End: 2024-06-29 | Stop reason: HOSPADM

## 2024-06-27 RX ORDER — MAGNESIUM SULFATE IN WATER 40 MG/ML
2000 INJECTION, SOLUTION INTRAVENOUS PRN
Status: DISCONTINUED | OUTPATIENT
Start: 2024-06-27 | End: 2024-06-29 | Stop reason: HOSPADM

## 2024-06-27 RX ORDER — ENOXAPARIN SODIUM 100 MG/ML
30 INJECTION SUBCUTANEOUS DAILY
Status: DISCONTINUED | OUTPATIENT
Start: 2024-06-27 | End: 2024-06-29 | Stop reason: HOSPADM

## 2024-06-27 RX ORDER — DULOXETIN HYDROCHLORIDE 60 MG/1
60 CAPSULE, DELAYED RELEASE ORAL DAILY
Status: ON HOLD | COMMUNITY
End: 2024-06-29 | Stop reason: HOSPADM

## 2024-06-27 RX ORDER — PREDNISONE 10 MG/1
10 TABLET ORAL DAILY
COMMUNITY

## 2024-06-27 RX ADMIN — GUAIFENESIN 400 MG: 400 TABLET ORAL at 20:40

## 2024-06-27 RX ADMIN — DILTIAZEM HYDROCHLORIDE 180 MG: 180 CAPSULE, COATED, EXTENDED RELEASE ORAL at 16:23

## 2024-06-27 RX ADMIN — SODIUM CHLORIDE, PRESERVATIVE FREE 10 ML: 5 INJECTION INTRAVENOUS at 20:40

## 2024-06-27 RX ADMIN — CEFTRIAXONE SODIUM 1000 MG: 1 INJECTION, POWDER, FOR SOLUTION INTRAMUSCULAR; INTRAVENOUS at 06:46

## 2024-06-27 RX ADMIN — FOLIC ACID 1 MG: 1 TABLET ORAL at 13:20

## 2024-06-27 RX ADMIN — PREDNISONE 10 MG: 20 TABLET ORAL at 13:20

## 2024-06-27 RX ADMIN — GUAIFENESIN 400 MG: 400 TABLET ORAL at 13:20

## 2024-06-27 RX ADMIN — SENNOSIDES AND DOCUSATE SODIUM 1 TABLET: 50; 8.6 TABLET ORAL at 13:20

## 2024-06-27 RX ADMIN — ASPIRIN 81 MG: 81 TABLET, COATED ORAL at 13:20

## 2024-06-27 RX ADMIN — POTASSIUM BICARBONATE 20 MEQ: 782 TABLET, EFFERVESCENT ORAL at 04:28

## 2024-06-27 RX ADMIN — ARFORMOTEROL TARTRATE 15 MCG: 15 SOLUTION RESPIRATORY (INHALATION) at 19:09

## 2024-06-27 RX ADMIN — METHIMAZOLE 15 MG: 5 TABLET ORAL at 16:23

## 2024-06-27 RX ADMIN — BUDESONIDE 1000 MCG: 0.5 SUSPENSION RESPIRATORY (INHALATION) at 19:09

## 2024-06-27 RX ADMIN — CYANOCOBALAMIN TAB 1000 MCG 500 MCG: 1000 TAB at 16:24

## 2024-06-27 RX ADMIN — CITALOPRAM HYDROBROMIDE 20 MG: 20 TABLET ORAL at 13:19

## 2024-06-27 ASSESSMENT — PAIN SCALES - GENERAL
PAINLEVEL_OUTOF10: 7
PAINLEVEL_OUTOF10: 0
PAINLEVEL_OUTOF10: 0

## 2024-06-27 ASSESSMENT — PAIN DESCRIPTION - LOCATION: LOCATION: BACK

## 2024-06-27 ASSESSMENT — PAIN - FUNCTIONAL ASSESSMENT: PAIN_FUNCTIONAL_ASSESSMENT: NONE - DENIES PAIN

## 2024-06-27 ASSESSMENT — PAIN DESCRIPTION - DESCRIPTORS: DESCRIPTORS: ACHING

## 2024-06-27 NOTE — PROGRESS NOTES
4 Eyes Skin Assessment     NAME:  Lana Phillips  YOB: 1955  MEDICAL RECORD NUMBER:  [id number]    The patient is being assessed for  Admission    I agree that at least one RN has performed a thorough Head to Toe Skin Assessment on the patient. ALL assessment sites listed below have been assessed.      Areas assessed by both nurses:    Head, Face, Ears, Shoulders, Back, Chest, Arms, Elbows, Hands, Sacrum. Buttock, Coccyx, Ischium, Legs. Feet and Heels, and Under Medical Devices         Does the Patient have a Wound? No noted wound(s)       Guy Prevention initiated by RN: Yes  Wound Care Orders initiated by RN: No    Pressure Injury (Stage 3,4, Unstageable, DTI, NWPT, and Complex wounds) if present, place Wound referral order by RN under : No    New Ostomies, if present place, Ostomy referral order under : No     Nurse 1 eSignature: Electronically signed by Bharti Medrano RN on 6/27/24 at 6:41 PM EDT    **SHARE this note so that the co-signing nurse can place an eSignature**    Nurse 2 eSignature: Electronically signed by Jaz Mccoy RN on 6/27/24 at 6:41 PM EDT

## 2024-06-27 NOTE — ED PROVIDER NOTES
HPI:  6/27/24, Time: 12:09 AM EDT         Lana Phillips is a 69 y.o. female history of COPD history of respiratory failure history of atrial fibrillation history of GI bleed in the past history of peripheral vascular disease thyroid disease hypertension presenting to the ED for reported hypoxia patient also was confused, beginning short time ago.  The complaint has been persistent, moderate in severity, and worsened by nothing.  Patient reportedly was agitated at facility and reportedly received Vistaril.  Patient was also noted to be hypoxic at facility.  Patient was confused as well.  Patient reporting no complaints of any chest pain or any new shortness of breath she is on oxygen at facility she reports no headache she reports no upper or lower extremity numbness or tingling she reports no upper or lower extremity weakness there is no slurred speech.  Patient reporting no urinary symptoms she reports no incontinence or seizure activity.  Patient reporting no homicidal suicidal    ROS:   Pertinent positives and negatives are stated within HPI, all other systems reviewed and are negative.  --------------------------------------------- PAST HISTORY ---------------------------------------------  Past Medical History:  has a past medical history of Acute exacerbation of chronic obstructive pulmonary disease (COPD) ([facility]), Acute respiratory failure ([facility]), Acute respiratory failure with hypoxia ([facility]), Acute respiratory failure with hypoxia ([facility]), Acute respiratory failure with hypoxia ([facility]), Atherosclerosis 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 ([facility]), Atrial fibrillation ([facility]), Chronic obstructive pulmonary disease ([facility]), COPD (chronic obstructive pulmonary disease) ([facility]), COPD exacerbation ([facility]), COPD exacerbation (HCC), COPD with acute exacerbation ([facility]), COVID-19, Critical limb ischemia of left lower extremity with rest pain ([facility]), GI bleed, Hypertension, Pneumonia due to infectious  for reported hypoxia patient also was confused, beginning short time ago.  The complaint has been persistent, moderate in severity, and worsened by nothing.  Patient reportedly was agitated at facility and reportedly received Vistaril.  Patient was also noted to be hypoxic at facility.  Patient was confused as well.  Patient reporting no complaints of any chest pain or any new shortness of breath she is on oxygen at facility she reports no headache she reports no upper or lower extremity numbness or tingling she reports no upper or lower extremity weakness there is no slurred speech.  Patient reporting no urinary symptoms she reports no incontinence or seizure activity.  Patient reporting no homicidal suicidal  CC/HPI Summary, DDx, ED Course, Reassessment, Tests Considered, Patient expectation:          Lana Phillips is a 69 y.o. female history of COPD history of respiratory failure history of atrial fibrillation history of GI bleed in the past history of peripheral vascular disease thyroid disease hypertension presenting to the ED for reported hypoxia patient also was confused, beginning short time ago.  The complaint has been persistent, moderate in severity, and worsened by nothing.  Patient reportedly was agitated at facility and reportedly received Vistaril.  Patient was also noted to be hypoxic at facility.  Patient was confused as well.  Patient reporting no complaints of any chest pain or any new shortness of breath she is on oxygen at facility she reports no headache she reports no upper or lower extremity numbness or tingling she reports no upper or lower extremity weakness there is no slurred speech.  Patient reporting no urinary symptoms she reports no incontinence or seizure activity.  Patient reporting no homicidal suicidal  Patient awake alert oriented x 3 at times but making nonsensical statements heart exam normal lungs are clear abdomen soft nontender.  Patient has normal strength all extremities

## 2024-06-27 NOTE — ED NOTES
ED TO INPATIENT SBAR HANDOFF    Patient Name: Lana Phillips   Preferred Name: aLna  : 1955  69 y.o.   Family/Caregiver Present: no   Code Status Order: Full Code  PO Status: NPO:No  Telemetry Order:   C-SSRS: Risk of Suicide: No Risk  Sitter no Safety  Restraints:     Sepsis Risk Score      Situation  Chief Complaint   Patient presents with    Altered Mental Status     Sent from [facility] for altered mental status that she was looking to leave and find her car. They did medicate her with hydroxyzine at 2320     Brief Description of Patient's Condition: stable   Mental Status: disoriented and alert  Arrived from:[facility]  Imaging:   CT HEAD WO CONTRAST   Final Result   No acute intracranial abnormality.         XR CHEST PORTABLE   Final Result   No acute process.           Abnormal labs:   Abnormal Labs Reviewed   CBC WITH AUTO DIFFERENTIAL - Abnormal; Notable for the following components:       Result Value    Hemoglobin 10.3 (*)     Hematocrit 33.8 (*)     MCHC 30.5 (*)     RDW 17.1 (*)     Lymphocytes % 19 (*)     Monocytes Absolute 1.00 (*)     All other components within normal limits   COMPREHENSIVE METABOLIC PANEL - Abnormal; Notable for the following components:    Potassium 3.3 (*)     Chloride 95 (*)     CO2 30 (*)     All other components within normal limits   TROPONIN - Abnormal; Notable for the following components:    Troponin, High Sensitivity 41 (*)     All other components within normal limits   URINALYSIS - Abnormal; Notable for the following components:    Urine Hgb TRACE (*)     Leukocyte Esterase, Urine TRACE (*)     All other components within normal limits   URINE DRUG SCREEN - Abnormal; Notable for the following components:    Benzodiazepine Screen, Urine POSITIVE (*)     All other components within normal limits   SERUM DRUG SCREEN - Abnormal; Notable for the following components:    Acetaminophen Level <5 (*)     All other components within normal limits   MICROSCOPIC  °C)     SpO2: 95% 99%  96%   Weight: 47.2 kg (104 lb)      Height: 1.727 m (5' 8\")        Deterioration Index (DI): Deterioration Index: 38.29  Deterioration Index (DI) Interventions Performed:    O2 Flow Rate: O2 Flow Rate (L/min): 6 L/min  O2 Device: O2 Device: Nasal cannula  Cardiac Rhythm:    Critical Lab Results: [unfilled]  Cultures: Cultures:None  NIH Score: NIH     Active LDA's:   Peripheral IV 06/27/24 Right;Posterior Forearm (Active)   Site Assessment Clean, dry & intact 06/27/24 0102   Phlebitis Assessment No symptoms 06/27/24 0102   Infiltration Assessment 0 06/27/24 0102   Dressing Status New dressing applied 06/27/24 0102   Dressing Intervention New 06/27/24 0102     Active Central Lines:                          Active Wounds:    Active Johnson's:    Active Feeding Tubes:      Administered Medications:   Medications   arformoterol tartrate (BROVANA) nebulizer solution 15 mcg (0 mcg Nebulization Held 6/27/24 1310)   aspirin EC tablet 81 mg (81 mg Oral Given 6/27/24 1320)   budesonide (PULMICORT) nebulizer suspension 1,000 mcg (0 mcg Nebulization Held 6/27/24 1315)   dilTIAZem (CARDIZEM CD) extended release capsule 180 mg (180 mg Oral Given 6/27/24 1623)   citalopram (CELEXA) tablet 20 mg (20 mg Oral Given 6/27/24 1319)   folic acid (FOLVITE) tablet 1 mg (1 mg Oral Given 6/27/24 1320)   guaiFENesin tablet 400 mg (400 mg Oral Given 6/27/24 1320)   ipratropium 0.5 mg-albuterol 2.5 mg (DUONEB) nebulizer solution 1 Dose (has no administration in time range)   methIMAzole (TAPAZOLE) tablet 15 mg (15 mg Oral Given 6/27/24 1623)   pantoprazole (PROTONIX) tablet 40 mg (has no administration in time range)   polyethylene glycol (GLYCOLAX) packet 17 g (has no administration in time range)   predniSONE (DELTASONE) tablet 10 mg (10 mg Oral Given 6/27/24 1320)   prochlorperazine (COMPAZINE) tablet 10 mg (has no administration in time range)   sennosides-docusate sodium (SENOKOT-S) 8.6-50 MG tablet 1 tablet (1 tablet Oral  Given 6/27/24 1320)   vitamin B-12 (CYANOCOBALAMIN) tablet 500 mcg (500 mcg Oral Given 6/27/24 1624)   sodium chloride flush 0.9 % injection 5-40 mL (has no administration in time range)   sodium chloride flush 0.9 % injection 5-40 mL (has no administration in time range)   0.9 % sodium chloride infusion (has no administration in time range)   potassium chloride (KLOR-CON M) extended release tablet 40 mEq (has no administration in time range)     Or   potassium bicarb-citric acid (EFFER-K) effervescent tablet 40 mEq (has no administration in time range)     Or   potassium chloride 10 mEq/100 mL IVPB (Peripheral Line) (has no administration in time range)   magnesium sulfate 2000 mg in 50 mL IVPB premix (has no administration in time range)   enoxaparin Sodium (LOVENOX) injection 30 mg (30 mg SubCUTAneous Not Given 6/27/24 1322)   acetaminophen (TYLENOL) tablet 650 mg (has no administration in time range)     Or   acetaminophen (TYLENOL) suppository 650 mg (has no administration in time range)   cefTRIAXone (ROCEPHIN) 1,000 mg in sterile water 10 mL IV syringe (has no administration in time range)   potassium bicarb-citric acid (EFFER-K) effervescent tablet 20 mEq (20 mEq Oral Given 6/27/24 0428)   cefTRIAXone (ROCEPHIN) 1,000 mg in sterile water 10 mL IV syringe (1,000 mg IntraVENous Given 6/27/24 0646)     Last documented pain medication administration:   Pertinent or High Risk Medications/Drips: no   If Yes, please provide details:   Blood Product Administration: no  If Yes, please provide details:   Process Protocols/Bundles: N/A    Recommendation  Incomplete STAT orders:   Overdue Medications:   Patient Belongings:    Additional Comments:   If any further questions, please call Sending RN at [phone]      Admitting Unit Notification  Name of person notified and time: 1743 - Nurse Valle      Electronically signed by: Electronically signed by JOSE MARTIN WAGNER RN on 6/27/2024 at 5:42 PM

## 2024-06-27 NOTE — H&P
[facility]              [address] [place] [address]                           HISTORY & PHYSICAL      PATIENT NAME: ANGIE SHELTON CM               : 1955  MED REC NO: [id number]                        ROOM:   ACCOUNT NO: [id number]                       ADMIT DATE: 2024  PROVIDER: Baldemar Jarrett DO      PRIMARY CARE PHYSICIAN:  Clyde Gonzalez    CHIEF COMPLAINT:  Altered mental status.    HISTORY OF PRESENT ILLNESS:  The patient is a 69-year-old  female who presented to the emergency room for evaluation of altered mental status.  She was seen in the emergency room.  She was confused.  Diagnostic evaluation revealed trace bacteria, trace leukocyte esterase, and negative nitrites.  Tox screen, positive benzodiazepines.  CT head, negative.  Chest x-ray, negative.  The patient was admitted for further evaluation and treatment.    MEDICATIONS PRIOR TO ADMISSION:  Tylenol p.r.n., Brovana, aspirin, Pulmicort, Celexa, Klonopin, Cardizem CD, Cymbalta, folic acid, guaifenesin, Vistaril, methimazole, Protonix, GlycoLax p.r.n., prednisone, Compazine, Senokot, trazodone, and vitamin B12.    PAST MEDICAL HISTORY:  COPD; chronic hypoxic respiratory failure, on chronic O2; paroxysmal atrial fibrillation; chronic anemia; hypertension; vitamin B12 deficiency; hyperthyroidism; and peripheral artery disease.    PAST SURGICAL HISTORY:  Back surgery, left oophorectomy, tonsillectomy, appendectomy, and wisdom teeth extraction.    SOCIAL HISTORY:  The patient with history of tobacco abuse.  Smoked until recent rehab admission.  No alcohol.    REVIEW OF SYSTEMS:  Remarkable for above-stated chief complaint.    ALLERGIES:  TO PENICILLIN.      PHYSICAL EXAMINATION:  GENERAL APPEARANCE:  Reveals a 69-year-old  female, who is alert, cooperative, and a poor historian.  She is lethargic, but answers questions.  VITAL SIGNS:  On admission, temperature  98.6, pulse 58, respirations 16, and blood pressure 158/86.  HEAD:  Normocephalic and atraumatic.  EYES:  Pupils equal and reactive to light.  Extraocular muscles intact.  Fundi, not well visualized.  NOSE:  No obstruction, polyp, or discharge noted.  MOUTH:  Mucosa without lesion.  PHARYNX:  Noninjected without exudate.  NECK:  Supple.  No JVD.  No thyromegaly.  No carotid bruits.  HEART:  Regular rate and rhythm without murmur.  LUNGS:  Clear to auscultation bilaterally.  ABDOMEN:  Positive bowel sounds.  Soft and nontender.  No rebound or guarding.  No hepatosplenomegaly.  No masses.  BACK:  With increased thoracic kyphosis.  EXTREMITIES:  Without edema.  LYMPH NODES:  No adenopathy noted.  SKIN:  Without rash or lesion.    IMPRESSION:    1. Altered mental status, possible medication related.  2. Chronic obstructive pulmonary disease.  3. Chronic hypoxic respiratory failure, on chronic O2.  4. Paroxysmal atrial fibrillation.  5. Hypertension.  6. Vitamin B12 deficiency.  7. Hyperthyroidism.  8. Peripheral artery disease.  9. Bacteriuria.    PLAN:    1. Admit.  2. Hold sedating medications.  3. Continue aerosols.  4. Urine culture.  5. Empiric antibiotics.  6. Discharge plan to rehab when stable.          SHAWN BRASWELL DO      D:  06/27/2024 08:49:08     T:  06/27/2024 09:07:06     CHAPITO/KARTHIKEYAN  Job #:  [id number]     Doc#:  [id number]

## 2024-06-28 LAB
ANION GAP SERPL CALCULATED.3IONS-SCNC: 12 MMOL/L (ref 7–16)
BUN SERPL-MCNC: 10 MG/DL (ref 6–23)
CALCIUM SERPL-MCNC: 9.1 MG/DL (ref 8.6–10.2)
CHLORIDE SERPL-SCNC: 98 MMOL/L (ref 98–107)
CO2 SERPL-SCNC: 30 MMOL/L (ref 22–29)
CREAT SERPL-MCNC: 0.7 MG/DL (ref 0.5–1)
GFR, ESTIMATED: >90 ML/MIN/1.73M2
GLUCOSE SERPL-MCNC: 92 MG/DL (ref 74–99)
POTASSIUM SERPL-SCNC: 3.6 MMOL/L (ref 3.5–5)
SODIUM SERPL-SCNC: 140 MMOL/L (ref 132–146)

## 2024-06-28 PROCEDURE — 94640 AIRWAY INHALATION TREATMENT: CPT

## 2024-06-28 PROCEDURE — 6360000002 HC RX W HCPCS: Performed by: INTERNAL MEDICINE

## 2024-06-28 PROCEDURE — 97161 PT EVAL LOW COMPLEX 20 MIN: CPT

## 2024-06-28 PROCEDURE — 2700000000 HC OXYGEN THERAPY PER DAY

## 2024-06-28 PROCEDURE — 2060000000 HC ICU INTERMEDIATE R&B

## 2024-06-28 PROCEDURE — 6370000000 HC RX 637 (ALT 250 FOR IP): Performed by: INTERNAL MEDICINE

## 2024-06-28 PROCEDURE — 36415 COLL VENOUS BLD VENIPUNCTURE: CPT

## 2024-06-28 PROCEDURE — 97535 SELF CARE MNGMENT TRAINING: CPT

## 2024-06-28 PROCEDURE — 2580000003 HC RX 258: Performed by: INTERNAL MEDICINE

## 2024-06-28 PROCEDURE — 97530 THERAPEUTIC ACTIVITIES: CPT

## 2024-06-28 PROCEDURE — 80048 BASIC METABOLIC PNL TOTAL CA: CPT

## 2024-06-28 PROCEDURE — 97165 OT EVAL LOW COMPLEX 30 MIN: CPT

## 2024-06-28 RX ORDER — CLONAZEPAM 0.5 MG/1
0.5 TABLET ORAL 2 TIMES DAILY
Status: DISCONTINUED | OUTPATIENT
Start: 2024-06-28 | End: 2024-06-29 | Stop reason: HOSPADM

## 2024-06-28 RX ADMIN — ACETAMINOPHEN 650 MG: 325 TABLET ORAL at 02:14

## 2024-06-28 RX ADMIN — DILTIAZEM HYDROCHLORIDE 180 MG: 180 CAPSULE, COATED, EXTENDED RELEASE ORAL at 09:04

## 2024-06-28 RX ADMIN — IPRATROPIUM BROMIDE AND ALBUTEROL SULFATE 1 DOSE: 2.5; .5 SOLUTION RESPIRATORY (INHALATION) at 13:32

## 2024-06-28 RX ADMIN — ASPIRIN 81 MG: 81 TABLET, COATED ORAL at 09:05

## 2024-06-28 RX ADMIN — PREDNISONE 10 MG: 20 TABLET ORAL at 09:05

## 2024-06-28 RX ADMIN — ARFORMOTEROL TARTRATE 15 MCG: 15 SOLUTION RESPIRATORY (INHALATION) at 20:00

## 2024-06-28 RX ADMIN — IPRATROPIUM BROMIDE AND ALBUTEROL SULFATE 1 DOSE: 2.5; .5 SOLUTION RESPIRATORY (INHALATION) at 17:33

## 2024-06-28 RX ADMIN — ENOXAPARIN SODIUM 30 MG: 100 INJECTION SUBCUTANEOUS at 09:06

## 2024-06-28 RX ADMIN — BUDESONIDE 1000 MCG: 0.5 SUSPENSION RESPIRATORY (INHALATION) at 09:46

## 2024-06-28 RX ADMIN — SODIUM CHLORIDE, PRESERVATIVE FREE 10 ML: 5 INJECTION INTRAVENOUS at 20:07

## 2024-06-28 RX ADMIN — CITALOPRAM HYDROBROMIDE 20 MG: 20 TABLET ORAL at 09:05

## 2024-06-28 RX ADMIN — SENNOSIDES AND DOCUSATE SODIUM 1 TABLET: 50; 8.6 TABLET ORAL at 09:05

## 2024-06-28 RX ADMIN — ACETAMINOPHEN 650 MG: 325 TABLET ORAL at 15:43

## 2024-06-28 RX ADMIN — BUDESONIDE 1000 MCG: 0.5 SUSPENSION RESPIRATORY (INHALATION) at 20:00

## 2024-06-28 RX ADMIN — ARFORMOTEROL TARTRATE 15 MCG: 15 SOLUTION RESPIRATORY (INHALATION) at 09:46

## 2024-06-28 RX ADMIN — GUAIFENESIN 400 MG: 400 TABLET ORAL at 14:56

## 2024-06-28 RX ADMIN — GUAIFENESIN 400 MG: 400 TABLET ORAL at 09:05

## 2024-06-28 RX ADMIN — FOLIC ACID 1 MG: 1 TABLET ORAL at 09:04

## 2024-06-28 RX ADMIN — METHIMAZOLE 15 MG: 5 TABLET ORAL at 09:05

## 2024-06-28 RX ADMIN — CLONAZEPAM 0.5 MG: 0.5 TABLET ORAL at 15:43

## 2024-06-28 RX ADMIN — SODIUM CHLORIDE, PRESERVATIVE FREE 10 ML: 5 INJECTION INTRAVENOUS at 09:15

## 2024-06-28 RX ADMIN — GUAIFENESIN 400 MG: 400 TABLET ORAL at 20:06

## 2024-06-28 RX ADMIN — WATER 1000 MG: 1 INJECTION INTRAMUSCULAR; INTRAVENOUS; SUBCUTANEOUS at 09:05

## 2024-06-28 RX ADMIN — CYANOCOBALAMIN TAB 1000 MCG 500 MCG: 1000 TAB at 09:04

## 2024-06-28 RX ADMIN — PANTOPRAZOLE SODIUM 40 MG: 40 TABLET, DELAYED RELEASE ORAL at 06:01

## 2024-06-28 ASSESSMENT — PAIN SCALES - GENERAL
PAINLEVEL_OUTOF10: 5
PAINLEVEL_OUTOF10: 0
PAINLEVEL_OUTOF10: 5
PAINLEVEL_OUTOF10: 1

## 2024-06-28 ASSESSMENT — PAIN DESCRIPTION - ORIENTATION
ORIENTATION: ANTERIOR
ORIENTATION: ANTERIOR

## 2024-06-28 ASSESSMENT — PAIN DESCRIPTION - DESCRIPTORS
DESCRIPTORS: ACHING

## 2024-06-28 ASSESSMENT — PAIN DESCRIPTION - LOCATION
LOCATION: BACK
LOCATION: OTHER (COMMENT)
LOCATION: OTHER (COMMENT)

## 2024-06-28 NOTE — ACP (ADVANCE CARE PLANNING)
Advance Care Planning   Healthcare Decision Maker:    Primary Decision Maker: sue ward - Brother/Sister - [phone]    Click here to complete Healthcare Decision Makers including selection of the Healthcare Decision Maker Relationship (ie \"Primary\").

## 2024-06-28 NOTE — PROGRESS NOTES
Hospital Medicine    Subjective:  pt alert conversive not confused this am pt states not using pap therapy at night      Current Facility-Administered Medications:     arformoterol tartrate (BROVANA) nebulizer solution 15 mcg, 15 mcg, Nebulization, BID RT, Baldemar Jarrett DO, 15 mcg at 06/27/24 1909    aspirin EC tablet 81 mg, 81 mg, Oral, Daily, Baldemar Jarrett DO, 81 mg at 06/27/24 1320    budesonide (PULMICORT) nebulizer suspension 1,000 mcg, 1 mg, Nebulization, BID RT, Baldemar Jarrett DO, 1,000 mcg at 06/27/24 1909    dilTIAZem (CARDIZEM CD) extended release capsule 180 mg, 180 mg, Oral, Daily, Baldemar Jarrett DO, 180 mg at 06/27/24 1623    citalopram (CELEXA) tablet 20 mg, 20 mg, Oral, Daily, Baldemar Jarrett DO, 20 mg at 06/27/24 1319    folic acid (FOLVITE) tablet 1 mg, 1 mg, Oral, Daily, Baldemar Jarrett DO, 1 mg at 06/27/24 1320    guaiFENesin tablet 400 mg, 400 mg, Oral, TID, Baldemar Jarrett DO, 400 mg at 06/27/24 2040    ipratropium 0.5 mg-albuterol 2.5 mg (DUONEB) nebulizer solution 1 Dose, 1 Dose, Inhalation, Q4H PRN, Baldemar Jarrett DO    methIMAzole (TAPAZOLE) tablet 15 mg, 15 mg, Oral, Daily, Baldemar Jarrett DO, 15 mg at 06/27/24 1623    pantoprazole (PROTONIX) tablet 40 mg, 40 mg, Oral, QAM AC, Baldemar Jarrett DO, 40 mg at 06/28/24 0601    polyethylene glycol (GLYCOLAX) packet 17 g, 17 g, Oral, Daily PRN, Baldemar Jarrett DO    predniSONE (DELTASONE) tablet 10 mg, 10 mg, Oral, Daily, Baldemar Jarrett DO, 10 mg at 06/27/24 1320    prochlorperazine (COMPAZINE) tablet 10 mg, 10 mg, Oral, Q6H PRN, Baldemar Jarrett DO    sennosides-docusate sodium (SENOKOT-S) 8.6-50 MG tablet 1 tablet, 1 tablet, Oral, Daily, Baldemar Jarrett DO, 1 tablet at 06/27/24 1320    vitamin B-12 (CYANOCOBALAMIN) tablet 500 mcg, 500 mcg, Oral, Daily, Baldemar Jarrett DO, 500 mcg at 06/27/24 1624    sodium chloride flush 0.9 % injection 5-40 mL, 5-40 mL, IntraVENous, 2 times per day, Kolby  Baldemar VU DO, 10 mL at 06/27/24 2040    sodium chloride flush 0.9 % injection 5-40 mL, 5-40 mL, IntraVENous, PRN, Baldemar Jarrett DO    0.9 % sodium chloride infusion, , IntraVENous, PRN, Baldemar Jarrett,     potassium chloride (KLOR-CON M) extended release tablet 40 mEq, 40 mEq, Oral, PRN **OR** potassium bicarb-citric acid (EFFER-K) effervescent tablet 40 mEq, 40 mEq, Oral, PRN **OR** potassium chloride 10 mEq/100 mL IVPB (Peripheral Line), 10 mEq, IntraVENous, PRN, Baldemar Jarrett DO    magnesium sulfate 2000 mg in 50 mL IVPB premix, 2,000 mg, IntraVENous, PRN, Baldemar Jarrett,     enoxaparin Sodium (LOVENOX) injection 30 mg, 30 mg, SubCUTAneous, Daily, Baldemar Jarrett DO    acetaminophen (TYLENOL) tablet 650 mg, 650 mg, Oral, Q6H PRN, 650 mg at 06/28/24 0214 **OR** acetaminophen (TYLENOL) suppository 650 mg, 650 mg, Rectal, Q6H PRN, Baldemar Jarrett DO    cefTRIAXone (ROCEPHIN) 1,000 mg in sterile water 10 mL IV syringe, 1,000 mg, IntraVENous, Q24H, Baldemar Jarrett DO    Objective:    BP (!) 162/87   Pulse 62   Temp 98.2 °F (36.8 °C) (Oral)   Resp 18   Ht 1.727 m (5' 7.99\")   Wt 47.9 kg (105 lb 8 oz)   SpO2 100%   BMI 16.04 kg/m²     Heart:  reg  Lungs:  ctab  Abd: + bs soft nontender  Extrem:  w/o edema    CBC with Differential:    Lab Results   Component Value Date/Time    WBC 9.8 06/27/2024 12:48 AM    RBC 3.76 06/27/2024 12:48 AM    HGB 10.3 06/27/2024 12:48 AM    HCT 33.8 06/27/2024 12:48 AM     06/27/2024 12:48 AM    MCV 89.9 06/27/2024 12:48 AM    MCH 27.4 06/27/2024 12:48 AM    MCHC 30.5 06/27/2024 12:48 AM    RDW 17.1 06/27/2024 12:48 AM    NRBC 1 04/06/2024 11:00 AM    LYMPHOPCT 19 06/27/2024 12:48 AM    MONOPCT 10 06/27/2024 12:48 AM    MYELOPCT 2 04/25/2024 09:54 AM    EOSPCT 2 06/27/2024 12:48 AM    BASOPCT 1 06/27/2024 12:48 AM    MONOSABS 1.00 06/27/2024 12:48 AM    EOSABS 0.18 06/27/2024 12:48 AM    BASOSABS 0.09 06/27/2024 12:48 AM     CMP:    Lab Results    Component Value Date/Time     06/27/2024 12:48 AM    K 3.3 06/27/2024 12:48 AM    K 3.3 12/30/2022 02:49 AM    CL 95 06/27/2024 12:48 AM    CO2 30 06/27/2024 12:48 AM    BUN 12 06/27/2024 12:48 AM    CREATININE 0.7 06/27/2024 12:48 AM    GFRAA >60 07/28/2022 09:31 AM    LABGLOM 89 06/27/2024 12:48 AM    LABGLOM >90 04/25/2024 09:54 AM    GLUCOSE 87 06/27/2024 12:48 AM    CALCIUM 9.2 06/27/2024 12:48 AM    BILITOT 0.2 06/27/2024 12:48 AM    ALKPHOS 75 06/27/2024 12:48 AM    AST 29 06/27/2024 12:48 AM    ALT 10 06/27/2024 12:48 AM     Warfarin PT/INR:    Lab Results   Component Value Date    INR 1.1 10/15/2023    INR 1.0 12/02/2022    PROTIME 12.0 10/15/2023    PROTIME 11.2 12/02/2022       Assessment:    Principal Problem:    Altered mental status  Resolved Problems:    * No resolved hospital problems. *  Copd  Chronic resp failure    Plan:  Increase activity dc yoel Jarrett DO  7:55 AM  6/28/2024

## 2024-06-28 NOTE — PROGRESS NOTES
Initial Assessment     Name: Lana Phillips  : 1955  MRN: [id number]      Date of Service: 2024    Evaluating PT: Baldemar Em, PT       Room #:  8508/8508-B  Diagnosis:  Altered mental status [R41.82]  UTI (urinary tract infection), bacterial [N39.0, A49.9]  Altered mental status, unspecified altered mental status type [R41.82]  PMHx/PSHx:   has a past medical history of Acute exacerbation of chronic obstructive pulmonary disease (COPD) ([facility]), Acute respiratory failure ([facility]), Acute respiratory failure with hypoxia ([facility]), Acute respiratory failure with hypoxia ([facility]), Acute respiratory failure with hypoxia ([facility]), Atherosclerosis 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 ([facility]), Atrial fibrillation ([facility]), Chronic obstructive pulmonary disease ([facility]), COPD (chronic obstructive pulmonary disease) ([facility]), COPD exacerbation ([facility]), COPD exacerbation ([facility]), COPD with acute exacerbation ([facility]), COVID-19, Critical limb ischemia of left lower extremity with rest pain ([facility]), GI bleed, Hypertension, Pneumonia due to infectious organism, PVD (peripheral vascular disease) ([facility]), Severe protein-calorie malnutrition ([facility]), Thyroid disease, and Uncontrolled hypertension.  Procedure/Surgery:  None  Precautions:  Falls, 4L O2 at baseline, (+) alarms  Equipment Needs:  TBD    SUBJECTIVE:    Patient admitted from [facility]. Patient reported during session she was at [facility] for rehab and home set up is as follows - Pt lives with sister-in-law in a 1 story mobile home with 4 stair(s) and 2 rail(s) to enter. Pt ambulated with no device prior to admission. Owns: quad cane and standard walker.    OBJECTIVE:   Initial Evaluation  Date: 24 Treatment Date: Short Term/ Long Term   Goals   AM-PAC 6 Clicks 15/24     Was pt agreeable to Eval/treatment? Yes     Does pt have pain? No     Bed Mobility  Rolling: SBA  Supine to sit:   SBA   Sit to supine: SBA   Scooting: SBA   Rolling: Ind  Supine to sit: Ind  Sit

## 2024-06-28 NOTE — PROGRESS NOTES
Attempted to contact Dr Jarrett to request something for pt's anxiety and pain per pt request.  Spoke with his answering service and requested Dr Jarrett call me.

## 2024-06-28 NOTE — CARE COORDINATION
Here from [facility]  [facility]NF  for confusion and hypoxia. Met with Patient who currently is A&O  X4.  Currently on 5 liters nc. Uses 4 L nc baseline. CT head- negative.IV rocephin. Pt states that plans to go back to [facility]. PT/OT  Message to Karey-from [facility] is a bedhold can return.. Envelope ambulette form and envelope in soft chart.Electronically signed by Tiffany Velez RN on 6/28/2024 at 9:39 AM    Case Management Assessment  Initial Evaluation    Date/Time of Evaluation: 6/28/2024 9:40 AM  Assessment Completed by: Tiffany Velez RN    If patient is discharged prior to next notation, then this note serves as note for discharge by case management.    Patient Name: Lana Phillips                   YOB: 1955  Diagnosis: Altered mental status [R41.82]  UTI (urinary tract infection), bacterial [N39.0, A49.9]  Altered mental status, unspecified altered mental status type [R41.82]                   Date / Time: 6/27/2024 12:10 AM    Patient Admission Status: Inpatient   Readmission Risk (Low < 19, Mod (19-27), High > 27): Readmission Risk Score: 38.8    Current PCP: Clyde Gonzalez, DO  PCP verified by CM? Yes    Chart Reviewed: Yes      History Provided by: Patient  Patient Orientation: Alert and Oriented    Patient Cognition: Alert    Hospitalization in the last 30 days (Readmission):  No    If yes, Readmission Assessment in CM Navigator will be completed.    Advance Directives:      Code Status: Full Code   Patient's Primary Decision Maker is: Legal Next of Kin    Primary Decision Maker: sue ward - Brother/Sister - [phone]    Discharge Planning:    Patient lives with: Other (Comment) Type of Home: Acute Rehab  Primary Care Giver: Other (Comment) (snf)  Patient Support Systems include: Family Members   Current Financial resources:    Current community resources:    Current services prior to admission: Skilled Nursing Facility            Current DME:              Type of Home Care services:   Discharge Plan?  Patient yes     Tiffany Velez RN  Case Management Department  Ph: [phone]  Fax: [phone]

## 2024-06-28 NOTE — PROGRESS NOTES
Occupational Therapy  OCCUPATIONAL THERAPY INITIAL EVALUATION    [facility]  [address], [place]      Date:2024                                                Patient Name: Lana Phillips  MRN: [id number]  : 1955  Room: [address] [place] [address]    Evaluating OT:Bree Echevarria, OTR/L #[id number]    Referring Provider: Baldemar aJrrett DO   Specific Provider Orders/Date: OT eval and treat 24     Diagnosis: Altered mental status [R41.82]  UTI (urinary tract infection), bacterial [N39.0, A49.9]  Altered mental status, unspecified altered mental status type [R41.82]   Pt admitted to hospital on 24 for hypoxia and confusion    Pertinent Medical History:  has a past medical history of Acute exacerbation of chronic obstructive pulmonary disease (COPD) ([facility]), Acute respiratory failure (HCC), Acute respiratory failure with hypoxia (HCC), Acute respiratory failure with hypoxia (HCC), Acute respiratory failure with hypoxia (HCC), Atherosclerosis 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 ([facility]), Atrial fibrillation ([facility]), Chronic obstructive pulmonary disease (HCC), COPD (chronic obstructive pulmonary disease) ([facility]), COPD exacerbation (HCC), COPD exacerbation (HCC), COPD with acute exacerbation (HCC), COVID-19, Critical limb ischemia of left lower extremity with rest pain ([facility]), GI bleed, Hypertension, Pneumonia due to infectious organism, PVD (peripheral vascular disease) ([facility]), Severe protein-calorie malnutrition ([facility]), Thyroid disease, and Uncontrolled hypertension.       Precautions:  Fall Risk, O2 (monitor sat), +alarms    Assessment of current deficits    [x] Functional mobility  [x]ADLs  [x] Strength               []Cognition    [x] Functional transfers   [x] IADLs         [x] Safety Awareness   [x]Endurance    [] Fine Coordination              [x] Balance      [] Vision/perception   []Sensation     []Gross Motor  step directions   Memory:  fair    Sequencing:  fair    Problem solving:  fair    Judgement/safety:  fair      Functional Assessment:  AM-PAC Daily Activity Raw Score: 16/24   Initial Eval Status  Date: 6/28/24 Treatment Status  Date: STGs = LTGs  Time frame: 10-14 days   Feeding Supervision   Modified [place]    Grooming Minimal Assist   Standing at sink  Modified [place]    UB Dressing Stand by Assist   Gown  Modified [place]    LB Dressing Moderate Assist   Stand by Assist    Bathing Moderate Assist  Stand by Assist    Toileting Moderate Assist   Including LB clothing management and hygiene  Stand by Assist    Bed Mobility  Supine to sit: Stand by Assist   Sit to supine: Stand by Assist   Supine to sit: Modified [place]   Sit to supine: Modified [place]    Functional Transfers Minimal Assist   Various surfaces  Supervision    Functional Mobility Moderate Assist   EOB><hallway (functional distance)  EOB><Bathroom  Seated rest break between  Supervision    Balance Sitting:     Static: Supervision    Dynamic: SBA  Standing: Min><Mod A, no AD     Activity Tolerance Fair-  Pt on O2 via nasal cannula (3L at room entry). O2 sat=WFL at rest, decreased to 88-90% with activity. Reinforced frequent rest breaks w/ cues for pursed lip breathing. Increased flow rate to 4L. Monitored until returned to ^90%. O2 sat remained between 90-92% during remainder of session. Pt on 4L O2 at room departure.  Fair+   Visual/  Perceptual Glasses: yes                  Hand Dominance R   AROM (PROM) Strength Additional Info:    RUE  WFL 4-/5 good  and wfl FMC/dexterity noted during ADL tasks       LUE WFL 4-/5 good  and wfl FMC/dexterity noted during ADL tasks       Hearing: WFL  Sensation: c/o numbness/tingling B feet (chronic)  Tone: WFL  Edema: none noted    Comments: Upon arrival patient lying in bed.  Therapist educated pt on role of OT. RN clearance. At end of session, patient lying in bed (per pt  request. Bed alarm on) with call light and phone within reach, all lines and tubes intact.  Overall patient demonstrated decreased independence and safety during completion of ADL/functional transfer/mobility tasks.  Pt would benefit from continued skilled OT to increase safety and independence with completion of ADL/IADL tasks for functional independence and quality of life.    Treatment: OT treatment provided this date includes: Facilitation of bed mobility (education/cues for body mechanics), unsupported sitting balance (addressing posture, weight shifting, dynamic reaching to prep for ADL's), functional transfers (various surfaces w/ education/cues for safety/hand placement), standing tolerance tasks (addressing posture, balance and activity tolerance while incorporating light functional reaching impacting ADLs and functional activity) and functional ambulation tasks (including to/ from bathroom and in preparation for item retrieval tasks w/ education/skilled cuing on hand placement, posture, body mechanics, energy conservation techniques and safety).  Therapist facilitated self-care retraining: UB/LB self-care tasks, toileting task and standing grooming tasks while educating pt on modified techniques, posture, safety and emphasizing energy conservation techniques. Skilled monitoring of HR, O2 sats and pts response to treatment.     Rehab Potential: Good for established goals     Patient / Family Goal: to increase independence      Patient and/or family were instructed on functional diagnosis, prognosis/goals and OT plan of care. Demonstrated fair+ understanding.     Eval Complexity: Low    Time In: 10:47  Time Out: 11:15  Total Treatment Time: 14 minutes    Min Units   OT Eval Low 97165  X  1   OT Eval Medium 85884      OT Eval High 73597      OT Re-Eval 80865       Therapeutic Ex 91228       Therapeutic Activities 12644  4 0    ADL/Self Care 64648  10  1   Orthotic Management 46330       Manual 86611     Neuro

## 2024-06-28 NOTE — PLAN OF CARE
Problem: Chronic Conditions and Co-morbidities  Goal: Patient's chronic conditions and co-morbidity symptoms are monitored and maintained or improved  6/28/2024 0149 by Trista Ortiz RN  Outcome: Progressing  6/27/2024 1825 by Bharti Medrano RN  Outcome: Progressing     Problem: Discharge Planning  Goal: Discharge to home or other facility with appropriate resources  6/28/2024 0149 by Trista Ortiz RN  Outcome: Progressing  6/27/2024 1825 by Bharti Medrano RN  Outcome: Progressing     Problem: Pain  Goal: Verbalizes/displays adequate comfort level or baseline comfort level  6/28/2024 0149 by Trista Ortiz RN  Outcome: Progressing  6/27/2024 1825 by Bharti Medrano RN  Outcome: Progressing     Problem: Safety - Adult  Goal: Free from fall injury  6/28/2024 0149 by Trista Ortiz RN  Outcome: Progressing  6/27/2024 1825 by Bharti Medrano RN  Outcome: Progressing     Problem: Risk for Elopement  Goal: Patient will not exit the unit/facility without proper excort  6/28/2024 0149 by Trista Ortiz RN  Outcome: Progressing  6/27/2024 1825 by Bharti Medrano RN  Outcome: Progressing

## 2024-06-29 VITALS
HEIGHT: 68 IN | TEMPERATURE: 98.3 F | SYSTOLIC BLOOD PRESSURE: 179 MMHG | DIASTOLIC BLOOD PRESSURE: 79 MMHG | RESPIRATION RATE: 16 BRPM | HEART RATE: 63 BPM | WEIGHT: 105.5 LBS | BODY MASS INDEX: 15.99 KG/M2 | OXYGEN SATURATION: 96 %

## 2024-06-29 PROCEDURE — 2580000003 HC RX 258: Performed by: INTERNAL MEDICINE

## 2024-06-29 PROCEDURE — 2700000000 HC OXYGEN THERAPY PER DAY

## 2024-06-29 PROCEDURE — 6360000002 HC RX W HCPCS: Performed by: INTERNAL MEDICINE

## 2024-06-29 PROCEDURE — 6370000000 HC RX 637 (ALT 250 FOR IP): Performed by: INTERNAL MEDICINE

## 2024-06-29 RX ORDER — CEFDINIR 300 MG/1
300 CAPSULE ORAL 2 TIMES DAILY
Qty: 10 CAPSULE | Refills: 0
Start: 2024-06-29 | End: 2024-07-04

## 2024-06-29 RX ADMIN — WATER 1000 MG: 1 INJECTION INTRAMUSCULAR; INTRAVENOUS; SUBCUTANEOUS at 09:10

## 2024-06-29 RX ADMIN — ASPIRIN 81 MG: 81 TABLET, COATED ORAL at 09:07

## 2024-06-29 RX ADMIN — ACETAMINOPHEN 650 MG: 325 TABLET ORAL at 00:02

## 2024-06-29 RX ADMIN — GUAIFENESIN 400 MG: 400 TABLET ORAL at 09:07

## 2024-06-29 RX ADMIN — SENNOSIDES AND DOCUSATE SODIUM 1 TABLET: 50; 8.6 TABLET ORAL at 09:07

## 2024-06-29 RX ADMIN — FOLIC ACID 1 MG: 1 TABLET ORAL at 09:07

## 2024-06-29 RX ADMIN — DILTIAZEM HYDROCHLORIDE 180 MG: 180 CAPSULE, COATED, EXTENDED RELEASE ORAL at 09:08

## 2024-06-29 RX ADMIN — CYANOCOBALAMIN TAB 1000 MCG 500 MCG: 1000 TAB at 09:08

## 2024-06-29 RX ADMIN — PANTOPRAZOLE SODIUM 40 MG: 40 TABLET, DELAYED RELEASE ORAL at 05:49

## 2024-06-29 RX ADMIN — ENOXAPARIN SODIUM 30 MG: 100 INJECTION SUBCUTANEOUS at 09:08

## 2024-06-29 RX ADMIN — CITALOPRAM HYDROBROMIDE 20 MG: 20 TABLET ORAL at 09:08

## 2024-06-29 RX ADMIN — METHIMAZOLE 15 MG: 5 TABLET ORAL at 09:08

## 2024-06-29 RX ADMIN — CLONAZEPAM 0.5 MG: 0.5 TABLET ORAL at 09:07

## 2024-06-29 RX ADMIN — SODIUM CHLORIDE, PRESERVATIVE FREE 10 ML: 5 INJECTION INTRAVENOUS at 09:10

## 2024-06-29 RX ADMIN — PREDNISONE 10 MG: 20 TABLET ORAL at 09:07

## 2024-06-29 ASSESSMENT — PAIN SCALES - GENERAL
PAINLEVEL_OUTOF10: 7
PAINLEVEL_OUTOF10: 0

## 2024-06-29 ASSESSMENT — PAIN DESCRIPTION - DESCRIPTORS: DESCRIPTORS: ACHING

## 2024-06-29 ASSESSMENT — PAIN DESCRIPTION - ORIENTATION: ORIENTATION: UPPER

## 2024-06-29 ASSESSMENT — PAIN DESCRIPTION - LOCATION: LOCATION: BACK;SHOULDER

## 2024-06-29 NOTE — PROGRESS NOTES
Report called to [facility], spoke with Primo    Discharge summary reviewed with patient. All questions/concerns addressed    Monitor removed, cleaned, placed in storage

## 2024-06-29 NOTE — DISCHARGE INSTR - COC
Continuity of Care Form    Patient Name: Lana Phillips   :  1955  MRN:  [id number]    Admit date:  2024  Discharge date:  ***    Code Status Order: Full Code   Advance Directives:     Admitting Physician:  Baldemar Jarrett DO  PCP: Clyde Gonzalez DO    Discharging Nurse: ***  Discharging Hospital Unit/Room#: 8508/8508-B  Discharging Unit Phone Number: ***    Emergency Contact:   Extended Emergency Contact Information  Primary Emergency Contact: Lana Reeves  Address: [address] [place] [address] [place] [address]  Home Phone: [phone]  Work Phone: [phone]  Mobile Phone: [phone]  Relation: Other Relative  Preferred language: English   needed? No  Secondary Emergency Contact: sue ward  Mobile Phone: [phone]  Relation: Brother/Sister  Preferred language: English   needed? No    Past Surgical History:  Past Surgical History:   Procedure Laterality Date    BACK SURGERY      x2    BRONCHOSCOPY N/A 2024    BRONCHOSCOPY DIAGNOSTIC OR CELL WASH ONLY performed by Jose Price MD at [facility] ENDOSCOPY    INVASIVE VASCULAR N/A 2024    Aortagram abdominal performed by Lazaro Vidales MD at [facility] CARDIAC CATH LAB    INVASIVE VASCULAR N/A 2024    Angioplasty peripheral artery performed by Lazaro Vidales MD at [facility] CARDIAC CATH LAB    LEFT OOPHORECTOMY      PAIN MANAGEMENT PROCEDURE N/A 2023    CAUDAL EPIDURAL STEROID INJECTION performed by Lilia Cabrera DO at [facility] OR    UPPER GASTROINTESTINAL ENDOSCOPY N/A 10/16/2023    EGD ESOPHAGOGASTRODUODENOSCOPY performed by Willie Chow DO at [facility] ENDOSCOPY       Immunization History:   Immunization History   Administered Date(s) Administered    COVID-19, PFIZER PURPLE top, DILUTE for use, (age 12 y+), 30mcg/0.3mL 2021, 2021    Influenza, FLUAD, (age 65 y+), Adjuvanted, 0.5mL 2023       Active Problems:  Patient Active Problem List   Diagnosis Code    Primary hypertension  Shawna MATTHEW RN    Current guidelines    Influenza 22 Rapid influenza A/B antigens   12/15/22 Infection     COVID-19 22 COVID-19, Rapid   22 Infection                        Nurse Assessment:  Last Vital Signs: BP (!) 179/79   Pulse 63   Temp 98.3 °F (36.8 °C) (Oral)   Resp 16   Ht 1.727 m (5' 7.99\")   Wt 47.9 kg (105 lb 8 oz)   SpO2 96%   BMI 16.04 kg/m²     Last documented pain score (0-10 scale): Pain Level: 0  Last Weight:   Wt Readings from Last 1 Encounters:   24 47.9 kg (105 lb 8 oz)     Mental Status:  disoriented and alert    IV Access:  - None    Nursing Mobility/ADLs:  Walking   Independent  Transfer  Independent  Bathing  Independent  Dressing  Independent  Toileting  Independent  Feeding  Independent  Med Admin  Independent  Med Delivery   whole    Wound Care Documentation and Therapy:  Puncture 23 Coccyx (Active)   Number of days: 183        Elimination:  Continence:   Bowel: Yes  Bladder: Yes  Urinary Catheter: None   Colostomy/Ileostomy/Ileal Conduit: No       Date of Last BM: 2024    Intake/Output Summary (Last 24 hours) at 2024 0940  Last data filed at 2024 0931  Gross per 24 hour   Intake 240 ml   Output --   Net 240 ml     I/O last 3 completed shifts:  In: 590 [P.O.:590]  Out: -     Safety Concerns:     At Risk for Falls    Impairments/Disabilities:      None    Nutrition Therapy:  Current Nutrition Therapy:   - Oral Diet:  General    Routes of Feeding: Oral  Liquids: Thin Liquids  Daily Fluid Restriction: no  Last Modified Barium Swallow with Video (Video Swallowing Test): not done    Treatments at the Time of Hospital Discharge:   Respiratory Treatments: as instructed  Oxygen Therapy:  is on oxygen at 4 L/min per nasal cannula.  Ventilator:    - No ventilator support    Rehab Therapies: Physical Therapy and Occupational Therapy  Weight Bearing Status/Restrictions: No weight bearing

## 2024-06-29 NOTE — CARE COORDINATION
Social Work Discharge Planning:  Discharge order noted. Patient will be leaving today at 11 AM returning to [place] by [facility]. SW notified nursing and LVM for patient relative Lana.  Electronically signed by ELIGIO Ortiz on 6/29/2024 at 8:25 AM

## 2024-06-29 NOTE — PLAN OF CARE
Problem: Chronic Conditions and Co-morbidities  Goal: Patient's chronic conditions and co-morbidity symptoms are monitored and maintained or improved  Outcome: Completed     Problem: Discharge Planning  Goal: Discharge to home or other facility with appropriate resources  Outcome: Completed     Problem: Pain  Goal: Verbalizes/displays adequate comfort level or baseline comfort level  Outcome: Completed     Problem: Safety - Adult  Goal: Free from fall injury  Outcome: Completed     Problem: Risk for Elopement  Goal: Patient will not exit the unit/facility without proper excort  Outcome: Completed

## 2024-06-29 NOTE — PROGRESS NOTES
Hospital Medicine    Subjective:  pt alert conversive      Current Facility-Administered Medications:     clonazePAM (KLONOPIN) tablet 0.5 mg, 0.5 mg, Oral, BID, Baldemar Jarrett DO, 0.5 mg at 06/28/24 1543    arformoterol tartrate (BROVANA) nebulizer solution 15 mcg, 15 mcg, Nebulization, BID RT, Baldemar Jarrett DO, 15 mcg at 06/28/24 2000    aspirin EC tablet 81 mg, 81 mg, Oral, Daily, Baldemar Jarrett DO, 81 mg at 06/28/24 0905    budesonide (PULMICORT) nebulizer suspension 1,000 mcg, 1 mg, Nebulization, BID RT, Baldemar Jarrett DO, 1,000 mcg at 06/28/24 2000    dilTIAZem (CARDIZEM CD) extended release capsule 180 mg, 180 mg, Oral, Daily, Baldemar Jarrett DO, 180 mg at 06/28/24 0904    citalopram (CELEXA) tablet 20 mg, 20 mg, Oral, Daily, Baldemar Jarrett DO, 20 mg at 06/28/24 0905    folic acid (FOLVITE) tablet 1 mg, 1 mg, Oral, Daily, Baldemar Jarrett DO, 1 mg at 06/28/24 0904    guaiFENesin tablet 400 mg, 400 mg, Oral, TID, Baldemar Jarrett DO, 400 mg at 06/28/24 2006    ipratropium 0.5 mg-albuterol 2.5 mg (DUONEB) nebulizer solution 1 Dose, 1 Dose, Inhalation, Q4H PRN, Baldemar Jarrtet DO, 1 Dose at 06/28/24 1733    methIMAzole (TAPAZOLE) tablet 15 mg, 15 mg, Oral, Daily, Baldemar Jarrett DO, 15 mg at 06/28/24 0905    pantoprazole (PROTONIX) tablet 40 mg, 40 mg, Oral, QAM AC, Baldemar Jarrett DO, 40 mg at 06/29/24 0549    polyethylene glycol (GLYCOLAX) packet 17 g, 17 g, Oral, Daily PRN, Baldemar Jarrett DO    predniSONE (DELTASONE) tablet 10 mg, 10 mg, Oral, Daily, Baldemar Jarrett DO, 10 mg at 06/28/24 0905    prochlorperazine (COMPAZINE) tablet 10 mg, 10 mg, Oral, Q6H PRN, Baldemar Jarrett DO    sennosides-docusate sodium (SENOKOT-S) 8.6-50 MG tablet 1 tablet, 1 tablet, Oral, Daily, Baldemar Jarrett, , 1 tablet at 06/28/24 0905    vitamin B-12 (CYANOCOBALAMIN) tablet 500 mcg, 500 mcg, Oral, Daily, Baldemar Jarrett DO, 500 mcg at 06/28/24 0904    sodium chloride flush 0.9  06/27/2024 12:48 AM    EOSABS 0.18 06/27/2024 12:48 AM    BASOSABS 0.09 06/27/2024 12:48 AM     CMP:    Lab Results   Component Value Date/Time     06/28/2024 08:12 AM    K 3.6 06/28/2024 08:12 AM    K 3.3 12/30/2022 02:49 AM    CL 98 06/28/2024 08:12 AM    CO2 30 06/28/2024 08:12 AM    BUN 10 06/28/2024 08:12 AM    CREATININE 0.7 06/28/2024 08:12 AM    GFRAA >60 07/28/2022 09:31 AM    LABGLOM >90 06/28/2024 08:12 AM    LABGLOM >90 04/25/2024 09:54 AM    GLUCOSE 92 06/28/2024 08:12 AM    CALCIUM 9.1 06/28/2024 08:12 AM    BILITOT 0.2 06/27/2024 12:48 AM    ALKPHOS 75 06/27/2024 12:48 AM    AST 29 06/27/2024 12:48 AM    ALT 10 06/27/2024 12:48 AM     Warfarin PT/INR:    Lab Results   Component Value Date    INR 1.1 10/15/2023    INR 1.0 12/02/2022    PROTIME 12.0 10/15/2023    PROTIME 11.2 12/02/2022       Assessment:    Principal Problem:    Altered mental status  Resolved Problems:    * No resolved hospital problems. *  Copd  anxiety  Uti    Plan:  Dc to [facility]        Baldemar Jarrett DO  8:05 AM  6/29/2024

## 2024-07-03 NOTE — PROGRESS NOTES
Physician Progress Note      PATIENT:               ANGIE SHELTON  [facility] #:                  [id number]  :                       1955  ADMIT DATE:       2024 12:10 AM  DISCH DATE:        2024 11:22 AM  RESPONDING  PROVIDER #:        Baldemar Cline DO          QUERY TEXT:    Pt admitted with AMS. Pt noted to have UTI. If possible, please document in   progress notes and discharge summary the relationship, if any, between AMS and   UTI.    The medical record reflects the following:  Risk Factors: hx of COPD; chronic hypoxic respiratory failure, on chronic O2  Clinical Indicators: patient admitted with AMS in H&P states Altered mental   status, possible medication related. Per H&P: She was confused.Diagnostic   evaluation revealed trace bacteria, trace leukocyte esterase, and negative   nitrites.Tox screen, positive benzodiazepines. Per IM note - UTI.   Bacteria TRACEA Urine WBC's (/HPF) 6 TO 9A.  Treatment: Rocephin,    Thank You  RY Atwood, RN  Clinical Documentation Integrity  Options provided:  -- AMS due to UTI  -- AMS related to medication, Please specify medication.  -- Other - I will add my own diagnosis  -- Disagree - Not applicable / Not valid  -- Disagree - Clinically unable to determine / Unknown  -- Refer to Clinical Documentation Reviewer    PROVIDER RESPONSE TEXT:    AMS related to medication. benzodiazepine    Query created by: Ed Leija on 7/3/2024 8:09 AM      QUERY TEXT:    Pt admitted with AMS. Pt noted to have UTI. If possible, please document in   the progress notes and discharge summary if you are evaluating and / or   treating any of the following:    The medical record reflects the following:  Risk Factors: Hx of  COPD; chronic hypoxic respiratory failure, on chronic O2;  Clinical Indicators: CT Head neg. H&P states: She was seen in the emergency   room.  She was confused.  Diagnostic evaluation revealed trace bacteria, trace   leukocyte esterase, and

## 2024-07-11 ENCOUNTER — TELEPHONE (OUTPATIENT)
Dept: PRIMARY CARE CLINIC | Age: 69
End: 2024-07-11

## 2024-07-11 DIAGNOSIS — I48.0 PAROXYSMAL ATRIAL FIBRILLATION (HCC): Primary | ICD-10-CM

## 2024-07-12 ENCOUNTER — TELEPHONE (OUTPATIENT)
Dept: ADMINISTRATIVE | Age: 69
End: 2024-07-12

## 2024-07-12 NOTE — TELEPHONE ENCOUNTER
Referral from Dr Gonzalez; DX: Paroxysmal atrial fibrillation (HCC)    LOV Petr 8/8/23  - please advise scheduling  kso

## 2024-07-16 ENCOUNTER — HOSPITAL ENCOUNTER (OUTPATIENT)
Dept: INFUSION THERAPY | Age: 69
Discharge: HOME | End: 2024-07-16
Payer: COMMERCIAL

## 2024-07-16 ENCOUNTER — OFFICE VISIT (OUTPATIENT)
Dept: ONCOLOGY | Age: 69
End: 2024-07-16
Payer: COMMERCIAL

## 2024-07-16 VITALS
BODY MASS INDEX: 15.46 KG/M2 | SYSTOLIC BLOOD PRESSURE: 140 MMHG | HEIGHT: 68 IN | WEIGHT: 102 LBS | TEMPERATURE: 97.5 F | OXYGEN SATURATION: 93 % | DIASTOLIC BLOOD PRESSURE: 66 MMHG | HEART RATE: 82 BPM

## 2024-07-16 DIAGNOSIS — D64.9 ACUTE ON CHRONIC ANEMIA: ICD-10-CM

## 2024-07-16 DIAGNOSIS — D50.8 OTHER IRON DEFICIENCY ANEMIA: ICD-10-CM

## 2024-07-16 DIAGNOSIS — D64.9 ANEMIA, UNSPECIFIED TYPE: Primary | ICD-10-CM

## 2024-07-16 LAB
ALBUMIN SERPL-MCNC: 3.9 G/DL (ref 3.5–5.2)
ALP SERPL-CCNC: 79 U/L (ref 35–104)
ALT SERPL-CCNC: 12 U/L (ref 0–32)
ANION GAP SERPL CALCULATED.3IONS-SCNC: 9 MMOL/L (ref 7–16)
AST SERPL-CCNC: 21 U/L (ref 0–31)
BASOPHILS # BLD: 0.09 K/UL (ref 0–0.2)
BASOPHILS NFR BLD: 1 % (ref 0–2)
BILIRUB SERPL-MCNC: <0.2 MG/DL (ref 0–1.2)
BUN SERPL-MCNC: 12 MG/DL (ref 6–23)
CALCIUM SERPL-MCNC: 9.5 MG/DL (ref 8.6–10.2)
CHLORIDE SERPL-SCNC: 95 MMOL/L (ref 98–107)
CO2 SERPL-SCNC: 31 MMOL/L (ref 22–29)
CREAT SERPL-MCNC: 0.7 MG/DL (ref 0.5–1)
EOSINOPHIL # BLD: 0.11 K/UL (ref 0.05–0.5)
EOSINOPHILS RELATIVE PERCENT: 1 % (ref 0–6)
ERYTHROCYTE [DISTWIDTH] IN BLOOD BY AUTOMATED COUNT: 16.4 % (ref 11.5–15)
FERRITIN SERPL-MCNC: 32 NG/ML
GFR, ESTIMATED: >90 ML/MIN/1.73M2
GLUCOSE SERPL-MCNC: 111 MG/DL (ref 74–99)
HCT VFR BLD AUTO: 37.8 % (ref 34–48)
HGB BLD-MCNC: 11.1 G/DL (ref 11.5–15.5)
IMM GRANULOCYTES # BLD AUTO: 0.07 K/UL (ref 0–0.58)
IMM GRANULOCYTES NFR BLD: 1 % (ref 0–5)
IRON SATN MFR SERPL: 14 % (ref 15–50)
IRON SERPL-MCNC: 44 UG/DL (ref 37–145)
LYMPHOCYTES NFR BLD: 0.81 K/UL (ref 1.5–4)
LYMPHOCYTES RELATIVE PERCENT: 7 % (ref 20–42)
MCH RBC QN AUTO: 26.8 PG (ref 26–35)
MCHC RBC AUTO-ENTMCNC: 29.4 G/DL (ref 32–34.5)
MCV RBC AUTO: 91.3 FL (ref 80–99.9)
MONOCYTES NFR BLD: 0.4 K/UL (ref 0.1–0.95)
MONOCYTES NFR BLD: 3 % (ref 2–12)
NEUTROPHILS NFR BLD: 88 % (ref 43–80)
NEUTS SEG NFR BLD: 10.49 K/UL (ref 1.8–7.3)
PLATELET # BLD AUTO: 329 K/UL (ref 130–450)
PMV BLD AUTO: 9.5 FL (ref 7–12)
POTASSIUM SERPL-SCNC: 4.9 MMOL/L (ref 3.5–5)
PROT SERPL-MCNC: 7.3 G/DL (ref 6.4–8.3)
RBC # BLD AUTO: 4.14 M/UL (ref 3.5–5.5)
SODIUM SERPL-SCNC: 135 MMOL/L (ref 132–146)
TIBC SERPL-MCNC: 324 UG/DL (ref 250–450)
WBC OTHER # BLD: 12 K/UL (ref 4.5–11.5)

## 2024-07-16 PROCEDURE — 80053 COMPREHEN METABOLIC PANEL: CPT

## 2024-07-16 PROCEDURE — 83550 IRON BINDING TEST: CPT

## 2024-07-16 PROCEDURE — 82728 ASSAY OF FERRITIN: CPT

## 2024-07-16 PROCEDURE — 83540 ASSAY OF IRON: CPT

## 2024-07-16 PROCEDURE — 3077F SYST BP >= 140 MM HG: CPT | Performed by: INTERNAL MEDICINE

## 2024-07-16 PROCEDURE — 85025 COMPLETE CBC W/AUTO DIFF WBC: CPT

## 2024-07-16 PROCEDURE — 36415 COLL VENOUS BLD VENIPUNCTURE: CPT

## 2024-07-16 PROCEDURE — 1123F ACP DISCUSS/DSCN MKR DOCD: CPT | Performed by: INTERNAL MEDICINE

## 2024-07-16 PROCEDURE — 99214 OFFICE O/P EST MOD 30 MIN: CPT | Performed by: INTERNAL MEDICINE

## 2024-07-16 PROCEDURE — 3078F DIAST BP <80 MM HG: CPT | Performed by: INTERNAL MEDICINE

## 2024-07-16 RX ORDER — LEVOTHYROXINE SODIUM 0.03 MG/1
TABLET ORAL
COMMUNITY
Start: 2024-05-06

## 2024-07-16 RX ORDER — PAROXETINE HYDROCHLORIDE 20 MG/1
TABLET, FILM COATED ORAL
COMMUNITY
Start: 2024-05-13

## 2024-07-17 RX ORDER — SODIUM CHLORIDE 9 MG/ML
INJECTION, SOLUTION INTRAVENOUS CONTINUOUS
OUTPATIENT
Start: 2024-07-17

## 2024-07-17 RX ORDER — ONDANSETRON 2 MG/ML
8 INJECTION INTRAMUSCULAR; INTRAVENOUS
OUTPATIENT
Start: 2024-07-17

## 2024-07-17 RX ORDER — ALBUTEROL SULFATE 90 UG/1
4 AEROSOL, METERED RESPIRATORY (INHALATION) PRN
OUTPATIENT
Start: 2024-07-17

## 2024-07-17 RX ORDER — DIPHENHYDRAMINE HYDROCHLORIDE 50 MG/ML
50 INJECTION INTRAMUSCULAR; INTRAVENOUS
OUTPATIENT
Start: 2024-07-17

## 2024-07-17 RX ORDER — HEPARIN 100 UNIT/ML
500 SYRINGE INTRAVENOUS PRN
OUTPATIENT
Start: 2024-07-17

## 2024-07-17 RX ORDER — EPINEPHRINE 1 MG/ML
0.3 INJECTION, SOLUTION, CONCENTRATE INTRAVENOUS PRN
OUTPATIENT
Start: 2024-07-17

## 2024-07-17 RX ORDER — SODIUM CHLORIDE 9 MG/ML
5-250 INJECTION, SOLUTION INTRAVENOUS PRN
OUTPATIENT
Start: 2024-07-17

## 2024-07-17 RX ORDER — SODIUM CHLORIDE 0.9 % (FLUSH) 0.9 %
5-40 SYRINGE (ML) INJECTION PRN
OUTPATIENT
Start: 2024-07-17

## 2024-07-17 RX ORDER — ACETAMINOPHEN 325 MG/1
650 TABLET ORAL
OUTPATIENT
Start: 2024-07-17

## 2024-07-17 NOTE — PROGRESS NOTES
Patient provided with discharge instructions, received printed AVS.  All questions answered.  Patient understands follow up plan of care.     
breath, and cough.   CARDIOVASCULAR: No chest pain, palpitations.  GASTROINTESTINAL: No nausea/vomiting, abdominal pain, positive for constipation  GENITOURINARY: No dysuria, urinary frequency, hematuria.  NEURO: No syncope, presyncope, positive for headache.  Remainder:  ROS NEGATIVE    Past Medical History:      Diagnosis Date    Acute exacerbation of chronic obstructive pulmonary disease (COPD) ([facility]) 07/14/2022    Acute respiratory failure ([facility]) 11/27/2022    Acute respiratory failure with hypoxia ([facility]) 06/27/2022    Acute respiratory failure with hypoxia ([facility]) 11/27/2022    Acute respiratory failure with hypoxia ([facility]) 01/09/2024    Atherosclerosis 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 ([facility]) 01/19/2024    Atrial fibrillation ([facility])     Chronic obstructive pulmonary disease ([facility]) 12/15/2022    COPD (chronic obstructive pulmonary disease) ([facility])     COPD exacerbation ([facility]) 07/14/2022    COPD exacerbation ([facility]) 02/05/2024    COPD with acute exacerbation ([facility]) 12/24/2023    COVID-19 07/16/2022    Critical limb ischemia of left lower extremity with rest pain ([facility]) 12/25/2023    GI bleed 10/14/2023    Hypertension     Pneumonia due to infectious organism     PVD (peripheral vascular disease) ([facility]) 01/19/2024    Severe protein-calorie malnutrition ([facility]) 06/26/2023    Thyroid disease     Uncontrolled hypertension      Patient Active Problem List   Diagnosis    Primary hypertension    COPD (chronic obstructive pulmonary disease) ([facility])    Gastroesophageal reflux disease    Constipation    Unexplained weight loss    Protein deficiency ([facility])    Diet-controlled diabetes mellitus ([facility])    Multiple nodules of lung    Abnormal EKG    Mixed hyperlipidemia    Hyperthyroidism    Vitamin B12 deficiency (dietary) anemia    Osteoporosis    Elevated troponin level not due to acute coronary syndrome    Tachycardia, unspecified    Pneumonia due to organism    Atrial fibrillation ([facility])    Hypoxia    Severe

## 2024-07-26 ENCOUNTER — TELEPHONE (OUTPATIENT)
Dept: ADMINISTRATIVE | Age: 69
End: 2024-07-26

## 2024-07-26 NOTE — TELEPHONE ENCOUNTER
Pt does not want to see Dr Humphreys , PCP sent a new referral. Would Dr Mcdonald or Dr Carr see this pt.?  [phone]

## 2024-08-01 ENCOUNTER — OFFICE VISIT (OUTPATIENT)
Dept: ENDOCRINOLOGY | Age: 69
End: 2024-08-01
Payer: COMMERCIAL

## 2024-08-01 VITALS
DIASTOLIC BLOOD PRESSURE: 72 MMHG | WEIGHT: 106 LBS | BODY MASS INDEX: 16.06 KG/M2 | HEART RATE: 83 BPM | SYSTOLIC BLOOD PRESSURE: 140 MMHG | HEIGHT: 68 IN | OXYGEN SATURATION: 98 %

## 2024-08-01 DIAGNOSIS — E55.9 VITAMIN D DEFICIENCY: ICD-10-CM

## 2024-08-01 DIAGNOSIS — M81.0 OSTEOPOROSIS, UNSPECIFIED OSTEOPOROSIS TYPE, UNSPECIFIED PATHOLOGICAL FRACTURE PRESENCE: ICD-10-CM

## 2024-08-01 DIAGNOSIS — E05.90 HYPERTHYROIDISM: Primary | ICD-10-CM

## 2024-08-01 PROCEDURE — 1123F ACP DISCUSS/DSCN MKR DOCD: CPT | Performed by: CLINICAL NURSE SPECIALIST

## 2024-08-01 PROCEDURE — G8428 CUR MEDS NOT DOCUMENT: HCPCS | Performed by: CLINICAL NURSE SPECIALIST

## 2024-08-01 PROCEDURE — 3077F SYST BP >= 140 MM HG: CPT | Performed by: CLINICAL NURSE SPECIALIST

## 2024-08-01 PROCEDURE — 3078F DIAST BP <80 MM HG: CPT | Performed by: CLINICAL NURSE SPECIALIST

## 2024-08-01 PROCEDURE — G8419 CALC BMI OUT NRM PARAM NOF/U: HCPCS | Performed by: CLINICAL NURSE SPECIALIST

## 2024-08-01 PROCEDURE — 1036F TOBACCO NON-USER: CPT | Performed by: CLINICAL NURSE SPECIALIST

## 2024-08-01 PROCEDURE — 1090F PRES/ABSN URINE INCON ASSESS: CPT | Performed by: CLINICAL NURSE SPECIALIST

## 2024-08-01 PROCEDURE — 3017F COLORECTAL CA SCREEN DOC REV: CPT | Performed by: CLINICAL NURSE SPECIALIST

## 2024-08-01 PROCEDURE — G8399 PT W/DXA RESULTS DOCUMENT: HCPCS | Performed by: CLINICAL NURSE SPECIALIST

## 2024-08-01 PROCEDURE — 99214 OFFICE O/P EST MOD 30 MIN: CPT | Performed by: CLINICAL NURSE SPECIALIST

## 2024-08-01 NOTE — PROGRESS NOTES
[facility] [employer] [facility] Department of Endocrinology Diabetes and Metabolism   [address] [place] [address]   Phone: [phone]  Fax: [phone]    Date of Service: 8/1/2024  Primary Care Physician: Clyde Gonzalez DO  Referring physician: No ref. provider found  Provider: Robert Delgado MD    Reason for the visit:  Hyperthyroidism osteoporosis    History of Present Illness:  The history is provided by the patient. No  was used. Accuracy of the patient data is excellent.    Lana Phillips is a very pleasant 69 y.o. female seen today for evaluation and management of hyperthyroidism     Lana Phillips was diagnosed with hyperthyroidism in May/2023. At that time she was c/o  palpitations, weight loss, change in appetite, nervousness, anxiety, irritability, tremor, sweating, heat intolerance, but denies changes in bowel habits, muscle weakness or difficulty sleeping.       The patient was started on methimazole July 2023 and tolerating it very well.    She is currently on methimazole 15 mg daily     Lab Results   Component Value Date/Time    TSH 1.86 03/13/2024 04:50 AM    T4FREE 0.4 (L) 03/13/2024 04:50 AM    F2DLGOI 38 (L) 02/07/2024 08:38 AM    TSI <0.10 07/06/2023 01:31 PM    TPOABS <4.0 07/06/2023 01:31 PM       No recent DXA scan     On VitD 3 supplement 2000 daily OTC     The patient has h/o Afib     PAST MEDICAL HISTORY   Past Medical History:   Diagnosis Date    Acute exacerbation of chronic obstructive pulmonary disease (COPD) ([facility]) 07/14/2022    Acute respiratory failure ([facility]) 11/27/2022    Acute respiratory failure with hypoxia ([facility]) 06/27/2022    Acute respiratory failure with hypoxia ([facility]) 11/27/2022    Acute respiratory failure with hypoxia ([facility]) 01/09/2024    Atherosclerosis 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 (HCC) 01/19/2024    Atrial fibrillation (HCC)     Chronic obstructive pulmonary disease (HCC) 12/15/2022

## 2024-08-08 ENCOUNTER — TELEPHONE (OUTPATIENT)
Dept: PRIMARY CARE CLINIC | Age: 69
End: 2024-08-08

## 2024-08-08 ENCOUNTER — OFFICE VISIT (OUTPATIENT)
Dept: PAIN MANAGEMENT | Age: 69
End: 2024-08-08
Payer: MEDICARE

## 2024-08-08 VITALS
TEMPERATURE: 98.1 F | RESPIRATION RATE: 18 BRPM | HEIGHT: 68 IN | BODY MASS INDEX: 16.22 KG/M2 | OXYGEN SATURATION: 90 % | WEIGHT: 107 LBS | DIASTOLIC BLOOD PRESSURE: 81 MMHG | HEART RATE: 83 BPM | SYSTOLIC BLOOD PRESSURE: 151 MMHG

## 2024-08-08 DIAGNOSIS — M79.10 MYALGIA: ICD-10-CM

## 2024-08-08 DIAGNOSIS — G89.4 CHRONIC PAIN SYNDROME: Primary | ICD-10-CM

## 2024-08-08 DIAGNOSIS — G89.29 CHRONIC BILATERAL LOW BACK PAIN WITH BILATERAL SCIATICA: ICD-10-CM

## 2024-08-08 DIAGNOSIS — M54.16 LUMBAR RADICULOPATHY: ICD-10-CM

## 2024-08-08 DIAGNOSIS — M54.41 CHRONIC BILATERAL LOW BACK PAIN WITH BILATERAL SCIATICA: ICD-10-CM

## 2024-08-08 DIAGNOSIS — M54.42 CHRONIC BILATERAL LOW BACK PAIN WITH BILATERAL SCIATICA: ICD-10-CM

## 2024-08-08 DIAGNOSIS — M51.9 LUMBAR DISC DISORDER: ICD-10-CM

## 2024-08-08 PROCEDURE — 99213 OFFICE O/P EST LOW 20 MIN: CPT | Performed by: PHYSICIAN ASSISTANT

## 2024-08-08 PROCEDURE — 3077F SYST BP >= 140 MM HG: CPT | Performed by: PHYSICIAN ASSISTANT

## 2024-08-08 PROCEDURE — 1123F ACP DISCUSS/DSCN MKR DOCD: CPT | Performed by: PHYSICIAN ASSISTANT

## 2024-08-08 PROCEDURE — 1090F PRES/ABSN URINE INCON ASSESS: CPT | Performed by: PHYSICIAN ASSISTANT

## 2024-08-08 PROCEDURE — G8427 DOCREV CUR MEDS BY ELIG CLIN: HCPCS | Performed by: PHYSICIAN ASSISTANT

## 2024-08-08 PROCEDURE — 3079F DIAST BP 80-89 MM HG: CPT | Performed by: PHYSICIAN ASSISTANT

## 2024-08-08 PROCEDURE — G8419 CALC BMI OUT NRM PARAM NOF/U: HCPCS | Performed by: PHYSICIAN ASSISTANT

## 2024-08-08 PROCEDURE — 3017F COLORECTAL CA SCREEN DOC REV: CPT | Performed by: PHYSICIAN ASSISTANT

## 2024-08-08 PROCEDURE — 99213 OFFICE O/P EST LOW 20 MIN: CPT

## 2024-08-08 PROCEDURE — G8399 PT W/DXA RESULTS DOCUMENT: HCPCS | Performed by: PHYSICIAN ASSISTANT

## 2024-08-08 PROCEDURE — 1036F TOBACCO NON-USER: CPT | Performed by: PHYSICIAN ASSISTANT

## 2024-08-08 NOTE — PROGRESS NOTES
Lana Phillips presents to the [facility] on 8/8/2024. Lana is complaining of pain lower back. The pain is constant. The pain is described as aching. Pain is rated on her best day at a 7, on her worst day at a 10, and on average at a 8 on the VAS scale. She took her last dose of Tylenol yesterday.     Any procedures since your last visit: No  Pacemaker or defibrillator: No    She is not on NSAIDS and is not on anticoagulation medications to include none.    Medication Contract and Consent for Opioid Use Documents Filed        No documents found                    There were no vitals taken for this visit.     No LMP recorded. Patient is postmenopausal.  
Reported on 8/8/2024 2/25/24   Viridiana Potter MD       Allergies   Allergen Reactions    Pcn [Penicillins] Other (See Comments)       Social History     Socioeconomic History    Marital status:      Spouse name: Not on file    Number of children: Not on file    Years of education: Not on file    Highest education level: Not on file   Occupational History    Not on file   Tobacco Use    Smoking status: Former     Current packs/day: 0.20     Average packs/day: 0.2 packs/day for 41.6 years (8.3 ttl pk-yrs)     Types: Cigarettes     Start date: 1983    Smokeless tobacco: Never    Tobacco comments:     2 cigarettes a week   Vaping Use    Vaping Use: Never used   Substance and Sexual Activity    Alcohol use: Not Currently    Drug use: Not Currently    Sexual activity: Not on file   Other Topics Concern    Not on file   Social History Narrative    1 cup coffee daily             Established 7-22 after not being seen since 2008    Smoker  quit   5-2022    COPD sees Dr. 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

## 2024-08-08 NOTE — TELEPHONE ENCOUNTER
Pt in rehab currently and wanted you to know that somehow, she ended up off the eliquis, she doesn't know how but she is, she's worried. She doesn't know how she would get back on it while she's there.  Advised pt talk to the rehab doctor about it.

## 2024-08-09 ENCOUNTER — TELEPHONE (OUTPATIENT)
Dept: PRIMARY CARE CLINIC | Age: 69
End: 2024-08-09

## 2024-08-09 NOTE — TELEPHONE ENCOUNTER
Medication was discontinued in hospital 5/14.    \"Decision was made to discontinue Eliquis in light of recurrent anemia.\"     Relayed this to the patient.

## 2024-08-13 NOTE — TELEPHONE ENCOUNTER
Tell her they will not allow switching within the group.  She can call there and ask if she could see someone else but they usually do not allow that.  The only other option would be a different group such as [facility] but they would have no connection with the emergency physicians.  I advised her to stick with the present cardiology

## 2024-08-13 NOTE — TELEPHONE ENCOUNTER
Pt notified and voiced understanding.  She'll give them a call to check just in case, if not, she'll stick with same cardiologist

## 2024-08-14 NOTE — TELEPHONE ENCOUNTER
Pt called back and said there was a misunderstanding.  She only wants to see Dr. Humphreys.  She has been in and out of the hospital and would like a check up.  Please advise or contact pt.

## 2024-08-15 ENCOUNTER — TELEPHONE (OUTPATIENT)
Dept: PRIMARY CARE CLINIC | Age: 69
End: 2024-08-15

## 2024-08-15 DIAGNOSIS — K59.1 FUNCTIONAL DIARRHEA: Primary | ICD-10-CM

## 2024-08-15 NOTE — TELEPHONE ENCOUNTER
Pt looking for referral to \"stomach Dr\" for diarrhea daily , upset stomach, it's been going on x 6 months.

## 2024-08-19 ENCOUNTER — TELEPHONE (OUTPATIENT)
Dept: ENDOCRINOLOGY | Age: 69
End: 2024-08-19

## 2024-08-22 DIAGNOSIS — M81.0 OSTEOPOROSIS, UNSPECIFIED OSTEOPOROSIS TYPE, UNSPECIFIED PATHOLOGICAL FRACTURE PRESENCE: Primary | ICD-10-CM

## 2024-08-22 NOTE — TELEPHONE ENCOUNTER
Left detailed message on voice mail.  
Patient called wanted to see if she can go for her prolia injection  she has not been able to go , because she has been in and out of the hospital     Please advise and can you reorder prolia injection   
Rx printed  Please check to see if patient needs to go to the infusion center to have completed.  She is in a nursing home  
warm

## 2024-09-09 ENCOUNTER — HOSPITAL ENCOUNTER (EMERGENCY)
Age: 69
Discharge: HOME | End: 2024-09-10
Payer: OTHER MISCELLANEOUS

## 2024-09-09 ENCOUNTER — APPOINTMENT (OUTPATIENT)
Dept: GENERAL RADIOLOGY | Age: 69
End: 2024-09-09
Payer: OTHER MISCELLANEOUS

## 2024-09-09 ENCOUNTER — APPOINTMENT (OUTPATIENT)
Dept: CT IMAGING | Age: 69
End: 2024-09-09
Payer: OTHER MISCELLANEOUS
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
## 2024-09-09 DIAGNOSIS — S40.029A CONTUSION, SHOULDER AND UPPER ARM, MULTIPLE SITES, UNSPECIFIED LATERALITY, INITIAL ENCOUNTER: ICD-10-CM

## 2024-09-09 DIAGNOSIS — S09.90XA CLOSED HEAD INJURY, INITIAL ENCOUNTER: ICD-10-CM

## 2024-09-09 DIAGNOSIS — S16.1XXA ACUTE STRAIN OF NECK MUSCLE, INITIAL ENCOUNTER: ICD-10-CM

## 2024-09-09 DIAGNOSIS — S40.019A CONTUSION, SHOULDER AND UPPER ARM, MULTIPLE SITES, UNSPECIFIED LATERALITY, INITIAL ENCOUNTER: ICD-10-CM

## 2024-09-09 PROCEDURE — 72125 CT NECK SPINE W/O DYE: CPT

## 2024-09-09 PROCEDURE — 99284 EMERGENCY DEPT VISIT MOD MDM: CPT

## 2024-09-09 PROCEDURE — 70450 CT HEAD/BRAIN W/O DYE: CPT

## 2024-09-09 PROCEDURE — 6370000000 HC RX 637 (ALT 250 FOR IP): Performed by: NURSE PRACTITIONER

## 2024-09-09 PROCEDURE — 71045 X-RAY EXAM CHEST 1 VIEW: CPT

## 2024-09-09 PROCEDURE — 73030 X-RAY EXAM OF SHOULDER: CPT

## 2024-09-09 RX ORDER — IPRATROPIUM BROMIDE AND ALBUTEROL SULFATE 2.5; .5 MG/3ML; MG/3ML
1 SOLUTION RESPIRATORY (INHALATION) ONCE
Status: COMPLETED | OUTPATIENT
Start: 2024-09-09 | End: 2024-09-09

## 2024-09-09 RX ORDER — ACETAMINOPHEN 500 MG
1000 TABLET ORAL ONCE
Status: COMPLETED | OUTPATIENT
Start: 2024-09-09 | End: 2024-09-09

## 2024-09-09 RX ADMIN — ACETAMINOPHEN 1000 MG: 500 TABLET ORAL at 19:28

## 2024-09-09 RX ADMIN — IPRATROPIUM BROMIDE AND ALBUTEROL SULFATE 1 DOSE: 2.5; .5 SOLUTION RESPIRATORY (INHALATION) at 19:29

## 2024-09-09 ASSESSMENT — PAIN SCALES - GENERAL: PAINLEVEL_OUTOF10: 5

## 2024-09-09 ASSESSMENT — PAIN DESCRIPTION - ORIENTATION: ORIENTATION: RIGHT;LEFT

## 2024-09-09 ASSESSMENT — PAIN DESCRIPTION - LOCATION: LOCATION: HEAD

## 2024-09-09 ASSESSMENT — PAIN DESCRIPTION - DESCRIPTORS: DESCRIPTORS: ACHING

## 2024-09-10 VITALS
HEIGHT: 68 IN | OXYGEN SATURATION: 100 % | HEART RATE: 63 BPM | WEIGHT: 107 LBS | DIASTOLIC BLOOD PRESSURE: 76 MMHG | BODY MASS INDEX: 16.22 KG/M2 | RESPIRATION RATE: 20 BRPM | TEMPERATURE: 97.8 F | SYSTOLIC BLOOD PRESSURE: 190 MMHG

## 2024-09-10 PROCEDURE — 6370000000 HC RX 637 (ALT 250 FOR IP): Performed by: NURSE PRACTITIONER

## 2024-09-10 RX ORDER — OXYCODONE HYDROCHLORIDE 5 MG/1
2.5 TABLET ORAL ONCE
Status: COMPLETED | OUTPATIENT
Start: 2024-09-10 | End: 2024-09-10

## 2024-09-10 RX ADMIN — OXYCODONE HYDROCHLORIDE 2.5 MG: 5 TABLET ORAL at 00:25

## 2024-09-10 ASSESSMENT — PAIN - FUNCTIONAL ASSESSMENT: PAIN_FUNCTIONAL_ASSESSMENT: PREVENTS OR INTERFERES SOME ACTIVE ACTIVITIES AND ADLS

## 2024-09-10 ASSESSMENT — PAIN DESCRIPTION - ORIENTATION: ORIENTATION: ANTERIOR

## 2024-09-10 ASSESSMENT — PAIN DESCRIPTION - DESCRIPTORS: DESCRIPTORS: THROBBING

## 2024-09-10 ASSESSMENT — PAIN DESCRIPTION - LOCATION: LOCATION: HEAD

## 2024-09-10 ASSESSMENT — PAIN SCALES - GENERAL: PAINLEVEL_OUTOF10: 8

## 2024-09-13 ENCOUNTER — HOSPITAL ENCOUNTER (OUTPATIENT)
Dept: INFUSION THERAPY | Age: 69
Discharge: HOME | End: 2024-09-13
Payer: MEDICARE

## 2024-09-13 ENCOUNTER — OFFICE VISIT (OUTPATIENT)
Dept: ONCOLOGY | Age: 69
End: 2024-09-13
Payer: MEDICARE

## 2024-09-13 VITALS
HEIGHT: 68 IN | HEART RATE: 65 BPM | OXYGEN SATURATION: 95 % | DIASTOLIC BLOOD PRESSURE: 71 MMHG | TEMPERATURE: 98 F | WEIGHT: 113.4 LBS | BODY MASS INDEX: 17.19 KG/M2 | SYSTOLIC BLOOD PRESSURE: 163 MMHG

## 2024-09-13 DIAGNOSIS — D64.9 ACUTE ON CHRONIC ANEMIA: ICD-10-CM

## 2024-09-13 DIAGNOSIS — D50.8 OTHER IRON DEFICIENCY ANEMIA: ICD-10-CM

## 2024-09-13 DIAGNOSIS — D64.9 ANEMIA, UNSPECIFIED TYPE: Primary | ICD-10-CM

## 2024-09-13 LAB
ALBUMIN SERPL-MCNC: 3.7 G/DL (ref 3.5–5.2)
ALP SERPL-CCNC: 71 U/L (ref 35–104)
ALT SERPL-CCNC: 14 U/L (ref 0–32)
ANION GAP SERPL CALCULATED.3IONS-SCNC: 5 MMOL/L (ref 7–16)
AST SERPL-CCNC: 23 U/L (ref 0–31)
BASOPHILS # BLD: 0 K/UL (ref 0–0.2)
BASOPHILS NFR BLD: 0 % (ref 0–2)
BILIRUB SERPL-MCNC: <0.2 MG/DL (ref 0–1.2)
BUN SERPL-MCNC: 10 MG/DL (ref 6–23)
CALCIUM SERPL-MCNC: 9 MG/DL (ref 8.6–10.2)
CHLORIDE SERPL-SCNC: 101 MMOL/L (ref 98–107)
CO2 SERPL-SCNC: 34 MMOL/L (ref 22–29)
CREAT SERPL-MCNC: 0.6 MG/DL (ref 0.5–1)
EOSINOPHIL # BLD: 0 K/UL (ref 0.05–0.5)
EOSINOPHILS RELATIVE PERCENT: 0 % (ref 0–6)
ERYTHROCYTE [DISTWIDTH] IN BLOOD BY AUTOMATED COUNT: 17.7 % (ref 11.5–15)
FERRITIN SERPL-MCNC: 30 NG/ML
GFR, ESTIMATED: >90 ML/MIN/1.73M2
GLUCOSE SERPL-MCNC: 209 MG/DL (ref 74–99)
HCT VFR BLD AUTO: 35.3 % (ref 34–48)
HGB BLD-MCNC: 10.4 G/DL (ref 11.5–15.5)
IRON SATN MFR SERPL: 19 % (ref 15–50)
IRON SERPL-MCNC: 60 UG/DL (ref 37–145)
LYMPHOCYTES NFR BLD: 0.19 K/UL (ref 1.5–4)
LYMPHOCYTES RELATIVE PERCENT: 3 % (ref 20–42)
MCH RBC QN AUTO: 28.1 PG (ref 26–35)
MCHC RBC AUTO-ENTMCNC: 29.5 G/DL (ref 32–34.5)
MCV RBC AUTO: 95.4 FL (ref 80–99.9)
MONOCYTES NFR BLD: 0.13 K/UL (ref 0.1–0.95)
MONOCYTES NFR BLD: 2 % (ref 2–12)
NEUTROPHILS NFR BLD: 96 % (ref 43–80)
NEUTS SEG NFR BLD: 6.98 K/UL (ref 1.8–7.3)
PLATELET # BLD AUTO: 256 K/UL (ref 130–450)
PMV BLD AUTO: 10 FL (ref 7–12)
POTASSIUM SERPL-SCNC: 3.7 MMOL/L (ref 3.5–5)
PROT SERPL-MCNC: 6.8 G/DL (ref 6.4–8.3)
RBC # BLD AUTO: 3.7 M/UL (ref 3.5–5.5)
RBC # BLD: ABNORMAL 10*6/UL
SODIUM SERPL-SCNC: 140 MMOL/L (ref 132–146)
TIBC SERPL-MCNC: 311 UG/DL (ref 250–450)
WBC OTHER # BLD: 7.3 K/UL (ref 4.5–11.5)

## 2024-09-13 PROCEDURE — 36415 COLL VENOUS BLD VENIPUNCTURE: CPT

## 2024-09-13 PROCEDURE — 1123F ACP DISCUSS/DSCN MKR DOCD: CPT | Performed by: INTERNAL MEDICINE

## 2024-09-13 PROCEDURE — 1090F PRES/ABSN URINE INCON ASSESS: CPT | Performed by: INTERNAL MEDICINE

## 2024-09-13 PROCEDURE — G8399 PT W/DXA RESULTS DOCUMENT: HCPCS | Performed by: INTERNAL MEDICINE

## 2024-09-13 PROCEDURE — 3017F COLORECTAL CA SCREEN DOC REV: CPT | Performed by: INTERNAL MEDICINE

## 2024-09-13 PROCEDURE — 85025 COMPLETE CBC W/AUTO DIFF WBC: CPT

## 2024-09-13 PROCEDURE — 80053 COMPREHEN METABOLIC PANEL: CPT

## 2024-09-13 PROCEDURE — 3078F DIAST BP <80 MM HG: CPT | Performed by: INTERNAL MEDICINE

## 2024-09-13 PROCEDURE — 82728 ASSAY OF FERRITIN: CPT

## 2024-09-13 PROCEDURE — G8427 DOCREV CUR MEDS BY ELIG CLIN: HCPCS | Performed by: INTERNAL MEDICINE

## 2024-09-13 PROCEDURE — 3077F SYST BP >= 140 MM HG: CPT | Performed by: INTERNAL MEDICINE

## 2024-09-13 PROCEDURE — 99213 OFFICE O/P EST LOW 20 MIN: CPT | Performed by: INTERNAL MEDICINE

## 2024-09-13 PROCEDURE — 83540 ASSAY OF IRON: CPT

## 2024-09-13 PROCEDURE — G8419 CALC BMI OUT NRM PARAM NOF/U: HCPCS | Performed by: INTERNAL MEDICINE

## 2024-09-13 PROCEDURE — 1036F TOBACCO NON-USER: CPT | Performed by: INTERNAL MEDICINE

## 2024-09-13 PROCEDURE — 83550 IRON BINDING TEST: CPT

## 2024-09-19 ENCOUNTER — HOSPITAL ENCOUNTER (OUTPATIENT)
Dept: INFUSION THERAPY | Age: 69
Setting detail: INFUSION SERIES
Discharge: HOME | End: 2024-09-19
Payer: MEDICARE

## 2024-09-19 VITALS
RESPIRATION RATE: 20 BRPM | HEIGHT: 68 IN | BODY MASS INDEX: 17.43 KG/M2 | WEIGHT: 115 LBS | TEMPERATURE: 97.6 F | HEART RATE: 84 BPM | OXYGEN SATURATION: 93 % | DIASTOLIC BLOOD PRESSURE: 70 MMHG | SYSTOLIC BLOOD PRESSURE: 158 MMHG

## 2024-09-19 DIAGNOSIS — M81.0 AGE RELATED OSTEOPOROSIS, UNSPECIFIED PATHOLOGICAL FRACTURE PRESENCE: Primary | ICD-10-CM

## 2024-09-19 PROCEDURE — 96372 THER/PROPH/DIAG INJ SC/IM: CPT

## 2024-09-19 PROCEDURE — 6360000002 HC RX W HCPCS: Performed by: CLINICAL NURSE SPECIALIST

## 2024-09-19 RX ADMIN — DENOSUMAB 60 MG: 60 INJECTION SUBCUTANEOUS at 13:15

## 2024-09-19 ASSESSMENT — PAIN SCALES - GENERAL: PAINLEVEL_OUTOF10: 0

## 2024-09-20 ENCOUNTER — TELEPHONE (OUTPATIENT)
Dept: PAIN MANAGEMENT | Age: 69
End: 2024-09-20

## 2024-10-01 ENCOUNTER — TELEPHONE (OUTPATIENT)
Dept: PRIMARY CARE CLINIC | Age: 69
End: 2024-10-01

## 2024-10-01 NOTE — TELEPHONE ENCOUNTER
Pt still in rehab ([facility]).  Said she thinks they're missing something.  She just doesn't feel well.  Said they do have transport and have been taking her to her other doctor's appointments.  Advised her to schedule an appointment with Dr. Gonzalez.  She will call back and soon as she sees when they can have transport available

## 2024-10-09 ENCOUNTER — OFFICE VISIT (OUTPATIENT)
Dept: PAIN MANAGEMENT | Age: 69
End: 2024-10-09
Payer: MEDICARE

## 2024-10-09 ENCOUNTER — PREP FOR PROCEDURE (OUTPATIENT)
Dept: PAIN MANAGEMENT | Age: 69
End: 2024-10-09

## 2024-10-09 VITALS
RESPIRATION RATE: 16 BRPM | SYSTOLIC BLOOD PRESSURE: 119 MMHG | TEMPERATURE: 97.1 F | OXYGEN SATURATION: 91 % | WEIGHT: 113 LBS | BODY MASS INDEX: 17.13 KG/M2 | HEART RATE: 70 BPM | HEIGHT: 68 IN | DIASTOLIC BLOOD PRESSURE: 62 MMHG

## 2024-10-09 DIAGNOSIS — M79.10 MYALGIA: ICD-10-CM

## 2024-10-09 DIAGNOSIS — M54.42 CHRONIC BILATERAL LOW BACK PAIN WITH BILATERAL SCIATICA: ICD-10-CM

## 2024-10-09 DIAGNOSIS — M54.16 LUMBAR RADICULOPATHY: ICD-10-CM

## 2024-10-09 DIAGNOSIS — G89.4 CHRONIC PAIN SYNDROME: Primary | ICD-10-CM

## 2024-10-09 DIAGNOSIS — M54.41 CHRONIC BILATERAL LOW BACK PAIN WITH BILATERAL SCIATICA: ICD-10-CM

## 2024-10-09 DIAGNOSIS — G89.29 CHRONIC BILATERAL LOW BACK PAIN WITH BILATERAL SCIATICA: ICD-10-CM

## 2024-10-09 DIAGNOSIS — M51.9 LUMBAR DISC DISORDER: ICD-10-CM

## 2024-10-09 PROCEDURE — 1123F ACP DISCUSS/DSCN MKR DOCD: CPT | Performed by: PHYSICIAN ASSISTANT

## 2024-10-09 PROCEDURE — 3017F COLORECTAL CA SCREEN DOC REV: CPT | Performed by: PHYSICIAN ASSISTANT

## 2024-10-09 PROCEDURE — 3078F DIAST BP <80 MM HG: CPT | Performed by: PHYSICIAN ASSISTANT

## 2024-10-09 PROCEDURE — G8399 PT W/DXA RESULTS DOCUMENT: HCPCS | Performed by: PHYSICIAN ASSISTANT

## 2024-10-09 PROCEDURE — 1090F PRES/ABSN URINE INCON ASSESS: CPT | Performed by: PHYSICIAN ASSISTANT

## 2024-10-09 PROCEDURE — 99213 OFFICE O/P EST LOW 20 MIN: CPT | Performed by: PHYSICIAN ASSISTANT

## 2024-10-09 PROCEDURE — G8484 FLU IMMUNIZE NO ADMIN: HCPCS | Performed by: PHYSICIAN ASSISTANT

## 2024-10-09 PROCEDURE — G8427 DOCREV CUR MEDS BY ELIG CLIN: HCPCS | Performed by: PHYSICIAN ASSISTANT

## 2024-10-09 PROCEDURE — 3074F SYST BP LT 130 MM HG: CPT | Performed by: PHYSICIAN ASSISTANT

## 2024-10-09 PROCEDURE — 1036F TOBACCO NON-USER: CPT | Performed by: PHYSICIAN ASSISTANT

## 2024-10-09 PROCEDURE — 99213 OFFICE O/P EST LOW 20 MIN: CPT

## 2024-10-09 PROCEDURE — G8419 CALC BMI OUT NRM PARAM NOF/U: HCPCS | Performed by: PHYSICIAN ASSISTANT

## 2024-10-09 RX ORDER — PREGABALIN 75 MG/1
75 CAPSULE ORAL
COMMUNITY
Start: 2024-10-08

## 2024-10-09 NOTE — PROGRESS NOTES
Lana Phillips presents to the [facility] on 10/9/2024. Lana is complaining of pain in her right shoulder. The pain is constant. The pain is described as aching, dull, and sharp. Pain is rated on her best day at a 5, on her worst day at a 9, and on average at a 7 on the VAS scale.     Any procedures since your last visit: No    Pacemaker or defibrillator: No     She is not on NSAIDS and is  on anticoagulation medications to include ASA and is managed by Clyde Gonzalez DO  .     Medication Contract and Consent for Opioid Use Documents Filed        No documents found                    /62   Pulse 70   Temp 97.1 °F (36.2 °C) (Infrared)   Resp 16   Ht 1.727 m (5' 8\")   Wt 51.3 kg (113 lb)   SpO2 91%   BMI 17.18 kg/m²      No LMP recorded. Patient is postmenopausal.    
start shtos      Ct    abd pelvis      mass left lung base and advsied  pet scan--- and pulm consult    Pos cologuard    referred to dr dumas but pt refused to go    Pt evicted from apartment-----failed to see specialist    Admit 2022 with exacerbation of COPD,   stress test was positive atrial fibrillation started Gurinder Cody     then on  with influenza A in  for constipation    Will discharge again 1218 with right lower lobe pneumonia COPD    Pulmonary evaluation Dr. Figueroa 2023    Vit b  12  defic      Brother        69 yrs old cva--pt   now von live with his ex------last name aj---his wife Lana---- is sister with  Silvina Leyva my pt    Colon     [facility]----    Anemia     refer to hem    Numb feet      sicne --ordered  emg    Admit wit gi bleed   10-23   w ork up with dr longoria    camera swallow    Anemia-----------------------------------------------------dr Christopher davenport    Admit   with exac copd and gerardo vas dis    Arteriogram     dr BUTLER-----surgery       Admit    COPD flare    Chg  paxil to  celexa   and inc   desyrel 100 mg   3-24     Social Determinants of Health     Financial Resource Strain: Low Risk  (2023)    Overall Financial Resource Strain (CARDIA)     Difficulty of Paying Living Expenses: Not hard at all   Food Insecurity: No Food Insecurity (2024)    Hunger Vital Sign     Worried About Running Out of Food in the Last Year: Never true     Ran Out of Food in the Last Year: Never true   Transportation Needs: No Transportation Needs (2024)    PRAPARE - Transportation     Lack of Transportation (Medical): No     Lack of Transportation (Non-Medical): No   Physical Activity: Inactive (10/6/2023)    Exercise Vital Sign     Days of Exercise per Week: 0 days     Minutes of Exercise per Session: 0 min   Stress: Not on file   Social Connections: Not on file   Intimate Partner

## 2024-10-25 ENCOUNTER — TELEPHONE (OUTPATIENT)
Dept: PAIN MANAGEMENT | Age: 69
End: 2024-10-25

## 2024-10-25 NOTE — TELEPHONE ENCOUNTER
Lana is having an upcoming Transforaminal Epidural Steroid Injection with Dr. Cabrera on 11/14/2024. Would it be appropriate if patient holds Asprin 7 days prior to procedure? Please advise. Thank you

## 2024-10-28 ENCOUNTER — TELEPHONE (OUTPATIENT)
Dept: PAIN MANAGEMENT | Age: 69
End: 2024-10-28

## 2024-10-28 NOTE — TELEPHONE ENCOUNTER
Lana is having an upcoming caudal epidural steroid injection with Dr. Cabrera on 11/14/2024. Would it be acceptable to hold 7 days prior to procedure? Please advise. Thank you

## 2024-10-30 ENCOUNTER — OFFICE VISIT (OUTPATIENT)
Dept: CARDIOLOGY CLINIC | Age: 69
End: 2024-10-30

## 2024-10-30 VITALS
WEIGHT: 114 LBS | HEIGHT: 68 IN | DIASTOLIC BLOOD PRESSURE: 60 MMHG | SYSTOLIC BLOOD PRESSURE: 110 MMHG | HEART RATE: 78 BPM | BODY MASS INDEX: 17.28 KG/M2 | RESPIRATION RATE: 18 BRPM

## 2024-10-30 DIAGNOSIS — R06.02 SHORTNESS OF BREATH: ICD-10-CM

## 2024-10-30 DIAGNOSIS — J43.9 PULMONARY EMPHYSEMA, UNSPECIFIED EMPHYSEMA TYPE (HCC): ICD-10-CM

## 2024-10-30 DIAGNOSIS — I48.0 PAROXYSMAL ATRIAL FIBRILLATION (HCC): Primary | ICD-10-CM

## 2024-10-30 DIAGNOSIS — Z99.81 ON HOME O2: ICD-10-CM

## 2024-10-30 DIAGNOSIS — D64.9 ANEMIA, UNSPECIFIED TYPE: ICD-10-CM

## 2024-10-30 DIAGNOSIS — I10 PRIMARY HYPERTENSION: ICD-10-CM

## 2024-10-30 RX ORDER — YOHIMBE BARK 500 MG
CAPSULE ORAL
COMMUNITY

## 2024-10-30 NOTE — PATIENT INSTRUCTIONS
You see Dr Alan Chávez to discuss Watchman to prevent stroke from your A Fib  Call for chest pain or heart racing  
01-Mar-2020 20:44

## 2024-10-30 NOTE — PROGRESS NOTES
OFFICE VISIT     PRIMARY CARE PHYSICIAN:      Clyde Gonzalez, DO       ALLERGIES / SENSITIVITIES:        Allergies   Allergen Reactions    Pcn [Penicillins] Other (See Comments)          REVIEWED MEDICATIONS:        Current Outpatient Medications:     Lactobacillus (ACIDOPHILUS) 100 MG CAPS, Take by mouth, Disp: , Rfl:     pregabalin (LYRICA) 75 MG capsule, Take 1 capsule by mouth., Disp: , Rfl:     polyethylene glycol (GLYCOLAX) 17 g packet, Take 1 packet by mouth daily as needed for Constipation, Disp: , Rfl:     predniSONE (DELTASONE) 10 MG tablet, Take 1 tablet by mouth daily, Disp: , Rfl:     clonazePAM (KLONOPIN) 1 MG tablet, Take 0.5 tablets by mouth in the morning and 0.5 tablets in the evening. Do all this for 60 doses. Max Daily Amount: 1 mg., Disp: 30 tablet, Rfl: 0    ipratropium 0.5 mg-albuterol 2.5 mg (DUONEB) 0.5-2.5 (3) MG/3ML SOLN nebulizer solution, Take 3 mLs by nebulization every 4 hours as needed for Shortness of Breath, Disp: , Rfl:     dilTIAZem (CARDIZEM CD) 180 MG extended release capsule, Take 1 capsule by mouth daily, Disp: 30 capsule, Rfl: 0    budesonide (PULMICORT) 0.5 MG/2ML nebulizer suspension, Take 4 mLs by nebulization in the morning and 4 mLs in the evening., Disp: 60 each, Rfl: 3    arformoterol tartrate (BROVANA) 15 MCG/2ML NEBU, Take 2 mLs by nebulization in the morning and 2 mLs in the evening., Disp: 120 mL, Rfl: 3    folic acid (FOLVITE) 1 MG tablet, Take 1 tablet by mouth daily, Disp: 30 tablet, Rfl: 0    acetaminophen (TYLENOL) 325 MG tablet, Take 2 tablets by mouth every 6 hours as needed for Pain, Disp: 120 tablet, Rfl: 0    citalopram (CELEXA) 20 MG tablet, Take 1 tablet by mouth daily, Disp: , Rfl:     methIMAzole (TAPAZOLE) 10 MG tablet, Take 1.5 tablets by mouth daily, Disp: 45 tablet, Rfl: 5    aspirin 81 MG EC tablet, Take 1 tablet by mouth daily, Disp: 90 tablet, Rfl: 1    pantoprazole (PROTONIX) 40 MG tablet, Take 1 tablet by mouth every morning (before

## 2024-10-31 ENCOUNTER — TELEPHONE (OUTPATIENT)
Dept: CARDIOLOGY CLINIC | Age: 69
End: 2024-10-31

## 2024-10-31 NOTE — TELEPHONE ENCOUNTER
DR. LOPEZ REFERRAL AND APPT    I called Lana to notify her that Dr. Humphreys  referred her to Dr. Smith to discuss a possible Watchman Implant.  She is agreeable to the consultation  Location: [facility].  [address] [place] [address].  [facility].   Date & Time of Appt:  11/19/24 at 1:15 pm  Arrival Time: 15:45 pm  Pt instructed to bring insurance card, photo ID, & list of medications w/ her.

## 2024-11-06 ENCOUNTER — TELEPHONE (OUTPATIENT)
Dept: PAIN MANAGEMENT | Age: 69
End: 2024-11-06

## 2024-11-06 NOTE — TELEPHONE ENCOUNTER
Call to Lana Phillips that procedure was scheduled for 11/14/2024 and that [facility] should call her a few days before for the pre op call and between 2:00 PM and 4:00 PM  the business day before with the arrival time. Instructed Lana to hold ibuprofen for 24 hours, Celebrex, Mobic, and naprosyn for 4 days and CoQ 10, or fish oil for 7 days. Instructed to call office back if any questions. Lana verbalized understanding.    **Instructed to hold Aspirin 7 days prior to procedure per med clearance in media**    Electronically signed by Bree North RN on 11/6/2024 at 3:56 PM

## 2024-11-12 RX ORDER — DIPHENOXYLATE HYDROCHLORIDE AND ATROPINE SULFATE 2.5; .025 MG/1; MG/1
1 TABLET ORAL 4 TIMES DAILY PRN
COMMUNITY

## 2024-11-12 NOTE — PROGRESS NOTES
[facility] PAIN MANAGEMENT  INSTRUCTIONS  ...........................................................................................................................................     [x] Parking the day of Surgery is located in the Main Entrance lot.  Upon entering the door, make immediate right into the surgery reception room    [x]  Bring photo ID and insurance card     [x] You may have a light breakfast day of procedure    [x]  Wear loose comfortable clothing    [x]  Please follow instructions for medications as given per Dr's office    [x] You can expect a call the business day prior to procedure to notify you of your arrival time     [x] Please arrange for     [x]  Other instructions: Arrival time 2785

## 2024-11-12 NOTE — PROGRESS NOTES
Patient is a resident at [facility] telephone assessment completed with COSTA Todd.      A&O:  yes  Code Status:  full code  Ambulatory:  yes  Oxygen:  5L NC at all time  Hard of Hearing:  No  Call light:  Yes  Signs Documents?  Yes  POA:  None  Transport:  facility van  Wounds:  No  Isolation:  No  Lines/Drains/Implanted devices:  None      Verified arrival time of 1215 with facility.  General instructions and medication instructions faxed to facility.

## 2024-11-14 ENCOUNTER — HOSPITAL ENCOUNTER (OUTPATIENT)
Dept: GENERAL RADIOLOGY | Age: 69
Setting detail: OUTPATIENT SURGERY
Discharge: HOME | End: 2024-11-16
Attending: PAIN MEDICINE
Payer: MEDICARE

## 2024-11-14 ENCOUNTER — HOSPITAL ENCOUNTER (OUTPATIENT)
Age: 69
Setting detail: OUTPATIENT SURGERY
Discharge: HOME | End: 2024-11-14
Attending: PAIN MEDICINE | Admitting: PAIN MEDICINE
Payer: MEDICARE

## 2024-11-14 VITALS
RESPIRATION RATE: 16 BRPM | HEART RATE: 77 BPM | OXYGEN SATURATION: 99 % | BODY MASS INDEX: 17.88 KG/M2 | HEIGHT: 68 IN | SYSTOLIC BLOOD PRESSURE: 156 MMHG | WEIGHT: 118 LBS | DIASTOLIC BLOOD PRESSURE: 86 MMHG | TEMPERATURE: 97.2 F

## 2024-11-14 DIAGNOSIS — R52 PAIN MANAGEMENT: ICD-10-CM

## 2024-11-14 PROCEDURE — 3600000002 HC SURGERY LEVEL 2 BASE: Performed by: PAIN MEDICINE

## 2024-11-14 PROCEDURE — 6360000004 HC RX CONTRAST MEDICATION: Performed by: PAIN MEDICINE

## 2024-11-14 PROCEDURE — 62323 NJX INTERLAMINAR LMBR/SAC: CPT | Performed by: PAIN MEDICINE

## 2024-11-14 PROCEDURE — 2709999900 HC NON-CHARGEABLE SUPPLY: Performed by: PAIN MEDICINE

## 2024-11-14 PROCEDURE — 6360000002 HC RX W HCPCS: Performed by: PAIN MEDICINE

## 2024-11-14 PROCEDURE — 2500000003 HC RX 250 WO HCPCS: Performed by: PAIN MEDICINE

## 2024-11-14 PROCEDURE — 7100000010 HC PHASE II RECOVERY - FIRST 15 MIN: Performed by: PAIN MEDICINE

## 2024-11-14 PROCEDURE — 7100000011 HC PHASE II RECOVERY - ADDTL 15 MIN: Performed by: PAIN MEDICINE

## 2024-11-14 PROCEDURE — 2580000003 HC RX 258: Performed by: PAIN MEDICINE

## 2024-11-14 RX ORDER — IOPAMIDOL 612 MG/ML
INJECTION, SOLUTION INTRATHECAL PRN
Status: DISCONTINUED | OUTPATIENT
Start: 2024-11-14 | End: 2024-11-14 | Stop reason: ALTCHOICE

## 2024-11-14 RX ORDER — LIDOCAINE HYDROCHLORIDE 5 MG/ML
INJECTION, SOLUTION INFILTRATION; INTRAVENOUS PRN
Status: DISCONTINUED | OUTPATIENT
Start: 2024-11-14 | End: 2024-11-14 | Stop reason: ALTCHOICE

## 2024-11-14 RX ORDER — SODIUM CHLORIDE 9 MG/ML
INJECTION, SOLUTION INTRAMUSCULAR; INTRAVENOUS; SUBCUTANEOUS PRN
Status: DISCONTINUED | OUTPATIENT
Start: 2024-11-14 | End: 2024-11-14 | Stop reason: ALTCHOICE

## 2024-11-14 RX ORDER — METHYLPREDNISOLONE ACETATE 40 MG/ML
INJECTION, SUSPENSION INTRA-ARTICULAR; INTRALESIONAL; INTRAMUSCULAR; SOFT TISSUE PRN
Status: DISCONTINUED | OUTPATIENT
Start: 2024-11-14 | End: 2024-11-14 | Stop reason: ALTCHOICE

## 2024-11-14 ASSESSMENT — PAIN - FUNCTIONAL ASSESSMENT
PAIN_FUNCTIONAL_ASSESSMENT: 0-10

## 2024-11-14 NOTE — DISCHARGE INSTRUCTIONS
[facility] Pain Management Department  [place]-[phone]  Dr. Elena Cabrera   Post-Pain Block/Radiofrequency  Home Going Instructions    1-Go home, rest for the remainder of the day  2-Please do not lift over 20 pounds the day of the injection  3-If you received sedation No: alcohol, driving, operating lawn mowers, plows, tractors or other dangerous equipment until next morning. Do not make important decisions or sign legal documents for 24 hours. You may experience light headedness, dizziness, nausea or sleepiness after sedation. Do not stay alone. A responsible adult must be with you for 24 hours. You could be nauseated from the medications you have received. Your IV site may be sore and bruised.    4-No dietary restrictions     5-Resume all medications the same day, blood thinners to be resumed 24 hours after injection if you were instructed to stop any.    6-Keep the surgical site clean and dry, you may shower the next morning and remove the      dressing.     7- No sitz baths, tub baths or hot tubs/swimming for 24 hours.       8- If you have any pain at the injection site(s), application of an ice pack to the area should be       helpful, 20 minutes on/20 minutes off for next 48 hours.  9- Call [facility] immediately at if you develop.  Fever greater than 100.4 F  Have bleeding or drainage from the puncture site  Have progressive Leg/arm numbness and or weakness  Loss of control of bowel and or bladder (wet/soil yourself)  Severe headache with inability to lift head  10-You may return to work the next day

## 2024-11-14 NOTE — H&P
exac -----------------------------------------------------------------  dr Madalyn sinclair    abnormal EKG  in the hospital referred to cardiology--------dr Garcia    Early hyperthyroid      refer to endo    Vit d  defic       Vit b 12 defic   start shtos      Ct    abd pelvis      mass left lung base and advsied  pet scan--- and pulm consult    Pos cologuard    referred to dr dumas but pt refused to go    Pt evicted from apartment-----failed to see specialist    Admit 2022 with exacerbation of COPD,   stress test was positive atrial fibrillation started Eliquis    Dr Cody     then on  with influenza A in  for constipation    Will discharge again 1218 with right lower lobe pneumonia COPD    Pulmonary evaluation Dr. Figueroa 2023    Vit b  12  defic      Brother        69 yrs old cva--pt   now von live with his ex------last name aj---his wife Lana---- is sister with  Silvina Leyva my pt    Colon     [facility]----    Anemia     refer to hem    Numb feet      sicne --ordered  emg    Admit wit gi bleed   10-23   w ork up with dr longoria    camera swallow    Anemia-----------------------------------------------------dr Christopher davenport    Admit   with exac copd and gerardo vas dis    Arteriogram     dr BUTLER-----surgery       Admit    COPD flare    Chg  paxil to  celexa   and inc   desyrel 100 mg   3-24     Social Determinants of Health     Financial Resource Strain: Low Risk  (2023)    Overall Financial Resource Strain (CARDIA)     Difficulty of Paying Living Expenses: Not hard at all   Food Insecurity: No Food Insecurity (2024)    Hunger Vital Sign     Worried About Running Out of Food in the Last Year: Never true     Ran Out of Food in the Last Year: Never true   Transportation Needs: No Transportation Needs (2024)    PRAPARE - Transportation     Lack of Transportation (Medical): No     Lack of

## 2024-11-14 NOTE — OP NOTE
2024    Patient: Lana Phillips  :  1955  Age:  69 y.o.  Sex:  female     PRE-OPERATIVE DIAGNOSIS: Lumbar radiculopathy    POST-OPERATIVE DIAGNOSIS: Same.    PROCEDURE PERFORMED: Therapeutic Caudal Epidural done under fluoroscopic guidance.    SURGEON:   IMER Victoria    ANESTHESIA: local    ESTIMATED BLOOD LOSS: None.    BRIEF HISTORY: Lana Phillips comes in today for the therapeutic caudal epidural steroid injection under fluorsoscopic guidance. After discussing the potential risks and benefits of the procedure with the patient  Lana did request that we proceed. A complete History & Physical was reviewed and it is unchanged.    DESCRIPTION OF PROCEDURE:    After confirming written and informed consent, a time-out was performed and Lana’s name and date of birth, the procedure to be performed as well as the plan for the location of the needle insertion were confirmed.    Patient was brought into the procedure room and was placed in the prone position on a fluoroscopy table.  Standard monitors were placed and vital signs were observed throughout the procedure.  The lumbosacral and caudal area were prepped with chloraprep and draped in a sterile manner.  The sacral hiatus was identified and marked under AP fluoroscopy. A sterile gauze was placed in the midgluteal cleft for increased sterility. The overlying skin and subcutaneous tissues were anesthetized with 0.5% Lidocaine.  Under lateral fluoroscopic guidance, a # 22 gauge 3.5 inch spinal needle was advanced into the sacral hiatus . After the needle passed through the sacrococcygeal ligament, the needle angle was advanced slightly. There was no evidence of paresthesia throughout the needle placement. After negative aspiration for blood and CSF, epidural spread was confirmed with injection of 2 cc of Isovue-M 300 in both live lateral and live AP fluoroscopy. A solution consisting of 0.25% Lidocaine and 40 mg DepoMedrol, total of 15 cc, was

## 2024-11-15 ENCOUNTER — PROCEDURE VISIT (OUTPATIENT)
Dept: PAIN MANAGEMENT | Age: 69
End: 2024-11-15
Payer: MEDICARE

## 2024-11-15 ENCOUNTER — HOSPITAL ENCOUNTER (OUTPATIENT)
Dept: INFUSION THERAPY | Age: 69
End: 2024-11-15

## 2024-11-15 VITALS
DIASTOLIC BLOOD PRESSURE: 75 MMHG | HEIGHT: 68 IN | OXYGEN SATURATION: 94 % | SYSTOLIC BLOOD PRESSURE: 160 MMHG | TEMPERATURE: 97.5 F | HEART RATE: 86 BPM | BODY MASS INDEX: 17.58 KG/M2 | RESPIRATION RATE: 16 BRPM | WEIGHT: 116 LBS

## 2024-11-15 DIAGNOSIS — G89.29 CHRONIC RIGHT SHOULDER PAIN: Primary | ICD-10-CM

## 2024-11-15 DIAGNOSIS — M25.511 CHRONIC RIGHT SHOULDER PAIN: Primary | ICD-10-CM

## 2024-11-15 PROCEDURE — 20610 DRAIN/INJ JOINT/BURSA W/O US: CPT | Performed by: PAIN MEDICINE

## 2024-11-15 RX ORDER — METHYLPREDNISOLONE ACETATE 40 MG/ML
40 INJECTION, SUSPENSION INTRA-ARTICULAR; INTRALESIONAL; INTRAMUSCULAR; SOFT TISSUE ONCE
Status: COMPLETED | OUTPATIENT
Start: 2024-11-15 | End: 2024-11-15

## 2024-11-15 RX ORDER — BUPIVACAINE HYDROCHLORIDE 2.5 MG/ML
3 INJECTION, SOLUTION EPIDURAL; INFILTRATION; INTRACAUDAL ONCE
Status: COMPLETED | OUTPATIENT
Start: 2024-11-15 | End: 2024-11-15

## 2024-11-15 RX ADMIN — BUPIVACAINE HYDROCHLORIDE 7.5 MG: 2.5 INJECTION, SOLUTION EPIDURAL; INFILTRATION; INTRACAUDAL at 15:46

## 2024-11-15 RX ADMIN — METHYLPREDNISOLONE ACETATE 40 MG: 40 INJECTION, SUSPENSION INTRA-ARTICULAR; INTRALESIONAL; INTRAMUSCULAR; INTRASYNOVIAL; SOFT TISSUE at 15:46

## 2024-11-15 NOTE — PROGRESS NOTES
11/15/2024    Patient: Lana Phillips  :  1955  Age:  69 y.o.  Sex:  female     PRE-OPERATIVE DIAGNOSIS: Right   Shoulder pain, osteoarthritis.    POST-OPERATIVE DIAGNOSIS: Same.    PROCEDURE PERFORMED: Right  Shoulder steroid injection.    SURGEON:   KRISS Cabrera D.O.    ANESTHESIA: local    ESTIMATED BLOOD LOSS: None.    BRIEF HISTORY: Lana Phillips comes in today for Right  Shoulder steroid injection. After discussing the potential risks and benefits of the procedure with the patient.  Lana did request that we proceed. A complete History & Physical was reviewed and it is unchanged.    DESCRIPTION OF PROCEDURE:   The patient was placed in a seated position. The area of the Right  shoulder was prepped with chloraprep and draped in a sterile manner. Using the posterior approach, a 25 gauge 1 1/2 inch  needle was advanced  In anterolateral projection into the joint capsule. After negative aspiration a solution of 0.25 % marcaine 3 cc and 40 mg DepoMedrol was injected easily without complications. The needle was then removed and Band-Aid applied.    Disposition the patient tolerated the procedure well and there were no complications .     Lana will follow up in our comprehensive Pain Management Center as scheduled. She was encouraged to call with questions, concerns or if worsening of symptoms occurs.    Lilia Cabrera DO

## 2024-11-15 NOTE — PROGRESS NOTES
11/15/2024    Chief Complaint   Patient presents with    Injections     Right shoulder injection        Allergies as of 11/15/2024 - Fully Reviewed 11/15/2024   Allergen Reaction Noted    Pcn [penicillins] Other (See Comments) 02/22/2021        Procedure: Right shoulder injection      Y  consent signed Y  procedure verified with patient  Y  allergies noted Y  pt confirms not pregnant    Patient rates pain a 8/10 on the VAS scale.    Skin Prep:  ChloraPrep    Anesthetic:  n/a    Medication:  Marcaine 0.25% and DepMedrol    Vitals:    11/15/24 1501   BP: (!) 160/75   Pulse: 86   Resp: 16   Temp: 97.5 °F (36.4 °C)   TempSrc: Infrared   SpO2: 94%   Weight: 52.6 kg (116 lb)   Height: 1.727 m (5' 8\")       I witnessed patient signing consent to Medical Procedure and Treatment form.     Baldemar Herrera RN

## 2024-11-19 ENCOUNTER — OFFICE VISIT (OUTPATIENT)
Dept: CARDIOLOGY CLINIC | Age: 69
End: 2024-11-19
Payer: MEDICARE

## 2024-11-19 VITALS
HEIGHT: 68 IN | DIASTOLIC BLOOD PRESSURE: 78 MMHG | BODY MASS INDEX: 17.58 KG/M2 | RESPIRATION RATE: 18 BRPM | WEIGHT: 116 LBS | SYSTOLIC BLOOD PRESSURE: 136 MMHG | HEART RATE: 67 BPM

## 2024-11-19 DIAGNOSIS — I48.0 PAROXYSMAL ATRIAL FIBRILLATION (HCC): Primary | ICD-10-CM

## 2024-11-19 PROCEDURE — 1090F PRES/ABSN URINE INCON ASSESS: CPT | Performed by: INTERNAL MEDICINE

## 2024-11-19 PROCEDURE — G8419 CALC BMI OUT NRM PARAM NOF/U: HCPCS | Performed by: INTERNAL MEDICINE

## 2024-11-19 PROCEDURE — 99205 OFFICE O/P NEW HI 60 MIN: CPT | Performed by: INTERNAL MEDICINE

## 2024-11-19 PROCEDURE — 3078F DIAST BP <80 MM HG: CPT | Performed by: INTERNAL MEDICINE

## 2024-11-19 PROCEDURE — G8484 FLU IMMUNIZE NO ADMIN: HCPCS | Performed by: INTERNAL MEDICINE

## 2024-11-19 PROCEDURE — G8427 DOCREV CUR MEDS BY ELIG CLIN: HCPCS | Performed by: INTERNAL MEDICINE

## 2024-11-19 PROCEDURE — 1123F ACP DISCUSS/DSCN MKR DOCD: CPT | Performed by: INTERNAL MEDICINE

## 2024-11-19 PROCEDURE — G8399 PT W/DXA RESULTS DOCUMENT: HCPCS | Performed by: INTERNAL MEDICINE

## 2024-11-19 PROCEDURE — 1159F MED LIST DOCD IN RCRD: CPT | Performed by: INTERNAL MEDICINE

## 2024-11-19 PROCEDURE — 3017F COLORECTAL CA SCREEN DOC REV: CPT | Performed by: INTERNAL MEDICINE

## 2024-11-19 PROCEDURE — 3075F SYST BP GE 130 - 139MM HG: CPT | Performed by: INTERNAL MEDICINE

## 2024-11-19 PROCEDURE — 1036F TOBACCO NON-USER: CPT | Performed by: INTERNAL MEDICINE

## 2024-11-19 PROCEDURE — 93000 ELECTROCARDIOGRAM COMPLETE: CPT | Performed by: INTERNAL MEDICINE

## 2024-11-19 ASSESSMENT — ENCOUNTER SYMPTOMS
NAUSEA: 0
BLOOD IN STOOL: 0
BACK PAIN: 0
COUGH: 0
VOMITING: 0
SHORTNESS OF BREATH: 1
ABDOMINAL PAIN: 0
CHEST TIGHTNESS: 0

## 2024-11-19 NOTE — PROGRESS NOTES
morning (before breakfast), Disp: 30 tablet, Rfl: 5    vitamin B-12 (CYANOCOBALAMIN) 500 MCG tablet, Take 1 tablet by mouth daily, Disp: , Rfl:       REVIEW OF SYSTEMS:   Review of Systems   Constitutional:  Negative for chills, fatigue and fever.   HENT:  Negative for nosebleeds.    Respiratory:  Positive for shortness of breath. Negative for cough and chest tightness.    Cardiovascular:  Negative for chest pain, palpitations and leg swelling.   Gastrointestinal:  Negative for abdominal pain, blood in stool, nausea and vomiting.   Genitourinary:  Negative for hematuria.   Musculoskeletal:  Negative for back pain and myalgias.   Skin: Negative.    Neurological:  Negative for dizziness, syncope, speech difficulty, weakness and light-headedness.   Hematological:  Does not bruise/bleed easily.   Psychiatric/Behavioral: Negative.            PHYSICAL EXAMINATION:   VITAL SIGNS: /78   Resp 18   Ht 1.727 m (5' 8\")   Wt 52.6 kg (116 lb)   BMI 17.64 kg/m²     GENERAL: NAD   HEAD: Normocephalic, atraumatic.   NECK: No JVD. No carotid bruits. No neck masses.    CARDIOVASCULAR: Normal rate, regular rhythm, normal S1, S2, no MRG   LUNGS: Distant lung sounds bilaterally, no wheezing.    ABDOMEN: Abdomen is soft and not distended. No tenderness.    EXTREMITIES: There is no pedal edema.   MUSCULOSKELETAL: No joint swelling. Normal range of motion    SKIN: Skin is moist. No ulcers or lesions.   NEUROLOGICAL: No evidence of obvious neurological deficits.    PSYCHOLOGICAL: Patient is in normal mood.          LABORATORY DATA:   Lab Results   Component Value Date/Time    WBC 7.3 09/13/2024 01:19 PM    HGB 10.4 09/13/2024 01:19 PM    HCT 35.3 09/13/2024 01:19 PM     09/13/2024 01:19 PM      Lab Results   Component Value Date/Time     09/13/2024 01:19 PM    K 3.7 09/13/2024 01:19 PM    K 3.3 12/30/2022 02:49 AM    CO2 34 09/13/2024 01:19 PM    BUN 10 09/13/2024 01:19 PM    CREATININE 0.6 09/13/2024 01:19 PM    MG

## 2024-11-22 DIAGNOSIS — I48.0 PAROXYSMAL ATRIAL FIBRILLATION (HCC): ICD-10-CM

## 2024-11-22 DIAGNOSIS — Z91.89 AT HIGH RISK FOR BLEEDING: Primary | ICD-10-CM

## 2024-11-26 DIAGNOSIS — D64.9 ACUTE ON CHRONIC ANEMIA: Primary | ICD-10-CM

## 2024-12-03 ENCOUNTER — HOSPITAL ENCOUNTER (OUTPATIENT)
Dept: INFUSION THERAPY | Age: 69
End: 2024-12-03

## 2024-12-04 ENCOUNTER — OFFICE VISIT (OUTPATIENT)
Dept: PAIN MANAGEMENT | Age: 69
End: 2024-12-04
Payer: MEDICARE

## 2024-12-04 VITALS
WEIGHT: 116 LBS | RESPIRATION RATE: 16 BRPM | TEMPERATURE: 98.1 F | OXYGEN SATURATION: 92 % | SYSTOLIC BLOOD PRESSURE: 111 MMHG | DIASTOLIC BLOOD PRESSURE: 58 MMHG | HEART RATE: 71 BPM | BODY MASS INDEX: 17.58 KG/M2 | HEIGHT: 68 IN

## 2024-12-04 DIAGNOSIS — M79.10 MYALGIA: ICD-10-CM

## 2024-12-04 DIAGNOSIS — M54.41 CHRONIC BILATERAL LOW BACK PAIN WITH BILATERAL SCIATICA: ICD-10-CM

## 2024-12-04 DIAGNOSIS — M54.16 LUMBAR RADICULOPATHY: ICD-10-CM

## 2024-12-04 DIAGNOSIS — M51.9 LUMBAR DISC DISORDER: ICD-10-CM

## 2024-12-04 DIAGNOSIS — M25.511 CHRONIC RIGHT SHOULDER PAIN: Primary | ICD-10-CM

## 2024-12-04 DIAGNOSIS — M54.42 CHRONIC BILATERAL LOW BACK PAIN WITH BILATERAL SCIATICA: ICD-10-CM

## 2024-12-04 DIAGNOSIS — G89.29 CHRONIC RIGHT SHOULDER PAIN: Primary | ICD-10-CM

## 2024-12-04 DIAGNOSIS — G89.29 CHRONIC BILATERAL LOW BACK PAIN WITH BILATERAL SCIATICA: ICD-10-CM

## 2024-12-04 DIAGNOSIS — G89.4 CHRONIC PAIN SYNDROME: ICD-10-CM

## 2024-12-04 PROCEDURE — G8399 PT W/DXA RESULTS DOCUMENT: HCPCS | Performed by: PHYSICIAN ASSISTANT

## 2024-12-04 PROCEDURE — 99213 OFFICE O/P EST LOW 20 MIN: CPT | Performed by: PHYSICIAN ASSISTANT

## 2024-12-04 PROCEDURE — 1159F MED LIST DOCD IN RCRD: CPT | Performed by: PHYSICIAN ASSISTANT

## 2024-12-04 PROCEDURE — 99213 OFFICE O/P EST LOW 20 MIN: CPT

## 2024-12-04 PROCEDURE — G8484 FLU IMMUNIZE NO ADMIN: HCPCS | Performed by: PHYSICIAN ASSISTANT

## 2024-12-04 PROCEDURE — 3078F DIAST BP <80 MM HG: CPT | Performed by: PHYSICIAN ASSISTANT

## 2024-12-04 PROCEDURE — G8419 CALC BMI OUT NRM PARAM NOF/U: HCPCS | Performed by: PHYSICIAN ASSISTANT

## 2024-12-04 PROCEDURE — G8427 DOCREV CUR MEDS BY ELIG CLIN: HCPCS | Performed by: PHYSICIAN ASSISTANT

## 2024-12-04 PROCEDURE — 1160F RVW MEDS BY RX/DR IN RCRD: CPT | Performed by: PHYSICIAN ASSISTANT

## 2024-12-04 PROCEDURE — 1090F PRES/ABSN URINE INCON ASSESS: CPT | Performed by: PHYSICIAN ASSISTANT

## 2024-12-04 PROCEDURE — 3017F COLORECTAL CA SCREEN DOC REV: CPT | Performed by: PHYSICIAN ASSISTANT

## 2024-12-04 PROCEDURE — 1036F TOBACCO NON-USER: CPT | Performed by: PHYSICIAN ASSISTANT

## 2024-12-04 PROCEDURE — 3074F SYST BP LT 130 MM HG: CPT | Performed by: PHYSICIAN ASSISTANT

## 2024-12-04 PROCEDURE — 1123F ACP DISCUSS/DSCN MKR DOCD: CPT | Performed by: PHYSICIAN ASSISTANT

## 2024-12-04 NOTE — PATIENT INSTRUCTIONS
Pain Management Department    Sacroiliac Joint Injection:  Sacroiliac joint injection is an outpatient procedure for treating low back and buttocks pain. The sacroiliac joints connect the bottom of the spine, called the sacrum, to the outer part of the hip bone. Sacroiliac joints help control your hip area when you move. They help transfer forces from your lower body to your upper body. Trauma and pregnancy can damage the sacroiliac joint. Immediate pain relief is a sign that the sacroiliac joint is the source of your pain.    Pre-procedure Instructions:  Your current medications list will be reviewed and you will be instructed on which medications to discontinue before the procedure.  lf sedation is administered, an IV will be started and you will be required to fast before procedure (eight hours).  A  will be required if sedation is administered.    Procedure:  A local anesthetic will be used to numb your skin. A needle will be advanced under X-ray to the target joint. Once needle is in the correct position, dye will be injected to confirm proper position. A mixture of a steroid and a local anesthetic will then be injected. The needle will then be removed and Band-Aid will be applied.    Post-procedure:  You will be monitored in the recovery room for up to 20 minutes after the injection, when you are ready to leave, the staff will give you the discharge instructions.

## 2024-12-04 NOTE — PROGRESS NOTES
Lana Phillips presents to the [facility] on 12/4/2024. Lana is complaining of pain in her low back, feels numb. The pain is constant. The pain is described as numb. Pain is rated on her best day at a 6, on her worst day at a 8, and on average at a 7 on the VAS scale. She took her last dose of Lyrica today.     Any procedures since your last visit: Yes, with 60 % relief.    Pacemaker or defibrillator: No managed by na.    She is not on NSAIDS and is  on anticoagulation medications to include ASA and is managed by na.     Do you want someone present when the provider examines you? No    Medication Contract and Consent for Opioid Use Documents Filed        No documents found                    There were no vitals taken for this visit.     No LMP recorded. Patient is postmenopausal.    
medications    Medication Sig Start Date End Date Taking? Authorizing Provider   diphenoxylate-atropine (LOMOTIL) 2.5-0.025 MG per tablet Take 1 tablet by mouth 4 times daily as needed for Diarrhea.   Yes Freida Welsh MD   Lactobacillus (ACIDOPHILUS) 100 MG CAPS Take by mouth   Yes Freida Welsh MD   pregabalin (LYRICA) 75 MG capsule Take 1 capsule by mouth. 10/8/24  Yes Freida Welsh MD   polyethylene glycol (GLYCOLAX) 17 g packet Take 1 packet by mouth daily as needed for Constipation   Yes Freida Welsh MD   predniSONE (DELTASONE) 10 MG tablet Take 1 tablet by mouth daily   Yes Freida Welsh MD   ipratropium 0.5 mg-albuterol 2.5 mg (DUONEB) 0.5-2.5 (3) MG/3ML SOLN nebulizer solution Take 3 mLs by nebulization every 4 hours as needed for Shortness of Breath   Yes Freida Welsh MD   dilTIAZem (CARDIZEM CD) 180 MG extended release capsule Take 1 capsule by mouth daily 4/23/24  Yes Baldemar Jarrett, DO   budesonide (PULMICORT) 0.5 MG/2ML nebulizer suspension Take 4 mLs by nebulization in the morning and 4 mLs in the evening. 4/22/24  Yes Jose Price MD   arformoterol tartrate (BROVANA) 15 MCG/2ML NEBU Take 2 mLs by nebulization in the morning and 2 mLs in the evening. 4/22/24  Yes Jose Price MD   folic acid (FOLVITE) 1 MG tablet Take 1 tablet by mouth daily 4/11/24  Yes Baldemar Jarrett,    acetaminophen (TYLENOL) 325 MG tablet Take 2 tablets by mouth every 6 hours as needed for Pain 3/19/24  Yes Baldemar Jarrett, DO   citalopram (CELEXA) 20 MG tablet Take 1 tablet by mouth daily   Yes Freida Welsh MD   methIMAzole (TAPAZOLE) 10 MG tablet Take 1.5 tablets by mouth daily 3/6/24  Yes Robert Delgado MD   aspirin 81 MG EC tablet Take 1 tablet by mouth daily 12/25/23  Yes Baldemar Jarrett, DO   pantoprazole (PROTONIX) 40 MG tablet Take 1 tablet by mouth every morning (before breakfast) 12/9/23  Yes Clyde Gonzalez, DO   vitamin B-12

## 2024-12-05 DIAGNOSIS — D64.9 ANEMIA, UNSPECIFIED TYPE: Primary | ICD-10-CM

## 2024-12-06 ENCOUNTER — HOSPITAL ENCOUNTER (OUTPATIENT)
Dept: INFUSION THERAPY | Age: 69
Discharge: HOME | End: 2024-12-06
Payer: MEDICARE

## 2024-12-06 ENCOUNTER — OFFICE VISIT (OUTPATIENT)
Dept: ONCOLOGY | Age: 69
End: 2024-12-06
Payer: MEDICARE

## 2024-12-06 VITALS
HEIGHT: 68 IN | SYSTOLIC BLOOD PRESSURE: 162 MMHG | DIASTOLIC BLOOD PRESSURE: 72 MMHG | HEART RATE: 69 BPM | WEIGHT: 117.4 LBS | OXYGEN SATURATION: 94 % | BODY MASS INDEX: 17.79 KG/M2 | TEMPERATURE: 97 F

## 2024-12-06 DIAGNOSIS — D50.8 OTHER IRON DEFICIENCY ANEMIA: ICD-10-CM

## 2024-12-06 DIAGNOSIS — D64.9 ANEMIA, UNSPECIFIED TYPE: Primary | ICD-10-CM

## 2024-12-06 DIAGNOSIS — D64.9 ACUTE ON CHRONIC ANEMIA: ICD-10-CM

## 2024-12-06 DIAGNOSIS — D64.9 ANEMIA, UNSPECIFIED TYPE: ICD-10-CM

## 2024-12-06 LAB
ALBUMIN SERPL-MCNC: 4 G/DL (ref 3.5–5.2)
ALP SERPL-CCNC: 67 U/L (ref 35–104)
ALT SERPL-CCNC: 16 U/L (ref 0–32)
ANION GAP SERPL CALCULATED.3IONS-SCNC: 6 MMOL/L (ref 7–16)
AST SERPL-CCNC: 25 U/L (ref 0–31)
BASOPHILS # BLD: 0.06 K/UL (ref 0–0.2)
BASOPHILS NFR BLD: 1 % (ref 0–2)
BILIRUB SERPL-MCNC: 0.2 MG/DL (ref 0–1.2)
BUN SERPL-MCNC: 13 MG/DL (ref 6–23)
CALCIUM SERPL-MCNC: 9.9 MG/DL (ref 8.6–10.2)
CHLORIDE SERPL-SCNC: 96 MMOL/L (ref 98–107)
CO2 SERPL-SCNC: 39 MMOL/L (ref 22–29)
CREAT SERPL-MCNC: 0.7 MG/DL (ref 0.5–1)
EOSINOPHIL # BLD: 0.1 K/UL (ref 0.05–0.5)
EOSINOPHILS RELATIVE PERCENT: 1 % (ref 0–6)
ERYTHROCYTE [DISTWIDTH] IN BLOOD BY AUTOMATED COUNT: 15 % (ref 11.5–15)
FERRITIN SERPL-MCNC: 37 NG/ML
GFR, ESTIMATED: >90 ML/MIN/1.73M2
GLUCOSE SERPL-MCNC: 108 MG/DL (ref 74–99)
HCT VFR BLD AUTO: 37.5 % (ref 34–48)
HGB BLD-MCNC: 10.8 G/DL (ref 11.5–15.5)
IMM GRANULOCYTES # BLD AUTO: 0.04 K/UL (ref 0–0.58)
IMM GRANULOCYTES NFR BLD: 0 % (ref 0–5)
IRON SATN MFR SERPL: 10 % (ref 15–50)
IRON SERPL-MCNC: 36 UG/DL (ref 37–145)
LYMPHOCYTES NFR BLD: 0.57 K/UL (ref 1.5–4)
LYMPHOCYTES RELATIVE PERCENT: 5 % (ref 20–42)
MCH RBC QN AUTO: 29.3 PG (ref 26–35)
MCHC RBC AUTO-ENTMCNC: 28.8 G/DL (ref 32–34.5)
MCV RBC AUTO: 101.6 FL (ref 80–99.9)
MONOCYTES NFR BLD: 0.32 K/UL (ref 0.1–0.95)
MONOCYTES NFR BLD: 3 % (ref 2–12)
NEUTROPHILS NFR BLD: 90 % (ref 43–80)
NEUTS SEG NFR BLD: 9.57 K/UL (ref 1.8–7.3)
PLATELET # BLD AUTO: 257 K/UL (ref 130–450)
PMV BLD AUTO: 10.2 FL (ref 7–12)
POTASSIUM SERPL-SCNC: 4.8 MMOL/L (ref 3.5–5)
PROT SERPL-MCNC: 7.3 G/DL (ref 6.4–8.3)
RBC # BLD AUTO: 3.69 M/UL (ref 3.5–5.5)
RBC # BLD: ABNORMAL 10*6/UL
SODIUM SERPL-SCNC: 141 MMOL/L (ref 132–146)
TIBC SERPL-MCNC: 378 UG/DL (ref 250–450)
WBC OTHER # BLD: 10.7 K/UL (ref 4.5–11.5)

## 2024-12-06 PROCEDURE — G8419 CALC BMI OUT NRM PARAM NOF/U: HCPCS | Performed by: INTERNAL MEDICINE

## 2024-12-06 PROCEDURE — 99214 OFFICE O/P EST MOD 30 MIN: CPT | Performed by: INTERNAL MEDICINE

## 2024-12-06 PROCEDURE — 1123F ACP DISCUSS/DSCN MKR DOCD: CPT | Performed by: INTERNAL MEDICINE

## 2024-12-06 PROCEDURE — 1159F MED LIST DOCD IN RCRD: CPT | Performed by: INTERNAL MEDICINE

## 2024-12-06 PROCEDURE — G8484 FLU IMMUNIZE NO ADMIN: HCPCS | Performed by: INTERNAL MEDICINE

## 2024-12-06 PROCEDURE — 3077F SYST BP >= 140 MM HG: CPT | Performed by: INTERNAL MEDICINE

## 2024-12-06 PROCEDURE — 83540 ASSAY OF IRON: CPT

## 2024-12-06 PROCEDURE — 80053 COMPREHEN METABOLIC PANEL: CPT

## 2024-12-06 PROCEDURE — 1036F TOBACCO NON-USER: CPT | Performed by: INTERNAL MEDICINE

## 2024-12-06 PROCEDURE — 36415 COLL VENOUS BLD VENIPUNCTURE: CPT

## 2024-12-06 PROCEDURE — 3017F COLORECTAL CA SCREEN DOC REV: CPT | Performed by: INTERNAL MEDICINE

## 2024-12-06 PROCEDURE — 3078F DIAST BP <80 MM HG: CPT | Performed by: INTERNAL MEDICINE

## 2024-12-06 PROCEDURE — 82728 ASSAY OF FERRITIN: CPT

## 2024-12-06 PROCEDURE — 1090F PRES/ABSN URINE INCON ASSESS: CPT | Performed by: INTERNAL MEDICINE

## 2024-12-06 PROCEDURE — G8399 PT W/DXA RESULTS DOCUMENT: HCPCS | Performed by: INTERNAL MEDICINE

## 2024-12-06 PROCEDURE — 85025 COMPLETE CBC W/AUTO DIFF WBC: CPT

## 2024-12-06 PROCEDURE — 83550 IRON BINDING TEST: CPT

## 2024-12-06 PROCEDURE — 1160F RVW MEDS BY RX/DR IN RCRD: CPT | Performed by: INTERNAL MEDICINE

## 2024-12-06 PROCEDURE — G8427 DOCREV CUR MEDS BY ELIG CLIN: HCPCS | Performed by: INTERNAL MEDICINE

## 2024-12-06 NOTE — PROGRESS NOTES
Patient received AVS  
Normocytic anemia, the patient has a history of positive fit test, EGD and colonoscopy were negative for malignancy/bleeding, repeat fit test is positive, the patient was referred to GI for a capsule endoscopy.  Recommended a work-up to evaluate for nutritional deficiencies, hemolysis, the hyperthyroidism could also be contributing to the anemia.  The work-up results with the patient, she has evidence of iron deficiency, recommended oral iron, side effects reviewed with the patient, no vitamin B12 deficiency, folate, zinc or copper deficiency, SPEP is negative for monoclonal proteins, SPEP is negative, Coomb's is negative, the patient was referred to GI for capsule endoscopy, they called her but she did not get scheduled yet, she will call them back and get an appointment scheduled.  She is tolerating the oral iron well overall, labs reviewed, hemoglobin had improved from 9.9G/DL to 11.1 G/DL, iron and TSAT had normalized, she had an EGD done on 10/16/2023, revealing mild erosive gastritis with ulcerations no signs of bleeding.  The patient had a capsule endoscopy done, results were requested again.  The patient was recently admitted to the hospital with COPD for the patient, hospitalization records reviewed, she had profound anemia requiring packed RBCs transfusion, iron studies were consistent with iron deficiency.  The patient received IV iron.    The patient doing better clinically, gaining weight, she denies any bleeding, labs reviewed, she has anemia hemoglobin 10.8 G/DL, hematocrit 37.8, .6, iron studies reviewed, consistent with iron deficiency, iron 36, TSAT 10%, the patient had constipation from the oral iron, recommended IV iron, will monitor her counts and iron panel, the patient is scheduled for a colonoscopy in [place], await results, capsule endoscopy records have been requested.     RTC in 2 months.    Thank you for allowing us to participate in the care of Mrs. Phillips.    CEDRIC VARELA MD

## 2024-12-10 DIAGNOSIS — Z91.89 AT HIGH RISK FOR BLEEDING: ICD-10-CM

## 2024-12-10 DIAGNOSIS — I48.0 PAF (PAROXYSMAL ATRIAL FIBRILLATION) (HCC): Primary | ICD-10-CM

## 2024-12-11 ENCOUNTER — TELEPHONE (OUTPATIENT)
Dept: CARDIOLOGY CLINIC | Age: 69
End: 2024-12-11

## 2024-12-11 NOTE — TELEPHONE ENCOUNTER
WATCHMAN CTA INSTRUCTIONS:    I called & spoke to Archana (nurse) at [facility] to see if she received Lana's Watchman CTA Instructions.  She said she did  Location: [facility]  Date & Time: 12/20/24 at 1:00 pm  NPO after 9:00 am  Arrival Time:  11:30 am  May take am medications with a sip of water  More detailed instructions faxed to [facility] at [phone] w/ fax confirmation obtained & will be scanned into Epic.

## 2024-12-16 ENCOUNTER — HOSPITAL ENCOUNTER (OUTPATIENT)
Dept: INFUSION THERAPY | Age: 69
End: 2024-12-16

## 2024-12-18 ENCOUNTER — TELEPHONE (OUTPATIENT)
Dept: PAIN MANAGEMENT | Age: 69
End: 2024-12-18

## 2024-12-18 DIAGNOSIS — M53.3 DISORDER OF SACRUM: ICD-10-CM

## 2024-12-18 RX ORDER — ALBUTEROL SULFATE 90 UG/1
4 INHALANT RESPIRATORY (INHALATION) PRN
Status: CANCELLED | OUTPATIENT
Start: 2024-12-18

## 2024-12-18 RX ORDER — ACETAMINOPHEN 325 MG/1
650 TABLET ORAL
Status: CANCELLED | OUTPATIENT
Start: 2024-12-18

## 2024-12-18 RX ORDER — DIPHENHYDRAMINE HYDROCHLORIDE 50 MG/ML
50 INJECTION INTRAMUSCULAR; INTRAVENOUS
Status: CANCELLED | OUTPATIENT
Start: 2024-12-18

## 2024-12-18 RX ORDER — HYDROCORTISONE SODIUM SUCCINATE 100 MG/2ML
100 INJECTION INTRAMUSCULAR; INTRAVENOUS
Status: CANCELLED | OUTPATIENT
Start: 2024-12-18

## 2024-12-18 RX ORDER — SODIUM CHLORIDE 9 MG/ML
INJECTION, SOLUTION INTRAVENOUS CONTINUOUS
Status: CANCELLED | OUTPATIENT
Start: 2024-12-18

## 2024-12-18 RX ORDER — SODIUM CHLORIDE 9 MG/ML
5-250 INJECTION, SOLUTION INTRAVENOUS PRN
Status: CANCELLED | OUTPATIENT
Start: 2024-12-18

## 2024-12-18 RX ORDER — ONDANSETRON 2 MG/ML
8 INJECTION INTRAMUSCULAR; INTRAVENOUS
Status: CANCELLED | OUTPATIENT
Start: 2024-12-18

## 2024-12-18 RX ORDER — EPINEPHRINE 1 MG/ML
0.3 INJECTION, SOLUTION, CONCENTRATE INTRAVENOUS PRN
Status: CANCELLED | OUTPATIENT
Start: 2024-12-18

## 2024-12-18 RX ORDER — HEPARIN 100 UNIT/ML
500 SYRINGE INTRAVENOUS PRN
Status: CANCELLED | OUTPATIENT
Start: 2024-12-18

## 2024-12-18 NOTE — TELEPHONE ENCOUNTER
Call to Lana Phillips & spoke with RAMO Magaña from [facility] that procedure was scheduled for 12/26/2024 and that [facility] should call her a few days before for the pre op call and between 2:00 PM and 4:00 PM  the business day before with the arrival time. Instructed Archana that Lana is to hold ibuprofen for 24 hours, Celebrex, Mobic, and naprosyn for 4 days and any CoQ 10, or fish oil for 7 days. Instructed to call office back if any questions. Archana verbalized understanding.    **Instructed Archana that Lana is to hold Aspirin 7 days prior to procedure per med clearance in media**    Electronically signed by Bree North RN on 12/18/2024 at 12:25 PM

## 2024-12-19 ENCOUNTER — HOSPITAL ENCOUNTER (OUTPATIENT)
Dept: INFUSION THERAPY | Age: 69
Discharge: HOME | End: 2024-12-19
Payer: MEDICARE

## 2024-12-19 VITALS
DIASTOLIC BLOOD PRESSURE: 75 MMHG | OXYGEN SATURATION: 99 % | SYSTOLIC BLOOD PRESSURE: 172 MMHG | HEART RATE: 68 BPM | TEMPERATURE: 98.2 F | RESPIRATION RATE: 16 BRPM

## 2024-12-19 DIAGNOSIS — D64.9 ACUTE ON CHRONIC ANEMIA: ICD-10-CM

## 2024-12-19 DIAGNOSIS — D50.9 IRON DEFICIENCY ANEMIA, UNSPECIFIED IRON DEFICIENCY ANEMIA TYPE: Primary | ICD-10-CM

## 2024-12-19 PROCEDURE — 96374 THER/PROPH/DIAG INJ IV PUSH: CPT

## 2024-12-19 PROCEDURE — 96375 TX/PRO/DX INJ NEW DRUG ADDON: CPT

## 2024-12-19 PROCEDURE — 2500000003 HC RX 250 WO HCPCS: Performed by: INTERNAL MEDICINE

## 2024-12-19 PROCEDURE — 6360000002 HC RX W HCPCS: Performed by: INTERNAL MEDICINE

## 2024-12-19 RX ORDER — SODIUM CHLORIDE 9 MG/ML
5-250 INJECTION, SOLUTION INTRAVENOUS PRN
Status: CANCELLED | OUTPATIENT
Start: 2024-12-22

## 2024-12-19 RX ORDER — SODIUM CHLORIDE 0.9 % (FLUSH) 0.9 %
5-40 SYRINGE (ML) INJECTION PRN
Status: CANCELLED | OUTPATIENT
Start: 2024-12-22

## 2024-12-19 RX ORDER — SODIUM CHLORIDE 0.9 % (FLUSH) 0.9 %
5-40 SYRINGE (ML) INJECTION PRN
Status: DISCONTINUED | OUTPATIENT
Start: 2024-12-19 | End: 2024-12-20 | Stop reason: HOSPADM

## 2024-12-19 RX ORDER — ACETAMINOPHEN 325 MG/1
650 TABLET ORAL
Status: CANCELLED | OUTPATIENT
Start: 2024-12-22

## 2024-12-19 RX ORDER — SODIUM CHLORIDE 9 MG/ML
INJECTION, SOLUTION INTRAVENOUS CONTINUOUS
Status: CANCELLED | OUTPATIENT
Start: 2024-12-22

## 2024-12-19 RX ORDER — HYDROCORTISONE SODIUM SUCCINATE 100 MG/2ML
100 INJECTION INTRAMUSCULAR; INTRAVENOUS
Status: CANCELLED | OUTPATIENT
Start: 2024-12-22

## 2024-12-19 RX ORDER — DIPHENHYDRAMINE HYDROCHLORIDE 50 MG/ML
50 INJECTION INTRAMUSCULAR; INTRAVENOUS
Status: CANCELLED | OUTPATIENT
Start: 2024-12-22

## 2024-12-19 RX ORDER — HEPARIN 100 UNIT/ML
500 SYRINGE INTRAVENOUS PRN
Status: CANCELLED | OUTPATIENT
Start: 2024-12-22

## 2024-12-19 RX ORDER — ALBUTEROL SULFATE 90 UG/1
4 INHALANT RESPIRATORY (INHALATION) PRN
Status: CANCELLED | OUTPATIENT
Start: 2024-12-22

## 2024-12-19 RX ORDER — EPINEPHRINE 1 MG/ML
0.3 INJECTION, SOLUTION, CONCENTRATE INTRAVENOUS PRN
Status: CANCELLED | OUTPATIENT
Start: 2024-12-22

## 2024-12-19 RX ORDER — ONDANSETRON 2 MG/ML
8 INJECTION INTRAMUSCULAR; INTRAVENOUS
Status: CANCELLED | OUTPATIENT
Start: 2024-12-22

## 2024-12-19 RX ADMIN — SODIUM CHLORIDE, PRESERVATIVE FREE 10 ML: 5 INJECTION INTRAVENOUS at 11:37

## 2024-12-19 RX ADMIN — SODIUM CHLORIDE, PRESERVATIVE FREE 10 ML: 5 INJECTION INTRAVENOUS at 11:31

## 2024-12-19 RX ADMIN — IRON SUCROSE 200 MG: 20 INJECTION, SOLUTION INTRAVENOUS at 11:31

## 2024-12-19 RX ADMIN — SODIUM CHLORIDE, PRESERVATIVE FREE 10 ML: 5 INJECTION INTRAVENOUS at 11:36

## 2024-12-19 NOTE — PROGRESS NOTES
Patient tolerated treatment well without complications or complaints. Alert and oriented x3. Patient aware of potential side effects and denies questions regarding treatment. Pain assessed, patient denies any new or worsening pain. Patient left in wheelchair.

## 2024-12-20 ENCOUNTER — HOSPITAL ENCOUNTER (OUTPATIENT)
Dept: CT IMAGING | Age: 69
Discharge: HOME | End: 2024-12-22
Attending: INTERNAL MEDICINE
Payer: MEDICARE

## 2024-12-20 DIAGNOSIS — Z91.89 AT HIGH RISK FOR BLEEDING: ICD-10-CM

## 2024-12-20 DIAGNOSIS — I48.0 PAF (PAROXYSMAL ATRIAL FIBRILLATION) (HCC): ICD-10-CM

## 2024-12-20 PROCEDURE — 75572 CT HRT W/3D IMAGE: CPT

## 2024-12-20 PROCEDURE — 6360000004 HC RX CONTRAST MEDICATION: Performed by: RADIOLOGY

## 2024-12-20 PROCEDURE — 2580000003 HC RX 258: Performed by: INTERNAL MEDICINE

## 2024-12-20 RX ORDER — 0.9 % SODIUM CHLORIDE 0.9 %
500 INTRAVENOUS SOLUTION INTRAVENOUS ONCE
Status: COMPLETED | OUTPATIENT
Start: 2024-12-20 | End: 2024-12-20

## 2024-12-20 RX ORDER — IOPAMIDOL 755 MG/ML
80 INJECTION, SOLUTION INTRAVASCULAR
Status: COMPLETED | OUTPATIENT
Start: 2024-12-20 | End: 2024-12-20

## 2024-12-20 RX ADMIN — IOPAMIDOL 80 ML: 755 INJECTION, SOLUTION INTRAVENOUS at 13:39

## 2024-12-20 RX ADMIN — SODIUM CHLORIDE 500 ML: 9 INJECTION, SOLUTION INTRAVENOUS at 12:35

## 2024-12-23 ENCOUNTER — HOSPITAL ENCOUNTER (OUTPATIENT)
Dept: INFUSION THERAPY | Age: 69
Discharge: HOME | End: 2024-12-23
Payer: MEDICARE

## 2024-12-23 VITALS
SYSTOLIC BLOOD PRESSURE: 158 MMHG | DIASTOLIC BLOOD PRESSURE: 70 MMHG | TEMPERATURE: 97.5 F | RESPIRATION RATE: 20 BRPM | HEART RATE: 92 BPM | OXYGEN SATURATION: 99 %

## 2024-12-23 DIAGNOSIS — D50.9 IRON DEFICIENCY ANEMIA, UNSPECIFIED IRON DEFICIENCY ANEMIA TYPE: Primary | ICD-10-CM

## 2024-12-23 DIAGNOSIS — D64.9 ACUTE ON CHRONIC ANEMIA: ICD-10-CM

## 2024-12-23 PROCEDURE — 96374 THER/PROPH/DIAG INJ IV PUSH: CPT

## 2024-12-23 PROCEDURE — 6360000002 HC RX W HCPCS: Performed by: INTERNAL MEDICINE

## 2024-12-23 PROCEDURE — 2500000003 HC RX 250 WO HCPCS: Performed by: INTERNAL MEDICINE

## 2024-12-23 RX ORDER — DIPHENHYDRAMINE HYDROCHLORIDE 50 MG/ML
50 INJECTION INTRAMUSCULAR; INTRAVENOUS
OUTPATIENT
Start: 2024-12-25

## 2024-12-23 RX ORDER — SODIUM CHLORIDE 9 MG/ML
5-250 INJECTION, SOLUTION INTRAVENOUS PRN
OUTPATIENT
Start: 2024-12-25

## 2024-12-23 RX ORDER — ACETAMINOPHEN 325 MG/1
650 TABLET ORAL
OUTPATIENT
Start: 2024-12-25

## 2024-12-23 RX ORDER — HYDROCORTISONE SODIUM SUCCINATE 100 MG/2ML
100 INJECTION INTRAMUSCULAR; INTRAVENOUS
OUTPATIENT
Start: 2024-12-25

## 2024-12-23 RX ORDER — HEPARIN 100 UNIT/ML
500 SYRINGE INTRAVENOUS PRN
OUTPATIENT
Start: 2024-12-25

## 2024-12-23 RX ORDER — EPINEPHRINE 1 MG/ML
0.3 INJECTION, SOLUTION, CONCENTRATE INTRAVENOUS PRN
OUTPATIENT
Start: 2024-12-25

## 2024-12-23 RX ORDER — ALBUTEROL SULFATE 90 UG/1
4 INHALANT RESPIRATORY (INHALATION) PRN
OUTPATIENT
Start: 2024-12-25

## 2024-12-23 RX ORDER — ONDANSETRON 2 MG/ML
8 INJECTION INTRAMUSCULAR; INTRAVENOUS
OUTPATIENT
Start: 2024-12-25

## 2024-12-23 RX ORDER — SODIUM CHLORIDE 9 MG/ML
INJECTION, SOLUTION INTRAVENOUS CONTINUOUS
OUTPATIENT
Start: 2024-12-25

## 2024-12-23 RX ORDER — SODIUM CHLORIDE 0.9 % (FLUSH) 0.9 %
5-40 SYRINGE (ML) INJECTION PRN
OUTPATIENT
Start: 2024-12-25

## 2024-12-23 RX ORDER — SODIUM CHLORIDE 0.9 % (FLUSH) 0.9 %
5-40 SYRINGE (ML) INJECTION PRN
Status: DISCONTINUED | OUTPATIENT
Start: 2024-12-23 | End: 2024-12-24 | Stop reason: HOSPADM

## 2024-12-23 RX ORDER — SODIUM CHLORIDE 9 MG/ML
5-250 INJECTION, SOLUTION INTRAVENOUS PRN
Status: DISCONTINUED | OUTPATIENT
Start: 2024-12-23 | End: 2024-12-24 | Stop reason: HOSPADM

## 2024-12-23 RX ADMIN — SODIUM CHLORIDE, PRESERVATIVE FREE 10 ML: 5 INJECTION INTRAVENOUS at 09:25

## 2024-12-23 RX ADMIN — IRON SUCROSE 200 MG: 20 INJECTION, SOLUTION INTRAVENOUS at 09:25

## 2024-12-23 NOTE — PROGRESS NOTES
Patient tolerated IV venofer push well.  Patient alert and oriented x3. No distress noted. Vital signs stable. Patient denies any new or worsening pain. Patient denies any needs. All questions answered. D/C in stable condition.

## 2024-12-25 ENCOUNTER — HOSPITAL ENCOUNTER (INPATIENT)
Age: 69
LOS: 9 days | Discharge: NURSING, REHAB OR LONG-TERM CARE | End: 2025-01-03
Attending: EMERGENCY MEDICINE | Admitting: STUDENT IN AN ORGANIZED HEALTH CARE EDUCATION/TRAINING PROGRAM
Payer: MEDICARE

## 2024-12-25 ENCOUNTER — APPOINTMENT (OUTPATIENT)
Dept: GENERAL RADIOLOGY | Age: 69
End: 2024-12-25
Payer: MEDICARE

## 2024-12-25 ENCOUNTER — APPOINTMENT (OUTPATIENT)
Dept: CT IMAGING | Age: 69
End: 2024-12-25
Payer: MEDICARE
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
## 2024-12-25 DIAGNOSIS — A41.9 SEPSIS, DUE TO UNSPECIFIED ORGANISM, UNSPECIFIED WHETHER ACUTE ORGAN DYSFUNCTION PRESENT (HCC): ICD-10-CM

## 2024-12-25 DIAGNOSIS — I65.23 ATHEROSCLEROSIS OF BOTH CAROTID ARTERIES: ICD-10-CM

## 2024-12-25 DIAGNOSIS — I33.0 INFECTIVE ENDOCARDITIS, DUE TO UNSPECIFIED ORGANISM, UNSPECIFIED CHRONICITY: ICD-10-CM

## 2024-12-25 DIAGNOSIS — I73.9 PERIPHERAL VASCULAR DISEASE, UNSPECIFIED (HCC): ICD-10-CM

## 2024-12-25 DIAGNOSIS — N17.9 AKI (ACUTE KIDNEY INJURY) (HCC): ICD-10-CM

## 2024-12-25 DIAGNOSIS — F41.9 ANXIETY: ICD-10-CM

## 2024-12-25 DIAGNOSIS — R78.81 BACTEREMIA: ICD-10-CM

## 2024-12-25 DIAGNOSIS — J18.9 PNEUMONIA OF BOTH LUNGS DUE TO INFECTIOUS ORGANISM, UNSPECIFIED PART OF LUNG: ICD-10-CM

## 2024-12-25 DIAGNOSIS — J96.12 ACUTE HYPOXIC ON CHRONIC HYPERCAPNIC RESPIRATORY FAILURE: Primary | ICD-10-CM

## 2024-12-25 DIAGNOSIS — J18.9 PNEUMONIA DUE TO INFECTIOUS ORGANISM, UNSPECIFIED LATERALITY, UNSPECIFIED PART OF LUNG: ICD-10-CM

## 2024-12-25 DIAGNOSIS — J96.01 ACUTE HYPOXIC ON CHRONIC HYPERCAPNIC RESPIRATORY FAILURE: Primary | ICD-10-CM

## 2024-12-25 PROBLEM — E87.20 LACTIC ACID ACIDOSIS: Status: ACTIVE | Noted: 2024-12-25

## 2024-12-25 PROBLEM — J96.21 ACUTE ON CHRONIC RESPIRATORY FAILURE WITH HYPOXIA AND HYPERCAPNIA: Status: ACTIVE | Noted: 2024-12-25
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
## 2024-12-25 PROBLEM — R79.89 ELEVATED TROPONIN: Status: ACTIVE | Noted: 2024-12-25

## 2024-12-25 PROBLEM — J96.22 ACUTE ON CHRONIC RESPIRATORY FAILURE WITH HYPOXIA AND HYPERCAPNIA: Status: ACTIVE | Noted: 2024-12-25

## 2024-12-25 PROBLEM — R06.03 RESPIRATORY DISTRESS: Status: ACTIVE | Noted: 2024-12-25

## 2024-12-25 LAB
25(OH)D3 SERPL-MCNC: 15.9 NG/ML (ref 30–100)
ALBUMIN SERPL-MCNC: 3.8 G/DL (ref 3.5–5.2)
ALP SERPL-CCNC: 108 U/L (ref 35–104)
ALT SERPL-CCNC: 14 U/L (ref 0–32)
ANION GAP SERPL CALCULATED.3IONS-SCNC: 12 MMOL/L (ref 7–16)
AST SERPL-CCNC: 28 U/L (ref 0–31)
B PARAP IS1001 DNA NPH QL NAA+NON-PROBE: NOT DETECTED
B PERT DNA SPEC QL NAA+PROBE: NOT DETECTED
B.E.: 2.8 MMOL/L (ref -3–3)
BASOPHILS # BLD: 0 K/UL (ref 0–0.2)
BASOPHILS NFR BLD: 0 % (ref 0–2)
BILIRUB SERPL-MCNC: 0.3 MG/DL (ref 0–1.2)
BNP SERPL-MCNC: 760 PG/ML (ref 0–125)
BUN SERPL-MCNC: 32 MG/DL (ref 6–23)
C PNEUM DNA NPH QL NAA+NON-PROBE: NOT DETECTED
CALCIUM SERPL-MCNC: 9.2 MG/DL (ref 8.6–10.2)
CHLORIDE SERPL-SCNC: 85 MMOL/L (ref 98–107)
CHOLEST SERPL-MCNC: 179 MG/DL
CO2 SERPL-SCNC: 36 MMOL/L (ref 22–29)
COHB: 1 % (ref 0–1.5)
CREAT SERPL-MCNC: 1.3 MG/DL (ref 0.5–1)
CRITICAL ACTION: NORMAL
CRITICAL ACTION: NORMAL
CRITICAL NOTIFICATION DATE/TIME: NORMAL
CRITICAL NOTIFICATION DATE/TIME: NORMAL
CRITICAL NOTIFICATION: NORMAL
CRITICAL NOTIFICATION: NORMAL
CRITICAL VALUE READ BACK: YES
CRITICAL VALUE READ BACK: YES
CRITICAL: ABNORMAL
DATE ANALYZED: ABNORMAL
DATE OF COLLECTION: ABNORMAL
EOSINOPHIL # BLD: 0.19 K/UL (ref 0.05–0.5)
EOSINOPHILS RELATIVE PERCENT: 1 % (ref 0–6)
ERYTHROCYTE [DISTWIDTH] IN BLOOD BY AUTOMATED COUNT: 14.8 % (ref 11.5–15)
FIO2: 60
FLUAV RNA NPH QL NAA+NON-PROBE: NOT DETECTED
FLUBV RNA NPH QL NAA+NON-PROBE: NOT DETECTED
FOLATE SERPL-MCNC: >20 NG/ML (ref 4.8–24.2)
GFR, ESTIMATED: 43 ML/MIN/1.73M2
GLUCOSE SERPL-MCNC: 89 MG/DL (ref 74–99)
HADV DNA NPH QL NAA+NON-PROBE: NOT DETECTED
HBA1C MFR BLD: 5.6 % (ref 4–5.6)
HCO3: 30.7 MMOL/L (ref 22–26)
HCOV 229E RNA NPH QL NAA+NON-PROBE: NOT DETECTED
HCOV HKU1 RNA NPH QL NAA+NON-PROBE: NOT DETECTED
HCOV NL63 RNA NPH QL NAA+NON-PROBE: NOT DETECTED
HCOV OC43 RNA NPH QL NAA+NON-PROBE: NOT DETECTED
HCT VFR BLD AUTO: 32 % (ref 37–54)
HCT VFR BLD AUTO: 33 % (ref 37–54)
HCT VFR BLD AUTO: 40.7 % (ref 34–48)
HDLC SERPL-MCNC: 94 MG/DL
HGB BLD-MCNC: 12 G/DL (ref 11.5–15.5)
HHB: 14.9 % (ref 0–5)
HMPV RNA NPH QL NAA+NON-PROBE: NOT DETECTED
HPIV1 RNA NPH QL NAA+NON-PROBE: NOT DETECTED
HPIV2 RNA NPH QL NAA+NON-PROBE: NOT DETECTED
HPIV3 RNA NPH QL NAA+NON-PROBE: NOT DETECTED
HPIV4 RNA NPH QL NAA+NON-PROBE: NOT DETECTED
IRON SATN MFR SERPL: 12 % (ref 15–50)
IRON SERPL-MCNC: 26 UG/DL (ref 37–145)
LAB: ABNORMAL
LACTATE BLDV-SCNC: 1.4 MMOL/L (ref 0.5–1.9)
LACTATE BLDV-SCNC: 7.1 MMOL/L (ref 0.5–1.9)
LDLC SERPL CALC-MCNC: 54 MG/DL
LYMPHOCYTES NFR BLD: 0.39 K/UL (ref 1.5–4)
LYMPHOCYTES RELATIVE PERCENT: 2 % (ref 20–42)
Lab: 847
M PNEUMO DNA NPH QL NAA+NON-PROBE: NOT DETECTED
MAGNESIUM SERPL-MCNC: 1.6 MG/DL (ref 1.6–2.6)
MCH RBC QN AUTO: 29.8 PG (ref 26–35)
MCHC RBC AUTO-ENTMCNC: 29.5 G/DL (ref 32–34.5)
MCV RBC AUTO: 101 FL (ref 80–99.9)
METAMYELOCYTES ABSOLUTE COUNT: 0.19 K/UL (ref 0–0.12)
METAMYELOCYTES: 1 % (ref 0–1)
METHB: 0.3 % (ref 0–1.5)
MODE: ABNORMAL
MONOCYTES NFR BLD: 1.36 K/UL (ref 0.1–0.95)
MONOCYTES NFR BLD: 6 % (ref 2–12)
NEUTROPHILS NFR BLD: 90 % (ref 43–80)
NEUTS SEG NFR BLD: 20.26 K/UL (ref 1.8–7.3)
O2 DELIVERY DEVICE: ABNORMAL
O2 DELIVERY DEVICE: ABNORMAL
O2 SATURATION: 84.9 % (ref 92–98.5)
O2HB: 83.8 % (ref 94–97)
OPERATOR ID: 1868
PATIENT TEMP: 37 C
PCO2: 63.1 MMHG (ref 35–45)
PEEP: 8
PH BLOOD GAS: 7.3 (ref 7.35–7.45)
PHOSPHATE SERPL-MCNC: 2.3 MG/DL (ref 2.5–4.5)
PLATELET # BLD AUTO: 344 K/UL (ref 130–450)
PMV BLD AUTO: 10.2 FL (ref 7–12)
PO2: 52.6 MMHG (ref 75–100)
POC HCO3: 36.4 MMOL/L (ref 22–26)
POC HCO3: 37.2 MMOL/L (ref 22–26)
POC HEMOGLOBIN (CALC): 10.7 G/DL (ref 12.5–15.5)
POC HEMOGLOBIN (CALC): 11.3 G/DL (ref 12.5–15.5)
POC O2 SATURATION: 97.2 % (ref 92–98.5)
POC O2 SATURATION: 99.2 % (ref 92–98.5)
POC PCO2: 68.4 MM HG (ref 35–45)
POC PCO2: 77.3 MM HG (ref 35–45)
POC PH: 7.29 (ref 7.35–7.45)
POC PH: 7.33 (ref 7.35–7.45)
POC PO2: 109.1 MM HG (ref 60–80)
POC PO2: 157.4 MM HG (ref 60–80)
POSITIVE BASE EXCESS, ART: 8.2 MMOL/L (ref 0–3)
POSITIVE BASE EXCESS, ART: 8.6 MMOL/L (ref 0–3)
POTASSIUM SERPL-SCNC: 3.4 MMOL/L (ref 3.5–5)
PROCALCITONIN SERPL-MCNC: 10.98 NG/ML (ref 0–0.08)
PROT SERPL-MCNC: 7.8 G/DL (ref 6.4–8.3)
RBC # BLD AUTO: 4.03 M/UL (ref 3.5–5.5)
RBC # BLD: ABNORMAL 10*6/UL
RSV RNA NPH QL NAA+NON-PROBE: NOT DETECTED
RV+EV RNA NPH QL NAA+NON-PROBE: NOT DETECTED
SARS-COV-2 RNA NPH QL NAA+NON-PROBE: NOT DETECTED
SET RATE, POC: 22
SODIUM SERPL-SCNC: 133 MMOL/L (ref 132–146)
SOURCE, BLOOD GAS: ABNORMAL
SPECIMEN DESCRIPTION: NORMAL
THB: 12.7 G/DL (ref 11.5–16.5)
TIBC SERPL-MCNC: 223 UG/DL (ref 250–450)
TIDAL VOLUME: 450
TIME ANALYZED: 850
TRIGL SERPL-MCNC: 154 MG/DL
TROPONIN I SERPL HS-MCNC: 63 NG/L (ref 0–9)
TROPONIN I SERPL HS-MCNC: 70 NG/L (ref 0–9)
TROPONIN I SERPL HS-MCNC: 73 NG/L (ref 0–9)
TROPONIN I SERPL HS-MCNC: 79 NG/L (ref 0–9)
TSH SERPL DL<=0.05 MIU/L-ACNC: 1.68 UIU/ML (ref 0.27–4.2)
VIT B12 SERPL-MCNC: 1535 PG/ML (ref 211–946)
VLDLC SERPL CALC-MCNC: 31 MG/DL
WBC OTHER # BLD: 22.4 K/UL (ref 4.5–11.5)

## 2024-12-25 PROCEDURE — 87449 NOS EACH ORGANISM AG IA: CPT

## 2024-12-25 PROCEDURE — 6360000002 HC RX W HCPCS

## 2024-12-25 PROCEDURE — 82803 BLOOD GASES ANY COMBINATION: CPT

## 2024-12-25 PROCEDURE — 6370000000 HC RX 637 (ALT 250 FOR IP): Performed by: NURSE PRACTITIONER

## 2024-12-25 PROCEDURE — 94640 AIRWAY INHALATION TREATMENT: CPT

## 2024-12-25 PROCEDURE — 5A09457 ASSISTANCE WITH RESPIRATORY VENTILATION, 24-96 CONSECUTIVE HOURS, CONTINUOUS POSITIVE AIRWAY PRESSURE: ICD-10-PCS | Performed by: STUDENT IN AN ORGANIZED HEALTH CARE EDUCATION/TRAINING PROGRAM

## 2024-12-25 PROCEDURE — 71045 X-RAY EXAM CHEST 1 VIEW: CPT

## 2024-12-25 PROCEDURE — 83605 ASSAY OF LACTIC ACID: CPT

## 2024-12-25 PROCEDURE — 73562 X-RAY EXAM OF KNEE 3: CPT

## 2024-12-25 PROCEDURE — 87899 AGENT NOS ASSAY W/OPTIC: CPT

## 2024-12-25 PROCEDURE — 84484 ASSAY OF TROPONIN QUANT: CPT

## 2024-12-25 PROCEDURE — 80061 LIPID PANEL: CPT

## 2024-12-25 PROCEDURE — 80053 COMPREHEN METABOLIC PANEL: CPT

## 2024-12-25 PROCEDURE — 6360000002 HC RX W HCPCS: Performed by: NURSE PRACTITIONER

## 2024-12-25 PROCEDURE — 85025 COMPLETE CBC W/AUTO DIFF WBC: CPT

## 2024-12-25 PROCEDURE — 87040 BLOOD CULTURE FOR BACTERIA: CPT

## 2024-12-25 PROCEDURE — 83036 HEMOGLOBIN GLYCOSYLATED A1C: CPT

## 2024-12-25 PROCEDURE — 84100 ASSAY OF PHOSPHORUS: CPT

## 2024-12-25 PROCEDURE — 2500000003 HC RX 250 WO HCPCS: Performed by: NURSE PRACTITIONER

## 2024-12-25 PROCEDURE — 84145 PROCALCITONIN (PCT): CPT

## 2024-12-25 PROCEDURE — 2140000000 HC CCU INTERMEDIATE R&B

## 2024-12-25 PROCEDURE — 87150 DNA/RNA AMPLIFIED PROBE: CPT

## 2024-12-25 PROCEDURE — 73502 X-RAY EXAM HIP UNI 2-3 VIEWS: CPT

## 2024-12-25 PROCEDURE — 82805 BLOOD GASES W/O2 SATURATION: CPT

## 2024-12-25 PROCEDURE — 96365 THER/PROPH/DIAG IV INF INIT: CPT

## 2024-12-25 PROCEDURE — 94660 CPAP INITIATION&MGMT: CPT

## 2024-12-25 PROCEDURE — 0202U NFCT DS 22 TRGT SARS-COV-2: CPT

## 2024-12-25 PROCEDURE — 82607 VITAMIN B-12: CPT

## 2024-12-25 PROCEDURE — 94664 DEMO&/EVAL PT USE INHALER: CPT

## 2024-12-25 PROCEDURE — 83550 IRON BINDING TEST: CPT

## 2024-12-25 PROCEDURE — 84443 ASSAY THYROID STIM HORMONE: CPT

## 2024-12-25 PROCEDURE — 72125 CT NECK SPINE W/O DYE: CPT

## 2024-12-25 PROCEDURE — 2580000003 HC RX 258: Performed by: NURSE PRACTITIONER

## 2024-12-25 PROCEDURE — 82306 VITAMIN D 25 HYDROXY: CPT

## 2024-12-25 PROCEDURE — 96375 TX/PRO/DX INJ NEW DRUG ADDON: CPT

## 2024-12-25 PROCEDURE — 99223 1ST HOSP IP/OBS HIGH 75: CPT | Performed by: STUDENT IN AN ORGANIZED HEALTH CARE EDUCATION/TRAINING PROGRAM

## 2024-12-25 PROCEDURE — 6370000000 HC RX 637 (ALT 250 FOR IP): Performed by: EMERGENCY MEDICINE

## 2024-12-25 PROCEDURE — 82746 ASSAY OF FOLIC ACID SERUM: CPT

## 2024-12-25 PROCEDURE — 2580000003 HC RX 258: Performed by: EMERGENCY MEDICINE

## 2024-12-25 PROCEDURE — 99285 EMERGENCY DEPT VISIT HI MDM: CPT

## 2024-12-25 PROCEDURE — 99223 1ST HOSP IP/OBS HIGH 75: CPT | Performed by: NURSE PRACTITIONER

## 2024-12-25 PROCEDURE — 83540 ASSAY OF IRON: CPT

## 2024-12-25 PROCEDURE — 83880 ASSAY OF NATRIURETIC PEPTIDE: CPT

## 2024-12-25 PROCEDURE — 83735 ASSAY OF MAGNESIUM: CPT

## 2024-12-25 PROCEDURE — 70450 CT HEAD/BRAIN W/O DYE: CPT

## 2024-12-25 PROCEDURE — 85014 HEMATOCRIT: CPT

## 2024-12-25 PROCEDURE — 93005 ELECTROCARDIOGRAM TRACING: CPT | Performed by: EMERGENCY MEDICINE

## 2024-12-25 PROCEDURE — 2580000003 HC RX 258

## 2024-12-25 RX ORDER — BUDESONIDE 0.5 MG/2ML
1 INHALANT ORAL
Status: DISCONTINUED | OUTPATIENT
Start: 2024-12-25 | End: 2025-01-03 | Stop reason: HOSPADM

## 2024-12-25 RX ORDER — ERGOCALCIFEROL 1.25 MG/1
50000 CAPSULE, LIQUID FILLED ORAL WEEKLY
Status: DISCONTINUED | OUTPATIENT
Start: 2024-12-25 | End: 2025-01-03 | Stop reason: HOSPADM

## 2024-12-25 RX ORDER — HYDROXYZINE PAMOATE 25 MG/1
25 CAPSULE ORAL 3 TIMES DAILY PRN
Status: DISCONTINUED | OUTPATIENT
Start: 2024-12-25 | End: 2025-01-03 | Stop reason: HOSPADM

## 2024-12-25 RX ORDER — IPRATROPIUM BROMIDE AND ALBUTEROL SULFATE 2.5; .5 MG/3ML; MG/3ML
1 SOLUTION RESPIRATORY (INHALATION)
Status: DISCONTINUED | OUTPATIENT
Start: 2024-12-25 | End: 2025-01-03 | Stop reason: HOSPADM

## 2024-12-25 RX ORDER — SODIUM CHLORIDE 9 MG/ML
INJECTION, SOLUTION INTRAVENOUS PRN
Status: DISCONTINUED | OUTPATIENT
Start: 2024-12-25 | End: 2025-01-03 | Stop reason: HOSPADM

## 2024-12-25 RX ORDER — DIPHENOXYLATE HYDROCHLORIDE AND ATROPINE SULFATE 2.5; .025 MG/1; MG/1
1 TABLET ORAL 4 TIMES DAILY PRN
Status: DISCONTINUED | OUTPATIENT
Start: 2024-12-25 | End: 2025-01-03 | Stop reason: HOSPADM

## 2024-12-25 RX ORDER — DEXTROSE MONOHYDRATE 100 MG/ML
INJECTION, SOLUTION INTRAVENOUS CONTINUOUS PRN
Status: DISCONTINUED | OUTPATIENT
Start: 2024-12-25 | End: 2025-01-03 | Stop reason: HOSPADM

## 2024-12-25 RX ORDER — ASPIRIN 81 MG/1
81 TABLET ORAL DAILY
Status: DISCONTINUED | OUTPATIENT
Start: 2024-12-25 | End: 2025-01-03 | Stop reason: HOSPADM

## 2024-12-25 RX ORDER — LACTOBACILLUS RHAMNOSUS GG 10B CELL
1 CAPSULE ORAL DAILY
Status: DISCONTINUED | OUTPATIENT
Start: 2024-12-25 | End: 2025-01-03 | Stop reason: HOSPADM

## 2024-12-25 RX ORDER — ENOXAPARIN SODIUM 100 MG/ML
40 INJECTION SUBCUTANEOUS DAILY
Status: DISCONTINUED | OUTPATIENT
Start: 2024-12-25 | End: 2024-12-29

## 2024-12-25 RX ORDER — GLUCAGON 1 MG/ML
1 KIT INJECTION PRN
Status: DISCONTINUED | OUTPATIENT
Start: 2024-12-25 | End: 2025-01-03 | Stop reason: HOSPADM

## 2024-12-25 RX ORDER — BENZONATATE 100 MG/1
100 CAPSULE ORAL 3 TIMES DAILY PRN
Status: DISCONTINUED | OUTPATIENT
Start: 2024-12-25 | End: 2025-01-03 | Stop reason: HOSPADM

## 2024-12-25 RX ORDER — ACETAMINOPHEN 650 MG/1
650 SUPPOSITORY RECTAL EVERY 6 HOURS PRN
Status: DISCONTINUED | OUTPATIENT
Start: 2024-12-25 | End: 2025-01-03 | Stop reason: HOSPADM

## 2024-12-25 RX ORDER — MECOBALAMIN 5000 MCG
5 TABLET,DISINTEGRATING ORAL NIGHTLY PRN
Status: DISCONTINUED | OUTPATIENT
Start: 2024-12-25 | End: 2025-01-03 | Stop reason: HOSPADM

## 2024-12-25 RX ORDER — 0.9 % SODIUM CHLORIDE 0.9 %
1000 INTRAVENOUS SOLUTION INTRAVENOUS ONCE
Status: COMPLETED | OUTPATIENT
Start: 2024-12-25 | End: 2024-12-25

## 2024-12-25 RX ORDER — PREDNISONE 20 MG/1
40 TABLET ORAL DAILY
Status: DISCONTINUED | OUTPATIENT
Start: 2024-12-27 | End: 2024-12-27

## 2024-12-25 RX ORDER — MECOBALAMIN 5000 MCG
5 TABLET,DISINTEGRATING ORAL EVERY EVENING
Status: DISCONTINUED | OUTPATIENT
Start: 2024-12-25 | End: 2024-12-29

## 2024-12-25 RX ORDER — POLYETHYLENE GLYCOL 3350 17 G/17G
17 POWDER, FOR SOLUTION ORAL DAILY PRN
Status: DISCONTINUED | OUTPATIENT
Start: 2024-12-25 | End: 2025-01-03 | Stop reason: HOSPADM

## 2024-12-25 RX ORDER — METHIMAZOLE 5 MG/1
15 TABLET ORAL DAILY
Status: DISCONTINUED | OUTPATIENT
Start: 2024-12-25 | End: 2025-01-03 | Stop reason: HOSPADM

## 2024-12-25 RX ORDER — ARFORMOTEROL TARTRATE 15 UG/2ML
15 SOLUTION RESPIRATORY (INHALATION)
Status: DISCONTINUED | OUTPATIENT
Start: 2024-12-25 | End: 2025-01-03 | Stop reason: HOSPADM

## 2024-12-25 RX ORDER — HYDRALAZINE HYDROCHLORIDE 20 MG/ML
10 INJECTION INTRAMUSCULAR; INTRAVENOUS EVERY 6 HOURS PRN
Status: DISCONTINUED | OUTPATIENT
Start: 2024-12-25 | End: 2025-01-03 | Stop reason: HOSPADM

## 2024-12-25 RX ORDER — POTASSIUM CHLORIDE 1500 MG/1
20 TABLET, EXTENDED RELEASE ORAL ONCE
Status: DISCONTINUED | OUTPATIENT
Start: 2024-12-25 | End: 2024-12-31

## 2024-12-25 RX ORDER — DULOXETIN HYDROCHLORIDE 30 MG/1
60 CAPSULE, DELAYED RELEASE ORAL DAILY
Status: DISCONTINUED | OUTPATIENT
Start: 2024-12-25 | End: 2025-01-03 | Stop reason: HOSPADM

## 2024-12-25 RX ORDER — SODIUM CHLORIDE 0.9 % (FLUSH) 0.9 %
5-40 SYRINGE (ML) INJECTION EVERY 12 HOURS SCHEDULED
Status: DISCONTINUED | OUTPATIENT
Start: 2024-12-25 | End: 2025-01-03 | Stop reason: HOSPADM

## 2024-12-25 RX ORDER — PREDNISONE 5 MG/1
10 TABLET ORAL DAILY
Status: DISCONTINUED | OUTPATIENT
Start: 2024-12-25 | End: 2024-12-29

## 2024-12-25 RX ORDER — CLONAZEPAM 0.5 MG/1
0.5 TABLET ORAL DAILY
Status: DISCONTINUED | OUTPATIENT
Start: 2024-12-25 | End: 2025-01-03 | Stop reason: HOSPADM

## 2024-12-25 RX ORDER — ACETAMINOPHEN 325 MG/1
650 TABLET ORAL EVERY 6 HOURS PRN
Status: DISCONTINUED | OUTPATIENT
Start: 2024-12-25 | End: 2025-01-03 | Stop reason: HOSPADM

## 2024-12-25 RX ORDER — DILTIAZEM HYDROCHLORIDE 180 MG/1
180 CAPSULE, COATED, EXTENDED RELEASE ORAL DAILY
Status: DISCONTINUED | OUTPATIENT
Start: 2024-12-25 | End: 2024-12-27

## 2024-12-25 RX ORDER — ONDANSETRON 2 MG/ML
4 INJECTION INTRAMUSCULAR; INTRAVENOUS EVERY 6 HOURS PRN
Status: DISCONTINUED | OUTPATIENT
Start: 2024-12-25 | End: 2025-01-03 | Stop reason: HOSPADM

## 2024-12-25 RX ORDER — ONDANSETRON 4 MG/1
4 TABLET, ORALLY DISINTEGRATING ORAL EVERY 8 HOURS PRN
Status: DISCONTINUED | OUTPATIENT
Start: 2024-12-25 | End: 2025-01-03 | Stop reason: HOSPADM

## 2024-12-25 RX ORDER — CALCIUM CARBONATE 500 MG/1
500 TABLET, CHEWABLE ORAL 3 TIMES DAILY PRN
Status: DISCONTINUED | OUTPATIENT
Start: 2024-12-25 | End: 2025-01-03 | Stop reason: HOSPADM

## 2024-12-25 RX ORDER — LANOLIN ALCOHOL/MO/W.PET/CERES
500 CREAM (GRAM) TOPICAL DAILY
Status: DISCONTINUED | OUTPATIENT
Start: 2024-12-25 | End: 2024-12-25

## 2024-12-25 RX ORDER — MAGNESIUM SULFATE 1 G/100ML
1000 INJECTION INTRAVENOUS ONCE
Status: COMPLETED | OUTPATIENT
Start: 2024-12-25 | End: 2024-12-25

## 2024-12-25 RX ORDER — FOLIC ACID 1 MG/1
1 TABLET ORAL DAILY
Status: DISCONTINUED | OUTPATIENT
Start: 2024-12-25 | End: 2025-01-03 | Stop reason: HOSPADM

## 2024-12-25 RX ORDER — IPRATROPIUM BROMIDE AND ALBUTEROL SULFATE 2.5; .5 MG/3ML; MG/3ML
1 SOLUTION RESPIRATORY (INHALATION)
Status: COMPLETED | OUTPATIENT
Start: 2024-12-25 | End: 2024-12-25

## 2024-12-25 RX ORDER — PANTOPRAZOLE SODIUM 40 MG/1
40 TABLET, DELAYED RELEASE ORAL
Status: DISCONTINUED | OUTPATIENT
Start: 2024-12-26 | End: 2025-01-03 | Stop reason: HOSPADM

## 2024-12-25 RX ORDER — YOHIMBE BARK 500 MG
100 CAPSULE ORAL DAILY
Status: DISCONTINUED | OUTPATIENT
Start: 2024-12-25 | End: 2024-12-25 | Stop reason: CLARIF

## 2024-12-25 RX ORDER — GUAIFENESIN 400 MG/1
400 TABLET ORAL 3 TIMES DAILY
Status: DISCONTINUED | OUTPATIENT
Start: 2024-12-25 | End: 2025-01-03 | Stop reason: HOSPADM

## 2024-12-25 RX ORDER — SODIUM CHLORIDE 9 MG/ML
INJECTION, SOLUTION INTRAVENOUS CONTINUOUS
Status: DISCONTINUED | OUTPATIENT
Start: 2024-12-25 | End: 2024-12-25

## 2024-12-25 RX ORDER — SODIUM CHLORIDE 9 MG/ML
INJECTION, SOLUTION INTRAVENOUS CONTINUOUS
Status: ACTIVE | OUTPATIENT
Start: 2024-12-25 | End: 2024-12-26

## 2024-12-25 RX ORDER — SODIUM CHLORIDE 0.9 % (FLUSH) 0.9 %
5-40 SYRINGE (ML) INJECTION PRN
Status: DISCONTINUED | OUTPATIENT
Start: 2024-12-25 | End: 2025-01-03 | Stop reason: HOSPADM

## 2024-12-25 RX ADMIN — CEFEPIME 2000 MG: 2 INJECTION, POWDER, FOR SOLUTION INTRAVENOUS at 21:01

## 2024-12-25 RX ADMIN — SODIUM CHLORIDE: 9 INJECTION, SOLUTION INTRAVENOUS at 09:09

## 2024-12-25 RX ADMIN — IPRATROPIUM BROMIDE AND ALBUTEROL SULFATE 1 DOSE: 2.5; .5 SOLUTION RESPIRATORY (INHALATION) at 08:57

## 2024-12-25 RX ADMIN — BUDESONIDE INHALATION 1000 MCG: 0.5 SUSPENSION RESPIRATORY (INHALATION) at 19:55

## 2024-12-25 RX ADMIN — IPRATROPIUM BROMIDE AND ALBUTEROL SULFATE 1 DOSE: 2.5; .5 SOLUTION RESPIRATORY (INHALATION) at 08:59

## 2024-12-25 RX ADMIN — SODIUM CHLORIDE 1000 ML: 9 INJECTION, SOLUTION INTRAVENOUS at 09:55

## 2024-12-25 RX ADMIN — SODIUM PHOSPHATE, MONOBASIC, MONOHYDRATE AND SODIUM PHOSPHATE, DIBASIC, ANHYDROUS 15 MMOL: 142; 276 INJECTION, SOLUTION INTRAVENOUS at 15:49

## 2024-12-25 RX ADMIN — WATER 40 MG: 1 INJECTION INTRAMUSCULAR; INTRAVENOUS; SUBCUTANEOUS at 14:26

## 2024-12-25 RX ADMIN — SODIUM CHLORIDE: 9 INJECTION, SOLUTION INTRAVENOUS at 14:14

## 2024-12-25 RX ADMIN — CEFEPIME 2000 MG: 2 INJECTION, POWDER, FOR SOLUTION INTRAVENOUS at 09:58

## 2024-12-25 RX ADMIN — VANCOMYCIN HYDROCHLORIDE 1000 MG: 1 INJECTION, POWDER, LYOPHILIZED, FOR SOLUTION INTRAVENOUS at 10:43

## 2024-12-25 RX ADMIN — ARFORMOTEROL TARTRATE 15 MCG: 15 SOLUTION RESPIRATORY (INHALATION) at 19:55

## 2024-12-25 RX ADMIN — ARFORMOTEROL TARTRATE 15 MCG: 15 SOLUTION RESPIRATORY (INHALATION) at 11:50

## 2024-12-25 RX ADMIN — Medication 5 MG: at 19:05

## 2024-12-25 RX ADMIN — WATER 40 MG: 1 INJECTION INTRAMUSCULAR; INTRAVENOUS; SUBCUTANEOUS at 22:38

## 2024-12-25 RX ADMIN — IPRATROPIUM BROMIDE AND ALBUTEROL SULFATE 1 DOSE: 2.5; .5 SOLUTION RESPIRATORY (INHALATION) at 19:55

## 2024-12-25 RX ADMIN — BUDESONIDE INHALATION 1000 MCG: 0.5 SUSPENSION RESPIRATORY (INHALATION) at 11:50

## 2024-12-25 RX ADMIN — SODIUM CHLORIDE, PRESERVATIVE FREE 10 ML: 5 INJECTION INTRAVENOUS at 21:01

## 2024-12-25 RX ADMIN — MAGNESIUM SULFATE HEPTAHYDRATE 1000 MG: 1 INJECTION, SOLUTION INTRAVENOUS at 14:30

## 2024-12-25 RX ADMIN — IPRATROPIUM BROMIDE AND ALBUTEROL SULFATE 1 DOSE: 2.5; .5 SOLUTION RESPIRATORY (INHALATION) at 15:37

## 2024-12-25 RX ADMIN — PREGABALIN 75 MG: 25 CAPSULE ORAL at 20:56

## 2024-12-25 RX ADMIN — GUAIFENESIN 400 MG: 400 TABLET ORAL at 20:56

## 2024-12-25 RX ADMIN — ENOXAPARIN SODIUM 40 MG: 100 INJECTION SUBCUTANEOUS at 14:27

## 2024-12-25 RX ADMIN — WATER 40 MG: 1 INJECTION INTRAMUSCULAR; INTRAVENOUS; SUBCUTANEOUS at 19:07

## 2024-12-25 RX ADMIN — IPRATROPIUM BROMIDE AND ALBUTEROL SULFATE 1 DOSE: 2.5; .5 SOLUTION RESPIRATORY (INHALATION) at 11:50

## 2024-12-25 RX ADMIN — IPRATROPIUM BROMIDE AND ALBUTEROL SULFATE 1 DOSE: 2.5; .5 SOLUTION RESPIRATORY (INHALATION) at 08:58

## 2024-12-25 ASSESSMENT — PAIN SCALES - GENERAL: PAINLEVEL_OUTOF10: 0

## 2024-12-25 NOTE — PROGRESS NOTES
Pharmacy Consultation Note  (Antibiotic Dosing and Monitoring)    Initial consult date: 12/25/2024  Consulting physician/provider: Elida Moore APRN - NP.   Drug: Vancomycin  Indication: Pneumonia (CAP).     Age/  Gender Height Weight IBW  Allergy Information   69 y.o./female  172.7 cm   53.6 kg    Ideal body weight: 63.9 kg (140 lb 14 oz)   Pcn [penicillins]      Renal Function:  Recent Labs     12/25/24  0900   BUN 32*   CREATININE 1.3*     Vancomycin Administration Times:  Recent vancomycin administrations                     vancomycin (VANCOCIN) 1,000 mg in sodium chloride 0.9 % 250 mL IVPB ([id number]) (mg) 1,000 mg New Bag 12/25/24 1043                    Assessment:  Patient is a 69 y.o. female who has been initiated on vancomycin for nosocomial pneumonia.   Estimated Creatinine Clearance: 34 mL/min (A) (based on SCr of 1.3 mg/dL (H)).  To dose vancomycin, pharmacy will be dosing based off of levels because of patient's renal impairment/insufficiency.   Scr 1.3mg/dL, baseline 0.7mg/dL on 12/6/2024.   Vanco 1000mg x1 given today (12/25) at 10:43.     Plan:  Vanco random level tomorrow (12/26) AM.   Will re-dose based on level.   Will continue to monitor renal function.   Pharmacy to follow      Pattie Payne, PharmD, BCIDP, BCPS 12/25/2024 12:26 PM  [phone]

## 2024-12-25 NOTE — PROGRESS NOTES
Renal Dose Adjustment Policy (Generic)     This patient is on medication that requires renal, weight, and/or indication dose adjustment.      Date Body Weight IBW  Adjusted BW SCr  CrCl Dialysis status   12/25/2024   Ideal body weight: 63.9 kg (140 lb 14 oz) Serum creatinine: 1.3 mg/dL (H) 12/25/24 0900  Estimated creatinine clearance: 34 mL/min (A) N/a       Pharmacy has dose-adjusted the following medication(s):    Date Previous Order Adjusted Order   12/25/2024 Cefepime 2 gm q8h Cefepime 2 gm q12h       These changes were made per protocol according to the [facility]   Automatic Renal Dose Adjustment Policy.     *Please note this dose may need readjusted if patient's condition changes.    Please contact pharmacy with any questions regarding these changes.    Odalys Montano RPH  12/25/2024  11:29 AM

## 2024-12-25 NOTE — CONSULTS
[facility]  [address], [address]  [place] [address]    Pulmonary Consultation      Reason for Consult:  sob    Requesting Physician:  Tal Humphrey DO    CHIEF COMPLAINT:  sob    History Obtained From:  electronic medical record    HISTORY OF PRESENT ILLNESS:                The patient is a 69 y.o. female with significant past medical history of COPD who presents with increased sob for the past 1-2 days, noted increased hypoxia despite 4 li NC at baseline, residing in [place] at that time. Sent here, increased oxygen supplementation but eventually switched over to BIPAP on 14/8 70% saturating 100%. Arousable, occ answering questions with unclear comprehension, oriented to month but not year, claims some better with breathing with not much cough.    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 ([facility]) 01/19/2024    Atrial fibrillation ([facility])     Chronic obstructive pulmonary disease ([facility]) 12/15/2022    COVID-19 07/16/2022    Critical limb ischemia of left lower extremity with rest pain ([facility]) 12/25/2023    GI bleed 10/14/2023    Hypertension     RUTH treated with BiPAP     Panic disorder     PVD (peripheral vascular disease) ([facility]) 01/19/2024    Severe protein-calorie malnutrition ([facility]) 06/26/2023    Thyroid disease     Uncontrolled hypertension        Past Surgical History:        Procedure Laterality Date    BACK SURGERY      x2    BRONCHOSCOPY N/A 4/16/2024    BRONCHOSCOPY DIAGNOSTIC OR CELL WASH ONLY performed by Jose Price MD at [facility]    INVASIVE VASCULAR N/A 1/19/2024    Aortagram abdominal performed by Lazaro Vidales MD at [facility]    INVASIVE VASCULAR N/A 1/19/2024    Angioplasty peripheral artery performed by Lazaro Vidales MD at [facility]    LEFT OOPHORECTOMY      PAIN MANAGEMENT PROCEDURE N/A  0.9 % 250 mL IVPB, 15 mmol, IntraVENous, Once  vitamin D (ERGOCALCIFEROL) capsule 50,000 Units, 50,000 Units, Oral, Weekly    Allergies:  Pcn [penicillins]    Social History:    TOBACCO:   reports that she has quit smoking. Her smoking use included cigarettes. She started smoking about 42 years ago. She has a 8.4 pack-year smoking history. She has never used smokeless tobacco.    Family History:       Problem Relation Age of Onset    Heart Disease Mother     Heart Disease Father     Dementia Father     Other Maternal Aunt         leukemia    Breast Cancer Paternal Aunt     Breast Cancer Maternal Grandmother     Diabetes Maternal Grandmother        REVIEW OF SYSTEMS:    Review of systems not obtained due to patient factors - mental status  Remainder of complete ROS is negative.    PHYSICAL EXAM:      Vitals:    BP (!) 101/54   Pulse 94   Temp 98.6 °F (37 °C)   Resp 21   SpO2 99%     EYES:  Lids and lashes normal, pupils equal, round and reactive to light, extra ocular muscles intact, sclera clear, conjunctiva normal  ENT:  Normocephalic, without obvious abnormality, atraumatic, sinuses nontender on palpation, external ears without lesions, oral pharynx with moist mucus membranes, tonsils without erythema or exudates, gums normal and good dentition.  LUNGS:  minimal ronchi bilateral with cough  CARDIOVASCULAR:  Normal apical impulse, regular rate and rhythm, normal S1 and S2, no S3 or S4, and no murmur noted  ABDOMEN:  No scars, normal bowel sounds, soft, non-distended, non-tender, no masses palpated, no hepatosplenomegally  MUSCULOSKELETAL:  There is no redness, warmth, or swelling of the joints.  Full range of motion noted.  Motor strength is 5 out of 5 all extremities bilaterally.  Tone is normal.  NEUROLOGIC:  arousable, occ answering questions though unclear comprehension, oriented to month but not year, moving extremities    DATA:    CBC:   Recent Labs     12/25/24  0900   WBC 22.4*   HGB 12.0   HCT 40.7

## 2024-12-25 NOTE — PLAN OF CARE
Patient admitted this evening was on nasal cannula at facility but on bipap now pulse ox 97%. Patient plans to return to facility  Problem: Safety - Adult  Goal: Free from fall injury  Outcome: Not Progressing     Problem: Chronic Conditions and Co-morbidities  Goal: Patient's chronic conditions and co-morbidity symptoms are monitored and maintained or improved  Outcome: Not Progressing     Problem: Discharge Planning  Goal: Discharge to home or other facility with appropriate resources  Outcome: Not Progressing

## 2024-12-25 NOTE — ED NOTES
Report given to receiving RN.  No questions at this time.  Awaiting for RT for transport up to floor.

## 2024-12-25 NOTE — PROGRESS NOTES
12/25/24 0848   NIV Type   $NIV $Daily Charge   NIV Started/Stopped On   Equipment Type v60   Mode Bilevel   Mask Type Full face mask   Mask Size Medium   Assessment   Pulse (!) 146   Respirations 30   /86   SpO2 98 %   Level of Consciousness 0   Comfort Level Fair   Using Accessory Muscles Yes   Mask Compliance Fair   Skin Assessment Clean, dry, & intact   Skin Protection for O2 Device Yes   Orientation Middle   Location Nose   Intervention(s) Skin Barrier   Settings/Measurements   PIP Observed 14 cm H20   IPAP 14 cmH20   CPAP/EPAP 8 cmH2O   Vt (Measured) 489 mL   Rate Ordered 14   Insp Rise Time (%) 2 %   FiO2  100 %   Minute Volume (L/min) 9 Liters   Mask Leak (lpm) 73 lpm   Patient's Home Machine No   Alarm Settings   Alarms On Y

## 2024-12-25 NOTE — H&P
Hospitalist History & Physical      PCP: Tal Humphrey DO    Date of Service: Pt seen/examined on 12/25/2024     Chief Complaint:  had concerns including Shortness of Breath (Patient r/o respiratory distress PTA via EMS. Pt normally on 4L NC at baseline. Arrived EMS via BiPap.).    History of Present Illness:    Ms. Lana Phillips, a 69 y.o. year old female  who  has a past medical history of Atherosclerosis 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 (HCC), Atrial fibrillation (HCC), Chronic obstructive pulmonary disease (HCC), COVID-19, Critical limb ischemia of left lower extremity with rest pain (HCC), GI bleed, Hypertension, RUTH treated with BiPAP, Panic disorder, PVD (peripheral vascular disease) (HCC), Severe protein-calorie malnutrition (HCC), Thyroid disease, and Uncontrolled hypertension.     Patient presented to the ED from [place] with complaints of respiratory distress beginning earlier in the day.  She was reportedly hypoxic with SpO2 in the 70s on her baseline of 4 L on their arrival and was placed on BIPAP.  Patient reports she has been having a cough and intermittent SOB over the past couple of days.  She denies any fevers, chills, chest pain, palpitations, or urinary symptoms. She also mentions she fell from her wheelchair and hit her head. She is not on blood thinners. She did not lose consciousness.  She denies any pain or focal deficits.       On my exam she is feeling much better.     ER COURSE:  In ED patient was tachycardic and hypoxic requiring BiPAP.  Laboratory workup significant for hypokalemia, DERIAN ,Lactic acidosis, elevated BNP, elevated troponin, leukocytosis.  Viral panel negative.  Blood cultures collected and pending.  CXR reviewed and with bilateral pneumonia.  CTh, CT C-spine, XR hip, XR right knee without acute findings.  EKG reviewed and sinus tachycardia without signs of acute ischemia.  She was given cefepime, vancomycin, 1 L IV fluid, DuoNeb in  pneumonia. 2. Interstitial changes that could be related to a combination of chronic interstitial disease and vascular congestion.     XR HIP 2-3 VW W PELVIS RIGHT    Result Date: 12/25/2024  1. No fracture or dislocation. 2. Mild degenerative changes of the right hip and right knee. 3. Degenerative changes of the visualized lower lumbar spine.     XR KNEE RIGHT (3 VIEWS)    Result Date: 12/25/2024  1. No fracture or dislocation. 2. Mild degenerative changes of the right hip and right knee. 3. Degenerative changes of the visualized lower lumbar spine.     Assessment and Plan:    Principal Problem:    Acute on chronic respiratory failure with hypoxia and hypercapnia  Active Problems:    COPD exacerbation (HCC)    Respiratory distress    Atherosclerosis of both carotid arteries    Pneumonia of both lungs due to infectious organism    Lactic acid acidosis    Elevated troponin    DERIAN (acute kidney injury) (HCC)    Fall at home  Resolved Problems:    * No resolved hospital problems. *    Discussed patient's case with ED physician.  Admit to intermediate    Acute on chronic hypoxic and hypercapnic respiratory failure  CAP  COPD exacerbation  Lactic acidosis  Leukocytosis  --Follows with pulmonologist Dr. Price OP.  She wears 3-4L O2 at baseline.   --Cefepime and Vanco for now, de-escalate as able  --Check urine strep and Legionella, respiratory culture  --Follow blood cultures  --IV Solu-Medrol to transition to p.o. prednisone  --Scheduled DuoNebs  --Wean BiPAP as able.  Patient is on BiPAP (12/5) at at bedtime and on chronic O2  --Gentle IVF  --Trend LA    Elevated BNP  Elevated troponin  --Likely in the setting of hypoxia and respiratory failure  --Repeat troponin stat  --Telemetry monitoring    DERIAN  Hypokalemia  --Cont IVF  --Monitor renal function, I&O  --Avoid nephrotoxic meds as able  --Replace potassium  --Check Mg    Fall  --Imaging negative for acute fracture or ICH  --PT/OT    Atherosclerosis of carotid

## 2024-12-25 NOTE — ED PROVIDER NOTES
[facility] EMERGENCY DEPARTMENT  EMERGENCY DEPARTMENT ENCOUNTER        Pt Name: Lana Phillips  MRN: [id number]  Birthdate 1955  Date of evaluation: 12/25/2024  Provider: Ibrahima Romero MD  PCP: Tal Humphrey DO  Note Started: 11:23 AM EST 12/25/24    CHIEF COMPLAINT       Chief Complaint   Patient presents with    Shortness of Breath     Patient r/o respiratory distress PTA via EMS. Pt normally on 4L NC at baseline. Arrived EMS via BiPap.       HISTORY OF PRESENT ILLNESS: 1 or more Elements   History From: Patient and EMS    Limitations to history : None  Social Determinants : None    Lana Phillips is a 69 y.o. female with history of COPD, GERD, diabetes, atrial fibrillation who presents with complaints of increased amount of respiratory distress.  As per EMS, she was having respiratory distress since earlier today.  Patient is normally on 4 L of oxygen via nasal cannula and was hypoxic today to the 70s and hence was placed on a BiPAP by EMS.    Patient also mentions that she has been sick and has been having trouble breathing and having on and off cough since the past couple of days.  This morning she also fell from her wheelchair and struck her head.  Complains of pain on the right hip.  Denies loss of consciousness.  Currently denies any chest pain.    Denies any fever, chills, nausea, vomiting, headache, dizziness, vision changes, neck tenderness or stiffness, chest pain, palpitations, leg swelling/tenderness, abdominal pain, dysuria, hematuria, diarrhea, constipation, bloody stools.    Nursing Notes were all reviewed and agreed with or any disagreements were addressed in the HPI.    ROS:   Pertinent positives and negatives are stated within HPI, all other systems reviewed and are negative.      --------------------------------------------- PAST HISTORY ---------------------------------------------  Past Medical History:       Diagnosis Date    Atherosclerosis of

## 2024-12-26 ENCOUNTER — TELEPHONE (OUTPATIENT)
Dept: PRIMARY CARE CLINIC | Age: 69
End: 2024-12-26

## 2024-12-26 ENCOUNTER — APPOINTMENT (OUTPATIENT)
Dept: ULTRASOUND IMAGING | Age: 69
End: 2024-12-26
Payer: MEDICARE

## 2024-12-26 LAB
ANION GAP SERPL CALCULATED.3IONS-SCNC: 6 MMOL/L (ref 7–16)
BASOPHILS # BLD: 0.01 K/UL (ref 0–0.2)
BASOPHILS NFR BLD: 0 % (ref 0–2)
BUN SERPL-MCNC: 27 MG/DL (ref 6–23)
CALCIUM SERPL-MCNC: 7.8 MG/DL (ref 8.6–10.2)
CHLORIDE SERPL-SCNC: 101 MMOL/L (ref 98–107)
CO2 SERPL-SCNC: 35 MMOL/L (ref 22–29)
CREAT SERPL-MCNC: 0.7 MG/DL (ref 0.5–1)
DATE LAST DOSE: ABNORMAL
EOSINOPHIL # BLD: 0 K/UL (ref 0.05–0.5)
EOSINOPHILS RELATIVE PERCENT: 0 % (ref 0–6)
ERYTHROCYTE [DISTWIDTH] IN BLOOD BY AUTOMATED COUNT: 15.2 % (ref 11.5–15)
GFR, ESTIMATED: >90 ML/MIN/1.73M2
GLUCOSE SERPL-MCNC: 124 MG/DL (ref 74–99)
HCT VFR BLD AUTO: 29.4 % (ref 34–48)
HGB BLD-MCNC: 8.8 G/DL (ref 11.5–15.5)
IMM GRANULOCYTES # BLD AUTO: 0.32 K/UL (ref 0–0.58)
IMM GRANULOCYTES NFR BLD: 2 % (ref 0–5)
L PNEUMO1 AG UR QL IA.RAPID: NEGATIVE
LYMPHOCYTES NFR BLD: 0.43 K/UL (ref 1.5–4)
LYMPHOCYTES RELATIVE PERCENT: 3 % (ref 20–42)
MCH RBC QN AUTO: 29.9 PG (ref 26–35)
MCHC RBC AUTO-ENTMCNC: 29.9 G/DL (ref 32–34.5)
MCV RBC AUTO: 100 FL (ref 80–99.9)
MONOCYTES NFR BLD: 0.61 K/UL (ref 0.1–0.95)
MONOCYTES NFR BLD: 4 % (ref 2–12)
NEUTROPHILS NFR BLD: 92 % (ref 43–80)
NEUTS SEG NFR BLD: 16.12 K/UL (ref 1.8–7.3)
PLATELET # BLD AUTO: 261 K/UL (ref 130–450)
PMV BLD AUTO: 9.8 FL (ref 7–12)
POTASSIUM SERPL-SCNC: 3.8 MMOL/L (ref 3.5–5)
PROCALCITONIN SERPL-MCNC: 20.81 NG/ML (ref 0–0.08)
RBC # BLD AUTO: 2.94 M/UL (ref 3.5–5.5)
RBC # BLD: ABNORMAL 10*6/UL
S PNEUM AG SPEC QL: NEGATIVE
SODIUM SERPL-SCNC: 142 MMOL/L (ref 132–146)
SPECIMEN SOURCE: 0
TME LAST DOSE: ABNORMAL H
VANCOMYCIN DOSE: ABNORMAL MG
VANCOMYCIN SERPL-MCNC: <4 UG/ML (ref 5–40)
WBC OTHER # BLD: 17.5 K/UL (ref 4.5–11.5)

## 2024-12-26 PROCEDURE — 6360000002 HC RX W HCPCS: Performed by: NURSE PRACTITIONER

## 2024-12-26 PROCEDURE — 84145 PROCALCITONIN (PCT): CPT

## 2024-12-26 PROCEDURE — 94640 AIRWAY INHALATION TREATMENT: CPT

## 2024-12-26 PROCEDURE — 2580000003 HC RX 258: Performed by: NURSE PRACTITIONER

## 2024-12-26 PROCEDURE — 87040 BLOOD CULTURE FOR BACTERIA: CPT

## 2024-12-26 PROCEDURE — 80048 BASIC METABOLIC PNL TOTAL CA: CPT

## 2024-12-26 PROCEDURE — 6370000000 HC RX 637 (ALT 250 FOR IP): Performed by: NURSE PRACTITIONER

## 2024-12-26 PROCEDURE — 87086 URINE CULTURE/COLONY COUNT: CPT

## 2024-12-26 PROCEDURE — 80202 ASSAY OF VANCOMYCIN: CPT

## 2024-12-26 PROCEDURE — 99232 SBSQ HOSP IP/OBS MODERATE 35: CPT | Performed by: STUDENT IN AN ORGANIZED HEALTH CARE EDUCATION/TRAINING PROGRAM

## 2024-12-26 PROCEDURE — 2140000000 HC CCU INTERMEDIATE R&B

## 2024-12-26 PROCEDURE — 5A0945A ASSISTANCE WITH RESPIRATORY VENTILATION, 24-96 CONSECUTIVE HOURS, HIGH NASAL FLOW/VELOCITY: ICD-10-PCS | Performed by: STUDENT IN AN ORGANIZED HEALTH CARE EDUCATION/TRAINING PROGRAM

## 2024-12-26 PROCEDURE — 94660 CPAP INITIATION&MGMT: CPT

## 2024-12-26 PROCEDURE — 85025 COMPLETE CBC W/AUTO DIFF WBC: CPT

## 2024-12-26 PROCEDURE — 2700000000 HC OXYGEN THERAPY PER DAY

## 2024-12-26 PROCEDURE — 93880 EXTRACRANIAL BILAT STUDY: CPT

## 2024-12-26 PROCEDURE — 36415 COLL VENOUS BLD VENIPUNCTURE: CPT

## 2024-12-26 PROCEDURE — 2500000003 HC RX 250 WO HCPCS: Performed by: NURSE PRACTITIONER

## 2024-12-26 RX ADMIN — IPRATROPIUM BROMIDE AND ALBUTEROL SULFATE 1 DOSE: 2.5; .5 SOLUTION RESPIRATORY (INHALATION) at 20:08

## 2024-12-26 RX ADMIN — CEFEPIME 2000 MG: 2 INJECTION, POWDER, FOR SOLUTION INTRAVENOUS at 09:29

## 2024-12-26 RX ADMIN — ACETAMINOPHEN 650 MG: 325 TABLET ORAL at 21:59

## 2024-12-26 RX ADMIN — PREGABALIN 75 MG: 25 CAPSULE ORAL at 19:50

## 2024-12-26 RX ADMIN — DILTIAZEM HYDROCHLORIDE 180 MG: 180 CAPSULE, COATED, EXTENDED RELEASE ORAL at 09:17

## 2024-12-26 RX ADMIN — IPRATROPIUM BROMIDE AND ALBUTEROL SULFATE 1 DOSE: 2.5; .5 SOLUTION RESPIRATORY (INHALATION) at 09:26

## 2024-12-26 RX ADMIN — CEFEPIME 2000 MG: 2 INJECTION, POWDER, FOR SOLUTION INTRAVENOUS at 21:40

## 2024-12-26 RX ADMIN — GUAIFENESIN 400 MG: 400 TABLET ORAL at 09:17

## 2024-12-26 RX ADMIN — IPRATROPIUM BROMIDE AND ALBUTEROL SULFATE 1 DOSE: 2.5; .5 SOLUTION RESPIRATORY (INHALATION) at 16:34

## 2024-12-26 RX ADMIN — FOLIC ACID 1 MG: 1 TABLET ORAL at 09:17

## 2024-12-26 RX ADMIN — GUAIFENESIN 400 MG: 400 TABLET ORAL at 19:48

## 2024-12-26 RX ADMIN — BUDESONIDE INHALATION 1000 MCG: 0.5 SUSPENSION RESPIRATORY (INHALATION) at 09:26

## 2024-12-26 RX ADMIN — ONDANSETRON 4 MG: 2 INJECTION INTRAMUSCULAR; INTRAVENOUS at 19:49

## 2024-12-26 RX ADMIN — METHIMAZOLE 15 MG: 5 TABLET ORAL at 09:18

## 2024-12-26 RX ADMIN — SODIUM CHLORIDE, PRESERVATIVE FREE 10 ML: 5 INJECTION INTRAVENOUS at 09:18

## 2024-12-26 RX ADMIN — ASPIRIN 81 MG: 81 TABLET, COATED ORAL at 09:17

## 2024-12-26 RX ADMIN — ENOXAPARIN SODIUM 40 MG: 100 INJECTION SUBCUTANEOUS at 09:16

## 2024-12-26 RX ADMIN — ARFORMOTEROL TARTRATE 15 MCG: 15 SOLUTION RESPIRATORY (INHALATION) at 09:26

## 2024-12-26 RX ADMIN — CLONAZEPAM 0.5 MG: 0.5 TABLET ORAL at 09:17

## 2024-12-26 RX ADMIN — VANCOMYCIN HYDROCHLORIDE 750 MG: 750 INJECTION, POWDER, LYOPHILIZED, FOR SOLUTION INTRAVENOUS at 19:54

## 2024-12-26 RX ADMIN — Medication 5 MG: at 21:19

## 2024-12-26 RX ADMIN — BENZONATATE 100 MG: 100 CAPSULE ORAL at 19:49

## 2024-12-26 RX ADMIN — WATER 40 MG: 1 INJECTION INTRAMUSCULAR; INTRAVENOUS; SUBCUTANEOUS at 17:45

## 2024-12-26 RX ADMIN — IPRATROPIUM BROMIDE AND ALBUTEROL SULFATE 1 DOSE: 2.5; .5 SOLUTION RESPIRATORY (INHALATION) at 12:40

## 2024-12-26 RX ADMIN — WATER 40 MG: 1 INJECTION INTRAMUSCULAR; INTRAVENOUS; SUBCUTANEOUS at 21:58

## 2024-12-26 RX ADMIN — WATER 40 MG: 1 INJECTION INTRAMUSCULAR; INTRAVENOUS; SUBCUTANEOUS at 07:04

## 2024-12-26 RX ADMIN — SODIUM CHLORIDE, PRESERVATIVE FREE 10 ML: 5 INJECTION INTRAVENOUS at 19:49

## 2024-12-26 RX ADMIN — PANTOPRAZOLE SODIUM 40 MG: 40 TABLET, DELAYED RELEASE ORAL at 07:04

## 2024-12-26 RX ADMIN — Medication 1 CAPSULE: at 09:17

## 2024-12-26 RX ADMIN — PREGABALIN 75 MG: 25 CAPSULE ORAL at 09:17

## 2024-12-26 RX ADMIN — Medication 5 MG: at 19:48

## 2024-12-26 RX ADMIN — BUDESONIDE INHALATION 1000 MCG: 0.5 SUSPENSION RESPIRATORY (INHALATION) at 20:08

## 2024-12-26 RX ADMIN — VANCOMYCIN HYDROCHLORIDE 750 MG: 750 INJECTION, POWDER, LYOPHILIZED, FOR SOLUTION INTRAVENOUS at 09:24

## 2024-12-26 RX ADMIN — GUAIFENESIN 400 MG: 400 TABLET ORAL at 14:42

## 2024-12-26 RX ADMIN — DULOXETINE HYDROCHLORIDE 60 MG: 60 CAPSULE, DELAYED RELEASE ORAL at 09:16

## 2024-12-26 RX ADMIN — ARFORMOTEROL TARTRATE 15 MCG: 15 SOLUTION RESPIRATORY (INHALATION) at 20:08

## 2024-12-26 RX ADMIN — WATER 40 MG: 1 INJECTION INTRAMUSCULAR; INTRAVENOUS; SUBCUTANEOUS at 10:53

## 2024-12-26 RX ADMIN — HYDROXYZINE PAMOATE 25 MG: 25 CAPSULE ORAL at 21:19

## 2024-12-26 ASSESSMENT — PAIN SCALES - GENERAL
PAINLEVEL_OUTOF10: 0
PAINLEVEL_OUTOF10: 0

## 2024-12-26 NOTE — PROGRESS NOTES
Spiritual Health History and Assessment/Progress Note  [facility] [place]    (P) Palliative Care,  ,  ,      Name: Lana Phillips MRN: [id number]    Age: 69 y.o.     Sex: female   Language: English   Confucianist: Rastafari   Acute hypoxic on chronic hypercapnic respiratory failure     Date: 12/26/2024                           Spiritual Assessment began in [facility]        Referral/Consult From: (P) Rounding   Encounter Overview/Reason: (P) Palliative Care  Service Provided For: (P) Patient    Armida, Belief, Meaning:   Patient identifies as spiritual and is connected with a armida tradition or spiritual practice  Family/Friends No family/friends present      Importance and Influence:  Patient has spiritual/personal beliefs that influence decisions regarding their health  Family/Friends No family/friends present    Community:  Patient is connected with a spiritual community and feels well-supported. Support system includes: Extended family  Family/Friends No family/friends present    Assessment and Plan of Care:     Patient Interventions include: Facilitated expression of thoughts and feelings, Explored spiritual coping/struggle/distress, and Engaged in theological reflection  Family/Friends Interventions include: No family/friends present    Patient Plan of Care: Spiritual Care available upon further referral  Family/Friends Plan of Care: Spiritual Care available upon further referral    Electronically signed by CHITRA DE LEON on 12/26/2024 at 1:17 PM

## 2024-12-26 NOTE — PROGRESS NOTES
Pharmacy Consultation Note  (Antibiotic Dosing and Monitoring)    Initial consult date: 12/25/2024  Consulting physician/provider: Elida Moore APRN - NP.   Drug: Vancomycin  Indication: Pneumonia (CAP).     Age/  Gender Height Weight IBW  Allergy Information   69 y.o./female  172.7 cm 51.8 kg (114 lb 3.2 oz) 53.6 kg    Ideal body weight: 63.9 kg (140 lb 14 oz)   Pcn [penicillins]      Renal Function:  Recent Labs     12/25/24  0900 12/26/24  0533   BUN 32* 27*   CREATININE 1.3* 0.7     Vancomycin Administration Times:  Recent vancomycin administrations                     vancomycin (VANCOCIN) 1,000 mg in sodium chloride 0.9 % 250 mL IVPB ([id number]) (mg) 1,000 mg New Bag 12/25/24 1043                   Assessment:  Patient is a 69 y.o. female who has been initiated on vancomycin for nosocomial pneumonia.   Estimated Creatinine Clearance: 60 mL/min (based on SCr of 0.7 mg/dL).  To dose vancomycin, pharmacy will be dosing based off of levels because of patient's renal impairment/insufficiency.   Scr 1.3mg/dL, baseline 0.7mg/dL on 12/6/2024.   12/26: Scr 0.7. WBC 17.5. Random level = <4 mcg/mL.   1/4 blood culture growing GPC in pairs and chains    Plan:  Change to vancomycin 750 mg IV q12h  Will check vancomycin levels when appropriate  Will continue to monitor renal function.   Pharmacy to follow    Eliecer Quinteros PharmD  PGY-1 Pharmacy Practice Resident   12/26/2024 8:24 AM

## 2024-12-26 NOTE — CARE COORDINATION
12/26/24  Transition of care update.  Patient admitted from [facility] for SOB and respiratory distress.Patient noted to have pneumonia with COPD exacerbation. Patient is being followed by pulmonology with new consult to palliative. Patient is on a steroid taper and receiving Duo-Nebs.  Patient is on IV Vanc and IV Maxipime with consult to ID for positive blood cultures.  Patient is pending an ECHO.  Discharge goal is to return to [facility] when medically stable.  Patient is a Medicaid bed hold and will not need a pre-cert to return. LINSEY/Mauri to follow.    Electronically signed by ELIGIO Frances on 12/26/2024 at 11:51 AM     Case Management Assessment  Initial Evaluation    Date/Time of Evaluation: 12/26/2024 11:51 AM  Assessment Completed by: ELIGIO Frances    If patient is discharged prior to next notation, then this note serves as note for discharge by case management.    Patient Name: Lana Phillips                   YOB: 1955  Diagnosis: Peripheral vascular disease, unspecified (HCC) [I73.9]  Respiratory distress [R06.03]  DERIAN (acute kidney injury) (HCC) [N17.9]  Atherosclerosis of both carotid arteries [I65.23]  Fall, initial encounter [W19.XXXA]  Acute on chronic respiratory failure with hypoxia and hypercapnia [J96.21, J96.22]  Pneumonia due to infectious organism, unspecified laterality, unspecified part of lung [J18.9]  Acute hypoxic on chronic hypercapnic respiratory failure [J96.01, J96.12]                   Date / Time: 12/25/2024  8:51 AM    Patient Admission Status: Inpatient   Readmission Risk (Low < 19, Mod (19-27), High > 27): Readmission Risk Score: 27.1    Current PCP: Tal Humphrey, DO  PCP verified by CM? Yes    Chart Reviewed: Yes      History Provided by: Patient  Patient Orientation: Alert and Oriented, Person, Place, Situation    Patient Cognition: Alert    Hospitalization in the last 30 days (Readmission):  No    If yes, Readmission Assessment in CM Navigator

## 2024-12-26 NOTE — PLAN OF CARE
Problem: Safety - Adult  Goal: Free from fall injury  12/25/2024 2249 by La Nena Denny RN  Outcome: Progressing  12/25/2024 1839 by Yuki Fiore, RN  Outcome: Not Progressing     Problem: Chronic Conditions and Co-morbidities  Goal: Patient's chronic conditions and co-morbidity symptoms are monitored and maintained or improved  12/25/2024 2249 by La Nena Denny RN  Outcome: Progressing  12/25/2024 1839 by Yuki Fiore, RN  Outcome: Not Progressing     Problem: Discharge Planning  Goal: Discharge to home or other facility with appropriate resources  12/25/2024 2249 by La Nena Denny RN  Outcome: Progressing  Flowsheets (Taken 12/25/2024 1855 by Yuki Fiore, RN)  Discharge to home or other facility with appropriate resources: Identify barriers to discharge with patient and caregiver  12/25/2024 1839 by Yuki Fiore, RN  Outcome: Not Progressing

## 2024-12-26 NOTE — PROGRESS NOTES
[facility]  [address], [address]  [place] [address]      Pulmonary Progress Note      SUBJECTIVE:  lying down in bed on 7 li HFNC saturating 95%, wore NIPPV 18-24/5 LU=071 Fio2=60% yesterday until this morning, feeling some better with breathing and less with cough    OBJECTIVE    Medications    Continuous Infusions:   sodium chloride      dextrose         Scheduled Meds:   vancomycin  750 mg IntraVENous Q12H    arformoterol tartrate  15 mcg Nebulization BID RT    aspirin  81 mg Oral Daily    budesonide  1 mg Nebulization BID RT    dilTIAZem  180 mg Oral Daily    clonazePAM  0.5 mg Oral Daily    DULoxetine  60 mg Oral Daily    folic acid  1 mg Oral Daily    methIMAzole  15 mg Oral Daily    pantoprazole  40 mg Oral QAM AC    [Held by provider] predniSONE  10 mg Oral Daily    pregabalin  75 mg Oral BID    sodium chloride flush  5-40 mL IntraVENous 2 times per day    enoxaparin  40 mg SubCUTAneous Daily    ipratropium 0.5 mg-albuterol 2.5 mg  1 Dose Inhalation Q4H WA RT    guaiFENesin  400 mg Oral TID    methylPREDNISolone  40 mg IntraVENous Q6H    Followed by    [START ON 12/27/2024] predniSONE  40 mg Oral Daily    melatonin  5 mg Oral QPM    potassium chloride  20 mEq Oral Once    cefepime  2,000 mg IntraVENous Q12H    vancomycin (VANCOCIN) intermittent dosing (placeholder)   Other RX Placeholder    lactobacillus  1 capsule Oral Daily    vitamin D  50,000 Units Oral Weekly       PRN Meds:diphenoxylate-atropine, polyethylene glycol, sodium chloride flush, sodium chloride, ondansetron **OR** ondansetron, acetaminophen **OR** acetaminophen, melatonin, hydrALAZINE, calcium carbonate, Polyvinyl Alcohol-Povidone PF, sodium chloride, magnesium hydroxide, benzocaine-menthol, hydrOXYzine pamoate, benzonatate, glucose, dextrose bolus **OR** dextrose bolus, glucagon (rDNA), dextrose    Physical    VITALS:  BP (!) 142/66   Pulse 97   Temp 97.3 °F (36.3

## 2024-12-26 NOTE — DISCHARGE INSTR - COC
[I.V.:97.9; IV Piggyback:247.4]  Out: 600 [Urine:600]    Safety Concerns:     None    Impairments/Disabilities:      None    Nutrition Therapy:  Current Nutrition Therapy:   - Oral Diet:  Low fat/Low Chol/High Fiber/ZIA  - Oral Nutrition Supplement:  Frozen Oral  twice a day    Routes of Feeding: Oral  Liquids: Thin Liquids  Daily Fluid Restriction: no  Last Modified Barium Swallow with Video (Video Swallowing Test): not done    Treatments at the Time of Hospital Discharge:   Respiratory Treatments: Brovana q12h / Duoneb q4h  Oxygen Therapy:  is on oxygen at 4-6 L/min per nasal cannula. PACHECO - keep O2 at 88-92%  Ventilator:    - BiPAP   IPAP: 14 cmH20, CPAP/EPAP: 8 cmH2O only when sleeping    Rehab Therapies: Physical Therapy and Occupational Therapy  Weight Bearing Status/Restrictions: No weight bearing restrictions  Other Medical Equipment (for information only, NOT a DME order):   Other Treatments:     Patient's personal belongings (please select all that are sent with patient):  With patient in belongings bag/ purse/ snacks/ cell phone    RN SIGNATURE:  Electronically signed by Baldemar Castro RN on 1/2/25 at 8:14 PM EST    CASE MANAGEMENT/SOCIAL WORK SECTION    Inpatient Status Date: 12/25/24    Readmission Risk Assessment Score:  [facility] RISK OF UNPLANNED READMISSION 2.0             27.1 Total Score        Discharging to Facility/ Agency   Name: [facility]  Address:[address] [place], [place] [address]  Phone:[phone]  Fax:[phone]    Dialysis Facility (if applicable)   Name:  Address:  Dialysis Schedule:  Phone:  Fax:    / signature: Electronically signed by ELIGIO Frances on 12/26/24 at 11:43 AM EST    PHYSICIAN SECTION    Prognosis: {Prognosis:6995753784}    Condition at Discharge: { Patient Condition:447806952}    Rehab Potential (if transferring to Rehab): {Prognosis:7079633137}    Recommended Labs or Other Treatments After Discharge: ***    Physician Certification: I  certify the above information and transfer of Lana Phillips  is necessary for the continuing treatment of the diagnosis listed and that she requires Skilled Nursing Facility for greater 30 days.     Update Admission H&P: {CHP DME Changes in HandP:768978316}    PHYSICIAN SIGNATURE:  {Esignature:[id number]}

## 2024-12-26 NOTE — PROGRESS NOTES
AVAPS Settings;    12/25/24 2001   NIV Type   NIV Started/Stopped On   Equipment Type v60   Mode AVAPS   Mask Type Full face mask   Mask Size Medium   Assessment   Pulse 88  (Simultaneous filing. User may not have seen previous data.)   Heart Rate Source Monitor  (Simultaneous filing. User may not have seen previous data.)   /64   Level of Consciousness 0   Comfort Level Good   Using Accessory Muscles No   Mask Compliance Good   Skin Assessment Clean, dry, & intact   Skin Protection for O2 Device Yes   Orientation Middle   Location Nose   Intervention(s) Skin Barrier   Breath Sounds   Respiratory Pattern Regular   Settings/Measurements   PIP Observed 18 cm H20   CPAP/EPAP 5 cmH2O   IPAP Min 18 cmH2O   IPAP Max 30 cmH2O   Vt (Set, mL) 450 mL   Vt (Measured) 600 mL   Rate Ordered 22   Insp Rise Time (%) 3 %   FiO2  60 %   I Time/ I Time % 0.8 s   Minute Volume (L/min) 12.5 Liters   Mask Leak (lpm) 62 lpm   Patient's Home Machine No   Alarm Settings   Alarms On Y   Low Pressure (cmH2O) 8 cmH2O   High Pressure (cmH2O) 30 cmH2O   Apnea (secs) 20 secs   RR Low (bpm) 16   RR High (bpm) 40 br/min         Date: 12/25/2024    Time: 8:04 PM    Patient Placed On BIPAP/CPAP/ Non-Invasive Ventilation?  Yes    If no must comment.  Facial area red/color change? No           If YES are Blister/Lesion present?No   If yes must notify nursing staff  BIPAP/CPAP skin barrier?  Yes    Skin barrier type:mepilexlite       Comments:        Santy Ocampo RCP

## 2024-12-26 NOTE — PROGRESS NOTES
Date: 12/26/2024    Time: 2:11 AM    Patient Placed On BIPAP/CPAP/ Non-Invasive Ventilation?  Patient continues on bipap    If no must comment.  Facial area red/color change? No           If YES are Blister/Lesion present?No   If yes must notify nursing staff  BIPAP/CPAP skin barrier?  Yes    Skin barrier type:mepilexlite       Comments:        Nia Rosas RCP

## 2024-12-26 NOTE — PROGRESS NOTES
4 Eyes Skin Assessment     NAME:  Lana Phillips  YOB: 1955  MEDICAL RECORD NUMBER:  [id number]    The patient is being assessed for  Admission    I agree that at least one RN has performed a thorough Head to Toe Skin Assessment on the patient. ALL assessment sites listed below have been assessed.      Areas assessed by both nurses:    Head, Face, Ears, Shoulders, Back, Chest, Arms, Elbows, Hands, Sacrum. Buttock, Coccyx, Ischium, and Legs. Feet and Heels        Does the Patient have a Wound? No noted wound(s)       Guy Prevention initiated by RN: Yes  Wound Care Orders initiated by RN: No    Pressure Injury (Stage 3,4, Unstageable, DTI, NWPT, and Complex wounds) if present, place Wound referral order by RN under : No    New Ostomies, if present place, Ostomy referral order under : No     Nurse 1 eSignature: Electronically signed by Yuki Miranda RN on 12/25/24 at 7:29 PM EST    **SHARE this note so that the co-signing nurse can place an eSignature**    Nurse 2 eSignature: {Esignature:[id number]}

## 2024-12-26 NOTE — PROGRESS NOTES
Ok to put pt on 15L HF and off bipap for meds and breakfast per attending    Britni Fernandez RN

## 2024-12-26 NOTE — TELEPHONE ENCOUNTER
Patient called in and stated that she has gained weight. She was previously 96lbs and is now 114lbs. She stated that her pcp has been concerned with her weight and wanted pcp to be aware. Please advise.

## 2024-12-26 NOTE — PATIENT CARE CONFERENCE
[facility] Quality Flow/Interdisciplinary Rounds Progress Note        Quality Flow Rounds held on December 26, 2024    Disciplines Attending:  Bedside Nurse, , , and Nursing Unit Leadership    Lana Phillips was admitted on 12/25/2024  8:51 AM    Anticipated Discharge Date:       Disposition:    Guy Score:  Guy Scale Score: 19    [facility] RISK OF UNPLANNED READMISSION 2.0             25.1 Total Score        Discussed patient goal for the day, patient clinical progression, and barriers to discharge.  The following Goal(s) of the Day/Commitment(s) have been identified:  Report labs/diagnostics       Ashlyn Simon RN  December 26, 2024

## 2024-12-26 NOTE — CONSULTS
Palliative Care Department  [phone]  Palliative Care Initial Consult  Provider Elida Lake, APRN - CNP     Lana Phillips  [id number]  Hospital Day: 2  Date of Initial Consult: 12/26/2024  Referring Provider:  Ray Smiley MD  Palliative Medicine was consulted for assistance with: Goals of care    HPI:   Lana Phillips is a 69 y.o. with a medical history of  Atherosclerosis 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 (HCC), Atrial fibrillation (HCC), Chronic obstructive pulmonary disease (HCC), COVID-19, Critical limb ischemia of left lower extremity with rest pain (HCC), GI bleed, Hypertension, RUTH treated with BiPAP, Panic disorder, PVD (peripheral vascular disease) (HCC), Severe protein-calorie malnutrition (HCC), Thyroid disease, and Uncontrolled hypertension who was admitted on 12/25/2024 from nursing facility with a CHIEF COMPLAINT of respiratory distress.  She was reportedly hypoxic with SpO2 in the 70s on her baseline of 4 L on their arrival and was placed on BIPAP.  Patient reports she has been having a cough and intermittent SOB over the past couple of days.  Laboratory workup significant for hypokalemia, DERIAN ,Lactic acidosis, elevated BNP, elevated troponin, leukocytosis.  Viral panel negative.  Blood cultures collected and pending.  CXR reviewed and with bilateral pneumonia.  CTh, CT C-spine, XR hip, XR right knee without acute findings.  Pulmonology and ID consulted.  Palliative medicine consulted for further assistance.    ASSESSMENT/PLAN:     Pertinent Hospital Diagnoses     Acute on chronic hypoxic hypercapnic respiratory failure  COPD exacerbation  Sepsis  DERIAN  PVD  Severe protein calorie malnutrition      Palliative Care Encounter / Counseling Regarding Goals of Care  Please see detailed goals of care discussion as below  At this time, Lana Phillips, Does have capacity for medical decision-making.  Capacity is time limited and situation/question

## 2024-12-26 NOTE — PROGRESS NOTES
[facility] Hospitalist Progress Note    SYNOPSIS: Patient admitted on 2024 for Acute hypoxic on chronic hypercapnic respiratory failure    This is a 69-year-old lady with past medical history of A-fib, COPD, critical limb ischemia of left lower extremity, GI bleed, hypertension, RUTH treated with BiPAP, panic disorder, peripheral vascular disease, severe protein calorie malnutrition, hypothyroidism, hypertension.  Patient presented from nursing [facility] with complaints of respiratory distress beginning earlier in the day.  Patient was reportedly hypoxic with SpO2 in the 70s on her baseline 4 L oxygen and was placed on BiPAP.  Patient has been having cough with intermittent shortness of breath over the past few days.  She denies any fever, chills, chest pain, palpitations or urinary symptoms.  She did also fell from her wheelchair and hit her head.  She is not on any blood thinners.  She did not lose consciousness.  No focal deficits or pain.  In the ED,  CT was tachycardic and hypoxic requiring BiPAP.  Lab work was significant for hypokalemia, DERIAN, lactic acidosis, elevated BNP, elevated troponin, leukocytosis.  Viral panel was negative.  Blood cultures obtained.  She was started on cefepime, vancomycin.  Also given 1 L bolus of fluid, DuoNeb in ED.  She was recently treated for altered mental status and UTI from  to .     blood culture came back positive for gram-positive bacteria-Enterococcus.  Echocardiogram ordered.  Repeat blood culture ordered.  ID consulted.  Urine culture.                    SUBJECTIVE:  Stable overnight. No other overnight issues reported.   Patient seen and examined  Records reviewed.         Temp (24hrs), Av.7 °F (36.5 °C), Min:96.6 °F (35.9 °C), Max:98.6 °F (37 °C)    DIET: ADULT DIET; Regular; Low Fat/Low Chol/High Fiber/ZIA  CODE: Full Code    Intake/Output Summary (Last 24 hours) at 2024 7502  Last data filed at 2024 0524  Gross per 24 hour  exacerbation  Sepsis  Gram-positive bacteremia-Enterococcus  DERIAN-prerenal resolved  Hypothyroidism  Hypertension  Peripheral vascular disease  Former smoker  Lactic acidosis-likely from dehydration resolved  Severe protein calorie malnutrition    Plan  -Monitor on telemetry  -Breathing treatment as needed, incentive spirometry, flutter valve, chest physiotherapy  -Encourage deep breathing and coughing  -IV Solu-Medrol, taper as able  -Pulmonary following, appreciate recommendations  -Continue with cefepime and vancomycin, ID following, appreciate recommendations  -Follow-up on urine culture, repeat blood culture  -2D echocardiogram ordered  -Resume home medication as appropriate  -Optimize electrolytes  -S/p IV fluid hydration, DERIAN and lactic acidosis resolved, encourage oral hydration  -Dietary service consulted for supplementation, on oral nutrition supplement  -PT/OT eval               DVT Prophylaxis [x] Lovenox, []  Heparin, [] SCDs, [] Ambulation   GI Prophylaxis [x] PPI,  [] H2 Blocker,  [] Carafate,  [x] Diet/Tube Feeds   Disposition Patient requires continued admission due to pending clinical improvement, PT/OT eval, 2D echocardiogram, final ID recommendations   MDM [] Low, [x] Moderate,[]  High       Medications:  REVIEWED DAILY    Infusion Medications    sodium chloride      dextrose       Scheduled Medications    vancomycin  750 mg IntraVENous Q12H    arformoterol tartrate  15 mcg Nebulization BID RT    aspirin  81 mg Oral Daily    budesonide  1 mg Nebulization BID RT    dilTIAZem  180 mg Oral Daily    clonazePAM  0.5 mg Oral Daily    DULoxetine  60 mg Oral Daily    folic acid  1 mg Oral Daily    methIMAzole  15 mg Oral Daily    pantoprazole  40 mg Oral QAM AC    [Held by provider] predniSONE  10 mg Oral Daily    pregabalin  75 mg Oral BID    sodium chloride flush  5-40 mL IntraVENous 2 times per day    enoxaparin  40 mg SubCUTAneous Daily    ipratropium 0.5 mg-albuterol 2.5 mg  1 Dose Inhalation Q4H WA

## 2024-12-26 NOTE — CONSULTS
Department of Internal Medicine  Infectious Diseases   Consult Note      Reason for Consult: Bacteremia       Requesting Physician:  Dr Smiley         HISTORY OF PRESENT ILLNESS:      This is a 69 yrs old female with hx of COPD, on home oxygen 4-5 L , PAD, HTN,  A fib , nursing home resident , presented to the ER with \" increased heart rate \" and headache . 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 ([facility]) 01/19/2024    Atrial fibrillation ([facility])     Chronic obstructive pulmonary disease ([facility]) 12/15/2022    COVID-19 07/16/2022    Critical limb ischemia of left lower extremity with rest pain ([facility]) 12/25/2023    GI bleed 10/14/2023    Hypertension     RUTH treated with BiPAP     Panic disorder     PVD (peripheral vascular disease) ([facility]) 01/19/2024    Severe protein-calorie malnutrition ([facility]) 06/26/2023    Thyroid disease     Uncontrolled hypertension        Past Surgical History:      Past Surgical History:   Procedure Laterality Date    BACK SURGERY      x2    BRONCHOSCOPY N/A 4/16/2024    BRONCHOSCOPY DIAGNOSTIC OR CELL WASH ONLY performed by Jose Price MD at [facility]    INVASIVE VASCULAR N/A 1/19/2024    Aortagram abdominal performed by Lazaro Vidales MD at [facility]IAC CATH LAB    INVASIVE VASCULAR N/A 1/19/2024    Angioplasty peripheral artery performed by Lazaro Vidales MD at [facility] CATH LAB    LEFT OOPHORECTOMY      PAIN MANAGEMENT PROCEDURE N/A 12/28/2023    CAUDAL EPIDURAL STEROID INJECTION performed by Lilia Cabrera DO at [facility] OR    PAIN MANAGEMENT PROCEDURE N/A 11/14/2024    THERAPEUTIC CAUDAL EPIDURAL UNDER FLUOROSCOPIC GUIDANCE

## 2024-12-27 ENCOUNTER — TELEPHONE (OUTPATIENT)
Dept: CARDIOLOGY CLINIC | Age: 69
End: 2024-12-27

## 2024-12-27 ENCOUNTER — HOSPITAL ENCOUNTER (OUTPATIENT)
Dept: INFUSION THERAPY | Age: 69
End: 2024-12-27

## 2024-12-27 ENCOUNTER — APPOINTMENT (OUTPATIENT)
Age: 69
End: 2024-12-27
Attending: STUDENT IN AN ORGANIZED HEALTH CARE EDUCATION/TRAINING PROGRAM
Payer: MEDICARE

## 2024-12-27 PROBLEM — Z51.5 PALLIATIVE CARE ENCOUNTER: Status: ACTIVE | Noted: 2024-12-27

## 2024-12-27 PROBLEM — R78.81 BACTEREMIA: Status: ACTIVE | Noted: 2024-12-27

## 2024-12-27 PROBLEM — I48.0 PAF (PAROXYSMAL ATRIAL FIBRILLATION) (HCC): Status: ACTIVE | Noted: 2022-12-15

## 2024-12-27 PROBLEM — A41.9 SEPSIS (HCC): Status: ACTIVE | Noted: 2024-12-27

## 2024-12-27 PROBLEM — E43 SEVERE PROTEIN-CALORIE MALNUTRITION (HCC): Chronic | Status: ACTIVE | Noted: 2024-12-27

## 2024-12-27 LAB
ANION GAP SERPL CALCULATED.3IONS-SCNC: 11 MMOL/L (ref 7–16)
BASOPHILS # BLD: 0 K/UL (ref 0–0.2)
BASOPHILS NFR BLD: 0 % (ref 0–2)
BUN SERPL-MCNC: 16 MG/DL (ref 6–23)
CALCIUM SERPL-MCNC: 8.1 MG/DL (ref 8.6–10.2)
CHLORIDE SERPL-SCNC: 97 MMOL/L (ref 98–107)
CO2 SERPL-SCNC: 29 MMOL/L (ref 22–29)
CREAT SERPL-MCNC: 0.6 MG/DL (ref 0.5–1)
CREAT SERPL-MCNC: 0.6 MG/DL (ref 0.5–1)
ECHO AV AREA PEAK VELOCITY: 2.4 CM2
ECHO AV AREA VTI: 2.6 CM2
ECHO AV AREA/BSA PEAK VELOCITY: 1.4 CM2/M2
ECHO AV AREA/BSA VTI: 1.6 CM2/M2
ECHO AV CUSP MM: 1.6 CM
ECHO AV MEAN GRADIENT: 6 MMHG
ECHO AV MEAN VELOCITY: 1.2 M/S
ECHO AV PEAK GRADIENT: 13 MMHG
ECHO AV PEAK VELOCITY: 1.8 M/S
ECHO AV VELOCITY RATIO: 0.83
ECHO AV VTI: 32.2 CM
ECHO EST RA PRESSURE: 3 MMHG
ECHO LA VOL A-L A2C: 65 ML (ref 22–52)
ECHO LA VOL A-L A4C: 76 ML (ref 22–52)
ECHO LA VOL MOD A2C: 62 ML (ref 22–52)
ECHO LA VOL MOD A4C: 69 ML (ref 22–52)
ECHO LA VOLUME AREA LENGTH: 72 ML
ECHO LA VOLUME INDEX A-L A2C: 39 ML/M2 (ref 16–34)
ECHO LA VOLUME INDEX A-L A4C: 46 ML/M2 (ref 16–34)
ECHO LA VOLUME INDEX AREA LENGTH: 43 ML/M2 (ref 16–34)
ECHO LA VOLUME INDEX MOD A2C: 37 ML/M2 (ref 16–34)
ECHO LA VOLUME INDEX MOD A4C: 42 ML/M2 (ref 16–34)
ECHO LV E' LATERAL VELOCITY: 13 CM/S
ECHO LV E' SEPTAL VELOCITY: 8 CM/S
ECHO LV EF PHYSICIAN: 65 %
ECHO LV FRACTIONAL SHORTENING: 36 % (ref 28–44)
ECHO LV INTERNAL DIMENSION DIASTOLE INDEX: 2.53 CM/M2
ECHO LV INTERNAL DIMENSION DIASTOLIC: 4.2 CM (ref 3.9–5.3)
ECHO LV INTERNAL DIMENSION SYSTOLIC INDEX: 1.63 CM/M2
ECHO LV INTERNAL DIMENSION SYSTOLIC: 2.7 CM
ECHO LV IVSD: 0.8 CM (ref 0.6–0.9)
ECHO LV IVSS: 1.2 CM
ECHO LV MASS 2D: 109.8 G (ref 67–162)
ECHO LV MASS INDEX 2D: 66.2 G/M2 (ref 43–95)
ECHO LV POSTERIOR WALL DIASTOLIC: 0.9 CM (ref 0.6–0.9)
ECHO LV POSTERIOR WALL SYSTOLIC: 1.3 CM
ECHO LV RELATIVE WALL THICKNESS RATIO: 0.43
ECHO LVOT AREA: 2.8 CM2
ECHO LVOT AV VTI INDEX: 0.9
ECHO LVOT DIAM: 1.9 CM
ECHO LVOT MEAN GRADIENT: 4 MMHG
ECHO LVOT PEAK GRADIENT: 9 MMHG
ECHO LVOT PEAK VELOCITY: 1.5 M/S
ECHO LVOT STROKE VOLUME INDEX: 49.3 ML/M2
ECHO LVOT SV: 81.9 ML
ECHO LVOT VTI: 28.9 CM
ECHO MV "A" WAVE DURATION: 124.6 MSEC
ECHO MV A VELOCITY: 1 M/S
ECHO MV AREA PHT: 4.4 CM2
ECHO MV AREA VTI: 2.7 CM2
ECHO MV E DECELERATION TIME (DT): 183.9 MS
ECHO MV E VELOCITY: 1.19 M/S
ECHO MV E/A RATIO: 1.19
ECHO MV E/E' LATERAL: 9.15
ECHO MV E/E' RATIO (AVERAGED): 12.01
ECHO MV E/E' SEPTAL: 14.88
ECHO MV LVOT VTI INDEX: 1.07
ECHO MV MAX VELOCITY: 1.4 M/S
ECHO MV MEAN GRADIENT: 3 MMHG
ECHO MV MEAN VELOCITY: 0.8 M/S
ECHO MV PEAK GRADIENT: 7 MMHG
ECHO MV PRESSURE HALF TIME (PHT): 50 MS
ECHO MV VTI: 30.8 CM
ECHO PV MAX VELOCITY: 1.4 M/S
ECHO PV MEAN GRADIENT: 4 MMHG
ECHO PV MEAN VELOCITY: 1 M/S
ECHO PV PEAK GRADIENT: 7 MMHG
ECHO PV VTI: 28.4 CM
ECHO RIGHT VENTRICULAR SYSTOLIC PRESSURE (RVSP): 58 MMHG
ECHO RV BASAL DIMENSION: 4.4 CM
ECHO RV INTERNAL DIMENSION: 3.4 CM
ECHO RV LONGITUDINAL DIMENSION: 6.7 CM
ECHO RV MID DIMENSION: 3.9 CM
ECHO RV TAPSE: 1.9 CM (ref 1.7–?)
ECHO TV REGURGITANT MAX VELOCITY: 3.7 M/S
ECHO TV REGURGITANT PEAK GRADIENT: 55 MMHG
EKG ATRIAL RATE: 62 BPM
EKG P AXIS: 94 DEGREES
EKG P-R INTERVAL: 144 MS
EKG Q-T INTERVAL: 416 MS
EKG QRS DURATION: 76 MS
EKG QTC CALCULATION (BAZETT): 422 MS
EKG R AXIS: 136 DEGREES
EKG T AXIS: 118 DEGREES
EKG VENTRICULAR RATE: 62 BPM
EOSINOPHIL # BLD: 0 K/UL (ref 0.05–0.5)
EOSINOPHILS RELATIVE PERCENT: 0 % (ref 0–6)
ERYTHROCYTE [DISTWIDTH] IN BLOOD BY AUTOMATED COUNT: 15.3 % (ref 11.5–15)
GFR, ESTIMATED: >90 ML/MIN/1.73M2
GFR, ESTIMATED: >90 ML/MIN/1.73M2
GLUCOSE SERPL-MCNC: 188 MG/DL (ref 74–99)
HCT VFR BLD AUTO: 31.7 % (ref 34–48)
HGB BLD-MCNC: 9.4 G/DL (ref 11.5–15.5)
LYMPHOCYTES NFR BLD: 0.79 K/UL (ref 1.5–4)
LYMPHOCYTES RELATIVE PERCENT: 6 % (ref 20–42)
MCH RBC QN AUTO: 29.3 PG (ref 26–35)
MCHC RBC AUTO-ENTMCNC: 29.7 G/DL (ref 32–34.5)
MCV RBC AUTO: 98.8 FL (ref 80–99.9)
MICROORGANISM SPEC CULT: NO GROWTH
MONOCYTES NFR BLD: 0.45 K/UL (ref 0.1–0.95)
MONOCYTES NFR BLD: 4 % (ref 2–12)
NEUTROPHILS NFR BLD: 90 % (ref 43–80)
NEUTS SEG NFR BLD: 11.67 K/UL (ref 1.8–7.3)
PLATELET # BLD AUTO: 327 K/UL (ref 130–450)
PMV BLD AUTO: 9.8 FL (ref 7–12)
POTASSIUM SERPL-SCNC: 3.9 MMOL/L (ref 3.5–5)
RBC # BLD AUTO: 3.21 M/UL (ref 3.5–5.5)
RBC # BLD: ABNORMAL 10*6/UL
SERVICE CMNT-IMP: NORMAL
SODIUM SERPL-SCNC: 137 MMOL/L (ref 132–146)
SPECIMEN DESCRIPTION: NORMAL
WBC OTHER # BLD: 12.9 K/UL (ref 4.5–11.5)

## 2024-12-27 PROCEDURE — 2140000000 HC CCU INTERMEDIATE R&B

## 2024-12-27 PROCEDURE — 97535 SELF CARE MNGMENT TRAINING: CPT

## 2024-12-27 PROCEDURE — 93005 ELECTROCARDIOGRAM TRACING: CPT | Performed by: STUDENT IN AN ORGANIZED HEALTH CARE EDUCATION/TRAINING PROGRAM

## 2024-12-27 PROCEDURE — 97530 THERAPEUTIC ACTIVITIES: CPT

## 2024-12-27 PROCEDURE — 82565 ASSAY OF CREATININE: CPT

## 2024-12-27 PROCEDURE — 97161 PT EVAL LOW COMPLEX 20 MIN: CPT

## 2024-12-27 PROCEDURE — 99222 1ST HOSP IP/OBS MODERATE 55: CPT | Performed by: INTERNAL MEDICINE

## 2024-12-27 PROCEDURE — 6360000002 HC RX W HCPCS: Performed by: NURSE PRACTITIONER

## 2024-12-27 PROCEDURE — 87040 BLOOD CULTURE FOR BACTERIA: CPT

## 2024-12-27 PROCEDURE — APPSS60 APP SPLIT SHARED TIME 46-60 MINUTES: Performed by: PHYSICIAN ASSISTANT

## 2024-12-27 PROCEDURE — 97165 OT EVAL LOW COMPLEX 30 MIN: CPT

## 2024-12-27 PROCEDURE — 99222 1ST HOSP IP/OBS MODERATE 55: CPT | Performed by: NURSE PRACTITIONER

## 2024-12-27 PROCEDURE — 94640 AIRWAY INHALATION TREATMENT: CPT

## 2024-12-27 PROCEDURE — 2580000003 HC RX 258: Performed by: NURSE PRACTITIONER

## 2024-12-27 PROCEDURE — 85025 COMPLETE CBC W/AUTO DIFF WBC: CPT

## 2024-12-27 PROCEDURE — 36415 COLL VENOUS BLD VENIPUNCTURE: CPT

## 2024-12-27 PROCEDURE — 94660 CPAP INITIATION&MGMT: CPT

## 2024-12-27 PROCEDURE — 2700000000 HC OXYGEN THERAPY PER DAY

## 2024-12-27 PROCEDURE — 2500000003 HC RX 250 WO HCPCS: Performed by: NURSE PRACTITIONER

## 2024-12-27 PROCEDURE — 99232 SBSQ HOSP IP/OBS MODERATE 35: CPT | Performed by: STUDENT IN AN ORGANIZED HEALTH CARE EDUCATION/TRAINING PROGRAM

## 2024-12-27 PROCEDURE — 6370000000 HC RX 637 (ALT 250 FOR IP): Performed by: NURSE PRACTITIONER

## 2024-12-27 PROCEDURE — 93306 TTE W/DOPPLER COMPLETE: CPT | Performed by: INTERNAL MEDICINE

## 2024-12-27 PROCEDURE — 2500000003 HC RX 250 WO HCPCS: Performed by: STUDENT IN AN ORGANIZED HEALTH CARE EDUCATION/TRAINING PROGRAM

## 2024-12-27 PROCEDURE — 93306 TTE W/DOPPLER COMPLETE: CPT

## 2024-12-27 PROCEDURE — 80048 BASIC METABOLIC PNL TOTAL CA: CPT

## 2024-12-27 RX ORDER — PREDNISONE 20 MG/1
40 TABLET ORAL DAILY
Status: DISCONTINUED | OUTPATIENT
Start: 2024-12-28 | End: 2024-12-29

## 2024-12-27 RX ORDER — METOPROLOL TARTRATE 1 MG/ML
5 INJECTION, SOLUTION INTRAVENOUS EVERY 5 MIN PRN
Status: COMPLETED | OUTPATIENT
Start: 2024-12-27 | End: 2024-12-29

## 2024-12-27 RX ORDER — DILTIAZEM HYDROCHLORIDE 120 MG/1
240 CAPSULE, COATED, EXTENDED RELEASE ORAL DAILY
Status: DISCONTINUED | OUTPATIENT
Start: 2024-12-28 | End: 2025-01-03 | Stop reason: HOSPADM

## 2024-12-27 RX ADMIN — PREDNISONE 40 MG: 20 TABLET ORAL at 11:33

## 2024-12-27 RX ADMIN — VANCOMYCIN HYDROCHLORIDE 750 MG: 750 INJECTION, POWDER, LYOPHILIZED, FOR SOLUTION INTRAVENOUS at 20:29

## 2024-12-27 RX ADMIN — HYDRALAZINE HYDROCHLORIDE 10 MG: 20 INJECTION INTRAMUSCULAR; INTRAVENOUS at 08:09

## 2024-12-27 RX ADMIN — IPRATROPIUM BROMIDE AND ALBUTEROL SULFATE 1 DOSE: 2.5; .5 SOLUTION RESPIRATORY (INHALATION) at 20:12

## 2024-12-27 RX ADMIN — Medication 5 MG: at 21:53

## 2024-12-27 RX ADMIN — ENOXAPARIN SODIUM 40 MG: 100 INJECTION SUBCUTANEOUS at 08:10

## 2024-12-27 RX ADMIN — GUAIFENESIN 400 MG: 400 TABLET ORAL at 15:31

## 2024-12-27 RX ADMIN — Medication 1 CAPSULE: at 08:09

## 2024-12-27 RX ADMIN — Medication 5 MG: at 19:00

## 2024-12-27 RX ADMIN — MAGNESIUM HYDROXIDE 30 ML: 400 SUSPENSION ORAL at 21:13

## 2024-12-27 RX ADMIN — CLONAZEPAM 0.5 MG: 0.5 TABLET ORAL at 08:10

## 2024-12-27 RX ADMIN — WATER 40 MG: 1 INJECTION INTRAMUSCULAR; INTRAVENOUS; SUBCUTANEOUS at 05:14

## 2024-12-27 RX ADMIN — VANCOMYCIN HYDROCHLORIDE 750 MG: 750 INJECTION, POWDER, LYOPHILIZED, FOR SOLUTION INTRAVENOUS at 08:17

## 2024-12-27 RX ADMIN — DILTIAZEM HYDROCHLORIDE 180 MG: 180 CAPSULE, COATED, EXTENDED RELEASE ORAL at 08:10

## 2024-12-27 RX ADMIN — HYDROXYZINE PAMOATE 25 MG: 25 CAPSULE ORAL at 21:53

## 2024-12-27 RX ADMIN — GUAIFENESIN 400 MG: 400 TABLET ORAL at 08:09

## 2024-12-27 RX ADMIN — BUDESONIDE INHALATION 1000 MCG: 0.5 SUSPENSION RESPIRATORY (INHALATION) at 20:12

## 2024-12-27 RX ADMIN — CEFEPIME 2000 MG: 2 INJECTION, POWDER, FOR SOLUTION INTRAVENOUS at 21:54

## 2024-12-27 RX ADMIN — METOPROLOL TARTRATE 5 MG: 1 INJECTION, SOLUTION INTRAVENOUS at 13:22

## 2024-12-27 RX ADMIN — METOPROLOL TARTRATE 5 MG: 1 INJECTION, SOLUTION INTRAVENOUS at 22:56

## 2024-12-27 RX ADMIN — PREGABALIN 75 MG: 25 CAPSULE ORAL at 08:08

## 2024-12-27 RX ADMIN — METHIMAZOLE 15 MG: 5 TABLET ORAL at 08:10

## 2024-12-27 RX ADMIN — DULOXETINE HYDROCHLORIDE 60 MG: 60 CAPSULE, DELAYED RELEASE ORAL at 08:09

## 2024-12-27 RX ADMIN — ACETAMINOPHEN 650 MG: 325 TABLET ORAL at 19:32

## 2024-12-27 RX ADMIN — SODIUM CHLORIDE, PRESERVATIVE FREE 10 ML: 5 INJECTION INTRAVENOUS at 08:12

## 2024-12-27 RX ADMIN — GUAIFENESIN 400 MG: 400 TABLET ORAL at 20:30

## 2024-12-27 RX ADMIN — PANTOPRAZOLE SODIUM 40 MG: 40 TABLET, DELAYED RELEASE ORAL at 05:14

## 2024-12-27 RX ADMIN — IPRATROPIUM BROMIDE AND ALBUTEROL SULFATE 1 DOSE: 2.5; .5 SOLUTION RESPIRATORY (INHALATION) at 15:35

## 2024-12-27 RX ADMIN — IPRATROPIUM BROMIDE AND ALBUTEROL SULFATE 1 DOSE: 2.5; .5 SOLUTION RESPIRATORY (INHALATION) at 12:57

## 2024-12-27 RX ADMIN — PREGABALIN 75 MG: 25 CAPSULE ORAL at 20:30

## 2024-12-27 RX ADMIN — HYDROXYZINE PAMOATE 25 MG: 25 CAPSULE ORAL at 05:14

## 2024-12-27 RX ADMIN — ONDANSETRON 4 MG: 2 INJECTION INTRAMUSCULAR; INTRAVENOUS at 19:33

## 2024-12-27 RX ADMIN — ARFORMOTEROL TARTRATE 15 MCG: 15 SOLUTION RESPIRATORY (INHALATION) at 20:12

## 2024-12-27 RX ADMIN — CEFEPIME 2000 MG: 2 INJECTION, POWDER, FOR SOLUTION INTRAVENOUS at 08:18

## 2024-12-27 RX ADMIN — ASPIRIN 81 MG: 81 TABLET, COATED ORAL at 08:10

## 2024-12-27 RX ADMIN — FOLIC ACID 1 MG: 1 TABLET ORAL at 08:09

## 2024-12-27 RX ADMIN — SODIUM CHLORIDE, PRESERVATIVE FREE 10 ML: 5 INJECTION INTRAVENOUS at 20:30

## 2024-12-27 RX ADMIN — POLYETHYLENE GLYCOL 3350 17 G: 17 POWDER, FOR SOLUTION ORAL at 19:00

## 2024-12-27 ASSESSMENT — PAIN SCALES - GENERAL
PAINLEVEL_OUTOF10: 8
PAINLEVEL_OUTOF10: 0

## 2024-12-27 ASSESSMENT — PAIN - FUNCTIONAL ASSESSMENT: PAIN_FUNCTIONAL_ASSESSMENT: ACTIVITIES ARE NOT PREVENTED

## 2024-12-27 ASSESSMENT — PAIN DESCRIPTION - LOCATION: LOCATION: HEAD

## 2024-12-27 ASSESSMENT — PAIN DESCRIPTION - DESCRIPTORS: DESCRIPTORS: ACHING;DISCOMFORT;SORE

## 2024-12-27 NOTE — CARE COORDINATION
12/27/24  Transition of Care update.  Patient is being followed for pneumonia and COPD exacerbation.  Patient is on a steroid taper and receiving Duo-nebs.  Patient is on IV Vanc and IV Maxipime with ID following. Patient is pending an ECHO. PT/OT evals complete with Am-PAC of 16/24 for PT as well as OT. Palliative met with patient to complete advanced directives. Patient is on 7 liters of 02 with attempt to wean as baseline 02 is 4 liters. Discharge goal is to return to [facility] when medically stable. Patient is a Medicaid bed hold and will not need a pre-cert to return. PENNY and destination complete.  No PASRR needed and transport started and on the soft chart. SW/Cm to follow.     Electronically signed by ELIGIO Frances on 12/27/2024 at 12:39 PM

## 2024-12-27 NOTE — CONSULTS
INPATIENT CARDIOLOGY CONSULT     Reason for Consult: AF RVR    Cardiologist: Dr. Yoo    Requesting Physician: Dr. Smiley    Date of Consultation: 12/27/2024    HISTORY OF PRESENT ILLNESS:   Patient is a 69 year old WF known to Dr. Humphreys.  She has also been evaluated by Dr. Chávez / Structural Cardiology for Watchman candidacy.    PERTINENT ENCOUNTERS  Admission [facility] 3/2024 with chest pain.  Felt to be atypical.  Seen by Cardiology, no work up ordered  Admission [facility] 4/2024 with COPD exacerbation.  Found to have acute on chronic anemia requiring transfusion.  Did not undergo EGD/colonoscopy due to evaluations noted 2023 per GI with negative bleeding etiology.  Admission [facility] 5/2024 with COPD exacerbation and PNA.  Found to be in AF RVR.  Cardiology consulted --> rate control continued.  Patient deemed poor candidate for OAC, Eliquis discontinued.  Outpatient Watchman evaluation.  Multiple other hospitalizations 2024 for acute on chronic hypoxic respiratory failure in setting of COPD exacerbations  OV Dr. Humphreys 10/30/2024.  Chronic PACHECO.  Continue current medications.  Watchman evaluation.  OV Dr. Chávez/Structural Cardiology 11/19/2024.  Undergoing outpatient Watchman evaluation with CTA --> if anatomy is suitable, patient is agreeable to proceed with the procedure.  Scheduled for watchman 1/13/2025.      Patient presented to [facility] on December 25, 2024 with complaints of dyspnea.    Per patient, for a few days prior to hospital presentation she began to notice progressively worsening dyspnea on exertion compared to typical baseline at the NH facility where she currently resides.  She noted of not feeling well overall, admitted to cough.  Denied chest pain, palpitations, dizziness, near-syncope or syncope.  Denies fever, chills.  Denies PND orthopnea or peripheral edema.  She was found to be in respiratory distress by EMS, with oxygen saturations in the 70s on her baseline 4L NC.  She was placed on  lower extremity edema  Neurologic: No focal motor deficits apparent, normal mood and affect    Telemetry: Sinus-AF-sinus  EK2024 1317: Atrial fibrillation 96 bpm.  Normal axis and intervals.  No ST or T wave changes    Impression:   Recurrent paroxysmal A-fib. <1 hour episode today.  Not on AC due to GI bleed  COPD exacerbation/pneumonia  Enterococcus bacteremia    Recommendations:  Back in sinus.  Recurrences of AF would not be surprising given significant lung disease.    Increase diltiazem to promote maintenance of sinus rhythm  Echo done will review  Patient is scheduled for outpatient Watchman procedure in the near future, continue aspirin for now  Will be available as needed over the weekend, please call with questions or recurrent arrhythmias are noted  Outpatient follow-up with Dr. Humphreys    Thank you for the consultation.  Please do not hesitate to call with questions.    Carmelo Yoo MD, [facility] Cardiology

## 2024-12-27 NOTE — PLAN OF CARE
Problem: Safety - Adult  Goal: Free from fall injury  12/27/2024 0723 by Sunni Peralta RN  Outcome: Progressing     Problem: Chronic Conditions and Co-morbidities  Goal: Patient's chronic conditions and co-morbidity symptoms are monitored and maintained or improved  12/27/2024 0723 by Sunni Peralta RN  Outcome: Progressing     Problem: Discharge Planning  Goal: Discharge to home or other facility with appropriate resources  12/27/2024 0723 by Sunni Peralta RN  Outcome: Progressing     Problem: Pain  Goal: Verbalizes/displays adequate comfort level or baseline comfort level  12/27/2024 0723 by Sunni Peralta RN  Outcome: Progressing  Flowsheets  Taken 12/27/2024 0315 by Oralia Jordan RN  Verbalizes/displays adequate comfort level or baseline comfort level: Encourage patient to monitor pain and request assistance  Taken 12/26/2024 2315 by Oralia Jordan RN  Verbalizes/displays adequate comfort level or baseline comfort level: Encourage patient to monitor pain and request assistance     Problem: Skin/Tissue Integrity  Goal: Absence of new skin breakdown  Description: 1.  Monitor for areas of redness and/or skin breakdown  2.  Assess vascular access sites hourly  3.  Every 4-6 hours minimum:  Change oxygen saturation probe site  4.  Every 4-6 hours:  If on nasal continuous positive airway pressure, respiratory therapy assess nares and determine need for appliance change or resting period.  12/27/2024 0723 by Sunni Peralta RN  Outcome: Progressing     Problem: Risk for Elopement  Goal: Patient will not exit the unit/facility without proper excort  12/27/2024 0723 by Sunni Peralta RN  Outcome: Progressing

## 2024-12-27 NOTE — PROGRESS NOTES
Occupational Therapy  OCCUPATIONAL THERAPY INITIAL EVALUATION    [facility]  [address], [place]      Date:2024                                                Patient Name: Lana Phillips  MRN: [id number]  : 1955  Room: [address] [place] [address]A    Evaluating OT: Oumar Echevarria OTR/L #[id number]     Referring Provider: Elida Moore APRN - NP   Specific Provider Orders/Date: OT eval and treat 24    Diagnosis: Peripheral vascular disease, unspecified (HCC) [I73.9]  Respiratory distress [R06.03]  DERIAN (acute kidney injury) (Former[facility]) [N17.9]  Atherosclerosis of both carotid arteries [I65.23]  Fall, initial encounter [W19.XXXA]  Acute on chronic respiratory failure with hypoxia and hypercapnia [J96.21, J96.22]  Pneumonia due to infectious organism, unspecified laterality, unspecified part of lung [J18.9]  Acute hypoxic on chronic hypercapnic respiratory failure [J96.01, J96.12]   Pt admitted to hospital with SOB, resp failure and fall      Pertinent Medical History:  has a past medical history of Atherosclerosis 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 (HCC), Atrial fibrillation (HCC), Chronic obstructive pulmonary disease (HCC), COVID-19, Critical limb ischemia of left lower extremity with rest pain (HCC), GI bleed, Hypertension, RUTH treated with BiPAP, Panic disorder, PVD (peripheral vascular disease) ([facility]), Severe protein-calorie malnutrition (HCC), Thyroid disease, and Uncontrolled hypertension.       Precautions:  Fall Risk, O2, continuous pulse ox     Assessment of current deficits    [x] Functional mobility  [x]ADLs  [x] Strength               []Cognition    [x] Functional transfers   [x] IADLs         [x] Safety Awareness   [x]Endurance    [] Fine Coordination              [x] Balance      [] Vision/perception   []Sensation     []Gross Motor Coordination  [] ROM  [] Delirium                   [] Motor Control

## 2024-12-27 NOTE — PROGRESS NOTES
Comprehensive Nutrition Assessment    Type and Reason for Visit:  Initial    Nutrition Recommendations/Plan:   Recommend and start Ensure plus high protein supplement BID and Magic cup supplement BID to help meet increased nutritional needs and d/t decreased po intake of meals.    Monitor renal lab values and will modify ONS as appropriate.         Malnutrition Assessment:  Malnutrition Status:  Severe malnutrition (12/27/24 1237)    Context:  Chronic Illness     Findings of the 6 clinical characteristics of malnutrition:  Energy Intake:  75% or less estimated energy requirements for 1 month or longer  Weight Loss:  Unable to assess (d/t poor long term actual weight history)     Body Fat Loss:  Severe body fat loss Orbital, Triceps, Fat Overlying Ribs   Muscle Mass Loss:  Severe muscle mass loss Temples (temporalis), Clavicles (pectoralis & deltoids), Calf (gastrocnemius)  Fluid Accumulation:  No fluid accumulation     Strength:  Not Performed    Nutrition Assessment:    Patient adm from nursing facility w/ SOB and acute hypoxic on chronic hypercapnic respiratory failure ; noted PNA and COPD exacerbation ; also adm w/ DERIAN and sepsis ; noted Enterococcus bacteremia ; recent fall PTA ;  hx of previous hospital admissions ; hx of severe malnutrition ; pt does meet criteria for severe malnutrition ; hx of COPD/PVD/CAD/atrial fibrillation/hypothyroidism ; s/p angioplasty on 1/19/24 ; s/p bronchoscopy on 4/16/24 ; noted decreased po intake/appetite PTA ; will provide recommendations    Nutrition Related Findings:    -I&Os (-1.0 L), no edema, redness to buttocks, A&O x 4, active BS, muscle/fat wasting, cachexia ; Wound Type: None       Current Nutrition Intake & Therapies:    Average Meal Intake: 26-50% (~50%)     ADULT DIET; Regular; Low Fat/Low Chol/High Fiber/ZIA  ADULT ORAL NUTRITION SUPPLEMENT; Breakfast, Lunch, Dinner; Standard High Calorie/High Protein Oral Supplement    Anthropometric Measures:  Height: 172.7

## 2024-12-27 NOTE — ACP (ADVANCE CARE PLANNING)
Advance Care Planning     Palliative Team Advance Care Planning (ACP) Conversation    Date of Conversation: 12/27/24    Individuals present for the conversation: Patient with decision making capacity     ACP documents on file prior to discussion:  -None    Previously completed document/s not on file:  Living Will document was completed previously but it is not available for review. This writer requested a copy of the document for patient's chart.     Healthcare Decision Maker:    Primary Decision Maker: Lana Reeves - Other Relative - [phone]    Secondary Decision Maker: Nia Salas - Niece/Nephew - [phone]     Conversation Summary:  LSW met with pt to complete HCPOA, agents as listed above. Original and extra copy given to pt, copy placed in soft chart.    Resuscitation Status:   Code Status: Full Code     Documentation Completed:  -Power of  for Healthcare    I spent 25 minutes with the patient and/or surrogate decision maker discussing the patient's wishes and goals.      ELIGIO Chapman

## 2024-12-27 NOTE — PROGRESS NOTES
Pharmacy Consultation Note  (Antibiotic Dosing and Monitoring)    Initial consult date: 12/25/2024  Consulting physician/provider: Elida Moore APRN - NP.   Drug: Vancomycin  Indication: Pneumonia (CAP).     Age/  Gender Height Weight IBW  Allergy Information   69 y.o./female 172.7 cm (5' 8\")172.7 cm 51.8 kg (114 lb 3.2 oz) 53.6 kg    Ideal body weight: 63.9 kg (140 lb 14 oz)   Pcn [penicillins]      Renal Function:  Recent Labs     12/25/24  0900 12/26/24  0533   BUN 32* 27*   CREATININE 1.3* 0.7     Vancomycin Administration Times:  Recent vancomycin administrations                     vancomycin (VANCOCIN) 750 mg in sodium chloride 0.9 % 250 mL IVPB ([id number]) (mg) 750 mg New Bag 12/26/24 1954     750 mg New Bag  0924    vancomycin (VANCOCIN) 1,000 mg in sodium chloride 0.9 % 250 mL IVPB (Dimt7Ada) (mg) 1,000 mg New Bag 12/25/24 1043                    Assessment:  Patient is a 69 y.o. female who has been initiated on vancomycin for nosocomial pneumonia.   Estimated Creatinine Clearance: 64 mL/min (based on SCr of 0.7 mg/dL).  To dose vancomycin, pharmacy will be dosing based off of levels because of patient's renal impairment/insufficiency.   Scr 1.3mg/dL, baseline 0.7mg/dL on 12/6/2024.   12/26: Scr 0.7. WBC 17.5. Random level = <4 mcg/mL.   1/4 blood culture growing GPC in pairs and chains  12/27:  Scr 0.6.  Awaiting susceptibilities for blood cultures.        Plan:  Continue vancomycin 750 mg IV q12h  Will check vancomycin level tomorrow with am labs  Will continue to monitor renal function.   Pharmacy to follow    Checo VigilD  PGY-1 Pharmacy Practice Resident, [phone]  12/27/2024 7:56 AM

## 2024-12-27 NOTE — TELEPHONE ENCOUNTER
WATCHMAN SHARED DECISION MAKING     Watchman Shared Decision Making was done by Dr. Humphreys on 10/30/24    See his office note from 10/30/24 that states the following     A fib  69 year old with Hx of PAF Dx 12/2022- Eliquis stopped in May 2024 -per Dr Jarrett note \"Decision was made to discontinue Eliquis in light of recurrent anemia.\"     10/30/24: Referral placed to Alan Chávez MD  to discuss Watchman to prevent stroke from A Fib            Shared Decision Making Tool    Lana was given a Watchman informational packet that contained the ACC CardioSmart Decision Aid for AFib Stroke Prevention for Patients with Atrial fibrillation during his  her visit mercedes/ Saira on 11/19/24.

## 2024-12-27 NOTE — PROGRESS NOTES
Department of Internal Medicine  Infectious Diseases  Progress  Note    C/C: Enterococcus bacteremia       Pt is awake and alert  Denies fever or chills  SOB stable   Afebrile         Current Facility-Administered Medications   Medication Dose Route Frequency Provider Last Rate Last Admin    vancomycin (VANCOCIN) 750 mg in sodium chloride 0.9 % 250 mL IVPB (Jwto7Hvw)  750 mg IntraVENous Q12H Elida Mooren, APRN - NP   Stopped at 12/27/24 0943    arformoterol tartrate (BROVANA) nebulizer solution 15 mcg  15 mcg Nebulization BID RT Elida Mooren, APRN - NP   15 mcg at 12/26/24 2008    aspirin EC tablet 81 mg  81 mg Oral Daily Teresa, Elida Mitzi, APRN - NP   81 mg at 12/27/24 0810    budesonide (PULMICORT) nebulizer suspension 1,000 mcg  1 mg Nebulization BID RT Teresa, Elida Mitzi, APRN - NP   1,000 mcg at 12/26/24 2008    dilTIAZem (CARDIZEM CD) extended release capsule 180 mg  180 mg Oral Daily Teresa, Elida Ericksonn, APRN - NP   180 mg at 12/27/24 0810    clonazePAM (KLONOPIN) tablet 0.5 mg  0.5 mg Oral Daily Teresa, Elida Mitzi, APRN - NP   0.5 mg at 12/27/24 0810    diphenoxylate-atropine (LOMOTIL) 2.5-0.025 MG per tablet 1 tablet  1 tablet Oral 4x Daily PRN Elida Moore Mitzi, APRN - NP        DULoxetine (CYMBALTA) extended release capsule 60 mg  60 mg Oral Daily Teresa, Elida Ericksonn, APRN - NP   60 mg at 12/27/24 0809    folic acid (FOLVITE) tablet 1 mg  1 mg Oral Daily Teresa, Elida Mitzi, APRN - NP   1 mg at 12/27/24 0809    methIMAzole (TAPAZOLE) tablet 15 mg  15 mg Oral Daily Teresa, Elida Mitzi, APRN - NP   15 mg at 12/27/24 0810    pantoprazole (PROTONIX) tablet 40 mg  40 mg Oral QAM AC Teresa, Elida Ericksonn, APRN - NP   40 mg at 12/27/24 0514    polyethylene glycol (GLYCOLAX) packet 17 g  17 g Oral Daily PRN Elida Moore APRN - NP        [Held by provider] predniSONE (DELTASONE) tablet 10 mg  10 mg Oral Daily Elida Moore APRN - NP        pregabalin (LYRICA) capsule 75 mg  75 mg Oral BID  12/27/2024 09:32 AM       CMP     Lab Results   Component Value Date/Time     12/27/2024 09:32 AM    K 3.9 12/27/2024 09:32 AM    K 3.3 12/30/2022 02:49 AM    CL 97 12/27/2024 09:32 AM    CO2 29 12/27/2024 09:32 AM    BUN 16 12/27/2024 09:32 AM    CREATININE 0.6 12/27/2024 09:32 AM    GFRAA >60 07/28/2022 09:31 AM    LABGLOM >90 12/27/2024 09:32 AM    LABGLOM >90 04/25/2024 09:54 AM    GLUCOSE 188 12/27/2024 09:32 AM    CALCIUM 8.1 12/27/2024 09:32 AM    BILITOT 0.3 12/25/2024 09:00 AM    ALKPHOS 108 12/25/2024 09:00 AM    AST 28 12/25/2024 09:00 AM    ALT 14 12/25/2024 09:00 AM         Hepatic Function Panel:    Lab Results   Component Value Date/Time    ALKPHOS 108 12/25/2024 09:00 AM    ALT 14 12/25/2024 09:00 AM    AST 28 12/25/2024 09:00 AM    BILITOT 0.3 12/25/2024 09:00 AM    BILIDIR <0.2 05/16/2024 07:25 AM    IBILI Can not be calculated 05/16/2024 07:25 AM       PT/INR:    Lab Results   Component Value Date/Time    PROTIME 12.0 10/15/2023 06:05 AM    INR 1.1 10/15/2023 06:05 AM       TSH:    Lab Results   Component Value Date/Time    TSH 1.68 12/25/2024 11:08 AM       U/A:    Lab Results   Component Value Date/Time    COLORU Yellow 06/27/2024 01:10 AM    PHUR 6.5 06/27/2024 01:10 AM    PHUR 7.5 04/25/2024 09:54 AM    WBCUA 6 TO 9 06/27/2024 01:10 AM    RBCUA 3 to 5 06/27/2024 01:10 AM    BACTERIA TRACE 06/27/2024 01:10 AM    CLARITYU SL CLOUDY 05/10/2023 03:22 PM    LEUKOCYTESUR TRACE 06/27/2024 01:10 AM    UROBILINOGEN 0.2 06/27/2024 01:10 AM    BILIRUBINUR NEGATIVE 06/27/2024 01:10 AM    BLOODU LARGE 05/10/2023 03:22 PM    GLUCOSEU NEGATIVE 06/27/2024 01:10 AM    AMORPHOUS PRESENT 04/25/2024 09:54 AM       ABG:  No results found for: \"HAO2BTI\", \"BEART\", \"C7HYTMLE\", \"PHART\", \"THGBART\", \"RBK5QZC\", \"PO2ART\", \"YNY5JQJ\"    MICROBIOLOGY:    Blood culture -        Enterococcus faecium DETECTED Abnormal      Streptococcus agalactiae (Group B) Not Detected    Streptococcus pneumoniae Not Detected

## 2024-12-27 NOTE — PROGRESS NOTES
[facility] Quality Flow/Interdisciplinary Rounds Progress Note        Quality Flow Rounds held on December 27, 2024    Disciplines Attending:  Bedside Nurse, , , and Nursing Unit Leadership    Lana Phillips was admitted on 12/25/2024  8:51 AM    Anticipated Discharge Date:       Disposition:    Guy Score:  Guy Scale Score: 20    [facility] RISK OF UNPLANNED READMISSION 2.0             26.8 Total Score        Discussed patient goal for the day, patient clinical progression, and barriers to discharge.  The following Goal(s) of the Day/Commitment(s) have been identified:  Diagnostics - Report Results and Labs - Report Results      Britni Fernandez RN  December 27, 2024

## 2024-12-27 NOTE — PROGRESS NOTES
Physical Therapy  Physical Therapy Initial Assessment     Name: Lana Phillips  : 1955  MRN: [id number]      Date of Service: 2024    Evaluating PT:  Nico Marrufo PT, DPT [id number]    Room #:  6504/6504-A  Diagnosis:  Peripheral vascular disease, unspecified ([facility]) [I73.9]  Respiratory distress [R06.03]  DERIAN (acute kidney injury) ([facility]) [N17.9]  Atherosclerosis of both carotid arteries [I65.23]  Fall, initial encounter [W19.XXXA]  Acute on chronic respiratory failure with hypoxia and hypercapnia [J96.21, J96.22]  Pneumonia due to infectious organism, unspecified laterality, unspecified part of lung [J18.9]  Acute hypoxic on chronic hypercapnic respiratory failure [J96.01, J96.12]  PMHx/PSHx:    Past Medical History:   Diagnosis Date    Atherosclerosis 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 ([facility]) 2024    Atrial fibrillation ([facility])     Chronic obstructive pulmonary disease ([facility]) 12/15/2022    COVID-19 2022    Critical limb ischemia of left lower extremity with rest pain ([facility]) 2023    GI bleed 10/14/2023    Hypertension     RUTH treated with BiPAP     Panic disorder     PVD (peripheral vascular disease) ([facility]) 2024    Severe protein-calorie malnutrition ([facility]) 2023    Thyroid disease     Uncontrolled hypertension      Procedure/Surgery:  none  Precautions:  Falls, O2, continuous pulse ox  Equipment Needs:  TBD    SUBJECTIVE:    Pt was admitted from [facility].  Pt ambulated without device and was active with PT PTA.    OBJECTIVE:   Initial Evaluation  Date: 24 Treatment Short Term/ Long Term   Goals   AM-PAC 6 Clicks      Was pt agreeable to Eval/treatment? Yes     Does pt have pain? No c/o pain     Bed Mobility  Rolling: NT  Supine to sit: SBA  Sit to supine: SBA  Scooting: SBA  Mod Independent   Transfers Sit to stand: Kizzy  Stand to sit: Kizzy  Stand pivot: Kizzy no device  Independent   Ambulation   50 feet x 2 reps with Kizzy no device

## 2024-12-27 NOTE — PROGRESS NOTES
[facility]  [address], [address]  [place] [address]      Pulmonary Progress Note      SUBJECTIVE:  lying down in bed on 7 li HFNC saturating 95%, wore NIPPV 18-24/5 ZT=840 Fio2=60% last night, feeling some better with breathing and less with cough (?) close to baseline    OBJECTIVE    Medications    Continuous Infusions:   sodium chloride      dextrose         Scheduled Meds:   [START ON 12/28/2024] dilTIAZem  240 mg Oral Daily    vancomycin  750 mg IntraVENous Q12H    arformoterol tartrate  15 mcg Nebulization BID RT    aspirin  81 mg Oral Daily    budesonide  1 mg Nebulization BID RT    clonazePAM  0.5 mg Oral Daily    DULoxetine  60 mg Oral Daily    folic acid  1 mg Oral Daily    methIMAzole  15 mg Oral Daily    pantoprazole  40 mg Oral QAM AC    [Held by provider] predniSONE  10 mg Oral Daily    pregabalin  75 mg Oral BID    sodium chloride flush  5-40 mL IntraVENous 2 times per day    enoxaparin  40 mg SubCUTAneous Daily    ipratropium 0.5 mg-albuterol 2.5 mg  1 Dose Inhalation Q4H WA RT    guaiFENesin  400 mg Oral TID    predniSONE  40 mg Oral Daily    melatonin  5 mg Oral QPM    potassium chloride  20 mEq Oral Once    cefepime  2,000 mg IntraVENous Q12H    lactobacillus  1 capsule Oral Daily    vitamin D  50,000 Units Oral Weekly       PRN Meds:metoprolol, diphenoxylate-atropine, polyethylene glycol, sodium chloride flush, sodium chloride, ondansetron **OR** ondansetron, acetaminophen **OR** acetaminophen, melatonin, hydrALAZINE, calcium carbonate, Polyvinyl Alcohol-Povidone PF, sodium chloride, magnesium hydroxide, benzocaine-menthol, hydrOXYzine pamoate, benzonatate, glucose, dextrose bolus **OR** dextrose bolus, glucagon (rDNA), dextrose    Physical    VITALS:  BP (!) 171/77 Comment: rn aware  Pulse 89   Temp 98.6 °F (37 °C) (Temporal)   Resp 18   Ht 1.727 m (5' 8\")   Wt 55.2 kg (121 lb 12.8 oz)   SpO2 98%   BMI 18.52 kg/m²

## 2024-12-27 NOTE — PROGRESS NOTES
[facility] Hospitalist Progress Note    SYNOPSIS: Patient admitted on 2024 for Acute hypoxic on chronic hypercapnic respiratory failure    This is a 69-year-old lady with past medical history of A-fib, COPD, critical limb ischemia of left lower extremity, GI bleed, hypertension, RUTH treated with BiPAP, panic disorder, peripheral vascular disease, severe protein calorie malnutrition, hypothyroidism, hypertension.  Patient presented from [facility] with complaints of respiratory distress beginning earlier in the day.  Patient was reportedly hypoxic with SpO2 in the 70s on her baseline 4 L oxygen and was placed on BiPAP.  Patient has been having cough with intermittent shortness of breath over the past few days.  She denies any fever, chills, chest pain, palpitations or urinary symptoms.  She did also fell from her wheelchair and hit her head.  She is not on any blood thinners.  She did not lose consciousness.  No focal deficits or pain.  In the ED,  CT was tachycardic and hypoxic requiring BiPAP.  Lab work was significant for hypokalemia, DERIAN, lactic acidosis, elevated BNP, elevated troponin, leukocytosis.  Viral panel was negative.  Blood cultures obtained.  She was started on cefepime, vancomycin.  Also given 1 L bolus of fluid, DuoNeb in ED.  She was recently treated for altered mental status and UTI from  to .     blood culture came back positive for gram-positive bacteria-Enterococcus.  Echocardiogram ordered.  Repeat blood culture ordered.  ID consulted.  Urine culture.     patient appears to be doing well, patient undergoing echocardiogram.  Patient went into A-fib RVR.  Stat EKG and IV metoprolol added.  Cardiology has been consulted.            SUBJECTIVE:  Stable overnight. No other overnight issues reported.   Patient seen and examined  Records reviewed.         Temp (24hrs), Av.8 °F (36.6 °C), Min:97.1 °F (36.2 °C), Max:98.6 °F (37 °C)    DIET: ADULT DIET;

## 2024-12-27 NOTE — PLAN OF CARE
Problem: Safety - Adult  Goal: Free from fall injury  Outcome: Progressing     Problem: Chronic Conditions and Co-morbidities  Goal: Patient's chronic conditions and co-morbidity symptoms are monitored and maintained or improved  Outcome: Progressing  Flowsheets (Taken 12/26/2024 2000)  Care Plan - Patient's Chronic Conditions and Co-Morbidity Symptoms are Monitored and Maintained or Improved: Monitor and assess patient's chronic conditions and comorbid symptoms for stability, deterioration, or improvement     Problem: Discharge Planning  Goal: Discharge to home or other facility with appropriate resources  Outcome: Progressing  Flowsheets (Taken 12/26/2024 2000)  Discharge to home or other facility with appropriate resources: Identify barriers to discharge with patient and caregiver     Problem: Pain  Goal: Verbalizes/displays adequate comfort level or baseline comfort level  Outcome: Progressing  Flowsheets (Taken 12/26/2024 1920)  Verbalizes/displays adequate comfort level or baseline comfort level: Encourage patient to monitor pain and request assistance     Problem: Skin/Tissue Integrity  Goal: Absence of new skin breakdown  Description: 1.  Monitor for areas of redness and/or skin breakdown  2.  Assess vascular access sites hourly  3.  Every 4-6 hours minimum:  Change oxygen saturation probe site  4.  Every 4-6 hours:  If on nasal continuous positive airway pressure, respiratory therapy assess nares and determine need for appliance change or resting period.  Outcome: Progressing     Problem: Risk for Elopement  Goal: Patient will not exit the unit/facility without proper excort  Outcome: Progressing

## 2024-12-28 ENCOUNTER — APPOINTMENT (OUTPATIENT)
Dept: GENERAL RADIOLOGY | Age: 69
End: 2024-12-28
Payer: MEDICARE

## 2024-12-28 LAB
ACB COMPLEX DNA BLD POS QL NAA+NON-PROBE: NOT DETECTED
ANION GAP SERPL CALCULATED.3IONS-SCNC: 7 MMOL/L (ref 7–16)
B FRAGILIS DNA BLD POS QL NAA+NON-PROBE: NOT DETECTED
B.E.: 9.5 MMOL/L (ref -3–3)
BASOPHILS # BLD: 0 K/UL (ref 0–0.2)
BASOPHILS NFR BLD: 0 % (ref 0–2)
BIOFIRE TEST COMMENT: ABNORMAL
BNP SERPL-MCNC: 4328 PG/ML (ref 0–125)
BUN SERPL-MCNC: 16 MG/DL (ref 6–23)
C ALBICANS DNA BLD POS QL NAA+NON-PROBE: NOT DETECTED
C AURIS DNA BLD POS QL NAA+NON-PROBE: NOT DETECTED
C GATTII+NEOFOR DNA BLD POS QL NAA+N-PRB: NOT DETECTED
C GLABRATA DNA BLD POS QL NAA+NON-PROBE: NOT DETECTED
C KRUSEI DNA BLD POS QL NAA+NON-PROBE: NOT DETECTED
C PARAP DNA BLD POS QL NAA+NON-PROBE: NOT DETECTED
C TROPICLS DNA BLD POS QL NAA+NON-PROBE: NOT DETECTED
CALCIUM SERPL-MCNC: 8.1 MG/DL (ref 8.6–10.2)
CHLORIDE SERPL-SCNC: 100 MMOL/L (ref 98–107)
CO2 SERPL-SCNC: 36 MMOL/L (ref 22–29)
COHB: 0.2 % (ref 0–1.5)
CREAT SERPL-MCNC: 0.6 MG/DL (ref 0.5–1)
CRITICAL: ABNORMAL
DATE ANALYZED: ABNORMAL
DATE LAST DOSE: NORMAL
DATE OF COLLECTION: ABNORMAL
E CLOAC COMP DNA BLD POS NAA+NON-PROBE: NOT DETECTED
E COLI DNA BLD POS QL NAA+NON-PROBE: NOT DETECTED
E FAECALIS DNA BLD POS QL NAA+NON-PROBE: NOT DETECTED
E FAECIUM DNA BLD POS QL NAA+NON-PROBE: DETECTED
EKG ATRIAL RATE: 129 BPM
EKG ATRIAL RATE: 140 BPM
EKG P AXIS: 81 DEGREES
EKG P-R INTERVAL: 124 MS
EKG Q-T INTERVAL: 306 MS
EKG Q-T INTERVAL: 334 MS
EKG QRS DURATION: 72 MS
EKG QRS DURATION: 78 MS
EKG QTC CALCULATION (BAZETT): 421 MS
EKG QTC CALCULATION (BAZETT): 467 MS
EKG R AXIS: 55 DEGREES
EKG R AXIS: 61 DEGREES
EKG T AXIS: 62 DEGREES
EKG T AXIS: 76 DEGREES
EKG VENTRICULAR RATE: 140 BPM
EKG VENTRICULAR RATE: 96 BPM
ENTEROBACTERALES DNA BLD POS NAA+N-PRB: NOT DETECTED
EOSINOPHIL # BLD: 0 K/UL (ref 0.05–0.5)
EOSINOPHILS RELATIVE PERCENT: 0 % (ref 0–6)
ERYTHROCYTE [DISTWIDTH] IN BLOOD BY AUTOMATED COUNT: 15.8 % (ref 11.5–15)
GFR, ESTIMATED: >90 ML/MIN/1.73M2
GLUCOSE SERPL-MCNC: 122 MG/DL (ref 74–99)
GP B STREP DNA BLD POS QL NAA+NON-PROBE: NOT DETECTED
HAEM INFLU DNA BLD POS QL NAA+NON-PROBE: NOT DETECTED
HCO3: 38.3 MMOL/L (ref 22–26)
HCT VFR BLD AUTO: 33.5 % (ref 34–48)
HGB BLD-MCNC: 9.6 G/DL (ref 11.5–15.5)
HHB: 3.9 % (ref 0–5)
K OXYTOCA DNA BLD POS QL NAA+NON-PROBE: NOT DETECTED
KLEBSIELLA SP DNA BLD POS QL NAA+NON-PRB: NOT DETECTED
KLEBSIELLA SP DNA BLD POS QL NAA+NON-PRB: NOT DETECTED
L MONOCYTOG DNA BLD POS QL NAA+NON-PROBE: NOT DETECTED
LAB: ABNORMAL
LYMPHOCYTES NFR BLD: 0.95 K/UL (ref 1.5–4)
LYMPHOCYTES RELATIVE PERCENT: 7 % (ref 20–42)
Lab: 835
MCH RBC QN AUTO: 29.9 PG (ref 26–35)
MCHC RBC AUTO-ENTMCNC: 28.7 G/DL (ref 32–34.5)
MCV RBC AUTO: 104.4 FL (ref 80–99.9)
METAMYELOCYTES ABSOLUTE COUNT: 0.27 K/UL (ref 0–0.12)
METAMYELOCYTES: 2 % (ref 0–1)
METHB: 0.3 % (ref 0–1.5)
MICROORGANISM SPEC CULT: ABNORMAL
MICROORGANISM/AGENT SPEC: ABNORMAL
MODE: ABNORMAL
MONOCYTES NFR BLD: 0.54 K/UL (ref 0.1–0.95)
MONOCYTES NFR BLD: 4 % (ref 2–12)
MYELOCYTES ABSOLUTE COUNT: 0.14 K/UL
MYELOCYTES: 1 %
N MEN DNA BLD POS QL NAA+NON-PROBE: NOT DETECTED
NEUTROPHILS NFR BLD: 86 % (ref 43–80)
NEUTS SEG NFR BLD: 11.7 K/UL (ref 1.8–7.3)
NUCLEATED RED BLOOD CELLS: 2 PER 100 WBC
O2 SATURATION: 96.1 % (ref 92–98.5)
O2HB: 95.6 % (ref 94–97)
OPERATOR ID: 421
P AERUGINOSA DNA BLD POS NAA+NON-PROBE: NOT DETECTED
PATIENT TEMP: 37 C
PCO2: 80.8 MMHG (ref 35–45)
PH BLOOD GAS: 7.29 (ref 7.35–7.45)
PLATELET # BLD AUTO: 324 K/UL (ref 130–450)
PMV BLD AUTO: 10 FL (ref 7–12)
PO2: 90.3 MMHG (ref 75–100)
POTASSIUM SERPL-SCNC: 4.2 MMOL/L (ref 3.5–5)
PROTEUS SP DNA BLD POS QL NAA+NON-PROBE: NOT DETECTED
RBC # BLD AUTO: 3.21 M/UL (ref 3.5–5.5)
RBC # BLD: ABNORMAL 10*6/UL
S AUREUS DNA BLD POS QL NAA+NON-PROBE: NOT DETECTED
S AUREUS+CONS DNA BLD POS NAA+NON-PROBE: NOT DETECTED
S EPIDERMIDIS DNA BLD POS QL NAA+NON-PRB: NOT DETECTED
S LUGDUNENSIS DNA BLD POS QL NAA+NON-PRB: NOT DETECTED
S MALTOPHILIA DNA BLD POS QL NAA+NON-PRB: NOT DETECTED
S MARCESCENS DNA BLD POS NAA+NON-PROBE: NOT DETECTED
S PNEUM DNA BLD POS QL NAA+NON-PROBE: NOT DETECTED
S PYO DNA BLD POS QL NAA+NON-PROBE: NOT DETECTED
SALMONELLA DNA BLD POS QL NAA+NON-PROBE: NOT DETECTED
SERVICE CMNT-IMP: ABNORMAL
SODIUM SERPL-SCNC: 143 MMOL/L (ref 132–146)
SOURCE, BLOOD GAS: ABNORMAL
SPECIMEN DESCRIPTION: ABNORMAL
STREPTOCOCCUS DNA BLD POS NAA+NON-PROBE: NOT DETECTED
THB: 10.6 G/DL (ref 11.5–16.5)
TIME ANALYZED: 841
TME LAST DOSE: NORMAL H
VANA+VANB ISLT/SPM QL: NOT DETECTED
VANCOMYCIN DOSE: NORMAL MG
VANCOMYCIN SERPL-MCNC: 9.4 UG/ML (ref 5–40)
WBC OTHER # BLD: 13.6 K/UL (ref 4.5–11.5)

## 2024-12-28 PROCEDURE — 6370000000 HC RX 637 (ALT 250 FOR IP): Performed by: NURSE PRACTITIONER

## 2024-12-28 PROCEDURE — 36415 COLL VENOUS BLD VENIPUNCTURE: CPT

## 2024-12-28 PROCEDURE — 6360000002 HC RX W HCPCS: Performed by: NURSE PRACTITIONER

## 2024-12-28 PROCEDURE — 6370000000 HC RX 637 (ALT 250 FOR IP): Performed by: INTERNAL MEDICINE

## 2024-12-28 PROCEDURE — 99232 SBSQ HOSP IP/OBS MODERATE 35: CPT | Performed by: INTERNAL MEDICINE

## 2024-12-28 PROCEDURE — 71045 X-RAY EXAM CHEST 1 VIEW: CPT

## 2024-12-28 PROCEDURE — 94640 AIRWAY INHALATION TREATMENT: CPT

## 2024-12-28 PROCEDURE — 84145 PROCALCITONIN (PCT): CPT

## 2024-12-28 PROCEDURE — 6360000002 HC RX W HCPCS: Performed by: INTERNAL MEDICINE

## 2024-12-28 PROCEDURE — 2580000003 HC RX 258: Performed by: NURSE PRACTITIONER

## 2024-12-28 PROCEDURE — 83880 ASSAY OF NATRIURETIC PEPTIDE: CPT

## 2024-12-28 PROCEDURE — 93010 ELECTROCARDIOGRAM REPORT: CPT | Performed by: INTERNAL MEDICINE

## 2024-12-28 PROCEDURE — 80202 ASSAY OF VANCOMYCIN: CPT

## 2024-12-28 PROCEDURE — 2140000000 HC CCU INTERMEDIATE R&B

## 2024-12-28 PROCEDURE — 2500000003 HC RX 250 WO HCPCS: Performed by: NURSE PRACTITIONER

## 2024-12-28 PROCEDURE — 2700000000 HC OXYGEN THERAPY PER DAY

## 2024-12-28 PROCEDURE — 6370000000 HC RX 637 (ALT 250 FOR IP): Performed by: PHYSICIAN ASSISTANT

## 2024-12-28 PROCEDURE — 82805 BLOOD GASES W/O2 SATURATION: CPT

## 2024-12-28 PROCEDURE — 80048 BASIC METABOLIC PNL TOTAL CA: CPT

## 2024-12-28 PROCEDURE — 94660 CPAP INITIATION&MGMT: CPT

## 2024-12-28 PROCEDURE — 85025 COMPLETE CBC W/AUTO DIFF WBC: CPT

## 2024-12-28 RX ORDER — MAGNESIUM CARB/ALUMINUM HYDROX 105-160MG
296 TABLET,CHEWABLE ORAL ONCE
Status: COMPLETED | OUTPATIENT
Start: 2024-12-28 | End: 2024-12-28

## 2024-12-28 RX ORDER — DIPHENHYDRAMINE HYDROCHLORIDE 50 MG/ML
25 INJECTION INTRAMUSCULAR; INTRAVENOUS EVERY 6 HOURS PRN
Status: DISCONTINUED | OUTPATIENT
Start: 2024-12-28 | End: 2025-01-03 | Stop reason: HOSPADM

## 2024-12-28 RX ADMIN — MAJOR MAGNESIUM CITRATE ORAL SOLUTION - LEMON 296 ML: 1.75 LIQUID ORAL at 11:09

## 2024-12-28 RX ADMIN — DULOXETINE HYDROCHLORIDE 60 MG: 60 CAPSULE, DELAYED RELEASE ORAL at 08:55

## 2024-12-28 RX ADMIN — PREGABALIN 75 MG: 25 CAPSULE ORAL at 08:55

## 2024-12-28 RX ADMIN — CLONAZEPAM 0.5 MG: 0.5 TABLET ORAL at 08:55

## 2024-12-28 RX ADMIN — ARFORMOTEROL TARTRATE 15 MCG: 15 SOLUTION RESPIRATORY (INHALATION) at 20:39

## 2024-12-28 RX ADMIN — BUDESONIDE INHALATION 1000 MCG: 0.5 SUSPENSION RESPIRATORY (INHALATION) at 08:36

## 2024-12-28 RX ADMIN — DIPHENHYDRAMINE HYDROCHLORIDE 25 MG: 50 INJECTION INTRAMUSCULAR; INTRAVENOUS at 18:43

## 2024-12-28 RX ADMIN — IPRATROPIUM BROMIDE AND ALBUTEROL SULFATE 1 DOSE: 2.5; .5 SOLUTION RESPIRATORY (INHALATION) at 15:29

## 2024-12-28 RX ADMIN — GUAIFENESIN 400 MG: 400 TABLET ORAL at 08:54

## 2024-12-28 RX ADMIN — HYDROXYZINE PAMOATE 25 MG: 25 CAPSULE ORAL at 05:53

## 2024-12-28 RX ADMIN — PREDNISONE 40 MG: 20 TABLET ORAL at 08:54

## 2024-12-28 RX ADMIN — IPRATROPIUM BROMIDE AND ALBUTEROL SULFATE 1 DOSE: 2.5; .5 SOLUTION RESPIRATORY (INHALATION) at 08:36

## 2024-12-28 RX ADMIN — IPRATROPIUM BROMIDE AND ALBUTEROL SULFATE 1 DOSE: 2.5; .5 SOLUTION RESPIRATORY (INHALATION) at 20:39

## 2024-12-28 RX ADMIN — ASPIRIN 81 MG: 81 TABLET, COATED ORAL at 08:55

## 2024-12-28 RX ADMIN — FOLIC ACID 1 MG: 1 TABLET ORAL at 08:54

## 2024-12-28 RX ADMIN — CEFEPIME 2000 MG: 2 INJECTION, POWDER, FOR SOLUTION INTRAVENOUS at 22:32

## 2024-12-28 RX ADMIN — SODIUM CHLORIDE, PRESERVATIVE FREE 10 ML: 5 INJECTION INTRAVENOUS at 08:56

## 2024-12-28 RX ADMIN — Medication 1 CAPSULE: at 08:54

## 2024-12-28 RX ADMIN — METHIMAZOLE 15 MG: 5 TABLET ORAL at 08:56

## 2024-12-28 RX ADMIN — Medication 5 MG: at 21:05

## 2024-12-28 RX ADMIN — GUAIFENESIN 400 MG: 400 TABLET ORAL at 14:19

## 2024-12-28 RX ADMIN — VANCOMYCIN HYDROCHLORIDE 1000 MG: 1 INJECTION, POWDER, LYOPHILIZED, FOR SOLUTION INTRAVENOUS at 09:07

## 2024-12-28 RX ADMIN — ANTACID TABLETS 500 MG: 500 TABLET, CHEWABLE ORAL at 08:55

## 2024-12-28 RX ADMIN — SODIUM CHLORIDE, PRESERVATIVE FREE 10 ML: 5 INJECTION INTRAVENOUS at 21:06

## 2024-12-28 RX ADMIN — DILTIAZEM HYDROCHLORIDE 240 MG: 120 CAPSULE, COATED, EXTENDED RELEASE ORAL at 08:55

## 2024-12-28 RX ADMIN — CEFEPIME 2000 MG: 2 INJECTION, POWDER, FOR SOLUTION INTRAVENOUS at 11:07

## 2024-12-28 RX ADMIN — GUAIFENESIN 400 MG: 400 TABLET ORAL at 21:06

## 2024-12-28 RX ADMIN — ARFORMOTEROL TARTRATE 15 MCG: 15 SOLUTION RESPIRATORY (INHALATION) at 08:36

## 2024-12-28 RX ADMIN — PREGABALIN 75 MG: 25 CAPSULE ORAL at 21:06

## 2024-12-28 RX ADMIN — PANTOPRAZOLE SODIUM 40 MG: 40 TABLET, DELAYED RELEASE ORAL at 05:53

## 2024-12-28 RX ADMIN — VANCOMYCIN HYDROCHLORIDE 1000 MG: 1 INJECTION, POWDER, LYOPHILIZED, FOR SOLUTION INTRAVENOUS at 21:12

## 2024-12-28 RX ADMIN — BUDESONIDE INHALATION 1000 MCG: 0.5 SUSPENSION RESPIRATORY (INHALATION) at 20:39

## 2024-12-28 RX ADMIN — IPRATROPIUM BROMIDE AND ALBUTEROL SULFATE 1 DOSE: 2.5; .5 SOLUTION RESPIRATORY (INHALATION) at 12:13

## 2024-12-28 RX ADMIN — ENOXAPARIN SODIUM 40 MG: 100 INJECTION SUBCUTANEOUS at 08:53

## 2024-12-28 RX ADMIN — ACETAMINOPHEN 650 MG: 325 TABLET ORAL at 21:25

## 2024-12-28 ASSESSMENT — PAIN SCALES - GENERAL
PAINLEVEL_OUTOF10: 2
PAINLEVEL_OUTOF10: 0
PAINLEVEL_OUTOF10: 8
PAINLEVEL_OUTOF10: 0
PAINLEVEL_OUTOF10: 0

## 2024-12-28 ASSESSMENT — PAIN - FUNCTIONAL ASSESSMENT: PAIN_FUNCTIONAL_ASSESSMENT: ACTIVITIES ARE NOT PREVENTED

## 2024-12-28 ASSESSMENT — PAIN DESCRIPTION - DESCRIPTORS: DESCRIPTORS: ACHING;NAGGING;DULL

## 2024-12-28 ASSESSMENT — PAIN DESCRIPTION - LOCATION: LOCATION: HEAD

## 2024-12-28 ASSESSMENT — PAIN DESCRIPTION - ORIENTATION: ORIENTATION: MID;INNER

## 2024-12-28 NOTE — PROGRESS NOTES
Pharmacy Consultation Note  (Antibiotic Dosing and Monitoring)    Initial consult date: 12/25/2024  Consulting physician/provider: Elida Moore APRN - NP.   Drug: Vancomycin  Indication: Pneumonia (CAP).     Age/  Gender Height Weight IBW  Allergy Information   69 y.o./female 172.7 cm (5' 8\")172.7 cm 51.8 kg (114 lb 3.2 oz) 53.6 kg    Ideal body weight: 63.9 kg (140 lb 14 oz)   Pcn [penicillins]      Renal Function:  Recent Labs     12/26/24  0533 12/27/24  0815 12/27/24  0932 12/28/24  0511   BUN 27*  --  16 16   CREATININE 0.7 0.6 0.6 0.6     Vancomycin Administration Times:  Recent vancomycin administrations                     vancomycin (VANCOCIN) 750 mg in sodium chloride 0.9 % 250 mL IVPB ([id number]) (mg) 750 mg New Bag 12/27/24 2029     750 mg New Bag  0817     750 mg New Bag 12/26/24 1954     750 mg New Bag  0924    vancomycin (VANCOCIN) 1,000 mg in sodium chloride 0.9 % 250 mL IVPB ([id number]) (mg) 1,000 mg New Bag 12/25/24 1043                   Assessment:  Patient is a 69 y.o. female who has been initiated on vancomycin for nosocomial pneumonia.   Estimated Creatinine Clearance: 77 mL/min (based on SCr of 0.6 mg/dL).  To dose vancomycin, pharmacy will be dosing based off of levels because of patient's renal impairment/insufficiency.   Scr 1.3mg/dL, baseline 0.7mg/dL on 12/6/2024.   12/26: Scr 0.7. WBC 17.5. Random level = <4 mcg/mL.   1/4 blood culture growing GPC in pairs and chains  12/27:  Scr 0.6.  Awaiting susceptibilities for blood cultures.  12/28: Scr 0.6. Blood cx growing enterococcus faecium. Random level this AM = 9.4 mcg/mL (~9 hours post-dose)    Plan:  Increase vancomycin to 1000 mg IV q12h [465 / 12]  Will check vancomycin levels as needed.  Will continue to monitor renal function.   Pharmacy to follow    Eliecer Quinteros PharmD  PGY-1 Pharmacy Practice Resident   12/28/2024 7:47 AM

## 2024-12-28 NOTE — PROGRESS NOTES
Patient removed herself from nasal cannula to get onto bedside commode- patient SPO2 76% patient very short of breath and assisted back to bed with non re breather mask applied. RT called to assess patient. Patient also complaining of CP. Dr. Blancas messaged to notify, orders obtained and he came to assess patient.

## 2024-12-28 NOTE — PLAN OF CARE
Problem: Safety - Adult  Goal: Free from fall injury  12/27/2024 1943 by Oralia Jordan RN  Outcome: Progressing  12/27/2024 0723 by Sunni Peralta RN  Outcome: Progressing     Problem: Chronic Conditions and Co-morbidities  Goal: Patient's chronic conditions and co-morbidity symptoms are monitored and maintained or improved  12/27/2024 1943 by Oralia Jordan RN  Outcome: Progressing  12/27/2024 0723 by Sunni Peralta RN  Outcome: Progressing     Problem: Discharge Planning  Goal: Discharge to home or other facility with appropriate resources  12/27/2024 1943 by Oralia Jordan RN  Outcome: Progressing  12/27/2024 0723 by Sunni Peralta RN  Outcome: Progressing     Problem: Pain  Goal: Verbalizes/displays adequate comfort level or baseline comfort level  12/27/2024 1943 by Oralia Jordan RN  Outcome: Progressing  12/27/2024 0723 by Sunni Peralta RN  Outcome: Progressing  Flowsheets  Taken 12/27/2024 0315 by Oralia Jordan RN  Verbalizes/displays adequate comfort level or baseline comfort level: Encourage patient to monitor pain and request assistance  Taken 12/26/2024 2315 by Oralia Jordan RN  Verbalizes/displays adequate comfort level or baseline comfort level: Encourage patient to monitor pain and request assistance     Problem: Skin/Tissue Integrity  Goal: Absence of new skin breakdown  Description: 1.  Monitor for areas of redness and/or skin breakdown  2.  Assess vascular access sites hourly  3.  Every 4-6 hours minimum:  Change oxygen saturation probe site  4.  Every 4-6 hours:  If on nasal continuous positive airway pressure, respiratory therapy assess nares and determine need for appliance change or resting period.  12/27/2024 1943 by Oralia Jordan, RN  Outcome: Progressing  12/27/2024 0723 by Sunni Peralta RN  Outcome: Progressing     Problem: Risk for Elopement  Goal: Patient will not exit the unit/facility without proper excort  12/27/2024 1943 by Oralia Jordan RN  Outcome:

## 2024-12-28 NOTE — PROGRESS NOTES
Pulmonary Progress Note    Admit Date: 12/25/2024  Hospital day                               PCP: Tal Humphrey DO    Chief Complaint (s):  Patient Active Problem List   Diagnosis    Primary hypertension    COPD (chronic obstructive pulmonary disease) (HCC)    Gastroesophageal reflux disease    Constipation    Unexplained weight loss    Protein deficiency (HCC)    Diet-controlled diabetes mellitus (HCC)    Multiple nodules of lung    Abnormal EKG    Mixed hyperlipidemia    Hyperthyroidism    Vitamin B12 deficiency (dietary) anemia    Osteoporosis    Elevated troponin level not due to acute coronary syndrome    Tachycardia, unspecified    Pneumonia due to organism    PAF (paroxysmal atrial fibrillation) (HCC)    Hypoxia    Severe protein-calorie malnutrition (HCC)    Anxiety    Numbness and tingling of both lower extremities    Acute on chronic anemia    Chronic pain syndrome [G89.4]    Chronic bilateral low back pain with bilateral sciatica    Lumbar radiculopathy    Lumbar disc disorder    Critical limb ischemia of left lower extremity with rest pain (HCC)    Atherosclerosis of artery of extremity with rest pain (HCC)    Peripheral vascular disease, unspecified (HCC)    Atherosclerosis 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 (HCC)    COPD with acute exacerbation (HCC)    Iron deficiency anemia, unspecified    Acute respiratory failure with hypoxia and hypercarbia    COPD exacerbation (HCC)    Iron deficiency anemia    Acute delirium    Altered mental status    Disorder of sacrum    Respiratory distress    Acute hypoxic on chronic hypercapnic respiratory failure    Atherosclerosis of both carotid arteries    Pneumonia of both lungs due to infectious organism    Lactic acid acidosis    Elevated troponin    DERIAN (acute kidney injury) ([facility])    Fall at home    Palliative care encounter    Bacteremia    Sepsis ([facility])       Subjective:  Patient is seen on 6 L nasal cannula.  Normal thyroid.  Resp: No accessory muscle use. no rales. no wheezing. no rhonchi. Very diminished bases.   CV: Regular rate. Regular rhythm. No murmur or rub.   Abd: Non-tender. Non-distended. No masses. No organomegaly. Normal bowel sounds.   Skin: Warm and dry. No nodule on exposed extremities. No rash on exposed extremities.  Ext: No cyanosis, clubbing, edema  Lymph: No cervical LAD. No supraclavicular LAD.   M/S: No cyanosis. No joint deformity. No clubbing.   Neuro: Awake. Follows commands. Positive pupils/gag/corneals. Normal pain response.         Results:  CBC:   Recent Labs     12/26/24  0533 12/27/24  0932 12/28/24  0511   WBC 17.5* 12.9* 13.6*   HGB 8.8* 9.4* 9.6*   HCT 29.4* 31.7* 33.5*   .0* 98.8 104.4*    327 324     BMP:   Recent Labs     12/26/24  0533 12/27/24  0815 12/27/24  0932 12/28/24  0511     --  137 143   K 3.8  --  3.9 4.2     --  97* 100   CO2 35*  --  29 36*   BUN 27*  --  16 16   CREATININE 0.7 0.6 0.6 0.6     LIVER PROFILE: No results for input(s): \"AST\", \"ALT\", \"LIPASE\", \"AMYLASE\", \"BILIDIR\", \"BILITOT\", \"ALKPHOS\" in the last 72 hours.    Invalid input(s): \"ALB\"  PT/INR: No results for input(s): \"PROTIME\", \"INR\" in the last 72 hours.  APTT: No results for input(s): \"APTT\" in the last 72 hours.      Pathology:  N/A      Microbiology:  None    Recent ABG:   Recent Labs     12/28/24  0835   PH 7.294*   PO2 90.3   PCO2 80.8*   HCO3 38.3*   BE 9.5*   O2SAT 96.1   METHB 0.3   O2HB 95.6   COHB 0.2   HHB 3.9   THB 10.6*     FiO2 : 60 %       Recent Films:  XR CHEST PORTABLE   Final Result   1. Increased interstitial prominence within the lungs concerning for   worsening pulmonary edema or fluid overload.   2. New small bilateral pleural effusions.         Vascular duplex carotid bilateral   Final Result   The right internal carotid artery demonstrates 0-50% stenosis.      The left internal carotid artery demonstrates 0-50% stenosis.      Bilateral vertebral arteries

## 2024-12-28 NOTE — PROGRESS NOTES
[facility] Hospitalist Progress Note    Admitting Date and Time: 12/25/2024  8:51 AM  Admit Dx: Peripheral vascular disease, unspecified (HCC) [I73.9]  Respiratory distress [R06.03]  DERIAN (acute kidney injury) (HCC) [N17.9]  Atherosclerosis of both carotid arteries [I65.23]  Fall, initial encounter [W19.XXXA]  Acute on chronic respiratory failure with hypoxia and hypercapnia [J96.21, J96.22]  Pneumonia due to infectious organism, unspecified laterality, unspecified part of lung [J18.9]  Acute hypoxic on chronic hypercapnic respiratory failure [J96.01, J96.12]    Synopsis:    Patient admitted on 12/25/2024 for Acute hypoxic on chronic hypercapnic respiratory failure     This is a 69-year-old lady with past medical history of A-fib, COPD, critical limb ischemia of left lower extremity, GI bleed, hypertension, RUTH treated with BiPAP, panic disorder, peripheral vascular disease, severe protein calorie malnutrition, hypothyroidism, hypertension.  Patient presented from [facility] with complaints of respiratory distress beginning earlier in the day.  Patient was reportedly hypoxic with SpO2 in the 70s on her baseline 4 L oxygen and was placed on BiPAP.  Patient has been having cough with intermittent shortness of breath over the past few days.  She denies any fever, chills, chest pain, palpitations or urinary symptoms.  She did also fell from her wheelchair and hit her head.  She is not on any blood thinners.  She did not lose consciousness.  No focal deficits or pain.  In the ED,  CT was tachycardic and hypoxic requiring BiPAP.  Lab work was significant for hypokalemia, DERIAN, lactic acidosis, elevated BNP, elevated troponin, leukocytosis.  Viral panel was negative.  Blood cultures obtained.  She was started on cefepime, vancomycin.  Also given 1 L bolus of fluid, DuoNeb in ED.  She was recently treated for altered mental status and UTI from 6/27 to 6/29.     12/26 blood culture came back positive for  chloride  20 mEq Oral Once    cefepime  2,000 mg IntraVENous Q12H    lactobacillus  1 capsule Oral Daily    vitamin D  50,000 Units Oral Weekly     [id number], ,   metoprolol, 5 mg, Q5 Min PRN  diphenoxylate-atropine, 1 tablet, 4x Daily PRN  polyethylene glycol, 17 g, Daily PRN  sodium chloride flush, 5-40 mL, PRN  sodium chloride, , PRN  ondansetron, 4 mg, Q8H PRN   Or  ondansetron, 4 mg, Q6H PRN  acetaminophen, 650 mg, Q6H PRN   Or  acetaminophen, 650 mg, Q6H PRN  melatonin, 5 mg, Nightly PRN  hydrALAZINE, 10 mg, Q6H PRN  calcium carbonate, 500 mg, TID PRN  Polyvinyl Alcohol-Povidone PF, 1 drop, PRN  sodium chloride, 1 spray, PRN  magnesium hydroxide, 30 mL, Daily PRN  benzocaine-menthol, 1 lozenge, Q2H PRN  hydrOXYzine pamoate, 25 mg, TID PRN  benzonatate, 100 mg, TID PRN  glucose, 4 tablet, PRN  dextrose bolus, 125 mL, PRN   Or  dextrose bolus, 250 mL, PRN  glucagon (rDNA), 1 mg, PRN  dextrose, , Continuous PRN         Objective:    BP (!) 153/69   Pulse 65   Temp 97.7 °F (36.5 °C) (Temporal)   Resp 18   Ht 1.727 m (5' 8\")   Wt 56.4 kg (124 lb 6.4 oz)   SpO2 100%   BMI 18.91 kg/m²     General Appearance: alert and oriented to person, place and time and in no acute distress  Skin: warm and dry  Head: normocephalic and atraumatic  Eyes: pupils equal, round, and reactive to light, extraocular eye movements intact, conjunctivae normal  Neck: neck supple and non tender without mass   Pulmonary/Chest: clear to auscultation bilaterally- no wheezes, rales or rhonchi, normal air movement, no respiratory distress  Cardiovascular: normal rate, normal S1 and S2 and no carotid bruits  Abdomen: soft, non-tender, non-distended, normal bowel sounds, no masses or organomegaly  Extremities: no cyanosis, no clubbing and no edema  Neurologic: no cranial nerve deficit and speech normal        Recent Labs     12/26/24  0533 12/27/24  0815 12/27/24  0932 12/28/24  0511     --  137 143   K 3.8  --  3.9 4.2     --  97*

## 2024-12-28 NOTE — PATIENT CARE CONFERENCE
[facility] Quality Flow/Interdisciplinary Rounds Progress Note        Quality Flow Rounds held on December 28, 2024    Disciplines Attending:  Bedside Nurse and Nursing Unit Leadership    Lana Phillips was admitted on 12/25/2024  8:51 AM    Anticipated Discharge Date:       Disposition:    Guy Score:  Guy Scale Score: 20    [facility] RISK OF UNPLANNED READMISSION 2.0             26.9 Total Score        Discussed patient goal for the day, patient clinical progression, and barriers to discharge.  The following Goal(s) of the Day/Commitment(s) have been identified:  Labs - Report Results and keep oxygen above 92%      Yuki Miranda RN  December 28, 2024

## 2024-12-28 NOTE — PROGRESS NOTES
MIRIAM Gonzalez notified of patient /80. Will await return call or new orders.Oralia Jordan RN    Per bjorn Gonzalez to give patients PRN IV Lopressor for BP.Oralia Jordan RN

## 2024-12-28 NOTE — PROGRESS NOTES
Date: 12/28/2024    Time: 1:32 AM    Patient Placed On BIPAP/CPAP/ Non-Invasive Ventilation?  Yes    If no must comment.  Facial area red/color change? No           If YES are Blister/Lesion present?No   If yes must notify nursing staff  BIPAP/CPAP skin barrier?  Yes    Skin barrier type:mepilexlite       Comments:        Gabe Agosto RCP

## 2024-12-28 NOTE — PROGRESS NOTES
12/28/24 0846   NIV Type   $NIV $Daily Charge   NIV Started/Stopped On   Equipment Type V60   Mode AVAPS   Mask Type Full face mask   Mask Size Medium   Assessment   Comfort Level Good   Using Accessory Muscles Yes   Mask Compliance Good   Skin Assessment Clean, dry, & intact   Skin Protection for O2 Device Yes   Orientation Middle   Location Nose   Intervention(s) Skin Barrier   Settings/Measurements   PIP Observed 17 cm H20   CPAP/EPAP 5 cmH2O   IPAP Min 18 cmH2O   IPAP Max 24 cmH2O   Vt (Set, mL) 450 mL   Vt (Measured) 492 mL   Rate Ordered 22   Insp Rise Time (%) 3 %   FiO2  60 %   I Time/ I Time % 0.8 s   Minute Volume (L/min) 15.4 Liters   Mask Leak (lpm) 80 lpm   Patient's Home Machine No   Alarm Settings   Alarms On Y   Low Pressure (cmH2O) 8 cmH2O   High Pressure (cmH2O) 30 cmH2O   Apnea (secs) 20 secs   RR Low (bpm) 16   RR High (bpm) 40 br/min     Date: 12/28/2024    Time: 8:48 AM    Patient Placed On BIPAP/CPAP/ Non-Invasive Ventilation? Yes  Facial area red/color change? No     If YES are Blister/Lesion present? No   BIPAP/CPAP skin barrier? Yes   Skin barrier type:mepilexlite     Comments: CO2 on ABG 80, placed back on AVAPS settings at this time    Oralia Burciaga RCP RRT-ACCS

## 2024-12-29 ENCOUNTER — APPOINTMENT (OUTPATIENT)
Dept: GENERAL RADIOLOGY | Age: 69
End: 2024-12-29
Payer: MEDICARE

## 2024-12-29 LAB
AADO2: 241.1 MMHG
AADO2: 256.2 MMHG
ANION GAP SERPL CALCULATED.3IONS-SCNC: 8 MMOL/L (ref 7–16)
ANION GAP SERPL CALCULATED.3IONS-SCNC: 8 MMOL/L (ref 7–16)
B.E.: 16.7 MMOL/L (ref -3–3)
B.E.: 9.2 MMOL/L (ref -3–3)
BASOPHILS # BLD: 0 K/UL (ref 0–0.2)
BASOPHILS # BLD: 0 K/UL (ref 0–0.2)
BASOPHILS NFR BLD: 0 % (ref 0–2)
BASOPHILS NFR BLD: 0 % (ref 0–2)
BUN SERPL-MCNC: 10 MG/DL (ref 6–23)
BUN SERPL-MCNC: 10 MG/DL (ref 6–23)
CALCIUM SERPL-MCNC: 8.5 MG/DL (ref 8.6–10.2)
CALCIUM SERPL-MCNC: 8.8 MG/DL (ref 8.6–10.2)
CHLORIDE SERPL-SCNC: 85 MMOL/L (ref 98–107)
CHLORIDE SERPL-SCNC: 95 MMOL/L (ref 98–107)
CO2 SERPL-SCNC: 40 MMOL/L (ref 22–29)
CO2 SERPL-SCNC: 43 MMOL/L (ref 22–29)
COHB: 0.1 % (ref 0–1.5)
COHB: 0.6 % (ref 0–1.5)
COMMENT: ABNORMAL
CREAT SERPL-MCNC: 0.5 MG/DL (ref 0.5–1)
CREAT SERPL-MCNC: 0.5 MG/DL (ref 0.5–1)
CRITICAL: ABNORMAL
CRITICAL: ABNORMAL
DATE ANALYZED: ABNORMAL
DATE ANALYZED: ABNORMAL
DATE OF COLLECTION: ABNORMAL
DATE OF COLLECTION: ABNORMAL
EOSINOPHIL # BLD: 0 K/UL (ref 0.05–0.5)
EOSINOPHIL # BLD: 0 K/UL (ref 0.05–0.5)
EOSINOPHILS RELATIVE PERCENT: 0 % (ref 0–6)
EOSINOPHILS RELATIVE PERCENT: 0 % (ref 0–6)
ERYTHROCYTE [DISTWIDTH] IN BLOOD BY AUTOMATED COUNT: 15.2 % (ref 11.5–15)
ERYTHROCYTE [DISTWIDTH] IN BLOOD BY AUTOMATED COUNT: 15.4 % (ref 11.5–15)
FIO2: 60 %
FIO2: 60 %
GFR, ESTIMATED: >90 ML/MIN/1.73M2
GFR, ESTIMATED: >90 ML/MIN/1.73M2
GLUCOSE SERPL-MCNC: 109 MG/DL (ref 74–99)
GLUCOSE SERPL-MCNC: 89 MG/DL (ref 74–99)
HCO3: 38.9 MMOL/L (ref 22–26)
HCO3: 42.1 MMOL/L (ref 22–26)
HCT VFR BLD AUTO: 33.2 % (ref 34–48)
HCT VFR BLD AUTO: 34.7 % (ref 34–48)
HGB BLD-MCNC: 10.1 G/DL (ref 11.5–15.5)
HGB BLD-MCNC: 10.3 G/DL (ref 11.5–15.5)
HHB: 1.4 % (ref 0–5)
HHB: 3.7 % (ref 0–5)
LAB: ABNORMAL
LAB: ABNORMAL
LACTATE BLDV-SCNC: 1.9 MMOL/L (ref 0.5–2.2)
LYMPHOCYTES NFR BLD: 1.19 K/UL (ref 1.5–4)
LYMPHOCYTES NFR BLD: 1.65 K/UL (ref 1.5–4)
LYMPHOCYTES RELATIVE PERCENT: 11 % (ref 20–42)
LYMPHOCYTES RELATIVE PERCENT: 9 % (ref 20–42)
Lab: 1355
Lab: 905
MAGNESIUM SERPL-MCNC: 1.6 MG/DL (ref 1.6–2.6)
MCH RBC QN AUTO: 30 PG (ref 26–35)
MCH RBC QN AUTO: 30.1 PG (ref 26–35)
MCHC RBC AUTO-ENTMCNC: 29.7 G/DL (ref 32–34.5)
MCHC RBC AUTO-ENTMCNC: 30.4 G/DL (ref 32–34.5)
MCV RBC AUTO: 101.2 FL (ref 80–99.9)
MCV RBC AUTO: 99.1 FL (ref 80–99.9)
METAMYELOCYTES ABSOLUTE COUNT: 0.12 K/UL (ref 0–0.12)
METAMYELOCYTES: 1 % (ref 0–1)
METHB: 0.2 % (ref 0–1.5)
METHB: 0.3 % (ref 0–1.5)
MODE: ABNORMAL
MODE: ABNORMAL
MONOCYTES NFR BLD: 1.07 K/UL (ref 0.1–0.95)
MONOCYTES NFR BLD: 1.37 K/UL (ref 0.1–0.95)
MONOCYTES NFR BLD: 8 % (ref 2–12)
MONOCYTES NFR BLD: 9 % (ref 2–12)
MYELOCYTES ABSOLUTE COUNT: 0.14 K/UL
MYELOCYTES ABSOLUTE COUNT: 0.6 K/UL
MYELOCYTES: 1 %
MYELOCYTES: 4 %
NEUTROPHILS NFR BLD: 78 % (ref 43–80)
NEUTROPHILS NFR BLD: 80 % (ref 43–80)
NEUTS SEG NFR BLD: 10.72 K/UL (ref 1.8–7.3)
NEUTS SEG NFR BLD: 12.35 K/UL (ref 1.8–7.3)
NUCLEATED RED BLOOD CELLS: 1 PER 100 WBC
O2 SATURATION: 96.3 % (ref 92–98.5)
O2 SATURATION: 98.6 % (ref 92–98.5)
O2HB: 95.4 % (ref 94–97)
O2HB: 98.3 % (ref 94–97)
OPERATOR ID: 1868
OPERATOR ID: ABNORMAL
PATIENT TEMP: 37 C
PATIENT TEMP: 37 C
PCO2: 55.4 MMHG (ref 35–45)
PCO2: 85.4 MMHG (ref 35–45)
PEEP/CPAP: 8 CMH2O
PEEP/CPAP: 8 CMH2O
PFO2: 1.55 MMHG/%
PFO2: 1.84 MMHG/%
PH BLOOD GAS: 7.28 (ref 7.35–7.45)
PH BLOOD GAS: 7.5 (ref 7.35–7.45)
PHOSPHATE SERPL-MCNC: 0.4 MG/DL (ref 2.5–4.5)
PLATELET # BLD AUTO: 304 K/UL (ref 130–450)
PLATELET # BLD AUTO: 313 K/UL (ref 130–450)
PMV BLD AUTO: 9.9 FL (ref 7–12)
PMV BLD AUTO: 9.9 FL (ref 7–12)
PO2: 110.7 MMHG (ref 75–100)
PO2: 92.8 MMHG (ref 75–100)
POTASSIUM SERPL-SCNC: 3 MMOL/L (ref 3.5–5)
POTASSIUM SERPL-SCNC: 3.9 MMOL/L (ref 3.5–5)
PROCALCITONIN SERPL-MCNC: 3.69 NG/ML (ref 0–0.08)
PS: 14 CMH20
PS: 14 CMH20
RBC # BLD AUTO: 3.35 M/UL (ref 3.5–5.5)
RBC # BLD AUTO: 3.43 M/UL (ref 3.5–5.5)
RBC # BLD: ABNORMAL 10*6/UL
RI(T): 2.31
RI(T): 2.6
SODIUM SERPL-SCNC: 136 MMOL/L (ref 132–146)
SODIUM SERPL-SCNC: 143 MMOL/L (ref 132–146)
SOURCE, BLOOD GAS: ABNORMAL
SOURCE, BLOOD GAS: ABNORMAL
THB: 10.8 G/DL (ref 11.5–16.5)
THB: 11.9 G/DL (ref 11.5–16.5)
TIME ANALYZED: 1404
TIME ANALYZED: 909
WBC OTHER # BLD: 13.7 K/UL (ref 4.5–11.5)
WBC OTHER # BLD: 15.5 K/UL (ref 4.5–11.5)

## 2024-12-29 PROCEDURE — 2580000003 HC RX 258: Performed by: NURSE PRACTITIONER

## 2024-12-29 PROCEDURE — 71045 X-RAY EXAM CHEST 1 VIEW: CPT

## 2024-12-29 PROCEDURE — 6360000002 HC RX W HCPCS: Performed by: NURSE PRACTITIONER

## 2024-12-29 PROCEDURE — 2500000003 HC RX 250 WO HCPCS: Performed by: INTERNAL MEDICINE

## 2024-12-29 PROCEDURE — 6370000000 HC RX 637 (ALT 250 FOR IP): Performed by: NURSE PRACTITIONER

## 2024-12-29 PROCEDURE — 83735 ASSAY OF MAGNESIUM: CPT

## 2024-12-29 PROCEDURE — 83605 ASSAY OF LACTIC ACID: CPT

## 2024-12-29 PROCEDURE — 82805 BLOOD GASES W/O2 SATURATION: CPT

## 2024-12-29 PROCEDURE — 2000000000 HC ICU R&B

## 2024-12-29 PROCEDURE — 51701 INSERT BLADDER CATHETER: CPT

## 2024-12-29 PROCEDURE — 99232 SBSQ HOSP IP/OBS MODERATE 35: CPT | Performed by: INTERNAL MEDICINE

## 2024-12-29 PROCEDURE — 6370000000 HC RX 637 (ALT 250 FOR IP): Performed by: INTERNAL MEDICINE

## 2024-12-29 PROCEDURE — 6360000002 HC RX W HCPCS: Performed by: INTERNAL MEDICINE

## 2024-12-29 PROCEDURE — 85025 COMPLETE CBC W/AUTO DIFF WBC: CPT

## 2024-12-29 PROCEDURE — 6370000000 HC RX 637 (ALT 250 FOR IP): Performed by: PHYSICIAN ASSISTANT

## 2024-12-29 PROCEDURE — 2580000003 HC RX 258: Performed by: INTERNAL MEDICINE

## 2024-12-29 PROCEDURE — 2500000003 HC RX 250 WO HCPCS: Performed by: NURSE PRACTITIONER

## 2024-12-29 PROCEDURE — 84100 ASSAY OF PHOSPHORUS: CPT

## 2024-12-29 PROCEDURE — 51798 US URINE CAPACITY MEASURE: CPT

## 2024-12-29 PROCEDURE — 94640 AIRWAY INHALATION TREATMENT: CPT

## 2024-12-29 PROCEDURE — 94660 CPAP INITIATION&MGMT: CPT

## 2024-12-29 PROCEDURE — 99291 CRITICAL CARE FIRST HOUR: CPT | Performed by: INTERNAL MEDICINE

## 2024-12-29 PROCEDURE — 36415 COLL VENOUS BLD VENIPUNCTURE: CPT

## 2024-12-29 PROCEDURE — 2700000000 HC OXYGEN THERAPY PER DAY

## 2024-12-29 PROCEDURE — 80048 BASIC METABOLIC PNL TOTAL CA: CPT

## 2024-12-29 PROCEDURE — 2500000003 HC RX 250 WO HCPCS: Performed by: STUDENT IN AN ORGANIZED HEALTH CARE EDUCATION/TRAINING PROGRAM

## 2024-12-29 PROCEDURE — 36600 WITHDRAWAL OF ARTERIAL BLOOD: CPT

## 2024-12-29 RX ORDER — METOPROLOL TARTRATE 1 MG/ML
5 INJECTION, SOLUTION INTRAVENOUS ONCE
Status: DISCONTINUED | OUTPATIENT
Start: 2024-12-29 | End: 2025-01-03 | Stop reason: HOSPADM

## 2024-12-29 RX ORDER — ENOXAPARIN SODIUM 100 MG/ML
1 INJECTION SUBCUTANEOUS 2 TIMES DAILY
Status: DISCONTINUED | OUTPATIENT
Start: 2024-12-29 | End: 2025-01-03 | Stop reason: HOSPADM

## 2024-12-29 RX ORDER — FUROSEMIDE 10 MG/ML
40 INJECTION INTRAMUSCULAR; INTRAVENOUS 2 TIMES DAILY
Status: DISCONTINUED | OUTPATIENT
Start: 2024-12-29 | End: 2024-12-30

## 2024-12-29 RX ORDER — FUROSEMIDE 10 MG/ML
40 INJECTION INTRAMUSCULAR; INTRAVENOUS DAILY
Status: DISCONTINUED | OUTPATIENT
Start: 2024-12-29 | End: 2024-12-29

## 2024-12-29 RX ORDER — MECOBALAMIN 5000 MCG
5 TABLET,DISINTEGRATING ORAL EVERY EVENING
Status: DISCONTINUED | OUTPATIENT
Start: 2024-12-29 | End: 2024-12-30

## 2024-12-29 RX ADMIN — AMIODARONE HYDROCHLORIDE 1 MG/MIN: 1.8 INJECTION, SOLUTION INTRAVENOUS at 14:18

## 2024-12-29 RX ADMIN — FUROSEMIDE 40 MG: 10 INJECTION, SOLUTION INTRAMUSCULAR; INTRAVENOUS at 18:28

## 2024-12-29 RX ADMIN — FUROSEMIDE 40 MG: 10 INJECTION, SOLUTION INTRAMUSCULAR; INTRAVENOUS at 09:18

## 2024-12-29 RX ADMIN — AMIODARONE HYDROCHLORIDE 150 MG: 50 INJECTION, SOLUTION INTRAVENOUS at 14:03

## 2024-12-29 RX ADMIN — PANTOPRAZOLE SODIUM 40 MG: 40 TABLET, DELAYED RELEASE ORAL at 05:26

## 2024-12-29 RX ADMIN — WATER 40 MG: 1 INJECTION INTRAMUSCULAR; INTRAVENOUS; SUBCUTANEOUS at 20:32

## 2024-12-29 RX ADMIN — ARFORMOTEROL TARTRATE 15 MCG: 15 SOLUTION RESPIRATORY (INHALATION) at 09:25

## 2024-12-29 RX ADMIN — ACETAMINOPHEN 650 MG: 325 TABLET ORAL at 04:37

## 2024-12-29 RX ADMIN — METHIMAZOLE 15 MG: 5 TABLET ORAL at 11:07

## 2024-12-29 RX ADMIN — SODIUM CHLORIDE, PRESERVATIVE FREE 10 ML: 5 INJECTION INTRAVENOUS at 21:09

## 2024-12-29 RX ADMIN — DULOXETINE HYDROCHLORIDE 60 MG: 60 CAPSULE, DELAYED RELEASE ORAL at 11:05

## 2024-12-29 RX ADMIN — IPRATROPIUM BROMIDE AND ALBUTEROL SULFATE 1 DOSE: 2.5; .5 SOLUTION RESPIRATORY (INHALATION) at 09:25

## 2024-12-29 RX ADMIN — BUDESONIDE INHALATION 1000 MCG: 0.5 SUSPENSION RESPIRATORY (INHALATION) at 09:25

## 2024-12-29 RX ADMIN — CLONAZEPAM 0.5 MG: 0.5 TABLET ORAL at 11:06

## 2024-12-29 RX ADMIN — Medication 1 CAPSULE: at 11:05

## 2024-12-29 RX ADMIN — DIPHENHYDRAMINE HYDROCHLORIDE 25 MG: 50 INJECTION INTRAMUSCULAR; INTRAVENOUS at 12:33

## 2024-12-29 RX ADMIN — ARFORMOTEROL TARTRATE 15 MCG: 15 SOLUTION RESPIRATORY (INHALATION) at 20:25

## 2024-12-29 RX ADMIN — BUDESONIDE INHALATION 1000 MCG: 0.5 SUSPENSION RESPIRATORY (INHALATION) at 20:25

## 2024-12-29 RX ADMIN — DILTIAZEM HYDROCHLORIDE 240 MG: 120 CAPSULE, COATED, EXTENDED RELEASE ORAL at 11:06

## 2024-12-29 RX ADMIN — ASPIRIN 81 MG: 81 TABLET, COATED ORAL at 11:06

## 2024-12-29 RX ADMIN — GUAIFENESIN 400 MG: 400 TABLET ORAL at 20:32

## 2024-12-29 RX ADMIN — IPRATROPIUM BROMIDE AND ALBUTEROL SULFATE 1 DOSE: 2.5; .5 SOLUTION RESPIRATORY (INHALATION) at 16:05

## 2024-12-29 RX ADMIN — PREGABALIN 75 MG: 25 CAPSULE ORAL at 20:32

## 2024-12-29 RX ADMIN — CEFEPIME 2000 MG: 2 INJECTION, POWDER, FOR SOLUTION INTRAVENOUS at 21:07

## 2024-12-29 RX ADMIN — POTASSIUM PHOSPHATE, MONOBASIC AND POTASSIUM PHOSPHATE, DIBASIC 20 MMOL: 224; 236 INJECTION, SOLUTION, CONCENTRATE INTRAVENOUS at 15:59

## 2024-12-29 RX ADMIN — WATER 40 MG: 1 INJECTION INTRAMUSCULAR; INTRAVENOUS; SUBCUTANEOUS at 14:12

## 2024-12-29 RX ADMIN — POLYETHYLENE GLYCOL 3350 17 G: 17 POWDER, FOR SOLUTION ORAL at 22:37

## 2024-12-29 RX ADMIN — HYDROXYZINE PAMOATE 25 MG: 25 CAPSULE ORAL at 02:47

## 2024-12-29 RX ADMIN — Medication 5 MG: at 20:32

## 2024-12-29 RX ADMIN — SALINE NASAL SPRAY 1 SPRAY: 1.5 SOLUTION NASAL at 05:26

## 2024-12-29 RX ADMIN — METOPROLOL TARTRATE 5 MG: 1 INJECTION, SOLUTION INTRAVENOUS at 12:29

## 2024-12-29 RX ADMIN — VANCOMYCIN HYDROCHLORIDE 1000 MG: 1 INJECTION, POWDER, LYOPHILIZED, FOR SOLUTION INTRAVENOUS at 09:28

## 2024-12-29 RX ADMIN — IPRATROPIUM BROMIDE AND ALBUTEROL SULFATE 1 DOSE: 2.5; .5 SOLUTION RESPIRATORY (INHALATION) at 20:25

## 2024-12-29 RX ADMIN — AMIODARONE HYDROCHLORIDE 0.5 MG/MIN: 1.8 INJECTION, SOLUTION INTRAVENOUS at 20:40

## 2024-12-29 RX ADMIN — HYDRALAZINE HYDROCHLORIDE 10 MG: 20 INJECTION INTRAMUSCULAR; INTRAVENOUS at 00:36

## 2024-12-29 RX ADMIN — IPRATROPIUM BROMIDE AND ALBUTEROL SULFATE 1 DOSE: 2.5; .5 SOLUTION RESPIRATORY (INHALATION) at 11:49

## 2024-12-29 RX ADMIN — CEFEPIME 2000 MG: 2 INJECTION, POWDER, FOR SOLUTION INTRAVENOUS at 09:25

## 2024-12-29 RX ADMIN — PREDNISONE 40 MG: 20 TABLET ORAL at 11:05

## 2024-12-29 RX ADMIN — FOLIC ACID 1 MG: 1 TABLET ORAL at 11:06

## 2024-12-29 RX ADMIN — DIPHENHYDRAMINE HYDROCHLORIDE 25 MG: 50 INJECTION INTRAMUSCULAR; INTRAVENOUS at 22:37

## 2024-12-29 RX ADMIN — PREGABALIN 75 MG: 25 CAPSULE ORAL at 11:05

## 2024-12-29 RX ADMIN — ENOXAPARIN SODIUM 40 MG: 100 INJECTION SUBCUTANEOUS at 09:18

## 2024-12-29 RX ADMIN — ENOXAPARIN SODIUM 60 MG: 100 INJECTION SUBCUTANEOUS at 20:33

## 2024-12-29 RX ADMIN — VANCOMYCIN HYDROCHLORIDE 1000 MG: 1 INJECTION, POWDER, LYOPHILIZED, FOR SOLUTION INTRAVENOUS at 21:02

## 2024-12-29 ASSESSMENT — PAIN SCALES - GENERAL
PAINLEVEL_OUTOF10: 8
PAINLEVEL_OUTOF10: 0

## 2024-12-29 ASSESSMENT — PAIN DESCRIPTION - LOCATION: LOCATION: HEAD

## 2024-12-29 ASSESSMENT — PAIN - FUNCTIONAL ASSESSMENT: PAIN_FUNCTIONAL_ASSESSMENT: PREVENTS OR INTERFERES SOME ACTIVE ACTIVITIES AND ADLS

## 2024-12-29 ASSESSMENT — PAIN DESCRIPTION - DESCRIPTORS: DESCRIPTORS: ACHING;NAGGING;PRESSURE

## 2024-12-29 ASSESSMENT — PAIN DESCRIPTION - ORIENTATION: ORIENTATION: INNER

## 2024-12-29 NOTE — CODE DOCUMENTATION
Patient had been on high flow and was up to bedside commode and getting back to bed when she had respiratory distress with activity

## 2024-12-29 NOTE — PLAN OF CARE
Called to bedside due to new onset tachycardia -160 and continued shortness of breath. Patient already had RRT called earlier today. PCO2 was elevated at 85 from 80 yesterday despite NIV. EKG done and showed afib RVR. Lopressor 5 mg IV x 1 given. Benadryl IV x 1 given also for anxiety. Dr. Nance from MICU at bedside and accepted patient for transfer for possible intubation. Transfer order placed.    Electronically signed by Shawn Blancas MD on 12/29/2024 at 12:34 PM

## 2024-12-29 NOTE — PROGRESS NOTES
[facility]  [place]  Progress Note    SUBJECTIVE:  Chief Complaint   Patient presents with    Shortness of Breath     Patient r/o respiratory distress PTA via EMS. Pt normally on 4L NC at baseline. Arrived EMS via BiPap.       Patient resting in bed. Denies fever or chills. No nausea, vomiting, rash or diarrhea      Review of systems:  As stated above in the chief complaint, otherwise negative.    Medications:  Scheduled Meds:   vancomycin  1,000 mg IntraVENous Q12H    dilTIAZem  240 mg Oral Daily    predniSONE  40 mg Oral Daily    arformoterol tartrate  15 mcg Nebulization BID RT    aspirin  81 mg Oral Daily    budesonide  1 mg Nebulization BID RT    clonazePAM  0.5 mg Oral Daily    DULoxetine  60 mg Oral Daily    folic acid  1 mg Oral Daily    methIMAzole  15 mg Oral Daily    pantoprazole  40 mg Oral QAM AC    [Held by provider] predniSONE  10 mg Oral Daily    pregabalin  75 mg Oral BID    sodium chloride flush  5-40 mL IntraVENous 2 times per day    enoxaparin  40 mg SubCUTAneous Daily    ipratropium 0.5 mg-albuterol 2.5 mg  1 Dose Inhalation Q4H WA RT    guaiFENesin  400 mg Oral TID    melatonin  5 mg Oral QPM    potassium chloride  20 mEq Oral Once    cefepime  2,000 mg IntraVENous Q12H    lactobacillus  1 capsule Oral Daily    vitamin D  50,000 Units Oral Weekly     Continuous Infusions:   sodium chloride      dextrose       PRN Meds:diphenhydrAMINE, metoprolol, diphenoxylate-atropine, polyethylene glycol, sodium chloride flush, sodium chloride, ondansetron **OR** ondansetron, acetaminophen **OR** acetaminophen, melatonin, hydrALAZINE, calcium carbonate, Polyvinyl Alcohol-Povidone PF, sodium chloride, magnesium hydroxide, benzocaine-menthol, hydrOXYzine pamoate, benzonatate, glucose, dextrose bolus **OR** dextrose bolus, glucagon (rDNA), dextrose    OBJECTIVE:  BP (!) 155/71   Pulse 77   Temp 98.5 °F (36.9 °C) (Oral)   Resp 18   Ht 1.727 m (5' 8\")   Wt 56.4 kg (124 lb  normal appearance ,

## 2024-12-29 NOTE — PROGRESS NOTES
Date: 12/29/2024    Time: 1:38 AM    Patient Placed On BIPAP/CPAP/ Non-Invasive Ventilation?  Yes    If no must comment.  Facial area red/color change? No           If YES are Blister/Lesion present?No   If yes must notify nursing staff  BIPAP/CPAP skin barrier?  Yes    Skin barrier type:mepilexlite       Comments:        Gabe Agosto RCP

## 2024-12-29 NOTE — CONSULTS
Ox::  SpO2: 99 %  24HR Pulse Ox Range:  SpO2  Av.3 %  Min: 85 %  Max: 100 %  Oxygen Amount and Delivery: O2 Flow Rate (L/min): 6 L/min      I/O (24 Hours)    Patient Vitals for the past 8 hrs:   BP Temp Temp src Pulse Resp SpO2   24 1339 -- -- -- (!) 139 22 99 %   24 1230 -- -- -- (!) 137 -- --   24 1226 121/67 -- -- (!) 180 -- --   24 1149 -- -- -- 93 21 99 %   24 1100 (!) 122/55 98.4 °F (36.9 °C) Temporal 81 16 93 %   24 0910 (!) 157/88 -- -- (!) 104 -- 98 %   24 0858 (!) 132/94 -- -- (!) 129 28 99 %   24 0750 (!) 181/92 97.3 °F (36.3 °C) -- 77 18 94 %   24 0725 -- -- -- -- -- 94 %   24 0720 -- -- -- -- -- (!) 85 %       Intake/Output Summary (Last 24 hours) at 2024 1411  Last data filed at 2024 1309  Gross per 24 hour   Intake 240 ml   Output 1375 ml   Net -1135 ml     I/O last 3 completed shifts:  In: 660 [P.O.:660]  Out:  [Urine:]   Date 24 0000 - 24   Shift 3767-2803 5876-6410 2235-4018 24 Hour Total   INTAKE   Shift Total(mL/kg)       OUTPUT   Urine(mL/kg/hr) 550(1.2) 350  900   Shift Total(mL/kg) 550(9.9) 350(6.3)  900(16.2)   Weight (kg) 55.7 55.7 55.7 55.7     Patient Vitals for the past 96 hrs (Last 3 readings):   Weight   24 0532 55.7 kg (122 lb 12.8 oz)   24 0551 56.4 kg (124 lb 6.4 oz)   24 0643 55.2 kg (121 lb 12.8 oz)         Exam   PHYSICAL EXAM:  Neuro: Following commands, no focal neurodeficit  Pulmonary: Bibasilar rales & crackles   Cardiovascular: S1& S2 Present, NO S3, No murmur  Abdomen/GI: No distension, BS+, No tenderness   Renal: Decent urine output   MSK: No obvious trauma or injury to extremities   Skin:No rashes, no pressure ulcers.    Data   Old records and images have been reviewed    Lab Results   CBC     Lab Results   Component Value Date/Time    WBC 15.5 2024 11:27 AM    RBC 3.43 2024 11:27 AM    HGB 10.3 2024 11:27 AM    HCT 34.7 2024 11:27  None    (Results Pending)           Assessment and Plan     Diagnosis:  COPD exacerbation  Right lower lobe pneumonia  Bilateral pleural effusion  Pulmonary congestion  HFpEF in exacerbation  Enterococcus Faecium bacteremia  A-fib RVR  Known RUTH    Plan:    Neuro: Following commands, no focal neurodeficit  Pulmonary: BiPAP dependent due to acute on chronic hypercapnic respiratory failure.  DuoNeb, Brovana, Pulmicort and Solu-Medrol to continue.  Continue BiPAP support.  Cardiovascular: Hemodynamically stable, amiodarone drip and Cardizem to continue for A-fib RVR.  Lovenox anticoagulation for A-fib.  Abdomen/GI:No acute issues , continue diet  Renal: Monitor urine output, continue Lasix 40 mg every 12  MSK: No active issues   Skin: No active issues   Hematology: No active issues  ID: Cefepime and vancomycin to continue.  ID consult appreciated.  CT abdomen pending to look for source for Enterococcus Faecium bacteremia which will be done after respiratory status improved.  Endocrine: Monitor BS  DVT/GI: Prophylaxis: Lovenox and Protonix  Code Status: Full Code  Disposition: ICU  Total Critical care time spent  35 mins. This did not include any procedures.         John aBnerjee MD  Critical Care

## 2024-12-29 NOTE — PROGRESS NOTES
Attempted to call patient's sister in law to update, no answer, left voicemail     N-N report called to MICU

## 2024-12-29 NOTE — PROGRESS NOTES
Pharmacy Consultation Note  (Antibiotic Dosing and Monitoring)    Initial consult date: 12/25/2024  Consulting physician/provider: Elida Moore APRN - NP.   Drug: Vancomycin  Indication: Pneumonia (CAP).     Age/  Gender Height Weight IBW  Allergy Information   69 y.o./female 172.7 cm (5' 8\")172.7 cm 51.8 kg (114 lb 3.2 oz) 53.6 kg    Ideal body weight: 63.9 kg (140 lb 14 oz)   Pcn [penicillins]      Renal Function:  Recent Labs     12/27/24  0932 12/28/24  0511 12/29/24  0600   BUN 16 16 10   CREATININE 0.6 0.6 0.5     Vancomycin Administration Times:  Recent vancomycin administrations                     vancomycin (VANCOCIN) 1,000 mg in sodium chloride 0.9 % 250 mL IVPB ([id number]) (mg) 1,000 mg New Bag 12/28/24 2112     1,000 mg New Bag  0907    vancomycin (VANCOCIN) 750 mg in sodium chloride 0.9 % 250 mL IVPB ([id number]) (mg) 750 mg New Bag 12/27/24 2029     750 mg New Bag  0817     750 mg New Bag 12/26/24 1954     750 mg New Bag  0924                      Assessment:  Patient is a 69 y.o. female who has been initiated on vancomycin for nosocomial pneumonia.   Estimated Creatinine Clearance: 91 mL/min (based on SCr of 0.5 mg/dL).  To dose vancomycin, pharmacy will be dosing based off of levels because of patient's renal impairment/insufficiency.   Scr 1.3mg/dL, baseline 0.7mg/dL on 12/6/2024.   12/26: Scr 0.7. WBC 17.5. Random level = <4 mcg/mL.   1/4 blood culture growing GPC in pairs and chains  12/27:  Scr 0.6.  Awaiting susceptibilities for blood cultures.  12/28: Scr 0.6. Blood cx growing enterococcus faecium. Random level this AM = 9.4 mcg/mL (~9 hours post-dose)  12/29: Scr 0.5, UOP incomplete.     Plan:  Continue vancomycin 1000 mg IV q12h [465 / 12]  Will check vancomycin trough level tomorrow @0800. Hold dose if trough level >20 mcg/mL.  Will continue to monitor renal function.   Pharmacy to follow    Checo MartinezD  PGY-1 Pharmacy Practice Resident   12/29/2024 8:19 AM

## 2024-12-29 NOTE — PROGRESS NOTES
Dr. Blancas notified of elevated HR & patient with continued shortness of breath even while on bipap.

## 2024-12-29 NOTE — PROGRESS NOTES
Event Note    Events of this a.m. are reviewed.  Transfer to ICU noted.  Would consider Precedex for sedation as it will not depress respirations.      Will continue to follow peripherally while in the ICU.  Please advise of transfer.  Gopi Rosas M.D., F.C.C.P.    [facility]    [facility], [address] [place] [address], [address], [place] [address]

## 2024-12-29 NOTE — PROGRESS NOTES
Patient refusing the Bipap so placed patient on 6L nasal canula. Patient stated \"I will take the blame but I just don't feel like I breath on the mask.\"    Vin Villareal RN

## 2024-12-29 NOTE — PLAN OF CARE
Problem: Safety - Adult  Goal: Free from fall injury  12/29/2024 0016 by Vin Villareal RN  Outcome: Progressing  12/28/2024 1234 by Linda Garcia RN  Outcome: Progressing     Problem: Chronic Conditions and Co-morbidities  Goal: Patient's chronic conditions and co-morbidity symptoms are monitored and maintained or improved  12/29/2024 0016 by Vin Villareal RN  Outcome: Progressing  12/28/2024 1234 by Linda Garcia RN  Outcome: Progressing     Problem: Discharge Planning  Goal: Discharge to home or other facility with appropriate resources  12/29/2024 0016 by Vin Villareal RN  Outcome: Progressing  12/28/2024 1234 by Linda Garcia RN  Outcome: Progressing     Problem: Pain  Goal: Verbalizes/displays adequate comfort level or baseline comfort level  Outcome: Progressing     Problem: Skin/Tissue Integrity  Goal: Absence of new skin breakdown  Description: 1.  Monitor for areas of redness and/or skin breakdown  2.  Assess vascular access sites hourly  3.  Every 4-6 hours minimum:  Change oxygen saturation probe site  4.  Every 4-6 hours:  If on nasal continuous positive airway pressure, respiratory therapy assess nares and determine need for appliance change or resting period.  Outcome: Progressing     Problem: Risk for Elopement  Goal: Patient will not exit the unit/facility without proper excort  Outcome: Progressing     Problem: Nutrition Deficit:  Goal: Optimize nutritional status  Outcome: Progressing

## 2024-12-29 NOTE — PLAN OF CARE
Problem: Safety - Adult  Goal: Free from fall injury  12/29/2024 1136 by Linda Garcia, RN  Outcome: Progressing     Problem: Chronic Conditions and Co-morbidities  Goal: Patient's chronic conditions and co-morbidity symptoms are monitored and maintained or improved  12/29/2024 1136 by Linda Garcia, RN  Outcome: Progressing     Problem: Discharge Planning  Goal: Discharge to home or other facility with appropriate resources  12/29/2024 1136 by Linda Garcia, RN  Outcome: Progressing

## 2024-12-29 NOTE — PROGRESS NOTES
[facility] Hospitalist Progress Note    Admitting Date and Time: 12/25/2024  8:51 AM  Admit Dx: Peripheral vascular disease, unspecified (HCC) [I73.9]  Respiratory distress [R06.03]  DERIAN (acute kidney injury) (HCC) [N17.9]  Atherosclerosis of both carotid arteries [I65.23]  Fall, initial encounter [W19.XXXA]  Acute on chronic respiratory failure with hypoxia and hypercapnia [J96.21, J96.22]  Pneumonia due to infectious organism, unspecified laterality, unspecified part of lung [J18.9]  Acute hypoxic on chronic hypercapnic respiratory failure [J96.01, J96.12]    Synopsis:    Patient admitted on 12/25/2024 for Acute hypoxic on chronic hypercapnic respiratory failure     This is a 69-year-old lady with past medical history of A-fib, COPD, critical limb ischemia of left lower extremity, GI bleed, hypertension, RUTH treated with BiPAP, panic disorder, peripheral vascular disease, severe protein calorie malnutrition, hypothyroidism, hypertension.  Patient presented from [facility] with complaints of respiratory distress beginning earlier in the day.  Patient was reportedly hypoxic with SpO2 in the 70s on her baseline 4 L oxygen and was placed on BiPAP.  Patient has been having cough with intermittent shortness of breath over the past few days.  She denies any fever, chills, chest pain, palpitations or urinary symptoms.  She did also fell from her wheelchair and hit her head.  She is not on any blood thinners.  She did not lose consciousness.  No focal deficits or pain.  In the ED,  CT was tachycardic and hypoxic requiring BiPAP.  Lab work was significant for hypokalemia, DERIAN, lactic acidosis, elevated BNP, elevated troponin, leukocytosis.  Viral panel was negative.  Blood cultures obtained.  She was started on cefepime, vancomycin.  Also given 1 L bolus of fluid, DuoNeb in ED.  She was recently treated for altered mental status and UTI from 6/27 to 6/29.     12/26 blood culture came back positive for  organomegaly  Extremities: no cyanosis, no clubbing and no edema  Neurologic: no cranial nerve deficit and speech normal        Recent Labs     12/27/24  0932 12/28/24  0511 12/29/24  0600    143 143   K 3.9 4.2 3.9   CL 97* 100 95*   CO2 29 36* 40*   BUN 16 16 10   CREATININE 0.6 0.6 0.5   GLUCOSE 188* 122* 89   CALCIUM 8.1* 8.1* 8.5*       Recent Labs     12/27/24  0932 12/28/24  0511 12/29/24  0600   WBC 12.9* 13.6* 13.7*   RBC 3.21* 3.21* 3.35*   HGB 9.4* 9.6* 10.1*   HCT 31.7* 33.5* 33.2*   MCV 98.8 104.4* 99.1   MCH 29.3 29.9 30.1   MCHC 29.7* 28.7* 30.4*   RDW 15.3* 15.8* 15.2*    324 313   MPV 9.8 10.0 9.9         Assessment:    Acute on chronic hypoxic hypercapnic respiratory failure 2/2 COPD exacerbation versus worsening pleural effusions  COPD exacerbation, on 4 L O2 at baseline  Sepsis  Gram-positive bacteremia-Enterococcus  DERIAN-prerenal resolved  Hypothyroidism  Hypertension  Peripheral vascular disease  Former smoker  Lactic acidosis-likely from dehydration resolved  Severe protein calorie malnutrition  Paroxysmal A-fib, currently RVR      Plan:  Pulmonology following:  Found to be hypercapnic again today during RRT, placed back on NIV  S/p Solu-Medrol IV, now on prednisone daily  Continue breathing treatments  Started on Lasix 40 mg IV daily, strict I's and O's  ID following:  Continue cefepime and vancomycin  TTE was suboptimal.  JEREMY ordered  Follow CT abdomen/pelvis  Follow cultures  Cardiology peripherally following:  Continue increased dose of Cardizem 240 mg daily  Patient scheduled for outpatient Watchman procedure in the near future, continue aspirin for now  Outpatient follow-up  Continue medications including Klonopin, Cymbalta, folic acid, guaifenesin, methimazole, PPI, Lyrica, vitamins  RRT team ordered sitter at bedside to ensure compliance to NIV      DC planning: Pending improvement in O2 saturation/requirements    NOTE: This report was transcribed using voice recognition

## 2024-12-29 NOTE — SIGNIFICANT EVENT
From: Sunni ROMO  To: Mi Caban  Sent: 7/24/2024 3:59 PM CDT  Subject: Adderall XR    Mi:    I just heard back from insurance on our request for coverage approval for the name brand Adderall XR. They have denied coverage, wanting you to have tried and failed formulary alternatives. You have, so I have submitted an appeal for coverage. I will do my best to expedite appeal, however it could take up to 30 business days for the determination to return. I will notify you once it has.     Sunni CMA   Rapid Response Team Note  Date of event: 12/29/2024   Time of event: 08:55  Lana Phillips 69 y.o. year old female   YOB: 1955   Admit date:  12/25/2024   Location: [address] [place] [address]A   Witnessed? : [x]Yes  [] No  Monitored? : [x]Yes  [] No  Code status: [x] Full  [] DNR-CCA  []DNR-CC  ______________________________________________________________________  Reason for RRT:     [x] RR > 28      Subjective:   CTSP regarding the above event, Pt was found to be on respiratory distress after going to the bedside commode and getting back to bed.  Patient was initially admitted for acute on chronic respiratory failure secondary to COPD exacerbation. overnight patient had been on AVAPS but was put on high flow nasal cannula for patient to have breakfast.  Per nurses patient also will get intermittently agitated and not want to wear the mask.  On arrival to RRT patient was back on BiPAP, saturating 97%.  Alert and oriented x 3    Objective:   Vital signs: BP: 132/94 /RR: 28/HR: 129  Initial Condition:  Conscious   [x] Yes  [] No     Breathing [x] Yes  [] No     Pulse  [x] Yes  [] No    Airway:   [x] Open/ Clear     Intervention: [x] None  [] Pooled secretions     [] Suctioned  [] Stridor      [] Intubation    Lungs:   [x] Symmetrical chest rise/ CTABL Intervention: [] None  [x] Use of accessory muscles    [x] NIV (CPAP/BiPAP)  [] Cyanosis      [] Nasal Oxygen/Mask  [] Wheezing       [] ABG             [x] CXR      Circulation:   Rhythm:  [x] Sinus  Intervention: [] None            [] IV Access  [] Peripheral              [] Central            [x] EKG            [] Cardioversion            [] Defibrillation     Capillary Refill:  [] > 2 seconds [] < 2 seconds    Neurologic:   Alert    Diagnostic Test:  EKG:    Sinus rythm  ABG:    Arterial Blood Gas result:  pO2 92.3; pCO2 85.4; pH 7.276;  HCO3 38.9, %O2 Sat 96.3  . Medication(s) Given: none    Infusion(s): None     Imaging:   [x] CXR:   [] Normal         []

## 2024-12-29 NOTE — PATIENT CARE CONFERENCE
[facility] Quality Flow/Interdisciplinary Rounds Progress Note        Quality Flow Rounds held on December 29, 2024    Disciplines Attending:  Bedside Nurse and Nursing Unit Leadership    Lana Phillips was admitted on 12/25/2024  8:51 AM    Anticipated Discharge Date:       Disposition:    Guy Score:  Guy Scale Score: 17    [facility] RISK OF UNPLANNED READMISSION 2.0             27.9 Total Score        Discussed patient goal for the day, patient clinical progression, and barriers to discharge.  The following Goal(s) of the Day/Commitment(s) have been identified:  Diagnostics - Report Results and Labs - Report Results and to remain safe and keep oxygen on      Yuki Miranda RN  December 29, 2024

## 2024-12-29 NOTE — PROGRESS NOTES
12/29/24 1339   NIV Type   NIV Started/Stopped On   Equipment Type v60   Mode Bilevel   Mask Type Full face mask   Mask Size Medium   Assessment   Pulse (!) 139   Respirations 22   SpO2 99 %   Comfort Level Good   Mask Compliance Good   Skin Assessment Clean, dry, & intact   Skin Protection for O2 Device Yes   Orientation Middle   Location Nose   Intervention(s) Skin Barrier   Settings/Measurements   PIP Observed 14 cm H20   IPAP 14 cmH20   CPAP/EPAP 8 cmH2O   Vt (Measured) 461 mL   Rate Ordered 14   Insp Rise Time (%) 2 %   FiO2  60 %   I Time/ I Time % 0.85 s   Minute Volume (L/min) 12.4 Liters   Mask Leak (lpm) 36 lpm   Patient's Home Machine No   Alarm Settings   Alarms On Y   Low Pressure (cmH2O) 8 cmH2O   High Pressure (cmH2O) 30 cmH2O   Apnea (secs) 20 secs   RR Low (bpm) 16   RR High (bpm) 35 br/min     Date: 12/29/2024    Time: 1:40 PM    Patient Placed On BIPAP/CPAP/ Non-Invasive Ventilation? No  Facial area red/color change? No     If YES are Blister/Lesion present? No    BIPAP/CPAP skin barrier?  Yes   Skin barrier type:mepilexlite     Comments: patient arrived to [facility] on Bipap, remains on    Oralia Burciaga RCP RRT-ACCS

## 2024-12-29 NOTE — PROGRESS NOTES
Patient arrived to MICU with the following belongings: shirt, pats, socks    4 Eyes Skin Assessment     NAME:  Lana Phillips  YOB: 1955  MEDICAL RECORD NUMBER:  [id number]    The patient is being assessed for  Transfer to New Unit    I agree that at least one RN has performed a thorough Head to Toe Skin Assessment on the patient. ALL assessment sites listed below have been assessed.      Areas assessed by both nurses:    Head, Face, Ears, Shoulders, Back, Chest, Arms, Elbows, Hands, Sacrum. Buttock, Coccyx, Ischium, Legs. Feet and Heels, and Under Medical Devices         Does the Patient have a Wound? No noted wound(s)       Guy Prevention initiated by RN: Yes  Wound Care Orders initiated by RN: No    Pressure Injury (Stage 3,4, Unstageable, DTI, NWPT, and Complex wounds) if present, place Wound referral order by RN under : No    New Ostomies, if present place, Ostomy referral order under : No     Nurse 1 eSignature: Electronically signed by Venancio Gentile RN on 12/29/24 at 3:12 PM EST    **SHARE this note so that the co-signing nurse can place an eSignature**    Nurse 2 eSignature: {Esignature:[id number]}

## 2024-12-30 ENCOUNTER — APPOINTMENT (OUTPATIENT)
Dept: GENERAL RADIOLOGY | Age: 69
End: 2024-12-30
Payer: MEDICARE

## 2024-12-30 ENCOUNTER — HOSPITAL ENCOUNTER (OUTPATIENT)
Dept: INFUSION THERAPY | Age: 69
Discharge: HOME | End: 2024-12-30

## 2024-12-30 ENCOUNTER — APPOINTMENT (OUTPATIENT)
Dept: CT IMAGING | Age: 69
End: 2024-12-30
Payer: MEDICARE

## 2024-12-30 LAB
AADO2: 84.4 MMHG
ALBUMIN SERPL-MCNC: 3.2 G/DL (ref 3.5–5.2)
ALP SERPL-CCNC: 72 U/L (ref 35–104)
ALT SERPL-CCNC: 17 U/L (ref 0–32)
ANION GAP SERPL CALCULATED.3IONS-SCNC: 6 MMOL/L (ref 7–16)
AST SERPL-CCNC: 20 U/L (ref 0–31)
B.E.: 11 MMOL/L (ref -3–3)
BASOPHILS # BLD: 0 K/UL (ref 0–0.2)
BASOPHILS NFR BLD: 0 % (ref 0–2)
BILIRUB SERPL-MCNC: <0.2 MG/DL (ref 0–1.2)
BUN SERPL-MCNC: 14 MG/DL (ref 6–23)
CALCIUM SERPL-MCNC: 8 MG/DL (ref 8.6–10.2)
CHLORIDE SERPL-SCNC: 91 MMOL/L (ref 98–107)
CO2 SERPL-SCNC: 42 MMOL/L (ref 22–29)
COHB: 0.3 % (ref 0–1.5)
CREAT SERPL-MCNC: 0.5 MG/DL (ref 0.5–1)
CRITICAL: ABNORMAL
DATE ANALYZED: ABNORMAL
DATE LAST DOSE: NORMAL
DATE OF COLLECTION: ABNORMAL
EOSINOPHIL # BLD: 0 K/UL (ref 0.05–0.5)
EOSINOPHILS RELATIVE PERCENT: 0 % (ref 0–6)
ERYTHROCYTE [DISTWIDTH] IN BLOOD BY AUTOMATED COUNT: 17.4 % (ref 11.5–15)
FIO2: 40 %
GFR, ESTIMATED: >90 ML/MIN/1.73M2
GLUCOSE SERPL-MCNC: 158 MG/DL (ref 74–99)
HCO3: 36.6 MMOL/L (ref 22–26)
HCT VFR BLD AUTO: 26.4 % (ref 34–48)
HGB BLD-MCNC: 8.4 G/DL (ref 11.5–15.5)
HHB: 1.1 % (ref 0–5)
INR PPP: 1.1
LAB: ABNORMAL
LYMPHOCYTES NFR BLD: 0.43 K/UL (ref 1.5–4)
LYMPHOCYTES RELATIVE PERCENT: 3 % (ref 20–42)
Lab: 600
MAGNESIUM SERPL-MCNC: 1.7 MG/DL (ref 1.6–2.6)
MCH RBC QN AUTO: 30.9 PG (ref 26–35)
MCHC RBC AUTO-ENTMCNC: 31.8 G/DL (ref 32–34.5)
MCV RBC AUTO: 97.1 FL (ref 80–99.9)
METAMYELOCYTES ABSOLUTE COUNT: 0.11 K/UL (ref 0–0.12)
METAMYELOCYTES: 1 % (ref 0–1)
METHB: 0.4 % (ref 0–1.5)
MICROORGANISM SPEC CULT: NORMAL
MODE: ABNORMAL
MONOCYTES NFR BLD: 0.53 K/UL (ref 0.1–0.95)
MONOCYTES NFR BLD: 4 % (ref 2–12)
MYELOCYTES ABSOLUTE COUNT: 0.32 K/UL
MYELOCYTES: 3 %
NEUTROPHILS NFR BLD: 89 % (ref 43–80)
NEUTS SEG NFR BLD: 11.21 K/UL (ref 1.8–7.3)
O2 SATURATION: 98.9 % (ref 92–98.5)
O2HB: 98.2 % (ref 94–97)
OPERATOR ID: ABNORMAL
PARTIAL THROMBOPLASTIN TIME: 30.5 SEC (ref 24.5–35.1)
PATIENT TEMP: 37 C
PCO2: 54.3 MMHG (ref 35–45)
PEEP/CPAP: 8 CMH2O
PFO2: 3.46 MMHG/%
PH BLOOD GAS: 7.45 (ref 7.35–7.45)
PHOSPHATE SERPL-MCNC: 1.4 MG/DL (ref 2.5–4.5)
PLATELET CONFIRMATION: NORMAL
PLATELET, FLUORESCENCE: 299 K/UL (ref 130–450)
PMV BLD AUTO: 12.1 FL (ref 7–12)
PO2: 138.4 MMHG (ref 75–100)
POTASSIUM SERPL-SCNC: 3.8 MMOL/L (ref 3.5–5)
PROT SERPL-MCNC: 6.3 G/DL (ref 6.4–8.3)
PROTHROMBIN TIME: 11.6 SEC (ref 9.3–12.4)
PS: 14 CMH20
RBC # BLD AUTO: 2.72 M/UL (ref 3.5–5.5)
RBC # BLD: ABNORMAL 10*6/UL
RI(T): 0.61
SERVICE CMNT-IMP: NORMAL
SODIUM SERPL-SCNC: 139 MMOL/L (ref 132–146)
SOURCE, BLOOD GAS: ABNORMAL
SPECIMEN DESCRIPTION: NORMAL
THB: 10.1 G/DL (ref 11.5–16.5)
TIME ANALYZED: 609
TME LAST DOSE: NORMAL H
VANCOMYCIN DOSE: NORMAL MG
VANCOMYCIN TROUGH SERPL-MCNC: 13.2 UG/ML (ref 5–16)
WBC # BLD: ABNORMAL 10*3/UL
WBC OTHER # BLD: 12.6 K/UL (ref 4.5–11.5)

## 2024-12-30 PROCEDURE — 2500000003 HC RX 250 WO HCPCS: Performed by: INTERNAL MEDICINE

## 2024-12-30 PROCEDURE — 85730 THROMBOPLASTIN TIME PARTIAL: CPT

## 2024-12-30 PROCEDURE — 6360000002 HC RX W HCPCS: Performed by: NURSE PRACTITIONER

## 2024-12-30 PROCEDURE — 80053 COMPREHEN METABOLIC PANEL: CPT

## 2024-12-30 PROCEDURE — 51701 INSERT BLADDER CATHETER: CPT

## 2024-12-30 PROCEDURE — 80202 ASSAY OF VANCOMYCIN: CPT

## 2024-12-30 PROCEDURE — 2500000003 HC RX 250 WO HCPCS: Performed by: NURSE PRACTITIONER

## 2024-12-30 PROCEDURE — 94640 AIRWAY INHALATION TREATMENT: CPT

## 2024-12-30 PROCEDURE — 6370000000 HC RX 637 (ALT 250 FOR IP): Performed by: NURSE PRACTITIONER

## 2024-12-30 PROCEDURE — 2580000003 HC RX 258: Performed by: NURSE PRACTITIONER

## 2024-12-30 PROCEDURE — 51702 INSERT TEMP BLADDER CATH: CPT

## 2024-12-30 PROCEDURE — 6370000000 HC RX 637 (ALT 250 FOR IP): Performed by: INTERNAL MEDICINE

## 2024-12-30 PROCEDURE — 97530 THERAPEUTIC ACTIVITIES: CPT

## 2024-12-30 PROCEDURE — 6360000002 HC RX W HCPCS: Performed by: INTERNAL MEDICINE

## 2024-12-30 PROCEDURE — 85610 PROTHROMBIN TIME: CPT

## 2024-12-30 PROCEDURE — 71045 X-RAY EXAM CHEST 1 VIEW: CPT

## 2024-12-30 PROCEDURE — 99232 SBSQ HOSP IP/OBS MODERATE 35: CPT | Performed by: INTERNAL MEDICINE

## 2024-12-30 PROCEDURE — 83735 ASSAY OF MAGNESIUM: CPT

## 2024-12-30 PROCEDURE — 97535 SELF CARE MNGMENT TRAINING: CPT

## 2024-12-30 PROCEDURE — 6360000004 HC RX CONTRAST MEDICATION: Performed by: RADIOLOGY

## 2024-12-30 PROCEDURE — 51798 US URINE CAPACITY MEASURE: CPT

## 2024-12-30 PROCEDURE — 85025 COMPLETE CBC W/AUTO DIFF WBC: CPT

## 2024-12-30 PROCEDURE — 6370000000 HC RX 637 (ALT 250 FOR IP): Performed by: PHYSICIAN ASSISTANT

## 2024-12-30 PROCEDURE — 2000000000 HC ICU R&B

## 2024-12-30 PROCEDURE — 94660 CPAP INITIATION&MGMT: CPT

## 2024-12-30 PROCEDURE — 6370000000 HC RX 637 (ALT 250 FOR IP)

## 2024-12-30 PROCEDURE — 2700000000 HC OXYGEN THERAPY PER DAY

## 2024-12-30 PROCEDURE — 74178 CT ABD&PLV WO CNTR FLWD CNTR: CPT

## 2024-12-30 PROCEDURE — 82805 BLOOD GASES W/O2 SATURATION: CPT

## 2024-12-30 PROCEDURE — 84100 ASSAY OF PHOSPHORUS: CPT

## 2024-12-30 RX ORDER — FUROSEMIDE 40 MG/1
40 TABLET ORAL DAILY
Status: DISCONTINUED | OUTPATIENT
Start: 2024-12-31 | End: 2025-01-03 | Stop reason: HOSPADM

## 2024-12-30 RX ORDER — DOCUSATE SODIUM 100 MG/1
100 CAPSULE, LIQUID FILLED ORAL DAILY
Status: DISCONTINUED | OUTPATIENT
Start: 2024-12-30 | End: 2025-01-03 | Stop reason: HOSPADM

## 2024-12-30 RX ORDER — SENNOSIDES A AND B 8.6 MG/1
1 TABLET, FILM COATED ORAL 2 TIMES DAILY
Status: DISCONTINUED | OUTPATIENT
Start: 2024-12-30 | End: 2025-01-03 | Stop reason: HOSPADM

## 2024-12-30 RX ORDER — MECOBALAMIN 5000 MCG
5 TABLET,DISINTEGRATING ORAL NIGHTLY
Status: DISCONTINUED | OUTPATIENT
Start: 2024-12-30 | End: 2025-01-03 | Stop reason: HOSPADM

## 2024-12-30 RX ORDER — SENNOSIDES A AND B 8.6 MG/1
1 TABLET, FILM COATED ORAL NIGHTLY
Status: DISCONTINUED | OUTPATIENT
Start: 2024-12-30 | End: 2024-12-30

## 2024-12-30 RX ORDER — BISACODYL 10 MG
10 SUPPOSITORY, RECTAL RECTAL DAILY PRN
Status: DISCONTINUED | OUTPATIENT
Start: 2024-12-30 | End: 2025-01-03 | Stop reason: HOSPADM

## 2024-12-30 RX ORDER — IOPAMIDOL 755 MG/ML
75 INJECTION, SOLUTION INTRAVASCULAR
Status: COMPLETED | OUTPATIENT
Start: 2024-12-30 | End: 2024-12-30

## 2024-12-30 RX ADMIN — SODIUM CHLORIDE, PRESERVATIVE FREE 10 ML: 5 INJECTION INTRAVENOUS at 08:27

## 2024-12-30 RX ADMIN — DOCUSATE SODIUM 100 MG: 100 CAPSULE, LIQUID FILLED ORAL at 10:18

## 2024-12-30 RX ADMIN — CEFEPIME 2000 MG: 2 INJECTION, POWDER, FOR SOLUTION INTRAVENOUS at 20:04

## 2024-12-30 RX ADMIN — IPRATROPIUM BROMIDE AND ALBUTEROL SULFATE 1 DOSE: 2.5; .5 SOLUTION RESPIRATORY (INHALATION) at 11:47

## 2024-12-30 RX ADMIN — PREGABALIN 75 MG: 25 CAPSULE ORAL at 08:23

## 2024-12-30 RX ADMIN — STANDARDIZED SENNA CONCENTRATE 8.6 MG: 8.6 TABLET ORAL at 17:13

## 2024-12-30 RX ADMIN — CEFEPIME 2000 MG: 2 INJECTION, POWDER, FOR SOLUTION INTRAVENOUS at 08:45

## 2024-12-30 RX ADMIN — VANCOMYCIN HYDROCHLORIDE 1000 MG: 1 INJECTION, POWDER, LYOPHILIZED, FOR SOLUTION INTRAVENOUS at 08:41

## 2024-12-30 RX ADMIN — DULOXETINE HYDROCHLORIDE 60 MG: 60 CAPSULE, DELAYED RELEASE ORAL at 08:29

## 2024-12-30 RX ADMIN — IPRATROPIUM BROMIDE AND ALBUTEROL SULFATE 1 DOSE: 2.5; .5 SOLUTION RESPIRATORY (INHALATION) at 16:37

## 2024-12-30 RX ADMIN — ARFORMOTEROL TARTRATE 15 MCG: 15 SOLUTION RESPIRATORY (INHALATION) at 20:00

## 2024-12-30 RX ADMIN — BISACODYL 10 MG: 10 SUPPOSITORY RECTAL at 21:33

## 2024-12-30 RX ADMIN — ACETAMINOPHEN 650 MG: 325 TABLET ORAL at 21:31

## 2024-12-30 RX ADMIN — Medication 5 MG: at 20:07

## 2024-12-30 RX ADMIN — ARFORMOTEROL TARTRATE 15 MCG: 15 SOLUTION RESPIRATORY (INHALATION) at 08:39

## 2024-12-30 RX ADMIN — IPRATROPIUM BROMIDE AND ALBUTEROL SULFATE 1 DOSE: 2.5; .5 SOLUTION RESPIRATORY (INHALATION) at 08:39

## 2024-12-30 RX ADMIN — WATER 40 MG: 1 INJECTION INTRAMUSCULAR; INTRAVENOUS; SUBCUTANEOUS at 08:31

## 2024-12-30 RX ADMIN — IPRATROPIUM BROMIDE AND ALBUTEROL SULFATE 1 DOSE: 2.5; .5 SOLUTION RESPIRATORY (INHALATION) at 20:00

## 2024-12-30 RX ADMIN — WATER 40 MG: 1 INJECTION INTRAMUSCULAR; INTRAVENOUS; SUBCUTANEOUS at 22:35

## 2024-12-30 RX ADMIN — FUROSEMIDE 40 MG: 10 INJECTION, SOLUTION INTRAMUSCULAR; INTRAVENOUS at 08:26

## 2024-12-30 RX ADMIN — GUAIFENESIN 400 MG: 400 TABLET ORAL at 08:24

## 2024-12-30 RX ADMIN — ASPIRIN 81 MG: 81 TABLET, COATED ORAL at 08:23

## 2024-12-30 RX ADMIN — GUAIFENESIN 400 MG: 400 TABLET ORAL at 20:05

## 2024-12-30 RX ADMIN — CLONAZEPAM 0.5 MG: 0.5 TABLET ORAL at 08:22

## 2024-12-30 RX ADMIN — ENOXAPARIN SODIUM 60 MG: 100 INJECTION SUBCUTANEOUS at 08:26

## 2024-12-30 RX ADMIN — BUDESONIDE INHALATION 1000 MCG: 0.5 SUSPENSION RESPIRATORY (INHALATION) at 20:00

## 2024-12-30 RX ADMIN — VANCOMYCIN HYDROCHLORIDE 1250 MG: 1.25 INJECTION, POWDER, LYOPHILIZED, FOR SOLUTION INTRAVENOUS at 18:04

## 2024-12-30 RX ADMIN — HYDROXYZINE PAMOATE 25 MG: 25 CAPSULE ORAL at 00:22

## 2024-12-30 RX ADMIN — WATER 40 MG: 1 INJECTION INTRAMUSCULAR; INTRAVENOUS; SUBCUTANEOUS at 02:16

## 2024-12-30 RX ADMIN — GUAIFENESIN 400 MG: 400 TABLET ORAL at 20:09

## 2024-12-30 RX ADMIN — SODIUM CHLORIDE, PRESERVATIVE FREE 10 ML: 5 INJECTION INTRAVENOUS at 20:09

## 2024-12-30 RX ADMIN — PANTOPRAZOLE SODIUM 40 MG: 40 TABLET, DELAYED RELEASE ORAL at 06:18

## 2024-12-30 RX ADMIN — METHIMAZOLE 15 MG: 5 TABLET ORAL at 08:26

## 2024-12-30 RX ADMIN — IOPAMIDOL 75 ML: 755 INJECTION, SOLUTION INTRAVENOUS at 12:54

## 2024-12-30 RX ADMIN — WATER 40 MG: 1 INJECTION INTRAMUSCULAR; INTRAVENOUS; SUBCUTANEOUS at 14:24

## 2024-12-30 RX ADMIN — Medication 1 CAPSULE: at 08:24

## 2024-12-30 RX ADMIN — DIPHENHYDRAMINE HYDROCHLORIDE 25 MG: 50 INJECTION INTRAMUSCULAR; INTRAVENOUS at 22:50

## 2024-12-30 RX ADMIN — DILTIAZEM HYDROCHLORIDE 240 MG: 120 CAPSULE, COATED, EXTENDED RELEASE ORAL at 08:24

## 2024-12-30 RX ADMIN — ENOXAPARIN SODIUM 60 MG: 100 INJECTION SUBCUTANEOUS at 20:04

## 2024-12-30 RX ADMIN — POLYETHYLENE GLYCOL 3350 17 G: 17 POWDER, FOR SOLUTION ORAL at 08:26

## 2024-12-30 RX ADMIN — HYDRALAZINE HYDROCHLORIDE 10 MG: 20 INJECTION INTRAMUSCULAR; INTRAVENOUS at 22:23

## 2024-12-30 RX ADMIN — GUAIFENESIN 400 MG: 400 TABLET ORAL at 14:22

## 2024-12-30 RX ADMIN — PREGABALIN 75 MG: 25 CAPSULE ORAL at 20:05

## 2024-12-30 RX ADMIN — FOLIC ACID 1 MG: 1 TABLET ORAL at 08:22

## 2024-12-30 RX ADMIN — MAGNESIUM HYDROXIDE 30 ML: 400 SUSPENSION ORAL at 15:46

## 2024-12-30 RX ADMIN — BUDESONIDE INHALATION 1000 MCG: 0.5 SUSPENSION RESPIRATORY (INHALATION) at 08:39

## 2024-12-30 RX ADMIN — AMIODARONE HYDROCHLORIDE 0.5 MG/MIN: 1.8 INJECTION, SOLUTION INTRAVENOUS at 08:21

## 2024-12-30 ASSESSMENT — PAIN SCALES - GENERAL
PAINLEVEL_OUTOF10: 0
PAINLEVEL_OUTOF10: 0
PAINLEVEL_OUTOF10: 5
PAINLEVEL_OUTOF10: 4
PAINLEVEL_OUTOF10: 0
PAINLEVEL_OUTOF10: 3
PAINLEVEL_OUTOF10: 0
PAINLEVEL_OUTOF10: 8

## 2024-12-30 ASSESSMENT — PAIN DESCRIPTION - PAIN TYPE
TYPE: ACUTE PAIN
TYPE: ACUTE PAIN

## 2024-12-30 ASSESSMENT — PAIN DESCRIPTION - DESCRIPTORS
DESCRIPTORS: DISCOMFORT
DESCRIPTORS: CRAMPING;DISCOMFORT

## 2024-12-30 ASSESSMENT — PAIN DESCRIPTION - LOCATION
LOCATION: ABDOMEN
LOCATION: ABDOMEN

## 2024-12-30 ASSESSMENT — PAIN DESCRIPTION - ORIENTATION
ORIENTATION: LOWER
ORIENTATION: UPPER;LOWER

## 2024-12-30 NOTE — PLAN OF CARE
Problem: Safety - Adult  Goal: Free from fall injury  12/29/2024 2319 by Brenda Carrasquillo RN  Outcome: Progressing  Flowsheets (Taken 12/29/2024 2200)  Free From Fall Injury: Instruct family/caregiver on patient safety     Problem: Chronic Conditions and Co-morbidities  Goal: Patient's chronic conditions and co-morbidity symptoms are monitored and maintained or improved  12/29/2024 2319 by Brenda Carrasquillo RN  Outcome: Progressing  Flowsheets  Taken 12/29/2024 2000 by Brenda Carrasquillo RN  Care Plan - Patient's Chronic Conditions and Co-Morbidity Symptoms are Monitored and Maintained or Improved:   Monitor and assess patient's chronic conditions and comorbid symptoms for stability, deterioration, or improvement   Collaborate with multidisciplinary team to address chronic and comorbid conditions and prevent exacerbation or deterioration   Update acute care plan with appropriate goals if chronic or comorbid symptoms are exacerbated and prevent overall improvement and discharge  Taken 12/29/2024 1413 by Venancio Gentile RN  Care Plan - Patient's Chronic Conditions and Co-Morbidity Symptoms are Monitored and Maintained or Improved:   Monitor and assess patient's chronic conditions and comorbid symptoms for stability, deterioration, or improvement   Collaborate with multidisciplinary team to address chronic and comorbid conditions and prevent exacerbation or deterioration   Update acute care plan with appropriate goals if chronic or comorbid symptoms are exacerbated and prevent overall improvement and discharge     Problem: Discharge Planning  Goal: Discharge to home or other facility with appropriate resources  12/29/2024 2319 by Brenda Carrasquillo RN  Outcome: Progressing  Flowsheets  Taken 12/29/2024 2000 by Brenda Carrasquillo RN  Discharge to home or other facility with appropriate resources:   Identify barriers to discharge with patient and caregiver   Arrange for needed discharge resources and transportation as appropriate   Identify

## 2024-12-30 NOTE — PROGRESS NOTES
Occupational Therapy  OCCUPATIONAL THERAPY TREATMENT SESSION  [facility]  [address], [place]      Date:2024                                                Patient Name: Lana Phillips  MRN: [id number]  : 1955  Room: [address] [place] [address]A    Per OT eval:    Evaluating OT: Oumar Echevarria OTR/L #[id number]     Referring Provider: Elida Moore APRN - NP   Specific Provider Orders/Date: OT eval and treat 24    Diagnosis: Peripheral vascular disease, unspecified ([facility]) [I73.9]  Respiratory distress [R06.03]  DERIAN (acute kidney injury) ([facility]) [N17.9]  Atherosclerosis of both carotid arteries [I65.23]  Fall, initial encounter [W19.XXXA]  Acute on chronic respiratory failure with hypoxia and hypercapnia [J96.21, J96.22]  Pneumonia due to infectious organism, unspecified laterality, unspecified part of lung [J18.9]  Acute hypoxic on chronic hypercapnic respiratory failure [J96.01, J96.12]   Pt admitted to hospital with SOB, resp failure and fall      Pertinent Medical History:  has a past medical history of Atherosclerosis 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 (HCC), Atrial fibrillation (HCC), Chronic obstructive pulmonary disease (HCC), COVID-19, Critical limb ischemia of left lower extremity with rest pain (HCC), GI bleed, Hypertension, RUTH treated with BiPAP, Panic disorder, PVD (peripheral vascular disease) ([facility]), Severe protein-calorie malnutrition (HCC), Thyroid disease, and Uncontrolled hypertension.       Precautions:  Fall Risk, O2, cognition, alarms+    Assessment of current deficits    [x] Functional mobility  [x]ADLs  [x] Strength               []Cognition    [x] Functional transfers   [x] IADLs         [x] Safety Awareness   [x]Endurance    [] Fine Coordination              [x] Balance      [] Vision/perception   []Sensation     []Gross Motor Coordination  [] ROM  [] Delirium                   [] Motor

## 2024-12-30 NOTE — CARE COORDINATION
Care Coordination:  LOS 5 day.  Tx from [facility] on 12/29/24- RRT  acute on chronic resp failure 2/2 copd exac and worsening of B/L pleural effusions. Plan in place is to [facility] at [facility]. per previous SW, Medicaid bed hold- no precert needed. Transport and oziel completed in soft chart.  Currently on Amio gtt, sitter at bedside. 6 ltr nc/Bipap, cefepime.   Met with pt at bedside to discuss transition of care - plan to return to [facility]. States she prefers [facility], as primary contact, but also has  a sibling and niece listed, that would be primary and secondary.      Electronically signed by Kendra Samuel RN on 12/30/2024 at 10:45 AM

## 2024-12-30 NOTE — PROGRESS NOTES
[facility] Hospitalist Progress Note    Admitting Date and Time: 12/25/2024  8:51 AM  Admit Dx: Peripheral vascular disease, unspecified (HCC) [I73.9]  Respiratory distress [R06.03]  DERIAN (acute kidney injury) (HCC) [N17.9]  Atherosclerosis of both carotid arteries [I65.23]  Fall, initial encounter [W19.XXXA]  Acute on chronic respiratory failure with hypoxia and hypercapnia [J96.21, J96.22]  Pneumonia due to infectious organism, unspecified laterality, unspecified part of lung [J18.9]  Acute hypoxic on chronic hypercapnic respiratory failure [J96.01, J96.12]    Synopsis:    Patient admitted on 12/25/2024 for Acute hypoxic on chronic hypercapnic respiratory failure     This is a 69-year-old lady with past medical history of A-fib, COPD, critical limb ischemia of left lower extremity, GI bleed, hypertension, RUTH treated with BiPAP, panic disorder, peripheral vascular disease, severe protein calorie malnutrition, hypothyroidism, hypertension.  Patient presented from [facility] with complaints of respiratory distress beginning earlier in the day.  Patient was reportedly hypoxic with SpO2 in the 70s on her baseline 4 L oxygen and was placed on BiPAP.  Patient has been having cough with intermittent shortness of breath over the past few days.  She denies any fever, chills, chest pain, palpitations or urinary symptoms.  She did also fell from her wheelchair and hit her head.  She is not on any blood thinners.  She did not lose consciousness.  No focal deficits or pain.  In the ED,  CT was tachycardic and hypoxic requiring BiPAP.  Lab work was significant for hypokalemia, DERIAN, lactic acidosis, elevated BNP, elevated troponin, leukocytosis.  Viral panel was negative.  Blood cultures obtained.  She was started on cefepime, vancomycin.  Also given 1 L bolus of fluid, DuoNeb in ED.  She was recently treated for altered mental status and UTI from 6/27 to 6/29.     12/26 blood culture came back positive for  DULoxetine  60 mg Oral Daily    folic acid  1 mg Oral Daily    methIMAzole  15 mg Oral Daily    pantoprazole  40 mg Oral QAM AC    pregabalin  75 mg Oral BID    sodium chloride flush  5-40 mL IntraVENous 2 times per day    ipratropium 0.5 mg-albuterol 2.5 mg  1 Dose Inhalation Q4H WA RT    guaiFENesin  400 mg Oral TID    potassium chloride  20 mEq Oral Once    cefepime  2,000 mg IntraVENous Q12H    lactobacillus  1 capsule Oral Daily    vitamin D  50,000 Units Oral Weekly     diphenhydrAMINE, 25 mg, Q6H PRN  diphenoxylate-atropine, 1 tablet, 4x Daily PRN  polyethylene glycol, 17 g, Daily PRN  sodium chloride flush, 5-40 mL, PRN  sodium chloride, , PRN  ondansetron, 4 mg, Q8H PRN   Or  ondansetron, 4 mg, Q6H PRN  acetaminophen, 650 mg, Q6H PRN   Or  acetaminophen, 650 mg, Q6H PRN  melatonin, 5 mg, Nightly PRN  hydrALAZINE, 10 mg, Q6H PRN  calcium carbonate, 500 mg, TID PRN  Polyvinyl Alcohol-Povidone PF, 1 drop, PRN  sodium chloride, 1 spray, PRN  magnesium hydroxide, 30 mL, Daily PRN  benzocaine-menthol, 1 lozenge, Q2H PRN  hydrOXYzine pamoate, 25 mg, TID PRN  benzonatate, 100 mg, TID PRN  glucose, 4 tablet, PRN  dextrose bolus, 125 mL, PRN   Or  dextrose bolus, 250 mL, PRN  glucagon (rDNA), 1 mg, PRN  dextrose, , Continuous PRN         Objective:    /68   Pulse 76   Temp (!) 96.7 °F (35.9 °C) (Axillary)   Resp 22   Ht 1.727 m (5' 8\")   Wt 55.7 kg (122 lb 12.8 oz)   SpO2 94%   BMI 18.67 kg/m²     General Appearance: alert and oriented to person, place and time and in no acute distress  Skin: warm and dry  Head: normocephalic and atraumatic  Eyes: pupils equal, round, and reactive to light, extraocular eye movements intact, conjunctivae normal  Neck: neck supple and non tender without mass   Pulmonary/Chest: Decreased air entry  Cardiovascular: normal rate, normal S1 and S2 and no carotid bruits  Abdomen: soft, non-tender, non-distended, normal bowel sounds, no masses or organomegaly  Extremities: no

## 2024-12-30 NOTE — PROGRESS NOTES
Physical Therapy    Physical Therapy Treatment     Name: Lana Phillips  : 1955  MRN: [id number]      Date of Service: 2024    Evaluating PT:  Nico Marrufo PT, DPT [id number]    Room #:  4409/4409-A  Diagnosis:  Peripheral vascular disease, unspecified ([facility]) [I73.9]  Respiratory distress [R06.03]  DERIAN (acute kidney injury) ([facility]) [N17.9]  Atherosclerosis of both carotid arteries [I65.23]  Fall, initial encounter [W19.XXXA]  Acute on chronic respiratory failure with hypoxia and hypercapnia [J96.21, J96.22]  Pneumonia due to infectious organism, unspecified laterality, unspecified part of lung [J18.9]  Acute hypoxic on chronic hypercapnic respiratory failure [J96.01, J96.12]  PMHx/PSHx:    Past Medical History:   Diagnosis Date    Atherosclerosis 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 ([facility]) 2024    Atrial fibrillation ([facility])     Chronic obstructive pulmonary disease ([facility]) 12/15/2022    COVID-19 2022    Critical limb ischemia of left lower extremity with rest pain ([facility]) 2023    GI bleed 10/14/2023    Hypertension     RUTH treated with BiPAP     Panic disorder     PVD (peripheral vascular disease) ([facility]) 2024    Severe protein-calorie malnutrition ([facility]) 2023    Thyroid disease     Uncontrolled hypertension      Procedure/Surgery:  none  Precautions:  Falls, O2,    Equipment Needs:  TBD    SUBJECTIVE:    Pt was admitted from [facility].  Pt ambulated without device and was active with PT PTA.    OBJECTIVE:   Initial Evaluation  Date: 24 Treatment  24 Short Term/ Long Term   Goals   AM-PAC 6 Clicks     Was pt agreeable to Eval/treatment? Yes Yes    Does pt have pain? No c/o pain No c/o pain    Bed Mobility  Rolling: NT  Supine to sit: SBA  Sit to supine: SBA  Scooting: SBA Rolling: NT  Supine to sit: SBA  Sit to supine: SBA  Scooting: SBA Mod Independent   Transfers Sit to stand: Kizzy  Stand to sit: Kizzy  Stand  facilitate proper sequencing and safety during rolling and supine>sit as well as provided with physical assistance to complete task    STS and pivot transfer training - pt educated on proper hand and foot placement, safety and sequencing, and use of no AD to safely complete sit<>stand and pivot transfers with hands on assistance to complete task safely    Gait training- pt was given verbal and tactile cues to facilitate safety/balance during ambulation as well as provided with physical assistance.     PHYSICAL THERAPY PLAN OF CARE:  Pt is making progress towards established goals.  Continue PT POC.     Referring provider/PT Order:  Elida Moore APRN - NP  /12/25/24 1115  PT eval and treat  Diagnosis:  Peripheral vascular disease, unspecified ([facility]) [I73.9]  Respiratory distress [R06.03]  DERIAN (acute kidney injury) ([facility]) [N17.9]  Atherosclerosis of both carotid arteries [I65.23]  Fall, initial encounter [W19.XXXA]  Acute on chronic respiratory failure with hypoxia and hypercapnia [J96.21, J96.22]  Pneumonia due to infectious organism, unspecified laterality, unspecified part of lung [J18.9]  Acute hypoxic on chronic hypercapnic respiratory failure [J96.01, J96.12]  Specific instructions for next treatment:  Progress activity     Time in  1320  Time out  1405    Total Treatment Time  45 minutes     CPT codes:  [] Low Complexity PT evaluation 84838  [] Moderate Complexity PT evaluation 57961  [] High Complexity PT evaluation 21531  [] PT Re-evaluation 68914  [] Gait training 31531 - minutes  [] Manual therapy 85947 - minutes  [x] Therapeutic activities 59709 45 minutes  [] Therapeutic exercises 94717 - minutes  [] Neuromuscular reeducation 76991 - minutes     Satnam Gutierrez, PT, DPT  [id number]

## 2024-12-30 NOTE — PROGRESS NOTES
[facility]  ANTONIO  Progress Note    SUBJECTIVE:  Chief Complaint   Patient presents with    Shortness of Breath     Patient r/o respiratory distress PTA via EMS. Pt normally on 4L NC at baseline. Arrived EMS via BiPap.       Patient sitting up in chair.  Denies fever chills nausea vomiting diarrhea.  Was transferred to MICU yesterday with hypoxic event and A-fib RVR.  Denies shortness of breath today      Review of systems:  As stated above in the chief complaint, otherwise negative.    Medications:  Scheduled Meds:   senna  1 tablet Oral Nightly    docusate sodium  100 mg Oral Daily    [START ON 12/31/2024] furosemide  40 mg Oral Daily    methylPREDNISolone  40 mg IntraVENous Q8H    vancomycin  1,250 mg IntraVENous Q12H    metoprolol  5 mg IntraVENous Once    enoxaparin  1 mg/kg SubCUTAneous BID    melatonin  5 mg Oral QPM    dilTIAZem  240 mg Oral Daily    arformoterol tartrate  15 mcg Nebulization BID RT    aspirin  81 mg Oral Daily    budesonide  1 mg Nebulization BID RT    clonazePAM  0.5 mg Oral Daily    DULoxetine  60 mg Oral Daily    folic acid  1 mg Oral Daily    methIMAzole  15 mg Oral Daily    pantoprazole  40 mg Oral QAM AC    pregabalin  75 mg Oral BID    sodium chloride flush  5-40 mL IntraVENous 2 times per day    ipratropium 0.5 mg-albuterol 2.5 mg  1 Dose Inhalation Q4H WA RT    guaiFENesin  400 mg Oral TID    potassium chloride  20 mEq Oral Once    cefepime  2,000 mg IntraVENous Q12H    lactobacillus  1 capsule Oral Daily    vitamin D  50,000 Units Oral Weekly     Continuous Infusions:   sodium chloride      dextrose       PRN Meds:diphenhydrAMINE, diphenoxylate-atropine, polyethylene glycol, sodium chloride flush, sodium chloride, ondansetron **OR** ondansetron, acetaminophen **OR** acetaminophen, melatonin, hydrALAZINE, calcium carbonate, Polyvinyl Alcohol-Povidone PF, sodium chloride, magnesium hydroxide, benzocaine-menthol, hydrOXYzine pamoate, benzonatate,

## 2024-12-30 NOTE — PROGRESS NOTES
Pharmacy Consultation Note  (Antibiotic Dosing and Monitoring)    Initial consult date: 12/25/2024  Consulting physician/provider: Elida Moore APRN - NP.   Drug: Vancomycin  Indication: Pneumonia (CAP).     Age/  Gender Height Weight IBW  Allergy Information   69 y.o./female 172.7 cm (5' 8\")172.7 cm 51.8 kg (114 lb 3.2 oz) 53.6 kg    Ideal body weight: 63.9 kg (140 lb 14 oz)   Pcn [penicillins]      Renal Function:  Recent Labs     12/29/24  0600 12/29/24  1127 12/30/24  0600   BUN 10 10 14   CREATININE 0.5 0.5 0.5     Vancomycin Administration Times:  Recent vancomycin administrations                     vancomycin (VANCOCIN) 1,000 mg in sodium chloride 0.9 % 250 mL IVPB ([id number]) (mg) 1,000 mg New Bag 12/28/24 2112     1,000 mg New Bag  0907    vancomycin (VANCOCIN) 750 mg in sodium chloride 0.9 % 250 mL IVPB ([id number]) (mg) 750 mg New Bag 12/27/24 2029     750 mg New Bag  0817     750 mg New Bag 12/26/24 1954     750 mg New Bag  0924                      Assessment:  Patient is a 69 y.o. female who has been initiated on vancomycin for nosocomial pneumonia.   Estimated Creatinine Clearance: 91 mL/min (based on SCr of 0.5 mg/dL).  To dose vancomycin, pharmacy will be dosing based off of levels because of patient's renal impairment/insufficiency.   Scr 1.3mg/dL, baseline 0.7mg/dL on 12/6/2024.   12/26: Scr 0.7. WBC 17.5. Random level = <4 mcg/mL.   1/4 blood culture growing GPC in pairs and chains  12/27:  Scr 0.6.  Awaiting susceptibilities for blood cultures.  12/28: Scr 0.6. Blood cx growing enterococcus faecium. Random level this AM = 9.4 mcg/mL (~9 hours post-dose)  12/29: Scr 0.5, UOP incomplete.   12/30: Scr 0.5, UOP 2.7 mL/kg/hr, vancomycin level is 13.2 mcg/mL (~8 hours post dose )    Plan:  Increase to vancomycin 1250 mg IV q12h   Will check vancomycin trough level with new steady state  Will continue to monitor renal function.   Pharmacy to follow    Thank you for the consult,     Keith Childress,

## 2024-12-30 NOTE — PLAN OF CARE
Problem: Safety - Adult  Goal: Free from fall injury  12/30/2024 0907 by Monica Rayo RN  Outcome: Progressing  12/29/2024 2319 by Brenda Carrasquillo RN  Outcome: Progressing  Flowsheets (Taken 12/29/2024 2200)  Free From Fall Injury: Instruct family/caregiver on patient safety     Problem: Chronic Conditions and Co-morbidities  Goal: Patient's chronic conditions and co-morbidity symptoms are monitored and maintained or improved  12/30/2024 0907 by Monica Rayo RN  Outcome: Progressing  12/29/2024 2319 by Brenda Carrasquillo RN  Outcome: Progressing  Flowsheets  Taken 12/29/2024 2000 by Brenda Carrasquillo RN  Care Plan - Patient's Chronic Conditions and Co-Morbidity Symptoms are Monitored and Maintained or Improved:   Monitor and assess patient's chronic conditions and comorbid symptoms for stability, deterioration, or improvement   Collaborate with multidisciplinary team to address chronic and comorbid conditions and prevent exacerbation or deterioration   Update acute care plan with appropriate goals if chronic or comorbid symptoms are exacerbated and prevent overall improvement and discharge  Taken 12/29/2024 1413 by Venancio Gentile RN  Care Plan - Patient's Chronic Conditions and Co-Morbidity Symptoms are Monitored and Maintained or Improved:   Monitor and assess patient's chronic conditions and comorbid symptoms for stability, deterioration, or improvement   Collaborate with multidisciplinary team to address chronic and comorbid conditions and prevent exacerbation or deterioration   Update acute care plan with appropriate goals if chronic or comorbid symptoms are exacerbated and prevent overall improvement and discharge     Problem: Discharge Planning  Goal: Discharge to home or other facility with appropriate resources  12/30/2024 0907 by Monica Rayo RN  Outcome: Progressing  12/29/2024 2319 by Brenda Carrasquillo RN  Outcome: Progressing  Flowsheets  Taken 12/29/2024 2000 by Brenda Carrasquillo RN  Discharge to home or  other facility with appropriate resources:   Identify barriers to discharge with patient and caregiver   Arrange for needed discharge resources and transportation as appropriate   Identify discharge learning needs (meds, wound care, etc)  Taken 12/29/2024 1413 by Venancio Gentile, RN  Discharge to home or other facility with appropriate resources: Identify barriers to discharge with patient and caregiver

## 2024-12-30 NOTE — PROGRESS NOTES
pending progress    35 minutes of CCT spent with the patient, reviewing the chart including imaging studies, and discussing the case with other health care professionals.  This time excludes procedures.     During multidisciplinary team rounds the patient was seen, examined and discussed. This is confirmation that I have personally seen and examined the patient and that the key elements of the encounter were performed by me (> 85 % time).  The medications & laboratory data and imagery was discussed and adjusted where necessary. Key issues of the case were discussed among consultants.     This patient has a high probability of sudden clinically significant deterioration. I managed/supervised life or organ supporting interventions that required frequent physician assessment. I devoted my full attention to the direct care of this patient for the period of time indicated below. In addition to above, time was devoted to teaching and to any procedure.     NOTE: This report, in part or full, may have been transcribed using voice recognition software. Every effort was made to ensure accuracy; however, inadvertent computerized transcription errors may be present. Please excuse any transcriptional grammatical or spelling errors that may have escaped my editorial review.    Total critical care time caring for this patient with life threatening, unstable organ failure, including direct patient contact, management of life support systems, review of data including imaging and labs, discussions with other team members and physicians is at least 33 Min so far today, excluding procedures.      Maci Orosco MD, .                    12/30/2024, 10:39 AM      NOTE: This report, in part or full, may have been transcribed using voice recognition software. Every effort was made to ensure accuracy; however, inadvertent computerized transcription errors may be present. Please excuse any transcriptional grammatical or spelling errors that  may have escaped my editorial review.

## 2024-12-31 LAB
ANION GAP SERPL CALCULATED.3IONS-SCNC: 10 MMOL/L (ref 7–16)
BASOPHILS # BLD: 0 K/UL (ref 0–0.2)
BASOPHILS NFR BLD: 0 % (ref 0–2)
BUN SERPL-MCNC: 13 MG/DL (ref 6–23)
CALCIUM SERPL-MCNC: 8.7 MG/DL (ref 8.6–10.2)
CHLORIDE SERPL-SCNC: 90 MMOL/L (ref 98–107)
CO2 SERPL-SCNC: 36 MMOL/L (ref 22–29)
CREAT SERPL-MCNC: 0.6 MG/DL (ref 0.5–1)
EOSINOPHIL # BLD: 0 K/UL (ref 0.05–0.5)
EOSINOPHILS RELATIVE PERCENT: 0 % (ref 0–6)
ERYTHROCYTE [DISTWIDTH] IN BLOOD BY AUTOMATED COUNT: 15.8 % (ref 11.5–15)
GFR, ESTIMATED: >90 ML/MIN/1.73M2
GLUCOSE SERPL-MCNC: 222 MG/DL (ref 74–99)
HCT VFR BLD AUTO: 32.5 % (ref 34–48)
HGB BLD-MCNC: 9.9 G/DL (ref 11.5–15.5)
LYMPHOCYTES NFR BLD: 1.31 K/UL (ref 1.5–4)
LYMPHOCYTES RELATIVE PERCENT: 7 % (ref 20–42)
MAGNESIUM SERPL-MCNC: 1.6 MG/DL (ref 1.6–2.6)
MCH RBC QN AUTO: 30 PG (ref 26–35)
MCHC RBC AUTO-ENTMCNC: 30.5 G/DL (ref 32–34.5)
MCV RBC AUTO: 98.5 FL (ref 80–99.9)
METAMYELOCYTES ABSOLUTE COUNT: 0.16 K/UL (ref 0–0.12)
METAMYELOCYTES: 1 % (ref 0–1)
MICROORGANISM SPEC CULT: NORMAL
MICROORGANISM SPEC CULT: NORMAL
MONOCYTES NFR BLD: 0 % (ref 2–12)
MONOCYTES NFR BLD: 0 K/UL (ref 0.1–0.95)
MYELOCYTES ABSOLUTE COUNT: 0.16 K/UL
MYELOCYTES: 1 %
NEUTROPHILS NFR BLD: 91 % (ref 43–80)
NEUTS SEG NFR BLD: 17.17 K/UL (ref 1.8–7.3)
PLATELET # BLD AUTO: 412 K/UL (ref 130–450)
PMV BLD AUTO: 10 FL (ref 7–12)
POTASSIUM SERPL-SCNC: 3.8 MMOL/L (ref 3.5–5)
RBC # BLD AUTO: 3.3 M/UL (ref 3.5–5.5)
RBC # BLD: ABNORMAL 10*6/UL
SERVICE CMNT-IMP: NORMAL
SERVICE CMNT-IMP: NORMAL
SODIUM SERPL-SCNC: 136 MMOL/L (ref 132–146)
SPECIMEN DESCRIPTION: NORMAL
SPECIMEN DESCRIPTION: NORMAL
WBC OTHER # BLD: 18.8 K/UL (ref 4.5–11.5)

## 2024-12-31 PROCEDURE — 6370000000 HC RX 637 (ALT 250 FOR IP): Performed by: INTERNAL MEDICINE

## 2024-12-31 PROCEDURE — 6360000002 HC RX W HCPCS: Performed by: INTERNAL MEDICINE

## 2024-12-31 PROCEDURE — 6370000000 HC RX 637 (ALT 250 FOR IP): Performed by: PHYSICIAN ASSISTANT

## 2024-12-31 PROCEDURE — 97530 THERAPEUTIC ACTIVITIES: CPT

## 2024-12-31 PROCEDURE — 6360000002 HC RX W HCPCS: Performed by: NURSE PRACTITIONER

## 2024-12-31 PROCEDURE — 97535 SELF CARE MNGMENT TRAINING: CPT

## 2024-12-31 PROCEDURE — 2700000000 HC OXYGEN THERAPY PER DAY

## 2024-12-31 PROCEDURE — 6370000000 HC RX 637 (ALT 250 FOR IP): Performed by: NURSE PRACTITIONER

## 2024-12-31 PROCEDURE — 99232 SBSQ HOSP IP/OBS MODERATE 35: CPT | Performed by: STUDENT IN AN ORGANIZED HEALTH CARE EDUCATION/TRAINING PROGRAM

## 2024-12-31 PROCEDURE — 85025 COMPLETE CBC W/AUTO DIFF WBC: CPT

## 2024-12-31 PROCEDURE — 2500000003 HC RX 250 WO HCPCS: Performed by: NURSE PRACTITIONER

## 2024-12-31 PROCEDURE — 2500000003 HC RX 250 WO HCPCS: Performed by: INTERNAL MEDICINE

## 2024-12-31 PROCEDURE — 6370000000 HC RX 637 (ALT 250 FOR IP): Performed by: STUDENT IN AN ORGANIZED HEALTH CARE EDUCATION/TRAINING PROGRAM

## 2024-12-31 PROCEDURE — B24BZZ4 ULTRASONOGRAPHY OF HEART WITH AORTA, TRANSESOPHAGEAL: ICD-10-PCS | Performed by: STUDENT IN AN ORGANIZED HEALTH CARE EDUCATION/TRAINING PROGRAM

## 2024-12-31 PROCEDURE — 2140000000 HC CCU INTERMEDIATE R&B

## 2024-12-31 PROCEDURE — 2580000003 HC RX 258: Performed by: NURSE PRACTITIONER

## 2024-12-31 PROCEDURE — 94660 CPAP INITIATION&MGMT: CPT

## 2024-12-31 PROCEDURE — 83735 ASSAY OF MAGNESIUM: CPT

## 2024-12-31 PROCEDURE — 80048 BASIC METABOLIC PNL TOTAL CA: CPT

## 2024-12-31 PROCEDURE — 94640 AIRWAY INHALATION TREATMENT: CPT

## 2024-12-31 RX ORDER — POTASSIUM CHLORIDE 1500 MG/1
20 TABLET, EXTENDED RELEASE ORAL ONCE
Status: COMPLETED | OUTPATIENT
Start: 2024-12-31 | End: 2024-12-31

## 2024-12-31 RX ADMIN — ACETAMINOPHEN 650 MG: 325 TABLET ORAL at 07:22

## 2024-12-31 RX ADMIN — PANTOPRAZOLE SODIUM 40 MG: 40 TABLET, DELAYED RELEASE ORAL at 05:31

## 2024-12-31 RX ADMIN — SODIUM CHLORIDE, PRESERVATIVE FREE 10 ML: 5 INJECTION INTRAVENOUS at 07:24

## 2024-12-31 RX ADMIN — IPRATROPIUM BROMIDE AND ALBUTEROL SULFATE 1 DOSE: 2.5; .5 SOLUTION RESPIRATORY (INHALATION) at 12:44

## 2024-12-31 RX ADMIN — ENOXAPARIN SODIUM 60 MG: 100 INJECTION SUBCUTANEOUS at 07:23

## 2024-12-31 RX ADMIN — IPRATROPIUM BROMIDE AND ALBUTEROL SULFATE 1 DOSE: 2.5; .5 SOLUTION RESPIRATORY (INHALATION) at 20:27

## 2024-12-31 RX ADMIN — POTASSIUM CHLORIDE 20 MEQ: 1500 TABLET, EXTENDED RELEASE ORAL at 15:33

## 2024-12-31 RX ADMIN — HYDROXYZINE PAMOATE 25 MG: 25 CAPSULE ORAL at 21:01

## 2024-12-31 RX ADMIN — ENOXAPARIN SODIUM 60 MG: 100 INJECTION SUBCUTANEOUS at 21:05

## 2024-12-31 RX ADMIN — CEFEPIME 2000 MG: 2 INJECTION, POWDER, FOR SOLUTION INTRAVENOUS at 21:12

## 2024-12-31 RX ADMIN — DOCUSATE SODIUM 100 MG: 100 CAPSULE, LIQUID FILLED ORAL at 07:23

## 2024-12-31 RX ADMIN — IPRATROPIUM BROMIDE AND ALBUTEROL SULFATE 1 DOSE: 2.5; .5 SOLUTION RESPIRATORY (INHALATION) at 08:35

## 2024-12-31 RX ADMIN — GUAIFENESIN 400 MG: 400 TABLET ORAL at 13:34

## 2024-12-31 RX ADMIN — ACETAMINOPHEN 650 MG: 325 TABLET ORAL at 15:33

## 2024-12-31 RX ADMIN — CEFEPIME 2000 MG: 2 INJECTION, POWDER, FOR SOLUTION INTRAVENOUS at 07:33

## 2024-12-31 RX ADMIN — BUDESONIDE INHALATION 1000 MCG: 0.5 SUSPENSION RESPIRATORY (INHALATION) at 08:35

## 2024-12-31 RX ADMIN — Medication 1 CAPSULE: at 07:23

## 2024-12-31 RX ADMIN — HYDROXYZINE PAMOATE 25 MG: 25 CAPSULE ORAL at 02:39

## 2024-12-31 RX ADMIN — VANCOMYCIN HYDROCHLORIDE 1250 MG: 1.25 INJECTION, POWDER, LYOPHILIZED, FOR SOLUTION INTRAVENOUS at 17:54

## 2024-12-31 RX ADMIN — ARFORMOTEROL TARTRATE 15 MCG: 15 SOLUTION RESPIRATORY (INHALATION) at 20:27

## 2024-12-31 RX ADMIN — Medication 5 MG: at 21:02

## 2024-12-31 RX ADMIN — WATER 40 MG: 1 INJECTION INTRAMUSCULAR; INTRAVENOUS; SUBCUTANEOUS at 21:00

## 2024-12-31 RX ADMIN — DILTIAZEM HYDROCHLORIDE 240 MG: 120 CAPSULE, COATED, EXTENDED RELEASE ORAL at 07:22

## 2024-12-31 RX ADMIN — GUAIFENESIN 400 MG: 400 TABLET ORAL at 21:02

## 2024-12-31 RX ADMIN — ASPIRIN 81 MG: 81 TABLET, COATED ORAL at 07:23

## 2024-12-31 RX ADMIN — STANDARDIZED SENNA CONCENTRATE 8.6 MG: 8.6 TABLET ORAL at 18:41

## 2024-12-31 RX ADMIN — WATER 40 MG: 1 INJECTION INTRAMUSCULAR; INTRAVENOUS; SUBCUTANEOUS at 07:24

## 2024-12-31 RX ADMIN — HYDRALAZINE HYDROCHLORIDE 10 MG: 20 INJECTION INTRAMUSCULAR; INTRAVENOUS at 08:37

## 2024-12-31 RX ADMIN — SALINE NASAL SPRAY 1 SPRAY: 1.5 SOLUTION NASAL at 00:56

## 2024-12-31 RX ADMIN — VANCOMYCIN HYDROCHLORIDE 1250 MG: 1.25 INJECTION, POWDER, LYOPHILIZED, FOR SOLUTION INTRAVENOUS at 05:33

## 2024-12-31 RX ADMIN — PREGABALIN 75 MG: 25 CAPSULE ORAL at 07:23

## 2024-12-31 RX ADMIN — STANDARDIZED SENNA CONCENTRATE 8.6 MG: 8.6 TABLET ORAL at 07:23

## 2024-12-31 RX ADMIN — BUDESONIDE INHALATION 1000 MCG: 0.5 SUSPENSION RESPIRATORY (INHALATION) at 20:27

## 2024-12-31 RX ADMIN — DIPHENHYDRAMINE HYDROCHLORIDE 25 MG: 50 INJECTION INTRAMUSCULAR; INTRAVENOUS at 22:22

## 2024-12-31 RX ADMIN — IPRATROPIUM BROMIDE AND ALBUTEROL SULFATE 1 DOSE: 2.5; .5 SOLUTION RESPIRATORY (INHALATION) at 16:28

## 2024-12-31 RX ADMIN — PREGABALIN 75 MG: 25 CAPSULE ORAL at 21:02

## 2024-12-31 RX ADMIN — FUROSEMIDE 40 MG: 40 TABLET ORAL at 07:23

## 2024-12-31 RX ADMIN — CLONAZEPAM 0.5 MG: 0.5 TABLET ORAL at 07:23

## 2024-12-31 RX ADMIN — METHIMAZOLE 15 MG: 5 TABLET ORAL at 07:22

## 2024-12-31 RX ADMIN — FOLIC ACID 1 MG: 1 TABLET ORAL at 07:22

## 2024-12-31 RX ADMIN — SODIUM CHLORIDE, PRESERVATIVE FREE 10 ML: 5 INJECTION INTRAVENOUS at 21:01

## 2024-12-31 RX ADMIN — ARFORMOTEROL TARTRATE 15 MCG: 15 SOLUTION RESPIRATORY (INHALATION) at 08:34

## 2024-12-31 RX ADMIN — GUAIFENESIN 400 MG: 400 TABLET ORAL at 07:23

## 2024-12-31 RX ADMIN — DULOXETINE HYDROCHLORIDE 60 MG: 60 CAPSULE, DELAYED RELEASE ORAL at 07:26

## 2024-12-31 ASSESSMENT — PAIN DESCRIPTION - ORIENTATION: ORIENTATION: UPPER

## 2024-12-31 ASSESSMENT — PAIN - FUNCTIONAL ASSESSMENT: PAIN_FUNCTIONAL_ASSESSMENT: ACTIVITIES ARE NOT PREVENTED

## 2024-12-31 ASSESSMENT — PAIN SCALES - GENERAL
PAINLEVEL_OUTOF10: 0
PAINLEVEL_OUTOF10: 3
PAINLEVEL_OUTOF10: 1

## 2024-12-31 ASSESSMENT — PAIN SCALES - WONG BAKER: WONGBAKER_NUMERICALRESPONSE: NO HURT

## 2024-12-31 ASSESSMENT — PAIN DESCRIPTION - DESCRIPTORS: DESCRIPTORS: CRAMPING

## 2024-12-31 ASSESSMENT — PAIN DESCRIPTION - LOCATION: LOCATION: HEAD

## 2024-12-31 NOTE — PROGRESS NOTES
Occupational Therapy  OCCUPATIONAL THERAPY TREATMENT SESSION  [facility]  [address] [place] [address], [place]      Date:2024                                                Patient Name: Lana Phillips  MRN: [id number]  : 1955  Room: [address] [place] [address]-A    Per OT eval:    Evaluating OT: Oumar Echevarria OTR/L #[id number]     Referring Provider: Elida Moore APRN - NP   Specific Provider Orders/Date: OT eval and treat 24    Diagnosis: Peripheral vascular disease, unspecified ([facility]) [I73.9]  Respiratory distress [R06.03]  DERIAN (acute kidney injury) ([facility]) [N17.9]  Atherosclerosis of both carotid arteries [I65.23]  Fall, initial encounter [W19.XXXA]  Acute on chronic respiratory failure with hypoxia and hypercapnia [J96.21, J96.22]  Pneumonia due to infectious organism, unspecified laterality, unspecified part of lung [J18.9]  Acute hypoxic on chronic hypercapnic respiratory failure [J96.01, J96.12]   Pt admitted to hospital with SOB, resp failure and fall      Pertinent Medical History:  has a past medical history of Atherosclerosis 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 ([facility]), Atrial fibrillation ([facility]), Chronic obstructive pulmonary disease ([facility]), COVID-19, Critical limb ischemia of left lower extremity with rest pain ([facility]), GI bleed, Hypertension, RUTH treated with BiPAP, Panic disorder, PVD (peripheral vascular disease) ([facility]), Severe protein-calorie malnutrition ([facility]), Thyroid disease, and Uncontrolled hypertension.       Precautions:  Fall Risk, O2, cognition, alarms+    Assessment of current deficits    [x] Functional mobility  [x]ADLs  [x] Strength               []Cognition    [x] Functional transfers   [x] IADLs         [x] Safety Awareness   [x]Endurance    [] Fine Coordination              [x] Balance      [] Vision/perception   []Sensation     []Gross Motor Coordination  [] ROM  [] Delirium                   [] Motor  noted during ADL tasks     Hearing: wfl  Sensation:wfl  Tone: wfl  Edema:none noted     Comments: Upon arrival patient supine in bed and agreeable to OT session.  Therapist educated pt on role of OT. Pt demonstrated transfers, seated ADLs requiring cues for safety, throughout. At end of session, patient semi-supine in bed with call light and phone within reach, all lines and tubes intact.  Overall patient demonstrated decreased independence and safety during completion of ADL/functional transfer/mobility tasks. Pt requesting additional time on commode-  agreeable to retrieve RN once patient is ready for transfer.  Pt would benefit from continued skilled OT to increase safety and independence with completion of ADL/IADL tasks for functional independence and quality of life.    Treatment:    Instruction/training on safety and adapted techniques for completion of ADLs: to increase independence and safety in self-care.   Instruction/training on safe bed mobility/functional mobility/transfer techniques: with focus on safety, body mechanics, and precautions   Proper Positioning/Alignment: for optimal healing, skin integrity, to prevent breakdown, decrease edema, and reduce risk of contracture.  Skilled Monitoring of Vitals: to include BP, spO2, and HR throughout session to maximize safety.  Sitting/Standing Balance/Tolerance: to increase balance and activity tolerance during ADLs and facilitate proper posture and positioning.  Delirium Prevention: Environmental and sensory modifications assessed and implemented to decrease ICU acquired delirium and to improve overall orientation, mentation and pt interaction with family/staff.    Fair progress toward set goals  Continue POC    Time In: 1111  Time Out: 1134  Total Treatment Time: 23 minutes    Min Units   OT Eval Low 68464     OT Eval Medium 56155      OT Eval High 41422      OT Re-Eval 41550       Therapeutic Ex 32162       Therapeutic Activities 21443 8 1

## 2024-12-31 NOTE — PLAN OF CARE
Problem: Safety - Adult  Goal: Free from fall injury  12/31/2024 0756 by Monica Rayo RN  Outcome: Progressing  12/30/2024 2030 by Vandana Saravia RN  Outcome: Progressing  Flowsheets (Taken 12/30/2024 2000)  Free From Fall Injury:   Instruct family/caregiver on patient safety   Based on caregiver fall risk screen, instruct family/caregiver to ask for assistance with transferring infant if caregiver noted to have fall risk factors     Problem: Chronic Conditions and Co-morbidities  Goal: Patient's chronic conditions and co-morbidity symptoms are monitored and maintained or improved  12/31/2024 0756 by Monica Rayo RN  Outcome: Progressing  12/30/2024 2030 by Vandana Saravia RN  Outcome: Progressing  Flowsheets (Taken 12/30/2024 2000)  Care Plan - Patient's Chronic Conditions and Co-Morbidity Symptoms are Monitored and Maintained or Improved:   Monitor and assess patient's chronic conditions and comorbid symptoms for stability, deterioration, or improvement   Collaborate with multidisciplinary team to address chronic and comorbid conditions and prevent exacerbation or deterioration     Problem: Discharge Planning  Goal: Discharge to home or other facility with appropriate resources  12/31/2024 0756 by Monica Rayo RN  Outcome: Progressing  12/30/2024 2030 by Vandana Saravia RN  Outcome: Progressing  Flowsheets (Taken 12/30/2024 2000)  Discharge to home or other facility with appropriate resources:   Identify barriers to discharge with patient and caregiver   Arrange for needed discharge resources and transportation as appropriate   Identify discharge learning needs (meds, wound care, etc)   Arrange for interpreters to assist at discharge as needed   Refer to discharge planning if patient needs post-hospital services based on physician order or complex needs related to functional status, cognitive ability or social support system     Problem: Pain  Goal: Verbalizes/displays adequate comfort

## 2024-12-31 NOTE — PROGRESS NOTES
12/31/24 0057   NIV Type   NIV Started/Stopped (S)  Off  (patient wanted off, explained to patient benefits of wearing, patient asked to go back on in a couple hours.)

## 2024-12-31 NOTE — PROGRESS NOTES
Please notify JEREMY when patient is able to go for test.  Will need to be NPO after midnight. LM with scheduling at [phone] at 11:54 AM to see about scheduling.

## 2024-12-31 NOTE — PROGRESS NOTES
[facility]  [address], [address]  [place] [address]      Pulmonary Progress Note      SUBJECTIVE:  brought out of ICU today, feeling stable with respiratory function, still with some dyspnea and minimal cough/sputum production, on 5 li NC saturatint 95-96%, wore NIV 12-25/8 OG=953 Fio2=40% on-off last night.    OBJECTIVE    Medications    Continuous Infusions:   sodium chloride      dextrose         Scheduled Meds:   methylPREDNISolone  40 mg IntraVENous Q12H    docusate sodium  100 mg Oral Daily    furosemide  40 mg Oral Daily    vancomycin  1,250 mg IntraVENous Q12H    senna  1 tablet Oral BID    melatonin  5 mg Oral Nightly    metoprolol  5 mg IntraVENous Once    enoxaparin  1 mg/kg SubCUTAneous BID    dilTIAZem  240 mg Oral Daily    arformoterol tartrate  15 mcg Nebulization BID RT    aspirin  81 mg Oral Daily    budesonide  1 mg Nebulization BID RT    clonazePAM  0.5 mg Oral Daily    DULoxetine  60 mg Oral Daily    folic acid  1 mg Oral Daily    methIMAzole  15 mg Oral Daily    pantoprazole  40 mg Oral QAM AC    pregabalin  75 mg Oral BID    sodium chloride flush  5-40 mL IntraVENous 2 times per day    ipratropium 0.5 mg-albuterol 2.5 mg  1 Dose Inhalation Q4H WA RT    guaiFENesin  400 mg Oral TID    cefepime  2,000 mg IntraVENous Q12H    lactobacillus  1 capsule Oral Daily    vitamin D  50,000 Units Oral Weekly       PRN Meds:bisacodyl, sodium chloride, diphenhydrAMINE, diphenoxylate-atropine, polyethylene glycol, sodium chloride flush, sodium chloride, ondansetron **OR** ondansetron, acetaminophen **OR** acetaminophen, melatonin, hydrALAZINE, calcium carbonate, Polyvinyl Alcohol-Povidone PF, sodium chloride, magnesium hydroxide, benzocaine-menthol, hydrOXYzine pamoate, benzonatate, glucose, dextrose bolus **OR** dextrose bolus, glucagon (rDNA), dextrose    Physical    VITALS:  BP (!) 140/68   Pulse 83   Temp 98.2 °F (36.8 °C) (Oral)

## 2024-12-31 NOTE — PROGRESS NOTES
[facility]  DESTINIIDA  Progress Note    SUBJECTIVE:  Chief Complaint   Patient presents with    Shortness of Breath     Patient r/o respiratory distress PTA via EMS. Pt normally on 4L NC at baseline. Arrived EMS via BiPap.     Transferred from MICU today.   Denies shortness of breath today      Review of systems:  As stated above in the chief complaint, otherwise negative.    Medications:  Scheduled Meds:   methylPREDNISolone  40 mg IntraVENous Q12H    docusate sodium  100 mg Oral Daily    furosemide  40 mg Oral Daily    vancomycin  1,250 mg IntraVENous Q12H    senna  1 tablet Oral BID    melatonin  5 mg Oral Nightly    metoprolol  5 mg IntraVENous Once    enoxaparin  1 mg/kg SubCUTAneous BID    dilTIAZem  240 mg Oral Daily    arformoterol tartrate  15 mcg Nebulization BID RT    aspirin  81 mg Oral Daily    budesonide  1 mg Nebulization BID RT    clonazePAM  0.5 mg Oral Daily    DULoxetine  60 mg Oral Daily    folic acid  1 mg Oral Daily    methIMAzole  15 mg Oral Daily    pantoprazole  40 mg Oral QAM AC    pregabalin  75 mg Oral BID    sodium chloride flush  5-40 mL IntraVENous 2 times per day    ipratropium 0.5 mg-albuterol 2.5 mg  1 Dose Inhalation Q4H WA RT    guaiFENesin  400 mg Oral TID    potassium chloride  20 mEq Oral Once    cefepime  2,000 mg IntraVENous Q12H    lactobacillus  1 capsule Oral Daily    vitamin D  50,000 Units Oral Weekly     Continuous Infusions:   sodium chloride      dextrose       PRN Meds:bisacodyl, sodium chloride, diphenhydrAMINE, diphenoxylate-atropine, polyethylene glycol, sodium chloride flush, sodium chloride, ondansetron **OR** ondansetron, acetaminophen **OR** acetaminophen, melatonin, hydrALAZINE, calcium carbonate, Polyvinyl Alcohol-Povidone PF, sodium chloride, magnesium hydroxide, benzocaine-menthol, hydrOXYzine pamoate, benzonatate, glucose, dextrose bolus **OR** dextrose bolus, glucagon (rDNA), dextrose    OBJECTIVE:  BP (!) 179/88   Pulse

## 2024-12-31 NOTE — PROGRESS NOTES
[facility] Hospitalist Progress Note    Admitting Date and Time: 12/25/2024  8:51 AM  Admit Dx: Peripheral vascular disease, unspecified (HCC) [I73.9]  Respiratory distress [R06.03]  DERIAN (acute kidney injury) (HCC) [N17.9]  Atherosclerosis of both carotid arteries [I65.23]  Fall, initial encounter [W19.XXXA]  Acute on chronic respiratory failure with hypoxia and hypercapnia [J96.21, J96.22]  Pneumonia due to infectious organism, unspecified laterality, unspecified part of lung [J18.9]  Acute hypoxic on chronic hypercapnic respiratory failure [J96.01, J96.12]    Synopsis:    Patient admitted on 12/25/2024 for Acute hypoxic on chronic hypercapnic respiratory failure     This is a 69-year-old lady with past medical history of A-fib, COPD, critical limb ischemia of left lower extremity, GI bleed, hypertension, RUTH treated with BiPAP, panic disorder, peripheral vascular disease, severe protein calorie malnutrition, hypothyroidism, hypertension.  Patient presented from [facility] with complaints of respiratory distress beginning earlier in the day.  Patient was reportedly hypoxic with SpO2 in the 70s on her baseline 4 L oxygen and was placed on BiPAP.  Patient has been having cough with intermittent shortness of breath over the past few days.  She denies any fever, chills, chest pain, palpitations or urinary symptoms.  She did also fell from her wheelchair and hit her head.  She is not on any blood thinners.  She did not lose consciousness.  No focal deficits or pain.  In the ED,  CT was tachycardic and hypoxic requiring BiPAP.  Lab work was significant for hypokalemia, DERIAN, lactic acidosis, elevated BNP, elevated troponin, leukocytosis.  Viral panel was negative.  Blood cultures obtained.  She was started on cefepime, vancomycin.  Also given 1 L bolus of fluid, DuoNeb in ED.  She was recently treated for altered mental status and UTI from 6/27 to 6/29.     12/26 blood culture came back positive for

## 2024-12-31 NOTE — PLAN OF CARE
Problem: Safety - Adult  Goal: Free from fall injury  12/30/2024 2030 by Vandana Saravia RN  Outcome: Progressing  Flowsheets (Taken 12/30/2024 2000)  Free From Fall Injury:   Instruct family/caregiver on patient safety   Based on caregiver fall risk screen, instruct family/caregiver to ask for assistance with transferring infant if caregiver noted to have fall risk factors  12/30/2024 0907 by Monica Rayo RN  Outcome: Progressing     Problem: Chronic Conditions and Co-morbidities  Goal: Patient's chronic conditions and co-morbidity symptoms are monitored and maintained or improved  12/30/2024 2030 by Vandana Saravia RN  Outcome: Progressing  Flowsheets (Taken 12/30/2024 2000)  Care Plan - Patient's Chronic Conditions and Co-Morbidity Symptoms are Monitored and Maintained or Improved:   Monitor and assess patient's chronic conditions and comorbid symptoms for stability, deterioration, or improvement   Collaborate with multidisciplinary team to address chronic and comorbid conditions and prevent exacerbation or deterioration  12/30/2024 0907 by Monica Rayo RN  Outcome: Progressing     Problem: Discharge Planning  Goal: Discharge to home or other facility with appropriate resources  12/30/2024 2030 by Vandana Saravia RN  Outcome: Progressing  Flowsheets (Taken 12/30/2024 2000)  Discharge to home or other facility with appropriate resources:   Identify barriers to discharge with patient and caregiver   Arrange for needed discharge resources and transportation as appropriate   Identify discharge learning needs (meds, wound care, etc)   Arrange for interpreters to assist at discharge as needed   Refer to discharge planning if patient needs post-hospital services based on physician order or complex needs related to functional status, cognitive ability or social support system  12/30/2024 0907 by Monica Rayo RN  Outcome: Progressing     Problem: Pain  Goal: Verbalizes/displays adequate comfort  level or baseline comfort level  12/30/2024 2030 by Vandana Saravia RN  Outcome: Progressing  12/30/2024 0907 by Monica Rayo RN  Outcome: Progressing  Flowsheets  Taken 12/30/2024 0400 by Brenda Carrasquillo RN  Verbalizes/displays adequate comfort level or baseline comfort level:   Encourage patient to monitor pain and request assistance   Assess pain using appropriate pain scale   Administer analgesics based on type and severity of pain and evaluate response   Implement non-pharmacological measures as appropriate and evaluate response  Taken 12/30/2024 0000 by Brenda Carrasquillo RN  Verbalizes/displays adequate comfort level or baseline comfort level:   Encourage patient to monitor pain and request assistance   Assess pain using appropriate pain scale   Administer analgesics based on type and severity of pain and evaluate response     Problem: Skin/Tissue Integrity  Goal: Absence of new skin breakdown  Description: 1.  Monitor for areas of redness and/or skin breakdown  2.  Assess vascular access sites hourly  3.  Every 4-6 hours minimum:  Change oxygen saturation probe site  4.  Every 4-6 hours:  If on nasal continuous positive airway pressure, respiratory therapy assess nares and determine need for appliance change or resting period.  12/30/2024 2030 by Vandana Saravia RN  Outcome: Progressing  12/30/2024 0907 by Monica Rayo RN  Outcome: Progressing     Problem: Risk for Elopement  Goal: Patient will not exit the unit/facility without proper excort  12/30/2024 2030 by Vandana Saravia RN  Outcome: Progressing  12/30/2024 0907 by Monica Rayo RN  Outcome: Progressing     Problem: Nutrition Deficit:  Goal: Optimize nutritional status  12/30/2024 2030 by Vandana Saravia RN  Outcome: Progressing  12/30/2024 0907 by Monica Rayo RN  Outcome: Progressing     Problem: ABCDS Injury Assessment  Goal: Absence of physical injury  12/30/2024 2030 by Vandana Saravia RN  Outcome: Progressing  Flowsheets

## 2024-12-31 NOTE — PLAN OF CARE
See Dr Yoo full consult from 12/27/24     Received message from nursing regarding when JEREMY would be done.      Per Dr Orosco pulmonology note today, patient is not stable for JEREMY      Message left with department.  If patient is stable, will be done on Jan 2 2025     REGI Dey  Cardiology [facility]   Electronically signed by REGI Dey on 12/31/2024 at 3:40 PM

## 2024-12-31 NOTE — PROGRESS NOTES
Critical Care Team - Daily Progress Note         Date and time: 2024 12:06 PM  Patient's name:  Lana Phillips  Medical Record Number: [id number]  Patient's account/billing number: [id number]  Patient's YOB: 1955  Age: 69 y.o.  Date of Admission: 2024  8:51 AM  Length of stay during current admission: 6      Primary Care Physician: Tal Humphrey DO  ICU Attending Physician:      Code Status: Full Code    Reason for ICU admission: acute on chronic hypoxic and hypercapnic respiratory failure       SUBJECTIVE:     OVERNIGHT EVENTS:         Off NIV this AM and on 5L  She is awake and talking  Looks much better  She had a small BM      CURRENT VENTILATION STATUS:     [] Ventilator  [] BIPAP  [x] Nasal Cannula [] Room Air      IF INTUBATED, ET TUBE MARKING AT LOWER LIP:       cms    SECRETIONS Amount:  [] Small [] Moderate  [] Large  [x] None  Color:     [] White [] Colored  [] Bloody    SEDATION:  RAAS Score:  [] Propofol gtt  [] Versed gtt  [] Ativan gtt   [x] No Sedation    PARALYZED:  [x] No    [] Yes      VASOPRESSORS:  [x] No    [] Yes    If yes -   [] Levophed       [] Dopamine     [] Vasopressin       [] Dobutamine  [] Phenylephrine         [] Epinephrine    CENTRAL LINES:     [x] No   [] Yes   (Date of Insertion:   )           If yes -     [] Right IJ     [] Left IJ [] Right Femoral [] Left Femoral                   [] Right Subclavian [] Left Subclavian       GUERRERO'S CATHETER:   [] No   [x] Yes  (Date of Insertion:   )     URINE OUTPUT:            [x] Good   [] Low              [] Anuric      OBJECTIVE:     VITAL SIGNS:  BP (!) 145/61   Pulse 89   Temp (!) 96.7 °F (35.9 °C) (Axillary)   Resp 18   Ht 1.727 m (5' 8\")   Wt 57.7 kg (127 lb 1.6 oz)   SpO2 97%   BMI 19.33 kg/m²   Tmax over 24 hours:  Temp (24hrs), Av.1 °F (36.2 °C), Min:96.7 °F (35.9 °C), Max:97.5 °F (36.4 °C)      Patient Vitals for the past 6 hrs:   BP Temp Temp src Pulse Resp SpO2   24    CALCIUM 8.5* 8.8 8.0*   BILITOT  --   --  <0.2   ALKPHOS  --   --  72   AST  --   --  20   ALT  --   --  17      Magnesium:   Lab Results   Component Value Date/Time    MG 1.7 12/30/2024 06:00 AM     Phosphorus:   Lab Results   Component Value Date/Time    PHOS 1.4 12/30/2024 06:00 AM     Ionized Calcium: No results found for: \"CAION\"     Urinalysis:     Troponin: No results for input(s): \"TROPONINI\" in the last 72 hours.        Radiology/Imaging:     Chest Xray (12/31/2024):  FINDINGS:  Increasing bilateral pleural effusions with stable right lower lobe opacity.  No pneumothorax.  The cardiomediastinal silhouette is without acute process.  The osseous structures are without acute process.     IMPRESSION:  Increasing bilateral pleural effusions with stable right lower lobe opacity.    Echo   Left Ventricle: Normal left ventricular systolic function with a visually estimated EF of 60 - 65%. Left ventricle size is normal. Normal wall thickness. Normal wall motion. Indeterminate diastolic function.    Right Ventricle: Right ventricle is moderately dilated. Normal systolic function. TAPSE is 1.9 cm.    Tricuspid Valve: Mild regurgitation. The estimated RVSP is 58 mmHg.    Left Atrium: Left atrium is moderately dilated.    Right Atrium: Right atrium is moderately dilated.    No valvular vegetations seen.  No echocardiographic evidence of endocarditis.    Consider JEREMY, if appropriate, if clinical suspicion remains high.    Image quality is suboptimal. Technically difficult study due to low parasternal window and procedure performed with the patient in a supine position.    ASSESSMENT:     Principal Problem:    Acute hypoxic on chronic hypercapnic respiratory failure  Active Problems:    PAF (paroxysmal atrial fibrillation) (HCC)    Severe protein-calorie malnutrition (HCC)    COPD exacerbation (HCC)    Respiratory distress    Atherosclerosis of both carotid arteries    Pneumonia of both lungs due to infectious

## 2024-12-31 NOTE — PROGRESS NOTES
Per Mary in scheduling, JEREMY placed on hold by Dr. Walsh on 12/30/24.  Relayed message to TL, Dion, on 64.

## 2024-12-31 NOTE — PROGRESS NOTES
Physical Therapy    Physical Therapy Treatment     Name: Lana Phillips  : 1955  MRN: [id number]      Date of Service: 2024    Evaluating PT:  Nico Marrufo PT, DPT [id number]    Room #:  6411/6411-B  Diagnosis:  Peripheral vascular disease, unspecified ([facility]) [I73.9]  Respiratory distress [R06.03]  DERIAN (acute kidney injury) ([facility]) [N17.9]  Atherosclerosis of both carotid arteries [I65.23]  Fall, initial encounter [W19.XXXA]  Acute on chronic respiratory failure with hypoxia and hypercapnia [J96.21, J96.22]  Pneumonia due to infectious organism, unspecified laterality, unspecified part of lung [J18.9]  Acute hypoxic on chronic hypercapnic respiratory failure [J96.01, J96.12]  PMHx/PSHx:    Past Medical History:   Diagnosis Date    Atherosclerosis 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 ([facility]) 2024    Atrial fibrillation ([facility])     Chronic obstructive pulmonary disease ([facility]) 12/15/2022    COVID-19 2022    Critical limb ischemia of left lower extremity with rest pain ([facility]) 2023    GI bleed 10/14/2023    Hypertension     RUTH treated with BiPAP     Panic disorder     PVD (peripheral vascular disease) ([facility]) 2024    Severe protein-calorie malnutrition ([facility]) 2023    Thyroid disease     Uncontrolled hypertension      Procedure/Surgery:  none  Precautions:  Falls, O2,    Equipment Needs:  TBD    SUBJECTIVE:    Pt was admitted from [facility].  Pt ambulated without device and was active with PT PTA.    OBJECTIVE:   Initial Evaluation  Date: 24 Treatment  24 Short Term/ Long Term   Goals   AM-PAC 6 Clicks     Was pt agreeable to Eval/treatment? Yes Yes    Does pt have pain? No c/o pain No c/o pain    Bed Mobility  Rolling: NT  Supine to sit: SBA  Sit to supine: SBA  Scooting: SBA Rolling: NT  Supine to sit: SBA  Sit to supine: SBA  Scooting: SBA Mod Independent   Transfers Sit to stand: Kizzy  Stand to sit: Kizzy  Stand

## 2024-12-31 NOTE — PROGRESS NOTES
monitor renal function.   Pharmacy to follow    Thank you for the consult,     Liberty Fuentes, PharmD  PGY1 Pharmacy Practice Resident [phone]  12/31/2024 2:53 PM

## 2024-12-31 NOTE — PROGRESS NOTES
4 Eyes Skin Assessment     NAME:  Lana Phillips  YOB: 1955  MEDICAL RECORD NUMBER:  [id number]    The patient is being assessed for  Transfer to New Unit    I agree that at least one RN has performed a thorough Head to Toe Skin Assessment on the patient. ALL assessment sites listed below have been assessed.      Areas assessed by both nurses:    Head, Face, Ears, Shoulders, Back, Chest, Arms, Elbows, Hands, Sacrum. Buttock, Coccyx, Ischium, Legs. Feet and Heels, and Under Medical Devices         Does the Patient have a Wound? No noted wound(s)       Guy Prevention initiated by RN: Yes  Wound Care Orders initiated by RN: No    Pressure Injury (Stage 3,4, Unstageable, DTI, NWPT, and Complex wounds) if present, place Wound referral order by RN under : No    New Ostomies, if present place, Ostomy referral order under : No     Nurse 1 eSignature: Electronically signed by Kevin Camacho RN on 12/31/24 at 3:30 PM EST    **SHARE this note so that the co-signing nurse can place an eSignature**    Nurse 2 eSignature: {Esignature:[id number]}

## 2024-12-31 NOTE — PROGRESS NOTES
Date: 12/31/2024    Time: 3:59 AM    Patient Placed On BIPAP/CPAP/ Non-Invasive Ventilation?  Yes    If no must comment.  Facial area red/color change? No           If YES are Blister/Lesion present?No   If yes must notify nursing staff        Comments:        Elaina Valenzuela RCP       12/31/24 0358   NIV Type   NIV Started/Stopped (S)  On   Equipment Type v60   Mode AVAPS   Mask Type Full face mask   Mask Size Small   Assessment   Pulse 83   Respirations 18   SpO2 97 %   Level of Consciousness 0   Comfort Level Good   Using Accessory Muscles No   Mask Compliance Good   Skin Assessment Clean, dry, & intact   Settings/Measurements   PIP Observed 16 cm H20   CPAP/EPAP 8 cmH2O   IPAP Min 15 cmH2O   IPAP Max 25 cmH2O   Vt (Set, mL) 450 mL   Vt (Measured) 562 mL   Rate Ordered 16   Insp Rise Time (%) 3 %   FiO2  50 %   I Time/ I Time % 0.8 s   Minute Volume (L/min) 8.2 Liters   Mask Leak (lpm) 53 lpm   Patient's Home Machine No   Electrical Safety Check Performed Yes

## 2025-01-01 ENCOUNTER — TELEPHONE (OUTPATIENT)
Dept: INFUSION THERAPY | Age: 70
End: 2025-01-01

## 2025-01-01 LAB
DATE LAST DOSE: ABNORMAL
MICROORGANISM SPEC CULT: NORMAL
MICROORGANISM SPEC CULT: NORMAL
SERVICE CMNT-IMP: NORMAL
SERVICE CMNT-IMP: NORMAL
SPECIMEN DESCRIPTION: NORMAL
SPECIMEN DESCRIPTION: NORMAL
TME LAST DOSE: ABNORMAL H
VANCOMYCIN DOSE: ABNORMAL MG
VANCOMYCIN TROUGH SERPL-MCNC: 17 UG/ML (ref 5–16)

## 2025-01-01 PROCEDURE — 2140000000 HC CCU INTERMEDIATE R&B

## 2025-01-01 PROCEDURE — 2500000003 HC RX 250 WO HCPCS: Performed by: INTERNAL MEDICINE

## 2025-01-01 PROCEDURE — 36415 COLL VENOUS BLD VENIPUNCTURE: CPT

## 2025-01-01 PROCEDURE — 2700000000 HC OXYGEN THERAPY PER DAY

## 2025-01-01 PROCEDURE — 99232 SBSQ HOSP IP/OBS MODERATE 35: CPT | Performed by: STUDENT IN AN ORGANIZED HEALTH CARE EDUCATION/TRAINING PROGRAM

## 2025-01-01 PROCEDURE — 2580000003 HC RX 258: Performed by: NURSE PRACTITIONER

## 2025-01-01 PROCEDURE — 94660 CPAP INITIATION&MGMT: CPT

## 2025-01-01 PROCEDURE — 6360000002 HC RX W HCPCS: Performed by: NURSE PRACTITIONER

## 2025-01-01 PROCEDURE — 6370000000 HC RX 637 (ALT 250 FOR IP): Performed by: NURSE PRACTITIONER

## 2025-01-01 PROCEDURE — 80202 ASSAY OF VANCOMYCIN: CPT

## 2025-01-01 PROCEDURE — 6360000002 HC RX W HCPCS: Performed by: INTERNAL MEDICINE

## 2025-01-01 PROCEDURE — 94640 AIRWAY INHALATION TREATMENT: CPT

## 2025-01-01 PROCEDURE — 2500000003 HC RX 250 WO HCPCS: Performed by: NURSE PRACTITIONER

## 2025-01-01 PROCEDURE — 6370000000 HC RX 637 (ALT 250 FOR IP): Performed by: INTERNAL MEDICINE

## 2025-01-01 PROCEDURE — 6370000000 HC RX 637 (ALT 250 FOR IP): Performed by: PHYSICIAN ASSISTANT

## 2025-01-01 RX ORDER — PREDNISONE 20 MG/1
40 TABLET ORAL DAILY
Status: DISCONTINUED | OUTPATIENT
Start: 2025-01-02 | End: 2025-01-01

## 2025-01-01 RX ADMIN — SODIUM CHLORIDE, PRESERVATIVE FREE 10 ML: 5 INJECTION INTRAVENOUS at 09:29

## 2025-01-01 RX ADMIN — IPRATROPIUM BROMIDE AND ALBUTEROL SULFATE 1 DOSE: 2.5; .5 SOLUTION RESPIRATORY (INHALATION) at 11:29

## 2025-01-01 RX ADMIN — ARFORMOTEROL TARTRATE 15 MCG: 15 SOLUTION RESPIRATORY (INHALATION) at 19:25

## 2025-01-01 RX ADMIN — SODIUM CHLORIDE, PRESERVATIVE FREE 10 ML: 5 INJECTION INTRAVENOUS at 20:58

## 2025-01-01 RX ADMIN — CLONAZEPAM 0.5 MG: 0.5 TABLET ORAL at 09:30

## 2025-01-01 RX ADMIN — GUAIFENESIN 400 MG: 400 TABLET ORAL at 20:58

## 2025-01-01 RX ADMIN — METHIMAZOLE 15 MG: 5 TABLET ORAL at 09:38

## 2025-01-01 RX ADMIN — FUROSEMIDE 40 MG: 40 TABLET ORAL at 09:29

## 2025-01-01 RX ADMIN — VANCOMYCIN HYDROCHLORIDE 1250 MG: 1.25 INJECTION, POWDER, LYOPHILIZED, FOR SOLUTION INTRAVENOUS at 06:29

## 2025-01-01 RX ADMIN — GUAIFENESIN 400 MG: 400 TABLET ORAL at 09:32

## 2025-01-01 RX ADMIN — ENOXAPARIN SODIUM 60 MG: 100 INJECTION SUBCUTANEOUS at 20:58

## 2025-01-01 RX ADMIN — BUDESONIDE INHALATION 1000 MCG: 0.5 SUSPENSION RESPIRATORY (INHALATION) at 19:25

## 2025-01-01 RX ADMIN — PREGABALIN 75 MG: 25 CAPSULE ORAL at 09:31

## 2025-01-01 RX ADMIN — CEFEPIME 2000 MG: 2 INJECTION, POWDER, FOR SOLUTION INTRAVENOUS at 09:37

## 2025-01-01 RX ADMIN — HYDROXYZINE PAMOATE 25 MG: 25 CAPSULE ORAL at 20:58

## 2025-01-01 RX ADMIN — STANDARDIZED SENNA CONCENTRATE 8.6 MG: 8.6 TABLET ORAL at 09:32

## 2025-01-01 RX ADMIN — DULOXETINE HYDROCHLORIDE 60 MG: 60 CAPSULE, DELAYED RELEASE ORAL at 09:32

## 2025-01-01 RX ADMIN — WATER 40 MG: 1 INJECTION INTRAMUSCULAR; INTRAVENOUS; SUBCUTANEOUS at 20:58

## 2025-01-01 RX ADMIN — GUAIFENESIN 400 MG: 400 TABLET ORAL at 13:31

## 2025-01-01 RX ADMIN — Medication 5 MG: at 20:57

## 2025-01-01 RX ADMIN — IPRATROPIUM BROMIDE AND ALBUTEROL SULFATE 1 DOSE: 2.5; .5 SOLUTION RESPIRATORY (INHALATION) at 19:25

## 2025-01-01 RX ADMIN — STANDARDIZED SENNA CONCENTRATE 8.6 MG: 8.6 TABLET ORAL at 20:58

## 2025-01-01 RX ADMIN — VANCOMYCIN HYDROCHLORIDE 1250 MG: 1.25 INJECTION, POWDER, LYOPHILIZED, FOR SOLUTION INTRAVENOUS at 18:59

## 2025-01-01 RX ADMIN — IPRATROPIUM BROMIDE AND ALBUTEROL SULFATE 1 DOSE: 2.5; .5 SOLUTION RESPIRATORY (INHALATION) at 08:17

## 2025-01-01 RX ADMIN — Medication 1 CAPSULE: at 09:30

## 2025-01-01 RX ADMIN — PANTOPRAZOLE SODIUM 40 MG: 40 TABLET, DELAYED RELEASE ORAL at 06:25

## 2025-01-01 RX ADMIN — ERGOCALCIFEROL 50000 UNITS: 1.25 CAPSULE ORAL at 09:31

## 2025-01-01 RX ADMIN — IPRATROPIUM BROMIDE AND ALBUTEROL SULFATE 1 DOSE: 2.5; .5 SOLUTION RESPIRATORY (INHALATION) at 16:21

## 2025-01-01 RX ADMIN — BUDESONIDE INHALATION 1000 MCG: 0.5 SUSPENSION RESPIRATORY (INHALATION) at 08:17

## 2025-01-01 RX ADMIN — ACETAMINOPHEN 650 MG: 325 TABLET ORAL at 18:36

## 2025-01-01 RX ADMIN — ASPIRIN 81 MG: 81 TABLET, COATED ORAL at 09:32

## 2025-01-01 RX ADMIN — ARFORMOTEROL TARTRATE 15 MCG: 15 SOLUTION RESPIRATORY (INHALATION) at 08:17

## 2025-01-01 RX ADMIN — PREGABALIN 75 MG: 25 CAPSULE ORAL at 20:58

## 2025-01-01 RX ADMIN — DOCUSATE SODIUM 100 MG: 100 CAPSULE, LIQUID FILLED ORAL at 09:32

## 2025-01-01 RX ADMIN — WATER 40 MG: 1 INJECTION INTRAMUSCULAR; INTRAVENOUS; SUBCUTANEOUS at 09:28

## 2025-01-01 RX ADMIN — FOLIC ACID 1 MG: 1 TABLET ORAL at 09:30

## 2025-01-01 RX ADMIN — ENOXAPARIN SODIUM 60 MG: 100 INJECTION SUBCUTANEOUS at 09:28

## 2025-01-01 RX ADMIN — DILTIAZEM HYDROCHLORIDE 240 MG: 120 CAPSULE, COATED, EXTENDED RELEASE ORAL at 09:29

## 2025-01-01 ASSESSMENT — PAIN DESCRIPTION - LOCATION: LOCATION: BACK

## 2025-01-01 ASSESSMENT — PAIN SCALES - GENERAL: PAINLEVEL_OUTOF10: 3

## 2025-01-01 NOTE — PROGRESS NOTES
[facility]  DESTINIIDA  Progress Note    SUBJECTIVE:  Chief Complaint   Patient presents with    Shortness of Breath     Patient r/o respiratory distress PTA via EMS. Pt normally on 4L NC at baseline. Arrived EMS via BiPap.     She reports feeling better, less cough.   Denies shortness of breath today      Review of systems:  As stated above in the chief complaint, otherwise negative.    Medications:  Scheduled Meds:   methylPREDNISolone  40 mg IntraVENous Q12H    docusate sodium  100 mg Oral Daily    furosemide  40 mg Oral Daily    vancomycin  1,250 mg IntraVENous Q12H    senna  1 tablet Oral BID    melatonin  5 mg Oral Nightly    metoprolol  5 mg IntraVENous Once    enoxaparin  1 mg/kg SubCUTAneous BID    dilTIAZem  240 mg Oral Daily    arformoterol tartrate  15 mcg Nebulization BID RT    aspirin  81 mg Oral Daily    budesonide  1 mg Nebulization BID RT    clonazePAM  0.5 mg Oral Daily    DULoxetine  60 mg Oral Daily    folic acid  1 mg Oral Daily    methIMAzole  15 mg Oral Daily    pantoprazole  40 mg Oral QAM AC    pregabalin  75 mg Oral BID    sodium chloride flush  5-40 mL IntraVENous 2 times per day    ipratropium 0.5 mg-albuterol 2.5 mg  1 Dose Inhalation Q4H WA RT    guaiFENesin  400 mg Oral TID    lactobacillus  1 capsule Oral Daily    vitamin D  50,000 Units Oral Weekly     Continuous Infusions:   sodium chloride      dextrose       PRN Meds:bisacodyl, sodium chloride, diphenhydrAMINE, diphenoxylate-atropine, polyethylene glycol, sodium chloride flush, sodium chloride, ondansetron **OR** ondansetron, acetaminophen **OR** acetaminophen, melatonin, hydrALAZINE, calcium carbonate, Polyvinyl Alcohol-Povidone PF, sodium chloride, magnesium hydroxide, benzocaine-menthol, hydrOXYzine pamoate, benzonatate, glucose, dextrose bolus **OR** dextrose bolus, glucagon (rDNA), dextrose    OBJECTIVE:  BP (!) 172/84   Pulse 98   Temp 97.6 °F (36.4 °C) (Axillary)   Resp 21   Ht 1.727 m  fracture of the anterior aspect  of L1.  3. No CT evidence of acute intra-abdominal or pelvic abnormality.  4. Unchanged right nephrolithiasis.  5. Small to moderate sliding-type hiatal hernia     Microbiology  Blood cultures 12/25/24 no growth 5 days  Blood cultures 12/25/2024 Enterococcus faecium  Urine culture 12/26/2024 no growth  Blood culture 12/26/2024 no growth 4 days    IMPRESSION:      Hypercapnic respiratory failure   Pneumonia. Finished 7 days of cefepime on 01/01.  COPD  Enterococcus bacteremia ( pt stated colonoscopy was unremarkable 2 weeks ago at [facility] ) Repeat blood cultures on 12/26 and on 12/27 showed no growth.  Leukocytosis, on steroids        RECOMMENDATIONS:       Vancomycin 750 mg IV q 12 hrs  ECHO suboptimal will need JEREMY before patient can be discharged  3. ID will continue to follow     Yolanda Mccloud MD  2:13 PM  1/1/2025

## 2025-01-01 NOTE — PROGRESS NOTES
[facility] Hospitalist Progress Note    Admitting Date and Time: 12/25/2024  8:51 AM  Admit Dx: Peripheral vascular disease, unspecified (HCC) [I73.9]  Respiratory distress [R06.03]  DERIAN (acute kidney injury) (HCC) [N17.9]  Atherosclerosis of both carotid arteries [I65.23]  Fall, initial encounter [W19.XXXA]  Acute on chronic respiratory failure with hypoxia and hypercapnia [J96.21, J96.22]  Pneumonia due to infectious organism, unspecified laterality, unspecified part of lung [J18.9]  Acute hypoxic on chronic hypercapnic respiratory failure [J96.01, J96.12]    Synopsis:    Patient admitted on 12/25/2024 for Acute hypoxic on chronic hypercapnic respiratory failure     This is a 69-year-old lady with past medical history of A-fib, COPD, critical limb ischemia of left lower extremity, GI bleed, hypertension, RUTH treated with BiPAP, panic disorder, peripheral vascular disease, severe protein calorie malnutrition, hypothyroidism, hypertension.  Patient presented from [facility] with complaints of respiratory distress beginning earlier in the day.  Patient was reportedly hypoxic with SpO2 in the 70s on her baseline 4 L oxygen and was placed on BiPAP.  Patient has been having cough with intermittent shortness of breath over the past few days.  She denies any fever, chills, chest pain, palpitations or urinary symptoms.  She did also fell from her wheelchair and hit her head.  She is not on any blood thinners.  She did not lose consciousness.  No focal deficits or pain.  In the ED,  CT was tachycardic and hypoxic requiring BiPAP.  Lab work was significant for hypokalemia, DERIAN, lactic acidosis, elevated BNP, elevated troponin, leukocytosis.  Viral panel was negative.  Blood cultures obtained.  She was started on cefepime, vancomycin.  Also given 1 L bolus of fluid, DuoNeb in ED.  She was recently treated for altered mental status and UTI from 6/27 to 6/29.     12/26 blood culture came back positive for  sliding-type hiatal hernia  Follow cultures  Cardiology peripherally following:  Continue increased dose of Cardizem 240 mg daily  Now on amiodarone drip  Patient scheduled for outpatient Watchman procedure in the near future, continue aspirin for now  Outpatient follow-up  Continue medications including Klonopin, Cymbalta, folic acid, guaifenesin, methimazole, PPI, Lyrica, vitamins  Sitter at bedside to ensure compliance with NIV      DC planning: [facility] pending improvement in O2 saturation/requirements, transesophageal echocardiogram    NOTE: This report was transcribed using voice recognition software. Every effort was made to ensure accuracy; however, inadvertent computerized transcription errors may be present.    Electronically signed by Ray Smiley MD on 1/1/2025 at 2:51 PM

## 2025-01-01 NOTE — PLAN OF CARE
Problem: Safety - Adult  Goal: Free from fall injury  Outcome: Progressing     Problem: Chronic Conditions and Co-morbidities  Goal: Patient's chronic conditions and co-morbidity symptoms are monitored and maintained or improved  Outcome: Progressing     Problem: Discharge Planning  Goal: Discharge to home or other facility with appropriate resources  Outcome: Progressing     Problem: Pain  Goal: Verbalizes/displays adequate comfort level or baseline comfort level  Outcome: Progressing     Problem: Skin/Tissue Integrity  Goal: Absence of new skin breakdown  Description: 1.  Monitor for areas of redness and/or skin breakdown  2.  Assess vascular access sites hourly  3.  Every 4-6 hours minimum:  Change oxygen saturation probe site  4.  Every 4-6 hours:  If on nasal continuous positive airway pressure, respiratory therapy assess nares and determine need for appliance change or resting period.  Outcome: Progressing     Problem: Risk for Elopement  Goal: Patient will not exit the unit/facility without proper excort  Outcome: Progressing     Problem: Nutrition Deficit:  Goal: Optimize nutritional status  Outcome: Progressing     Problem: ABCDS Injury Assessment  Goal: Absence of physical injury  Outcome: Progressing

## 2025-01-01 NOTE — PROGRESS NOTES
Pharmacy Consultation Note  (Antibiotic Dosing and Monitoring)    Initial consult date: 12/25/2024  Consulting physician/provider: Elida Moore APRN - NP.   Drug: Vancomycin  Indication: Pneumonia (CAP).     Age/  Gender Height Weight IBW  Allergy Information   69 y.o./female 172.7 cm (5' 8\")172.7 cm 51.8 kg (114 lb 3.2 oz) 53.6 kg    Ideal body weight: 63.9 kg (140 lb 14 oz)   Pcn [penicillins]      Renal Function:  Recent Labs     12/29/24  1127 12/30/24  0600 12/31/24  1153   BUN 10 14 13   CREATININE 0.5 0.5 0.6     Vancomycin Administration Times:  Recent vancomycin administrations                     vancomycin (VANCOCIN) 1,250 mg in sodium chloride 0.9 % 250 mL IVPB ([id number]) (mg) 1,250 mg New Bag 01/01/25 0629     1,250 mg New Bag 12/31/24 1754     1,250 mg New Bag  0533     1,250 mg New Bag 12/30/24 1804    vancomycin (VANCOCIN) 1,000 mg in sodium chloride 0.9 % 250 mL IVPB ([id number]) (mg) 1,000 mg New Bag 12/30/24 0841     1,000 mg New Bag 12/29/24 2102      Restarted  1001     1,000 mg New Bag  0928                  Assessment:  Patient is a 69 y.o. female who has been initiated on vancomycin for nosocomial pneumonia.   Estimated Creatinine Clearance: 85 mL/min (based on SCr of 0.6 mg/dL).  To dose vancomycin, pharmacy will be dosing based off of levels because of patient's renal impairment/insufficiency.   Scr 1.3mg/dL, baseline 0.7mg/dL on 12/6/2024.   12/26: Scr 0.7. WBC 17.5. Random level = <4 mcg/mL.   1/4 blood culture growing GPC in pairs and chains  12/27:  Scr 0.6.  Awaiting susceptibilities for blood cultures.  12/28: Scr 0.6. Blood cx growing enterococcus faecium. Random level this AM = 9.4 mcg/mL (~9 hours post-dose)  12/29: Scr 0.5, UOP incomplete.   12/30: Scr 0.5, UOP 2.7 mL/kg/hr, vancomycin level is 13.2 mcg/mL (~8 hours post dose )  12/31: Scr 0.6, UOP 1.9 mL/kg/hr  1/1: no new labs, afebrile, UOP unmeasured x2    Plan:  Continue vancomycin 1250 mg IV every 12 hours  Trough at

## 2025-01-01 NOTE — PROGRESS NOTES
[facility]  [address], [address]  [place] [address]      Pulmonary Progress Note      SUBJECTIVE:  claims doing some better with breathing with minimal sputum production, wore NIV 12-25/8 DG=684 Fio2=40% last night and just put it on this afternoon to \"expand her lung\", wasn't feeling worse with breathing prior to putting it back on.    OBJECTIVE    Medications    Continuous Infusions:   sodium chloride      dextrose         Scheduled Meds:   methylPREDNISolone  40 mg IntraVENous Q12H    docusate sodium  100 mg Oral Daily    furosemide  40 mg Oral Daily    vancomycin  1,250 mg IntraVENous Q12H    senna  1 tablet Oral BID    melatonin  5 mg Oral Nightly    metoprolol  5 mg IntraVENous Once    enoxaparin  1 mg/kg SubCUTAneous BID    dilTIAZem  240 mg Oral Daily    arformoterol tartrate  15 mcg Nebulization BID RT    aspirin  81 mg Oral Daily    budesonide  1 mg Nebulization BID RT    clonazePAM  0.5 mg Oral Daily    DULoxetine  60 mg Oral Daily    folic acid  1 mg Oral Daily    methIMAzole  15 mg Oral Daily    pantoprazole  40 mg Oral QAM AC    pregabalin  75 mg Oral BID    sodium chloride flush  5-40 mL IntraVENous 2 times per day    ipratropium 0.5 mg-albuterol 2.5 mg  1 Dose Inhalation Q4H WA RT    guaiFENesin  400 mg Oral TID    lactobacillus  1 capsule Oral Daily    vitamin D  50,000 Units Oral Weekly       PRN Meds:bisacodyl, sodium chloride, diphenhydrAMINE, diphenoxylate-atropine, polyethylene glycol, sodium chloride flush, sodium chloride, ondansetron **OR** ondansetron, acetaminophen **OR** acetaminophen, melatonin, hydrALAZINE, calcium carbonate, Polyvinyl Alcohol-Povidone PF, sodium chloride, magnesium hydroxide, benzocaine-menthol, hydrOXYzine pamoate, benzonatate, glucose, dextrose bolus **OR** dextrose bolus, glucagon (rDNA), dextrose    Physical    VITALS:  BP (!) 172/84   Pulse 98   Temp 97.6 °F (36.4 °C) (Axillary)   Resp  Bacteremia    Sepsis (HCC)  Resolved Problems:    * No resolved hospital problems. *      Plan:       Cont with steroids, taper as tolerated  Cont with nebs, observe respiratory function  Cont with antibiotics as per ID  Plans for JEREMY, will need to use NIV for recovery, watch oxygenation, mod-high risk for procedure due to current respiratory function  Cont with oxygen, taper as tolerated, usually on 4 li NC  Cont with NIV qhs and prn for sob/lethargy  OOB to chair, ambulate as tolerated      Time at the bedside, reviewing labs and radiographs, reviewing notes and consultations, discussing with staff and family was more than 50 minutes.      Thanks for letting us see this patient in consultation.  Please contact us with any questions. Office [phone] or after hours through [employer], x [phone].

## 2025-01-01 NOTE — PROGRESS NOTES
12/31/24 2244   NIV Type   NIV Started/Stopped On   Equipment Type v60   Mode AVAPS   Mask Type Full face mask   Mask Size Small   Assessment   SpO2 96 %   Level of Consciousness 0   Comfort Level Good   Using Accessory Muscles No   Mask Compliance Good   Skin Assessment Clean, dry, & intact   Skin Protection for O2 Device Yes   Orientation Middle   Location Nose   Intervention(s) Skin Barrier   Settings/Measurements   CPAP/EPAP 8 cmH2O   IPAP Min 12 cmH2O   IPAP Max 25 cmH2O   Vt (Set, mL) 400 mL   Vt (Measured) 463 mL   Rate Ordered 16   FiO2  40 %   I Time/ I Time % 0.8 s   Minute Volume (L/min) 12 Liters   Mask Leak (lpm) 55 lpm   Patient's Home Machine No   Alarm Settings   Alarms On Y   Low Pressure (cmH2O) 8 cmH2O   High Pressure (cmH2O) 30 cmH2O   RR Low (bpm) 15   RR High (bpm) 35 br/min     Date: 12/31/2024    Time: 10:47 PM    Patient Placed On BIPAP/CPAP/ Non-Invasive Ventilation?  Yes    If no must comment.  Facial area red/color change? No           If YES are Blister/Lesion present?No   If yes must notify nursing staff  BIPAP/CPAP skin barrier?  Yes    Skin barrier type:mepilexlite       Comments:        Hayde Medellin RCP

## 2025-01-02 ENCOUNTER — HOSPITAL ENCOUNTER (INPATIENT)
Age: 70
Discharge: HOME | End: 2025-01-04
Payer: MEDICARE

## 2025-01-02 ENCOUNTER — APPOINTMENT (OUTPATIENT)
Dept: GENERAL RADIOLOGY | Age: 70
End: 2025-01-02
Payer: MEDICARE

## 2025-01-02 ENCOUNTER — ANESTHESIA (OUTPATIENT)
Age: 70
End: 2025-01-02
Payer: MEDICARE

## 2025-01-02 ENCOUNTER — ANESTHESIA EVENT (OUTPATIENT)
Age: 70
End: 2025-01-02
Payer: MEDICARE

## 2025-01-02 ENCOUNTER — HOSPITAL ENCOUNTER (OUTPATIENT)
Dept: INFUSION THERAPY | Age: 70
Discharge: HOME | End: 2025-01-02

## 2025-01-02 VITALS
HEIGHT: 68 IN | DIASTOLIC BLOOD PRESSURE: 66 MMHG | TEMPERATURE: 98.3 F | BODY MASS INDEX: 19.9 KG/M2 | SYSTOLIC BLOOD PRESSURE: 134 MMHG | OXYGEN SATURATION: 100 % | RESPIRATION RATE: 14 BRPM | WEIGHT: 131.3 LBS | HEART RATE: 89 BPM

## 2025-01-02 VITALS
HEART RATE: 77 BPM | RESPIRATION RATE: 18 BRPM | SYSTOLIC BLOOD PRESSURE: 152 MMHG | OXYGEN SATURATION: 95 % | TEMPERATURE: 97.5 F | DIASTOLIC BLOOD PRESSURE: 83 MMHG

## 2025-01-02 LAB
ANION GAP SERPL CALCULATED.3IONS-SCNC: 9 MMOL/L (ref 7–16)
BASOPHILS # BLD: 0 K/UL (ref 0–0.2)
BASOPHILS NFR BLD: 0 % (ref 0–2)
BUN SERPL-MCNC: 15 MG/DL (ref 6–23)
CALCIUM SERPL-MCNC: 9 MG/DL (ref 8.6–10.2)
CHLORIDE SERPL-SCNC: 91 MMOL/L (ref 98–107)
CO2 SERPL-SCNC: 37 MMOL/L (ref 22–29)
CREAT SERPL-MCNC: 0.5 MG/DL (ref 0.5–1)
ECHO LV EF PHYSICIAN: 60 %
EKG ATRIAL RATE: 150 BPM
EKG ATRIAL RATE: 192 BPM
EKG ATRIAL RATE: 95 BPM
EKG P AXIS: 89 DEGREES
EKG P-R INTERVAL: 132 MS
EKG Q-T INTERVAL: 270 MS
EKG Q-T INTERVAL: 322 MS
EKG Q-T INTERVAL: 358 MS
EKG QRS DURATION: 70 MS
EKG QRS DURATION: 72 MS
EKG QRS DURATION: 76 MS
EKG QTC CALCULATION (BAZETT): 449 MS
EKG QTC CALCULATION (BAZETT): 450 MS
EKG QTC CALCULATION (BAZETT): 484 MS
EKG R AXIS: 60 DEGREES
EKG R AXIS: 61 DEGREES
EKG R AXIS: 65 DEGREES
EKG T AXIS: 114 DEGREES
EKG T AXIS: 69 DEGREES
EKG T AXIS: 71 DEGREES
EKG VENTRICULAR RATE: 136 BPM
EKG VENTRICULAR RATE: 167 BPM
EKG VENTRICULAR RATE: 95 BPM
EOSINOPHIL # BLD: 0 K/UL (ref 0.05–0.5)
EOSINOPHILS RELATIVE PERCENT: 0 % (ref 0–6)
ERYTHROCYTE [DISTWIDTH] IN BLOOD BY AUTOMATED COUNT: 15.9 % (ref 11.5–15)
GFR, ESTIMATED: >90 ML/MIN/1.73M2
GLUCOSE SERPL-MCNC: 136 MG/DL (ref 74–99)
HCT VFR BLD AUTO: 31.3 % (ref 34–48)
HGB BLD-MCNC: 9.7 G/DL (ref 11.5–15.5)
LYMPHOCYTES NFR BLD: 0.76 K/UL (ref 1.5–4)
LYMPHOCYTES RELATIVE PERCENT: 4 % (ref 20–42)
MCH RBC QN AUTO: 30 PG (ref 26–35)
MCHC RBC AUTO-ENTMCNC: 31 G/DL (ref 32–34.5)
MCV RBC AUTO: 96.9 FL (ref 80–99.9)
METAMYELOCYTES ABSOLUTE COUNT: 0.15 K/UL (ref 0–0.12)
METAMYELOCYTES: 1 % (ref 0–1)
MONOCYTES NFR BLD: 0.46 K/UL (ref 0.1–0.95)
MONOCYTES NFR BLD: 3 % (ref 2–12)
NEUTROPHILS NFR BLD: 92 % (ref 43–80)
NEUTS SEG NFR BLD: 16.13 K/UL (ref 1.8–7.3)
PLATELET # BLD AUTO: 404 K/UL (ref 130–450)
PMV BLD AUTO: 9.7 FL (ref 7–12)
POTASSIUM SERPL-SCNC: 4.3 MMOL/L (ref 3.5–5)
RBC # BLD AUTO: 3.23 M/UL (ref 3.5–5.5)
RBC # BLD: ABNORMAL 10*6/UL
SODIUM SERPL-SCNC: 137 MMOL/L (ref 132–146)
WBC OTHER # BLD: 17.5 K/UL (ref 4.5–11.5)

## 2025-01-02 PROCEDURE — 6360000002 HC RX W HCPCS: Performed by: NURSE PRACTITIONER

## 2025-01-02 PROCEDURE — 80048 BASIC METABOLIC PNL TOTAL CA: CPT

## 2025-01-02 PROCEDURE — 99239 HOSP IP/OBS DSCHRG MGMT >30: CPT | Performed by: STUDENT IN AN ORGANIZED HEALTH CARE EDUCATION/TRAINING PROGRAM

## 2025-01-02 PROCEDURE — 36415 COLL VENOUS BLD VENIPUNCTURE: CPT

## 2025-01-02 PROCEDURE — 6370000000 HC RX 637 (ALT 250 FOR IP): Performed by: INTERNAL MEDICINE

## 2025-01-02 PROCEDURE — 6370000000 HC RX 637 (ALT 250 FOR IP): Performed by: PHYSICIAN ASSISTANT

## 2025-01-02 PROCEDURE — 94640 AIRWAY INHALATION TREATMENT: CPT

## 2025-01-02 PROCEDURE — 85025 COMPLETE CBC W/AUTO DIFF WBC: CPT

## 2025-01-02 PROCEDURE — 2500000003 HC RX 250 WO HCPCS: Performed by: INTERNAL MEDICINE

## 2025-01-02 PROCEDURE — 6360000002 HC RX W HCPCS: Performed by: INTERNAL MEDICINE

## 2025-01-02 PROCEDURE — 6360000002 HC RX W HCPCS: Performed by: NURSE ANESTHETIST, CERTIFIED REGISTERED

## 2025-01-02 PROCEDURE — 2140000000 HC CCU INTERMEDIATE R&B

## 2025-01-02 PROCEDURE — 93320 DOPPLER ECHO COMPLETE: CPT | Performed by: INTERNAL MEDICINE

## 2025-01-02 PROCEDURE — 2700000000 HC OXYGEN THERAPY PER DAY

## 2025-01-02 PROCEDURE — 7100000010 HC PHASE II RECOVERY - FIRST 15 MIN: Performed by: INTERNAL MEDICINE

## 2025-01-02 PROCEDURE — 3700000000 HC ANESTHESIA ATTENDED CARE: Performed by: INTERNAL MEDICINE

## 2025-01-02 PROCEDURE — 2580000003 HC RX 258: Performed by: NURSE PRACTITIONER

## 2025-01-02 PROCEDURE — 6370000000 HC RX 637 (ALT 250 FOR IP): Performed by: NURSE PRACTITIONER

## 2025-01-02 PROCEDURE — 93312 ECHO TRANSESOPHAGEAL: CPT | Performed by: INTERNAL MEDICINE

## 2025-01-02 PROCEDURE — 2500000003 HC RX 250 WO HCPCS: Performed by: NURSE PRACTITIONER

## 2025-01-02 PROCEDURE — 93320 DOPPLER ECHO COMPLETE: CPT

## 2025-01-02 PROCEDURE — 71045 X-RAY EXAM CHEST 1 VIEW: CPT

## 2025-01-02 PROCEDURE — 2580000003 HC RX 258: Performed by: NURSE ANESTHETIST, CERTIFIED REGISTERED

## 2025-01-02 PROCEDURE — 93325 DOPPLER ECHO COLOR FLOW MAPG: CPT | Performed by: INTERNAL MEDICINE

## 2025-01-02 PROCEDURE — 3700000001 HC ADD 15 MINUTES (ANESTHESIA): Performed by: INTERNAL MEDICINE

## 2025-01-02 PROCEDURE — 94660 CPAP INITIATION&MGMT: CPT

## 2025-01-02 RX ORDER — CLONAZEPAM 0.5 MG/1
0.5 TABLET ORAL NIGHTLY PRN
Qty: 10 TABLET | Refills: 0 | Status: SHIPPED | OUTPATIENT
Start: 2025-01-02 | End: 2025-01-12

## 2025-01-02 RX ORDER — MIDAZOLAM HYDROCHLORIDE 1 MG/ML
INJECTION, SOLUTION INTRAMUSCULAR; INTRAVENOUS
Status: DISCONTINUED | OUTPATIENT
Start: 2025-01-02 | End: 2025-01-02 | Stop reason: SDUPTHER

## 2025-01-02 RX ORDER — MECOBALAMIN 5000 MCG
5 TABLET,DISINTEGRATING ORAL NIGHTLY
DISCHARGE
Start: 2025-01-02 | End: 2025-02-01

## 2025-01-02 RX ORDER — CALCIUM CARBONATE 500 MG/1
500 TABLET, CHEWABLE ORAL 3 TIMES DAILY PRN
DISCHARGE
Start: 2025-01-02 | End: 2025-02-01

## 2025-01-02 RX ORDER — PREDNISONE 10 MG/1
TABLET ORAL
Qty: 40 TABLET | Refills: 1 | Status: SHIPPED | OUTPATIENT
Start: 2025-01-02

## 2025-01-02 RX ORDER — FUROSEMIDE 40 MG/1
40 TABLET ORAL DAILY
DISCHARGE
Start: 2025-01-03

## 2025-01-02 RX ORDER — SENNOSIDES A AND B 8.6 MG/1
1 TABLET, FILM COATED ORAL 2 TIMES DAILY
DISCHARGE
Start: 2025-01-02 | End: 2025-02-01

## 2025-01-02 RX ORDER — PSEUDOEPHEDRINE HCL 30 MG
100 TABLET ORAL DAILY
DISCHARGE
Start: 2025-01-03

## 2025-01-02 RX ORDER — BENZONATATE 100 MG/1
100 CAPSULE ORAL 3 TIMES DAILY PRN
DISCHARGE
Start: 2025-01-02 | End: 2025-01-12

## 2025-01-02 RX ORDER — GUAIFENESIN 400 MG/1
400 TABLET ORAL 3 TIMES DAILY
DISCHARGE
Start: 2025-01-02

## 2025-01-02 RX ORDER — LINEZOLID 600 MG/1
600 TABLET, FILM COATED ORAL 2 TIMES DAILY
DISCHARGE
Start: 2025-01-02 | End: 2025-01-09

## 2025-01-02 RX ORDER — BISACODYL 10 MG
10 SUPPOSITORY, RECTAL RECTAL DAILY PRN
DISCHARGE
Start: 2025-01-02 | End: 2025-02-01

## 2025-01-02 RX ORDER — ONDANSETRON 2 MG/ML
INJECTION INTRAMUSCULAR; INTRAVENOUS
Status: DISCONTINUED | OUTPATIENT
Start: 2025-01-02 | End: 2025-01-02 | Stop reason: SDUPTHER

## 2025-01-02 RX ORDER — PROPOFOL 10 MG/ML
INJECTION, EMULSION INTRAVENOUS
Status: DISCONTINUED | OUTPATIENT
Start: 2025-01-02 | End: 2025-01-02 | Stop reason: SDUPTHER

## 2025-01-02 RX ORDER — ERGOCALCIFEROL 1.25 MG/1
50000 CAPSULE ORAL WEEKLY
DISCHARGE
Start: 2025-01-08 | End: 2025-02-13

## 2025-01-02 RX ORDER — SODIUM CHLORIDE 9 MG/ML
INJECTION, SOLUTION INTRAVENOUS
Status: DISCONTINUED | OUTPATIENT
Start: 2025-01-02 | End: 2025-01-02 | Stop reason: SDUPTHER

## 2025-01-02 RX ORDER — DILTIAZEM HYDROCHLORIDE 240 MG/1
240 CAPSULE, COATED, EXTENDED RELEASE ORAL DAILY
DISCHARGE
Start: 2025-01-03

## 2025-01-02 RX ADMIN — DILTIAZEM HYDROCHLORIDE 240 MG: 120 CAPSULE, COATED, EXTENDED RELEASE ORAL at 11:06

## 2025-01-02 RX ADMIN — STANDARDIZED SENNA CONCENTRATE 8.6 MG: 8.6 TABLET ORAL at 21:00

## 2025-01-02 RX ADMIN — ENOXAPARIN SODIUM 60 MG: 100 INJECTION SUBCUTANEOUS at 20:58

## 2025-01-02 RX ADMIN — MIDAZOLAM 1 MG: 1 INJECTION INTRAMUSCULAR; INTRAVENOUS at 10:24

## 2025-01-02 RX ADMIN — SODIUM CHLORIDE, PRESERVATIVE FREE 10 ML: 5 INJECTION INTRAVENOUS at 21:05

## 2025-01-02 RX ADMIN — ASPIRIN 81 MG: 81 TABLET, COATED ORAL at 11:05

## 2025-01-02 RX ADMIN — STANDARDIZED SENNA CONCENTRATE 8.6 MG: 8.6 TABLET ORAL at 11:06

## 2025-01-02 RX ADMIN — VANCOMYCIN HYDROCHLORIDE 1250 MG: 1.25 INJECTION, POWDER, LYOPHILIZED, FOR SOLUTION INTRAVENOUS at 06:42

## 2025-01-02 RX ADMIN — HYDRALAZINE HYDROCHLORIDE 10 MG: 20 INJECTION INTRAMUSCULAR; INTRAVENOUS at 00:39

## 2025-01-02 RX ADMIN — IPRATROPIUM BROMIDE AND ALBUTEROL SULFATE 1 DOSE: 2.5; .5 SOLUTION RESPIRATORY (INHALATION) at 07:59

## 2025-01-02 RX ADMIN — IPRATROPIUM BROMIDE AND ALBUTEROL SULFATE 1 DOSE: 2.5; .5 SOLUTION RESPIRATORY (INHALATION) at 11:56

## 2025-01-02 RX ADMIN — IPRATROPIUM BROMIDE AND ALBUTEROL SULFATE 1 DOSE: 2.5; .5 SOLUTION RESPIRATORY (INHALATION) at 15:37

## 2025-01-02 RX ADMIN — DULOXETINE HYDROCHLORIDE 60 MG: 60 CAPSULE, DELAYED RELEASE ORAL at 11:07

## 2025-01-02 RX ADMIN — ENOXAPARIN SODIUM 60 MG: 100 INJECTION SUBCUTANEOUS at 11:05

## 2025-01-02 RX ADMIN — ARFORMOTEROL TARTRATE 15 MCG: 15 SOLUTION RESPIRATORY (INHALATION) at 20:24

## 2025-01-02 RX ADMIN — ONDANSETRON 4 MG: 2 INJECTION INTRAMUSCULAR; INTRAVENOUS at 10:24

## 2025-01-02 RX ADMIN — GUAIFENESIN 400 MG: 400 TABLET ORAL at 21:00

## 2025-01-02 RX ADMIN — MIDAZOLAM 1 MG: 1 INJECTION INTRAMUSCULAR; INTRAVENOUS at 10:30

## 2025-01-02 RX ADMIN — DOCUSATE SODIUM 100 MG: 100 CAPSULE, LIQUID FILLED ORAL at 11:07

## 2025-01-02 RX ADMIN — WATER 40 MG: 1 INJECTION INTRAMUSCULAR; INTRAVENOUS; SUBCUTANEOUS at 11:20

## 2025-01-02 RX ADMIN — FUROSEMIDE 40 MG: 40 TABLET ORAL at 11:06

## 2025-01-02 RX ADMIN — PANTOPRAZOLE SODIUM 40 MG: 40 TABLET, DELAYED RELEASE ORAL at 05:07

## 2025-01-02 RX ADMIN — IPRATROPIUM BROMIDE AND ALBUTEROL SULFATE 1 DOSE: 2.5; .5 SOLUTION RESPIRATORY (INHALATION) at 20:24

## 2025-01-02 RX ADMIN — Medication 1 CAPSULE: at 11:07

## 2025-01-02 RX ADMIN — GUAIFENESIN 400 MG: 400 TABLET ORAL at 11:07

## 2025-01-02 RX ADMIN — SODIUM CHLORIDE, PRESERVATIVE FREE 10 ML: 5 INJECTION INTRAVENOUS at 11:08

## 2025-01-02 RX ADMIN — SODIUM CHLORIDE: 9 INJECTION, SOLUTION INTRAVENOUS at 10:15

## 2025-01-02 RX ADMIN — METHIMAZOLE 15 MG: 5 TABLET ORAL at 11:06

## 2025-01-02 RX ADMIN — ARFORMOTEROL TARTRATE 15 MCG: 15 SOLUTION RESPIRATORY (INHALATION) at 07:59

## 2025-01-02 RX ADMIN — FOLIC ACID 1 MG: 1 TABLET ORAL at 11:07

## 2025-01-02 RX ADMIN — CLONAZEPAM 0.5 MG: 0.5 TABLET ORAL at 11:05

## 2025-01-02 RX ADMIN — PROPOFOL 60 MG: 10 INJECTION, EMULSION INTRAVENOUS at 10:24

## 2025-01-02 RX ADMIN — PREGABALIN 75 MG: 25 CAPSULE ORAL at 11:06

## 2025-01-02 RX ADMIN — WATER 40 MG: 1 INJECTION INTRAMUSCULAR; INTRAVENOUS; SUBCUTANEOUS at 20:58

## 2025-01-02 RX ADMIN — VANCOMYCIN HYDROCHLORIDE 1250 MG: 1.25 INJECTION, POWDER, LYOPHILIZED, FOR SOLUTION INTRAVENOUS at 20:47

## 2025-01-02 RX ADMIN — PREGABALIN 75 MG: 25 CAPSULE ORAL at 20:59

## 2025-01-02 RX ADMIN — BUDESONIDE INHALATION 1000 MCG: 0.5 SUSPENSION RESPIRATORY (INHALATION) at 07:59

## 2025-01-02 RX ADMIN — Medication 5 MG: at 20:58

## 2025-01-02 RX ADMIN — ACETAMINOPHEN 650 MG: 325 TABLET ORAL at 21:00

## 2025-01-02 RX ADMIN — BUDESONIDE INHALATION 1000 MCG: 0.5 SUSPENSION RESPIRATORY (INHALATION) at 20:24

## 2025-01-02 ASSESSMENT — PAIN SCALES - WONG BAKER: WONGBAKER_NUMERICALRESPONSE: NO HURT

## 2025-01-02 ASSESSMENT — PAIN SCALES - GENERAL
PAINLEVEL_OUTOF10: 3
PAINLEVEL_OUTOF10: 0

## 2025-01-02 ASSESSMENT — PAIN DESCRIPTION - LOCATION: LOCATION: BACK

## 2025-01-02 ASSESSMENT — ENCOUNTER SYMPTOMS
DYSPNEA ACTIVITY LEVEL: NO INTERVAL CHANGE
SHORTNESS OF BREATH: 1

## 2025-01-02 ASSESSMENT — PAIN DESCRIPTION - ORIENTATION: ORIENTATION: RIGHT;LEFT;MID

## 2025-01-02 ASSESSMENT — LIFESTYLE VARIABLES: SMOKING_STATUS: 1

## 2025-01-02 ASSESSMENT — COPD QUESTIONNAIRES: CAT_SEVERITY: SEVERE

## 2025-01-02 NOTE — PROGRESS NOTES
[facility]  [facility]  Progress Note    C/C : Enterococcus bacteremia     Denies fever or chills  SOB +  Afebrile       Review of systems:  As stated above in the chief complaint, otherwise negative.    Medications:  Scheduled Meds:   vancomycin  1,250 mg IntraVENous Q12H    methylPREDNISolone  40 mg IntraVENous Q12H    docusate sodium  100 mg Oral Daily    furosemide  40 mg Oral Daily    senna  1 tablet Oral BID    melatonin  5 mg Oral Nightly    metoprolol  5 mg IntraVENous Once    enoxaparin  1 mg/kg SubCUTAneous BID    dilTIAZem  240 mg Oral Daily    arformoterol tartrate  15 mcg Nebulization BID RT    aspirin  81 mg Oral Daily    budesonide  1 mg Nebulization BID RT    clonazePAM  0.5 mg Oral Daily    DULoxetine  60 mg Oral Daily    folic acid  1 mg Oral Daily    methIMAzole  15 mg Oral Daily    pantoprazole  40 mg Oral QAM AC    pregabalin  75 mg Oral BID    sodium chloride flush  5-40 mL IntraVENous 2 times per day    ipratropium 0.5 mg-albuterol 2.5 mg  1 Dose Inhalation Q4H WA RT    guaiFENesin  400 mg Oral TID    lactobacillus  1 capsule Oral Daily    vitamin D  50,000 Units Oral Weekly     Continuous Infusions:   sodium chloride      dextrose       PRN Meds:bisacodyl, sodium chloride, diphenhydrAMINE, diphenoxylate-atropine, polyethylene glycol, sodium chloride flush, sodium chloride, ondansetron **OR** ondansetron, acetaminophen **OR** acetaminophen, melatonin, hydrALAZINE, calcium carbonate, Polyvinyl Alcohol-Povidone PF, magnesium hydroxide, benzocaine-menthol, hydrOXYzine pamoate, benzonatate, glucose, dextrose bolus **OR** dextrose bolus, glucagon (rDNA), dextrose    OBJECTIVE:  BP (!) 147/80   Pulse 79   Temp 97.5 °F (36.4 °C) (Temporal)   Resp 18   Ht 1.727 m (5' 8\")   Wt 59.6 kg (131 lb 4.8 oz)   SpO2 98%   BMI 19.96 kg/m²   Temp  Av.7 °F (36.5 °C)  Min: 97.5 °F (36.4 °C)  Max: 98 °F (36.7 °C)  Constitutional: No acute distress  Skin: Warm and dry. No

## 2025-01-02 NOTE — PROGRESS NOTES
Pharmacy Consultation Note  (Antibiotic Dosing and Monitoring)    Initial consult date: 12/25/2024  Consulting physician/provider: Elida Moore APRN - NP.   Drug: Vancomycin  Indication: Pneumonia (CAP).     Age/  Gender Height Weight IBW  Allergy Information   69 y.o./female 172.7 cm (5' 8\")172.7 cm 51.8 kg (114 lb 3.2 oz) 53.6 kg    Ideal body weight: 63.9 kg (140 lb 14 oz)   Pcn [penicillins]      Renal Function:  Recent Labs     12/31/24  1153 01/02/25  0553   BUN 13 15   CREATININE 0.6 0.5     Vancomycin Monitoring:  Trough:    Recent Labs     01/01/25  1727   VANCOTROUGH 17.0*     Random:  No results for input(s): \"VANCORANDOM\" in the last 72 hours.    No results for input(s): \"BLOODCULT2\" in the last 72 hours.     Historical Cultures:  No results found for: \"ORG\"  No results for input(s): \"BC\" in the last 72 hours.    Vancomycin Administration Times:  Recent vancomycin administrations                     vancomycin (VANCOCIN) 1,250 mg in sodium chloride 0.9 % 250 mL IVPB (Rbec3Hts) (mg) 1,250 mg New Bag 01/02/25 0642     1,250 mg New Bag 01/01/25 1859     1,250 mg New Bag  0629     1,250 mg New Bag 12/31/24 1754     1,250 mg New Bag  0533     1,250 mg New Bag 12/30/24 1804        Assessment:  Patient is a 69 y.o. female who has been initiated on vancomycin for nosocomial pneumonia.   Estimated Creatinine Clearance: 97 mL/min (based on SCr of 0.5 mg/dL).  To dose vancomycin, pharmacy will be dosing based off of levels because of patient's renal impairment/insufficiency.   Scr 1.3mg/dL, baseline 0.7mg/dL on 12/6/2024.   12/26: Scr 0.7. WBC 17.5. Random level = <4 mcg/mL.   1/4 blood culture growing GPC in pairs and chains  12/27:  Scr 0.6.  Awaiting susceptibilities for blood cultures.  12/28: Scr 0.6. Blood cx growing enterococcus faecium. Random level this AM = 9.4 mcg/mL (~9 hours post-dose)  12/29: Scr 0.5, UOP incomplete.   12/30: Scr 0.5, UOP 2.7 mL/kg/hr, vancomycin level is 13.2 mcg/mL (~8 hours  post dose )  12/31: Scr 0.6, UOP 1.9 mL/kg/hr  1/1: no new labs, afebrile, UOP unmeasured x2  1/2: Trough 17.0 (11 hrs post dose).    Plan:  Continue vancomycin 1250 mg IV every 12 hours  Will check vancomycin levels as appropriate  Will continue to monitor renal function.   Pharmacy to follow    Thank you for this consult,    Manohar Chavez, [facility] 1/2/2025 11:19 AM   Pharmacy extension [phone]

## 2025-01-02 NOTE — ANESTHESIA PRE PROCEDURE
Department of Anesthesiology  Preprocedure Note       Name:  Lana Phillips   Age:  69 y.o.  :  1955                                          MRN:  [id number]         Date:  2025      Surgeon: Dr. Walsh  Procedure: JEREMY     Medications prior to admission:   Prior to Admission medications    Medication Sig Start Date End Date Taking? Authorizing Provider   DULoxetine (CYMBALTA) 60 MG extended release capsule Take 1 capsule by mouth daily    Freida Welsh MD   clonazePAM (KLONOPIN) 0.5 MG tablet Take 1 tablet by mouth daily.    Freida Welsh MD   diphenoxylate-atropine (LOMOTIL) 2.5-0.025 MG per tablet Take 1 tablet by mouth every 12 hours as needed for Diarrhea.    Freida Welsh MD   Lactobacillus (ACIDOPHILUS) 100 MG CAPS Take 100 mg by mouth    Freida Welsh MD   pregabalin (LYRICA) 75 MG capsule Take 1 capsule by mouth 2 times daily. 10/8/24   Freida Welsh MD   polyethylene glycol (GLYCOLAX) 17 g packet Take 1 packet by mouth daily as needed for Constipation    Freida Welsh MD   predniSONE (DELTASONE) 10 MG tablet Take 1 tablet by mouth daily    Freida Welsh MD   ipratropium 0.5 mg-albuterol 2.5 mg (DUONEB) 0.5-2.5 (3) MG/3ML SOLN nebulizer solution Take 3 mLs by nebulization every 4 hours as needed for Shortness of Breath    Freida Welsh MD   dilTIAZem (CARDIZEM CD) 180 MG extended release capsule Take 1 capsule by mouth daily 24   Baldemar Jarrett DO   budesonide (PULMICORT) 0.5 MG/2ML nebulizer suspension Take 4 mLs by nebulization in the morning and 4 mLs in the evening.  Patient taking differently: Take 2 mLs by nebulization in the morning and 2 mLs in the evening. 24   Jose Price MD   arformoterol tartrate (BROVANA) 15 MCG/2ML NEBU Take 2 mLs by nebulization in the morning and 2 mLs in the evening. 24   Jose Price MD   folic acid (FOLVITE) 1 MG tablet Take 1 tablet by mouth daily 24   [place]

## 2025-01-02 NOTE — PLAN OF CARE
Problem: Safety - Adult  Goal: Free from fall injury  Outcome: Progressing     Problem: Chronic Conditions and Co-morbidities  Goal: Patient's chronic conditions and co-morbidity symptoms are monitored and maintained or improved  Outcome: Progressing  Flowsheets (Taken 1/1/2025 1914)  Care Plan - Patient's Chronic Conditions and Co-Morbidity Symptoms are Monitored and Maintained or Improved: Monitor and assess patient's chronic conditions and comorbid symptoms for stability, deterioration, or improvement     Problem: Discharge Planning  Goal: Discharge to home or other facility with appropriate resources  Outcome: Progressing  Flowsheets (Taken 1/1/2025 1914)  Discharge to home or other facility with appropriate resources: Identify barriers to discharge with patient and caregiver     Problem: Pain  Goal: Verbalizes/displays adequate comfort level or baseline comfort level  Outcome: Progressing     Problem: Skin/Tissue Integrity  Goal: Absence of new skin breakdown  Description: 1.  Monitor for areas of redness and/or skin breakdown  2.  Assess vascular access sites hourly  3.  Every 4-6 hours minimum:  Change oxygen saturation probe site  4.  Every 4-6 hours:  If on nasal continuous positive airway pressure, respiratory therapy assess nares and determine need for appliance change or resting period.  Outcome: Progressing     Problem: Risk for Elopement  Goal: Patient will not exit the unit/facility without proper excort  Outcome: Progressing     Problem: Nutrition Deficit:  Goal: Optimize nutritional status  Outcome: Progressing     Problem: ABCDS Injury Assessment  Goal: Absence of physical injury  Outcome: Progressing

## 2025-01-02 NOTE — CARE COORDINATION
Social Work Transition of Care Planning:  Patient NPO for JEREMY today. On 4 L O2  Plan remains [place] at discharge per previous SW note  patient is a bed hold, no precert needed to return.PENNY started and wheelchair transport in soft chart. SW will continue to follow.  Electronically signed by ELIGIO Ortiz on 1/2/2025 at 12:52 PM

## 2025-01-02 NOTE — PROGRESS NOTES
[facility]  [address], [address]  [place] [address]      Pulmonary Progress Note      SUBJECTIVE:  claims stable with breathing with minimal sputum production, wore NIV 12-25/8 XA=838 Fio2=40% last night and currently on 4 li NC saturating 94%. Just had JEREMY done and (?) no vegetation    OBJECTIVE    Medications    Continuous Infusions:   sodium chloride      dextrose         Scheduled Meds:   vancomycin  1,250 mg IntraVENous Q12H    methylPREDNISolone  40 mg IntraVENous Q12H    docusate sodium  100 mg Oral Daily    furosemide  40 mg Oral Daily    senna  1 tablet Oral BID    melatonin  5 mg Oral Nightly    metoprolol  5 mg IntraVENous Once    enoxaparin  1 mg/kg SubCUTAneous BID    dilTIAZem  240 mg Oral Daily    arformoterol tartrate  15 mcg Nebulization BID RT    aspirin  81 mg Oral Daily    budesonide  1 mg Nebulization BID RT    clonazePAM  0.5 mg Oral Daily    DULoxetine  60 mg Oral Daily    folic acid  1 mg Oral Daily    methIMAzole  15 mg Oral Daily    pantoprazole  40 mg Oral QAM AC    pregabalin  75 mg Oral BID    sodium chloride flush  5-40 mL IntraVENous 2 times per day    ipratropium 0.5 mg-albuterol 2.5 mg  1 Dose Inhalation Q4H WA RT    guaiFENesin  400 mg Oral TID    lactobacillus  1 capsule Oral Daily    vitamin D  50,000 Units Oral Weekly       PRN Meds:bisacodyl, sodium chloride, diphenhydrAMINE, diphenoxylate-atropine, polyethylene glycol, sodium chloride flush, sodium chloride, ondansetron **OR** ondansetron, acetaminophen **OR** acetaminophen, melatonin, hydrALAZINE, calcium carbonate, Polyvinyl Alcohol-Povidone PF, magnesium hydroxide, benzocaine-menthol, hydrOXYzine pamoate, benzonatate, glucose, dextrose bolus **OR** dextrose bolus, glucagon (rDNA), dextrose    Physical    VITALS:  BP (!) 113/93   Pulse 75   Temp 97.5 °F (36.4 °C) (Temporal)   Resp 18   Ht 1.727 m (5' 8\")   Wt 59.6 kg (131 lb 4.8 oz)   SpO2 93%

## 2025-01-02 NOTE — DISCHARGE SUMMARY
Hospitalist Discharge Summary    Patient ID: Lana Phillips   Patient : 1955  Patient's PCP: Tal Humphrey DO    Admit Date: 2024   Admitting Physician: Ray Smiley MD    Discharge Date:  1/3/2025   Discharge Physician: Ray Smiley MD   Discharge Condition: Stable  Discharge Disposition: Skilled Facility      Hospital course in brief:  (Please refer to daily progress notes for a comprehensive review of the hospitalization by requesting medical records)      This is a 69-year-old lady with past medical history of A-fib, COPD, critical limb ischemia of left lower extremity, GI bleed, hypertension, RUTH treated with BiPAP, panic disorder, peripheral vascular disease, severe protein calorie malnutrition, hypothyroidism, hypertension.  Patient presented from nursing home [facility] with complaints of respiratory distress beginning earlier in the day.  Patient was reportedly hypoxic with SpO2 in the 70s on her baseline 4 L oxygen and was placed on BiPAP.  Patient has been having cough with intermittent shortness of breath over the past few days.  She denies any fever, chills, chest pain, palpitations or urinary symptoms.  She did also fell from her wheelchair and hit her head.  She is not on any blood thinners.  She did not lose consciousness.  No focal deficits or pain.  In the ED,  CT was tachycardic and hypoxic requiring BiPAP.  Lab work was significant for hypokalemia, DERIAN, lactic acidosis, elevated BNP, elevated troponin, leukocytosis.  Viral panel was negative.  Blood cultures obtained.  She was started on cefepime, vancomycin.  Also given 1 L bolus of fluid, DuoNeb in ED.  She was recently treated for altered mental status and UTI from  to .      blood culture came back positive for gram-positive bacteria-Enterococcus.  Echocardiogram ordered.  Repeat blood culture ordered.  ID consulted.  Urine culture.      patient appears to be doing well, patient undergoing  \"code stroke\" or \"stroke alert\" been called?->No Reason for exam:->Fall Decision Support Exception - unselect if not a suspected or confirmed emergency medical condition->Emergency Medical Condition (MA) What reading provider will be dictating this exam?->CRC FINDINGS: BRAIN/VENTRICLES: There is no acute intracranial hemorrhage, mass effect or midline shift.  No abnormal extra-axial fluid collection.  The gray-white differentiation is maintained without evidence of an acute infarct.  There is no evidence of hydrocephalus. There is moderate chronic ischemic/degenerative changes in the white matter. ORBITS: The visualized portion of the orbits demonstrate no acute abnormality. SINUSES: The visualized paranasal sinuses and mastoid air cells demonstrate no acute abnormality. SOFT TISSUES/SKULL/cervical spine: No acute abnormality of the visualized skull or soft tissues.  Vertebral body height and alignment are normal. There are no fractures.  There is moderate degenerative changes at the atlanto dental interval.  At C2-3 there is some left-sided facet arthrosis without stenosis.  There is facet arthrosis bilaterally at C3-4 with slight disc space narrowing. At C4-5 there is moderate disc space narrowing and some anterior posterior spurring.  There is some facet arthrosis.  There is mild central stenosis. At C5-6 there is moderate disc space narrowing and moderate posterior spurring.  There is mild to moderate stenosis and bilateral foraminal narrowing worse on the right. Disc space narrowing at C6-7.  There is right-sided facet arthrosis.  There is no significant stenosis. Remainder of the cervical spine is normal. There is bilateral scarring in the lung apices.  There is a moderate amount of bilateral carotid arterial vascular plaque.     1. No acute intracranial abnormality. 2. There is moderate chronic ischemic/degenerative changes in the white matter. 3. No acute osseous abnormality of the cervical spine. 4.

## 2025-01-02 NOTE — ANESTHESIA POSTPROCEDURE EVALUATION
Department of Anesthesiology  Postprocedure Note    Patient: Lana Phillips  MRN: [id number]  YOB: 1955  Date of evaluation: 1/2/2025    Procedure Summary       Date: 01/02/25 Room / Location: [facility] Cardiac Cath Lab; [facility] Noninvasive [facility]iology    Anesthesia Start: 1015 Anesthesia Stop: 1040    Procedure: JEREMY (PRN CONTRAST/BUBBLE/3D) Diagnosis: Infective endocarditis, due to unspecified organism, unspecified chronicity    Scheduled Providers: Baldemar Ge DO Responsible Provider: Baldemar Ge DO    Anesthesia Type: MAC ASA Status: 4            Anesthesia Type: No value filed.    Salvador Phase I:      Salvador Phase II:      Anesthesia Post Evaluation    Patient location during evaluation: bedside  Patient participation: complete - patient cannot participate  Level of consciousness: awake and alert  Airway patency: patent  Nausea & Vomiting: no nausea and no vomiting  Cardiovascular status: blood pressure returned to baseline  Respiratory status: acceptable  Hydration status: euvolemic  Multimodal analgesia pain management approach    No notable events documented.

## 2025-01-02 NOTE — PROGRESS NOTES
Comprehensive Nutrition Assessment    Type and Reason for Visit:  Reassess    Nutrition Recommendations/Plan:   Continue current diet w/ ONS to promote nutrient/PO intake. Will continue to monitor and provide nutrition recs as appropriate.     Malnutrition Assessment:  Malnutrition Status:  Severe malnutrition (12/27/24 1237)    Context:  Chronic Illness     Findings of the 6 clinical characteristics of malnutrition:  Energy Intake:  75% or less estimated energy requirements for 1 month or longer  Weight Loss:  Unable to assess (d/t poor long term actual weight history)     Body Fat Loss:  Severe body fat loss Orbital, Triceps, Fat Overlying Ribs   Muscle Mass Loss:  Severe muscle mass loss Temples (temporalis), Clavicles (pectoralis & deltoids), Calf (gastrocnemius)  Fluid Accumulation:  No fluid accumulation     Strength:  Not Performed    Nutrition Assessment:    Patient adm from nursing facility w/ SOB and acute hypoxic on chronic hypercapnic respiratory failure ; noted PNA and COPD exacerbation ; also adm w/ DERIAN and sepsis ; noted Enterococcus bacteremia. RRT called on 12/29/24for acute on chronic resp failure 2/2 copd exac and worsening of B/L pleural effusions w/ transfer to MICU. Transferrred out of ICU on 10/31/24 and now s/p JEREMY today 1/2/25. PMHx of COPD/PVD/CAD/atrial fibrillation/hypothyroidism. hx of severe malnutrition ; pt does meet criteria for severe malnutrition ; Noted decreased po intake/appetite PTA ; PO intake remains sporadic/suboptimal. Will continue ONS and monitor.    Nutrition Related Findings:    -I&Os (-5.4 L), no edema, redness to buttocks, A&O x 4, Oriented/disoriented at times, distended/rounded abd, active BS, +constipation/abd tenderness,  muscle/fat wasting, cachexia ;  hyperglycemia, Wound Type: None       Current Nutrition Intake & Therapies:    Average Meal Intake: 51-75%, 26-50% (sporadic/varied)  Average Supplements Intake: Unable to assess  ADULT DIET; Regular; Low

## 2025-01-02 NOTE — PROGRESS NOTES
Spoke with Dr. Price while on unit who states that the pt is okay for d/c to SNF from pulmonology standpoint.

## 2025-01-03 ENCOUNTER — TELEPHONE (OUTPATIENT)
Dept: CARDIOLOGY CLINIC | Age: 70
End: 2025-01-03

## 2025-01-03 PROCEDURE — 94660 CPAP INITIATION&MGMT: CPT

## 2025-01-03 NOTE — TELEPHONE ENCOUNTER
I called Alia from [facility] & informed her to cancel Lana's Watchman procedure on 1/13 because she has bacteremia.

## 2025-01-03 NOTE — PLAN OF CARE
Problem: Safety - Adult  Goal: Free from fall injury  Outcome: Adequate for Discharge     Problem: Chronic Conditions and Co-morbidities  Goal: Patient's chronic conditions and co-morbidity symptoms are monitored and maintained or improved  Outcome: Adequate for Discharge     Problem: Discharge Planning  Goal: Discharge to home or other facility with appropriate resources  Outcome: Adequate for Discharge     Problem: Pain  Goal: Verbalizes/displays adequate comfort level or baseline comfort level  Outcome: Adequate for Discharge     Problem: Skin/Tissue Integrity  Goal: Absence of new skin breakdown  Description: 1.  Monitor for areas of redness and/or skin breakdown  2.  Assess vascular access sites hourly  3.  Every 4-6 hours minimum:  Change oxygen saturation probe site  4.  Every 4-6 hours:  If on nasal continuous positive airway pressure, respiratory therapy assess nares and determine need for appliance change or resting period.  Outcome: Adequate for Discharge     Problem: Skin/Tissue Integrity  Goal: Absence of new skin breakdown  Description: 1.  Monitor for areas of redness and/or skin breakdown  2.  Assess vascular access sites hourly  3.  Every 4-6 hours minimum:  Change oxygen saturation probe site  4.  Every 4-6 hours:  If on nasal continuous positive airway pressure, respiratory therapy assess nares and determine need for appliance change or resting period.  Outcome: Adequate for Discharge     Problem: Risk for Elopement  Goal: Patient will not exit the unit/facility without proper excort  Outcome: Adequate for Discharge  Flowsheets (Taken 1/2/2025 4756)  Nursing Interventions for Elopement Risk: Assist with personal care needs such as toileting, eating, dressing, as needed to reduce the risk of wandering     Problem: Nutrition Deficit:  Goal: Optimize nutritional status  Outcome: Adequate for Discharge  Flowsheets (Taken 1/2/2025 1401 by Diego Sanabria RD)  Nutrient intake appropriate for improving,

## 2025-01-03 NOTE — PROGRESS NOTES
Spoke with Dr. Smiley, patient to discharge - discharge order in - nursing in process of seeing if [facility] will accept tonight and if ambulance can be able to transfer.

## 2025-01-03 NOTE — PROGRESS NOTES
Call placed to [facility] ambulance service to transport patient. They can  at 1130pm. Called [facility] and gave them an update on the time. They will be expecting at 1130pm

## 2025-01-07 LAB
EKG ATRIAL RATE: 62 BPM
EKG P AXIS: 94 DEGREES
EKG P-R INTERVAL: 144 MS
EKG Q-T INTERVAL: 416 MS
EKG QRS DURATION: 76 MS
EKG QTC CALCULATION (BAZETT): 422 MS
EKG R AXIS: 136 DEGREES
EKG T AXIS: 118 DEGREES
EKG VENTRICULAR RATE: 62 BPM

## 2025-01-08 ENCOUNTER — HOSPITAL ENCOUNTER (OUTPATIENT)
Dept: INFUSION THERAPY | Age: 70
Discharge: HOME | End: 2025-01-08
Payer: MEDICARE

## 2025-01-08 VITALS
DIASTOLIC BLOOD PRESSURE: 77 MMHG | SYSTOLIC BLOOD PRESSURE: 144 MMHG | TEMPERATURE: 98.2 F | HEART RATE: 80 BPM | RESPIRATION RATE: 16 BRPM | OXYGEN SATURATION: 99 %

## 2025-01-08 DIAGNOSIS — D50.9 IRON DEFICIENCY ANEMIA, UNSPECIFIED IRON DEFICIENCY ANEMIA TYPE: Primary | ICD-10-CM

## 2025-01-08 DIAGNOSIS — D64.9 ACUTE ON CHRONIC ANEMIA: ICD-10-CM

## 2025-01-08 PROCEDURE — 6360000002 HC RX W HCPCS: Performed by: INTERNAL MEDICINE

## 2025-01-08 PROCEDURE — 96374 THER/PROPH/DIAG INJ IV PUSH: CPT

## 2025-01-08 RX ORDER — HEPARIN 100 UNIT/ML
500 SYRINGE INTRAVENOUS PRN
Status: CANCELLED | OUTPATIENT
Start: 2025-01-10

## 2025-01-08 RX ORDER — ACETAMINOPHEN 325 MG/1
650 TABLET ORAL
Status: CANCELLED | OUTPATIENT
Start: 2025-01-10

## 2025-01-08 RX ORDER — SODIUM CHLORIDE 9 MG/ML
5-250 INJECTION, SOLUTION INTRAVENOUS PRN
Status: CANCELLED | OUTPATIENT
Start: 2025-01-10

## 2025-01-08 RX ORDER — ONDANSETRON 2 MG/ML
8 INJECTION INTRAMUSCULAR; INTRAVENOUS
Status: CANCELLED | OUTPATIENT
Start: 2025-01-10

## 2025-01-08 RX ORDER — DIPHENHYDRAMINE HYDROCHLORIDE 50 MG/ML
50 INJECTION INTRAMUSCULAR; INTRAVENOUS
Status: CANCELLED | OUTPATIENT
Start: 2025-01-10

## 2025-01-08 RX ORDER — SODIUM CHLORIDE 0.9 % (FLUSH) 0.9 %
5-40 SYRINGE (ML) INJECTION PRN
Status: CANCELLED | OUTPATIENT
Start: 2025-01-10

## 2025-01-08 RX ORDER — SODIUM CHLORIDE 9 MG/ML
INJECTION, SOLUTION INTRAVENOUS CONTINUOUS
Status: CANCELLED | OUTPATIENT
Start: 2025-01-10

## 2025-01-08 RX ORDER — SODIUM CHLORIDE 9 MG/ML
5-250 INJECTION, SOLUTION INTRAVENOUS PRN
Status: DISCONTINUED | OUTPATIENT
Start: 2025-01-08 | End: 2025-01-09 | Stop reason: HOSPADM

## 2025-01-08 RX ORDER — ALBUTEROL SULFATE 90 UG/1
4 INHALANT RESPIRATORY (INHALATION) PRN
Status: CANCELLED | OUTPATIENT
Start: 2025-01-10

## 2025-01-08 RX ORDER — EPINEPHRINE 1 MG/ML
0.3 INJECTION, SOLUTION, CONCENTRATE INTRAVENOUS PRN
Status: CANCELLED | OUTPATIENT
Start: 2025-01-10

## 2025-01-08 RX ORDER — HYDROCORTISONE SODIUM SUCCINATE 100 MG/2ML
100 INJECTION INTRAMUSCULAR; INTRAVENOUS
Status: CANCELLED | OUTPATIENT
Start: 2025-01-10

## 2025-01-08 RX ADMIN — IRON SUCROSE 200 MG: 20 INJECTION, SOLUTION INTRAVENOUS at 13:22

## 2025-01-08 NOTE — PROGRESS NOTES
Patient arrived for Venofer.  Patient denies complaints at this time. Confirmed (+) blood return to PIV pre and post treatment. Treatment administered without incident. Discharged in stable condition.

## 2025-01-13 ENCOUNTER — HOSPITAL ENCOUNTER (OUTPATIENT)
Dept: INFUSION THERAPY | Age: 70
Discharge: HOME | End: 2025-01-13
Payer: MEDICARE

## 2025-01-13 VITALS
TEMPERATURE: 98.3 F | HEART RATE: 85 BPM | OXYGEN SATURATION: 93 % | RESPIRATION RATE: 16 BRPM | SYSTOLIC BLOOD PRESSURE: 104 MMHG | DIASTOLIC BLOOD PRESSURE: 63 MMHG

## 2025-01-13 DIAGNOSIS — D50.9 IRON DEFICIENCY ANEMIA, UNSPECIFIED IRON DEFICIENCY ANEMIA TYPE: Primary | ICD-10-CM

## 2025-01-13 DIAGNOSIS — D64.9 ACUTE ON CHRONIC ANEMIA: ICD-10-CM

## 2025-01-13 PROCEDURE — 96374 THER/PROPH/DIAG INJ IV PUSH: CPT

## 2025-01-13 PROCEDURE — 6360000002 HC RX W HCPCS: Performed by: INTERNAL MEDICINE

## 2025-01-13 PROCEDURE — 2500000003 HC RX 250 WO HCPCS: Performed by: INTERNAL MEDICINE

## 2025-01-13 RX ORDER — EPINEPHRINE 1 MG/ML
0.3 INJECTION, SOLUTION, CONCENTRATE INTRAVENOUS PRN
Status: CANCELLED | OUTPATIENT
Start: 2025-01-14

## 2025-01-13 RX ORDER — HEPARIN 100 UNIT/ML
500 SYRINGE INTRAVENOUS PRN
Status: CANCELLED | OUTPATIENT
Start: 2025-01-14

## 2025-01-13 RX ORDER — ONDANSETRON 2 MG/ML
8 INJECTION INTRAMUSCULAR; INTRAVENOUS
Status: CANCELLED | OUTPATIENT
Start: 2025-01-14

## 2025-01-13 RX ORDER — DIPHENHYDRAMINE HYDROCHLORIDE 50 MG/ML
50 INJECTION INTRAMUSCULAR; INTRAVENOUS
Status: CANCELLED | OUTPATIENT
Start: 2025-01-14

## 2025-01-13 RX ORDER — ACETAMINOPHEN 325 MG/1
650 TABLET ORAL
Status: CANCELLED | OUTPATIENT
Start: 2025-01-14

## 2025-01-13 RX ORDER — HYDROCORTISONE SODIUM SUCCINATE 100 MG/2ML
100 INJECTION INTRAMUSCULAR; INTRAVENOUS
Status: CANCELLED | OUTPATIENT
Start: 2025-01-14

## 2025-01-13 RX ORDER — SODIUM CHLORIDE 0.9 % (FLUSH) 0.9 %
5-40 SYRINGE (ML) INJECTION PRN
Status: DISCONTINUED | OUTPATIENT
Start: 2025-01-13 | End: 2025-01-14 | Stop reason: HOSPADM

## 2025-01-13 RX ORDER — ALBUTEROL SULFATE 90 UG/1
4 INHALANT RESPIRATORY (INHALATION) PRN
Status: CANCELLED | OUTPATIENT
Start: 2025-01-14

## 2025-01-13 RX ORDER — SODIUM CHLORIDE 9 MG/ML
5-250 INJECTION, SOLUTION INTRAVENOUS PRN
Status: CANCELLED | OUTPATIENT
Start: 2025-01-14

## 2025-01-13 RX ORDER — SODIUM CHLORIDE 9 MG/ML
INJECTION, SOLUTION INTRAVENOUS CONTINUOUS
Status: CANCELLED | OUTPATIENT
Start: 2025-01-14

## 2025-01-13 RX ORDER — SODIUM CHLORIDE 0.9 % (FLUSH) 0.9 %
5-40 SYRINGE (ML) INJECTION PRN
Status: CANCELLED | OUTPATIENT
Start: 2025-01-14

## 2025-01-13 RX ADMIN — SODIUM CHLORIDE, PRESERVATIVE FREE 10 ML: 5 INJECTION INTRAVENOUS at 13:19

## 2025-01-13 RX ADMIN — IRON SUCROSE 200 MG: 20 INJECTION, SOLUTION INTRAVENOUS at 13:19

## 2025-01-13 RX ADMIN — SODIUM CHLORIDE, PRESERVATIVE FREE 10 ML: 5 INJECTION INTRAVENOUS at 13:28

## 2025-01-13 NOTE — PROGRESS NOTES
Patient tolerated treatment well without complications or complaints. Alert and oriented x3. Patient aware of potential side effects and denies questions regarding treatment. Pain assessed, patient denies any new or worsening pain.

## 2025-01-16 ENCOUNTER — HOSPITAL ENCOUNTER (OUTPATIENT)
Dept: INFUSION THERAPY | Age: 70
Discharge: HOME | End: 2025-01-16
Payer: MEDICARE

## 2025-01-16 VITALS
OXYGEN SATURATION: 95 % | HEART RATE: 76 BPM | RESPIRATION RATE: 18 BRPM | DIASTOLIC BLOOD PRESSURE: 60 MMHG | TEMPERATURE: 97.5 F | SYSTOLIC BLOOD PRESSURE: 100 MMHG

## 2025-01-16 DIAGNOSIS — D64.9 ACUTE ON CHRONIC ANEMIA: ICD-10-CM

## 2025-01-16 DIAGNOSIS — D50.9 IRON DEFICIENCY ANEMIA, UNSPECIFIED IRON DEFICIENCY ANEMIA TYPE: Primary | ICD-10-CM

## 2025-01-16 PROCEDURE — 6360000002 HC RX W HCPCS: Performed by: INTERNAL MEDICINE

## 2025-01-16 PROCEDURE — 96374 THER/PROPH/DIAG INJ IV PUSH: CPT

## 2025-01-16 RX ORDER — HYDROCORTISONE SODIUM SUCCINATE 100 MG/2ML
100 INJECTION INTRAMUSCULAR; INTRAVENOUS
OUTPATIENT
Start: 2025-01-16

## 2025-01-16 RX ORDER — SODIUM CHLORIDE 9 MG/ML
5-250 INJECTION, SOLUTION INTRAVENOUS PRN
OUTPATIENT
Start: 2025-01-16

## 2025-01-16 RX ORDER — DIPHENHYDRAMINE HYDROCHLORIDE 50 MG/ML
50 INJECTION INTRAMUSCULAR; INTRAVENOUS
OUTPATIENT
Start: 2025-01-16

## 2025-01-16 RX ORDER — SODIUM CHLORIDE 0.9 % (FLUSH) 0.9 %
5-40 SYRINGE (ML) INJECTION PRN
OUTPATIENT
Start: 2025-01-16

## 2025-01-16 RX ORDER — SODIUM CHLORIDE 9 MG/ML
5-250 INJECTION, SOLUTION INTRAVENOUS PRN
Status: DISCONTINUED | OUTPATIENT
Start: 2025-01-16 | End: 2025-01-17 | Stop reason: HOSPADM

## 2025-01-16 RX ORDER — HEPARIN 100 UNIT/ML
500 SYRINGE INTRAVENOUS PRN
OUTPATIENT
Start: 2025-01-16

## 2025-01-16 RX ORDER — EPINEPHRINE 1 MG/ML
0.3 INJECTION, SOLUTION, CONCENTRATE INTRAVENOUS PRN
OUTPATIENT
Start: 2025-01-16

## 2025-01-16 RX ORDER — SODIUM CHLORIDE 9 MG/ML
INJECTION, SOLUTION INTRAVENOUS CONTINUOUS
OUTPATIENT
Start: 2025-01-16

## 2025-01-16 RX ORDER — ACETAMINOPHEN 325 MG/1
650 TABLET ORAL
OUTPATIENT
Start: 2025-01-16

## 2025-01-16 RX ORDER — ALBUTEROL SULFATE 90 UG/1
4 INHALANT RESPIRATORY (INHALATION) PRN
OUTPATIENT
Start: 2025-01-16

## 2025-01-16 RX ORDER — ONDANSETRON 2 MG/ML
8 INJECTION INTRAMUSCULAR; INTRAVENOUS
OUTPATIENT
Start: 2025-01-16

## 2025-01-16 RX ADMIN — IRON SUCROSE 200 MG: 20 INJECTION, SOLUTION INTRAVENOUS at 13:43

## 2025-01-16 NOTE — PROGRESS NOTES
Patient tolerated Venofer infusion well. Patient alert and oriented x 3 No distress noted. Vital signs stable. Patient denies any new or worsening pain. Patient denies questions regarding treatment or medication; verbalized understanding, offered patient information and discharge material; patient declined; Patient denies needs, all questions answered. Left via wheelchair by self

## 2025-01-24 PROBLEM — R79.89 ELEVATED TROPONIN: Status: RESOLVED | Noted: 2024-12-25 | Resolved: 2025-01-24

## 2025-01-28 NOTE — PROGRESS NOTES
[facility]  [address] [place] [id number]    Follow up Note      Lana Phillips     Date of Visit:  1/29/2025    CC:  Patient presents for follow up   Chief Complaint   Patient presents with    Follow-up     Low back pain        HPI:    Pain is a little worse to right shoulder.  Lower back is unchanged.    Appropriate analgesia with current medications regimen: yes.    Change in quality of symptoms:no.    Medication side effects:none.   Recent diagnostic testing:none.   Recent interventional procedures:      She has been on anticoagulation medications to include ELIQUIS and has not been on herbal supplements.  She is not diabetic.     Imaging:   10/2023 lumbar MRI w/o -  FINDINGS:  BONES/ALIGNMENT: No fracture or joint dislocation is noted.  There is  straightening of the lumbar spine, which may be due to positioning or  muscular spasm. The vertebral body heights are preserved.  No marrow edema or  infiltrative process is noted.     SPINAL CORD: The conus terminates normally.     SOFT TISSUES: No paraspinal mass identified.     L1-L2: Disc desiccation with a minimal disc bulge.  No significant central  canal, lateral recess or neural foraminal stenoses.     L2-L3: Disc desiccation with mild loss of disc height and a disc bulge.  Mild  central canal, lateral recess and neural foraminal stenoses.     L3-L4: Severe loss of disc height with a small disc bulge.  No significant  central canal stenosis.  Mild lateral recess stenoses.  Mild-to-moderate  neural foraminal stenoses.     L4-L5: Prominent loss of disc height, more so towards the left.  No  significant central canal or lateral recess stenosis.  Mild left neural  foraminal stenosis.  Status post right hemilaminectomy.     L5-S1: Prominent loss of disc height with minimal disc osteophyte complex.  Status post left hemilaminectomy.  Mild to moderate neural foraminal  stenoses.  No significant central canal or lateral recess stenosis.

## 2025-01-29 ENCOUNTER — OFFICE VISIT (OUTPATIENT)
Dept: PAIN MANAGEMENT | Age: 70
End: 2025-01-29
Payer: MEDICARE

## 2025-01-29 VITALS
HEIGHT: 68 IN | TEMPERATURE: 98.1 F | DIASTOLIC BLOOD PRESSURE: 62 MMHG | RESPIRATION RATE: 18 BRPM | BODY MASS INDEX: 16.97 KG/M2 | SYSTOLIC BLOOD PRESSURE: 107 MMHG | OXYGEN SATURATION: 93 % | WEIGHT: 112 LBS

## 2025-01-29 DIAGNOSIS — M54.41 CHRONIC BILATERAL LOW BACK PAIN WITH BILATERAL SCIATICA: ICD-10-CM

## 2025-01-29 DIAGNOSIS — M51.9 LUMBAR DISC DISORDER: ICD-10-CM

## 2025-01-29 DIAGNOSIS — M54.42 CHRONIC BILATERAL LOW BACK PAIN WITH BILATERAL SCIATICA: ICD-10-CM

## 2025-01-29 DIAGNOSIS — M25.511 CHRONIC RIGHT SHOULDER PAIN: ICD-10-CM

## 2025-01-29 DIAGNOSIS — M53.3 DISORDER OF SACRUM: Primary | ICD-10-CM

## 2025-01-29 DIAGNOSIS — G89.4 CHRONIC PAIN SYNDROME: ICD-10-CM

## 2025-01-29 DIAGNOSIS — M54.16 LUMBAR RADICULOPATHY: ICD-10-CM

## 2025-01-29 DIAGNOSIS — M79.10 MYALGIA: ICD-10-CM

## 2025-01-29 DIAGNOSIS — G89.29 CHRONIC RIGHT SHOULDER PAIN: ICD-10-CM

## 2025-01-29 DIAGNOSIS — G89.29 CHRONIC BILATERAL LOW BACK PAIN WITH BILATERAL SCIATICA: ICD-10-CM

## 2025-01-29 PROCEDURE — 1160F RVW MEDS BY RX/DR IN RCRD: CPT | Performed by: PHYSICIAN ASSISTANT

## 2025-01-29 PROCEDURE — G8427 DOCREV CUR MEDS BY ELIG CLIN: HCPCS | Performed by: PHYSICIAN ASSISTANT

## 2025-01-29 PROCEDURE — 99213 OFFICE O/P EST LOW 20 MIN: CPT | Performed by: PHYSICIAN ASSISTANT

## 2025-01-29 PROCEDURE — 1090F PRES/ABSN URINE INCON ASSESS: CPT | Performed by: PHYSICIAN ASSISTANT

## 2025-01-29 PROCEDURE — G8419 CALC BMI OUT NRM PARAM NOF/U: HCPCS | Performed by: PHYSICIAN ASSISTANT

## 2025-01-29 PROCEDURE — 1111F DSCHRG MED/CURRENT MED MERGE: CPT | Performed by: PHYSICIAN ASSISTANT

## 2025-01-29 PROCEDURE — 3074F SYST BP LT 130 MM HG: CPT | Performed by: PHYSICIAN ASSISTANT

## 2025-01-29 PROCEDURE — G8399 PT W/DXA RESULTS DOCUMENT: HCPCS | Performed by: PHYSICIAN ASSISTANT

## 2025-01-29 PROCEDURE — 1159F MED LIST DOCD IN RCRD: CPT | Performed by: PHYSICIAN ASSISTANT

## 2025-01-29 PROCEDURE — 99213 OFFICE O/P EST LOW 20 MIN: CPT

## 2025-01-29 PROCEDURE — 3078F DIAST BP <80 MM HG: CPT | Performed by: PHYSICIAN ASSISTANT

## 2025-01-29 PROCEDURE — 1123F ACP DISCUSS/DSCN MKR DOCD: CPT | Performed by: PHYSICIAN ASSISTANT

## 2025-01-29 PROCEDURE — 1036F TOBACCO NON-USER: CPT | Performed by: PHYSICIAN ASSISTANT

## 2025-01-29 PROCEDURE — 3017F COLORECTAL CA SCREEN DOC REV: CPT | Performed by: PHYSICIAN ASSISTANT

## 2025-01-29 NOTE — PROGRESS NOTES
Lana Phillips presents to the [facility] on 1/29/2025. Lana is complaining of pain in h er low back. The pain is constant. The pain is described as aching, dull, and sharp. Pain is rated on her best day at a 4, on her worst day at a 8, and on average at a 6 on the VAS scale. She took her last dose of Lyrica and Cymbalta today.     Any procedures since your last visit: No    Pacemaker or defibrillator: No    She is  on NSAIDS and is not on anticoagulation medications.    Do you want someone present when the provider examines you? No    Medication Contract and Consent for Opioid Use Documents Filed        No documents found                    /62   Temp 98.1 °F (36.7 °C) (Infrared)   Resp 18 Comment: 4 L O2 via NC  Ht 1.727 m (5' 8\")   Wt 50.8 kg (112 lb)   SpO2 93%   BMI 17.03 kg/m²      No LMP recorded. Patient is postmenopausal.

## 2025-02-03 ENCOUNTER — OFFICE VISIT (OUTPATIENT)
Dept: ENDOCRINOLOGY | Age: 70
End: 2025-02-03
Payer: MEDICARE

## 2025-02-03 VITALS — HEART RATE: 83 BPM | OXYGEN SATURATION: 94 % | DIASTOLIC BLOOD PRESSURE: 63 MMHG | SYSTOLIC BLOOD PRESSURE: 110 MMHG

## 2025-02-03 DIAGNOSIS — M81.0 OSTEOPOROSIS, UNSPECIFIED OSTEOPOROSIS TYPE, UNSPECIFIED PATHOLOGICAL FRACTURE PRESENCE: ICD-10-CM

## 2025-02-03 DIAGNOSIS — E55.9 VITAMIN D DEFICIENCY: ICD-10-CM

## 2025-02-03 DIAGNOSIS — E05.90 HYPERTHYROIDISM: Primary | ICD-10-CM

## 2025-02-03 PROCEDURE — G8419 CALC BMI OUT NRM PARAM NOF/U: HCPCS | Performed by: CLINICAL NURSE SPECIALIST

## 2025-02-03 PROCEDURE — 3074F SYST BP LT 130 MM HG: CPT | Performed by: CLINICAL NURSE SPECIALIST

## 2025-02-03 PROCEDURE — G8428 CUR MEDS NOT DOCUMENT: HCPCS | Performed by: CLINICAL NURSE SPECIALIST

## 2025-02-03 PROCEDURE — 1090F PRES/ABSN URINE INCON ASSESS: CPT | Performed by: CLINICAL NURSE SPECIALIST

## 2025-02-03 PROCEDURE — 3017F COLORECTAL CA SCREEN DOC REV: CPT | Performed by: CLINICAL NURSE SPECIALIST

## 2025-02-03 PROCEDURE — 99214 OFFICE O/P EST MOD 30 MIN: CPT | Performed by: CLINICAL NURSE SPECIALIST

## 2025-02-03 PROCEDURE — G8399 PT W/DXA RESULTS DOCUMENT: HCPCS | Performed by: CLINICAL NURSE SPECIALIST

## 2025-02-03 PROCEDURE — G2211 COMPLEX E/M VISIT ADD ON: HCPCS | Performed by: CLINICAL NURSE SPECIALIST

## 2025-02-03 PROCEDURE — 1036F TOBACCO NON-USER: CPT | Performed by: CLINICAL NURSE SPECIALIST

## 2025-02-03 PROCEDURE — 1123F ACP DISCUSS/DSCN MKR DOCD: CPT | Performed by: CLINICAL NURSE SPECIALIST

## 2025-02-03 PROCEDURE — 3078F DIAST BP <80 MM HG: CPT | Performed by: CLINICAL NURSE SPECIALIST

## 2025-02-03 RX ORDER — PHENOL 1.4 %
1 AEROSOL, SPRAY (ML) MUCOUS MEMBRANE DAILY
COMMUNITY
End: 2025-02-09

## 2025-02-03 RX ORDER — DICYCLOMINE HCL 20 MG
20 TABLET ORAL
Status: ON HOLD | COMMUNITY
Start: 2025-01-25

## 2025-02-03 RX ORDER — PREDNISONE 20 MG/1
10 TABLET ORAL DAILY
Status: ON HOLD | COMMUNITY
Start: 2025-02-02

## 2025-02-03 RX ORDER — DILTIAZEM HYDROCHLORIDE 240 MG/1
CAPSULE, EXTENDED RELEASE ORAL
Status: ON HOLD | COMMUNITY
Start: 2025-01-25

## 2025-02-03 NOTE — PROGRESS NOTES
12/25/2024 11:08 AM    VITD25 34.1 03/27/2024 04:06 AM       ASSESSMENT & RECOMMENDATIONS   Lana Phillips, a 70 y.o.-old female seen in for management of following issues      Graves Hyperthyroidism  Last TSH within normal limits  She is currently on methimazole 15 mg daily   Need to check labs now   Reviewed potential side effects of Methimazole, including agranulocytosis and liver dysfunction (cholestasis).    Osteoporosis/vitD deficiency    DEXA scan July 2023 showed significant osteoporosis  Encourage taking vitD 2000 iu/day over the counter  Currently on Prolia 60 mg every 6 months  Repeat DEXA scan August 2025  Check CMP and vitamin D  Last calcium within normal limits    I personally reviewed external notes from PCP and other patient's care team providers, and personally interpreted labs associated with the above diagnosis. I also ordered labs to further assess and manage the above addressed medical conditions.     No follow-ups on file.    The above issues were reviewed with the patient who understood and agreed with the plan.    Thank you for allowing us to participate in the care of this patient. Please do not hesitate to contact us with any additional questions.    Diagnosis Orders   1. Hyperthyroidism  Comprehensive Metabolic Panel    Vitamin D 25 Hydroxy    T4, Free    T3, Free    TSH      2. Osteoporosis, unspecified osteoporosis type, unspecified pathological fracture presence  Comprehensive Metabolic Panel    Vitamin D 25 Hydroxy    DEXA BONE DENSITY AXIAL SKELETON      3. Vitamin D deficiency                   Tal A Gargasz, APRN - CNS   Endocrinologist, [facility]   [address] [place] [address], [address], [place], [address]   Phone: [phone]  Fax: [phone]  -------------------------  An electronic signature was used to authenticate this note. REGI Solis  on 2/3/2025 at 1:48 PM

## 2025-02-08 ENCOUNTER — APPOINTMENT (OUTPATIENT)
Dept: GENERAL RADIOLOGY | Age: 70
End: 2025-02-08
Payer: MEDICARE

## 2025-02-08 ENCOUNTER — HOSPITAL ENCOUNTER (INPATIENT)
Age: 70
LOS: 5 days | Discharge: HOME | End: 2025-02-14
Attending: EMERGENCY MEDICINE | Admitting: FAMILY MEDICINE
Payer: MEDICARE

## 2025-02-08 DIAGNOSIS — J96.21 ACUTE ON CHRONIC RESPIRATORY FAILURE WITH HYPOXIA (HCC): ICD-10-CM

## 2025-02-08 DIAGNOSIS — J18.9 PNEUMONIA DUE TO INFECTIOUS ORGANISM, UNSPECIFIED LATERALITY, UNSPECIFIED PART OF LUNG: ICD-10-CM

## 2025-02-08 DIAGNOSIS — B34.2 CORONAVIRUS INFECTION: Primary | ICD-10-CM

## 2025-02-08 LAB
ALBUMIN SERPL-MCNC: 3.6 G/DL (ref 3.5–5.2)
ALP SERPL-CCNC: 72 U/L (ref 35–104)
ALT SERPL-CCNC: 14 U/L (ref 0–32)
ANION GAP SERPL CALCULATED.3IONS-SCNC: 8 MMOL/L (ref 7–16)
AST SERPL-CCNC: 18 U/L (ref 0–31)
BASOPHILS # BLD: 0.05 K/UL (ref 0–0.2)
BASOPHILS NFR BLD: 0 % (ref 0–2)
BILIRUB SERPL-MCNC: <0.2 MG/DL (ref 0–1.2)
BUN SERPL-MCNC: 22 MG/DL (ref 6–23)
CALCIUM SERPL-MCNC: 8.9 MG/DL (ref 8.6–10.2)
CHLORIDE SERPL-SCNC: 93 MMOL/L (ref 98–107)
CO2 SERPL-SCNC: 40 MMOL/L (ref 22–29)
CREAT SERPL-MCNC: 0.8 MG/DL (ref 0.5–1)
D-DIMER QUANTITATIVE: 293 NG/ML DDU (ref 0–230)
EOSINOPHIL # BLD: 0.01 K/UL (ref 0.05–0.5)
EOSINOPHILS RELATIVE PERCENT: 0 % (ref 0–6)
ERYTHROCYTE [DISTWIDTH] IN BLOOD BY AUTOMATED COUNT: 16.9 % (ref 11.5–15)
FLUAV RNA RESP QL NAA+PROBE: NOT DETECTED
FLUBV RNA RESP QL NAA+PROBE: NOT DETECTED
GFR, ESTIMATED: 76 ML/MIN/1.73M2
GLUCOSE SERPL-MCNC: 120 MG/DL (ref 74–99)
HCT VFR BLD AUTO: 30.5 % (ref 34–48)
HGB BLD-MCNC: 9.3 G/DL (ref 11.5–15.5)
IMM GRANULOCYTES # BLD AUTO: 0.42 K/UL (ref 0–0.58)
IMM GRANULOCYTES NFR BLD: 2 % (ref 0–5)
LACTATE BLDV-SCNC: 0.9 MMOL/L (ref 0.5–1.9)
LYMPHOCYTES NFR BLD: 0.88 K/UL (ref 1.5–4)
LYMPHOCYTES RELATIVE PERCENT: 4 % (ref 20–42)
MCH RBC QN AUTO: 32.4 PG (ref 26–35)
MCHC RBC AUTO-ENTMCNC: 30.5 G/DL (ref 32–34.5)
MCV RBC AUTO: 106.3 FL (ref 80–99.9)
MONOCYTES NFR BLD: 1.14 K/UL (ref 0.1–0.95)
MONOCYTES NFR BLD: 5 % (ref 2–12)
NEUTROPHILS NFR BLD: 89 % (ref 43–80)
NEUTS SEG NFR BLD: 19.09 K/UL (ref 1.8–7.3)
PLATELET # BLD AUTO: 316 K/UL (ref 130–450)
PMV BLD AUTO: 9.4 FL (ref 7–12)
POTASSIUM SERPL-SCNC: 3.7 MMOL/L (ref 3.5–5)
PROT SERPL-MCNC: 6.5 G/DL (ref 6.4–8.3)
RBC # BLD AUTO: 2.87 M/UL (ref 3.5–5.5)
SARS-COV-2 RNA RESP QL NAA+PROBE: NOT DETECTED
SODIUM SERPL-SCNC: 141 MMOL/L (ref 132–146)
SOURCE: NORMAL
SPECIMEN DESCRIPTION: NORMAL
TROPONIN I SERPL HS-MCNC: 32 NG/L (ref 0–9)
TROPONIN I SERPL HS-MCNC: 39 NG/L (ref 0–9)
WBC OTHER # BLD: 21.6 K/UL (ref 4.5–11.5)

## 2025-02-08 PROCEDURE — 85025 COMPLETE CBC W/AUTO DIFF WBC: CPT

## 2025-02-08 PROCEDURE — 71045 X-RAY EXAM CHEST 1 VIEW: CPT

## 2025-02-08 PROCEDURE — 87636 SARSCOV2 & INF A&B AMP PRB: CPT

## 2025-02-08 PROCEDURE — 2500000003 HC RX 250 WO HCPCS

## 2025-02-08 PROCEDURE — 6370000000 HC RX 637 (ALT 250 FOR IP)

## 2025-02-08 PROCEDURE — 94640 AIRWAY INHALATION TREATMENT: CPT

## 2025-02-08 PROCEDURE — 96374 THER/PROPH/DIAG INJ IV PUSH: CPT

## 2025-02-08 PROCEDURE — 2700000000 HC OXYGEN THERAPY PER DAY

## 2025-02-08 PROCEDURE — 96361 HYDRATE IV INFUSION ADD-ON: CPT

## 2025-02-08 PROCEDURE — 87040 BLOOD CULTURE FOR BACTERIA: CPT

## 2025-02-08 PROCEDURE — 6360000002 HC RX W HCPCS

## 2025-02-08 PROCEDURE — 84484 ASSAY OF TROPONIN QUANT: CPT

## 2025-02-08 PROCEDURE — 0202U NFCT DS 22 TRGT SARS-COV-2: CPT

## 2025-02-08 PROCEDURE — 99285 EMERGENCY DEPT VISIT HI MDM: CPT

## 2025-02-08 PROCEDURE — 93005 ELECTROCARDIOGRAM TRACING: CPT

## 2025-02-08 PROCEDURE — 80053 COMPREHEN METABOLIC PANEL: CPT

## 2025-02-08 PROCEDURE — 85379 FIBRIN DEGRADATION QUANT: CPT

## 2025-02-08 PROCEDURE — 2580000003 HC RX 258

## 2025-02-08 PROCEDURE — 83605 ASSAY OF LACTIC ACID: CPT

## 2025-02-08 RX ORDER — SODIUM CHLORIDE 9 MG/ML
INJECTION, SOLUTION INTRAVENOUS PRN
Status: DISCONTINUED | OUTPATIENT
Start: 2025-02-08 | End: 2025-02-14 | Stop reason: HOSPADM

## 2025-02-08 RX ORDER — 0.9 % SODIUM CHLORIDE 0.9 %
1000 INTRAVENOUS SOLUTION INTRAVENOUS ONCE
Status: COMPLETED | OUTPATIENT
Start: 2025-02-08 | End: 2025-02-08

## 2025-02-08 RX ORDER — ASPIRIN 81 MG/1
324 TABLET, CHEWABLE ORAL ONCE
Status: COMPLETED | OUTPATIENT
Start: 2025-02-08 | End: 2025-02-08

## 2025-02-08 RX ORDER — IPRATROPIUM BROMIDE AND ALBUTEROL SULFATE 2.5; .5 MG/3ML; MG/3ML
3 SOLUTION RESPIRATORY (INHALATION) ONCE
Status: COMPLETED | OUTPATIENT
Start: 2025-02-08 | End: 2025-02-08

## 2025-02-08 RX ORDER — SODIUM CHLORIDE 0.9 % (FLUSH) 0.9 %
5-40 SYRINGE (ML) INJECTION EVERY 12 HOURS SCHEDULED
Status: DISCONTINUED | OUTPATIENT
Start: 2025-02-08 | End: 2025-02-14 | Stop reason: HOSPADM

## 2025-02-08 RX ORDER — SODIUM CHLORIDE 0.9 % (FLUSH) 0.9 %
5-40 SYRINGE (ML) INJECTION PRN
Status: DISCONTINUED | OUTPATIENT
Start: 2025-02-08 | End: 2025-02-14 | Stop reason: HOSPADM

## 2025-02-08 RX ADMIN — WATER 125 MG: 1 INJECTION INTRAMUSCULAR; INTRAVENOUS; SUBCUTANEOUS at 22:11

## 2025-02-08 RX ADMIN — IPRATROPIUM BROMIDE AND ALBUTEROL SULFATE 3 DOSE: 2.5; .5 SOLUTION RESPIRATORY (INHALATION) at 23:55

## 2025-02-08 RX ADMIN — SODIUM CHLORIDE 1000 ML: 9 INJECTION, SOLUTION INTRAVENOUS at 22:14

## 2025-02-08 RX ADMIN — ASPIRIN 81 MG CHEWABLE TABLET 324 MG: 81 TABLET CHEWABLE at 22:11

## 2025-02-08 ASSESSMENT — PAIN DESCRIPTION - DESCRIPTORS: DESCRIPTORS: ACHING;THROBBING

## 2025-02-08 ASSESSMENT — PAIN - FUNCTIONAL ASSESSMENT
PAIN_FUNCTIONAL_ASSESSMENT: ACTIVITIES ARE NOT PREVENTED
PAIN_FUNCTIONAL_ASSESSMENT: 0-10

## 2025-02-08 ASSESSMENT — PAIN SCALES - GENERAL: PAINLEVEL_OUTOF10: 6

## 2025-02-08 ASSESSMENT — PAIN DESCRIPTION - ONSET: ONSET: ON-GOING

## 2025-02-08 ASSESSMENT — PAIN DESCRIPTION - PAIN TYPE: TYPE: ACUTE PAIN

## 2025-02-08 ASSESSMENT — PAIN DESCRIPTION - ORIENTATION: ORIENTATION: LEFT

## 2025-02-08 ASSESSMENT — PAIN DESCRIPTION - FREQUENCY: FREQUENCY: INTERMITTENT

## 2025-02-08 ASSESSMENT — PAIN DESCRIPTION - LOCATION: LOCATION: CHEST

## 2025-02-09 ENCOUNTER — APPOINTMENT (OUTPATIENT)
Dept: CT IMAGING | Age: 70
End: 2025-02-09
Payer: MEDICARE

## 2025-02-09 PROBLEM — J96.20 RESPIRATORY FAILURE, ACUTE-ON-CHRONIC: Status: ACTIVE | Noted: 2025-02-09

## 2025-02-09 LAB
ANION GAP SERPL CALCULATED.3IONS-SCNC: 8 MMOL/L (ref 7–16)
B PARAP IS1001 DNA NPH QL NAA+NON-PROBE: NOT DETECTED
B PERT DNA SPEC QL NAA+PROBE: NOT DETECTED
B.E.: 13.4 MMOL/L (ref -3–3)
BUN SERPL-MCNC: 16 MG/DL (ref 6–23)
C PNEUM DNA NPH QL NAA+NON-PROBE: NOT DETECTED
CALCIUM SERPL-MCNC: 8.5 MG/DL (ref 8.6–10.2)
CHLORIDE SERPL-SCNC: 96 MMOL/L (ref 98–107)
CO2 SERPL-SCNC: 36 MMOL/L (ref 22–29)
COHB: 0.5 % (ref 0–1.5)
CREAT SERPL-MCNC: 0.6 MG/DL (ref 0.5–1)
CRITICAL: ABNORMAL
DATE ANALYZED: ABNORMAL
DATE OF COLLECTION: ABNORMAL
FLUAV RNA NPH QL NAA+NON-PROBE: NOT DETECTED
FLUBV RNA NPH QL NAA+NON-PROBE: NOT DETECTED
GFR, ESTIMATED: >90 ML/MIN/1.73M2
GLUCOSE SERPL-MCNC: 181 MG/DL (ref 74–99)
HADV DNA NPH QL NAA+NON-PROBE: NOT DETECTED
HCO3: 39.1 MMOL/L (ref 22–26)
HCOV 229E RNA NPH QL NAA+NON-PROBE: NOT DETECTED
HCOV HKU1 RNA NPH QL NAA+NON-PROBE: NOT DETECTED
HCOV NL63 RNA NPH QL NAA+NON-PROBE: NOT DETECTED
HCOV OC43 RNA NPH QL NAA+NON-PROBE: DETECTED
HHB: 3.7 % (ref 0–5)
HMPV RNA NPH QL NAA+NON-PROBE: NOT DETECTED
HPIV1 RNA NPH QL NAA+NON-PROBE: NOT DETECTED
HPIV2 RNA NPH QL NAA+NON-PROBE: NOT DETECTED
HPIV3 RNA NPH QL NAA+NON-PROBE: NOT DETECTED
HPIV4 RNA NPH QL NAA+NON-PROBE: NOT DETECTED
LAB: ABNORMAL
LACTATE BLDV-SCNC: 0.9 MMOL/L (ref 0.5–1.9)
Lab: 1440
M PNEUMO DNA NPH QL NAA+NON-PROBE: NOT DETECTED
METHB: 0.6 % (ref 0–1.5)
MODE: ABNORMAL
O2 SATURATION: 96.3 % (ref 92–98.5)
O2HB: 95.2 % (ref 94–97)
OPERATOR ID: 7292
PATIENT TEMP: 37 C
PCO2: 55.8 MMHG (ref 35–45)
PH BLOOD GAS: 7.46 (ref 7.35–7.45)
PO2: 82.7 MMHG (ref 75–100)
POTASSIUM SERPL-SCNC: 3.7 MMOL/L (ref 3.5–5)
PROCALCITONIN SERPL-MCNC: 0.12 NG/ML (ref 0–0.08)
RSV RNA NPH QL NAA+NON-PROBE: NOT DETECTED
RV+EV RNA NPH QL NAA+NON-PROBE: NOT DETECTED
SARS-COV-2 RNA NPH QL NAA+NON-PROBE: NOT DETECTED
SODIUM SERPL-SCNC: 140 MMOL/L (ref 132–146)
SOURCE, BLOOD GAS: ABNORMAL
SPECIMEN DESCRIPTION: ABNORMAL
THB: 10.7 G/DL (ref 11.5–16.5)
TIME ANALYZED: 1443

## 2025-02-09 PROCEDURE — 6370000000 HC RX 637 (ALT 250 FOR IP): Performed by: INTERNAL MEDICINE

## 2025-02-09 PROCEDURE — 84145 PROCALCITONIN (PCT): CPT

## 2025-02-09 PROCEDURE — 2500000003 HC RX 250 WO HCPCS: Performed by: RADIOLOGY

## 2025-02-09 PROCEDURE — 6370000000 HC RX 637 (ALT 250 FOR IP): Performed by: NURSE PRACTITIONER

## 2025-02-09 PROCEDURE — 99223 1ST HOSP IP/OBS HIGH 75: CPT | Performed by: NURSE PRACTITIONER

## 2025-02-09 PROCEDURE — 6370000000 HC RX 637 (ALT 250 FOR IP)

## 2025-02-09 PROCEDURE — 6360000002 HC RX W HCPCS: Performed by: NURSE PRACTITIONER

## 2025-02-09 PROCEDURE — 82805 BLOOD GASES W/O2 SATURATION: CPT

## 2025-02-09 PROCEDURE — 2500000003 HC RX 250 WO HCPCS: Performed by: NURSE PRACTITIONER

## 2025-02-09 PROCEDURE — 5A0955A ASSISTANCE WITH RESPIRATORY VENTILATION, GREATER THAN 96 CONSECUTIVE HOURS, HIGH NASAL FLOW/VELOCITY: ICD-10-PCS | Performed by: STUDENT IN AN ORGANIZED HEALTH CARE EDUCATION/TRAINING PROGRAM

## 2025-02-09 PROCEDURE — 6360000002 HC RX W HCPCS: Performed by: INTERNAL MEDICINE

## 2025-02-09 PROCEDURE — 87899 AGENT NOS ASSAY W/OPTIC: CPT

## 2025-02-09 PROCEDURE — 71275 CT ANGIOGRAPHY CHEST: CPT

## 2025-02-09 PROCEDURE — 2500000003 HC RX 250 WO HCPCS

## 2025-02-09 PROCEDURE — 2700000000 HC OXYGEN THERAPY PER DAY

## 2025-02-09 PROCEDURE — 36600 WITHDRAWAL OF ARTERIAL BLOOD: CPT

## 2025-02-09 PROCEDURE — 2060000000 HC ICU INTERMEDIATE R&B

## 2025-02-09 PROCEDURE — 80048 BASIC METABOLIC PNL TOTAL CA: CPT

## 2025-02-09 PROCEDURE — 6360000004 HC RX CONTRAST MEDICATION: Performed by: RADIOLOGY

## 2025-02-09 PROCEDURE — 94640 AIRWAY INHALATION TREATMENT: CPT

## 2025-02-09 PROCEDURE — 2500000003 HC RX 250 WO HCPCS: Performed by: INTERNAL MEDICINE

## 2025-02-09 PROCEDURE — 6360000002 HC RX W HCPCS

## 2025-02-09 PROCEDURE — 36415 COLL VENOUS BLD VENIPUNCTURE: CPT

## 2025-02-09 PROCEDURE — 83605 ASSAY OF LACTIC ACID: CPT

## 2025-02-09 PROCEDURE — 87449 NOS EACH ORGANISM AG IA: CPT

## 2025-02-09 RX ORDER — ERGOCALCIFEROL 1.25 MG/1
50000 CAPSULE, LIQUID FILLED ORAL WEEKLY
Status: DISCONTINUED | OUTPATIENT
Start: 2025-02-12 | End: 2025-02-14 | Stop reason: HOSPADM

## 2025-02-09 RX ORDER — HYDROXYZINE HYDROCHLORIDE 10 MG/1
10 TABLET, FILM COATED ORAL 3 TIMES DAILY PRN
Status: DISCONTINUED | OUTPATIENT
Start: 2025-02-09 | End: 2025-02-14 | Stop reason: HOSPADM

## 2025-02-09 RX ORDER — ASPIRIN 81 MG/1
81 TABLET ORAL DAILY
Status: DISCONTINUED | OUTPATIENT
Start: 2025-02-09 | End: 2025-02-14 | Stop reason: HOSPADM

## 2025-02-09 RX ORDER — IBUPROFEN 200 MG
1 CAPSULE ORAL 3 TIMES DAILY PRN
COMMUNITY

## 2025-02-09 RX ORDER — IPRATROPIUM BROMIDE AND ALBUTEROL SULFATE 2.5; .5 MG/3ML; MG/3ML
1 SOLUTION RESPIRATORY (INHALATION)
Status: DISCONTINUED | OUTPATIENT
Start: 2025-02-09 | End: 2025-02-14 | Stop reason: HOSPADM

## 2025-02-09 RX ORDER — SENNOSIDES A AND B 8.6 MG/1
1 TABLET, FILM COATED ORAL 2 TIMES DAILY
COMMUNITY

## 2025-02-09 RX ORDER — FUROSEMIDE 40 MG/1
40 TABLET ORAL DAILY
Status: DISCONTINUED | OUTPATIENT
Start: 2025-02-09 | End: 2025-02-14 | Stop reason: HOSPADM

## 2025-02-09 RX ORDER — SODIUM CHLORIDE 0.9 % (FLUSH) 0.9 %
10 SYRINGE (ML) INJECTION PRN
Status: COMPLETED | OUTPATIENT
Start: 2025-02-09 | End: 2025-02-09

## 2025-02-09 RX ORDER — CALCIUM CARBONATE 500 MG/1
500 TABLET, CHEWABLE ORAL DAILY
Status: DISCONTINUED | OUTPATIENT
Start: 2025-02-09 | End: 2025-02-14 | Stop reason: HOSPADM

## 2025-02-09 RX ORDER — SENNOSIDES A AND B 8.6 MG/1
1 TABLET, FILM COATED ORAL NIGHTLY
Status: DISCONTINUED | OUTPATIENT
Start: 2025-02-10 | End: 2025-02-14 | Stop reason: HOSPADM

## 2025-02-09 RX ORDER — LACTOBACILLUS RHAMNOSUS GG 10B CELL
1 CAPSULE ORAL DAILY
Status: DISCONTINUED | OUTPATIENT
Start: 2025-02-09 | End: 2025-02-14 | Stop reason: HOSPADM

## 2025-02-09 RX ORDER — FOLIC ACID 1 MG/1
1 TABLET ORAL DAILY
Status: DISCONTINUED | OUTPATIENT
Start: 2025-02-09 | End: 2025-02-14 | Stop reason: HOSPADM

## 2025-02-09 RX ORDER — GUAIFENESIN 400 MG/1
400 TABLET ORAL 3 TIMES DAILY
Status: DISCONTINUED | OUTPATIENT
Start: 2025-02-09 | End: 2025-02-14 | Stop reason: HOSPADM

## 2025-02-09 RX ORDER — ARFORMOTEROL TARTRATE 15 UG/2ML
15 SOLUTION RESPIRATORY (INHALATION)
Status: DISCONTINUED | OUTPATIENT
Start: 2025-02-09 | End: 2025-02-09 | Stop reason: SDUPTHER

## 2025-02-09 RX ORDER — MAGNESIUM HYDROXIDE/ALUMINUM HYDROXICE/SIMETHICONE 120; 1200; 1200 MG/30ML; MG/30ML; MG/30ML
30 SUSPENSION ORAL EVERY 6 HOURS PRN
Status: DISCONTINUED | OUTPATIENT
Start: 2025-02-09 | End: 2025-02-14 | Stop reason: HOSPADM

## 2025-02-09 RX ORDER — IPRATROPIUM BROMIDE AND ALBUTEROL SULFATE 2.5; .5 MG/3ML; MG/3ML
1 SOLUTION RESPIRATORY (INHALATION)
Status: DISCONTINUED | OUTPATIENT
Start: 2025-02-09 | End: 2025-02-09 | Stop reason: SDUPTHER

## 2025-02-09 RX ORDER — SODIUM CHLORIDE 9 MG/ML
INJECTION, SOLUTION INTRAVENOUS PRN
Status: DISCONTINUED | OUTPATIENT
Start: 2025-02-09 | End: 2025-02-14 | Stop reason: HOSPADM

## 2025-02-09 RX ORDER — SODIUM CHLORIDE 0.9 % (FLUSH) 0.9 %
5-40 SYRINGE (ML) INJECTION PRN
Status: DISCONTINUED | OUTPATIENT
Start: 2025-02-09 | End: 2025-02-14 | Stop reason: HOSPADM

## 2025-02-09 RX ORDER — IPRATROPIUM BROMIDE AND ALBUTEROL SULFATE 2.5; .5 MG/3ML; MG/3ML
3 SOLUTION RESPIRATORY (INHALATION) ONCE
Status: COMPLETED | OUTPATIENT
Start: 2025-02-09 | End: 2025-02-09

## 2025-02-09 RX ORDER — PHENOL 1.4 %
1 AEROSOL, SPRAY (ML) MUCOUS MEMBRANE DAILY
Status: DISCONTINUED | OUTPATIENT
Start: 2025-02-09 | End: 2025-02-09 | Stop reason: SDUPTHER

## 2025-02-09 RX ORDER — ENOXAPARIN SODIUM 100 MG/ML
30 INJECTION SUBCUTANEOUS DAILY
Status: DISCONTINUED | OUTPATIENT
Start: 2025-02-09 | End: 2025-02-14 | Stop reason: HOSPADM

## 2025-02-09 RX ORDER — YOHIMBE BARK 500 MG
100 CAPSULE ORAL DAILY
Status: DISCONTINUED | OUTPATIENT
Start: 2025-02-09 | End: 2025-02-09 | Stop reason: SDUPTHER

## 2025-02-09 RX ORDER — HYDROXYZINE PAMOATE 25 MG/1
25 CAPSULE ORAL DAILY
COMMUNITY

## 2025-02-09 RX ORDER — ACETAMINOPHEN 325 MG/1
650 TABLET ORAL EVERY 6 HOURS PRN
Status: DISCONTINUED | OUTPATIENT
Start: 2025-02-09 | End: 2025-02-14 | Stop reason: HOSPADM

## 2025-02-09 RX ORDER — SODIUM CHLORIDE 0.9 % (FLUSH) 0.9 %
5-40 SYRINGE (ML) INJECTION EVERY 12 HOURS SCHEDULED
Status: DISCONTINUED | OUTPATIENT
Start: 2025-02-09 | End: 2025-02-14 | Stop reason: HOSPADM

## 2025-02-09 RX ORDER — BUDESONIDE 0.5 MG/2ML
0.5 INHALANT ORAL
Status: DISCONTINUED | OUTPATIENT
Start: 2025-02-09 | End: 2025-02-14 | Stop reason: HOSPADM

## 2025-02-09 RX ORDER — DILTIAZEM HYDROCHLORIDE 120 MG/1
240 CAPSULE, COATED, EXTENDED RELEASE ORAL DAILY
Status: DISCONTINUED | OUTPATIENT
Start: 2025-02-09 | End: 2025-02-14 | Stop reason: HOSPADM

## 2025-02-09 RX ORDER — ONDANSETRON 2 MG/ML
4 INJECTION INTRAMUSCULAR; INTRAVENOUS EVERY 6 HOURS PRN
Status: DISCONTINUED | OUTPATIENT
Start: 2025-02-09 | End: 2025-02-14 | Stop reason: HOSPADM

## 2025-02-09 RX ORDER — DULOXETIN HYDROCHLORIDE 30 MG/1
60 CAPSULE, DELAYED RELEASE ORAL DAILY
Status: DISCONTINUED | OUTPATIENT
Start: 2025-02-09 | End: 2025-02-14 | Stop reason: HOSPADM

## 2025-02-09 RX ORDER — DOXYCYCLINE 100 MG/1
100 CAPSULE ORAL ONCE
Status: COMPLETED | OUTPATIENT
Start: 2025-02-09 | End: 2025-02-09

## 2025-02-09 RX ORDER — POLYETHYLENE GLYCOL 3350 17 G/17G
17 POWDER, FOR SOLUTION ORAL DAILY PRN
Status: DISCONTINUED | OUTPATIENT
Start: 2025-02-09 | End: 2025-02-14 | Stop reason: HOSPADM

## 2025-02-09 RX ORDER — ACETAMINOPHEN 650 MG/1
650 SUPPOSITORY RECTAL EVERY 6 HOURS PRN
Status: DISCONTINUED | OUTPATIENT
Start: 2025-02-09 | End: 2025-02-14 | Stop reason: HOSPADM

## 2025-02-09 RX ORDER — DOXYCYCLINE 100 MG/1
100 CAPSULE ORAL EVERY 12 HOURS SCHEDULED
Status: COMPLETED | OUTPATIENT
Start: 2025-02-09 | End: 2025-02-14

## 2025-02-09 RX ORDER — BUDESONIDE 0.5 MG/2ML
500 INHALANT ORAL
Status: DISCONTINUED | OUTPATIENT
Start: 2025-02-09 | End: 2025-02-09 | Stop reason: SDUPTHER

## 2025-02-09 RX ORDER — ARFORMOTEROL TARTRATE 15 UG/2ML
15 SOLUTION RESPIRATORY (INHALATION)
Status: DISCONTINUED | OUTPATIENT
Start: 2025-02-09 | End: 2025-02-14 | Stop reason: HOSPADM

## 2025-02-09 RX ORDER — ONDANSETRON 4 MG/1
4 TABLET, ORALLY DISINTEGRATING ORAL EVERY 8 HOURS PRN
Status: DISCONTINUED | OUTPATIENT
Start: 2025-02-09 | End: 2025-02-14 | Stop reason: HOSPADM

## 2025-02-09 RX ORDER — IOPAMIDOL 755 MG/ML
70 INJECTION, SOLUTION INTRAVASCULAR
Status: COMPLETED | OUTPATIENT
Start: 2025-02-09 | End: 2025-02-09

## 2025-02-09 RX ADMIN — HYDROXYZINE HYDROCHLORIDE 10 MG: 10 TABLET, FILM COATED ORAL at 19:00

## 2025-02-09 RX ADMIN — GUAIFENESIN 400 MG: 400 TABLET ORAL at 14:14

## 2025-02-09 RX ADMIN — SODIUM CHLORIDE, PRESERVATIVE FREE 10 ML: 5 INJECTION INTRAVENOUS at 02:39

## 2025-02-09 RX ADMIN — IPRATROPIUM BROMIDE AND ALBUTEROL SULFATE 3 DOSE: 2.5; .5 SOLUTION RESPIRATORY (INHALATION) at 06:05

## 2025-02-09 RX ADMIN — GUAIFENESIN 400 MG: 400 TABLET ORAL at 11:44

## 2025-02-09 RX ADMIN — IPRATROPIUM BROMIDE AND ALBUTEROL SULFATE 1 DOSE: 2.5; .5 SOLUTION RESPIRATORY (INHALATION) at 20:38

## 2025-02-09 RX ADMIN — CALCIUM CARBONATE 500 MG: 500 TABLET, CHEWABLE ORAL at 11:45

## 2025-02-09 RX ADMIN — ENOXAPARIN SODIUM 30 MG: 100 INJECTION SUBCUTANEOUS at 11:46

## 2025-02-09 RX ADMIN — ASPIRIN 81 MG: 81 TABLET, COATED ORAL at 11:45

## 2025-02-09 RX ADMIN — DOXYCYCLINE HYCLATE 100 MG: 100 CAPSULE ORAL at 21:11

## 2025-02-09 RX ADMIN — DILTIAZEM HYDROCHLORIDE 240 MG: 120 CAPSULE, COATED, EXTENDED RELEASE ORAL at 11:46

## 2025-02-09 RX ADMIN — ARFORMOTEROL TARTRATE 15 MCG: 15 SOLUTION RESPIRATORY (INHALATION) at 09:34

## 2025-02-09 RX ADMIN — ARFORMOTEROL TARTRATE 15 MCG: 15 SOLUTION RESPIRATORY (INHALATION) at 20:38

## 2025-02-09 RX ADMIN — BUDESONIDE 500 MCG: 0.5 INHALANT RESPIRATORY (INHALATION) at 20:38

## 2025-02-09 RX ADMIN — IPRATROPIUM BROMIDE AND ALBUTEROL SULFATE 1 DOSE: 2.5; .5 SOLUTION RESPIRATORY (INHALATION) at 12:43

## 2025-02-09 RX ADMIN — ACETAMINOPHEN 650 MG: 325 TABLET ORAL at 14:14

## 2025-02-09 RX ADMIN — IOPAMIDOL 70 ML: 755 INJECTION, SOLUTION INTRAVENOUS at 02:38

## 2025-02-09 RX ADMIN — DOXYCYCLINE HYCLATE 100 MG: 100 CAPSULE ORAL at 03:38

## 2025-02-09 RX ADMIN — BUDESONIDE 500 MCG: 0.5 SUSPENSION RESPIRATORY (INHALATION) at 09:34

## 2025-02-09 RX ADMIN — FOLIC ACID 1 MG: 1 TABLET ORAL at 11:45

## 2025-02-09 RX ADMIN — WATER 40 MG: 1 INJECTION INTRAMUSCULAR; INTRAVENOUS; SUBCUTANEOUS at 15:01

## 2025-02-09 RX ADMIN — IPRATROPIUM BROMIDE AND ALBUTEROL SULFATE 1 DOSE: 2.5; .5 SOLUTION RESPIRATORY (INHALATION) at 09:34

## 2025-02-09 RX ADMIN — SODIUM CHLORIDE, PRESERVATIVE FREE 10 ML: 5 INJECTION INTRAVENOUS at 22:22

## 2025-02-09 RX ADMIN — Medication 1 CAPSULE: at 11:46

## 2025-02-09 RX ADMIN — SODIUM CHLORIDE, PRESERVATIVE FREE 5 ML: 5 INJECTION INTRAVENOUS at 12:24

## 2025-02-09 RX ADMIN — SODIUM CHLORIDE, PRESERVATIVE FREE 10 ML: 5 INJECTION INTRAVENOUS at 21:13

## 2025-02-09 RX ADMIN — GUAIFENESIN 400 MG: 400 TABLET ORAL at 21:11

## 2025-02-09 RX ADMIN — SODIUM CHLORIDE, PRESERVATIVE FREE 10 ML: 5 INJECTION INTRAVENOUS at 15:01

## 2025-02-09 RX ADMIN — ACETAMINOPHEN 650 MG: 325 TABLET ORAL at 21:09

## 2025-02-09 RX ADMIN — FUROSEMIDE 40 MG: 20 TABLET ORAL at 11:46

## 2025-02-09 RX ADMIN — WATER 2000 MG: 1 INJECTION INTRAMUSCULAR; INTRAVENOUS; SUBCUTANEOUS at 03:33

## 2025-02-09 RX ADMIN — WATER 40 MG: 1 INJECTION INTRAMUSCULAR; INTRAVENOUS; SUBCUTANEOUS at 21:11

## 2025-02-09 RX ADMIN — SODIUM CHLORIDE, PRESERVATIVE FREE 10 ML: 5 INJECTION INTRAVENOUS at 12:25

## 2025-02-09 ASSESSMENT — PAIN SCALES - GENERAL
PAINLEVEL_OUTOF10: 5
PAINLEVEL_OUTOF10: 6

## 2025-02-09 ASSESSMENT — PAIN DESCRIPTION - LOCATION: LOCATION: CHEST

## 2025-02-09 ASSESSMENT — PAIN DESCRIPTION - DESCRIPTORS: DESCRIPTORS: ACHING

## 2025-02-09 ASSESSMENT — PAIN DESCRIPTION - ORIENTATION: ORIENTATION: RIGHT

## 2025-02-09 NOTE — PROGRESS NOTES
ED called states that the doctor came down and evaluated the patient and said that she is okay for our unit.

## 2025-02-09 NOTE — ED NOTES
Pt SpO2 79% on 4L, increased to 7L NC, only improved to 86%, applied NRB and made ED resident aware. Ed resident at bedside assessing patient. Patient tolerating NRB at 99%.

## 2025-02-09 NOTE — PROGRESS NOTES
HOSPITALIST PROGRESS NOTES     ADMITTING DATE AND TIME: 2/8/2025  9:03 PM  had concerns including Shortness of Breath (Sudden onset SOB, productive cough bringing up clear phegm. Wears 4L) and Chest Pain (Left side chest pain, intermittent for last three months, but worse tonight when SOB started).    ADMIT DX: Respiratory failure, acute-on-chronic [J96.20]  Pneumonia due to organism [J18.9]      SUBJECTIVE:  Patient is being followed for Respiratory failure, acute-on-chronic [J96.20]  Pneumonia due to organism [J18.9]     Patient was seen examined and evaluated  Recent lab results, charts and pertinent diagnostic imaging reviewed   Ancillary service notes reviewed   Short of breath     Care reviewed with nursing staff, medical and surgical specialty care, primary care and the patient's family as available.   ROS: denies fever, chills, cp, n/v, HA unless stated above.      arformoterol tartrate  15 mcg Nebulization BID RT    aspirin  81 mg Oral Daily    budesonide  500 mcg Nebulization BID RT    calcium carbonate  1 tablet Oral Daily    dilTIAZem  240 mg Oral Daily    DULoxetine  60 mg Oral Daily    Vitamin D (Ergocalciferol)  50,000 Units Oral Weekly    folic acid  1 mg Oral Daily    furosemide  40 mg Oral Daily    guaiFENesin  400 mg Oral TID    Acidophilus  100 mg Oral Daily    sodium chloride flush  5-40 mL IntraVENous 2 times per day    enoxaparin  30 mg SubCUTAneous Daily    ipratropium 0.5 mg-albuterol 2.5 mg  1 Dose Inhalation Q4H WA RT    [START ON 2/10/2025] cefTRIAXone (ROCEPHIN) IV  1,000 mg IntraVENous Q24H    And    doxycycline  100 mg Oral 2 times per day    sodium chloride flush  5-40 mL IntraVENous 2 times per day     benzocaine-menthol, 1 lozenge, Q2H PRN  sodium chloride flush, 5-40 mL, PRN  sodium chloride, , PRN  ondansetron, 4 mg, Q8H PRN   Or  ondansetron, 4 mg, Q6H  PRN  polyethylene glycol, 17 g, Daily PRN  acetaminophen, 650 mg, Q6H PRN   Or  acetaminophen, 650 mg, Q6H PRN  aluminum & magnesium hydroxide-simethicone, 30 mL, Q6H PRN  sodium chloride flush, 5-40 mL, PRN  sodium chloride, , PRN         OBJECTIVE:    /60   Pulse 80   Temp 98.3 °F (36.8 °C) (Oral)   Resp 22   Ht 1.727 m (5' 8\")   Wt 50.8 kg (112 lb)   SpO2 100%   BMI 17.03 kg/m²     General Appearance: alert and oriented to person, place and time and in no acute distress  Skin: warm and dry  Head: normocephalic and atraumatic  Eyes: pupils equal, round, and reactive to light, extraocular eye movements intact, conjunctivae normal  Neck: neck supple and non tender without mass   Pulmonary/Chest: dec breath sound bl  Cardiovascular: normal rate, normal S1 and S2 and no carotid bruits  Abdomen: soft, non-tender, non-distended, normal bowel sounds, no masses or organomegaly  Extremities: no cyanosis, no clubbing and no edema  Neurologic: no cranial nerve deficit and speech normal    Recent Labs     02/08/25 2129      K 3.7   CL 93*   CO2 40*   BUN 22   CREATININE 0.8   GLUCOSE 120*   CALCIUM 8.9       Recent Labs     02/08/25 2129   WBC 21.6*   RBC 2.87*   HGB 9.3*   HCT 30.5*   .3*   MCH 32.4   MCHC 30.5*   RDW 16.9*      MPV 9.4       I/O last 3 completed shifts:  In: 10 [I.V.:10]  Out: -   No intake/output data recorded.    12/25/24    JEREMY (PRN CONTRAST/BUBBLE/3D) 01/02/2025  5:49 PM (Final)    Interpretation Summary    Left Ventricle: Normal left ventricular systolic function. EF by visual approximation is > 60%. Left ventricle size is normal.    Right Ventricle: Right ventricle size is normal. Normal systolic function.    Left Atrium: Left atrium is dilated. No left atrial appendage thrombus noted.    Interatrial Septum: No interatrial shunt visualized on bubble study.    Mitral Valve: Mild regurgitation. No stenosis noted.    No valvular vegetation visualized.    Signed by: Mario

## 2025-02-09 NOTE — PROGRESS NOTES
Patient extremely anxious. Attempted to redirect and provide calming techniques but patient states she needs something for anxiety. Dr. Gillespie secure messaged. Awaiting further orders

## 2025-02-09 NOTE — FLOWSHEET NOTE
Chart accessed for nurse to nurse.  Nurse in Er to clarify if tele needed as PT RR rate elevated and O2 needs keep fluctuating. Hx of Afib. Will call unit back

## 2025-02-09 NOTE — ED NOTES
Pt cleared to go to 5WE by DR. Gillespie. Pt was evaluated in ED before transport to non monitored floor. Pt received breathing treatment.

## 2025-02-09 NOTE — PROGRESS NOTES
4 Eyes Skin Assessment     NAME:  Lana Phillips  YOB: 1955  MEDICAL RECORD NUMBER:  [id number]    The patient is being assessed for  Admission    I agree that at least one RN has performed a thorough Head to Toe Skin Assessment on the patient. ALL assessment sites listed below have been assessed.      Areas assessed by both nurses:    Head, Face, Ears, Shoulders, Back, Chest, Arms, Elbows, Hands, Sacrum. Buttock, Coccyx, Ischium, Legs. Feet and Heels, and Under Medical Devices         Does the Patient have a Wound? No noted wound(s)    Right inner thigh ecchymosis        Guy Prevention initiated by RN: No  Wound Care Orders initiated by RN: No    Pressure Injury (Stage 3,4, Unstageable, DTI, NWPT, and Complex wounds) if present, place Wound referral order by RN under : No    New Ostomies, if present place, Ostomy referral order under : No     Nurse 1 eSignature: Electronically signed by Leticia Tineo RN on 2/9/25 at 1:53 PM EST    **SHARE this note so that the co-signing nurse can place an eSignature**    Nurse 2 eSignature: Electronically signed by Armida Mg RN on 2/9/25 at 4:43 PM EST

## 2025-02-09 NOTE — FLOWSHEET NOTE
Pt arrived to unit Resp rate elevated with accessory muscle.  Dr. Gillsepie called pt felt not best to be on a non monitor floor. Per Dr. Gillespie pt needed tele unit.

## 2025-02-09 NOTE — ED PROVIDER NOTES
/        [facility] EMERGENCY DEPARTMENT  EMERGENCY DEPARTMENT ENCOUNTER        Pt Name: Lana Phillips  MRN: [id number]  Birthdate 1955  Date of evaluation: 2/8/2025  Provider: Keila Henrandez MD  Attending Provider: Shawn Dey DO  PCP: Tal Humphrey DO  Note Started: 9:28 PM EST 2/8/25    CHIEF COMPLAINT       Chief Complaint   Patient presents with    Shortness of Breath     Sudden onset SOB, productive cough bringing up clear phegm. Wears 4L    Chest Pain     Left side chest pain, intermittent for last three months, but worse tonight when SOB started       HISTORY OF PRESENT ILLNESS: 1 or more Elements   History From: The patient        Lana Phillips is a 70 y.o. female with a past medical history of hypertension, COPD, atrial fibrillation, peripheral vascular disease, chronic respiratory failure on 4 L nasal cannula oxygen supplementation at baseline presenting with chest pain and shortness of breath.  Patient states that she has been having shortness of breath for the last several weeks.  She is been having a productive cough with clear sputum.  She states that tonight, she started having chest pain in the left side of her chest that radiates down her left arm.  Is pleuritic in nature as well.  Worse with exertion.  Her breathing also became worse.  EMS stated that she was hypoxic on 4 L and put on 6 L of oxygen supplementation.  She denies any nausea or vomiting or abdominal pain.  No hematuria or dysuria.  Patient denies anticoagulant use.  Patient denies any black or bloody stools.  No fevers or chills.  For inhaler has not been helping her symptoms last 4 weeks.    Nursing Notes were all reviewed and agreed with or any disagreements were addressed in the HPI.      REVIEW OF EXTERNAL NOTE :       Internal medicine discharge summary 1/3/2025 reviewed.  Patient was admitted for acute on chronic hypoxic hypercapnic respiratory failure and COPD.  Patient was able to

## 2025-02-09 NOTE — H&P
Hospitalist History & Physical      PCP: Tal Humphrey DO    Date of Service: Pt seen/examined on 2/9/2025 and is admitted to Inpatient with expected LOS greater than two midnights due to medical therapy.        Chief Complaint:  had concerns including Shortness of Breath (Sudden onset SOB, productive cough bringing up clear phegm. Wears 4L) and Chest Pain (Left side chest pain, intermittent for last three months, but worse tonight when SOB started).    History of Present Illness:    Ms. Lana Phillips, a 70 y.o. year old female  who  has a past medical history of Atherosclerosis 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 (HCC), Atrial fibrillation (HCC), Chronic obstructive pulmonary disease (HCC), COVID-19, Critical limb ischemia of left lower extremity with rest pain (HCC), GI bleed, Hypertension, RUTH treated with BiPAP, Panic disorder, PVD (peripheral vascular disease) (HCC), Severe protein-calorie malnutrition (HCC), Thyroid disease, and Uncontrolled hypertension.     Patient came to the ED with complaints of sob that has been going on for the last few weeks. She states that she has been having a productive cough of white sputum. Today she was having chest pain that has been radiating down her left arm. She states that her chest pain is worse when she takes in a deep breath. She is on baseline oxygen at 4L but due to hypoxia she was placed 6L, Pt at times would drift off during conversation, history taken from pt and ED notes.      ER COURSE:    She was found to have troponin level of 39, 32. CT chest WBC elevated at 21.6 chest xray negative CTA neg for PE Right greater than left dependent ground-glass and consolidative opacities  may reflect pneumonia in the correct clinical setting. She was given IV rocephin and doxy. She was incidentally found to be Coronavirus OC +       Past Medical History:        Diagnosis Date    Atherosclerosis of native arteries of left leg with  release capsule  1/25/25   Freida Welsh MD   predniSONE (DELTASONE) 20 MG tablet  2/2/25   Freida Welsh MD   clonazePAM (KLONOPIN) 0.5 MG tablet Take 1 tablet by mouth nightly as needed for Anxiety (hold if lethargic) for up to 10 days. Max Daily Amount: 0.5 mg 1/2/25 1/12/25  Ray Smiley MD   dilTIAZem (CARDIZEM CD) 240 MG extended release capsule Take 1 capsule by mouth daily 1/3/25   Ray Smiley MD   guaiFENesin 400 MG tablet Take 1 tablet by mouth 3 times daily 1/2/25   Ray Smiley MD   sodium chloride (OCEAN, BABY AYR) 0.65 % nasal spray 1 spray by Nasal route as needed for Congestion 1/2/25   Ray Smiley MD   furosemide (LASIX) 40 MG tablet Take 1 tablet by mouth daily 1/3/25   Ray Smiley MD   docusate sodium (COLACE, DULCOLAX) 100 MG CAPS Take 100 mg by mouth daily 1/3/25   Ray Smiley MD   melatonin 5 MG TBDP disintegrating tablet Take 1 tablet by mouth nightly 1/2/25 2/1/25  Ray Smiley MD   benzocaine-menthol (CEPACOL SORE THROAT) 15-3.6 MG lozenge Take 1 lozenge by mouth every 2 hours as needed for Sore Throat 1/2/25   Ray Smiley MD   Ergocalciferol (VITAMIN D) 68284 units CAPS Take 50,000 Units by mouth once a week for 6 doses 1/8/25 2/13/25  Ray Smiley MD   predniSONE (DELTASONE) 10 MG tablet 3 tabs daily x3 days, 2 tabs daily x3 days, remain on 1 tab daily 1/2/25   Jose Price MD   DULoxetine (CYMBALTA) 60 MG extended release capsule Take 1 capsule by mouth daily    Freida Welsh MD   diphenoxylate-atropine (LOMOTIL) 2.5-0.025 MG per tablet Take 1 tablet by mouth every 12 hours as needed for Diarrhea.    Freida Welsh MD   Lactobacillus (ACIDOPHILUS) 100 MG CAPS Take 100 mg by mouth    ProviderFreida MD   pregabalin (LYRICA) 75 MG capsule Take 1 capsule by mouth 2 times daily.  Patient not taking: Reported on 2/6/2025 10/8/24   Provider, MD Freida   polyethylene glycol (GLYCOLAX) 17 g packet Take 1 packet

## 2025-02-10 LAB
25(OH)D3 SERPL-MCNC: 64.8 NG/ML (ref 30–100)
ANION GAP SERPL CALCULATED.3IONS-SCNC: 11 MMOL/L (ref 7–16)
B.E.: 9.6 MMOL/L (ref -3–3)
BASOPHILS # BLD: 0 K/UL (ref 0–0.2)
BASOPHILS # BLD: 0 K/UL (ref 0–0.2)
BASOPHILS NFR BLD: 0 % (ref 0–2)
BASOPHILS NFR BLD: 0 % (ref 0–2)
BUN SERPL-MCNC: 19 MG/DL (ref 6–23)
CALCIUM SERPL-MCNC: 9.3 MG/DL (ref 8.6–10.2)
CHLORIDE SERPL-SCNC: 94 MMOL/L (ref 98–107)
CO2 SERPL-SCNC: 36 MMOL/L (ref 22–29)
COHB: 0.4 % (ref 0–1.5)
CREAT SERPL-MCNC: 0.6 MG/DL (ref 0.5–1)
CRITICAL: ABNORMAL
DATE ANALYZED: ABNORMAL
DATE OF COLLECTION: ABNORMAL
EKG ATRIAL RATE: 80 BPM
EKG P AXIS: 86 DEGREES
EKG P-R INTERVAL: 142 MS
EKG Q-T INTERVAL: 432 MS
EKG QRS DURATION: 84 MS
EKG QTC CALCULATION (BAZETT): 498 MS
EKG R AXIS: 57 DEGREES
EKG T AXIS: 72 DEGREES
EKG VENTRICULAR RATE: 80 BPM
EOSINOPHIL # BLD: 0 K/UL (ref 0.05–0.5)
EOSINOPHIL # BLD: 0 K/UL (ref 0.05–0.5)
EOSINOPHILS RELATIVE PERCENT: 0 % (ref 0–6)
EOSINOPHILS RELATIVE PERCENT: 0 % (ref 0–6)
ERYTHROCYTE [DISTWIDTH] IN BLOOD BY AUTOMATED COUNT: 17.1 % (ref 11.5–15)
ERYTHROCYTE [DISTWIDTH] IN BLOOD BY AUTOMATED COUNT: 17.3 % (ref 11.5–15)
FOLATE SERPL-MCNC: >20 NG/ML (ref 4.8–24.2)
GFR, ESTIMATED: >90 ML/MIN/1.73M2
GLUCOSE SERPL-MCNC: 132 MG/DL (ref 74–99)
HCO3: 35.6 MMOL/L (ref 22–26)
HCT VFR BLD AUTO: 34.3 % (ref 34–48)
HCT VFR BLD AUTO: 35.2 % (ref 34–48)
HGB BLD-MCNC: 10.5 G/DL (ref 11.5–15.5)
HGB BLD-MCNC: 10.8 G/DL (ref 11.5–15.5)
HHB: 0.8 % (ref 0–5)
L PNEUMO1 AG UR QL IA.RAPID: NEGATIVE
LAB: ABNORMAL
LYMPHOCYTES NFR BLD: 0.21 K/UL (ref 1.5–4)
LYMPHOCYTES NFR BLD: 0.41 K/UL (ref 1.5–4)
LYMPHOCYTES RELATIVE PERCENT: 1 % (ref 20–42)
LYMPHOCYTES RELATIVE PERCENT: 2 % (ref 20–42)
Lab: 836
MAGNESIUM SERPL-MCNC: 1.8 MG/DL (ref 1.6–2.6)
MCH RBC QN AUTO: 32.9 PG (ref 26–35)
MCH RBC QN AUTO: 33 PG (ref 26–35)
MCHC RBC AUTO-ENTMCNC: 30.6 G/DL (ref 32–34.5)
MCHC RBC AUTO-ENTMCNC: 30.7 G/DL (ref 32–34.5)
MCV RBC AUTO: 107.5 FL (ref 80–99.9)
MCV RBC AUTO: 107.6 FL (ref 80–99.9)
METHB: 0.4 % (ref 0–1.5)
MODE: ABNORMAL
MONOCYTES NFR BLD: 0.21 K/UL (ref 0.1–0.95)
MONOCYTES NFR BLD: 0.41 K/UL (ref 0.1–0.95)
MONOCYTES NFR BLD: 1 % (ref 2–12)
MONOCYTES NFR BLD: 2 % (ref 2–12)
NEUTROPHILS NFR BLD: 95 % (ref 43–80)
NEUTROPHILS NFR BLD: 98 % (ref 43–80)
NEUTS SEG NFR BLD: 22.96 K/UL (ref 1.8–7.3)
NEUTS SEG NFR BLD: 24.07 K/UL (ref 1.8–7.3)
O2 SATURATION: 99.2 % (ref 92–98.5)
O2HB: 98.4 % (ref 94–97)
OPERATOR ID: 1768
PATIENT TEMP: 37 C
PCO2: 55.7 MMHG (ref 35–45)
PH BLOOD GAS: 7.42 (ref 7.35–7.45)
PLATELET # BLD AUTO: 398 K/UL (ref 130–450)
PLATELET # BLD AUTO: 419 K/UL (ref 130–450)
PMV BLD AUTO: 9.7 FL (ref 7–12)
PMV BLD AUTO: 9.9 FL (ref 7–12)
PO2: 160.6 MMHG (ref 75–100)
POTASSIUM SERPL-SCNC: 3.5 MMOL/L (ref 3.5–5)
PROMYELOCYTES ABSOLUTE COUNT: 0.41 K/UL
PROMYELOCYTES: 2 %
RBC # BLD AUTO: 3.19 M/UL (ref 3.5–5.5)
RBC # BLD AUTO: 3.27 M/UL (ref 3.5–5.5)
RBC # BLD: ABNORMAL 10*6/UL
S PNEUM AG SPEC QL: NEGATIVE
SODIUM SERPL-SCNC: 141 MMOL/L (ref 132–146)
SOURCE, BLOOD GAS: ABNORMAL
SPECIMEN SOURCE: NORMAL
T4 FREE SERPL-MCNC: 1 NG/DL (ref 0.9–1.7)
THB: 11.4 G/DL (ref 11.5–16.5)
TIME ANALYZED: 838
TSH SERPL DL<=0.05 MIU/L-ACNC: 0.25 UIU/ML (ref 0.27–4.2)
VIT B12 SERPL-MCNC: 1341 PG/ML (ref 211–946)
WBC OTHER # BLD: 24.2 K/UL (ref 4.5–11.5)
WBC OTHER # BLD: 24.5 K/UL (ref 4.5–11.5)

## 2025-02-10 PROCEDURE — 2060000000 HC ICU INTERMEDIATE R&B

## 2025-02-10 PROCEDURE — 6370000000 HC RX 637 (ALT 250 FOR IP): Performed by: INTERNAL MEDICINE

## 2025-02-10 PROCEDURE — 80048 BASIC METABOLIC PNL TOTAL CA: CPT

## 2025-02-10 PROCEDURE — 93010 ELECTROCARDIOGRAM REPORT: CPT | Performed by: INTERNAL MEDICINE

## 2025-02-10 PROCEDURE — 6360000002 HC RX W HCPCS: Performed by: INTERNAL MEDICINE

## 2025-02-10 PROCEDURE — 94640 AIRWAY INHALATION TREATMENT: CPT

## 2025-02-10 PROCEDURE — 84439 ASSAY OF FREE THYROXINE: CPT

## 2025-02-10 PROCEDURE — 84443 ASSAY THYROID STIM HORMONE: CPT

## 2025-02-10 PROCEDURE — 85025 COMPLETE CBC W/AUTO DIFF WBC: CPT

## 2025-02-10 PROCEDURE — 5A09357 ASSISTANCE WITH RESPIRATORY VENTILATION, LESS THAN 24 CONSECUTIVE HOURS, CONTINUOUS POSITIVE AIRWAY PRESSURE: ICD-10-PCS | Performed by: INTERNAL MEDICINE

## 2025-02-10 PROCEDURE — 36600 WITHDRAWAL OF ARTERIAL BLOOD: CPT

## 2025-02-10 PROCEDURE — 2500000003 HC RX 250 WO HCPCS: Performed by: NURSE PRACTITIONER

## 2025-02-10 PROCEDURE — 6360000002 HC RX W HCPCS: Performed by: NURSE PRACTITIONER

## 2025-02-10 PROCEDURE — 36415 COLL VENOUS BLD VENIPUNCTURE: CPT

## 2025-02-10 PROCEDURE — 2700000000 HC OXYGEN THERAPY PER DAY

## 2025-02-10 PROCEDURE — 83735 ASSAY OF MAGNESIUM: CPT

## 2025-02-10 PROCEDURE — 82746 ASSAY OF FOLIC ACID SERUM: CPT

## 2025-02-10 PROCEDURE — 6370000000 HC RX 637 (ALT 250 FOR IP): Performed by: NURSE PRACTITIONER

## 2025-02-10 PROCEDURE — 2500000003 HC RX 250 WO HCPCS: Performed by: INTERNAL MEDICINE

## 2025-02-10 PROCEDURE — 82805 BLOOD GASES W/O2 SATURATION: CPT

## 2025-02-10 PROCEDURE — 94660 CPAP INITIATION&MGMT: CPT

## 2025-02-10 PROCEDURE — 82306 VITAMIN D 25 HYDROXY: CPT

## 2025-02-10 PROCEDURE — 2500000003 HC RX 250 WO HCPCS

## 2025-02-10 PROCEDURE — 82607 VITAMIN B-12: CPT

## 2025-02-10 PROCEDURE — 99232 SBSQ HOSP IP/OBS MODERATE 35: CPT | Performed by: INTERNAL MEDICINE

## 2025-02-10 RX ORDER — MORPHINE SULFATE 2 MG/ML
2 INJECTION, SOLUTION INTRAMUSCULAR; INTRAVENOUS ONCE
Status: COMPLETED | OUTPATIENT
Start: 2025-02-10 | End: 2025-02-10

## 2025-02-10 RX ADMIN — GUAIFENESIN 400 MG: 400 TABLET ORAL at 21:37

## 2025-02-10 RX ADMIN — IPRATROPIUM BROMIDE AND ALBUTEROL SULFATE 1 DOSE: 2.5; .5 SOLUTION RESPIRATORY (INHALATION) at 17:08

## 2025-02-10 RX ADMIN — SODIUM CHLORIDE, PRESERVATIVE FREE 10 ML: 5 INJECTION INTRAVENOUS at 21:39

## 2025-02-10 RX ADMIN — DOXYCYCLINE HYCLATE 100 MG: 100 CAPSULE ORAL at 21:37

## 2025-02-10 RX ADMIN — IPRATROPIUM BROMIDE AND ALBUTEROL SULFATE 1 DOSE: 2.5; .5 SOLUTION RESPIRATORY (INHALATION) at 02:38

## 2025-02-10 RX ADMIN — GUAIFENESIN 400 MG: 400 TABLET ORAL at 08:52

## 2025-02-10 RX ADMIN — CALCIUM CARBONATE 500 MG: 500 TABLET, CHEWABLE ORAL at 08:52

## 2025-02-10 RX ADMIN — MORPHINE SULFATE 2 MG: 2 INJECTION, SOLUTION INTRAMUSCULAR; INTRAVENOUS at 10:22

## 2025-02-10 RX ADMIN — DULOXETINE HYDROCHLORIDE 60 MG: 30 CAPSULE, DELAYED RELEASE ORAL at 08:51

## 2025-02-10 RX ADMIN — ASPIRIN 81 MG: 81 TABLET, COATED ORAL at 08:52

## 2025-02-10 RX ADMIN — SODIUM CHLORIDE, PRESERVATIVE FREE 10 ML: 5 INJECTION INTRAVENOUS at 21:37

## 2025-02-10 RX ADMIN — BUDESONIDE 500 MCG: 0.5 INHALANT RESPIRATORY (INHALATION) at 08:19

## 2025-02-10 RX ADMIN — BUDESONIDE 500 MCG: 0.5 INHALANT RESPIRATORY (INHALATION) at 20:21

## 2025-02-10 RX ADMIN — FUROSEMIDE 40 MG: 20 TABLET ORAL at 08:52

## 2025-02-10 RX ADMIN — MORPHINE SULFATE 2 MG: 2 INJECTION, SOLUTION INTRAMUSCULAR; INTRAVENOUS at 04:18

## 2025-02-10 RX ADMIN — FOLIC ACID 1 MG: 1 TABLET ORAL at 08:52

## 2025-02-10 RX ADMIN — HYDROXYZINE HYDROCHLORIDE 10 MG: 10 TABLET, FILM COATED ORAL at 18:11

## 2025-02-10 RX ADMIN — POLYETHYLENE GLYCOL 3350 17 G: 17 POWDER, FOR SOLUTION ORAL at 18:11

## 2025-02-10 RX ADMIN — MORPHINE SULFATE 2 MG: 2 INJECTION, SOLUTION INTRAMUSCULAR; INTRAVENOUS at 14:03

## 2025-02-10 RX ADMIN — WATER 40 MG: 1 INJECTION INTRAMUSCULAR; INTRAVENOUS; SUBCUTANEOUS at 12:57

## 2025-02-10 RX ADMIN — HYDROXYZINE HYDROCHLORIDE 10 MG: 10 TABLET, FILM COATED ORAL at 01:29

## 2025-02-10 RX ADMIN — Medication 1 CAPSULE: at 08:52

## 2025-02-10 RX ADMIN — ARFORMOTEROL TARTRATE 15 MCG: 15 SOLUTION RESPIRATORY (INHALATION) at 20:21

## 2025-02-10 RX ADMIN — ONDANSETRON 4 MG: 4 TABLET, ORALLY DISINTEGRATING ORAL at 18:11

## 2025-02-10 RX ADMIN — IPRATROPIUM BROMIDE AND ALBUTEROL SULFATE 1 DOSE: 2.5; .5 SOLUTION RESPIRATORY (INHALATION) at 20:21

## 2025-02-10 RX ADMIN — DILTIAZEM HYDROCHLORIDE 240 MG: 120 CAPSULE, COATED, EXTENDED RELEASE ORAL at 08:52

## 2025-02-10 RX ADMIN — ARFORMOTEROL TARTRATE 15 MCG: 15 SOLUTION RESPIRATORY (INHALATION) at 08:18

## 2025-02-10 RX ADMIN — GUAIFENESIN 400 MG: 400 TABLET ORAL at 12:58

## 2025-02-10 RX ADMIN — IPRATROPIUM BROMIDE AND ALBUTEROL SULFATE 1 DOSE: 2.5; .5 SOLUTION RESPIRATORY (INHALATION) at 13:14

## 2025-02-10 RX ADMIN — SENNOSIDES 8.6 MG: 8.6 TABLET, COATED ORAL at 21:37

## 2025-02-10 RX ADMIN — IPRATROPIUM BROMIDE AND ALBUTEROL SULFATE 1 DOSE: 2.5; .5 SOLUTION RESPIRATORY (INHALATION) at 08:19

## 2025-02-10 RX ADMIN — DOXYCYCLINE HYCLATE 100 MG: 100 CAPSULE ORAL at 08:52

## 2025-02-10 RX ADMIN — WATER 40 MG: 1 INJECTION INTRAMUSCULAR; INTRAVENOUS; SUBCUTANEOUS at 05:47

## 2025-02-10 RX ADMIN — WATER 40 MG: 1 INJECTION INTRAMUSCULAR; INTRAVENOUS; SUBCUTANEOUS at 21:38

## 2025-02-10 RX ADMIN — ENOXAPARIN SODIUM 30 MG: 100 INJECTION SUBCUTANEOUS at 08:52

## 2025-02-10 RX ADMIN — WATER 1000 MG: 1 INJECTION INTRAMUSCULAR; INTRAVENOUS; SUBCUTANEOUS at 08:51

## 2025-02-10 RX ADMIN — SENNOSIDES 8.6 MG: 8.6 TABLET, COATED ORAL at 00:54

## 2025-02-10 ASSESSMENT — PAIN DESCRIPTION - ORIENTATION
ORIENTATION: OTHER (COMMENT)
ORIENTATION: OTHER (COMMENT)
ORIENTATION: LEFT

## 2025-02-10 ASSESSMENT — PAIN DESCRIPTION - PAIN TYPE: TYPE: ACUTE PAIN

## 2025-02-10 ASSESSMENT — ENCOUNTER SYMPTOMS
WHEEZING: 1
EYES NEGATIVE: 1
SHORTNESS OF BREATH: 1
COUGH: 1
CONSTIPATION: 1

## 2025-02-10 ASSESSMENT — PAIN - FUNCTIONAL ASSESSMENT: PAIN_FUNCTIONAL_ASSESSMENT: ACTIVITIES ARE NOT PREVENTED

## 2025-02-10 ASSESSMENT — PAIN DESCRIPTION - DESCRIPTORS
DESCRIPTORS: OTHER (COMMENT)
DESCRIPTORS: ACHING;DISCOMFORT;TIGHTNESS

## 2025-02-10 ASSESSMENT — PAIN SCALES - GENERAL
PAINLEVEL_OUTOF10: 0
PAINLEVEL_OUTOF10: 4

## 2025-02-10 ASSESSMENT — PAIN DESCRIPTION - LOCATION
LOCATION: CHEST
LOCATION: OTHER (COMMENT)
LOCATION: OTHER (COMMENT)

## 2025-02-10 NOTE — PLAN OF CARE
Problem: Chronic Conditions and Co-morbidities  Goal: Patient's chronic conditions and co-morbidity symptoms are monitored and maintained or improved  2/10/2025 1333 by Radha Rogers RN  Outcome: Progressing  Flowsheets (Taken 2/10/2025 0800)  Care Plan - Patient's Chronic Conditions and Co-Morbidity Symptoms are Monitored and Maintained or Improved: Monitor and assess patient's chronic conditions and comorbid symptoms for stability, deterioration, or improvement  2/10/2025 0641 by Lisa Rosario RN  Outcome: Progressing     Problem: Discharge Planning  Goal: Discharge to home or other facility with appropriate resources  Recent Flowsheet Documentation  Taken 2/10/2025 0800 by Radha Rogers RN  Discharge to home or other facility with appropriate resources: Identify barriers to discharge with patient and caregiver  2/10/2025 0641 by Lisa Rosario RN  Outcome: Progressing     Problem: Safety - Adult  Goal: Free from fall injury  2/10/2025 0641 by Lisa Rosario RN  Outcome: Progressing     Problem: ABCDS Injury Assessment  Goal: Absence of physical injury  2/10/2025 0641 by Lisa Rosario RN  Outcome: Progressing     Problem: Pain  Goal: Verbalizes/displays adequate comfort level or baseline comfort level  2/10/2025 0641 by Lisa Rosario RN  Outcome: Progressing     Problem: Respiratory - Adult  Goal: Achieves optimal ventilation and oxygenation  2/10/2025 0641 by Lisa Rosario RN  Outcome: Progressing     Problem: Cardiovascular - Adult  Goal: Maintains optimal cardiac output and hemodynamic stability  Recent Flowsheet Documentation  Taken 2/10/2025 0800 by Radha Rogers RN  Maintains optimal cardiac output and hemodynamic stability: Monitor blood pressure and heart rate  2/10/2025 0641 by Lisa Rosario RN  Outcome: Progressing  Goal: Absence of cardiac dysrhythmias or at baseline  Recent Flowsheet  Documentation  Taken 2/10/2025 0800 by Radha Rogers, RN  Absence of cardiac dysrhythmias or at baseline: Monitor cardiac rate and rhythm  2/10/2025 0641 by Lisa Rosario RN  Outcome: Progressing

## 2025-02-10 NOTE — PROGRESS NOTES
Occupational Therapy  Received OT order, Reviewed chart, spoke with RN who requested therapy be held d/t pt's current medical status/respiratory status.  Will attempt assessment at a later time.  Thank you for this referral. YOAV Kendall, OTR/L  # [id number]

## 2025-02-10 NOTE — DISCHARGE INSTR - COC
Continuity of Care Form    Patient Name: Lana Phillips   :  1955  MRN:  [id number]    Admit date:  2025  Discharge date:  2025    Code Status Order: Full Code   Advance Directives:   Advance Care Flowsheet Documentation             Admitting Physician:  No admitting provider for patient encounter.  PCP: Tal Humphrey DO    Discharging Nurse: Arcelia Velásquez Rn  Discharging Hospital Unit/Room#: 4504/4504-A  Discharging Unit Phone Number: [phone]      Emergency Contact:   Extended Emergency Contact Information  Primary Emergency Contact: Lana Reeves  Address: [address] [place] [address] [place] [address]  Home Phone: [phone]  Work Phone: [phone]  Mobile Phone: [phone]  Relation: Other Relative  Preferred language: English   needed? No  Secondary Emergency Contact: sue ward  Mobile Phone: [phone]  Relation: Brother/Sister  Preferred language: English   needed? No    Past Surgical History:  Past Surgical History:   Procedure Laterality Date    BACK SURGERY      x2    BRONCHOSCOPY N/A 2024    BRONCHOSCOPY DIAGNOSTIC OR CELL WASH ONLY performed by Jose Price MD at [facility] ENDOSCOPY    INVASIVE VASCULAR N/A 2024    Aortagram abdominal performed by Lazaro Vidales MD at [facility] CARDIAC CATH LAB    INVASIVE VASCULAR N/A 2024    Angioplasty peripheral artery performed by Lazaro Vidales MD at [facility] [place] [facility] CARDIAC CATH LAB    LEFT OOPHORECTOMY      PAIN MANAGEMENT PROCEDURE N/A 2023    CAUDAL EPIDURAL STEROID INJECTION performed by Lilia Cabrera DO at [facility] OR    PAIN MANAGEMENT PROCEDURE N/A 2024    THERAPEUTIC CAUDAL EPIDURAL UNDER FLUOROSCOPIC GUIDANCE performed by Lilia Cabrera DO at [facility] OR    UPPER GASTROINTESTINAL ENDOSCOPY N/A 10/16/2023    EGD ESOPHAGOGASTRODUODENOSCOPY performed by Willie Chow DO at [facility] ENDOSCOPY       Immunization History:   Immunization History   Administered Date(s) Administered  J96.12    Atherosclerosis of both carotid arteries I65.23    Pneumonia of both lungs due to infectious organism J18.9    Lactic acid acidosis E87.20    DERIAN (acute kidney injury) ([facility]) N17.9    Fall at home W19.XXXA, Y92.009    Palliative care encounter Z51.5    Bacteremia R78.81    Sepsis ([facility]) A41.9    Respiratory failure, acute-on-chronic J96.20       Isolation/Infection:   Isolation            No Isolation          Patient Infection Status       Infection Onset Added Last Indicated Last Indicated By Review Planned Expiration Resolved Resolved By    None active    Resolved    Rhinovirus 24 Respiratory Panel, Molecular, with COVID-19 (Restricted: peds pts or suitable admitted adults)   24 Shawna Lomeli RN    Current guidelines    Influenza 22 Rapid influenza A/B antigens   12/15/22 Infection     COVID-19 22 COVID-19, Rapid   22 Infection                        Nurse Assessment:  Last Vital Signs: BP (!) 156/98   Pulse 88   Temp 97.8 °F (36.6 °C) (Oral)   Resp 26   Ht 1.727 m (5' 8\")   Wt 50.8 kg (112 lb)   SpO2 94%   BMI 17.03 kg/m²     Last documented pain score (0-10 scale): Pain Level: 0  Last Weight:   Wt Readings from Last 1 Encounters:   25 50.8 kg (112 lb)     Mental Status:  oriented and alert    IV Access:  - None    Nursing Mobility/ADLs:  Walking   Assisted  Transfer  Assisted  Bathing  Assisted  Dressing  Assisted  Toileting  Assisted  Feeding  Assisted  Med Admin  Assisted  Med Delivery   whole    Wound Care Documentation and Therapy:  Puncture 24 Back (Active)   Number of days: 87        Elimination:  Continence:   Bowel: Yes  Bladder: No  Urinary Catheter: None   Colostomy/Ileostomy/Ileal Conduit: No       Date of Last BM: 2025    Intake/Output Summary (Last 24 hours) at 2/10/2025 1259  Last data filed at 2/10/2025 0631  Gross per 24 hour   Intake --   Output 200 ml   Net -200

## 2025-02-10 NOTE — CARE COORDINATION
Transition of Care: Pt admitted from [place] with SOB.  [facility] 293, CTA Pulm (-) for PE, showed PNA. IV Rocephin and PO Doxy started. Pulm consulted. PO Lasix daily. Pt is long term care at [place], no precert needed to return. PENNY/destination completed. Transport envelope on soft chart (TF)        ALBERTO CuellarN,RN  Case Management  [phone]

## 2025-02-10 NOTE — ACP (ADVANCE CARE PLANNING)
Advance Care Planning   Healthcare Decision Maker:    Primary Decision Maker: OtisLana shah - Other Relative - [phone]    Secondary Decision Maker: Nia Salas - Niece/Nephew - [phone]    Click here to complete Healthcare Decision Makers including selection of the Healthcare Decision Maker Relationship (ie \"Primary\").

## 2025-02-10 NOTE — PROGRESS NOTES
Consult received however patient is known to Dr. Christopher Rider with [facility]. Nursing communication order placed and consultation switched.  Please contact [facility] oncology for further heme/onc needs.Thank you.    Desi DELUNA- CNP  [facility]

## 2025-02-10 NOTE — CONSULTS
Viridiana Rider MD   ·   MD Larisa Arana APRN  ·  REGI Kaye      [facility] in [place]  [address] [place] [address]  P: [phone]  F: [phone] [place] [facility] [place]  [address] [place] [address]  P: [phone]  F: [phone]  [facility] [place]  [address] [place] [address]  P: [phone]  F: [phone]           Inpatient Medical Oncology/Hematology Consult Note            Patient Name: Lana Phillips  YOB: 1955    DATE OF ADMISSION: 2/8/2025  DATE OF CONSULTATION: 2/10/2025   CONSULTING PROVIDER: Dr. Gillespie   REASON FOR CONSULTATION: leukmoid reaction   PCP: Tal Humphrey    Room: [facility]      CHIEF COMPLAINT:  Cough     HISTORY OF PRESENT ILLNESS  Patient is a 70 y.o. female  with PMH A-fib, on chronic anticoagulation with Eliquis COPD, O2 dependent, hypertension, hyperlipidemia, vitamin B12 deficiency, and hyperparathyroidism , recent hospitalization 12/25/2025- 1/3/2025 sob , treated for PNA d/c to a skilled facility . Our service has been consulted for leukmoid reaction . Leukocytosis at admit were 21.6, per epic review pt was d/c to facility with elevated WBC 17.5. anemia 10.5, plts 419.   Pt reports 1 week with increased cough similar to her cough she had in January , she reports no fever chills or night sweats, but her SOB has increased significantly despite steroids and breathing treatments  . Reports that she has been unable to gain any weight at the nursing home facility and has felt worse recently with resp panel being + coronavirus on 2/6 . Also states she feels constipated. Pt seen in room ,  is on 9 L NC and A& O for me , she does have a sitter , she has tried to ambulate to the bathroom and then had resulting confusion , hypoxia .     Patient is seen by Dr. Christopher Rider in office , last office visit was on 2/6/2024 for anemia and unexplained weight loss diagnosis of iron  Pos cologuadrew    referred to dr dumas but pt refused to go    Pt evicted from apartment-----failed to see specialist    Admit 2022 with exacerbation of COPD,   stress test was positive atrial fibrillation started Eliquis    Dr Cody     then on  with influenza A in  for constipation    Will discharge again 1218 with right lower lobe pneumonia COPD    Pulmonary evaluation Dr. Figueroa 2023    Vit b  12  defic      Brother        69 yrs old cva--pt   now von live with his ex------last name aj---his wife Lana---- is sister with  Silvina Leyva my pt    Colon     [facility]----    Anemia     refer to hem    Numb feet      sicne --ordered  emg    Admit wit gi bleed   10-23   w ork up with dr longoria    camera swallow    Anemia-----------------------------------------------------dr Christopher davenport    Admit   with exac copd and gerardo vas dis    Arteriogram     dr BUTLER-----surgery       Admit    COPD flare    Chg  paxil to  celexa   and inc   desyrel 100 mg   3-24     Social Determinants of Health     Financial Resource Strain: Low Risk  (2023)    Overall Financial Resource Strain (CARDIA)     Difficulty of Paying Living Expenses: Not hard at all   Food Insecurity: No Food Insecurity (2025)    Hunger Vital Sign     Worried About Running Out of Food in the Last Year: Never true     Ran Out of Food in the Last Year: Never true   Transportation Needs: No Transportation Needs (2025)    PRAPARE - Transportation     Lack of Transportation (Medical): No     Lack of Transportation (Non-Medical): No   Physical Activity: Inactive (10/6/2023)    Exercise Vital Sign     Days of Exercise per Week: 0 days     Minutes of Exercise per Session: 0 min   Housing Stability: Low Risk  (2025)    Housing Stability Vital Sign     Unable to Pay for Housing in the Last Year: No     Number of Times Moved in the Last Year: 1     Homeless in

## 2025-02-10 NOTE — PROGRESS NOTES
4 Eyes Skin Assessment     NAME:  Lana Phillips  YOB: 1955  MEDICAL RECORD NUMBER:  [id number]    The patient is being assessed for  Admission    I agree that at least one RN has performed a thorough Head to Toe Skin Assessment on the patient. ALL assessment sites listed below have been assessed.      Areas assessed by both nurses:    Head, Face, Ears, Shoulders, Back, Chest, Arms, Elbows, Hands, Sacrum. Buttock, Coccyx, Ischium, and Legs. Feet and Heels        Does the Patient have a Wound? No noted wound(s)       Guy Prevention initiated by RN: No  Wound Care Orders initiated by RN: No    Pressure Injury (Stage 3,4, Unstageable, DTI, NWPT, and Complex wounds) if present, place Wound referral order by RN under : No    New Ostomies, if present place, Ostomy referral order under : No     Nurse 1 eSignature: Electronically signed by Elza Lawrence RN on 2/9/25 at 7:43 PM EST    **SHARE this note so that the co-signing nurse can place an eSignature**    Nurse 2 eSignature: {Esignature:[id number]}

## 2025-02-10 NOTE — PLAN OF CARE
Problem: Chronic Conditions and Co-morbidities  Goal: Patient's chronic conditions and co-morbidity symptoms are monitored and maintained or improved  Outcome: Progressing  Flowsheets (Taken 2/9/2025 1957)  Care Plan - Patient's Chronic Conditions and Co-Morbidity Symptoms are Monitored and Maintained or Improved: Monitor and assess patient's chronic conditions and comorbid symptoms for stability, deterioration, or improvement     Problem: Discharge Planning  Goal: Discharge to home or other facility with appropriate resources  Outcome: Progressing  Flowsheets (Taken 2/9/2025 1957)  Discharge to home or other facility with appropriate resources: Identify barriers to discharge with patient and caregiver     Problem: Safety - Adult  Goal: Free from fall injury  Outcome: Progressing     Problem: ABCDS Injury Assessment  Goal: Absence of physical injury  2/9/2025 2002 by Lisa Rosario RN  Outcome: Progressing  2/9/2025 1941 by Elza Lawrence RN  Outcome: Progressing     Problem: Respiratory - Adult  Goal: Achieves optimal ventilation and oxygenation  2/9/2025 2002 by Lisa Rosario RN  Outcome: Progressing  Flowsheets (Taken 2/9/2025 1957)  Achieves optimal ventilation and oxygenation: Assess for changes in respiratory status  2/9/2025 1941 by Elza Lawrence RN  Outcome: Progressing     Problem: Pain  Goal: Verbalizes/displays adequate comfort level or baseline comfort level  2/9/2025 2002 by Lisa Rosario RN  Outcome: Progressing  Flowsheets (Taken 2/9/2025 1957)  Verbalizes/displays adequate comfort level or baseline comfort level: Encourage patient to monitor pain and request assistance  2/9/2025 1941 by Elza Lawrence, RN  Outcome: Progressing     Problem: Cardiovascular - Adult  Goal: Maintains optimal cardiac output and hemodynamic stability  2/9/2025 2002 by Lisa Rosario, RN  Outcome: Progressing  Flowsheets (Taken 2/9/2025 1957)  Maintains optimal cardiac  output and hemodynamic stability: Monitor blood pressure and heart rate  2/9/2025 1941 by Elza Lawrence RN  Outcome: Progressing  Goal: Absence of cardiac dysrhythmias or at baseline  2/9/2025 2002 by Lisa Rosario RN  Outcome: Progressing  Flowsheets (Taken 2/9/2025 1957)  Absence of cardiac dysrhythmias or at baseline: Monitor cardiac rate and rhythm  2/9/2025 1941 by Elza Lawrence RN  Outcome: Progressing     Problem: Respiratory - Adult  Goal: Achieves optimal ventilation and oxygenation  2/9/2025 2002 by Lisa Rosario RN  Outcome: Progressing  Flowsheets (Taken 2/9/2025 1957)  Achieves optimal ventilation and oxygenation: Assess for changes in respiratory status  2/9/2025 1941 by Elza Lawrence RN  Outcome: Progressing     Problem: Cardiovascular - Adult  Goal: Maintains optimal cardiac output and hemodynamic stability  2/9/2025 2002 by Lisa Rosario RN  Outcome: Progressing  Flowsheets (Taken 2/9/2025 1957)  Maintains optimal cardiac output and hemodynamic stability: Monitor blood pressure and heart rate  2/9/2025 1941 by Elza Lawrence RN  Outcome: Progressing  Goal: Absence of cardiac dysrhythmias or at baseline  2/9/2025 2002 by Lisa Rosario RN  Outcome: Progressing  Flowsheets (Taken 2/9/2025 1957)  Absence of cardiac dysrhythmias or at baseline: Monitor cardiac rate and rhythm  2/9/2025 1941 by Elza Lawrence RN  Outcome: Progressing

## 2025-02-10 NOTE — PLAN OF CARE
Problem: Safety - Adult  Goal: Free from fall injury  2/10/2025 0641 by Lisa Rosario, RN  Outcome: Progressing  2/9/2025 2002 by Lisa Rosario, RN  Outcome: Progressing

## 2025-02-10 NOTE — PROGRESS NOTES
ID consult sent to answering service. Dr. Banerjee on. Electronically signed by Becky Kennedy RN on 2/10/2025 at 4:48 PM

## 2025-02-10 NOTE — CONSULTS
[facility]  [address], [address]  [place] [address]    Pulmonary Consultation      Reason for Consult:  sob    Requesting Physician:  Tal Hupmhrey DO    CHIEF COMPLAINT:  sob    History Obtained From:  patient    HISTORY OF PRESENT ILLNESS:                The patient is a 70 y.o. female with significant past medical history of COPD and hypoxia who presents with increased sob and cough for about 2 days, mentions some chest pain with sob. Increased oxygen requirements since admission, placed on NIPPV last night, was on 9 li HFNC with good saturations but placed on NIPPV 12/6 60% due to air hunger, also received some morphine. Feeling slightly better with NIPPV use, still with cough but minimal sputum production. Was on NIV 12-25/8 GC=434 Fio2=40% at last hospitalization    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 ([facility]) 01/19/2024    Atrial fibrillation (HCC)     Chronic obstructive pulmonary disease ([facility]) 12/15/2022    COVID-19 07/16/2022    Critical limb ischemia of left lower extremity with rest pain ([facility]) 12/25/2023    GI bleed 10/14/2023    Hypertension     RUTH treated with BiPAP     Panic disorder     PVD (peripheral vascular disease) ([facility]) 01/19/2024    Severe protein-calorie malnutrition ([facility]) 06/26/2023    Thyroid disease     Uncontrolled hypertension        Past Surgical History:        Procedure Laterality Date    BACK SURGERY      x2    BRONCHOSCOPY N/A 4/16/2024    BRONCHOSCOPY DIAGNOSTIC OR CELL WASH ONLY performed by Jose Price MD at [facility] ENDOSCOPY    INVASIVE VASCULAR N/A 1/19/2024    Aortagram abdominal performed by Lazaro Vidales MD at [facility] CARDIAC CATH LAB    INVASIVE VASCULAR N/A 1/19/2024    Angioplasty peripheral artery performed by Lazaro Vidales MD at [facility] CARDIAC CATH LAB    LEFT OOPHORECTOMY      PAIN  minutes.    Thanks for letting us see this patient in consultation.  Please contact us with any questions. Office [phone] or after hours through [facility], x [phone].

## 2025-02-10 NOTE — PROGRESS NOTES
Physical Therapy    Date: 2/10/2025       Patient Name: Lana Phillips  : 1955      MRN: [id number]    Physical therapy order received and chart reviewed. PT evaluation held this AM per RN /2 current medical status.  Will follow up as appropriate.     Susan García PT, DPT  [id number]

## 2025-02-10 NOTE — PROGRESS NOTES
HOSPITALIST PROGRESS NOTES     ADMITTING DATE AND TIME: 2/8/2025  9:03 PM  had concerns including Shortness of Breath (Sudden onset SOB, productive cough bringing up clear phegm. Wears 4L) and Chest Pain (Left side chest pain, intermittent for last three months, but worse tonight when SOB started).    ADMIT DX: Pneumonia due to organism [J18.9]  Respiratory failure, acute-on-chronic [J96.20]  Coronavirus infection [B34.2]  Acute on chronic respiratory failure with hypoxia [J96.21]  Pneumonia due to infectious organism, unspecified laterality, unspecified part of lung [J18.9]      SUBJECTIVE:  Patient is being followed for Pneumonia due to organism [J18.9]  Respiratory failure, acute-on-chronic [J96.20]  Coronavirus infection [B34.2]  Acute on chronic respiratory failure with hypoxia [J96.21]  Pneumonia due to infectious organism, unspecified laterality, unspecified part of lung [J18.9]     Patient was seen examined and evaluated  Recent lab results, charts and pertinent diagnostic imaging reviewed   Ancillary service notes reviewed   Short of breath     Care reviewed with nursing staff, medical and surgical specialty care, primary care and the patient's family as available.   ROS: denies fever, chills, cp, n/v, HA unless stated above.      aspirin  81 mg Oral Daily    dilTIAZem  240 mg Oral Daily    DULoxetine  60 mg Oral Daily    [START ON 2/12/2025] Vitamin D (Ergocalciferol)  50,000 Units Oral Weekly    folic acid  1 mg Oral Daily    furosemide  40 mg Oral Daily    guaiFENesin  400 mg Oral TID    sodium chloride flush  5-40 mL IntraVENous 2 times per day    enoxaparin  30 mg SubCUTAneous Daily    cefTRIAXone (ROCEPHIN) IV  1,000 mg IntraVENous Q24H    And    doxycycline  100 mg Oral 2 times per day    lactobacillus  1 capsule Oral Daily    calcium carbonate  500 mg Oral Daily

## 2025-02-11 PROBLEM — D72.829 LEUKOCYTOSIS: Status: ACTIVE | Noted: 2025-02-11

## 2025-02-11 LAB
ANION GAP SERPL CALCULATED.3IONS-SCNC: 10 MMOL/L (ref 7–16)
BASOPHILS # BLD: 0.02 K/UL (ref 0–0.2)
BASOPHILS NFR BLD: 0 % (ref 0–2)
BUN SERPL-MCNC: 23 MG/DL (ref 6–23)
CALCIUM SERPL-MCNC: 9.2 MG/DL (ref 8.6–10.2)
CHLORIDE SERPL-SCNC: 94 MMOL/L (ref 98–107)
CO2 SERPL-SCNC: 38 MMOL/L (ref 22–29)
CREAT SERPL-MCNC: 0.7 MG/DL (ref 0.5–1)
EKG ATRIAL RATE: 79 BPM
EKG P AXIS: 86 DEGREES
EKG P-R INTERVAL: 138 MS
EKG Q-T INTERVAL: 440 MS
EKG QRS DURATION: 76 MS
EKG QTC CALCULATION (BAZETT): 504 MS
EKG R AXIS: 54 DEGREES
EKG T AXIS: 69 DEGREES
EKG VENTRICULAR RATE: 79 BPM
EOSINOPHIL # BLD: 0 K/UL (ref 0.05–0.5)
EOSINOPHILS RELATIVE PERCENT: 0 % (ref 0–6)
ERYTHROCYTE [DISTWIDTH] IN BLOOD BY AUTOMATED COUNT: 17 % (ref 11.5–15)
GFR, ESTIMATED: >90 ML/MIN/1.73M2
GLUCOSE SERPL-MCNC: 143 MG/DL (ref 74–99)
HCT VFR BLD AUTO: 33.4 % (ref 34–48)
HGB BLD-MCNC: 10.5 G/DL (ref 11.5–15.5)
IMM GRANULOCYTES # BLD AUTO: 0.31 K/UL (ref 0–0.58)
IMM GRANULOCYTES NFR BLD: 2 % (ref 0–5)
LYMPHOCYTES NFR BLD: 0.66 K/UL (ref 1.5–4)
LYMPHOCYTES RELATIVE PERCENT: 4 % (ref 20–42)
MAGNESIUM SERPL-MCNC: 1.9 MG/DL (ref 1.6–2.6)
MCH RBC QN AUTO: 33.4 PG (ref 26–35)
MCHC RBC AUTO-ENTMCNC: 31.4 G/DL (ref 32–34.5)
MCV RBC AUTO: 106.4 FL (ref 80–99.9)
MONOCYTES NFR BLD: 0.56 K/UL (ref 0.1–0.95)
MONOCYTES NFR BLD: 4 % (ref 2–12)
NEUTROPHILS NFR BLD: 90 % (ref 43–80)
NEUTS SEG NFR BLD: 13.71 K/UL (ref 1.8–7.3)
PATH REV BLD -IMP: NORMAL
PLATELET # BLD AUTO: 375 K/UL (ref 130–450)
PMV BLD AUTO: 9.7 FL (ref 7–12)
POTASSIUM SERPL-SCNC: 3.5 MMOL/L (ref 3.5–5)
RBC # BLD AUTO: 3.14 M/UL (ref 3.5–5.5)
SODIUM SERPL-SCNC: 142 MMOL/L (ref 132–146)
WBC OTHER # BLD: 15.3 K/UL (ref 4.5–11.5)

## 2025-02-11 PROCEDURE — 6370000000 HC RX 637 (ALT 250 FOR IP): Performed by: INTERNAL MEDICINE

## 2025-02-11 PROCEDURE — 97161 PT EVAL LOW COMPLEX 20 MIN: CPT

## 2025-02-11 PROCEDURE — 94660 CPAP INITIATION&MGMT: CPT

## 2025-02-11 PROCEDURE — 6360000002 HC RX W HCPCS: Performed by: NURSE PRACTITIONER

## 2025-02-11 PROCEDURE — 80048 BASIC METABOLIC PNL TOTAL CA: CPT

## 2025-02-11 PROCEDURE — 6360000002 HC RX W HCPCS: Performed by: INTERNAL MEDICINE

## 2025-02-11 PROCEDURE — 97165 OT EVAL LOW COMPLEX 30 MIN: CPT

## 2025-02-11 PROCEDURE — 36415 COLL VENOUS BLD VENIPUNCTURE: CPT

## 2025-02-11 PROCEDURE — 85025 COMPLETE CBC W/AUTO DIFF WBC: CPT

## 2025-02-11 PROCEDURE — 99223 1ST HOSP IP/OBS HIGH 75: CPT | Performed by: INTERNAL MEDICINE

## 2025-02-11 PROCEDURE — 2500000003 HC RX 250 WO HCPCS: Performed by: INTERNAL MEDICINE

## 2025-02-11 PROCEDURE — 97535 SELF CARE MNGMENT TRAINING: CPT

## 2025-02-11 PROCEDURE — 83735 ASSAY OF MAGNESIUM: CPT

## 2025-02-11 PROCEDURE — 6370000000 HC RX 637 (ALT 250 FOR IP): Performed by: NURSE PRACTITIONER

## 2025-02-11 PROCEDURE — 93010 ELECTROCARDIOGRAM REPORT: CPT | Performed by: INTERNAL MEDICINE

## 2025-02-11 PROCEDURE — 2500000003 HC RX 250 WO HCPCS: Performed by: NURSE PRACTITIONER

## 2025-02-11 PROCEDURE — 2500000003 HC RX 250 WO HCPCS

## 2025-02-11 PROCEDURE — 2060000000 HC ICU INTERMEDIATE R&B

## 2025-02-11 PROCEDURE — 93005 ELECTROCARDIOGRAM TRACING: CPT | Performed by: INTERNAL MEDICINE

## 2025-02-11 PROCEDURE — 99231 SBSQ HOSP IP/OBS SF/LOW 25: CPT | Performed by: INTERNAL MEDICINE

## 2025-02-11 PROCEDURE — 94640 AIRWAY INHALATION TREATMENT: CPT

## 2025-02-11 PROCEDURE — 2700000000 HC OXYGEN THERAPY PER DAY

## 2025-02-11 RX ORDER — BISACODYL 10 MG
10 SUPPOSITORY, RECTAL RECTAL DAILY PRN
Status: DISCONTINUED | OUTPATIENT
Start: 2025-02-11 | End: 2025-02-14 | Stop reason: HOSPADM

## 2025-02-11 RX ADMIN — IPRATROPIUM BROMIDE AND ALBUTEROL SULFATE 1 DOSE: 2.5; .5 SOLUTION RESPIRATORY (INHALATION) at 12:29

## 2025-02-11 RX ADMIN — FUROSEMIDE 40 MG: 20 TABLET ORAL at 08:41

## 2025-02-11 RX ADMIN — IPRATROPIUM BROMIDE AND ALBUTEROL SULFATE 1 DOSE: 2.5; .5 SOLUTION RESPIRATORY (INHALATION) at 16:27

## 2025-02-11 RX ADMIN — GUAIFENESIN 400 MG: 400 TABLET ORAL at 12:31

## 2025-02-11 RX ADMIN — ARFORMOTEROL TARTRATE 15 MCG: 15 SOLUTION RESPIRATORY (INHALATION) at 19:57

## 2025-02-11 RX ADMIN — BUDESONIDE 500 MCG: 0.5 INHALANT RESPIRATORY (INHALATION) at 09:46

## 2025-02-11 RX ADMIN — DOXYCYCLINE HYCLATE 100 MG: 100 CAPSULE ORAL at 19:50

## 2025-02-11 RX ADMIN — WATER 40 MG: 1 INJECTION INTRAMUSCULAR; INTRAVENOUS; SUBCUTANEOUS at 22:30

## 2025-02-11 RX ADMIN — GUAIFENESIN 400 MG: 400 TABLET ORAL at 19:50

## 2025-02-11 RX ADMIN — IPRATROPIUM BROMIDE AND ALBUTEROL SULFATE 1 DOSE: 2.5; .5 SOLUTION RESPIRATORY (INHALATION) at 09:46

## 2025-02-11 RX ADMIN — HYDROXYZINE HYDROCHLORIDE 10 MG: 10 TABLET, FILM COATED ORAL at 08:55

## 2025-02-11 RX ADMIN — WATER 40 MG: 1 INJECTION INTRAMUSCULAR; INTRAVENOUS; SUBCUTANEOUS at 06:25

## 2025-02-11 RX ADMIN — SODIUM CHLORIDE, PRESERVATIVE FREE 10 ML: 5 INJECTION INTRAVENOUS at 20:19

## 2025-02-11 RX ADMIN — ACETAMINOPHEN 650 MG: 325 TABLET ORAL at 08:41

## 2025-02-11 RX ADMIN — SODIUM CHLORIDE, PRESERVATIVE FREE 10 ML: 5 INJECTION INTRAVENOUS at 08:43

## 2025-02-11 RX ADMIN — SODIUM CHLORIDE, PRESERVATIVE FREE 10 ML: 5 INJECTION INTRAVENOUS at 19:50

## 2025-02-11 RX ADMIN — Medication 1 CAPSULE: at 08:42

## 2025-02-11 RX ADMIN — DOXYCYCLINE HYCLATE 100 MG: 100 CAPSULE ORAL at 08:42

## 2025-02-11 RX ADMIN — SENNOSIDES 8.6 MG: 8.6 TABLET, COATED ORAL at 19:50

## 2025-02-11 RX ADMIN — ARFORMOTEROL TARTRATE 15 MCG: 15 SOLUTION RESPIRATORY (INHALATION) at 09:46

## 2025-02-11 RX ADMIN — ALUMINUM HYDROXIDE, MAGNESIUM HYDROXIDE, AND SIMETHICONE 30 ML: 200; 200; 20 SUSPENSION ORAL at 08:55

## 2025-02-11 RX ADMIN — BISACODYL 10 MG: 10 SUPPOSITORY RECTAL at 12:31

## 2025-02-11 RX ADMIN — BUDESONIDE 500 MCG: 0.5 INHALANT RESPIRATORY (INHALATION) at 19:57

## 2025-02-11 RX ADMIN — FOLIC ACID 1 MG: 1 TABLET ORAL at 08:42

## 2025-02-11 RX ADMIN — ENOXAPARIN SODIUM 30 MG: 100 INJECTION SUBCUTANEOUS at 08:41

## 2025-02-11 RX ADMIN — WATER 40 MG: 1 INJECTION INTRAMUSCULAR; INTRAVENOUS; SUBCUTANEOUS at 12:31

## 2025-02-11 RX ADMIN — ASPIRIN 81 MG: 81 TABLET, COATED ORAL at 08:42

## 2025-02-11 RX ADMIN — WATER 1000 MG: 1 INJECTION INTRAMUSCULAR; INTRAVENOUS; SUBCUTANEOUS at 08:41

## 2025-02-11 RX ADMIN — GUAIFENESIN 400 MG: 400 TABLET ORAL at 08:41

## 2025-02-11 RX ADMIN — DULOXETINE HYDROCHLORIDE 60 MG: 30 CAPSULE, DELAYED RELEASE ORAL at 08:41

## 2025-02-11 RX ADMIN — HYDROXYZINE HYDROCHLORIDE 10 MG: 10 TABLET, FILM COATED ORAL at 21:30

## 2025-02-11 RX ADMIN — DILTIAZEM HYDROCHLORIDE 240 MG: 120 CAPSULE, COATED, EXTENDED RELEASE ORAL at 08:41

## 2025-02-11 RX ADMIN — IPRATROPIUM BROMIDE AND ALBUTEROL SULFATE 1 DOSE: 2.5; .5 SOLUTION RESPIRATORY (INHALATION) at 19:57

## 2025-02-11 ASSESSMENT — PAIN DESCRIPTION - LOCATION
LOCATION: ABDOMEN
LOCATION: CHEST

## 2025-02-11 ASSESSMENT — PAIN DESCRIPTION - PAIN TYPE: TYPE: ACUTE PAIN

## 2025-02-11 ASSESSMENT — PAIN DESCRIPTION - DESCRIPTORS
DESCRIPTORS: ACHING;DISCOMFORT;SORE
DESCRIPTORS: ACHING;SORE;CRAMPING

## 2025-02-11 ASSESSMENT — PAIN SCALES - GENERAL
PAINLEVEL_OUTOF10: 0
PAINLEVEL_OUTOF10: 3
PAINLEVEL_OUTOF10: 6
PAINLEVEL_OUTOF10: 0
PAINLEVEL_OUTOF10: 3

## 2025-02-11 ASSESSMENT — PAIN DESCRIPTION - ONSET: ONSET: GRADUAL

## 2025-02-11 ASSESSMENT — PAIN DESCRIPTION - FREQUENCY: FREQUENCY: INTERMITTENT

## 2025-02-11 ASSESSMENT — PAIN DESCRIPTION - ORIENTATION
ORIENTATION: MID
ORIENTATION: MID

## 2025-02-11 NOTE — CARE COORDINATION
CM Update: Pt is long term care at [place], no precert needed to return. PENNY/destination completed. Transport envelope on soft chart. IV Rocephin and PO Doxy continue.  IV Solu Medrol q 8. 9 liters high flow O2. ID consulted for leukocytosis. CM will follow(TF)        ALBERTO CuellarN,RN  Case Management  [phone]

## 2025-02-11 NOTE — PROGRESS NOTES
Physical Therapy  Physical Therapy Initial Assessment     Name: Lana Phillips  : 1955  MRN: [id number]      Date of Service: 2025    Evaluating PT:  Susan García, PT, DPT, [id number]    Room #:  [facility]4/4504-A  Diagnosis:  Pneumonia due to organism [J18.9]  Respiratory failure, acute-on-chronic [J96.20]  Coronavirus infection [B34.2]  Acute on chronic respiratory failure with hypoxia [J96.21]  Pneumonia due to infectious organism, unspecified laterality, unspecified part of lung [J18.9]  PMHx/PSHx:    Past Medical History:   Diagnosis Date    Atherosclerosis 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 ([facility]) 2024    Atrial fibrillation ([facility])     Chronic obstructive pulmonary disease ([facility]) 12/15/2022    COVID-19 2022    Critical limb ischemia of left lower extremity with rest pain ([facility]) 2023    GI bleed 10/14/2023    Hypertension     RUTH treated with BiPAP     Panic disorder     PVD (peripheral vascular disease) ([facility]) 2024    Severe protein-calorie malnutrition ([facility]) 2023    Thyroid disease     Uncontrolled hypertension       Past Surgical History:   Procedure Laterality Date    BACK SURGERY      x2    BRONCHOSCOPY N/A 2024    BRONCHOSCOPY DIAGNOSTIC OR CELL WASH ONLY performed by Jose Price MD at [facility] ENDOSCOPY    INVASIVE VASCULAR N/A 2024    Aortagram abdominal performed by Lazaro Vidales MD at [facility] CARDIAC CATH LAB    INVASIVE VASCULAR N/A 2024    Angioplasty peripheral artery performed by Lazaro Vidales MD at [facility] CARDIAC CATH LAB    LEFT OOPHORECTOMY      PAIN MANAGEMENT PROCEDURE N/A 2023    CAUDAL EPIDURAL STEROID INJECTION performed by Lilia Cabrera DO at [facility] OR    PAIN MANAGEMENT PROCEDURE N/A 2024    THERAPEUTIC CAUDAL EPIDURAL UNDER FLUOROSCOPIC GUIDANCE performed by Lilia Cabrera DO at [facility] OR    UPPER GASTROINTESTINAL ENDOSCOPY N/A 10/16/2023    EGD ESOPHAGOGASTRODUODENOSCOPY

## 2025-02-11 NOTE — PLAN OF CARE
Problem: Chronic Conditions and Co-morbidities  Goal: Patient's chronic conditions and co-morbidity symptoms are monitored and maintained or improved  2/11/2025 1014 by Inder Lucas RN  Outcome: Progressing  2/11/2025 1007 by Inder Lucas RN  Outcome: Progressing  2/11/2025 0157 by Linda Boswell RN  Outcome: Progressing     Problem: Discharge Planning  Goal: Discharge to home or other facility with appropriate resources  2/11/2025 1014 by Inder Lucas RN  Outcome: Progressing  2/11/2025 1007 by Inder Lucas RN  Outcome: Progressing  Flowsheets (Taken 2/11/2025 0833)  Discharge to home or other facility with appropriate resources: Identify barriers to discharge with patient and caregiver  2/11/2025 0157 by Linda Boswell RN  Outcome: Progressing     Problem: Safety - Adult  Goal: Free from fall injury  2/11/2025 1014 by Inder Lucas RN  Outcome: Progressing  2/11/2025 1007 by Inder Lucas RN  Outcome: Progressing  Flowsheets (Taken 2/11/2025 1006)  Free From Fall Injury: Instruct family/caregiver on patient safety  2/11/2025 0157 by Linda Boswell RN  Outcome: Progressing     Problem: ABCDS Injury Assessment  Goal: Absence of physical injury  2/11/2025 1014 by Inder Lucas RN  Outcome: Progressing  2/11/2025 1008 by Inder Lucas RN  Outcome: Progressing  2/11/2025 1007 by Inder Lucas RN  Outcome: Progressing  2/11/2025 0157 by Linda Boswell RN  Outcome: Progressing     Problem: Pain  Goal: Verbalizes/displays adequate comfort level or baseline comfort level  2/11/2025 1014 by Inder Lucas RN  Outcome: Progressing  2/11/2025 1008 by Inder Lucas RN  Outcome: Progressing  2/11/2025 1007 by Inder Lucas RN  Outcome: Progressing  2/11/2025 0157 by Linda Boswell RN  Outcome: Progressing     Problem: Respiratory - Adult  Goal: Achieves optimal ventilation and oxygenation  2/11/2025 1008 by Inder Lucas RN  Outcome: Progressing  2/11/2025 0157 by Linda Boswell RN  Outcome: Progressing  Flowsheets (Taken

## 2025-02-11 NOTE — PLAN OF CARE
Problem: Chronic Conditions and Co-morbidities  Goal: Patient's chronic conditions and co-morbidity symptoms are monitored and maintained or improved  2/11/2025 1007 by Inder Lucas RN  Outcome: Progressing  2/11/2025 0157 by Linda Boswell RN  Outcome: Progressing     Problem: Discharge Planning  Goal: Discharge to home or other facility with appropriate resources  2/11/2025 1007 by Inder Lucas RN  Outcome: Progressing  Flowsheets (Taken 2/11/2025 0833)  Discharge to home or other facility with appropriate resources: Identify barriers to discharge with patient and caregiver  2/11/2025 0157 by Linda Boswell RN  Outcome: Progressing     Problem: Safety - Adult  Goal: Free from fall injury  2/11/2025 1007 by Inder Lucas RN  Outcome: Progressing  Flowsheets (Taken 2/11/2025 1006)  Free From Fall Injury: Instruct family/caregiver on patient safety  2/11/2025 0157 by Linda Boswell RN  Outcome: Progressing     Problem: ABCDS Injury Assessment  Goal: Absence of physical injury  2/11/2025 1008 by Inder Lucas RN  Outcome: Progressing  2/11/2025 1007 by Inder Lucas RN  Outcome: Progressing  2/11/2025 0157 by Linda Boswell RN  Outcome: Progressing     Problem: Pain  Goal: Verbalizes/displays adequate comfort level or baseline comfort level  2/11/2025 1008 by Inder Lucas RN  Outcome: Progressing  2/11/2025 1007 by Inder Lucas RN  Outcome: Progressing  2/11/2025 0157 by Linda Boswell RN  Outcome: Progressing     Problem: Respiratory - Adult  Goal: Achieves optimal ventilation and oxygenation  2/11/2025 1008 by Inder Lucas RN  Outcome: Progressing  2/11/2025 0157 by Linda Boswell RN  Outcome: Progressing  Flowsheets (Taken 2/10/2025 2000)  Achieves optimal ventilation and oxygenation: Assess for changes in respiratory status     Problem: Cardiovascular - Adult  Goal: Maintains optimal cardiac output and hemodynamic stability  2/11/2025 1008 by Inder Lucas RN  Outcome: Progressing  Flowsheets (Taken 2/11/2025  0833)  Maintains optimal cardiac output and hemodynamic stability: Monitor blood pressure and heart rate  2/11/2025 0157 by Linda Boswell RN  Outcome: Progressing  Flowsheets (Taken 2/10/2025 2000)  Maintains optimal cardiac output and hemodynamic stability: Monitor blood pressure and heart rate  Goal: Absence of cardiac dysrhythmias or at baseline  Recent Flowsheet Documentation  Taken 2/11/2025 0833 by Inder Lucas RN  Absence of cardiac dysrhythmias or at baseline: Monitor cardiac rate and rhythm  2/11/2025 0157 by Linda Boswell RN  Outcome: Progressing  Flowsheets (Taken 2/10/2025 2000)  Absence of cardiac dysrhythmias or at baseline: Monitor cardiac rate and rhythm

## 2025-02-11 NOTE — PROGRESS NOTES
Pt reported chest pain with pain down L arm. EKG competed and transmitted to chart. NSR with prolonged QT noted.

## 2025-02-11 NOTE — PLAN OF CARE
Problem: Chronic Conditions and Co-morbidities  Goal: Patient's chronic conditions and co-morbidity symptoms are monitored and maintained or improved  2/11/2025 0157 by Linda Boswell, RN  Outcome: Progressing  2/10/2025 1333 by Radha Rogers, RN  Outcome: Progressing  Flowsheets (Taken 2/10/2025 0800)  Care Plan - Patient's Chronic Conditions and Co-Morbidity Symptoms are Monitored and Maintained or Improved: Monitor and assess patient's chronic conditions and comorbid symptoms for stability, deterioration, or improvement     Problem: Discharge Planning  Goal: Discharge to home or other facility with appropriate resources  Outcome: Progressing     Problem: Safety - Adult  Goal: Free from fall injury  Outcome: Progressing     Problem: ABCDS Injury Assessment  Goal: Absence of physical injury  Outcome: Progressing     Problem: Pain  Goal: Verbalizes/displays adequate comfort level or baseline comfort level  Outcome: Progressing     Problem: Respiratory - Adult  Goal: Achieves optimal ventilation and oxygenation  Outcome: Progressing  Flowsheets (Taken 2/10/2025 2000)  Achieves optimal ventilation and oxygenation: Assess for changes in respiratory status     Problem: Cardiovascular - Adult  Goal: Maintains optimal cardiac output and hemodynamic stability  Outcome: Progressing  Flowsheets (Taken 2/10/2025 2000)  Maintains optimal cardiac output and hemodynamic stability: Monitor blood pressure and heart rate  Goal: Absence of cardiac dysrhythmias or at baseline  Outcome: Progressing  Flowsheets (Taken 2/10/2025 2000)  Absence of cardiac dysrhythmias or at baseline: Monitor cardiac rate and rhythm

## 2025-02-11 NOTE — PROGRESS NOTES
OCCUPATIONAL THERAPY INITIAL EVALUATION    [facility]  [address], [place]      Date:2025                                                  Patient Name: Lana Phillips  MRN: [id number]  : 1955  Room: [address] [place], [place] [address]    Evaluating OT: Jaz Franklin MOT, OTR/L  # [id number]    Referring Provider:  Sebastian Gillespie MD    Specific Provider Orders:  \"OT Eval and Treat\"  2-10-25    Diagnosis: Pneumonia due to organism [J18.9]  Respiratory failure, acute-on-chronic [J96.20]  Coronavirus infection [B34.2]  Acute on chronic respiratory failure with hypoxia [J96.21]  Pneumonia due to infectious organism, unspecified laterality, unspecified part of lung [J18.9]    Pt was admitted from [facility] w/ sudden onset of SOB, Productive Cough, CP x 3 months.  Found to be hypoxic - placed on NRB, Moderately Anxious - repeatedly removing O2    Pertinent Medical History:  Pt has a past medical history of Atherosclerosis 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 (HCC), Atrial fibrillation (HCC), Chronic obstructive pulmonary disease (HCC), COVID-19, Critical limb ischemia of left lower extremity with rest pain (HCC), GI bleed, Hypertension, RUTH treated with BiPAP, Panic disorder, PVD (peripheral vascular disease) (HCC), Severe protein-calorie malnutrition (HCC), Thyroid disease, and Uncontrolled hypertension.,  has a past surgical history that includes back surgery; Left oophorectomy; Upper gastrointestinal endoscopy (N/A, 10/16/2023); Pain management procedure (N/A, 2023); invasive vascular (N/A, 2024); invasive vascular (N/A, 2024); bronchoscopy (N/A, 2024); and Pain management procedure (N/A, 2024).    Surgeries this admission: None     Precautions:  Fall Risk  Cognition - Alarms,   5L O2 via HF NC, continuous pulse-ox      Assessment of current deficits   [x] Functional mobility  [x]ADLs  [x]  RN of pt's position and performance.  at b/s         Overall patient demonstrated decreased independence and safety during completion of ADL/functional transfer/mobility tasks.  Pt would benefit from continued skilled OT to increase safety and independence with completion of ADL/IADL tasks for functional independence and quality of life.    Treatment: OT treatment provided this date includes:   Instruction/training on safety and adapted techniques for completion of ADLs, use of DME/AD/Adaptive equip;  within precautions   Instruction/training on safe functional mobility/transfer techniques, use of DME/AD;  within precautions      Instruction/training on energy conservation techs (EC)/Work Simplification/PLB for completion of ADLs:     Neuromuscular Reeducation to facilitate balance/righting reactions for increased function with ADLs:    Skilled positioning/alignment to maximize Pt's safety and ability to safely and INDly interact w/ his/her environment, maximize respiratory status  Activity tolerance - Sitting/Standing to improve endurance w/ functional ax   Cognitive retraining -  Oriented pt to current Date, Place and Situation; Cues for safety/safety awareness, sequencing, problem solving    Skilled monitoring of Vitals during session and pt's response to tx ax      Pt/family ed re: benefits of participate in post-acute therapy program;    Consulted RN, Nsg Staff     Made all appropriate Environmental Modifications to facilitate pt's level of IND and safety.    Recommendations for Continued Participation in OT services during Hospitalization and at D/C      Pt and/or Family verbalized/demonstrated a Fair(+) understanding of education provided.  Will Review PRN.         Rehab Potential: Good(-) for established goals     Patient / Family Goal: Not stated at this time      Patient and/or family were instructed on functional diagnosis, prognosis/goals and OT plan of care. Demonstrated Fair(+)

## 2025-02-11 NOTE — PROGRESS NOTES
HOSPITALIST PROGRESS NOTES     ADMITTING DATE AND TIME: 2/8/2025  9:03 PM  had concerns including Shortness of Breath (Sudden onset SOB, productive cough bringing up clear phegm. Wears 4L) and Chest Pain (Left side chest pain, intermittent for last three months, but worse tonight when SOB started).    ADMIT DX: Pneumonia due to organism [J18.9]  Respiratory failure, acute-on-chronic [J96.20]  Coronavirus infection [B34.2]  Acute on chronic respiratory failure with hypoxia [J96.21]  Pneumonia due to infectious organism, unspecified laterality, unspecified part of lung [J18.9]      SUBJECTIVE:  Patient is being followed for Pneumonia due to organism [J18.9]  Respiratory failure, acute-on-chronic [J96.20]  Coronavirus infection [B34.2]  Acute on chronic respiratory failure with hypoxia [J96.21]  Pneumonia due to infectious organism, unspecified laterality, unspecified part of lung [J18.9]     Patient was seen examined and evaluated  Recent lab results, charts and pertinent diagnostic imaging reviewed   Ancillary service notes reviewed   Short of breath     Care reviewed with nursing staff, medical and surgical specialty care, primary care and the patient's family as available.   ROS: denies fever, chills, cp, n/v, HA unless stated above.      aspirin  81 mg Oral Daily    dilTIAZem  240 mg Oral Daily    DULoxetine  60 mg Oral Daily    [START ON 2/12/2025] Vitamin D (Ergocalciferol)  50,000 Units Oral Weekly    folic acid  1 mg Oral Daily    furosemide  40 mg Oral Daily    guaiFENesin  400 mg Oral TID    sodium chloride flush  5-40 mL IntraVENous 2 times per day    enoxaparin  30 mg SubCUTAneous Daily    cefTRIAXone (ROCEPHIN) IV  1,000 mg IntraVENous Q24H    And    doxycycline  100 mg Oral 2 times per day    lactobacillus  1 capsule Oral Daily    calcium carbonate  500 mg Oral Daily     oral

## 2025-02-11 NOTE — PROGRESS NOTES
[facility]  [address], [address]  [place] [address]      Pulmonary Progress Note      SUBJECTIVE:  claims still feeling sob, on 5 li NC, wore NIPPV on-off today and was on it last night, still with cough and minimal sputum production    OBJECTIVE    Medications    Continuous Infusions:   sodium chloride      sodium chloride         Scheduled Meds:   aspirin  81 mg Oral Daily    dilTIAZem  240 mg Oral Daily    DULoxetine  60 mg Oral Daily    [START ON 2/12/2025] Vitamin D (Ergocalciferol)  50,000 Units Oral Weekly    folic acid  1 mg Oral Daily    furosemide  40 mg Oral Daily    guaiFENesin  400 mg Oral TID    sodium chloride flush  5-40 mL IntraVENous 2 times per day    enoxaparin  30 mg SubCUTAneous Daily    cefTRIAXone (ROCEPHIN) IV  1,000 mg IntraVENous Q24H    And    doxycycline  100 mg Oral 2 times per day    lactobacillus  1 capsule Oral Daily    calcium carbonate  500 mg Oral Daily    methylPREDNISolone  40 mg IntraVENous Q8H    ipratropium 0.5 mg-albuterol 2.5 mg  1 Dose Inhalation Q4H WA RT    arformoterol tartrate  15 mcg Nebulization BID RT    budesonide  0.5 mg Nebulization BID RT    senna  1 tablet Oral Nightly    sodium chloride flush  5-40 mL IntraVENous 2 times per day       PRN Meds:bisacodyl, benzocaine-menthol, sodium chloride flush, sodium chloride, ondansetron **OR** ondansetron, polyethylene glycol, acetaminophen **OR** acetaminophen, aluminum & magnesium hydroxide-simethicone, hydrOXYzine HCl, sodium chloride flush, sodium chloride    Physical    VITALS:  BP (!) 140/69   Pulse 74   Temp 97.4 °F (36.3 °C) (Infrared)   Resp 26   Ht 1.727 m (5' 8\")   Wt 50.8 kg (112 lb)   SpO2 91%   BMI 17.03 kg/m²     24HR INTAKE/OUTPUT:      Intake/Output Summary (Last 24 hours) at 2/11/2025 1743  Last data filed at 2/11/2025 1001  Gross per 24 hour   Intake 190 ml   Output --   Net 190 ml       24HR PULSE OXIMETRY RANGE:    SpO2

## 2025-02-11 NOTE — PROGRESS NOTES
Respiratory therapist notified of Dr. Price's order for NIV Q HS and PRN.     Electronically signed by Inder Lucas RN on 2/11/2025 at 6:09 PM

## 2025-02-11 NOTE — PROGRESS NOTES
Patient received the Sacrament of the Anointing of the Sick by Father Franco Mccloud On Monday, February 10, 2025.    If additional support is requested or needed please reach out to Spiritual Health ([phone]).    Chap. Phillip Reese MDIV, BCC

## 2025-02-11 NOTE — PLAN OF CARE
Problem: Chronic Conditions and Co-morbidities  Goal: Patient's chronic conditions and co-morbidity symptoms are monitored and maintained or improved  2/11/2025 1007 by Inder Lucas RN  Outcome: Progressing  2/11/2025 0157 by Linda Boswell RN  Outcome: Progressing     Problem: Discharge Planning  Goal: Discharge to home or other facility with appropriate resources  2/11/2025 1007 by Inder Lucas RN  Outcome: Progressing  Flowsheets (Taken 2/11/2025 0833)  Discharge to home or other facility with appropriate resources: Identify barriers to discharge with patient and caregiver  2/11/2025 0157 by Linda Boswell RN  Outcome: Progressing     Problem: Safety - Adult  Goal: Free from fall injury  2/11/2025 1007 by Inder Lucas RN  Outcome: Progressing  Flowsheets (Taken 2/11/2025 1006)  Free From Fall Injury: Instruct family/caregiver on patient safety  2/11/2025 0157 by Linda Boswell RN  Outcome: Progressing     Problem: ABCDS Injury Assessment  Goal: Absence of physical injury  2/11/2025 1007 by Inder Lucas RN  Outcome: Progressing  2/11/2025 0157 by Linda Boswell RN  Outcome: Progressing     Problem: Pain  Goal: Verbalizes/displays adequate comfort level or baseline comfort level  2/11/2025 1007 by Inder Lucas RN  Outcome: Progressing  2/11/2025 0157 by Linda Boswell RN  Outcome: Progressing     Problem: Respiratory - Adult  Goal: Achieves optimal ventilation and oxygenation  2/11/2025 0157 by Linda Boswell RN  Outcome: Progressing  Flowsheets (Taken 2/10/2025 2000)  Achieves optimal ventilation and oxygenation: Assess for changes in respiratory status     Problem: Cardiovascular - Adult  Goal: Maintains optimal cardiac output and hemodynamic stability  Recent Flowsheet Documentation  Taken 2/11/2025 0833 by Inder Lucas RN  Maintains optimal cardiac output and hemodynamic stability: Monitor blood pressure and heart rate  2/11/2025 0157 by Linda Boswell RN  Outcome: Progressing  Flowsheets (Taken 2/10/2025  2000)  Maintains optimal cardiac output and hemodynamic stability: Monitor blood pressure and heart rate  Goal: Absence of cardiac dysrhythmias or at baseline  Recent Flowsheet Documentation  Taken 2/11/2025 0833 by Inder Lucas RN  Absence of cardiac dysrhythmias or at baseline: Monitor cardiac rate and rhythm  2/11/2025 0157 by Linda Boswell RN  Outcome: Progressing  Flowsheets (Taken 2/10/2025 2000)  Absence of cardiac dysrhythmias or at baseline: Monitor cardiac rate and rhythm

## 2025-02-11 NOTE — CONSULTS
Department of Internal Medicine  Infectious Diseases   Consult Note      Reason for Consult:  Leukocytosis         Requesting Physician: Dr Gillespie         HISTORY OF PRESENT ILLNESS:      Pt is known to me . This is a 69 yrs old female with hx of COPD, on home oxygen 4-5 L , PAD, HTN,  A fib , Enterococcus bacteremia ( 12/2024- JEREMY neg ) , nursing home resident presented to the ER with worsening shortness of breath, cough, clear sputum expectoration . She denied fever, chills. 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 ([facility]) 01/19/2024    Atrial fibrillation ([facility])     Chronic obstructive pulmonary disease ([facility]) 12/15/2022    COVID-19 07/16/2022    Critical limb ischemia of left lower extremity with rest pain ([facility]) 12/25/2023    GI bleed 10/14/2023    Hypertension     RUTH treated with BiPAP     Panic disorder     PVD (peripheral vascular disease) ([facility]) 01/19/2024    Severe protein-calorie malnutrition ([facility]) 06/26/2023    Thyroid disease     Uncontrolled hypertension        Past Surgical History:      Past Surgical History:   Procedure Laterality Date    BACK SURGERY      x2    BRONCHOSCOPY N/A 4/16/2024    BRONCHOSCOPY DIAGNOSTIC OR CELL WASH ONLY performed by Jose Price MD at [facility] ENDOSCOPY    INVASIVE VASCULAR N/A 1/19/2024    Aortagram abdominal performed by Lazaro Vidales MD at [facility] CARDIAC CATH LAB    INVASIVE VASCULAR N/A 1/19/2024    Angioplasty peripheral artery performed by Lazaro Vidales MD at [facility] CARDIAC CATH LAB    LEFT OOPHORECTOMY      PAIN MANAGEMENT PROCEDURE N/A 12/28/2023    CAUDAL EPIDURAL STEROID INJECTION performed by Lilia Cabrera DO at [facility] OR    PAIN MANAGEMENT PROCEDURE N/A 11/14/2024    THERAPEUTIC CAUDAL EPIDURAL UNDER  Dorsalis pedis palpable    Skin:   no rashes         CBC with Differential:      Lab Results   Component Value Date/Time    WBC 15.3 02/11/2025 04:34 AM    RBC 3.14 02/11/2025 04:34 AM    HGB 10.5 02/11/2025 04:34 AM    HCT 33.4 02/11/2025 04:34 AM     02/11/2025 04:34 AM    .4 02/11/2025 04:34 AM    MCH 33.4 02/11/2025 04:34 AM    MCHC 31.4 02/11/2025 04:34 AM    RDW 17.0 02/11/2025 04:34 AM    NRBC 1 12/29/2024 11:27 AM    METASPCT 1 01/02/2025 05:53 AM    LYMPHOPCT 4 02/11/2025 04:34 AM    PROMYELOPCT 2 02/10/2025 02:19 PM    MONOPCT 4 02/11/2025 04:34 AM    MYELOPCT 1 12/31/2024 11:53 AM    EOSPCT 0 02/11/2025 04:34 AM    BASOPCT 0 02/11/2025 04:34 AM    MONOSABS 0.56 02/11/2025 04:34 AM    LYMPHSABS 0.66 02/11/2025 04:34 AM    EOSABS 0.00 02/11/2025 04:34 AM    BASOSABS 0.02 02/11/2025 04:34 AM       CMP     Lab Results   Component Value Date/Time     02/11/2025 04:34 AM    K 3.5 02/11/2025 04:34 AM    K 3.3 12/30/2022 02:49 AM    CL 94 02/11/2025 04:34 AM    CO2 38 02/11/2025 04:34 AM    BUN 23 02/11/2025 04:34 AM    CREATININE 0.7 02/11/2025 04:34 AM    GFRAA >60 07/28/2022 09:31 AM    LABGLOM >90 02/11/2025 04:34 AM    LABGLOM >90 04/25/2024 09:54 AM    GLUCOSE 143 02/11/2025 04:34 AM    CALCIUM 9.2 02/11/2025 04:34 AM    BILITOT <0.2 02/08/2025 09:29 PM    ALKPHOS 72 02/08/2025 09:29 PM    AST 18 02/08/2025 09:29 PM    ALT 14 02/08/2025 09:29 PM         Hepatic Function Panel:    Lab Results   Component Value Date/Time    ALKPHOS 72 02/08/2025 09:29 PM    ALT 14 02/08/2025 09:29 PM    AST 18 02/08/2025 09:29 PM    BILITOT <0.2 02/08/2025 09:29 PM    BILIDIR <0.2 05/16/2024 07:25 AM    IBILI Can not be calculated 05/16/2024 07:25 AM       PT/INR:    Lab Results   Component Value Date/Time    PROTIME 11.6 12/30/2024 04:52 AM    INR 1.1 12/30/2024 04:52 AM       TSH:    Lab Results   Component Value Date/Time    TSH 0.25 02/10/2025 02:19 PM       U/A:    Lab Results   Component Value

## 2025-02-12 PROBLEM — B34.2 CORONAVIRUS INFECTION: Status: ACTIVE | Noted: 2025-02-12

## 2025-02-12 LAB
ANION GAP SERPL CALCULATED.3IONS-SCNC: 8 MMOL/L (ref 7–16)
BASOPHILS # BLD: 0.02 K/UL (ref 0–0.2)
BASOPHILS NFR BLD: 0 % (ref 0–2)
BUN SERPL-MCNC: 23 MG/DL (ref 6–23)
CALCIUM SERPL-MCNC: 9.4 MG/DL (ref 8.6–10.2)
CHLORIDE SERPL-SCNC: 92 MMOL/L (ref 98–107)
CO2 SERPL-SCNC: 40 MMOL/L (ref 22–29)
CREAT SERPL-MCNC: 0.6 MG/DL (ref 0.5–1)
EOSINOPHIL # BLD: 0 K/UL (ref 0.05–0.5)
EOSINOPHILS RELATIVE PERCENT: 0 % (ref 0–6)
ERYTHROCYTE [DISTWIDTH] IN BLOOD BY AUTOMATED COUNT: 16.8 % (ref 11.5–15)
FERRITIN SERPL-MCNC: 682 NG/ML
GFR, ESTIMATED: >90 ML/MIN/1.73M2
GLUCOSE SERPL-MCNC: 118 MG/DL (ref 74–99)
HCT VFR BLD AUTO: 37.9 % (ref 34–48)
HGB BLD-MCNC: 11.9 G/DL (ref 11.5–15.5)
IMM GRANULOCYTES # BLD AUTO: 0.25 K/UL (ref 0–0.58)
IMM GRANULOCYTES NFR BLD: 2 % (ref 0–5)
IRON SATN MFR SERPL: 35 % (ref 15–50)
IRON SERPL-MCNC: 79 UG/DL (ref 37–145)
LYMPHOCYTES NFR BLD: 0.87 K/UL (ref 1.5–4)
LYMPHOCYTES RELATIVE PERCENT: 6 % (ref 20–42)
MCH RBC QN AUTO: 32.8 PG (ref 26–35)
MCHC RBC AUTO-ENTMCNC: 31.4 G/DL (ref 32–34.5)
MCV RBC AUTO: 104.4 FL (ref 80–99.9)
MONOCYTES NFR BLD: 0.89 K/UL (ref 0.1–0.95)
MONOCYTES NFR BLD: 6 % (ref 2–12)
NEUTROPHILS NFR BLD: 86 % (ref 43–80)
NEUTS SEG NFR BLD: 12.11 K/UL (ref 1.8–7.3)
PLATELET # BLD AUTO: 449 K/UL (ref 130–450)
PMV BLD AUTO: 9.9 FL (ref 7–12)
POTASSIUM SERPL-SCNC: 4 MMOL/L (ref 3.5–5)
RBC # BLD AUTO: 3.63 M/UL (ref 3.5–5.5)
SODIUM SERPL-SCNC: 140 MMOL/L (ref 132–146)
TIBC SERPL-MCNC: 226 UG/DL (ref 250–450)
WBC OTHER # BLD: 14.1 K/UL (ref 4.5–11.5)

## 2025-02-12 PROCEDURE — 6370000000 HC RX 637 (ALT 250 FOR IP): Performed by: INTERNAL MEDICINE

## 2025-02-12 PROCEDURE — 6370000000 HC RX 637 (ALT 250 FOR IP): Performed by: STUDENT IN AN ORGANIZED HEALTH CARE EDUCATION/TRAINING PROGRAM

## 2025-02-12 PROCEDURE — 6360000002 HC RX W HCPCS: Performed by: INTERNAL MEDICINE

## 2025-02-12 PROCEDURE — 94660 CPAP INITIATION&MGMT: CPT

## 2025-02-12 PROCEDURE — 2700000000 HC OXYGEN THERAPY PER DAY

## 2025-02-12 PROCEDURE — 6370000000 HC RX 637 (ALT 250 FOR IP): Performed by: NURSE PRACTITIONER

## 2025-02-12 PROCEDURE — 2060000000 HC ICU INTERMEDIATE R&B

## 2025-02-12 PROCEDURE — 83550 IRON BINDING TEST: CPT

## 2025-02-12 PROCEDURE — 2500000003 HC RX 250 WO HCPCS: Performed by: STUDENT IN AN ORGANIZED HEALTH CARE EDUCATION/TRAINING PROGRAM

## 2025-02-12 PROCEDURE — 6360000002 HC RX W HCPCS: Performed by: STUDENT IN AN ORGANIZED HEALTH CARE EDUCATION/TRAINING PROGRAM

## 2025-02-12 PROCEDURE — 83540 ASSAY OF IRON: CPT

## 2025-02-12 PROCEDURE — 94640 AIRWAY INHALATION TREATMENT: CPT

## 2025-02-12 PROCEDURE — 99232 SBSQ HOSP IP/OBS MODERATE 35: CPT | Performed by: CLINICAL NURSE SPECIALIST

## 2025-02-12 PROCEDURE — 99232 SBSQ HOSP IP/OBS MODERATE 35: CPT | Performed by: STUDENT IN AN ORGANIZED HEALTH CARE EDUCATION/TRAINING PROGRAM

## 2025-02-12 PROCEDURE — 36415 COLL VENOUS BLD VENIPUNCTURE: CPT

## 2025-02-12 PROCEDURE — 82728 ASSAY OF FERRITIN: CPT

## 2025-02-12 PROCEDURE — 85025 COMPLETE CBC W/AUTO DIFF WBC: CPT

## 2025-02-12 PROCEDURE — 80048 BASIC METABOLIC PNL TOTAL CA: CPT

## 2025-02-12 PROCEDURE — 2500000003 HC RX 250 WO HCPCS: Performed by: NURSE PRACTITIONER

## 2025-02-12 PROCEDURE — 2500000003 HC RX 250 WO HCPCS: Performed by: INTERNAL MEDICINE

## 2025-02-12 PROCEDURE — 6360000002 HC RX W HCPCS: Performed by: NURSE PRACTITIONER

## 2025-02-12 RX ORDER — BENZONATATE 100 MG/1
100 CAPSULE ORAL 3 TIMES DAILY
Status: DISCONTINUED | OUTPATIENT
Start: 2025-02-12 | End: 2025-02-14 | Stop reason: HOSPADM

## 2025-02-12 RX ORDER — ALPRAZOLAM 0.25 MG
0.25 TABLET ORAL 2 TIMES DAILY PRN
Status: DISCONTINUED | OUTPATIENT
Start: 2025-02-12 | End: 2025-02-14 | Stop reason: HOSPADM

## 2025-02-12 RX ADMIN — SENNOSIDES 8.6 MG: 8.6 TABLET, COATED ORAL at 20:19

## 2025-02-12 RX ADMIN — BENZONATATE 100 MG: 100 CAPSULE ORAL at 20:19

## 2025-02-12 RX ADMIN — GUAIFENESIN 400 MG: 400 TABLET ORAL at 20:19

## 2025-02-12 RX ADMIN — DOXYCYCLINE HYCLATE 100 MG: 100 CAPSULE ORAL at 08:56

## 2025-02-12 RX ADMIN — ALPRAZOLAM 0.25 MG: 0.25 TABLET ORAL at 15:54

## 2025-02-12 RX ADMIN — GUAIFENESIN 400 MG: 400 TABLET ORAL at 13:10

## 2025-02-12 RX ADMIN — ENOXAPARIN SODIUM 30 MG: 100 INJECTION SUBCUTANEOUS at 08:57

## 2025-02-12 RX ADMIN — ARFORMOTEROL TARTRATE 15 MCG: 15 SOLUTION RESPIRATORY (INHALATION) at 07:57

## 2025-02-12 RX ADMIN — IPRATROPIUM BROMIDE AND ALBUTEROL SULFATE 1 DOSE: 2.5; .5 SOLUTION RESPIRATORY (INHALATION) at 07:57

## 2025-02-12 RX ADMIN — SODIUM CHLORIDE, PRESERVATIVE FREE 10 ML: 5 INJECTION INTRAVENOUS at 08:58

## 2025-02-12 RX ADMIN — ERGOCALCIFEROL 50000 UNITS: 1.25 CAPSULE ORAL at 08:56

## 2025-02-12 RX ADMIN — FUROSEMIDE 40 MG: 20 TABLET ORAL at 08:59

## 2025-02-12 RX ADMIN — BENZONATATE 100 MG: 100 CAPSULE ORAL at 11:36

## 2025-02-12 RX ADMIN — GUAIFENESIN 400 MG: 400 TABLET ORAL at 08:56

## 2025-02-12 RX ADMIN — BENZOCAINE AND MENTHOL 1 LOZENGE: 15; 3.6 LOZENGE ORAL at 14:30

## 2025-02-12 RX ADMIN — HYDROXYZINE HYDROCHLORIDE 10 MG: 10 TABLET, FILM COATED ORAL at 08:57

## 2025-02-12 RX ADMIN — WATER 40 MG: 1 INJECTION INTRAMUSCULAR; INTRAVENOUS; SUBCUTANEOUS at 06:33

## 2025-02-12 RX ADMIN — ALUMINUM HYDROXIDE, MAGNESIUM HYDROXIDE, AND SIMETHICONE 30 ML: 200; 200; 20 SUSPENSION ORAL at 08:56

## 2025-02-12 RX ADMIN — DOXYCYCLINE HYCLATE 100 MG: 100 CAPSULE ORAL at 20:19

## 2025-02-12 RX ADMIN — IPRATROPIUM BROMIDE AND ALBUTEROL SULFATE 1 DOSE: 2.5; .5 SOLUTION RESPIRATORY (INHALATION) at 11:58

## 2025-02-12 RX ADMIN — ARFORMOTEROL TARTRATE 15 MCG: 15 SOLUTION RESPIRATORY (INHALATION) at 20:57

## 2025-02-12 RX ADMIN — DILTIAZEM HYDROCHLORIDE 240 MG: 120 CAPSULE, COATED, EXTENDED RELEASE ORAL at 08:56

## 2025-02-12 RX ADMIN — BUDESONIDE 500 MCG: 0.5 INHALANT RESPIRATORY (INHALATION) at 20:58

## 2025-02-12 RX ADMIN — Medication 1 CAPSULE: at 08:56

## 2025-02-12 RX ADMIN — WATER 40 MG: 1 INJECTION INTRAMUSCULAR; INTRAVENOUS; SUBCUTANEOUS at 18:50

## 2025-02-12 RX ADMIN — DULOXETINE HYDROCHLORIDE 60 MG: 30 CAPSULE, DELAYED RELEASE ORAL at 08:56

## 2025-02-12 RX ADMIN — ACETAMINOPHEN 650 MG: 325 TABLET ORAL at 08:57

## 2025-02-12 RX ADMIN — IPRATROPIUM BROMIDE AND ALBUTEROL SULFATE 1 DOSE: 2.5; .5 SOLUTION RESPIRATORY (INHALATION) at 15:08

## 2025-02-12 RX ADMIN — IPRATROPIUM BROMIDE AND ALBUTEROL SULFATE 1 DOSE: 2.5; .5 SOLUTION RESPIRATORY (INHALATION) at 20:57

## 2025-02-12 RX ADMIN — FOLIC ACID 1 MG: 1 TABLET ORAL at 08:57

## 2025-02-12 RX ADMIN — BUDESONIDE 500 MCG: 0.5 INHALANT RESPIRATORY (INHALATION) at 07:57

## 2025-02-12 RX ADMIN — WATER 1000 MG: 1 INJECTION INTRAMUSCULAR; INTRAVENOUS; SUBCUTANEOUS at 08:56

## 2025-02-12 RX ADMIN — SODIUM CHLORIDE, PRESERVATIVE FREE 10 ML: 5 INJECTION INTRAVENOUS at 20:20

## 2025-02-12 RX ADMIN — ASPIRIN 81 MG: 81 TABLET, COATED ORAL at 08:56

## 2025-02-12 ASSESSMENT — PAIN SCALES - GENERAL
PAINLEVEL_OUTOF10: 0
PAINLEVEL_OUTOF10: 4
PAINLEVEL_OUTOF10: 0

## 2025-02-12 ASSESSMENT — ENCOUNTER SYMPTOMS
SHORTNESS OF BREATH: 1
WHEEZING: 1
CONSTIPATION: 1
EYES NEGATIVE: 1
COUGH: 1

## 2025-02-12 ASSESSMENT — PAIN DESCRIPTION - LOCATION: LOCATION: ARM

## 2025-02-12 ASSESSMENT — PAIN - FUNCTIONAL ASSESSMENT: PAIN_FUNCTIONAL_ASSESSMENT: ACTIVITIES ARE NOT PREVENTED

## 2025-02-12 ASSESSMENT — PAIN DESCRIPTION - DESCRIPTORS: DESCRIPTORS: ACHING;SORE;DISCOMFORT

## 2025-02-12 ASSESSMENT — PAIN DESCRIPTION - ORIENTATION: ORIENTATION: LEFT

## 2025-02-12 NOTE — PROGRESS NOTES
Hospitalist Progress Note      Chief Complaint:  had concerns including Shortness of Breath (Sudden onset SOB, productive cough bringing up clear phegm. Wears 4L) and Chest Pain (Left side chest pain, intermittent for last three months, but worse tonight when SOB started).    Admission Date: 2025     SYNOPSIS:70 y.o. year old female Atrial fibrillation, Chronic obstructive pulmonary disease Hypertension, RUTH treated with BiPAP, Panic disorder, PVD presented to the ED with complaints of sob that has been going on for the last few weeks. She states that she has been having a productive cough of white sputum. she was having chest pain that has been radiating down her left arm. She states that her chest pain is worse when she takes in a deep breath. She is on baseline oxygen at 4L but due to hypoxia she was placed 6L. In the ED She was found to have troponin level of 39, 32. CT chest WBC elevated at 21.6 chest xray negative CTA neg for PE Right greater than left dependent ground-glass and consolidative opacities  may reflect pneumonia. She was given IV rocephin and doxy. She was incidentally found to be Coronavirus OC +ve     SUBJECTIVE:    Patient seen and Exa at bedside, not in acute distress, complains of shortness of breath, weakness  Records reviewed.   Stable overnight. No overnight issues reported     Temp (24hrs), Av.9 °F (36.6 °C), Min:97.4 °F (36.3 °C), Max:98.4 °F (36.9 °C)    DIET: ADULT DIET; Regular  CODE: Full Code    Intake/Output Summary (Last 24 hours) at 2025 1408  Last data filed at 2025 1153  Gross per 24 hour   Intake 1020 ml   Output --   Net 1020 ml       OBJECTIVE:    BP (!) 159/84   Pulse 84   Temp 97.8 °F (36.6 °C) (Temporal)   Resp 17   Ht 1.727 m (5' 8\")   Wt 50.8 kg (112 lb)   SpO2 90%   BMI 17.03 kg/m²     General appearance: No apparent distress, appears stated age and cooperative.   HEENT:  Normocephalic. Conjunctivae/corneas clear. Moist mucosa   Neck:

## 2025-02-12 NOTE — PROGRESS NOTES
Viridiana Rider MD   ·   MD Larisa Arana APRN  ·  REGI Kaye      [facility] [place]  [address] [place] [address]  P: [phone]  F: [phone] [facility] [place]  [address] [place] [address]  P: [phone]  F: [phone]  [facility] [place]  [address] [place] [address]  P: [phone]  F: [phone]           Inpatient Medical Oncology/Hematology progress  Note            Patient Name: Lana Phillips  YOB: 1955    Room: [address] [place] [address]A      CHIEF COMPLAINT:  Cough     Interim hx :   Sitting up in chair , appears in NAD. She reports breathing is better her o2 is on 4 L NC ,, still with cough , denies fever an chills     HISTORY OF PRESENT ILLNESS  Patient is a 70 y.o. female  with PMH A-fib, on chronic anticoagulation with Eliquis COPD, O2 dependent, hypertension, hyperlipidemia, vitamin B12 deficiency, and hyperparathyroidism , recent hospitalization 12/25/2025- 1/3/2025 sob , treated for PNA d/c to a skilled facility . Our service has been consulted for leukmoid reaction . Leukocytosis at admit were 21.6, per epic review pt was d/c to facility with elevated WBC 17.5. anemia 10.5, plts 419.   Pt reports 1 week with increased cough similar to her cough she had in January , she reports no fever chills or night sweats, but her SOB has increased significantly despite steroids and breathing treatments  . Reports that she has been unable to gain any weight at the nursing home facility and has felt worse recently with resp panel being + coronavirus on 2/6 . Also states she feels constipated. Pt seen in room ,  is on 9 L NC and A& O for me , she does have a sitter , she has tried to ambulate to the bathroom and then had resulting confusion , hypoxia .     Patient is seen by Dr. Christopher Rider in office , last office visit was on 2/6/2024 for anemia and unexplained weight loss diagnosis of iron deficiency  patient verbalized understanding.      REGI Pope,ACNS-BC,AGAMANUELP-BC  HEMATOLOGY/MEDICAL ONCOLOGY  [facility] MED ONCOLOGY  [facility] [address]  Dept: [phone]  Loc: [phone]

## 2025-02-12 NOTE — PLAN OF CARE
Problem: Chronic Conditions and Co-morbidities  Goal: Patient's chronic conditions and co-morbidity symptoms are monitored and maintained or improved  2/11/2025 2229 by Linad Boswell RN  Outcome: Progressing  Flowsheets (Taken 2/11/2025 2000)  Care Plan - Patient's Chronic Conditions and Co-Morbidity Symptoms are Monitored and Maintained or Improved: Monitor and assess patient's chronic conditions and comorbid symptoms for stability, deterioration, or improvement  2/11/2025 1014 by Inder Lucas RN  Outcome: Progressing  2/11/2025 1007 by Inder Lucas RN  Outcome: Progressing     Problem: Discharge Planning  Goal: Discharge to home or other facility with appropriate resources  2/11/2025 2229 by Linda Boswell RN  Outcome: Progressing  Flowsheets (Taken 2/11/2025 2000)  Discharge to home or other facility with appropriate resources: Identify barriers to discharge with patient and caregiver  2/11/2025 1014 by Inder Lucas RN  Outcome: Progressing  2/11/2025 1007 by Inder Lucas RN  Outcome: Progressing  Flowsheets (Taken 2/11/2025 0833)  Discharge to home or other facility with appropriate resources: Identify barriers to discharge with patient and caregiver     Problem: Safety - Adult  Goal: Free from fall injury  2/11/2025 2229 by Linda Boswell RN  Outcome: Progressing  2/11/2025 1014 by Inder Lucas RN  Outcome: Progressing  2/11/2025 1007 by Inder Lucas RN  Outcome: Progressing  Flowsheets (Taken 2/11/2025 1006)  Free From Fall Injury: Instruct family/caregiver on patient safety     Problem: ABCDS Injury Assessment  Goal: Absence of physical injury  2/11/2025 2229 by Linda Boswell RN  Outcome: Progressing  2/11/2025 1014 by Inder Lucas RN  Outcome: Progressing  2/11/2025 1008 by Inder Lucas RN  Outcome: Progressing  2/11/2025 1007 by Inder Lucas RN  Outcome: Progressing     Problem: Pain  Goal: Verbalizes/displays adequate comfort level or baseline comfort level  2/11/2025 2229 by Linda Boswell

## 2025-02-12 NOTE — PLAN OF CARE
Problem: Chronic Conditions and Co-morbidities  Goal: Patient's chronic conditions and co-morbidity symptoms are monitored and maintained or improved  2/12/2025 0924 by Inder Lucas RN  Outcome: Progressing  2/11/2025 2229 by Linda Boswell RN  Outcome: Progressing  Flowsheets (Taken 2/11/2025 2000)  Care Plan - Patient's Chronic Conditions and Co-Morbidity Symptoms are Monitored and Maintained or Improved: Monitor and assess patient's chronic conditions and comorbid symptoms for stability, deterioration, or improvement     Problem: Discharge Planning  Goal: Discharge to home or other facility with appropriate resources  2/12/2025 0924 by Inder Lucas RN  Outcome: Progressing  Flowsheets (Taken 2/12/2025 0854)  Discharge to home or other facility with appropriate resources: Identify barriers to discharge with patient and caregiver  2/11/2025 2229 by Linda Boswell RN  Outcome: Progressing  Flowsheets (Taken 2/11/2025 2000)  Discharge to home or other facility with appropriate resources: Identify barriers to discharge with patient and caregiver     Problem: Safety - Adult  Goal: Free from fall injury  2/12/2025 0924 by Inder Lucas RN  Outcome: Progressing  Flowsheets (Taken 2/12/2025 0923)  Free From Fall Injury: Instruct family/caregiver on patient safety  2/11/2025 2229 by Linda Boswell RN  Outcome: Progressing     Problem: ABCDS Injury Assessment  Goal: Absence of physical injury  2/11/2025 2229 by Linda Boswell RN  Outcome: Progressing     Problem: Pain  Goal: Verbalizes/displays adequate comfort level or baseline comfort level  2/11/2025 2229 by Linda Boswell RN  Outcome: Progressing     Problem: Respiratory - Adult  Goal: Achieves optimal ventilation and oxygenation  2/11/2025 2229 by Linda Boswell RN  Outcome: Progressing  Flowsheets (Taken 2/11/2025 2000)  Achieves optimal ventilation and oxygenation: Assess for changes in respiratory status     Problem: Cardiovascular - Adult  Goal: Maintains optimal

## 2025-02-12 NOTE — PROGRESS NOTES
Pt requesting something PRN for anxiety. Dr. Langley notified via [employer].     Electronically signed by Inder Lucas RN on 2/12/2025 at 3:22 PM    Ativan 0.25 mg PO ordered BID by Dr. Langley.   Electronically signed by Inder Lucas RN on 2/12/2025 at 3:56 PM

## 2025-02-12 NOTE — PROGRESS NOTES
Bedside shift report given to RAMO Oneil to assume care at this time.     Electronically signed by Inder Lucas RN on 2/12/2025 at 3:56 PM

## 2025-02-12 NOTE — PROGRESS NOTES
[facility]  [address], [address]  [place] [address]      Pulmonary Progress Note      SUBJECTIVE:  claims still feeling sob though slightly better with cough and minimal looser sputum production, on 4 li NC, wore NIPPV last night    OBJECTIVE    Medications    Continuous Infusions:   sodium chloride      sodium chloride         Scheduled Meds:   methylPREDNISolone  40 mg IntraVENous Q12H    benzonatate  100 mg Oral TID    aspirin  81 mg Oral Daily    dilTIAZem  240 mg Oral Daily    DULoxetine  60 mg Oral Daily    vitamin D  50,000 Units Oral Weekly    folic acid  1 mg Oral Daily    furosemide  40 mg Oral Daily    guaiFENesin  400 mg Oral TID    sodium chloride flush  5-40 mL IntraVENous 2 times per day    enoxaparin  30 mg SubCUTAneous Daily    cefTRIAXone (ROCEPHIN) IV  1,000 mg IntraVENous Q24H    And    doxycycline  100 mg Oral 2 times per day    lactobacillus  1 capsule Oral Daily    calcium carbonate  500 mg Oral Daily    ipratropium 0.5 mg-albuterol 2.5 mg  1 Dose Inhalation Q4H WA RT    arformoterol tartrate  15 mcg Nebulization BID RT    budesonide  0.5 mg Nebulization BID RT    senna  1 tablet Oral Nightly    sodium chloride flush  5-40 mL IntraVENous 2 times per day       PRN Meds:ALPRAZolam, bisacodyl, benzocaine-menthol, sodium chloride flush, sodium chloride, ondansetron **OR** ondansetron, polyethylene glycol, acetaminophen **OR** acetaminophen, aluminum & magnesium hydroxide-simethicone, hydrOXYzine HCl, sodium chloride flush, sodium chloride    Physical    VITALS:  /84   Pulse 87   Temp 97.8 °F (36.6 °C) (Infrared)   Resp 18   Ht 1.727 m (5' 8\")   Wt 50.8 kg (112 lb)   SpO2 94%   BMI 17.03 kg/m²     24HR INTAKE/OUTPUT:      Intake/Output Summary (Last 24 hours) at 2/12/2025 1608  Last data filed at 2/12/2025 1558  Gross per 24 hour   Intake 1140 ml   Output --   Net 1140 ml       24HR PULSE OXIMETRY RANGE:

## 2025-02-12 NOTE — PROGRESS NOTES
Department of Internal Medicine  Infectious Diseases  Progress  Note      C/C:   Leukocytosis       Denies fever or chills  Reports SOB   Chest tightness   Afebrile         Current Facility-Administered Medications   Medication Dose Route Frequency Provider Last Rate Last Admin    methylPREDNISolone sodium succ (SOLU-MEDROL) 40 mg in sterile water 1 mL injection  40 mg IntraVENous Q12H Jenna Langley MD        benzonatate (TESSALON) capsule 100 mg  100 mg Oral TID Jenna Lagnley MD   100 mg at 02/12/25 1136    bisacodyl (DULCOLAX) suppository 10 mg  10 mg Rectal Daily PRN Sebastian Gillespie MD   10 mg at 02/11/25 1231    aspirin EC tablet 81 mg  81 mg Oral Daily Caro Hinkle APRN - CNP   81 mg at 02/12/25 0856    benzocaine-menthol (CEPACOL SORE THROAT) lozenge 1 lozenge  1 lozenge Oral Q2H PRN Caro Hinkle APRN - CNP        dilTIAZem (CARDIZEM CD) extended release capsule 240 mg  240 mg Oral Daily Caro Hinkle APRN - CNP   240 mg at 02/12/25 0856    DULoxetine (CYMBALTA) extended release capsule 60 mg  60 mg Oral Daily Caro Hinkle APRN - CNP   60 mg at 02/12/25 0856    vitamin D (ERGOCALCIFEROL) capsule 50,000 Units  50,000 Units Oral Weekly Caro Hinkle APRN - CNP   50,000 Units at 02/12/25 0856    folic acid (FOLVITE) tablet 1 mg  1 mg Oral Daily Caro Hinkle APRN - CNP   1 mg at 02/12/25 0857    furosemide (LASIX) tablet 40 mg  40 mg Oral Daily Caro Hinkle APRN - CNP   40 mg at 02/12/25 0859    guaiFENesin tablet 400 mg  400 mg Oral TID Caro Hinkle APRN - CNP   400 mg at 02/12/25 0856    sodium chloride flush 0.9 % injection 5-40 mL  5-40 mL IntraVENous 2 times per day Caro Hinkle APRN - CNP   10 mL at 02/12/25 0858    sodium chloride flush 0.9 % injection 5-40 mL  5-40 mL IntraVENous PRN Caro Hinkle APRN - CNP   10 mL at 02/09/25 1501    0.9 % sodium chloride infusion   IntraVENous PRN Caro Hinkle APRN - CNP        enoxaparin Sodium (LOVENOX)

## 2025-02-12 NOTE — CARE COORDINATION
CM Update: Met with patient at bedside to discuss transition of care plan. Discharge plan is [facility]. Patient is long term care, no precert needed to return. Destination and PENNY updated. Face Sheet, Ambulance Form, and Envelope in soft chart. IV Rocephin. PO Doxy. IV Solumedrol Q 8. 4 liters nasal cannula. CM/SW to follow. Baldemar Khoury [phone]

## 2025-02-12 NOTE — PROGRESS NOTES
Spiritual Health History and Assessment/Progress Note  [facility]    (P) Spiritual/Emotional Needs,  ,  ,      Name: Lana Phillips MRN: [id number]    Age: 70 y.o.     Sex: female   Language: English   Amish: Confucianist   Pneumonia due to organism     Date: 2/12/2025                           Spiritual Assessment began in [address] PICU        Referral/Consult From: (P) Rounding   Encounter Overview/Reason: (P) Spiritual/Emotional Needs  Service Provided For: (P) Patient    Armida, Belief, Meaning:   Patient identifies as spiritual and is connected with a armida tradition or spiritual practice  Family/Friends No family/friends present      Importance and Influence:  Patient has spiritual/personal beliefs that influence decisions regarding their health  Family/Friends No family/friends present    Community:  Patient is connected with a spiritual community and feels well-supported. Support system includes: Friends and Extended family  Family/Friends No family/friends present    Assessment and Plan of Care:     Patient Interventions include: Facilitated expression of thoughts and feelings, Explored spiritual coping/struggle/distress, Affirmed coping skills/support systems, and Provided sacramental/Protestant ritual  Family/Friends Interventions include: No family/friends present    Patient Plan of Care: Spiritual Care available upon further referral  Family/Friends Plan of Care: No family/friends present    Electronically signed by CHITRA DE LEON on 2/12/2025 at 4:46 PM

## 2025-02-13 ENCOUNTER — APPOINTMENT (OUTPATIENT)
Dept: GENERAL RADIOLOGY | Age: 70
End: 2025-02-13
Payer: MEDICARE

## 2025-02-13 LAB
BILIRUB UR QL STRIP: NEGATIVE
CLARITY UR: CLEAR
COLOR UR: YELLOW
COMMENT: NORMAL
GLUCOSE UR STRIP-MCNC: NEGATIVE MG/DL
HGB UR QL STRIP.AUTO: NEGATIVE
KETONES UR STRIP-MCNC: NEGATIVE MG/DL
LEUKOCYTE ESTERASE UR QL STRIP: NEGATIVE
MICROORGANISM SPEC CULT: NORMAL
MICROORGANISM SPEC CULT: NORMAL
NITRITE UR QL STRIP: NEGATIVE
PH UR STRIP: 7 [PH] (ref 5–8)
PROT UR STRIP-MCNC: NEGATIVE MG/DL
SERVICE CMNT-IMP: NORMAL
SERVICE CMNT-IMP: NORMAL
SP GR UR STRIP: 1.01 (ref 1–1.03)
SPECIMEN DESCRIPTION: NORMAL
SPECIMEN DESCRIPTION: NORMAL
UROBILINOGEN UR STRIP-ACNC: 0.2 EU/DL (ref 0–1)

## 2025-02-13 PROCEDURE — 6360000002 HC RX W HCPCS: Performed by: INTERNAL MEDICINE

## 2025-02-13 PROCEDURE — 94660 CPAP INITIATION&MGMT: CPT

## 2025-02-13 PROCEDURE — 2700000000 HC OXYGEN THERAPY PER DAY

## 2025-02-13 PROCEDURE — 99232 SBSQ HOSP IP/OBS MODERATE 35: CPT | Performed by: STUDENT IN AN ORGANIZED HEALTH CARE EDUCATION/TRAINING PROGRAM

## 2025-02-13 PROCEDURE — 2060000000 HC ICU INTERMEDIATE R&B

## 2025-02-13 PROCEDURE — 97530 THERAPEUTIC ACTIVITIES: CPT

## 2025-02-13 PROCEDURE — 94640 AIRWAY INHALATION TREATMENT: CPT

## 2025-02-13 PROCEDURE — 97535 SELF CARE MNGMENT TRAINING: CPT

## 2025-02-13 PROCEDURE — 2500000003 HC RX 250 WO HCPCS: Performed by: INTERNAL MEDICINE

## 2025-02-13 PROCEDURE — 6370000000 HC RX 637 (ALT 250 FOR IP): Performed by: INTERNAL MEDICINE

## 2025-02-13 PROCEDURE — 6360000002 HC RX W HCPCS: Performed by: STUDENT IN AN ORGANIZED HEALTH CARE EDUCATION/TRAINING PROGRAM

## 2025-02-13 PROCEDURE — 6360000002 HC RX W HCPCS: Performed by: NURSE PRACTITIONER

## 2025-02-13 PROCEDURE — 2500000003 HC RX 250 WO HCPCS

## 2025-02-13 PROCEDURE — 2500000003 HC RX 250 WO HCPCS: Performed by: NURSE PRACTITIONER

## 2025-02-13 PROCEDURE — 6370000000 HC RX 637 (ALT 250 FOR IP): Performed by: STUDENT IN AN ORGANIZED HEALTH CARE EDUCATION/TRAINING PROGRAM

## 2025-02-13 PROCEDURE — 81003 URINALYSIS AUTO W/O SCOPE: CPT

## 2025-02-13 PROCEDURE — 51798 US URINE CAPACITY MEASURE: CPT

## 2025-02-13 PROCEDURE — 2500000003 HC RX 250 WO HCPCS: Performed by: STUDENT IN AN ORGANIZED HEALTH CARE EDUCATION/TRAINING PROGRAM

## 2025-02-13 PROCEDURE — 99232 SBSQ HOSP IP/OBS MODERATE 35: CPT | Performed by: NURSE PRACTITIONER

## 2025-02-13 PROCEDURE — 6370000000 HC RX 637 (ALT 250 FOR IP): Performed by: NURSE PRACTITIONER

## 2025-02-13 PROCEDURE — 71045 X-RAY EXAM CHEST 1 VIEW: CPT

## 2025-02-13 RX ORDER — PREDNISONE 20 MG/1
30 TABLET ORAL DAILY
Status: DISCONTINUED | OUTPATIENT
Start: 2025-02-14 | End: 2025-02-14 | Stop reason: HOSPADM

## 2025-02-13 RX ADMIN — ENOXAPARIN SODIUM 30 MG: 100 INJECTION SUBCUTANEOUS at 08:57

## 2025-02-13 RX ADMIN — ARFORMOTEROL TARTRATE 15 MCG: 15 SOLUTION RESPIRATORY (INHALATION) at 07:43

## 2025-02-13 RX ADMIN — WATER 1000 MG: 1 INJECTION INTRAMUSCULAR; INTRAVENOUS; SUBCUTANEOUS at 08:57

## 2025-02-13 RX ADMIN — IPRATROPIUM BROMIDE AND ALBUTEROL SULFATE 1 DOSE: 2.5; .5 SOLUTION RESPIRATORY (INHALATION) at 07:43

## 2025-02-13 RX ADMIN — ACETAMINOPHEN 650 MG: 325 TABLET ORAL at 15:18

## 2025-02-13 RX ADMIN — ALUMINUM HYDROXIDE, MAGNESIUM HYDROXIDE, AND SIMETHICONE 30 ML: 200; 200; 20 SUSPENSION ORAL at 09:07

## 2025-02-13 RX ADMIN — BUDESONIDE 500 MCG: 0.5 INHALANT RESPIRATORY (INHALATION) at 20:37

## 2025-02-13 RX ADMIN — SODIUM CHLORIDE, PRESERVATIVE FREE 10 ML: 5 INJECTION INTRAVENOUS at 21:26

## 2025-02-13 RX ADMIN — DULOXETINE HYDROCHLORIDE 60 MG: 30 CAPSULE, DELAYED RELEASE ORAL at 08:57

## 2025-02-13 RX ADMIN — FUROSEMIDE 40 MG: 20 TABLET ORAL at 08:57

## 2025-02-13 RX ADMIN — SODIUM CHLORIDE, PRESERVATIVE FREE 10 ML: 5 INJECTION INTRAVENOUS at 08:57

## 2025-02-13 RX ADMIN — IPRATROPIUM BROMIDE AND ALBUTEROL SULFATE 1 DOSE: 2.5; .5 SOLUTION RESPIRATORY (INHALATION) at 11:44

## 2025-02-13 RX ADMIN — SODIUM CHLORIDE, PRESERVATIVE FREE 10 ML: 5 INJECTION INTRAVENOUS at 08:59

## 2025-02-13 RX ADMIN — ARFORMOTEROL TARTRATE 15 MCG: 15 SOLUTION RESPIRATORY (INHALATION) at 20:37

## 2025-02-13 RX ADMIN — WATER 40 MG: 1 INJECTION INTRAMUSCULAR; INTRAVENOUS; SUBCUTANEOUS at 08:57

## 2025-02-13 RX ADMIN — BENZONATATE 100 MG: 100 CAPSULE ORAL at 08:58

## 2025-02-13 RX ADMIN — POLYETHYLENE GLYCOL 3350 17 G: 17 POWDER, FOR SOLUTION ORAL at 09:07

## 2025-02-13 RX ADMIN — BUDESONIDE 500 MCG: 0.5 INHALANT RESPIRATORY (INHALATION) at 07:43

## 2025-02-13 RX ADMIN — HYDROXYZINE HYDROCHLORIDE 10 MG: 10 TABLET, FILM COATED ORAL at 03:00

## 2025-02-13 RX ADMIN — HYDROXYZINE HYDROCHLORIDE 10 MG: 10 TABLET, FILM COATED ORAL at 21:25

## 2025-02-13 RX ADMIN — GUAIFENESIN 400 MG: 400 TABLET ORAL at 14:21

## 2025-02-13 RX ADMIN — IPRATROPIUM BROMIDE AND ALBUTEROL SULFATE 1 DOSE: 2.5; .5 SOLUTION RESPIRATORY (INHALATION) at 15:54

## 2025-02-13 RX ADMIN — DOXYCYCLINE HYCLATE 100 MG: 100 CAPSULE ORAL at 08:58

## 2025-02-13 RX ADMIN — ASPIRIN 81 MG: 81 TABLET, COATED ORAL at 08:58

## 2025-02-13 RX ADMIN — ONDANSETRON 4 MG: 4 TABLET, ORALLY DISINTEGRATING ORAL at 01:11

## 2025-02-13 RX ADMIN — FOLIC ACID 1 MG: 1 TABLET ORAL at 08:58

## 2025-02-13 RX ADMIN — GUAIFENESIN 400 MG: 400 TABLET ORAL at 08:57

## 2025-02-13 RX ADMIN — IPRATROPIUM BROMIDE AND ALBUTEROL SULFATE 1 DOSE: 2.5; .5 SOLUTION RESPIRATORY (INHALATION) at 20:37

## 2025-02-13 RX ADMIN — DOXYCYCLINE HYCLATE 100 MG: 100 CAPSULE ORAL at 21:25

## 2025-02-13 RX ADMIN — SENNOSIDES 8.6 MG: 8.6 TABLET, COATED ORAL at 21:24

## 2025-02-13 RX ADMIN — ALPRAZOLAM 0.25 MG: 0.25 TABLET ORAL at 03:29

## 2025-02-13 RX ADMIN — GUAIFENESIN 400 MG: 400 TABLET ORAL at 21:25

## 2025-02-13 RX ADMIN — BENZONATATE 100 MG: 100 CAPSULE ORAL at 21:25

## 2025-02-13 RX ADMIN — Medication 1 CAPSULE: at 08:58

## 2025-02-13 RX ADMIN — DILTIAZEM HYDROCHLORIDE 240 MG: 120 CAPSULE, COATED, EXTENDED RELEASE ORAL at 08:58

## 2025-02-13 RX ADMIN — ALPRAZOLAM 0.25 MG: 0.25 TABLET ORAL at 15:17

## 2025-02-13 RX ADMIN — BENZONATATE 100 MG: 100 CAPSULE ORAL at 14:21

## 2025-02-13 RX ADMIN — WATER 40 MG: 1 INJECTION INTRAMUSCULAR; INTRAVENOUS; SUBCUTANEOUS at 18:21

## 2025-02-13 ASSESSMENT — PAIN SCALES - GENERAL
PAINLEVEL_OUTOF10: 6
PAINLEVEL_OUTOF10: 0

## 2025-02-13 ASSESSMENT — ENCOUNTER SYMPTOMS
SHORTNESS OF BREATH: 1
WHEEZING: 1
COUGH: 1
CONSTIPATION: 1
EYES NEGATIVE: 1

## 2025-02-13 ASSESSMENT — PAIN DESCRIPTION - LOCATION: LOCATION: BACK

## 2025-02-13 ASSESSMENT — PAIN DESCRIPTION - ORIENTATION: ORIENTATION: LOWER

## 2025-02-13 NOTE — PROGRESS NOTES
[facility]  [address], [address]  [place] [address]      Pulmonary Progress Note      SUBJECTIVE:  claims still feeling sob and cough and minimal looser sputum production, on 4 li NC, didn't wear NIPPV last night though claims she did for a few hrs last night.    OBJECTIVE    Medications    Continuous Infusions:   sodium chloride      sodium chloride         Scheduled Meds:   methylPREDNISolone  40 mg IntraVENous Q12H    benzonatate  100 mg Oral TID    aspirin  81 mg Oral Daily    dilTIAZem  240 mg Oral Daily    DULoxetine  60 mg Oral Daily    vitamin D  50,000 Units Oral Weekly    folic acid  1 mg Oral Daily    furosemide  40 mg Oral Daily    guaiFENesin  400 mg Oral TID    sodium chloride flush  5-40 mL IntraVENous 2 times per day    enoxaparin  30 mg SubCUTAneous Daily    cefTRIAXone (ROCEPHIN) IV  1,000 mg IntraVENous Q24H    And    doxycycline  100 mg Oral 2 times per day    lactobacillus  1 capsule Oral Daily    calcium carbonate  500 mg Oral Daily    ipratropium 0.5 mg-albuterol 2.5 mg  1 Dose Inhalation Q4H WA RT    arformoterol tartrate  15 mcg Nebulization BID RT    budesonide  0.5 mg Nebulization BID RT    senna  1 tablet Oral Nightly    sodium chloride flush  5-40 mL IntraVENous 2 times per day       PRN Meds:ALPRAZolam, bisacodyl, benzocaine-menthol, sodium chloride flush, sodium chloride, ondansetron **OR** ondansetron, polyethylene glycol, acetaminophen **OR** acetaminophen, aluminum & magnesium hydroxide-simethicone, hydrOXYzine HCl, sodium chloride flush, sodium chloride    Physical    VITALS:  BP (!) 141/82   Pulse 91   Temp 97.4 °F (36.3 °C) (Temporal)   Resp 20   Ht 1.727 m (5' 8\")   Wt 50.8 kg (112 lb)   SpO2 99%   BMI 17.03 kg/m²     24HR INTAKE/OUTPUT:      Intake/Output Summary (Last 24 hours) at 2/13/2025 1552  Last data filed at 2/13/2025 1539  Gross per 24 hour   Intake 360 ml   Output 270 ml   Net 90 ml

## 2025-02-13 NOTE — PROGRESS NOTES
Supple. No jugular venous distention. Thyroid not visible, non tender   Respiratory: Normal respiratory effort. Clear to auscultation bilaterally. No stridor/wheezing/rhonchi/crackles   Cardiovascular: Regular heart beats, normal S1-S2. No M/G/R  Abdomen: Non distended, soft, non tender, no visceromegaly, no mass, normal bowel sounds   Musculoskeletal: No clubbing, cyanosis, no bilateral lower extremity edema. Brisk capillary refill.   Skin:  No rashes  on visible skin  Neurologic: awake, alert and oriented x 3. Following commands. No focal neurological deficit. Cranial nerves 2-12 grossly normal      ASSESSMENT and PLAN:    Acute on chronic hypoxic respiratory failure  Acute exacerbation of COPD  Pulmonary cachexia, BMI 17.73  Underlying COPD, 4 L nasal cannula  P/W shortness of breath, productive cough, pleuritic chest pain  Imaging negative for PE, bilateral groundglass opacity seen, Leukocytosis neutrophil predominant  Respiratory viral panel positive for coronavirus  Continue breathing treatment, wean oxygen as able, will reduce IV steroids from every 8 hourly to every 12 hourly  continue ceftriaxone, pulmonology following  CXR this am no acute changes     Neutrophil predominant leukocytosis    Macrocytic anemia   Leukemoid reaction?  Obtain B12, folate stable   Leukocytosis trending down  hematology following      Paroxysmal A-fib  Chronic anticoagulation  Telemetry monitoring  Continue Cardizem  Continue Eliquis     Depression/anxiety, Graves' disease osteoarthritis, osteoporosis, vitamin D deficiency  Continue Cymbalta and vistaril PNR  Follow-up with pain clinic    DVT prophylaxis: Lovenox     DISPOSITION: Discharge plan is [facility]  possible discharge in next 24 to 48 hours.    Medications:  REVIEWED DAILY    Infusion Medications    sodium chloride      sodium chloride       Scheduled Medications    methylPREDNISolone  40 mg IntraVENous Q12H    benzonatate  100 mg Oral TID    aspirin  81 mg Oral Daily     dilTIAZem  240 mg Oral Daily    DULoxetine  60 mg Oral Daily    vitamin D  50,000 Units Oral Weekly    folic acid  1 mg Oral Daily    furosemide  40 mg Oral Daily    guaiFENesin  400 mg Oral TID    sodium chloride flush  5-40 mL IntraVENous 2 times per day    enoxaparin  30 mg SubCUTAneous Daily    cefTRIAXone (ROCEPHIN) IV  1,000 mg IntraVENous Q24H    And    doxycycline  100 mg Oral 2 times per day    lactobacillus  1 capsule Oral Daily    calcium carbonate  500 mg Oral Daily    ipratropium 0.5 mg-albuterol 2.5 mg  1 Dose Inhalation Q4H WA RT    arformoterol tartrate  15 mcg Nebulization BID RT    budesonide  0.5 mg Nebulization BID RT    senna  1 tablet Oral Nightly    sodium chloride flush  5-40 mL IntraVENous 2 times per day     PRN Meds: ALPRAZolam, bisacodyl, benzocaine-menthol, sodium chloride flush, sodium chloride, ondansetron **OR** ondansetron, polyethylene glycol, acetaminophen **OR** acetaminophen, aluminum & magnesium hydroxide-simethicone, hydrOXYzine HCl, sodium chloride flush, sodium chloride    Labs:     Recent Labs     02/11/25  0434 02/12/25  0420   WBC 15.3* 14.1*   HGB 10.5* 11.9   HCT 33.4* 37.9    449       Recent Labs     02/11/25  0434 02/12/25  0728    140   K 3.5 4.0   CL 94* 92*   CO2 38* 40*   BUN 23 23   CREATININE 0.7 0.6   CALCIUM 9.2 9.4       No results for input(s): \"ALKPHOS\", \"ALT\", \"AST\", \"BILITOT\", \"AMYLASE\", \"LIPASE\" in the last 72 hours.    Invalid input(s): \"PROT\", \"ALB\"    No results for input(s): \"INR\" in the last 72 hours.    No results for input(s): \"CKTOTAL\", \"TROPONINI\" in the last 72 hours.    Chronic labs:    Lab Results   Component Value Date    CHOL 179 12/25/2024    TRIG 154 (H) 12/25/2024    HDL 94 12/25/2024    TSH 0.25 (L) 02/10/2025    INR 1.1 12/30/2024    LABA1C 5.6 12/25/2024       Radiology: REVIEWED DAILY    +++++++++++++++++++++++++++++++++++++++++++++++++  Jenna Langley MD  [facility]   [facility]

## 2025-02-13 NOTE — PLAN OF CARE
Problem: Chronic Conditions and Co-morbidities  Goal: Patient's chronic conditions and co-morbidity symptoms are monitored and maintained or improved  2/12/2025 2239 by Linda Boswell RN  Outcome: Progressing  Flowsheets (Taken 2/12/2025 2000)  Care Plan - Patient's Chronic Conditions and Co-Morbidity Symptoms are Monitored and Maintained or Improved: Monitor and assess patient's chronic conditions and comorbid symptoms for stability, deterioration, or improvement  2/12/2025 0924 by Inder Lucas RN  Outcome: Progressing     Problem: Discharge Planning  Goal: Discharge to home or other facility with appropriate resources  2/12/2025 2239 by Linda Boswell RN  Outcome: Progressing  Flowsheets (Taken 2/12/2025 2000)  Discharge to home or other facility with appropriate resources: Identify barriers to discharge with patient and caregiver  2/12/2025 0924 by Inder Lucas RN  Outcome: Progressing  Flowsheets (Taken 2/12/2025 0854)  Discharge to home or other facility with appropriate resources: Identify barriers to discharge with patient and caregiver     Problem: Safety - Adult  Goal: Free from fall injury  2/12/2025 2239 by Linda Boswell RN  Outcome: Progressing  2/12/2025 0924 by Inder Lucas RN  Outcome: Progressing  Flowsheets (Taken 2/12/2025 0923)  Free From Fall Injury: Instruct family/caregiver on patient safety     Problem: ABCDS Injury Assessment  Goal: Absence of physical injury  Outcome: Progressing     Problem: Pain  Goal: Verbalizes/displays adequate comfort level or baseline comfort level  Outcome: Progressing  Flowsheets (Taken 2/12/2025 1504 by Inder Lucas, RAMO)  Verbalizes/displays adequate comfort level or baseline comfort level: Encourage patient to monitor pain and request assistance     Problem: Respiratory - Adult  Goal: Achieves optimal ventilation and oxygenation  Outcome: Progressing  Flowsheets (Taken 2/12/2025 2000)  Achieves optimal ventilation and oxygenation: Assess for changes in

## 2025-02-13 NOTE — CARE COORDINATION
CM Update: Pt is long term care at [facility], no precert needed to return. PENNY/destination completed. Transport envelope on soft chart.  IV Rocephin, PO Doxy and IV Solu Medrol continues. CM to follow(TF)          ALBERTO CuellarN,RN  Case Management  [phone]

## 2025-02-13 NOTE — PROGRESS NOTES
Patient continues to decline bipap use for the night.  Remains on 4 lpm cannula, SaO2=96%  Nia Rosas RCP

## 2025-02-13 NOTE — PROGRESS NOTES
OCCUPATIONAL THERAPY TREATMENT NOTE  [facility] [address], [place]      Date:2025  Patient Name: Lana Phillips  MRN: [id number]  : 1955  [facility]     Per OT Eval:   Evaluating OT: Jaz Franklin, YOAV, OTR/L  # [id number]     Referring Provider:  Sebastian Gillespie MD     Specific Provider Orders:  \"OT Eval and Treat\"  2-10-25     Diagnosis: Pneumonia due to organism [J18.9]  Respiratory failure, acute-on-chronic [J96.20]  Coronavirus infection [B34.2]  Acute on chronic respiratory failure with hypoxia [J96.21]  Pneumonia due to infectious organism, unspecified laterality, unspecified part of lung [J18.9]     Pt was admitted from [facility] w/ sudden onset of SOB, Productive Cough, CP x 3 months.  Found to be hypoxic - placed on NRB, Moderately Anxious - repeatedly removing O2     Pertinent Medical History:  Pt has a past medical history of Atherosclerosis 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 (HCC), Atrial fibrillation (HCC), Chronic obstructive pulmonary disease (HCC), COVID-19, Critical limb ischemia of left lower extremity with rest pain (HCC), GI bleed, Hypertension, RUTH treated with BiPAP, Panic disorder, PVD (peripheral vascular disease) (HCC), Severe protein-calorie malnutrition (HCC), Thyroid disease, and Uncontrolled hypertension.,  has a past surgical history that includes back surgery; Left oophorectomy; Upper gastrointestinal endoscopy (N/A, 10/16/2023); Pain management procedure (N/A, 2023); invasive vascular (N/A, 2024); invasive vascular (N/A, 2024); bronchoscopy (N/A, 2024); and Pain management procedure (N/A, 2024).     Surgeries this admission: None      Precautions:  Fall Risk  Cognition - Alarms,   5L O2 via HF NC, continuous pulse-ox     Assessment of current deficits   [x] Functional mobility             [x]ADLs           [x] Strength        Chanel  Will continue to assess.   SUP for safety        Toileting Mod A/set up     SUP - Hygiene - seated  Min A - Clothing adjustment - standing - extended time/rest breaks  Pt ed for safe/adaptive techs     Min A  For balance/safety during personal hygiene and clothing management d/t unsteadiness.  Mod I      Bed Mobility  Supine to sit: SUP   Sit to supine:  NT     VCs for safe/adaptive techs     Supine<>Sit: Supervision  HOB elevated. Supine to sit:  IND  Sit to supine: IND      Functional Transfers SBA     EOB, chair, Commode  Pt ed for safety/hand placement     Sit<>Stand: SBA  Commode Transfer: SBA  Min cues for safety and line management safety required. Mod I      Functional Mobility SBA w/ WW     Household distances in room, to bathroom, Mod VCs for PLB/relaxation techs  Pt ed for safety/improved safety awareness, walker safety     SBA  Completed short distance within pt's room with min cues for safety.  Mod I      Balance Sitting:     Static:  Remote SUP    Dynamic:  SUP w/ functional ax EOB/Commode      Standing:     Static:  SBA w/ WW    Dynamic:  Min A w/ functional ax/mobility w/ WW     Sitting:     Static: Independent    Dynamic: Supervision      Standing:     Static:  SBA     Dynamic:  Min A      Sitting:     Static:  IND    Dynamic:  IND w/ functional ax     Standing:     Static:  Mod I w/ AD PRN    Dynamic:  Mod I w/ functional ax/mobility w/ AD PRN   Activity Tolerance Fair     Sitting Tolerance w/ light ax ~ extended time  Standing Tolerance w/ light ax ~ 2-4 mins at a time x several trials w/ functional ax     Fair-  For light activity. Good(-)   Visual/  Perceptual     Hearing: WNL   Glasses: No     WFL   Hearing Aids:  No          Comments:  Cleared by RN to see pt. Upon arrival patient in supine with HOB elevated and nursing present and agreeable to OT session. At end of session, patient returned to supine with HOB elevated with call light within reach, all lines and tubes intact and bed

## 2025-02-13 NOTE — PLAN OF CARE
Problem: Chronic Conditions and Co-morbidities  Goal: Patient's chronic conditions and co-morbidity symptoms are monitored and maintained or improved  2/13/2025 1745 by Thad Brock RN  Outcome: Progressing  2/13/2025 1042 by Melissa Chow RN  Outcome: Progressing  Flowsheets (Taken 2/13/2025 0824)  Care Plan - Patient's Chronic Conditions and Co-Morbidity Symptoms are Monitored and Maintained or Improved: Monitor and assess patient's chronic conditions and comorbid symptoms for stability, deterioration, or improvement     Problem: Discharge Planning  Goal: Discharge to home or other facility with appropriate resources  2/13/2025 1745 by Thad Brock RN  Outcome: Progressing  2/13/2025 1042 by Melissa Chow RN  Outcome: Progressing  Flowsheets (Taken 2/13/2025 0824)  Discharge to home or other facility with appropriate resources: Identify barriers to discharge with patient and caregiver     Problem: Safety - Adult  Goal: Free from fall injury  2/13/2025 1745 by Thad Brock RN  Outcome: Progressing  2/13/2025 1042 by Melissa Chow RN  Outcome: Progressing     Problem: ABCDS Injury Assessment  Goal: Absence of physical injury  2/13/2025 1745 by Thad Brock RN  Outcome: Progressing  2/13/2025 1042 by Melissa Chow RN  Outcome: Progressing     Problem: Pain  Goal: Verbalizes/displays adequate comfort level or baseline comfort level  2/13/2025 1745 by Thad Brock RN  Outcome: Progressing  2/13/2025 1042 by Melissa Chow RN  Outcome: Progressing     Problem: Respiratory - Adult  Goal: Achieves optimal ventilation and oxygenation  2/13/2025 1745 by Thad Brock RN  Outcome: Progressing  2/13/2025 1042 by Melissa Chow RN  Outcome: Progressing  Flowsheets (Taken 2/13/2025 0824)  Achieves optimal ventilation and oxygenation: Assess for changes in respiratory status     Problem: Cardiovascular - Adult  Goal: Maintains optimal cardiac output and hemodynamic stability  2/13/2025

## 2025-02-13 NOTE — PLAN OF CARE
Problem: Chronic Conditions and Co-morbidities  Goal: Patient's chronic conditions and co-morbidity symptoms are monitored and maintained or improved  2/13/2025 1042 by Melissa Chow RN  Outcome: Progressing  Flowsheets (Taken 2/13/2025 0824)  Care Plan - Patient's Chronic Conditions and Co-Morbidity Symptoms are Monitored and Maintained or Improved: Monitor and assess patient's chronic conditions and comorbid symptoms for stability, deterioration, or improvement     Problem: Discharge Planning  Goal: Discharge to home or other facility with appropriate resources  2/13/2025 1042 by Melissa Chow RN  Outcome: Progressing  Flowsheets (Taken 2/13/2025 0824)  Discharge to home or other facility with appropriate resources: Identify barriers to discharge with patient and caregiver     Problem: Safety - Adult  Goal: Free from fall injury  2/13/2025 1042 by Melissa Chow RN  Outcome: Progressing     Problem: ABCDS Injury Assessment  Goal: Absence of physical injury  2/13/2025 1042 by Melissa Chow RN  Outcome: Progressing     Problem: Pain  Goal: Verbalizes/displays adequate comfort level or baseline comfort level  2/13/2025 1042 by Melissa Chow RN  Outcome: Progressing     Problem: Respiratory - Adult  Goal: Achieves optimal ventilation and oxygenation  2/13/2025 1042 by Melissa Chow RN  Outcome: Progressing  Flowsheets (Taken 2/13/2025 0824)  Achieves optimal ventilation and oxygenation: Assess for changes in respiratory status     Problem: Cardiovascular - Adult  Goal: Maintains optimal cardiac output and hemodynamic stability  2/13/2025 1042 by Melissa Chow RN  Outcome: Progressing  Flowsheets (Taken 2/13/2025 0824)  Maintains optimal cardiac output and hemodynamic stability: Monitor blood pressure and heart rate     Problem: Skin/Tissue Integrity  Goal: Skin integrity remains intact  Description: 1.  Monitor for areas of redness and/or skin breakdown  2.  Assess vascular access sites hourly  3.  Every 4-6  hours minimum:  Change oxygen saturation probe site  4.  Every 4-6 hours:  If on nasal continuous positive airway pressure, respiratory therapy assess nares and determine need for appliance change or resting period  2/13/2025 1042 by Melissa Chow, RN  Outcome: Progressing  Flowsheets (Taken 2/13/2025 0824)  Skin Integrity Remains Intact: Monitor for areas of redness and/or skin breakdown

## 2025-02-13 NOTE — PROGRESS NOTES
02/13/25 0743    hydrOXYzine HCl (ATARAX) tablet 10 mg  10 mg Oral TID PRN Sebastian Gillespie MD   10 mg at 02/13/25 0300    senna (SENOKOT) tablet 8.6 mg  1 tablet Oral Nightly Caro Hinkle APRN - CNP   8.6 mg at 02/12/25 2019    sodium chloride flush 0.9 % injection 5-40 mL  5-40 mL IntraVENous 2 times per day Keila Hernandez MD   10 mL at 02/13/25 0859    sodium chloride flush 0.9 % injection 5-40 mL  5-40 mL IntraVENous PRN Keila Hernandez MD        0.9 % sodium chloride infusion   IntraVENous PRN Keila Hernandez MD           REVIEW OF SYSTEMS:    CONSTITUTIONAL:  Denies fever, chill or rigors.  HEENT: denies blurring of vision or double vision, denies hearing problem  RESPIRATORY:Cough, SOB   CARDIOVASCULAR:  Denies palpitation or chest pain   GASTROINTESTINAL:  Denies abdomen pain, diarrhea or constipation,, nausea or vomiting.  GENITOURINARY:  Denies burning urination or frequency of urination  INTEGUMENT: denies wound , rash  HEMATOLOGIC/LYMPHATIC:  Denies lymph node swelling, gum bleeding or easy bruising.  MUSCULOSKELETAL:  Denies leg pain , joint pain , joint swelling  NEUROLOGICAL:  Denies light headed, dizziness, loss of consciousness, weakness of lower extremities, bowel or bladder incontinence.      PHYSICAL EXAM:      Vitals:       BP (!) 141/82   Pulse 91   Temp 97.4 °F (36.3 °C) (Temporal)   Resp 20   Ht 1.727 m (5' 8\")   Wt 50.8 kg (112 lb)   SpO2 99%   BMI 17.03 kg/m²       General Appearance:    Awake, alert , no acute distress.   Head:    Normocephalic, atraumatic   Eyes:    No pallor, no icterus,   Ears:    No obvious deformity or drainage.   Nose:   No nasal drainage   Throat:   Mucosa moist, no oral thrush   Neck:   Supple, no lymphadenopathy   Lungs:     Decreased breath sound     Heart:    Irregular , no murmur   Abdomen:     Soft, non-tender, bowel sounds present    Extremities:   No edema, no cyanosis    Pulses:   Dorsalis pedis palpable    Skin:   no rashes         CBC with  Differential:      Lab Results   Component Value Date/Time    WBC 14.1 02/12/2025 04:20 AM    RBC 3.63 02/12/2025 04:20 AM    HGB 11.9 02/12/2025 04:20 AM    HCT 37.9 02/12/2025 04:20 AM     02/12/2025 04:20 AM    .4 02/12/2025 04:20 AM    MCH 32.8 02/12/2025 04:20 AM    MCHC 31.4 02/12/2025 04:20 AM    RDW 16.8 02/12/2025 04:20 AM    NRBC 1 12/29/2024 11:27 AM    METASPCT 1 01/02/2025 05:53 AM    LYMPHOPCT 6 02/12/2025 04:20 AM    PROMYELOPCT 2 02/10/2025 02:19 PM    MONOPCT 6 02/12/2025 04:20 AM    MYELOPCT 1 12/31/2024 11:53 AM    EOSPCT 0 02/12/2025 04:20 AM    BASOPCT 0 02/12/2025 04:20 AM    MONOSABS 0.89 02/12/2025 04:20 AM    LYMPHSABS 0.87 02/12/2025 04:20 AM    EOSABS 0.00 02/12/2025 04:20 AM    BASOSABS 0.02 02/12/2025 04:20 AM       CMP     Lab Results   Component Value Date/Time     02/12/2025 07:28 AM    K 4.0 02/12/2025 07:28 AM    K 3.3 12/30/2022 02:49 AM    CL 92 02/12/2025 07:28 AM    CO2 40 02/12/2025 07:28 AM    BUN 23 02/12/2025 07:28 AM    CREATININE 0.6 02/12/2025 07:28 AM    GFRAA >60 07/28/2022 09:31 AM    LABGLOM >90 02/12/2025 07:28 AM    LABGLOM >90 04/25/2024 09:54 AM    GLUCOSE 118 02/12/2025 07:28 AM    CALCIUM 9.4 02/12/2025 07:28 AM    BILITOT <0.2 02/08/2025 09:29 PM    ALKPHOS 72 02/08/2025 09:29 PM    AST 18 02/08/2025 09:29 PM    ALT 14 02/08/2025 09:29 PM         Hepatic Function Panel:    Lab Results   Component Value Date/Time    ALKPHOS 72 02/08/2025 09:29 PM    ALT 14 02/08/2025 09:29 PM    AST 18 02/08/2025 09:29 PM    BILITOT <0.2 02/08/2025 09:29 PM    BILIDIR <0.2 05/16/2024 07:25 AM    IBILI Can not be calculated 05/16/2024 07:25 AM       PT/INR:    Lab Results   Component Value Date/Time    PROTIME 11.6 12/30/2024 04:52 AM    INR 1.1 12/30/2024 04:52 AM       TSH:    Lab Results   Component Value Date/Time    TSH 0.25 02/10/2025 02:19 PM       U/A:    Lab Results   Component Value Date/Time    COLORU Yellow 06/27/2024 01:10 AM    PHUR 6.5

## 2025-02-13 NOTE — PROGRESS NOTES
Viridiana Rider MD   ·   MD Larisa Arana APRN  ·  REGI Kaye      [place] [facility] in [place]  [address] [place] [address]  P: [phone]  F: [phone] [place] [facility] in [place]  [address] [place] [address]  P: [phone]  F: [phone]  [facility] in [place]  [address] [place] [address]  P: [phone]  F: [phone]           Inpatient Medical Oncology/Hematology progress  Note            Patient Name: Lana Phillips  YOB: 1955    Room: [facility]      CHIEF COMPLAINT:  Cough     Interim hx :   Patient seen and examined in room. Occasional dyspnea with exertion. Denies nausea, vomiting, diarrhea, bleeding, or fever.     HISTORY OF PRESENT ILLNESS  Patient is a 70 y.o. female  with PMH A-fib, on chronic anticoagulation with Eliquis COPD, O2 dependent, hypertension, hyperlipidemia, vitamin B12 deficiency, and hyperparathyroidism, recent hospitalization 12/25/2025- 1/3/2025 sob, treated for PNA d/c to a skilled facility. Our service has been consulted for leukmoid reaction. Leukocytosis at admit were 21.6, per epic review pt was d/c to facility with elevated WBC 17.5, hemoglobin 10.5, plts 419.   Pt reports 1 week with increased cough similar to her cough she had in January, she reports no fever chills or night sweats, but her SOB has increased significantly despite steroids and breathing treatments. Reports that she has been unable to gain any weight at the nursing home facility and has felt worse recently with resp panel being + coronavirus on 2/6. Also states she feels constipated. Pt seen in room,  is on 9 L NC and A& O for me, she does have a sitter, she has tried to ambulate to the bathroom and then had resulting confusion, hypoxia .     Patient is seen by Dr. Christopher Rider in office , last office visit was on 2/6/2024 for anemia and unexplained weight loss diagnosis of iron deficiency anemia she            DIAGNOSTIC DATA:   Lab Results   Component Value Date    WBC 14.1 (H) 02/12/2025    HGB 11.9 02/12/2025    HCT 37.9 02/12/2025    .4 (H) 02/12/2025     02/12/2025     Lab Results   Component Value Date    NEUTROABS 12.11 (H) 02/12/2025     Lab Results   Component Value Date    ALT 14 02/08/2025    AST 18 02/08/2025    ALKPHOS 72 02/08/2025    BILITOT <0.2 02/08/2025     Lab Results   Component Value Date    CREATININE 0.6 02/12/2025    BUN 23 02/12/2025     02/12/2025    K 4.0 02/12/2025    CL 92 (L) 02/12/2025    CO2 40 (H) 02/12/2025       Pathology:     Radiology:       IMPRESSION:  1. No acute pulmonary artery embolism.  2. Right greater than left dependent ground-glass and consolidative opacities  may reflect pneumonia in the correct clinical setting.  3. Hiatal hernia.  4. Age-indeterminate L1 superior endplate compression fracture.       ASSESSMENT     Acute on chronic hypoxc respiratory failure , +coronavirus OC43, COPD  exacerbation pt is 02 dependant   CTA pulm 2/10/2025  reviewed, na acute PE, b/l ground glass and consolidative opacities, pna, CT abd no acute disease seen   Leukocytosis with neutrophilia, reactive, secondary to infection, and steroids, white count is improving   Paroxysmal A-fib, Chronic anticoagulation  Chronic Anemia       PLAN     ID following appreciate, receiving Rocephin, doxycycline  Path smear  Macrocytic anemia - flow cytometry [facility] pending   Pt received venofer iron infusion on 1/16/2025   Pulm following -steroids   Continue to monitor counts, transfuse with packed RBCs to keep her hemoglobin greater than 7G/DL, monitor for bleeding.         Fidelia Pace, APRN - CNP   [facility] MED ONCOLOGY  [facility] [facility] [address]  Dept: [phone]  Loc: [phone]

## 2025-02-14 ENCOUNTER — HOSPITAL ENCOUNTER (OUTPATIENT)
Dept: INFUSION THERAPY | Age: 70
End: 2025-02-14

## 2025-02-14 VITALS
WEIGHT: 112 LBS | TEMPERATURE: 97.5 F | DIASTOLIC BLOOD PRESSURE: 84 MMHG | HEART RATE: 88 BPM | OXYGEN SATURATION: 99 % | RESPIRATION RATE: 16 BRPM | SYSTOLIC BLOOD PRESSURE: 146 MMHG | HEIGHT: 68 IN | BODY MASS INDEX: 16.97 KG/M2

## 2025-02-14 LAB
ANION GAP SERPL CALCULATED.3IONS-SCNC: 7 MMOL/L (ref 7–16)
BASOPHILS # BLD: 0.02 K/UL (ref 0–0.2)
BASOPHILS NFR BLD: 0 % (ref 0–2)
BUN SERPL-MCNC: 23 MG/DL (ref 6–23)
CALCIUM SERPL-MCNC: 9.5 MG/DL (ref 8.6–10.2)
CHLORIDE SERPL-SCNC: 93 MMOL/L (ref 98–107)
CO2 SERPL-SCNC: 39 MMOL/L (ref 22–29)
CREAT SERPL-MCNC: 0.6 MG/DL (ref 0.5–1)
EOSINOPHIL # BLD: 0.01 K/UL (ref 0.05–0.5)
EOSINOPHILS RELATIVE PERCENT: 0 % (ref 0–6)
ERYTHROCYTE [DISTWIDTH] IN BLOOD BY AUTOMATED COUNT: 16.2 % (ref 11.5–15)
GFR, ESTIMATED: >90 ML/MIN/1.73M2
GLUCOSE SERPL-MCNC: 129 MG/DL (ref 74–99)
HCT VFR BLD AUTO: 36.9 % (ref 34–48)
HGB BLD-MCNC: 11.4 G/DL (ref 11.5–15.5)
IMM GRANULOCYTES # BLD AUTO: 0.29 K/UL (ref 0–0.58)
IMM GRANULOCYTES NFR BLD: 2 % (ref 0–5)
LYMPHOCYTES NFR BLD: 0.8 K/UL (ref 1.5–4)
LYMPHOCYTES RELATIVE PERCENT: 5 % (ref 20–42)
MCH RBC QN AUTO: 32.9 PG (ref 26–35)
MCHC RBC AUTO-ENTMCNC: 30.9 G/DL (ref 32–34.5)
MCV RBC AUTO: 106.3 FL (ref 80–99.9)
MONOCYTES NFR BLD: 0.81 K/UL (ref 0.1–0.95)
MONOCYTES NFR BLD: 5 % (ref 2–12)
NEUTROPHILS NFR BLD: 88 % (ref 43–80)
NEUTS SEG NFR BLD: 13.74 K/UL (ref 1.8–7.3)
PLATELET # BLD AUTO: 402 K/UL (ref 130–450)
PMV BLD AUTO: 10 FL (ref 7–12)
POTASSIUM SERPL-SCNC: 3.8 MMOL/L (ref 3.5–5)
RBC # BLD AUTO: 3.47 M/UL (ref 3.5–5.5)
SEND OUT REPORT: NORMAL
SODIUM SERPL-SCNC: 139 MMOL/L (ref 132–146)
TEST NAME: NORMAL
WBC OTHER # BLD: 15.7 K/UL (ref 4.5–11.5)

## 2025-02-14 PROCEDURE — 2580000003 HC RX 258: Performed by: STUDENT IN AN ORGANIZED HEALTH CARE EDUCATION/TRAINING PROGRAM

## 2025-02-14 PROCEDURE — 80048 BASIC METABOLIC PNL TOTAL CA: CPT

## 2025-02-14 PROCEDURE — 36415 COLL VENOUS BLD VENIPUNCTURE: CPT

## 2025-02-14 PROCEDURE — 6370000000 HC RX 637 (ALT 250 FOR IP): Performed by: INTERNAL MEDICINE

## 2025-02-14 PROCEDURE — 2700000000 HC OXYGEN THERAPY PER DAY

## 2025-02-14 PROCEDURE — 6360000002 HC RX W HCPCS: Performed by: STUDENT IN AN ORGANIZED HEALTH CARE EDUCATION/TRAINING PROGRAM

## 2025-02-14 PROCEDURE — 2500000003 HC RX 250 WO HCPCS

## 2025-02-14 PROCEDURE — 85025 COMPLETE CBC W/AUTO DIFF WBC: CPT

## 2025-02-14 PROCEDURE — 94640 AIRWAY INHALATION TREATMENT: CPT

## 2025-02-14 PROCEDURE — 6370000000 HC RX 637 (ALT 250 FOR IP): Performed by: STUDENT IN AN ORGANIZED HEALTH CARE EDUCATION/TRAINING PROGRAM

## 2025-02-14 PROCEDURE — 6370000000 HC RX 637 (ALT 250 FOR IP): Performed by: NURSE PRACTITIONER

## 2025-02-14 PROCEDURE — 99232 SBSQ HOSP IP/OBS MODERATE 35: CPT | Performed by: NURSE PRACTITIONER

## 2025-02-14 PROCEDURE — 6360000002 HC RX W HCPCS: Performed by: NURSE PRACTITIONER

## 2025-02-14 PROCEDURE — 99239 HOSP IP/OBS DSCHRG MGMT >30: CPT | Performed by: STUDENT IN AN ORGANIZED HEALTH CARE EDUCATION/TRAINING PROGRAM

## 2025-02-14 PROCEDURE — 94660 CPAP INITIATION&MGMT: CPT

## 2025-02-14 PROCEDURE — 2500000003 HC RX 250 WO HCPCS: Performed by: NURSE PRACTITIONER

## 2025-02-14 PROCEDURE — 6360000002 HC RX W HCPCS: Performed by: INTERNAL MEDICINE

## 2025-02-14 RX ORDER — POLYETHYLENE GLYCOL 3350 17 G/17G
17 POWDER, FOR SOLUTION ORAL ONCE
Status: COMPLETED | OUTPATIENT
Start: 2025-02-14 | End: 2025-02-14

## 2025-02-14 RX ORDER — BENZONATATE 100 MG/1
100 CAPSULE ORAL 3 TIMES DAILY
DISCHARGE
Start: 2025-02-14 | End: 2025-02-21

## 2025-02-14 RX ORDER — FAMOTIDINE 20 MG/1
20 TABLET, FILM COATED ORAL ONCE
Status: COMPLETED | OUTPATIENT
Start: 2025-02-14 | End: 2025-02-14

## 2025-02-14 RX ORDER — PREDNISONE 10 MG/1
TABLET ORAL
DISCHARGE
Start: 2025-02-15 | End: 2025-02-24

## 2025-02-14 RX ORDER — PANTOPRAZOLE SODIUM 40 MG/1
40 TABLET, DELAYED RELEASE ORAL
Status: DISCONTINUED | OUTPATIENT
Start: 2025-02-15 | End: 2025-02-14 | Stop reason: HOSPADM

## 2025-02-14 RX ADMIN — SODIUM CHLORIDE, PRESERVATIVE FREE 10 ML: 5 INJECTION INTRAVENOUS at 10:08

## 2025-02-14 RX ADMIN — FAMOTIDINE 20 MG: 20 TABLET, FILM COATED ORAL at 10:06

## 2025-02-14 RX ADMIN — BENZONATATE 100 MG: 100 CAPSULE ORAL at 10:06

## 2025-02-14 RX ADMIN — WATER 1000 MG: 1 INJECTION INTRAMUSCULAR; INTRAVENOUS; SUBCUTANEOUS at 10:05

## 2025-02-14 RX ADMIN — BENZONATATE 100 MG: 100 CAPSULE ORAL at 14:03

## 2025-02-14 RX ADMIN — PREDNISONE 30 MG: 20 TABLET ORAL at 10:06

## 2025-02-14 RX ADMIN — SODIUM CHLORIDE, PRESERVATIVE FREE 10 ML: 5 INJECTION INTRAVENOUS at 10:21

## 2025-02-14 RX ADMIN — ASPIRIN 81 MG: 81 TABLET, COATED ORAL at 10:06

## 2025-02-14 RX ADMIN — DILTIAZEM HYDROCHLORIDE 240 MG: 120 CAPSULE, COATED, EXTENDED RELEASE ORAL at 10:06

## 2025-02-14 RX ADMIN — FOLIC ACID 1 MG: 1 TABLET ORAL at 10:05

## 2025-02-14 RX ADMIN — BUDESONIDE 500 MCG: 0.5 INHALANT RESPIRATORY (INHALATION) at 10:30

## 2025-02-14 RX ADMIN — POLYETHYLENE GLYCOL 3350 17 G: 17 POWDER, FOR SOLUTION ORAL at 10:07

## 2025-02-14 RX ADMIN — ENOXAPARIN SODIUM 30 MG: 100 INJECTION SUBCUTANEOUS at 10:04

## 2025-02-14 RX ADMIN — IPRATROPIUM BROMIDE AND ALBUTEROL SULFATE 1 DOSE: 2.5; .5 SOLUTION RESPIRATORY (INHALATION) at 10:29

## 2025-02-14 RX ADMIN — PANTOPRAZOLE SODIUM 40 MG: 40 INJECTION, POWDER, FOR SOLUTION INTRAVENOUS at 10:05

## 2025-02-14 RX ADMIN — DOXYCYCLINE HYCLATE 100 MG: 100 CAPSULE ORAL at 10:06

## 2025-02-14 RX ADMIN — GUAIFENESIN 400 MG: 400 TABLET ORAL at 14:03

## 2025-02-14 RX ADMIN — Medication 1 CAPSULE: at 10:06

## 2025-02-14 RX ADMIN — FUROSEMIDE 40 MG: 20 TABLET ORAL at 10:06

## 2025-02-14 RX ADMIN — ARFORMOTEROL TARTRATE 15 MCG: 15 SOLUTION RESPIRATORY (INHALATION) at 10:30

## 2025-02-14 RX ADMIN — GUAIFENESIN 400 MG: 400 TABLET ORAL at 10:06

## 2025-02-14 RX ADMIN — DULOXETINE HYDROCHLORIDE 60 MG: 30 CAPSULE, DELAYED RELEASE ORAL at 10:05

## 2025-02-14 RX ADMIN — ALPRAZOLAM 0.25 MG: 0.25 TABLET ORAL at 14:26

## 2025-02-14 ASSESSMENT — ENCOUNTER SYMPTOMS
CONSTIPATION: 0
NAUSEA: 1
SHORTNESS OF BREATH: 1
COUGH: 1
WHEEZING: 0
EYES NEGATIVE: 1

## 2025-02-14 ASSESSMENT — PAIN DESCRIPTION - LOCATION: LOCATION: ABDOMEN

## 2025-02-14 ASSESSMENT — PAIN SCALES - GENERAL: PAINLEVEL_OUTOF10: 6

## 2025-02-14 ASSESSMENT — PAIN DESCRIPTION - ORIENTATION: ORIENTATION: MID

## 2025-02-14 ASSESSMENT — PAIN DESCRIPTION - FREQUENCY: FREQUENCY: INTERMITTENT

## 2025-02-14 ASSESSMENT — PAIN DESCRIPTION - DESCRIPTORS: DESCRIPTORS: CRAMPING;ACHING;DISCOMFORT

## 2025-02-14 NOTE — CARE COORDINATION
CM Update: Discharge order noted. Pt is long term care at [facility]. Transport arranged with [facility] @ 1:30/2:00pm.  PENNY/destination completed. Transport envelope on soft chart. Pt, liaison and nurse updated (TF)      ALBERTO CuellarN,RN  Case Management  [phone]

## 2025-02-14 NOTE — PROGRESS NOTES
[facility]  [address], [address]  [place] [address]      Pulmonary Progress Note      SUBJECTIVE:  claims feeling stable with breathing and cough with minimal looser sputum production, on 4 li NC, did wear NIPPV last night.    OBJECTIVE    Medications    Continuous Infusions:   sodium chloride      sodium chloride         Scheduled Meds:   [START ON 2/15/2025] pantoprazole  40 mg Oral QAM AC    predniSONE  30 mg Oral Daily    benzonatate  100 mg Oral TID    aspirin  81 mg Oral Daily    dilTIAZem  240 mg Oral Daily    DULoxetine  60 mg Oral Daily    vitamin D  50,000 Units Oral Weekly    folic acid  1 mg Oral Daily    furosemide  40 mg Oral Daily    guaiFENesin  400 mg Oral TID    sodium chloride flush  5-40 mL IntraVENous 2 times per day    enoxaparin  30 mg SubCUTAneous Daily    lactobacillus  1 capsule Oral Daily    calcium carbonate  500 mg Oral Daily    ipratropium 0.5 mg-albuterol 2.5 mg  1 Dose Inhalation Q4H WA RT    arformoterol tartrate  15 mcg Nebulization BID RT    budesonide  0.5 mg Nebulization BID RT    senna  1 tablet Oral Nightly    sodium chloride flush  5-40 mL IntraVENous 2 times per day       PRN Meds:ALPRAZolam, bisacodyl, benzocaine-menthol, sodium chloride flush, sodium chloride, ondansetron **OR** ondansetron, polyethylene glycol, acetaminophen **OR** acetaminophen, aluminum & magnesium hydroxide-simethicone, hydrOXYzine HCl, sodium chloride flush, sodium chloride    Physical    VITALS:  BP (!) 146/84   Pulse 88   Temp 97.5 °F (36.4 °C) (Oral)   Resp 16   Ht 1.727 m (5' 8\")   Wt 50.8 kg (112 lb)   SpO2 99%   BMI 17.03 kg/m²     24HR INTAKE/OUTPUT:      Intake/Output Summary (Last 24 hours) at 2025 1524  Last data filed at 2025 1021  Gross per 24 hour   Intake 840 ml   Output 100 ml   Net 740 ml       24HR PULSE OXIMETRY RANGE:    SpO2  Av.7 %  Min: 99 %  Max: 100 %    General appearance: alert,

## 2025-02-14 NOTE — PLAN OF CARE
Problem: Chronic Conditions and Co-morbidities  Goal: Patient's chronic conditions and co-morbidity symptoms are monitored and maintained or improved  2/13/2025 2314 by Linda Boswell RN  Outcome: Progressing  Flowsheets (Taken 2/13/2025 2000)  Care Plan - Patient's Chronic Conditions and Co-Morbidity Symptoms are Monitored and Maintained or Improved: Monitor and assess patient's chronic conditions and comorbid symptoms for stability, deterioration, or improvement  2/13/2025 1745 by Thad Brock RN  Outcome: Progressing  2/13/2025 1042 by Melissa Chow RN  Outcome: Progressing  Flowsheets (Taken 2/13/2025 0824)  Care Plan - Patient's Chronic Conditions and Co-Morbidity Symptoms are Monitored and Maintained or Improved: Monitor and assess patient's chronic conditions and comorbid symptoms for stability, deterioration, or improvement     Problem: Discharge Planning  Goal: Discharge to home or other facility with appropriate resources  2/13/2025 2314 by Linda Boswell RN  Outcome: Progressing  Flowsheets (Taken 2/13/2025 2000)  Discharge to home or other facility with appropriate resources: Identify barriers to discharge with patient and caregiver  2/13/2025 1745 by Thad Brock RN  Outcome: Progressing  2/13/2025 1042 by Melissa Chow RN  Outcome: Progressing  Flowsheets (Taken 2/13/2025 0824)  Discharge to home or other facility with appropriate resources: Identify barriers to discharge with patient and caregiver     Problem: Safety - Adult  Goal: Free from fall injury  2/13/2025 2314 by Linda Boswell RN  Outcome: Progressing  2/13/2025 1745 by Thad Brock RN  Outcome: Progressing  2/13/2025 1042 by Melissa Chow RN  Outcome: Progressing     Problem: ABCDS Injury Assessment  Goal: Absence of physical injury  2/13/2025 2314 by Linda Boswell RN  Outcome: Progressing  2/13/2025 1745 by Thad Brock RN  Outcome: Progressing  2/13/2025 1042 by Melissa Chow RN  Outcome: Progressing

## 2025-02-14 NOTE — DISCHARGE SUMMARY
Hospitalist Discharge Summary    Patient ID: Lana Phillips   Patient : 1955  Patient's PCP: Tal Humphrey DO    Admit Date: 2025   Admitting Physician: Tal Humphrey DO    Discharge Date:  2025   Discharge Physician: Jenna Langley MD   Discharge Condition: Stable  Discharge Disposition: Skilled Facility      Hospital course in brief:  (Please refer to daily progress notes for a comprehensive review of the hospitalization by requesting medical records)    70 y.o. year old female Atrial fibrillation, Chronic obstructive pulmonary disease Hypertension, RUTH treated with BiPAP, Panic disorder, PVD presented to the ED with complaints of sob that has been going on for the last few weeks. She states that she has been having a productive cough of white sputum. she was having chest pain that has been radiating down her left arm. She states that her chest pain is worse when she takes in a deep breath. She is on baseline oxygen at 4L but due to hypoxia she was placed 6L. In the ED She was found to have troponin level of 39, 32. CT chest WBC elevated at 21.6 chest xray negative CTA neg for PE Right greater than left dependent ground-glass and consolidative opacities  may reflect pneumonia. She was given IV rocephin and doxy. She was incidentally found to be Coronavirus OC +ve. Pulmonology and ID consulted, She was placed on IV steroids,   Oncology consulted while inpatient for Neutrophil predominant leukocytosis    Macrocytic anemia, Patient was previously seen by Dr. Christopher Rider in office , last office visit was on 2024 for anemia and unexplained weight loss diagnosis of iron deficiency anemia she received IV iron and scheduled for colonoscopy in [place] with capsule endoscopy. Workup no vitamin B12 deficiency, folate, zinc or copper deficiency, SPEP is negative for monoclonal proteins, SPEP is negative, Coomb's is negative .   Leukocytosis likely secondary to steroids, infection.

## 2025-02-14 NOTE — PROGRESS NOTES
Viridiana Rider MD   ·   MD Larisa Arana APRN  ·  REGI Kaye      [place] [facility] in [place]  [address] [place] [address]  P: [phone]  F: [phone] [place] [facility] in [place]  [address] [place] [address]  P: [phone]  F: [phone]  [facility] in [place]  [address] [place] [address]  P: [phone]  F: [phone]           Inpatient Medical Oncology/Hematology progress  Note            Patient Name: Lana Phillips  YOB: 1955    Room: [address] [place] [address]-A      CHIEF COMPLAINT:  Cough     Interim hx :   Patient seen and examined in room. Occasional dyspnea with exertion. Denies vomiting, diarrhea, bleeding, or fever. Admits to nausea this morning.     HISTORY OF PRESENT ILLNESS  Patient is a 70 y.o. female  with PMH A-fib, on chronic anticoagulation with Eliquis COPD, O2 dependent, hypertension, hyperlipidemia, vitamin B12 deficiency, and hyperparathyroidism, recent hospitalization 12/25/2025- 1/3/2025 sob, treated for PNA d/c to a skilled facility. Our service has been consulted for leukmoid reaction. Leukocytosis at admit were 21.6, per epic review pt was d/c to facility with elevated WBC 17.5, hemoglobin 10.5, plts 419.   Pt reports 1 week with increased cough similar to her cough she had in January, she reports no fever chills or night sweats, but her SOB has increased significantly despite steroids and breathing treatments. Reports that she has been unable to gain any weight at the nursing home facility and has felt worse recently with resp panel being + coronavirus on 2/6. Also states she feels constipated. Pt seen in room,  is on 9 L NC and A& O for me, she does have a sitter, she has tried to ambulate to the bathroom and then had resulting confusion, hypoxia .     Patient is seen by Dr. Christopher Rider in office , last office visit was on 2/6/2024 for anemia and unexplained weight loss diagnosis of iron  [facility]  MATERNAL CHILD HEALTH   SOCIAL WORK PROGRESS NOTE      DATA:     [facility] Child Protection worker, Pooja Hinton, plans on visiting pt's maternal grandma's home tomorrow as family anticipates residing there as they await alternative housing arrangements.     [facility] Housing worker, Eulalia Reid, states that if maternal grandparent's home is not approved it would be helpful for a letter from [facility] outlining Tamgalen's needs and desired private living accomodations. The family has been approved for a Family Univfication Program (FUP) voucher so Eulalia will continue to assist with locating housing. This process will take a minimum of a few weeks.     CNP, Lisa Mando, signed a letter outlining Tamari's history and compromised immunity due to prematurity.     INTERVENTION:     SW communicated medical update with [facility] workers: Pooja Hinton (Child Protection) & Eulalia Reid (Housing ).  Provided requested documents of H& P, recent progress note to Pooja Hinton for Supplemental Social Income application.   Provided medical advocacy letter to Eulalia Reid.     ASSESSMENT:     [facility] services are attempting to be supportive for family. Family is preparing for discharge anticipated the end of this week.     PLAN:      to follow up with medical team, family, and community supports tomorrow.       Kjerstin Rydeen, [facility]   Social Worker  Maternal Child Health   Direct: [phone]  Pager: [phone]

## 2025-02-14 NOTE — PROGRESS NOTES
Department of Internal Medicine  Infectious Diseases  Progress  Note      C/C:   Leukocytosis , CAP, COPDE       Denies fever or chills  Reports SOB - improving   Afebrile         Current Facility-Administered Medications   Medication Dose Route Frequency Provider Last Rate Last Admin    [START ON 2/15/2025] pantoprazole (PROTONIX) tablet 40 mg  40 mg Oral QAM AC Jenna Langley MD        predniSONE (DELTASONE) tablet 30 mg  30 mg Oral Daily Jose Price MD   30 mg at 02/14/25 1006    benzonatate (TESSALON) capsule 100 mg  100 mg Oral TID Jenna Langley MD   100 mg at 02/14/25 1006    ALPRAZolam (XANAX) tablet 0.25 mg  0.25 mg Oral BID PRN Jenna Langley MD   0.25 mg at 02/13/25 1517    bisacodyl (DULCOLAX) suppository 10 mg  10 mg Rectal Daily PRN Sebastian Gillespie MD   10 mg at 02/11/25 1231    aspirin EC tablet 81 mg  81 mg Oral Daily Caro Hinkle APRN - CNP   81 mg at 02/14/25 1006    benzocaine-menthol (CEPACOL SORE THROAT) lozenge 1 lozenge  1 lozenge Oral Q2H PRN Caro Hinkle APRN - CNP   1 lozenge at 02/12/25 1430    dilTIAZem (CARDIZEM CD) extended release capsule 240 mg  240 mg Oral Daily Caro Hinkle APRN - CNP   240 mg at 02/14/25 1006    DULoxetine (CYMBALTA) extended release capsule 60 mg  60 mg Oral Daily Caro Hinkle APRN - CNP   60 mg at 02/14/25 1005    vitamin D (ERGOCALCIFEROL) capsule 50,000 Units  50,000 Units Oral Weekly Caro Hinkle APRN - CNP   50,000 Units at 02/12/25 0856    folic acid (FOLVITE) tablet 1 mg  1 mg Oral Daily Caro Hinkle APRN - CNP   1 mg at 02/14/25 1005    furosemide (LASIX) tablet 40 mg  40 mg Oral Daily Caro Hinkle APRN - CNP   40 mg at 02/14/25 1006    guaiFENesin tablet 400 mg  400 mg Oral TID Caro Hinkle APRN - CNP   400 mg at 02/14/25 1006    sodium chloride flush 0.9 % injection 5-40 mL  5-40 mL IntraVENous 2 times per day Caro Hinkle APRN - CNP   10 mL at 02/14/25 1008    sodium chloride flush 0.9 % injection  02/14/2025 05:00 AM    MCHC 30.9 02/14/2025 05:00 AM    RDW 16.2 02/14/2025 05:00 AM    NRBC 1 12/29/2024 11:27 AM    METASPCT 1 01/02/2025 05:53 AM    LYMPHOPCT 5 02/14/2025 05:00 AM    PROMYELOPCT 2 02/10/2025 02:19 PM    MONOPCT 5 02/14/2025 05:00 AM    MYELOPCT 1 12/31/2024 11:53 AM    EOSPCT 0 02/14/2025 05:00 AM    BASOPCT 0 02/14/2025 05:00 AM    MONOSABS 0.81 02/14/2025 05:00 AM    LYMPHSABS 0.80 02/14/2025 05:00 AM    EOSABS 0.01 02/14/2025 05:00 AM    BASOSABS 0.02 02/14/2025 05:00 AM       CMP     Lab Results   Component Value Date/Time     02/14/2025 05:00 AM    K 3.8 02/14/2025 05:00 AM    K 3.3 12/30/2022 02:49 AM    CL 93 02/14/2025 05:00 AM    CO2 39 02/14/2025 05:00 AM    BUN 23 02/14/2025 05:00 AM    CREATININE 0.6 02/14/2025 05:00 AM    GFRAA >60 07/28/2022 09:31 AM    LABGLOM >90 02/14/2025 05:00 AM    LABGLOM >90 04/25/2024 09:54 AM    GLUCOSE 129 02/14/2025 05:00 AM    CALCIUM 9.5 02/14/2025 05:00 AM    BILITOT <0.2 02/08/2025 09:29 PM    ALKPHOS 72 02/08/2025 09:29 PM    AST 18 02/08/2025 09:29 PM    ALT 14 02/08/2025 09:29 PM         Hepatic Function Panel:    Lab Results   Component Value Date/Time    ALKPHOS 72 02/08/2025 09:29 PM    ALT 14 02/08/2025 09:29 PM    AST 18 02/08/2025 09:29 PM    BILITOT <0.2 02/08/2025 09:29 PM    BILIDIR <0.2 05/16/2024 07:25 AM    IBILI Can not be calculated 05/16/2024 07:25 AM       PT/INR:    Lab Results   Component Value Date/Time    PROTIME 11.6 12/30/2024 04:52 AM    INR 1.1 12/30/2024 04:52 AM       TSH:    Lab Results   Component Value Date/Time    TSH 0.25 02/10/2025 02:19 PM       U/A:    Lab Results   Component Value Date/Time    COLORU Yellow 02/13/2025 04:11 PM    PHUR 7.0 02/13/2025 04:11 PM    PHUR 7.5 04/25/2024 09:54 AM    WBCUA 6 TO 9 06/27/2024 01:10 AM    RBCUA 3 to 5 06/27/2024 01:10 AM    BACTERIA TRACE 06/27/2024 01:10 AM    CLARITYU SL CLOUDY 05/10/2023 03:22 PM    LEUKOCYTESUR NEGATIVE 02/13/2025 04:11 PM    UROBILINOGEN 0.2

## 2025-02-14 NOTE — PROGRESS NOTES
Date: 2/14/2025    Time: 12:20 AM    Patient Placed On BIPAP/CPAP/ Non-Invasive Ventilation?  Yes    If no must comment.  Facial area red/color change? No           If YES are Blister/Lesion present?No   If yes must notify nursing staff  BIPAP/CPAP skin barrier?  Yes    Skin barrier type:mepilexlite       Comments:        Gabe Agosto RCP

## 2025-02-14 NOTE — PLAN OF CARE
Problem: Chronic Conditions and Co-morbidities  Goal: Patient's chronic conditions and co-morbidity symptoms are monitored and maintained or improved  2/14/2025 1252 by Arcelia Velásquez RN  Outcome: Progressing  2/13/2025 2314 by Linda Boswell RN  Outcome: Progressing  Flowsheets (Taken 2/13/2025 2000)  Care Plan - Patient's Chronic Conditions and Co-Morbidity Symptoms are Monitored and Maintained or Improved: Monitor and assess patient's chronic conditions and comorbid symptoms for stability, deterioration, or improvement     Problem: Discharge Planning  Goal: Discharge to home or other facility with appropriate resources  2/14/2025 1252 by Arcelia Velásquez RN  Outcome: Progressing  2/13/2025 2314 by Linda Boswell RN  Outcome: Progressing  Flowsheets (Taken 2/13/2025 2000)  Discharge to home or other facility with appropriate resources: Identify barriers to discharge with patient and caregiver     Problem: Safety - Adult  Goal: Free from fall injury  2/14/2025 1252 by Arcelia Velásquez RN  Outcome: Progressing  Flowsheets (Taken 2/14/2025 0837)  Free From Fall Injury: Instruct family/caregiver on patient safety  2/13/2025 2314 by Linda Boswell RN  Outcome: Progressing     Problem: ABCDS Injury Assessment  Goal: Absence of physical injury  2/14/2025 1252 by Arcelia Velásquez RN  Outcome: Progressing  2/13/2025 2314 by Linda Boswell RN  Outcome: Progressing     Problem: Pain  Goal: Verbalizes/displays adequate comfort level or baseline comfort level  2/14/2025 1252 by Arcelia Velásquez RN  Outcome: Progressing  Flowsheets (Taken 2/14/2025 0837)  Verbalizes/displays adequate comfort level or baseline comfort level: Encourage patient to monitor pain and request assistance  2/13/2025 2314 by Linda Boswell RN  Outcome: Progressing     Problem: Respiratory - Adult  Goal: Achieves optimal ventilation and oxygenation  2/14/2025 1252 by Arcelia Velásquez RN  Outcome: Progressing  2/13/2025 2314 by Linda Boswell RN  Outcome:

## 2025-02-17 ENCOUNTER — CARE COORDINATION (OUTPATIENT)
Dept: CARE COORDINATION | Age: 70
End: 2025-02-17

## 2025-02-17 NOTE — CARE COORDINATION
Care Transitions Note    Initial Call - Call within 2 business days of discharge: Yes    CTN attempted initial CT outreach leaving HIPAA VM, purpose of call, my contact info and reminder to schedule PCP appt.    Patient: Lana Phillips      Patient : 1955   MRN: [id number]      Reason for Admission: 2025 - 2025 [facility] IP. Pneumonia.  Discharge Date: 25    RURS: Readmission Risk Score: 34.2  End date 3/14/2025    Onc/Christopher Rider 3/11 8:30    START taking:  benzonatate (TESSALON)  CHANGE how you take:  predniSONE (DELTASONE)  STOP taking:  benzocaine-menthol 15-3.6 MG lozenge (CEPACOL  SORE THROAT)  Dilt- MG extended release capsule (dilTIAZem)        Last Discharge Facility       Date Complaint Diagnosis Description Type Department Provider    25 Shortness of Breath; Chest Pain Coronavirus infection ... ED to Hosp-Admission (Discharged) (ADMITTED) [facility] Jenna Langley MD; Aj Barnett...            Follow Up Appointment:     Future Appointments         Provider Specialty Dept Phone    3/11/2025 8:30 AM Viridiana Potter MD Oncology [phone]    3/11/2025 8:45 AM [facility] FAST TRACK 2 Infusion Therapy [phone]    3/19/2025 1:00 PM [facility] RM 10 Infusion Therapy [phone]    2025 10:00 AM Chapis Santos PA Pain Management [phone]    2025 1:40 PM Tal Bedoya, APRN - CNS Endocrinology [phone]          Radha Elliott RN

## 2025-02-18 ENCOUNTER — CARE COORDINATION (OUTPATIENT)
Dept: CARE COORDINATION | Age: 70
End: 2025-02-18

## 2025-02-18 NOTE — CARE COORDINATION
Care Transitions Note    Initial Call - Call within 2 business days of discharge: Yes    CTN attempted 2nd initial CT outreach leaving HIPAA VM, purpose of call, my contact and reminder to schedule appt.     Patient: Lana Phillips      Patient : 1955   MRN: [id number]      Reason for Admission:  2025 - 2025 [facility] IP. Pneumonia.   Discharge Date: 25    RURS: Readmission Risk Score: 34.2  End date 3/14/2025     Non [facility] PCP  Onc/El Tereso 3/11 8:30     START taking:  benzonatate (TESSALON)  CHANGE how you take:  predniSONE (DELTASONE)  STOP taking:  benzocaine-menthol 15-3.6 MG lozenge (CEPACOL  SORE THROAT)  Dilt- MG extended release capsule (dilTIAZem)    Last Discharge Facility       Date Complaint Diagnosis Description Type Department Provider    25 Shortness of Breath; Chest Pain Coronavirus infection ... ED to Hosp-Admission (Discharged) (ADMITTED) [facility] Jenna Langley MD; Aj Barnett...            Future Appointments         Provider Specialty Dept Phone    3/11/2025 8:30 AM Viridiana Potter MD Oncology [phone]    3/11/2025 8:45 AM [facility] FAST TRACK 2 Infusion Therapy [phone]    3/19/2025 1:00 PM [facility] RM 10 Infusion Therapy [phone]    2025 10:00 AM Chapis Santos PA Pain Management [phone]    2025 1:40 PM Tal Bedoya, APRN - CNS Endocrinology [phone]          Radha Elliott RN

## 2025-02-23 ENCOUNTER — APPOINTMENT (OUTPATIENT)
Dept: GENERAL RADIOLOGY | Age: 70
DRG: 871 | End: 2025-02-23
Payer: MEDICARE

## 2025-02-23 ENCOUNTER — APPOINTMENT (OUTPATIENT)
Dept: CT IMAGING | Age: 70
DRG: 871 | End: 2025-02-23
Payer: MEDICARE

## 2025-02-23 ENCOUNTER — HOSPITAL ENCOUNTER (INPATIENT)
Age: 70
LOS: 5 days | Discharge: ANOTHER HOSPITAL | DRG: 871 | End: 2025-02-28
Attending: EMERGENCY MEDICINE | Admitting: INTERNAL MEDICINE
Payer: MEDICARE

## 2025-02-23 DIAGNOSIS — J96.02 ACUTE RESPIRATORY FAILURE WITH HYPOXIA AND HYPERCAPNIA (HCC): Primary | ICD-10-CM

## 2025-02-23 DIAGNOSIS — J44.1 COPD EXACERBATION (HCC): ICD-10-CM

## 2025-02-23 DIAGNOSIS — J96.01 ACUTE RESPIRATORY FAILURE WITH HYPOXIA AND HYPERCAPNIA (HCC): Primary | ICD-10-CM
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
## 2025-02-23 DIAGNOSIS — J18.9 PNEUMONIA DUE TO INFECTIOUS ORGANISM, UNSPECIFIED LATERALITY, UNSPECIFIED PART OF LUNG: ICD-10-CM

## 2025-02-23 PROBLEM — R29.6 FALLS: Status: ACTIVE | Noted: 2025-02-23

## 2025-02-23 PROBLEM — R53.83 LETHARGY: Status: ACTIVE | Noted: 2025-02-23

## 2025-02-23 LAB
AADO2: 169.1 MMHG
AADO2: 173.6 MMHG
ALBUMIN SERPL-MCNC: 3.7 G/DL (ref 3.5–5.2)
ALP SERPL-CCNC: 69 U/L (ref 35–104)
ALT SERPL-CCNC: 21 U/L (ref 0–32)
ANION GAP SERPL CALCULATED.3IONS-SCNC: 8 MMOL/L (ref 7–16)
AST SERPL-CCNC: 37 U/L (ref 0–31)
B PARAP IS1001 DNA NPH QL NAA+NON-PROBE: NOT DETECTED
B PERT DNA SPEC QL NAA+PROBE: NOT DETECTED
B.E.: 10.3 MMOL/L (ref -3–3)
B.E.: 9.2 MMOL/L (ref -3–3)
BACTERIA URNS QL MICRO: ABNORMAL
BASOPHILS # BLD: 0 K/UL (ref 0–0.2)
BASOPHILS NFR BLD: 0 % (ref 0–2)
BILIRUB SERPL-MCNC: 0.3 MG/DL (ref 0–1.2)
BILIRUB UR QL STRIP: NEGATIVE
BNP SERPL-MCNC: 2364 PG/ML (ref 0–125)
BUN SERPL-MCNC: 16 MG/DL (ref 6–23)
C PNEUM DNA NPH QL NAA+NON-PROBE: NOT DETECTED
CALCIUM SERPL-MCNC: 12.6 MG/DL (ref 8.6–10.2)
CHLORIDE SERPL-SCNC: 89 MMOL/L (ref 98–107)
CLARITY UR: ABNORMAL
CO2 SERPL-SCNC: 40 MMOL/L (ref 22–29)
COHB: 0.3 % (ref 0–1.5)
COHB: 0.5 % (ref 0–1.5)
COLOR UR: YELLOW
CREAT SERPL-MCNC: 0.7 MG/DL (ref 0.5–1)
CRITICAL: ABNORMAL
CRITICAL: ABNORMAL
DATE ANALYZED: ABNORMAL
DATE ANALYZED: ABNORMAL
DATE OF COLLECTION: ABNORMAL
DATE OF COLLECTION: ABNORMAL
EOSINOPHIL # BLD: 0 K/UL (ref 0.05–0.5)
EOSINOPHILS RELATIVE PERCENT: 0 % (ref 0–6)
EPI CELLS #/AREA URNS HPF: ABNORMAL /HPF
ERYTHROCYTE [DISTWIDTH] IN BLOOD BY AUTOMATED COUNT: 15.3 % (ref 11.5–15)
FIO2: 50 %
FIO2: 50 %
FLUAV RNA NPH QL NAA+NON-PROBE: NOT DETECTED
FLUBV RNA NPH QL NAA+NON-PROBE: NOT DETECTED
GFR, ESTIMATED: >90 ML/MIN/1.73M2
GLUCOSE SERPL-MCNC: 121 MG/DL (ref 74–99)
GLUCOSE UR STRIP-MCNC: NEGATIVE MG/DL
HADV DNA NPH QL NAA+NON-PROBE: NOT DETECTED
HCO3: 35.9 MMOL/L (ref 22–26)
HCO3: 37.4 MMOL/L (ref 22–26)
HCOV 229E RNA NPH QL NAA+NON-PROBE: NOT DETECTED
HCOV HKU1 RNA NPH QL NAA+NON-PROBE: NOT DETECTED
HCOV NL63 RNA NPH QL NAA+NON-PROBE: NOT DETECTED
HCOV OC43 RNA NPH QL NAA+NON-PROBE: NOT DETECTED
HCT VFR BLD AUTO: 37.9 % (ref 34–48)
HGB BLD-MCNC: 11.8 G/DL (ref 11.5–15.5)
HGB UR QL STRIP.AUTO: ABNORMAL
HHB: 2.4 % (ref 0–5)
HHB: 2.6 % (ref 0–5)
HMPV RNA NPH QL NAA+NON-PROBE: NOT DETECTED
HPIV1 RNA NPH QL NAA+NON-PROBE: NOT DETECTED
HPIV2 RNA NPH QL NAA+NON-PROBE: NOT DETECTED
HPIV3 RNA NPH QL NAA+NON-PROBE: NOT DETECTED
HPIV4 RNA NPH QL NAA+NON-PROBE: NOT DETECTED
KETONES UR STRIP-MCNC: ABNORMAL MG/DL
LAB: ABNORMAL
LAB: ABNORMAL
LACTATE BLDV-SCNC: 1 MMOL/L (ref 0.5–2.2)
LEUKOCYTE ESTERASE UR QL STRIP: ABNORMAL
LYMPHOCYTES NFR BLD: 0.5 K/UL (ref 1.5–4)
LYMPHOCYTES RELATIVE PERCENT: 2 % (ref 20–42)
Lab: 1040
Lab: 820
M PNEUMO DNA NPH QL NAA+NON-PROBE: NOT DETECTED
MCH RBC QN AUTO: 33.4 PG (ref 26–35)
MCHC RBC AUTO-ENTMCNC: 31.1 G/DL (ref 32–34.5)
MCV RBC AUTO: 107.4 FL (ref 80–99.9)
METHB: 0.1 % (ref 0–1.5)
METHB: 0.1 % (ref 0–1.5)
MODE: ABNORMAL
MODE: ABNORMAL
MONOCYTES NFR BLD: 1.25 K/UL (ref 0.1–0.95)
MONOCYTES NFR BLD: 4 % (ref 2–12)
NEUTROPHILS NFR BLD: 94 % (ref 43–80)
NEUTS SEG NFR BLD: 27.05 K/UL (ref 1.8–7.3)
NITRITE UR QL STRIP: NEGATIVE
O2 SATURATION: 97.4 % (ref 92–98.5)
O2 SATURATION: 97.6 % (ref 92–98.5)
O2HB: 97 % (ref 94–97)
O2HB: 97 % (ref 94–97)
OPERATOR ID: 467
OPERATOR ID: 467
PATIENT TEMP: 37 C
PATIENT TEMP: 37 C
PCO2: 59.9 MMHG (ref 35–45)
PCO2: 63 MMHG (ref 35–45)
PEEP/CPAP: 6 CMH2O
PEEP/CPAP: 6 CMH2O
PFO2: 2.06 MMHG/%
PFO2: 2.08 MMHG/%
PH BLOOD GAS: 7.39 (ref 7.35–7.45)
PH BLOOD GAS: 7.39 (ref 7.35–7.45)
PH UR STRIP: 6 [PH] (ref 5–8)
PLATELET # BLD AUTO: 288 K/UL (ref 130–450)
PMV BLD AUTO: 10.9 FL (ref 7–12)
PO2: 103 MMHG (ref 75–100)
PO2: 104 MMHG (ref 75–100)
POTASSIUM SERPL-SCNC: 3.8 MMOL/L (ref 3.5–5)
PROCALCITONIN SERPL-MCNC: 1.01 NG/ML (ref 0–0.08)
PROT SERPL-MCNC: 6.9 G/DL (ref 6.4–8.3)
PROT UR STRIP-MCNC: 30 MG/DL
PS: 12 CMH20
PS: 12 CMH20
RBC # BLD AUTO: 3.53 M/UL (ref 3.5–5.5)
RBC # BLD: ABNORMAL 10*6/UL
RBC #/AREA URNS HPF: ABNORMAL /HPF
RI(T): 1.63
RI(T): 1.69
RSV RNA NPH QL NAA+NON-PROBE: NOT DETECTED
RV+EV RNA NPH QL NAA+NON-PROBE: NOT DETECTED
SARS-COV-2 RNA NPH QL NAA+NON-PROBE: NOT DETECTED
SODIUM SERPL-SCNC: 137 MMOL/L (ref 132–146)
SOURCE, BLOOD GAS: ABNORMAL
SOURCE, BLOOD GAS: ABNORMAL
SP GR UR STRIP: >1.03 (ref 1–1.03)
SPECIMEN DESCRIPTION: NORMAL
T4 FREE SERPL-MCNC: 1.1 NG/DL (ref 0.9–1.7)
THB: 11.3 G/DL (ref 11.5–16.5)
THB: 12 G/DL (ref 11.5–16.5)
TIME ANALYZED: 1046
TIME ANALYZED: 829
TROPONIN I SERPL HS-MCNC: 48 NG/L (ref 0–9)
TSH SERPL DL<=0.05 MIU/L-ACNC: 0.08 UIU/ML (ref 0.27–4.2)
UROBILINOGEN UR STRIP-ACNC: 0.2 EU/DL (ref 0–1)
WBC #/AREA URNS HPF: ABNORMAL /HPF
WBC OTHER # BLD: 28.8 K/UL (ref 4.5–11.5)

## 2025-02-23 PROCEDURE — 94660 CPAP INITIATION&MGMT: CPT

## 2025-02-23 PROCEDURE — 87449 NOS EACH ORGANISM AG IA: CPT

## 2025-02-23 PROCEDURE — 2500000003 HC RX 250 WO HCPCS: Performed by: INTERNAL MEDICINE

## 2025-02-23 PROCEDURE — 2580000003 HC RX 258: Performed by: NURSE PRACTITIONER

## 2025-02-23 PROCEDURE — 94640 AIRWAY INHALATION TREATMENT: CPT

## 2025-02-23 PROCEDURE — 99222 1ST HOSP IP/OBS MODERATE 55: CPT | Performed by: NURSE PRACTITIONER

## 2025-02-23 PROCEDURE — 70450 CT HEAD/BRAIN W/O DYE: CPT

## 2025-02-23 PROCEDURE — 87899 AGENT NOS ASSAY W/OPTIC: CPT

## 2025-02-23 PROCEDURE — 96365 THER/PROPH/DIAG IV INF INIT: CPT

## 2025-02-23 PROCEDURE — 0202U NFCT DS 22 TRGT SARS-COV-2: CPT

## 2025-02-23 PROCEDURE — 6360000002 HC RX W HCPCS: Performed by: NURSE PRACTITIONER

## 2025-02-23 PROCEDURE — 99285 EMERGENCY DEPT VISIT HI MDM: CPT

## 2025-02-23 PROCEDURE — 85025 COMPLETE CBC W/AUTO DIFF WBC: CPT

## 2025-02-23 PROCEDURE — 83605 ASSAY OF LACTIC ACID: CPT

## 2025-02-23 PROCEDURE — 6360000002 HC RX W HCPCS: Performed by: EMERGENCY MEDICINE

## 2025-02-23 PROCEDURE — 96366 THER/PROPH/DIAG IV INF ADDON: CPT

## 2025-02-23 PROCEDURE — 80053 COMPREHEN METABOLIC PANEL: CPT

## 2025-02-23 PROCEDURE — 2060000000 HC ICU INTERMEDIATE R&B

## 2025-02-23 PROCEDURE — 6370000000 HC RX 637 (ALT 250 FOR IP): Performed by: INTERNAL MEDICINE

## 2025-02-23 PROCEDURE — 84439 ASSAY OF FREE THYROXINE: CPT

## 2025-02-23 PROCEDURE — 81001 URINALYSIS AUTO W/SCOPE: CPT

## 2025-02-23 PROCEDURE — 94664 DEMO&/EVAL PT USE INHALER: CPT

## 2025-02-23 PROCEDURE — 84145 PROCALCITONIN (PCT): CPT

## 2025-02-23 PROCEDURE — 6370000000 HC RX 637 (ALT 250 FOR IP)

## 2025-02-23 PROCEDURE — 5A09457 ASSISTANCE WITH RESPIRATORY VENTILATION, 24-96 CONSECUTIVE HOURS, CONTINUOUS POSITIVE AIRWAY PRESSURE: ICD-10-PCS | Performed by: INTERNAL MEDICINE

## 2025-02-23 PROCEDURE — 6370000000 HC RX 637 (ALT 250 FOR IP): Performed by: NURSE PRACTITIONER

## 2025-02-23 PROCEDURE — 84484 ASSAY OF TROPONIN QUANT: CPT

## 2025-02-23 PROCEDURE — 84443 ASSAY THYROID STIM HORMONE: CPT

## 2025-02-23 PROCEDURE — 71045 X-RAY EXAM CHEST 1 VIEW: CPT

## 2025-02-23 PROCEDURE — 2580000003 HC RX 258: Performed by: EMERGENCY MEDICINE

## 2025-02-23 PROCEDURE — 83880 ASSAY OF NATRIURETIC PEPTIDE: CPT

## 2025-02-23 PROCEDURE — 72125 CT NECK SPINE W/O DYE: CPT

## 2025-02-23 PROCEDURE — 82805 BLOOD GASES W/O2 SATURATION: CPT

## 2025-02-23 PROCEDURE — 87040 BLOOD CULTURE FOR BACTERIA: CPT

## 2025-02-23 PROCEDURE — 87081 CULTURE SCREEN ONLY: CPT

## 2025-02-23 PROCEDURE — 2580000003 HC RX 258

## 2025-02-23 PROCEDURE — 6360000002 HC RX W HCPCS: Performed by: INTERNAL MEDICINE

## 2025-02-23 PROCEDURE — 96368 THER/DIAG CONCURRENT INF: CPT

## 2025-02-23 RX ORDER — SENNOSIDES A AND B 8.6 MG/1
1 TABLET, FILM COATED ORAL 2 TIMES DAILY
Status: DISCONTINUED | OUTPATIENT
Start: 2025-02-23 | End: 2025-02-28 | Stop reason: HOSPADM

## 2025-02-23 RX ORDER — PANTOPRAZOLE SODIUM 40 MG/1
40 TABLET, DELAYED RELEASE ORAL
Status: DISCONTINUED | OUTPATIENT
Start: 2025-02-24 | End: 2025-02-28 | Stop reason: HOSPADM

## 2025-02-23 RX ORDER — POTASSIUM CHLORIDE 1500 MG/1
40 TABLET, EXTENDED RELEASE ORAL PRN
Status: DISCONTINUED | OUTPATIENT
Start: 2025-02-23 | End: 2025-02-28 | Stop reason: HOSPADM

## 2025-02-23 RX ORDER — SODIUM CHLORIDE 0.9 % (FLUSH) 0.9 %
5-40 SYRINGE (ML) INJECTION EVERY 12 HOURS SCHEDULED
Status: DISCONTINUED | OUTPATIENT
Start: 2025-02-23 | End: 2025-02-28 | Stop reason: HOSPADM

## 2025-02-23 RX ORDER — POLYETHYLENE GLYCOL 3350 17 G/17G
17 POWDER, FOR SOLUTION ORAL DAILY PRN
Status: DISCONTINUED | OUTPATIENT
Start: 2025-02-23 | End: 2025-02-28 | Stop reason: HOSPADM

## 2025-02-23 RX ORDER — ACETAMINOPHEN 325 MG/1
650 TABLET ORAL EVERY 6 HOURS PRN
Status: DISCONTINUED | OUTPATIENT
Start: 2025-02-23 | End: 2025-02-28 | Stop reason: HOSPADM

## 2025-02-23 RX ORDER — DOCUSATE SODIUM 100 MG/1
100 CAPSULE, LIQUID FILLED ORAL DAILY PRN
Status: DISCONTINUED | OUTPATIENT
Start: 2025-02-23 | End: 2025-02-28 | Stop reason: HOSPADM

## 2025-02-23 RX ORDER — BUDESONIDE 0.5 MG/2ML
500 INHALANT ORAL
Status: DISCONTINUED | OUTPATIENT
Start: 2025-02-23 | End: 2025-02-24

## 2025-02-23 RX ORDER — ASPIRIN 81 MG/1
81 TABLET ORAL DAILY
Status: DISCONTINUED | OUTPATIENT
Start: 2025-02-23 | End: 2025-02-28 | Stop reason: HOSPADM

## 2025-02-23 RX ORDER — DILTIAZEM HYDROCHLORIDE 120 MG/1
240 CAPSULE, COATED, EXTENDED RELEASE ORAL DAILY
Status: DISCONTINUED | OUTPATIENT
Start: 2025-02-24 | End: 2025-02-28 | Stop reason: HOSPADM

## 2025-02-23 RX ORDER — ONDANSETRON 2 MG/ML
4 INJECTION INTRAMUSCULAR; INTRAVENOUS EVERY 6 HOURS PRN
Status: DISCONTINUED | OUTPATIENT
Start: 2025-02-23 | End: 2025-02-23

## 2025-02-23 RX ORDER — IPRATROPIUM BROMIDE AND ALBUTEROL SULFATE 2.5; .5 MG/3ML; MG/3ML
3 SOLUTION RESPIRATORY (INHALATION) ONCE
Status: COMPLETED | OUTPATIENT
Start: 2025-02-23 | End: 2025-02-23

## 2025-02-23 RX ORDER — ARFORMOTEROL TARTRATE 15 UG/2ML
15 SOLUTION RESPIRATORY (INHALATION)
Status: DISCONTINUED | OUTPATIENT
Start: 2025-02-23 | End: 2025-02-28 | Stop reason: HOSPADM

## 2025-02-23 RX ORDER — METHIMAZOLE 5 MG/1
15 TABLET ORAL DAILY
Status: DISCONTINUED | OUTPATIENT
Start: 2025-02-23 | End: 2025-02-28 | Stop reason: HOSPADM

## 2025-02-23 RX ORDER — ERGOCALCIFEROL 1.25 MG/1
50000 CAPSULE, LIQUID FILLED ORAL WEEKLY
Status: DISCONTINUED | OUTPATIENT
Start: 2025-02-26 | End: 2025-02-28 | Stop reason: HOSPADM

## 2025-02-23 RX ORDER — IPRATROPIUM BROMIDE AND ALBUTEROL SULFATE 2.5; .5 MG/3ML; MG/3ML
1 SOLUTION RESPIRATORY (INHALATION) EVERY 4 HOURS PRN
Status: DISCONTINUED | OUTPATIENT
Start: 2025-02-23 | End: 2025-02-28 | Stop reason: HOSPADM

## 2025-02-23 RX ORDER — PROCHLORPERAZINE EDISYLATE 5 MG/ML
10 INJECTION INTRAMUSCULAR; INTRAVENOUS EVERY 6 HOURS PRN
Status: DISCONTINUED | OUTPATIENT
Start: 2025-02-23 | End: 2025-02-28 | Stop reason: HOSPADM

## 2025-02-23 RX ORDER — IPRATROPIUM BROMIDE AND ALBUTEROL SULFATE 2.5; .5 MG/3ML; MG/3ML
1 SOLUTION RESPIRATORY (INHALATION)
Status: DISCONTINUED | OUTPATIENT
Start: 2025-02-23 | End: 2025-02-23

## 2025-02-23 RX ORDER — ACETAMINOPHEN 650 MG/1
650 SUPPOSITORY RECTAL EVERY 6 HOURS PRN
Status: DISCONTINUED | OUTPATIENT
Start: 2025-02-23 | End: 2025-02-28 | Stop reason: HOSPADM

## 2025-02-23 RX ORDER — POTASSIUM CHLORIDE 7.45 MG/ML
10 INJECTION INTRAVENOUS PRN
Status: DISCONTINUED | OUTPATIENT
Start: 2025-02-23 | End: 2025-02-28 | Stop reason: HOSPADM

## 2025-02-23 RX ORDER — PREGABALIN 75 MG/1
75 CAPSULE ORAL 2 TIMES DAILY
Status: DISCONTINUED | OUTPATIENT
Start: 2025-02-23 | End: 2025-02-28 | Stop reason: HOSPADM

## 2025-02-23 RX ORDER — ONDANSETRON 4 MG/1
4 TABLET, ORALLY DISINTEGRATING ORAL EVERY 8 HOURS PRN
Status: DISCONTINUED | OUTPATIENT
Start: 2025-02-23 | End: 2025-02-23

## 2025-02-23 RX ORDER — SODIUM CHLORIDE 9 MG/ML
INJECTION, SOLUTION INTRAVENOUS PRN
Status: DISCONTINUED | OUTPATIENT
Start: 2025-02-23 | End: 2025-02-28 | Stop reason: HOSPADM

## 2025-02-23 RX ORDER — IPRATROPIUM BROMIDE AND ALBUTEROL SULFATE 2.5; .5 MG/3ML; MG/3ML
1 SOLUTION RESPIRATORY (INHALATION)
Status: DISCONTINUED | OUTPATIENT
Start: 2025-02-23 | End: 2025-02-28 | Stop reason: HOSPADM

## 2025-02-23 RX ORDER — PROCHLORPERAZINE MALEATE 10 MG
5 TABLET ORAL EVERY 6 HOURS PRN
Status: DISCONTINUED | OUTPATIENT
Start: 2025-02-23 | End: 2025-02-28 | Stop reason: HOSPADM

## 2025-02-23 RX ORDER — FOLIC ACID 1 MG/1
1 TABLET ORAL DAILY
Status: DISCONTINUED | OUTPATIENT
Start: 2025-02-23 | End: 2025-02-28 | Stop reason: HOSPADM

## 2025-02-23 RX ORDER — FUROSEMIDE 40 MG/1
40 TABLET ORAL DAILY
Status: DISCONTINUED | OUTPATIENT
Start: 2025-02-23 | End: 2025-02-28 | Stop reason: HOSPADM

## 2025-02-23 RX ORDER — ENOXAPARIN SODIUM 100 MG/ML
30 INJECTION SUBCUTANEOUS DAILY
Status: DISCONTINUED | OUTPATIENT
Start: 2025-02-23 | End: 2025-02-23

## 2025-02-23 RX ORDER — GUAIFENESIN 400 MG/1
400 TABLET ORAL 3 TIMES DAILY
Status: DISCONTINUED | OUTPATIENT
Start: 2025-02-23 | End: 2025-02-28 | Stop reason: HOSPADM

## 2025-02-23 RX ORDER — MAGNESIUM SULFATE IN WATER 40 MG/ML
2000 INJECTION, SOLUTION INTRAVENOUS PRN
Status: DISCONTINUED | OUTPATIENT
Start: 2025-02-23 | End: 2025-02-28 | Stop reason: HOSPADM

## 2025-02-23 RX ORDER — SODIUM CHLORIDE 0.9 % (FLUSH) 0.9 %
5-40 SYRINGE (ML) INJECTION PRN
Status: DISCONTINUED | OUTPATIENT
Start: 2025-02-23 | End: 2025-02-28 | Stop reason: HOSPADM

## 2025-02-23 RX ORDER — 0.9 % SODIUM CHLORIDE 0.9 %
500 INTRAVENOUS SOLUTION INTRAVENOUS ONCE
Status: COMPLETED | OUTPATIENT
Start: 2025-02-23 | End: 2025-02-23

## 2025-02-23 RX ORDER — DULOXETIN HYDROCHLORIDE 30 MG/1
60 CAPSULE, DELAYED RELEASE ORAL DAILY
Status: DISCONTINUED | OUTPATIENT
Start: 2025-02-23 | End: 2025-02-28 | Stop reason: HOSPADM

## 2025-02-23 RX ORDER — MECOBALAMIN 5000 MCG
5 TABLET,DISINTEGRATING ORAL NIGHTLY
Status: DISCONTINUED | OUTPATIENT
Start: 2025-02-23 | End: 2025-02-23

## 2025-02-23 RX ADMIN — IPRATROPIUM BROMIDE AND ALBUTEROL SULFATE 3 DOSE: 2.5; .5 SOLUTION RESPIRATORY (INHALATION) at 07:54

## 2025-02-23 RX ADMIN — METHYLPREDNISOLONE SODIUM SUCCINATE 40 MG: 40 INJECTION, POWDER, LYOPHILIZED, FOR SOLUTION INTRAMUSCULAR; INTRAVENOUS at 20:55

## 2025-02-23 RX ADMIN — SENNOSIDES 8.6 MG: 8.6 TABLET, COATED ORAL at 20:56

## 2025-02-23 RX ADMIN — BUDESONIDE 500 MCG: 0.5 SUSPENSION RESPIRATORY (INHALATION) at 19:29

## 2025-02-23 RX ADMIN — CEFEPIME 2000 MG: 2 INJECTION, POWDER, FOR SOLUTION INTRAVENOUS at 12:04

## 2025-02-23 RX ADMIN — IPRATROPIUM BROMIDE AND ALBUTEROL SULFATE 1 DOSE: 2.5; .5 SOLUTION RESPIRATORY (INHALATION) at 11:52

## 2025-02-23 RX ADMIN — ARFORMOTEROL TARTRATE 15 MCG: 15 SOLUTION RESPIRATORY (INHALATION) at 12:58

## 2025-02-23 RX ADMIN — CEFEPIME 2000 MG: 2 INJECTION, POWDER, FOR SOLUTION INTRAVENOUS at 09:24

## 2025-02-23 RX ADMIN — IPRATROPIUM BROMIDE AND ALBUTEROL SULFATE 1 DOSE: 2.5; .5 SOLUTION RESPIRATORY (INHALATION) at 19:29

## 2025-02-23 RX ADMIN — ARFORMOTEROL TARTRATE 15 MCG: 15 SOLUTION RESPIRATORY (INHALATION) at 19:29

## 2025-02-23 RX ADMIN — VANCOMYCIN HYDROCHLORIDE 750 MG: 750 INJECTION, POWDER, LYOPHILIZED, FOR SOLUTION INTRAVENOUS at 21:06

## 2025-02-23 RX ADMIN — ENOXAPARIN SODIUM 30 MG: 100 INJECTION SUBCUTANEOUS at 12:06

## 2025-02-23 RX ADMIN — VANCOMYCIN HYDROCHLORIDE 1250 MG: 1.25 INJECTION, POWDER, LYOPHILIZED, FOR SOLUTION INTRAVENOUS at 09:25

## 2025-02-23 RX ADMIN — GUAIFENESIN 400 MG: 400 TABLET ORAL at 20:56

## 2025-02-23 RX ADMIN — SODIUM CHLORIDE 500 ML: 9 INJECTION, SOLUTION INTRAVENOUS at 09:26

## 2025-02-23 RX ADMIN — IPRATROPIUM BROMIDE AND ALBUTEROL SULFATE 3 DOSE: 2.5; .5 SOLUTION RESPIRATORY (INHALATION) at 16:00

## 2025-02-23 RX ADMIN — CEFEPIME 2000 MG: 2 INJECTION, POWDER, FOR SOLUTION INTRAVENOUS at 23:32

## 2025-02-23 RX ADMIN — BUDESONIDE 500 MCG: 0.5 SUSPENSION RESPIRATORY (INHALATION) at 12:58

## 2025-02-23 ASSESSMENT — PAIN SCALES - GENERAL: PAINLEVEL_OUTOF10: 0

## 2025-02-23 ASSESSMENT — PAIN - FUNCTIONAL ASSESSMENT: PAIN_FUNCTIONAL_ASSESSMENT: NONE - DENIES PAIN

## 2025-02-23 NOTE — ED PROVIDER NOTES
[facility] EMERGENCY DEPARTMENT  EMERGENCY DEPARTMENT ENCOUNTER        Pt Name: Lana Phillips  MRN: [id number]  Birthdate 1955  Date of evaluation: 2/23/2025  Provider: Blane Piedra DO  PCP: Tal Humphrey DO  Note Started: 7:29 AM EST 2/23/25    CHIEF COMPLAINT       Chief Complaint   Patient presents with    Shortness of Breath     Pt from [place]. Non compliant with O2 at facility, takes off O2. Has Cpap ordered HS, but pt refuses. Pt given Duoneb x2, 2gm Mag, 125mg Solumedrol, 1gm calcium. Sat in 60's on room air    Fall     4 falls yesterday at facility       HISTORY OF PRESENT ILLNESS: 1 or more Elements   History received from: Patient and EMS    Lana Phillips is a 70 y.o. female who presents to the emergency department with chief complaint of respiratory distress.  Per EMS, patient from nursing facility where patient is noncompliant with oxygen and BiPAP at night.  Patient was placed on BiPAP for EMS.  Patient was given 2 DuoNeb treatments, IV Solu-Medrol, IV magnesium, and calcium.  Patient feeling improved on arrival in emergency department but was reportedly 60% on room air when EMS arrived.  Patient is also had multiple falls.  Otherwise patient not complaining of any symptoms at this time including fever, chills, nausea, vomiting, chest pain, abdominal pain, hematuria or dysuria, constipation or diarrhea.  Patient has no headache or neck pain.    Nursing Notes were all reviewed and agreed with or any disagreements were addressed in the HPI.    REVIEW OF SYSTEMS :    Positives and Pertinent negatives as per HPI.    PAST MEDICAL HISTORY/Chronic Conditions Affecting Care    has a past medical history of Atherosclerosis 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 (HCC) (01/19/2024), Atrial fibrillation (HCC), Chronic obstructive pulmonary disease (HCC) (12/15/2022), COVID-19 (07/16/2022), Critical limb ischemia of left lower extremity with

## 2025-02-23 NOTE — CONSULTS
[facility]  [address], [address]  [place] [address]    Pulmonary Consultation      Reason for Consult:  sob    Requesting Physician:  Tal Humphrey DO    CHIEF COMPLAINT:  sob    History Obtained From:  patient    HISTORY OF PRESENT ILLNESS:                The patient is a 70 y.o. female with significant past medical history of COPD oxygen-dependent who presents with increased sob and cough for a day. Was discharged from hospital on 14th after treatment for coronavirus, on-off compliance with NIV here, continued to be inconsistent with NIV use at [facility] (EMR states not compliant with both NIV and oxygen). Mention of falls also at facility. Currently on NIPPV 12/6 40% saturating 94% (usually on 12-25/8 AT=780), sitting up in bed slightly agitated due to stomach discomfort/nausea.    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 (HCC) 01/19/2024    Atrial fibrillation (HCC)     Chronic obstructive pulmonary disease (HCC) 12/15/2022    COVID-19 07/16/2022    Critical limb ischemia of left lower extremity with rest pain (HCC) 12/25/2023    GI bleed 10/14/2023    Hypertension     RUTH treated with BiPAP     Panic disorder     PVD (peripheral vascular disease) 01/19/2024    Severe protein-calorie malnutrition 06/26/2023    Thyroid disease     Uncontrolled hypertension        Past Surgical History:        Procedure Laterality Date    BACK SURGERY      x2    BRONCHOSCOPY N/A 4/16/2024    BRONCHOSCOPY DIAGNOSTIC OR CELL WASH ONLY performed by Jose Price MD at [facility] [place] [facility] ENDOSCOPY    INVASIVE VASCULAR N/A 1/19/2024    Aortagram abdominal performed by Lazaro Vidales MD at [facility] [place] [facility] CARDIAC CATH LAB    INVASIVE VASCULAR N/A 1/19/2024    Angioplasty peripheral artery performed by Lazaro Vidales MD at [facility] CARDIAC CATH LAB    LEFT OOPHORECTOMY      PAIN

## 2025-02-23 NOTE — H&P
[facility] History and Physical      CHIEF COMPLAINT: Shortness of breath/falls.    History of Present Illness: Lana Phillips is a 70-year-old female with a past medical history of A-fib (Eliquis stopped due to GI bleeding, watchman scheduled 1/13 canceled R/T bacteremia), COPD, GIB, iron deficiency anemia, hypertension, PVD, RUTH treated with BiPAP, and thyroid disease who presents today with a complaint of shortness of breath and multiple falls at her nursing facility.  She was recently admitted 2/8/25-2/14/25 with shortness of breath, productive cough, and chest pain.  At that time she was diagnosed with a COPD exacerbation/pneumonia with corona OC 43 infection and treated with steroids, nebulizers, and antibiotics (Rocephin and doxycycline) and was discharged to [facility] on 2/14/25.  She presents today for increasing shortness of breath and multiple falls.  She has been noncompliant with oxygen (baseline 4 L)/BiPAP (at bedtime) at her nursing facility.  She reportedly had an SpO2 of 60% on RA when EMS arrived at nursing facility.  She is lethargic and a poor historian making gathering subjective information difficult. She denies any SOB/CP.      ED course: T98.3, , respirations 26, /65, and SpO2 92% on BiPAP.  BUNs/CR 16/0.7, AST 37, WBCs 28.8, H/H 11.8/37.9, viral panel negative, P CO2 59.9, pO2 103.0, and HCO3 35.9.  CXR: Chronic changes with patchy opacification of the right lower lung concerning for right middle lobe or right lower lobe airspace disease.  CT of the head/cervical spine: Normal.  Cefepime 2 g IV x 1, DuoNeb x 1,  cc bolus x 1, vancomycin 1250 mg x 1.  She was given Solu-Medrol 125 mg IV via EMS.    Informant(s) for H&P: Patient and chart review    Patient will be admitted for further medical management.    REVIEW OF SYSTEMS:  A comprehensive review of systems was negative except for: what is in the HPI    PMH:  Past Medical History:   Diagnosis Date

## 2025-02-24 LAB
ANION GAP SERPL CALCULATED.3IONS-SCNC: 3 MMOL/L (ref 7–16)
BASOPHILS # BLD: 0.04 K/UL (ref 0–0.2)
BASOPHILS NFR BLD: 0 % (ref 0–2)
BUN SERPL-MCNC: 18 MG/DL (ref 6–23)
CALCIUM SERPL-MCNC: 9.3 MG/DL (ref 8.6–10.2)
CHLORIDE SERPL-SCNC: 96 MMOL/L (ref 98–107)
CO2 SERPL-SCNC: 38 MMOL/L (ref 22–29)
CREAT SERPL-MCNC: 0.6 MG/DL (ref 0.5–1)
EOSINOPHIL # BLD: 0.01 K/UL (ref 0.05–0.5)
EOSINOPHILS RELATIVE PERCENT: 0 % (ref 0–6)
ERYTHROCYTE [DISTWIDTH] IN BLOOD BY AUTOMATED COUNT: 15.9 % (ref 11.5–15)
GFR, ESTIMATED: >90 ML/MIN/1.73M2
GLUCOSE BLD-MCNC: 171 MG/DL (ref 74–99)
GLUCOSE SERPL-MCNC: 166 MG/DL (ref 74–99)
HCT VFR BLD AUTO: 32.3 % (ref 34–48)
HGB BLD-MCNC: 9.9 G/DL (ref 11.5–15.5)
IMM GRANULOCYTES # BLD AUTO: 0.69 K/UL (ref 0–0.58)
IMM GRANULOCYTES NFR BLD: 3 % (ref 0–5)
L PNEUMO1 AG UR QL IA.RAPID: NEGATIVE
LYMPHOCYTES NFR BLD: 0.33 K/UL (ref 1.5–4)
LYMPHOCYTES RELATIVE PERCENT: 1 % (ref 20–42)
MCH RBC QN AUTO: 32.8 PG (ref 26–35)
MCHC RBC AUTO-ENTMCNC: 30.7 G/DL (ref 32–34.5)
MCV RBC AUTO: 107 FL (ref 80–99.9)
MONOCYTES NFR BLD: 0.71 K/UL (ref 0.1–0.95)
MONOCYTES NFR BLD: 3 % (ref 2–12)
NEUTROPHILS NFR BLD: 94 % (ref 43–80)
NEUTS SEG NFR BLD: 25.1 K/UL (ref 1.8–7.3)
PLATELET # BLD AUTO: 239 K/UL (ref 130–450)
PMV BLD AUTO: 10.1 FL (ref 7–12)
POTASSIUM SERPL-SCNC: 3.8 MMOL/L (ref 3.5–5)
RBC # BLD AUTO: 3.02 M/UL (ref 3.5–5.5)
RBC # BLD: ABNORMAL 10*6/UL
S PNEUM AG SPEC QL: POSITIVE
SODIUM SERPL-SCNC: 137 MMOL/L (ref 132–146)
SPECIMEN SOURCE: ABNORMAL
WBC OTHER # BLD: 26.9 K/UL (ref 4.5–11.5)

## 2025-02-24 PROCEDURE — 6370000000 HC RX 637 (ALT 250 FOR IP): Performed by: NURSE PRACTITIONER

## 2025-02-24 PROCEDURE — 6370000000 HC RX 637 (ALT 250 FOR IP)

## 2025-02-24 PROCEDURE — 94660 CPAP INITIATION&MGMT: CPT

## 2025-02-24 PROCEDURE — 6360000002 HC RX W HCPCS: Performed by: INTERNAL MEDICINE

## 2025-02-24 PROCEDURE — 2700000000 HC OXYGEN THERAPY PER DAY

## 2025-02-24 PROCEDURE — 6360000002 HC RX W HCPCS: Performed by: NURSE PRACTITIONER

## 2025-02-24 PROCEDURE — 85025 COMPLETE CBC W/AUTO DIFF WBC: CPT

## 2025-02-24 PROCEDURE — 2500000003 HC RX 250 WO HCPCS: Performed by: NURSE PRACTITIONER

## 2025-02-24 PROCEDURE — 2500000003 HC RX 250 WO HCPCS: Performed by: INTERNAL MEDICINE

## 2025-02-24 PROCEDURE — 94640 AIRWAY INHALATION TREATMENT: CPT

## 2025-02-24 PROCEDURE — 2060000000 HC ICU INTERMEDIATE R&B

## 2025-02-24 PROCEDURE — 82962 GLUCOSE BLOOD TEST: CPT

## 2025-02-24 PROCEDURE — 6370000000 HC RX 637 (ALT 250 FOR IP): Performed by: INTERNAL MEDICINE

## 2025-02-24 PROCEDURE — 2580000003 HC RX 258: Performed by: NURSE PRACTITIONER

## 2025-02-24 PROCEDURE — 80048 BASIC METABOLIC PNL TOTAL CA: CPT

## 2025-02-24 PROCEDURE — 99232 SBSQ HOSP IP/OBS MODERATE 35: CPT | Performed by: INTERNAL MEDICINE

## 2025-02-24 RX ORDER — BUDESONIDE 0.5 MG/2ML
1 INHALANT ORAL
Status: DISCONTINUED | OUTPATIENT
Start: 2025-02-24 | End: 2025-02-28 | Stop reason: HOSPADM

## 2025-02-24 RX ORDER — CLONAZEPAM 0.5 MG/1
0.5 TABLET ORAL DAILY PRN
COMMUNITY

## 2025-02-24 RX ORDER — ERGOCALCIFEROL 1.25 MG/1
50000 CAPSULE, LIQUID FILLED ORAL WEEKLY
COMMUNITY

## 2025-02-24 RX ORDER — DIPHENOXYLATE HYDROCHLORIDE AND ATROPINE SULFATE 2.5; .025 MG/1; MG/1
1 TABLET ORAL EVERY 12 HOURS PRN
COMMUNITY

## 2025-02-24 RX ORDER — BUDESONIDE 0.5 MG/2ML
2 INHALANT ORAL 2 TIMES DAILY
COMMUNITY

## 2025-02-24 RX ORDER — DOCUSATE SODIUM 100 MG/1
100 CAPSULE, LIQUID FILLED ORAL DAILY
COMMUNITY

## 2025-02-24 RX ORDER — CLONAZEPAM 0.5 MG/1
0.5 TABLET ORAL NIGHTLY PRN
Status: DISCONTINUED | OUTPATIENT
Start: 2025-02-24 | End: 2025-02-28 | Stop reason: HOSPADM

## 2025-02-24 RX ORDER — PREDNISONE 10 MG/1
10 TABLET ORAL DAILY
Status: ON HOLD | COMMUNITY
Start: 2025-02-21 | End: 2025-02-28 | Stop reason: HOSPADM

## 2025-02-24 RX ADMIN — IPRATROPIUM BROMIDE AND ALBUTEROL SULFATE 1 DOSE: 2.5; .5 SOLUTION RESPIRATORY (INHALATION) at 21:38

## 2025-02-24 RX ADMIN — GUAIFENESIN 400 MG: 400 TABLET ORAL at 13:11

## 2025-02-24 RX ADMIN — Medication 1 MG: at 08:58

## 2025-02-24 RX ADMIN — PANTOPRAZOLE SODIUM 40 MG: 40 TABLET, DELAYED RELEASE ORAL at 05:14

## 2025-02-24 RX ADMIN — SENNOSIDES 8.6 MG: 8.6 TABLET, COATED ORAL at 20:52

## 2025-02-24 RX ADMIN — IPRATROPIUM BROMIDE AND ALBUTEROL SULFATE 1 DOSE: 2.5; .5 SOLUTION RESPIRATORY (INHALATION) at 11:31

## 2025-02-24 RX ADMIN — IPRATROPIUM BROMIDE AND ALBUTEROL SULFATE 1 DOSE: 2.5; .5 SOLUTION RESPIRATORY (INHALATION) at 15:18

## 2025-02-24 RX ADMIN — BUDESONIDE 500 MCG: 0.5 SUSPENSION RESPIRATORY (INHALATION) at 07:55

## 2025-02-24 RX ADMIN — METHYLPREDNISOLONE SODIUM SUCCINATE 40 MG: 40 INJECTION, POWDER, LYOPHILIZED, FOR SOLUTION INTRAMUSCULAR; INTRAVENOUS at 22:48

## 2025-02-24 RX ADMIN — SODIUM CHLORIDE, PRESERVATIVE FREE 10 ML: 5 INJECTION INTRAVENOUS at 20:52

## 2025-02-24 RX ADMIN — GUAIFENESIN 400 MG: 400 TABLET ORAL at 08:58

## 2025-02-24 RX ADMIN — ARFORMOTEROL TARTRATE 15 MCG: 15 SOLUTION RESPIRATORY (INHALATION) at 07:55

## 2025-02-24 RX ADMIN — ASPIRIN 81 MG: 81 TABLET, COATED ORAL at 08:57

## 2025-02-24 RX ADMIN — SENNOSIDES 8.6 MG: 8.6 TABLET, COATED ORAL at 08:57

## 2025-02-24 RX ADMIN — CEFEPIME 2000 MG: 2 INJECTION, POWDER, FOR SOLUTION INTRAVENOUS at 11:02

## 2025-02-24 RX ADMIN — VANCOMYCIN HYDROCHLORIDE 750 MG: 750 INJECTION, POWDER, LYOPHILIZED, FOR SOLUTION INTRAVENOUS at 21:12

## 2025-02-24 RX ADMIN — GUAIFENESIN 400 MG: 400 TABLET ORAL at 20:52

## 2025-02-24 RX ADMIN — ARFORMOTEROL TARTRATE 15 MCG: 15 SOLUTION RESPIRATORY (INHALATION) at 21:37

## 2025-02-24 RX ADMIN — SODIUM CHLORIDE, PRESERVATIVE FREE 10 ML: 5 INJECTION INTRAVENOUS at 11:50

## 2025-02-24 RX ADMIN — CLONAZEPAM 0.5 MG: 0.5 TABLET ORAL at 00:59

## 2025-02-24 RX ADMIN — CEFEPIME 2000 MG: 2 INJECTION, POWDER, FOR SOLUTION INTRAVENOUS at 22:47

## 2025-02-24 RX ADMIN — DULOXETINE HYDROCHLORIDE 60 MG: 60 CAPSULE, DELAYED RELEASE ORAL at 08:57

## 2025-02-24 RX ADMIN — VANCOMYCIN HYDROCHLORIDE 750 MG: 750 INJECTION, POWDER, LYOPHILIZED, FOR SOLUTION INTRAVENOUS at 11:56

## 2025-02-24 RX ADMIN — Medication 3 MG: at 22:48

## 2025-02-24 RX ADMIN — METHYLPREDNISOLONE SODIUM SUCCINATE 40 MG: 40 INJECTION, POWDER, LYOPHILIZED, FOR SOLUTION INTRAMUSCULAR; INTRAVENOUS at 11:49

## 2025-02-24 RX ADMIN — Medication 3 MG: at 22:08

## 2025-02-24 RX ADMIN — METHYLPREDNISOLONE SODIUM SUCCINATE 40 MG: 40 INJECTION, POWDER, LYOPHILIZED, FOR SOLUTION INTRAMUSCULAR; INTRAVENOUS at 03:12

## 2025-02-24 RX ADMIN — IPRATROPIUM BROMIDE AND ALBUTEROL SULFATE 1 DOSE: 2.5; .5 SOLUTION RESPIRATORY (INHALATION) at 07:55

## 2025-02-24 RX ADMIN — BUDESONIDE 1000 MCG: 0.5 SUSPENSION RESPIRATORY (INHALATION) at 21:38

## 2025-02-24 ASSESSMENT — PAIN - FUNCTIONAL ASSESSMENT: PAIN_FUNCTIONAL_ASSESSMENT: NONE - DENIES PAIN

## 2025-02-24 ASSESSMENT — PAIN SCALES - GENERAL: PAINLEVEL_OUTOF10: 0

## 2025-02-24 NOTE — CARE COORDINATION
2/24. Met with the pt at the bedside to discuss transitions of care. The pt is a LTC pt at [place] [facility]. Spoke with liaison. The pt is a bedhold and can return when medically stable. She was recently hospitalized with COPD/Corona/pneumonia.She returned to the ED with frequent falls, cough and increased SOB. Started on IV maxipime, IV vancomycin and IV solumedrol, as well as nebulizers.  Pt is O2 (4L) dependent at the facility, with BIPAP at night. Questionable compliance. Pulmonary consulted. She was recently hospitalized with COPD/Corona/pneumonia. Kirti Gerardo RN    Case Management Assessment  Initial Evaluation    Date/Time of Evaluation: 2/24/2025 10:13 AM  Assessment Completed by: Kirti Gerardo RN    If patient is discharged prior to next notation, then this note serves as note for discharge by case management.    Patient Name: Lana Phillips                   YOB: 1955  Diagnosis: Community acquired pneumonia of left lung, unspecified part of lung [J18.9]                   Date / Time: 2/23/2025  6:50 AM    Patient Admission Status: Inpatient   Readmission Risk (Low < 19, Mod (19-27), High > 27): Readmission Risk Score: 36.4    Current PCP: Tal Humphrey, DO  PCP verified by CM? Yes (house physician at facility)    Chart Reviewed: Yes      History Provided by: Patient, Medical Record  Patient Orientation: Alert and Oriented    Patient Cognition: Alert    Hospitalization in the last 30 days (Readmission):  Yes    If yes, Readmission Assessment in CM Navigator will be completed.    Advance Directives:      Code Status: Full Code   Patient's Primary Decision Maker is: Legal Next of Kin    Primary Decision Maker: Lana Reeves - Other Relative - [phone]    Secondary Decision Maker: Nia Salas - Niece/Nephew - [phone]    Discharge Planning:    Patient lives with:   Type of Home:    Primary Care Giver: Other (Comment) (pt is in skilled nursing facility long term)  Patient Support

## 2025-02-24 NOTE — ED NOTES
Patient attempted to urinate x 3 without success.   Patient straight cathed per her request for 700 ml output.  Patient expressed relief.

## 2025-02-24 NOTE — ED NOTES
Pt found sitting at edge of bed with bipap ripped off and disconnected from machine stating its hard to breathe. Pt assisted back in bed and bipap placed back on pt.

## 2025-02-25 LAB
ANION GAP SERPL CALCULATED.3IONS-SCNC: 7 MMOL/L (ref 7–16)
BASOPHILS # BLD: 0.03 K/UL (ref 0–0.2)
BASOPHILS NFR BLD: 0 % (ref 0–2)
BUN SERPL-MCNC: 17 MG/DL (ref 6–23)
CALCIUM SERPL-MCNC: 9.4 MG/DL (ref 8.6–10.2)
CHLORIDE SERPL-SCNC: 101 MMOL/L (ref 98–107)
CO2 SERPL-SCNC: 33 MMOL/L (ref 22–29)
CREAT SERPL-MCNC: 0.6 MG/DL (ref 0.5–1)
DATE LAST DOSE: NORMAL
EKG ATRIAL RATE: 79 BPM
EKG P AXIS: 86 DEGREES
EKG P-R INTERVAL: 152 MS
EKG Q-T INTERVAL: 428 MS
EKG QRS DURATION: 86 MS
EKG QTC CALCULATION (BAZETT): 490 MS
EKG R AXIS: 61 DEGREES
EKG T AXIS: 81 DEGREES
EKG VENTRICULAR RATE: 79 BPM
EOSINOPHIL # BLD: 0 K/UL (ref 0.05–0.5)
EOSINOPHILS RELATIVE PERCENT: 0 % (ref 0–6)
ERYTHROCYTE [DISTWIDTH] IN BLOOD BY AUTOMATED COUNT: 15.3 % (ref 11.5–15)
GFR, ESTIMATED: >90 ML/MIN/1.73M2
GLUCOSE SERPL-MCNC: 177 MG/DL (ref 74–99)
HCT VFR BLD AUTO: 29.2 % (ref 34–48)
HGB BLD-MCNC: 9.2 G/DL (ref 11.5–15.5)
IMM GRANULOCYTES # BLD AUTO: 0.22 K/UL (ref 0–0.58)
IMM GRANULOCYTES NFR BLD: 1 % (ref 0–5)
LYMPHOCYTES NFR BLD: 0.47 K/UL (ref 1.5–4)
LYMPHOCYTES RELATIVE PERCENT: 2 % (ref 20–42)
MCH RBC QN AUTO: 33.2 PG (ref 26–35)
MCHC RBC AUTO-ENTMCNC: 31.5 G/DL (ref 32–34.5)
MCV RBC AUTO: 105.4 FL (ref 80–99.9)
MICROORGANISM SPEC CULT: ABNORMAL
MONOCYTES NFR BLD: 0.57 K/UL (ref 0.1–0.95)
MONOCYTES NFR BLD: 3 % (ref 2–12)
NEUTROPHILS NFR BLD: 94 % (ref 43–80)
NEUTS SEG NFR BLD: 20.61 K/UL (ref 1.8–7.3)
PLATELET # BLD AUTO: 241 K/UL (ref 130–450)
PMV BLD AUTO: 10.2 FL (ref 7–12)
POTASSIUM SERPL-SCNC: 3.9 MMOL/L (ref 3.5–5)
RBC # BLD AUTO: 2.77 M/UL (ref 3.5–5.5)
RBC # BLD: ABNORMAL 10*6/UL
SODIUM SERPL-SCNC: 141 MMOL/L (ref 132–146)
SPECIMEN DESCRIPTION: ABNORMAL
TME LAST DOSE: NORMAL H
VANCOMYCIN DOSE: NORMAL MG
VANCOMYCIN TROUGH SERPL-MCNC: 9.6 UG/ML (ref 5–16)
WBC OTHER # BLD: 21.9 K/UL (ref 4.5–11.5)

## 2025-02-25 PROCEDURE — 6370000000 HC RX 637 (ALT 250 FOR IP)

## 2025-02-25 PROCEDURE — 2500000003 HC RX 250 WO HCPCS: Performed by: INTERNAL MEDICINE

## 2025-02-25 PROCEDURE — 6360000002 HC RX W HCPCS

## 2025-02-25 PROCEDURE — 6370000000 HC RX 637 (ALT 250 FOR IP): Performed by: INTERNAL MEDICINE

## 2025-02-25 PROCEDURE — 2700000000 HC OXYGEN THERAPY PER DAY

## 2025-02-25 PROCEDURE — 2580000003 HC RX 258: Performed by: NURSE PRACTITIONER

## 2025-02-25 PROCEDURE — 97530 THERAPEUTIC ACTIVITIES: CPT

## 2025-02-25 PROCEDURE — 6360000002 HC RX W HCPCS: Performed by: INTERNAL MEDICINE

## 2025-02-25 PROCEDURE — 2500000003 HC RX 250 WO HCPCS: Performed by: NURSE PRACTITIONER

## 2025-02-25 PROCEDURE — 80202 ASSAY OF VANCOMYCIN: CPT

## 2025-02-25 PROCEDURE — 97161 PT EVAL LOW COMPLEX 20 MIN: CPT

## 2025-02-25 PROCEDURE — 97535 SELF CARE MNGMENT TRAINING: CPT

## 2025-02-25 PROCEDURE — 6360000002 HC RX W HCPCS: Performed by: NURSE PRACTITIONER

## 2025-02-25 PROCEDURE — 36415 COLL VENOUS BLD VENIPUNCTURE: CPT

## 2025-02-25 PROCEDURE — 6370000000 HC RX 637 (ALT 250 FOR IP): Performed by: NURSE PRACTITIONER

## 2025-02-25 PROCEDURE — 97165 OT EVAL LOW COMPLEX 30 MIN: CPT

## 2025-02-25 PROCEDURE — 94640 AIRWAY INHALATION TREATMENT: CPT

## 2025-02-25 PROCEDURE — 2500000003 HC RX 250 WO HCPCS

## 2025-02-25 PROCEDURE — 99232 SBSQ HOSP IP/OBS MODERATE 35: CPT

## 2025-02-25 PROCEDURE — 2060000000 HC ICU INTERMEDIATE R&B

## 2025-02-25 PROCEDURE — 93005 ELECTROCARDIOGRAM TRACING: CPT

## 2025-02-25 PROCEDURE — 80048 BASIC METABOLIC PNL TOTAL CA: CPT

## 2025-02-25 PROCEDURE — 85025 COMPLETE CBC W/AUTO DIFF WBC: CPT

## 2025-02-25 RX ORDER — DICYCLOMINE HYDROCHLORIDE 10 MG/ML
20 INJECTION INTRAMUSCULAR ONCE
Status: COMPLETED | OUTPATIENT
Start: 2025-02-25 | End: 2025-02-25

## 2025-02-25 RX ORDER — LABETALOL HYDROCHLORIDE 5 MG/ML
5 INJECTION, SOLUTION INTRAVENOUS ONCE
Status: COMPLETED | OUTPATIENT
Start: 2025-02-25 | End: 2025-02-25

## 2025-02-25 RX ORDER — LOSARTAN POTASSIUM 50 MG/1
25 TABLET ORAL DAILY
Status: DISCONTINUED | OUTPATIENT
Start: 2025-02-25 | End: 2025-02-28 | Stop reason: HOSPADM

## 2025-02-25 RX ORDER — PREDNISONE 20 MG/1
40 TABLET ORAL DAILY
Status: COMPLETED | OUTPATIENT
Start: 2025-02-26 | End: 2025-02-28

## 2025-02-25 RX ORDER — LORAZEPAM 2 MG/ML
0.5 INJECTION INTRAMUSCULAR ONCE
Status: COMPLETED | OUTPATIENT
Start: 2025-02-25 | End: 2025-02-25

## 2025-02-25 RX ADMIN — METHYLPREDNISOLONE SODIUM SUCCINATE 40 MG: 40 INJECTION, POWDER, LYOPHILIZED, FOR SOLUTION INTRAMUSCULAR; INTRAVENOUS at 11:28

## 2025-02-25 RX ADMIN — IPRATROPIUM BROMIDE AND ALBUTEROL SULFATE 1 DOSE: 2.5; .5 SOLUTION RESPIRATORY (INHALATION) at 20:17

## 2025-02-25 RX ADMIN — LOSARTAN POTASSIUM 25 MG: 50 TABLET, FILM COATED ORAL at 08:52

## 2025-02-25 RX ADMIN — IPRATROPIUM BROMIDE AND ALBUTEROL SULFATE 1 DOSE: 2.5; .5 SOLUTION RESPIRATORY (INHALATION) at 12:17

## 2025-02-25 RX ADMIN — VANCOMYCIN HYDROCHLORIDE 750 MG: 750 INJECTION, POWDER, LYOPHILIZED, FOR SOLUTION INTRAVENOUS at 08:54

## 2025-02-25 RX ADMIN — SENNOSIDES 8.6 MG: 8.6 TABLET, COATED ORAL at 08:51

## 2025-02-25 RX ADMIN — BUDESONIDE 1000 MCG: 0.5 SUSPENSION RESPIRATORY (INHALATION) at 20:17

## 2025-02-25 RX ADMIN — GUAIFENESIN 400 MG: 400 TABLET ORAL at 08:51

## 2025-02-25 RX ADMIN — IPRATROPIUM BROMIDE AND ALBUTEROL SULFATE 1 DOSE: 2.5; .5 SOLUTION RESPIRATORY (INHALATION) at 08:35

## 2025-02-25 RX ADMIN — SENNOSIDES 8.6 MG: 8.6 TABLET, COATED ORAL at 20:03

## 2025-02-25 RX ADMIN — IPRATROPIUM BROMIDE AND ALBUTEROL SULFATE 1 DOSE: 2.5; .5 SOLUTION RESPIRATORY (INHALATION) at 16:17

## 2025-02-25 RX ADMIN — GUAIFENESIN 400 MG: 400 TABLET ORAL at 20:03

## 2025-02-25 RX ADMIN — VANCOMYCIN HYDROCHLORIDE 750 MG: 750 INJECTION, POWDER, LYOPHILIZED, FOR SOLUTION INTRAVENOUS at 20:07

## 2025-02-25 RX ADMIN — FUROSEMIDE 40 MG: 40 TABLET ORAL at 08:51

## 2025-02-25 RX ADMIN — SODIUM CHLORIDE, PRESERVATIVE FREE 10 ML: 5 INJECTION INTRAVENOUS at 20:08

## 2025-02-25 RX ADMIN — CEFEPIME 2000 MG: 2 INJECTION, POWDER, FOR SOLUTION INTRAVENOUS at 11:27

## 2025-02-25 RX ADMIN — ASPIRIN 81 MG: 81 TABLET, COATED ORAL at 08:51

## 2025-02-25 RX ADMIN — DILTIAZEM HYDROCHLORIDE 240 MG: 120 CAPSULE, EXTENDED RELEASE ORAL at 08:50

## 2025-02-25 RX ADMIN — BUDESONIDE 1000 MCG: 0.5 SUSPENSION RESPIRATORY (INHALATION) at 08:35

## 2025-02-25 RX ADMIN — ARFORMOTEROL TARTRATE 15 MCG: 15 SOLUTION RESPIRATORY (INHALATION) at 08:36

## 2025-02-25 RX ADMIN — Medication 1 MG: at 08:51

## 2025-02-25 RX ADMIN — METHYLPREDNISOLONE SODIUM SUCCINATE 40 MG: 40 INJECTION, POWDER, LYOPHILIZED, FOR SOLUTION INTRAMUSCULAR; INTRAVENOUS at 16:47

## 2025-02-25 RX ADMIN — DULOXETINE HYDROCHLORIDE 60 MG: 60 CAPSULE, DELAYED RELEASE ORAL at 08:50

## 2025-02-25 RX ADMIN — LABETALOL HYDROCHLORIDE 5 MG: 5 INJECTION INTRAVENOUS at 04:13

## 2025-02-25 RX ADMIN — METHIMAZOLE 15 MG: 5 TABLET ORAL at 08:50

## 2025-02-25 RX ADMIN — ARFORMOTEROL TARTRATE 15 MCG: 15 SOLUTION RESPIRATORY (INHALATION) at 20:17

## 2025-02-25 RX ADMIN — PROCHLORPERAZINE EDISYLATE 10 MG: 5 INJECTION INTRAMUSCULAR; INTRAVENOUS at 10:11

## 2025-02-25 RX ADMIN — LORAZEPAM 0.5 MG: 2 INJECTION INTRAMUSCULAR; INTRAVENOUS at 11:28

## 2025-02-25 RX ADMIN — CLONAZEPAM 0.5 MG: 0.5 TABLET ORAL at 20:09

## 2025-02-25 RX ADMIN — DICYCLOMINE HYDROCHLORIDE 20 MG: 10 INJECTION, SOLUTION INTRAMUSCULAR at 16:48

## 2025-02-25 RX ADMIN — GUAIFENESIN 400 MG: 400 TABLET ORAL at 16:47

## 2025-02-25 NOTE — PLAN OF CARE
Problem: Safety - Adult  Goal: Free from fall injury  Outcome: Progressing  Flowsheets (Taken 2/25/2025 1722)  Free From Fall Injury: Instruct family/caregiver on patient safety     Problem: Chronic Conditions and Co-morbidities  Goal: Patient's chronic conditions and co-morbidity symptoms are monitored and maintained or improved  Outcome: Progressing  Flowsheets (Taken 2/25/2025 0750)  Care Plan - Patient's Chronic Conditions and Co-Morbidity Symptoms are Monitored and Maintained or Improved:   Monitor and assess patient's chronic conditions and comorbid symptoms for stability, deterioration, or improvement   Collaborate with multidisciplinary team to address chronic and comorbid conditions and prevent exacerbation or deterioration   Update acute care plan with appropriate goals if chronic or comorbid symptoms are exacerbated and prevent overall improvement and discharge     Problem: Discharge Planning  Goal: Discharge to home or other facility with appropriate resources  Outcome: Progressing  Flowsheets (Taken 2/25/2025 0750)  Discharge to home or other facility with appropriate resources:   Identify barriers to discharge with patient and caregiver   Arrange for needed discharge resources and transportation as appropriate   Identify discharge learning needs (meds, wound care, etc)   Refer to discharge planning if patient needs post-hospital services based on physician order or complex needs related to functional status, cognitive ability or social support system     Problem: Skin/Tissue Integrity  Goal: Skin integrity remains intact  Description: 1.  Monitor for areas of redness and/or skin breakdown  2.  Assess vascular access sites hourly  3.  Every 4-6 hours minimum:  Change oxygen saturation probe site  4.  Every 4-6 hours:  If on nasal continuous positive airway pressure, respiratory therapy assess nares and determine need for appliance change or resting period  Outcome: Progressing  Flowsheets (Taken

## 2025-02-25 NOTE — DISCHARGE INSTR - COC
Continuity of Care Form    Patient Name: Lana Phillips   :  1955  MRN:  [id number]    Admit date:  2025  Discharge date:  25      Code Status Order: Full Code   Advance Directives:   Advance Care Flowsheet Documentation             Admitting Physician:  Elyssa Suárez DO  PCP: Tal Humphrey DO    Discharging Nurse: RAKAN Rogers  Discharging Hospital Unit/Room#: 4509/4509-B  Discharging Unit Phone Number: [phone]    Emergency Contact:   Extended Emergency Contact Information  Primary Emergency Contact: Lana Reeves  Address: [address] [place] [address] [place] [address]  Home Phone: [phone]  Work Phone: [phone]  Mobile Phone: [phone]  Relation: Other Relative  Preferred language: English   needed? No  Secondary Emergency Contact: sue ward  Mobile Phone: [phone]  Relation: Brother/Sister  Preferred language: English   needed? No    Past Surgical History:  Past Surgical History:   Procedure Laterality Date    BACK SURGERY      x2    BRONCHOSCOPY N/A 2024    BRONCHOSCOPY DIAGNOSTIC OR CELL WASH ONLY performed by Jose Price MD at [facility] [place] [place] ENDOSCOPY    INVASIVE VASCULAR N/A 2024    Aortagram abdominal performed by Lazaro Vidales MD at [facility] [place] [place] CARDIAC CATH LAB    INVASIVE VASCULAR N/A 2024    Angioplasty peripheral artery performed by Lazaro Vidales MD at [facility] [place] [place] CARDIAC CATH LAB    LEFT OOPHORECTOMY      PAIN MANAGEMENT PROCEDURE N/A 2023    CAUDAL EPIDURAL STEROID INJECTION performed by Lilia Cabrera DO at [facility] OR    PAIN MANAGEMENT PROCEDURE N/A 2024    THERAPEUTIC CAUDAL EPIDURAL UNDER FLUOROSCOPIC GUIDANCE performed by Lilia Cabrera DO at [facility] OR    UPPER GASTROINTESTINAL ENDOSCOPY N/A 10/16/2023    EGD ESOPHAGOGASTRODUODENOSCOPY performed by Willie Chow DO at [facility] ENDOSCOPY       Immunization History:   Immunization History   Administered Date(s) Administered    COVID-19, PFIZER PURPLE top, DILUTE for

## 2025-02-25 NOTE — CARE COORDINATION
CM Update: Met with patient at bedside to discuss transition of care plan. Sitter at beside. Discharge plan is [facility]. Patient is long term care and does not need a precert to return. Destination and PENNY updated. Face Sheet, Ambulette Form, and Envelope in soft chart. Wearing 6 liters high flow nasal cannula. 4 liters is baseline. Patient came to hospital with SOB. Found to have pna. CM/SW to follow. Baldemar Khoury [phone]

## 2025-02-26 LAB
ANION GAP SERPL CALCULATED.3IONS-SCNC: 9 MMOL/L (ref 7–16)
BASOPHILS # BLD: 0 K/UL (ref 0–0.2)
BASOPHILS NFR BLD: 0 % (ref 0–2)
BUN SERPL-MCNC: 17 MG/DL (ref 6–23)
CALCIUM SERPL-MCNC: 9.2 MG/DL (ref 8.6–10.2)
CHLORIDE SERPL-SCNC: 96 MMOL/L (ref 98–107)
CO2 SERPL-SCNC: 36 MMOL/L (ref 22–29)
CREAT SERPL-MCNC: 0.6 MG/DL (ref 0.5–1)
EOSINOPHIL # BLD: 0 K/UL (ref 0.05–0.5)
EOSINOPHILS RELATIVE PERCENT: 0 % (ref 0–6)
ERYTHROCYTE [DISTWIDTH] IN BLOOD BY AUTOMATED COUNT: 15.1 % (ref 11.5–15)
GFR, ESTIMATED: >90 ML/MIN/1.73M2
GLUCOSE SERPL-MCNC: 146 MG/DL (ref 74–99)
HCT VFR BLD AUTO: 33.8 % (ref 34–48)
HGB BLD-MCNC: 10.4 G/DL (ref 11.5–15.5)
LYMPHOCYTES NFR BLD: 0.56 K/UL (ref 1.5–4)
LYMPHOCYTES RELATIVE PERCENT: 4 % (ref 20–42)
MAGNESIUM SERPL-MCNC: 1.7 MG/DL (ref 1.6–2.6)
MCH RBC QN AUTO: 32.9 PG (ref 26–35)
MCHC RBC AUTO-ENTMCNC: 30.8 G/DL (ref 32–34.5)
MCV RBC AUTO: 107 FL (ref 80–99.9)
MONOCYTES NFR BLD: 0.7 K/UL (ref 0.1–0.95)
MONOCYTES NFR BLD: 4 % (ref 2–12)
NEUTROPHILS NFR BLD: 92 % (ref 43–80)
NEUTS SEG NFR BLD: 14.93 K/UL (ref 1.8–7.3)
PLATELET # BLD AUTO: 270 K/UL (ref 130–450)
PMV BLD AUTO: 10.3 FL (ref 7–12)
POTASSIUM SERPL-SCNC: 3.5 MMOL/L (ref 3.5–5)
RBC # BLD AUTO: 3.16 M/UL (ref 3.5–5.5)
RBC # BLD: ABNORMAL 10*6/UL
SODIUM SERPL-SCNC: 141 MMOL/L (ref 132–146)
WBC OTHER # BLD: 16.2 K/UL (ref 4.5–11.5)

## 2025-02-26 PROCEDURE — 36415 COLL VENOUS BLD VENIPUNCTURE: CPT

## 2025-02-26 PROCEDURE — 85025 COMPLETE CBC W/AUTO DIFF WBC: CPT

## 2025-02-26 PROCEDURE — 6370000000 HC RX 637 (ALT 250 FOR IP): Performed by: NURSE PRACTITIONER

## 2025-02-26 PROCEDURE — 6360000002 HC RX W HCPCS: Performed by: NURSE PRACTITIONER

## 2025-02-26 PROCEDURE — 6360000002 HC RX W HCPCS

## 2025-02-26 PROCEDURE — 2060000000 HC ICU INTERMEDIATE R&B

## 2025-02-26 PROCEDURE — 99232 SBSQ HOSP IP/OBS MODERATE 35: CPT

## 2025-02-26 PROCEDURE — 6370000000 HC RX 637 (ALT 250 FOR IP): Performed by: INTERNAL MEDICINE

## 2025-02-26 PROCEDURE — 80048 BASIC METABOLIC PNL TOTAL CA: CPT

## 2025-02-26 PROCEDURE — 6370000000 HC RX 637 (ALT 250 FOR IP)

## 2025-02-26 PROCEDURE — 2580000003 HC RX 258: Performed by: NURSE PRACTITIONER

## 2025-02-26 PROCEDURE — 94660 CPAP INITIATION&MGMT: CPT

## 2025-02-26 PROCEDURE — 2700000000 HC OXYGEN THERAPY PER DAY

## 2025-02-26 PROCEDURE — 6360000002 HC RX W HCPCS: Performed by: INTERNAL MEDICINE

## 2025-02-26 PROCEDURE — 83735 ASSAY OF MAGNESIUM: CPT

## 2025-02-26 PROCEDURE — 2500000003 HC RX 250 WO HCPCS: Performed by: NURSE PRACTITIONER

## 2025-02-26 PROCEDURE — 94640 AIRWAY INHALATION TREATMENT: CPT

## 2025-02-26 RX ORDER — LORAZEPAM 2 MG/ML
0.5 INJECTION INTRAMUSCULAR ONCE
Status: COMPLETED | OUTPATIENT
Start: 2025-02-26 | End: 2025-02-26

## 2025-02-26 RX ORDER — DICYCLOMINE HYDROCHLORIDE 10 MG/ML
20 INJECTION INTRAMUSCULAR ONCE
Status: COMPLETED | OUTPATIENT
Start: 2025-02-26 | End: 2025-02-26

## 2025-02-26 RX ADMIN — BUDESONIDE 1000 MCG: 0.5 SUSPENSION RESPIRATORY (INHALATION) at 08:55

## 2025-02-26 RX ADMIN — IPRATROPIUM BROMIDE AND ALBUTEROL SULFATE 1 DOSE: 2.5; .5 SOLUTION RESPIRATORY (INHALATION) at 12:55

## 2025-02-26 RX ADMIN — FUROSEMIDE 40 MG: 40 TABLET ORAL at 10:00

## 2025-02-26 RX ADMIN — ACETAMINOPHEN 650 MG: 325 TABLET ORAL at 18:33

## 2025-02-26 RX ADMIN — SODIUM CHLORIDE, PRESERVATIVE FREE 10 ML: 5 INJECTION INTRAVENOUS at 19:59

## 2025-02-26 RX ADMIN — SENNOSIDES 8.6 MG: 8.6 TABLET, COATED ORAL at 10:07

## 2025-02-26 RX ADMIN — SODIUM CHLORIDE, PRESERVATIVE FREE 10 ML: 5 INJECTION INTRAVENOUS at 10:02

## 2025-02-26 RX ADMIN — METHIMAZOLE 15 MG: 5 TABLET ORAL at 10:00

## 2025-02-26 RX ADMIN — CEFEPIME 2000 MG: 2 INJECTION, POWDER, FOR SOLUTION INTRAVENOUS at 11:46

## 2025-02-26 RX ADMIN — ARFORMOTEROL TARTRATE 15 MCG: 15 SOLUTION RESPIRATORY (INHALATION) at 20:55

## 2025-02-26 RX ADMIN — CLONAZEPAM 0.5 MG: 0.5 TABLET ORAL at 19:59

## 2025-02-26 RX ADMIN — CEFEPIME 2000 MG: 2 INJECTION, POWDER, FOR SOLUTION INTRAVENOUS at 00:04

## 2025-02-26 RX ADMIN — GUAIFENESIN 400 MG: 400 TABLET ORAL at 10:00

## 2025-02-26 RX ADMIN — VANCOMYCIN HYDROCHLORIDE 750 MG: 750 INJECTION, POWDER, LYOPHILIZED, FOR SOLUTION INTRAVENOUS at 10:18

## 2025-02-26 RX ADMIN — PREDNISONE 40 MG: 20 TABLET ORAL at 10:01

## 2025-02-26 RX ADMIN — ASPIRIN 81 MG: 81 TABLET, COATED ORAL at 10:01

## 2025-02-26 RX ADMIN — PANTOPRAZOLE SODIUM 40 MG: 40 TABLET, DELAYED RELEASE ORAL at 06:50

## 2025-02-26 RX ADMIN — DILTIAZEM HYDROCHLORIDE 240 MG: 120 CAPSULE, EXTENDED RELEASE ORAL at 10:00

## 2025-02-26 RX ADMIN — DULOXETINE HYDROCHLORIDE 60 MG: 60 CAPSULE, DELAYED RELEASE ORAL at 09:59

## 2025-02-26 RX ADMIN — Medication 1 MG: at 10:00

## 2025-02-26 RX ADMIN — IPRATROPIUM BROMIDE AND ALBUTEROL SULFATE 1 DOSE: 2.5; .5 SOLUTION RESPIRATORY (INHALATION) at 08:55

## 2025-02-26 RX ADMIN — DICYCLOMINE HYDROCHLORIDE 20 MG: 10 INJECTION INTRAMUSCULAR at 13:14

## 2025-02-26 RX ADMIN — ARFORMOTEROL TARTRATE 15 MCG: 15 SOLUTION RESPIRATORY (INHALATION) at 08:55

## 2025-02-26 RX ADMIN — POLYETHYLENE GLYCOL 3350 17 G: 17 POWDER, FOR SOLUTION ORAL at 13:14

## 2025-02-26 RX ADMIN — BUDESONIDE 1000 MCG: 0.5 SUSPENSION RESPIRATORY (INHALATION) at 20:55

## 2025-02-26 RX ADMIN — PROCHLORPERAZINE EDISYLATE 10 MG: 5 INJECTION INTRAMUSCULAR; INTRAVENOUS at 10:02

## 2025-02-26 RX ADMIN — GUAIFENESIN 400 MG: 400 TABLET ORAL at 13:14

## 2025-02-26 RX ADMIN — SENNOSIDES 8.6 MG: 8.6 TABLET, COATED ORAL at 19:59

## 2025-02-26 RX ADMIN — ERGOCALCIFEROL 50000 UNITS: 1.25 CAPSULE ORAL at 10:01

## 2025-02-26 RX ADMIN — IPRATROPIUM BROMIDE AND ALBUTEROL SULFATE 1 DOSE: 2.5; .5 SOLUTION RESPIRATORY (INHALATION) at 20:56

## 2025-02-26 RX ADMIN — GUAIFENESIN 400 MG: 400 TABLET ORAL at 19:59

## 2025-02-26 RX ADMIN — IPRATROPIUM BROMIDE AND ALBUTEROL SULFATE 1 DOSE: 2.5; .5 SOLUTION RESPIRATORY (INHALATION) at 16:23

## 2025-02-26 RX ADMIN — LORAZEPAM 0.5 MG: 2 INJECTION INTRAMUSCULAR; INTRAVENOUS at 14:21

## 2025-02-26 RX ADMIN — VANCOMYCIN HYDROCHLORIDE 1000 MG: 1 INJECTION, POWDER, LYOPHILIZED, FOR SOLUTION INTRAVENOUS at 20:04

## 2025-02-26 RX ADMIN — LOSARTAN POTASSIUM 25 MG: 50 TABLET, FILM COATED ORAL at 10:01

## 2025-02-26 ASSESSMENT — PAIN DESCRIPTION - LOCATION
LOCATION: ABDOMEN
LOCATION: HEAD

## 2025-02-26 ASSESSMENT — PAIN DESCRIPTION - DESCRIPTORS
DESCRIPTORS: ACHING;DISCOMFORT;THROBBING
DESCRIPTORS: CRAMPING;ACHING

## 2025-02-26 ASSESSMENT — PAIN DESCRIPTION - ORIENTATION
ORIENTATION: MID
ORIENTATION: POSTERIOR

## 2025-02-26 ASSESSMENT — PAIN SCALES - GENERAL: PAINLEVEL_OUTOF10: 6

## 2025-02-26 ASSESSMENT — PAIN - FUNCTIONAL ASSESSMENT: PAIN_FUNCTIONAL_ASSESSMENT: ACTIVITIES ARE NOT PREVENTED

## 2025-02-26 NOTE — ACP (ADVANCE CARE PLANNING)
Advance Care Planning   Healthcare Decision Maker:    Primary Decision Maker: OtisLana shah - Other Relative - [phone]    Secondary Decision Maker: Nia Salas - Niece/Nephew - [phone]    Click here to complete Healthcare Decision Makers including selection of the Healthcare Decision Maker Relationship (ie \"Primary\").

## 2025-02-26 NOTE — PLAN OF CARE
Problem: Safety - Adult  Goal: Free from fall injury  2/26/2025 1012 by Arcelia Velásquez RN  Outcome: Progressing  Flowsheets (Taken 2/26/2025 0842)  Free From Fall Injury: Instruct family/caregiver on patient safety  2/25/2025 2158 by Linda Boswell RN  Outcome: Progressing  Flowsheets (Taken 2/25/2025 1722 by Shea Purdy RN)  Free From Fall Injury: Instruct family/caregiver on patient safety     Problem: Chronic Conditions and Co-morbidities  Goal: Patient's chronic conditions and co-morbidity symptoms are monitored and maintained or improved  2/26/2025 1012 by Arcelia Velásquez RN  Outcome: Progressing  Flowsheets (Taken 2/26/2025 0842)  Care Plan - Patient's Chronic Conditions and Co-Morbidity Symptoms are Monitored and Maintained or Improved: Monitor and assess patient's chronic conditions and comorbid symptoms for stability, deterioration, or improvement  2/25/2025 2158 by Linda Boswell RN  Outcome: Progressing  Flowsheets (Taken 2/25/2025 2025)  Care Plan - Patient's Chronic Conditions and Co-Morbidity Symptoms are Monitored and Maintained or Improved: Monitor and assess patient's chronic conditions and comorbid symptoms for stability, deterioration, or improvement     Problem: Discharge Planning  Goal: Discharge to home or other facility with appropriate resources  2/26/2025 1012 by Arcelia Velásquez RN  Outcome: Progressing  Flowsheets (Taken 2/26/2025 0842)  Discharge to home or other facility with appropriate resources: Identify barriers to discharge with patient and caregiver  2/25/2025 2158 by Linda Boswell RN  Outcome: Progressing  Flowsheets (Taken 2/25/2025 2025)  Discharge to home or other facility with appropriate resources: Identify barriers to discharge with patient and caregiver     Problem: Skin/Tissue Integrity  Goal: Skin integrity remains intact  Description: 1.  Monitor for areas of redness and/or skin breakdown  2.  Assess vascular access sites hourly  3.  Every 4-6 hours minimum:  Change

## 2025-02-26 NOTE — PLAN OF CARE
Problem: Safety - Adult  Goal: Free from fall injury  2/25/2025 2158 by Linda Boswell RN  Outcome: Progressing  Flowsheets (Taken 2/25/2025 1722 by Shea Purdy RN)  Free From Fall Injury: Instruct family/caregiver on patient safety  2/25/2025 1722 by Shea Purdy RN  Outcome: Progressing  Flowsheets (Taken 2/25/2025 1722)  Free From Fall Injury: Instruct family/caregiver on patient safety     Problem: Chronic Conditions and Co-morbidities  Goal: Patient's chronic conditions and co-morbidity symptoms are monitored and maintained or improved  2/25/2025 2158 by Linda Boswell RN  Outcome: Progressing  Flowsheets (Taken 2/25/2025 2025)  Care Plan - Patient's Chronic Conditions and Co-Morbidity Symptoms are Monitored and Maintained or Improved: Monitor and assess patient's chronic conditions and comorbid symptoms for stability, deterioration, or improvement  2/25/2025 1722 by Shea Purdy RN  Outcome: Progressing  Flowsheets (Taken 2/25/2025 0750)  Care Plan - Patient's Chronic Conditions and Co-Morbidity Symptoms are Monitored and Maintained or Improved:   Monitor and assess patient's chronic conditions and comorbid symptoms for stability, deterioration, or improvement   Collaborate with multidisciplinary team to address chronic and comorbid conditions and prevent exacerbation or deterioration   Update acute care plan with appropriate goals if chronic or comorbid symptoms are exacerbated and prevent overall improvement and discharge     Problem: Discharge Planning  Goal: Discharge to home or other facility with appropriate resources  2/25/2025 2158 by Linda Boswell RN  Outcome: Progressing  Flowsheets (Taken 2/25/2025 2025)  Discharge to home or other facility with appropriate resources: Identify barriers to discharge with patient and caregiver  2/25/2025 1722 by Shea Purdy RN  Outcome: Progressing  Flowsheets (Taken 2/25/2025 0750)  Discharge to home or other facility with appropriate

## 2025-02-26 NOTE — CARE COORDINATION
CM Update: Met with patient at bedside to discuss transition of care plan. Discharge plan is [facility]. Patient is long term care and does not need a precert to return. Destination and PENNY updated. Face Sheet, Ambulette Form, and Envelope in soft chart. Wearing 6 liters high flow nasal cannula. 4 liters is baseline. Patient came to hospital with SOB. Found to have pna. CM/SW to follow. Baldemar Khoury [phone]

## 2025-02-27 PROBLEM — E43 SEVERE PROTEIN-CALORIE MALNUTRITION: Chronic | Status: ACTIVE | Noted: 2025-02-27

## 2025-02-27 PROCEDURE — 2580000003 HC RX 258: Performed by: NURSE PRACTITIONER

## 2025-02-27 PROCEDURE — 6360000002 HC RX W HCPCS: Performed by: NURSE PRACTITIONER

## 2025-02-27 PROCEDURE — 99232 SBSQ HOSP IP/OBS MODERATE 35: CPT

## 2025-02-27 PROCEDURE — 6370000000 HC RX 637 (ALT 250 FOR IP)

## 2025-02-27 PROCEDURE — 6360000002 HC RX W HCPCS: Performed by: INTERNAL MEDICINE

## 2025-02-27 PROCEDURE — 2700000000 HC OXYGEN THERAPY PER DAY

## 2025-02-27 PROCEDURE — 6370000000 HC RX 637 (ALT 250 FOR IP): Performed by: NURSE PRACTITIONER

## 2025-02-27 PROCEDURE — 2500000003 HC RX 250 WO HCPCS: Performed by: NURSE PRACTITIONER

## 2025-02-27 PROCEDURE — 6370000000 HC RX 637 (ALT 250 FOR IP): Performed by: INTERNAL MEDICINE

## 2025-02-27 PROCEDURE — 94660 CPAP INITIATION&MGMT: CPT

## 2025-02-27 PROCEDURE — 2060000000 HC ICU INTERMEDIATE R&B

## 2025-02-27 PROCEDURE — 94640 AIRWAY INHALATION TREATMENT: CPT

## 2025-02-27 RX ADMIN — IPRATROPIUM BROMIDE AND ALBUTEROL SULFATE 1 DOSE: 2.5; .5 SOLUTION RESPIRATORY (INHALATION) at 16:16

## 2025-02-27 RX ADMIN — SENNOSIDES 8.6 MG: 8.6 TABLET, COATED ORAL at 09:47

## 2025-02-27 RX ADMIN — CEFEPIME 2000 MG: 2 INJECTION, POWDER, FOR SOLUTION INTRAVENOUS at 12:02

## 2025-02-27 RX ADMIN — DULOXETINE HYDROCHLORIDE 60 MG: 60 CAPSULE, DELAYED RELEASE ORAL at 09:44

## 2025-02-27 RX ADMIN — ASPIRIN 81 MG: 81 TABLET, COATED ORAL at 09:44

## 2025-02-27 RX ADMIN — IPRATROPIUM BROMIDE AND ALBUTEROL SULFATE 1 DOSE: 2.5; .5 SOLUTION RESPIRATORY (INHALATION) at 08:34

## 2025-02-27 RX ADMIN — SODIUM CHLORIDE, PRESERVATIVE FREE 10 ML: 5 INJECTION INTRAVENOUS at 20:11

## 2025-02-27 RX ADMIN — LOSARTAN POTASSIUM 25 MG: 50 TABLET, FILM COATED ORAL at 09:47

## 2025-02-27 RX ADMIN — PANTOPRAZOLE SODIUM 40 MG: 40 TABLET, DELAYED RELEASE ORAL at 08:10

## 2025-02-27 RX ADMIN — ARFORMOTEROL TARTRATE 15 MCG: 15 SOLUTION RESPIRATORY (INHALATION) at 21:20

## 2025-02-27 RX ADMIN — GUAIFENESIN 400 MG: 400 TABLET ORAL at 20:10

## 2025-02-27 RX ADMIN — FUROSEMIDE 40 MG: 40 TABLET ORAL at 09:48

## 2025-02-27 RX ADMIN — GUAIFENESIN 400 MG: 400 TABLET ORAL at 14:46

## 2025-02-27 RX ADMIN — CLONAZEPAM 0.5 MG: 0.5 TABLET ORAL at 20:11

## 2025-02-27 RX ADMIN — BUDESONIDE 1000 MCG: 0.5 SUSPENSION RESPIRATORY (INHALATION) at 21:19

## 2025-02-27 RX ADMIN — ARFORMOTEROL TARTRATE 15 MCG: 15 SOLUTION RESPIRATORY (INHALATION) at 08:35

## 2025-02-27 RX ADMIN — BUDESONIDE 1000 MCG: 0.5 SUSPENSION RESPIRATORY (INHALATION) at 08:36

## 2025-02-27 RX ADMIN — Medication 1 MG: at 09:47

## 2025-02-27 RX ADMIN — PREDNISONE 40 MG: 20 TABLET ORAL at 09:43

## 2025-02-27 RX ADMIN — IPRATROPIUM BROMIDE AND ALBUTEROL SULFATE 1 DOSE: 2.5; .5 SOLUTION RESPIRATORY (INHALATION) at 11:33

## 2025-02-27 RX ADMIN — METHIMAZOLE 15 MG: 5 TABLET ORAL at 09:46

## 2025-02-27 RX ADMIN — IPRATROPIUM BROMIDE AND ALBUTEROL SULFATE 1 DOSE: 2.5; .5 SOLUTION RESPIRATORY (INHALATION) at 21:19

## 2025-02-27 RX ADMIN — GUAIFENESIN 400 MG: 400 TABLET ORAL at 09:47

## 2025-02-27 RX ADMIN — DILTIAZEM HYDROCHLORIDE 240 MG: 120 CAPSULE, EXTENDED RELEASE ORAL at 09:45

## 2025-02-27 RX ADMIN — SENNOSIDES 8.6 MG: 8.6 TABLET, COATED ORAL at 20:11

## 2025-02-27 RX ADMIN — VANCOMYCIN HYDROCHLORIDE 1000 MG: 1 INJECTION, POWDER, LYOPHILIZED, FOR SOLUTION INTRAVENOUS at 10:18

## 2025-02-27 RX ADMIN — VANCOMYCIN HYDROCHLORIDE 1000 MG: 1 INJECTION, POWDER, LYOPHILIZED, FOR SOLUTION INTRAVENOUS at 20:10

## 2025-02-27 RX ADMIN — CEFEPIME 2000 MG: 2 INJECTION, POWDER, FOR SOLUTION INTRAVENOUS at 00:02

## 2025-02-27 NOTE — CARE COORDINATION
CM Update: Discharge plan is [facility]. Patient is long term care and does not need a precert to return. Destination and PENNY updated. Face Sheet, Ambulette Form, and Envelope in soft chart. Wearing 4 liters high flow nasal cannula. Patient came to hospital with SOB. Found to have pna. CM/SW to follow. Baldemar Khoury [phone]

## 2025-02-27 NOTE — PLAN OF CARE
Problem: Safety - Adult  Goal: Free from fall injury  2/27/2025 1029 by Radha Rogers RN  Outcome: Progressing  2/26/2025 2123 by Linda Boswell RN  Outcome: Progressing     Problem: Chronic Conditions and Co-morbidities  Goal: Patient's chronic conditions and co-morbidity symptoms are monitored and maintained or improved  2/26/2025 2123 by Linda Boswell RN  Outcome: Progressing  Flowsheets (Taken 2/26/2025 2000)  Care Plan - Patient's Chronic Conditions and Co-Morbidity Symptoms are Monitored and Maintained or Improved: Monitor and assess patient's chronic conditions and comorbid symptoms for stability, deterioration, or improvement     Problem: Discharge Planning  Goal: Discharge to home or other facility with appropriate resources  2/26/2025 2123 by Linda Boswell RN  Outcome: Progressing  Flowsheets (Taken 2/26/2025 2000)  Discharge to home or other facility with appropriate resources: Identify barriers to discharge with patient and caregiver     Problem: Skin/Tissue Integrity  Goal: Skin integrity remains intact  Description: 1.  Monitor for areas of redness and/or skin breakdown  2.  Assess vascular access sites hourly  3.  Every 4-6 hours minimum:  Change oxygen saturation probe site  4.  Every 4-6 hours:  If on nasal continuous positive airway pressure, respiratory therapy assess nares and determine need for appliance change or resting period  2/26/2025 2123 by Linda Boswell RN  Outcome: Progressing  Flowsheets (Taken 2/26/2025 2000)  Skin Integrity Remains Intact: Monitor for areas of redness and/or skin breakdown

## 2025-02-27 NOTE — PLAN OF CARE
Problem: Safety - Adult  Goal: Free from fall injury  2/26/2025 2123 by Linda Boswell RN  Outcome: Progressing  2/26/2025 1012 by Arcelia Velásquez RN  Outcome: Progressing  Flowsheets (Taken 2/26/2025 0842)  Free From Fall Injury: Instruct family/caregiver on patient safety     Problem: Chronic Conditions and Co-morbidities  Goal: Patient's chronic conditions and co-morbidity symptoms are monitored and maintained or improved  2/26/2025 2123 by Linda Boswell RN  Outcome: Progressing  Flowsheets (Taken 2/26/2025 2000)  Care Plan - Patient's Chronic Conditions and Co-Morbidity Symptoms are Monitored and Maintained or Improved: Monitor and assess patient's chronic conditions and comorbid symptoms for stability, deterioration, or improvement  2/26/2025 1012 by Arcelia Velásquez RN  Outcome: Progressing  Flowsheets (Taken 2/26/2025 0842)  Care Plan - Patient's Chronic Conditions and Co-Morbidity Symptoms are Monitored and Maintained or Improved: Monitor and assess patient's chronic conditions and comorbid symptoms for stability, deterioration, or improvement     Problem: Discharge Planning  Goal: Discharge to home or other facility with appropriate resources  2/26/2025 2123 by Linda Boswell RN  Outcome: Progressing  Flowsheets (Taken 2/26/2025 2000)  Discharge to home or other facility with appropriate resources: Identify barriers to discharge with patient and caregiver  2/26/2025 1012 by Arcelia Velásquez RN  Outcome: Progressing  Flowsheets (Taken 2/26/2025 0842)  Discharge to home or other facility with appropriate resources: Identify barriers to discharge with patient and caregiver     Problem: Skin/Tissue Integrity  Goal: Skin integrity remains intact  Description: 1.  Monitor for areas of redness and/or skin breakdown  2.  Assess vascular access sites hourly  3.  Every 4-6 hours minimum:  Change oxygen saturation probe site  4.  Every 4-6 hours:  If on nasal continuous positive airway pressure, respiratory therapy assess

## 2025-02-28 ENCOUNTER — APPOINTMENT (OUTPATIENT)
Dept: GENERAL RADIOLOGY | Age: 70
DRG: 871 | End: 2025-02-28
Payer: MEDICARE

## 2025-02-28 VITALS
BODY MASS INDEX: 16.67 KG/M2 | WEIGHT: 110 LBS | SYSTOLIC BLOOD PRESSURE: 117 MMHG | DIASTOLIC BLOOD PRESSURE: 97 MMHG | HEART RATE: 99 BPM | RESPIRATION RATE: 22 BRPM | OXYGEN SATURATION: 95 % | TEMPERATURE: 98.6 F | HEIGHT: 68 IN

## 2025-02-28 PROCEDURE — 6370000000 HC RX 637 (ALT 250 FOR IP): Performed by: INTERNAL MEDICINE

## 2025-02-28 PROCEDURE — 6370000000 HC RX 637 (ALT 250 FOR IP)

## 2025-02-28 PROCEDURE — 2700000000 HC OXYGEN THERAPY PER DAY

## 2025-02-28 PROCEDURE — 99239 HOSP IP/OBS DSCHRG MGMT >30: CPT

## 2025-02-28 PROCEDURE — 6360000002 HC RX W HCPCS: Performed by: NURSE PRACTITIONER

## 2025-02-28 PROCEDURE — 94660 CPAP INITIATION&MGMT: CPT

## 2025-02-28 PROCEDURE — 71045 X-RAY EXAM CHEST 1 VIEW: CPT

## 2025-02-28 PROCEDURE — 6370000000 HC RX 637 (ALT 250 FOR IP): Performed by: NURSE PRACTITIONER

## 2025-02-28 PROCEDURE — 6360000002 HC RX W HCPCS: Performed by: INTERNAL MEDICINE

## 2025-02-28 PROCEDURE — 2580000003 HC RX 258: Performed by: NURSE PRACTITIONER

## 2025-02-28 PROCEDURE — 94640 AIRWAY INHALATION TREATMENT: CPT

## 2025-02-28 RX ORDER — PREDNISONE 10 MG/1
TABLET ORAL
DISCHARGE
Start: 2025-02-28

## 2025-02-28 RX ORDER — LOSARTAN POTASSIUM 25 MG/1
25 TABLET ORAL DAILY
DISCHARGE
Start: 2025-03-01

## 2025-02-28 RX ADMIN — PANTOPRAZOLE SODIUM 40 MG: 40 TABLET, DELAYED RELEASE ORAL at 06:51

## 2025-02-28 RX ADMIN — ARFORMOTEROL TARTRATE 15 MCG: 15 SOLUTION RESPIRATORY (INHALATION) at 09:11

## 2025-02-28 RX ADMIN — LOSARTAN POTASSIUM 25 MG: 50 TABLET, FILM COATED ORAL at 09:42

## 2025-02-28 RX ADMIN — PREDNISONE 40 MG: 20 TABLET ORAL at 09:42

## 2025-02-28 RX ADMIN — SENNOSIDES 8.6 MG: 8.6 TABLET, COATED ORAL at 09:42

## 2025-02-28 RX ADMIN — POLYETHYLENE GLYCOL 3350 17 G: 17 POWDER, FOR SOLUTION ORAL at 11:37

## 2025-02-28 RX ADMIN — DULOXETINE HYDROCHLORIDE 60 MG: 60 CAPSULE, DELAYED RELEASE ORAL at 09:42

## 2025-02-28 RX ADMIN — IPRATROPIUM BROMIDE AND ALBUTEROL SULFATE 1 DOSE: 2.5; .5 SOLUTION RESPIRATORY (INHALATION) at 12:54

## 2025-02-28 RX ADMIN — IPRATROPIUM BROMIDE AND ALBUTEROL SULFATE 1 DOSE: 2.5; .5 SOLUTION RESPIRATORY (INHALATION) at 15:53

## 2025-02-28 RX ADMIN — IPRATROPIUM BROMIDE AND ALBUTEROL SULFATE 1 DOSE: 2.5; .5 SOLUTION RESPIRATORY (INHALATION) at 09:12

## 2025-02-28 RX ADMIN — METHIMAZOLE 15 MG: 5 TABLET ORAL at 09:45

## 2025-02-28 RX ADMIN — DILTIAZEM HYDROCHLORIDE 240 MG: 120 CAPSULE, EXTENDED RELEASE ORAL at 09:42

## 2025-02-28 RX ADMIN — Medication 1 MG: at 09:42

## 2025-02-28 RX ADMIN — ASPIRIN 81 MG: 81 TABLET, COATED ORAL at 09:42

## 2025-02-28 RX ADMIN — CEFEPIME 2000 MG: 2 INJECTION, POWDER, FOR SOLUTION INTRAVENOUS at 00:22

## 2025-02-28 RX ADMIN — FUROSEMIDE 40 MG: 40 TABLET ORAL at 09:42

## 2025-02-28 RX ADMIN — GUAIFENESIN 400 MG: 400 TABLET ORAL at 09:42

## 2025-02-28 RX ADMIN — BUDESONIDE 1000 MCG: 0.5 SUSPENSION RESPIRATORY (INHALATION) at 09:12

## 2025-02-28 ASSESSMENT — PAIN SCALES - GENERAL: PAINLEVEL_OUTOF10: 0

## 2025-02-28 NOTE — PROGRESS NOTES
[facility] Hospitalist Progress Note    Admitting Date and Time: 2/23/2025  6:50 AM  Admit Dx: Community acquired pneumonia of left lung, unspecified part of lung [J18.9]    Synopsis: The patient is a 70-year-old female with A-fib currently not on anticoagulation due to GI bleeding, COPD, JULIO CESAR, hypertension, RUTH with BiPAP, who presented to the emergency department for shortness of breath and multiple falls.  Recently admitted 2/8-2/14 for COPD exacerbation secondary to corona OC infection.  She was discharged to [place] however she returned to the emergency department 2/23 for shortness of breath as well as repeated falls.     Subjective:  Seen and examined at bedside  Off BiPAP, continues to have increased work of breathing although she says improved    ROS: denies fever, chills, cp, sob, n/v, HA unless stated above.      sodium chloride flush  5-40 mL IntraVENous 2 times per day    cefepime  2,000 mg IntraVENous Q12H    vancomycin  750 mg IntraVENous Q12H    arformoterol tartrate  15 mcg Nebulization BID RT    aspirin  81 mg Oral Daily    budesonide  500 mcg Nebulization BID RT    dilTIAZem  240 mg Oral Daily    DULoxetine  60 mg Oral Daily    [START ON 2/26/2025] vitamin D  50,000 Units Oral Weekly    folic acid  1 mg Oral Daily    [Held by provider] furosemide  40 mg Oral Daily    guaiFENesin  400 mg Oral TID    methIMAzole  15 mg Oral Daily    pantoprazole  40 mg Oral QAM AC    [Held by provider] pregabalin  75 mg Oral BID    senna  1 tablet Oral BID    methylPREDNISolone  40 mg IntraVENous Q8H    ipratropium 0.5 mg-albuterol 2.5 mg  1 Dose Inhalation Q4H WA RT     clonazePAM, 0.5 mg, Nightly PRN  sodium chloride flush, 5-40 mL, PRN  sodium chloride, , PRN  potassium chloride, 40 mEq, PRN   Or  potassium alternative oral replacement, 40 mEq, PRN   Or  potassium chloride, 10 mEq, PRN  magnesium sulfate, 2,000 mg, PRN  polyethylene glycol, 17 g, Daily PRN  acetaminophen, 650 mg, Q6H PRN   
       [facility] Hospitalist Progress Note    Admitting Date and Time: 2/23/2025  6:50 AM  Admit Dx: COPD exacerbation (HCC) [J44.1]  Fall, initial encounter [W19.XXXA]  Acute respiratory failure with hypoxia and hypercapnia (HCC) [J96.01, J96.02]  Pneumonia due to infectious organism, unspecified laterality, unspecified part of lung [J18.9]  Community acquired pneumonia of left lung, unspecified part of lung [J18.9]    Synopsis: The patient is a 70-year-old female with A-fib currently not on anticoagulation due to GI bleeding, COPD, JULIO CESAR, hypertension, RUTH with BiPAP, who presented to the emergency department for shortness of breath and multiple falls.  Recently admitted 2/8-2/14 for COPD exacerbation secondary to corona infection.  She was discharged to [place] however she returned to the emergency department 2/23 for shortness of breath as well as repeated falls.   Chest x-ray show concerning for right middle lobe or right lower lobe airspace disease.  Patient was treated with cefepime and vancomycin.  Pulmonology was consulted.  MRSA nares positive    Subjective:  Patient is being followed for COPD exacerbation (HCC) [J44.1]  Fall, initial encounter [W19.XXXA]  Acute respiratory failure with hypoxia and hypercapnia (HCC) [J96.01, J96.02]  Pneumonia due to infectious organism, unspecified laterality, unspecified part of lung [J18.9]  Community acquired pneumonia of left lung, unspecified part of lung [J18.9]     Patient was seen at bedside.  Patient getting breathing treatment this morning.  Denies any complaint    ROS: denies fever, chills, cp, sob, n/v, HA unless stated above.      vancomycin  1,000 mg IntraVENous Q12H    losartan  25 mg Oral Daily    predniSONE  40 mg Oral Daily    budesonide  1 mg Nebulization BID RT    sodium chloride flush  5-40 mL IntraVENous 2 times per day    cefepime  2,000 mg IntraVENous Q12H    arformoterol tartrate  15 mcg Nebulization BID RT    aspirin  81 mg Oral Daily    dilTIAZem 
       [facility] Hospitalist Progress Note    Admitting Date and Time: 2/23/2025  6:50 AM  Admit Dx: COPD exacerbation (HCC) [J44.1]  Fall, initial encounter [W19.XXXA]  Acute respiratory failure with hypoxia and hypercapnia (HCC) [J96.01, J96.02]  Pneumonia due to infectious organism, unspecified laterality, unspecified part of lung [J18.9]  Community acquired pneumonia of left lung, unspecified part of lung [J18.9]    Synopsis: The patient is a 70-year-old female with A-fib currently not on anticoagulation due to GI bleeding, COPD, JULIO CESAR, hypertension, RUTH with BiPAP, who presented to the emergency department for shortness of breath and multiple falls.  Recently admitted 2/8-2/14 for COPD exacerbation secondary to corona infection.  She was discharged to [place] however she returned to the emergency department 2/23 for shortness of breath as well as repeated falls.   Chest x-ray show concerning for right middle lobe or right lower lobe airspace disease.  Patient was treated with cefepime and vancomycin.  Pulmonology was consulted.  MRSA nares positive    Subjective:  Patient is being followed for COPD exacerbation (HCC) [J44.1]  Fall, initial encounter [W19.XXXA]  Acute respiratory failure with hypoxia and hypercapnia (HCC) [J96.01, J96.02]  Pneumonia due to infectious organism, unspecified laterality, unspecified part of lung [J18.9]  Community acquired pneumonia of left lung, unspecified part of lung [J18.9]     Patient was seen at bedside.  Used BiPAP last night.  On 6 L oxygen.    Her blood pressure was high morning.    ROS: denies fever, chills, cp, sob, n/v, HA unless stated above.      losartan  25 mg Oral Daily    dicyclomine  20 mg IntraMUSCular Once    [START ON 2/26/2025] predniSONE  40 mg Oral Daily    methylPREDNISolone  40 mg IntraVENous Once    budesonide  1 mg Nebulization BID RT    sodium chloride flush  5-40 mL IntraVENous 2 times per day    cefepime  2,000 mg IntraVENous Q12H    vancomycin  750 
       [facility] Hospitalist Progress Note    Admitting Date and Time: 2/23/2025  6:50 AM  Admit Dx: COPD exacerbation (HCC) [J44.1]  Fall, initial encounter [W19.XXXA]  Acute respiratory failure with hypoxia and hypercapnia (HCC) [J96.01, J96.02]  Pneumonia due to infectious organism, unspecified laterality, unspecified part of lung [J18.9]  Community acquired pneumonia of left lung, unspecified part of lung [J18.9]    Synopsis: The patient is a 70-year-old female with A-fib currently not on anticoagulation due to GI bleeding, COPD, JULIO CESAR, hypertension, RUTH with BiPAP, who presented to the emergency department for shortness of breath and multiple falls.  Recently admitted 2/8-2/14 for COPD exacerbation secondary to corona infection.  She was discharged to [place] however she returned to the emergency department 2/23 for shortness of breath as well as repeated falls.   Chest x-ray show concerning for right middle lobe or right lower lobe airspace disease.  Patient was treated with cefepime and vancomycin.  Pulmonology was consulted.  MRSA nares positive    Subjective:  Patient is being followed for COPD exacerbation (HCC) [J44.1]  Fall, initial encounter [W19.XXXA]  Acute respiratory failure with hypoxia and hypercapnia (HCC) [J96.01, J96.02]  Pneumonia due to infectious organism, unspecified laterality, unspecified part of lung [J18.9]  Community acquired pneumonia of left lung, unspecified part of lung [J18.9]     Patient was seen at bedside.  Complaining of abdominal discomfort but denies any fever, chills, nausea or vomiting.  On 6 L nasal cannula    ROS: denies fever, chills, cp, sob, n/v, HA unless stated above.      dicyclomine  20 mg IntraMUSCular Once    losartan  25 mg Oral Daily    predniSONE  40 mg Oral Daily    budesonide  1 mg Nebulization BID RT    sodium chloride flush  5-40 mL IntraVENous 2 times per day    cefepime  2,000 mg IntraVENous Q12H    vancomycin  750 mg IntraVENous Q12H    arformoterol 
  [facility]  [address], [address]  [place] [address]      Pulmonary Progress Note      SUBJECTIVE:  sitting up in stretcher on 5 li NC saturating 95%, claims wore NIPPV 12/6 40% most of last night, feeling some better with breathing/coughing    OBJECTIVE    Medications    Continuous Infusions:   sodium chloride         Scheduled Meds:   sodium chloride flush  5-40 mL IntraVENous 2 times per day    cefepime  2,000 mg IntraVENous Q12H    vancomycin  750 mg IntraVENous Q12H    arformoterol tartrate  15 mcg Nebulization BID RT    aspirin  81 mg Oral Daily    budesonide  500 mcg Nebulization BID RT    dilTIAZem  240 mg Oral Daily    DULoxetine  60 mg Oral Daily    [START ON 2025] vitamin D  50,000 Units Oral Weekly    folic acid  1 mg Oral Daily    furosemide  40 mg Oral Daily    guaiFENesin  400 mg Oral TID    methIMAzole  15 mg Oral Daily    pantoprazole  40 mg Oral QAM AC    [Held by provider] pregabalin  75 mg Oral BID    senna  1 tablet Oral BID    methylPREDNISolone  40 mg IntraVENous Q8H    ipratropium 0.5 mg-albuterol 2.5 mg  1 Dose Inhalation Q4H WA RT       PRN Meds:clonazePAM, sodium chloride flush, sodium chloride, potassium chloride **OR** potassium alternative oral replacement **OR** potassium chloride, magnesium sulfate, polyethylene glycol, acetaminophen **OR** acetaminophen, prochlorperazine **OR** prochlorperazine, docusate sodium, ipratropium 0.5 mg-albuterol 2.5 mg    Physical    VITALS:  BP (!) 157/82   Pulse 99   Temp 99.1 °F (37.3 °C)   Resp 12   Ht 1.727 m (5' 8\")   Wt 49.9 kg (110 lb)   SpO2 98%   BMI 16.73 kg/m²     24HR INTAKE/OUTPUT:      Intake/Output Summary (Last 24 hours) at 2025 1703  Last data filed at 2025 1256  Gross per 24 hour   Intake 934.87 ml   Output 0 ml   Net 934.87 ml       24HR PULSE OXIMETRY RANGE:    SpO2  Av.2 %  Min: 92 %  Max: 100 %    General appearance: alert, appears 
  [facility]  [address], [address]  [place] [address]      Pulmonary Progress Note      SUBJECTIVE:  sitting up in bed on 4 li NC, hasn't worn NIPPV 12/ 40% the past few nights but wore for a few hrs today as felt sob, feeling similar with breathing/coughing    OBJECTIVE    Medications    Continuous Infusions:   sodium chloride         Scheduled Meds:   vancomycin  1,000 mg IntraVENous Q12H    losartan  25 mg Oral Daily    predniSONE  40 mg Oral Daily    budesonide  1 mg Nebulization BID RT    sodium chloride flush  5-40 mL IntraVENous 2 times per day    cefepime  2,000 mg IntraVENous Q12H    arformoterol tartrate  15 mcg Nebulization BID RT    aspirin  81 mg Oral Daily    dilTIAZem  240 mg Oral Daily    DULoxetine  60 mg Oral Daily    vitamin D  50,000 Units Oral Weekly    folic acid  1 mg Oral Daily    furosemide  40 mg Oral Daily    guaiFENesin  400 mg Oral TID    methIMAzole  15 mg Oral Daily    pantoprazole  40 mg Oral QAM AC    [Held by provider] pregabalin  75 mg Oral BID    senna  1 tablet Oral BID    ipratropium 0.5 mg-albuterol 2.5 mg  1 Dose Inhalation Q4H WA RT       PRN Meds:clonazePAM, melatonin, sodium chloride flush, sodium chloride, potassium chloride **OR** potassium alternative oral replacement **OR** potassium chloride, magnesium sulfate, polyethylene glycol, acetaminophen **OR** acetaminophen, prochlorperazine **OR** prochlorperazine, docusate sodium, ipratropium 0.5 mg-albuterol 2.5 mg    Physical    VITALS:  /81   Pulse 81   Temp 98.4 °F (36.9 °C) (Oral)   Resp 18   Ht 1.727 m (5' 8\")   Wt 49.9 kg (110 lb)   SpO2 95%   BMI 16.73 kg/m²     24HR INTAKE/OUTPUT:      Intake/Output Summary (Last 24 hours) at 2025 1609  Last data filed at 2025 1230  Gross per 24 hour   Intake 600 ml   Output --   Net 600 ml       24HR PULSE OXIMETRY RANGE:    SpO2  Av %  Min: 94 %  Max: 100 %    General appearance: 
  [facility]  [address], [address]  [place] [address]      Pulmonary Progress Note      SUBJECTIVE:  sitting up in bed on 5 li NC saturating 100%, claims wore NIPPV 12/6 40% for about 4 hrs last night, feeling some better with breathing/coughing    OBJECTIVE    Medications    Continuous Infusions:   sodium chloride         Scheduled Meds:   losartan  25 mg Oral Daily    [START ON 2025] predniSONE  40 mg Oral Daily    budesonide  1 mg Nebulization BID RT    sodium chloride flush  5-40 mL IntraVENous 2 times per day    cefepime  2,000 mg IntraVENous Q12H    vancomycin  750 mg IntraVENous Q12H    arformoterol tartrate  15 mcg Nebulization BID RT    aspirin  81 mg Oral Daily    dilTIAZem  240 mg Oral Daily    DULoxetine  60 mg Oral Daily    [START ON 2025] vitamin D  50,000 Units Oral Weekly    folic acid  1 mg Oral Daily    furosemide  40 mg Oral Daily    guaiFENesin  400 mg Oral TID    methIMAzole  15 mg Oral Daily    pantoprazole  40 mg Oral QAM AC    [Held by provider] pregabalin  75 mg Oral BID    senna  1 tablet Oral BID    ipratropium 0.5 mg-albuterol 2.5 mg  1 Dose Inhalation Q4H WA RT       PRN Meds:clonazePAM, melatonin, sodium chloride flush, sodium chloride, potassium chloride **OR** potassium alternative oral replacement **OR** potassium chloride, magnesium sulfate, polyethylene glycol, acetaminophen **OR** acetaminophen, prochlorperazine **OR** prochlorperazine, docusate sodium, ipratropium 0.5 mg-albuterol 2.5 mg    Physical    VITALS:  BP (!) 150/77   Pulse 86   Temp 97 °F (36.1 °C) (Oral)   Resp 20   Ht 1.727 m (5' 8\")   Wt 49.9 kg (110 lb)   SpO2 100%   BMI 16.73 kg/m²     24HR INTAKE/OUTPUT:      Intake/Output Summary (Last 24 hours) at 2025 1728  Last data filed at 2025 1340  Gross per 24 hour   Intake 300 ml   Output --   Net 300 ml       24HR PULSE OXIMETRY RANGE:    SpO2  Av.4 %  Min: 94 %  
  [facility]  [address], [address]  [place] [address]      Pulmonary Progress Note      SUBJECTIVE:  sitting up in bed on 4 li NC, wore NIPPV 12/6 40% for a few hrs last night, feeling similar with breathing/coughing, possible discharge today    OBJECTIVE    Medications    Continuous Infusions:   sodium chloride         Scheduled Meds:   losartan  25 mg Oral Daily    budesonide  1 mg Nebulization BID RT    sodium chloride flush  5-40 mL IntraVENous 2 times per day    arformoterol tartrate  15 mcg Nebulization BID RT    aspirin  81 mg Oral Daily    dilTIAZem  240 mg Oral Daily    DULoxetine  60 mg Oral Daily    vitamin D  50,000 Units Oral Weekly    folic acid  1 mg Oral Daily    furosemide  40 mg Oral Daily    guaiFENesin  400 mg Oral TID    methIMAzole  15 mg Oral Daily    pantoprazole  40 mg Oral QAM AC    [Held by provider] pregabalin  75 mg Oral BID    senna  1 tablet Oral BID    ipratropium 0.5 mg-albuterol 2.5 mg  1 Dose Inhalation Q4H WA RT       PRN Meds:clonazePAM, melatonin, sodium chloride flush, sodium chloride, potassium chloride **OR** potassium alternative oral replacement **OR** potassium chloride, magnesium sulfate, polyethylene glycol, acetaminophen **OR** acetaminophen, prochlorperazine **OR** prochlorperazine, docusate sodium, ipratropium 0.5 mg-albuterol 2.5 mg    Physical    VITALS:  BP (!) 117/97   Pulse 99   Temp 98.6 °F (37 °C) (Temporal)   Resp 22   Ht 1.727 m (5' 8\")   Wt 49.9 kg (110 lb)   SpO2 95%   BMI 16.73 kg/m²     24HR INTAKE/OUTPUT:      Intake/Output Summary (Last 24 hours) at 2025 1602  Last data filed at 2025 1006  Gross per 24 hour   Intake 50 ml   Output --   Net 50 ml       24HR PULSE OXIMETRY RANGE:    SpO2  Av.2 %  Min: 95 %  Max: 99 %    General appearance: alert, appears stated age, and cooperative  Lungs: diminished breath sounds bilaterally and rhonchi bilaterally minimal 
  Physician Progress Note      PATIENT:               ANGIE SHELTON  [facility] #:                  [id number]  :                       1955  ADMIT DATE:       2025 6:50 AM  DISCH DATE:  RESPONDING  PROVIDER #:        Carlos Lipscomb MD          QUERY TEXT:    Patient admitted with pneumonia, acute on chronic hypercapnic respiratory   failure. Patient noted to have WBC  28.8,  procalcitonin 1.0, pulse  108-115    within 24 hours of admission. If possible, please document in the progress   notes and discharge summary if you are evaluating and /or treating any of the   following:    The medical record reflects the following:  Risk Factors: 70 yof, pneumonia, COPD, HTN  Clinical Indicators: presents with SOB, CXR: Chronic changes with patchy   opacifications right lower lung concerning for right middle lobe or right   lower  lobe airspace disease.  WBC  28.8,  procalcitonin 1.0, pulse  108-115  within   24 hours of admission,  Treatment: CXR, Cefepime, Duonebs, Vancomycin, labs including procalcitonin,   sputum/strep/legionella, pulmonary consult, O2 protocol    Thank you  Andrew DOMINGUEZN RN [facility]   M-F [id number]  Options provided:  -- Sepsis, present on admission  -- Pneumonia without Sepsis  -- Other - I will add my own diagnosis  -- Disagree - Not applicable / Not valid  -- Disagree - Clinically unable to determine / Unknown  -- Refer to Clinical Documentation Reviewer    PROVIDER RESPONSE TEXT:    This patient has sepsis which was present on admission.    Query created by: Andrew Hanson on 2025 2:31 PM      Electronically signed by:  Carlos Lipscomb MD 2025 4:45 PM          
  Physician Progress Note      PATIENT:               ANGIE SHELTON  [facility] #:                  [id number]  :                       1955  ADMIT DATE:       2025 6:50 AM  DISCH DATE:  RESPONDING  PROVIDER #:        Carlos Lipscomb MD          QUERY TEXT:    Patient admitted with Sepsis, pneumonia, acute respiratory failure.  Noted   dietician assessment with severe malnutrition on 25. If possible, please   document in progress notes and discharge summary if you are evaluating and   /or treating any of the following:    The medical record reflects the following:  Risk Factors: 70 yof,  Sepsis, acute respiratory failure, pneumonia  Clinical Indicators: Dietitian 25  assessment Malnutrition Status:    Severe malnutrition  Energy Intake: 75% or less estimated energy requirements  for 1 month or longer Weight Loss:  Unable to assess (2/2 probable fluid   shifts w/ noted fluctuations pta)   Body Fat Loss: Severe body fat loss   Orbital,  Triceps, Fat Overlying Ribs, Buccal region Muscle Mass Loss:  Severe muscle   mass loss Temples (temporalis), Clavicles (pectoralis & deltoids), Thigh  (quadriceps), Calf (gastrocnemius), Hand (interosseous), Scapula (trapezius)     5'8\"  BMI 16.7  wt 110#  Treatment: dietitian consult, ONS, I/O, daily wt    Thank you  Andrew DOMINGUEZN RN [facility]   M-F 6am-2pm    ASPEN Criteria:    https://aspenjournals.onlinelibrary.daigle.com/doi/full/10.1177/499985296977477  5  Options provided:  -- Protein calorie malnutrition severe  -- Other - I will add my own diagnosis  -- Disagree - Not applicable / Not valid  -- Disagree - Clinically unable to determine / Unknown  -- Refer to Clinical Documentation Reviewer    PROVIDER RESPONSE TEXT:    This patient has severe protein calorie malnutrition.    Query created by: Andrew Hanson on 2025 11:56 AM      Electronically signed by:  Carlos Lipscomb MD 2025 12:29 PM          
1535 oxygen decreased from 6lpm to 4lpm with SPO2 maintaining 100%.     
4 Eyes Skin Assessment     NAME:  Lana Phillips  YOB: 1955  MEDICAL RECORD NUMBER:  [id number]    The patient is being assessed for  Admission    I agree that at least one RN has performed a thorough Head to Toe Skin Assessment on the patient. ALL assessment sites listed below have been assessed.      Areas assessed by both nurses:    Head, Face, Ears, Shoulders, Back, Chest, Arms, Elbows, Hands, Sacrum. Buttock, Coccyx, Ischium, Legs. Feet and Heels, and Under Medical Devices         Does the Patient have a Wound? No noted wound(s)       Guy Prevention initiated by RN: No  Wound Care Orders initiated by RN: No    Pressure Injury (Stage 3,4, Unstageable, DTI, NWPT, and Complex wounds) if present, place Wound referral order by RN under : No    New Ostomies, if present place, Ostomy referral order under : No     Nurse 1 eSignature: Electronically signed by Moraima Puentes RN on 2/24/25 at 11:12 PM EST    **SHARE this note so that the co-signing nurse can place an eSignature**    Nurse 2 eSignature: {Esignature:[id number]}   
Call placed to [facility] from [facility] to admit patient on telemetry. Electronically signed by Becky Kennedy RN on 2/24/2025 at 6:17 PM    
Call placed to [facility] for updated  time.  time will be closer to 1500. Electronically signed by Fe Gilliland RN on 2/28/2025 at 2:10 PM    
Chart accessed for pending admission to 4500.   Electronically signed by Becky Kennedy RN on 2/24/2025 at 5:42 PM    
Chart accessed for pending admission to 4509    Electronically signed by Moraima Puentes RN on 2/24/2025 at 6:34 PM    
Comprehensive Nutrition Assessment    Type and Reason for Visit:  Initial (Auto Assess Low BMI)    Nutrition Recommendations/Plan:   Continue Diet.    Will Modify Current ONS and monitor.       Malnutrition Assessment:  Malnutrition Status:  Severe malnutrition (02/27/25 1158)    Context:  Chronic Illness     Findings of the 6 clinical characteristics of malnutrition:  Energy Intake:  75% or less estimated energy requirements for 1 month or longer  Weight Loss:  Unable to assess (2/2 probable fluid shifts w/ noted fluctuations pta)     Body Fat Loss:  Severe body fat loss Orbital, Triceps, Fat Overlying Ribs, Buccal region   Muscle Mass Loss:  Severe muscle mass loss Temples (temporalis), Clavicles (pectoralis & deltoids), Thigh (quadriceps), Calf (gastrocnemius), Hand (interosseous), Scapula (trapezius)  Fluid Accumulation:  No fluid accumulation     Strength:  Not Performed    Nutrition Assessment:    Pt adm from SNF w/ respiratory distress, non-compliance and multiple falls s/p recent adm for AHRF/PNA/COPDE/DERIAN/Sepsis/Bacteremia.  PMHx COPD, PVD, CAD, AF, DM, severe malnutrition, hypothyroid.  Adm w/ Acute on Chronic hypoxic respiratory failure 2/2 CAP, multiple falls and PAF.  At risk d/t pt currently meets criteria for Severe Malnutrition w/ increased needs 2/2 COPD.  Will Modify Current ONS and monitor.    Nutrition Related Findings:    A&O, confused at times, Abd WDL, hypo BS, no edema, +I/O's, hypochloridemia, hyperglycemia, cachexia Wound Type: None       Current Nutrition Intake & Therapies:    Average Meal Intake: 51-75% (per pt)  Average Supplements Intake: 0% (per pt, states she prefers glucerna at this time)  ADULT DIET; Regular  ADULT ORAL NUTRITION SUPPLEMENT; Breakfast, Lunch, Dinner; Standard High Calorie/High Protein Oral Supplement    Anthropometric Measures:  Height: 172.7 cm (5' 8\")  Ideal Body Weight (IBW): 140 lbs (64 kg)    Admission Body Weight: 49.9 kg (110 lb) (bed 2/27)  Current Body 
Date: 2/23/2025    Time: 8:00 AM    Patient Placed On BIPAP/CPAP/ Non-Invasive Ventilation?  Yes    If no must comment.  Facial area red/color change? No           If YES are Blister/Lesion present?No   If yes must notify nursing staff  BIPAP/CPAP skin barrier?  Yes    Skin barrier type:mepilexlite       Comments:        Patricia Simerlink, RCP  
Date: 2/24/2025    Time: 11:51 PM    Patient Placed On BIPAP/CPAP/ Non-Invasive Ventilation?  Yes    If no must comment.  Facial area red/color change? No           If YES are Blister/Lesion present?No   If yes must notify nursing staff  BIPAP/CPAP skin barrier?  Yes    Skin barrier type:mepilexlite       Comments:        Elizabeth Solano RCP  
Date: 2/24/2025    Time: 8:10 AM    Patient Placed On BIPAP/CPAP/ Non-Invasive Ventilation?  Yes    If no must comment.  Facial area red/color change? No           If YES are Blister/Lesion present?No   If yes must notify nursing staff  BIPAP/CPAP skin barrier?  No    Skin barrier type: unavailable will get         Comments: pt sob restless tired . Placed on niv        Johana Reed RCP  
EKG obtained and uploaded into EPIC. Paper copy placed in soft chart     Electronically signed by Moraima Puentes RN on 2/25/2025 at 5:36 AM    
Messaged Geraldine Brock NP regarding BP of 163/87. Currently, no PRNs ordered. Messaged read and replied w/ \"They aren’t always needed. Please recheck at midnight manually thanks\"    Electronically signed by Moraima Puentes RN on 2/24/2025 at 8:22 PM    
Messaged Geraldine Brock NP regarding manual BP of 180/100.  No new orders received at this time     Electronically signed by Moraima Puentes RN on 2/25/2025 at 3:33 AM    
OCCUPATIONAL THERAPY INITIAL EVALUATION    [facility]  [address] [place], [place]      Date:2025                                                  Patient Name: Lana Phillips  MRN: [id number]  : 1955  Room: [address] [place] [address]    Evaluating OT: YOAV Kendall, OTR/L  # [id number]    Referring Provider:  Kristie Barry APRN - CNP   Specific Provider Orders:  \"OT Eval and Treat\"  25    Diagnosis: COPD exacerbation (HCC) [J44.1]  Fall, initial encounter [W19.XXXA]  Acute respiratory failure with hypoxia and hypercapnia (HCC) [J96.01, J96.02]  Pneumonia due to infectious organism, unspecified laterality, unspecified part of lung [J18.9]  Community acquired pneumonia of left lung, unspecified part of lung [J18.9]    Pt was admitted w/ SOB, non-compliant w/ O2 at [facility], 4 Recent Falls, O2 sats 60% on room air    Pertinent Medical History:  Pt has a past medical history of Atherosclerosis 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 (HCC), Atrial fibrillation (HCC), Chronic obstructive pulmonary disease (HCC), COVID-19, Critical limb ischemia of left lower extremity with rest pain (HCC), GI bleed, Hypertension, RUTH treated with BiPAP, Panic disorder, PVD (peripheral vascular disease), Severe protein-calorie malnutrition, Thyroid disease, and Uncontrolled hypertension.,  has a past surgical history that includes back surgery; Left oophorectomy; Upper gastrointestinal endoscopy (N/A, 10/16/2023); Pain management procedure (N/A, 2023); invasive vascular (N/A, 2024); invasive vascular (N/A, 2024); bronchoscopy (N/A, 2024); and Pain management procedure (N/A, 2024).    Surgeries this admission: None     Precautions:  Fall Risk  Cognition,     Assessment of current deficits   [x] Functional mobility  [x]ADLs   [x] Strength                 [x]Cognition   [x] Functional transfers  
Occupational Therapy  Date:2025  Patient Name: Lana Phillips  MRN: [id number]  : 1955  Room: [address] [place] [address]    Pt's chart reviewed and treatment attempted however pt being placed on Bipap secondary to SOB.  Will attempt treatment at a later date/time when appropriate.     Melissa ARNDT/VALERIY [id number]    
Patient received the Sacrament of the Anointing of the Sick by Father Franco Mccloud on February 25, 2025.    If additional support is requested or needed please reach out to Spiritual Health ([phone]).    Chap. Phillip Reese MDIV, BCC   
Pharmacy Consultation Note  (Antibiotic Dosing and Monitoring)    Initial consult date: 2/23/25  Consulting physician/provider: Kristie Barry APRN-CNP  Drug: Vancomycin  Indication: Pneumonia    Age/  Gender Height Weight IBW  Allergy Information   70 y.o./female 172.7 cm (5' 8\") 49.9 kg (110 lb)     Ideal body weight: 63.9 kg (140 lb 14 oz)   Pcn [penicillins]      Renal Function:  Recent Labs     02/23/25  0700   BUN 16   CREATININE 0.7     No intake or output data in the 24 hours ending 02/23/25 1149    Vancomycin Monitoring:  Trough:  No results for input(s): \"VANCOTROUGH\" in the last 72 hours.  Random:  No results for input(s): \"VANCORANDOM\" in the last 72 hours.    Vancomycin Administration Times:  Recent vancomycin administrations                     vancomycin (VANCOCIN) 1,250 mg in sodium chloride 0.9 % 250 mL IVPB (Whys1Xce) (mg) 1,250 mg New Bag 02/23/25 0925                    Assessment:  Patient is a 70 y.o. female who has been initiated on vancomycin  Estimated Creatinine Clearance: 57 mL/min (based on SCr of 0.7 mg/dL).  To dose vancomycin, pharmacy will be utilizing [employer] calculation software for goal AUC/JENNIFER 400-600 mg/L-hr (predicted AUC/JENNIFER = 545, Tr =15.2 mcg/mL)    Plan:  Will continue vancomycin 750 mg IV every 12 hours  Will check vancomycin levels when appropriate  Will continue to monitor renal function   Pharmacy to follow      Elza Logan, Karissa, BCPS, BCCCP 2/23/2025 11:49 AM      SEB: [phone]  SEY: [phone]  SJW: [phone]    
Pharmacy Consultation Note  (Antibiotic Dosing and Monitoring)    Initial consult date: 2/23/25  Consulting physician/provider: Kristie Barry APRN-CNP  Drug: Vancomycin  Indication: Pneumonia    Age/  Gender Height Weight IBW  Allergy Information   70 y.o./female 172.7 cm (5' 8\") 49.9 kg (110 lb)     Ideal body weight: 63.9 kg (140 lb 14 oz)   Pcn [penicillins]      Renal Function:  Recent Labs     02/23/25  0700 02/24/25  0311   BUN 16 18   CREATININE 0.7 0.6       Intake/Output Summary (Last 24 hours) at 2/24/2025 1105  Last data filed at 2/24/2025 0017  Gross per 24 hour   Intake 120 ml   Output 0 ml   Net 120 ml       Vancomycin Monitoring:  Trough:  No results for input(s): \"VANCOTROUGH\" in the last 72 hours.  Random:  No results for input(s): \"VANCORANDOM\" in the last 72 hours.    Recent vancomycin administrations                     vancomycin (VANCOCIN) 750 mg in sodium chloride 0.9 % 250 mL IVPB (Kmdw8Apr) (mg) 750 mg New Bag 02/23/25 2106    vancomycin (VANCOCIN) 1,250 mg in sodium chloride 0.9 % 250 mL IVPB (Soop1Owo) (mg) 1,250 mg New Bag 02/23/25 0925                        Assessment:  Patient is a 70 y.o. female who has been initiated on vancomycin  Estimated Creatinine Clearance: 67 mL/min (based on SCr of 0.6 mg/dL).  To dose vancomycin, pharmacy will be utilizing [employer] calculation software for goal AUC/JENNIFER 400-600 mg/L-hr (predicted AUC/JENNIFER = 545, Tr =15.2 mcg/mL)    Plan:  Will continue vancomycin 750 mg IV every 12 hours  Will check vancomycin levels when appropriate  Will continue to monitor renal function   Pharmacy to follow    Zan Sher PharmD, [facility] 2/24/2025 11:06 AM  [phone]      
Pharmacy Consultation Note  (Antibiotic Dosing and Monitoring)    Initial consult date: 2/23/25  Consulting physician/provider: Kristie Barry APRN-CNP  Drug: Vancomycin  Indication: Pneumonia    Age/  Gender Height Weight IBW  Allergy Information   70 y.o./female 172.7 cm (5' 8\") 49.9 kg (110 lb)     Ideal body weight: 63.9 kg (140 lb 14 oz)   Pcn [penicillins]      Renal Function:  Recent Labs     02/23/25  0700 02/24/25  0311 02/25/25  0445   BUN 16 18 17   CREATININE 0.7 0.6 0.6       Intake/Output Summary (Last 24 hours) at 2/25/2025 0803  Last data filed at 2/24/2025 1256  Gross per 24 hour   Intake 814.87 ml   Output --   Net 814.87 ml       Vancomycin Monitoring:  Trough:  No results for input(s): \"VANCOTROUGH\" in the last 72 hours.  Random:  No results for input(s): \"VANCORANDOM\" in the last 72 hours.    Recent vancomycin administrations                     vancomycin (VANCOCIN) 750 mg in sodium chloride 0.9 % 250 mL IVPB (Gkcz9Cnb) (mg) 750 mg New Bag 02/24/25 2112     750 mg New Bag  1156     750 mg New Bag 02/23/25 2106    vancomycin (VANCOCIN) 1,250 mg in sodium chloride 0.9 % 250 mL IVPB (Njsw9Lbf) (mg) 1,250 mg New Bag 02/23/25 0925                   Assessment:  Patient is a 70 y.o. female who has been initiated on vancomycin  Estimated Creatinine Clearance: 67 mL/min (based on SCr of 0.6 mg/dL).  To dose vancomycin, pharmacy will be utilizing [employer] calculation software for goal AUC/JENNIFER 400-600 mg/L-hr (predicted AUC/JENNIFER = 545, Tr =15.2 mcg/mL)  2/25: Scr 0.6, UOP unreported  MRSA nares pending    Plan:  Will continue vancomycin 750 mg IV every 12 hours  Will check vancomycin trough level tonight @2000. Hold vancomycin if trough level > 20 mcg/mL.  Will continue to monitor renal function   Pharmacy to follow    Eliecer Quinteros PharmD  PGY-1 Pharmacy Practice Resident   2/25/2025 8:03 AM        
Pharmacy Consultation Note  (Antibiotic Dosing and Monitoring)    Initial consult date: 2/23/25  Consulting physician/provider: Kristie Barry APRN-CNP  Drug: Vancomycin  Indication: Pneumonia    Age/  Gender Height Weight IBW  Allergy Information   70 y.o./female 172.7 cm (5' 8\") 49.9 kg (110 lb)     Ideal body weight: 63.9 kg (140 lb 14 oz)   Pcn [penicillins]      Renal Function:  Recent Labs     02/24/25  0311 02/25/25  0445 02/26/25  0425   BUN 18 17 17   CREATININE 0.6 0.6 0.6       Intake/Output Summary (Last 24 hours) at 2/26/2025 1304  Last data filed at 2/26/2025 1249  Gross per 24 hour   Intake 680 ml   Output --   Net 680 ml       Vancomycin Monitoring:  Trough:    Recent Labs     02/25/25  2002   VANCOTROUGH 9.6     Random:  No results for input(s): \"VANCORANDOM\" in the last 72 hours.    Recent vancomycin administrations                     vancomycin (VANCOCIN) 750 mg in sodium chloride 0.9 % 250 mL IVPB (Ephy2Lmn) (mg) 750 mg New Bag 02/26/25 1018     750 mg New Bag 02/25/25 2007     750 mg New Bag  0854     750 mg New Bag 02/24/25 2112     750 mg New Bag  1156     750 mg New Bag 02/23/25 2106                   Assessment:  Patient is a 70 y.o. female who has been initiated on vancomycin  Estimated Creatinine Clearance: 67 mL/min (based on SCr of 0.6 mg/dL).  To dose vancomycin, pharmacy will be utilizing [employer] calculation software for goal AUC/JENNIFER 400-600 mg/L-hr (predicted AUC/JENNIFER = 545, Tr =15.2 mcg/mL)  2/25: Scr 0.6, UOP unreported  MRSA nares pending  2/26: Scr 0.6.  MRSA nares positive    Plan:  Increase vancomycin to 1000 mg IV every 12 hours [538 / 13.1]  Will check vancomycin levels as needed  Will continue to monitor renal function   Pharmacy to follow    Eliecer Quinteros PharmD  PGY-1 Pharmacy Practice Resident   2/26/2025 1:04 PM        
Pharmacy Consultation Note  (Antibiotic Dosing and Monitoring)    Initial consult date: 2/23/25  Consulting physician/provider: Kristie Barry APRN-CNP  Drug: Vancomycin  Indication: Pneumonia    Age/  Gender Height Weight IBW  Allergy Information   70 y.o./female 172.7 cm (5' 8\") 49.9 kg (110 lb)     Ideal body weight: 63.9 kg (140 lb 14 oz)   Pcn [penicillins]      Renal Function:  Recent Labs     02/25/25  0445 02/26/25  0425   BUN 17 17   CREATININE 0.6 0.6       Intake/Output Summary (Last 24 hours) at 2/27/2025 0802  Last data filed at 2/26/2025 1831  Gross per 24 hour   Intake 2336 ml   Output --   Net 2336 ml       Vancomycin Monitoring:  Trough:    Recent Labs     02/25/25 2002   VANCOTROUGH 9.6     Random:  No results for input(s): \"VANCORANDOM\" in the last 72 hours.    Recent vancomycin administrations                     vancomycin (VANCOCIN) 1,000 mg in sodium chloride 0.9 % 250 mL IVPB (Tbvw7Kgx) (mg) 1,000 mg New Bag 02/26/25 2004    vancomycin (VANCOCIN) 750 mg in sodium chloride 0.9 % 250 mL IVPB (Usup3Smm) (mg) 750 mg New Bag 02/26/25 1018     750 mg New Bag 02/25/25 2007      Restarted  0919     750 mg New Bag  0854     750 mg New Bag 02/24/25 2112     750 mg New Bag  1156                   Assessment:  Patient is a 70 y.o. female who has been initiated on vancomycin  Estimated Creatinine Clearance: 67 mL/min (based on SCr of 0.6 mg/dL).  To dose vancomycin, pharmacy will be utilizing [employer] calculation software for goal AUC/JENNIFER 400-600 mg/L-hr (predicted AUC/JENNIFER = 545, Tr =15.2 mcg/mL)  2/25: Scr 0.6, UOP unreported  MRSA nares pending  2/26: Scr 0.6.  MRSA nares positive  2/27: No changes    Plan:  Continue vancomycin 1000 mg IV every 12 hours [538 / 13.1]  Continue vancomycin for 5 days total per Pulmonolgy. Last dose of vancomycin tonight @2100.  Will check vancomycin levels as needed  Will continue to monitor renal function   Pharmacy to follow    Eliecer Quinteros, PharmD  PGY-1 
Pharmacy Consultation Note  (Antibiotic Dosing and Monitoring)    Initial consult date: 2/23/25  Consulting physician/provider: Kristie Barry APRN-CNP  Drug: Vancomycin  Indication: Pneumonia    Per discussion with pulmonology vancomycin therapy completed  Clinical Pharmacy to sign-off  Physician to re-consult pharmacy if future dosing is needed    Thank you for the consult,    Liberty Fuentes, PharmD  PGY1 Pharmacy Practice Resident [phone]  2/28/2025 8:15 AM    
Physical Therapy    Date: 2025       Patient Name: Lana Phillips  : 1955      MRN: [id number]    Physical therapy order received and chart reviewed. PT session attempted; pt was being placed on Bipap 2/2 SOB.   Will follow up as appropriate.     Susan García PT, DPT  [id number]       
Physical Therapy  Physical Therapy Initial Assessment     Name: Lana Phillips  : 1955  MRN: [id number]      Date of Service: 2025    Evaluating PT:  Susan García, PT, DPT, [id number]    Room #:  4509/4509-B  Diagnosis:  COPD exacerbation (HCC) [J44.1]  Fall, initial encounter [W19.XXXA]  Acute respiratory failure with hypoxia and hypercapnia (HCC) [J96.01, J96.02]  Pneumonia due to infectious organism, unspecified laterality, unspecified part of lung [J18.9]  Community acquired pneumonia of left lung, unspecified part of lung [J18.9]  PMHx/PSHx:    Past Medical History:   Diagnosis Date    Atherosclerosis 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 (HCC) 2024    Atrial fibrillation (HCC)     Chronic obstructive pulmonary disease (HCC) 12/15/2022    COVID-19 2022    Critical limb ischemia of left lower extremity with rest pain (HCC) 2023    GI bleed 10/14/2023    Hypertension     RUTH treated with BiPAP     Panic disorder     PVD (peripheral vascular disease) 2024    Severe protein-calorie malnutrition 2023    Thyroid disease     Uncontrolled hypertension       Past Surgical History:   Procedure Laterality Date    BACK SURGERY      x2    BRONCHOSCOPY N/A 2024    BRONCHOSCOPY DIAGNOSTIC OR CELL WASH ONLY performed by Jose Price MD at [facility] ENDOSCOPY    INVASIVE VASCULAR N/A 2024    Aortagram abdominal performed by Lazaro Vidales MD at [facility] CARDIAC CATH LAB    INVASIVE VASCULAR N/A 2024    Angioplasty peripheral artery performed by Lazaro Vidales MD at [facility] CARDIAC CATH LAB    LEFT OOPHORECTOMY      PAIN MANAGEMENT PROCEDURE N/A 2023    CAUDAL EPIDURAL STEROID INJECTION performed by Lilia Cabrera DO at [facility] OR    PAIN MANAGEMENT PROCEDURE N/A 2024    THERAPEUTIC CAUDAL EPIDURAL UNDER FLUOROSCOPIC GUIDANCE performed by Lilia Cabrera DO at [facility] OR    UPPER GASTROINTESTINAL ENDOSCOPY N/A 
Pt ripping off bipap. Very anxious and continues to state she is \"lonely\". RN notified Caro PINEDA.   
Pt with increased anxiety. No PRN daytime medication orders at present. This nurse sent a Perfect Serve message to Dr. Lipscomb to inquire if could have an order for a PRN med.   Electronically signed by Arcelia Velásquez RN on 2/26/2025 at 1:39 PM     Message read and this nurse noted a once order for ativan entered.   Electronically signed by Arcelia Velásquez RN on 2/26/2025 at 1:41 PM   
The pt is refusing to wear the bipap.  
consultations, discussing with staff and family was more than 50 minutes.      Thanks for letting us see this patient in consultation.  Please contact us with any questions. Office [phone] or after hours through [facility], x [phone].

## 2025-02-28 NOTE — PLAN OF CARE
Problem: Safety - Adult  Goal: Free from fall injury  2/28/2025 1106 by Radha Rogers RN  Outcome: Progressing  2/27/2025 2211 by Linda Boswell RN  Outcome: Progressing  2/27/2025 2211 by Linda Boswell RN  Outcome: Progressing     Problem: Chronic Conditions and Co-morbidities  Goal: Patient's chronic conditions and co-morbidity symptoms are monitored and maintained or improved  Recent Flowsheet Documentation  Taken 2/28/2025 0839 by Radha Rogers RN  Care Plan - Patient's Chronic Conditions and Co-Morbidity Symptoms are Monitored and Maintained or Improved: Monitor and assess patient's chronic conditions and comorbid symptoms for stability, deterioration, or improvement  2/27/2025 2211 by Linda Boswell RN  Outcome: Progressing  2/27/2025 2211 by Linda Boswell RN  Outcome: Progressing  Flowsheets (Taken 2/27/2025 2000)  Care Plan - Patient's Chronic Conditions and Co-Morbidity Symptoms are Monitored and Maintained or Improved: Monitor and assess patient's chronic conditions and comorbid symptoms for stability, deterioration, or improvement     Problem: Discharge Planning  Goal: Discharge to home or other facility with appropriate resources  Recent Flowsheet Documentation  Taken 2/28/2025 0839 by Radha Rogers RN  Discharge to home or other facility with appropriate resources: Identify barriers to discharge with patient and caregiver  2/27/2025 2211 by Linda Boswell RN  Outcome: Progressing  2/27/2025 2211 by Linda Boswell RN  Outcome: Progressing  Flowsheets (Taken 2/27/2025 2000)  Discharge to home or other facility with appropriate resources: Identify barriers to discharge with patient and caregiver     Problem: Skin/Tissue Integrity  Goal: Skin integrity remains intact  Description: 1.  Monitor for areas of redness and/or skin breakdown  2.  Assess vascular access sites hourly  3.  Every 4-6 hours minimum:  Change oxygen saturation probe site  4.  Every 4-6 hours:  If on nasal continuous positive

## 2025-02-28 NOTE — CARE COORDINATION
CM Update: Discharge Order Noted: Met with patient at bedside to discuss transition of care plan. Discharge plan is [facility]. Destination and PENNY updated. Face Sheet, Ambulette Form, and Envelope in soft chart. Transport set with [facility] for 1230. Nursing Notified, Patient Notified, Facility Notified. CM/LINSEY to follow. Baldemar Khoury [phone]

## 2025-02-28 NOTE — DISCHARGE SUMMARY
[facility] Hospitalist Physician Discharge Summary       [facility]  [address] [address]  [phone]        Tal Humphrey DO  [address] [place] [address]  [phone]    Follow up in 1 week(s)      Tal Humphrey DO  [facility] [place] [address]  [phone]            Activity level: As tolerated     Dispo: SNF    Condition on discharge: Stable     Patient ID:  Lana Phillips  [id number]  70 y.o.  1955    Admit date: 2/23/2025    Discharge date and time:  2/28/2025  10:28 AM    Admission Diagnoses: Principal Problem:    Community acquired pneumonia of left lung, unspecified part of lung  Active Problems:    HTN (hypertension)    COPD (chronic obstructive pulmonary disease) (HCC)    Hyperthyroidism    A-fib (HCC)    Severe protein-calorie malnutrition    Acute respiratory failure with hypoxia and hypercapnia (HCC)    Fall    Leukocytosis    Falls    Lethargy  Resolved Problems:    * No resolved hospital problems. *      Discharge Diagnoses: Principal Problem:    Community acquired pneumonia of left lung, unspecified part of lung  Active Problems:    HTN (hypertension)    COPD (chronic obstructive pulmonary disease) (HCC)    Hyperthyroidism    A-fib (HCC)    Severe protein-calorie malnutrition    Acute respiratory failure with hypoxia and hypercapnia (HCC)    Fall    Leukocytosis    Falls    Lethargy  Resolved Problems:    * No resolved hospital problems. *      Consults:  IP CONSULT TO INTERNAL MEDICINE  PHARMACY TO DOSE VANCOMYCIN  IP CONSULT TO PULMONOLOGY    Procedures: None    Hospital Course:   Patient Lana Phillips is a 70 y.o. presented with COPD exacerbation (HCC) [J44.1]  Fall, initial encounter [W19.XXXA]  Acute respiratory failure with hypoxia and hypercapnia (HCC) [J96.01, J96.02]  Pneumonia due to infectious organism, unspecified laterality, unspecified part of lung [J18.9]  Community acquired

## 2025-02-28 NOTE — PLAN OF CARE
Problem: Safety - Adult  Goal: Free from fall injury  2/27/2025 2211 by Linda Boswell, RN  Outcome: Progressing  2/27/2025 1029 by Radha Rogers, RN  Outcome: Progressing     Problem: Chronic Conditions and Co-morbidities  Goal: Patient's chronic conditions and co-morbidity symptoms are monitored and maintained or improved  Outcome: Progressing  Flowsheets (Taken 2/27/2025 2000)  Care Plan - Patient's Chronic Conditions and Co-Morbidity Symptoms are Monitored and Maintained or Improved: Monitor and assess patient's chronic conditions and comorbid symptoms for stability, deterioration, or improvement     Problem: Discharge Planning  Goal: Discharge to home or other facility with appropriate resources  Outcome: Progressing  Flowsheets (Taken 2/27/2025 2000)  Discharge to home or other facility with appropriate resources: Identify barriers to discharge with patient and caregiver     Problem: Skin/Tissue Integrity  Goal: Skin integrity remains intact  Description: 1.  Monitor for areas of redness and/or skin breakdown  2.  Assess vascular access sites hourly  3.  Every 4-6 hours minimum:  Change oxygen saturation probe site  4.  Every 4-6 hours:  If on nasal continuous positive airway pressure, respiratory therapy assess nares and determine need for appliance change or resting period  Outcome: Progressing  Flowsheets (Taken 2/27/2025 2000)  Skin Integrity Remains Intact: Monitor for areas of redness and/or skin breakdown     Problem: Nutrition Deficit:  Goal: Optimize nutritional status  Outcome: Progressing

## 2025-03-02 ENCOUNTER — CARE COORDINATION (OUTPATIENT)
Dept: CARE COORDINATION | Age: 70
End: 2025-03-02

## 2025-03-07 ENCOUNTER — APPOINTMENT (OUTPATIENT)
Dept: GENERAL RADIOLOGY | Age: 70
DRG: 177 | End: 2025-03-07
Payer: MEDICARE

## 2025-03-07 ENCOUNTER — HOSPITAL ENCOUNTER (INPATIENT)
Age: 70
LOS: 6 days | Discharge: NURSING, REHAB OR LONG-TERM CARE | DRG: 177 | End: 2025-03-13
Attending: EMERGENCY MEDICINE | Admitting: STUDENT IN AN ORGANIZED HEALTH CARE EDUCATION/TRAINING PROGRAM
Payer: MEDICARE

## 2025-03-07 ENCOUNTER — APPOINTMENT (OUTPATIENT)
Dept: CT IMAGING | Age: 70
DRG: 177 | End: 2025-03-07
Attending: EMERGENCY MEDICINE
Payer: MEDICARE

## 2025-03-07 DIAGNOSIS — J12.82 PNEUMONIA DUE TO COVID-19 VIRUS: ICD-10-CM

## 2025-03-07 DIAGNOSIS — J18.9 PNEUMONIA DUE TO INFECTIOUS ORGANISM, UNSPECIFIED LATERALITY, UNSPECIFIED PART OF LUNG: Primary | ICD-10-CM

## 2025-03-07 DIAGNOSIS — R09.02 HYPOXEMIA: ICD-10-CM

## 2025-03-07 DIAGNOSIS — J44.1 ACUTE EXACERBATION OF CHRONIC OBSTRUCTIVE PULMONARY DISEASE (COPD) (HCC): ICD-10-CM

## 2025-03-07 DIAGNOSIS — U07.1 PNEUMONIA DUE TO COVID-19 VIRUS: ICD-10-CM

## 2025-03-07 LAB
ALBUMIN SERPL-MCNC: 3.6 G/DL (ref 3.5–5.2)
ALP SERPL-CCNC: 79 U/L (ref 35–104)
ALT SERPL-CCNC: 16 U/L (ref 0–32)
ANION GAP SERPL CALCULATED.3IONS-SCNC: 15 MMOL/L (ref 7–16)
AST SERPL-CCNC: 19 U/L (ref 0–31)
B.E.: 16.3 MMOL/L (ref -3–3)
BASOPHILS # BLD: 0 K/UL (ref 0–0.2)
BASOPHILS NFR BLD: 0 % (ref 0–2)
BILIRUB SERPL-MCNC: 0.2 MG/DL (ref 0–1.2)
BUN SERPL-MCNC: 14 MG/DL (ref 6–23)
CALCIUM SERPL-MCNC: 9.3 MG/DL (ref 8.6–10.2)
CHLORIDE SERPL-SCNC: 91 MMOL/L (ref 98–107)
CO2 SERPL-SCNC: 37 MMOL/L (ref 22–29)
COHB: 0.2 % (ref 0–1.5)
CREAT SERPL-MCNC: 0.6 MG/DL (ref 0.5–1)
CRITICAL: ABNORMAL
CRP SERPL HS-MCNC: 26 MG/L (ref 0–5)
D-DIMER QUANTITATIVE: 273 NG/ML DDU (ref 0–230)
DATE ANALYZED: ABNORMAL
DATE OF COLLECTION: ABNORMAL
EKG ATRIAL RATE: 112 BPM
EKG P AXIS: 84 DEGREES
EKG P-R INTERVAL: 156 MS
EKG Q-T INTERVAL: 378 MS
EKG QRS DURATION: 72 MS
EKG QTC CALCULATION (BAZETT): 515 MS
EKG R AXIS: 76 DEGREES
EKG T AXIS: 76 DEGREES
EKG VENTRICULAR RATE: 112 BPM
EOSINOPHIL # BLD: 0.22 K/UL (ref 0.05–0.5)
EOSINOPHILS RELATIVE PERCENT: 1 % (ref 0–6)
ERYTHROCYTE [DISTWIDTH] IN BLOOD BY AUTOMATED COUNT: 14.2 % (ref 11.5–15)
FLUAV RNA RESP QL NAA+PROBE: NOT DETECTED
FLUBV RNA RESP QL NAA+PROBE: NOT DETECTED
GFR, ESTIMATED: >90 ML/MIN/1.73M2
GLUCOSE SERPL-MCNC: 174 MG/DL (ref 74–99)
HCO3: 43.4 MMOL/L (ref 22–26)
HCT VFR BLD AUTO: 37.6 % (ref 34–48)
HGB BLD-MCNC: 11.4 G/DL (ref 11.5–15.5)
HHB: 8.4 % (ref 0–5)
LAB: ABNORMAL
LACTATE BLDV-SCNC: 2 MMOL/L (ref 0.5–1.9)
LYMPHOCYTES NFR BLD: 1.51 K/UL (ref 1.5–4)
LYMPHOCYTES RELATIVE PERCENT: 6 % (ref 20–42)
Lab: 743
MCH RBC QN AUTO: 32.7 PG (ref 26–35)
MCHC RBC AUTO-ENTMCNC: 30.3 G/DL (ref 32–34.5)
MCV RBC AUTO: 107.7 FL (ref 80–99.9)
METHB: 0 % (ref 0–1.5)
MODE: ABNORMAL
MONOCYTES NFR BLD: 1.08 K/UL (ref 0.1–0.95)
MONOCYTES NFR BLD: 4 % (ref 2–12)
NEUTROPHILS NFR BLD: 89 % (ref 43–80)
NEUTS SEG NFR BLD: 21.79 K/UL (ref 1.8–7.3)
O2 SATURATION: 91.6 % (ref 92–98.5)
O2HB: 91.4 % (ref 94–97)
OPERATOR ID: 5100
PATIENT TEMP: 37 C
PCO2: 65 MMHG (ref 35–45)
PH BLOOD GAS: 7.44 (ref 7.35–7.45)
PLATELET # BLD AUTO: 382 K/UL (ref 130–450)
PMV BLD AUTO: 9.3 FL (ref 7–12)
PO2: 60.5 MMHG (ref 75–100)
POTASSIUM SERPL-SCNC: 2.9 MMOL/L (ref 3.5–5)
POTASSIUM SERPL-SCNC: 4.3 MMOL/L (ref 3.5–5)
PROT SERPL-MCNC: 6.4 G/DL (ref 6.4–8.3)
RBC # BLD AUTO: 3.49 M/UL (ref 3.5–5.5)
RBC # BLD: ABNORMAL 10*6/UL
RBC # BLD: ABNORMAL 10*6/UL
SARS-COV-2 RNA RESP QL NAA+PROBE: DETECTED
SODIUM SERPL-SCNC: 143 MMOL/L (ref 132–146)
SOURCE, BLOOD GAS: ABNORMAL
SOURCE: ABNORMAL
SPECIMEN DESCRIPTION: ABNORMAL
THB: 12.1 G/DL (ref 11.5–16.5)
TIME ANALYZED: 747
TROPONIN I SERPL HS-MCNC: 62 NG/L (ref 0–9)
TROPONIN I SERPL HS-MCNC: 63 NG/L (ref 0–9)
WBC OTHER # BLD: 24.6 K/UL (ref 4.5–11.5)

## 2025-03-07 PROCEDURE — 2500000003 HC RX 250 WO HCPCS: Performed by: EMERGENCY MEDICINE

## 2025-03-07 PROCEDURE — 6360000002 HC RX W HCPCS: Performed by: INTERNAL MEDICINE

## 2025-03-07 PROCEDURE — 99222 1ST HOSP IP/OBS MODERATE 55: CPT | Performed by: STUDENT IN AN ORGANIZED HEALTH CARE EDUCATION/TRAINING PROGRAM

## 2025-03-07 PROCEDURE — 6370000000 HC RX 637 (ALT 250 FOR IP): Performed by: STUDENT IN AN ORGANIZED HEALTH CARE EDUCATION/TRAINING PROGRAM

## 2025-03-07 PROCEDURE — 80053 COMPREHEN METABOLIC PANEL: CPT

## 2025-03-07 PROCEDURE — 87899 AGENT NOS ASSAY W/OPTIC: CPT

## 2025-03-07 PROCEDURE — 96365 THER/PROPH/DIAG IV INF INIT: CPT

## 2025-03-07 PROCEDURE — 85025 COMPLETE CBC W/AUTO DIFF WBC: CPT

## 2025-03-07 PROCEDURE — 87636 SARSCOV2 & INF A&B AMP PRB: CPT

## 2025-03-07 PROCEDURE — 96375 TX/PRO/DX INJ NEW DRUG ADDON: CPT

## 2025-03-07 PROCEDURE — 82805 BLOOD GASES W/O2 SATURATION: CPT

## 2025-03-07 PROCEDURE — 6370000000 HC RX 637 (ALT 250 FOR IP): Performed by: EMERGENCY MEDICINE

## 2025-03-07 PROCEDURE — 87040 BLOOD CULTURE FOR BACTERIA: CPT

## 2025-03-07 PROCEDURE — 99285 EMERGENCY DEPT VISIT HI MDM: CPT

## 2025-03-07 PROCEDURE — 93005 ELECTROCARDIOGRAM TRACING: CPT | Performed by: EMERGENCY MEDICINE

## 2025-03-07 PROCEDURE — 85379 FIBRIN DEGRADATION QUANT: CPT

## 2025-03-07 PROCEDURE — 93010 ELECTROCARDIOGRAM REPORT: CPT | Performed by: INTERNAL MEDICINE

## 2025-03-07 PROCEDURE — 1200000000 HC SEMI PRIVATE

## 2025-03-07 PROCEDURE — 87449 NOS EACH ORGANISM AG IA: CPT

## 2025-03-07 PROCEDURE — 6360000002 HC RX W HCPCS: Performed by: EMERGENCY MEDICINE

## 2025-03-07 PROCEDURE — 5A09357 ASSISTANCE WITH RESPIRATORY VENTILATION, LESS THAN 24 CONSECUTIVE HOURS, CONTINUOUS POSITIVE AIRWAY PRESSURE: ICD-10-PCS | Performed by: INTERNAL MEDICINE

## 2025-03-07 PROCEDURE — 6370000000 HC RX 637 (ALT 250 FOR IP): Performed by: NURSE PRACTITIONER

## 2025-03-07 PROCEDURE — 84132 ASSAY OF SERUM POTASSIUM: CPT

## 2025-03-07 PROCEDURE — 6360000002 HC RX W HCPCS: Performed by: STUDENT IN AN ORGANIZED HEALTH CARE EDUCATION/TRAINING PROGRAM

## 2025-03-07 PROCEDURE — 71275 CT ANGIOGRAPHY CHEST: CPT

## 2025-03-07 PROCEDURE — 5A0945A ASSISTANCE WITH RESPIRATORY VENTILATION, 24-96 CONSECUTIVE HOURS, HIGH NASAL FLOW/VELOCITY: ICD-10-PCS | Performed by: STUDENT IN AN ORGANIZED HEALTH CARE EDUCATION/TRAINING PROGRAM

## 2025-03-07 PROCEDURE — 2580000003 HC RX 258: Performed by: STUDENT IN AN ORGANIZED HEALTH CARE EDUCATION/TRAINING PROGRAM

## 2025-03-07 PROCEDURE — 2580000003 HC RX 258: Performed by: EMERGENCY MEDICINE

## 2025-03-07 PROCEDURE — 6360000004 HC RX CONTRAST MEDICATION: Performed by: RADIOLOGY

## 2025-03-07 PROCEDURE — 2700000000 HC OXYGEN THERAPY PER DAY

## 2025-03-07 PROCEDURE — 87081 CULTURE SCREEN ONLY: CPT

## 2025-03-07 PROCEDURE — 86140 C-REACTIVE PROTEIN: CPT

## 2025-03-07 PROCEDURE — 71045 X-RAY EXAM CHEST 1 VIEW: CPT

## 2025-03-07 PROCEDURE — 94664 DEMO&/EVAL PT USE INHALER: CPT

## 2025-03-07 PROCEDURE — 94640 AIRWAY INHALATION TREATMENT: CPT

## 2025-03-07 PROCEDURE — 2500000003 HC RX 250 WO HCPCS: Performed by: STUDENT IN AN ORGANIZED HEALTH CARE EDUCATION/TRAINING PROGRAM

## 2025-03-07 PROCEDURE — 83605 ASSAY OF LACTIC ACID: CPT

## 2025-03-07 PROCEDURE — 84484 ASSAY OF TROPONIN QUANT: CPT

## 2025-03-07 PROCEDURE — 36415 COLL VENOUS BLD VENIPUNCTURE: CPT

## 2025-03-07 RX ORDER — 0.9 % SODIUM CHLORIDE 0.9 %
500 INTRAVENOUS SOLUTION INTRAVENOUS ONCE
Status: COMPLETED | OUTPATIENT
Start: 2025-03-07 | End: 2025-03-07

## 2025-03-07 RX ORDER — DEXAMETHASONE SODIUM PHOSPHATE 4 MG/ML
4 INJECTION, SOLUTION INTRA-ARTICULAR; INTRALESIONAL; INTRAMUSCULAR; INTRAVENOUS; SOFT TISSUE EVERY 12 HOURS
Status: DISCONTINUED | OUTPATIENT
Start: 2025-03-08 | End: 2025-03-11

## 2025-03-07 RX ORDER — GUAIFENESIN 400 MG/1
400 TABLET ORAL 3 TIMES DAILY
Status: DISCONTINUED | OUTPATIENT
Start: 2025-03-07 | End: 2025-03-13 | Stop reason: HOSPADM

## 2025-03-07 RX ORDER — DOCUSATE SODIUM 100 MG/1
100 CAPSULE, LIQUID FILLED ORAL DAILY
Status: CANCELLED | OUTPATIENT
Start: 2025-03-07

## 2025-03-07 RX ORDER — ONDANSETRON 2 MG/ML
4 INJECTION INTRAMUSCULAR; INTRAVENOUS EVERY 6 HOURS PRN
Status: DISCONTINUED | OUTPATIENT
Start: 2025-03-07 | End: 2025-03-07

## 2025-03-07 RX ORDER — IPRATROPIUM BROMIDE AND ALBUTEROL SULFATE 2.5; .5 MG/3ML; MG/3ML
1 SOLUTION RESPIRATORY (INHALATION)
Status: DISCONTINUED | OUTPATIENT
Start: 2025-03-07 | End: 2025-03-13 | Stop reason: HOSPADM

## 2025-03-07 RX ORDER — DICYCLOMINE HYDROCHLORIDE 10 MG/1
20 CAPSULE ORAL 3 TIMES DAILY
Status: DISCONTINUED | OUTPATIENT
Start: 2025-03-07 | End: 2025-03-13 | Stop reason: HOSPADM

## 2025-03-07 RX ORDER — ONDANSETRON 4 MG/1
4 TABLET, ORALLY DISINTEGRATING ORAL EVERY 8 HOURS PRN
Status: DISCONTINUED | OUTPATIENT
Start: 2025-03-07 | End: 2025-03-07

## 2025-03-07 RX ORDER — DILTIAZEM HYDROCHLORIDE 120 MG/1
240 CAPSULE, COATED, EXTENDED RELEASE ORAL DAILY
Status: DISCONTINUED | OUTPATIENT
Start: 2025-03-07 | End: 2025-03-13 | Stop reason: HOSPADM

## 2025-03-07 RX ORDER — SODIUM CHLORIDE 9 MG/ML
INJECTION, SOLUTION INTRAVENOUS PRN
Status: DISCONTINUED | OUTPATIENT
Start: 2025-03-07 | End: 2025-03-13 | Stop reason: HOSPADM

## 2025-03-07 RX ORDER — DULOXETIN HYDROCHLORIDE 60 MG/1
60 CAPSULE, DELAYED RELEASE ORAL DAILY
Status: CANCELLED | OUTPATIENT
Start: 2025-03-07

## 2025-03-07 RX ORDER — DICYCLOMINE HCL 20 MG
20 TABLET ORAL 3 TIMES DAILY
Status: CANCELLED | OUTPATIENT
Start: 2025-03-07

## 2025-03-07 RX ORDER — DULOXETIN HYDROCHLORIDE 60 MG/1
60 CAPSULE, DELAYED RELEASE ORAL DAILY
Status: DISCONTINUED | OUTPATIENT
Start: 2025-03-07 | End: 2025-03-13 | Stop reason: HOSPADM

## 2025-03-07 RX ORDER — ENOXAPARIN SODIUM 100 MG/ML
30 INJECTION SUBCUTANEOUS DAILY
Status: DISCONTINUED | OUTPATIENT
Start: 2025-03-08 | End: 2025-03-09 | Stop reason: DRUGHIGH

## 2025-03-07 RX ORDER — CLONAZEPAM 0.5 MG/1
0.5 TABLET ORAL NIGHTLY PRN
Status: DISCONTINUED | OUTPATIENT
Start: 2025-03-07 | End: 2025-03-11

## 2025-03-07 RX ORDER — BUDESONIDE 0.5 MG/2ML
500 INHALANT ORAL 2 TIMES DAILY
Status: CANCELLED | OUTPATIENT
Start: 2025-03-07

## 2025-03-07 RX ORDER — VITAMIN B COMPLEX
1 CAPSULE ORAL DAILY
Status: CANCELLED | OUTPATIENT
Start: 2025-03-07

## 2025-03-07 RX ORDER — IOPAMIDOL 755 MG/ML
75 INJECTION, SOLUTION INTRAVASCULAR
Status: COMPLETED | OUTPATIENT
Start: 2025-03-07 | End: 2025-03-07

## 2025-03-07 RX ORDER — CLONAZEPAM 0.5 MG/1
0.5 TABLET ORAL NIGHTLY PRN
Status: CANCELLED | OUTPATIENT
Start: 2025-03-07

## 2025-03-07 RX ORDER — DILTIAZEM HYDROCHLORIDE 120 MG/1
240 TABLET, FILM COATED ORAL DAILY
COMMUNITY
End: 2025-03-16

## 2025-03-07 RX ORDER — METHIMAZOLE 5 MG/1
15 TABLET ORAL DAILY
Status: DISCONTINUED | OUTPATIENT
Start: 2025-03-07 | End: 2025-03-13 | Stop reason: HOSPADM

## 2025-03-07 RX ORDER — FUROSEMIDE 20 MG/1
40 TABLET ORAL DAILY
Status: CANCELLED | OUTPATIENT
Start: 2025-03-07

## 2025-03-07 RX ORDER — PANTOPRAZOLE SODIUM 40 MG/1
40 TABLET, DELAYED RELEASE ORAL
Status: DISCONTINUED | OUTPATIENT
Start: 2025-03-08 | End: 2025-03-13 | Stop reason: HOSPADM

## 2025-03-07 RX ORDER — HYDROXYZINE PAMOATE 25 MG/1
25 CAPSULE ORAL DAILY
Status: DISCONTINUED | OUTPATIENT
Start: 2025-03-07 | End: 2025-03-13 | Stop reason: HOSPADM

## 2025-03-07 RX ORDER — MUPIROCIN 20 MG/G
OINTMENT TOPICAL 2 TIMES DAILY
Status: DISPENSED | OUTPATIENT
Start: 2025-03-07 | End: 2025-03-12

## 2025-03-07 RX ORDER — LOSARTAN POTASSIUM 25 MG/1
25 TABLET ORAL DAILY
Status: DISCONTINUED | OUTPATIENT
Start: 2025-03-07 | End: 2025-03-13 | Stop reason: HOSPADM

## 2025-03-07 RX ORDER — BUDESONIDE 0.5 MG/2ML
0.5 INHALANT ORAL
Status: DISCONTINUED | OUTPATIENT
Start: 2025-03-07 | End: 2025-03-13 | Stop reason: HOSPADM

## 2025-03-07 RX ORDER — POTASSIUM CHLORIDE 1500 MG/1
40 TABLET, EXTENDED RELEASE ORAL ONCE
Status: COMPLETED | OUTPATIENT
Start: 2025-03-07 | End: 2025-03-07

## 2025-03-07 RX ORDER — FUROSEMIDE 40 MG/1
40 TABLET ORAL DAILY
Status: DISCONTINUED | OUTPATIENT
Start: 2025-03-07 | End: 2025-03-13 | Stop reason: HOSPADM

## 2025-03-07 RX ORDER — BENZONATATE 100 MG/1
100 CAPSULE ORAL 3 TIMES DAILY PRN
Status: DISCONTINUED | OUTPATIENT
Start: 2025-03-07 | End: 2025-03-13 | Stop reason: HOSPADM

## 2025-03-07 RX ORDER — SODIUM CHLORIDE 0.9 % (FLUSH) 0.9 %
5-40 SYRINGE (ML) INJECTION EVERY 12 HOURS SCHEDULED
Status: DISCONTINUED | OUTPATIENT
Start: 2025-03-07 | End: 2025-03-13 | Stop reason: HOSPADM

## 2025-03-07 RX ORDER — ACETAMINOPHEN 650 MG/1
650 SUPPOSITORY RECTAL EVERY 6 HOURS PRN
Status: DISCONTINUED | OUTPATIENT
Start: 2025-03-07 | End: 2025-03-13 | Stop reason: HOSPADM

## 2025-03-07 RX ORDER — ASPIRIN 81 MG/1
81 TABLET ORAL DAILY
Status: CANCELLED | OUTPATIENT
Start: 2025-03-07

## 2025-03-07 RX ORDER — ACETAMINOPHEN 325 MG/1
650 TABLET ORAL EVERY 6 HOURS PRN
Status: DISCONTINUED | OUTPATIENT
Start: 2025-03-07 | End: 2025-03-13 | Stop reason: HOSPADM

## 2025-03-07 RX ORDER — IPRATROPIUM BROMIDE AND ALBUTEROL SULFATE 2.5; .5 MG/3ML; MG/3ML
3 SOLUTION RESPIRATORY (INHALATION) ONCE
Status: COMPLETED | OUTPATIENT
Start: 2025-03-07 | End: 2025-03-07

## 2025-03-07 RX ORDER — IBUPROFEN 200 MG
1250 CAPSULE ORAL 3 TIMES DAILY PRN
Status: CANCELLED | OUTPATIENT
Start: 2025-03-07

## 2025-03-07 RX ORDER — PREDNISONE 10 MG/1
20 TABLET ORAL DAILY
Status: ON HOLD | COMMUNITY
Start: 2025-03-01 | End: 2025-03-13 | Stop reason: HOSPADM

## 2025-03-07 RX ORDER — SENNOSIDES 8.6 MG
650 CAPSULE ORAL EVERY 6 HOURS PRN
Status: ON HOLD | COMMUNITY

## 2025-03-07 RX ORDER — PREDNISONE 10 MG/1
10 TABLET ORAL DAILY
Status: ON HOLD | COMMUNITY

## 2025-03-07 RX ORDER — DEXAMETHASONE SODIUM PHOSPHATE 10 MG/ML
10 INJECTION, SOLUTION INTRAMUSCULAR; INTRAVENOUS DAILY
Status: DISCONTINUED | OUTPATIENT
Start: 2025-03-08 | End: 2025-03-07

## 2025-03-07 RX ORDER — ARFORMOTEROL TARTRATE 15 UG/2ML
15 SOLUTION RESPIRATORY (INHALATION)
Status: CANCELLED | OUTPATIENT
Start: 2025-03-07

## 2025-03-07 RX ORDER — ASPIRIN 81 MG/1
81 TABLET ORAL DAILY
Status: DISCONTINUED | OUTPATIENT
Start: 2025-03-07 | End: 2025-03-13 | Stop reason: HOSPADM

## 2025-03-07 RX ORDER — POLYETHYLENE GLYCOL 3350 17 G/17G
17 POWDER, FOR SOLUTION ORAL DAILY PRN
Status: DISCONTINUED | OUTPATIENT
Start: 2025-03-07 | End: 2025-03-13

## 2025-03-07 RX ORDER — FOLIC ACID 1 MG/1
1 TABLET ORAL DAILY
Status: CANCELLED | OUTPATIENT
Start: 2025-03-07

## 2025-03-07 RX ORDER — ARFORMOTEROL TARTRATE 15 UG/2ML
15 SOLUTION RESPIRATORY (INHALATION)
Status: DISCONTINUED | OUTPATIENT
Start: 2025-03-07 | End: 2025-03-13 | Stop reason: HOSPADM

## 2025-03-07 RX ORDER — SODIUM CHLORIDE 0.9 % (FLUSH) 0.9 %
5-40 SYRINGE (ML) INJECTION PRN
Status: DISCONTINUED | OUTPATIENT
Start: 2025-03-07 | End: 2025-03-13 | Stop reason: HOSPADM

## 2025-03-07 RX ADMIN — ASPIRIN 81 MG: 81 TABLET, COATED ORAL at 22:33

## 2025-03-07 RX ADMIN — VANCOMYCIN HYDROCHLORIDE 1250 MG: 1.25 INJECTION, POWDER, LYOPHILIZED, FOR SOLUTION INTRAVENOUS at 17:25

## 2025-03-07 RX ADMIN — CEFEPIME 2000 MG: 2 INJECTION, POWDER, FOR SOLUTION INTRAVENOUS at 16:42

## 2025-03-07 RX ADMIN — LOSARTAN POTASSIUM 25 MG: 25 TABLET, FILM COATED ORAL at 20:22

## 2025-03-07 RX ADMIN — MUPIROCIN: 20 OINTMENT TOPICAL at 20:22

## 2025-03-07 RX ADMIN — HYDROXYZINE PAMOATE 25 MG: 25 CAPSULE ORAL at 22:34

## 2025-03-07 RX ADMIN — IPRATROPIUM BROMIDE AND ALBUTEROL SULFATE 3 DOSE: 2.5; .5 SOLUTION RESPIRATORY (INHALATION) at 07:59

## 2025-03-07 RX ADMIN — DILTIAZEM HYDROCHLORIDE 240 MG: 120 CAPSULE, EXTENDED RELEASE ORAL at 20:21

## 2025-03-07 RX ADMIN — IOPAMIDOL 75 ML: 755 INJECTION, SOLUTION INTRAVENOUS at 10:41

## 2025-03-07 RX ADMIN — DEXAMETHASONE SODIUM PHOSPHATE 4 MG: 4 INJECTION INTRA-ARTICULAR; INTRALESIONAL; INTRAMUSCULAR; INTRAVENOUS; SOFT TISSUE at 22:35

## 2025-03-07 RX ADMIN — IPRATROPIUM BROMIDE AND ALBUTEROL SULFATE 1 DOSE: 2.5; .5 SOLUTION RESPIRATORY (INHALATION) at 15:27

## 2025-03-07 RX ADMIN — BUDESONIDE 500 MCG: 0.5 INHALANT RESPIRATORY (INHALATION) at 20:23

## 2025-03-07 RX ADMIN — DICYCLOMINE HYDROCHLORIDE 20 MG: 10 CAPSULE ORAL at 20:21

## 2025-03-07 RX ADMIN — SODIUM CHLORIDE, PRESERVATIVE FREE 10 ML: 5 INJECTION INTRAVENOUS at 20:22

## 2025-03-07 RX ADMIN — IPRATROPIUM BROMIDE AND ALBUTEROL SULFATE 1 DOSE: 2.5; .5 SOLUTION RESPIRATORY (INHALATION) at 20:23

## 2025-03-07 RX ADMIN — GUAIFENESIN 400 MG: 400 TABLET ORAL at 13:50

## 2025-03-07 RX ADMIN — GUAIFENESIN 400 MG: 400 TABLET ORAL at 20:22

## 2025-03-07 RX ADMIN — BENZONATATE 100 MG: 100 CAPSULE ORAL at 20:22

## 2025-03-07 RX ADMIN — SODIUM CHLORIDE 500 ML: 0.9 INJECTION, SOLUTION INTRAVENOUS at 10:08

## 2025-03-07 RX ADMIN — WATER 1000 MG: 1 INJECTION INTRAMUSCULAR; INTRAVENOUS; SUBCUTANEOUS at 09:28

## 2025-03-07 RX ADMIN — WATER 125 MG: 1 INJECTION INTRAMUSCULAR; INTRAVENOUS; SUBCUTANEOUS at 07:59

## 2025-03-07 RX ADMIN — FUROSEMIDE 40 MG: 40 TABLET ORAL at 22:33

## 2025-03-07 RX ADMIN — DULOXETINE HYDROCHLORIDE 60 MG: 60 CAPSULE, DELAYED RELEASE ORAL at 20:21

## 2025-03-07 RX ADMIN — POTASSIUM CHLORIDE 40 MEQ: 1500 TABLET, EXTENDED RELEASE ORAL at 10:08

## 2025-03-07 RX ADMIN — DOXYCYCLINE 100 MG: 100 INJECTION, POWDER, LYOPHILIZED, FOR SOLUTION INTRAVENOUS at 09:28

## 2025-03-07 RX ADMIN — MUPIROCIN: 20 OINTMENT TOPICAL at 16:38

## 2025-03-07 RX ADMIN — CLONAZEPAM 0.5 MG: 0.5 TABLET ORAL at 20:21

## 2025-03-07 RX ADMIN — METHIMAZOLE 15 MG: 5 TABLET ORAL at 22:33

## 2025-03-07 RX ADMIN — ARFORMOTEROL TARTRATE 15 MCG: 15 SOLUTION RESPIRATORY (INHALATION) at 20:23

## 2025-03-07 NOTE — H&P
Hospitalist History & Physical      PCP: Tal Humphrey DO    Date of Admission: 3/7/2025    Date of Service: Pt seen/examined on 3/7/2025 and is admitted to Inpatient with expected LOS greater than two midnights due to medical therapy.      Chief Complaint:  shortness of breath    History Of Present Illness:  70-year-old female patient with history of paroxysmal atrial fibrillation, chronic hypoxemic respiratory failure baseline 4 L nasal cannula, hypertension, hyperthyroidism, frequent falls, chronic leukocytosis, COPD, hyperlipidemia, PVD, malnutrition, RUTH, GI bleed, who presented to the ER from nursing facility with complaint of shortness of breath, per report her saturation was 79% on the 6 L nasal cannula. Patient states she went to bed in the usual state of health but around 1:30 in the morning her breathing became labored. She is having moist cough with clear sputum production, has not voided urine since 8 o'clock last night and has been constipated for the past 4 days when she had last BM, passing gas. Denies chest pain, abdominal pain, nausea or vomiting.     Per report patient was given DuoNebs x 3, Solu-Medrol 125 mg by EMS.    On ER evaluation temp 99.4 F, /64  RR 28 SpO2 92% on 6 L NC.  Blood work with leukocytosis 24.6 with left shift, hemoglobin 11.4, potassium 2.9, normal renal function, CO2 37, troponin 63 and repeat 62, lactic acid 2.0, D-dimer 273, ABG on 6 L nasal cannula pH 7.44 pCO2 64 pO2 60.  CTA pulmonary with contrast with no evidence of PE.  Multifocal bilateral proximal lower lobe endobronchial and lower lobe segmental endobronchial mucoid impaction with multifocal mild postobstructive atelectasis  in the right lower lobe but no postobstruction  atelectatic changes or postobstructive pneumonia in the left lower lobe.  Advanced bilateral emphysema.  Patient was given ceftriaxone, doxycycline, DuoNeb once, Solu-Medrol 125 mg, KCl 40 mill equivalent, normal saline bolus

## 2025-03-07 NOTE — CONSULTS
[facility]  [address], [address]  [place] [address]    Pulmonary Consultation      Reason for Consult:  sob    Requesting Physician:  Tal Humphrey DO    CHIEF COMPLAINT:  sob    History Obtained From:  patient    HISTORY OF PRESENT ILLNESS:                The patient is a 70 y.o. female with significant past medical history of COPD oxygen dependent who presents with increased sob for the past few days. Was discharged on 2/28 after treatment for COPD and pneumonia, claims was stable with respiratory function until increased sob for the past few days, not much cough/sputum production. Currently lying down in stretcher on 6 li HFNC, similar with sob and cough with minimal sputum production.    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 (HCC) 01/19/2024    Atrial fibrillation (HCC)     Chronic obstructive pulmonary disease (HCC) 12/15/2022    COVID-19 07/16/2022    Critical limb ischemia of left lower extremity with rest pain (HCC) 12/25/2023    GI bleed 10/14/2023    Hypertension     RUTH treated with BiPAP     Panic disorder     PVD (peripheral vascular disease) 01/19/2024    Severe protein-calorie malnutrition 06/26/2023    Thyroid disease     Uncontrolled hypertension        Past Surgical History:        Procedure Laterality Date    BACK SURGERY      x2    BRONCHOSCOPY N/A 4/16/2024    BRONCHOSCOPY DIAGNOSTIC OR CELL WASH ONLY performed by Jose Price MD at [facility] ENDOSCOPY    INVASIVE VASCULAR N/A 1/19/2024    Aortagram abdominal performed by Lazaro Vidales MD at [facility] CARDIAC CATH LAB    INVASIVE VASCULAR N/A 1/19/2024    Angioplasty peripheral artery performed by Lazaro Vidales MD at [facility] CARDIAC CATH LAB    LEFT OOPHORECTOMY      PAIN MANAGEMENT PROCEDURE N/A 12/28/2023    CAUDAL EPIDURAL STEROID INJECTION performed by Deborah

## 2025-03-07 NOTE — ED PROVIDER NOTES
[facility]  EMERGENCY DEPARTMENT ENCOUNTER        Pt Name: Lana Phillips  MRN: [id number]  Birthdate 1955  Date of evaluation: 3/7/2025  Provider: Miguel Angel Early MD  PCP: Tal Humphrey DO  Note Started: 8:25 AM EST 3/7/25    CHIEF COMPLAINT       Chief Complaint   Patient presents with    Shortness of Breath     [place], COPD, moist, wears 6L NC was 79%, treatment 93-94%, 3 duonebs, 125 solumedrol, hx afib       HISTORY OF PRESENT ILLNESS: 1 or more Elements        Lana Phillips is a 70 y.o. female who presents for shortness of breath. Sick for a few days, productive cough with shortness of breath and wheezing. Normally on 6L but 79% on this. Given nebs and steroids prior to arrival with some improvement. 02 improved. She reports some tightness in her chest as well. She denies orthopnea or peripheral edema.  She denies other complaints.     Nursing Notes were all reviewed and agreed with or any disagreements were addressed in the HPI.      REVIEW OF EXTERNAL NOTE :       EMS report from [facility]  2-28-25 internal medicine discharge summary      Chart Review/External Note Review    Last Echo reviewed by Me:  Lab Results   Component Value Date    LVEF 68 12/02/2022             Controlled Substance Monitoring:    Acute and Chronic Pain Monitoring:   RX Monitoring Periodic Controlled Substance Monitoring   2/29/2024  11:10 AM Possible medication side effects, risk of tolerance/dependence & alternative treatments discussed.;No signs of potential drug abuse or diversion identified.;Assessed functional status (ability to engage in work or other purposeful activities, the pain intensity and its interference with activities of daily living, quality of family life and social activities, and the physical activity);Obtaining appropriate analgesic effect of treatment.           REVIEW OF SYSTEMS :      Positives and Pertinent negatives as per HPI.     SURGICAL HISTORY     Past Surgical History:

## 2025-03-07 NOTE — ED NOTES
Pt c/o needing to void and cannot use purewick, requesting to use BR. Instructed it was not advisable to ambulate due to breathing status and O2 demand.  Assisted up to chair at bedside with bedpan.  Pt unable to void despite use of water and warm wipes.  Assisted back to bed, dyspneic at rest and worse with exertion. O2 sat 86% with activity.  Palpated bladder and no distention noted.  Bladder scanned and obtained 126 ml.  Informed patient of findings and H20 given.  Dr. Milanes at bedside currently.

## 2025-03-08 LAB
ANION GAP SERPL CALCULATED.3IONS-SCNC: 13 MMOL/L (ref 7–16)
BASOPHILS # BLD: 0 K/UL (ref 0–0.2)
BASOPHILS NFR BLD: 0 % (ref 0–2)
BUN SERPL-MCNC: 13 MG/DL (ref 6–23)
CALCIUM SERPL-MCNC: 9 MG/DL (ref 8.6–10.2)
CHLORIDE SERPL-SCNC: 91 MMOL/L (ref 98–107)
CO2 SERPL-SCNC: 34 MMOL/L (ref 22–29)
CREAT SERPL-MCNC: 0.6 MG/DL (ref 0.5–1)
CRP SERPL HS-MCNC: 90 MG/L (ref 0–5)
EOSINOPHIL # BLD: 0 K/UL (ref 0.05–0.5)
EOSINOPHILS RELATIVE PERCENT: 0 % (ref 0–6)
ERYTHROCYTE [DISTWIDTH] IN BLOOD BY AUTOMATED COUNT: 14.7 % (ref 11.5–15)
GFR, ESTIMATED: >90 ML/MIN/1.73M2
GLUCOSE SERPL-MCNC: 130 MG/DL (ref 74–99)
HCT VFR BLD AUTO: 29.8 % (ref 34–48)
HGB BLD-MCNC: 9.3 G/DL (ref 11.5–15.5)
L PNEUMO1 AG UR QL IA.RAPID: NEGATIVE
LYMPHOCYTES NFR BLD: 0.62 K/UL (ref 1.5–4)
LYMPHOCYTES RELATIVE PERCENT: 3 % (ref 20–42)
MCH RBC QN AUTO: 33 PG (ref 26–35)
MCHC RBC AUTO-ENTMCNC: 31.2 G/DL (ref 32–34.5)
MCV RBC AUTO: 105.7 FL (ref 80–99.9)
MONOCYTES NFR BLD: 0.42 K/UL (ref 0.1–0.95)
MONOCYTES NFR BLD: 2 % (ref 2–12)
NEUTROPHILS NFR BLD: 96 % (ref 43–80)
NEUTS SEG NFR BLD: 22.86 K/UL (ref 1.8–7.3)
PLATELET # BLD AUTO: 335 K/UL (ref 130–450)
PMV BLD AUTO: 9.7 FL (ref 7–12)
POTASSIUM SERPL-SCNC: 4.7 MMOL/L (ref 3.5–5)
PROCALCITONIN SERPL-MCNC: 0.28 NG/ML (ref 0–0.08)
RBC # BLD AUTO: 2.82 M/UL (ref 3.5–5.5)
RBC # BLD: ABNORMAL 10*6/UL
S PNEUM AG SPEC QL: NEGATIVE
SODIUM SERPL-SCNC: 138 MMOL/L (ref 132–146)
SPECIMEN SOURCE: NORMAL
TSH SERPL DL<=0.05 MIU/L-ACNC: 0.15 UIU/ML (ref 0.27–4.2)
WBC OTHER # BLD: 23.9 K/UL (ref 4.5–11.5)

## 2025-03-08 PROCEDURE — 1200000000 HC SEMI PRIVATE

## 2025-03-08 PROCEDURE — 6360000002 HC RX W HCPCS: Performed by: INTERNAL MEDICINE

## 2025-03-08 PROCEDURE — 87077 CULTURE AEROBIC IDENTIFY: CPT

## 2025-03-08 PROCEDURE — 80048 BASIC METABOLIC PNL TOTAL CA: CPT

## 2025-03-08 PROCEDURE — 84145 PROCALCITONIN (PCT): CPT

## 2025-03-08 PROCEDURE — 6370000000 HC RX 637 (ALT 250 FOR IP): Performed by: NURSE PRACTITIONER

## 2025-03-08 PROCEDURE — 86140 C-REACTIVE PROTEIN: CPT

## 2025-03-08 PROCEDURE — 6370000000 HC RX 637 (ALT 250 FOR IP): Performed by: STUDENT IN AN ORGANIZED HEALTH CARE EDUCATION/TRAINING PROGRAM

## 2025-03-08 PROCEDURE — 2580000003 HC RX 258: Performed by: STUDENT IN AN ORGANIZED HEALTH CARE EDUCATION/TRAINING PROGRAM

## 2025-03-08 PROCEDURE — 6360000002 HC RX W HCPCS: Performed by: STUDENT IN AN ORGANIZED HEALTH CARE EDUCATION/TRAINING PROGRAM

## 2025-03-08 PROCEDURE — 99233 SBSQ HOSP IP/OBS HIGH 50: CPT | Performed by: STUDENT IN AN ORGANIZED HEALTH CARE EDUCATION/TRAINING PROGRAM

## 2025-03-08 PROCEDURE — 94640 AIRWAY INHALATION TREATMENT: CPT

## 2025-03-08 PROCEDURE — 2500000003 HC RX 250 WO HCPCS: Performed by: STUDENT IN AN ORGANIZED HEALTH CARE EDUCATION/TRAINING PROGRAM

## 2025-03-08 PROCEDURE — 87205 SMEAR GRAM STAIN: CPT

## 2025-03-08 PROCEDURE — 2700000000 HC OXYGEN THERAPY PER DAY

## 2025-03-08 PROCEDURE — 85025 COMPLETE CBC W/AUTO DIFF WBC: CPT

## 2025-03-08 PROCEDURE — 84443 ASSAY THYROID STIM HORMONE: CPT

## 2025-03-08 PROCEDURE — 36415 COLL VENOUS BLD VENIPUNCTURE: CPT

## 2025-03-08 PROCEDURE — 87070 CULTURE OTHR SPECIMN AEROBIC: CPT

## 2025-03-08 PROCEDURE — 94660 CPAP INITIATION&MGMT: CPT

## 2025-03-08 PROCEDURE — 87106 FUNGI IDENTIFICATION YEAST: CPT

## 2025-03-08 RX ORDER — ONDANSETRON 2 MG/ML
4 INJECTION INTRAMUSCULAR; INTRAVENOUS EVERY 6 HOURS PRN
Status: DISCONTINUED | OUTPATIENT
Start: 2025-03-08 | End: 2025-03-13 | Stop reason: HOSPADM

## 2025-03-08 RX ORDER — LOPERAMIDE HYDROCHLORIDE 2 MG/1
2 CAPSULE ORAL 3 TIMES DAILY PRN
Status: DISCONTINUED | OUTPATIENT
Start: 2025-03-08 | End: 2025-03-13

## 2025-03-08 RX ADMIN — GUAIFENESIN 400 MG: 400 TABLET ORAL at 08:51

## 2025-03-08 RX ADMIN — DEXAMETHASONE SODIUM PHOSPHATE 4 MG: 4 INJECTION INTRA-ARTICULAR; INTRALESIONAL; INTRAMUSCULAR; INTRAVENOUS; SOFT TISSUE at 10:41

## 2025-03-08 RX ADMIN — IPRATROPIUM BROMIDE AND ALBUTEROL SULFATE 1 DOSE: 2.5; .5 SOLUTION RESPIRATORY (INHALATION) at 16:47

## 2025-03-08 RX ADMIN — SODIUM CHLORIDE, PRESERVATIVE FREE 10 ML: 5 INJECTION INTRAVENOUS at 08:58

## 2025-03-08 RX ADMIN — LOPERAMIDE HYDROCHLORIDE 2 MG: 2 CAPSULE ORAL at 17:28

## 2025-03-08 RX ADMIN — ASPIRIN 81 MG: 81 TABLET, COATED ORAL at 08:52

## 2025-03-08 RX ADMIN — BUDESONIDE 500 MCG: 0.5 INHALANT RESPIRATORY (INHALATION) at 20:10

## 2025-03-08 RX ADMIN — IPRATROPIUM BROMIDE AND ALBUTEROL SULFATE 1 DOSE: 2.5; .5 SOLUTION RESPIRATORY (INHALATION) at 13:12

## 2025-03-08 RX ADMIN — SODIUM CHLORIDE, PRESERVATIVE FREE 10 ML: 5 INJECTION INTRAVENOUS at 20:30

## 2025-03-08 RX ADMIN — GUAIFENESIN 400 MG: 400 TABLET ORAL at 14:43

## 2025-03-08 RX ADMIN — ENOXAPARIN SODIUM 30 MG: 100 INJECTION SUBCUTANEOUS at 08:52

## 2025-03-08 RX ADMIN — VANCOMYCIN HYDROCHLORIDE 1000 MG: 1 INJECTION, POWDER, LYOPHILIZED, FOR SOLUTION INTRAVENOUS at 17:31

## 2025-03-08 RX ADMIN — DICYCLOMINE HYDROCHLORIDE 20 MG: 10 CAPSULE ORAL at 08:52

## 2025-03-08 RX ADMIN — CLONAZEPAM 0.5 MG: 0.5 TABLET ORAL at 20:30

## 2025-03-08 RX ADMIN — ONDANSETRON 4 MG: 2 INJECTION, SOLUTION INTRAMUSCULAR; INTRAVENOUS at 09:59

## 2025-03-08 RX ADMIN — METHIMAZOLE 15 MG: 5 TABLET ORAL at 08:53

## 2025-03-08 RX ADMIN — VANCOMYCIN HYDROCHLORIDE 1000 MG: 1 INJECTION, POWDER, LYOPHILIZED, FOR SOLUTION INTRAVENOUS at 05:59

## 2025-03-08 RX ADMIN — BENZONATATE 100 MG: 100 CAPSULE ORAL at 20:30

## 2025-03-08 RX ADMIN — FUROSEMIDE 40 MG: 40 TABLET ORAL at 08:52

## 2025-03-08 RX ADMIN — ARFORMOTEROL TARTRATE 15 MCG: 15 SOLUTION RESPIRATORY (INHALATION) at 20:10

## 2025-03-08 RX ADMIN — HYDROXYZINE PAMOATE 25 MG: 25 CAPSULE ORAL at 08:52

## 2025-03-08 RX ADMIN — LOSARTAN POTASSIUM 25 MG: 25 TABLET, FILM COATED ORAL at 08:52

## 2025-03-08 RX ADMIN — CEFEPIME 2000 MG: 2 INJECTION, POWDER, FOR SOLUTION INTRAVENOUS at 08:58

## 2025-03-08 RX ADMIN — DICYCLOMINE HYDROCHLORIDE 20 MG: 10 CAPSULE ORAL at 14:42

## 2025-03-08 RX ADMIN — CEFEPIME 2000 MG: 2 INJECTION, POWDER, FOR SOLUTION INTRAVENOUS at 01:00

## 2025-03-08 RX ADMIN — DULOXETINE HYDROCHLORIDE 60 MG: 60 CAPSULE, DELAYED RELEASE ORAL at 08:52

## 2025-03-08 RX ADMIN — GUAIFENESIN 400 MG: 400 TABLET ORAL at 20:30

## 2025-03-08 RX ADMIN — IPRATROPIUM BROMIDE AND ALBUTEROL SULFATE 1 DOSE: 2.5; .5 SOLUTION RESPIRATORY (INHALATION) at 20:10

## 2025-03-08 RX ADMIN — LOPERAMIDE HYDROCHLORIDE 2 MG: 2 CAPSULE ORAL at 10:41

## 2025-03-08 RX ADMIN — PANTOPRAZOLE SODIUM 40 MG: 40 TABLET, DELAYED RELEASE ORAL at 05:55

## 2025-03-08 RX ADMIN — DICYCLOMINE HYDROCHLORIDE 20 MG: 10 CAPSULE ORAL at 20:30

## 2025-03-08 RX ADMIN — ACETAMINOPHEN 650 MG: 325 TABLET ORAL at 03:52

## 2025-03-08 RX ADMIN — DILTIAZEM HYDROCHLORIDE 240 MG: 120 CAPSULE, EXTENDED RELEASE ORAL at 08:52

## 2025-03-08 RX ADMIN — CEFEPIME 2000 MG: 2 INJECTION, POWDER, FOR SOLUTION INTRAVENOUS at 17:32

## 2025-03-08 ASSESSMENT — PAIN DESCRIPTION - DESCRIPTORS
DESCRIPTORS: ACHING;DISCOMFORT;THROBBING
DESCRIPTORS: ACHING;GNAWING;DISCOMFORT
DESCRIPTORS: ACHING;DISCOMFORT;SPASM;SQUEEZING

## 2025-03-08 ASSESSMENT — PAIN DESCRIPTION - LOCATION
LOCATION: ABDOMEN

## 2025-03-08 ASSESSMENT — PAIN DESCRIPTION - PAIN TYPE: TYPE: ACUTE PAIN

## 2025-03-08 ASSESSMENT — PAIN DESCRIPTION - ORIENTATION: ORIENTATION: RIGHT;LEFT

## 2025-03-08 ASSESSMENT — PAIN SCALES - GENERAL
PAINLEVEL_OUTOF10: 7
PAINLEVEL_OUTOF10: 7
PAINLEVEL_OUTOF10: 8

## 2025-03-08 ASSESSMENT — PAIN - FUNCTIONAL ASSESSMENT: PAIN_FUNCTIONAL_ASSESSMENT: ACTIVITIES ARE NOT PREVENTED

## 2025-03-08 ASSESSMENT — PAIN DESCRIPTION - FREQUENCY: FREQUENCY: CONTINUOUS

## 2025-03-08 ASSESSMENT — PAIN DESCRIPTION - ONSET: ONSET: ON-GOING

## 2025-03-08 NOTE — CONSULTS
[facility]  [facility]    Consultation Note     Admit Date: 3/7/2025  7:26 AM    Reason for Consult: Hospital-acquired pneumonia    Attending Physician:  Michael Herbert MD     Chief Complaint: Shortness of breath    HISTORY OF PRESENT ILLNESS:   70-year-old female with past medical history of COPD, emphysematous lung disease, A-fib, chronic hypoxic respiratory failure, HLD, RUTH presented with shortness of breath.  CTA chest is consistent with bibasilar pneumonia with mucous impaction.  COVID positive.  Leukocytosis noted.  ID consulted for hospital-acquired pneumonia.    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 (HCC) 01/19/2024    Atrial fibrillation (HCC)     Chronic obstructive pulmonary disease (HCC) 12/15/2022    COVID-19 07/16/2022    Critical limb ischemia of left lower extremity with rest pain ([facility]) 12/25/2023    GI bleed 10/14/2023    Hypertension     RUTH treated with BiPAP     Panic disorder     PVD (peripheral vascular disease) 01/19/2024    Severe protein-calorie malnutrition 06/26/2023    Thyroid disease     Uncontrolled hypertension      Past Surgical History:        Procedure Laterality Date    BACK SURGERY      x2    BRONCHOSCOPY N/A 4/16/2024    BRONCHOSCOPY DIAGNOSTIC OR CELL WASH ONLY performed by Jose Price MD at [facility] ENDOSCOPY    INVASIVE VASCULAR N/A 1/19/2024    Aortagram abdominal performed by Lazaro Vidales MD at [facility] CARDIAC CATH LAB    INVASIVE VASCULAR N/A 1/19/2024    Angioplasty peripheral artery performed by Lazaro Vidales MD at [facility] CARDIAC CATH LAB    LEFT OOPHORECTOMY      PAIN MANAGEMENT PROCEDURE N/A 12/28/2023    CAUDAL EPIDURAL STEROID INJECTION performed [facility]    PAIN MANAGEMENT PROCEDURE N/A 11/14/2024    THERAPEUTIC CAUDAL EPIDURAL UNDER FLUOROSCOPIC GUIDANCE performed by [facility]

## 2025-03-09 LAB
ANION GAP SERPL CALCULATED.3IONS-SCNC: 13 MMOL/L (ref 7–16)
BASOPHILS # BLD: 0.01 K/UL (ref 0–0.2)
BASOPHILS NFR BLD: 0 % (ref 0–2)
BUN SERPL-MCNC: 15 MG/DL (ref 6–23)
CALCIUM SERPL-MCNC: 9.4 MG/DL (ref 8.6–10.2)
CHLORIDE SERPL-SCNC: 95 MMOL/L (ref 98–107)
CO2 SERPL-SCNC: 33 MMOL/L (ref 22–29)
CREAT SERPL-MCNC: 0.6 MG/DL (ref 0.5–1)
DATE LAST DOSE: NORMAL
EOSINOPHIL # BLD: 0.01 K/UL (ref 0.05–0.5)
EOSINOPHILS RELATIVE PERCENT: 0 % (ref 0–6)
ERYTHROCYTE [DISTWIDTH] IN BLOOD BY AUTOMATED COUNT: 15.1 % (ref 11.5–15)
GFR, ESTIMATED: >90 ML/MIN/1.73M2
GLUCOSE SERPL-MCNC: 80 MG/DL (ref 74–99)
HCT VFR BLD AUTO: 30.8 % (ref 34–48)
HGB BLD-MCNC: 9.7 G/DL (ref 11.5–15.5)
IMM GRANULOCYTES # BLD AUTO: 0.17 K/UL (ref 0–0.58)
IMM GRANULOCYTES NFR BLD: 1 % (ref 0–5)
LYMPHOCYTES NFR BLD: 0.84 K/UL (ref 1.5–4)
LYMPHOCYTES RELATIVE PERCENT: 4 % (ref 20–42)
MCH RBC QN AUTO: 33.2 PG (ref 26–35)
MCHC RBC AUTO-ENTMCNC: 31.5 G/DL (ref 32–34.5)
MCV RBC AUTO: 105.5 FL (ref 80–99.9)
MICROORGANISM SPEC CULT: ABNORMAL
MONOCYTES NFR BLD: 1.31 K/UL (ref 0.1–0.95)
MONOCYTES NFR BLD: 6 % (ref 2–12)
NEUTROPHILS NFR BLD: 89 % (ref 43–80)
NEUTS SEG NFR BLD: 17.99 K/UL (ref 1.8–7.3)
PLATELET # BLD AUTO: 338 K/UL (ref 130–450)
PMV BLD AUTO: 9.9 FL (ref 7–12)
POTASSIUM SERPL-SCNC: 5 MMOL/L (ref 3.5–5)
RBC # BLD AUTO: 2.92 M/UL (ref 3.5–5.5)
SODIUM SERPL-SCNC: 141 MMOL/L (ref 132–146)
SPECIMEN DESCRIPTION: ABNORMAL
T3 SERPL-MCNC: 67 NG/DL (ref 80–200)
T4 FREE SERPL-MCNC: 1.1 NG/DL (ref 0.9–1.7)
TME LAST DOSE: NORMAL H
VANCOMYCIN DOSE: NORMAL MG
VANCOMYCIN TROUGH SERPL-MCNC: 13.8 UG/ML (ref 5–16)
WBC OTHER # BLD: 20.3 K/UL (ref 4.5–11.5)

## 2025-03-09 PROCEDURE — 6370000000 HC RX 637 (ALT 250 FOR IP): Performed by: STUDENT IN AN ORGANIZED HEALTH CARE EDUCATION/TRAINING PROGRAM

## 2025-03-09 PROCEDURE — 99232 SBSQ HOSP IP/OBS MODERATE 35: CPT | Performed by: STUDENT IN AN ORGANIZED HEALTH CARE EDUCATION/TRAINING PROGRAM

## 2025-03-09 PROCEDURE — 6370000000 HC RX 637 (ALT 250 FOR IP): Performed by: NURSE PRACTITIONER

## 2025-03-09 PROCEDURE — 2580000003 HC RX 258: Performed by: STUDENT IN AN ORGANIZED HEALTH CARE EDUCATION/TRAINING PROGRAM

## 2025-03-09 PROCEDURE — 85025 COMPLETE CBC W/AUTO DIFF WBC: CPT

## 2025-03-09 PROCEDURE — 1200000000 HC SEMI PRIVATE

## 2025-03-09 PROCEDURE — 6360000002 HC RX W HCPCS: Performed by: STUDENT IN AN ORGANIZED HEALTH CARE EDUCATION/TRAINING PROGRAM

## 2025-03-09 PROCEDURE — 2500000003 HC RX 250 WO HCPCS: Performed by: STUDENT IN AN ORGANIZED HEALTH CARE EDUCATION/TRAINING PROGRAM

## 2025-03-09 PROCEDURE — 36415 COLL VENOUS BLD VENIPUNCTURE: CPT

## 2025-03-09 PROCEDURE — 80202 ASSAY OF VANCOMYCIN: CPT

## 2025-03-09 PROCEDURE — 94660 CPAP INITIATION&MGMT: CPT

## 2025-03-09 PROCEDURE — 2700000000 HC OXYGEN THERAPY PER DAY

## 2025-03-09 PROCEDURE — 84439 ASSAY OF FREE THYROXINE: CPT

## 2025-03-09 PROCEDURE — 84480 ASSAY TRIIODOTHYRONINE (T3): CPT

## 2025-03-09 PROCEDURE — 6360000002 HC RX W HCPCS: Performed by: INTERNAL MEDICINE

## 2025-03-09 PROCEDURE — 94640 AIRWAY INHALATION TREATMENT: CPT

## 2025-03-09 PROCEDURE — 80048 BASIC METABOLIC PNL TOTAL CA: CPT

## 2025-03-09 RX ORDER — ENOXAPARIN SODIUM 100 MG/ML
40 INJECTION SUBCUTANEOUS DAILY
Status: DISCONTINUED | OUTPATIENT
Start: 2025-03-09 | End: 2025-03-13 | Stop reason: HOSPADM

## 2025-03-09 RX ADMIN — IPRATROPIUM BROMIDE AND ALBUTEROL SULFATE 1 DOSE: 2.5; .5 SOLUTION RESPIRATORY (INHALATION) at 17:50

## 2025-03-09 RX ADMIN — DULOXETINE HYDROCHLORIDE 60 MG: 60 CAPSULE, DELAYED RELEASE ORAL at 09:18

## 2025-03-09 RX ADMIN — HYDROXYZINE PAMOATE 25 MG: 25 CAPSULE ORAL at 09:18

## 2025-03-09 RX ADMIN — DEXAMETHASONE SODIUM PHOSPHATE 4 MG: 4 INJECTION INTRA-ARTICULAR; INTRALESIONAL; INTRAMUSCULAR; INTRAVENOUS; SOFT TISSUE at 11:30

## 2025-03-09 RX ADMIN — ENOXAPARIN SODIUM 40 MG: 100 INJECTION SUBCUTANEOUS at 11:36

## 2025-03-09 RX ADMIN — CEFEPIME 2000 MG: 2 INJECTION, POWDER, FOR SOLUTION INTRAVENOUS at 11:16

## 2025-03-09 RX ADMIN — SODIUM CHLORIDE, PRESERVATIVE FREE 10 ML: 5 INJECTION INTRAVENOUS at 20:02

## 2025-03-09 RX ADMIN — GUAIFENESIN 400 MG: 400 TABLET ORAL at 15:17

## 2025-03-09 RX ADMIN — DICYCLOMINE HYDROCHLORIDE 20 MG: 10 CAPSULE ORAL at 20:02

## 2025-03-09 RX ADMIN — CLONAZEPAM 0.5 MG: 0.5 TABLET ORAL at 17:30

## 2025-03-09 RX ADMIN — CEFEPIME 2000 MG: 2 INJECTION, POWDER, FOR SOLUTION INTRAVENOUS at 20:01

## 2025-03-09 RX ADMIN — PANTOPRAZOLE SODIUM 40 MG: 40 TABLET, DELAYED RELEASE ORAL at 05:56

## 2025-03-09 RX ADMIN — DEXAMETHASONE SODIUM PHOSPHATE 4 MG: 4 INJECTION INTRA-ARTICULAR; INTRALESIONAL; INTRAMUSCULAR; INTRAVENOUS; SOFT TISSUE at 00:33

## 2025-03-09 RX ADMIN — ACETAMINOPHEN 650 MG: 325 TABLET ORAL at 20:02

## 2025-03-09 RX ADMIN — CEFEPIME 2000 MG: 2 INJECTION, POWDER, FOR SOLUTION INTRAVENOUS at 00:39

## 2025-03-09 RX ADMIN — DICYCLOMINE HYDROCHLORIDE 20 MG: 10 CAPSULE ORAL at 09:18

## 2025-03-09 RX ADMIN — ACETAMINOPHEN 650 MG: 325 TABLET ORAL at 11:33

## 2025-03-09 RX ADMIN — VANCOMYCIN HYDROCHLORIDE 1000 MG: 1 INJECTION, POWDER, LYOPHILIZED, FOR SOLUTION INTRAVENOUS at 11:14

## 2025-03-09 RX ADMIN — DICYCLOMINE HYDROCHLORIDE 20 MG: 10 CAPSULE ORAL at 15:17

## 2025-03-09 RX ADMIN — LOSARTAN POTASSIUM 25 MG: 25 TABLET, FILM COATED ORAL at 09:20

## 2025-03-09 RX ADMIN — METHIMAZOLE 15 MG: 5 TABLET ORAL at 09:19

## 2025-03-09 RX ADMIN — MUPIROCIN: 20 OINTMENT TOPICAL at 20:04

## 2025-03-09 RX ADMIN — GUAIFENESIN 400 MG: 400 TABLET ORAL at 09:18

## 2025-03-09 RX ADMIN — ASPIRIN 81 MG: 81 TABLET, COATED ORAL at 09:18

## 2025-03-09 RX ADMIN — BUDESONIDE 500 MCG: 0.5 INHALANT RESPIRATORY (INHALATION) at 20:26

## 2025-03-09 RX ADMIN — SODIUM CHLORIDE, PRESERVATIVE FREE 10 ML: 5 INJECTION INTRAVENOUS at 11:18

## 2025-03-09 RX ADMIN — IPRATROPIUM BROMIDE AND ALBUTEROL SULFATE 1 DOSE: 2.5; .5 SOLUTION RESPIRATORY (INHALATION) at 20:26

## 2025-03-09 RX ADMIN — ONDANSETRON 4 MG: 2 INJECTION, SOLUTION INTRAMUSCULAR; INTRAVENOUS at 11:15

## 2025-03-09 RX ADMIN — DILTIAZEM HYDROCHLORIDE 240 MG: 120 CAPSULE, EXTENDED RELEASE ORAL at 09:18

## 2025-03-09 RX ADMIN — FUROSEMIDE 40 MG: 40 TABLET ORAL at 09:18

## 2025-03-09 RX ADMIN — ARFORMOTEROL TARTRATE 15 MCG: 15 SOLUTION RESPIRATORY (INHALATION) at 20:26

## 2025-03-09 RX ADMIN — BENZONATATE 100 MG: 100 CAPSULE ORAL at 17:30

## 2025-03-09 RX ADMIN — IPRATROPIUM BROMIDE AND ALBUTEROL SULFATE 1 DOSE: 2.5; .5 SOLUTION RESPIRATORY (INHALATION) at 13:35

## 2025-03-09 RX ADMIN — GUAIFENESIN 400 MG: 400 TABLET ORAL at 20:02

## 2025-03-09 RX ADMIN — VANCOMYCIN HYDROCHLORIDE 1000 MG: 1 INJECTION, POWDER, LYOPHILIZED, FOR SOLUTION INTRAVENOUS at 20:01

## 2025-03-09 ASSESSMENT — PAIN SCALES - GENERAL
PAINLEVEL_OUTOF10: 5
PAINLEVEL_OUTOF10: 2
PAINLEVEL_OUTOF10: 8
PAINLEVEL_OUTOF10: 6

## 2025-03-09 ASSESSMENT — PAIN DESCRIPTION - DESCRIPTORS
DESCRIPTORS: DISCOMFORT
DESCRIPTORS: ACHING;DISCOMFORT;GNAWING
DESCRIPTORS: ACHING;GNAWING;DISCOMFORT
DESCRIPTORS: ACHING;DISCOMFORT;SORE;SPASM

## 2025-03-09 ASSESSMENT — PAIN DESCRIPTION - LOCATION
LOCATION: ABDOMEN

## 2025-03-09 ASSESSMENT — PAIN - FUNCTIONAL ASSESSMENT: PAIN_FUNCTIONAL_ASSESSMENT: PREVENTS OR INTERFERES SOME ACTIVE ACTIVITIES AND ADLS

## 2025-03-09 NOTE — PLAN OF CARE
Problem: Chronic Conditions and Co-morbidities  Goal: Patient's chronic conditions and co-morbidity symptoms are monitored and maintained or improved  Outcome: Progressing     Problem: Skin/Tissue Integrity  Goal: Skin integrity remains intact  Description: 1.  Monitor for areas of redness and/or skin breakdown  2.  Assess vascular access sites hourly  3.  Every 4-6 hours minimum:  Change oxygen saturation probe site  4.  Every 4-6 hours:  If on nasal continuous positive airway pressure, respiratory therapy assess nares and determine need for appliance change or resting period  Outcome: Progressing     Problem: Safety - Adult  Goal: Free from fall injury  Outcome: Progressing     Problem: Pain  Goal: Verbalizes/displays adequate comfort level or baseline comfort level  Outcome: Progressing

## 2025-03-10 ENCOUNTER — APPOINTMENT (OUTPATIENT)
Dept: GENERAL RADIOLOGY | Age: 70
DRG: 177 | End: 2025-03-10
Payer: MEDICARE

## 2025-03-10 LAB
ANION GAP SERPL CALCULATED.3IONS-SCNC: 13 MMOL/L (ref 7–16)
BASOPHILS # BLD: 0.01 K/UL (ref 0–0.2)
BASOPHILS NFR BLD: 0 % (ref 0–2)
BUN SERPL-MCNC: 15 MG/DL (ref 6–23)
CALCIUM SERPL-MCNC: 8.9 MG/DL (ref 8.6–10.2)
CHLORIDE SERPL-SCNC: 97 MMOL/L (ref 98–107)
CO2 SERPL-SCNC: 34 MMOL/L (ref 22–29)
CREAT SERPL-MCNC: 0.7 MG/DL (ref 0.5–1)
CRP SERPL HS-MCNC: 23 MG/L (ref 0–5)
EOSINOPHIL # BLD: 0 K/UL (ref 0.05–0.5)
EOSINOPHILS RELATIVE PERCENT: 0 % (ref 0–6)
ERYTHROCYTE [DISTWIDTH] IN BLOOD BY AUTOMATED COUNT: 14.9 % (ref 11.5–15)
GFR, ESTIMATED: >90 ML/MIN/1.73M2
GLUCOSE SERPL-MCNC: 112 MG/DL (ref 74–99)
HCT VFR BLD AUTO: 28.7 % (ref 34–48)
HGB BLD-MCNC: 8.9 G/DL (ref 11.5–15.5)
IMM GRANULOCYTES # BLD AUTO: 0.17 K/UL (ref 0–0.58)
IMM GRANULOCYTES NFR BLD: 1 % (ref 0–5)
LYMPHOCYTES NFR BLD: 0.69 K/UL (ref 1.5–4)
LYMPHOCYTES RELATIVE PERCENT: 5 % (ref 20–42)
MCH RBC QN AUTO: 33 PG (ref 26–35)
MCHC RBC AUTO-ENTMCNC: 31 G/DL (ref 32–34.5)
MCV RBC AUTO: 106.3 FL (ref 80–99.9)
MONOCYTES NFR BLD: 0.63 K/UL (ref 0.1–0.95)
MONOCYTES NFR BLD: 5 % (ref 2–12)
NEUTROPHILS NFR BLD: 89 % (ref 43–80)
NEUTS SEG NFR BLD: 12.06 K/UL (ref 1.8–7.3)
PLATELET # BLD AUTO: 316 K/UL (ref 130–450)
PMV BLD AUTO: 9.6 FL (ref 7–12)
POTASSIUM SERPL-SCNC: 4.2 MMOL/L (ref 3.5–5)
PROCALCITONIN SERPL-MCNC: 0.11 NG/ML (ref 0–0.08)
RBC # BLD AUTO: 2.7 M/UL (ref 3.5–5.5)
SODIUM SERPL-SCNC: 144 MMOL/L (ref 132–146)
WBC OTHER # BLD: 13.6 K/UL (ref 4.5–11.5)

## 2025-03-10 PROCEDURE — 6360000002 HC RX W HCPCS: Performed by: STUDENT IN AN ORGANIZED HEALTH CARE EDUCATION/TRAINING PROGRAM

## 2025-03-10 PROCEDURE — 6370000000 HC RX 637 (ALT 250 FOR IP): Performed by: NURSE PRACTITIONER

## 2025-03-10 PROCEDURE — 6360000002 HC RX W HCPCS: Performed by: INTERNAL MEDICINE

## 2025-03-10 PROCEDURE — 6370000000 HC RX 637 (ALT 250 FOR IP): Performed by: STUDENT IN AN ORGANIZED HEALTH CARE EDUCATION/TRAINING PROGRAM

## 2025-03-10 PROCEDURE — 2500000003 HC RX 250 WO HCPCS: Performed by: STUDENT IN AN ORGANIZED HEALTH CARE EDUCATION/TRAINING PROGRAM

## 2025-03-10 PROCEDURE — 94640 AIRWAY INHALATION TREATMENT: CPT

## 2025-03-10 PROCEDURE — 2700000000 HC OXYGEN THERAPY PER DAY

## 2025-03-10 PROCEDURE — 86140 C-REACTIVE PROTEIN: CPT

## 2025-03-10 PROCEDURE — 85025 COMPLETE CBC W/AUTO DIFF WBC: CPT

## 2025-03-10 PROCEDURE — 1200000000 HC SEMI PRIVATE

## 2025-03-10 PROCEDURE — 6360000002 HC RX W HCPCS: Performed by: NURSE PRACTITIONER

## 2025-03-10 PROCEDURE — 94660 CPAP INITIATION&MGMT: CPT

## 2025-03-10 PROCEDURE — 99232 SBSQ HOSP IP/OBS MODERATE 35: CPT | Performed by: STUDENT IN AN ORGANIZED HEALTH CARE EDUCATION/TRAINING PROGRAM

## 2025-03-10 PROCEDURE — 2580000003 HC RX 258: Performed by: STUDENT IN AN ORGANIZED HEALTH CARE EDUCATION/TRAINING PROGRAM

## 2025-03-10 PROCEDURE — 2500000003 HC RX 250 WO HCPCS: Performed by: NURSE PRACTITIONER

## 2025-03-10 PROCEDURE — 71045 X-RAY EXAM CHEST 1 VIEW: CPT

## 2025-03-10 PROCEDURE — 80048 BASIC METABOLIC PNL TOTAL CA: CPT

## 2025-03-10 PROCEDURE — 36415 COLL VENOUS BLD VENIPUNCTURE: CPT

## 2025-03-10 PROCEDURE — 84145 PROCALCITONIN (PCT): CPT

## 2025-03-10 RX ORDER — DIPHENHYDRAMINE HCL 25 MG
25 TABLET ORAL EVERY 6 HOURS PRN
Status: DISCONTINUED | OUTPATIENT
Start: 2025-03-10 | End: 2025-03-13 | Stop reason: HOSPADM

## 2025-03-10 RX ADMIN — BUDESONIDE 500 MCG: 0.5 INHALANT RESPIRATORY (INHALATION) at 08:16

## 2025-03-10 RX ADMIN — DICYCLOMINE HYDROCHLORIDE 20 MG: 10 CAPSULE ORAL at 08:34

## 2025-03-10 RX ADMIN — GUAIFENESIN 400 MG: 400 TABLET ORAL at 08:34

## 2025-03-10 RX ADMIN — CEFEPIME 2000 MG: 2 INJECTION, POWDER, FOR SOLUTION INTRAVENOUS at 08:41

## 2025-03-10 RX ADMIN — IPRATROPIUM BROMIDE AND ALBUTEROL SULFATE 1 DOSE: 2.5; .5 SOLUTION RESPIRATORY (INHALATION) at 13:35

## 2025-03-10 RX ADMIN — DILTIAZEM HYDROCHLORIDE 240 MG: 120 CAPSULE, EXTENDED RELEASE ORAL at 08:33

## 2025-03-10 RX ADMIN — IPRATROPIUM BROMIDE AND ALBUTEROL SULFATE 1 DOSE: 2.5; .5 SOLUTION RESPIRATORY (INHALATION) at 16:10

## 2025-03-10 RX ADMIN — LOSARTAN POTASSIUM 25 MG: 25 TABLET, FILM COATED ORAL at 08:34

## 2025-03-10 RX ADMIN — DEXAMETHASONE SODIUM PHOSPHATE 4 MG: 4 INJECTION INTRA-ARTICULAR; INTRALESIONAL; INTRAMUSCULAR; INTRAVENOUS; SOFT TISSUE at 12:03

## 2025-03-10 RX ADMIN — FUROSEMIDE 40 MG: 40 TABLET ORAL at 08:34

## 2025-03-10 RX ADMIN — ARFORMOTEROL TARTRATE 15 MCG: 15 SOLUTION RESPIRATORY (INHALATION) at 08:16

## 2025-03-10 RX ADMIN — GUAIFENESIN 400 MG: 400 TABLET ORAL at 20:51

## 2025-03-10 RX ADMIN — ONDANSETRON 4 MG: 2 INJECTION, SOLUTION INTRAMUSCULAR; INTRAVENOUS at 22:11

## 2025-03-10 RX ADMIN — CEFEPIME 2000 MG: 2 INJECTION, POWDER, FOR SOLUTION INTRAVENOUS at 02:00

## 2025-03-10 RX ADMIN — MUPIROCIN: 20 OINTMENT TOPICAL at 20:53

## 2025-03-10 RX ADMIN — SODIUM CHLORIDE, PRESERVATIVE FREE 10 ML: 5 INJECTION INTRAVENOUS at 08:34

## 2025-03-10 RX ADMIN — DEXAMETHASONE SODIUM PHOSPHATE 4 MG: 4 INJECTION INTRA-ARTICULAR; INTRALESIONAL; INTRAMUSCULAR; INTRAVENOUS; SOFT TISSUE at 02:00

## 2025-03-10 RX ADMIN — VANCOMYCIN HYDROCHLORIDE 1000 MG: 1 INJECTION, POWDER, LYOPHILIZED, FOR SOLUTION INTRAVENOUS at 05:48

## 2025-03-10 RX ADMIN — WATER 2000 MG: 1 INJECTION INTRAMUSCULAR; INTRAVENOUS; SUBCUTANEOUS at 23:39

## 2025-03-10 RX ADMIN — DICYCLOMINE HYDROCHLORIDE 20 MG: 10 CAPSULE ORAL at 20:50

## 2025-03-10 RX ADMIN — IPRATROPIUM BROMIDE AND ALBUTEROL SULFATE 1 DOSE: 2.5; .5 SOLUTION RESPIRATORY (INHALATION) at 08:16

## 2025-03-10 RX ADMIN — GUAIFENESIN 400 MG: 400 TABLET ORAL at 13:15

## 2025-03-10 RX ADMIN — DICYCLOMINE HYDROCHLORIDE 20 MG: 10 CAPSULE ORAL at 13:15

## 2025-03-10 RX ADMIN — HYDROXYZINE PAMOATE 25 MG: 25 CAPSULE ORAL at 08:45

## 2025-03-10 RX ADMIN — VANCOMYCIN HYDROCHLORIDE 1000 MG: 1 INJECTION, POWDER, LYOPHILIZED, FOR SOLUTION INTRAVENOUS at 18:31

## 2025-03-10 RX ADMIN — DIPHENHYDRAMINE HYDROCHLORIDE 25 MG: 25 TABLET ORAL at 18:29

## 2025-03-10 RX ADMIN — ASPIRIN 81 MG: 81 TABLET, COATED ORAL at 08:33

## 2025-03-10 RX ADMIN — WATER 2000 MG: 1 INJECTION INTRAMUSCULAR; INTRAVENOUS; SUBCUTANEOUS at 16:44

## 2025-03-10 RX ADMIN — CLONAZEPAM 0.5 MG: 0.5 TABLET ORAL at 18:29

## 2025-03-10 RX ADMIN — METHIMAZOLE 15 MG: 5 TABLET ORAL at 08:45

## 2025-03-10 RX ADMIN — SODIUM CHLORIDE, PRESERVATIVE FREE 10 ML: 5 INJECTION INTRAVENOUS at 20:51

## 2025-03-10 RX ADMIN — ACETAMINOPHEN 650 MG: 325 TABLET ORAL at 18:29

## 2025-03-10 RX ADMIN — DULOXETINE HYDROCHLORIDE 60 MG: 60 CAPSULE, DELAYED RELEASE ORAL at 08:33

## 2025-03-10 RX ADMIN — DEXAMETHASONE SODIUM PHOSPHATE 4 MG: 4 INJECTION INTRA-ARTICULAR; INTRALESIONAL; INTRAMUSCULAR; INTRAVENOUS; SOFT TISSUE at 23:39

## 2025-03-10 RX ADMIN — ENOXAPARIN SODIUM 40 MG: 100 INJECTION SUBCUTANEOUS at 08:34

## 2025-03-10 RX ADMIN — PANTOPRAZOLE SODIUM 40 MG: 40 TABLET, DELAYED RELEASE ORAL at 05:49

## 2025-03-10 ASSESSMENT — PAIN DESCRIPTION - LOCATION: LOCATION: ABDOMEN

## 2025-03-10 ASSESSMENT — PAIN DESCRIPTION - DESCRIPTORS: DESCRIPTORS: ACHING;SORE;SPASM;DISCOMFORT

## 2025-03-10 ASSESSMENT — PAIN SCALES - GENERAL: PAINLEVEL_OUTOF10: 7

## 2025-03-10 NOTE — DISCHARGE INSTR - COC
Continuity of Care Form    Patient Name: Lana Phillips   :  1955  MRN:  [id number]    Admit date:  3/7/2025  Discharge date:  3/13/2025    Code Status Order: Full Code   Advance Directives:     Admitting Physician:  Maria Milanes Marino, MD  PCP: Tal Humphrey DO    Discharging Nurse: asha jones RN  Discharging Hospital Unit/Room#: 8424/8424-A  Discharging Unit Phone Number: [phone]    Emergency Contact:   Extended Emergency Contact Information  Primary Emergency Contact: Lana Reeves  Address: [address] [place] [address] [place] [address]  Home Phone: [phone]  Work Phone: [phone]  Mobile Phone: [phone]  Relation: Other Relative  Preferred language: English   needed? No  Secondary Emergency Contact: sue ward  Mobile Phone: [phone]  Relation: Brother/Sister  Preferred language: English   needed? No    Past Surgical History:  Past Surgical History:   Procedure Laterality Date    BACK SURGERY      x2    BRONCHOSCOPY N/A 2024    BRONCHOSCOPY DIAGNOSTIC OR CELL WASH ONLY performed by Jose Price MD at [facility] ENDOSCOPY    INVASIVE VASCULAR N/A 2024    Aortagram abdominal performed by Lazaro Vidales MD at [facility] CARDIAC CATH LAB    INVASIVE VASCULAR N/A 2024    Angioplasty peripheral artery performed by Lazaro Vidales MD at [facility] CARDIAC CATH LAB    LEFT OOPHORECTOMY      PAIN MANAGEMENT PROCEDURE N/A 2023    CAUDAL EPIDURAL STEROID INJECTION performed by Lilia Cabrera DO at [facility] OR    PAIN MANAGEMENT PROCEDURE N/A 2024    THERAPEUTIC CAUDAL EPIDURAL UNDER FLUOROSCOPIC GUIDANCE performed by iLlia Cabrera DO at [facility] OR    UPPER GASTROINTESTINAL ENDOSCOPY N/A 10/16/2023    EGD ESOPHAGOGASTRODUODENOSCOPY performed by Willie Chow DO at [facility] ENDOSCOPY       Immunization History:   Immunization History   Administered Date(s) Administered    COVID-19, PFIZER PURPLE top, DILUTE for use, (age 12 y+), 30mcg/0.3mL

## 2025-03-10 NOTE — PLAN OF CARE
Problem: Chronic Conditions and Co-morbidities  Goal: Patient's chronic conditions and co-morbidity symptoms are monitored and maintained or improved  3/10/2025 1254 by Daysi Mccord RN  Outcome: Progressing  3/10/2025 0511 by Hillary Silver RN  Outcome: Progressing     Problem: Skin/Tissue Integrity  Goal: Skin integrity remains intact  Description: 1.  Monitor for areas of redness and/or skin breakdown  2.  Assess vascular access sites hourly  3.  Every 4-6 hours minimum:  Change oxygen saturation probe site  4.  Every 4-6 hours:  If on nasal continuous positive airway pressure, respiratory therapy assess nares and determine need for appliance change or resting period  3/10/2025 1254 by Daysi Mccord RN  Outcome: Progressing  3/10/2025 0511 by Hillary Silver RN  Outcome: Progressing     Problem: Safety - Adult  Goal: Free from fall injury  3/10/2025 1254 by Daysi Mccord RN  Outcome: Progressing  3/10/2025 0511 by Hillary Silver RN  Outcome: Progressing     Problem: Pain  Goal: Verbalizes/displays adequate comfort level or baseline comfort level  3/10/2025 1254 by Daysi Mccord RN  Outcome: Progressing  3/10/2025 0511 by Hillary Silver RN  Outcome: Progressing

## 2025-03-10 NOTE — ACP (ADVANCE CARE PLANNING)
Advance Care Planning   The patient has the following advanced directives on file:  Advance Directives       Power of  Living Will ACP-Advance Directive ACP-Power of     Not on File Filed on 01/07/25 Filed Not on File            The patient has appointed the following active healthcare agents:    Primary Decision Maker: Lana Reeves - Other Relative - [phone]    Secondary Decision Maker: Nia Salas - Niece/Nephew - [phone]      ELIGIO Vital  3/10/2025

## 2025-03-11 ENCOUNTER — HOSPITAL ENCOUNTER (OUTPATIENT)
Dept: INFUSION THERAPY | Age: 70
End: 2025-03-11

## 2025-03-11 LAB
ANION GAP SERPL CALCULATED.3IONS-SCNC: 11 MMOL/L (ref 7–16)
BASOPHILS # BLD: 0 K/UL (ref 0–0.2)
BASOPHILS NFR BLD: 0 % (ref 0–2)
BUN SERPL-MCNC: 15 MG/DL (ref 6–23)
CALCIUM SERPL-MCNC: 8.8 MG/DL (ref 8.6–10.2)
CHLORIDE SERPL-SCNC: 92 MMOL/L (ref 98–107)
CO2 SERPL-SCNC: 34 MMOL/L (ref 22–29)
CREAT SERPL-MCNC: 0.6 MG/DL (ref 0.5–1)
DATE LAST DOSE: NORMAL
EOSINOPHIL # BLD: 0 K/UL (ref 0.05–0.5)
EOSINOPHILS RELATIVE PERCENT: 0 % (ref 0–6)
ERYTHROCYTE [DISTWIDTH] IN BLOOD BY AUTOMATED COUNT: 14.6 % (ref 11.5–15)
GFR, ESTIMATED: >90 ML/MIN/1.73M2
GLUCOSE SERPL-MCNC: 144 MG/DL (ref 74–99)
HCT VFR BLD AUTO: 31.4 % (ref 34–48)
HGB BLD-MCNC: 9.7 G/DL (ref 11.5–15.5)
LYMPHOCYTES NFR BLD: 0.55 K/UL (ref 1.5–4)
LYMPHOCYTES RELATIVE PERCENT: 5 % (ref 20–42)
MCH RBC QN AUTO: 32.4 PG (ref 26–35)
MCHC RBC AUTO-ENTMCNC: 30.9 G/DL (ref 32–34.5)
MCV RBC AUTO: 105 FL (ref 80–99.9)
MONOCYTES NFR BLD: 0.18 K/UL (ref 0.1–0.95)
MONOCYTES NFR BLD: 2 % (ref 2–12)
MYELOCYTES ABSOLUTE COUNT: 0.09 K/UL
MYELOCYTES: 1 %
NEUTROPHILS NFR BLD: 92 % (ref 43–80)
NEUTS SEG NFR BLD: 9.48 K/UL (ref 1.8–7.3)
PLATELET # BLD AUTO: 323 K/UL (ref 130–450)
PMV BLD AUTO: 9.5 FL (ref 7–12)
POTASSIUM SERPL-SCNC: 4.6 MMOL/L (ref 3.5–5)
RBC # BLD AUTO: 2.99 M/UL (ref 3.5–5.5)
RBC # BLD: ABNORMAL 10*6/UL
SODIUM SERPL-SCNC: 137 MMOL/L (ref 132–146)
TME LAST DOSE: NORMAL H
VANCOMYCIN DOSE: NORMAL MG
VANCOMYCIN TROUGH SERPL-MCNC: 10.1 UG/ML (ref 5–16)
WBC OTHER # BLD: 10.3 K/UL (ref 4.5–11.5)

## 2025-03-11 PROCEDURE — 6360000002 HC RX W HCPCS: Performed by: INTERNAL MEDICINE

## 2025-03-11 PROCEDURE — 6360000002 HC RX W HCPCS: Performed by: NURSE PRACTITIONER

## 2025-03-11 PROCEDURE — 80202 ASSAY OF VANCOMYCIN: CPT

## 2025-03-11 PROCEDURE — 80048 BASIC METABOLIC PNL TOTAL CA: CPT

## 2025-03-11 PROCEDURE — 99232 SBSQ HOSP IP/OBS MODERATE 35: CPT | Performed by: INTERNAL MEDICINE

## 2025-03-11 PROCEDURE — 94640 AIRWAY INHALATION TREATMENT: CPT

## 2025-03-11 PROCEDURE — 36415 COLL VENOUS BLD VENIPUNCTURE: CPT

## 2025-03-11 PROCEDURE — 6370000000 HC RX 637 (ALT 250 FOR IP): Performed by: STUDENT IN AN ORGANIZED HEALTH CARE EDUCATION/TRAINING PROGRAM

## 2025-03-11 PROCEDURE — 94660 CPAP INITIATION&MGMT: CPT

## 2025-03-11 PROCEDURE — 2580000003 HC RX 258: Performed by: STUDENT IN AN ORGANIZED HEALTH CARE EDUCATION/TRAINING PROGRAM

## 2025-03-11 PROCEDURE — 1200000000 HC SEMI PRIVATE

## 2025-03-11 PROCEDURE — 6370000000 HC RX 637 (ALT 250 FOR IP): Performed by: NURSE PRACTITIONER

## 2025-03-11 PROCEDURE — 6370000000 HC RX 637 (ALT 250 FOR IP): Performed by: INTERNAL MEDICINE

## 2025-03-11 PROCEDURE — 6360000002 HC RX W HCPCS: Performed by: STUDENT IN AN ORGANIZED HEALTH CARE EDUCATION/TRAINING PROGRAM

## 2025-03-11 PROCEDURE — 85025 COMPLETE CBC W/AUTO DIFF WBC: CPT

## 2025-03-11 PROCEDURE — 2700000000 HC OXYGEN THERAPY PER DAY

## 2025-03-11 PROCEDURE — 2500000003 HC RX 250 WO HCPCS: Performed by: STUDENT IN AN ORGANIZED HEALTH CARE EDUCATION/TRAINING PROGRAM

## 2025-03-11 PROCEDURE — 2500000003 HC RX 250 WO HCPCS: Performed by: NURSE PRACTITIONER

## 2025-03-11 RX ORDER — DEXAMETHASONE SODIUM PHOSPHATE 4 MG/ML
4 INJECTION, SOLUTION INTRA-ARTICULAR; INTRALESIONAL; INTRAMUSCULAR; INTRAVENOUS; SOFT TISSUE EVERY 24 HOURS
Status: DISCONTINUED | OUTPATIENT
Start: 2025-03-12 | End: 2025-03-13 | Stop reason: HOSPADM

## 2025-03-11 RX ORDER — CLONAZEPAM 0.5 MG/1
0.5 TABLET ORAL EVERY 12 HOURS PRN
Status: DISCONTINUED | OUTPATIENT
Start: 2025-03-11 | End: 2025-03-13 | Stop reason: HOSPADM

## 2025-03-11 RX ADMIN — IPRATROPIUM BROMIDE AND ALBUTEROL SULFATE 1 DOSE: 2.5; .5 SOLUTION RESPIRATORY (INHALATION) at 12:08

## 2025-03-11 RX ADMIN — ONDANSETRON 4 MG: 2 INJECTION, SOLUTION INTRAMUSCULAR; INTRAVENOUS at 06:59

## 2025-03-11 RX ADMIN — DULOXETINE HYDROCHLORIDE 60 MG: 60 CAPSULE, DELAYED RELEASE ORAL at 09:57

## 2025-03-11 RX ADMIN — DICYCLOMINE HYDROCHLORIDE 20 MG: 10 CAPSULE ORAL at 21:55

## 2025-03-11 RX ADMIN — CLONAZEPAM 0.5 MG: 0.5 TABLET ORAL at 14:39

## 2025-03-11 RX ADMIN — ACETAMINOPHEN 650 MG: 325 TABLET ORAL at 14:39

## 2025-03-11 RX ADMIN — WATER 2000 MG: 1 INJECTION INTRAMUSCULAR; INTRAVENOUS; SUBCUTANEOUS at 09:56

## 2025-03-11 RX ADMIN — IPRATROPIUM BROMIDE AND ALBUTEROL SULFATE 1 DOSE: 2.5; .5 SOLUTION RESPIRATORY (INHALATION) at 21:20

## 2025-03-11 RX ADMIN — GUAIFENESIN 400 MG: 400 TABLET ORAL at 09:57

## 2025-03-11 RX ADMIN — ENOXAPARIN SODIUM 40 MG: 100 INJECTION SUBCUTANEOUS at 09:58

## 2025-03-11 RX ADMIN — SODIUM CHLORIDE, PRESERVATIVE FREE 10 ML: 5 INJECTION INTRAVENOUS at 09:56

## 2025-03-11 RX ADMIN — DIPHENHYDRAMINE HYDROCHLORIDE 25 MG: 25 TABLET ORAL at 14:39

## 2025-03-11 RX ADMIN — POLYETHYLENE GLYCOL 3350 17 G: 17 POWDER, FOR SOLUTION ORAL at 09:57

## 2025-03-11 RX ADMIN — GUAIFENESIN 400 MG: 400 TABLET ORAL at 14:31

## 2025-03-11 RX ADMIN — WATER 2000 MG: 1 INJECTION INTRAMUSCULAR; INTRAVENOUS; SUBCUTANEOUS at 17:26

## 2025-03-11 RX ADMIN — BUDESONIDE 500 MCG: 0.5 INHALANT RESPIRATORY (INHALATION) at 07:41

## 2025-03-11 RX ADMIN — BENZONATATE 100 MG: 100 CAPSULE ORAL at 06:37

## 2025-03-11 RX ADMIN — ACETAMINOPHEN 650 MG: 325 TABLET ORAL at 22:09

## 2025-03-11 RX ADMIN — HYDROXYZINE PAMOATE 25 MG: 25 CAPSULE ORAL at 09:56

## 2025-03-11 RX ADMIN — ARFORMOTEROL TARTRATE 15 MCG: 15 SOLUTION RESPIRATORY (INHALATION) at 21:20

## 2025-03-11 RX ADMIN — VANCOMYCIN HYDROCHLORIDE 1000 MG: 1 INJECTION, POWDER, LYOPHILIZED, FOR SOLUTION INTRAVENOUS at 06:33

## 2025-03-11 RX ADMIN — SODIUM CHLORIDE, PRESERVATIVE FREE 10 ML: 5 INJECTION INTRAVENOUS at 21:56

## 2025-03-11 RX ADMIN — IPRATROPIUM BROMIDE AND ALBUTEROL SULFATE 1 DOSE: 2.5; .5 SOLUTION RESPIRATORY (INHALATION) at 16:43

## 2025-03-11 RX ADMIN — MUPIROCIN: 20 OINTMENT TOPICAL at 23:53

## 2025-03-11 RX ADMIN — ASPIRIN 81 MG: 81 TABLET, COATED ORAL at 09:57

## 2025-03-11 RX ADMIN — BUDESONIDE 500 MCG: 0.5 INHALANT RESPIRATORY (INHALATION) at 21:20

## 2025-03-11 RX ADMIN — VANCOMYCIN HYDROCHLORIDE 1250 MG: 1.25 INJECTION, POWDER, LYOPHILIZED, FOR SOLUTION INTRAVENOUS at 18:55

## 2025-03-11 RX ADMIN — METHIMAZOLE 15 MG: 5 TABLET ORAL at 09:56

## 2025-03-11 RX ADMIN — LOSARTAN POTASSIUM 25 MG: 25 TABLET, FILM COATED ORAL at 09:57

## 2025-03-11 RX ADMIN — GUAIFENESIN 400 MG: 400 TABLET ORAL at 21:55

## 2025-03-11 RX ADMIN — PANTOPRAZOLE SODIUM 40 MG: 40 TABLET, DELAYED RELEASE ORAL at 06:37

## 2025-03-11 RX ADMIN — MUPIROCIN: 20 OINTMENT TOPICAL at 09:59

## 2025-03-11 RX ADMIN — DILTIAZEM HYDROCHLORIDE 240 MG: 120 CAPSULE, EXTENDED RELEASE ORAL at 09:57

## 2025-03-11 RX ADMIN — FUROSEMIDE 40 MG: 40 TABLET ORAL at 09:57

## 2025-03-11 RX ADMIN — DICYCLOMINE HYDROCHLORIDE 20 MG: 10 CAPSULE ORAL at 14:31

## 2025-03-11 RX ADMIN — ARFORMOTEROL TARTRATE 15 MCG: 15 SOLUTION RESPIRATORY (INHALATION) at 07:41

## 2025-03-11 RX ADMIN — ACETAMINOPHEN 650 MG: 325 TABLET ORAL at 06:37

## 2025-03-11 RX ADMIN — IPRATROPIUM BROMIDE AND ALBUTEROL SULFATE 1 DOSE: 2.5; .5 SOLUTION RESPIRATORY (INHALATION) at 07:41

## 2025-03-11 RX ADMIN — DICYCLOMINE HYDROCHLORIDE 20 MG: 10 CAPSULE ORAL at 09:57

## 2025-03-11 RX ADMIN — WATER 2000 MG: 1 INJECTION INTRAMUSCULAR; INTRAVENOUS; SUBCUTANEOUS at 23:52

## 2025-03-11 RX ADMIN — DEXAMETHASONE SODIUM PHOSPHATE 4 MG: 4 INJECTION INTRA-ARTICULAR; INTRALESIONAL; INTRAMUSCULAR; INTRAVENOUS; SOFT TISSUE at 11:05

## 2025-03-11 ASSESSMENT — PAIN DESCRIPTION - ORIENTATION
ORIENTATION: MID
ORIENTATION: MID

## 2025-03-11 ASSESSMENT — PAIN SCALES - GENERAL
PAINLEVEL_OUTOF10: 6
PAINLEVEL_OUTOF10: 6
PAINLEVEL_OUTOF10: 8

## 2025-03-11 ASSESSMENT — PAIN DESCRIPTION - LOCATION
LOCATION: ABDOMEN

## 2025-03-11 ASSESSMENT — PAIN DESCRIPTION - DESCRIPTORS
DESCRIPTORS: ACHING;DISCOMFORT;SORE;SPASM
DESCRIPTORS: ACHING;DULL;DISCOMFORT
DESCRIPTORS: ACHING;DULL;DISCOMFORT

## 2025-03-11 NOTE — PLAN OF CARE
Problem: Chronic Conditions and Co-morbidities  Goal: Patient's chronic conditions and co-morbidity symptoms are monitored and maintained or improved  3/10/2025 2355 by Hung Mejia RN  Outcome: Progressing     Problem: Skin/Tissue Integrity  Goal: Skin integrity remains intact  Description: 1.  Monitor for areas of redness and/or skin breakdown  2.  Assess vascular access sites hourly  3.  Every 4-6 hours minimum:  Change oxygen saturation probe site  4.  Every 4-6 hours:  If on nasal continuous positive airway pressure, respiratory therapy assess nares and determine need for appliance change or resting period  3/10/2025 2355 by Hung Mejia RN  Outcome: Progressing     Problem: Safety - Adult  Goal: Free from fall injury  3/10/2025 2355 by Hung Mejia RN  Outcome: Progressing     Problem: Pain  Goal: Verbalizes/displays adequate comfort level or baseline comfort level  3/10/2025 2355 by Hung Mejia RN  Outcome: Progressing

## 2025-03-12 ENCOUNTER — APPOINTMENT (OUTPATIENT)
Dept: GENERAL RADIOLOGY | Age: 70
DRG: 177 | End: 2025-03-12
Payer: MEDICARE

## 2025-03-12 LAB
MICROORGANISM SPEC CULT: ABNORMAL
MICROORGANISM SPEC CULT: NORMAL
MICROORGANISM SPEC CULT: NORMAL
MICROORGANISM/AGENT SPEC: ABNORMAL
SERVICE CMNT-IMP: NORMAL
SERVICE CMNT-IMP: NORMAL
SPECIMEN DESCRIPTION: ABNORMAL
SPECIMEN DESCRIPTION: NORMAL
SPECIMEN DESCRIPTION: NORMAL

## 2025-03-12 PROCEDURE — 6360000002 HC RX W HCPCS: Performed by: INTERNAL MEDICINE

## 2025-03-12 PROCEDURE — 6370000000 HC RX 637 (ALT 250 FOR IP): Performed by: INTERNAL MEDICINE

## 2025-03-12 PROCEDURE — 2700000000 HC OXYGEN THERAPY PER DAY

## 2025-03-12 PROCEDURE — 99232 SBSQ HOSP IP/OBS MODERATE 35: CPT | Performed by: STUDENT IN AN ORGANIZED HEALTH CARE EDUCATION/TRAINING PROGRAM

## 2025-03-12 PROCEDURE — 94640 AIRWAY INHALATION TREATMENT: CPT

## 2025-03-12 PROCEDURE — 6360000002 HC RX W HCPCS: Performed by: NURSE PRACTITIONER

## 2025-03-12 PROCEDURE — 6370000000 HC RX 637 (ALT 250 FOR IP): Performed by: STUDENT IN AN ORGANIZED HEALTH CARE EDUCATION/TRAINING PROGRAM

## 2025-03-12 PROCEDURE — 1200000000 HC SEMI PRIVATE

## 2025-03-12 PROCEDURE — 2580000003 HC RX 258: Performed by: STUDENT IN AN ORGANIZED HEALTH CARE EDUCATION/TRAINING PROGRAM

## 2025-03-12 PROCEDURE — 74018 RADEX ABDOMEN 1 VIEW: CPT

## 2025-03-12 PROCEDURE — 6360000002 HC RX W HCPCS: Performed by: STUDENT IN AN ORGANIZED HEALTH CARE EDUCATION/TRAINING PROGRAM

## 2025-03-12 PROCEDURE — 2500000003 HC RX 250 WO HCPCS: Performed by: STUDENT IN AN ORGANIZED HEALTH CARE EDUCATION/TRAINING PROGRAM

## 2025-03-12 PROCEDURE — 2500000003 HC RX 250 WO HCPCS: Performed by: NURSE PRACTITIONER

## 2025-03-12 PROCEDURE — 6370000000 HC RX 637 (ALT 250 FOR IP): Performed by: NURSE PRACTITIONER

## 2025-03-12 RX ORDER — SODIUM PHOSPHATE, DIBASIC AND SODIUM PHOSPHATE, MONOBASIC 7; 19 G/230ML; G/230ML
1 ENEMA RECTAL DAILY
Status: DISCONTINUED | OUTPATIENT
Start: 2025-03-12 | End: 2025-03-13 | Stop reason: HOSPADM

## 2025-03-12 RX ORDER — SENNOSIDES A AND B 8.6 MG/1
1 TABLET, FILM COATED ORAL 2 TIMES DAILY
Status: DISCONTINUED | OUTPATIENT
Start: 2025-03-12 | End: 2025-03-13 | Stop reason: HOSPADM

## 2025-03-12 RX ORDER — BISACODYL 10 MG
10 SUPPOSITORY, RECTAL RECTAL DAILY
Status: DISCONTINUED | OUTPATIENT
Start: 2025-03-12 | End: 2025-03-13 | Stop reason: HOSPADM

## 2025-03-12 RX ORDER — LACTULOSE 10 G/15ML
20 SOLUTION ORAL 2 TIMES DAILY
Status: DISCONTINUED | OUTPATIENT
Start: 2025-03-12 | End: 2025-03-13 | Stop reason: HOSPADM

## 2025-03-12 RX ORDER — SODIUM PHOSPHATE, DIBASIC AND SODIUM PHOSPHATE, MONOBASIC 7; 19 G/230ML; G/230ML
1 ENEMA RECTAL ONCE
Status: COMPLETED | OUTPATIENT
Start: 2025-03-12 | End: 2025-03-12

## 2025-03-12 RX ORDER — SODIUM PHOSPHATE, DIBASIC AND SODIUM PHOSPHATE, MONOBASIC 7; 19 G/230ML; G/230ML
1 ENEMA RECTAL
Status: DISCONTINUED | OUTPATIENT
Start: 2025-03-12 | End: 2025-03-12

## 2025-03-12 RX ADMIN — SODIUM CHLORIDE, PRESERVATIVE FREE 10 ML: 5 INJECTION INTRAVENOUS at 08:18

## 2025-03-12 RX ADMIN — BISACODYL 10 MG: 10 SUPPOSITORY RECTAL at 11:54

## 2025-03-12 RX ADMIN — GUAIFENESIN 400 MG: 400 TABLET ORAL at 08:20

## 2025-03-12 RX ADMIN — LACTULOSE 20 G: 20 SOLUTION ORAL at 15:09

## 2025-03-12 RX ADMIN — MAJOR MAGNESIUM CITRATE ORAL SOLUTION - LEMON 296 ML: 1.75 LIQUID ORAL at 14:12

## 2025-03-12 RX ADMIN — IPRATROPIUM BROMIDE AND ALBUTEROL SULFATE 1 DOSE: 2.5; .5 SOLUTION RESPIRATORY (INHALATION) at 16:48

## 2025-03-12 RX ADMIN — VANCOMYCIN HYDROCHLORIDE 1250 MG: 1.25 INJECTION, POWDER, LYOPHILIZED, FOR SOLUTION INTRAVENOUS at 18:17

## 2025-03-12 RX ADMIN — SODIUM PHOSPHATE 1 ENEMA: 7; 19 ENEMA RECTAL at 20:20

## 2025-03-12 RX ADMIN — DULOXETINE HYDROCHLORIDE 60 MG: 60 CAPSULE, DELAYED RELEASE ORAL at 08:19

## 2025-03-12 RX ADMIN — METHIMAZOLE 15 MG: 5 TABLET ORAL at 08:20

## 2025-03-12 RX ADMIN — GUAIFENESIN 400 MG: 400 TABLET ORAL at 14:22

## 2025-03-12 RX ADMIN — IPRATROPIUM BROMIDE AND ALBUTEROL SULFATE 1 DOSE: 2.5; .5 SOLUTION RESPIRATORY (INHALATION) at 07:26

## 2025-03-12 RX ADMIN — WATER 2000 MG: 1 INJECTION INTRAMUSCULAR; INTRAVENOUS; SUBCUTANEOUS at 08:18

## 2025-03-12 RX ADMIN — SENNOSIDES 8.6 MG: 8.6 TABLET, FILM COATED ORAL at 20:20

## 2025-03-12 RX ADMIN — DEXAMETHASONE SODIUM PHOSPHATE 4 MG: 4 INJECTION INTRA-ARTICULAR; INTRALESIONAL; INTRAMUSCULAR; INTRAVENOUS; SOFT TISSUE at 11:09

## 2025-03-12 RX ADMIN — GUAIFENESIN 400 MG: 400 TABLET ORAL at 20:20

## 2025-03-12 RX ADMIN — DICYCLOMINE HYDROCHLORIDE 20 MG: 10 CAPSULE ORAL at 14:22

## 2025-03-12 RX ADMIN — BUDESONIDE 500 MCG: 0.5 INHALANT RESPIRATORY (INHALATION) at 07:26

## 2025-03-12 RX ADMIN — DICYCLOMINE HYDROCHLORIDE 20 MG: 10 CAPSULE ORAL at 08:19

## 2025-03-12 RX ADMIN — WATER 2000 MG: 1 INJECTION INTRAMUSCULAR; INTRAVENOUS; SUBCUTANEOUS at 15:29

## 2025-03-12 RX ADMIN — FUROSEMIDE 40 MG: 40 TABLET ORAL at 08:19

## 2025-03-12 RX ADMIN — SENNOSIDES 8.6 MG: 8.6 TABLET, FILM COATED ORAL at 11:10

## 2025-03-12 RX ADMIN — ARFORMOTEROL TARTRATE 15 MCG: 15 SOLUTION RESPIRATORY (INHALATION) at 07:26

## 2025-03-12 RX ADMIN — POLYETHYLENE GLYCOL 3350 17 G: 17 POWDER, FOR SOLUTION ORAL at 08:18

## 2025-03-12 RX ADMIN — ENOXAPARIN SODIUM 40 MG: 100 INJECTION SUBCUTANEOUS at 08:18

## 2025-03-12 RX ADMIN — LACTULOSE 20 G: 20 SOLUTION ORAL at 20:20

## 2025-03-12 RX ADMIN — DICYCLOMINE HYDROCHLORIDE 20 MG: 10 CAPSULE ORAL at 20:20

## 2025-03-12 RX ADMIN — VANCOMYCIN HYDROCHLORIDE 1250 MG: 1.25 INJECTION, POWDER, LYOPHILIZED, FOR SOLUTION INTRAVENOUS at 06:22

## 2025-03-12 RX ADMIN — PANTOPRAZOLE SODIUM 40 MG: 40 TABLET, DELAYED RELEASE ORAL at 06:19

## 2025-03-12 RX ADMIN — ASPIRIN 81 MG: 81 TABLET, COATED ORAL at 08:19

## 2025-03-12 RX ADMIN — DILTIAZEM HYDROCHLORIDE 240 MG: 120 CAPSULE, EXTENDED RELEASE ORAL at 08:19

## 2025-03-12 RX ADMIN — LOSARTAN POTASSIUM 25 MG: 25 TABLET, FILM COATED ORAL at 08:20

## 2025-03-12 RX ADMIN — CLONAZEPAM 0.5 MG: 0.5 TABLET ORAL at 14:22

## 2025-03-12 RX ADMIN — SODIUM CHLORIDE, PRESERVATIVE FREE 10 ML: 5 INJECTION INTRAVENOUS at 20:22

## 2025-03-12 RX ADMIN — HYDROXYZINE PAMOATE 25 MG: 25 CAPSULE ORAL at 08:19

## 2025-03-12 ASSESSMENT — PAIN SCALES - GENERAL
PAINLEVEL_OUTOF10: 8
PAINLEVEL_OUTOF10: 9
PAINLEVEL_OUTOF10: 10

## 2025-03-12 ASSESSMENT — PAIN DESCRIPTION - ORIENTATION: ORIENTATION: RIGHT;LEFT

## 2025-03-12 ASSESSMENT — PAIN DESCRIPTION - LOCATION
LOCATION: ABDOMEN

## 2025-03-12 ASSESSMENT — PAIN DESCRIPTION - DESCRIPTORS
DESCRIPTORS: ACHING;DISCOMFORT;TENDER
DESCRIPTORS: ACHING;DISCOMFORT;GNAWING
DESCRIPTORS: ACHING;DISCOMFORT;GNAWING

## 2025-03-12 ASSESSMENT — PAIN DESCRIPTION - PAIN TYPE: TYPE: ACUTE PAIN

## 2025-03-12 ASSESSMENT — PAIN DESCRIPTION - ONSET: ONSET: ON-GOING

## 2025-03-12 ASSESSMENT — PAIN DESCRIPTION - FREQUENCY: FREQUENCY: CONTINUOUS

## 2025-03-12 ASSESSMENT — PAIN - FUNCTIONAL ASSESSMENT: PAIN_FUNCTIONAL_ASSESSMENT: ACTIVITIES ARE NOT PREVENTED

## 2025-03-13 VITALS
WEIGHT: 117.5 LBS | BODY MASS INDEX: 17.81 KG/M2 | OXYGEN SATURATION: 96 % | TEMPERATURE: 97 F | RESPIRATION RATE: 24 BRPM | HEIGHT: 68 IN | SYSTOLIC BLOOD PRESSURE: 140 MMHG | DIASTOLIC BLOOD PRESSURE: 75 MMHG | HEART RATE: 94 BPM

## 2025-03-13 PROCEDURE — 6370000000 HC RX 637 (ALT 250 FOR IP): Performed by: STUDENT IN AN ORGANIZED HEALTH CARE EDUCATION/TRAINING PROGRAM

## 2025-03-13 PROCEDURE — 6360000002 HC RX W HCPCS: Performed by: STUDENT IN AN ORGANIZED HEALTH CARE EDUCATION/TRAINING PROGRAM

## 2025-03-13 PROCEDURE — 6360000002 HC RX W HCPCS: Performed by: INTERNAL MEDICINE

## 2025-03-13 PROCEDURE — 6370000000 HC RX 637 (ALT 250 FOR IP): Performed by: INTERNAL MEDICINE

## 2025-03-13 PROCEDURE — 2580000003 HC RX 258: Performed by: STUDENT IN AN ORGANIZED HEALTH CARE EDUCATION/TRAINING PROGRAM

## 2025-03-13 PROCEDURE — 94660 CPAP INITIATION&MGMT: CPT

## 2025-03-13 PROCEDURE — 2700000000 HC OXYGEN THERAPY PER DAY

## 2025-03-13 PROCEDURE — 6370000000 HC RX 637 (ALT 250 FOR IP): Performed by: NURSE PRACTITIONER

## 2025-03-13 PROCEDURE — 94640 AIRWAY INHALATION TREATMENT: CPT

## 2025-03-13 PROCEDURE — 99239 HOSP IP/OBS DSCHRG MGMT >30: CPT | Performed by: STUDENT IN AN ORGANIZED HEALTH CARE EDUCATION/TRAINING PROGRAM

## 2025-03-13 RX ORDER — BISACODYL 10 MG
10 SUPPOSITORY, RECTAL RECTAL DAILY
Status: ON HOLD | DISCHARGE
Start: 2025-03-14 | End: 2025-04-13

## 2025-03-13 RX ORDER — BENZONATATE 100 MG/1
100 CAPSULE ORAL 3 TIMES DAILY PRN
Status: ON HOLD | DISCHARGE
Start: 2025-03-13 | End: 2025-03-20

## 2025-03-13 RX ORDER — LINEZOLID 600 MG/1
600 TABLET, FILM COATED ORAL EVERY 12 HOURS SCHEDULED
Status: ON HOLD | DISCHARGE
Start: 2025-03-13 | End: 2025-03-20

## 2025-03-13 RX ORDER — POLYETHYLENE GLYCOL 3350 17 G/17G
17 POWDER, FOR SOLUTION ORAL DAILY
Status: DISCONTINUED | OUTPATIENT
Start: 2025-03-13 | End: 2025-03-13 | Stop reason: HOSPADM

## 2025-03-13 RX ORDER — LINEZOLID 600 MG/1
600 TABLET, FILM COATED ORAL EVERY 12 HOURS SCHEDULED
Status: DISCONTINUED | OUTPATIENT
Start: 2025-03-13 | End: 2025-03-13 | Stop reason: HOSPADM

## 2025-03-13 RX ORDER — DEXAMETHASONE 4 MG/1
4 TABLET ORAL
Status: ON HOLD | DISCHARGE
Start: 2025-03-13 | End: 2025-03-18

## 2025-03-13 RX ADMIN — DEXAMETHASONE SODIUM PHOSPHATE 4 MG: 4 INJECTION INTRA-ARTICULAR; INTRALESIONAL; INTRAMUSCULAR; INTRAVENOUS; SOFT TISSUE at 12:05

## 2025-03-13 RX ADMIN — FUROSEMIDE 40 MG: 40 TABLET ORAL at 07:56

## 2025-03-13 RX ADMIN — LOSARTAN POTASSIUM 25 MG: 25 TABLET, FILM COATED ORAL at 07:57

## 2025-03-13 RX ADMIN — POLYETHYLENE GLYCOL 3350 17 G: 17 POWDER, FOR SOLUTION ORAL at 12:05

## 2025-03-13 RX ADMIN — DULOXETINE HYDROCHLORIDE 60 MG: 60 CAPSULE, DELAYED RELEASE ORAL at 07:56

## 2025-03-13 RX ADMIN — IPRATROPIUM BROMIDE AND ALBUTEROL SULFATE 1 DOSE: 2.5; .5 SOLUTION RESPIRATORY (INHALATION) at 20:24

## 2025-03-13 RX ADMIN — LACTULOSE 20 G: 20 SOLUTION ORAL at 07:58

## 2025-03-13 RX ADMIN — ARFORMOTEROL TARTRATE 15 MCG: 15 SOLUTION RESPIRATORY (INHALATION) at 09:15

## 2025-03-13 RX ADMIN — IPRATROPIUM BROMIDE AND ALBUTEROL SULFATE 1 DOSE: 2.5; .5 SOLUTION RESPIRATORY (INHALATION) at 09:15

## 2025-03-13 RX ADMIN — PANTOPRAZOLE SODIUM 40 MG: 40 TABLET, DELAYED RELEASE ORAL at 05:48

## 2025-03-13 RX ADMIN — GUAIFENESIN 400 MG: 400 TABLET ORAL at 07:56

## 2025-03-13 RX ADMIN — DICYCLOMINE HYDROCHLORIDE 20 MG: 10 CAPSULE ORAL at 07:55

## 2025-03-13 RX ADMIN — BUDESONIDE 500 MCG: 0.5 INHALANT RESPIRATORY (INHALATION) at 20:24

## 2025-03-13 RX ADMIN — GUAIFENESIN 400 MG: 400 TABLET ORAL at 17:10

## 2025-03-13 RX ADMIN — DILTIAZEM HYDROCHLORIDE 240 MG: 120 CAPSULE, EXTENDED RELEASE ORAL at 07:56

## 2025-03-13 RX ADMIN — BUDESONIDE 500 MCG: 0.5 INHALANT RESPIRATORY (INHALATION) at 09:15

## 2025-03-13 RX ADMIN — DICYCLOMINE HYDROCHLORIDE 20 MG: 10 CAPSULE ORAL at 17:10

## 2025-03-13 RX ADMIN — METHIMAZOLE 15 MG: 5 TABLET ORAL at 07:57

## 2025-03-13 RX ADMIN — ENOXAPARIN SODIUM 40 MG: 100 INJECTION SUBCUTANEOUS at 07:55

## 2025-03-13 RX ADMIN — ARFORMOTEROL TARTRATE 15 MCG: 15 SOLUTION RESPIRATORY (INHALATION) at 20:24

## 2025-03-13 RX ADMIN — VANCOMYCIN HYDROCHLORIDE 1250 MG: 1.25 INJECTION, POWDER, LYOPHILIZED, FOR SOLUTION INTRAVENOUS at 05:51

## 2025-03-13 RX ADMIN — CLONAZEPAM 0.5 MG: 0.5 TABLET ORAL at 03:45

## 2025-03-13 RX ADMIN — ASPIRIN 81 MG: 81 TABLET, COATED ORAL at 07:56

## 2025-03-13 RX ADMIN — ACETAMINOPHEN 650 MG: 325 TABLET ORAL at 03:45

## 2025-03-13 RX ADMIN — SENNOSIDES 8.6 MG: 8.6 TABLET, FILM COATED ORAL at 07:56

## 2025-03-13 RX ADMIN — IPRATROPIUM BROMIDE AND ALBUTEROL SULFATE 1 DOSE: 2.5; .5 SOLUTION RESPIRATORY (INHALATION) at 13:19

## 2025-03-13 RX ADMIN — IPRATROPIUM BROMIDE AND ALBUTEROL SULFATE 1 DOSE: 2.5; .5 SOLUTION RESPIRATORY (INHALATION) at 16:17

## 2025-03-13 RX ADMIN — HYDROXYZINE PAMOATE 25 MG: 25 CAPSULE ORAL at 07:56

## 2025-03-13 RX ADMIN — ACETAMINOPHEN 650 MG: 325 TABLET ORAL at 12:06

## 2025-03-13 ASSESSMENT — PAIN SCALES - GENERAL
PAINLEVEL_OUTOF10: 6
PAINLEVEL_OUTOF10: 0
PAINLEVEL_OUTOF10: 8
PAINLEVEL_OUTOF10: 1

## 2025-03-13 ASSESSMENT — PAIN DESCRIPTION - ORIENTATION
ORIENTATION: RIGHT;LEFT
ORIENTATION: RIGHT;LEFT

## 2025-03-13 ASSESSMENT — PAIN DESCRIPTION - LOCATION
LOCATION: ABDOMEN

## 2025-03-13 ASSESSMENT — PAIN DESCRIPTION - PAIN TYPE: TYPE: ACUTE PAIN

## 2025-03-13 ASSESSMENT — PAIN DESCRIPTION - DESCRIPTORS
DESCRIPTORS: ACHING;DISCOMFORT;TENDER
DESCRIPTORS: ACHING

## 2025-03-13 ASSESSMENT — PAIN DESCRIPTION - ONSET: ONSET: ON-GOING

## 2025-03-13 ASSESSMENT — PAIN DESCRIPTION - FREQUENCY: FREQUENCY: CONTINUOUS

## 2025-03-13 ASSESSMENT — PAIN - FUNCTIONAL ASSESSMENT: PAIN_FUNCTIONAL_ASSESSMENT: ACTIVITIES ARE NOT PREVENTED

## 2025-03-13 NOTE — DISCHARGE SUMMARY
solution  Commonly known as: DUONEB     losartan 25 MG tablet  Commonly known as: COZAAR  Take 1 tablet by mouth daily     methIMAzole 10 MG tablet  Commonly known as: TAPAZOLE  Take 1.5 tablets by mouth daily     pantoprazole 40 MG tablet  Commonly known as: PROTONIX  Take 1 tablet by mouth every morning (before breakfast)     polyethylene glycol 17 g packet  Commonly known as: GLYCOLAX     senna 8.6 MG tablet  Commonly known as: SENOKOT     Tylenol 8 Hour Arthritis Pain 650 MG extended release tablet  Generic drug: acetaminophen     vitamin D 1.25 MG (44611 UT) Caps capsule  Commonly known as: ERGOCALCIFEROL               Where to Get Your Medications        Information about where to get these medications is not yet available    Ask your nurse or doctor about these medications  benzonatate 100 MG capsule  bisacodyl 10 MG suppository  dexAMETHasone 4 MG tablet  linezolid 600 MG tablet         Time Spent on discharge is 45 minutes in the examination, evaluation, counseling and review of medications and discharge plan.    +++++++++++++++++++++++++++++++++++++++++++++++++  Jenna Langley MD  [facility] - [place], [place]  +++++++++++++++++++++++++++++++++++++++++++++++++  NOTE: This report was transcribed using voice recognition software. Every effort was made to ensure accuracy; however, inadvertent computerized transcription errors may be present.

## 2025-03-13 NOTE — CARE COORDINATION
Social Work /Transition of Care:    Pt presents to the ED secondary to shortness of breath from [place] [facility].  Pt recently discharged from this hospital on 2/28.  Pt tested positive for covid while in the ED.  Pt is currently on 8L high flow oxygen.    Per ED physician, plan will be to admit pt.    Per liaison at [facility] [place], pt is a long term care bed hold and able to return when medically clear.  LINSEY/MARGARITA will follow.      
Social Work/Case Management Transition of Care Planning (Marlin Jama -[phone]):  Per report and chart review, patient is on IV Decadron q12.  She is on 5L NC at 100%.  NIV at HS.  Oxygen to be weaned as tolerated.  Pulmonology is following. Patient is on IV Vanc q12 and IV Ancef q8.  ID is following.  Discharge plan is to return to [place].  She is a long term bed hold.  No pre-cert or HENS is needed.  PENNY/destination completed.  Discharge envelope with ambulette form is in the soft chart.  CM/SW will follow.   Marlin Jama, ELIGIO  3/11/2025    
Social Work/Case Management Transition of Care Planning (Marlin Jama -[phone]):  Per report and chart review, patient is now on IV Decadron q24.  She is on 5L at 98%.  Oxygen to be weaned as tolerated.  Baseline is 4L NC.  NIV is used at HS. Okay for discharge per pulmonology.   She is on IV Vanc q12 and IV Ancef q8.  Plan is for oral antibiotics at discharge. Will need final plan for antibiotics.   Discharge plan is to return to [place]. She is a long term bed hold. No pre-cert or HENS is needed. PENNY/destination completed. Discharge envelope with ambulette form is in the soft chart. CM/SW will follow. Marlin Jama, ELIGIO  3/12/2025    
Social Work/Case Management Transition of Care Planning (Marlin Jama -[phone]):  Per report and chart review, KUB was completed.  No bowel obstruction noted. She has now had a bowel movement.   Patient remains on 5L NC at 95%.  Baseline is 4L NC.  Oxygen to be weaned as tolerated.  She is on IV Decadron q24.  Pulmonology has cleared for discharge.  Patient remains on IV Vanc q12.  Plan is for oral Zyvox on discharge. Message sent to attending to request review for discharge. Met with patient at bedside.  Discharge plan is to return to [place]. She is a long term bed hold. No pre-cert or HENS is needed. PENNY/destination completed. Discharge envelope with ambulette form is in the soft chart. CM/SW will follow.    ELIGIO Vital  3/13/2025    Update:  Discharge order noted.  Transport via [facility] ambulette with a  time of 6:00-8:00 pm.  Notified patient, floor nurse, and Karey of [place].  Message left for patient's sister, Lana.  Discharge envelope with ambulette form is in the soft chart.  ELIGIO Vital  3/13/2025    
Hypoxemia [R09.02]  Acute exacerbation of chronic obstructive pulmonary disease (COPD) (HCC) [J44.1]  HCAP (healthcare-associated pneumonia) [J18.9]  Pneumonia due to infectious organism, unspecified laterality, unspecified part of lung [J18.9]  Pneumonia due to COVID-19 virus [U07.1, J12.82]    IF APPLICABLE: The Patient and/or patient representative Lana and her family were provided with a choice of provider and agrees with the discharge plan. Freedom of choice list with basic dialogue that supports the patient's individualized plan of care/goals and shares the quality data associated with the providers was provided to:     Patient Representative Name:       The Patient and/or Patient Representative Agree with the Discharge Plan      ELIGIO Vital  Case Management Department  Ph: [phone]

## 2025-03-13 NOTE — PROGRESS NOTES
Hospitalist Progress Note      Chief Complaint:  had concerns including Shortness of Breath ([place], COPD, moist, wears 6L NC was 79%, treatment 93-94%, 3 duonebs, 125 solumedrol, hx afib).    Admission Date: 3/7/2025     SYNOPSIS:70-year-old female patient with history of paroxysmal atrial fibrillation, chronic hypoxemic respiratory failure baseline 4 L nasal cannula, hypertension, hyperthyroidism, frequent falls, chronic leukocytosis, COPD, hyperlipidemia, PVD, malnutrition, RUTH, GI bleed, who presented to the ER from nursing facility with complaint of shortness of breath, per report her saturation was 79% on the 6 L nasal cannula.   CTA pulmonary with contrast with no evidence of PE.  Multifocal bilateral proximal lower lobe endobronchial and lower lobe segmental endobronchial mucoid impaction with multifocal mild postobstructive atelectasis  in the right lower lobe but no postobstruction  atelectatic changes or postobstructive pneumonia in the left lower lobe.  Advanced bilateral emphysema.   Patient admitted for management of Acute On chronic hypoxic respiratory Failure with Hypercapnia, COPD exacerbation     SUBJECTIVE:    Patient complains of constipation, multiple bowel regimen on board, Await BM Records reviewed.   Stable overnight. No overnight issues reported     Temp (24hrs), Av °F (36.7 °C), Min:97.8 °F (36.6 °C), Max:98.1 °F (36.7 °C)    DIET: ADULT DIET; Regular; 5 carb choices (75 gm/meal); Low Fat/Low Chol/High Fiber/2 gm Na  ADULT ORAL NUTRITION SUPPLEMENT; AM Snack, PM Snack, HS Snack; Standard High Calorie/High Protein Oral Supplement  CODE: Full Code    Intake/Output Summary (Last 24 hours) at 3/12/2025 1522  Last data filed at 3/12/2025 1027  Gross per 24 hour   Intake 600 ml   Output --   Net 600 ml       OBJECTIVE:    /85   Pulse 87   Temp 98.1 °F (36.7 °C) (Temporal)   Resp 18   Ht 1.727 m (5' 8\")   Wt 53.3 kg (117 lb 8.1 oz)   SpO2 98%   BMI 17.87 kg/m²     General 
    Hospitalist Progress Note      SYNOPSIS: Patient admitted on 3/7/2025 for HCAP (healthcare-associated pneumonia)  70-year-old female patient with history of paroxysmal atrial fibrillation, chronic hypoxemic respiratory failure baseline 4 L nasal cannula, hypertension, hyperthyroidism, frequent falls, chronic leukocytosis, COPD, hyperlipidemia, PVD, malnutrition, RUTH, GI bleed, who presented to the ER from nursing facility with complaint of shortness of breath, per report her saturation was 79% on the 6 L nasal cannula.  On ER evaluation temp 99.4 F, /64  RR 28 SpO2 92% on 6 L NC.  Blood work with leukocytosis 24.6 with left shift, hemoglobin 11.4, potassium 2.9, normal renal function, CO2 37, troponin 63 and repeat 62, lactic acid 2.0, D-dimer 273, ABG on 6 L nasal cannula pH 7.44 pCO2 64 pO2 60.  CTA pulmonary with contrast with no evidence of PE.  Multifocal bilateral proximal lower lobe endobronchial and lower lobe segmental endobronchial mucoid impaction with multifocal mild postobstructive atelectasis  in the right lower lobe but no postobstruction  atelectatic changes or postobstructive pneumonia in the left lower lobe.  Advanced bilateral emphysema.  Patient was given ceftriaxone, doxycycline, DuoNeb once, Solu-Medrol 125 mg, KCl 40 mill equivalent, normal saline bolus 500 cc     SUBJECTIVE:  Stable overnight. No other overnight issues reported.   Patient seen and examined at bedside today a.m. does complain of shortness of breath but overall improving,  her oxygen requirement has improved to 5 L which is close to her baseline of 4 L.  Patient states improvement in diarrhea  and abdominal pain  Records reviewed.         Temp (24hrs), Av.3 °F (36.8 °C), Min:98.1 °F (36.7 °C), Max:98.4 °F (36.9 °C)    DIET: ADULT DIET; Regular; 5 carb choices (75 gm/meal); Low Fat/Low Chol/High Fiber/2 gm Na  ADULT ORAL NUTRITION SUPPLEMENT; AM Snack, PM Snack, HS Snack; Standard High Calorie/High Protein 
    Hospitalist Progress Note      SYNOPSIS: Patient admitted on 3/7/2025 for HCAP (healthcare-associated pneumonia)  70-year-old female patient with history of paroxysmal atrial fibrillation, chronic hypoxemic respiratory failure baseline 4 L nasal cannula, hypertension, hyperthyroidism, frequent falls, chronic leukocytosis, COPD, hyperlipidemia, PVD, malnutrition, RUTH, GI bleed, who presented to the ER from nursing facility with complaint of shortness of breath, per report her saturation was 79% on the 6 L nasal cannula.  On ER evaluation temp 99.4 F, /64  RR 28 SpO2 92% on 6 L NC.  Blood work with leukocytosis 24.6 with left shift, hemoglobin 11.4, potassium 2.9, normal renal function, CO2 37, troponin 63 and repeat 62, lactic acid 2.0, D-dimer 273, ABG on 6 L nasal cannula pH 7.44 pCO2 64 pO2 60.  CTA pulmonary with contrast with no evidence of PE.  Multifocal bilateral proximal lower lobe endobronchial and lower lobe segmental endobronchial mucoid impaction with multifocal mild postobstructive atelectasis  in the right lower lobe but no postobstruction  atelectatic changes or postobstructive pneumonia in the left lower lobe.  Advanced bilateral emphysema.  Patient was given ceftriaxone, doxycycline, DuoNeb once, Solu-Medrol 125 mg, KCl 40 mill equivalent, normal saline bolus 500 cc     SUBJECTIVE:  Stable overnight. No other overnight issues reported.   Patient seen and examined at bedside today a.m. does complain of shortness of breath, however her oxygen requirement has improved to 5 L.  Patient states improvement in diarrhea  and abdominal pain  Records reviewed.         Temp (24hrs), Av.9 °F (36.6 °C), Min:97.6 °F (36.4 °C), Max:98.2 °F (36.8 °C)    DIET: ADULT DIET; Regular; 5 carb choices (75 gm/meal); Low Fat/Low Chol/High Fiber/2 gm Na  ADULT ORAL NUTRITION SUPPLEMENT; AM Snack, PM Snack, HS Snack; Standard High Calorie/High Protein Oral Supplement  CODE: Full Code    Intake/Output 
    Hospitalist Progress Note      SYNOPSIS: Patient admitted on 3/7/2025 for HCAP (healthcare-associated pneumonia)  70-year-old female patient with history of paroxysmal atrial fibrillation, chronic hypoxemic respiratory failure baseline 4 L nasal cannula, hypertension, hyperthyroidism, frequent falls, chronic leukocytosis, COPD, hyperlipidemia, PVD, malnutrition, RUTH, GI bleed, who presented to the ER from nursing facility with complaint of shortness of breath, per report her saturation was 79% on the 6 L nasal cannula.  On ER evaluation temp 99.4 F, /64  RR 28 SpO2 92% on 6 L NC.  Blood work with leukocytosis 24.6 with left shift, hemoglobin 11.4, potassium 2.9, normal renal function, CO2 37, troponin 63 and repeat 62, lactic acid 2.0, D-dimer 273, ABG on 6 L nasal cannula pH 7.44 pCO2 64 pO2 60.  CTA pulmonary with contrast with no evidence of PE.  Multifocal bilateral proximal lower lobe endobronchial and lower lobe segmental endobronchial mucoid impaction with multifocal mild postobstructive atelectasis  in the right lower lobe but no postobstruction  atelectatic changes or postobstructive pneumonia in the left lower lobe.  Advanced bilateral emphysema.  Patient was given ceftriaxone, doxycycline, DuoNeb once, Solu-Medrol 125 mg, KCl 40 mill equivalent, normal saline bolus 500 cc     SUBJECTIVE:  Stable overnight. No other overnight issues reported.   Patient seen and examined at bedside today a.m. states improvement in her breathing.  Currently on 6 L of oxygen to maintain saturation.  Patient states multiple episodes of loose stool overnight without any blood  Records reviewed.         Temp (24hrs), Av °F (37.2 °C), Min:98.6 °F (37 °C), Max:99.3 °F (37.4 °C)    DIET: ADULT DIET; Regular; 5 carb choices (75 gm/meal); Low Fat/Low Chol/High Fiber/2 gm Na  ADULT ORAL NUTRITION SUPPLEMENT; AM Snack, PM Snack, HS Snack; Standard High Calorie/High Protein Oral Supplement  CODE: Full 
   03/13/25 0914   NIV Type   NIV Started/Stopped On  (pt placed on AVAPS at this time due to SOB)   Mode AVAPS   Mask Type Full face mask   Mask Size Small   Assessment   Respirations 23   SpO2 99 %   Level of Consciousness 0   Comfort Level Fair   Using Accessory Muscles Yes   Mask Compliance Good   Skin Assessment Clean, dry, & intact   Settings/Measurements   PIP Observed 14 cm H20   CPAP/EPAP 8 cmH2O   IPAP Min 10 cmH2O   IPAP Max 20 cmH2O   Vt (Set, mL) 400 mL   Vt (Measured) 496 mL   Rate Ordered 12   Insp Rise Time (%) 2 %   FiO2  40 %   I Time/ I Time % 0.9 s   Minute Volume (L/min) 11.2 Liters   Mask Leak (lpm) 28 lpm   Patient's Home Machine No   Alarm Settings   Alarms On Y       
  [facility]  [address], [address]  [place] [address]      Pulmonary Progress Note      SUBJECTIVE:  slow gradual improvement with breathing and coughing with minimal sputum production, on 6 li NC, wore NIV for a short period last night, lying down in bed when seen.    OBJECTIVE    Medications    Continuous Infusions:   sodium chloride         Scheduled Meds:   vancomycin  1,250 mg IntraVENous Q12H    ceFAZolin  2,000 mg IntraVENous q8h    enoxaparin  40 mg SubCUTAneous Daily    sodium chloride flush  5-40 mL IntraVENous 2 times per day    ipratropium 0.5 mg-albuterol 2.5 mg  1 Dose Inhalation Q4H WA RT    guaiFENesin  400 mg Oral TID    mupirocin   Each Nostril BID    dexAMETHasone  4 mg IntraVENous Q12H    arformoterol tartrate  15 mcg Nebulization BID RT    budesonide  0.5 mg Nebulization BID RT    aspirin  81 mg Oral Daily    dicyclomine  20 mg Oral TID    dilTIAZem  240 mg Oral Daily    DULoxetine  60 mg Oral Daily    furosemide  40 mg Oral Daily    hydrOXYzine pamoate  25 mg Oral Daily    losartan  25 mg Oral Daily    methIMAzole  15 mg Oral Daily    pantoprazole  40 mg Oral QAM AC       PRN Meds:clonazePAM, diphenhydrAMINE, ondansetron, loperamide, sodium chloride flush, sodium chloride, polyethylene glycol, acetaminophen **OR** acetaminophen, benzonatate    Physical    VITALS:  /67   Pulse 82   Temp 98.1 °F (36.7 °C) (Oral)   Resp 18   Ht 1.727 m (5' 8\")   Wt 53.3 kg (117 lb 8.1 oz)   SpO2 96%   BMI 17.87 kg/m²     24HR INTAKE/OUTPUT:      Intake/Output Summary (Last 24 hours) at 3/11/2025 1098  Last data filed at 3/10/2025 2329  Gross per 24 hour   Intake 200 ml   Output --   Net 200 ml       24HR PULSE OXIMETRY RANGE:    SpO2  Av.7 %  Min: 96 %  Max: 100 %    General appearance: alert, appears stated age, and cooperative  Lungs: rhonchi bilaterally with cough  Heart: regular rate and rhythm, S1, S2 normal, no 
  [facility]  [address], [address]  [place] [address]      Pulmonary Progress Note      SUBJECTIVE:  slow gradual improvement with breathing and coughing with minimal sputum production, on 5 li NC, unclear if wore NIV last night, sitting up in chair when seen.    OBJECTIVE    Medications    Continuous Infusions:   sodium chloride         Scheduled Meds:   senna  1 tablet Oral BID    bisacodyl  10 mg Rectal Daily    lactulose  20 g Oral BID    sodium phosphate  1 enema Rectal Daily    vancomycin  1,250 mg IntraVENous Q12H    dexAMETHasone  4 mg IntraVENous Q24H    enoxaparin  40 mg SubCUTAneous Daily    sodium chloride flush  5-40 mL IntraVENous 2 times per day    ipratropium 0.5 mg-albuterol 2.5 mg  1 Dose Inhalation Q4H WA RT    guaiFENesin  400 mg Oral TID    arformoterol tartrate  15 mcg Nebulization BID RT    budesonide  0.5 mg Nebulization BID RT    aspirin  81 mg Oral Daily    dicyclomine  20 mg Oral TID    dilTIAZem  240 mg Oral Daily    DULoxetine  60 mg Oral Daily    furosemide  40 mg Oral Daily    hydrOXYzine pamoate  25 mg Oral Daily    losartan  25 mg Oral Daily    methIMAzole  15 mg Oral Daily    pantoprazole  40 mg Oral QAM AC       PRN Meds:clonazePAM, diphenhydrAMINE, ondansetron, loperamide, sodium chloride flush, sodium chloride, polyethylene glycol, acetaminophen **OR** acetaminophen, benzonatate    Physical    VITALS:  /85   Pulse 87   Temp 98.1 °F (36.7 °C) (Temporal)   Resp 18   Ht 1.727 m (5' 8\")   Wt 53.3 kg (117 lb 8.1 oz)   SpO2 97%   BMI 17.87 kg/m²     24HR INTAKE/OUTPUT:      Intake/Output Summary (Last 24 hours) at 3/12/2025 8528  Last data filed at 3/12/2025 1027  Gross per 24 hour   Intake 600 ml   Output --   Net 600 ml       24HR PULSE OXIMETRY RANGE:    SpO2  Av.6 %  Min: 95 %  Max: 100 %    General appearance: alert, appears stated age, and cooperative  Lungs: rhonchi bilaterally with 
  [facility]  [address], [address]  [place] [address]      Pulmonary Progress Note      SUBJECTIVE:  slow gradual improvement with breathing and coughing with minimal sputum production, on 12 li NC saturating 99%, wore NIV for a short period last night, sitting up in bed when seen.    OBJECTIVE    Medications    Continuous Infusions:   sodium chloride         Scheduled Meds:   ceFAZolin  2,000 mg IntraVENous q8h    enoxaparin  40 mg SubCUTAneous Daily    sodium chloride flush  5-40 mL IntraVENous 2 times per day    ipratropium 0.5 mg-albuterol 2.5 mg  1 Dose Inhalation Q4H WA RT    guaiFENesin  400 mg Oral TID    mupirocin   Each Nostril BID    dexAMETHasone  4 mg IntraVENous Q12H    arformoterol tartrate  15 mcg Nebulization BID RT    budesonide  0.5 mg Nebulization BID RT    vancomycin  1,000 mg IntraVENous Q12H    aspirin  81 mg Oral Daily    dicyclomine  20 mg Oral TID    dilTIAZem  240 mg Oral Daily    DULoxetine  60 mg Oral Daily    furosemide  40 mg Oral Daily    hydrOXYzine pamoate  25 mg Oral Daily    losartan  25 mg Oral Daily    methIMAzole  15 mg Oral Daily    pantoprazole  40 mg Oral QAM AC       PRN Meds:diphenhydrAMINE, ondansetron, loperamide, sodium chloride flush, sodium chloride, polyethylene glycol, acetaminophen **OR** acetaminophen, benzonatate, clonazePAM    Physical    VITALS:  BP (!) 135/55   Pulse 82   Temp 97.6 °F (36.4 °C) (Temporal)   Resp 18   Ht 1.727 m (5' 8\")   Wt 53.3 kg (117 lb 8.1 oz)   SpO2 92%   BMI 17.87 kg/m²     24HR INTAKE/OUTPUT:      Intake/Output Summary (Last 24 hours) at 3/10/2025 1546  Last data filed at 3/9/2025 2258  Gross per 24 hour   Intake 1121.79 ml   Output --   Net 1121.79 ml       24HR PULSE OXIMETRY RANGE:    SpO2  Av.3 %  Min: 92 %  Max: 98 %    General appearance: alert, appears stated age, and cooperative  Lungs: rhonchi bilaterally with cough  Heart: regular rate and 
  [facility]  [address], [address]  [place] [address]      Pulmonary Progress Note      SUBJECTIVE:  feeling a bit better with breathing and coughing, on 7 li NC saturating 93%, wore NIV for a short period last night, sitting up in bed when seen.    OBJECTIVE    Medications    Continuous Infusions:   sodium chloride         Scheduled Meds:   sodium chloride flush  5-40 mL IntraVENous 2 times per day    enoxaparin  30 mg SubCUTAneous Daily    ipratropium 0.5 mg-albuterol 2.5 mg  1 Dose Inhalation Q4H WA RT    guaiFENesin  400 mg Oral TID    mupirocin   Each Nostril BID    cefepime  2,000 mg IntraVENous Q8H    dexAMETHasone  4 mg IntraVENous Q12H    arformoterol tartrate  15 mcg Nebulization BID RT    budesonide  0.5 mg Nebulization BID RT    vancomycin  1,000 mg IntraVENous Q12H    aspirin  81 mg Oral Daily    dicyclomine  20 mg Oral TID    dilTIAZem  240 mg Oral Daily    DULoxetine  60 mg Oral Daily    furosemide  40 mg Oral Daily    hydrOXYzine pamoate  25 mg Oral Daily    losartan  25 mg Oral Daily    methIMAzole  15 mg Oral Daily    pantoprazole  40 mg Oral QAM AC       PRN Meds:ondansetron, loperamide, sodium chloride flush, sodium chloride, polyethylene glycol, acetaminophen **OR** acetaminophen, benzonatate, clonazePAM    Physical    VITALS:  /88   Pulse 86   Temp 98.6 °F (37 °C) (Oral)   Resp 21   Ht 1.727 m (5' 8\")   Wt 53.3 kg (117 lb 8.1 oz)   SpO2 98%   BMI 17.87 kg/m²     24HR INTAKE/OUTPUT:      Intake/Output Summary (Last 24 hours) at 3/8/2025 1923  Last data filed at 3/8/2025 1818  Gross per 24 hour   Intake 1653.74 ml   Output --   Net 1653.74 ml       24HR PULSE OXIMETRY RANGE:    SpO2  Av %  Min: 93 %  Max: 99 %    General appearance: alert, appears stated age, and cooperative  Lungs: rhonchi bilaterally with cough  Heart: regular rate and rhythm, S1, S2 normal, no murmur, click, rub or gallop  Abdomen: 
  Physician Progress Note      PATIENT:               ANGIE SHELTON  [facility] #:                  [id number]  :                       1955  ADMIT DATE:       3/7/2025 7:26 AM  DISCH DATE:  RESPONDING  PROVIDER #:        Maria MILANES Milanes Marino MD          QUERY TEXT:    Pt admitted with PNA and Resp. Failure.  Pt noted to have elevated WBC,   elevated lactic, elevated CRP, elevated procalcitonin on arrival. If possible,   please document in the progress notes and discharge summary if you are   evaluating and /or treating any of the following:  The medical record reflects the following:  Risk Factors: PNA, COVID-19  Clinical Indicators: On arrival: T; 99.4, HR; 105, WBC: 24.6, Lactic 2.0, CRP;   26., Procal: 0.28. ALso noted Acute Resp. Failure.  Treatment: Maxipime IV, Vancomycin IV, Rocephin IV, 500cc bolus in the ER  Options provided:  -- Sepsis, present on admission  -- Pneumonia without Sepsis  -- Other - I will add my own diagnosis  -- Disagree - Not applicable / Not valid  -- Disagree - Clinically unable to determine / Unknown  -- Refer to Clinical Documentation Reviewer    PROVIDER RESPONSE TEXT:    This patient has Pneumonia without Sepsis.    Query created by: Jeannette Motta on 3/11/2025 12:45 PM      Electronically signed by:  Maria MILANES Milanes Marino MD 3/12/2025 7:56 AM          
4 Eyes Skin Assessment     NAME:  Lana Phillips  YOB: 1955  MEDICAL RECORD NUMBER:  [id number]    The patient is being assessed for  Admission    I agree that at least one RN has performed a thorough Head to Toe Skin Assessment on the patient. ALL assessment sites listed below have been assessed.      Areas assessed by both nurses:    Head, Face, Ears, Shoulders, Back, Chest, Arms, Elbows, Hands, Sacrum. Buttock, Coccyx, Ischium, and Legs. Feet and Heels        Does the Patient have a Wound? No noted wound(s)     Scar lower back, blanchable redness buttocks, bruising bue  Guy Prevention initiated by RN: Yes  Wound Care Orders initiated by RN: No    Pressure Injury (Stage 3,4, Unstageable, DTI, NWPT, and Complex wounds) if present, place Wound referral order by RN under : No    New Ostomies, if present place, Ostomy referral order under : No     Nurse 1 eSignature: Electronically signed by Omar Escalante RN on 3/8/25 at 2:25 AM EST    **SHARE this note so that the co-signing nurse can place an eSignature**    Nurse 2 eSignature: {Esignature:[id number]}  
Antibiotic Extended Infusion Policy     This patient is on medication that requires renal, weight, and/or indication dose adjustment.      Date Body Weight IBW  Adjusted BW SCr  CrCl Dialysis status BMI   3/7/2025 50.8 kg (112 lb) Ideal body weight: 63.9 kg (140 lb 14 oz) Serum creatinine: 0.6 mg/dL 03/07/25 0753  Estimated creatinine clearance: 68 mL/min N/a Body mass index is 17.03 kg/m².       Pharmacy has dose-adjusted the following medication(s):    Ordered Medication: Cefepime 2000mg q12h     Order Changed/converted to: Cefepime 2000mg q8h    These changes were made per protocol according to the [facility]   Automatic Extended Infusion Dose Adjustment Policy.     *Please note this dose may need readjusted if patient's condition changes.    Please contact pharmacy with any questions regarding these changes.    Allison Parr RPH  3/7/2025  3:33 PM   
Attending was informed via perfect serve that patient is very constipated, still no bowel movement.   
DVT Prophylaxis Adjustment Policy (DVT Prophylaxis)     This patient is on DVT Prophylaxis medication that requires a dose adjustment      Date Body Weight IBW  Adjusted BW SCr  CrCl Dialysis status   3/9/2025 53.3 kg (117 lb 8.1 oz) Ideal body weight: 63.9 kg (140 lb 14 oz) Serum creatinine: 0.6 mg/dL 03/09/25 0512  Estimated creatinine clearance: 71 mL/min N/a       Pharmacy has dose-adjusted the DVT Prophylaxis regimen to match   the recommendations from the following table        Ordered Medication:Lovenox 30mg daily    Order Changed/converted to: Lovenox 40mg daily      These changes were made per protocol according to the [facility] Pharmacist   Review for Appropriate Use and Automatic Dose Adjustments of   Subcutaneous Anticoagulants Policy     *Please note this dose may need readjusted if patient's condition changes.    Please contact pharmacy with any questions regarding these changes.    Baldemar Reyes, PharmD 3/9/2025 11:11 AM    
Date: 3/11/2025    Time: 9:47 PM    Patient Placed On BIPAP/CPAP/ Non-Invasive Ventilation?  Yes    If no must comment.  Facial area red/color change? No           If YES are Blister/Lesion present?No   If yes must notify nursing staff  BIPAP/CPAP skin barrier?  Yes    Skin barrier type:mepilexlite         Comments:        Johana Reed RCP  
Enema was given  
[facility]  [facility]  Progress Note      Chief Complaint   Patient presents with    Shortness of Breath     [place], COPD, moist, wears 6L NC was 79%, treatment 93-94%, 3 duonebs, 125 solumedrol, hx afib       SUBJECTIVE:    Patient is tolerating medications. No reported adverse drug reactions.  No nausea, vomiting, diarrhea. On BSC. Reports constipation. She has miralax prn, notified rn  Review of systems:  As stated above in the chief complaint, otherwise negative.    Medications:  Scheduled Meds:   senna  1 tablet Oral BID    bisacodyl  10 mg Rectal Daily    lactulose  20 g Oral BID    sodium phosphate  1 enema Rectal Daily    vancomycin  1,250 mg IntraVENous Q12H    dexAMETHasone  4 mg IntraVENous Q24H    ceFAZolin  2,000 mg IntraVENous q8h    enoxaparin  40 mg SubCUTAneous Daily    sodium chloride flush  5-40 mL IntraVENous 2 times per day    ipratropium 0.5 mg-albuterol 2.5 mg  1 Dose Inhalation Q4H WA RT    guaiFENesin  400 mg Oral TID    arformoterol tartrate  15 mcg Nebulization BID RT    budesonide  0.5 mg Nebulization BID RT    aspirin  81 mg Oral Daily    dicyclomine  20 mg Oral TID    dilTIAZem  240 mg Oral Daily    DULoxetine  60 mg Oral Daily    furosemide  40 mg Oral Daily    hydrOXYzine pamoate  25 mg Oral Daily    losartan  25 mg Oral Daily    methIMAzole  15 mg Oral Daily    pantoprazole  40 mg Oral QAM AC     Continuous Infusions:   sodium chloride       PRN Meds:clonazePAM, diphenhydrAMINE, ondansetron, loperamide, sodium chloride flush, sodium chloride, polyethylene glycol, acetaminophen **OR** acetaminophen, benzonatate    OBJECTIVE:  /85   Pulse 87   Temp 98.1 °F (36.7 °C) (Temporal)   Resp 18   Ht 1.727 m (5' 8\")   Wt 53.3 kg (117 lb 8.1 oz)   SpO2 98%   BMI 17.87 kg/m²   Temp  Av °F (36.7 °C)  Min: 97.8 °F (36.6 °C)  Max: 98.1 °F (36.7 °C)  Constitutional: The patient is awake, alert, and oriented. On bedside commode  Skin: Warm and dry. No 
ID consult sent to Dr. Obrien  
[facility]  [facility]  Progress Note      Chief Complaint   Patient presents with    Shortness of Breath     [place], COPD, moist, wears 6L NC was 79%, treatment 93-94%, 3 duonebs, 125 solumedrol, hx afib       SUBJECTIVE:    Patient is tolerating medications. No reported adverse drug reactions.  No nausea, vomiting, diarrhea.    Review of systems:  As stated above in the chief complaint, otherwise negative.    Medications:  Scheduled Meds:   enoxaparin  40 mg SubCUTAneous Daily    sodium chloride flush  5-40 mL IntraVENous 2 times per day    ipratropium 0.5 mg-albuterol 2.5 mg  1 Dose Inhalation Q4H WA RT    guaiFENesin  400 mg Oral TID    mupirocin   Each Nostril BID    cefepime  2,000 mg IntraVENous Q8H    dexAMETHasone  4 mg IntraVENous Q12H    arformoterol tartrate  15 mcg Nebulization BID RT    budesonide  0.5 mg Nebulization BID RT    vancomycin  1,000 mg IntraVENous Q12H    aspirin  81 mg Oral Daily    dicyclomine  20 mg Oral TID    dilTIAZem  240 mg Oral Daily    DULoxetine  60 mg Oral Daily    furosemide  40 mg Oral Daily    hydrOXYzine pamoate  25 mg Oral Daily    losartan  25 mg Oral Daily    methIMAzole  15 mg Oral Daily    pantoprazole  40 mg Oral QAM AC     Continuous Infusions:   sodium chloride       PRN Meds:ondansetron, loperamide, sodium chloride flush, sodium chloride, polyethylene glycol, acetaminophen **OR** acetaminophen, benzonatate, clonazePAM    OBJECTIVE:  BP (!) 145/73   Pulse 77   Temp 97.7 °F (36.5 °C) (Oral)   Resp 22   Ht 1.727 m (5' 8\")   Wt 53.3 kg (117 lb 8.1 oz)   SpO2 96%   BMI 17.87 kg/m²   Temp  Av.3 °F (36.8 °C)  Min: 97.7 °F (36.5 °C)  Max: 98.8 °F (37.1 °C)  Constitutional: The patient is awake, alert, and oriented.   Skin: Warm and dry. No rashes were noted. No jaundice.  HEENT: Eyes show round, and reactive pupils. Moist mucous membranes, no ulcerations, no thrush.   Neck: Supple to movements. No lymphadenopathy.   Chest: No use of 
Located within [facility]  [facility]  Progress Note      Chief Complaint   Patient presents with    Shortness of Breath     [place], COPD, moist, wears 6L NC was 79%, treatment 93-94%, 3 duonebs, 125 solumedrol, hx afib       SUBJECTIVE:    Patient is tolerating medications. No reported adverse drug reactions.  No nausea, vomiting, diarrhea. On BSC. Reports constipation. She has miralax prn, notified rn  Review of systems:  As stated above in the chief complaint, otherwise negative.    Medications:  Scheduled Meds:   vancomycin  1,250 mg IntraVENous Q12H    ceFAZolin  2,000 mg IntraVENous q8h    enoxaparin  40 mg SubCUTAneous Daily    sodium chloride flush  5-40 mL IntraVENous 2 times per day    ipratropium 0.5 mg-albuterol 2.5 mg  1 Dose Inhalation Q4H WA RT    guaiFENesin  400 mg Oral TID    mupirocin   Each Nostril BID    dexAMETHasone  4 mg IntraVENous Q12H    arformoterol tartrate  15 mcg Nebulization BID RT    budesonide  0.5 mg Nebulization BID RT    aspirin  81 mg Oral Daily    dicyclomine  20 mg Oral TID    dilTIAZem  240 mg Oral Daily    DULoxetine  60 mg Oral Daily    furosemide  40 mg Oral Daily    hydrOXYzine pamoate  25 mg Oral Daily    losartan  25 mg Oral Daily    methIMAzole  15 mg Oral Daily    pantoprazole  40 mg Oral QAM AC     Continuous Infusions:   sodium chloride       PRN Meds:diphenhydrAMINE, ondansetron, loperamide, sodium chloride flush, sodium chloride, polyethylene glycol, acetaminophen **OR** acetaminophen, benzonatate, clonazePAM    OBJECTIVE:  /67   Pulse 82   Temp 98.1 °F (36.7 °C) (Oral)   Resp 18   Ht 1.727 m (5' 8\")   Wt 53.3 kg (117 lb 8.1 oz)   SpO2 100%   BMI 17.87 kg/m²   Temp  Av.3 °F (36.8 °C)  Min: 98.1 °F (36.7 °C)  Max: 98.4 °F (36.9 °C)  Constitutional: The patient is awake, alert, and oriented. On bedside commode  Skin: Warm and dry. No rashes were noted. No jaundice.  HEENT: Eyes show round, and reactive pupils. Moist mucous membranes, 
Notified dr light of pts mild nosebleed, off and on bleeding   
Notified dr light via perfect serve of pts nose bleed   
Nursing instructor accessed chart for student assignment and teaching  
Patient received the Sacrament of the Anointing of the Sick on March 10, 2025, by Father Franco Mccloud.    If additional support is requested or needed please reach out to Spiritual [facility] ([phone]).    Chap. Phillip Reese MDIV, BCC    
Pharmacy Consultation Note  (Antibiotic Dosing and Monitoring)    Initial consult date: 3/7/25  Consulting physician/provider: Milanes Marino, Maria, MD  Drug: Vancomycin  Indication: Nosocomial pneumonia    Age/  Gender Height Weight IBW  Allergy Information   70 y.o./female   50.8 kg (112 lb)     Ideal body weight: 63.9 kg (140 lb 14 oz)   Pcn [penicillins]      Renal Function:  Recent Labs     03/07/25  0753   BUN 14   CREATININE 0.6     No intake or output data in the 24 hours ending 03/07/25 1647    Vancomycin Monitoring:  Trough:  No results for input(s): \"VANCOTROUGH\" in the last 72 hours.  Random:  No results for input(s): \"VANCORANDOM\" in the last 72 hours.    Vancomycin Administration Times:  Recent vancomycin administrations        No vancomycin IV orders with administrations found.                    Assessment:  Patient is a 70 y.o. female who has been initiated on vancomycin  Estimated Creatinine Clearance: 68 mL/min (based on SCr of 0.6 mg/dL).  3/7: Scr 0.6, WBC 24.6, tachycardic, afebrile.   MRSA nares pending    Plan:  Will load with vancomycin 1250 mg IV x1 tonight  Continue with vancomycin 1000 mg IV q12h tomorrow morning  Will check vancomycin levels when appropriate  Will continue to monitor renal function   Pharmacy to follow    Eliecer Quinteros, PharmD  PGY-1 Pharmacy Practice Resident   3/7/2025 4:48 PM    
Pharmacy Consultation Note  (Antibiotic Dosing and Monitoring)    Initial consult date: 3/7/25  Consulting physician/provider: Milanes Marino, Maria, MD  Drug: Vancomycin  Indication: Nosocomial pneumonia    Age/  Gender Height Weight IBW  Allergy Information   70 y.o./female 172.7 cm (5' 8\") 50.8 kg (112 lb)     Ideal body weight: 63.9 kg (140 lb 14 oz)   Pcn [penicillins]      Renal Function:  Recent Labs     03/07/25  0753 03/08/25  0723   BUN 14 13   CREATININE 0.6 0.6       Intake/Output Summary (Last 24 hours) at 3/8/2025 0842  Last data filed at 3/8/2025 0603  Gross per 24 hour   Intake 480 ml   Output --   Net 480 ml       Vancomycin Monitoring:  Trough:  No results for input(s): \"VANCOTROUGH\" in the last 72 hours.  Random:  No results for input(s): \"VANCORANDOM\" in the last 72 hours.    Vancomycin Administration Times:    Recent vancomycin administrations                     vancomycin (VANCOCIN) 1,000 mg in sodium chloride 0.9 % 250 mL IVPB ([id number]) (mg) 1,000 mg New Bag 03/08/25 0559    vancomycin (VANCOCIN) 1,250 mg in sodium chloride 0.9 % 250 mL IVPB ([id number]) (mg) 1,250 mg New Bag 03/07/25 1725                  Assessment:  Patient is a 70 y.o. female who has been initiated on vancomycin  Estimated Creatinine Clearance: 71 mL/min (based on SCr of 0.6 mg/dL).  3/7: Scr 0.6, WBC 24.6, tachycardic, afebrile.   MRSA nares pending  3/8: Scr 0.6    Plan:  Continue vancomycin 1000 mg IV q12h  Will check vancomycin trough level tomorrow morning before 0600 dose   Will continue to monitor renal function   Pharmacy to follow    Thank you for the consult,     Keith Childress, PharmD, BCPS, BCCCP 3/8/2025 8:47 AM  Phone: [phone]    
Pharmacy Consultation Note  (Antibiotic Dosing and Monitoring)    Initial consult date: 3/7/25  Consulting physician/provider: Milanes Marino, Maria, MD  Drug: Vancomycin  Indication: Nosocomial pneumonia    Age/  Gender Height Weight IBW  Allergy Information   70 y.o./female 172.7 cm (5' 8\") 50.8 kg (112 lb)     Ideal body weight: 63.9 kg (140 lb 14 oz)   Pcn [penicillins]      Renal Function:  Recent Labs     03/07/25  0753 03/08/25  0723 03/09/25  0512   BUN 14 13 15   CREATININE 0.6 0.6 0.6       Intake/Output Summary (Last 24 hours) at 3/9/2025 0856  Last data filed at 3/9/2025 0556  Gross per 24 hour   Intake 1653.74 ml   Output --   Net 1653.74 ml       Vancomycin Monitoring:  Trough:    Recent Labs     03/09/25  0512   VANCOTROUGH 13.8     Random:  No results for input(s): \"VANCORANDOM\" in the last 72 hours.    Vancomycin Administration Times:    Recent vancomycin administrations                     vancomycin (VANCOCIN) 1,000 mg in sodium chloride 0.9 % 250 mL IVPB ([id number]) (mg) 1,000 mg New Bag 03/08/25 1731     1,000 mg New Bag  0559    vancomycin (VANCOCIN) 1,250 mg in sodium chloride 0.9 % 250 mL IVPB ([id number]) (mg) 1,250 mg New Bag 03/07/25 1725                      Assessment:  Patient is a 70 y.o. female who has been initiated on vancomycin  Estimated Creatinine Clearance: 71 mL/min (based on SCr of 0.6 mg/dL).  3/7: Scr 0.6, WBC 24.6, tachycardic, afebrile.   MRSA nares pending  3/8: Scr 0.6  3/9: trough 13.8 mcg/mL, scr 0.6    Plan:  Continue vancomycin 1000 mg IV every 12 hours  Will check vancomycin levels as needed  Will continue to monitor renal function   Pharmacy to follow    Thank you for the consult,     Liberty Fuentes, PharmD  PGY1 Pharmacy Practice Resident [phone]  3/9/2025 8:57 AM      
Pharmacy Consultation Note  (Antibiotic Dosing and Monitoring)    Initial consult date: 3/7/25  Consulting physician/provider: Milanes Marino, Maria, MD  Drug: Vancomycin  Indication: Nosocomial pneumonia    Age/  Gender Height Weight IBW  Allergy Information   70 y.o./female 172.7 cm (5' 8\") 50.8 kg (112 lb)     Ideal body weight: 63.9 kg (140 lb 14 oz)   Pcn [penicillins]      Renal Function:  Recent Labs     03/08/25  0723 03/09/25  0512 03/10/25  0442   BUN 13 15 15   CREATININE 0.6 0.6 0.7       Intake/Output Summary (Last 24 hours) at 3/10/2025 0824  Last data filed at 3/9/2025 2258  Gross per 24 hour   Intake 1361.79 ml   Output --   Net 1361.79 ml       Vancomycin Monitoring:  Trough:    Recent Labs     03/09/25  0512   VANCOTROUGH 13.8     Random:  No results for input(s): \"VANCORANDOM\" in the last 72 hours.    Recent vancomycin administrations                     vancomycin (VANCOCIN) 1,000 mg in sodium chloride 0.9 % 250 mL IVPB ([id number]) (mg) 1,000 mg New Bag 03/10/25 0548     1,000 mg New Bag 03/09/25 2001     1,000 mg New Bag  1114     1,000 mg New Bag 03/08/25 1731     1,000 mg New Bag  0559    vancomycin (VANCOCIN) 1,250 mg in sodium chloride 0.9 % 250 mL IVPB ([id number]) (mg) 1,250 mg New Bag 03/07/25 1725                              Assessment:  Patient is a 70 y.o. female who has been initiated on vancomycin  Estimated Creatinine Clearance: 61 mL/min (based on SCr of 0.7 mg/dL).  3/7: Scr 0.6, WBC 24.6, tachycardic, afebrile.   MRSA nares pending  3/8: Scr 0.6  3/9: trough 13.8 mcg/mL, scr 0.6  3/10: Scr 0.7    Plan:  Continue vancomycin 1000 mg q12h  Will check vancomycin trough tomorrow am  Will continue to monitor renal function   Pharmacy to follow    Thank you for the consult,     Mike Casey, PharmD, BCPS 3/10/2025 8:25 AM  #[phone]      
Pharmacy Consultation Note  (Antibiotic Dosing and Monitoring)    Initial consult date: 3/7/25  Consulting physician/provider: Milanes Marino, Maria, MD  Drug: Vancomycin  Indication: Nosocomial pneumonia    Age/  Gender Height Weight IBW  Allergy Information   70 y.o./female 172.7 cm (5' 8\") 50.8 kg (112 lb)     Ideal body weight: 63.9 kg (140 lb 14 oz)   Pcn [penicillins]      Renal Function:  Recent Labs     03/09/25  0512 03/10/25  0442 03/11/25  0612   BUN 15 15 15   CREATININE 0.6 0.7 0.6       Intake/Output Summary (Last 24 hours) at 3/11/2025 0805  Last data filed at 3/10/2025 2329  Gross per 24 hour   Intake 200 ml   Output --   Net 200 ml       Vancomycin Monitoring:  Trough:    Recent Labs     03/09/25  0512 03/11/25  0612   VANCOTROUGH 13.8 10.1     Random:  No results for input(s): \"VANCORANDOM\" in the last 72 hours.    Recent vancomycin administrations                     vancomycin (VANCOCIN) 1,000 mg in sodium chloride 0.9 % 250 mL IVPB ([id number]) (mg) 1,000 mg New Bag 03/11/25 0633     1,000 mg New Bag 03/10/25 1831     1,000 mg New Bag  0548     1,000 mg New Bag 03/09/25 2001     1,000 mg New Bag  1114     1,000 mg New Bag 03/08/25 1731                                       Assessment:  Patient is a 70 y.o. female who has been initiated on vancomycin  Estimated Creatinine Clearance: 71 mL/min (based on SCr of 0.6 mg/dL).  3/7: Scr 0.6, WBC 24.6, tachycardic, afebrile.   MRSA nares pending  3/8: Scr 0.6  3/9: trough 13.8 mcg/mL, scr 0.6  3/10: Scr 0.7    Plan:  Increase vancomycin to 1250 mg q12h  Will check vancomycin level when needed  Will continue to monitor renal function   Pharmacy to follow    Thank you for the consult,     Mike Casey, PharmD, BCPS 3/11/2025 8:05 AM  #[phone]      
Pharmacy Consultation Note  (Antibiotic Dosing and Monitoring)    Initial consult date: 3/7/25  Consulting physician/provider: Milanes Marino, Maria, MD  Drug: Vancomycin  Indication: Nosocomial pneumonia    Age/  Gender Height Weight IBW  Allergy Information   70 y.o./female 172.7 cm (5' 8\") 50.8 kg (112 lb)     Ideal body weight: 63.9 kg (140 lb 14 oz)   Pcn [penicillins]      Renal Function:  Recent Labs     03/10/25  0442 03/11/25  0612   BUN 15 15   CREATININE 0.7 0.6       Intake/Output Summary (Last 24 hours) at 3/12/2025 1446  Last data filed at 3/12/2025 1027  Gross per 24 hour   Intake 600 ml   Output --   Net 600 ml       Vancomycin Monitoring:  Trough:    Recent Labs     03/11/25  0612   VANCOTROUGH 10.1     Random:  No results for input(s): \"VANCORANDOM\" in the last 72 hours.    Recent vancomycin administrations                     vancomycin (VANCOCIN) 1,250 mg in sodium chloride 0.9 % 250 mL IVPB ([id number]) (mg) 1,250 mg New Bag 03/12/25 0622     1,250 mg New Bag 03/11/25 1855    vancomycin (VANCOCIN) 1,000 mg in sodium chloride 0.9 % 250 mL IVPB ([id number]) (mg) 1,000 mg New Bag 03/11/25 0633     1,000 mg New Bag 03/10/25 1831     1,000 mg New Bag  0548     1,000 mg New Bag 03/09/25 2001                    Assessment:  Patient is a 70 y.o. female who has been initiated on vancomycin  Estimated Creatinine Clearance: 71 mL/min (based on SCr of 0.6 mg/dL).  3/7: Scr 0.6, WBC 24.6, tachycardic, afebrile.   MRSA nares pending  3/8: Scr 0.6  3/9: trough 13.8 mcg/mL, scr 0.6  3/10: Scr 0.7    Plan:  Continue vancomycin 1250 mg q12h  Will check vancomycin level when needed  Will continue to monitor renal function   Pharmacy to follow    Thank you for the consult,     Jeremias Farr PharmD, BCPS 3/12/2025 2:47 PM  [phone]  
Pharmacy Consultation Note  (Antibiotic Dosing and Monitoring)    Initial consult date: 3/7/25  Consulting physician/provider: Milanes Marino, Maria, MD  Drug: Vancomycin  Indication: Nosocomial pneumonia    Age/  Gender Height Weight IBW  Allergy Information   70 y.o./female 172.7 cm (5' 8\") 50.8 kg (112 lb)     Ideal body weight: 63.9 kg (140 lb 14 oz)   Pcn [penicillins]      Renal Function:  Recent Labs     03/11/25  0612   BUN 15   CREATININE 0.6       Intake/Output Summary (Last 24 hours) at 3/13/2025 1054  Last data filed at 3/13/2025 0547  Gross per 24 hour   Intake 480 ml   Output --   Net 480 ml       Vancomycin Monitoring:  Trough:    Recent Labs     03/11/25  0612   VANCOTROUGH 10.1     Random:  No results for input(s): \"VANCORANDOM\" in the last 72 hours.    Recent vancomycin administrations                     vancomycin (VANCOCIN) 1,250 mg in sodium chloride 0.9 % 250 mL IVPB ([id number]) (mg) 1,250 mg New Bag 03/13/25 0551     1,250 mg New Bag 03/12/25 1817     1,250 mg New Bag  0622     1,250 mg New Bag 03/11/25 1855    vancomycin (VANCOCIN) 1,000 mg in sodium chloride 0.9 % 250 mL IVPB ([id number]) (mg) 1,000 mg New Bag 03/11/25 0633     1,000 mg New Bag 03/10/25 1831                    Assessment:  Patient is a 70 y.o. female who has been initiated on vancomycin  Estimated Creatinine Clearance: 71 mL/min (based on SCr of 0.6 mg/dL).  3/7: Scr 0.6, WBC 24.6, tachycardic, afebrile.   MRSA nares pending  3/8: Scr 0.6  3/9: trough 13.8 mcg/mL, scr 0.6  3/10: Scr 0.7    Plan:  Continue vancomycin 1250 mg q12h  Will check vancomycin level when needed  Will continue to monitor renal function   Pharmacy to follow    Thank you for the consult,     Jeremias Farr PharmD, BCPS 3/13/2025 10:54 AM  [phone]  
[facility] called with updated pickup time. Pt was originally set for 6 pm, now delayed 2-3 hours.  
[facility]  [facility]  Progress Note      Chief Complaint   Patient presents with    Shortness of Breath     [place], COPD, moist, wears 6L NC was 79%, treatment 93-94%, 3 duonebs, 125 solumedrol, hx afib       SUBJECTIVE:    Patient is tolerating medications. No reported adverse drug reactions.  No nausea, vomiting, diarrhea. Placed on NRB. Having sob and cough    Review of systems:  As stated above in the chief complaint, otherwise negative.    Medications:  Scheduled Meds:   enoxaparin  40 mg SubCUTAneous Daily    sodium chloride flush  5-40 mL IntraVENous 2 times per day    ipratropium 0.5 mg-albuterol 2.5 mg  1 Dose Inhalation Q4H WA RT    guaiFENesin  400 mg Oral TID    mupirocin   Each Nostril BID    cefepime  2,000 mg IntraVENous Q8H    dexAMETHasone  4 mg IntraVENous Q12H    arformoterol tartrate  15 mcg Nebulization BID RT    budesonide  0.5 mg Nebulization BID RT    vancomycin  1,000 mg IntraVENous Q12H    aspirin  81 mg Oral Daily    dicyclomine  20 mg Oral TID    dilTIAZem  240 mg Oral Daily    DULoxetine  60 mg Oral Daily    furosemide  40 mg Oral Daily    hydrOXYzine pamoate  25 mg Oral Daily    losartan  25 mg Oral Daily    methIMAzole  15 mg Oral Daily    pantoprazole  40 mg Oral QAM AC     Continuous Infusions:   sodium chloride       PRN Meds:diphenhydrAMINE, ondansetron, loperamide, sodium chloride flush, sodium chloride, polyethylene glycol, acetaminophen **OR** acetaminophen, benzonatate, clonazePAM    OBJECTIVE:  BP (!) 135/55   Pulse 82   Temp 97.6 °F (36.4 °C) (Temporal)   Resp 18   Ht 1.727 m (5' 8\")   Wt 53.3 kg (117 lb 8.1 oz)   SpO2 92%   BMI 17.87 kg/m²   Temp  Av.9 °F (36.6 °C)  Min: 97.6 °F (36.4 °C)  Max: 98.2 °F (36.8 °C)  Constitutional: The patient is awake, alert, and oriented.   Skin: Warm and dry. No rashes were noted. No jaundice.  HEENT: Eyes show round, and reactive pupils. Moist mucous membranes, no ulcerations, no thrush.   Neck: 
gallop  Abdomen: soft, non-tender; bowel sounds normal; no masses,  no organomegaly  Extremities: extremities normal, atraumatic, no cyanosis or edema  Neurologic: Mental status: Alert, oriented, thought content appropriate    Data    CBC:   Recent Labs     03/07/25 0753 03/08/25 0723 03/09/25  0512   WBC 24.6* 23.9* 20.3*   HGB 11.4* 9.3* 9.7*   HCT 37.6 29.8* 30.8*   .7* 105.7* 105.5*    335 338       BMP:  Recent Labs     03/07/25 0753 03/07/25 2024 03/08/25 0723 03/09/25  0512     --  138 141   K 2.9* 4.3 4.7 5.0   CL 91*  --  91* 95*   CO2 37*  --  34* 33*   BUN 14  --  13 15   CREATININE 0.6  --  0.6 0.6    ALB:3,BILIDIR:3,BILITOT:3,ALKPHOS:3)@    PT/INR: No results for input(s): \"PROTIME\", \"INR\" in the last 72 hours.    ABG:   Recent Labs     03/07/25 0743   PH 7.442   PO2 60.5*   PCO2 65.0*   HCO3 43.4*   BE 16.3*   O2SAT 91.6*   METHB 0.0   O2HB 91.4*   COHB 0.2   HHB 8.4*   THB 12.1     FiO2 : 50 %       Radiology/Other tests reviewed: none    Assessment:     Principal Problem:    HCAP (healthcare-associated pneumonia)  Resolved Problems:    * No resolved hospital problems. *  COVID  COPD  Hypoxia      Plan:       Cont with steroids, taper as tolerated, procal lower but slightly elevated with CRP  Cont with nebs  Cont with oxygen dayime and NIV at night, taper as tolerated, decreased to 5 li NC  OOB to chair as tolerated      Time at the bedside, reviewing labs and radiographs, reviewing notes and consultations, discussing with staff and family was more than 50 minutes.      Thanks for letting us see this patient in consultation.  Please contact us with any questions. Office [phone] or after hours through [employer], x [phone].    
    Lab Results   Component Value Date    SEDRATE 31 (H) 03/12/2024     Radiology:      Microbiology:   Lab Results   Component Value Date/Time    BC 5 Days no growth 06/27/2022 01:25 PM     Lab Results   Component Value Date/Time    BLOODCULT2 5 Days no growth 06/27/2022 01:25 PM     No results found for: \"WNDABS\"  No results found for: \"RESPSMEAR\"      Component Value Date/Time    MPNEUMO Not Detected 02/23/2025 0700     No results found for: \"CULTRESP\"  No results found for: \"CXCATHTIP\"  No results found for: \"BFCS\"  No results found for: \"CXSURG\"  No results found for: \"LABURIN\"  No results found for: \"MRSAC\"    Assessment:  Bibasilar pneumonia with mucus impaction-staph aureus, candida albicans colonizer  COPD exacerbation with emphysematous lung disease  COVID-19 infection  MRSA Nares Positive     Plan:    Continue Decadron  Continue and vancomycin day 5  stop cefepime ad start ancef pending sensitivity  MRSA nares pending, respiratory culture mixed, Legionella and streptococcal urine antigen negative  Blood cultures no growth so far  No further treatment for COVID infection is necessary at this time  Pulmonary toileting with chest vest recommended  Procal .11  Check follow up chest xray -some improvement  D/c plan nursing facility  ID will continue to follow    REGI Parra - CNP  1:40 PM  3/11/2025  
Results   Component Value Date    SEDRATE 31 (H) 03/12/2024     Radiology:      Microbiology:   Lab Results   Component Value Date/Time    BC 5 Days no growth 06/27/2022 01:25 PM     Lab Results   Component Value Date/Time    BLOODCULT2 5 Days no growth 06/27/2022 01:25 PM     No results found for: \"WNDABS\"  No results found for: \"RESPSMEAR\"      Component Value Date/Time    MPNEUMO Not Detected 02/23/2025 0700     No results found for: \"CULTRESP\"  No results found for: \"CXCATHTIP\"  No results found for: \"BFCS\"  No results found for: \"CXSURG\"  No results found for: \"LABURIN\"  No results found for: \"MRSAC\"    Assessment:  Bibasilar pneumonia with mucus impaction-MRSA pneumonia candida albicans colonizer  COPD exacerbation with emphysematous lung disease  COVID-19 infection  MRSA Nares Positive     Plan:    Continue Decadron  Stop vancomycin and DC cefazolin  Plan for p.o. Zyvox on discharge for 7 days  respiratory culture mixed, Legionella and streptococcal urine antigen negative  Blood cultures no growth so far  No further treatment for COVID infection is necessary at this time  Pulmonary toileting with chest vest recommended  Procal .11  Check follow up chest xray -some improvement  D/c plan nursing facility  ID will continue to follow    REGI Parra - CNP  9:05 AM  3/13/2025  
chronic hypoxic hypercapnic respiratory failure  COPD exacerbation  COVID-19 infection  Left lower lobe pneumonia     Plan  Titrate oxygen to maintain saturation more than 92%  Continue noninvasive ventilation nightly  Continue on vancomycin and cefepime d2  Continue on Decadron 4 mg IV twice daily  Continue breathing treatment  Pulmonology on board  Infectious disease on board, no further treatment for COVID     Leukocytosis  Trending down  Persistent leukocytosis could be due to infection, aggravated by steroid  Has been covered with broad-spectrum antibiotic        Diarrhea improving  Improving  Check TSH Which is low   patient is on methimazole  We will check free T4, T3  Imodium as needed     Chronic comorbidities:   Hypertension, GERD, PVD, CAD, chronic pain syndrome vitamin D deficiency anemia, iron deficiency anemia, frequent falls         DVT Prophylaxis [] Lovenox, []  Heparin, [] SCDs, [] Ambulation   GI Prophylaxis [] PPI,  [] H2 Blocker,  [] Carafate,  [] Diet/Tube Feeds   Disposition Patient requires continued admission due to on broad-spectrum antibiotic awaiting final decision from ID and pulmonology   MDM [] Low, [] Moderate,[]  High  Patient's risk as above due to        Medications:  REVIEWED DAILY    Infusion Medications    sodium chloride       Scheduled Medications    sodium chloride flush  5-40 mL IntraVENous 2 times per day    enoxaparin  30 mg SubCUTAneous Daily    ipratropium 0.5 mg-albuterol 2.5 mg  1 Dose Inhalation Q4H WA RT    guaiFENesin  400 mg Oral TID    mupirocin   Each Nostril BID    cefepime  2,000 mg IntraVENous Q8H    dexAMETHasone  4 mg IntraVENous Q12H    arformoterol tartrate  15 mcg Nebulization BID RT    budesonide  0.5 mg Nebulization BID RT    vancomycin  1,000 mg IntraVENous Q12H    aspirin  81 mg Oral Daily    dicyclomine  20 mg Oral TID    dilTIAZem  240 mg Oral Daily    DULoxetine  60 mg Oral Daily    furosemide  40 mg Oral Daily    hydrOXYzine pamoate  25 mg Oral

## 2025-03-14 NOTE — PLAN OF CARE
Problem: Chronic Conditions and Co-morbidities  Goal: Patient's chronic conditions and co-morbidity symptoms are monitored and maintained or improved  Outcome: Completed     Problem: Skin/Tissue Integrity  Goal: Skin integrity remains intact  Description: 1.  Monitor for areas of redness and/or skin breakdown  2.  Assess vascular access sites hourly  3.  Every 4-6 hours minimum:  Change oxygen saturation probe site  4.  Every 4-6 hours:  If on nasal continuous positive airway pressure, respiratory therapy assess nares and determine need for appliance change or resting period  Outcome: Completed     Problem: Safety - Adult  Goal: Free from fall injury  Outcome: Completed     Problem: Pain  Goal: Verbalizes/displays adequate comfort level or baseline comfort level  Outcome: Completed

## 2025-03-16 ENCOUNTER — HOSPITAL ENCOUNTER (OUTPATIENT)
Age: 70
Setting detail: OBSERVATION
Discharge: NURSING, REHAB OR LONG-TERM CARE | End: 2025-03-17
Attending: EMERGENCY MEDICINE | Admitting: STUDENT IN AN ORGANIZED HEALTH CARE EDUCATION/TRAINING PROGRAM
Payer: MEDICARE

## 2025-03-16 ENCOUNTER — APPOINTMENT (OUTPATIENT)
Dept: GENERAL RADIOLOGY | Age: 70
End: 2025-03-16
Payer: MEDICARE

## 2025-03-16 ENCOUNTER — APPOINTMENT (OUTPATIENT)
Dept: CT IMAGING | Age: 70
End: 2025-03-16
Payer: MEDICARE

## 2025-03-16 DIAGNOSIS — J44.1 COPD WITH ACUTE EXACERBATION (HCC): ICD-10-CM

## 2025-03-16 DIAGNOSIS — R07.9 CHEST PAIN, UNSPECIFIED TYPE: Primary | ICD-10-CM

## 2025-03-16 PROBLEM — J15.212 PNEUMONIA OF LEFT LOWER LOBE DUE TO METHICILLIN-RESISTANT STAPHYLOCOCCUS AUREUS (MRSA) (HCC): Status: ACTIVE | Noted: 2025-03-16

## 2025-03-16 PROBLEM — J96.12 CHRONIC RESPIRATORY FAILURE WITH HYPOXIA AND HYPERCAPNIA (HCC): Status: ACTIVE | Noted: 2025-02-09

## 2025-03-16 PROBLEM — J96.11 CHRONIC RESPIRATORY FAILURE WITH HYPOXIA AND HYPERCAPNIA: Status: ACTIVE | Noted: 2025-02-09

## 2025-03-16 LAB
25(OH)D3 SERPL-MCNC: 55.9 NG/ML (ref 30–100)
ALBUMIN SERPL-MCNC: 3.6 G/DL (ref 3.5–5.2)
ALP SERPL-CCNC: 92 U/L (ref 35–104)
ALT SERPL-CCNC: 11 U/L (ref 0–32)
ANION GAP SERPL CALCULATED.3IONS-SCNC: 13 MMOL/L (ref 7–16)
AST SERPL-CCNC: 23 U/L (ref 0–31)
B.E.: 16 MMOL/L (ref -3–3)
BASOPHILS # BLD: 0.02 K/UL (ref 0–0.2)
BASOPHILS NFR BLD: 0 % (ref 0–2)
BILIRUB SERPL-MCNC: <0.2 MG/DL (ref 0–1.2)
BNP SERPL-MCNC: 1318 PG/ML (ref 0–125)
BUN SERPL-MCNC: 16 MG/DL (ref 6–23)
CALCIUM SERPL-MCNC: 9.6 MG/DL (ref 8.6–10.2)
CHLORIDE SERPL-SCNC: 90 MMOL/L (ref 98–107)
CHOLEST SERPL-MCNC: 266 MG/DL
CO2 SERPL-SCNC: 34 MMOL/L (ref 22–29)
COHB: 0.3 % (ref 0–1.5)
CREAT SERPL-MCNC: 0.7 MG/DL (ref 0.5–1)
CRITICAL: ABNORMAL
DATE ANALYZED: ABNORMAL
DATE OF COLLECTION: ABNORMAL
EOSINOPHIL # BLD: 0.04 K/UL (ref 0.05–0.5)
EOSINOPHILS RELATIVE PERCENT: 0 % (ref 0–6)
ERYTHROCYTE [DISTWIDTH] IN BLOOD BY AUTOMATED COUNT: 14.3 % (ref 11.5–15)
GFR, ESTIMATED: >90 ML/MIN/1.73M2
GLUCOSE SERPL-MCNC: 130 MG/DL (ref 74–99)
HBA1C MFR BLD: 6.3 % (ref 4–5.6)
HCO3: 42 MMOL/L (ref 22–26)
HCT VFR BLD AUTO: 31.5 % (ref 34–48)
HDLC SERPL-MCNC: 108 MG/DL
HGB BLD-MCNC: 10 G/DL (ref 11.5–15.5)
HHB: 4.7 % (ref 0–5)
IMM GRANULOCYTES # BLD AUTO: 0.25 K/UL (ref 0–0.58)
IMM GRANULOCYTES NFR BLD: 1 % (ref 0–5)
LAB: ABNORMAL
LDLC SERPL CALC-MCNC: 127 MG/DL
LYMPHOCYTES NFR BLD: 0.87 K/UL (ref 1.5–4)
LYMPHOCYTES RELATIVE PERCENT: 5 % (ref 20–42)
Lab: 547
MAGNESIUM SERPL-MCNC: 1.8 MG/DL (ref 1.6–2.6)
MCH RBC QN AUTO: 33.2 PG (ref 26–35)
MCHC RBC AUTO-ENTMCNC: 31.7 G/DL (ref 32–34.5)
MCV RBC AUTO: 104.7 FL (ref 80–99.9)
METHB: 0.2 % (ref 0–1.5)
MODE: ABNORMAL
MONOCYTES NFR BLD: 0.68 K/UL (ref 0.1–0.95)
MONOCYTES NFR BLD: 4 % (ref 2–12)
NEUTROPHILS NFR BLD: 89 % (ref 43–80)
NEUTS SEG NFR BLD: 15.51 K/UL (ref 1.8–7.3)
O2 SATURATION: 95.3 % (ref 92–98.5)
O2HB: 94.8 % (ref 94–97)
OPERATOR ID: ABNORMAL
PATIENT TEMP: 37 C
PCO2: 59.2 MMHG (ref 35–45)
PH BLOOD GAS: 7.47 (ref 7.35–7.45)
PLATELET # BLD AUTO: 352 K/UL (ref 130–450)
PMV BLD AUTO: 9.8 FL (ref 7–12)
PO2: 76.7 MMHG (ref 75–100)
POTASSIUM SERPL-SCNC: 3.9 MMOL/L (ref 3.5–5)
PROCALCITONIN SERPL-MCNC: 0.58 NG/ML (ref 0–0.08)
PROT SERPL-MCNC: 6.7 G/DL (ref 6.4–8.3)
RBC # BLD AUTO: 3.01 M/UL (ref 3.5–5.5)
SODIUM SERPL-SCNC: 137 MMOL/L (ref 132–146)
SOURCE, BLOOD GAS: ABNORMAL
THB: 10.9 G/DL (ref 11.5–16.5)
TIME ANALYZED: 552
TRIGL SERPL-MCNC: 157 MG/DL
TROPONIN I SERPL HS-MCNC: 43 NG/L (ref 0–9)
TROPONIN I SERPL HS-MCNC: 52 NG/L (ref 0–9)
VLDLC SERPL CALC-MCNC: 31 MG/DL
WBC OTHER # BLD: 17.4 K/UL (ref 4.5–11.5)

## 2025-03-16 PROCEDURE — 74018 RADEX ABDOMEN 1 VIEW: CPT

## 2025-03-16 PROCEDURE — 83036 HEMOGLOBIN GLYCOSYLATED A1C: CPT

## 2025-03-16 PROCEDURE — 99285 EMERGENCY DEPT VISIT HI MDM: CPT

## 2025-03-16 PROCEDURE — 6360000002 HC RX W HCPCS: Performed by: STUDENT IN AN ORGANIZED HEALTH CARE EDUCATION/TRAINING PROGRAM

## 2025-03-16 PROCEDURE — 6370000000 HC RX 637 (ALT 250 FOR IP): Performed by: NURSE PRACTITIONER

## 2025-03-16 PROCEDURE — 99223 1ST HOSP IP/OBS HIGH 75: CPT | Performed by: NURSE PRACTITIONER

## 2025-03-16 PROCEDURE — 85025 COMPLETE CBC W/AUTO DIFF WBC: CPT

## 2025-03-16 PROCEDURE — 36415 COLL VENOUS BLD VENIPUNCTURE: CPT

## 2025-03-16 PROCEDURE — 99222 1ST HOSP IP/OBS MODERATE 55: CPT | Performed by: OTOLARYNGOLOGY

## 2025-03-16 PROCEDURE — 84145 PROCALCITONIN (PCT): CPT

## 2025-03-16 PROCEDURE — G0378 HOSPITAL OBSERVATION PER HR: HCPCS

## 2025-03-16 PROCEDURE — 80053 COMPREHEN METABOLIC PANEL: CPT

## 2025-03-16 PROCEDURE — 6360000002 HC RX W HCPCS: Performed by: NURSE PRACTITIONER

## 2025-03-16 PROCEDURE — 82306 VITAMIN D 25 HYDROXY: CPT

## 2025-03-16 PROCEDURE — 6370000000 HC RX 637 (ALT 250 FOR IP)

## 2025-03-16 PROCEDURE — 71275 CT ANGIOGRAPHY CHEST: CPT

## 2025-03-16 PROCEDURE — 84484 ASSAY OF TROPONIN QUANT: CPT

## 2025-03-16 PROCEDURE — 6370000000 HC RX 637 (ALT 250 FOR IP): Performed by: STUDENT IN AN ORGANIZED HEALTH CARE EDUCATION/TRAINING PROGRAM

## 2025-03-16 PROCEDURE — 96372 THER/PROPH/DIAG INJ SC/IM: CPT

## 2025-03-16 PROCEDURE — 82805 BLOOD GASES W/O2 SATURATION: CPT

## 2025-03-16 PROCEDURE — 83735 ASSAY OF MAGNESIUM: CPT

## 2025-03-16 PROCEDURE — 2700000000 HC OXYGEN THERAPY PER DAY

## 2025-03-16 PROCEDURE — 6360000004 HC RX CONTRAST MEDICATION: Performed by: RADIOLOGY

## 2025-03-16 PROCEDURE — 71045 X-RAY EXAM CHEST 1 VIEW: CPT

## 2025-03-16 PROCEDURE — 96374 THER/PROPH/DIAG INJ IV PUSH: CPT

## 2025-03-16 PROCEDURE — 6360000002 HC RX W HCPCS: Performed by: EMERGENCY MEDICINE

## 2025-03-16 PROCEDURE — 51798 US URINE CAPACITY MEASURE: CPT

## 2025-03-16 PROCEDURE — 96375 TX/PRO/DX INJ NEW DRUG ADDON: CPT

## 2025-03-16 PROCEDURE — 94640 AIRWAY INHALATION TREATMENT: CPT

## 2025-03-16 PROCEDURE — 80061 LIPID PANEL: CPT

## 2025-03-16 PROCEDURE — 2500000003 HC RX 250 WO HCPCS: Performed by: NURSE PRACTITIONER

## 2025-03-16 PROCEDURE — 94664 DEMO&/EVAL PT USE INHALER: CPT

## 2025-03-16 PROCEDURE — 87040 BLOOD CULTURE FOR BACTERIA: CPT

## 2025-03-16 PROCEDURE — 6370000000 HC RX 637 (ALT 250 FOR IP): Performed by: EMERGENCY MEDICINE

## 2025-03-16 PROCEDURE — 94660 CPAP INITIATION&MGMT: CPT

## 2025-03-16 PROCEDURE — 2500000003 HC RX 250 WO HCPCS: Performed by: EMERGENCY MEDICINE

## 2025-03-16 PROCEDURE — 83880 ASSAY OF NATRIURETIC PEPTIDE: CPT

## 2025-03-16 RX ORDER — MECOBALAMIN 5000 MCG
5 TABLET,DISINTEGRATING ORAL NIGHTLY PRN
Status: DISCONTINUED | OUTPATIENT
Start: 2025-03-16 | End: 2025-03-17 | Stop reason: HOSPADM

## 2025-03-16 RX ORDER — POLYETHYLENE GLYCOL 3350 17 G/17G
17 POWDER, FOR SOLUTION ORAL DAILY PRN
Status: DISCONTINUED | OUTPATIENT
Start: 2025-03-16 | End: 2025-03-16

## 2025-03-16 RX ORDER — ERGOCALCIFEROL 1.25 MG/1
50000 CAPSULE, LIQUID FILLED ORAL WEEKLY
Status: DISCONTINUED | OUTPATIENT
Start: 2025-03-17 | End: 2025-03-17 | Stop reason: HOSPADM

## 2025-03-16 RX ORDER — PANTOPRAZOLE SODIUM 40 MG/1
40 TABLET, DELAYED RELEASE ORAL
Status: DISCONTINUED | OUTPATIENT
Start: 2025-03-16 | End: 2025-03-17 | Stop reason: HOSPADM

## 2025-03-16 RX ORDER — DILTIAZEM HYDROCHLORIDE 120 MG/1
240 CAPSULE, COATED, EXTENDED RELEASE ORAL DAILY
Status: DISCONTINUED | OUTPATIENT
Start: 2025-03-16 | End: 2025-03-17 | Stop reason: HOSPADM

## 2025-03-16 RX ORDER — DEXAMETHASONE 4 MG/1
4 TABLET ORAL
Status: COMPLETED | OUTPATIENT
Start: 2025-03-16 | End: 2025-03-17

## 2025-03-16 RX ORDER — GUAIFENESIN 400 MG/1
400 TABLET ORAL 3 TIMES DAILY
Status: DISCONTINUED | OUTPATIENT
Start: 2025-03-16 | End: 2025-03-17 | Stop reason: HOSPADM

## 2025-03-16 RX ORDER — ARFORMOTEROL TARTRATE 15 UG/2ML
15 SOLUTION RESPIRATORY (INHALATION)
Status: DISCONTINUED | OUTPATIENT
Start: 2025-03-16 | End: 2025-03-17 | Stop reason: HOSPADM

## 2025-03-16 RX ORDER — HYDROXYZINE PAMOATE 25 MG/1
25 CAPSULE ORAL DAILY
Status: DISCONTINUED | OUTPATIENT
Start: 2025-03-16 | End: 2025-03-17 | Stop reason: HOSPADM

## 2025-03-16 RX ORDER — POLYETHYLENE GLYCOL 3350 17 G/17G
17 POWDER, FOR SOLUTION ORAL 2 TIMES DAILY
Status: DISCONTINUED | OUTPATIENT
Start: 2025-03-16 | End: 2025-03-17 | Stop reason: HOSPADM

## 2025-03-16 RX ORDER — BUDESONIDE 0.5 MG/2ML
500 INHALANT ORAL 2 TIMES DAILY
Status: DISCONTINUED | OUTPATIENT
Start: 2025-03-16 | End: 2025-03-17 | Stop reason: HOSPADM

## 2025-03-16 RX ORDER — PREGABALIN 75 MG/1
75 CAPSULE ORAL 2 TIMES DAILY
COMMUNITY

## 2025-03-16 RX ORDER — PREDNISONE 5 MG/1
10 TABLET ORAL DAILY
Status: DISCONTINUED | OUTPATIENT
Start: 2025-03-18 | End: 2025-03-17 | Stop reason: HOSPADM

## 2025-03-16 RX ORDER — OXYMETAZOLINE HYDROCHLORIDE 0.05 G/100ML
2 SPRAY NASAL ONCE
Status: COMPLETED | OUTPATIENT
Start: 2025-03-16 | End: 2025-03-16

## 2025-03-16 RX ORDER — DILTIAZEM HYDROCHLORIDE 240 MG/1
240 CAPSULE, EXTENDED RELEASE ORAL DAILY
COMMUNITY

## 2025-03-16 RX ORDER — ONDANSETRON 2 MG/ML
4 INJECTION INTRAMUSCULAR; INTRAVENOUS EVERY 6 HOURS PRN
Status: DISCONTINUED | OUTPATIENT
Start: 2025-03-16 | End: 2025-03-16

## 2025-03-16 RX ORDER — FUROSEMIDE 40 MG/1
40 TABLET ORAL DAILY
Status: DISCONTINUED | OUTPATIENT
Start: 2025-03-16 | End: 2025-03-17 | Stop reason: HOSPADM

## 2025-03-16 RX ORDER — DOCUSATE SODIUM 100 MG/1
100 CAPSULE, LIQUID FILLED ORAL DAILY
Status: DISCONTINUED | OUTPATIENT
Start: 2025-03-16 | End: 2025-03-16 | Stop reason: SDUPTHER

## 2025-03-16 RX ORDER — ASPIRIN 81 MG/1
81 TABLET ORAL DAILY
Status: DISCONTINUED | OUTPATIENT
Start: 2025-03-16 | End: 2025-03-17 | Stop reason: HOSPADM

## 2025-03-16 RX ORDER — LOSARTAN POTASSIUM 25 MG/1
25 TABLET ORAL DAILY
Status: DISCONTINUED | OUTPATIENT
Start: 2025-03-16 | End: 2025-03-17 | Stop reason: HOSPADM

## 2025-03-16 RX ORDER — FOLIC ACID 1 MG/1
1 TABLET ORAL DAILY
Status: DISCONTINUED | OUTPATIENT
Start: 2025-03-16 | End: 2025-03-17 | Stop reason: HOSPADM

## 2025-03-16 RX ORDER — IBUPROFEN 200 MG
1250 CAPSULE ORAL 3 TIMES DAILY PRN
Status: DISCONTINUED | OUTPATIENT
Start: 2025-03-16 | End: 2025-03-16 | Stop reason: SDUPTHER

## 2025-03-16 RX ORDER — DICYCLOMINE HYDROCHLORIDE 10 MG/1
20 CAPSULE ORAL 3 TIMES DAILY
Status: DISCONTINUED | OUTPATIENT
Start: 2025-03-16 | End: 2025-03-17 | Stop reason: HOSPADM

## 2025-03-16 RX ORDER — DIPHENOXYLATE HYDROCHLORIDE AND ATROPINE SULFATE 2.5; .025 MG/1; MG/1
1 TABLET ORAL EVERY 12 HOURS PRN
Status: DISCONTINUED | OUTPATIENT
Start: 2025-03-16 | End: 2025-03-16

## 2025-03-16 RX ORDER — CLONAZEPAM 0.5 MG/1
0.5 TABLET ORAL NIGHTLY PRN
Status: DISCONTINUED | OUTPATIENT
Start: 2025-03-16 | End: 2025-03-17 | Stop reason: HOSPADM

## 2025-03-16 RX ORDER — ENOXAPARIN SODIUM 100 MG/ML
30 INJECTION SUBCUTANEOUS DAILY
Status: DISCONTINUED | OUTPATIENT
Start: 2025-03-16 | End: 2025-03-17 | Stop reason: HOSPADM

## 2025-03-16 RX ORDER — CALCIUM CARBONATE 500 MG/1
500 TABLET, CHEWABLE ORAL 3 TIMES DAILY PRN
Status: DISCONTINUED | OUTPATIENT
Start: 2025-03-16 | End: 2025-03-17 | Stop reason: HOSPADM

## 2025-03-16 RX ORDER — SODIUM CHLORIDE 0.9 % (FLUSH) 0.9 %
5-40 SYRINGE (ML) INJECTION PRN
Status: DISCONTINUED | OUTPATIENT
Start: 2025-03-16 | End: 2025-03-17 | Stop reason: HOSPADM

## 2025-03-16 RX ORDER — LACTOBACILLUS RHAMNOSUS GG 10B CELL
1 CAPSULE ORAL DAILY
Status: DISCONTINUED | OUTPATIENT
Start: 2025-03-16 | End: 2025-03-17 | Stop reason: HOSPADM

## 2025-03-16 RX ORDER — METHIMAZOLE 5 MG/1
15 TABLET ORAL DAILY
Status: DISCONTINUED | OUTPATIENT
Start: 2025-03-16 | End: 2025-03-17 | Stop reason: HOSPADM

## 2025-03-16 RX ORDER — GLUCAGON 1 MG/ML
1 KIT INJECTION PRN
Status: DISCONTINUED | OUTPATIENT
Start: 2025-03-16 | End: 2025-03-17 | Stop reason: HOSPADM

## 2025-03-16 RX ORDER — BENZONATATE 100 MG/1
100 CAPSULE ORAL 3 TIMES DAILY PRN
Status: DISCONTINUED | OUTPATIENT
Start: 2025-03-16 | End: 2025-03-17 | Stop reason: HOSPADM

## 2025-03-16 RX ORDER — IPRATROPIUM BROMIDE AND ALBUTEROL SULFATE 2.5; .5 MG/3ML; MG/3ML
1 SOLUTION RESPIRATORY (INHALATION)
Status: DISCONTINUED | OUTPATIENT
Start: 2025-03-16 | End: 2025-03-17 | Stop reason: HOSPADM

## 2025-03-16 RX ORDER — OXYMETAZOLINE HYDROCHLORIDE 0.05 G/100ML
2 SPRAY NASAL 2 TIMES DAILY
Status: DISCONTINUED | OUTPATIENT
Start: 2025-03-16 | End: 2025-03-17 | Stop reason: HOSPADM

## 2025-03-16 RX ORDER — ACETAMINOPHEN 325 MG/1
650 TABLET ORAL EVERY 6 HOURS PRN
Status: DISCONTINUED | OUTPATIENT
Start: 2025-03-16 | End: 2025-03-17 | Stop reason: HOSPADM

## 2025-03-16 RX ORDER — HYDRALAZINE HYDROCHLORIDE 20 MG/ML
10 INJECTION INTRAMUSCULAR; INTRAVENOUS EVERY 6 HOURS PRN
Status: DISCONTINUED | OUTPATIENT
Start: 2025-03-16 | End: 2025-03-17 | Stop reason: HOSPADM

## 2025-03-16 RX ORDER — IPRATROPIUM BROMIDE AND ALBUTEROL SULFATE 2.5; .5 MG/3ML; MG/3ML
1 SOLUTION RESPIRATORY (INHALATION)
Status: COMPLETED | OUTPATIENT
Start: 2025-03-16 | End: 2025-03-16

## 2025-03-16 RX ORDER — SODIUM CHLORIDE 0.9 % (FLUSH) 0.9 %
5-40 SYRINGE (ML) INJECTION EVERY 12 HOURS SCHEDULED
Status: DISCONTINUED | OUTPATIENT
Start: 2025-03-16 | End: 2025-03-17 | Stop reason: HOSPADM

## 2025-03-16 RX ORDER — LINEZOLID 600 MG/1
600 TABLET, FILM COATED ORAL EVERY 12 HOURS SCHEDULED
Status: DISCONTINUED | OUTPATIENT
Start: 2025-03-16 | End: 2025-03-17 | Stop reason: HOSPADM

## 2025-03-16 RX ORDER — SENNOSIDES A AND B 8.6 MG/1
1 TABLET, FILM COATED ORAL 2 TIMES DAILY
Status: DISCONTINUED | OUTPATIENT
Start: 2025-03-16 | End: 2025-03-17 | Stop reason: HOSPADM

## 2025-03-16 RX ORDER — DEXTROSE MONOHYDRATE 100 MG/ML
INJECTION, SOLUTION INTRAVENOUS CONTINUOUS PRN
Status: DISCONTINUED | OUTPATIENT
Start: 2025-03-16 | End: 2025-03-17 | Stop reason: HOSPADM

## 2025-03-16 RX ORDER — IOPAMIDOL 755 MG/ML
75 INJECTION, SOLUTION INTRAVASCULAR
Status: COMPLETED | OUTPATIENT
Start: 2025-03-16 | End: 2025-03-16

## 2025-03-16 RX ORDER — MECOBALAMIN 5000 MCG
5 TABLET,DISINTEGRATING ORAL EVERY EVENING
Status: DISCONTINUED | OUTPATIENT
Start: 2025-03-16 | End: 2025-03-17 | Stop reason: HOSPADM

## 2025-03-16 RX ORDER — BISACODYL 10 MG
10 SUPPOSITORY, RECTAL RECTAL DAILY
Status: DISCONTINUED | OUTPATIENT
Start: 2025-03-16 | End: 2025-03-17 | Stop reason: HOSPADM

## 2025-03-16 RX ORDER — ACETAMINOPHEN 650 MG/1
650 SUPPOSITORY RECTAL EVERY 6 HOURS PRN
Status: DISCONTINUED | OUTPATIENT
Start: 2025-03-16 | End: 2025-03-17 | Stop reason: HOSPADM

## 2025-03-16 RX ORDER — SODIUM CHLORIDE 9 MG/ML
INJECTION, SOLUTION INTRAVENOUS PRN
Status: DISCONTINUED | OUTPATIENT
Start: 2025-03-16 | End: 2025-03-17 | Stop reason: HOSPADM

## 2025-03-16 RX ORDER — DOCUSATE SODIUM 100 MG/1
100 CAPSULE, LIQUID FILLED ORAL 2 TIMES DAILY
Status: DISCONTINUED | OUTPATIENT
Start: 2025-03-16 | End: 2025-03-17 | Stop reason: HOSPADM

## 2025-03-16 RX ORDER — DULOXETIN HYDROCHLORIDE 60 MG/1
60 CAPSULE, DELAYED RELEASE ORAL DAILY
Status: DISCONTINUED | OUTPATIENT
Start: 2025-03-16 | End: 2025-03-17 | Stop reason: HOSPADM

## 2025-03-16 RX ORDER — ONDANSETRON 4 MG/1
4 TABLET, ORALLY DISINTEGRATING ORAL EVERY 8 HOURS PRN
Status: DISCONTINUED | OUTPATIENT
Start: 2025-03-16 | End: 2025-03-16

## 2025-03-16 RX ORDER — LORAZEPAM 2 MG/ML
0.5 INJECTION INTRAMUSCULAR ONCE
Status: COMPLETED | OUTPATIENT
Start: 2025-03-16 | End: 2025-03-16

## 2025-03-16 RX ADMIN — POLYETHYLENE GLYCOL 3350 17 GRAM ORAL POWDER PACKET 17 G: at 12:01

## 2025-03-16 RX ADMIN — CLONAZEPAM 0.5 MG: 0.5 TABLET ORAL at 21:49

## 2025-03-16 RX ADMIN — LOSARTAN POTASSIUM 25 MG: 25 TABLET, FILM COATED ORAL at 08:49

## 2025-03-16 RX ADMIN — LORAZEPAM 0.5 MG: 2 INJECTION INTRAMUSCULAR; INTRAVENOUS at 15:33

## 2025-03-16 RX ADMIN — IPRATROPIUM BROMIDE AND ALBUTEROL SULFATE 1 DOSE: 2.5; .5 SOLUTION RESPIRATORY (INHALATION) at 05:59

## 2025-03-16 RX ADMIN — GUAIFENESIN 400 MG: 400 TABLET ORAL at 08:51

## 2025-03-16 RX ADMIN — ARFORMOTEROL TARTRATE 15 MCG: 15 SOLUTION RESPIRATORY (INHALATION) at 09:03

## 2025-03-16 RX ADMIN — DOCUSATE SODIUM 100 MG: 100 CAPSULE, LIQUID FILLED ORAL at 08:51

## 2025-03-16 RX ADMIN — PANTOPRAZOLE SODIUM 40 MG: 40 TABLET, DELAYED RELEASE ORAL at 08:51

## 2025-03-16 RX ADMIN — SALINE NASAL SPRAY 2 SPRAY: 1.5 SOLUTION NASAL at 21:50

## 2025-03-16 RX ADMIN — HYDROXYZINE PAMOATE 25 MG: 25 CAPSULE ORAL at 08:51

## 2025-03-16 RX ADMIN — IPRATROPIUM BROMIDE AND ALBUTEROL SULFATE 1 DOSE: 2.5; .5 SOLUTION RESPIRATORY (INHALATION) at 05:58

## 2025-03-16 RX ADMIN — ASPIRIN 81 MG: 81 TABLET, COATED ORAL at 08:51

## 2025-03-16 RX ADMIN — BUDESONIDE 500 MCG: 0.5 SUSPENSION RESPIRATORY (INHALATION) at 18:07

## 2025-03-16 RX ADMIN — DULOXETINE HYDROCHLORIDE 60 MG: 60 CAPSULE, DELAYED RELEASE ORAL at 08:48

## 2025-03-16 RX ADMIN — GUAIFENESIN 400 MG: 400 TABLET ORAL at 21:36

## 2025-03-16 RX ADMIN — OXYMETAZOLINE HYDROCHLORIDE 2 SPRAY: 0.5 SPRAY NASAL at 14:32

## 2025-03-16 RX ADMIN — WATER 125 MG: 1 INJECTION INTRAMUSCULAR; INTRAVENOUS; SUBCUTANEOUS at 06:06

## 2025-03-16 RX ADMIN — DEXAMETHASONE 4 MG: 4 TABLET ORAL at 08:51

## 2025-03-16 RX ADMIN — IPRATROPIUM BROMIDE AND ALBUTEROL SULFATE 1 DOSE: 2.5; .5 SOLUTION RESPIRATORY (INHALATION) at 06:02

## 2025-03-16 RX ADMIN — LINEZOLID 600 MG: 600 TABLET, FILM COATED ORAL at 08:50

## 2025-03-16 RX ADMIN — Medication 5 MG: at 21:39

## 2025-03-16 RX ADMIN — IPRATROPIUM BROMIDE AND ALBUTEROL SULFATE 1 DOSE: 2.5; .5 SOLUTION RESPIRATORY (INHALATION) at 18:07

## 2025-03-16 RX ADMIN — FUROSEMIDE 40 MG: 20 TABLET ORAL at 08:47

## 2025-03-16 RX ADMIN — Medication 5 MG: at 18:09

## 2025-03-16 RX ADMIN — BUDESONIDE 500 MCG: 0.5 SUSPENSION RESPIRATORY (INHALATION) at 09:03

## 2025-03-16 RX ADMIN — DICYCLOMINE HYDROCHLORIDE 20 MG: 10 CAPSULE ORAL at 21:36

## 2025-03-16 RX ADMIN — FOLIC ACID 1 MG: 1 TABLET ORAL at 08:49

## 2025-03-16 RX ADMIN — OXYMETAZOLINE HYDROCHLORIDE 2 SPRAY: 0.5 SPRAY NASAL at 13:35

## 2025-03-16 RX ADMIN — DOCUSATE SODIUM 100 MG: 100 CAPSULE, LIQUID FILLED ORAL at 21:36

## 2025-03-16 RX ADMIN — ARFORMOTEROL TARTRATE 15 MCG: 15 SOLUTION RESPIRATORY (INHALATION) at 18:07

## 2025-03-16 RX ADMIN — METHIMAZOLE 15 MG: 5 TABLET ORAL at 08:48

## 2025-03-16 RX ADMIN — DILTIAZEM HYDROCHLORIDE 240 MG: 120 CAPSULE, EXTENDED RELEASE ORAL at 08:50

## 2025-03-16 RX ADMIN — ENOXAPARIN SODIUM 30 MG: 100 INJECTION SUBCUTANEOUS at 08:52

## 2025-03-16 RX ADMIN — LINEZOLID 600 MG: 600 TABLET, FILM COATED ORAL at 21:36

## 2025-03-16 RX ADMIN — SENNOSIDES 8.6 MG: 8.6 TABLET, FILM COATED ORAL at 08:50

## 2025-03-16 RX ADMIN — IPRATROPIUM BROMIDE AND ALBUTEROL SULFATE 1 DOSE: 2.5; .5 SOLUTION RESPIRATORY (INHALATION) at 15:29

## 2025-03-16 RX ADMIN — OXYMETAZOLINE HYDROCHLORIDE 2 SPRAY: 0.5 SPRAY NASAL at 21:50

## 2025-03-16 RX ADMIN — SENNOSIDES 8.6 MG: 8.6 TABLET, FILM COATED ORAL at 21:36

## 2025-03-16 RX ADMIN — SODIUM CHLORIDE, PRESERVATIVE FREE 10 ML: 5 INJECTION INTRAVENOUS at 21:37

## 2025-03-16 RX ADMIN — IPRATROPIUM BROMIDE AND ALBUTEROL SULFATE 1 DOSE: 2.5; .5 SOLUTION RESPIRATORY (INHALATION) at 11:20

## 2025-03-16 RX ADMIN — IPRATROPIUM BROMIDE AND ALBUTEROL SULFATE 1 DOSE: 2.5; .5 SOLUTION RESPIRATORY (INHALATION) at 09:03

## 2025-03-16 RX ADMIN — DICYCLOMINE HYDROCHLORIDE 20 MG: 10 CAPSULE ORAL at 08:49

## 2025-03-16 RX ADMIN — SODIUM CHLORIDE, PRESERVATIVE FREE 10 ML: 5 INJECTION INTRAVENOUS at 15:34

## 2025-03-16 RX ADMIN — IOPAMIDOL 75 ML: 755 INJECTION, SOLUTION INTRAVENOUS at 10:01

## 2025-03-16 RX ADMIN — DOCUSATE SODIUM 100 MG: 100 CAPSULE, LIQUID FILLED ORAL at 12:01

## 2025-03-16 ASSESSMENT — PAIN SCALES - GENERAL
PAINLEVEL_OUTOF10: 0
PAINLEVEL_OUTOF10: 5
PAINLEVEL_OUTOF10: 0
PAINLEVEL_OUTOF10: 6

## 2025-03-16 ASSESSMENT — PAIN - FUNCTIONAL ASSESSMENT: PAIN_FUNCTIONAL_ASSESSMENT: 0-10

## 2025-03-16 ASSESSMENT — PAIN DESCRIPTION - DESCRIPTORS
DESCRIPTORS: ACHING
DESCRIPTORS: ACHING

## 2025-03-16 ASSESSMENT — PAIN DESCRIPTION - LOCATION
LOCATION: CHEST
LOCATION: CHEST

## 2025-03-16 ASSESSMENT — PAIN DESCRIPTION - ORIENTATION: ORIENTATION: MID

## 2025-03-16 NOTE — CONSULTS
OTOLARYNGOLOGY  CONSULT NOTE  3/16/2025    Physician Consulted: Dr. Cardona  Reason for Consult: Epistaxis  Referring Physician: Dr. Soheila POLANCO  Lana Phillips is a 70 y.o. female with history of COPD, 4L home oxygen (BiPAP at night) and atrial fibrillation admitted for SOB and productive cough. Patient was previously admitted on 3/7-3/13 for acute on chronic respiratory failure. Today, patient developed epistaxis earlier today for which ENT was consulted. Denies any previous history sinonasal surgeries or frequent episodes of epistaxis. Denies any chronic blood thinner use. Most recent Hgb 10.0, Plt 352.     Review of Systems   Constitutional:  Negative for activity change, chills, fatigue and fever.   HENT:  Positive for congestion and nosebleeds. Negative for ear discharge, ear pain, postnasal drip, rhinorrhea, sinus pressure, sinus pain, tinnitus, trouble swallowing and voice change.    Eyes: Negative.    Respiratory:  Positive for shortness of breath.    Gastrointestinal: Negative.        Past Medical History:   Diagnosis Date    Atherosclerosis 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 (HCC) 01/19/2024    Atrial fibrillation (HCC)     Chronic obstructive pulmonary disease (HCC) 12/15/2022    COVID-19 07/16/2022    Critical limb ischemia of left lower extremity with rest pain (HCC) 12/25/2023    GI bleed 10/14/2023    Hypertension     RUTH treated with BiPAP     Panic disorder     PVD (peripheral vascular disease) 01/19/2024    Severe protein-calorie malnutrition 06/26/2023    Thyroid disease     Uncontrolled hypertension        Past Surgical History:   Procedure Laterality Date    BACK SURGERY      x2    BRONCHOSCOPY N/A 4/16/2024    BRONCHOSCOPY DIAGNOSTIC OR CELL WASH ONLY performed by Jose Price MD at [facility] ENDOSCOPY    INVASIVE VASCULAR N/A 1/19/2024    Aortagram abdominal performed by Lazaro Vidales MD at [facility] CARDIAC CATH LAB    INVASIVE VASCULAR N/A

## 2025-03-16 NOTE — ED PROVIDER NOTES
HPI:  3/16/25, Time: 5:11 AM EDT         Lana Phillips is a 70 y.o. female history of atherosclerotic disease lower extremity COPD history history of BiPAP use for obstructive sleep apnea peripheral vascular disease thyroid disease hypertension presenting to the ED for shortness of breath chest tightness, beginning yesterday ago.  The complaint has been persistent, moderate in severity, and worsened by nothing.  Patient presenting here because of tightness in chest as well as shortness of breath that started yesterday evening.  Patient reporting some cough reports is grayish sputum she reports no fever she reports no vomiting.  Patient was recently in the hospital for pneumonia currently on no antibiotics.  Patient reporting no calf pain or swelling.  She reports no syncopal event.  Patient was given breathing treatment prior to arrival.  Patient reporting no headache.    ROS:   Pertinent positives and negatives are stated within HPI, all other systems reviewed and are negative.  --------------------------------------------- PAST HISTORY ---------------------------------------------  Past Medical History:  has a past medical history of Atherosclerosis 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 (HCC), Atrial fibrillation (HCC), Chronic obstructive pulmonary disease (HCC), COVID-19, Critical limb ischemia of left lower extremity with rest pain (HCC), GI bleed, Hypertension, RUTH treated with BiPAP, Panic disorder, PVD (peripheral vascular disease), Severe protein-calorie malnutrition, Thyroid disease, and Uncontrolled hypertension.    Past Surgical History:  has a past surgical history that includes back surgery; Left oophorectomy; Upper gastrointestinal endoscopy (N/A, 10/16/2023); Pain management procedure (N/A, 12/28/2023); invasive vascular (N/A, 1/19/2024); invasive vascular (N/A, 1/19/2024); bronchoscopy (N/A, 4/16/2024); and Pain management procedure (N/A, 11/14/2024).    Social  History:  reports that she has quit smoking. Her smoking use included cigarettes. She started smoking about 42 years ago. She has a 8.4 pack-year smoking history. She has never used smokeless tobacco. She reports that she does not currently use alcohol. She reports that she does not currently use drugs.    Family History: family history includes Breast Cancer in her maternal grandmother and paternal aunt; Dementia in her father; Diabetes in her maternal grandmother; Heart Disease in her father and mother; Other in her maternal aunt.     The patient’s home medications have been reviewed.    Allergies: Pcn [penicillins]    ---------------------------------------------------PHYSICAL EXAM--------------------------------------    Constitutional/General: Alert and oriented x3, mild distress  Head: Normocephalic and atraumatic  Eyes: PERRL, EOMI  Mouth: Oropharynx clear, handling secretions, no trismus  Neck: Supple, full ROM, non tender to palpation in the midline, no stridor, no crepitus, no meningeal signs  Pulmonary: Lungs diminished bilaterally mild to moderate distress  Cardiovascular:  Regular rate. Regular rhythm. No murmurs, gallops, or rubs. 2+ distal pulses  Chest: no chest wall tenderness  Abdomen: Soft.  Non tender. Non distended.  +BS.  No rebound, guarding, or rigidity. No pulsatile masses appreciated.  Musculoskeletal: Moves all extremities x 4. Warm and well perfused, no clubbing, cyanosis, or edema. Capillary refill <3 seconds  Skin: warm and dry. No rashes.   Neurologic: GCS 15, CN 2-12 grossly intact, no focal deficits, symmetric strength 5/5 in the upper and lower extremities bilaterally  Psych: Normal Affect    -------------------------------------------------- RESULTS -------------------------------------------------  I have personally reviewed all laboratory and imaging results for this patient. Results are listed below.     LABS:  Results for orders placed or performed during the hospital encounter

## 2025-03-16 NOTE — PROGRESS NOTES
4 Eyes Skin Assessment     NAME:  Lana Phillips  YOB: 1955  MEDICAL RECORD NUMBER:  [id number]    The patient is being assessed for  Admission    I agree that at least one RN has performed a thorough Head to Toe Skin Assessment on the patient. ALL assessment sites listed below have been assessed.      Areas assessed by both nurses:    Head, Face, Ears, Shoulders, Back, Chest, Arms, Elbows, Hands, Sacrum. Buttock, Coccyx, Ischium, Legs. Feet and Heels, and Under Medical Devices         Does the Patient have a Wound? No noted wound(s)       Guy Prevention initiated by RN: Yes  Wound Care Orders initiated by RN: No    Pressure Injury (Stage 3,4, Unstageable, DTI, NWPT, and Complex wounds) if present, place Wound referral order by RN under : No    New Ostomies, if present place, Ostomy referral order under : No     Nurse 1 eSignature: Electronically signed by Arcelia Betts RN on 3/16/25 at 4:03 PM EDT    **SHARE this note so that the co-signing nurse can place an eSignature**    Nurse 2 eSignature: Electronically signed by Janee España RN on 3/16/25 at 4:13 PM EDT

## 2025-03-16 NOTE — H&P
Hospitalist History & Physical      PCP: Tal Humphrey DO    Date of Service: Pt seen/examined on 3/16/2025     Chief Complaint:  had concerns including Shortness of Breath and Chest Pain (SOB, midsternal CP. 4L chronic. Pt also had a bloody nose through the night. Blood dried upon arrival. -thinners. 1 duoneb PTA).    History of Present Illness:    Ms. Lana Phillips, a 70 y.o. year old female  who  has a past medical history of Atherosclerosis 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 (HCC), Atrial fibrillation (HCC), Chronic obstructive pulmonary disease (HCC), COVID-19, Critical limb ischemia of left lower extremity with rest pain (HCC), GI bleed, Hypertension, RUTH treated with BiPAP, Panic disorder, PVD (peripheral vascular disease), Severe protein-calorie malnutrition, Thyroid disease, and Uncontrolled hypertension.     Patient presented to the ED from [facility] for shortness of breath and chest tightness beginning hours PTA. She reports she was having a coughing fit and could not catch her breath afterwards.  She also endorses a cough productive of grayish sputum. She has chronic respiratory failure and is on 4 L O2 during the day and BiPAP at night.  On my exam she reports she is feeling much better and back to baseline.  She reports compliance with BiPAP and home meds.  She denies any chest pain, palpitations, shortness of breath and cough beyond baseline, fevers, chills, N/V/D.     ER COURSE:  In ED vitals are stable on supplemental O2.  Laboratory workup significant for elevated BNP, elevated troponin, leukocytosis, and macrocytic anemia.  CXR reviewed and without any obvious infiltrate, effusion, pulmonary edema.  EKG reviewed and stable with previous.  She was given 1 DuoNeb and 125 mg of Solu-Medrol in ED.    PREVIOUS HOSPITALIZATION:  Admission from 3/7 - 3/13 reviewed.  Patient admitted and treated for acute on chronic hypoxic respiratory failure with hypercapnia, COPD  [place]      +++++++++++++++++++++++++++++++++++++++++++++++++  NOTE: This report was transcribed using voice recognition software. Every effort was made to ensure accuracy; however, inadvertent computerized transcription errors may be present.

## 2025-03-16 NOTE — ED NOTES
Patient c/o feeling the need to urinate, attempted bedpan and external catheter. Patient unable to urinate. Bladder scanned for >691ml. Message sent to Dr. Parnell, awaiting response

## 2025-03-17 VITALS
OXYGEN SATURATION: 98 % | WEIGHT: 112 LBS | BODY MASS INDEX: 16.97 KG/M2 | TEMPERATURE: 97.8 F | HEIGHT: 68 IN | DIASTOLIC BLOOD PRESSURE: 82 MMHG | RESPIRATION RATE: 28 BRPM | SYSTOLIC BLOOD PRESSURE: 149 MMHG | HEART RATE: 53 BPM

## 2025-03-17 PROBLEM — R07.9 CHEST PAIN: Status: ACTIVE | Noted: 2025-03-17

## 2025-03-17 LAB
ANION GAP SERPL CALCULATED.3IONS-SCNC: 16 MMOL/L (ref 7–16)
B.E.: 13.4 MMOL/L (ref -3–3)
BASOPHILS # BLD: 0.01 K/UL (ref 0–0.2)
BASOPHILS NFR BLD: 0 % (ref 0–2)
BUN SERPL-MCNC: 20 MG/DL (ref 6–23)
CALCIUM SERPL-MCNC: 9.9 MG/DL (ref 8.6–10.2)
CHLORIDE SERPL-SCNC: 92 MMOL/L (ref 98–107)
CO2 SERPL-SCNC: 35 MMOL/L (ref 22–29)
COHB: 0.2 % (ref 0–1.5)
CREAT SERPL-MCNC: 0.7 MG/DL (ref 0.5–1)
CRITICAL: ABNORMAL
DATE ANALYZED: ABNORMAL
DATE OF COLLECTION: ABNORMAL
EOSINOPHIL # BLD: 0.01 K/UL (ref 0.05–0.5)
EOSINOPHILS RELATIVE PERCENT: 0 % (ref 0–6)
ERYTHROCYTE [DISTWIDTH] IN BLOOD BY AUTOMATED COUNT: 14.6 % (ref 11.5–15)
GFR, ESTIMATED: >90 ML/MIN/1.73M2
GLUCOSE SERPL-MCNC: 93 MG/DL (ref 74–99)
HCO3: 37.8 MMOL/L (ref 22–26)
HCT VFR BLD AUTO: 31.1 % (ref 34–48)
HGB BLD-MCNC: 9.9 G/DL (ref 11.5–15.5)
HHB: 7.5 % (ref 0–5)
IMM GRANULOCYTES # BLD AUTO: 0.26 K/UL (ref 0–0.58)
IMM GRANULOCYTES NFR BLD: 2 % (ref 0–5)
LAB: ABNORMAL
LYMPHOCYTES NFR BLD: 1.16 K/UL (ref 1.5–4)
LYMPHOCYTES RELATIVE PERCENT: 7 % (ref 20–42)
Lab: 1135
MAGNESIUM SERPL-MCNC: 2 MG/DL (ref 1.6–2.6)
MCH RBC QN AUTO: 33.3 PG (ref 26–35)
MCHC RBC AUTO-ENTMCNC: 31.8 G/DL (ref 32–34.5)
MCV RBC AUTO: 104.7 FL (ref 80–99.9)
METHB: 0.3 % (ref 0–1.5)
MODE: ABNORMAL
MONOCYTES NFR BLD: 1.09 K/UL (ref 0.1–0.95)
MONOCYTES NFR BLD: 7 % (ref 2–12)
NEUTROPHILS NFR BLD: 85 % (ref 43–80)
NEUTS SEG NFR BLD: 14.06 K/UL (ref 1.8–7.3)
O2 SATURATION: 92.5 % (ref 92–98.5)
O2HB: 92 % (ref 94–97)
OPERATOR ID: ABNORMAL
PATIENT TEMP: 37 C
PCO2: 47.4 MMHG (ref 35–45)
PH BLOOD GAS: 7.52 (ref 7.35–7.45)
PLATELET # BLD AUTO: 429 K/UL (ref 130–450)
PMV BLD AUTO: 9.5 FL (ref 7–12)
PO2: 62.1 MMHG (ref 75–100)
POTASSIUM SERPL-SCNC: 3.8 MMOL/L (ref 3.5–5)
PROCALCITONIN SERPL-MCNC: 0.34 NG/ML (ref 0–0.08)
RBC # BLD AUTO: 2.97 M/UL (ref 3.5–5.5)
SODIUM SERPL-SCNC: 143 MMOL/L (ref 132–146)
SOURCE, BLOOD GAS: ABNORMAL
THB: 11.2 G/DL (ref 11.5–16.5)
TIME ANALYZED: 1146
WBC OTHER # BLD: 16.6 K/UL (ref 4.5–11.5)

## 2025-03-17 PROCEDURE — 51798 US URINE CAPACITY MEASURE: CPT

## 2025-03-17 PROCEDURE — 80048 BASIC METABOLIC PNL TOTAL CA: CPT

## 2025-03-17 PROCEDURE — G0378 HOSPITAL OBSERVATION PER HR: HCPCS

## 2025-03-17 PROCEDURE — 6360000002 HC RX W HCPCS: Performed by: NURSE PRACTITIONER

## 2025-03-17 PROCEDURE — 85025 COMPLETE CBC W/AUTO DIFF WBC: CPT

## 2025-03-17 PROCEDURE — 94640 AIRWAY INHALATION TREATMENT: CPT

## 2025-03-17 PROCEDURE — 82805 BLOOD GASES W/O2 SATURATION: CPT

## 2025-03-17 PROCEDURE — 6370000000 HC RX 637 (ALT 250 FOR IP): Performed by: NURSE PRACTITIONER

## 2025-03-17 PROCEDURE — 83735 ASSAY OF MAGNESIUM: CPT

## 2025-03-17 PROCEDURE — 2700000000 HC OXYGEN THERAPY PER DAY

## 2025-03-17 PROCEDURE — 84145 PROCALCITONIN (PCT): CPT

## 2025-03-17 PROCEDURE — 96372 THER/PROPH/DIAG INJ SC/IM: CPT

## 2025-03-17 PROCEDURE — 36415 COLL VENOUS BLD VENIPUNCTURE: CPT

## 2025-03-17 PROCEDURE — 2500000003 HC RX 250 WO HCPCS: Performed by: NURSE PRACTITIONER

## 2025-03-17 PROCEDURE — 94660 CPAP INITIATION&MGMT: CPT

## 2025-03-17 PROCEDURE — 6370000000 HC RX 637 (ALT 250 FOR IP): Performed by: STUDENT IN AN ORGANIZED HEALTH CARE EDUCATION/TRAINING PROGRAM

## 2025-03-17 RX ORDER — SENNA AND DOCUSATE SODIUM 50; 8.6 MG/1; MG/1
1 TABLET, FILM COATED ORAL 2 TIMES DAILY
Qty: 30 TABLET | Refills: 0 | Status: SHIPPED | OUTPATIENT
Start: 2025-03-17

## 2025-03-17 RX ORDER — OXYMETAZOLINE HYDROCHLORIDE 0.05 G/100ML
2 SPRAY NASAL 2 TIMES DAILY
Qty: 1 EACH | Refills: 0 | Status: SHIPPED | OUTPATIENT
Start: 2025-03-17 | End: 2025-03-18

## 2025-03-17 RX ADMIN — GUAIFENESIN 400 MG: 400 TABLET ORAL at 08:48

## 2025-03-17 RX ADMIN — FOLIC ACID 1 MG: 1 TABLET ORAL at 08:48

## 2025-03-17 RX ADMIN — DILTIAZEM HYDROCHLORIDE 240 MG: 120 CAPSULE, EXTENDED RELEASE ORAL at 08:46

## 2025-03-17 RX ADMIN — FUROSEMIDE 40 MG: 20 TABLET ORAL at 08:47

## 2025-03-17 RX ADMIN — LINEZOLID 600 MG: 600 TABLET, FILM COATED ORAL at 08:47

## 2025-03-17 RX ADMIN — PANTOPRAZOLE SODIUM 40 MG: 40 TABLET, DELAYED RELEASE ORAL at 06:03

## 2025-03-17 RX ADMIN — ARFORMOTEROL TARTRATE 15 MCG: 15 SOLUTION RESPIRATORY (INHALATION) at 09:19

## 2025-03-17 RX ADMIN — ERGOCALCIFEROL 50000 UNITS: 1.25 CAPSULE ORAL at 08:47

## 2025-03-17 RX ADMIN — BUDESONIDE 500 MCG: 0.5 SUSPENSION RESPIRATORY (INHALATION) at 09:19

## 2025-03-17 RX ADMIN — METHIMAZOLE 15 MG: 5 TABLET ORAL at 08:46

## 2025-03-17 RX ADMIN — OXYMETAZOLINE HYDROCHLORIDE 2 SPRAY: 0.5 SPRAY NASAL at 08:52

## 2025-03-17 RX ADMIN — SALINE NASAL SPRAY 2 SPRAY: 1.5 SOLUTION NASAL at 09:00

## 2025-03-17 RX ADMIN — DULOXETINE HYDROCHLORIDE 60 MG: 60 CAPSULE, DELAYED RELEASE ORAL at 08:47

## 2025-03-17 RX ADMIN — SENNOSIDES 8.6 MG: 8.6 TABLET, FILM COATED ORAL at 08:47

## 2025-03-17 RX ADMIN — IPRATROPIUM BROMIDE AND ALBUTEROL SULFATE 1 DOSE: 2.5; .5 SOLUTION RESPIRATORY (INHALATION) at 11:37

## 2025-03-17 RX ADMIN — ASPIRIN 81 MG: 81 TABLET, COATED ORAL at 08:48

## 2025-03-17 RX ADMIN — Medication 1 CAPSULE: at 08:51

## 2025-03-17 RX ADMIN — HYDROXYZINE PAMOATE 25 MG: 25 CAPSULE ORAL at 08:47

## 2025-03-17 RX ADMIN — IPRATROPIUM BROMIDE AND ALBUTEROL SULFATE 1 DOSE: 2.5; .5 SOLUTION RESPIRATORY (INHALATION) at 09:18

## 2025-03-17 RX ADMIN — DICYCLOMINE HYDROCHLORIDE 20 MG: 10 CAPSULE ORAL at 08:46

## 2025-03-17 RX ADMIN — ENOXAPARIN SODIUM 30 MG: 100 INJECTION SUBCUTANEOUS at 08:47

## 2025-03-17 RX ADMIN — DOCUSATE SODIUM 100 MG: 100 CAPSULE, LIQUID FILLED ORAL at 08:47

## 2025-03-17 RX ADMIN — DEXAMETHASONE 4 MG: 4 TABLET ORAL at 08:47

## 2025-03-17 RX ADMIN — SODIUM CHLORIDE, PRESERVATIVE FREE 10 ML: 5 INJECTION INTRAVENOUS at 08:48

## 2025-03-17 RX ADMIN — LOSARTAN POTASSIUM 25 MG: 25 TABLET, FILM COATED ORAL at 08:47

## 2025-03-17 ASSESSMENT — PAIN SCALES - GENERAL
PAINLEVEL_OUTOF10: 0
PAINLEVEL_OUTOF10: 0

## 2025-03-17 NOTE — DISCHARGE INSTR - COC
Continuity of Care Form    Patient Name: Lana Phillips   :  1955  MRN:  [id number]    Admit date:  3/16/2025  Discharge date:  ***    Code Status Order: Full Code   Advance Directives:     Admitting Physician:  Jennifer Parnell MD  PCP: Tal Humphrey DO    Discharging Nurse: ***  Discharging Hospital Unit/Room#: 7402/7402-A  Discharging Unit Phone Number: ***    Emergency Contact:   Extended Emergency Contact Information  Primary Emergency Contact: Lana Reeves  Address: [address] [place] [address] [place] [address]  Home Phone: [phone]  Work Phone: [phone]  Mobile Phone: [phone]  Relation: Other Relative  Preferred language: English   needed? No  Secondary Emergency Contact: sue ward  Mobile Phone: [phone]  Relation: Brother/Sister  Preferred language: English   needed? No    Past Surgical History:  Past Surgical History:   Procedure Laterality Date    BACK SURGERY      x2    BRONCHOSCOPY N/A 2024    BRONCHOSCOPY DIAGNOSTIC OR CELL WASH ONLY performed by Jose Price MD at [facility] [place]    INVASIVE VASCULAR N/A 2024    Aortagram abdominal performed by Lazaro Vidales MD at [facility]    INVASIVE VASCULAR N/A 2024    Angioplasty peripheral artery performed by Lazaro Vidales MD at [facility]    LEFT OOPHORECTOMY      PAIN MANAGEMENT PROCEDURE N/A 2023    CAUDAL EPIDURAL STEROID INJECTION performed by Lilia Cabrera DO at [facility] OR    PAIN MANAGEMENT PROCEDURE N/A 2024    THERAPEUTIC CAUDAL EPIDURAL UNDER FLUOROSCOPIC GUIDANCE performed by Lilia Cabrera DO at [facility] OR    UPPER GASTROINTESTINAL ENDOSCOPY N/A 10/16/2023    EGD ESOPHAGOGASTRODUODENOSCOPY performed by Willie Chow DO at [facility] ENDOSCOPY       Immunization History:   Immunization History   Administered Date(s) Administered    COVID-19, PFIZER PURPLE top, DILUTE for use, (age 12 y+), 30mcg/0.3mL 2021, 2021     {CHP DME ADLs:163599212}  Transfer  {CHP DME ADLs:522169097}  Bathing  {CHP DME ADLs:892246337}  Dressing  {CHP DME ADLs:367540558}  Toileting  {CHP DME ADLs:547376634}  Feeding  {CHP DME ADLs:176831805}  Med Admin  {P DME ADLs:587848110}  Med Delivery   { PENNY MED Delivery:493996585}    Wound Care Documentation and Therapy:        Elimination:  Continence:   Bowel: {YES / NO:}  Bladder: {YES / NO:}  Urinary Catheter: {Urinary Catheter:202032873}   Colostomy/Ileostomy/Ileal Conduit: {YES / NO:}       Date of Last BM: ***    Intake/Output Summary (Last 24 hours) at 3/17/2025 1213  Last data filed at 3/17/2025 0800  Gross per 24 hour   Intake 250 ml   Output --   Net 250 ml     I/O last 3 completed shifts:  In: 100 [P.O.:100]  Out: 600 [Urine:600]    Safety Concerns:     { PENNY Safety Concerns:719147478}    Impairments/Disabilities:      { PENNY Impairments/Disabilities:634556669}    Nutrition Therapy:  Current Nutrition Therapy:   { PENNY Diet List:944398121}    Routes of Feeding: {[facility] DME Other Feedings:067259306}  Liquids: {Slp liquid thickness:26182}  Daily Fluid Restriction: {CHP DME Yes amt example:762189314}  Last Modified Barium Swallow with Video (Video Swallowing Test): {Done Not Done Date:}    Treatments at the Time of Hospital Discharge:   Respiratory Treatments: ***  Oxygen Therapy:  {Therapy; copd oxygen:93678}  Ventilator:    { CC Vent List:349121140}    Rehab Therapies: {THERAPEUTIC INTERVENTION:6665669570}  Weight Bearing Status/Restrictions: {[facility] Weight Bearin}  Other Medical Equipment (for information only, NOT a DME order):  {EQUIPMENT:658514981}  Other Treatments: ***    Patient's personal belongings (please select all that are sent with patient):  {[facility] DME Belongings:835513816}    RN SIGNATURE:  {Esignature:358229209}    CASE MANAGEMENT/SOCIAL WORK SECTION    Inpatient Status Date: ***    Readmission Risk Assessment Score:  [facility] RISK OF UNPLANNED

## 2025-03-17 NOTE — PROGRESS NOTES
OTOLARYNGOLOGY  DAILY PROGRESS NOTE  3/17/2025    Subjective:     Patient is doing well today. No issues overnight. Admits to minimal anterior oozing otherwise denied further bleeding. Oxygen is humidified. Questions answered.     Objective:/     BP (!) 156/92   Pulse 67   Temp 97.6 °F (36.4 °C) (Temporal)   Resp 24   Ht 1.727 m (5' 8\")   Wt 50.8 kg (112 lb)   SpO2 94%   BMI 17.03 kg/m²   PULSE OXIMETRY RANGE: SpO2  Av.7 %  Min: 92 %  Max: 99 %  I/O last 3 completed shifts:  In: 100 [P.O.:100]  Out: 600 [Urine:600]    General Appearance:  Laying in bed, awake, alert, no apparent distress  Head/face:  NC/AT  Eyes: PERRL, EOMI  ENT: Bilateral external ears WNL, Nares patent, dissolvable packing in place, no active epistaxis anteriorly, posterior oropharynx with dried blood  Neck:Supple, no adenopathy  Lungs: no stridor, humidified oxygen  Heart:  RR, HTN  Neuro: Facial nerve symmetric and intact. House Brackmann 1/6, bilaterally.     CBC  Recent Labs     25  0528   WBC 17.4*   HGB 10.0*   HCT 31.5*        BMP  Recent Labs     25  0528      K 3.9   CL 90*   CO2 34*   BUN 16   CREATININE 0.7   CALCIUM 9.6     PT-INR  No results for input(s): \"INR\" in the last 72 hours.    Invalid input(s): \"PT\", \"PTT\"  CALCIUM  Recent Labs     25  0528   CALCIUM 9.6       Assessment/Plan:     70 y.o. female with acute on chronic respiratory failure now with epistaxis likely from chronic septal perforation and non-humidified 02 supplementation resulting in nasal dryness     Dissolvable packing placed. Keep packing in place. This will not need to be removed.   Minimal oozing is to be expected over next several days as packing dissolves. Please notify ENT resident of any profuse bleeding anteriorly or coughing up bright red blood clots.   Nursing can spray afrin liberally in both nares and hold pressure for any persistent bleeding.   Continue nasal saline spray TID to both nares  Continue  humidification with oxygen supplementation  Maintain HOB elevated  No nose blowing or straining  Sneeze with mouth open  Tight blood pressure control  Recommend outpatient follow up appointment with ENT when medically stable     Patient seen and examined by resident, discussed with attending on call       Electronically signed by Lexii Gutierrez DO on 3/17/2025 at 6:38 AM

## 2025-03-17 NOTE — PROGRESS NOTES
03/16/25 2122   NIV Type   $NIV $Daily Charge   NIV Started/Stopped On   Equipment Type v60   Mode AVAPS   Mask Type Full face mask   Mask Size Medium   Assessment   Level of Consciousness 0   Comfort Level Good   Using Accessory Muscles No   Mask Compliance Good   Skin Assessment Clean, dry, & intact   Skin Protection for O2 Device N/A   Settings/Measurements   PIP Observed 17 cm H20   CPAP/EPAP 8 cmH2O   IPAP Min 14 cmH2O   IPAP Max 25 cmH2O   Vt (Set, mL) 400 mL   Vt (Measured) 500 mL   Rate Ordered 12   Insp Rise Time (%) 3 %   O2 Flow Rate (L/min) 4 L/min   FiO2  40 %   I Time/ I Time % 0.75 s   Minute Volume (L/min) 13.3 Liters   Mask Leak (lpm) 36 lpm   Patient's Home Machine No   Alarm Settings   Alarms On Y   Low Pressure (cmH2O) 8 cmH2O   High Pressure (cmH2O) 30 cmH2O   Apnea (secs) 20 secs   RR Low (bpm) 12   RR High (bpm) 40 br/min

## 2025-03-17 NOTE — PROGRESS NOTES
[facility] to Dr. Parnell at this time     New orders obtained at this time    Medication: atorvastatin (LIPITOR) 40 MG tablet   Last office visit date: 07/12/2023  Next appointment scheduled?: No; Return in about 6 months (around 1/12/2024) for General follow-up for thyroid issues and other medical problems.    Number of refills given: 30 with 0 refills

## 2025-03-17 NOTE — CARE COORDINATION
[facility] for SOB, Cough, and chest tightness.WBC 17.4  Urinary retention, cathed for 600cc of urine. Urology consulted. Nose bleed and ENT consulted. Currenty on 4 L  of oxygen which is her baseline. She has Bipap that she uses at night. Per Karey from [facility], patient  is a long term resident and can return without precert or PT/OT.  [place] form in envelope in soft chart. Spoke with patient at bedside to discuss transition of care and she is agreeable to return to [facility]. [facility]ulette form will need completed. PENNY and destination done  Discharge order noted and transport arranged for 130 for return to [facility]. Liaison, staff nurse and patient informed  Case Management Assessment  Initial Evaluation    Date/Time of Evaluation: 3/17/2025 10:19 AM  Assessment Completed by: Lesa Arguelles RN    If patient is discharged prior to next notation, then this note serves as note for discharge by case management.    Patient Name: Lana Phillips                   YOB: 1955  Diagnosis: COPD exacerbation (HCC) [J44.1]  COPD with acute exacerbation (HCC) [J44.1]  Chest pain, unspecified type [R07.9]                   Date / Time: 3/16/2025  5:28 AM    Patient Admission Status: Observation   Readmission Risk (Low < 19, Mod (19-27), High > 27): Readmission Risk Score: 39.5    Current PCP: Tal Humphrey, DO  PCP verified by ? Yes    Chart Reviewed: Yes      History Provided by: Medical Record  Patient Orientation: Alert and Oriented    Patient Cognition: Alert    Hospitalization in the last 30 days (Readmission):  Yes    If yes, Readmission Assessment in  Navigator will be completed.    Advance Directives:      Code Status: Full Code   Patient's Primary Decision Maker is: Legal Next of Kin    Primary Decision Maker: Lana Reeves - Other Relative - [phone]    Secondary Decision Maker: Nia Salas - Niece/Nephew - [phone]    Discharge Planning:    Patient lives with: Alone Type

## 2025-03-17 NOTE — PROGRESS NOTES
Attempt to call [facility] at this time; voicemail left with call back number provided.     2nd Call placed to [facility], all questions answered, transportation for 1:30pm.

## 2025-03-17 NOTE — DISCHARGE SUMMARY
Hospitalist Discharge Summary    Patient ID: Lana Phillips   Patient : 1955  Patient's PCP: Tal Humphrey DO    Admit Date: 3/16/2025   Admitting Physician: Jennifer Parnell MD    Discharge Date:  3/17/2025   Discharge Physician: Jennifer Parnell MD   Discharge Condition: Stable  Discharge Disposition: Skilled Facility      Hospital course in brief:  (Please refer to daily progress notes for a comprehensive review of the hospitalization by requesting medical records)     70 y.o. year old female  who  has a past medical history of Atherosclerosis 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 (HCC), Atrial fibrillation (HCC), Chronic obstructive pulmonary disease (HCC), COVID-19, Critical limb ischemia of left lower extremity with rest pain (HCC), GI bleed, Hypertension, RUTH treated with BiPAP, Panic disorder, PVD (peripheral vascular disease), Severe protein-calorie malnutrition, Thyroid disease, and Uncontrolled hypertension.      Patient presented to the ED from [facility] for shortness of breath and chest tightness beginning hours PTA. She reports she was having a coughing fit and could not catch her breath afterwards.  She also endorses a cough productive of grayish sputum. She has chronic respiratory failure and is on 4 L O2 during the day and BiPAP at night.  On my exam she reports she is feeling much better and back to baseline.  She reports compliance with BiPAP and home meds.  She denies any chest pain, palpitations, shortness of breath and cough beyond baseline, fevers, chills, N/V/D.   In ED vitals are stable on supplemental O2.  Laboratory workup significant for elevated BNP, elevated troponin, leukocytosis, and macrocytic anemia.  CXR reviewed and without any obvious infiltrate, effusion, pulmonary edema.  EKG reviewed and stable with previous.  She was given 1 DuoNeb and 125 mg of Solu-Medrol in ED.  Patient Had urinary retention  Last BM 4 days ago; has  constipation  Constipation and thus urinary retention resolved with passage of stool after taking laxatives  Cta chest  No evidence of pulmonary embolism.  2. Improvement in mucoid impaction of the lower lobe bronchi identified on  03/07/2025. There is some residual atelectasis or pneumonia in the right  posterior costophrenic angle, decreased in extent.  3. Moderate pulmonary emphysema with increased lung volumes.  4. Stable nodular focus of presumed scarring in the peripheral left upper  lobe measuring 6 mm.  No significant change from 05/14/2024  5. Small to moderate hiatal hernia in the retrocardiac position.  6. Mild hepatomegaly.      Also having epistaxis with post nasal drips  Apply pressure  Oxymetazoline nasal spray  Ent consult-  Patient on day of discharge; denies any complains; on bipap ; reviewed am abg; clear to dc medically  Follow up with pcp in a week    Consults:   IP CONSULT TO OTOLARYNGOLOGY  IP CONSULT TO UROLOGY    Discharge Diagnoses:    Epistaxis  Constipation  Urinary retention  Chronic hypoxic and hypercapnic respiratory failure  End stage COPD  MRSA pneumonia  COVID-19 infection  Leukocytosis      Discharge Instructions / Follow up:  Follow-up with PCP within 1 week of discharge.  Follow-up with consultants as indicated by them.  Compliance with medications as prescribed on discharge.    Future Appointments   Date Time Provider Department Center   3/19/2025  1:00 PM [facility] INF CLINIC RM 10 SEB Inf [facility]   5/1/2025 10:00 AM Chapis Santos PA BDM PAIN MAR [facility]   8/11/2025  1:40 PM Tal Bedoya, APRN - CNS BDM ENDO [facility]       The patient's condition is stable.  At this time the patient is without objective evidence of an acute process requiring continuing hospitalization or inpatient management.  They are stable for discharge with outpatient follow-up.     I have spoken with the patient and discussed the results of the current hospitalization, in addition to providing

## 2025-03-17 NOTE — PLAN OF CARE
Problem: Safety - Adult  Goal: Free from fall injury  3/17/2025 1035 by Arcelia Betts RN  Outcome: Progressing     Problem: Chronic Conditions and Co-morbidities  Goal: Patient's chronic conditions and co-morbidity symptoms are monitored and maintained or improved  3/17/2025 1035 by Arcelia Betts RN  Outcome: Progressing     Problem: Discharge Planning  Goal: Discharge to home or other facility with appropriate resources  3/17/2025 1035 by Arcelia Betts RN  Outcome: Progressing     Problem: Pain  Goal: Verbalizes/displays adequate comfort level or baseline comfort level  3/17/2025 1035 by Arcelia Betts RN  Outcome: Progressing     Problem: Skin/Tissue Integrity  Goal: Skin integrity remains intact  Description: 1.  Monitor for areas of redness and/or skin breakdown  2.  Assess vascular access sites hourly  3.  Every 4-6 hours minimum:  Change oxygen saturation probe site  4.  Every 4-6 hours:  If on nasal continuous positive airway pressure, respiratory therapy assess nares and determine need for appliance change or resting period  3/17/2025 1035 by Arcelia Betts RN  Outcome: Progressing

## 2025-03-18 NOTE — PROGRESS NOTES
Called to Dr. Delgado/Tal Bedoya office and requested a new Prolia script left message on prescription refill line and on clinical line

## 2025-03-19 ENCOUNTER — HOSPITAL ENCOUNTER (OUTPATIENT)
Dept: INFUSION THERAPY | Age: 70
Setting detail: INFUSION SERIES
Discharge: HOME | End: 2025-03-19
Payer: MEDICARE

## 2025-03-19 VITALS
TEMPERATURE: 98.1 F | DIASTOLIC BLOOD PRESSURE: 64 MMHG | SYSTOLIC BLOOD PRESSURE: 102 MMHG | OXYGEN SATURATION: 93 % | RESPIRATION RATE: 18 BRPM

## 2025-03-19 DIAGNOSIS — M81.0 AGE RELATED OSTEOPOROSIS, UNSPECIFIED PATHOLOGICAL FRACTURE PRESENCE: Primary | ICD-10-CM

## 2025-03-19 PROCEDURE — 6360000002 HC RX W HCPCS: Performed by: CLINICAL NURSE SPECIALIST

## 2025-03-19 PROCEDURE — 96372 THER/PROPH/DIAG INJ SC/IM: CPT

## 2025-03-19 RX ADMIN — DENOSUMAB 60 MG: 60 INJECTION SUBCUTANEOUS at 13:07

## 2025-03-19 ASSESSMENT — PAIN SCALES - GENERAL: PAINLEVEL_OUTOF10: 4

## 2025-03-19 ASSESSMENT — PAIN DESCRIPTION - DESCRIPTORS: DESCRIPTORS: ACHING

## 2025-03-19 ASSESSMENT — PAIN DESCRIPTION - FREQUENCY: FREQUENCY: INTERMITTENT

## 2025-03-19 ASSESSMENT — PAIN DESCRIPTION - LOCATION: LOCATION: GENERALIZED

## 2025-03-19 ASSESSMENT — PAIN DESCRIPTION - ORIENTATION: ORIENTATION: RIGHT;LEFT

## 2025-03-22 ENCOUNTER — APPOINTMENT (OUTPATIENT)
Dept: CT IMAGING | Age: 70
DRG: 312 | End: 2025-03-22
Payer: MEDICARE

## 2025-03-22 ENCOUNTER — APPOINTMENT (OUTPATIENT)
Dept: GENERAL RADIOLOGY | Age: 70
DRG: 312 | End: 2025-03-22
Payer: MEDICARE

## 2025-03-22 ENCOUNTER — HOSPITAL ENCOUNTER (INPATIENT)
Age: 70
LOS: 4 days | Discharge: NURSING, REHAB OR LONG-TERM CARE | DRG: 312 | End: 2025-03-26
Attending: EMERGENCY MEDICINE | Admitting: STUDENT IN AN ORGANIZED HEALTH CARE EDUCATION/TRAINING PROGRAM
Payer: MEDICARE

## 2025-03-22 DIAGNOSIS — Z51.5 PALLIATIVE CARE ENCOUNTER: ICD-10-CM

## 2025-03-22 DIAGNOSIS — J96.11 CHRONIC RESPIRATORY FAILURE WITH HYPOXIA: ICD-10-CM

## 2025-03-22 DIAGNOSIS — N30.01 ACUTE CYSTITIS WITH HEMATURIA: Primary | ICD-10-CM

## 2025-03-22 DIAGNOSIS — G89.4 CHRONIC PAIN SYNDROME: ICD-10-CM

## 2025-03-22 DIAGNOSIS — J44.1 COPD EXACERBATION (HCC): ICD-10-CM

## 2025-03-22 DIAGNOSIS — R41.82 ALTERED MENTAL STATUS, UNSPECIFIED ALTERED MENTAL STATUS TYPE: ICD-10-CM

## 2025-03-22 PROBLEM — N39.0 UTI (URINARY TRACT INFECTION): Status: ACTIVE | Noted: 2025-03-22

## 2025-03-22 LAB
ALBUMIN SERPL-MCNC: 3.8 G/DL (ref 3.5–5.2)
ALP SERPL-CCNC: 75 U/L (ref 35–104)
ALT SERPL-CCNC: 11 U/L (ref 0–32)
ANION GAP SERPL CALCULATED.3IONS-SCNC: 11 MMOL/L (ref 7–16)
AST SERPL-CCNC: 19 U/L (ref 0–31)
B PARAP IS1001 DNA NPH QL NAA+NON-PROBE: NOT DETECTED
B PERT DNA SPEC QL NAA+PROBE: NOT DETECTED
B.E.: 9.6 MMOL/L (ref -3–3)
BACTERIA URNS QL MICRO: ABNORMAL
BASOPHILS # BLD: 0 K/UL (ref 0–0.2)
BASOPHILS NFR BLD: 0 % (ref 0–2)
BILIRUB SERPL-MCNC: 0.2 MG/DL (ref 0–1.2)
BILIRUB UR QL STRIP: NEGATIVE
BUN SERPL-MCNC: 16 MG/DL (ref 6–23)
C PNEUM DNA NPH QL NAA+NON-PROBE: NOT DETECTED
CALCIUM SERPL-MCNC: 9.2 MG/DL (ref 8.6–10.2)
CHLORIDE SERPL-SCNC: 94 MMOL/L (ref 98–107)
CHP ED QC CHECK: YES
CLARITY UR: CLEAR
CO2 SERPL-SCNC: 35 MMOL/L (ref 22–29)
COHB: 0.3 % (ref 0–1.5)
COLOR UR: YELLOW
CREAT SERPL-MCNC: 0.8 MG/DL (ref 0.5–1)
CRITICAL: ABNORMAL
CRP SERPL HS-MCNC: 130 MG/L (ref 0–5)
DATE ANALYZED: ABNORMAL
DATE OF COLLECTION: ABNORMAL
EOSINOPHIL # BLD: 0 K/UL (ref 0.05–0.5)
EOSINOPHILS RELATIVE PERCENT: 0 % (ref 0–6)
ERYTHROCYTE [DISTWIDTH] IN BLOOD BY AUTOMATED COUNT: 14.6 % (ref 11.5–15)
ERYTHROCYTE [SEDIMENTATION RATE] IN BLOOD BY WESTERGREN METHOD: 67 MM/HR (ref 0–20)
FLUAV RNA NPH QL NAA+NON-PROBE: NOT DETECTED
FLUBV RNA NPH QL NAA+NON-PROBE: NOT DETECTED
GFR, ESTIMATED: 79 ML/MIN/1.73M2
GLUCOSE BLD-MCNC: 163 MG/DL
GLUCOSE BLD-MCNC: 163 MG/DL (ref 74–99)
GLUCOSE BLD-MCNC: 254 MG/DL (ref 74–99)
GLUCOSE SERPL-MCNC: 113 MG/DL (ref 74–99)
GLUCOSE UR STRIP-MCNC: NEGATIVE MG/DL
HADV DNA NPH QL NAA+NON-PROBE: NOT DETECTED
HCO3: 34 MMOL/L (ref 22–26)
HCOV 229E RNA NPH QL NAA+NON-PROBE: NOT DETECTED
HCOV HKU1 RNA NPH QL NAA+NON-PROBE: NOT DETECTED
HCOV NL63 RNA NPH QL NAA+NON-PROBE: NOT DETECTED
HCOV OC43 RNA NPH QL NAA+NON-PROBE: NOT DETECTED
HCT VFR BLD AUTO: 30.1 % (ref 34–48)
HGB BLD-MCNC: 9.4 G/DL (ref 11.5–15.5)
HGB UR QL STRIP.AUTO: ABNORMAL
HHB: 2.1 % (ref 0–5)
HMPV RNA NPH QL NAA+NON-PROBE: NOT DETECTED
HPIV1 RNA NPH QL NAA+NON-PROBE: NOT DETECTED
HPIV2 RNA NPH QL NAA+NON-PROBE: NOT DETECTED
HPIV3 RNA NPH QL NAA+NON-PROBE: NOT DETECTED
HPIV4 RNA NPH QL NAA+NON-PROBE: NOT DETECTED
KETONES UR STRIP-MCNC: NEGATIVE MG/DL
LAB: ABNORMAL
LACTATE BLDV-SCNC: 1.1 MMOL/L (ref 0.5–1.9)
LACTATE BLDV-SCNC: 1.4 MMOL/L (ref 0.5–1.9)
LEUKOCYTE ESTERASE UR QL STRIP: ABNORMAL
LYMPHOCYTES NFR BLD: 0 K/UL (ref 1.5–4)
LYMPHOCYTES RELATIVE PERCENT: 0 % (ref 20–42)
Lab: 1247
M PNEUMO DNA NPH QL NAA+NON-PROBE: NOT DETECTED
MCH RBC QN AUTO: 33 PG (ref 26–35)
MCHC RBC AUTO-ENTMCNC: 31.2 G/DL (ref 32–34.5)
MCV RBC AUTO: 105.6 FL (ref 80–99.9)
METHB: 0.1 % (ref 0–1.5)
MODE: ABNORMAL
MONOCYTES NFR BLD: 0 % (ref 2–12)
MONOCYTES NFR BLD: 0 K/UL (ref 0.1–0.95)
NEUTROPHILS NFR BLD: 100 % (ref 43–80)
NEUTS SEG NFR BLD: 18.5 K/UL (ref 1.8–7.3)
NITRITE UR QL STRIP: NEGATIVE
O2 SATURATION: 97.9 % (ref 92–98.5)
O2HB: 97.5 % (ref 94–97)
OPERATOR ID: 1868
PATIENT TEMP: 37 C
PCO2: 45.9 MMHG (ref 35–45)
PH BLOOD GAS: 7.49 (ref 7.35–7.45)
PH UR STRIP: 6 [PH] (ref 5–8)
PLATELET # BLD AUTO: 318 K/UL (ref 130–450)
PMV BLD AUTO: 8.9 FL (ref 7–12)
PO2: 104.3 MMHG (ref 75–100)
POTASSIUM SERPL-SCNC: 3.9 MMOL/L (ref 3.5–5)
PROCALCITONIN SERPL-MCNC: 0.16 NG/ML (ref 0–0.08)
PROT SERPL-MCNC: 6.6 G/DL (ref 6.4–8.3)
PROT UR STRIP-MCNC: NEGATIVE MG/DL
RBC # BLD AUTO: 2.85 M/UL (ref 3.5–5.5)
RBC # BLD: ABNORMAL 10*6/UL
RBC #/AREA URNS HPF: ABNORMAL /HPF
RSV RNA NPH QL NAA+NON-PROBE: NOT DETECTED
RV+EV RNA NPH QL NAA+NON-PROBE: NOT DETECTED
SARS-COV-2 RNA NPH QL NAA+NON-PROBE: NOT DETECTED
SODIUM SERPL-SCNC: 140 MMOL/L (ref 132–146)
SOURCE, BLOOD GAS: ABNORMAL
SP GR UR STRIP: <1.005 (ref 1–1.03)
SPECIMEN DESCRIPTION: NORMAL
THB: 10.2 G/DL (ref 11.5–16.5)
TIME ANALYZED: 1300
TROPONIN I SERPL HS-MCNC: 41 NG/L (ref 0–9)
TROPONIN I SERPL HS-MCNC: 48 NG/L (ref 0–9)
UROBILINOGEN UR STRIP-ACNC: 0.2 EU/DL (ref 0–1)
WBC #/AREA URNS HPF: ABNORMAL /HPF
WBC OTHER # BLD: 18.5 K/UL (ref 4.5–11.5)

## 2025-03-22 PROCEDURE — 6360000002 HC RX W HCPCS: Performed by: STUDENT IN AN ORGANIZED HEALTH CARE EDUCATION/TRAINING PROGRAM

## 2025-03-22 PROCEDURE — 6370000000 HC RX 637 (ALT 250 FOR IP)

## 2025-03-22 PROCEDURE — 2580000003 HC RX 258

## 2025-03-22 PROCEDURE — 2580000003 HC RX 258: Performed by: STUDENT IN AN ORGANIZED HEALTH CARE EDUCATION/TRAINING PROGRAM

## 2025-03-22 PROCEDURE — 84145 PROCALCITONIN (PCT): CPT

## 2025-03-22 PROCEDURE — 70450 CT HEAD/BRAIN W/O DYE: CPT

## 2025-03-22 PROCEDURE — 82962 GLUCOSE BLOOD TEST: CPT

## 2025-03-22 PROCEDURE — 84484 ASSAY OF TROPONIN QUANT: CPT

## 2025-03-22 PROCEDURE — 86140 C-REACTIVE PROTEIN: CPT

## 2025-03-22 PROCEDURE — 94640 AIRWAY INHALATION TREATMENT: CPT

## 2025-03-22 PROCEDURE — 85025 COMPLETE CBC W/AUTO DIFF WBC: CPT

## 2025-03-22 PROCEDURE — 74177 CT ABD & PELVIS W/CONTRAST: CPT

## 2025-03-22 PROCEDURE — 96361 HYDRATE IV INFUSION ADD-ON: CPT

## 2025-03-22 PROCEDURE — 72125 CT NECK SPINE W/O DYE: CPT

## 2025-03-22 PROCEDURE — 2500000003 HC RX 250 WO HCPCS: Performed by: STUDENT IN AN ORGANIZED HEALTH CARE EDUCATION/TRAINING PROGRAM

## 2025-03-22 PROCEDURE — 99221 1ST HOSP IP/OBS SF/LOW 40: CPT | Performed by: STUDENT IN AN ORGANIZED HEALTH CARE EDUCATION/TRAINING PROGRAM

## 2025-03-22 PROCEDURE — 81001 URINALYSIS AUTO W/SCOPE: CPT

## 2025-03-22 PROCEDURE — 6360000002 HC RX W HCPCS

## 2025-03-22 PROCEDURE — 6370000000 HC RX 637 (ALT 250 FOR IP): Performed by: STUDENT IN AN ORGANIZED HEALTH CARE EDUCATION/TRAINING PROGRAM

## 2025-03-22 PROCEDURE — 83605 ASSAY OF LACTIC ACID: CPT

## 2025-03-22 PROCEDURE — 87040 BLOOD CULTURE FOR BACTERIA: CPT

## 2025-03-22 PROCEDURE — 2500000003 HC RX 250 WO HCPCS

## 2025-03-22 PROCEDURE — 99285 EMERGENCY DEPT VISIT HI MDM: CPT

## 2025-03-22 PROCEDURE — 82805 BLOOD GASES W/O2 SATURATION: CPT

## 2025-03-22 PROCEDURE — 72170 X-RAY EXAM OF PELVIS: CPT

## 2025-03-22 PROCEDURE — 2060000000 HC ICU INTERMEDIATE R&B

## 2025-03-22 PROCEDURE — 96374 THER/PROPH/DIAG INJ IV PUSH: CPT

## 2025-03-22 PROCEDURE — 96375 TX/PRO/DX INJ NEW DRUG ADDON: CPT

## 2025-03-22 PROCEDURE — 36415 COLL VENOUS BLD VENIPUNCTURE: CPT

## 2025-03-22 PROCEDURE — 80053 COMPREHEN METABOLIC PANEL: CPT

## 2025-03-22 PROCEDURE — 71275 CT ANGIOGRAPHY CHEST: CPT

## 2025-03-22 PROCEDURE — 85652 RBC SED RATE AUTOMATED: CPT

## 2025-03-22 PROCEDURE — 0202U NFCT DS 22 TRGT SARS-COV-2: CPT

## 2025-03-22 PROCEDURE — 6360000004 HC RX CONTRAST MEDICATION: Performed by: RADIOLOGY

## 2025-03-22 PROCEDURE — 71045 X-RAY EXAM CHEST 1 VIEW: CPT

## 2025-03-22 RX ORDER — PREDNISONE 10 MG/1
10 TABLET ORAL DAILY
Status: DISCONTINUED | OUTPATIENT
Start: 2025-03-23 | End: 2025-03-26 | Stop reason: HOSPADM

## 2025-03-22 RX ORDER — POLYETHYLENE GLYCOL 3350 17 G/17G
17 POWDER, FOR SOLUTION ORAL DAILY PRN
Status: DISCONTINUED | OUTPATIENT
Start: 2025-03-22 | End: 2025-03-26 | Stop reason: HOSPADM

## 2025-03-22 RX ORDER — DULOXETIN HYDROCHLORIDE 60 MG/1
60 CAPSULE, DELAYED RELEASE ORAL DAILY
Status: DISCONTINUED | OUTPATIENT
Start: 2025-03-22 | End: 2025-03-26 | Stop reason: HOSPADM

## 2025-03-22 RX ORDER — BUSPIRONE HYDROCHLORIDE 10 MG/1
10 TABLET ORAL 2 TIMES DAILY
COMMUNITY

## 2025-03-22 RX ORDER — CLONAZEPAM 0.5 MG/1
1 TABLET ORAL
Status: DISCONTINUED | OUTPATIENT
Start: 2025-03-22 | End: 2025-03-26 | Stop reason: HOSPADM

## 2025-03-22 RX ORDER — LOSARTAN POTASSIUM 25 MG/1
25 TABLET ORAL DAILY
Status: DISCONTINUED | OUTPATIENT
Start: 2025-03-23 | End: 2025-03-26 | Stop reason: HOSPADM

## 2025-03-22 RX ORDER — 0.9 % SODIUM CHLORIDE 0.9 %
30 INTRAVENOUS SOLUTION INTRAVENOUS ONCE
Status: COMPLETED | OUTPATIENT
Start: 2025-03-22 | End: 2025-03-22

## 2025-03-22 RX ORDER — SODIUM CHLORIDE 0.9 % (FLUSH) 0.9 %
5-40 SYRINGE (ML) INJECTION PRN
Status: DISCONTINUED | OUTPATIENT
Start: 2025-03-22 | End: 2025-03-26 | Stop reason: HOSPADM

## 2025-03-22 RX ORDER — METHIMAZOLE 5 MG/1
15 TABLET ORAL DAILY
Status: DISCONTINUED | OUTPATIENT
Start: 2025-03-23 | End: 2025-03-26 | Stop reason: HOSPADM

## 2025-03-22 RX ORDER — PREDNISONE 10 MG/1
10 TABLET ORAL DAILY
Status: ON HOLD | COMMUNITY
End: 2025-04-01

## 2025-03-22 RX ORDER — BUDESONIDE 0.5 MG/2ML
500 INHALANT ORAL 2 TIMES DAILY
Status: DISCONTINUED | OUTPATIENT
Start: 2025-03-22 | End: 2025-03-26 | Stop reason: HOSPADM

## 2025-03-22 RX ORDER — SOY PROTEIN
1 POWDER (GRAM) ORAL DAILY
COMMUNITY

## 2025-03-22 RX ORDER — ARFORMOTEROL TARTRATE 15 UG/2ML
15 SOLUTION RESPIRATORY (INHALATION)
Status: DISCONTINUED | OUTPATIENT
Start: 2025-03-22 | End: 2025-03-26 | Stop reason: HOSPADM

## 2025-03-22 RX ORDER — HYDROXYZINE HYDROCHLORIDE 50 MG/1
50 TABLET, FILM COATED ORAL 3 TIMES DAILY PRN
Status: DISCONTINUED | OUTPATIENT
Start: 2025-03-22 | End: 2025-03-26 | Stop reason: HOSPADM

## 2025-03-22 RX ORDER — SODIUM CHLORIDE 9 MG/ML
INJECTION, SOLUTION INTRAVENOUS PRN
Status: DISCONTINUED | OUTPATIENT
Start: 2025-03-22 | End: 2025-03-26 | Stop reason: HOSPADM

## 2025-03-22 RX ORDER — PANTOPRAZOLE SODIUM 40 MG/1
40 TABLET, DELAYED RELEASE ORAL
Status: DISCONTINUED | OUTPATIENT
Start: 2025-03-23 | End: 2025-03-26 | Stop reason: HOSPADM

## 2025-03-22 RX ORDER — ASPIRIN 81 MG/1
81 TABLET ORAL DAILY
Status: DISCONTINUED | OUTPATIENT
Start: 2025-03-23 | End: 2025-03-26 | Stop reason: HOSPADM

## 2025-03-22 RX ORDER — FUROSEMIDE 40 MG/1
40 TABLET ORAL DAILY
Status: DISCONTINUED | OUTPATIENT
Start: 2025-03-22 | End: 2025-03-26 | Stop reason: HOSPADM

## 2025-03-22 RX ORDER — IPRATROPIUM BROMIDE AND ALBUTEROL SULFATE 2.5; .5 MG/3ML; MG/3ML
3 SOLUTION RESPIRATORY (INHALATION) ONCE
Status: COMPLETED | OUTPATIENT
Start: 2025-03-22 | End: 2025-03-22

## 2025-03-22 RX ORDER — BENZONATATE 100 MG/1
100 CAPSULE ORAL 3 TIMES DAILY PRN
Status: DISCONTINUED | OUTPATIENT
Start: 2025-03-22 | End: 2025-03-26 | Stop reason: HOSPADM

## 2025-03-22 RX ORDER — BENZONATATE 100 MG/1
100 CAPSULE ORAL 3 TIMES DAILY PRN
COMMUNITY

## 2025-03-22 RX ORDER — ACETAMINOPHEN 650 MG/1
650 SUPPOSITORY RECTAL EVERY 6 HOURS PRN
Status: DISCONTINUED | OUTPATIENT
Start: 2025-03-22 | End: 2025-03-26 | Stop reason: HOSPADM

## 2025-03-22 RX ORDER — SODIUM CHLORIDE 9 MG/ML
INJECTION, SOLUTION INTRAVENOUS CONTINUOUS
Status: ACTIVE | OUTPATIENT
Start: 2025-03-22 | End: 2025-03-23

## 2025-03-22 RX ORDER — GUAIFENESIN 400 MG/1
400 TABLET ORAL 3 TIMES DAILY
Status: DISCONTINUED | OUTPATIENT
Start: 2025-03-22 | End: 2025-03-26 | Stop reason: HOSPADM

## 2025-03-22 RX ORDER — ONDANSETRON 4 MG/1
4 TABLET, ORALLY DISINTEGRATING ORAL EVERY 8 HOURS PRN
Status: DISCONTINUED | OUTPATIENT
Start: 2025-03-22 | End: 2025-03-26 | Stop reason: HOSPADM

## 2025-03-22 RX ORDER — BUSPIRONE HYDROCHLORIDE 5 MG/1
10 TABLET ORAL 2 TIMES DAILY
Status: DISCONTINUED | OUTPATIENT
Start: 2025-03-22 | End: 2025-03-26 | Stop reason: HOSPADM

## 2025-03-22 RX ORDER — DIPHENOXYLATE HYDROCHLORIDE AND ATROPINE SULFATE 2.5; .025 MG/1; MG/1
1 TABLET ORAL EVERY 12 HOURS PRN
COMMUNITY

## 2025-03-22 RX ORDER — DILTIAZEM HYDROCHLORIDE 120 MG/1
240 CAPSULE, COATED, EXTENDED RELEASE ORAL DAILY
Status: DISCONTINUED | OUTPATIENT
Start: 2025-03-23 | End: 2025-03-26 | Stop reason: HOSPADM

## 2025-03-22 RX ORDER — SODIUM CHLORIDE 0.9 % (FLUSH) 0.9 %
5-40 SYRINGE (ML) INJECTION EVERY 12 HOURS SCHEDULED
Status: DISCONTINUED | OUTPATIENT
Start: 2025-03-22 | End: 2025-03-26 | Stop reason: HOSPADM

## 2025-03-22 RX ORDER — ONDANSETRON 2 MG/ML
4 INJECTION INTRAMUSCULAR; INTRAVENOUS EVERY 6 HOURS PRN
Status: DISCONTINUED | OUTPATIENT
Start: 2025-03-22 | End: 2025-03-26 | Stop reason: HOSPADM

## 2025-03-22 RX ORDER — IOPAMIDOL 755 MG/ML
75 INJECTION, SOLUTION INTRAVASCULAR
Status: COMPLETED | OUTPATIENT
Start: 2025-03-22 | End: 2025-03-22

## 2025-03-22 RX ORDER — ACETAMINOPHEN 325 MG/1
650 TABLET ORAL EVERY 6 HOURS PRN
Status: DISCONTINUED | OUTPATIENT
Start: 2025-03-22 | End: 2025-03-26 | Stop reason: HOSPADM

## 2025-03-22 RX ORDER — DICYCLOMINE HYDROCHLORIDE 10 MG/1
20 CAPSULE ORAL 3 TIMES DAILY
Status: DISCONTINUED | OUTPATIENT
Start: 2025-03-22 | End: 2025-03-26 | Stop reason: HOSPADM

## 2025-03-22 RX ORDER — IPRATROPIUM BROMIDE AND ALBUTEROL SULFATE 2.5; .5 MG/3ML; MG/3ML
1 SOLUTION RESPIRATORY (INHALATION) EVERY 4 HOURS PRN
Status: DISCONTINUED | OUTPATIENT
Start: 2025-03-22 | End: 2025-03-26 | Stop reason: HOSPADM

## 2025-03-22 RX ORDER — ENOXAPARIN SODIUM 100 MG/ML
40 INJECTION SUBCUTANEOUS DAILY
Status: DISCONTINUED | OUTPATIENT
Start: 2025-03-22 | End: 2025-03-26 | Stop reason: HOSPADM

## 2025-03-22 RX ORDER — FOLIC ACID 1 MG/1
1 TABLET ORAL DAILY
Status: DISCONTINUED | OUTPATIENT
Start: 2025-03-22 | End: 2025-03-26 | Stop reason: HOSPADM

## 2025-03-22 RX ORDER — DOCUSATE SODIUM 100 MG/1
100 CAPSULE, LIQUID FILLED ORAL DAILY
Status: DISCONTINUED | OUTPATIENT
Start: 2025-03-22 | End: 2025-03-26 | Stop reason: HOSPADM

## 2025-03-22 RX ADMIN — DICYCLOMINE HYDROCHLORIDE 20 MG: 10 CAPSULE ORAL at 21:34

## 2025-03-22 RX ADMIN — GUAIFENESIN 400 MG: 400 TABLET ORAL at 21:35

## 2025-03-22 RX ADMIN — IPRATROPIUM BROMIDE AND ALBUTEROL SULFATE 3 DOSE: .5; 3 SOLUTION RESPIRATORY (INHALATION) at 15:41

## 2025-03-22 RX ADMIN — CLONAZEPAM 1 MG: 0.5 TABLET ORAL at 21:34

## 2025-03-22 RX ADMIN — BUDESONIDE 500 MCG: 0.5 SUSPENSION RESPIRATORY (INHALATION) at 20:19

## 2025-03-22 RX ADMIN — ARFORMOTEROL TARTRATE 15 MCG: 15 SOLUTION RESPIRATORY (INHALATION) at 20:19

## 2025-03-22 RX ADMIN — WATER 1000 MG: 1 INJECTION INTRAMUSCULAR; INTRAVENOUS; SUBCUTANEOUS at 16:02

## 2025-03-22 RX ADMIN — SODIUM CHLORIDE: 0.9 INJECTION, SOLUTION INTRAVENOUS at 22:55

## 2025-03-22 RX ADMIN — SODIUM CHLORIDE, PRESERVATIVE FREE 10 ML: 5 INJECTION INTRAVENOUS at 21:35

## 2025-03-22 RX ADMIN — METHYLPREDNISOLONE SODIUM SUCCINATE 125 MG: 125 INJECTION INTRAMUSCULAR; INTRAVENOUS at 15:38

## 2025-03-22 RX ADMIN — SODIUM CHLORIDE, PRESERVATIVE FREE 10 ML: 5 INJECTION INTRAVENOUS at 12:51

## 2025-03-22 RX ADMIN — SODIUM CHLORIDE 1524 ML: 9 INJECTION, SOLUTION INTRAVENOUS at 12:51

## 2025-03-22 RX ADMIN — BUSPIRONE HYDROCHLORIDE 10 MG: 5 TABLET ORAL at 21:35

## 2025-03-22 RX ADMIN — IOPAMIDOL 75 ML: 755 INJECTION, SOLUTION INTRAVENOUS at 18:49

## 2025-03-22 ASSESSMENT — PAIN DESCRIPTION - LOCATION
LOCATION: ABDOMEN;SHOULDER
LOCATION: ABDOMEN

## 2025-03-22 ASSESSMENT — PAIN - FUNCTIONAL ASSESSMENT: PAIN_FUNCTIONAL_ASSESSMENT: NONE - DENIES PAIN

## 2025-03-22 ASSESSMENT — PAIN DESCRIPTION - DESCRIPTORS
DESCRIPTORS: ACHING;DISCOMFORT;TENDER
DESCRIPTORS: ACHING

## 2025-03-22 ASSESSMENT — PAIN SCALES - GENERAL
PAINLEVEL_OUTOF10: 1
PAINLEVEL_OUTOF10: 9

## 2025-03-22 NOTE — H&P
Hospitalist History & Physical      PCP: Tal Humphrey DO    Date of Admission: 3/22/2025    Date of Service: Pt seen/examined on 3/22/2025 and is admitted to Inpatient with expected LOS greater than two midnights due to medical therapy.      Placed in Observation.    Chief Complaint:  had concerns including Altered Mental Status (Patient sent from [place] with RAE LUZ 9:30am . Patient confused eating blanket during triage ).    History Of Present Illness:    Ms. Lana Phillips, a 70 y.o. year old female  who  has a past medical history of Atherosclerosis 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 (HCC), Atrial fibrillation (HCC), Chronic obstructive pulmonary disease (HCC), COVID-19, Critical limb ischemia of left lower extremity with rest pain (HCC), GI bleed, Hypertension, RUTH treated with BiPAP, Panic disorder, PVD (peripheral vascular disease), Severe protein-calorie malnutrition, Thyroid disease, and Uncontrolled hypertension.     Patient was brought in to ER from nursing home for evaluation of altered mental status.  No other reported from nursing facility.  His last known well was 930.     On ER evaluation she is afebrile 97.9 BP 89/53  RR 18 SpO2 100% Blood work with leukocytosis of 18.5 with left shift, hemoglobin 9.4, normal platelets.  Normal renal function and electrolytes.  Troponin 48 and repeat 41.  Normal lactic acid.  ABG on 5 L nasal cannula pH 7.48 pCO2 46 pO2 104.  UA with positive leukocyte esterase, hematuria.  Negative RVP.  Blood culture sent.  CT head with no acute process.  CT abdomen pelvis pending as well as CTA pulmonary on admission.  Patient was given ceftriaxone 1 g, Solu-Medrol 125, DuoNebs x 3.  Decision to admit for altered mental status possible underlying UTI.  [facility] hospitalist called for admission.  Case was discussed with ER physician    On my evaluation patient is alert and answering questions, not in distress.  She admits to

## 2025-03-22 NOTE — ED PROVIDER NOTES
[facility]  EMERGENCY DEPARTMENT ENCOUNTER        Pt Name: Lana Phillips  MRN: [id number]  Birthdate 1955  Date of evaluation: 3/22/2025  Provider: Keila Hernandez MD  Attending Provider: Michael Herbert MD  PCP: Tal Humphrey DO  Note Started: 11:33 AM EDT 3/22/25    CHIEF COMPLAINT       Chief Complaint   Patient presents with    Altered Mental Status     Patient sent from [place] with RAE LUZ 9:30am . Patient confused eating blanket during triage        HISTORY OF PRESENT ILLNESS: 1 or more Elements   History From: nursing report/the patient        Lana Phillips is a 70 y.o. female with a past medical history of COPD, chronic respiratory failure, hypertension, hyperlipidemia, atrial fibrillation on Eliquis presenting with altered mental status from skilled nursing facility.  Patient was last seen well at approximately 930.  Patient was confused eating breakfast.  She is normally alert and oriented x 4.  She reportedly is only alert and oriented x 1.  Patient also reportedly fell this morning.  Patient states that she has no complaints at this time.  She is on her baseline oxygen use.  Denies any fevers or cough or sore throat or chest pain or shortness of breath.  No nausea or vomiting or abdominal pain.    Nursing Notes were all reviewed and agreed with or any disagreements were addressed in the HPI.      REVIEW OF EXTERNAL NOTE :           PDMP Monitoring:    Last PDMP Melvin as Reviewed:  Review User Review Instant Review Result   ANDRES, ABILIO 10/9/2024 11:11 AM Reviewed PDMP [1]     Last Controlled Substance Monitoring Documentation      Flowsheet Row Office Visit from 2/29/2024 in [facility]   Periodic Controlled Substance Monitoring Possible medication side effects, risk of tolerance/dependence & alternative treatments discussed., No signs of potential drug abuse or diversion identified., Assessed functional status (ability to engage in work or other

## 2025-03-23 LAB
ANION GAP SERPL CALCULATED.3IONS-SCNC: 16 MMOL/L (ref 7–16)
B.E.: 6.2 MMOL/L (ref -3–3)
BASOPHILS # BLD: 0 K/UL (ref 0–0.2)
BASOPHILS NFR BLD: 0 % (ref 0–2)
BUN SERPL-MCNC: 12 MG/DL (ref 6–23)
CALCIUM SERPL-MCNC: 8.4 MG/DL (ref 8.6–10.2)
CHLORIDE SERPL-SCNC: 99 MMOL/L (ref 98–107)
CO2 SERPL-SCNC: 29 MMOL/L (ref 22–29)
COHB: 0.1 % (ref 0–1.5)
CREAT SERPL-MCNC: 0.6 MG/DL (ref 0.5–1)
CRITICAL: ABNORMAL
DATE ANALYZED: ABNORMAL
DATE OF COLLECTION: ABNORMAL
EOSINOPHIL # BLD: 0 K/UL (ref 0.05–0.5)
EOSINOPHILS RELATIVE PERCENT: 0 % (ref 0–6)
ERYTHROCYTE [DISTWIDTH] IN BLOOD BY AUTOMATED COUNT: 14.7 % (ref 11.5–15)
GFR, ESTIMATED: >90 ML/MIN/1.73M2
GLUCOSE BLD-MCNC: 202 MG/DL (ref 74–99)
GLUCOSE SERPL-MCNC: 208 MG/DL (ref 74–99)
HCO3: 31.3 MMOL/L (ref 22–26)
HCT VFR BLD AUTO: 30.3 % (ref 34–48)
HGB BLD-MCNC: 9.2 G/DL (ref 11.5–15.5)
HHB: 6.2 % (ref 0–5)
LAB: ABNORMAL
LYMPHOCYTES NFR BLD: 0.7 K/UL (ref 1.5–4)
LYMPHOCYTES RELATIVE PERCENT: 7 % (ref 20–42)
Lab: 1151
MCH RBC QN AUTO: 32.4 PG (ref 26–35)
MCHC RBC AUTO-ENTMCNC: 30.4 G/DL (ref 32–34.5)
MCV RBC AUTO: 106.7 FL (ref 80–99.9)
METHB: 0.3 % (ref 0–1.5)
MODE: ABNORMAL
MONOCYTES NFR BLD: 0.09 K/UL (ref 0.1–0.95)
MONOCYTES NFR BLD: 1 % (ref 2–12)
NEUTROPHILS NFR BLD: 92 % (ref 43–80)
NEUTS SEG NFR BLD: 9.22 K/UL (ref 1.8–7.3)
O2 SATURATION: 93.8 % (ref 92–98.5)
O2HB: 93.4 % (ref 94–97)
OPERATOR ID: ABNORMAL
PATIENT TEMP: 37 C
PCO2: 48.3 MMHG (ref 35–45)
PH BLOOD GAS: 7.43 (ref 7.35–7.45)
PLATELET # BLD AUTO: 353 K/UL (ref 130–450)
PMV BLD AUTO: 9.1 FL (ref 7–12)
PO2: 69.5 MMHG (ref 75–100)
POTASSIUM SERPL-SCNC: 3.9 MMOL/L (ref 3.5–5)
RBC # BLD AUTO: 2.84 M/UL (ref 3.5–5.5)
RBC # BLD: ABNORMAL 10*6/UL
RBC # BLD: ABNORMAL 10*6/UL
SODIUM SERPL-SCNC: 144 MMOL/L (ref 132–146)
SOURCE, BLOOD GAS: ABNORMAL
THB: 9.5 G/DL (ref 11.5–16.5)
TIME ANALYZED: 1155
WBC OTHER # BLD: 10 K/UL (ref 4.5–11.5)

## 2025-03-23 PROCEDURE — 85025 COMPLETE CBC W/AUTO DIFF WBC: CPT

## 2025-03-23 PROCEDURE — 82962 GLUCOSE BLOOD TEST: CPT

## 2025-03-23 PROCEDURE — 80048 BASIC METABOLIC PNL TOTAL CA: CPT

## 2025-03-23 PROCEDURE — 2500000003 HC RX 250 WO HCPCS: Performed by: STUDENT IN AN ORGANIZED HEALTH CARE EDUCATION/TRAINING PROGRAM

## 2025-03-23 PROCEDURE — 99232 SBSQ HOSP IP/OBS MODERATE 35: CPT | Performed by: STUDENT IN AN ORGANIZED HEALTH CARE EDUCATION/TRAINING PROGRAM

## 2025-03-23 PROCEDURE — 6370000000 HC RX 637 (ALT 250 FOR IP): Performed by: STUDENT IN AN ORGANIZED HEALTH CARE EDUCATION/TRAINING PROGRAM

## 2025-03-23 PROCEDURE — 2500000003 HC RX 250 WO HCPCS

## 2025-03-23 PROCEDURE — 6360000002 HC RX W HCPCS: Performed by: STUDENT IN AN ORGANIZED HEALTH CARE EDUCATION/TRAINING PROGRAM

## 2025-03-23 PROCEDURE — 82805 BLOOD GASES W/O2 SATURATION: CPT

## 2025-03-23 PROCEDURE — 2700000000 HC OXYGEN THERAPY PER DAY

## 2025-03-23 PROCEDURE — 94640 AIRWAY INHALATION TREATMENT: CPT

## 2025-03-23 PROCEDURE — 36415 COLL VENOUS BLD VENIPUNCTURE: CPT

## 2025-03-23 PROCEDURE — 1200000000 HC SEMI PRIVATE

## 2025-03-23 RX ORDER — BISACODYL 10 MG
10 SUPPOSITORY, RECTAL RECTAL DAILY PRN
Status: DISCONTINUED | OUTPATIENT
Start: 2025-03-23 | End: 2025-03-26 | Stop reason: HOSPADM

## 2025-03-23 RX ORDER — DOCUSATE SODIUM 100 MG/1
100 CAPSULE, LIQUID FILLED ORAL DAILY
Status: DISCONTINUED | OUTPATIENT
Start: 2025-03-23 | End: 2025-03-26 | Stop reason: HOSPADM

## 2025-03-23 RX ADMIN — POLYETHYLENE GLYCOL 3350 17 G: 17 POWDER, FOR SOLUTION ORAL at 18:29

## 2025-03-23 RX ADMIN — GUAIFENESIN 400 MG: 400 TABLET ORAL at 13:44

## 2025-03-23 RX ADMIN — BUSPIRONE HYDROCHLORIDE 10 MG: 5 TABLET ORAL at 21:54

## 2025-03-23 RX ADMIN — ARFORMOTEROL TARTRATE 15 MCG: 15 SOLUTION RESPIRATORY (INHALATION) at 09:07

## 2025-03-23 RX ADMIN — BUDESONIDE 500 MCG: 0.5 SUSPENSION RESPIRATORY (INHALATION) at 18:12

## 2025-03-23 RX ADMIN — DILTIAZEM HYDROCHLORIDE 240 MG: 120 CAPSULE, EXTENDED RELEASE ORAL at 09:25

## 2025-03-23 RX ADMIN — PREDNISONE 10 MG: 10 TABLET ORAL at 09:23

## 2025-03-23 RX ADMIN — BENZONATATE 100 MG: 100 CAPSULE ORAL at 13:44

## 2025-03-23 RX ADMIN — IPRATROPIUM BROMIDE AND ALBUTEROL SULFATE 1 DOSE: 2.5; .5 SOLUTION RESPIRATORY (INHALATION) at 18:12

## 2025-03-23 RX ADMIN — BUDESONIDE 500 MCG: 0.5 SUSPENSION RESPIRATORY (INHALATION) at 09:07

## 2025-03-23 RX ADMIN — HYDROXYZINE HYDROCHLORIDE 50 MG: 50 TABLET, FILM COATED ORAL at 15:18

## 2025-03-23 RX ADMIN — GUAIFENESIN 400 MG: 400 TABLET ORAL at 21:54

## 2025-03-23 RX ADMIN — HYDROXYZINE HYDROCHLORIDE 50 MG: 50 TABLET, FILM COATED ORAL at 09:25

## 2025-03-23 RX ADMIN — SODIUM CHLORIDE, PRESERVATIVE FREE 10 ML: 5 INJECTION INTRAVENOUS at 21:54

## 2025-03-23 RX ADMIN — SODIUM CHLORIDE, PRESERVATIVE FREE 10 ML: 5 INJECTION INTRAVENOUS at 09:34

## 2025-03-23 RX ADMIN — GUAIFENESIN 400 MG: 400 TABLET ORAL at 09:23

## 2025-03-23 RX ADMIN — ENOXAPARIN SODIUM 40 MG: 100 INJECTION SUBCUTANEOUS at 09:24

## 2025-03-23 RX ADMIN — ASPIRIN 81 MG: 81 TABLET, COATED ORAL at 09:23

## 2025-03-23 RX ADMIN — PANTOPRAZOLE SODIUM 40 MG: 40 TABLET, DELAYED RELEASE ORAL at 05:33

## 2025-03-23 RX ADMIN — DOCUSATE SODIUM 100 MG: 100 CAPSULE, LIQUID FILLED ORAL at 18:29

## 2025-03-23 RX ADMIN — IPRATROPIUM BROMIDE AND ALBUTEROL SULFATE 1 DOSE: 2.5; .5 SOLUTION RESPIRATORY (INHALATION) at 12:38

## 2025-03-23 RX ADMIN — ARFORMOTEROL TARTRATE 15 MCG: 15 SOLUTION RESPIRATORY (INHALATION) at 18:12

## 2025-03-23 RX ADMIN — WATER 1000 MG: 1 INJECTION INTRAMUSCULAR; INTRAVENOUS; SUBCUTANEOUS at 18:01

## 2025-03-23 RX ADMIN — ACETAMINOPHEN 650 MG: 325 TABLET ORAL at 12:04

## 2025-03-23 RX ADMIN — POLYETHYLENE GLYCOL 3350 17 G: 17 POWDER, FOR SOLUTION ORAL at 16:42

## 2025-03-23 RX ADMIN — ONDANSETRON 4 MG: 4 TABLET, ORALLY DISINTEGRATING ORAL at 10:12

## 2025-03-23 RX ADMIN — METHIMAZOLE 15 MG: 5 TABLET ORAL at 09:25

## 2025-03-23 RX ADMIN — SERTRALINE 50 MG: 50 TABLET, FILM COATED ORAL at 09:23

## 2025-03-23 RX ADMIN — CLONAZEPAM 1 MG: 0.5 TABLET ORAL at 21:53

## 2025-03-23 RX ADMIN — SODIUM CHLORIDE, PRESERVATIVE FREE 10 ML: 5 INJECTION INTRAVENOUS at 09:26

## 2025-03-23 ASSESSMENT — PAIN DESCRIPTION - DESCRIPTORS: DESCRIPTORS: HEAVINESS

## 2025-03-23 ASSESSMENT — PAIN SCALES - GENERAL: PAINLEVEL_OUTOF10: 6

## 2025-03-23 ASSESSMENT — PAIN DESCRIPTION - LOCATION: LOCATION: CHEST

## 2025-03-23 ASSESSMENT — PAIN DESCRIPTION - ORIENTATION: ORIENTATION: LEFT;MID

## 2025-03-23 NOTE — PROGRESS NOTES
4 Eyes Skin Assessment     NAME:  Lana Phillips  YOB: 1955  MEDICAL RECORD NUMBER:  [id number]    The patient is being assessed for  Admission    I agree that at least one RN has performed a thorough Head to Toe Skin Assessment on the patient. ALL assessment sites listed below have been assessed.      Areas assessed by both nurses:    Head, Face, Ears, Shoulders, Back, Chest, Arms, Elbows, Hands, Sacrum. Buttock, Coccyx, Ischium, Legs. Feet and Heels, Under Medical Devices , and Other          Does the Patient have a Wound? No noted wound(s)       Guy Prevention initiated by RN: No  Wound Care Orders initiated by RN: No    Pressure Injury (Stage 3,4, Unstageable, DTI, NWPT, and Complex wounds) if present, place Wound referral order by RN under : No    New Ostomies, if present place, Ostomy referral order under : No     Nurse 1 eSignature: Electronically signed by Vandana Sims RN on 3/23/25 at 7:00 AM EDT    **SHARE this note so that the co-signing nurse can place an eSignature**    Nurse 2 eSignature: Electronically signed by Linda Caruso RN on 3/23/25 at 7:01 AM EDT

## 2025-03-23 NOTE — PROGRESS NOTES
Pulmonology consult called to Dr Rosas answering service    Electronically signed by Elaina Can RN on 3/23/2025 at 12:50 PM

## 2025-03-23 NOTE — PROGRESS NOTES
Messaged Dr Herbert to request downgrade    Electronically signed by Elaina Can RN on 3/23/2025 at 7:57 AM

## 2025-03-23 NOTE — PROGRESS NOTES
Event Note    Consult noted, will see in AM  Gopi Rosas M.D., LAKEISHA.    [facility]    [facility], [address] [place] [address], [address], [place] [address]             no

## 2025-03-23 NOTE — PLAN OF CARE
Problem: Confusion  Goal: Confusion, delirium, dementia, or psychosis is improved or at baseline  Description: INTERVENTIONS:  1. Assess for possible contributors to thought disturbance, including medications, impaired vision or hearing, underlying metabolic abnormalities, dehydration, psychiatric diagnoses, and notify attending LIP  2. [place] high risk fall precautions, as indicated  3. Provide frequent short contacts to provide reality reorientation, refocusing and direction  4. Decrease environmental stimuli, including noise as appropriate  5. Monitor and intervene to maintain adequate nutrition, hydration, elimination, sleep and activity  6. If unable to ensure safety without constant attention obtain sitter and review sitter guidelines with assigned personnel  7. Initiate Psychosocial CNS and Spiritual Care consult, as indicated  Outcome: Progressing     Problem: Skin/Tissue Integrity  Goal: Skin integrity remains intact  Description: 1.  Monitor for areas of redness and/or skin breakdown  2.  Assess vascular access sites hourly  3.  Every 4-6 hours minimum:  Change oxygen saturation probe site  4.  Every 4-6 hours:  If on nasal continuous positive airway pressure, respiratory therapy assess nares and determine need for appliance change or resting period  Outcome: Progressing     Problem: Safety - Adult  Goal: Free from fall injury  Outcome: Progressing

## 2025-03-23 NOTE — PLAN OF CARE
Problem: Chronic Conditions and Co-morbidities  Goal: Patient's chronic conditions and co-morbidity symptoms are monitored and maintained or improved  Outcome: Progressing     Problem: Discharge Planning  Goal: Discharge to home or other facility with appropriate resources  Outcome: Progressing     Problem: Pain  Goal: Verbalizes/displays adequate comfort level or baseline comfort level  Outcome: Progressing     Problem: Confusion  Goal: Confusion, delirium, dementia, or psychosis is improved or at baseline  Description: INTERVENTIONS:  1. Assess for possible contributors to thought disturbance, including medications, impaired vision or hearing, underlying metabolic abnormalities, dehydration, psychiatric diagnoses, and notify attending LIP  2. [place] high risk fall precautions, as indicated  3. Provide frequent short contacts to provide reality reorientation, refocusing and direction  4. Decrease environmental stimuli, including noise as appropriate  5. Monitor and intervene to maintain adequate nutrition, hydration, elimination, sleep and activity  6. If unable to ensure safety without constant attention obtain sitter and review sitter guidelines with assigned personnel  7. Initiate Psychosocial CNS and Spiritual Care consult, as indicated  Outcome: Progressing     Problem: Skin/Tissue Integrity  Goal: Skin integrity remains intact  Description: 1.  Monitor for areas of redness and/or skin breakdown  2.  Assess vascular access sites hourly  3.  Every 4-6 hours minimum:  Change oxygen saturation probe site  4.  Every 4-6 hours:  If on nasal continuous positive airway pressure, respiratory therapy assess nares and determine need for appliance change or resting period  Outcome: Progressing     Problem: Safety - Adult  Goal: Free from fall injury  Outcome: Progressing

## 2025-03-24 ENCOUNTER — APPOINTMENT (OUTPATIENT)
Dept: GENERAL RADIOLOGY | Age: 70
DRG: 312 | End: 2025-03-24
Payer: MEDICARE

## 2025-03-24 LAB
ANION GAP SERPL CALCULATED.3IONS-SCNC: 9 MMOL/L (ref 7–16)
B.E.: 7.1 MMOL/L (ref -3–3)
BASOPHILS # BLD: 0.02 K/UL (ref 0–0.2)
BASOPHILS NFR BLD: 0 % (ref 0–2)
BUN SERPL-MCNC: 11 MG/DL (ref 6–23)
CALCIUM SERPL-MCNC: 8.6 MG/DL (ref 8.6–10.2)
CHLORIDE SERPL-SCNC: 102 MMOL/L (ref 98–107)
CO2 SERPL-SCNC: 31 MMOL/L (ref 22–29)
COHB: 0.5 % (ref 0–1.5)
CREAT SERPL-MCNC: 0.7 MG/DL (ref 0.5–1)
CRITICAL: ABNORMAL
DATE ANALYZED: ABNORMAL
DATE OF COLLECTION: ABNORMAL
EOSINOPHIL # BLD: 0.08 K/UL (ref 0.05–0.5)
EOSINOPHILS RELATIVE PERCENT: 1 % (ref 0–6)
ERYTHROCYTE [DISTWIDTH] IN BLOOD BY AUTOMATED COUNT: 14.9 % (ref 11.5–15)
GFR, ESTIMATED: >90 ML/MIN/1.73M2
GLUCOSE SERPL-MCNC: 87 MG/DL (ref 74–99)
HCO3: 33 MMOL/L (ref 22–26)
HCT VFR BLD AUTO: 28.3 % (ref 34–48)
HGB BLD-MCNC: 8.6 G/DL (ref 11.5–15.5)
HHB: 1.8 % (ref 0–5)
IMM GRANULOCYTES # BLD AUTO: 0.1 K/UL (ref 0–0.58)
IMM GRANULOCYTES NFR BLD: 1 % (ref 0–5)
LAB: ABNORMAL
LYMPHOCYTES NFR BLD: 1.53 K/UL (ref 1.5–4)
LYMPHOCYTES RELATIVE PERCENT: 13 % (ref 20–42)
Lab: 1025
MCH RBC QN AUTO: 32.8 PG (ref 26–35)
MCHC RBC AUTO-ENTMCNC: 30.4 G/DL (ref 32–34.5)
MCV RBC AUTO: 108 FL (ref 80–99.9)
METHB: 0.4 % (ref 0–1.5)
MODE: ABNORMAL
MONOCYTES NFR BLD: 0.66 K/UL (ref 0.1–0.95)
MONOCYTES NFR BLD: 6 % (ref 2–12)
NEUTROPHILS NFR BLD: 80 % (ref 43–80)
NEUTS SEG NFR BLD: 9.49 K/UL (ref 1.8–7.3)
O2 SATURATION: 98.2 % (ref 92–98.5)
O2HB: 97.3 % (ref 94–97)
OPERATOR ID: ABNORMAL
PATIENT TEMP: 37 C
PCO2: 54.3 MMHG (ref 35–45)
PH BLOOD GAS: 7.4 (ref 7.35–7.45)
PLATELET # BLD AUTO: 275 K/UL (ref 130–450)
PMV BLD AUTO: 9.1 FL (ref 7–12)
PO2: 109.7 MMHG (ref 75–100)
POTASSIUM SERPL-SCNC: 3.7 MMOL/L (ref 3.5–5)
RBC # BLD AUTO: 2.62 M/UL (ref 3.5–5.5)
SODIUM SERPL-SCNC: 142 MMOL/L (ref 132–146)
SOURCE, BLOOD GAS: ABNORMAL
THB: 9.2 G/DL (ref 11.5–16.5)
TIME ANALYZED: 1029
WBC OTHER # BLD: 11.9 K/UL (ref 4.5–11.5)

## 2025-03-24 PROCEDURE — 6370000000 HC RX 637 (ALT 250 FOR IP): Performed by: STUDENT IN AN ORGANIZED HEALTH CARE EDUCATION/TRAINING PROGRAM

## 2025-03-24 PROCEDURE — 1200000000 HC SEMI PRIVATE

## 2025-03-24 PROCEDURE — 80048 BASIC METABOLIC PNL TOTAL CA: CPT

## 2025-03-24 PROCEDURE — 99232 SBSQ HOSP IP/OBS MODERATE 35: CPT

## 2025-03-24 PROCEDURE — 2500000003 HC RX 250 WO HCPCS

## 2025-03-24 PROCEDURE — 74018 RADEX ABDOMEN 1 VIEW: CPT

## 2025-03-24 PROCEDURE — 6360000002 HC RX W HCPCS: Performed by: STUDENT IN AN ORGANIZED HEALTH CARE EDUCATION/TRAINING PROGRAM

## 2025-03-24 PROCEDURE — 2500000003 HC RX 250 WO HCPCS: Performed by: STUDENT IN AN ORGANIZED HEALTH CARE EDUCATION/TRAINING PROGRAM

## 2025-03-24 PROCEDURE — 6370000000 HC RX 637 (ALT 250 FOR IP)

## 2025-03-24 PROCEDURE — 36415 COLL VENOUS BLD VENIPUNCTURE: CPT

## 2025-03-24 PROCEDURE — 94640 AIRWAY INHALATION TREATMENT: CPT

## 2025-03-24 PROCEDURE — 2700000000 HC OXYGEN THERAPY PER DAY

## 2025-03-24 PROCEDURE — 36600 WITHDRAWAL OF ARTERIAL BLOOD: CPT

## 2025-03-24 PROCEDURE — 71045 X-RAY EXAM CHEST 1 VIEW: CPT

## 2025-03-24 PROCEDURE — 82805 BLOOD GASES W/O2 SATURATION: CPT

## 2025-03-24 PROCEDURE — 85025 COMPLETE CBC W/AUTO DIFF WBC: CPT

## 2025-03-24 RX ORDER — TRAMADOL HYDROCHLORIDE 50 MG/1
50 TABLET ORAL EVERY 6 HOURS PRN
Status: DISCONTINUED | OUTPATIENT
Start: 2025-03-24 | End: 2025-03-26 | Stop reason: HOSPADM

## 2025-03-24 RX ORDER — DICYCLOMINE HYDROCHLORIDE 10 MG/1
20 CAPSULE ORAL
Status: DISCONTINUED | OUTPATIENT
Start: 2025-03-24 | End: 2025-03-26 | Stop reason: HOSPADM

## 2025-03-24 RX ADMIN — ASPIRIN 81 MG: 81 TABLET, COATED ORAL at 08:27

## 2025-03-24 RX ADMIN — BUDESONIDE 500 MCG: 0.5 SUSPENSION RESPIRATORY (INHALATION) at 20:25

## 2025-03-24 RX ADMIN — WATER 1000 MG: 1 INJECTION INTRAMUSCULAR; INTRAVENOUS; SUBCUTANEOUS at 14:31

## 2025-03-24 RX ADMIN — SODIUM CHLORIDE, PRESERVATIVE FREE 10 ML: 5 INJECTION INTRAVENOUS at 08:26

## 2025-03-24 RX ADMIN — BISACODYL 10 MG: 10 SUPPOSITORY RECTAL at 03:42

## 2025-03-24 RX ADMIN — ONDANSETRON 4 MG: 4 TABLET, ORALLY DISINTEGRATING ORAL at 03:22

## 2025-03-24 RX ADMIN — GUAIFENESIN 400 MG: 400 TABLET ORAL at 14:06

## 2025-03-24 RX ADMIN — ARFORMOTEROL TARTRATE 15 MCG: 15 SOLUTION RESPIRATORY (INHALATION) at 08:03

## 2025-03-24 RX ADMIN — CLONAZEPAM 1 MG: 0.5 TABLET ORAL at 20:49

## 2025-03-24 RX ADMIN — BUDESONIDE 500 MCG: 0.5 SUSPENSION RESPIRATORY (INHALATION) at 08:03

## 2025-03-24 RX ADMIN — PREDNISONE 10 MG: 10 TABLET ORAL at 08:29

## 2025-03-24 RX ADMIN — SODIUM CHLORIDE, PRESERVATIVE FREE 10 ML: 5 INJECTION INTRAVENOUS at 20:55

## 2025-03-24 RX ADMIN — POLYETHYLENE GLYCOL 3350 17 G: 17 POWDER, FOR SOLUTION ORAL at 17:01

## 2025-03-24 RX ADMIN — SODIUM CHLORIDE, PRESERVATIVE FREE 10 ML: 5 INJECTION INTRAVENOUS at 08:29

## 2025-03-24 RX ADMIN — METHIMAZOLE 15 MG: 5 TABLET ORAL at 08:28

## 2025-03-24 RX ADMIN — DILTIAZEM HYDROCHLORIDE 240 MG: 120 CAPSULE, EXTENDED RELEASE ORAL at 09:44

## 2025-03-24 RX ADMIN — IPRATROPIUM BROMIDE AND ALBUTEROL SULFATE 1 DOSE: 2.5; .5 SOLUTION RESPIRATORY (INHALATION) at 16:59

## 2025-03-24 RX ADMIN — BUSPIRONE HYDROCHLORIDE 10 MG: 5 TABLET ORAL at 08:27

## 2025-03-24 RX ADMIN — IPRATROPIUM BROMIDE AND ALBUTEROL SULFATE 1 DOSE: 2.5; .5 SOLUTION RESPIRATORY (INHALATION) at 10:27

## 2025-03-24 RX ADMIN — DICYCLOMINE HYDROCHLORIDE 20 MG: 10 CAPSULE ORAL at 20:52

## 2025-03-24 RX ADMIN — BUSPIRONE HYDROCHLORIDE 10 MG: 5 TABLET ORAL at 20:49

## 2025-03-24 RX ADMIN — ENOXAPARIN SODIUM 40 MG: 100 INJECTION SUBCUTANEOUS at 08:25

## 2025-03-24 RX ADMIN — PANTOPRAZOLE SODIUM 40 MG: 40 TABLET, DELAYED RELEASE ORAL at 06:51

## 2025-03-24 RX ADMIN — HYDROXYZINE HYDROCHLORIDE 50 MG: 50 TABLET, FILM COATED ORAL at 20:54

## 2025-03-24 RX ADMIN — TRAMADOL HYDROCHLORIDE 50 MG: 50 TABLET, COATED ORAL at 18:02

## 2025-03-24 RX ADMIN — ONDANSETRON 4 MG: 2 INJECTION, SOLUTION INTRAMUSCULAR; INTRAVENOUS at 12:12

## 2025-03-24 RX ADMIN — SODIUM CHLORIDE, PRESERVATIVE FREE 10 ML: 5 INJECTION INTRAVENOUS at 20:46

## 2025-03-24 RX ADMIN — ARFORMOTEROL TARTRATE 15 MCG: 15 SOLUTION RESPIRATORY (INHALATION) at 20:25

## 2025-03-24 RX ADMIN — GUAIFENESIN 400 MG: 400 TABLET ORAL at 08:27

## 2025-03-24 RX ADMIN — GUAIFENESIN 400 MG: 400 TABLET ORAL at 20:54

## 2025-03-24 RX ADMIN — HYDROXYZINE HYDROCHLORIDE 50 MG: 50 TABLET, FILM COATED ORAL at 13:42

## 2025-03-24 ASSESSMENT — PAIN - FUNCTIONAL ASSESSMENT
PAIN_FUNCTIONAL_ASSESSMENT: PREVENTS OR INTERFERES SOME ACTIVE ACTIVITIES AND ADLS
PAIN_FUNCTIONAL_ASSESSMENT: ACTIVITIES ARE NOT PREVENTED
PAIN_FUNCTIONAL_ASSESSMENT: ACTIVITIES ARE NOT PREVENTED

## 2025-03-24 ASSESSMENT — PAIN DESCRIPTION - ONSET: ONSET: GRADUAL

## 2025-03-24 ASSESSMENT — PAIN DESCRIPTION - LOCATION
LOCATION: ABDOMEN

## 2025-03-24 ASSESSMENT — PAIN SCALES - GENERAL
PAINLEVEL_OUTOF10: 6
PAINLEVEL_OUTOF10: 7
PAINLEVEL_OUTOF10: 6
PAINLEVEL_OUTOF10: 0
PAINLEVEL_OUTOF10: 2
PAINLEVEL_OUTOF10: 8

## 2025-03-24 ASSESSMENT — PAIN DESCRIPTION - FREQUENCY
FREQUENCY: INTERMITTENT
FREQUENCY: INTERMITTENT

## 2025-03-24 ASSESSMENT — PAIN DESCRIPTION - DESCRIPTORS
DESCRIPTORS: CRAMPING
DESCRIPTORS: STABBING;THROBBING;ACHING
DESCRIPTORS: ACHING;DISCOMFORT

## 2025-03-24 ASSESSMENT — PAIN DESCRIPTION - ORIENTATION
ORIENTATION: UPPER;MID
ORIENTATION: ANTERIOR
ORIENTATION: INNER

## 2025-03-24 ASSESSMENT — PAIN DESCRIPTION - PAIN TYPE
TYPE: ACUTE PAIN
TYPE: ACUTE PAIN

## 2025-03-24 NOTE — PATIENT CHOICE
Patient complaining of stomach pain then ate full meal.Respiratory called for Treatment Charge nurse notified.

## 2025-03-24 NOTE — PROGRESS NOTES
Hospitalist Progress Note      SYNOPSIS: Patient admitted on 3/22/2025 for UTI (urinary tract infection)  70-year-old lady was brought to ER from nursing home for evaluation of altered mental status.  On ER evaluation she is afebrile 97.9 BP 89/53  RR 18 SpO2 100% Blood work with leukocytosis of 18.5 with left shift, hemoglobin 9.4, normal platelets.  Normal renal function and electrolytes.  Troponin 48 and repeat 41.  Normal lactic acid.  ABG on 5 L nasal cannula pH 7.48 pCO2 46 pO2 104.  UA with positive leukocyte esterase, hematuria.  Negative RVP.  Blood culture sent.  CT head with no acute process   CT abdomen pelvis showed constipation  The patient was admitted under internal medicine started on IV Rocephin.    SUBJECTIVE:  Stable overnight. No other overnight issues reported.   alert oriented x 3 answering to my questions appropriately.  Records reviewed.     Temp (24hrs), Av.2 °F (36.2 °C), Min:96.8 °F (36 °C), Max:97.5 °F (36.4 °C)    DIET: ADULT DIET; Regular; Low Fat/Low Chol/High Fiber/2 gm Na  CODE: Full Code    Intake/Output Summary (Last 24 hours) at 3/24/2025 1205  Last data filed at 3/24/2025 0715  Gross per 24 hour   Intake 610 ml   Output --   Net 610 ml       Review of Systems  All bolded are positive; please see HPI  General:  Fever, chills, diaphoresis, fatigue, malaise, night sweats, weight loss  Psychological:  Anxiety, disorientation, hallucinations.  ENT:  Epistaxis, headaches, vertigo, visual changes.  Cardiovascular:  Chest pain, irregular heartbeats, palpitations, paroxysmal nocturnal dyspnea.  Respiratory:  Shortness of breath, coughing, sputum production, hemoptysis, wheezing, orthopnea.  Gastrointestinal:  Nausea, vomiting, diarrhea, heartburn, constipation, abdominal pain, hematemesis, hematochezia, melena, acholic stools  Genito-Urinary:  Dysuria, urgency, frequency, hematuria  Musculoskeletal:  Joint pain, joint stiffness, joint swelling, muscle pain  Neurology:   0.2       No results for input(s): \"INR\" in the last 72 hours.    No results for input(s): \"CKTOTAL\", \"TROPONINI\" in the last 72 hours.    Chronic labs:    Lab Results   Component Value Date    CHOL 266 (H) 03/16/2025    TRIG 157 (H) 03/16/2025     03/16/2025    TSH 0.15 (L) 03/08/2025    INR 1.1 12/30/2024    LABA1C 6.3 (H) 03/16/2025       Radiology: REVIEWED DAILY    +++++++++++++++++++++++++++++++++++++++++++++++++  Lee Wilson MD   Hospitalist  [place], [place]  +++++++++++++++++++++++++++++++++++++++++++++++++  NOTE: This report was transcribed using voice recognition software. Every effort was made to ensure accuracy; however, inadvertent computerized transcription errors may be present.

## 2025-03-24 NOTE — PLAN OF CARE
Problem: Chronic Conditions and Co-morbidities  Goal: Patient's chronic conditions and co-morbidity symptoms are monitored and maintained or improved  3/24/2025 1115 by Diane Schuler RN  Outcome: Not Progressing  3/24/2025 0223 by Chery Walker RN  Outcome: Progressing  3/24/2025 0053 by Chery Walker RN  Outcome: Progressing     Problem: Discharge Planning  Goal: Discharge to home or other facility with appropriate resources  3/24/2025 1115 by Diane Schuler RN  Outcome: Not Progressing  3/24/2025 0223 by Chery Walker RN  Outcome: Progressing  3/24/2025 0053 by Chery Walker RN  Outcome: Progressing     Problem: Pain  Goal: Verbalizes/displays adequate comfort level or baseline comfort level  3/24/2025 1115 by Diane Schuler RN  Outcome: Progressing  3/24/2025 0223 by Chery Walker RN  Outcome: Progressing  3/24/2025 0053 by Chery Walker RN  Outcome: Progressing     Problem: Confusion  Goal: Confusion, delirium, dementia, or psychosis is improved or at baseline  Description: INTERVENTIONS:  1. Assess for possible contributors to thought disturbance, including medications, impaired vision or hearing, underlying metabolic abnormalities, dehydration, psychiatric diagnoses, and notify attending LIP  2. [place] high risk fall precautions, as indicated  3. Provide frequent short contacts to provide reality reorientation, refocusing and direction  4. Decrease environmental stimuli, including noise as appropriate  5. Monitor and intervene to maintain adequate nutrition, hydration, elimination, sleep and activity  6. If unable to ensure safety without constant attention obtain sitter and review sitter guidelines with assigned personnel  7. Initiate Psychosocial CNS and Spiritual Care consult, as indicated  3/24/2025 1115 by Diane Schuler RN  Outcome: Progressing  3/24/2025 0223 by Chery Walker RN  Outcome: Progressing  3/24/2025 0053 by  Chery Walker RN  Outcome: Progressing     Problem: Skin/Tissue Integrity  Goal: Skin integrity remains intact  Description: 1.  Monitor for areas of redness and/or skin breakdown  2.  Assess vascular access sites hourly  3.  Every 4-6 hours minimum:  Change oxygen saturation probe site  4.  Every 4-6 hours:  If on nasal continuous positive airway pressure, respiratory therapy assess nares and determine need for appliance change or resting period  3/24/2025 1115 by Diane Schuler RN  Outcome: Progressing  3/24/2025 0223 by Chery Walker RN  Outcome: Progressing  3/24/2025 0053 by Chery Walker RN  Outcome: Progressing     Problem: Safety - Adult  Goal: Free from fall injury  3/24/2025 1115 by Diane Schuler RN  Outcome: Progressing  3/24/2025 0223 by Chery Walker RN  Outcome: Progressing  3/24/2025 0053 by Chery Walker RN  Outcome: Progressing     Problem: Chronic Conditions and Co-morbidities  Goal: Patient's chronic conditions and co-morbidity symptoms are monitored and maintained or improved  3/24/2025 1115 by Diane Schuler RN  Outcome: Not Progressing  3/24/2025 0223 by Chery Walker RN  Outcome: Progressing  3/24/2025 0053 by Chery Walker RN  Outcome: Progressing     Problem: Discharge Planning  Goal: Discharge to home or other facility with appropriate resources  3/24/2025 1115 by Diane Schuler RN  Outcome: Not Progressing  3/24/2025 0223 by Chery Walker RN  Outcome: Progressing  3/24/2025 0053 by Chery Walker RN  Outcome: Progressing

## 2025-03-24 NOTE — CONSULTS
[facility]  [address], [address]  [place] [address]    Pulmonary Consultation      Reason for Consult:  sob    Requesting Physician:  Tal Humphrey DO    CHIEF COMPLAINT:  sob    History Obtained From:  patient    HISTORY OF PRESENT ILLNESS:                The patient is a 70 y.o. female with significant past medical history of COPD oxygen dependent who presents with increased sob for the past 5 days, doesn't think cough/sputum production has gotten worse prior to admission. Was recently discharged on 17th with similar complaints, wasn't wearing her NIV consistently here or after discharge. Currently on 5 li NC, claims feeling slightly better with breathing.    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 (HCC) 01/19/2024    Atrial fibrillation (HCC)     Chronic obstructive pulmonary disease (HCC) 12/15/2022    COVID-19 07/16/2022    Critical limb ischemia of left lower extremity with rest pain (HCC) 12/25/2023    GI bleed 10/14/2023    Hypertension     RUTH treated with BiPAP     Panic disorder     PVD (peripheral vascular disease) 01/19/2024    Severe protein-calorie malnutrition 06/26/2023    Thyroid disease     Uncontrolled hypertension        Past Surgical History:        Procedure Laterality Date    BACK SURGERY      x2    BRONCHOSCOPY N/A 4/16/2024    BRONCHOSCOPY DIAGNOSTIC OR CELL WASH ONLY performed by Jose Price MD at [facility] ENDOSCOPY    INVASIVE VASCULAR N/A 1/19/2024    Aortagram abdominal performed by Lazaro Vidales MD at [facility] CARDIAC CATH LAB    INVASIVE VASCULAR N/A 1/19/2024    Angioplasty peripheral artery performed by Lazaro Vidales MD at [facility] CARDIAC CATH LAB    LEFT OOPHORECTOMY      PAIN MANAGEMENT PROCEDURE N/A 12/28/2023    CAUDAL EPIDURAL STEROID INJECTION performed by Lilia Cabrera DO at [facility] OR    PAIN  bilateral with cough  CARDIOVASCULAR:  Normal apical impulse, regular rate and rhythm, normal S1 and S2, no S3 or S4, and no murmur noted  ABDOMEN:  No scars, normal bowel sounds, soft, non-distended, non-tender, no masses palpated, no hepatosplenomegally  MUSCULOSKELETAL:  There is no redness, warmth, or swelling of the joints.  Full range of motion noted.  Motor strength is 5 out of 5 all extremities bilaterally.  Tone is normal.  NEUROLOGIC:  Awake, alert, oriented to name, place and time.  Cranial nerves II-XII are grossly intact.  Motor is 5 out of 5 bilaterally.  Cerebellar finger to nose, heel to shin intact.  Sensory is intact.  Babinski down going, Romberg negative, and gait is normal.    DATA:    CBC:   Recent Labs     03/22/25  1217 03/23/25  0818 03/24/25  0512   WBC 18.5* 10.0 11.9*   HGB 9.4* 9.2* 8.6*   HCT 30.1* 30.3* 28.3*   .6* 106.7* 108.0*    353 275       BMP:  Recent Labs     03/22/25  1217 03/23/25  0818 03/24/25  0512    144 142   K 3.9 3.9 3.7   CL 94* 99 102   CO2 35* 29 31*   BUN 16 12 11   CREATININE 0.8 0.6 0.7    ALB:3,BILIDIR:3,BILITOT:3,ALKPHOS:3)@    PT/INR: No results for input(s): \"PROTIME\", \"INR\" in the last 72 hours.    ABG:   Recent Labs     03/24/25  1025   PH 7.401   PO2 109.7*   PCO2 54.3*   HCO3 33.0*   BE 7.1*   O2SAT 98.2   METHB 0.4   O2HB 97.3*   COHB 0.5   HHB 1.8   THB 9.2*             Radiology Review:  CXR reviewed looking similar with emphysematous changes   CTA chest reviewed with (-) PE, similar dependent atelectasis both lower lobes, and similar emphysematous changes compared to previous      IMPRESSION/RECOMMENDATIONS:      COPD  hypoxia    Cont with steroids, at chronic dosing  Cont with nebs, observe respiratory function  Cont with oxygen, taper as tolerated  Resume NIV qhs WK=197 PEEP=8, observe respiratory function and cough  OOB to chair as tolerated      Time at the bedside, reviewing labs and radiographs, reviewing notes and

## 2025-03-24 NOTE — CARE COORDINATION
Care Coordination  The patient was admitted from [facility] and per Karey wayne liason the patient is a long term bed hold and return once medically stable. Left a detailed voice message for Lana Reeves EVERETTE sister in law @ [phone] to confirm the plan is back to [facility]. The patient came in from [facility] with altered mental status. Wbc 18.5 with a left shift. Urine and blood cultures sent. The patient was started on Iv Rocephin 1 gm iv, Iv solumedrol 125 mg iv x 1. The patient is here fr a uti with acute confusion. Sed rate 31,crp 23.0, ddimer 273. Iv Rocephin 1 gm iv q8 hrs continuous. Pt Ot to see. The patient is on 6 liters and is post recent Covid infection. Await a call back from the patients family. Return envelope done. Lana GAVIRIA called back and said the plan is for the patient to return to [facility] skilled.        Case Management Assessment  Initial Evaluation    Date/Time of Evaluation: 3/24/2025 12:53 PM  Assessment Completed by: Mariia Ramirez RN    If patient is discharged prior to next notation, then this note serves as note for discharge by case management.    Patient Name: Lana Phillips                   YOB: 1955  Diagnosis: UTI (urinary tract infection) [N39.0]                   Date / Time: 3/22/2025 11:31 AM    Patient Admission Status: Inpatient   Readmission Risk (Low < 19, Mod (19-27), High > 27): Readmission Risk Score: 42.1    Current PCP: Tal Humphrey, DO  PCP verified by CM? Yes    Chart Reviewed: Yes      History Provided by: Child/Family  Patient Orientation: Alert and Oriented, Person    Patient Cognition: Alert (shes confused)    Hospitalization in the last 30 days (Readmission):  Yes    If yes, Readmission Assessment in CM Navigator will be completed.    Advance Directives:      Code Status: Full Code   Patient's Primary Decision Maker is: Named in Scanned ACP Document    Primary Decision Maker: Lana Reeves - Other Relative - [phone]    Secondary Decision Maker:  Nia Salas - Niece/Nephew - [phone]    Discharge Planning:    Patient lives with: Other (Comment) (snf) Type of Home: Skilled Nursing Facility  Primary Care Giver: Family  Patient Support Systems include: Family Members   Current Financial resources:    Current community resources:    Current services prior to admission: Skilled Nursing Facility            Current DME:              Type of Home Care services:  None    ADLS  Prior functional level: Assistance with the following:, Mobility  Current functional level: Assistance with the following:, Mobility    PT AM-PAC:   /24  OT AM-PAC:   /24    Family can provide assistance at DC: Yes  Would you like Case Management to discuss the discharge plan with any other family members/significant others, and if so, who? Yes  Plans to Return to Present Housing: Yes  Other Identified Issues/Barriers to RETURNING to current housing: yes  Potential Assistance needed at discharge: Skilled Nursing Facility            Potential DME:    Patient expects to discharge to: Skilled nursing facility  Plan for transportation at discharge:      Financial    Payor: [employer] MEDICARE / Plan: [employer] GOLD PLUS HMO / Product Type: *No Product type* /     Does insurance require precert for SNF: No    Potential assistance Purchasing Medications:    Meds-to-Beds request:        [facility] [place], [place] - [address] [facility] [phone] - F [phone]  [address] [facility] [address]  Phone: [phone] Fax: [phone]    [id number] [place], [place] - [address] [facility] [phone] - F [phone]  [address] [place] [address]  Phone: [phone] Fax: [phone]      Notes:    Factors facilitating achievement of predicted outcomes: Family support    Barriers to discharge: Confusion    Additional Case Management Notes: see cm note    The Plan for Transition of Care is related to the following treatment goals of UTI (urinary tract infection)

## 2025-03-24 NOTE — PATIENT CHOICE
Matias lax given as per Dr. Tenorio for constipation. Patient attempting to drink. Patient defiant in her care.Yelling down hallway for nurse. Anxiety Atarax given. Respiratory treatment just completed.

## 2025-03-24 NOTE — PROGRESS NOTES
4 Eyes Skin Assessment     NAME:  Lana Phillips  YOB: 1955  MEDICAL RECORD NUMBER:  [id number]    The patient is being assessed for  Admission    I agree that at least one RN has performed a thorough Head to Toe Skin Assessment on the patient. ALL assessment sites listed below have been assessed.      Areas assessed by both nurses:    Head, Face, Ears, Shoulders, Back, Chest, Arms, Elbows, Hands, Sacrum. Buttock, Coccyx, Ischium, and Legs. Feet and Heels        Does the Patient have a Wound? No noted wound(s)       Guy Prevention initiated by RN: Yes  Wound Care Orders initiated by RN: No    Pressure Injury (Stage 3,4, Unstageable, DTI, NWPT, and Complex wounds) if present, place Wound referral order by RN under : No    New Ostomies, if present place, Ostomy referral order under : No     Nurse 1 eSignature: Electronically signed by Chery Walker RN on 3/24/25 at 5:09 AM EDT    **SHARE this note so that the co-signing nurse can place an eSignature**    Nurse 2 eSignature: {Esignature:[id number]}

## 2025-03-25 LAB
ANION GAP SERPL CALCULATED.3IONS-SCNC: 9 MMOL/L (ref 7–16)
BASOPHILS # BLD: 0.02 K/UL (ref 0–0.2)
BASOPHILS NFR BLD: 0 % (ref 0–2)
BUN SERPL-MCNC: 8 MG/DL (ref 6–23)
CALCIUM SERPL-MCNC: 8.3 MG/DL (ref 8.6–10.2)
CHLORIDE SERPL-SCNC: 100 MMOL/L (ref 98–107)
CO2 SERPL-SCNC: 32 MMOL/L (ref 22–29)
CREAT SERPL-MCNC: 0.6 MG/DL (ref 0.5–1)
EOSINOPHIL # BLD: 0.12 K/UL (ref 0.05–0.5)
EOSINOPHILS RELATIVE PERCENT: 1 % (ref 0–6)
ERYTHROCYTE [DISTWIDTH] IN BLOOD BY AUTOMATED COUNT: 14.8 % (ref 11.5–15)
GFR, ESTIMATED: >90 ML/MIN/1.73M2
GLUCOSE SERPL-MCNC: 83 MG/DL (ref 74–99)
HCT VFR BLD AUTO: 27.3 % (ref 34–48)
HGB BLD-MCNC: 8.4 G/DL (ref 11.5–15.5)
IMM GRANULOCYTES # BLD AUTO: 0.1 K/UL (ref 0–0.58)
IMM GRANULOCYTES NFR BLD: 1 % (ref 0–5)
LYMPHOCYTES NFR BLD: 1.56 K/UL (ref 1.5–4)
LYMPHOCYTES RELATIVE PERCENT: 15 % (ref 20–42)
MCH RBC QN AUTO: 32.9 PG (ref 26–35)
MCHC RBC AUTO-ENTMCNC: 30.8 G/DL (ref 32–34.5)
MCV RBC AUTO: 107.1 FL (ref 80–99.9)
MONOCYTES NFR BLD: 0.84 K/UL (ref 0.1–0.95)
MONOCYTES NFR BLD: 8 % (ref 2–12)
NEUTROPHILS NFR BLD: 74 % (ref 43–80)
NEUTS SEG NFR BLD: 7.59 K/UL (ref 1.8–7.3)
PLATELET # BLD AUTO: 226 K/UL (ref 130–450)
PMV BLD AUTO: 8.9 FL (ref 7–12)
POTASSIUM SERPL-SCNC: 3.6 MMOL/L (ref 3.5–5)
RBC # BLD AUTO: 2.55 M/UL (ref 3.5–5.5)
SODIUM SERPL-SCNC: 141 MMOL/L (ref 132–146)
WBC OTHER # BLD: 10.2 K/UL (ref 4.5–11.5)

## 2025-03-25 PROCEDURE — 6370000000 HC RX 637 (ALT 250 FOR IP): Performed by: STUDENT IN AN ORGANIZED HEALTH CARE EDUCATION/TRAINING PROGRAM

## 2025-03-25 PROCEDURE — 94640 AIRWAY INHALATION TREATMENT: CPT

## 2025-03-25 PROCEDURE — 6360000002 HC RX W HCPCS: Performed by: STUDENT IN AN ORGANIZED HEALTH CARE EDUCATION/TRAINING PROGRAM

## 2025-03-25 PROCEDURE — 1200000000 HC SEMI PRIVATE

## 2025-03-25 PROCEDURE — 99232 SBSQ HOSP IP/OBS MODERATE 35: CPT

## 2025-03-25 PROCEDURE — 36415 COLL VENOUS BLD VENIPUNCTURE: CPT

## 2025-03-25 PROCEDURE — 2500000003 HC RX 250 WO HCPCS: Performed by: STUDENT IN AN ORGANIZED HEALTH CARE EDUCATION/TRAINING PROGRAM

## 2025-03-25 PROCEDURE — 6360000002 HC RX W HCPCS

## 2025-03-25 PROCEDURE — 2700000000 HC OXYGEN THERAPY PER DAY

## 2025-03-25 PROCEDURE — 80048 BASIC METABOLIC PNL TOTAL CA: CPT

## 2025-03-25 PROCEDURE — 2500000003 HC RX 250 WO HCPCS

## 2025-03-25 PROCEDURE — 85025 COMPLETE CBC W/AUTO DIFF WBC: CPT

## 2025-03-25 PROCEDURE — 6370000000 HC RX 637 (ALT 250 FOR IP)

## 2025-03-25 RX ORDER — OXYMETAZOLINE HYDROCHLORIDE 0.05 G/100ML
2 SPRAY NASAL 2 TIMES DAILY PRN
Status: DISCONTINUED | OUTPATIENT
Start: 2025-03-25 | End: 2025-03-26 | Stop reason: HOSPADM

## 2025-03-25 RX ORDER — LORAZEPAM 0.5 MG/1
0.5 TABLET ORAL EVERY 6 HOURS PRN
Status: DISCONTINUED | OUTPATIENT
Start: 2025-03-25 | End: 2025-03-26 | Stop reason: HOSPADM

## 2025-03-25 RX ORDER — HYDRALAZINE HYDROCHLORIDE 20 MG/ML
10 INJECTION INTRAMUSCULAR; INTRAVENOUS EVERY 6 HOURS PRN
Status: DISCONTINUED | OUTPATIENT
Start: 2025-03-25 | End: 2025-03-26 | Stop reason: HOSPADM

## 2025-03-25 RX ADMIN — METHIMAZOLE 15 MG: 5 TABLET ORAL at 08:51

## 2025-03-25 RX ADMIN — BUDESONIDE 500 MCG: 0.5 SUSPENSION RESPIRATORY (INHALATION) at 21:03

## 2025-03-25 RX ADMIN — SERTRALINE 50 MG: 50 TABLET, FILM COATED ORAL at 10:41

## 2025-03-25 RX ADMIN — ENOXAPARIN SODIUM 40 MG: 100 INJECTION SUBCUTANEOUS at 08:47

## 2025-03-25 RX ADMIN — IPRATROPIUM BROMIDE AND ALBUTEROL SULFATE 1 DOSE: 2.5; .5 SOLUTION RESPIRATORY (INHALATION) at 16:20

## 2025-03-25 RX ADMIN — HYDROXYZINE HYDROCHLORIDE 50 MG: 50 TABLET, FILM COATED ORAL at 08:55

## 2025-03-25 RX ADMIN — ARFORMOTEROL TARTRATE 15 MCG: 15 SOLUTION RESPIRATORY (INHALATION) at 21:03

## 2025-03-25 RX ADMIN — DICYCLOMINE HYDROCHLORIDE 20 MG: 10 CAPSULE ORAL at 10:41

## 2025-03-25 RX ADMIN — SODIUM CHLORIDE, PRESERVATIVE FREE 10 ML: 5 INJECTION INTRAVENOUS at 20:29

## 2025-03-25 RX ADMIN — IPRATROPIUM BROMIDE AND ALBUTEROL SULFATE 1 DOSE: 2.5; .5 SOLUTION RESPIRATORY (INHALATION) at 21:03

## 2025-03-25 RX ADMIN — CLONAZEPAM 1 MG: 0.5 TABLET ORAL at 20:29

## 2025-03-25 RX ADMIN — GUAIFENESIN 400 MG: 400 TABLET ORAL at 20:29

## 2025-03-25 RX ADMIN — DICYCLOMINE HYDROCHLORIDE 20 MG: 10 CAPSULE ORAL at 20:44

## 2025-03-25 RX ADMIN — PREDNISONE 10 MG: 10 TABLET ORAL at 08:50

## 2025-03-25 RX ADMIN — GUAIFENESIN 400 MG: 400 TABLET ORAL at 08:48

## 2025-03-25 RX ADMIN — DICYCLOMINE HYDROCHLORIDE 20 MG: 10 CAPSULE ORAL at 06:07

## 2025-03-25 RX ADMIN — BUDESONIDE 500 MCG: 0.5 SUSPENSION RESPIRATORY (INHALATION) at 11:31

## 2025-03-25 RX ADMIN — SODIUM CHLORIDE, PRESERVATIVE FREE 10 ML: 5 INJECTION INTRAVENOUS at 12:06

## 2025-03-25 RX ADMIN — HYDROXYZINE HYDROCHLORIDE 50 MG: 50 TABLET, FILM COATED ORAL at 20:44

## 2025-03-25 RX ADMIN — SODIUM CHLORIDE, PRESERVATIVE FREE 10 ML: 5 INJECTION INTRAVENOUS at 20:55

## 2025-03-25 RX ADMIN — DILTIAZEM HYDROCHLORIDE 240 MG: 120 CAPSULE, EXTENDED RELEASE ORAL at 08:48

## 2025-03-25 RX ADMIN — ONDANSETRON 4 MG: 4 TABLET, ORALLY DISINTEGRATING ORAL at 08:58

## 2025-03-25 RX ADMIN — ASPIRIN 81 MG: 81 TABLET, COATED ORAL at 08:48

## 2025-03-25 RX ADMIN — IPRATROPIUM BROMIDE AND ALBUTEROL SULFATE 1 DOSE: 2.5; .5 SOLUTION RESPIRATORY (INHALATION) at 11:31

## 2025-03-25 RX ADMIN — ACETAMINOPHEN 650 MG: 325 TABLET ORAL at 18:18

## 2025-03-25 RX ADMIN — SODIUM CHLORIDE, PRESERVATIVE FREE 10 ML: 5 INJECTION INTRAVENOUS at 18:19

## 2025-03-25 RX ADMIN — LORAZEPAM 0.5 MG: 0.5 TABLET ORAL at 20:29

## 2025-03-25 RX ADMIN — BUSPIRONE HYDROCHLORIDE 10 MG: 5 TABLET ORAL at 08:49

## 2025-03-25 RX ADMIN — PANTOPRAZOLE SODIUM 40 MG: 40 TABLET, DELAYED RELEASE ORAL at 06:06

## 2025-03-25 RX ADMIN — DICYCLOMINE HYDROCHLORIDE 20 MG: 10 CAPSULE ORAL at 16:27

## 2025-03-25 RX ADMIN — TRAMADOL HYDROCHLORIDE 50 MG: 50 TABLET, COATED ORAL at 06:12

## 2025-03-25 RX ADMIN — BUSPIRONE HYDROCHLORIDE 10 MG: 5 TABLET ORAL at 20:29

## 2025-03-25 RX ADMIN — HYDRALAZINE HYDROCHLORIDE 10 MG: 20 INJECTION INTRAMUSCULAR; INTRAVENOUS at 23:07

## 2025-03-25 RX ADMIN — GUAIFENESIN 400 MG: 400 TABLET ORAL at 14:05

## 2025-03-25 RX ADMIN — WATER 1000 MG: 1 INJECTION INTRAMUSCULAR; INTRAVENOUS; SUBCUTANEOUS at 14:05

## 2025-03-25 RX ADMIN — LORAZEPAM 0.5 MG: 0.5 TABLET ORAL at 12:09

## 2025-03-25 RX ADMIN — DOCUSATE SODIUM 100 MG: 100 CAPSULE, LIQUID FILLED ORAL at 08:48

## 2025-03-25 RX ADMIN — SODIUM CHLORIDE, PRESERVATIVE FREE 10 ML: 5 INJECTION INTRAVENOUS at 12:05

## 2025-03-25 RX ADMIN — ONDANSETRON 4 MG: 2 INJECTION, SOLUTION INTRAMUSCULAR; INTRAVENOUS at 20:28

## 2025-03-25 RX ADMIN — SODIUM CHLORIDE, PRESERVATIVE FREE 10 ML: 5 INJECTION INTRAVENOUS at 20:38

## 2025-03-25 ASSESSMENT — PAIN DESCRIPTION - PAIN TYPE
TYPE: ACUTE PAIN
TYPE: ACUTE PAIN

## 2025-03-25 ASSESSMENT — PAIN DESCRIPTION - ORIENTATION
ORIENTATION: INNER
ORIENTATION: ANTERIOR;INNER
ORIENTATION: POSTERIOR
ORIENTATION: INNER

## 2025-03-25 ASSESSMENT — PAIN SCALES - GENERAL
PAINLEVEL_OUTOF10: 3
PAINLEVEL_OUTOF10: 5
PAINLEVEL_OUTOF10: 0
PAINLEVEL_OUTOF10: 2
PAINLEVEL_OUTOF10: 0

## 2025-03-25 ASSESSMENT — ENCOUNTER SYMPTOMS
EYES NEGATIVE: 1
SHORTNESS OF BREATH: 1
RHINORRHEA: 0
SINUS PRESSURE: 0
SINUS PAIN: 0
GASTROINTESTINAL NEGATIVE: 1

## 2025-03-25 ASSESSMENT — PAIN DESCRIPTION - ONSET
ONSET: GRADUAL
ONSET: GRADUAL

## 2025-03-25 ASSESSMENT — PAIN DESCRIPTION - DESCRIPTORS
DESCRIPTORS: CRAMPING
DESCRIPTORS: ACHING

## 2025-03-25 ASSESSMENT — PAIN DESCRIPTION - LOCATION
LOCATION: ABDOMEN
LOCATION: HEAD
LOCATION: ABDOMEN
LOCATION: ABDOMEN

## 2025-03-25 ASSESSMENT — PAIN - FUNCTIONAL ASSESSMENT
PAIN_FUNCTIONAL_ASSESSMENT: PREVENTS OR INTERFERES SOME ACTIVE ACTIVITIES AND ADLS
PAIN_FUNCTIONAL_ASSESSMENT: ACTIVITIES ARE NOT PREVENTED

## 2025-03-25 ASSESSMENT — PAIN DESCRIPTION - FREQUENCY
FREQUENCY: INTERMITTENT
FREQUENCY: INTERMITTENT

## 2025-03-25 NOTE — PROGRESS NOTES
Hospitalist Progress Note      SYNOPSIS: Patient admitted on 3/22/2025 for UTI (urinary tract infection)  70-year-old lady was brought to ER from nursing home for evaluation of altered mental status.  On ER evaluation she is afebrile 97.9 BP 89/53  RR 18 SpO2 100% Blood work with leukocytosis of 18.5 with left shift, hemoglobin 9.4, normal platelets.  Normal renal function and electrolytes.  Troponin 48 and repeat 41.  Normal lactic acid.  ABG on 5 L nasal cannula pH 7.48 pCO2 46 pO2 104.  UA with positive leukocyte esterase, hematuria.  Negative RVP.  Blood culture sent.  CT head with no acute process   CT abdomen pelvis showed constipation  The patient was admitted under internal medicine started on IV Rocephin.    SUBJECTIVE:  Stable overnight. No other overnight issues reported.     Temp (24hrs), Av.2 °F (36.8 °C), Min:97.3 °F (36.3 °C), Max:98.9 °F (37.2 °C)    DIET: ADULT DIET; Regular; Low Fat/Low Chol/High Fiber/2 gm Na  CODE: Full Code    Intake/Output Summary (Last 24 hours) at 3/25/2025 1158  Last data filed at 3/25/2025 1156  Gross per 24 hour   Intake 1677 ml   Output --   Net 1677 ml       Review of Systems  All bolded are positive; please see HPI  General:  Fever, chills, diaphoresis, fatigue, malaise, night sweats, weight loss  Psychological:  Anxiety, disorientation, hallucinations.  ENT:  Epistaxis, headaches, vertigo, visual changes.  Cardiovascular:  Chest pain, irregular heartbeats, palpitations, paroxysmal nocturnal dyspnea.  Respiratory:  Shortness of breath, coughing, sputum production, hemoptysis, wheezing, orthopnea.  Gastrointestinal:  Nausea, vomiting, diarrhea, heartburn, constipation, abdominal pain, hematemesis, hematochezia, melena, acholic stools  Genito-Urinary:  Dysuria, urgency, frequency, hematuria  Musculoskeletal:  Joint pain, joint stiffness, joint swelling, muscle pain  Neurology:  Headache, focal neurological deficits, weakness, numbness, paresthesia  Derm:

## 2025-03-25 NOTE — PROGRESS NOTES
[facility]  [address], [address]  [place] [address]      Pulmonary Progress Note      SUBJECTIVE:  claims feeling slightly better with breathing, cough with minimal sputum production, sitting up in bed on 5 li NC, didn't wear NIV much last night    OBJECTIVE    Medications    Continuous Infusions:   sodium chloride      sodium chloride         Scheduled Meds:   dicyclomine  20 mg Oral 4x Daily AC & HS    docusate sodium  100 mg Oral Daily    sodium chloride flush  5-40 mL IntraVENous 2 times per day    sodium chloride flush  5-40 mL IntraVENous 2 times per day    enoxaparin  40 mg SubCUTAneous Daily    arformoterol tartrate  15 mcg Nebulization BID RT    aspirin  81 mg Oral Daily    budesonide  500 mcg Nebulization BID    busPIRone  10 mg Oral BID    clonazePAM  1 mg Oral QHS    [Held by provider] dicyclomine  20 mg Oral TID    dilTIAZem  240 mg Oral Daily    [Held by provider] docusate sodium  100 mg Oral Daily    [Held by provider] DULoxetine  60 mg Oral Daily    [Held by provider] folic acid  1 mg Oral Daily    [Held by provider] furosemide  40 mg Oral Daily    guaiFENesin  400 mg Oral TID    [Held by provider] losartan  25 mg Oral Daily    methIMAzole  15 mg Oral Daily    pantoprazole  40 mg Oral QAM AC    predniSONE  10 mg Oral Daily    sertraline  50 mg Oral Daily       PRN Meds:LORazepam, traMADol, bisacodyl, sodium chloride flush, sodium chloride, sodium chloride flush, sodium chloride, ondansetron **OR** ondansetron, acetaminophen **OR** acetaminophen, polyethylene glycol, benzonatate, hydrOXYzine HCl, ipratropium 0.5 mg-albuterol 2.5 mg    Physical    VITALS:  BP (!) 157/79   Pulse 90   Temp 98.3 °F (36.8 °C) (Temporal)   Resp 18   SpO2 99%     24HR INTAKE/OUTPUT:      Intake/Output Summary (Last 24 hours) at 3/25/2025 1644  Last data filed at 3/25/2025 1511  Gross per 24 hour   Intake 1557 ml   Output --   Net 1557 ml        24HR PULSE OXIMETRY RANGE:    SpO2  Av %  Min: 99 %  Max: 99 %    General appearance: alert, appears stated age, and cooperative  Lungs: rhonchi bilaterally  Heart: regular rate and rhythm, S1, S2 normal, no murmur, click, rub or gallop  Abdomen: soft, non-tender; bowel sounds normal; no masses,  no organomegaly  Extremities: extremities normal, atraumatic, no cyanosis or edema  Neurologic: Mental status: Alert, oriented, thought content appropriate    Data    CBC:   Recent Labs     25  0818 25  0512 25  0426   WBC 10.0 11.9* 10.2   HGB 9.2* 8.6* 8.4*   HCT 30.3* 28.3* 27.3*   .7* 108.0* 107.1*    275 226       BMP:  Recent Labs     2518 2512 25  0426    142 141   K 3.9 3.7 3.6   CL 99 102 100   CO2 29 31* 32*   BUN 12 11 8   CREATININE 0.6 0.7 0.6    ALB:3,BILIDIR:3,BILITOT:3,ALKPHOS:3)@    PT/INR: No results for input(s): \"PROTIME\", \"INR\" in the last 72 hours.    ABG:   Recent Labs     25  1025   PH 7.401   PO2 109.7*   PCO2 54.3*   HCO3 33.0*   BE 7.1*   O2SAT 98.2   METHB 0.4   O2HB 97.3*   COHB 0.5   HHB 1.8   THB 9.2*             Radiology/Other tests reviewed: none    Assessment:     Principal Problem:    UTI (urinary tract infection)  Resolved Problems:    * No resolved hospital problems. *  COPD  Chronic hypercarbia and hypoxia    Plan:       Cont with nebs, resume usual medications upon discharge  Cont with steroids, at baseline chronic dosing  Cont with oxygen, taper as tolerated  Cont with NIV qhs, encouraged to use  OOB to chair as tolerated      Time at the bedside, reviewing labs and radiographs, reviewing notes and consultations, discussing with staff and family was more than 50 minutes.      Thanks for letting us see this patient in consultation.  Please contact us with any questions. Office [phone] or after hours through [employer], x [phone].

## 2025-03-25 NOTE — PROGRESS NOTES
Patient received the Sacrament of the Anointing of the Sick by Father Franco Mccloud on Monday March 24, 2025.    If additional support is requested or needed please reach out to Spiritual Health ([phone]).    Chap. Phillip Reese MDIV, BCC

## 2025-03-25 NOTE — CARE COORDINATION
Care Coordination  The plan is for the patient to return to [facility] as a Long term bed hold. She was found to have  a uti with acute confusion. She was started on Iv Rocephin 1 gm iv q24 hrs and po prednisone 10 mg po q24. Resume NIV at [facility].

## 2025-03-25 NOTE — PLAN OF CARE
Problem: Chronic Conditions and Co-morbidities  Goal: Patient's chronic conditions and co-morbidity symptoms are monitored and maintained or improved  3/25/2025 1409 by Diane Schuler RN  Outcome: Progressing  3/25/2025 0034 by Cindy Gentile RN  Outcome: Progressing     Problem: Discharge Planning  Goal: Discharge to home or other facility with appropriate resources  3/25/2025 1409 by Diane Schuler RN  Outcome: Progressing  3/25/2025 0034 by Cindy Gentile RN  Outcome: Progressing     Problem: Pain  Goal: Verbalizes/displays adequate comfort level or baseline comfort level  3/25/2025 1409 by Diane Schuler RN  Outcome: Progressing  3/25/2025 0034 by Cindy Gentile RN  Outcome: Progressing     Problem: Confusion  Goal: Confusion, delirium, dementia, or psychosis is improved or at baseline  Description: INTERVENTIONS:  1. Assess for possible contributors to thought disturbance, including medications, impaired vision or hearing, underlying metabolic abnormalities, dehydration, psychiatric diagnoses, and notify attending LIP  2. [place] high risk fall precautions, as indicated  3. Provide frequent short contacts to provide reality reorientation, refocusing and direction  4. Decrease environmental stimuli, including noise as appropriate  5. Monitor and intervene to maintain adequate nutrition, hydration, elimination, sleep and activity  6. If unable to ensure safety without constant attention obtain sitter and review sitter guidelines with assigned personnel  7. Initiate Psychosocial CNS and Spiritual Care consult, as indicated  3/25/2025 1409 by Diane Schuler RN  Outcome: Progressing  3/25/2025 0034 by Cindy Gentile, RN  Outcome: Progressing     Problem: Skin/Tissue Integrity  Goal: Skin integrity remains intact  Description: 1.  Monitor for areas of redness and/or skin breakdown  2.  Assess vascular access sites hourly  3.  Every 4-6 hours minimum:  Change oxygen saturation probe

## 2025-03-25 NOTE — PLAN OF CARE
Problem: Chronic Conditions and Co-morbidities  Goal: Patient's chronic conditions and co-morbidity symptoms are monitored and maintained or improved  3/25/2025 0034 by Cindy Gentile RN  Outcome: Progressing  3/24/2025 1115 by Diane Schuler RN  Outcome: Not Progressing     Problem: Discharge Planning  Goal: Discharge to home or other facility with appropriate resources  3/25/2025 0034 by Cindy Gentile RN  Outcome: Progressing  3/24/2025 1115 by Diane Schuler RN  Outcome: Not Progressing     Problem: Pain  Goal: Verbalizes/displays adequate comfort level or baseline comfort level  3/25/2025 0034 by Cindy Gentile RN  Outcome: Progressing  3/24/2025 1115 by Diane Schuler RN  Outcome: Progressing     Problem: Confusion  Goal: Confusion, delirium, dementia, or psychosis is improved or at baseline  Description: INTERVENTIONS:  1. Assess for possible contributors to thought disturbance, including medications, impaired vision or hearing, underlying metabolic abnormalities, dehydration, psychiatric diagnoses, and notify attending LIP  2. [place] high risk fall precautions, as indicated  3. Provide frequent short contacts to provide reality reorientation, refocusing and direction  4. Decrease environmental stimuli, including noise as appropriate  5. Monitor and intervene to maintain adequate nutrition, hydration, elimination, sleep and activity  6. If unable to ensure safety without constant attention obtain sitter and review sitter guidelines with assigned personnel  7. Initiate Psychosocial CNS and Spiritual Care consult, as indicated  3/25/2025 0034 by Cindy Gentile RN  Outcome: Progressing  3/24/2025 1115 by Diane Schuler RN  Outcome: Progressing     Problem: Skin/Tissue Integrity  Goal: Skin integrity remains intact  Description: 1.  Monitor for areas of redness and/or skin breakdown  2.  Assess vascular access sites hourly  3.  Every 4-6 hours minimum:  Change oxygen

## 2025-03-26 VITALS
HEIGHT: 68 IN | HEART RATE: 92 BPM | RESPIRATION RATE: 22 BRPM | TEMPERATURE: 97.6 F | DIASTOLIC BLOOD PRESSURE: 57 MMHG | WEIGHT: 111.99 LBS | SYSTOLIC BLOOD PRESSURE: 117 MMHG | BODY MASS INDEX: 16.97 KG/M2 | OXYGEN SATURATION: 93 %

## 2025-03-26 LAB
EKG ATRIAL RATE: 87 BPM
EKG P AXIS: 77 DEGREES
EKG P-R INTERVAL: 142 MS
EKG Q-T INTERVAL: 382 MS
EKG QRS DURATION: 64 MS
EKG QTC CALCULATION (BAZETT): 459 MS
EKG R AXIS: 58 DEGREES
EKG T AXIS: 104 DEGREES
EKG VENTRICULAR RATE: 87 BPM

## 2025-03-26 PROCEDURE — 6360000002 HC RX W HCPCS: Performed by: STUDENT IN AN ORGANIZED HEALTH CARE EDUCATION/TRAINING PROGRAM

## 2025-03-26 PROCEDURE — 2700000000 HC OXYGEN THERAPY PER DAY

## 2025-03-26 PROCEDURE — 6370000000 HC RX 637 (ALT 250 FOR IP): Performed by: STUDENT IN AN ORGANIZED HEALTH CARE EDUCATION/TRAINING PROGRAM

## 2025-03-26 PROCEDURE — 2500000003 HC RX 250 WO HCPCS: Performed by: STUDENT IN AN ORGANIZED HEALTH CARE EDUCATION/TRAINING PROGRAM

## 2025-03-26 PROCEDURE — 99221 1ST HOSP IP/OBS SF/LOW 40: CPT | Performed by: OTOLARYNGOLOGY

## 2025-03-26 PROCEDURE — 6370000000 HC RX 637 (ALT 250 FOR IP)

## 2025-03-26 PROCEDURE — 6360000002 HC RX W HCPCS

## 2025-03-26 PROCEDURE — 99222 1ST HOSP IP/OBS MODERATE 55: CPT | Performed by: PHYSICIAN ASSISTANT

## 2025-03-26 PROCEDURE — 99232 SBSQ HOSP IP/OBS MODERATE 35: CPT

## 2025-03-26 PROCEDURE — 2500000003 HC RX 250 WO HCPCS

## 2025-03-26 PROCEDURE — 94640 AIRWAY INHALATION TREATMENT: CPT

## 2025-03-26 RX ORDER — HYDROXYZINE PAMOATE 25 MG/1
50 CAPSULE ORAL 4 TIMES DAILY PRN
DISCHARGE
Start: 2025-03-26 | End: 2025-04-09

## 2025-03-26 RX ORDER — TRAMADOL HYDROCHLORIDE 50 MG/1
50 TABLET ORAL EVERY 6 HOURS PRN
Qty: 5 TABLET | Refills: 0 | Status: ON HOLD | OUTPATIENT
Start: 2025-03-26 | End: 2025-04-01 | Stop reason: HOSPADM

## 2025-03-26 RX ORDER — FUROSEMIDE 40 MG/1
40 TABLET ORAL DAILY
DISCHARGE
Start: 2025-03-26

## 2025-03-26 RX ORDER — MORPHINE SULFATE 10 MG/5ML
4 SOLUTION ORAL EVERY 4 HOURS PRN
Refills: 0 | Status: DISCONTINUED | OUTPATIENT
Start: 2025-03-26 | End: 2025-03-26 | Stop reason: HOSPADM

## 2025-03-26 RX ORDER — MORPHINE SULFATE 10 MG/5ML
4 SOLUTION ORAL EVERY 4 HOURS PRN
Qty: 60 ML | Status: ON HOLD | DISCHARGE
Start: 2025-03-26 | End: 2025-04-01 | Stop reason: HOSPADM

## 2025-03-26 RX ORDER — MORPHINE SULFATE 2 MG/ML
2 INJECTION, SOLUTION INTRAMUSCULAR; INTRAVENOUS EVERY 4 HOURS PRN
Status: DISCONTINUED | OUTPATIENT
Start: 2025-03-26 | End: 2025-03-26

## 2025-03-26 RX ORDER — OXYMETAZOLINE HYDROCHLORIDE 0.05 G/100ML
2 SPRAY NASAL 2 TIMES DAILY PRN
DISCHARGE
Start: 2025-03-26 | End: 2025-03-28

## 2025-03-26 RX ADMIN — ARFORMOTEROL TARTRATE 15 MCG: 15 SOLUTION RESPIRATORY (INHALATION) at 09:02

## 2025-03-26 RX ADMIN — MORPHINE SULFATE 2 MG: 2 INJECTION, SOLUTION INTRAMUSCULAR; INTRAVENOUS at 13:20

## 2025-03-26 RX ADMIN — SODIUM CHLORIDE, PRESERVATIVE FREE 10 ML: 5 INJECTION INTRAVENOUS at 08:04

## 2025-03-26 RX ADMIN — PREDNISONE 10 MG: 10 TABLET ORAL at 08:07

## 2025-03-26 RX ADMIN — SODIUM CHLORIDE, PRESERVATIVE FREE 10 ML: 5 INJECTION INTRAVENOUS at 08:08

## 2025-03-26 RX ADMIN — ASPIRIN 81 MG: 81 TABLET, COATED ORAL at 08:04

## 2025-03-26 RX ADMIN — DILTIAZEM HYDROCHLORIDE 240 MG: 120 CAPSULE, EXTENDED RELEASE ORAL at 08:07

## 2025-03-26 RX ADMIN — LORAZEPAM 0.5 MG: 0.5 TABLET ORAL at 11:38

## 2025-03-26 RX ADMIN — LORAZEPAM 0.5 MG: 0.5 TABLET ORAL at 04:25

## 2025-03-26 RX ADMIN — METHIMAZOLE 15 MG: 5 TABLET ORAL at 08:06

## 2025-03-26 RX ADMIN — DICYCLOMINE HYDROCHLORIDE 20 MG: 10 CAPSULE ORAL at 11:38

## 2025-03-26 RX ADMIN — PANTOPRAZOLE SODIUM 40 MG: 40 TABLET, DELAYED RELEASE ORAL at 06:18

## 2025-03-26 RX ADMIN — DOCUSATE SODIUM 100 MG: 100 CAPSULE, LIQUID FILLED ORAL at 08:05

## 2025-03-26 RX ADMIN — IPRATROPIUM BROMIDE AND ALBUTEROL SULFATE 1 DOSE: 2.5; .5 SOLUTION RESPIRATORY (INHALATION) at 09:02

## 2025-03-26 RX ADMIN — DICYCLOMINE HYDROCHLORIDE 20 MG: 10 CAPSULE ORAL at 06:18

## 2025-03-26 RX ADMIN — ACETAMINOPHEN 650 MG: 325 TABLET ORAL at 04:25

## 2025-03-26 RX ADMIN — ENOXAPARIN SODIUM 40 MG: 100 INJECTION SUBCUTANEOUS at 08:08

## 2025-03-26 RX ADMIN — BUDESONIDE 500 MCG: 0.5 SUSPENSION RESPIRATORY (INHALATION) at 09:02

## 2025-03-26 RX ADMIN — GUAIFENESIN 400 MG: 400 TABLET ORAL at 08:05

## 2025-03-26 RX ADMIN — BUSPIRONE HYDROCHLORIDE 10 MG: 5 TABLET ORAL at 08:05

## 2025-03-26 RX ADMIN — SERTRALINE 50 MG: 50 TABLET, FILM COATED ORAL at 08:05

## 2025-03-26 RX ADMIN — TRAMADOL HYDROCHLORIDE 50 MG: 50 TABLET, COATED ORAL at 08:05

## 2025-03-26 RX ADMIN — GUAIFENESIN 400 MG: 400 TABLET ORAL at 13:19

## 2025-03-26 ASSESSMENT — PAIN DESCRIPTION - LOCATION
LOCATION: ABDOMEN
LOCATION: BACK
LOCATION: ABDOMEN
LOCATION: ABDOMEN
LOCATION: ABDOMEN;CHEST

## 2025-03-26 ASSESSMENT — PAIN - FUNCTIONAL ASSESSMENT
PAIN_FUNCTIONAL_ASSESSMENT: ACTIVITIES ARE NOT PREVENTED

## 2025-03-26 ASSESSMENT — PAIN DESCRIPTION - DESCRIPTORS
DESCRIPTORS: CRAMPING;DISCOMFORT
DESCRIPTORS: CRAMPING
DESCRIPTORS: ACHING;DISCOMFORT;SORE
DESCRIPTORS: ACHING
DESCRIPTORS: DISCOMFORT;THROBBING

## 2025-03-26 ASSESSMENT — PAIN SCALES - GENERAL
PAINLEVEL_OUTOF10: 5
PAINLEVEL_OUTOF10: 7
PAINLEVEL_OUTOF10: 4
PAINLEVEL_OUTOF10: 6
PAINLEVEL_OUTOF10: 7
PAINLEVEL_OUTOF10: 6
PAINLEVEL_OUTOF10: 6
PAINLEVEL_OUTOF10: 2
PAINLEVEL_OUTOF10: 4

## 2025-03-26 ASSESSMENT — PAIN SCALES - WONG BAKER
WONGBAKER_NUMERICALRESPONSE: NO HURT

## 2025-03-26 ASSESSMENT — PAIN DESCRIPTION - ORIENTATION
ORIENTATION: POSTERIOR
ORIENTATION: INNER

## 2025-03-26 NOTE — CARE COORDINATION
Care Coordination  The patient has completed her Iv Rocephin for her uti and her acute confusion has resolved. She remains on 10 mg po prednisone q24 hrs, Pulmonary is following. Have messaged Dr Wilson to see if the patient can return to [facility] today. She has a NIV at the facility and she remains on 5 liters n/c.    1415 Noted discharge order. Have messaged palliative care to see the patient prior to discharge per Dr Wilson. The patient is set up to be picked up at 4 pm by [facility]  van service at 4 pm. Per [facility] they will need to borrow an o2 tank and will bring it back once the transport is done. Called the patients Family Lana at [phone] and left a voice message about her discharge.

## 2025-03-26 NOTE — PROGRESS NOTES
OTOLARYNGOLOGY  DAILY PROGRESS NOTE  3/26/2025    Subjective:     Patient is doing well today. No issues overnight.  Oxygen is humidified. Questions answered.     Objective:/     BP (!) 177/86   Pulse 89   Temp 96.8 °F (36 °C) (Temporal)   Resp 22   Ht 1.727 m (5' 7.99\")   Wt 50.8 kg (111 lb 15.9 oz)   SpO2 99%   BMI 17.03 kg/m²   PULSE OXIMETRY RANGE: SpO2  Av.8 %  Min: 95 %  Max: 99 %  I/O last 3 completed shifts:  In: 2517 [P.O.:2507; I.V.:10]  Out: -     General Appearance:  Laying in bed, awake, alert, no apparent distress  Head/face:  NC/AT  Eyes: PERRL, EOMI  ENT: Bilateral external ears WNL, Nares patent, dissolvable packing in place, no active epistaxis anteriorly, posterior oropharynx with dried blood  Neck:Supple, no adenopathy  Lungs: no stridor, humidified oxygen  Heart:  RR, HTN  Neuro: Facial nerve symmetric and intact. House Brackmann 1/6, bilaterally.     CBC  Recent Labs     25  0818 25  0512 25  0426   WBC 10.0 11.9* 10.2   HGB 9.2* 8.6* 8.4*   HCT 30.3* 28.3* 27.3*    275 226     BMP  Recent Labs     25  0426      K 3.6      CO2 32*   BUN 8   CREATININE 0.6   CALCIUM 8.3*     PT-INR  No results for input(s): \"INR\" in the last 72 hours.    Invalid input(s): \"PT\", \"PTT\"  CALCIUM  Recent Labs     25  0818 25  0512 25  0426   CALCIUM 8.4* 8.6 8.3*       Assessment/Plan:     70 y.o. female with acute on chronic respiratory failure now with epistaxis likely from chronic septal perforation and non-humidified 02 supplementation resulting in nasal dryness     Nursing can spray afrin liberally in both nares and hold pressure for any persistent bleeding.   Continue nasal saline spray TID to both nares  Continue humidification with oxygen supplementation  Maintain HOB elevated  No nose blowing or straining  Sneeze with mouth open  Tight blood pressure control  Recommend outpatient follow up appointment with ENT when medically stable      Electronically signed by Chele Ibrahim DO on 3/26/2025 at 6:51 AM

## 2025-03-26 NOTE — DISCHARGE SUMMARY
Discharge Summary    Date: 3/26/2025  Patient Name: Lana Phillips    YOB: 1955     Age: 70 y.o.    Admit Date: 3/22/2025  Discharge Date: 3/26/2025  Discharge Condition: Good    Admission Diagnosis  UTI (urinary tract infection) [N39.0]      Discharge Diagnosis  Principal Problem:    UTI (urinary tract infection)  Resolved Problems:    * No resolved hospital problems. *      Hospital Stay  Narrative of Hospital Course:   Patient admitted on 3/22/2025 for UTI (urinary tract infection)  70-year-old lady was brought to ER from nursing home for evaluation of altered mental status.  On ER evaluation she is afebrile 97.9 BP 89/53  RR 18 SpO2 100% Blood work with leukocytosis of 18.5 with left shift, hemoglobin 9.4, normal platelets.  Normal renal function and electrolytes.  Troponin 48 and repeat 41.  Normal lactic acid.  ABG on 5 L nasal cannula pH 7.48 pCO2 46 pO2 104.  UA with positive leukocyte esterase, hematuria.  Negative RVP.  Blood culture sent.  CT head with no acute process   CT abdomen pelvis showed constipation  The patient was admitted under internal medicine started on IV Rocephin.     SUBJECTIVE:  Had epistaxis likely from chronic septal perforation and non-humidified 02 supplementation resulting in nasal dryness   ENT saw her  and she had dissolvable packing placed  nasal saline spray TID to both nares     General appearance:  awake, alert, and oriented to person, place, time, and purpose; appears stated age and cooperative; no apparent distress no labored breathing, thinly built HEENT:  Conjunctivae/corneas clear.   Neck: Supple. No jugular venous distention.   Respiratory: symmetrical; bilateral decreased air entry with prolonged expiration,; no wheezes; no rhonchi; no rales  Cardiovascular: rhythm regular; rate controlled; no murmurs  Abdomen: Soft, nontender, nondistended  Extremities:  peripheral pulses present; no peripheral edema; no ulcers  Musculoskeletal: No clubbing,  capsule  Take 1 capsule by mouth daily    Lactobacillus (ACIDOPHILUS) 100 MG CAPS  Take 100 mg by mouth    polyethylene glycol (GLYCOLAX) 17 g packet  Take 1 packet by mouth daily as needed for Constipation    ipratropium 0.5 mg-albuterol 2.5 mg (DUONEB) 0.5-2.5 (3) MG/3ML SOLN nebulizer solution  Take 3 mLs by nebulization every 4 hours as needed for Shortness of Breath    arformoterol tartrate (BROVANA) 15 MCG/2ML NEBU  Take 2 mLs by nebulization in the morning and 2 mLs in the evening.  Qty: 120 mL Refills: 3    folic acid (FOLVITE) 1 MG tablet  Take 1 tablet by mouth daily  Qty: 30 tablet Refills: 0    methIMAzole (TAPAZOLE) 10 MG tablet  Take 1.5 tablets by mouth daily  Qty: 45 tablet Refills: 5  Associated Diagnoses:Hyperthyroidism    aspirin 81 MG EC tablet  Take 1 tablet by mouth daily  Qty: 90 tablet Refills: 1    pantoprazole (PROTONIX) 40 MG tablet  Take 1 tablet by mouth every morning (before breakfast)  Qty: 30 tablet Refills: 5    !! Cobalamin Combinations (COBALAMINE COMBINATIONS PO)  Take 1 tablet by mouth daily    !! - Potential duplicate medications found. Please discuss with provider.          Current Discharge Medication List    STOP taking these medications    denosumab (PROLIA) 60 MG/ML SOSY SC injection  Comments:  Reason for Stopping:    bisacodyl (DULCOLAX) 10 MG suppository  Comments:  Reason for Stopping:    B COMPLEX VITAMINS PO  Comments:  Reason for Stopping:    dicyclomine (BENTYL) 20 MG tablet  Comments:  Reason for Stopping:    furosemide (LASIX) 40 MG tablet  Comments:  Reason for Stopping:          Time Spent on Discharge:  60 minutes were spent in patient examination, evaluation, counseling as well as medication reconciliation, prescriptions for required medications, discharge plan, and follow up.    Electronically signed by Lee Wilson MD on 3/26/25 at 1:52 PM EDT

## 2025-03-26 NOTE — DISCHARGE INSTRUCTIONS
Continue nasal saline spray TID to both nares  Continue humidification with oxygen supplementation  Maintain HOB elevated  No nose blowing or straining  Sneeze with mouth open

## 2025-03-26 NOTE — CONSULTS
Palliative Care Department  [phone]  Palliative Care Initial Consult  Provider Katelyn Rankin PA-C     Lana Phillips  [phone]  Hospital Day: 5  Date of Initial Consult: 03/26/2025  Referring Provider: Dr Wilson  Palliative Medicine was consulted for assistance with: goals of care    HPI:   Lana Phillips is a 70 y.o. with a medical history of chronic respiratory insufficiency, peripheral vascular disease, hypertension, end-stage COPD who was admitted on 3/22/2025 from skilled nursing facility with a CHIEF COMPLAINT of altered mental status.  Workup notable for UTI and mild bronchopneumonia upon severe emphysema.  Patient was previously admitted from 3/7 to 3/13 and 3/16 to 3/17 for acute on chronic respiratory failure.  Pulmonology following and started AVAPS.  Patient was seen by ENT for significant epistaxis--> status post dissolvable packing and saline nasal spray 3 times daily.  Mental status improved with treatment of UTI.  Medications adjusted for soft BP.  Patient is planned to be discharged back to [facility] skilled facility today.  Palliative care consulted for goals of care    ASSESSMENT/PLAN:     Pertinent Hospital Diagnoses     End-stage COPD  UTI  Altered mental status, resolved      Palliative Care Encounter / Counseling Regarding Goals of Care  Please see detailed goals of care discussion as below  At this time, Lana Phillips, Does have capacity for medical decision-making.  Capacity is time limited and situation/question specific  Outcome of goals of care meeting:   Patient would like to remain a full code  Her sister-in-law is her medical power of  per ACP documents  Patient states that her POA knows her wishes in regards to long-term ventilation/life support  Code status Full Code  Advanced Directives: no POA or living will in [facility]  Surrogate/Legal NOK:  Lana Reeves, POA, [phone]  Nia Salas, niece/alt POA, [phone]  Ade Diaz, sibling,  States that her sister-in-law is her medical power of  and knows her wishes in terms of long-term mechanical ventilation/life support.  Plan for follow-up for symptom management with the outpatient palliative care clinic      OBJECTIVE:   Prognosis: Guarded    Physical Exam:  BP (!) 117/57   Pulse 92   Temp 97.6 °F (36.4 °C) (Temporal)   Resp 22   Ht 1.727 m (5' 7.99\")   Wt 50.8 kg (111 lb 15.9 oz)   SpO2 93%   BMI 17.03 kg/m²   Constitutional:  Elderly, thin,  Eyes: no scleral icterus  ENMT:  Normocephalic, atraumatic  Lungs: Labored on 5 L nasal cannula  Heart:: Regular rate  Ext:  Moving all extremities  Skin:  Warm and dry, no rashes on visible skin  Psych: Anxious  Neuro: Alert and oriented.  No focal deficits.    Objective data reviewed: labs, images, records, medication use, vitals, and chart    Discussed patient and the plan of care with the other IDT members: Patient    Time/Communication  Greater than 50% of time spent, total 55 minutes in counseling and coordination of care at the bedside regarding goals of care, symptom management, diagnosis and prognosis, and see above.    Thank you for allowing Palliative Medicine to participate in the care of Lana Phillips.

## 2025-03-26 NOTE — PROGRESS NOTES
Physician Progress Note      PATIENT:               ANGIE SHELTON  [facility] #:                  [id number]  :                       1955  ADMIT DATE:       3/22/2025 11:31 AM  DISCH DATE:  RESPONDING  PROVIDER #:        Lee Wilson MD          QUERY TEXT:    Patient presented with concerns for altered mental status. Noted documentation   of \"Suspected UTI\" in H&P 3/22 and \"Urinary tract infection\" in subsequent   Progress Notes. No Urine Culture noted in lab results. In order to support the   diagnosis of UTI, please include additional clinical indicators in your   documentation.  Or please document if the diagnosis of UTI has been ruled out   after further study.    The medical record reflects the following:  Risk Factors: Female, Advanced Age >70  Clinical Indicators: -H&P 3/22 \" UA with positive leukocyte esterase,   hematuria...She admits to have suprapubic discomfort as well as dysuria and   chills...Suspected UTI\"  -Progress Notes 3/23 & 3/24 \"Urinary tract infection\"  -3/22/2025 UA \"Nitrite Negative, Leukocyte Esterase Moderate, WBC 0 to 5,   Bacteria 2+\"  Treatment: Labs, 30 mL/kg NS bolus, IVF, IV Rocephin  Options provided:  -- UTI present as evidenced by, Please document evidence.  -- UTI was ruled out  -- Other - I will add my own diagnosis  -- Disagree - Not applicable / Not valid  -- Disagree - Clinically unable to determine / Unknown  -- Refer to Clinical Documentation Reviewer    PROVIDER RESPONSE TEXT:    UTI was ruled out after study.    Query created by: Jesse Mckeon on 3/25/2025 11:52 AM      Electronically signed by:  Lee Wilson MD 3/26/2025 10:45 AM

## 2025-03-26 NOTE — CONSULTS
OTOLARYNGOLOGY  CONSULT NOTE  3/25/2025    Physician Consulted: Dr. Allen  Reason for Consult: epistaxis  Referring Physician: Dr. Steve POLANCO  Lana Phillips is a 70 y.o. female female with history of COPD, 4L home oxygen (BiPAP at night) and atrial fibrillation admitted for AMS and SOB. Patient was previously admitted on 3/7-3/13 and then again on 3/16-3/17 for acute on chronic respiratory failure. Patient was seen by ENT on prior admission for epistaxis. This evening, patient developed epistaxis for which ENT was consulted. Denies any previous history sinonasal surgeries or frequent episodes of epistaxis. Denies any chronic blood thinner use aside from ASA 81mg daily. Most recent Hgb 8.4, Plt 226.     Review of Systems   Constitutional:  Negative for activity change, chills, fatigue and fever.   HENT:  Positive for congestion and nosebleeds. Negative for ear discharge, ear pain, postnasal drip, rhinorrhea, sinus pressure and sinus pain.    Eyes: Negative.    Respiratory:  Positive for shortness of breath.    Cardiovascular: Negative.    Gastrointestinal: Negative.        Past Medical History:   Diagnosis Date    Atherosclerosis 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 (HCC) 01/19/2024    Atrial fibrillation (HCC)     Chronic obstructive pulmonary disease (HCC) 12/15/2022    COVID-19 07/16/2022    Critical limb ischemia of left lower extremity with rest pain (HCC) 12/25/2023    GI bleed 10/14/2023    Hypertension     RUTH treated with BiPAP     Panic disorder     PVD (peripheral vascular disease) 01/19/2024    Severe protein-calorie malnutrition 06/26/2023    Thyroid disease     Uncontrolled hypertension        Past Surgical History:   Procedure Laterality Date    BACK SURGERY      x2    BRONCHOSCOPY N/A 4/16/2024    BRONCHOSCOPY DIAGNOSTIC OR CELL WASH ONLY performed by Jose Price MD at [facility] [place]    INVASIVE VASCULAR N/A 1/19/2024    Aortagram abdominal performed by  the visualized skull or soft tissues.     1. No acute intracranial abnormality. 2. There is a stable appearance of the brain.     XR PELVIS (1-2 VIEWS)  Result Date: 3/22/2025  EXAMINATION: ONE XRAY VIEW OF THE PELVIS 3/22/2025 1:04 pm COMPARISON: None. HISTORY: ORDERING SYSTEM PROVIDED HISTORY: fall TECHNOLOGIST PROVIDED HISTORY: Reason for exam:->fall FINDINGS: No evidence of pelvic fracture.  Bilateral hips demonstrate normal alignment. No focal osseous lesion.  SI joints are symmetric.  Moderate bilateral hip joint degenerative changes.     No acute abnormality of the pelvis.     XR CHEST PORTABLE  Result Date: 3/22/2025  EXAMINATION: ONE XRAY VIEW OF THE CHEST 3/22/2025 1:04 pm COMPARISON: 03/16/2025 HISTORY: ORDERING SYSTEM PROVIDED HISTORY: ams TECHNOLOGIST PROVIDED HISTORY: Reason for exam:->ams FINDINGS: The lungs are without acute focal process.  There is no effusion or pneumothorax. The cardiomediastinal silhouette is without acute process. The osseous structures are without acute process.     No acute process.         ASSESSMENT:  70 y.o. female  with acute on chronic respiratory failure and altered mental status now with epistaxis likely from chronic septal perforation and non-humidified 02 supplementation resulting in nasal dryness     PLAN:  Dissolvable packing placed. Keep packing in place. This will not need to be removed.   Recommend humidified non re-breather mask  Minimal oozing is to be expected over next several days as packing dissolves. Please notify ENT resident of any profuse bleeding anteriorly or coughing up bright red blood clots.   Nursing can spray afrin liberally in both nares and hold pressure for any persistent bleeding.   Hgb 8.4  Transfuse per protocol (Hgb <= 7.0)  Start nasal saline spray TID to both nares  Maintain HOB elevated  No nose blowing or straining  Sneeze with mouth open  Strict blood pressure control per primary  Most recent read 177/86  PRN hydralazine

## 2025-03-26 NOTE — PROGRESS NOTES
Geraldine mann served regarding patient nose bleeding and patient requesting someone to come see her.

## 2025-03-26 NOTE — DISCHARGE INSTR - COC
Continuity of Care Form    Patient Name: Lana Phillips   :  1955  MRN:  [id number]    Admit date:  3/22/2025  Discharge date:  3/26/25    Code Status Order: Full Code   Advance Directives:     Admitting Physician:  Maria Milanes Marino, MD  PCP: Tal Humphrey DO    Discharging Nurse: [facility]Room[facility] 5410/5410-A  Discharging Unit Phone Number: [phone]    Emergency Contact:   Extended Emergency Contact Information  Primary Emergency Contact: Lana Reeves  Address: [address] [place] [address] [place] [address]  Home Phone: [phone]  Work Phone: [phone]  Mobile Phone: [phone]  Relation: Other Relative  Preferred language: English   needed? No  Secondary Emergency Contact: jmerkisue  Mobile Phone: [phone]  Relation: Brother/Sister  Preferred language: English   needed? No    Past Surgical History:  Past Surgical History:   Procedure Laterality Date    BACK SURGERY      x2    BRONCHOSCOPY N/A 2024    BRONCHOSCOPY DIAGNOSTIC OR CELL WASH ONLY [place] [facility] [place]    INVASIVE VASCULAR N/A 2024    Aortagram abdominal performed by Lazaro Vidales MD [place] [facility] [facility]    INVASIVE VASCULAR N/A 2024    Angioplasty peripheral artery performed by Lazaro Vidales MD at [facility] [facility]    LEFT OOPHORECTOMY      PAIN MANAGEMENT PROCEDURE N/A 2023    CAUDAL EPIDURAL STEROID INJECTION performed by Lilia Cabrera DO at [facility] OR    PAIN MANAGEMENT PROCEDURE N/A 2024    THERAPEUTIC CAUDAL EPIDURAL UNDER FLUOROSCOPIC GUIDANCE performed by Lilia Cabrera DO at [facility] OR    UPPER GASTROINTESTINAL ENDOSCOPY N/A 10/16/2023    EGD ESOPHAGOGASTRODUODENOSCOPY performed by Willie Chow DO at [facility] ENDOSCOPY       Immunization History:   Immunization History   Administered Date(s) Administered    COVID-19, PFIZER PURPLE top, DILUTE for use, (age 12 y+), 30mcg/0.3mL 2021,

## 2025-03-26 NOTE — PROGRESS NOTES
Hospitalist Progress Note      SYNOPSIS: Patient admitted on 3/22/2025 for UTI (urinary tract infection)  70-year-old lady was brought to ER from nursing home for evaluation of altered mental status.  On ER evaluation she is afebrile 97.9 BP 89/53  RR 18 SpO2 100% Blood work with leukocytosis of 18.5 with left shift, hemoglobin 9.4, normal platelets.  Normal renal function and electrolytes.  Troponin 48 and repeat 41.  Normal lactic acid.  ABG on 5 L nasal cannula pH 7.48 pCO2 46 pO2 104.  UA with positive leukocyte esterase, hematuria.  Negative RVP.  Blood culture sent.  CT head with no acute process   CT abdomen pelvis showed constipation  The patient was admitted under internal medicine started on IV Rocephin.    SUBJECTIVE:  Had epistaxis likely from chronic septal perforation and non-humidified 02 supplementation resulting in nasal dryness   ENT saw her  and she had dissolvable packing placed  nasal saline spray TID to both nares     Temp (24hrs), Av.6 °F (36.4 °C), Min:96.8 °F (36 °C), Max:98.5 °F (36.9 °C)    DIET: ADULT DIET; Regular; Low Fat/Low Chol/High Fiber/2 gm Na  CODE: Full Code    Intake/Output Summary (Last 24 hours) at 3/26/2025 1127  Last data filed at 3/26/2025 0946  Gross per 24 hour   Intake 1470 ml   Output 0 ml   Net 1470 ml       Review of Systems  All bolded are positive; please see HPI  General:  Fever, chills, diaphoresis, fatigue, malaise, night sweats, weight loss  Psychological:  Anxiety, disorientation, hallucinations.  ENT:  Epistaxis, headaches, vertigo, visual changes.  Cardiovascular:  Chest pain, irregular heartbeats, palpitations, paroxysmal nocturnal dyspnea.  Respiratory:  Shortness of breath, coughing, sputum production, hemoptysis, wheezing, orthopnea.  Gastrointestinal:  Nausea, vomiting, diarrhea, heartburn, constipation, abdominal pain, hematemesis, hematochezia, melena, acholic stools  Genito-Urinary:  Dysuria, urgency, frequency,  can be downgraded  Awaiting pulmonology recommendations  Patient started on AVAPS  Apparently she has been non compliant  Discussed about compliance today    DVT Prophylaxis [] Lovenox, []  Heparin, [] SCDs, [] Ambulation   GI Prophylaxis [] PPI,  [] H2 Blocker,  [] Carafate,  [] Diet/Tube Feeds   Disposition Patient requires continued admission due to likely discharge tomorrow a.m.   [facility] [] Low, [] Moderate,[]  High  Patient's risk as above due to        Medications:  REVIEWED DAILY    Infusion Medications    sodium chloride      sodium chloride       Scheduled Medications    sodium chloride  2 spray Each Nostril TID    dicyclomine  20 mg Oral 4x Daily AC & HS    docusate sodium  100 mg Oral Daily    sodium chloride flush  5-40 mL IntraVENous 2 times per day    sodium chloride flush  5-40 mL IntraVENous 2 times per day    enoxaparin  40 mg SubCUTAneous Daily    arformoterol tartrate  15 mcg Nebulization BID RT    aspirin  81 mg Oral Daily    budesonide  500 mcg Nebulization BID    busPIRone  10 mg Oral BID    clonazePAM  1 mg Oral QHS    [Held by provider] dicyclomine  20 mg Oral TID    dilTIAZem  240 mg Oral Daily    [Held by provider] docusate sodium  100 mg Oral Daily    [Held by provider] DULoxetine  60 mg Oral Daily    [Held by provider] folic acid  1 mg Oral Daily    [Held by provider] furosemide  40 mg Oral Daily    guaiFENesin  400 mg Oral TID    [Held by provider] losartan  25 mg Oral Daily    methIMAzole  15 mg Oral Daily    pantoprazole  40 mg Oral QAM AC    predniSONE  10 mg Oral Daily    sertraline  50 mg Oral Daily     PRN Meds: morphine, LORazepam, oxymetazoline, hydrALAZINE, traMADol, bisacodyl, sodium chloride flush, sodium chloride, sodium chloride flush, sodium chloride, ondansetron **OR** ondansetron, acetaminophen **OR** acetaminophen, polyethylene glycol, benzonatate, hydrOXYzine HCl, ipratropium 0.5 mg-albuterol 2.5 mg    Labs:     Recent Labs     03/24/25  0512 03/25/25  0426   WBC 11.9*

## 2025-03-26 NOTE — PROGRESS NOTES
[facility]  [address], [address]  [place] [address]      Pulmonary Progress Note      SUBJECTIVE:  claims feeling stable with respiratory function, cough with minimal sputum production, sitting up in commode on 5 li NC, didn't wear NIV much last night, for discharge today. Packing in nose due to bleeding, seen by ENT, suppose to use nasal saline tid    OBJECTIVE    Medications    Continuous Infusions:   sodium chloride      sodium chloride         Scheduled Meds:   sodium chloride  2 spray Each Nostril TID    dicyclomine  20 mg Oral 4x Daily AC & HS    docusate sodium  100 mg Oral Daily    sodium chloride flush  5-40 mL IntraVENous 2 times per day    sodium chloride flush  5-40 mL IntraVENous 2 times per day    enoxaparin  40 mg SubCUTAneous Daily    arformoterol tartrate  15 mcg Nebulization BID RT    aspirin  81 mg Oral Daily    budesonide  500 mcg Nebulization BID    busPIRone  10 mg Oral BID    clonazePAM  1 mg Oral QHS    [Held by provider] dicyclomine  20 mg Oral TID    dilTIAZem  240 mg Oral Daily    [Held by provider] docusate sodium  100 mg Oral Daily    [Held by provider] DULoxetine  60 mg Oral Daily    [Held by provider] folic acid  1 mg Oral Daily    [Held by provider] furosemide  40 mg Oral Daily    guaiFENesin  400 mg Oral TID    [Held by provider] losartan  25 mg Oral Daily    methIMAzole  15 mg Oral Daily    pantoprazole  40 mg Oral QAM AC    predniSONE  10 mg Oral Daily    sertraline  50 mg Oral Daily       PRN Meds:morphine, LORazepam, oxymetazoline, hydrALAZINE, traMADol, bisacodyl, sodium chloride flush, sodium chloride, sodium chloride flush, sodium chloride, ondansetron **OR** ondansetron, acetaminophen **OR** acetaminophen, polyethylene glycol, benzonatate, hydrOXYzine HCl, ipratropium 0.5 mg-albuterol 2.5 mg    Physical    VITALS:  BP (!) 117/57   Pulse 92   Temp 97.6 °F (36.4 °C) (Temporal)   Resp 22   Ht 1.727 m  more than 50 minutes.      Thanks for letting us see this patient in consultation.  Please contact us with any questions. Office [phone] or after hours through [facility], x [phone].

## 2025-03-26 NOTE — PLAN OF CARE
Problem: Chronic Conditions and Co-morbidities  Goal: Patient's chronic conditions and co-morbidity symptoms are monitored and maintained or improved  3/26/2025 0325 by Cindy Gentile RN  Outcome: Progressing  3/25/2025 1409 by Diane Schuler RN  Outcome: Progressing     Problem: Discharge Planning  Goal: Discharge to home or other facility with appropriate resources  3/26/2025 0325 by Cindy Gentile RN  Outcome: Progressing  3/25/2025 1409 by Diane Schuler RN  Outcome: Progressing     Problem: Pain  Goal: Verbalizes/displays adequate comfort level or baseline comfort level  3/26/2025 0325 by Cindy Gentile RN  Outcome: Progressing  3/25/2025 1409 by iDane Schuler RN  Outcome: Progressing     Problem: Confusion  Goal: Confusion, delirium, dementia, or psychosis is improved or at baseline  Description: INTERVENTIONS:  1. Assess for possible contributors to thought disturbance, including medications, impaired vision or hearing, underlying metabolic abnormalities, dehydration, psychiatric diagnoses, and notify attending LIP  2. Kathleen high risk fall precautions, as indicated  3. Provide frequent short contacts to provide reality reorientation, refocusing and direction  4. Decrease environmental stimuli, including noise as appropriate  5. Monitor and intervene to maintain adequate nutrition, hydration, elimination, sleep and activity  6. If unable to ensure safety without constant attention obtain sitter and review sitter guidelines with assigned personnel  7. Initiate Psychosocial CNS and Spiritual Care consult, as indicated  3/26/2025 0325 by Cindy Gentile RN  Outcome: Progressing  3/25/2025 1409 by Diane Schuler RN  Outcome: Progressing     Problem: Skin/Tissue Integrity  Goal: Skin integrity remains intact  Description: 1.  Monitor for areas of redness and/or skin breakdown  2.  Assess vascular access sites hourly  3.  Every 4-6 hours minimum:  Change oxygen saturation probe  site  4.  Every 4-6 hours:  If on nasal continuous positive airway pressure, respiratory therapy assess nares and determine need for appliance change or resting period  3/26/2025 0325 by Cindy Gentile, RN  Outcome: Progressing  Flowsheets (Taken 3/25/2025 1905)  Skin Integrity Remains Intact: Monitor for areas of redness and/or skin breakdown  3/25/2025 1409 by Diane Schuler, RN  Outcome: Progressing  Flowsheets (Taken 3/25/2025 0813)  Skin Integrity Remains Intact: Monitor for areas of redness and/or skin breakdown     Problem: Safety - Adult  Goal: Free from fall injury  3/26/2025 0325 by Cindy Gentile, RN  Outcome: Progressing  3/25/2025 1409 by Diane Schuler RN  Outcome: Progressing
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
## 2025-03-29 ENCOUNTER — HOSPITAL ENCOUNTER (INPATIENT)
Age: 70
LOS: 3 days | Discharge: HOSPICE | DRG: 871 | End: 2025-04-01
Attending: EMERGENCY MEDICINE | Admitting: FAMILY MEDICINE
Payer: MEDICARE

## 2025-03-29 ENCOUNTER — APPOINTMENT (OUTPATIENT)
Dept: CT IMAGING | Age: 70
DRG: 871 | End: 2025-03-29
Payer: MEDICARE

## 2025-03-29 ENCOUNTER — APPOINTMENT (OUTPATIENT)
Dept: GENERAL RADIOLOGY | Age: 70
DRG: 871 | End: 2025-03-29
Payer: MEDICARE

## 2025-03-29 DIAGNOSIS — J44.1 COPD EXACERBATION (HCC): ICD-10-CM

## 2025-03-29 DIAGNOSIS — J18.9 PNEUMONIA DUE TO INFECTIOUS ORGANISM, UNSPECIFIED LATERALITY, UNSPECIFIED PART OF LUNG: Primary | ICD-10-CM

## 2025-03-29 LAB
ALBUMIN SERPL-MCNC: 2.9 G/DL (ref 3.5–5.2)
ALP SERPL-CCNC: 67 U/L (ref 35–104)
ALT SERPL-CCNC: 10 U/L (ref 0–32)
ANION GAP SERPL CALCULATED.3IONS-SCNC: 12 MMOL/L (ref 7–16)
AST SERPL-CCNC: 13 U/L (ref 0–31)
B PARAP IS1001 DNA NPH QL NAA+NON-PROBE: NOT DETECTED
B PERT DNA SPEC QL NAA+PROBE: NOT DETECTED
BASOPHILS # BLD: 0.02 K/UL (ref 0–0.2)
BASOPHILS NFR BLD: 0 % (ref 0–2)
BILIRUB SERPL-MCNC: 0.2 MG/DL (ref 0–1.2)
BILIRUB UR QL STRIP: NEGATIVE
BUN SERPL-MCNC: 14 MG/DL (ref 6–23)
C PNEUM DNA NPH QL NAA+NON-PROBE: NOT DETECTED
CALCIUM SERPL-MCNC: 8.2 MG/DL (ref 8.6–10.2)
CHLORIDE SERPL-SCNC: 96 MMOL/L (ref 98–107)
CLARITY UR: CLEAR
CO2 SERPL-SCNC: 29 MMOL/L (ref 22–29)
COLOR UR: YELLOW
CREAT SERPL-MCNC: 0.6 MG/DL (ref 0.5–1)
EOSINOPHIL # BLD: 0.1 K/UL (ref 0.05–0.5)
EOSINOPHILS RELATIVE PERCENT: 1 % (ref 0–6)
ERYTHROCYTE [DISTWIDTH] IN BLOOD BY AUTOMATED COUNT: 15.3 % (ref 11.5–15)
FERRITIN SERPL-MCNC: 471 NG/ML
FLUAV RNA NPH QL NAA+NON-PROBE: NOT DETECTED
FLUBV RNA NPH QL NAA+NON-PROBE: NOT DETECTED
GFR, ESTIMATED: >90 ML/MIN/1.73M2
GLUCOSE SERPL-MCNC: 79 MG/DL (ref 74–99)
GLUCOSE UR STRIP-MCNC: NEGATIVE MG/DL
HADV DNA NPH QL NAA+NON-PROBE: NOT DETECTED
HCOV 229E RNA NPH QL NAA+NON-PROBE: NOT DETECTED
HCOV HKU1 RNA NPH QL NAA+NON-PROBE: NOT DETECTED
HCOV NL63 RNA NPH QL NAA+NON-PROBE: NOT DETECTED
HCOV OC43 RNA NPH QL NAA+NON-PROBE: NOT DETECTED
HCT VFR BLD AUTO: 22.7 % (ref 34–48)
HCT VFR BLD AUTO: 23.6 % (ref 34–48)
HGB BLD-MCNC: 7 G/DL (ref 11.5–15.5)
HGB BLD-MCNC: 7.2 G/DL (ref 11.5–15.5)
HGB UR QL STRIP.AUTO: ABNORMAL
HMPV RNA NPH QL NAA+NON-PROBE: NOT DETECTED
HPIV1 RNA NPH QL NAA+NON-PROBE: NOT DETECTED
HPIV2 RNA NPH QL NAA+NON-PROBE: NOT DETECTED
HPIV3 RNA NPH QL NAA+NON-PROBE: NOT DETECTED
HPIV4 RNA NPH QL NAA+NON-PROBE: NOT DETECTED
IMM GRANULOCYTES # BLD AUTO: 0.09 K/UL (ref 0–0.58)
IMM GRANULOCYTES NFR BLD: 1 % (ref 0–5)
IRON SATN MFR SERPL: 21 % (ref 15–50)
IRON SERPL-MCNC: 35 UG/DL (ref 37–145)
KETONES UR STRIP-MCNC: 15 MG/DL
LACTATE BLDV-SCNC: 0.7 MMOL/L (ref 0.5–1.9)
LACTATE BLDV-SCNC: 0.8 MMOL/L (ref 0.5–2.2)
LACTATE BLDV-SCNC: 0.9 MMOL/L (ref 0.5–1.9)
LEUKOCYTE ESTERASE UR QL STRIP: NEGATIVE
LIPASE SERPL-CCNC: 12 U/L (ref 13–60)
LYMPHOCYTES NFR BLD: 0.85 K/UL (ref 1.5–4)
LYMPHOCYTES RELATIVE PERCENT: 6 % (ref 20–42)
M PNEUMO DNA NPH QL NAA+NON-PROBE: NOT DETECTED
MCH RBC QN AUTO: 32.6 PG (ref 26–35)
MCHC RBC AUTO-ENTMCNC: 30.8 G/DL (ref 32–34.5)
MCV RBC AUTO: 105.6 FL (ref 80–99.9)
MONOCYTES NFR BLD: 0.71 K/UL (ref 0.1–0.95)
MONOCYTES NFR BLD: 5 % (ref 2–12)
NEUTROPHILS NFR BLD: 88 % (ref 43–80)
NEUTS SEG NFR BLD: 13.02 K/UL (ref 1.8–7.3)
NITRITE UR QL STRIP: NEGATIVE
PH UR STRIP: 6 [PH] (ref 5–8)
PLATELET # BLD AUTO: 234 K/UL (ref 130–450)
PMV BLD AUTO: 9.3 FL (ref 7–12)
POTASSIUM SERPL-SCNC: 3.8 MMOL/L (ref 3.5–5)
PROCALCITONIN SERPL-MCNC: 0.45 NG/ML (ref 0–0.08)
PROT SERPL-MCNC: 5.7 G/DL (ref 6.4–8.3)
PROT UR STRIP-MCNC: NEGATIVE MG/DL
RBC # BLD AUTO: 2.15 M/UL (ref 3.5–5.5)
RBC #/AREA URNS HPF: ABNORMAL /HPF
RSV RNA NPH QL NAA+NON-PROBE: NOT DETECTED
RV+EV RNA NPH QL NAA+NON-PROBE: NOT DETECTED
SARS-COV-2 RNA NPH QL NAA+NON-PROBE: NOT DETECTED
SODIUM SERPL-SCNC: 137 MMOL/L (ref 132–146)
SP GR UR STRIP: 1.01 (ref 1–1.03)
SPECIMEN DESCRIPTION: NORMAL
TIBC SERPL-MCNC: 166 UG/DL (ref 250–450)
TROPONIN I SERPL HS-MCNC: 38 NG/L (ref 0–9)
TROPONIN I SERPL HS-MCNC: 41 NG/L (ref 0–9)
UROBILINOGEN UR STRIP-ACNC: 0.2 EU/DL (ref 0–1)
WBC #/AREA URNS HPF: ABNORMAL /HPF
WBC OTHER # BLD: 14.8 K/UL (ref 4.5–11.5)

## 2025-03-29 PROCEDURE — 87077 CULTURE AEROBIC IDENTIFY: CPT

## 2025-03-29 PROCEDURE — 85018 HEMOGLOBIN: CPT

## 2025-03-29 PROCEDURE — 99285 EMERGENCY DEPT VISIT HI MDM: CPT

## 2025-03-29 PROCEDURE — 85025 COMPLETE CBC W/AUTO DIFF WBC: CPT

## 2025-03-29 PROCEDURE — 71045 X-RAY EXAM CHEST 1 VIEW: CPT

## 2025-03-29 PROCEDURE — 6370000000 HC RX 637 (ALT 250 FOR IP)

## 2025-03-29 PROCEDURE — 6360000002 HC RX W HCPCS: Performed by: FAMILY MEDICINE

## 2025-03-29 PROCEDURE — 87899 AGENT NOS ASSAY W/OPTIC: CPT

## 2025-03-29 PROCEDURE — 81001 URINALYSIS AUTO W/SCOPE: CPT

## 2025-03-29 PROCEDURE — 2580000003 HC RX 258: Performed by: FAMILY MEDICINE

## 2025-03-29 PROCEDURE — 83690 ASSAY OF LIPASE: CPT

## 2025-03-29 PROCEDURE — 2580000003 HC RX 258

## 2025-03-29 PROCEDURE — 71275 CT ANGIOGRAPHY CHEST: CPT

## 2025-03-29 PROCEDURE — 96374 THER/PROPH/DIAG INJ IV PUSH: CPT

## 2025-03-29 PROCEDURE — 80053 COMPREHEN METABOLIC PANEL: CPT

## 2025-03-29 PROCEDURE — 84484 ASSAY OF TROPONIN QUANT: CPT

## 2025-03-29 PROCEDURE — 6370000000 HC RX 637 (ALT 250 FOR IP): Performed by: FAMILY MEDICINE

## 2025-03-29 PROCEDURE — 93005 ELECTROCARDIOGRAM TRACING: CPT

## 2025-03-29 PROCEDURE — 2500000003 HC RX 250 WO HCPCS: Performed by: FAMILY MEDICINE

## 2025-03-29 PROCEDURE — 86738 MYCOPLASMA ANTIBODY: CPT

## 2025-03-29 PROCEDURE — 74177 CT ABD & PELVIS W/CONTRAST: CPT

## 2025-03-29 PROCEDURE — 36415 COLL VENOUS BLD VENIPUNCTURE: CPT

## 2025-03-29 PROCEDURE — 84145 PROCALCITONIN (PCT): CPT

## 2025-03-29 PROCEDURE — 2700000000 HC OXYGEN THERAPY PER DAY

## 2025-03-29 PROCEDURE — 94640 AIRWAY INHALATION TREATMENT: CPT

## 2025-03-29 PROCEDURE — 6370000000 HC RX 637 (ALT 250 FOR IP): Performed by: INTERNAL MEDICINE

## 2025-03-29 PROCEDURE — 87040 BLOOD CULTURE FOR BACTERIA: CPT

## 2025-03-29 PROCEDURE — 82728 ASSAY OF FERRITIN: CPT

## 2025-03-29 PROCEDURE — 85014 HEMATOCRIT: CPT

## 2025-03-29 PROCEDURE — 87449 NOS EACH ORGANISM AG IA: CPT

## 2025-03-29 PROCEDURE — 0202U NFCT DS 22 TRGT SARS-COV-2: CPT

## 2025-03-29 PROCEDURE — 2500000003 HC RX 250 WO HCPCS

## 2025-03-29 PROCEDURE — 6360000004 HC RX CONTRAST MEDICATION: Performed by: RADIOLOGY

## 2025-03-29 PROCEDURE — 99222 1ST HOSP IP/OBS MODERATE 55: CPT | Performed by: FAMILY MEDICINE

## 2025-03-29 PROCEDURE — 2140000000 HC CCU INTERMEDIATE R&B

## 2025-03-29 PROCEDURE — 87086 URINE CULTURE/COLONY COUNT: CPT

## 2025-03-29 PROCEDURE — 83605 ASSAY OF LACTIC ACID: CPT

## 2025-03-29 PROCEDURE — 83540 ASSAY OF IRON: CPT

## 2025-03-29 PROCEDURE — 83550 IRON BINDING TEST: CPT

## 2025-03-29 PROCEDURE — 6360000002 HC RX W HCPCS

## 2025-03-29 RX ORDER — IPRATROPIUM BROMIDE AND ALBUTEROL SULFATE 2.5; .5 MG/3ML; MG/3ML
1 SOLUTION RESPIRATORY (INHALATION) EVERY 4 HOURS PRN
Status: DISCONTINUED | OUTPATIENT
Start: 2025-03-29 | End: 2025-04-01 | Stop reason: HOSPADM

## 2025-03-29 RX ORDER — 0.9 % SODIUM CHLORIDE 0.9 %
1000 INTRAVENOUS SOLUTION INTRAVENOUS ONCE
Status: COMPLETED | OUTPATIENT
Start: 2025-03-29 | End: 2025-03-29

## 2025-03-29 RX ORDER — DOCUSATE SODIUM 100 MG/1
100 CAPSULE, LIQUID FILLED ORAL DAILY
Status: DISCONTINUED | OUTPATIENT
Start: 2025-03-29 | End: 2025-04-01 | Stop reason: HOSPADM

## 2025-03-29 RX ORDER — ENOXAPARIN SODIUM 100 MG/ML
30 INJECTION SUBCUTANEOUS DAILY
Status: DISCONTINUED | OUTPATIENT
Start: 2025-03-29 | End: 2025-04-01 | Stop reason: HOSPADM

## 2025-03-29 RX ORDER — CLONAZEPAM 0.5 MG/1
1 TABLET ORAL
Status: DISCONTINUED | OUTPATIENT
Start: 2025-03-29 | End: 2025-04-01 | Stop reason: HOSPADM

## 2025-03-29 RX ORDER — PREDNISONE 20 MG/1
40 TABLET ORAL DAILY
Status: DISCONTINUED | OUTPATIENT
Start: 2025-03-29 | End: 2025-03-30

## 2025-03-29 RX ORDER — BUDESONIDE 0.5 MG/2ML
500 INHALANT ORAL 2 TIMES DAILY
Status: DISCONTINUED | OUTPATIENT
Start: 2025-03-29 | End: 2025-04-01 | Stop reason: HOSPADM

## 2025-03-29 RX ORDER — ONDANSETRON 4 MG/1
4 TABLET, ORALLY DISINTEGRATING ORAL EVERY 8 HOURS PRN
Status: DISCONTINUED | OUTPATIENT
Start: 2025-03-29 | End: 2025-04-01 | Stop reason: HOSPADM

## 2025-03-29 RX ORDER — BENZONATATE 100 MG/1
100 CAPSULE ORAL 3 TIMES DAILY PRN
Status: DISCONTINUED | OUTPATIENT
Start: 2025-03-29 | End: 2025-04-01 | Stop reason: HOSPADM

## 2025-03-29 RX ORDER — FOLIC ACID 1 MG/1
1 TABLET ORAL DAILY
Status: DISCONTINUED | OUTPATIENT
Start: 2025-03-29 | End: 2025-04-01 | Stop reason: HOSPADM

## 2025-03-29 RX ORDER — ARFORMOTEROL TARTRATE 15 UG/2ML
15 SOLUTION RESPIRATORY (INHALATION)
Status: DISCONTINUED | OUTPATIENT
Start: 2025-03-29 | End: 2025-04-01 | Stop reason: HOSPADM

## 2025-03-29 RX ORDER — PANTOPRAZOLE SODIUM 40 MG/1
40 TABLET, DELAYED RELEASE ORAL
Status: DISCONTINUED | OUTPATIENT
Start: 2025-03-29 | End: 2025-04-01 | Stop reason: HOSPADM

## 2025-03-29 RX ORDER — IPRATROPIUM BROMIDE AND ALBUTEROL SULFATE 2.5; .5 MG/3ML; MG/3ML
1 SOLUTION RESPIRATORY (INHALATION) 4 TIMES DAILY
Status: DISCONTINUED | OUTPATIENT
Start: 2025-03-29 | End: 2025-04-01 | Stop reason: HOSPADM

## 2025-03-29 RX ORDER — ASPIRIN 81 MG/1
324 TABLET, CHEWABLE ORAL ONCE
Status: COMPLETED | OUTPATIENT
Start: 2025-03-29 | End: 2025-03-29

## 2025-03-29 RX ORDER — ASPIRIN 81 MG/1
81 TABLET ORAL DAILY
Status: DISCONTINUED | OUTPATIENT
Start: 2025-03-30 | End: 2025-04-01 | Stop reason: HOSPADM

## 2025-03-29 RX ORDER — IPRATROPIUM BROMIDE AND ALBUTEROL SULFATE 2.5; .5 MG/3ML; MG/3ML
3 SOLUTION RESPIRATORY (INHALATION) ONCE
Status: COMPLETED | OUTPATIENT
Start: 2025-03-29 | End: 2025-03-29

## 2025-03-29 RX ORDER — SENNA AND DOCUSATE SODIUM 50; 8.6 MG/1; MG/1
1 TABLET, FILM COATED ORAL 2 TIMES DAILY
Status: DISCONTINUED | OUTPATIENT
Start: 2025-03-29 | End: 2025-04-01 | Stop reason: HOSPADM

## 2025-03-29 RX ORDER — BUSPIRONE HYDROCHLORIDE 5 MG/1
10 TABLET ORAL 2 TIMES DAILY
Status: DISCONTINUED | OUTPATIENT
Start: 2025-03-29 | End: 2025-04-01 | Stop reason: HOSPADM

## 2025-03-29 RX ORDER — SODIUM CHLORIDE 9 MG/ML
INJECTION, SOLUTION INTRAVENOUS PRN
Status: DISCONTINUED | OUTPATIENT
Start: 2025-03-29 | End: 2025-04-01 | Stop reason: HOSPADM

## 2025-03-29 RX ORDER — POTASSIUM CHLORIDE 7.45 MG/ML
10 INJECTION INTRAVENOUS PRN
Status: DISCONTINUED | OUTPATIENT
Start: 2025-03-29 | End: 2025-04-01 | Stop reason: HOSPADM

## 2025-03-29 RX ORDER — SODIUM CHLORIDE 0.9 % (FLUSH) 0.9 %
5-40 SYRINGE (ML) INJECTION PRN
Status: DISCONTINUED | OUTPATIENT
Start: 2025-03-29 | End: 2025-04-01 | Stop reason: HOSPADM

## 2025-03-29 RX ORDER — POLYETHYLENE GLYCOL 3350 17 G/17G
17 POWDER, FOR SOLUTION ORAL DAILY PRN
Status: DISCONTINUED | OUTPATIENT
Start: 2025-03-29 | End: 2025-04-01 | Stop reason: HOSPADM

## 2025-03-29 RX ORDER — METHIMAZOLE 5 MG/1
15 TABLET ORAL DAILY
Status: DISCONTINUED | OUTPATIENT
Start: 2025-03-29 | End: 2025-04-01 | Stop reason: HOSPADM

## 2025-03-29 RX ORDER — ONDANSETRON 2 MG/ML
4 INJECTION INTRAMUSCULAR; INTRAVENOUS EVERY 6 HOURS PRN
Status: DISCONTINUED | OUTPATIENT
Start: 2025-03-29 | End: 2025-04-01 | Stop reason: HOSPADM

## 2025-03-29 RX ORDER — SODIUM CHLORIDE 0.9 % (FLUSH) 0.9 %
5-40 SYRINGE (ML) INJECTION EVERY 12 HOURS SCHEDULED
Status: DISCONTINUED | OUTPATIENT
Start: 2025-03-29 | End: 2025-04-01 | Stop reason: HOSPADM

## 2025-03-29 RX ORDER — ACETAMINOPHEN 650 MG/1
650 SUPPOSITORY RECTAL EVERY 6 HOURS PRN
Status: DISCONTINUED | OUTPATIENT
Start: 2025-03-29 | End: 2025-04-01 | Stop reason: HOSPADM

## 2025-03-29 RX ORDER — DILTIAZEM HYDROCHLORIDE 240 MG/1
240 CAPSULE, EXTENDED RELEASE ORAL DAILY
Status: DISCONTINUED | OUTPATIENT
Start: 2025-03-29 | End: 2025-03-29 | Stop reason: SDUPTHER

## 2025-03-29 RX ORDER — LOSARTAN POTASSIUM 25 MG/1
25 TABLET ORAL DAILY
Status: DISCONTINUED | OUTPATIENT
Start: 2025-03-29 | End: 2025-04-01 | Stop reason: HOSPADM

## 2025-03-29 RX ORDER — MORPHINE SULFATE 10 MG/5ML
4 SOLUTION ORAL EVERY 4 HOURS PRN
Status: DISCONTINUED | OUTPATIENT
Start: 2025-03-29 | End: 2025-04-01 | Stop reason: HOSPADM

## 2025-03-29 RX ORDER — MAGNESIUM SULFATE IN WATER 40 MG/ML
2000 INJECTION, SOLUTION INTRAVENOUS PRN
Status: DISCONTINUED | OUTPATIENT
Start: 2025-03-29 | End: 2025-04-01 | Stop reason: HOSPADM

## 2025-03-29 RX ORDER — DIPHENOXYLATE HYDROCHLORIDE AND ATROPINE SULFATE 2.5; .025 MG/1; MG/1
1 TABLET ORAL EVERY 12 HOURS PRN
Status: DISCONTINUED | OUTPATIENT
Start: 2025-03-29 | End: 2025-04-01 | Stop reason: HOSPADM

## 2025-03-29 RX ORDER — DILTIAZEM HYDROCHLORIDE 120 MG/1
240 CAPSULE, COATED, EXTENDED RELEASE ORAL DAILY
Status: DISCONTINUED | OUTPATIENT
Start: 2025-03-29 | End: 2025-04-01 | Stop reason: HOSPADM

## 2025-03-29 RX ORDER — MULTIVITAMIN WITH IRON
1 TABLET ORAL DAILY
Status: DISCONTINUED | OUTPATIENT
Start: 2025-03-29 | End: 2025-04-01 | Stop reason: HOSPADM

## 2025-03-29 RX ORDER — TRAMADOL HYDROCHLORIDE 50 MG/1
50 TABLET ORAL EVERY 6 HOURS PRN
Status: DISCONTINUED | OUTPATIENT
Start: 2025-03-29 | End: 2025-04-01 | Stop reason: HOSPADM

## 2025-03-29 RX ORDER — ACETAMINOPHEN 325 MG/1
650 TABLET ORAL EVERY 6 HOURS PRN
Status: DISCONTINUED | OUTPATIENT
Start: 2025-03-29 | End: 2025-04-01 | Stop reason: HOSPADM

## 2025-03-29 RX ORDER — IPRATROPIUM BROMIDE AND ALBUTEROL SULFATE 2.5; .5 MG/3ML; MG/3ML
1 SOLUTION RESPIRATORY (INHALATION) EVERY 6 HOURS
Status: DISCONTINUED | OUTPATIENT
Start: 2025-03-29 | End: 2025-03-29

## 2025-03-29 RX ORDER — POTASSIUM CHLORIDE 1500 MG/1
40 TABLET, EXTENDED RELEASE ORAL PRN
Status: DISCONTINUED | OUTPATIENT
Start: 2025-03-29 | End: 2025-04-01 | Stop reason: HOSPADM

## 2025-03-29 RX ORDER — GUAIFENESIN 400 MG/1
400 TABLET ORAL 3 TIMES DAILY
Status: DISCONTINUED | OUTPATIENT
Start: 2025-03-29 | End: 2025-04-01 | Stop reason: HOSPADM

## 2025-03-29 RX ORDER — CALCIUM CARBONATE 500(1250)
1000 TABLET ORAL 3 TIMES DAILY PRN
Status: DISCONTINUED | OUTPATIENT
Start: 2025-03-29 | End: 2025-04-01 | Stop reason: HOSPADM

## 2025-03-29 RX ORDER — LACTOBACILLUS RHAMNOSUS GG 10B CELL
1 CAPSULE ORAL DAILY
Status: DISCONTINUED | OUTPATIENT
Start: 2025-03-29 | End: 2025-04-01 | Stop reason: HOSPADM

## 2025-03-29 RX ORDER — DULOXETIN HYDROCHLORIDE 30 MG/1
60 CAPSULE, DELAYED RELEASE ORAL DAILY
Status: DISCONTINUED | OUTPATIENT
Start: 2025-03-29 | End: 2025-04-01 | Stop reason: HOSPADM

## 2025-03-29 RX ORDER — FUROSEMIDE 40 MG/1
40 TABLET ORAL DAILY
Status: DISCONTINUED | OUTPATIENT
Start: 2025-03-29 | End: 2025-03-31

## 2025-03-29 RX ORDER — HYDROXYZINE PAMOATE 50 MG/1
50 CAPSULE ORAL 4 TIMES DAILY PRN
Status: DISCONTINUED | OUTPATIENT
Start: 2025-03-29 | End: 2025-04-01 | Stop reason: HOSPADM

## 2025-03-29 RX ORDER — IOPAMIDOL 755 MG/ML
75 INJECTION, SOLUTION INTRAVASCULAR
Status: COMPLETED | OUTPATIENT
Start: 2025-03-29 | End: 2025-03-29

## 2025-03-29 RX ADMIN — BUSPIRONE HYDROCHLORIDE 10 MG: 5 TABLET ORAL at 20:37

## 2025-03-29 RX ADMIN — BUDESONIDE 500 MCG: 0.5 INHALANT RESPIRATORY (INHALATION) at 08:46

## 2025-03-29 RX ADMIN — SODIUM CHLORIDE, PRESERVATIVE FREE 10 ML: 5 INJECTION INTRAVENOUS at 08:44

## 2025-03-29 RX ADMIN — IPRATROPIUM BROMIDE AND ALBUTEROL SULFATE 1 DOSE: 2.5; .5 SOLUTION RESPIRATORY (INHALATION) at 13:07

## 2025-03-29 RX ADMIN — DULOXETINE HYDROCHLORIDE 60 MG: 30 CAPSULE, DELAYED RELEASE ORAL at 08:44

## 2025-03-29 RX ADMIN — PREGABALIN 75 MG: 25 CAPSULE ORAL at 08:45

## 2025-03-29 RX ADMIN — ASPIRIN 81 MG CHEWABLE TABLET 324 MG: 81 TABLET CHEWABLE at 00:56

## 2025-03-29 RX ADMIN — IPRATROPIUM BROMIDE AND ALBUTEROL SULFATE 3 DOSE: 2.5; .5 SOLUTION RESPIRATORY (INHALATION) at 01:01

## 2025-03-29 RX ADMIN — POLYETHYLENE GLYCOL 3350 17 G: 17 POWDER, FOR SOLUTION ORAL at 10:21

## 2025-03-29 RX ADMIN — HYDROXYZINE PAMOATE 50 MG: 50 CAPSULE ORAL at 21:27

## 2025-03-29 RX ADMIN — SENNOSIDES AND DOCUSATE SODIUM 1 TABLET: 50; 8.6 TABLET ORAL at 08:45

## 2025-03-29 RX ADMIN — IOPAMIDOL 75 ML: 755 INJECTION, SOLUTION INTRAVENOUS at 02:41

## 2025-03-29 RX ADMIN — PREDNISONE 40 MG: 20 TABLET ORAL at 08:45

## 2025-03-29 RX ADMIN — PREGABALIN 75 MG: 25 CAPSULE ORAL at 20:37

## 2025-03-29 RX ADMIN — GUAIFENESIN 400 MG: 400 TABLET ORAL at 12:34

## 2025-03-29 RX ADMIN — IPRATROPIUM BROMIDE AND ALBUTEROL SULFATE 1 DOSE: 2.5; .5 SOLUTION RESPIRATORY (INHALATION) at 21:26

## 2025-03-29 RX ADMIN — CLONAZEPAM 1 MG: 0.5 TABLET ORAL at 20:37

## 2025-03-29 RX ADMIN — BUDESONIDE 500 MCG: 0.5 INHALANT RESPIRATORY (INHALATION) at 21:26

## 2025-03-29 RX ADMIN — VANCOMYCIN HYDROCHLORIDE 750 MG: 750 INJECTION, POWDER, LYOPHILIZED, FOR SOLUTION INTRAVENOUS at 20:31

## 2025-03-29 RX ADMIN — ONDANSETRON 4 MG: 4 TABLET, ORALLY DISINTEGRATING ORAL at 22:10

## 2025-03-29 RX ADMIN — CEFEPIME 2000 MG: 2 INJECTION, POWDER, FOR SOLUTION INTRAVENOUS at 05:27

## 2025-03-29 RX ADMIN — SODIUM CHLORIDE 1000 ML: 0.9 INJECTION, SOLUTION INTRAVENOUS at 00:57

## 2025-03-29 RX ADMIN — DOCUSATE SODIUM 100 MG: 100 CAPSULE, LIQUID FILLED ORAL at 08:45

## 2025-03-29 RX ADMIN — ACETAMINOPHEN 650 MG: 325 TABLET ORAL at 11:54

## 2025-03-29 RX ADMIN — BUSPIRONE HYDROCHLORIDE 10 MG: 5 TABLET ORAL at 08:45

## 2025-03-29 RX ADMIN — ENOXAPARIN SODIUM 30 MG: 100 INJECTION SUBCUTANEOUS at 08:45

## 2025-03-29 RX ADMIN — GUAIFENESIN 400 MG: 400 TABLET ORAL at 08:45

## 2025-03-29 RX ADMIN — SODIUM CHLORIDE 1000 ML: 0.9 INJECTION, SOLUTION INTRAVENOUS at 05:26

## 2025-03-29 RX ADMIN — FOLIC ACID 1 MG: 1 TABLET ORAL at 08:44

## 2025-03-29 RX ADMIN — ARFORMOTEROL TARTRATE 15 MCG: 15 SOLUTION RESPIRATORY (INHALATION) at 21:26

## 2025-03-29 RX ADMIN — ONDANSETRON 4 MG: 2 INJECTION, SOLUTION INTRAMUSCULAR; INTRAVENOUS at 09:11

## 2025-03-29 RX ADMIN — ARFORMOTEROL TARTRATE 15 MCG: 15 SOLUTION RESPIRATORY (INHALATION) at 08:46

## 2025-03-29 RX ADMIN — GUAIFENESIN 400 MG: 400 TABLET ORAL at 20:37

## 2025-03-29 RX ADMIN — PANTOPRAZOLE SODIUM 40 MG: 40 TABLET, DELAYED RELEASE ORAL at 08:44

## 2025-03-29 RX ADMIN — SERTRALINE 50 MG: 50 TABLET, FILM COATED ORAL at 08:44

## 2025-03-29 RX ADMIN — WATER 125 MG: 1 INJECTION INTRAMUSCULAR; INTRAVENOUS; SUBCUTANEOUS at 00:56

## 2025-03-29 RX ADMIN — SENNOSIDES AND DOCUSATE SODIUM 1 TABLET: 50; 8.6 TABLET ORAL at 20:37

## 2025-03-29 RX ADMIN — SODIUM CHLORIDE, PRESERVATIVE FREE 10 ML: 5 INJECTION INTRAVENOUS at 20:37

## 2025-03-29 RX ADMIN — VANCOMYCIN HYDROCHLORIDE 1000 MG: 1 INJECTION, POWDER, LYOPHILIZED, FOR SOLUTION INTRAVENOUS at 05:29

## 2025-03-29 RX ADMIN — CEFEPIME 2000 MG: 2 INJECTION, POWDER, FOR SOLUTION INTRAVENOUS at 16:44

## 2025-03-29 ASSESSMENT — PAIN SCALES - GENERAL
PAINLEVEL_OUTOF10: 7
PAINLEVEL_OUTOF10: 6
PAINLEVEL_OUTOF10: 0

## 2025-03-29 ASSESSMENT — PAIN DESCRIPTION - ORIENTATION
ORIENTATION: MID
ORIENTATION_2: RIGHT;LEFT;LOWER
ORIENTATION: LEFT

## 2025-03-29 ASSESSMENT — PAIN DESCRIPTION - PAIN TYPE: TYPE: CHRONIC PAIN

## 2025-03-29 ASSESSMENT — PAIN DESCRIPTION - LOCATION
LOCATION_2: ABDOMEN
LOCATION: HEAD
LOCATION: CHEST

## 2025-03-29 ASSESSMENT — PAIN DESCRIPTION - DESCRIPTORS
DESCRIPTORS: ACHING;SHOOTING;STABBING;THROBBING
DESCRIPTORS_2: DISCOMFORT

## 2025-03-29 ASSESSMENT — PAIN DESCRIPTION - INTENSITY: RATING_2: 6

## 2025-03-29 ASSESSMENT — PAIN - FUNCTIONAL ASSESSMENT: PAIN_FUNCTIONAL_ASSESSMENT: 0-10

## 2025-03-29 ASSESSMENT — PAIN DESCRIPTION - FREQUENCY: FREQUENCY: INTERMITTENT

## 2025-03-29 NOTE — ED PROVIDER NOTES
[facility] EMERGENCY DEPARTMENT  EMERGENCY DEPARTMENT ENCOUNTER        Pt Name: Lana Phillips  MRN: [id number]  Birthdate 1955  Date of evaluation: 3/29/2025  Provider: Jerrell Paredes DO  PCP: Tal Humphrey DO  Note Started: 12:52 AM EDT 3/29/25    CHIEF COMPLAINT       Chief Complaint   Patient presents with    Chest Pain     Left sided and at times radiates to the right chest. Intermittent since nov. States that she does have shortness of breath with the pain     Abdominal Pain     Bilateral lower quadrants for a week. Pt states no bm in 5 day       HISTORY OF PRESENT ILLNESS: 1 or more Elements   History From: PATIENT     Limitations to history : None    Lana Phillips is a 70 y.o. female with prior history of COPD, ACS, atrial fibrillation, currently being treated for UTI arriving with the complaint of worsening left-sided chest pain for the past 10 days but it has been intermittent for a year.  She also has lower quadrant abdominal pain with no associated fever, diarrhea, melena.  She wears 5 L nasal cannula at baseline but does feel more short of breath than normal.  She is not currently on any blood thinners.      Nursing Notes were all reviewed and agreed with or any disagreements were addressed in the HPI.    REVIEW OF SYSTEMS :      Review of Systems    POSITIVE (+): abdominal pain, chest pain, shortness of breath   NEGATIVE (-): Fever, chills, nausea, vomiting, diarrhea, melena    SURGICAL HISTORY     Past Surgical History:   Procedure Laterality Date    BACK SURGERY      x2    BRONCHOSCOPY N/A 4/16/2024    BRONCHOSCOPY DIAGNOSTIC OR CELL WASH ONLY performed by Jose Price MD at [facility] ENDOSCOPY    INVASIVE VASCULAR N/A 1/19/2024    Aortagram abdominal performed by Lazaro Vidales MD at [facility] CARDIAC CATH LAB    INVASIVE VASCULAR N/A 1/19/2024    Angioplasty peripheral artery performed by Lazaro Vidales MD at [facility] CARDIAC CATH LAB    LEFT

## 2025-03-29 NOTE — H&P
Hospital Medicine History & Physical      PCP: Tal Humphrey DO    Date of Admission: 3/29/2025    Date of Service: Pt seen/examined on 3/29/25 and Admitted to Inpatient with expected LOS greater than two midnights due to medical therapy.     Chief Complaint:  shortness of breath, chest pain      History Of Present Illness:      70 y.o. female who presented to [facility] with concern for chest pain and abdominal pain and also shortness of breath.  Patient abdominal pain is gone for 5 days with chest pain of the left side at some time radiates to the right side.  Reports to be short of breath with the pain.  Patient was discharged from the hospital on 3/26/2025 for UTI.  She is 5 L oxygen at baseline for COPD.  CT chest was negative for PE but did show consolidative bilateral changes.  White count of 14,800.  CT abdomen pelvis with no acute abnormality.  He was given vancomycin and cefepime.  Also received IV Solu-Medrol due to concern for COPD exacerbation.  Initial presentation blood pressure was soft at 97/43 received a liter of fluids blood pressure improved to 114/38.  Patient is currently admitted for sepsis secondary to pneumonia.    Past Medical History:          Diagnosis Date    Atherosclerosis 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 (HCC) 01/19/2024    Atrial fibrillation (HCC)     Chronic obstructive pulmonary disease (HCC) 12/15/2022    COVID-19 07/16/2022    Critical limb ischemia of left lower extremity with rest pain (HCC) 12/25/2023    GI bleed 10/14/2023    Hypertension     RUTH treated with BiPAP     Panic disorder     PVD (peripheral vascular disease) 01/19/2024    Severe protein-calorie malnutrition 06/26/2023    Thyroid disease     Uncontrolled hypertension        Past Surgical History:          Procedure Laterality Date    BACK SURGERY      x2    BRONCHOSCOPY N/A 4/16/2024    BRONCHOSCOPY DIAGNOSTIC OR CELL WASH ONLY performed by Jose Price MD

## 2025-03-29 NOTE — CONSULTS
[facility]  [address], [address]  [place] [address]    Pulmonary Consultation      Reason for Consult:  sob    Requesting Physician:  Tal Humphrey DO    CHIEF COMPLAINT:  sob    History Obtained From:  patient    HISTORY OF PRESENT ILLNESS:                The patient is a 70 y.o. female with significant past medical history of COPD oxygen dependent who presents with sob and chest pain. Was just discharged on 26th, stable with dyspnea and cough/minimal sputum production, not entirely clear if there had been any significant change/worsening of her symptoms, claims had been wearing her NIV at night (has been inconsistent with use while here in hospital) mentions something about her roommate fiddling with the NIV to help her at night. Mention of hypotension at ER and given IVF. Currently on 4.5 li NC, sitting up in bed, looking similar with sob, not complaining much of chest pain currently.     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 (HCC) 01/19/2024    Atrial fibrillation (HCC)     Chronic obstructive pulmonary disease (HCC) 12/15/2022    COVID-19 07/16/2022    Critical limb ischemia of left lower extremity with rest pain (HCC) 12/25/2023    GI bleed 10/14/2023    Hypertension     RUTH treated with BiPAP     Panic disorder     PVD (peripheral vascular disease) 01/19/2024    Severe protein-calorie malnutrition 06/26/2023    Thyroid disease     Uncontrolled hypertension        Past Surgical History:        Procedure Laterality Date    BACK SURGERY      x2    BRONCHOSCOPY N/A 4/16/2024    BRONCHOSCOPY DIAGNOSTIC OR CELL WASH ONLY performed by Jose Price MD at [facility] ENDOSCOPY    INVASIVE VASCULAR N/A 1/19/2024    Aortagram abdominal performed by Lazaro Vidales MD at [facility] CARDIAC CATH LAB    INVASIVE VASCULAR N/A 1/19/2024    Angioplasty

## 2025-03-29 NOTE — PLAN OF CARE
Problem: Chronic Conditions and Co-morbidities  Goal: Patient's chronic conditions and co-morbidity symptoms are monitored and maintained or improved  Outcome: Progressing     Problem: Discharge Planning  Goal: Discharge to home or other facility with appropriate resources  Outcome: Progressing     Problem: Pain  Goal: Verbalizes/displays adequate comfort level or baseline comfort level  Outcome: Progressing     Problem: Risk for Elopement  Goal: Patient will not exit the unit/facility without proper excort  Outcome: Progressing  Flowsheets (Taken 3/29/2025 0820 by Ricardo Gonzalez RN)  Nursing Interventions for Elopement Risk: Assist with personal care needs such as toileting, eating, dressing, as needed to reduce the risk of wandering     Problem: Safety - Adult  Goal: Free from fall injury  Outcome: Progressing     Problem: ABCDS Injury Assessment  Goal: Absence of physical injury  Outcome: Progressing     Problem: Skin/Tissue Integrity  Goal: Skin integrity remains intact  Description: 1.  Monitor for areas of redness and/or skin breakdown  2.  Assess vascular access sites hourly  3.  Every 4-6 hours minimum:  Change oxygen saturation probe site  4.  Every 4-6 hours:  If on nasal continuous positive airway pressure, respiratory therapy assess nares and determine need for appliance change or resting period  Outcome: Progressing

## 2025-03-30 LAB
ANION GAP SERPL CALCULATED.3IONS-SCNC: 12 MMOL/L (ref 7–16)
BASOPHILS # BLD: 0.01 K/UL (ref 0–0.2)
BASOPHILS NFR BLD: 0 % (ref 0–2)
BUN SERPL-MCNC: 13 MG/DL (ref 6–23)
CALCIUM SERPL-MCNC: 8.7 MG/DL (ref 8.6–10.2)
CHLORIDE SERPL-SCNC: 105 MMOL/L (ref 98–107)
CO2 SERPL-SCNC: 27 MMOL/L (ref 22–29)
CREAT SERPL-MCNC: 0.6 MG/DL (ref 0.5–1)
DATE LAST DOSE: NORMAL
EOSINOPHIL # BLD: 0.05 K/UL (ref 0.05–0.5)
EOSINOPHILS RELATIVE PERCENT: 0 % (ref 0–6)
ERYTHROCYTE [DISTWIDTH] IN BLOOD BY AUTOMATED COUNT: 15.5 % (ref 11.5–15)
GFR, ESTIMATED: >90 ML/MIN/1.73M2
GLUCOSE SERPL-MCNC: 103 MG/DL (ref 74–99)
HCT VFR BLD AUTO: 23.8 % (ref 34–48)
HCT VFR BLD AUTO: 26.3 % (ref 34–48)
HGB BLD-MCNC: 7.2 G/DL (ref 11.5–15.5)
HGB BLD-MCNC: 7.7 G/DL (ref 11.5–15.5)
IMM GRANULOCYTES # BLD AUTO: 0.18 K/UL (ref 0–0.58)
IMM GRANULOCYTES NFR BLD: 1 % (ref 0–5)
L PNEUMO1 AG UR QL IA.RAPID: NEGATIVE
LYMPHOCYTES NFR BLD: 1.14 K/UL (ref 1.5–4)
LYMPHOCYTES RELATIVE PERCENT: 8 % (ref 20–42)
MCH RBC QN AUTO: 32.6 PG (ref 26–35)
MCHC RBC AUTO-ENTMCNC: 30.3 G/DL (ref 32–34.5)
MCV RBC AUTO: 107.7 FL (ref 80–99.9)
MONOCYTES NFR BLD: 0.8 K/UL (ref 0.1–0.95)
MONOCYTES NFR BLD: 6 % (ref 2–12)
NEUTROPHILS NFR BLD: 84 % (ref 43–80)
NEUTS SEG NFR BLD: 11.41 K/UL (ref 1.8–7.3)
PLATELET # BLD AUTO: 281 K/UL (ref 130–450)
PMV BLD AUTO: 9.5 FL (ref 7–12)
POTASSIUM SERPL-SCNC: 4.1 MMOL/L (ref 3.5–5)
RBC # BLD AUTO: 2.21 M/UL (ref 3.5–5.5)
S PNEUM AG SPEC QL: NEGATIVE
SODIUM SERPL-SCNC: 144 MMOL/L (ref 132–146)
SPECIMEN SOURCE: NORMAL
TME LAST DOSE: NORMAL H
VANCOMYCIN DOSE: NORMAL MG
VANCOMYCIN SERPL-MCNC: 24.9 UG/ML (ref 5–40)
WBC OTHER # BLD: 13.6 K/UL (ref 4.5–11.5)

## 2025-03-30 PROCEDURE — 99232 SBSQ HOSP IP/OBS MODERATE 35: CPT

## 2025-03-30 PROCEDURE — 2500000003 HC RX 250 WO HCPCS: Performed by: FAMILY MEDICINE

## 2025-03-30 PROCEDURE — 94640 AIRWAY INHALATION TREATMENT: CPT

## 2025-03-30 PROCEDURE — 6370000000 HC RX 637 (ALT 250 FOR IP): Performed by: FAMILY MEDICINE

## 2025-03-30 PROCEDURE — 2700000000 HC OXYGEN THERAPY PER DAY

## 2025-03-30 PROCEDURE — 85025 COMPLETE CBC W/AUTO DIFF WBC: CPT

## 2025-03-30 PROCEDURE — 2140000000 HC CCU INTERMEDIATE R&B

## 2025-03-30 PROCEDURE — 6370000000 HC RX 637 (ALT 250 FOR IP): Performed by: INTERNAL MEDICINE

## 2025-03-30 PROCEDURE — 6360000002 HC RX W HCPCS: Performed by: FAMILY MEDICINE

## 2025-03-30 PROCEDURE — 85018 HEMOGLOBIN: CPT

## 2025-03-30 PROCEDURE — 80048 BASIC METABOLIC PNL TOTAL CA: CPT

## 2025-03-30 PROCEDURE — 36415 COLL VENOUS BLD VENIPUNCTURE: CPT

## 2025-03-30 PROCEDURE — 85014 HEMATOCRIT: CPT

## 2025-03-30 PROCEDURE — 80202 ASSAY OF VANCOMYCIN: CPT

## 2025-03-30 PROCEDURE — 2580000003 HC RX 258: Performed by: FAMILY MEDICINE

## 2025-03-30 RX ADMIN — METHIMAZOLE 15 MG: 5 TABLET ORAL at 08:12

## 2025-03-30 RX ADMIN — HYDROXYZINE PAMOATE 50 MG: 50 CAPSULE ORAL at 15:49

## 2025-03-30 RX ADMIN — SERTRALINE 50 MG: 50 TABLET, FILM COATED ORAL at 08:12

## 2025-03-30 RX ADMIN — ACETAMINOPHEN 650 MG: 325 TABLET ORAL at 21:49

## 2025-03-30 RX ADMIN — PREDNISONE 40 MG: 20 TABLET ORAL at 08:10

## 2025-03-30 RX ADMIN — ONDANSETRON 4 MG: 2 INJECTION, SOLUTION INTRAMUSCULAR; INTRAVENOUS at 08:07

## 2025-03-30 RX ADMIN — SODIUM CHLORIDE, PRESERVATIVE FREE 10 ML: 5 INJECTION INTRAVENOUS at 21:02

## 2025-03-30 RX ADMIN — SODIUM CHLORIDE, PRESERVATIVE FREE 10 ML: 5 INJECTION INTRAVENOUS at 08:14

## 2025-03-30 RX ADMIN — IPRATROPIUM BROMIDE AND ALBUTEROL SULFATE 1 DOSE: 2.5; .5 SOLUTION RESPIRATORY (INHALATION) at 15:11

## 2025-03-30 RX ADMIN — SENNOSIDES AND DOCUSATE SODIUM 1 TABLET: 50; 8.6 TABLET ORAL at 21:01

## 2025-03-30 RX ADMIN — PANTOPRAZOLE SODIUM 40 MG: 40 TABLET, DELAYED RELEASE ORAL at 05:37

## 2025-03-30 RX ADMIN — ENOXAPARIN SODIUM 30 MG: 100 INJECTION SUBCUTANEOUS at 08:07

## 2025-03-30 RX ADMIN — CEFEPIME 2000 MG: 2 INJECTION, POWDER, FOR SOLUTION INTRAVENOUS at 15:51

## 2025-03-30 RX ADMIN — VANCOMYCIN HYDROCHLORIDE 750 MG: 750 INJECTION, POWDER, LYOPHILIZED, FOR SOLUTION INTRAVENOUS at 19:04

## 2025-03-30 RX ADMIN — CEFEPIME 2000 MG: 2 INJECTION, POWDER, FOR SOLUTION INTRAVENOUS at 05:42

## 2025-03-30 RX ADMIN — GUAIFENESIN 400 MG: 400 TABLET ORAL at 08:08

## 2025-03-30 RX ADMIN — BUSPIRONE HYDROCHLORIDE 10 MG: 5 TABLET ORAL at 21:01

## 2025-03-30 RX ADMIN — IPRATROPIUM BROMIDE AND ALBUTEROL SULFATE 1 DOSE: 2.5; .5 SOLUTION RESPIRATORY (INHALATION) at 20:08

## 2025-03-30 RX ADMIN — Medication 1 CAPSULE: at 08:13

## 2025-03-30 RX ADMIN — BUDESONIDE 500 MCG: 0.5 INHALANT RESPIRATORY (INHALATION) at 08:06

## 2025-03-30 RX ADMIN — IPRATROPIUM BROMIDE AND ALBUTEROL SULFATE 1 DOSE: 2.5; .5 SOLUTION RESPIRATORY (INHALATION) at 11:47

## 2025-03-30 RX ADMIN — SENNOSIDES AND DOCUSATE SODIUM 1 TABLET: 50; 8.6 TABLET ORAL at 08:07

## 2025-03-30 RX ADMIN — FOLIC ACID 1 MG: 1 TABLET ORAL at 08:12

## 2025-03-30 RX ADMIN — ARFORMOTEROL TARTRATE 15 MCG: 15 SOLUTION RESPIRATORY (INHALATION) at 08:06

## 2025-03-30 RX ADMIN — VANCOMYCIN HYDROCHLORIDE 750 MG: 750 INJECTION, POWDER, LYOPHILIZED, FOR SOLUTION INTRAVENOUS at 05:41

## 2025-03-30 RX ADMIN — GUAIFENESIN 400 MG: 400 TABLET ORAL at 21:02

## 2025-03-30 RX ADMIN — Medication 1 TABLET: at 05:38

## 2025-03-30 RX ADMIN — BUDESONIDE 500 MCG: 0.5 INHALANT RESPIRATORY (INHALATION) at 20:08

## 2025-03-30 RX ADMIN — PREGABALIN 75 MG: 25 CAPSULE ORAL at 21:01

## 2025-03-30 RX ADMIN — DULOXETINE HYDROCHLORIDE 60 MG: 30 CAPSULE, DELAYED RELEASE ORAL at 08:08

## 2025-03-30 RX ADMIN — ARFORMOTEROL TARTRATE 15 MCG: 15 SOLUTION RESPIRATORY (INHALATION) at 20:08

## 2025-03-30 RX ADMIN — CLONAZEPAM 1 MG: 0.5 TABLET ORAL at 21:01

## 2025-03-30 RX ADMIN — IPRATROPIUM BROMIDE AND ALBUTEROL SULFATE 1 DOSE: 2.5; .5 SOLUTION RESPIRATORY (INHALATION) at 08:06

## 2025-03-30 RX ADMIN — BUSPIRONE HYDROCHLORIDE 10 MG: 5 TABLET ORAL at 08:11

## 2025-03-30 RX ADMIN — ASPIRIN 81 MG: 81 TABLET, COATED ORAL at 08:12

## 2025-03-30 RX ADMIN — GUAIFENESIN 400 MG: 400 TABLET ORAL at 15:47

## 2025-03-30 RX ADMIN — PREGABALIN 75 MG: 25 CAPSULE ORAL at 08:10

## 2025-03-30 RX ADMIN — DOCUSATE SODIUM 100 MG: 100 CAPSULE, LIQUID FILLED ORAL at 08:12

## 2025-03-30 ASSESSMENT — PAIN - FUNCTIONAL ASSESSMENT: PAIN_FUNCTIONAL_ASSESSMENT: ACTIVITIES ARE NOT PREVENTED

## 2025-03-30 ASSESSMENT — PAIN SCALES - GENERAL
PAINLEVEL_OUTOF10: 0
PAINLEVEL_OUTOF10: 6

## 2025-03-30 ASSESSMENT — PAIN DESCRIPTION - DESCRIPTORS: DESCRIPTORS: ACHING

## 2025-03-30 ASSESSMENT — PAIN DESCRIPTION - LOCATION: LOCATION: HEAD

## 2025-03-30 NOTE — PLAN OF CARE
Problem: Chronic Conditions and Co-morbidities  Goal: Patient's chronic conditions and co-morbidity symptoms are monitored and maintained or improved  3/30/2025 1430 by Baldemar Martinez RN  Outcome: Progressing  3/30/2025 0143 by Arnol Beard RN  Outcome: Progressing     Problem: Discharge Planning  Goal: Discharge to home or other facility with appropriate resources  3/30/2025 1430 by Baldemar Martinez RN  Outcome: Progressing  3/30/2025 0143 by Arnol Beard RN  Outcome: Progressing     Problem: Pain  Goal: Verbalizes/displays adequate comfort level or baseline comfort level  3/30/2025 1430 by Baldemar Martinez RN  Outcome: Progressing  Flowsheets (Taken 3/30/2025 0400 by Arnol Beard RN)  Verbalizes/displays adequate comfort level or baseline comfort level:   Encourage patient to monitor pain and request assistance   Assess pain using appropriate pain scale  3/30/2025 0143 by Arnol Beard RN  Outcome: Progressing  Flowsheets  Taken 3/30/2025 0000  Verbalizes/displays adequate comfort level or baseline comfort level:   Encourage patient to monitor pain and request assistance   Assess pain using appropriate pain scale  Taken 3/29/2025 2000  Verbalizes/displays adequate comfort level or baseline comfort level:   Encourage patient to monitor pain and request assistance   Assess pain using appropriate pain scale     Problem: Risk for Elopement  Goal: Patient will not exit the unit/facility without proper excort  3/30/2025 1430 by Baldemar Martinez RN  Outcome: Progressing  Flowsheets (Taken 3/30/2025 0800)  Nursing Interventions for Elopement Risk: Assist with personal care needs such as toileting, eating, dressing, as needed to reduce the risk of wandering  3/30/2025 0143 by Arnol Beard RN  Outcome: Progressing  Flowsheets (Taken 3/29/2025 2000)  Nursing Interventions for Elopement Risk: Assist with personal care needs such as toileting, eating, dressing, as needed to reduce the risk of wandering

## 2025-03-31 LAB
BASOPHILS # BLD: 0.01 K/UL (ref 0–0.2)
BASOPHILS NFR BLD: 0 % (ref 0–2)
EKG ATRIAL RATE: 73 BPM
EKG P AXIS: 85 DEGREES
EKG P-R INTERVAL: 146 MS
EKG Q-T INTERVAL: 424 MS
EKG QRS DURATION: 76 MS
EKG QTC CALCULATION (BAZETT): 467 MS
EKG R AXIS: 71 DEGREES
EKG T AXIS: 95 DEGREES
EKG VENTRICULAR RATE: 73 BPM
EOSINOPHIL # BLD: 0.08 K/UL (ref 0.05–0.5)
EOSINOPHILS RELATIVE PERCENT: 1 % (ref 0–6)
ERYTHROCYTE [DISTWIDTH] IN BLOOD BY AUTOMATED COUNT: 15.6 % (ref 11.5–15)
HCT VFR BLD AUTO: 24.4 % (ref 34–48)
HCT VFR BLD AUTO: 25.3 % (ref 34–48)
HCT VFR BLD AUTO: 25.6 % (ref 34–48)
HGB BLD-MCNC: 7.2 G/DL (ref 11.5–15.5)
HGB BLD-MCNC: 7.5 G/DL (ref 11.5–15.5)
HGB BLD-MCNC: 7.6 G/DL (ref 11.5–15.5)
IMM GRANULOCYTES # BLD AUTO: 0.14 K/UL (ref 0–0.58)
IMM GRANULOCYTES NFR BLD: 2 % (ref 0–5)
LYMPHOCYTES NFR BLD: 1.48 K/UL (ref 1.5–4)
LYMPHOCYTES RELATIVE PERCENT: 16 % (ref 20–42)
MCH RBC QN AUTO: 32.3 PG (ref 26–35)
MCHC RBC AUTO-ENTMCNC: 29.6 G/DL (ref 32–34.5)
MCV RBC AUTO: 109.1 FL (ref 80–99.9)
MICROORGANISM SPEC CULT: ABNORMAL
MONOCYTES NFR BLD: 0.7 K/UL (ref 0.1–0.95)
MONOCYTES NFR BLD: 8 % (ref 2–12)
MYCOPLASMA AB,IGM: PRESENT
NEUTROPHILS NFR BLD: 73 % (ref 43–80)
NEUTS SEG NFR BLD: 6.64 K/UL (ref 1.8–7.3)
PLATELET # BLD AUTO: 298 K/UL (ref 130–450)
PMV BLD AUTO: 9.3 FL (ref 7–12)
PROCALCITONIN SERPL-MCNC: 0.2 NG/ML (ref 0–0.08)
RBC # BLD AUTO: 2.32 M/UL (ref 3.5–5.5)
SERVICE CMNT-IMP: ABNORMAL
SPECIMEN DESCRIPTION: ABNORMAL
WBC OTHER # BLD: 9.1 K/UL (ref 4.5–11.5)

## 2025-03-31 PROCEDURE — 97161 PT EVAL LOW COMPLEX 20 MIN: CPT

## 2025-03-31 PROCEDURE — 6360000002 HC RX W HCPCS: Performed by: FAMILY MEDICINE

## 2025-03-31 PROCEDURE — 94640 AIRWAY INHALATION TREATMENT: CPT

## 2025-03-31 PROCEDURE — 6370000000 HC RX 637 (ALT 250 FOR IP): Performed by: FAMILY MEDICINE

## 2025-03-31 PROCEDURE — 85014 HEMATOCRIT: CPT

## 2025-03-31 PROCEDURE — 97530 THERAPEUTIC ACTIVITIES: CPT

## 2025-03-31 PROCEDURE — 85025 COMPLETE CBC W/AUTO DIFF WBC: CPT

## 2025-03-31 PROCEDURE — 36415 COLL VENOUS BLD VENIPUNCTURE: CPT

## 2025-03-31 PROCEDURE — 2580000003 HC RX 258: Performed by: FAMILY MEDICINE

## 2025-03-31 PROCEDURE — 2700000000 HC OXYGEN THERAPY PER DAY

## 2025-03-31 PROCEDURE — 84145 PROCALCITONIN (PCT): CPT

## 2025-03-31 PROCEDURE — 97535 SELF CARE MNGMENT TRAINING: CPT

## 2025-03-31 PROCEDURE — 6370000000 HC RX 637 (ALT 250 FOR IP): Performed by: INTERNAL MEDICINE

## 2025-03-31 PROCEDURE — 93010 ELECTROCARDIOGRAM REPORT: CPT | Performed by: INTERNAL MEDICINE

## 2025-03-31 PROCEDURE — 85018 HEMOGLOBIN: CPT

## 2025-03-31 PROCEDURE — 2500000003 HC RX 250 WO HCPCS: Performed by: FAMILY MEDICINE

## 2025-03-31 PROCEDURE — 2140000000 HC CCU INTERMEDIATE R&B

## 2025-03-31 PROCEDURE — 97165 OT EVAL LOW COMPLEX 30 MIN: CPT

## 2025-03-31 PROCEDURE — 99232 SBSQ HOSP IP/OBS MODERATE 35: CPT

## 2025-03-31 RX ORDER — FUROSEMIDE 20 MG/1
20 TABLET ORAL DAILY
Status: DISCONTINUED | OUTPATIENT
Start: 2025-04-01 | End: 2025-04-01 | Stop reason: HOSPADM

## 2025-03-31 RX ADMIN — Medication 1 CAPSULE: at 09:00

## 2025-03-31 RX ADMIN — SENNOSIDES AND DOCUSATE SODIUM 1 TABLET: 50; 8.6 TABLET ORAL at 09:00

## 2025-03-31 RX ADMIN — ASPIRIN 81 MG: 81 TABLET, COATED ORAL at 08:59

## 2025-03-31 RX ADMIN — HYDROXYZINE PAMOATE 50 MG: 50 CAPSULE ORAL at 14:59

## 2025-03-31 RX ADMIN — SERTRALINE 50 MG: 50 TABLET, FILM COATED ORAL at 09:00

## 2025-03-31 RX ADMIN — HYDROXYZINE PAMOATE 50 MG: 50 CAPSULE ORAL at 21:17

## 2025-03-31 RX ADMIN — SODIUM CHLORIDE, PRESERVATIVE FREE 10 ML: 5 INJECTION INTRAVENOUS at 14:59

## 2025-03-31 RX ADMIN — BUSPIRONE HYDROCHLORIDE 10 MG: 5 TABLET ORAL at 21:17

## 2025-03-31 RX ADMIN — SALINE NASAL SPRAY 2 SPRAY: 1.5 SOLUTION NASAL at 09:00

## 2025-03-31 RX ADMIN — PREGABALIN 75 MG: 25 CAPSULE ORAL at 08:58

## 2025-03-31 RX ADMIN — ENOXAPARIN SODIUM 30 MG: 100 INJECTION SUBCUTANEOUS at 08:58

## 2025-03-31 RX ADMIN — SODIUM CHLORIDE, PRESERVATIVE FREE 10 ML: 5 INJECTION INTRAVENOUS at 09:01

## 2025-03-31 RX ADMIN — GUAIFENESIN 400 MG: 400 TABLET ORAL at 09:00

## 2025-03-31 RX ADMIN — ARFORMOTEROL TARTRATE 15 MCG: 15 SOLUTION RESPIRATORY (INHALATION) at 19:58

## 2025-03-31 RX ADMIN — METHIMAZOLE 15 MG: 5 TABLET ORAL at 09:00

## 2025-03-31 RX ADMIN — Medication 1000 MG: at 08:59

## 2025-03-31 RX ADMIN — VANCOMYCIN HYDROCHLORIDE 750 MG: 750 INJECTION, POWDER, LYOPHILIZED, FOR SOLUTION INTRAVENOUS at 04:45

## 2025-03-31 RX ADMIN — HYDROXYZINE PAMOATE 50 MG: 50 CAPSULE ORAL at 08:59

## 2025-03-31 RX ADMIN — BUDESONIDE 500 MCG: 0.5 INHALANT RESPIRATORY (INHALATION) at 09:34

## 2025-03-31 RX ADMIN — VANCOMYCIN HYDROCHLORIDE 750 MG: 750 INJECTION, POWDER, LYOPHILIZED, FOR SOLUTION INTRAVENOUS at 21:55

## 2025-03-31 RX ADMIN — TRAMADOL HYDROCHLORIDE 50 MG: 50 TABLET, COATED ORAL at 21:17

## 2025-03-31 RX ADMIN — DULOXETINE HYDROCHLORIDE 60 MG: 30 CAPSULE, DELAYED RELEASE ORAL at 08:59

## 2025-03-31 RX ADMIN — GUAIFENESIN 400 MG: 400 TABLET ORAL at 14:59

## 2025-03-31 RX ADMIN — CLONAZEPAM 1 MG: 0.5 TABLET ORAL at 21:17

## 2025-03-31 RX ADMIN — Medication 1 TABLET: at 05:37

## 2025-03-31 RX ADMIN — FOLIC ACID 1 MG: 1 TABLET ORAL at 09:00

## 2025-03-31 RX ADMIN — IPRATROPIUM BROMIDE AND ALBUTEROL SULFATE 1 DOSE: 2.5; .5 SOLUTION RESPIRATORY (INHALATION) at 09:34

## 2025-03-31 RX ADMIN — IPRATROPIUM BROMIDE AND ALBUTEROL SULFATE 1 DOSE: 2.5; .5 SOLUTION RESPIRATORY (INHALATION) at 19:58

## 2025-03-31 RX ADMIN — IPRATROPIUM BROMIDE AND ALBUTEROL SULFATE 1 DOSE: 2.5; .5 SOLUTION RESPIRATORY (INHALATION) at 16:03

## 2025-03-31 RX ADMIN — GUAIFENESIN 400 MG: 400 TABLET ORAL at 21:17

## 2025-03-31 RX ADMIN — CEFEPIME 2000 MG: 2 INJECTION, POWDER, FOR SOLUTION INTRAVENOUS at 17:50

## 2025-03-31 RX ADMIN — PANTOPRAZOLE SODIUM 40 MG: 40 TABLET, DELAYED RELEASE ORAL at 05:37

## 2025-03-31 RX ADMIN — IPRATROPIUM BROMIDE AND ALBUTEROL SULFATE 1 DOSE: 2.5; .5 SOLUTION RESPIRATORY (INHALATION) at 12:22

## 2025-03-31 RX ADMIN — SODIUM CHLORIDE, PRESERVATIVE FREE 10 ML: 5 INJECTION INTRAVENOUS at 21:17

## 2025-03-31 RX ADMIN — SALINE NASAL SPRAY 2 SPRAY: 1.5 SOLUTION NASAL at 21:18

## 2025-03-31 RX ADMIN — PREGABALIN 75 MG: 25 CAPSULE ORAL at 21:16

## 2025-03-31 RX ADMIN — SENNOSIDES AND DOCUSATE SODIUM 1 TABLET: 50; 8.6 TABLET ORAL at 21:17

## 2025-03-31 RX ADMIN — BUDESONIDE 500 MCG: 0.5 INHALANT RESPIRATORY (INHALATION) at 19:58

## 2025-03-31 RX ADMIN — ARFORMOTEROL TARTRATE 15 MCG: 15 SOLUTION RESPIRATORY (INHALATION) at 09:34

## 2025-03-31 RX ADMIN — BUSPIRONE HYDROCHLORIDE 10 MG: 5 TABLET ORAL at 08:58

## 2025-03-31 RX ADMIN — DOCUSATE SODIUM 100 MG: 100 CAPSULE, LIQUID FILLED ORAL at 09:00

## 2025-03-31 RX ADMIN — MORPHINE SULFATE 4 MG: 10 SOLUTION ORAL at 18:55

## 2025-03-31 RX ADMIN — PREDNISONE 30 MG: 20 TABLET ORAL at 08:59

## 2025-03-31 RX ADMIN — CEFEPIME 2000 MG: 2 INJECTION, POWDER, FOR SOLUTION INTRAVENOUS at 05:41

## 2025-03-31 RX ADMIN — SALINE NASAL SPRAY 2 SPRAY: 1.5 SOLUTION NASAL at 14:59

## 2025-03-31 ASSESSMENT — PAIN SCALES - GENERAL
PAINLEVEL_OUTOF10: 3
PAINLEVEL_OUTOF10: 5
PAINLEVEL_OUTOF10: 0

## 2025-03-31 ASSESSMENT — PAIN DESCRIPTION - LOCATION: LOCATION: BACK

## 2025-03-31 ASSESSMENT — PAIN - FUNCTIONAL ASSESSMENT: PAIN_FUNCTIONAL_ASSESSMENT: ACTIVITIES ARE NOT PREVENTED

## 2025-03-31 ASSESSMENT — PAIN DESCRIPTION - ORIENTATION: ORIENTATION: LEFT

## 2025-03-31 ASSESSMENT — PAIN DESCRIPTION - DESCRIPTORS: DESCRIPTORS: ACHING;THROBBING;SORE

## 2025-03-31 ASSESSMENT — PAIN DESCRIPTION - PAIN TYPE: TYPE: CHRONIC PAIN

## 2025-03-31 ASSESSMENT — PAIN DESCRIPTION - ONSET: ONSET: GRADUAL

## 2025-03-31 ASSESSMENT — PAIN DESCRIPTION - FREQUENCY: FREQUENCY: INTERMITTENT

## 2025-03-31 NOTE — ACP (ADVANCE CARE PLANNING)
Advance Care Planning   The patient has the following advanced directives on file:  Advance Directives       Power of  Living Will ACP-Advance Directive ACP-Power of     Not on File Filed on 01/07/25 Filed Not on File            The patient has appointed the following active healthcare agents:    Primary Decision Maker: Lana Reeves - Other Relative - [phone]    Secondary Decision Maker: Nia Salas - Niece/Nephew - [phone]    The Patient has the following current code status:    Code Status: Full Code      Karey Deutsch, [facility]  3/31/2025

## 2025-03-31 NOTE — CARE COORDINATION
Social Work/ Case Management Transition of Care Planning (ELIGIO Carney [phone]):     Pt presented to the hospital with SOB and Chest pain. Pt is long term bedhold at [facility]. LINSEY spoke with [facility] liaison who stated the Pt was suppose to transition to Hospice today at their facility and the nurse on staff did not know and sent her to the hospital.  LINSEY attempted to contact Pt's Lana PHILLIPS, LINSEY left message requesting return call for discharge plan. [facility] liaison stated they will take her back and they have hospice set up and ready to start. LINSEY/MARGARITA to follow.  ELIGIO Carney  3/31/2025      Case Management Assessment  Initial Evaluation    Date/Time of Evaluation: 3/31/2025 1:32 PM  Assessment Completed by: ELIGIO Carney    If patient is discharged prior to next notation, then this note serves as note for discharge by case management.    Patient Name: Lana Phillips                   YOB: 1955  Diagnosis: Pneumonia due to organism [J18.9]  COPD exacerbation (HCC) [J44.1]  Pneumonia due to infectious organism, unspecified laterality, unspecified part of lung [J18.9]                   Date / Time: 3/29/2025 12:30 AM    Patient Admission Status: Inpatient   Readmission Risk (Low < 19, Mod (19-27), High > 27): Readmission Risk Score: 44.4    Current PCP: Tal Humphrey, DO  PCP verified by CM?      Chart Reviewed: Yes      History Provided by:    Patient Orientation: Unable to Assess, Other (see comment) (Pt with physician team, family did not answer the phone)    Patient Cognition:      Hospitalization in the last 30 days (Readmission):  Yes    If yes, Readmission Assessment in CM Navigator will be completed.    Advance Directives:      Code Status: Full Code   Patient's Primary Decision Maker is:      Primary Decision Maker: OtispabloLana - Other Relative - [phone]    Secondary Decision Maker: Nia Salas - Niece/Nephew - [phone]    Discharge Planning:    Patient lives with:

## 2025-04-01 ENCOUNTER — HOSPITAL ENCOUNTER (INPATIENT)
Age: 70
LOS: 2 days | Discharge: NURSING, REHAB OR LONG-TERM CARE | DRG: 189 | End: 2025-04-03
Attending: EMERGENCY MEDICINE
Payer: MEDICARE

## 2025-04-01 ENCOUNTER — APPOINTMENT (OUTPATIENT)
Dept: GENERAL RADIOLOGY | Age: 70
DRG: 189 | End: 2025-04-01
Payer: MEDICARE

## 2025-04-01 VITALS
WEIGHT: 107.81 LBS | TEMPERATURE: 97.6 F | BODY MASS INDEX: 16.92 KG/M2 | HEART RATE: 81 BPM | SYSTOLIC BLOOD PRESSURE: 136 MMHG | DIASTOLIC BLOOD PRESSURE: 63 MMHG | RESPIRATION RATE: 18 BRPM | HEIGHT: 67 IN | OXYGEN SATURATION: 96 %

## 2025-04-01 DIAGNOSIS — J44.1 COPD WITH ACUTE EXACERBATION (HCC): Primary | ICD-10-CM

## 2025-04-01 DIAGNOSIS — R07.9 CHEST PAIN, UNSPECIFIED TYPE: ICD-10-CM

## 2025-04-01 DIAGNOSIS — J96.02 ACUTE RESPIRATORY FAILURE WITH HYPERCAPNIA: ICD-10-CM

## 2025-04-01 DIAGNOSIS — F41.9 ANXIETY: ICD-10-CM

## 2025-04-01 LAB
AADO2: 166.9 MMHG
ALBUMIN SERPL-MCNC: 3.5 G/DL (ref 3.5–5.2)
ALP SERPL-CCNC: 80 U/L (ref 35–104)
ALT SERPL-CCNC: 15 U/L (ref 0–32)
ANION GAP SERPL CALCULATED.3IONS-SCNC: 11 MMOL/L (ref 7–16)
ANION GAP SERPL CALCULATED.3IONS-SCNC: 9 MMOL/L (ref 7–16)
AST SERPL-CCNC: 21 U/L (ref 0–31)
B.E.: 10 MMOL/L (ref -3–3)
B.E.: 10.4 MMOL/L (ref -3–3)
BASOPHILS # BLD: 0.01 K/UL (ref 0–0.2)
BASOPHILS NFR BLD: 0 % (ref 0–2)
BILIRUB SERPL-MCNC: <0.2 MG/DL (ref 0–1.2)
BNP SERPL-MCNC: 2271 PG/ML (ref 0–125)
BUN SERPL-MCNC: 14 MG/DL (ref 6–23)
BUN SERPL-MCNC: 9 MG/DL (ref 6–23)
CALCIUM SERPL-MCNC: 8.8 MG/DL (ref 8.6–10.2)
CALCIUM SERPL-MCNC: 8.9 MG/DL (ref 8.6–10.2)
CHLORIDE SERPL-SCNC: 97 MMOL/L (ref 98–107)
CHLORIDE SERPL-SCNC: 99 MMOL/L (ref 98–107)
CO2 SERPL-SCNC: 28 MMOL/L (ref 22–29)
CO2 SERPL-SCNC: 33 MMOL/L (ref 22–29)
COHB: 0 % (ref 0–1.5)
COHB: 0.3 % (ref 0–1.5)
CREAT SERPL-MCNC: 0.6 MG/DL (ref 0.5–1)
CREAT SERPL-MCNC: 0.8 MG/DL (ref 0.5–1)
CRITICAL: ABNORMAL
CRITICAL: ABNORMAL
DATE ANALYZED: ABNORMAL
DATE ANALYZED: ABNORMAL
DATE OF COLLECTION: ABNORMAL
DATE OF COLLECTION: ABNORMAL
EOSINOPHIL # BLD: 0.04 K/UL (ref 0.05–0.5)
EOSINOPHILS RELATIVE PERCENT: 0 % (ref 0–6)
ERYTHROCYTE [DISTWIDTH] IN BLOOD BY AUTOMATED COUNT: 15.6 % (ref 11.5–15)
FIO2: 50 %
GFR, ESTIMATED: 75 ML/MIN/1.73M2
GFR, ESTIMATED: >90 ML/MIN/1.73M2
GLUCOSE SERPL-MCNC: 102 MG/DL (ref 74–99)
GLUCOSE SERPL-MCNC: 70 MG/DL (ref 74–99)
HCO3: 35.2 MMOL/L (ref 22–26)
HCO3: 37.1 MMOL/L (ref 22–26)
HCT VFR BLD AUTO: 25.9 % (ref 34–48)
HCT VFR BLD AUTO: 27.3 % (ref 34–48)
HGB BLD-MCNC: 7.7 G/DL (ref 11.5–15.5)
HGB BLD-MCNC: 8.1 G/DL (ref 11.5–15.5)
HHB: 1.4 % (ref 0–5)
HHB: 6.6 % (ref 0–5)
IMM GRANULOCYTES # BLD AUTO: 0.28 K/UL (ref 0–0.58)
IMM GRANULOCYTES NFR BLD: 3 % (ref 0–5)
LAB: ABNORMAL
LAB: ABNORMAL
LYMPHOCYTES NFR BLD: 0.79 K/UL (ref 1.5–4)
LYMPHOCYTES RELATIVE PERCENT: 7 % (ref 20–42)
Lab: 2100
Lab: 2309
MCH RBC QN AUTO: 32 PG (ref 26–35)
MCHC RBC AUTO-ENTMCNC: 29.7 G/DL (ref 32–34.5)
MCV RBC AUTO: 107.5 FL (ref 80–99.9)
METHB: 0.2 % (ref 0–1.5)
METHB: 0.3 % (ref 0–1.5)
MODE: ABNORMAL
MODE: ABNORMAL
MONOCYTES NFR BLD: 0.7 K/UL (ref 0.1–0.95)
MONOCYTES NFR BLD: 6 % (ref 2–12)
NEUTROPHILS NFR BLD: 84 % (ref 43–80)
NEUTS SEG NFR BLD: 9.49 K/UL (ref 1.8–7.3)
O2 SATURATION: 93.4 % (ref 92–98.5)
O2 SATURATION: 98.6 % (ref 92–98.5)
O2HB: 93.2 % (ref 94–97)
O2HB: 98 % (ref 94–97)
OPERATOR ID: 405
OPERATOR ID: ABNORMAL
PATIENT TEMP: 37 C
PATIENT TEMP: 37 C
PCO2: 51.8 MMHG (ref 35–45)
PCO2: 62.9 MMHG (ref 35–45)
PEEP/CPAP: 5 CMH2O
PFO2: 2.38 MMHG/%
PH BLOOD GAS: 7.39 (ref 7.35–7.45)
PH BLOOD GAS: 7.45 (ref 7.35–7.45)
PLATELET # BLD AUTO: 347 K/UL (ref 130–450)
PMV BLD AUTO: 9.2 FL (ref 7–12)
PO2: 118.8 MMHG (ref 75–100)
PO2: 72 MMHG (ref 75–100)
POTASSIUM SERPL-SCNC: 3.9 MMOL/L (ref 3.5–5)
POTASSIUM SERPL-SCNC: 4.2 MMOL/L (ref 3.5–5)
PROT SERPL-MCNC: 5.8 G/DL (ref 6.4–8.3)
PS: 15 CMH20
RBC # BLD AUTO: 2.41 M/UL (ref 3.5–5.5)
RI(T): 1.41
SODIUM SERPL-SCNC: 136 MMOL/L (ref 132–146)
SODIUM SERPL-SCNC: 141 MMOL/L (ref 132–146)
SOURCE, BLOOD GAS: ABNORMAL
SOURCE, BLOOD GAS: ABNORMAL
THB: 9 G/DL (ref 11.5–16.5)
THB: 9.3 G/DL (ref 11.5–16.5)
TIME ANALYZED: 2104
TIME ANALYZED: 2316
TROPONIN I SERPL HS-MCNC: 39 NG/L (ref 0–9)
TROPONIN I SERPL HS-MCNC: 40 NG/L (ref 0–9)
WBC OTHER # BLD: 11.3 K/UL (ref 4.5–11.5)

## 2025-04-01 PROCEDURE — 36415 COLL VENOUS BLD VENIPUNCTURE: CPT

## 2025-04-01 PROCEDURE — 85018 HEMOGLOBIN: CPT

## 2025-04-01 PROCEDURE — 94640 AIRWAY INHALATION TREATMENT: CPT

## 2025-04-01 PROCEDURE — 80053 COMPREHEN METABOLIC PANEL: CPT

## 2025-04-01 PROCEDURE — 94660 CPAP INITIATION&MGMT: CPT

## 2025-04-01 PROCEDURE — 96374 THER/PROPH/DIAG INJ IV PUSH: CPT

## 2025-04-01 PROCEDURE — 6370000000 HC RX 637 (ALT 250 FOR IP)

## 2025-04-01 PROCEDURE — 2580000003 HC RX 258: Performed by: FAMILY MEDICINE

## 2025-04-01 PROCEDURE — 6370000000 HC RX 637 (ALT 250 FOR IP): Performed by: INTERNAL MEDICINE

## 2025-04-01 PROCEDURE — 99223 1ST HOSP IP/OBS HIGH 75: CPT | Performed by: FAMILY MEDICINE

## 2025-04-01 PROCEDURE — 6360000002 HC RX W HCPCS: Performed by: FAMILY MEDICINE

## 2025-04-01 PROCEDURE — 6370000000 HC RX 637 (ALT 250 FOR IP): Performed by: FAMILY MEDICINE

## 2025-04-01 PROCEDURE — 6370000000 HC RX 637 (ALT 250 FOR IP): Performed by: EMERGENCY MEDICINE

## 2025-04-01 PROCEDURE — 84484 ASSAY OF TROPONIN QUANT: CPT

## 2025-04-01 PROCEDURE — 99285 EMERGENCY DEPT VISIT HI MDM: CPT

## 2025-04-01 PROCEDURE — 99239 HOSP IP/OBS DSCHRG MGMT >30: CPT

## 2025-04-01 PROCEDURE — 6360000002 HC RX W HCPCS: Performed by: EMERGENCY MEDICINE

## 2025-04-01 PROCEDURE — 2700000000 HC OXYGEN THERAPY PER DAY

## 2025-04-01 PROCEDURE — 71045 X-RAY EXAM CHEST 1 VIEW: CPT

## 2025-04-01 PROCEDURE — 85025 COMPLETE CBC W/AUTO DIFF WBC: CPT

## 2025-04-01 PROCEDURE — 82805 BLOOD GASES W/O2 SATURATION: CPT

## 2025-04-01 PROCEDURE — 80048 BASIC METABOLIC PNL TOTAL CA: CPT

## 2025-04-01 PROCEDURE — 83880 ASSAY OF NATRIURETIC PEPTIDE: CPT

## 2025-04-01 PROCEDURE — 93005 ELECTROCARDIOGRAM TRACING: CPT | Performed by: EMERGENCY MEDICINE

## 2025-04-01 PROCEDURE — 1200000000 HC SEMI PRIVATE

## 2025-04-01 PROCEDURE — 6360000002 HC RX W HCPCS

## 2025-04-01 PROCEDURE — 85014 HEMATOCRIT: CPT

## 2025-04-01 RX ORDER — GUAIFENESIN/DEXTROMETHORPHAN 100-10MG/5
5 SYRUP ORAL EVERY 4 HOURS PRN
Status: DISCONTINUED | OUTPATIENT
Start: 2025-04-01 | End: 2025-04-03 | Stop reason: HOSPADM

## 2025-04-01 RX ORDER — ALBUTEROL SULFATE 0.83 MG/ML
2.5 SOLUTION RESPIRATORY (INHALATION)
Status: DISCONTINUED | OUTPATIENT
Start: 2025-04-01 | End: 2025-04-03 | Stop reason: HOSPADM

## 2025-04-01 RX ORDER — SODIUM CHLORIDE 9 MG/ML
INJECTION, SOLUTION INTRAVENOUS PRN
Status: DISCONTINUED | OUTPATIENT
Start: 2025-04-01 | End: 2025-04-03 | Stop reason: HOSPADM

## 2025-04-01 RX ORDER — IPRATROPIUM BROMIDE AND ALBUTEROL SULFATE 2.5; .5 MG/3ML; MG/3ML
1 SOLUTION RESPIRATORY (INHALATION)
Status: COMPLETED | OUTPATIENT
Start: 2025-04-01 | End: 2025-04-01

## 2025-04-01 RX ORDER — SODIUM CHLORIDE 0.9 % (FLUSH) 0.9 %
5-40 SYRINGE (ML) INJECTION PRN
Status: DISCONTINUED | OUTPATIENT
Start: 2025-04-01 | End: 2025-04-03 | Stop reason: HOSPADM

## 2025-04-01 RX ORDER — MAGNESIUM SULFATE IN WATER 40 MG/ML
2000 INJECTION, SOLUTION INTRAVENOUS ONCE
Status: COMPLETED | OUTPATIENT
Start: 2025-04-01 | End: 2025-04-01

## 2025-04-01 RX ORDER — IPRATROPIUM BROMIDE AND ALBUTEROL SULFATE 2.5; .5 MG/3ML; MG/3ML
1 SOLUTION RESPIRATORY (INHALATION)
Status: DISCONTINUED | OUTPATIENT
Start: 2025-04-02 | End: 2025-04-03 | Stop reason: HOSPADM

## 2025-04-01 RX ORDER — ACETAMINOPHEN 650 MG/1
650 SUPPOSITORY RECTAL EVERY 6 HOURS PRN
Status: DISCONTINUED | OUTPATIENT
Start: 2025-04-01 | End: 2025-04-03 | Stop reason: HOSPADM

## 2025-04-01 RX ORDER — ALPRAZOLAM 0.25 MG
0.25 TABLET ORAL ONCE
Status: COMPLETED | OUTPATIENT
Start: 2025-04-01 | End: 2025-04-01

## 2025-04-01 RX ORDER — DOXYCYCLINE HYCLATE 100 MG
100 TABLET ORAL 2 TIMES DAILY
DISCHARGE
Start: 2025-04-01 | End: 2025-04-04

## 2025-04-01 RX ORDER — PREDNISONE 10 MG/1
TABLET ORAL
Qty: 30 TABLET | Refills: 0 | Status: SHIPPED | OUTPATIENT
Start: 2025-04-02

## 2025-04-01 RX ORDER — ONDANSETRON 2 MG/ML
4 INJECTION INTRAMUSCULAR; INTRAVENOUS EVERY 6 HOURS PRN
Status: DISCONTINUED | OUTPATIENT
Start: 2025-04-01 | End: 2025-04-03 | Stop reason: HOSPADM

## 2025-04-01 RX ORDER — PREDNISONE 10 MG/1
10 TABLET ORAL DAILY
Qty: 30 TABLET | Refills: 5 | Status: SHIPPED | OUTPATIENT
Start: 2025-04-01

## 2025-04-01 RX ORDER — ENOXAPARIN SODIUM 100 MG/ML
30 INJECTION SUBCUTANEOUS DAILY
Status: DISCONTINUED | OUTPATIENT
Start: 2025-04-02 | End: 2025-04-03 | Stop reason: HOSPADM

## 2025-04-01 RX ORDER — CEFDINIR 300 MG/1
300 CAPSULE ORAL 2 TIMES DAILY
Status: ON HOLD | DISCHARGE
Start: 2025-04-01 | End: 2025-04-03 | Stop reason: HOSPADM

## 2025-04-01 RX ORDER — SODIUM CHLORIDE 0.9 % (FLUSH) 0.9 %
5-40 SYRINGE (ML) INJECTION EVERY 12 HOURS SCHEDULED
Status: DISCONTINUED | OUTPATIENT
Start: 2025-04-01 | End: 2025-04-03 | Stop reason: HOSPADM

## 2025-04-01 RX ORDER — ENOXAPARIN SODIUM 100 MG/ML
40 INJECTION SUBCUTANEOUS DAILY
Status: DISCONTINUED | OUTPATIENT
Start: 2025-04-02 | End: 2025-04-01

## 2025-04-01 RX ORDER — PROCHLORPERAZINE EDISYLATE 5 MG/ML
5 INJECTION INTRAMUSCULAR; INTRAVENOUS ONCE
Status: COMPLETED | OUTPATIENT
Start: 2025-04-01 | End: 2025-04-01

## 2025-04-01 RX ORDER — POLYETHYLENE GLYCOL 3350 17 G/17G
17 POWDER, FOR SOLUTION ORAL DAILY PRN
Status: DISCONTINUED | OUTPATIENT
Start: 2025-04-01 | End: 2025-04-03 | Stop reason: HOSPADM

## 2025-04-01 RX ORDER — BUDESONIDE 0.5 MG/2ML
500 INHALANT ORAL
Status: DISCONTINUED | OUTPATIENT
Start: 2025-04-01 | End: 2025-04-03 | Stop reason: HOSPADM

## 2025-04-01 RX ORDER — PREDNISONE 20 MG/1
40 TABLET ORAL DAILY
Status: DISCONTINUED | OUTPATIENT
Start: 2025-04-04 | End: 2025-04-02

## 2025-04-01 RX ORDER — ONDANSETRON 4 MG/1
4 TABLET, ORALLY DISINTEGRATING ORAL EVERY 8 HOURS PRN
Status: DISCONTINUED | OUTPATIENT
Start: 2025-04-01 | End: 2025-04-03 | Stop reason: HOSPADM

## 2025-04-01 RX ORDER — ACETAMINOPHEN 325 MG/1
650 TABLET ORAL EVERY 6 HOURS PRN
Status: DISCONTINUED | OUTPATIENT
Start: 2025-04-01 | End: 2025-04-03 | Stop reason: HOSPADM

## 2025-04-01 RX ORDER — CLONAZEPAM 0.5 MG/1
1 TABLET ORAL ONCE
Status: COMPLETED | OUTPATIENT
Start: 2025-04-01 | End: 2025-04-01

## 2025-04-01 RX ADMIN — SERTRALINE 50 MG: 50 TABLET, FILM COATED ORAL at 07:51

## 2025-04-01 RX ADMIN — VANCOMYCIN HYDROCHLORIDE 750 MG: 750 INJECTION, POWDER, LYOPHILIZED, FOR SOLUTION INTRAVENOUS at 10:13

## 2025-04-01 RX ADMIN — BUSPIRONE HYDROCHLORIDE 10 MG: 5 TABLET ORAL at 07:51

## 2025-04-01 RX ADMIN — IPRATROPIUM BROMIDE AND ALBUTEROL SULFATE 1 DOSE: 2.5; .5 SOLUTION RESPIRATORY (INHALATION) at 08:02

## 2025-04-01 RX ADMIN — SENNOSIDES AND DOCUSATE SODIUM 1 TABLET: 50; 8.6 TABLET ORAL at 07:51

## 2025-04-01 RX ADMIN — IPRATROPIUM BROMIDE AND ALBUTEROL SULFATE 1 DOSE: 2.5; .5 SOLUTION RESPIRATORY (INHALATION) at 20:25

## 2025-04-01 RX ADMIN — PANTOPRAZOLE SODIUM 40 MG: 40 TABLET, DELAYED RELEASE ORAL at 06:07

## 2025-04-01 RX ADMIN — ARFORMOTEROL TARTRATE 15 MCG: 15 SOLUTION RESPIRATORY (INHALATION) at 08:02

## 2025-04-01 RX ADMIN — IPRATROPIUM BROMIDE AND ALBUTEROL SULFATE 1 DOSE: 2.5; .5 SOLUTION RESPIRATORY (INHALATION) at 21:47

## 2025-04-01 RX ADMIN — ENOXAPARIN SODIUM 30 MG: 100 INJECTION SUBCUTANEOUS at 07:49

## 2025-04-01 RX ADMIN — ALPRAZOLAM 0.25 MG: 0.25 TABLET ORAL at 11:29

## 2025-04-01 RX ADMIN — CLONAZEPAM 1 MG: 0.5 TABLET ORAL at 21:52

## 2025-04-01 RX ADMIN — Medication 1000 MG: at 07:50

## 2025-04-01 RX ADMIN — BUDESONIDE 500 MCG: 0.5 SUSPENSION RESPIRATORY (INHALATION) at 22:19

## 2025-04-01 RX ADMIN — DULOXETINE HYDROCHLORIDE 60 MG: 30 CAPSULE, DELAYED RELEASE ORAL at 07:50

## 2025-04-01 RX ADMIN — BUDESONIDE 500 MCG: 0.5 INHALANT RESPIRATORY (INHALATION) at 08:02

## 2025-04-01 RX ADMIN — LOSARTAN POTASSIUM 25 MG: 25 TABLET, FILM COATED ORAL at 07:51

## 2025-04-01 RX ADMIN — Medication 1 TABLET: at 06:07

## 2025-04-01 RX ADMIN — DOCUSATE SODIUM 100 MG: 100 CAPSULE, LIQUID FILLED ORAL at 07:51

## 2025-04-01 RX ADMIN — METHIMAZOLE 15 MG: 5 TABLET ORAL at 07:50

## 2025-04-01 RX ADMIN — IPRATROPIUM BROMIDE AND ALBUTEROL SULFATE 1 DOSE: 2.5; .5 SOLUTION RESPIRATORY (INHALATION) at 20:24

## 2025-04-01 RX ADMIN — IPRATROPIUM BROMIDE AND ALBUTEROL SULFATE 1 DOSE: 2.5; .5 SOLUTION RESPIRATORY (INHALATION) at 12:26

## 2025-04-01 RX ADMIN — MAGNESIUM SULFATE HEPTAHYDRATE 2000 MG: 40 INJECTION, SOLUTION INTRAVENOUS at 21:41

## 2025-04-01 RX ADMIN — IPRATROPIUM BROMIDE AND ALBUTEROL SULFATE 1 DOSE: 2.5; .5 SOLUTION RESPIRATORY (INHALATION) at 20:22

## 2025-04-01 RX ADMIN — Medication 1 CAPSULE: at 07:50

## 2025-04-01 RX ADMIN — CEFEPIME 2000 MG: 2 INJECTION, POWDER, FOR SOLUTION INTRAVENOUS at 06:09

## 2025-04-01 RX ADMIN — GUAIFENESIN 400 MG: 400 TABLET ORAL at 14:33

## 2025-04-01 RX ADMIN — ONDANSETRON 4 MG: 2 INJECTION, SOLUTION INTRAMUSCULAR; INTRAVENOUS at 07:49

## 2025-04-01 RX ADMIN — PROCHLORPERAZINE EDISYLATE 5 MG: 5 INJECTION INTRAMUSCULAR; INTRAVENOUS at 10:09

## 2025-04-01 RX ADMIN — ASPIRIN 81 MG: 81 TABLET, COATED ORAL at 07:50

## 2025-04-01 RX ADMIN — GUAIFENESIN 400 MG: 400 TABLET ORAL at 07:51

## 2025-04-01 RX ADMIN — HYDROXYZINE PAMOATE 50 MG: 50 CAPSULE ORAL at 07:50

## 2025-04-01 RX ADMIN — SALINE NASAL SPRAY 2 SPRAY: 1.5 SOLUTION NASAL at 07:56

## 2025-04-01 RX ADMIN — FOLIC ACID 1 MG: 1 TABLET ORAL at 07:51

## 2025-04-01 RX ADMIN — PREDNISONE 30 MG: 20 TABLET ORAL at 07:51

## 2025-04-01 RX ADMIN — MORPHINE SULFATE 4 MG: 10 SOLUTION ORAL at 06:06

## 2025-04-01 RX ADMIN — PREGABALIN 75 MG: 25 CAPSULE ORAL at 07:51

## 2025-04-01 RX ADMIN — IPRATROPIUM BROMIDE AND ALBUTEROL SULFATE 1 DOSE: 2.5; .5 SOLUTION RESPIRATORY (INHALATION) at 21:43

## 2025-04-01 RX ADMIN — IPRATROPIUM BROMIDE AND ALBUTEROL SULFATE 1 DOSE: 2.5; .5 SOLUTION RESPIRATORY (INHALATION) at 21:48

## 2025-04-01 ASSESSMENT — PAIN DESCRIPTION - DESCRIPTORS: DESCRIPTORS: DISCOMFORT;NAGGING;THROBBING

## 2025-04-01 ASSESSMENT — PAIN DESCRIPTION - LOCATION: LOCATION: CHEST

## 2025-04-01 ASSESSMENT — PAIN - FUNCTIONAL ASSESSMENT: PAIN_FUNCTIONAL_ASSESSMENT: 0-10

## 2025-04-01 NOTE — PLAN OF CARE
Problem: Chronic Conditions and Co-morbidities  Goal: Patient's chronic conditions and co-morbidity symptoms are monitored and maintained or improved  4/1/2025 0858 by Thad Garcia RN  Outcome: Progressing  3/31/2025 2248 by Geraldine Guadarrama RN  Outcome: Progressing  Flowsheets (Taken 3/31/2025 2117)  Care Plan - Patient's Chronic Conditions and Co-Morbidity Symptoms are Monitored and Maintained or Improved: Monitor and assess patient's chronic conditions and comorbid symptoms for stability, deterioration, or improvement     Problem: Discharge Planning  Goal: Discharge to home or other facility with appropriate resources  4/1/2025 0858 by Thad Garcia RN  Outcome: Progressing  3/31/2025 2248 by Geraldine Guadarrama RN  Outcome: Progressing  Flowsheets (Taken 3/31/2025 2117)  Discharge to home or other facility with appropriate resources: Identify barriers to discharge with patient and caregiver     Problem: Pain  Goal: Verbalizes/displays adequate comfort level or baseline comfort level  4/1/2025 0858 by Thad Garcia RN  Outcome: Progressing  3/31/2025 2248 by Geraldine Guadarrama RN  Outcome: Progressing     Problem: Risk for Elopement  Goal: Patient will not exit the unit/facility without proper excort  4/1/2025 0858 by Thad Garcia RN  Outcome: Progressing  3/31/2025 2248 by Geraldine Guadarrama RN  Outcome: Progressing  Flowsheets (Taken 3/31/2025 2117)  Nursing Interventions for Elopement Risk: Assist with personal care needs such as toileting, eating, dressing, as needed to reduce the risk of wandering     Problem: Safety - Adult  Goal: Free from fall injury  4/1/2025 0858 by Thad Garcia RN  Outcome: Progressing  3/31/2025 2248 by Geraldine Guadarrama RN  Outcome: Progressing     Problem: ABCDS Injury Assessment  Goal: Absence of physical injury  4/1/2025 0858 by Thad Garcia RN  Outcome: Progressing  3/31/2025 2248 by Geraldine Guadarrama RN  Outcome: Progressing     Problem: Skin/Tissue

## 2025-04-01 NOTE — CARE COORDINATION
Social Work/ Case Management Transition of Care Planning (ELIGIO Carney [phone]):     Discharge order noted. Pt to return to [place], no precert needed. Transport arranged with [facility] for 4pm .  Ambulette form and envelope in soft chart. Facility aware of time, RN aware.   ELIGIO Carney  4/1/2025

## 2025-04-01 NOTE — PROGRESS NOTES
[facility] Hospitalist Progress Note    Admitting Date and Time: 3/29/2025 12:30 AM  Admit Dx: Pneumonia due to organism [J18.9]  COPD exacerbation (HCC) [J44.1]  Pneumonia due to infectious organism, unspecified laterality, unspecified part of lung [J18.9]    Synopsis:  70 y.o. female who presented to [facility] with concern for chest pain and abdominal pain and also shortness of breath.  Patient abdominal pain is gone for 5 days with chest pain of the left side at some time radiates to the right side.  Patient was discharged from the hospital on 3/26/2025 for UTI.  She is 5 L oxygen at baseline for COPD.  CT chest was negative for PE but did show consolidative bilateral changes.  White count of 14,800.  CT abdomen pelvis with no acute abnormality.  He was given vancomycin and cefepime.  Also received IV Solu-Medrol due to concern for COPD exacerbation.  Initial presentation blood pressure was soft at 97/43 received a liter of fluids blood pressure improved to 114/38.  Patient is currently admitted for sepsis secondary to pneumonia.   Pulmonology consulted.  She is currently on vancomycin and cefepime along with 5 days of prednisone 40 Mg daily    Subjective:  Patient is being followed for Pneumonia due to organism [J18.9]  COPD exacerbation (HCC) [J44.1]  Pneumonia due to infectious organism, unspecified laterality, unspecified part of lung [J18.9]     Patient was seen at bedside.  She feels better but still complaining of mild shortness of breath and productive cough  Denies any fever, chills, chest pain, abdominal pain    ROS: denies fever, chills, cp, n/v, HA unless stated above.      ferric gluconate (FERRLECIT) 125 mg in sodium chloride 0.9 % 100 mL IVPB  125 mg IntraVENous Once    sodium chloride flush  5-40 mL IntraVENous 2 times per day    enoxaparin  30 mg SubCUTAneous Daily    cefepime  2,000 mg IntraVENous Q12H    vancomycin  750 mg IntraVENous Q12H    predniSONE  40 mg Oral Daily 
       [facility] Hospitalist Progress Note    Admitting Date and Time: 3/29/2025 12:30 AM  Admit Dx: Pneumonia due to organism [J18.9]  COPD exacerbation (HCC) [J44.1]  Pneumonia due to infectious organism, unspecified laterality, unspecified part of lung [J18.9]    Synopsis:  70 y.o. female who presented to [facility] with concern for chest pain and abdominal pain and also shortness of breath.  Patient abdominal pain is gone for 5 days with chest pain of the left side at some time radiates to the right side.  Patient was discharged from the hospital on 3/26/2025 for UTI.  She is 5 L oxygen at baseline for COPD.  CT chest was negative for PE but did show consolidative bilateral changes.  White count of 14,800.  CT abdomen pelvis with no acute abnormality.  He was given vancomycin and cefepime.  Also received IV Solu-Medrol due to concern for COPD exacerbation.  Initial presentation blood pressure was soft at 97/43 received a liter of fluids blood pressure improved to 114/38.  Patient is currently admitted for sepsis secondary to pneumonia.   Pulmonology consulted.  She is currently on vancomycin and cefepime along with 5 days of prednisone 40 Mg daily    Subjective:  Patient is being followed for Pneumonia due to organism [J18.9]  COPD exacerbation (HCC) [J44.1]  Pneumonia due to infectious organism, unspecified laterality, unspecified part of lung [J18.9]     Patient was seen at bedside.  She feels better but still complaining of shortness of breath  He mentions he want to stay 1 more day    Denies any fever, chills, chest pain, abdominal pain    ROS: denies fever, chills, cp, n/v, HA unless stated above.      ferric gluconate (FERRLECIT) 125 mg in sodium chloride 0.9 % 100 mL IVPB  125 mg IntraVENous Once    predniSONE  30 mg Oral Daily    sodium chloride flush  5-40 mL IntraVENous 2 times per day    enoxaparin  30 mg SubCUTAneous Daily    cefepime  2,000 mg IntraVENous Q12H    vancomycin  750 mg 
  [facility]  [address], [address]  [place] [address]      Pulmonary Progress Note      SUBJECTIVE:  sitting up in bed on 7 li NC saturating 100%, claims similar with respiratory function, minimal sputum production with cough, (?) close to baseline, worked with PT/OT today with no mention if any desaturation on 5 li NC    OBJECTIVE    Medications    Continuous Infusions:   sodium chloride         Scheduled Meds:   [Held by provider] furosemide  20 mg Oral Daily    ferric gluconate (FERRLECIT) 125 mg in sodium chloride 0.9 % 100 mL IVPB  125 mg IntraVENous Once    predniSONE  30 mg Oral Daily    sodium chloride flush  5-40 mL IntraVENous 2 times per day    enoxaparin  30 mg SubCUTAneous Daily    cefepime  2,000 mg IntraVENous Q12H    vancomycin  750 mg IntraVENous Q12H    arformoterol tartrate  15 mcg Nebulization BID RT    aspirin  81 mg Oral Daily    budesonide  500 mcg Nebulization BID    busPIRone  10 mg Oral BID    clonazePAM  1 mg Oral QHS    vitamin B and C  1 tablet Oral Daily    docusate sodium  100 mg Oral Daily    DULoxetine  60 mg Oral Daily    folic acid  1 mg Oral Daily    guaiFENesin  400 mg Oral TID    lactobacillus  1 capsule Oral Daily    losartan  25 mg Oral Daily    methIMAzole  15 mg Oral Daily    pantoprazole  40 mg Oral QAM AC    pregabalin  75 mg Oral BID    sennosides-docusate sodium  1 tablet Oral BID    sertraline  50 mg Oral Daily    sodium chloride  2 spray Nasal TID    [Held by provider] dilTIAZem  240 mg Oral Daily    ipratropium 0.5 mg-albuterol 2.5 mg  1 Dose Inhalation 4x Daily       PRN Meds:sodium chloride flush, sodium chloride, potassium chloride **OR** potassium alternative oral replacement **OR** potassium chloride, magnesium sulfate, ondansetron **OR** ondansetron, polyethylene glycol, acetaminophen **OR** acetaminophen, benzonatate, calcium elemental, diphenoxylate-atropine, hydrOXYzine pamoate, 
  [facility]  [address], [address]  [place] [address]      Pulmonary Progress Note      SUBJECTIVE:  sitting up in bed on 5 li NC saturating 99%, claims similar with respiratory function, minimal sputum production with cough, (?) close to baseline, ambulating a bit in room and tolerating    OBJECTIVE    Medications    Continuous Infusions:   sodium chloride         Scheduled Meds:   ferric gluconate (FERRLECIT) 125 mg in sodium chloride 0.9 % 100 mL IVPB  125 mg IntraVENous Once    sodium chloride flush  5-40 mL IntraVENous 2 times per day    enoxaparin  30 mg SubCUTAneous Daily    cefepime  2,000 mg IntraVENous Q12H    vancomycin  750 mg IntraVENous Q12H    predniSONE  40 mg Oral Daily    arformoterol tartrate  15 mcg Nebulization BID RT    aspirin  81 mg Oral Daily    budesonide  500 mcg Nebulization BID    busPIRone  10 mg Oral BID    clonazePAM  1 mg Oral QHS    vitamin B and C  1 tablet Oral Daily    docusate sodium  100 mg Oral Daily    DULoxetine  60 mg Oral Daily    folic acid  1 mg Oral Daily    [Held by provider] furosemide  40 mg Oral Daily    guaiFENesin  400 mg Oral TID    lactobacillus  1 capsule Oral Daily    [Held by provider] losartan  25 mg Oral Daily    methIMAzole  15 mg Oral Daily    pantoprazole  40 mg Oral QAM AC    pregabalin  75 mg Oral BID    sennosides-docusate sodium  1 tablet Oral BID    sertraline  50 mg Oral Daily    sodium chloride  2 spray Nasal TID    [Held by provider] dilTIAZem  240 mg Oral Daily    ipratropium 0.5 mg-albuterol 2.5 mg  1 Dose Inhalation 4x Daily       PRN Meds:sodium chloride flush, sodium chloride, potassium chloride **OR** potassium alternative oral replacement **OR** potassium chloride, magnesium sulfate, ondansetron **OR** ondansetron, polyethylene glycol, acetaminophen **OR** acetaminophen, benzonatate, calcium elemental, diphenoxylate-atropine, hydrOXYzine pamoate, ipratropium 0.5 
[facility]. No answer at nurse station. Left voicemail and callback number  
Attending notified of patient complaining of nausea  
Comprehensive Nutrition Assessment    Type and Reason for Visit:  Initial, Positive nutrition screen    Nutrition Recommendations/Plan:   Continue current diet  Start glucerna BID to promote oral intake  Will monitor     Malnutrition Assessment:  Malnutrition Status:  Severe malnutrition (03/31/25 1309)    Context:  Chronic Illness     Findings of the 6 clinical characteristics of malnutrition:  Energy Intake:  75% or less estimated energy requirements for 1 month or longer  Weight Loss:  No weight loss     Body Fat Loss:  Severe body fat loss Orbital, Triceps, Buccal region   Muscle Mass Loss:  Severe muscle mass loss Temples (temporalis), Clavicles (pectoralis & deltoids), Hand (interosseous)  Fluid Accumulation:  No fluid accumulation     Strength:  Not Performed    Nutrition Assessment:    pt adm d/t chest/abd pain w/ SOB; note recent adm for UTI; PNA/COPD exacerbation noted; PMhx of COPD, AFIB,DM,PVD; pt meets criteria for severe malnutrition; will modify ONS to further promote ora intake and will monitor.    Nutrition Related Findings:    A&Ox4; poor dentition; active BS; nausea; no edema; I/O WNL Wound Type: None       Current Nutrition Intake & Therapies:    Average Meal Intake: 51-75%  Average Supplements Intake: Unable to assess  ADULT DIET; Regular  ADULT ORAL NUTRITION SUPPLEMENT; Breakfast, Lunch; Diabetic Oral Supplement    Anthropometric Measures:  Height: 170.2 cm (5' 7.01\")  Ideal Body Weight (IBW): 135 lbs (61 kg)    Admission Body Weight: 48.9 kg (107 lb 12.9 oz) (3/29-BS)  Current Body Weight: 48.9 kg (107 lb 12.9 oz) (3/29-BS), 79.9 % IBW. Weight Source: Bed scale  Current BMI (kg/m2): 16.9  Usual Body Weight: 51.8 kg (114 lb 3.2 oz) (12/26/24-SS; note wt of 41.2kg via BS on 3/17/24.)     % Weight Change (Calculated): -5.6  Weight Adjustment For: No Adjustment                 BMI Categories: Underweight (BMI less than 22) age over 65    Estimated Daily Nutrient Needs:  Energy Requirements 
Nurse to nurse called to [facility]    
Pharmacy Consultation Note  (Antibiotic Dosing and Monitoring)    Initial consult date: 3/29/25  Consulting physician/provider: Oskar  Drug: Vancomycin  Indication: PNA    Age/  Gender Height Weight IBW  Allergy Information   70 y.o./female 170.2 cm (5' 7\") 50.8 kg (112 lb)     Ideal body weight: 61.6 kg (135 lb 13.5 oz)   Pcn [penicillins]      Renal Function:  Recent Labs     03/30/25  0621 04/01/25  0513   BUN 13 9   CREATININE 0.6 0.6       Intake/Output Summary (Last 24 hours) at 4/1/2025 0730  Last data filed at 3/31/2025 2117  Gross per 24 hour   Intake 110 ml   Output --   Net 110 ml       CBC:  Lab Results   Component Value Date/Time    WBC 9.1 03/31/2025 08:35 AM       Vitals:    04/01/25 0636   BP:    Pulse:    Resp: 17   Temp:    SpO2:        Vancomycin Monitoring:  Trough:  No results for input(s): \"VANCOTROUGH\" in the last 72 hours.  Random:    Recent Labs     03/30/25  0621   VANCORANDOM 24.9       Microbiology:  Results       Procedure Component Value Units Date/Time    Culture, Respiratory [[id number]]     Order Status: No result Specimen: Sputum Expectorated     Respiratory Panel, Molecular, with COVID-19 (Restricted: peds pts or suitable admitted adults) [[id number]] Collected: 03/29/25 1453    Order Status: Completed Specimen: Nasopharyngeal Swab Updated: 03/29/25 1658     Specimen Description .NASOPHARYNGEAL SWAB     Adenovirus PCR Not Detected     Coronavirus 229E PCR Not Detected     Coronavirus HKU1 PCR Not Detected     Coronavirus NL63 PCR Not Detected     Coronavirus OC43 PCR Not Detected     SARS-CoV-2, PCR Not Detected     Human Metapneumovirus PCR Not Detected     Rhino/Enterovirus PCR Not Detected     Influenza A by PCR Not Detected     Influenza B by PCR Not Detected     Parainfluenza 1 PCR Not Detected     Parainfluenza 2 PCR Not Detected     Parainfluenza 3 PCR Not Detected     Parainfluenza 4 PCR Not Detected     Resp Syncytial Virus PCR Not Detected     Bordetella parapertussis 
Pharmacy Consultation Note  (Antibiotic Dosing and Monitoring)    Initial consult date: 3/29/25  Consulting physician/provider: Oskar  Drug: Vancomycin  Indication: Pneumonia (Nosocomial)    Age/  Gender Height Weight IBW  Allergy Information   70 y.o./female 170.2 cm (5' 7\") 50.8 kg (112 lb)     Ideal body weight: 61.6 kg (135 lb 12.9 oz)   Pcn [penicillins]      Renal Function:  Recent Labs     03/29/25  0050   BUN 14   CREATININE 0.6     No intake or output data in the 24 hours ending 03/29/25 0530    Vancomycin Monitoring:  Trough:  No results for input(s): \"VANCOTROUGH\" in the last 72 hours.  Random:  No results for input(s): \"VANCORANDOM\" in the last 72 hours.    Vancomycin Administration Times:  Recent vancomycin administrations                     vancomycin (VANCOCIN) 1,000 mg in sodium chloride 0.9 % 250 mL IVPB (Ozur7Put) (mg) 1,000 mg New Bag 03/29/25 0529                    Assessment:  Patient is a 70 y.o. female who has been initiated on vancomycin  Estimated Creatinine Clearance: 68 mL/min (based on SCr of 0.6 mg/dL).  To dose vancomycin, pharmacy will be utilizing "[employer]" calculation software for goal AUC/JENNIFER 400-600 mg/L-hr (predicted AUC/JENNIFER = 514, Tr =16 mcg/mL)    Plan:  Loaded with vancomycin 1000mg IV once  Will continue vancomycin 750 mg IV every 12 hours  Will check vancomycin levels when appropriate  Will continue to monitor renal function   Pharmacy to follow      Keri Valadez, PharmD, 3/29/2025 5:30 AM    GÓMEZ: [phone]  SEY: [phone]  SJW: [phone]    
Pharmacy Consultation Note  (Antibiotic Dosing and Monitoring)    Initial consult date: 3/29/25  Consulting physician/provider: Oskar  Drug: Vancomycin  Indication: Pneumonia (Nosocomial)    Age/  Gender Height Weight IBW  Allergy Information   70 y.o./female 170.2 cm (5' 7\") 50.8 kg (112 lb)     Ideal body weight: 61.6 kg (135 lb 12.9 oz)   Pcn [penicillins]      Renal Function:  Recent Labs     03/29/25  0050 03/30/25  0621   BUN 14 13   CREATININE 0.6 0.6       Intake/Output Summary (Last 24 hours) at 3/30/2025 0823  Last data filed at 3/29/2025 2037  Gross per 24 hour   Intake 10 ml   Output --   Net 10 ml       Vancomycin Monitoring:  Trough:  No results for input(s): \"VANCOTROUGH\" in the last 72 hours.  Random:    Recent Labs     03/30/25  0621   VANCORANDOM 24.9       Vancomycin Administration Times:  Recent vancomycin administrations                     vancomycin (VANCOCIN) 750 mg in sodium chloride 0.9 % 250 mL IVPB ([id number]) (mg) 750 mg New Bag 03/30/25 0541     750 mg New Bag 03/29/25 2031    vancomycin (VANCOCIN) 1,000 mg in sodium chloride 0.9 % 250 mL IVPB (Grgn4Wct) (mg) 1,000 mg New Bag 03/29/25 0529                      Assessment:  Patient is a 70 y.o. female who has been initiated on vancomycin  Estimated Creatinine Clearance: 65 mL/min (based on SCr of 0.6 mg/dL).  To dose vancomycin, pharmacy will be utilizing [employer] calculation software for goal AUC/JENNIFER 400-600 mg/L-hr (predicted AUC/JENNIFER = 538, Tr =16.4 mcg/mL)  3/30:  Random level 24.9 (drawn ~1 hour post dose)    Plan:  Will continue vancomycin 750 mg IV every 12 hours  Will check vancomycin levels when appropriate  Will continue to monitor renal function   Pharmacy to follow      Celestino Linn PharmD  PGY-1 Pharmacy Practice Resident, [phone]  3/30/2025 8:23 AM        
Pharmacy Consultation Note  (Antibiotic Dosing and Monitoring)    Initial consult date: 3/29/25  Consulting physician/provider: Oskar  Drug: Vancomycin  Indication: Pneumonia (Nosocomial)    Age/  Gender Height Weight IBW  Allergy Information   70 y.o./female 170.2 cm (5' 7\") 50.8 kg (112 lb)     Ideal body weight: 61.6 kg (135 lb 12.9 oz)   Pcn [penicillins]      Renal Function:  Recent Labs     03/29/25  0050 03/30/25  0621   BUN 14 13   CREATININE 0.6 0.6       Intake/Output Summary (Last 24 hours) at 3/31/2025 0727  Last data filed at 3/30/2025 2102  Gross per 24 hour   Intake 10 ml   Output --   Net 10 ml       Vancomycin Monitoring:  Trough:  No results for input(s): \"VANCOTROUGH\" in the last 72 hours.  Random:    Recent Labs     03/30/25  0621   VANCORANDOM 24.9     Vancomycin Administration Times:  Recent vancomycin administrations                     vancomycin (VANCOCIN) 750 mg in sodium chloride 0.9 % 250 mL IVPB ([id number]) (mg) 750 mg New Bag 03/31/25 0445     750 mg New Bag 03/30/25 1904     750 mg New Bag  0541     750 mg New Bag 03/29/25 2031    vancomycin (VANCOCIN) 1,000 mg in sodium chloride 0.9 % 250 mL IVPB (Ghyc4Esc) (mg) 1,000 mg New Bag 03/29/25 0529                    Assessment:  Patient is a 70 y.o. female who has been initiated on vancomycin  Estimated Creatinine Clearance: 65 mL/min (based on SCr of 0.6 mg/dL).  To dose vancomycin, pharmacy will be utilizing [employer] calculation software for goal AUC/JENNIFER 400-600 mg/L-hr (predicted AUC/JENNIFER = 536, Tr =16.4 mcg/mL)  3/30:  Random level 24.9 (drawn ~1 hour post dose)    Plan:  Will continue vancomycin 750 mg IV every 12 hours  Will check vancomycin levels when appropriate  Will continue to monitor renal function   Pharmacy to follow      Checo Hussein D 3/31/2025 7:32 AM   [phone]        
Physical Therapy  Physical Therapy Initial Assessment     Name: Lana Phillips  : 1955  MRN: [id number]      Date of Service: 3/31/2025    Evaluating PT:  Gorge Butts PT, DPT    Room #:  6413/6413-A  Diagnosis:  Pneumonia due to organism [J18.9]  COPD exacerbation (HCC) [J44.1]  Pneumonia due to infectious organism, unspecified laterality, unspecified part of lung [J18.9]  PMHx/PSHx:  COPD, afib, HTN, PVD  Procedure/Surgery:  N/A  Precautions:  fall risk, O2, contact/droplet  Equipment Needs:  TBD    SUBJECTIVE:    Pt admitted from [facility]. Pt ambulated with ww with assistance PTA.    OBJECTIVE:   Initial Evaluation  Date: 3/31/25 Treatment Short Term/ Long Term   Goals   AM-PAC 6 Clicks      Was pt agreeable to Eval/treatment? yes     Does pt have pain? Unrated chest     Bed Mobility  Rolling: NT  Supine to sit: SBA  Sit to supine: NT  Scooting: SBA  Rolling: IND  Supine to sit: IND  Sit to supine: IND  Scooting: IND   Transfers Sit to stand: min A  Stand to sit: min A  Stand pivot: min A with ww  Sit to stand: mod I  Stand to sit: mod I  Stand pivot: mod I with AAD   Ambulation    20'x2, 10'x1 with ww min A  100'+ with AAD mod I   Stair negotiation: ascended and descended  NT       Strength/ROM:   BLE grossly 4/5  BLE AROM WFL    Balance:   Static Sitting: SBA  Dynamic Sitting: SBA  Static Standing: CGA with ww  Dynamic Standing: min A with ww    Pt is A & O x 3  Sensation:  Pt denies numbness and tingling to extremities  Edema:  unremarkable    Vitals:  SpO2 and HR were stable during session    Therapeutic Exercises:    Bed mobility: supine>sit, cued for EOB positioning  Transfers: STS x2, cued for hand placement and postural correction  Ambulation: 20'x2, 10'x1 with ww  BLE AROM    Patient education  Pt educated on role of PT, importance of functional mobility during hospital stay, safety with functional mobility    Patient response to education:   Pt verbalized understanding Pt demonstrated 
Pulmonology okay to DC per Dr. Price  
Spiritual Health History and Assessment/Progress Note  [facility]    (P) Initial Encounter, Spiritual/Emotional Needs,  ,  ,      Name: Lana Phillips MRN: [id number]    Age: 70 y.o.     Sex: female   Language: English   Voodoo: [employer]   Pneumonia due to organism     Date: 4/1/2025                           Spiritual Assessment began in [facility]        Referral/Consult From: (P) Rounding   Encounter Overview/Reason: (P) Initial Encounter, Spiritual/Emotional Needs  Service Provided For: (P) Patient    Armida, Belief, Meaning:   Patient identifies as spiritual, is connected with a armida tradition or spiritual practice, and has beliefs or practices that help with coping during difficult times  Family/Friends No family/friends present      Importance and Influence:  Patient has spiritual/personal beliefs that influence decisions regarding their health  Family/Friends No family/friends present    Community:  Patient is connected with a spiritual community and feels well-supported. Support system includes: [place] Community and Extended family  Family/Friends No family/friends present    Assessment and Plan of Care:     Patient Interventions include: Facilitated expression of thoughts and feelings, Explored spiritual coping/struggle/distress, Engaged in theological reflection, Affirmed coping skills/support systems, and Facilitated life review and/ or legacy  Family/Friends Interventions include: No family/friends present    Patient Plan of Care: Other: The  will follow as needed.  Family/Friends Plan of Care: No family/friends present    Electronically signed by Chaplain Cristy on 4/1/2025 at 4:30 PM    
impacting ADLs and functional activity) and functional ambulation tasks with w/w  (in room;  in preparation for item retrieval tasks: cuing on posture, w/w management and safety) -  skilled cuing on hand placement, posture, body mechanics, energy conservation techniques and safety.  Therapist facilitated self-care retraining: UB/LB self-care tasks (robe and socks), simulated toileting task on BSC and grooming tasks while cuing on modified techniques, posture, safety and energy conservation techniques and sequencing. Skilled monitoring of HR, O2 sats and pts response to treatment.        Rehab Potential:  Good  for established goals     Patient / Family Goal: Regain [place]     Patient and/or family were instructed on functional diagnosis, prognosis/goals and OT plan of care. Demonstrated fair understanding.     Eval Complexity: Low    Time In:  1355  Time Out: 1420  Total Treatment Time: 10 minutes    Min Units   OT Eval Low 97165  x  1   OT Eval Medium 37137      OT Eval High 80284      OT Re-Eval 55379       Therapeutic Ex 36882       Therapeutic Activities 37239 2     ADL/Self Care 48101  8  1   Orthotic Management 51225       Manual 66839     Neuro Re-Ed 24307       Non-Billable Time          Evaluation Time additionally includes thorough review of current medical information, gathering information on past medical history/social history and prior level of function, interpretation of standardized testing/informal observation of tasks, assessment of data and development of plan of care and goals.          Oumar Echevarria OTR/L #[id number]

## 2025-04-01 NOTE — DISCHARGE INSTR - COC
Continuity of Care Form    Patient Name: Lana Phillips   :  1955  MRN:  [id number]    Admit date:  3/29/2025  Discharge date:  25    Code Status Order: Full Code   Advance Directives:     Admitting Physician:  Piero Schmitt MD  PCP: Tal Humphrey DO    Discharging Nurse: [facility]/Room[facility] 6413/6413-A  Discharging Unit Phone Number: [phone]    Emergency Contact:   Extended Emergency Contact Information  Primary Emergency Contact: Lana Reeves  Address: [address] [place] [address] [place] [address]  Home Phone: [phone]  Work Phone: [phone]  Mobile Phone: [phone]  Relation: Other Relative  Preferred language: English   needed? No  Secondary Emergency Contact: jmeriksue  Mobile Phone: [phone]  Relation: Brother/Sister  Preferred language: English   needed? No    Past Surgical History:  Past Surgical History:   Procedure Laterality Date    BACK SURGERY      x2    BRONCHOSCOPY N/A 2024    BRONCHOSCOPY DIAGNOSTIC OR CELL WASH ONLY performed by Jose Price MD at [facility] ENDOSCOPY    INVASIVE VASCULAR N/A 2024    Aortagram abdominal performed by Lazaro Vidales MD at [facility] CARDIAC CATH LAB    INVASIVE VASCULAR N/A 2024    Angioplasty peripheral artery performed by Lazaro Vidales MD at [facility] CARDIAC CATH LAB    LEFT OOPHORECTOMY      PAIN MANAGEMENT PROCEDURE N/A 2023    CAUDAL EPIDURAL STEROID INJECTION performed by Lilia Cabrera DO at [facility] OR    PAIN MANAGEMENT PROCEDURE N/A 2024    THERAPEUTIC CAUDAL EPIDURAL UNDER FLUOROSCOPIC GUIDANCE performed by Lilia Cabrera DO at [facility] OR    UPPER GASTROINTESTINAL ENDOSCOPY N/A 10/16/2023    EGD ESOPHAGOGASTRODUODENOSCOPY performed by Willie Chow DO at [facility] ENDOSCOPY       Immunization History:   Immunization History   Administered Date(s) Administered    COVID-19, PFIZER PURPLE top, DILUTE for use, (age 12 y+), 30mcg/0.3mL 2021,

## 2025-04-01 NOTE — DISCHARGE SUMMARY
[facility] Hospitalist Physician Discharge Summary       [facility]  [address] [address]  [phone]          Activity level: As tolerated     Dispo: SNF    Condition on discharge: Stable     Patient ID:  Lana Phillips  [id number]  70 y.o.  1955    Admit date: 3/29/2025    Discharge date and time:  4/1/2025  2:38 PM    Admission Diagnoses: Principal Problem:    Pneumonia due to organism  Active Problems:    Severe protein-calorie malnutrition  Resolved Problems:    * No resolved hospital problems. *      Discharge Diagnoses: Principal Problem:    Pneumonia due to organism  Active Problems:    Severe protein-calorie malnutrition  Resolved Problems:    * No resolved hospital problems. *      Consults:  IP CONSULT TO INTERNAL MEDICINE  IP CONSULT TO PHARMACY  IP CONSULT TO PULMONOLOGY    Procedures: None    Hospital Course:   Patient Lana Phillips is a 70 y.o. presented with Pneumonia due to organism [J18.9]  COPD exacerbation (HCC) [J44.1]  Pneumonia due to infectious organism, unspecified laterality, unspecified part of lung [J18.9]    70 y.o. female who presented to [facility] with concern for chest pain and abdominal pain and also shortness of breath.     CT chest was negative for PE but did show consolidative bilateral changes.  White count of 14,800.  CT abdomen pelvis with no acute abnormality.   Initial presentation blood pressure was soft at 97/43 received a liter of fluids blood pressure improved to 114/38.   Respiratory pathogen was negative, and Procal was mildly elevated on admission  Patient was treated with vancomycin and cefepime for pneumonia, and prednisone and breathing treatment for COPD exacerbation.  Pulmonology was consulted.    On discharge day patient back to baseline oxygen and feels better.  Pulmonology is okay to discharge patient on oral antibiotic.  Advised to take cefdinir 300 mg twice daily

## 2025-04-02 ENCOUNTER — APPOINTMENT (OUTPATIENT)
Dept: GENERAL RADIOLOGY | Age: 70
DRG: 189 | End: 2025-04-02
Payer: MEDICARE

## 2025-04-02 LAB
ANION GAP SERPL CALCULATED.3IONS-SCNC: 11 MMOL/L (ref 7–16)
BASOPHILS # BLD: 0 K/UL (ref 0–0.2)
BASOPHILS NFR BLD: 0 % (ref 0–2)
BNP SERPL-MCNC: 2363 PG/ML (ref 0–125)
BUN SERPL-MCNC: 12 MG/DL (ref 6–23)
CALCIUM SERPL-MCNC: 8.8 MG/DL (ref 8.6–10.2)
CHLORIDE SERPL-SCNC: 97 MMOL/L (ref 98–107)
CO2 SERPL-SCNC: 32 MMOL/L (ref 22–29)
CREAT SERPL-MCNC: 0.5 MG/DL (ref 0.5–1)
EKG ATRIAL RATE: 91 BPM
EKG ATRIAL RATE: 99 BPM
EKG P AXIS: 83 DEGREES
EKG P AXIS: 88 DEGREES
EKG P-R INTERVAL: 138 MS
EKG P-R INTERVAL: 138 MS
EKG Q-T INTERVAL: 342 MS
EKG Q-T INTERVAL: 348 MS
EKG QRS DURATION: 78 MS
EKG QRS DURATION: 84 MS
EKG QTC CALCULATION (BAZETT): 428 MS
EKG QTC CALCULATION (BAZETT): 438 MS
EKG R AXIS: 60 DEGREES
EKG R AXIS: 66 DEGREES
EKG T AXIS: 80 DEGREES
EKG T AXIS: 88 DEGREES
EKG VENTRICULAR RATE: 91 BPM
EKG VENTRICULAR RATE: 99 BPM
EOSINOPHIL # BLD: 0 K/UL (ref 0.05–0.5)
EOSINOPHILS RELATIVE PERCENT: 0 % (ref 0–6)
ERYTHROCYTE [DISTWIDTH] IN BLOOD BY AUTOMATED COUNT: 15.9 % (ref 11.5–15)
GFR, ESTIMATED: >90 ML/MIN/1.73M2
GLUCOSE SERPL-MCNC: 113 MG/DL (ref 74–99)
HCT VFR BLD AUTO: 26.1 % (ref 34–48)
HGB BLD-MCNC: 7.9 G/DL (ref 11.5–15.5)
LYMPHOCYTES NFR BLD: 0.36 K/UL (ref 1.5–4)
LYMPHOCYTES RELATIVE PERCENT: 3 % (ref 20–42)
MCH RBC QN AUTO: 31.9 PG (ref 26–35)
MCHC RBC AUTO-ENTMCNC: 30.3 G/DL (ref 32–34.5)
MCV RBC AUTO: 105.2 FL (ref 80–99.9)
MONOCYTES NFR BLD: 0.36 K/UL (ref 0.1–0.95)
MONOCYTES NFR BLD: 3 % (ref 2–12)
MYELOCYTES ABSOLUTE COUNT: 0.24 K/UL
MYELOCYTES: 2 %
NEUTROPHILS NFR BLD: 93 % (ref 43–80)
NEUTS SEG NFR BLD: 12.75 K/UL (ref 1.8–7.3)
O2 DELIVERY DEVICE: 6
PLATELET # BLD AUTO: 403 K/UL (ref 130–450)
PMV BLD AUTO: 9.8 FL (ref 7–12)
POC HCO3: 43.3 MMOL/L (ref 22–26)
POC O2 SATURATION: 96.6 % (ref 92–98.5)
POC PCO2: 60.4 MM HG (ref 35–45)
POC PH: 7.46 (ref 7.35–7.45)
POC PO2: 85.2 MM HG (ref 60–80)
POSITIVE BASE EXCESS, ART: 17.3 MMOL/L (ref 0–3)
POTASSIUM SERPL-SCNC: 4 MMOL/L (ref 3.5–5)
RBC # BLD AUTO: 2.48 M/UL (ref 3.5–5.5)
RBC # BLD: ABNORMAL 10*6/UL
SODIUM SERPL-SCNC: 140 MMOL/L (ref 132–146)
TROPONIN I SERPL HS-MCNC: 37 NG/L (ref 0–9)
TROPONIN I SERPL HS-MCNC: 37 NG/L (ref 0–9)
WBC OTHER # BLD: 13.7 K/UL (ref 4.5–11.5)

## 2025-04-02 PROCEDURE — 6360000002 HC RX W HCPCS: Performed by: INTERNAL MEDICINE

## 2025-04-02 PROCEDURE — 99232 SBSQ HOSP IP/OBS MODERATE 35: CPT | Performed by: STUDENT IN AN ORGANIZED HEALTH CARE EDUCATION/TRAINING PROGRAM

## 2025-04-02 PROCEDURE — 36415 COLL VENOUS BLD VENIPUNCTURE: CPT

## 2025-04-02 PROCEDURE — 6360000002 HC RX W HCPCS: Performed by: FAMILY MEDICINE

## 2025-04-02 PROCEDURE — 6370000000 HC RX 637 (ALT 250 FOR IP): Performed by: STUDENT IN AN ORGANIZED HEALTH CARE EDUCATION/TRAINING PROGRAM

## 2025-04-02 PROCEDURE — 2500000003 HC RX 250 WO HCPCS: Performed by: FAMILY MEDICINE

## 2025-04-02 PROCEDURE — 1200000000 HC SEMI PRIVATE

## 2025-04-02 PROCEDURE — 80048 BASIC METABOLIC PNL TOTAL CA: CPT

## 2025-04-02 PROCEDURE — 5A0935A ASSISTANCE WITH RESPIRATORY VENTILATION, LESS THAN 24 CONSECUTIVE HOURS, HIGH NASAL FLOW/VELOCITY: ICD-10-PCS | Performed by: FAMILY MEDICINE

## 2025-04-02 PROCEDURE — 2700000000 HC OXYGEN THERAPY PER DAY

## 2025-04-02 PROCEDURE — 82803 BLOOD GASES ANY COMBINATION: CPT

## 2025-04-02 PROCEDURE — 94640 AIRWAY INHALATION TREATMENT: CPT

## 2025-04-02 PROCEDURE — 85025 COMPLETE CBC W/AUTO DIFF WBC: CPT

## 2025-04-02 PROCEDURE — 6360000002 HC RX W HCPCS: Performed by: STUDENT IN AN ORGANIZED HEALTH CARE EDUCATION/TRAINING PROGRAM

## 2025-04-02 PROCEDURE — 36600 WITHDRAWAL OF ARTERIAL BLOOD: CPT

## 2025-04-02 PROCEDURE — 71045 X-RAY EXAM CHEST 1 VIEW: CPT

## 2025-04-02 PROCEDURE — 93010 ELECTROCARDIOGRAM REPORT: CPT | Performed by: INTERNAL MEDICINE

## 2025-04-02 PROCEDURE — 2500000003 HC RX 250 WO HCPCS: Performed by: INTERNAL MEDICINE

## 2025-04-02 PROCEDURE — 84484 ASSAY OF TROPONIN QUANT: CPT

## 2025-04-02 PROCEDURE — 83880 ASSAY OF NATRIURETIC PEPTIDE: CPT

## 2025-04-02 PROCEDURE — 5A09357 ASSISTANCE WITH RESPIRATORY VENTILATION, LESS THAN 24 CONSECUTIVE HOURS, CONTINUOUS POSITIVE AIRWAY PRESSURE: ICD-10-PCS | Performed by: FAMILY MEDICINE

## 2025-04-02 PROCEDURE — 94660 CPAP INITIATION&MGMT: CPT

## 2025-04-02 PROCEDURE — 93005 ELECTROCARDIOGRAM TRACING: CPT | Performed by: STUDENT IN AN ORGANIZED HEALTH CARE EDUCATION/TRAINING PROGRAM

## 2025-04-02 PROCEDURE — 6370000000 HC RX 637 (ALT 250 FOR IP): Performed by: FAMILY MEDICINE

## 2025-04-02 RX ORDER — IPRATROPIUM BROMIDE AND ALBUTEROL SULFATE 2.5; .5 MG/3ML; MG/3ML
1 SOLUTION RESPIRATORY (INHALATION) EVERY 4 HOURS PRN
Status: DISCONTINUED | OUTPATIENT
Start: 2025-04-02 | End: 2025-04-03 | Stop reason: HOSPADM

## 2025-04-02 RX ORDER — PREDNISONE 20 MG/1
20 TABLET ORAL DAILY
Status: DISCONTINUED | OUTPATIENT
Start: 2025-04-03 | End: 2025-04-03 | Stop reason: HOSPADM

## 2025-04-02 RX ORDER — CLONAZEPAM 0.5 MG/1
1 TABLET ORAL
Status: DISCONTINUED | OUTPATIENT
Start: 2025-04-02 | End: 2025-04-03 | Stop reason: HOSPADM

## 2025-04-02 RX ORDER — FUROSEMIDE 10 MG/ML
40 INJECTION INTRAMUSCULAR; INTRAVENOUS ONCE
Status: COMPLETED | OUTPATIENT
Start: 2025-04-02 | End: 2025-04-02

## 2025-04-02 RX ORDER — HYDROXYZINE PAMOATE 50 MG/1
50 CAPSULE ORAL 4 TIMES DAILY PRN
Status: DISCONTINUED | OUTPATIENT
Start: 2025-04-02 | End: 2025-04-03 | Stop reason: HOSPADM

## 2025-04-02 RX ORDER — CALCIUM CARBONATE 500 MG/1
500 TABLET, CHEWABLE ORAL 3 TIMES DAILY PRN
Status: DISCONTINUED | OUTPATIENT
Start: 2025-04-02 | End: 2025-04-03 | Stop reason: HOSPADM

## 2025-04-02 RX ORDER — BENZONATATE 100 MG/1
100 CAPSULE ORAL 3 TIMES DAILY PRN
Status: DISCONTINUED | OUTPATIENT
Start: 2025-04-02 | End: 2025-04-03 | Stop reason: HOSPADM

## 2025-04-02 RX ORDER — DULOXETIN HYDROCHLORIDE 60 MG/1
60 CAPSULE, DELAYED RELEASE ORAL DAILY
Status: DISCONTINUED | OUTPATIENT
Start: 2025-04-02 | End: 2025-04-03 | Stop reason: HOSPADM

## 2025-04-02 RX ORDER — ASPIRIN 81 MG/1
81 TABLET ORAL DAILY
Status: DISCONTINUED | OUTPATIENT
Start: 2025-04-02 | End: 2025-04-03 | Stop reason: HOSPADM

## 2025-04-02 RX ORDER — ALPRAZOLAM 0.25 MG
0.5 TABLET ORAL 3 TIMES DAILY PRN
Status: DISCONTINUED | OUTPATIENT
Start: 2025-04-02 | End: 2025-04-03 | Stop reason: HOSPADM

## 2025-04-02 RX ORDER — BUDESONIDE 0.5 MG/2ML
500 INHALANT ORAL 2 TIMES DAILY
Status: DISCONTINUED | OUTPATIENT
Start: 2025-04-02 | End: 2025-04-02

## 2025-04-02 RX ORDER — BUSPIRONE HYDROCHLORIDE 10 MG/1
10 TABLET ORAL 2 TIMES DAILY
Status: DISCONTINUED | OUTPATIENT
Start: 2025-04-02 | End: 2025-04-03 | Stop reason: HOSPADM

## 2025-04-02 RX ORDER — MORPHINE SULFATE 2 MG/ML
1 INJECTION, SOLUTION INTRAMUSCULAR; INTRAVENOUS ONCE
Status: COMPLETED | OUTPATIENT
Start: 2025-04-02 | End: 2025-04-02

## 2025-04-02 RX ADMIN — DULOXETINE HYDROCHLORIDE 60 MG: 60 CAPSULE, DELAYED RELEASE ORAL at 09:33

## 2025-04-02 RX ADMIN — IPRATROPIUM BROMIDE AND ALBUTEROL SULFATE 1 DOSE: 2.5; .5 SOLUTION RESPIRATORY (INHALATION) at 16:16

## 2025-04-02 RX ADMIN — FUROSEMIDE 40 MG: 10 INJECTION, SOLUTION INTRAMUSCULAR; INTRAVENOUS at 10:21

## 2025-04-02 RX ADMIN — CLONAZEPAM 1 MG: 0.5 TABLET ORAL at 20:11

## 2025-04-02 RX ADMIN — SODIUM CHLORIDE, PRESERVATIVE FREE 10 ML: 5 INJECTION INTRAVENOUS at 20:11

## 2025-04-02 RX ADMIN — METHYLPREDNISOLONE SODIUM SUCCINATE 40 MG: 40 INJECTION, POWDER, LYOPHILIZED, FOR SOLUTION INTRAMUSCULAR; INTRAVENOUS at 04:04

## 2025-04-02 RX ADMIN — ENOXAPARIN SODIUM 30 MG: 100 INJECTION SUBCUTANEOUS at 08:30

## 2025-04-02 RX ADMIN — ALPRAZOLAM 0.5 MG: 0.25 TABLET ORAL at 18:23

## 2025-04-02 RX ADMIN — BUDESONIDE 500 MCG: 0.5 SUSPENSION RESPIRATORY (INHALATION) at 20:04

## 2025-04-02 RX ADMIN — BUDESONIDE 500 MCG: 0.5 SUSPENSION RESPIRATORY (INHALATION) at 08:30

## 2025-04-02 RX ADMIN — ONDANSETRON 4 MG: 2 INJECTION, SOLUTION INTRAMUSCULAR; INTRAVENOUS at 09:33

## 2025-04-02 RX ADMIN — ASPIRIN 81 MG: 81 TABLET, COATED ORAL at 09:33

## 2025-04-02 RX ADMIN — MORPHINE SULFATE 1 MG: 2 INJECTION, SOLUTION INTRAMUSCULAR; INTRAVENOUS at 09:25

## 2025-04-02 RX ADMIN — SODIUM CHLORIDE, PRESERVATIVE FREE 10 ML: 5 INJECTION INTRAVENOUS at 08:31

## 2025-04-02 RX ADMIN — METHYLPREDNISOLONE SODIUM SUCCINATE 40 MG: 40 INJECTION, POWDER, LYOPHILIZED, FOR SOLUTION INTRAMUSCULAR; INTRAVENOUS at 21:04

## 2025-04-02 RX ADMIN — IPRATROPIUM BROMIDE AND ALBUTEROL SULFATE 1 DOSE: 2.5; .5 SOLUTION RESPIRATORY (INHALATION) at 20:04

## 2025-04-02 RX ADMIN — METHYLPREDNISOLONE SODIUM SUCCINATE 40 MG: 40 INJECTION, POWDER, LYOPHILIZED, FOR SOLUTION INTRAMUSCULAR; INTRAVENOUS at 08:31

## 2025-04-02 RX ADMIN — ACETAMINOPHEN 650 MG: 325 TABLET ORAL at 20:11

## 2025-04-02 RX ADMIN — METHYLPREDNISOLONE SODIUM SUCCINATE 40 MG: 40 INJECTION, POWDER, LYOPHILIZED, FOR SOLUTION INTRAMUSCULAR; INTRAVENOUS at 15:37

## 2025-04-02 RX ADMIN — ALPRAZOLAM 0.5 MG: 0.25 TABLET ORAL at 12:00

## 2025-04-02 RX ADMIN — BUSPIRONE HYDROCHLORIDE 10 MG: 10 TABLET ORAL at 20:11

## 2025-04-02 RX ADMIN — IPRATROPIUM BROMIDE AND ALBUTEROL SULFATE 1 DOSE: 2.5; .5 SOLUTION RESPIRATORY (INHALATION) at 13:05

## 2025-04-02 RX ADMIN — BUSPIRONE HYDROCHLORIDE 10 MG: 10 TABLET ORAL at 09:33

## 2025-04-02 RX ADMIN — IPRATROPIUM BROMIDE AND ALBUTEROL SULFATE 1 DOSE: 2.5; .5 SOLUTION RESPIRATORY (INHALATION) at 08:30

## 2025-04-02 ASSESSMENT — PAIN DESCRIPTION - FREQUENCY: FREQUENCY: CONTINUOUS

## 2025-04-02 ASSESSMENT — PAIN DESCRIPTION - LOCATION: LOCATION: HEAD

## 2025-04-02 ASSESSMENT — PAIN DESCRIPTION - ONSET: ONSET: ON-GOING

## 2025-04-02 ASSESSMENT — PAIN DESCRIPTION - DESCRIPTORS: DESCRIPTORS: ACHING

## 2025-04-02 ASSESSMENT — PAIN SCALES - GENERAL
PAINLEVEL_OUTOF10: 6
PAINLEVEL_OUTOF10: 4

## 2025-04-02 ASSESSMENT — PAIN DESCRIPTION - ORIENTATION: ORIENTATION: MID

## 2025-04-02 ASSESSMENT — PAIN - FUNCTIONAL ASSESSMENT: PAIN_FUNCTIONAL_ASSESSMENT: ACTIVITIES ARE NOT PREVENTED

## 2025-04-02 ASSESSMENT — PAIN DESCRIPTION - PAIN TYPE: TYPE: ACUTE PAIN

## 2025-04-02 NOTE — DISCHARGE INSTR - COC
Continuity of Care Form    Patient Name: Lana Phillips   :  1955  MRN:  [id number]    Admit date:  2025  Discharge date:  25    Code Status Order: Full Code   Advance Directives:     Admitting Physician:  Shawn Dey DO  PCP: Tal Humphrey DO    Discharging Nurse: Fanny Pat,RAMO  Discharging Hospital Unit/Room#: 8416/8416-B  Discharging Unit Phone Number: [phone]    Emergency Contact:   Extended Emergency Contact Information  Primary Emergency Contact: Lana Reeves  Address: [address] [place] [address] [place] [address]  Home Phone: [phone]  Work Phone: [phone]  Mobile Phone: [phone]  Relation: Other Relative  Preferred language: English   needed? No  Secondary Emergency Contact: sue ward  Mobile Phone: [phone]  Relation: Brother/Sister  Preferred language: English   needed? No    Past Surgical History:  Past Surgical History:   Procedure Laterality Date    BACK SURGERY      x2    BRONCHOSCOPY N/A 2024    BRONCHOSCOPY DIAGNOSTIC OR CELL WASH ONLY performed by Jose Price MD at [facility]OSCOPY    INVASIVE VASCULAR N/A 2024    Aortagram abdominal performed by Lazaro Vidales MD at [facility] CARDIAC CATH LAB    INVASIVE VASCULAR N/A 2024    Angioplasty peripheral artery performed by Lazaro Vidales MD at [facility] CARDIAC CATH LAB    LEFT OOPHORECTOMY      PAIN MANAGEMENT PROCEDURE N/A 2023    CAUDAL EPIDURAL STEROID INJECTION performed by Lilia Cabrera DO at [facility] OR    PAIN MANAGEMENT PROCEDURE N/A 2024    THERAPEUTIC CAUDAL EPIDURAL UNDER FLUOROSCOPIC GUIDANCE performed by Lilia Cabrera DO at [facility] OR    UPPER GASTROINTESTINAL ENDOSCOPY N/A 10/16/2023    EGD ESOPHAGOGASTRODUODENOSCOPY performed by Willie Chow DO at [facility] ENDOSCOPY       Immunization History:   Immunization History   Administered Date(s) Administered    COVID-19, PFIZER PURPLE top, DILUTE for use, (age 12 y+), 30mcg/0.3mL    Name:  Address:  Dialysis Schedule:  Phone:  Fax:    / signature: Electronically signed by Patricia Rapp RN on 4/2/25 at 3:06 PM EDT    PHYSICIAN SECTION    Prognosis: {Prognosis:5933820502}    Condition at Discharge: { Patient Condition:024271785}    Rehab Potential (if transferring to Rehab): {Prognosis:1325601370}    Recommended Labs or Other Treatments After Discharge: ***    Physician Certification: I certify the above information and transfer of Lana Phillips  is necessary for the continuing treatment of the diagnosis listed and that she requires Skilled Nursing Facility for less 30 days.     Update Admission H&P: {CHP DME Changes in HandP:747805584}    PHYSICIAN SIGNATURE:  {Esignature:[id number]}

## 2025-04-02 NOTE — PROGRESS NOTES
4 Eyes Skin Assessment     NAME:  Lana Phillips  YOB: 1955  MEDICAL RECORD NUMBER:  [id number]    The patient is being assessed for  Admission    I agree that at least one RN has performed a thorough Head to Toe Skin Assessment on the patient. ALL assessment sites listed below have been assessed.      Areas assessed by both nurses:    Head, Face, Ears, Shoulders, Back, Chest, Arms, Elbows, Hands, Sacrum. Buttock, Coccyx, Ischium, Legs. Feet and Heels, and Under Medical Devices     Scabs noted BUE        Does the Patient have a Wound? No noted wound(s)       Guy Prevention initiated by RN: Yes  Wound Care Orders initiated by RN: No    Pressure Injury (Stage 3,4, Unstageable, DTI, NWPT, and Complex wounds) if present, place Wound referral order by RN under : No    New Ostomies, if present place, Ostomy referral order under : No     Nurse 1 eSignature: Electronically signed by Yarely Mcintosh RN on 4/2/25 at 4:11 AM EDT    **SHARE this note so that the co-signing nurse can place an eSignature**    Nurse 2 eSignature: Electronically signed by Shantell Guerra on 4/2/25 at 4:13 AM EDT

## 2025-04-02 NOTE — ED NOTES
RT at bedside, duoneb tx running at this time, will wait 10-15 minutes until treatment complete for ABG.

## 2025-04-02 NOTE — ACP (ADVANCE CARE PLANNING)
Advance Care Planning   Healthcare Decision Maker:    Primary Decision Maker: Lana Reeves - Other Relative - [phone]    Secondary Decision Maker: Nia Salas - Niece/Nephew - [phone]    Click here to complete Healthcare Decision Makers including selection of the Healthcare Decision Maker Relationship (ie \"Primary\").  Today we documented Decision Maker(s) consistent with ACP documents on file.       If the relationship to the patient does NOT follow our state's Next of Kin hierarchy, the patient MUST complete an ACP Document to allow him/her to act on the patient's behalf. :

## 2025-04-02 NOTE — CONSULTS
[facility]  [facility]  [address], [address]  [place] [address]    Pulmonary Consultation      Reason for Consult:  sob and chest pain    Requesting Physician:  Tal Humphrey DO    CHIEF COMPLAINT:  chest pain and sob    History Obtained From:  patient    HISTORY OF PRESENT ILLNESS:                The patient is a 70 y.o. female with significant past medical history of COPD oxygen dependent who presents with chest pain and sob. Was just discharged yesterday afternoon, came back 4-6 hrs later as claims had chest pain, doesn't seem to have had any increased problems with breathing, she said she spoke with [facility] personnel who told her to call 911 so can get chest pain evaluated. Currently on 5 li NC, was inconsistent with NIPPV use while in ER, similar with respiratory function and minimal cough, not much chest pain currently.    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 (HCC) 01/19/2024    Atrial fibrillation (HCC)     Chronic obstructive pulmonary disease (HCC) 12/15/2022    COVID-19 07/16/2022    Critical limb ischemia of left lower extremity with rest pain 12/25/2023    GI bleed 10/14/2023    Hypertension     RUTH treated with BiPAP     Panic disorder     PVD (peripheral vascular disease) 01/19/2024    Severe protein-calorie malnutrition 06/26/2023    Thyroid disease     Uncontrolled hypertension        Past Surgical History:        Procedure Laterality Date    BACK SURGERY      x2    BRONCHOSCOPY N/A 4/16/2024    BRONCHOSCOPY DIAGNOSTIC OR CELL WASH ONLY performed by Jose Price MD [place] [facility] [place]    INVASIVE VASCULAR N/A 1/19/2024    Aortagram abdominal performed by Lazaro Vidales MD at [facility]    INVASIVE VASCULAR N/A 1/19/2024    Angioplasty peripheral artery performed by Lazaro Vidales MD [place] [facility]

## 2025-04-02 NOTE — PROGRESS NOTES
HOSPITALIST PROGRESS NOTE    Patient's name: Lana Phillips  : 1955  Admission date: 2025  Date of service: 2025   Primary care physician: Tal Humphrey DO    Assessment   Patient admitted on 2025     Lana Phillips is a 70 y.o. female patient of Tal Humphrey DO with history of COPD, atrial fibrillation, sleep apnea on BiPAP, panic disorder, peripheral vascular disease, hypertension, severe protein calorie malnutrition presented to [facility] on 2025 with concern of shortness of breath.  Patient was discharged on  after being treated for similar complaint and COPD exacerbation.  On recent nursing home patient reported some chest discomfort and shortness of breath and was sent back to the ER for evaluation.  While in the ER patient ABG showed hypercapnia but likely compensated with pH of 7.38 and pCO2 of 62.  Patient had increased work of breathing, was on 6 L nasal cannula and was admitted for further evaluation.    Acute on chronic hypoxic hypercapnic respiratory failure  COPD exacerbation  Possible fibrotic lung disease  Patient with increased work of breathing likely in the setting of significant anxiety  Continue breathing treatment and steroids  Pulmonology consulted  Continue home BuSpar, Cymbalta, clonazepam  As needed Xanax    Peripheral vascular disease  Obstructive sleep apnea on BiPAP  Chronic heart failure with preserved EF  Continue home meds as ordered.    Follow labs   DVT prophylaxis Lovenox  Please see orders for further management and care.  Discharge plan: Pending improvement in respiratory status    Subjective  Lana was seen and examined at bedside.  Patient with increased work of breathing, was awake anxious when seen, no wheezing on exam.      Review of Systems  All systems reviewed and negative except mentioned above.       Objective  Most Recent Recorded Vitals  BP (!) 176/93   Pulse 83   Temp 97.9 °F (36.6 °C) (Temporal)   Resp (!) 38

## 2025-04-02 NOTE — ED NOTES
Patient continues to take bipap off and ambulate to the bathroom. This RN educated patient multiple times that she needs to stay in bed and patient insists that she cannot use the bed pan or external catheter.

## 2025-04-02 NOTE — CARE COORDINATION
CM transition of care.  Patient was discharged from this hospital yesterday 4/1 to [facility].  Patient returned within 3 hours of discharge and was readmitted with COPD exacerbation.  Spoke with Karey from [facility].  She reports that the patient called 911 from the facility.  The plan was for her to transition to Hospice Care with [facility] today.  Patient can return today and they will transition her to Hospice.  No precert required.  Per Nursing, Pulmonologist cleared from his stand point for her to return to [facility].  Left VM message with Patient PONORI Schwartz to discuss transition of care.      1435:  Spoke with Lana, patient HC POA.  She was not aware of her transitioning to Hospice at the facility.  Spoke with Karey from [facility] and updated her.  They report that they do not have a HCPOA on file.  Updated her that it was done 12/24 with palliative .  They will review and reach out if appropriate.  Patient reports that she was unaware of them transitioning her to Hospice at the SNF.  Encouraged patient to reach out to her HC POA to discuss.  Discharge plan is to return to [facility] at discharge.  Patient is long term care bed hold.  No precert required.  Christian/destination completed.  Ambulette form with envelope placed on the soft chart.  Altaf Rapp RN  [phone]        Case Management Assessment  Initial Evaluation    Date/Time of Evaluation: 4/2/2025 2:33 PM  Assessment Completed by: Patricia Rapp RN    If patient is discharged prior to next notation, then this note serves as note for discharge by case management.    Patient Name: Lana Phillips                   YOB: 1955  Diagnosis: COPD exacerbation (HCC) [J44.1]  COPD with acute exacerbation (HCC) [J44.1]  Acute respiratory failure with hypercapnia [J96.02]  Chest pain, unspecified type [R07.9]                   Date / Time: 4/1/2025  8:12 PM    Patient Admission Status: Inpatient   Readmission Risk (Low < 19, Mod (19-27),  Patient and/or patient representative Lana and her family were provided with a choice of provider and agrees with the discharge plan. Freedom of choice list with basic dialogue that supports the patient's individualized plan of care/goals and shares the quality data associated with the providers was provided to: Patient Representative   [facility]     The Patient and/or Patient Representative Agree with the Discharge Plan? Yes    Patricia Rapp RN  Case Management Department  Ph: [phone] Fax:

## 2025-04-02 NOTE — PROGRESS NOTES
Comprehensive Nutrition Assessment    Type and Reason for Visit:  Initial, Positive nutrition screen    Nutrition Recommendations/Plan:   Continue diet. Recommend and start ONS: Glucerna BID to promote nutrient/PO intake.     Malnutrition Assessment:  Malnutrition Status:  Severe malnutrition (04/02/25 1014)    Context:  Chronic Illness     Findings of the 6 clinical characteristics of malnutrition:  Energy Intake:  75% or less estimated energy requirements for 1 month or longer  Weight Loss:  No weight loss     Body Fat Loss:  Severe body fat loss Orbital, Triceps, Buccal region   Muscle Mass Loss:  Severe muscle mass loss Temples (temporalis), Clavicles (pectoralis & deltoids), Hand (interosseous)  Fluid Accumulation:  No fluid accumulation     Strength:  Not Performed    Nutrition Assessment:    Pt. admitted d/t SOB and chest pain. Admit w/ COPD exacerbation. PMhx of COPD, AFIB,DM,PVD; noted recent adm for UTI. Hx of manutrition. pt does meets criteria for severe malnutrition. Will start ONS and monitor.    Nutrition Related Findings:    A&Ox4; poor dentition; rounded/distended abd, active BS; nausea; no edema; no I/O data, labs reviewed Wound Type: None       Current Nutrition Intake & Therapies:    Average Meal Intake: 51-75%  Average Supplements Intake: None Ordered  ADULT DIET; Regular    Anthropometric Measures:  Height: 172.7 cm (5' 7.99\")  Ideal Body Weight (IBW): 140 lbs (64 kg)    Admission Body Weight: 50 kg (110 lb 3.7 oz) (4/2 BS actual first measured)  Current Body Weight: 50 kg (110 lb 3.7 oz) (4/02 BS actual), 78.7 % IBW. Weight Source: Bed scale  Current BMI (kg/m2): 16.8  Usual Body Weight: 51.8 kg (114 lb 3.2 oz) (12/26/24-SS; note wt of 41.2kg via BS on 3/17/24.)     % Weight Change (Calculated): -3.5  Weight Adjustment For: No Adjustment                 BMI Categories: Underweight (BMI less than 22) age over 65    Estimated Daily Nutrient Needs:  Energy Requirements Based On:

## 2025-04-02 NOTE — PROGRESS NOTES
Patient agitated BS coarse crackles patient is winded.  Will not go back on bipap states it is making her nauseated.  Perfect served Dr Rodriguez

## 2025-04-02 NOTE — ED PROVIDER NOTES
HPI:  4/1/25, Time: 8:10 PM EDT         Lana Phillips is a 70 y.o. female history of atherosclerotic disease history of COPD history of COVID history of obstructive sleep apnea PVD thyroid disease uncontrolled hypertension presenting to the ED for shortness of breath and chest pain, beginning hours ago.  The complaint has been persistent, moderate in severity, and worsened by nothing.  Patient presenting here because of shortness of breath.  Patient symptoms started hours prior arrival.  Patient reporting chest discomfort with coughing.  Patient reporting some mild upper abdominal pain patient reporting no vomiting.  Patient reporting no fever no chills.  Patient is on oxygen patient reports she was just discharged from the hospital today.  Patient was given DuoNeb treatment as well as Solu-Medrol prior to arrival    ROS:   Pertinent positives and negatives are stated within HPI, all other systems reviewed and are negative.  --------------------------------------------- PAST HISTORY ---------------------------------------------  Past Medical History:  has a past medical history of Atherosclerosis 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 (HCC), Atrial fibrillation (HCC), Chronic obstructive pulmonary disease (HCC), COVID-19, Critical limb ischemia of left lower extremity with rest pain, GI bleed, Hypertension, RUTH treated with BiPAP, Panic disorder, PVD (peripheral vascular disease), Severe protein-calorie malnutrition, Thyroid disease, and Uncontrolled hypertension.    Past Surgical History:  has a past surgical history that includes back surgery; Left oophorectomy; Upper gastrointestinal endoscopy (N/A, 10/16/2023); Pain management procedure (N/A, 12/28/2023); invasive vascular (N/A, 1/19/2024); invasive vascular (N/A, 1/19/2024); bronchoscopy (N/A, 4/16/2024); and Pain management procedure (N/A, 11/14/2024).    Social History:  reports that she quit smoking about 15 months ago. Her

## 2025-04-02 NOTE — ED NOTES
BiPap alarm sounded, when this RN entered room, pt had removed her bipap and was 81% room air. Patient instructed to keep bipap mask on. Mask reapplied to patient.     Pt assisted w/ bedpan. Call light remains in reach.

## 2025-04-02 NOTE — PLAN OF CARE
Problem: Safety - Adult  Goal: Free from fall injury  4/2/2025 1317 by Eleni Oleary, RN  Outcome: Progressing  4/2/2025 0412 by Yarely Mcintosh, RN  Outcome: Progressing

## 2025-04-02 NOTE — H&P
Hospitalist History & Physical      PCP: Tal Humphrey DO    Date of Service: Pt seen/examined on 4/1/2025     Chief Complaint:  had concerns including Shortness of Breath (Pt complaint of SOB, throbbing chest pain, sent in from [place]. EMS gave 125 solumedrol and 1 breathing treatment ).    History Of Present Illness:    Ms. Lana Phillips, a 70 y.o. year old female  who  has a past medical history of Atherosclerosis 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 (HCC), Atrial fibrillation (HCC), Chronic obstructive pulmonary disease (HCC), COVID-19, Critical limb ischemia of left lower extremity with rest pain, GI bleed, Hypertension, RUTH treated with BiPAP, Panic disorder, PVD (peripheral vascular disease), Severe protein-calorie malnutrition, Thyroid disease, and Uncontrolled hypertension.     Patient presented to the emergency department with shortness of breath.  Discharged from [place] earlier today.  Also reporting some chest discomfort.  These began about several hours ago.  Patient denies fever, chills, nausea, vomiting, abdominal pain, headache, dizziness, dysuria, hematuria, constipation, diarrhea.  Vital signs within normal limits and stable.  Patient is currently 99% on 4 L nasal cannula.  The patient is afebrile.  Laboratory studies demonstrate glucose 102, troponin 39 and a hemoglobin of 7.7.  This appears to be at her baseline.  Blood gases show a pCO2 of 72.0 and a pCO2 of 62.9.  Chest x-ray was reviewed which shows interstitial markings concerning underlying fibrotic disease.  Patient was given breathing treatments and IV steroids in the emergency department.  Medicine consulted for admission.  Case was discussed with the emergency department physician.  Discussed plan of care with patient who agrees with plan.      Past Medical History:   Diagnosis Date    Atherosclerosis 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 (HCC) 01/19/2024    Atrial      04/01/25 2029   ALKPHOS 80   ALT 15   AST 21   BILITOT <0.2     CE:  No results for input(s): \"CKTOTAL\", \"TROPONINI\" in the last 72 hours.  PT/INR: No results for input(s): \"INR\", \"APTT\" in the last 72 hours.  BNP: No results for input(s): \"BNP\" in the last 72 hours.  ESR:   Lab Results   Component Value Date    SEDRATE 67 (H) 03/22/2025     CRP:   Lab Results   Component Value Date    .0 (H) 03/22/2025     D Dimer:   Lab Results   Component Value Date    DDIMER 273 (H) 03/07/2025      Folate and B12:   Lab Results   Component Value Date    QHLVXWJM00 1341 (H) 02/10/2025   ,   Lab Results   Component Value Date    FOLATE >20.0 02/10/2025     Lactic Acid:   Lab Results   Component Value Date    LACTA 0.8 03/29/2025     Thyroid Studies:   Lab Results   Component Value Date    TSH 0.15 (L) 03/08/2025    I8LDCFS 67 (L) 03/09/2025       Oupatient labs:  Lab Results   Component Value Date    CHOL 266 (H) 03/16/2025    TRIG 157 (H) 03/16/2025     03/16/2025    TSH 0.15 (L) 03/08/2025    INR 1.1 12/30/2024    LABA1C 6.3 (H) 03/16/2025       Urinalysis:    Lab Results   Component Value Date/Time    NITRU NEGATIVE 03/29/2025 02:56 AM    WBCUA 0 TO 5 03/29/2025 02:56 AM    BACTERIA 2+ 03/22/2025 11:45 AM    RBCUA 3 to 5 03/29/2025 02:56 AM    BLOODU LARGE 05/10/2023 03:22 PM    GLUCOSEU NEGATIVE 03/29/2025 02:56 AM       Imaging:      ASSESSMENT:  -COPD exacerbation  -Acute respiratory failure with hypercapnia  -Chronic anemia  -History of atrial fibrillation  -Hypertension  -GERD  -Anxiety/depression      PLAN:  -Admit to medicine  -Consult pulmonology  -Pulmicort twice daily  -DuoNebs 4 times daily  -Albuterol as needed  -Methylprednisolone 40 mg IV every 6 hours  -Supplemental O2 to maintain oxygen saturations greater than 92%  -Monitor hemoglobin levels  -Transfuse for hemoglobin less than 7.0  -Continue BuSpar 10 mg tablet daily  -Cymbalta 60 mg daily  -Zoloft 50 mg daily  -Clonazepam 0.5 mg 2 tablets

## 2025-04-02 NOTE — ED NOTES
Pt noted ambulating out of ED room on room air without assistance. RN went to assist patient so she did not fall while other RN got O2 tank and nasal cannula. Pt asked not to remove self from bipap and press call light.   Pt used rest room on nasal canula then brought back to bed, replaced on bipap and monitor. Call light within reach.

## 2025-04-02 NOTE — PROGRESS NOTES
LOVENOX PROPHYLAXIS EVALUATION  (Populations not addressed in this protocol: trauma, obstetrics, or COVID-19)    Wt Readings from Last 3 Encounters:   03/29/25 48.9 kg (107 lb 12.9 oz)   03/25/25 50.8 kg (111 lb 15.9 oz)   03/16/25 50.8 kg (112 lb)       Estimated Creatinine Clearance: 49 mL/min (based on SCr of 0.8 mg/dL).  Recent Labs     03/30/25  0621 03/30/25  1715 04/01/25  0513 04/01/25 2029   BUN 13  --  9 14   CREATININE 0.6  --  0.6 0.8      < >  --  347   HGB 7.2*   < > 8.1* 7.7*   HCT 23.8*   < > 27.3* 25.9*    < > = values in this interval not displayed.       Weight Range: 50.9 kg and below    CRCL = 30 or greater    50.9 kg   and below     .9  kg   101-150.9 kg   151-174.9  kg   175 kg  or greater     30mg subq  daily     40mg subq daily    (or 30mg subq BID orthopedic)     30mg subq  BID   40mg subq  BID   60mg subq BID       Per P/T protocol for appropriate subq anticoagulation by weight and CRCL change to:    Enoxparin 30mg subq daily      Diego Hartman RPH  10:05 PM  04/01/25

## 2025-04-03 VITALS
WEIGHT: 110.23 LBS | BODY MASS INDEX: 16.71 KG/M2 | HEIGHT: 68 IN | DIASTOLIC BLOOD PRESSURE: 91 MMHG | RESPIRATION RATE: 20 BRPM | SYSTOLIC BLOOD PRESSURE: 153 MMHG | TEMPERATURE: 97 F | HEART RATE: 84 BPM | OXYGEN SATURATION: 96 %

## 2025-04-03 PROCEDURE — 6370000000 HC RX 637 (ALT 250 FOR IP): Performed by: STUDENT IN AN ORGANIZED HEALTH CARE EDUCATION/TRAINING PROGRAM

## 2025-04-03 PROCEDURE — 6360000002 HC RX W HCPCS: Performed by: FAMILY MEDICINE

## 2025-04-03 PROCEDURE — 2700000000 HC OXYGEN THERAPY PER DAY

## 2025-04-03 PROCEDURE — 6370000000 HC RX 637 (ALT 250 FOR IP): Performed by: INTERNAL MEDICINE

## 2025-04-03 PROCEDURE — 94640 AIRWAY INHALATION TREATMENT: CPT

## 2025-04-03 PROCEDURE — 99239 HOSP IP/OBS DSCHRG MGMT >30: CPT | Performed by: STUDENT IN AN ORGANIZED HEALTH CARE EDUCATION/TRAINING PROGRAM

## 2025-04-03 PROCEDURE — 6370000000 HC RX 637 (ALT 250 FOR IP): Performed by: FAMILY MEDICINE

## 2025-04-03 PROCEDURE — 2500000003 HC RX 250 WO HCPCS: Performed by: FAMILY MEDICINE

## 2025-04-03 RX ORDER — DULOXETIN HYDROCHLORIDE 60 MG/1
60 CAPSULE, DELAYED RELEASE ORAL 2 TIMES DAILY
Qty: 30 CAPSULE | Refills: 3 | Status: SHIPPED | OUTPATIENT
Start: 2025-04-03

## 2025-04-03 RX ORDER — ALPRAZOLAM 0.5 MG
0.5 TABLET ORAL 3 TIMES DAILY PRN
Qty: 30 TABLET | Refills: 0 | Status: SHIPPED | OUTPATIENT
Start: 2025-04-03 | End: 2025-05-03

## 2025-04-03 RX ADMIN — ENOXAPARIN SODIUM 30 MG: 100 INJECTION SUBCUTANEOUS at 08:11

## 2025-04-03 RX ADMIN — DULOXETINE HYDROCHLORIDE 60 MG: 60 CAPSULE, DELAYED RELEASE ORAL at 08:10

## 2025-04-03 RX ADMIN — SODIUM CHLORIDE, PRESERVATIVE FREE 10 ML: 5 INJECTION INTRAVENOUS at 08:11

## 2025-04-03 RX ADMIN — IPRATROPIUM BROMIDE AND ALBUTEROL SULFATE 1 DOSE: 2.5; .5 SOLUTION RESPIRATORY (INHALATION) at 13:41

## 2025-04-03 RX ADMIN — ALPRAZOLAM 0.5 MG: 0.25 TABLET ORAL at 08:10

## 2025-04-03 RX ADMIN — BUSPIRONE HYDROCHLORIDE 10 MG: 10 TABLET ORAL at 08:10

## 2025-04-03 RX ADMIN — ASPIRIN 81 MG: 81 TABLET, COATED ORAL at 08:10

## 2025-04-03 RX ADMIN — IPRATROPIUM BROMIDE AND ALBUTEROL SULFATE 1 DOSE: 2.5; .5 SOLUTION RESPIRATORY (INHALATION) at 08:32

## 2025-04-03 RX ADMIN — PREDNISONE 20 MG: 20 TABLET ORAL at 08:10

## 2025-04-03 RX ADMIN — BUDESONIDE 500 MCG: 0.5 SUSPENSION RESPIRATORY (INHALATION) at 08:33

## 2025-04-03 RX ADMIN — IPRATROPIUM BROMIDE AND ALBUTEROL SULFATE 1 DOSE: 2.5; .5 SOLUTION RESPIRATORY (INHALATION) at 12:18

## 2025-04-03 RX ADMIN — IPRATROPIUM BROMIDE AND ALBUTEROL SULFATE 1 DOSE: 2.5; .5 SOLUTION RESPIRATORY (INHALATION) at 15:39

## 2025-04-03 RX ADMIN — ALPRAZOLAM 0.5 MG: 0.25 TABLET ORAL at 14:45

## 2025-04-03 RX ADMIN — ONDANSETRON 4 MG: 2 INJECTION, SOLUTION INTRAMUSCULAR; INTRAVENOUS at 08:10

## 2025-04-03 NOTE — CARE COORDINATION
CM update note.  Discharge order noted.  Pulmonology clearance needed.  [facility]/destination completed.      1055:  patient cleared for discharge from Pulmonary standpoint.  [facility] does not have any available transport.   Transport set up via wheelchair with PAS.  Patient does have [place] Medicaid (confirmed with Karey from [place]).  Sent message to patient access to add insurance.   time is between [id number].  Nurse will need to call report to [phone].  Facility liaison, patient, patient HCPOA, and RN all notified of  time.  Ambulette form with envelope placed on the soft chart.  Altaf Rapp RN [phone].

## 2025-04-03 NOTE — DISCHARGE SUMMARY
[facility] Hospitalist Discharge Summary         Lana Phillips  MRN: [id number]  Account Number: [id number]  Admitted: 4/1/2025  Discharge Date: 04/03/25    PCP Handoff    Recommended Outpatient Testing  none    Results Pending At Discharge  None        Clinical Summary  Patient admitted on 4/1/2025      Lana Phillips is a 70 y.o. female patient of Tal Humphrey DO with history of COPD, atrial fibrillation, sleep apnea on BiPAP, panic disorder, peripheral vascular disease, hypertension, severe protein calorie malnutrition presented to [facility] on 4/1/2025 with concern of shortness of breath.  Patient was discharged on 4/1 after being treated for similar complaint and COPD exacerbation.  On recent nursing home patient reported some chest discomfort and shortness of breath and was sent back to the ER for evaluation.  While in the ER patient ABG showed hypercapnia but likely compensated with pH of 7.38 and pCO2 of 62.  Patient had increased work of breathing, was on 6 L nasal cannula and was admitted for further evaluation.     Acute on chronic hypoxic hypercapnic respiratory failure  COPD exacerbation  Possible fibrotic lung disease  Patient with increased work of breathing likely in the setting of significant anxiety  Continue breathing treatment and steroids  Pulmonology consulted  Continue home BuSpar, Cymbalta, clonazepam  As needed Xanax  Patient is being discharged on increased dose of Cymbalta and as needed Xanax.  Patient needs to follow-up with psychiatry as outpatient.     Peripheral vascular disease  Obstructive sleep apnea on BiPAP  Chronic heart failure with preserved EF  Continue home meds as ordered.      Discharge Medications     Medication List        START taking these medications      ALPRAZolam 0.5 MG tablet  Commonly known as: XANAX  Take 1 tablet by mouth 3 times daily as needed for Anxiety for up to 30 days. Max Daily Amount: 1.5 mg            CHANGE how you take these

## 2025-04-03 NOTE — PLAN OF CARE
Problem: Chronic Conditions and Co-morbidities  Goal: Patient's chronic conditions and co-morbidity symptoms are monitored and maintained or improved  Outcome: Progressing     Problem: Safety - Adult  Goal: Free from fall injury  4/2/2025 9763 by Elvin Cantu, RN  Outcome: Progressing  4/2/2025 1317 by Eleni Oleary, RN  Outcome: Progressing     Problem: Pain  Goal: Verbalizes/displays adequate comfort level or baseline comfort level  Outcome: Progressing     Problem: Skin/Tissue Integrity  Goal: Skin integrity remains intact  Description: 1.  Monitor for areas of redness and/or skin breakdown  2.  Assess vascular access sites hourly  3.  Every 4-6 hours minimum:  Change oxygen saturation probe site  4.  Every 4-6 hours:  If on nasal continuous positive airway pressure, respiratory therapy assess nares and determine need for appliance change or resting period  Outcome: Progressing     Problem: Nutrition Deficit:  Goal: Optimize nutritional status  Outcome: Progressing

## 2025-04-03 NOTE — PLAN OF CARE
Problem: Chronic Conditions and Co-morbidities  Goal: Patient's chronic conditions and co-morbidity symptoms are monitored and maintained or improved  4/3/2025 1129 by Fanny Hardwick RN  Outcome: Progressing  4/2/2025 2253 by Elvin Cantu RN  Outcome: Progressing     Problem: Safety - Adult  Goal: Free from fall injury  4/3/2025 1129 by Fanny Hardwick RN  Outcome: Progressing  Flowsheets (Taken 4/3/2025 1129)  Free From Fall Injury: Instruct family/caregiver on patient safety  4/2/2025 2253 by Elvin Cantu RN  Outcome: Progressing     Problem: Pain  Goal: Verbalizes/displays adequate comfort level or baseline comfort level  4/3/2025 1129 by Fanny Hardwick RN  Outcome: Progressing  Flowsheets (Taken 4/3/2025 0800)  Verbalizes/displays adequate comfort level or baseline comfort level: Encourage patient to monitor pain and request assistance  4/2/2025 2253 by Elvin Cantu RN  Outcome: Progressing     Problem: Skin/Tissue Integrity  Goal: Skin integrity remains intact  Description: 1.  Monitor for areas of redness and/or skin breakdown  2.  Assess vascular access sites hourly  3.  Every 4-6 hours minimum:  Change oxygen saturation probe site  4.  Every 4-6 hours:  If on nasal continuous positive airway pressure, respiratory therapy assess nares and determine need for appliance change or resting period  4/3/2025 1129 by Fanny Hardwick RN  Outcome: Progressing  4/2/2025 2253 by Elvin Cantu RN  Outcome: Progressing     Problem: Nutrition Deficit:  Goal: Optimize nutritional status  4/2/2025 2253 by Elvin Cantu RN  Outcome: Progressing

## 2025-04-03 NOTE — PROGRESS NOTES
[facility]  [address], [address]  [place] [address]      Pulmonary Progress Note      SUBJECTIVE:  claims feeling better with breathing, close to baseline with respiratory function, didn't wear NIV much last night, for discharge today    OBJECTIVE    Medications    Continuous Infusions:   sodium chloride         Scheduled Meds:   aspirin  81 mg Oral Daily    busPIRone  10 mg Oral BID    clonazePAM  1 mg Oral QHS    DULoxetine  60 mg Oral Daily    predniSONE  20 mg Oral Daily    methylPREDNISolone  125 mg IntraVENous Once    sodium chloride flush  5-40 mL IntraVENous 2 times per day    budesonide  500 mcg Nebulization BID RT    ipratropium 0.5 mg-albuterol 2.5 mg  1 Dose Inhalation Q4H WA RT    enoxaparin  30 mg SubCUTAneous Daily       PRN Meds:benzonatate, calcium carbonate, hydrOXYzine pamoate, ipratropium 0.5 mg-albuterol 2.5 mg, ALPRAZolam, sodium chloride flush, sodium chloride, ondansetron **OR** ondansetron, polyethylene glycol, acetaminophen **OR** acetaminophen, albuterol, guaiFENesin-dextromethorphan    Physical    VITALS:  BP (!) 153/91   Pulse 84   Temp 97 °F (36.1 °C) (Temporal)   Resp 20   Ht 1.727 m (5' 7.99\")   Wt 50 kg (110 lb 3.7 oz)   SpO2 96%   BMI 16.76 kg/m²     24HR INTAKE/OUTPUT:      Intake/Output Summary (Last 24 hours) at 4/3/2025 1625  Last data filed at 4/3/2025 0951  Gross per 24 hour   Intake 100 ml   Output --   Net 100 ml       24HR PULSE OXIMETRY RANGE:    SpO2  Av.6 %  Min: 96 %  Max: 100 %    General appearance: alert, appears stated age, and cooperative  Lungs: clear to auscultation bilaterally and rhonchi bilaterally  Heart: regular rate and rhythm, S1, S2 normal, no murmur, click, rub or gallop  Abdomen: soft, non-tender; bowel sounds normal; no masses,  no organomegaly  Extremities: extremities normal, atraumatic, no cyanosis or edema  Neurologic: Mental status: Alert, oriented, thought  content appropriate    Data    CBC:   Recent Labs     04/01/25  0513 04/01/25 2029 04/02/25  0606   WBC  --  11.3 13.7*   HGB 8.1* 7.7* 7.9*   HCT 27.3* 25.9* 26.1*   MCV  --  107.5* 105.2*   PLT  --  347 403       BMP:  Recent Labs     04/01/25  0513 04/01/25 2029 04/02/25  0606    141 140   K 3.9 4.2 4.0   CL 97* 99 97*   CO2 28 33* 32*   BUN 9 14 12   CREATININE 0.6 0.8 0.5    ALB:3,BILIDIR:3,BILITOT:3,ALKPHOS:3)@    PT/INR: No results for input(s): \"PROTIME\", \"INR\" in the last 72 hours.    ABG:   Recent Labs     04/01/25  2309   PH 7.450   PO2 118.8*   PCO2 51.8*   HCO3 35.2*   BE 10.0*   O2SAT 98.6*   METHB 0.3   O2HB 98.0*   COHB 0.3   HHB 1.4   THB 9.0*     FiO2 : 50 %       Radiology/Other tests reviewed: none    Assessment:     Principal Problem:    COPD exacerbation (HCC)  Active Problems:    Severe protein-calorie malnutrition  Resolved Problems:    * No resolved hospital problems. *      Plan:       Cont with steroids, taper as tolerated, on chronic use daily  Cont with oxygen daytime and NIV at night, encouraged use as doesn't do well when doesn't wear NIV  Cont with nebs, can resume usual medications as out-pt  Can be discharged from pulmonary pov, will have to see if gets readmitted quickly or not      Time at the bedside, reviewing labs and radiographs, reviewing notes and consultations, discussing with staff and family was more than 50 minutes.      Thanks for letting us see this patient in consultation.  Please contact us with any questions. Office [phone] or after hours through [employer], x [phone].

## 2025-04-03 NOTE — PLAN OF CARE
Problem: Chronic Conditions and Co-morbidities  Goal: Patient's chronic conditions and co-morbidity symptoms are monitored and maintained or improved  4/3/2025 1814 by Fanny Hardwick RN  Outcome: Adequate for Discharge  4/3/2025 1129 by Fanny Hardwick RN  Outcome: Progressing     Problem: Safety - Adult  Goal: Free from fall injury  4/3/2025 1814 by Fanny Hardwick RN  Outcome: Adequate for Discharge  4/3/2025 1129 by Fanny Hardwick RN  Outcome: Progressing  Flowsheets (Taken 4/3/2025 1129)  Free From Fall Injury: Instruct family/caregiver on patient safety     Problem: Pain  Goal: Verbalizes/displays adequate comfort level or baseline comfort level  4/3/2025 1814 by Fanny Hardwick RN  Outcome: Adequate for Discharge  4/3/2025 1129 by Fanny Hardwick RN  Outcome: Progressing  Flowsheets (Taken 4/3/2025 0800)  Verbalizes/displays adequate comfort level or baseline comfort level: Encourage patient to monitor pain and request assistance     Problem: Skin/Tissue Integrity  Goal: Skin integrity remains intact  Description: 1.  Monitor for areas of redness and/or skin breakdown  2.  Assess vascular access sites hourly  3.  Every 4-6 hours minimum:  Change oxygen saturation probe site  4.  Every 4-6 hours:  If on nasal continuous positive airway pressure, respiratory therapy assess nares and determine need for appliance change or resting period  4/3/2025 1814 by Fanny Hardwick RN  Outcome: Adequate for Discharge  4/3/2025 1129 by Fanny Hardwick RN  Outcome: Progressing     Problem: Nutrition Deficit:  Goal: Optimize nutritional status  Outcome: Adequate for Discharge

## 2025-04-03 NOTE — PROGRESS NOTES
Called [facility] for nurse to nurse report but to no avail, left a voicemail. Will call again later.

## 2025-04-08 NOTE — PROGRESS NOTES
Physician Progress Note      PATIENT:               ANGIE SHELTON  [facility] #:                  [id number]  :                       1955  ADMIT DATE:       3/22/2025 11:31 AM  DISCH DATE:        3/26/2025 4:10 PM  RESPONDING  PROVIDER #:        Lee Wilson MD          QUERY TEXT:    Patient admitted with concerns for altered mental status. Noted documentation   of \"Currently severe protein calorie malnutrition\" in H&P 3/22 and subsequent   Progress Notes. In order to support the diagnosis of severe protein calorie   malnutrition, please include additional clinical indicators in your   documentation.  Or please document if the diagnosis of severe protein calorie   malnutrition has been ruled out after further study.    The medical record reflects the following:  Risk Factors: Advanced age >70, Hx of GERD, COPD w/ chronic respiratory   failure  Clinical Indicators:  -H&P 3/22, Progress Notes 3/24-3/26 & Discharge Summary   3/26 \"Currently severe protein calorie malnutrition\"  -H&P 3/22 \"encourage oral intake\"  -Internal Med Progress Notes 3/23-3/26/2025 \"weight loss\"  -3/25/2025 Height 172.7 cm, Weight 50.8 kg, BMI 17.1  Treatment: Adult Regular Diet- Low Fat/Low Cholesterol/High Fiber, Height,   Weight  Options provided:  -- Severe protein calorie malnutrition present as evidenced by, Please   document evidence.  -- Severe protein calorie malnutrition was ruled out  -- Other - I will add my own diagnosis  -- Disagree - Not applicable / Not valid  -- Disagree - Clinically unable to determine / Unknown  -- Refer to Clinical Documentation Reviewer    PROVIDER RESPONSE TEXT:    Severe protein calorie malnutrition is present as evidenced by    Query created by: Jesse Mckeon on 3/28/2025 9:15 AM      Electronically signed by:  Lee Wilson MD 2025 11:15 AM

## 2025-04-17 NOTE — PROGRESS NOTES
Physician Progress Note      PATIENT:               ANGIE SHELTON  CSN #:                  [id number]  :                       1955  ADMIT DATE:       2025 8:12 PM  DISCH DATE:        4/3/2025 6:19 PM  RESPONDING  PROVIDER #:        Mohamud Rodriguez MD        QUERY TEXT:    Type of Anemia: Please provide further specificity, if known.    Clinical indicators include: gi bleed, hematuria, hemoglobin, vitamin b12,   cancer, rbc, hgb, hct, b12, chronic anemia, transfuse  Options provided:  -- Anemia due to acute blood loss  -- Anemia due to chronic blood loss  -- Anemia due to iron deficiency  -- Anemia due to postoperative blood loss  -- Anemia due to chronic disease  -- Other - I will add my own diagnosis  -- Disagree - Not applicable / Not valid  -- Disagree - Clinically Unable to determine / Unknown        PROVIDER RESPONSE TEXT:    The patient has anemia due to chronic blood loss.      Electronically signed by:  Mohamud Rodriguez MD 2025 10:14 AM

## (undated) DEVICE — 3M™ RED DOT™ MONITORING ELECTRODE WITH FOAM TAPE AND STICKY GEL 2560, 50/BAG, 20/CASE, 72/PLT: Brand: RED DOT™

## (undated) DEVICE — ANGIO-SEAL VIP VASCULAR CLOSURE DEVICE: Brand: ANGIO-SEAL

## (undated) DEVICE — NEEDLE HYPO 18GA L1.5IN PNK POLYPR HUB S STL REG BVL STR

## (undated) DEVICE — SINGLE USE SUCTION VALVE MAJ-209: Brand: SINGLE USE SUCTION VALVE (STERILE)

## (undated) DEVICE — SINGLE USE BIOPSY VALVE MAJ-210: Brand: SINGLE USE BIOPSY VALVE (STERILE)

## (undated) DEVICE — BANDAGE ADH W1XL3IN NAT FAB WVN FLX DURABLE N ADH PD SEAL

## (undated) DEVICE — NON-DEHP CATHETER EXTENSION SET, MALE LUER LOCK ADAPTER

## (undated) DEVICE — Device: Brand: PORTEX

## (undated) DEVICE — 12 ML SYRINGE,LUER-LOCK TIP: Brand: MONOJECT

## (undated) DEVICE — KIT ANGIO W/ AT P65 PREM HND CTRL FOR CNTRST DEL ANGIOTOUCH

## (undated) DEVICE — SYSTEM ATHRCTMY SHTH 7FR L114CM TIP L9.6CM VES DIA3.5-7MM

## (undated) DEVICE — MICROPUNCTURE INTRODUCER SET SILHOUETTE TRANSITIONLESS PUSH-PLUS DESIGN - STIFFENED CANNULA WITH STAINLESS STEEL WIRE GUIDE: Brand: MICROPUNCTURE

## (undated) DEVICE — KIT MED IMAG CNTRST AGNT W/ IOPAMIDOL REUSE

## (undated) DEVICE — RADIFOCUS GLIDEWIRE ADVANTAGE GUIDEWIRE: Brand: GLIDEWIRE ADVANTAGE

## (undated) DEVICE — CATHETER GUID OTW 0.035 IN 6 FRX135 CM 5 FR TRAILBLAZER

## (undated) DEVICE — DESTINATION RENAL GUIDING SHEATH: Brand: DESTINATION

## (undated) DEVICE — PACK SURG CARDIAC CATH

## (undated) DEVICE — SOLUTION IRRIG 500ML 0.9% SOD CHLO USP POUR PLAS BTL

## (undated) DEVICE — GAUZE,SPONGE,4"X4",8PLY,STRL,LF,10/TRAY: Brand: MEDLINE

## (undated) DEVICE — OXYMASK ETCO2, 7FT OXYGEN TUBING, 8FT SAMPLING LINE WITH MALE CONNECTOR: Brand: OXYMASK™

## (undated) DEVICE — EP SPD2-US-050-320 SPIDER FX V04
Type: IMPLANTABLE DEVICE | Site: LEG | Status: NON-FUNCTIONAL
Brand: SPIDERFX™
Removed: 2024-01-19

## (undated) DEVICE — AIRWAY PHARYNGEAL AD 9 CM INTUB

## (undated) DEVICE — PTA BALLOON DILATATION CATHETER: Brand: STERLING™

## (undated) DEVICE — KIT MFLD ISOLATN NACL CNTRST PRT TBNG SPIK W/ PRSS TRNSDUC

## (undated) DEVICE — GLOVE ORANGE PI 7 1/2   MSG9075

## (undated) DEVICE — 6 ML SYRINGE LUER-LOCK TIP: Brand: MONOJECT

## (undated) DEVICE — SYRINGE, LUER LOCK, 5ML: Brand: MEDLINE

## (undated) DEVICE — NEEDLE HYPO 25GA L1.5IN BLU POLYPR HUB S STL REG BVL STR

## (undated) DEVICE — BLOCK BITE 60FR RUBBER ADLT DENTAL

## (undated) DEVICE — CATHETER ANGIO MOD HK 2 0.035 IN 5 FRX80 CM PERFORMA

## (undated) DEVICE — BRONCHOSCOPY PACK: Brand: MEDLINE INDUSTRIES, INC.

## (undated) DEVICE — DOUBLE  SWIVEL ELBOW 15M - DOUBLE FLIP TOP CAP WITH SEAL - 22M/15F: Brand: DOUBLE  SWIVEL ELBOW 15M - DOUBLE FLIP TOP CAP WITH SEAL - 22M/15F

## (undated) DEVICE — TUBING PRSS MON L12IN PVC RIG NONEXPANDING M TO FEM CONN

## (undated) DEVICE — CATHETER ANGIOPLSTY L130CM BAL L250MM DIA5MM GWIRE 0.035IN

## (undated) DEVICE — CANNULA NSL CANN NSL L25FT TBNG AD O2 SUP SFT UC

## (undated) DEVICE — GRADUATE TRIANG MEASURE 1000ML BLK PRNT

## (undated) DEVICE — SPONGE GZ W4XL4IN RAYON POLY CVR W/NONWOVEN FAB STRL 2/PK

## (undated) DEVICE — SYRINGE MED 5ML STD CLR PLAS LUERLOCK TIP N CTRL DISP